# Patient Record
Sex: FEMALE | Race: WHITE | NOT HISPANIC OR LATINO | Employment: OTHER | URBAN - METROPOLITAN AREA
[De-identification: names, ages, dates, MRNs, and addresses within clinical notes are randomized per-mention and may not be internally consistent; named-entity substitution may affect disease eponyms.]

---

## 2020-09-20 ENCOUNTER — TRANSFERRED RECORDS (OUTPATIENT)
Dept: HEALTH INFORMATION MANAGEMENT | Facility: CLINIC | Age: 50
End: 2020-09-20

## 2020-10-02 ENCOUNTER — TRANSFERRED RECORDS (OUTPATIENT)
Dept: HEALTH INFORMATION MANAGEMENT | Facility: CLINIC | Age: 50
End: 2020-10-02

## 2020-10-28 ENCOUNTER — TRANSFERRED RECORDS (OUTPATIENT)
Dept: HEALTH INFORMATION MANAGEMENT | Facility: CLINIC | Age: 50
End: 2020-10-28

## 2020-10-31 ENCOUNTER — TRANSFERRED RECORDS (OUTPATIENT)
Dept: HEALTH INFORMATION MANAGEMENT | Facility: CLINIC | Age: 50
End: 2020-10-31

## 2020-11-14 ENCOUNTER — TRANSFERRED RECORDS (OUTPATIENT)
Dept: HEALTH INFORMATION MANAGEMENT | Facility: CLINIC | Age: 50
End: 2020-11-14

## 2020-11-15 ENCOUNTER — TRANSFERRED RECORDS (OUTPATIENT)
Dept: HEALTH INFORMATION MANAGEMENT | Facility: CLINIC | Age: 50
End: 2020-11-15

## 2020-11-30 ENCOUNTER — TRANSFERRED RECORDS (OUTPATIENT)
Dept: HEALTH INFORMATION MANAGEMENT | Facility: CLINIC | Age: 50
End: 2020-11-30

## 2020-11-30 ENCOUNTER — MEDICAL CORRESPONDENCE (OUTPATIENT)
Dept: HEALTH INFORMATION MANAGEMENT | Facility: CLINIC | Age: 50
End: 2020-11-30

## 2020-12-03 ENCOUNTER — REFERRAL (OUTPATIENT)
Dept: TRANSPLANT | Facility: CLINIC | Age: 50
End: 2020-12-03

## 2020-12-03 VITALS — WEIGHT: 180 LBS | BODY MASS INDEX: 28.25 KG/M2 | HEIGHT: 67 IN

## 2020-12-03 DIAGNOSIS — K70.30 ALCOHOLIC CIRRHOSIS (H): Primary | ICD-10-CM

## 2020-12-03 DIAGNOSIS — K70.31 ALCOHOLIC CIRRHOSIS OF LIVER WITH ASCITES (H): ICD-10-CM

## 2020-12-03 DIAGNOSIS — K76.6 PORTAL HYPERTENSION (H): ICD-10-CM

## 2020-12-03 PROBLEM — D61.818 PANCYTOPENIA (H): Status: ACTIVE | Noted: 2020-09-08

## 2020-12-03 PROBLEM — D75.89 MACROCYTOSIS WITHOUT ANEMIA: Status: ACTIVE | Noted: 2017-05-19

## 2020-12-03 PROBLEM — I82.629 ACUTE DEEP VEIN THROMBOSIS (DVT) OF UPPER EXTREMITY (H): Status: ACTIVE | Noted: 2020-09-08

## 2020-12-03 PROBLEM — J94.8 HYDROTHORAX: Status: ACTIVE | Noted: 2020-09-13

## 2020-12-03 PROBLEM — N18.9 CKD (CHRONIC KIDNEY DISEASE): Status: ACTIVE | Noted: 2020-11-14

## 2020-12-03 PROBLEM — K74.60 CIRRHOSIS OF LIVER WITH ASCITES (H): Status: ACTIVE | Noted: 2020-09-08

## 2020-12-03 PROBLEM — E87.6 HYPOKALEMIA: Status: ACTIVE | Noted: 2020-09-08

## 2020-12-03 PROBLEM — N18.9 ACUTE KIDNEY INJURY SUPERIMPOSED ON CHRONIC KIDNEY DISEASE (H): Status: ACTIVE | Noted: 2020-11-14

## 2020-12-03 PROBLEM — D53.9 MACROCYTIC ANEMIA: Status: ACTIVE | Noted: 2020-10-01

## 2020-12-03 PROBLEM — K76.82 ACUTE HEPATIC ENCEPHALOPATHY (H): Status: ACTIVE | Noted: 2020-09-10

## 2020-12-03 PROBLEM — D64.9 ACUTE ANEMIA: Status: ACTIVE | Noted: 2020-10-02

## 2020-12-03 PROBLEM — Z86.718 HISTORY OF DVT (DEEP VEIN THROMBOSIS): Status: ACTIVE | Noted: 2020-10-30

## 2020-12-03 PROBLEM — K31.89 PORTAL HYPERTENSIVE GASTROPATHY (H): Status: ACTIVE | Noted: 2020-10-02

## 2020-12-03 PROBLEM — R18.8 CIRRHOSIS OF LIVER WITH ASCITES (H): Status: ACTIVE | Noted: 2020-09-08

## 2020-12-03 PROBLEM — D62 ANEMIA ASSOCIATED WITH ACUTE BLOOD LOSS: Status: ACTIVE | Noted: 2020-10-30

## 2020-12-03 PROBLEM — I87.2 VENOUS INCOMPETENCE: Status: ACTIVE | Noted: 2017-02-15

## 2020-12-03 PROBLEM — K21.9 GASTROESOPHAGEAL REFLUX DISEASE WITHOUT ESOPHAGITIS: Status: ACTIVE | Noted: 2017-02-14

## 2020-12-03 PROBLEM — J96.01 ACUTE HYPOXEMIC RESPIRATORY FAILURE (H): Status: ACTIVE | Noted: 2020-09-09

## 2020-12-03 PROBLEM — K92.2 GI (GASTROINTESTINAL BLEED): Status: ACTIVE | Noted: 2020-10-30

## 2020-12-03 SDOH — HEALTH STABILITY: MENTAL HEALTH: HOW MANY STANDARD DRINKS CONTAINING ALCOHOL DO YOU HAVE ON A TYPICAL DAY?: NOT ASKED

## 2020-12-03 SDOH — HEALTH STABILITY: MENTAL HEALTH: HOW OFTEN DO YOU HAVE A DRINK CONTAINING ALCOHOL?: NOT ASKED

## 2020-12-03 SDOH — HEALTH STABILITY: MENTAL HEALTH: HOW OFTEN DO YOU HAVE 6 OR MORE DRINKS ON ONE OCCASION?: NOT ASKED

## 2020-12-03 ASSESSMENT — MIFFLIN-ST. JEOR: SCORE: 1469.1

## 2020-12-03 NOTE — LETTER
12/3/2020    Sarah Fisher  [address] [facility] [address]      Dear Sarah,    Thank you for your interest in the Transplant Center at [facility], [facility]. We look forward to being a part of your care team and assisting you through the transplant process.    As we discussed, your transplant coordinator is Kiki Sutherland (Liver).  You may call your coordinator at any time with questions or concerns call [phone].    Please complete the following.    1. Fill out and return the enclosed forms    Authorization for Electronic Communication    Authorization to Discuss Protected Health Information    Authorization for Release of Protected Health Information    Authorization for Care Everywhere Release of Information    2. Sign up for:    Genlot, access to your electronic medical record (see enclosed pamphlet)    [email or web address], a transplant education website    You can use these tools to learn more about your transplant, communicate with your care team, and track your medical details      Sincerely,  Solid Organ Transplant  [facility], [facility]    cc: Referring Physician and PCP

## 2020-12-03 NOTE — TELEPHONE ENCOUNTER
Patient was asked the following questions during liver intake call.     Referring Provider: Rehan Escobar NP  Referring Diagnosis: Alcoholic Cirrhosis of the Liver.   PCP: Dr. Raven Weaver     1)Do you know why you have liver disease: Yes             If Alcoholic Cirrhosis is present when was your last drink: 6/2020             Have you ever been through treatment for alcohol: No, but sees therapist at [facility] regularly.    2) Presence of Ascites: Yes Paracentesis: Yes  3) Presence of Hepatic Encephalopathy: No Medications: No  4) History of GI Bleeding: No  5) Oxygen Use: None  6) EGD: Yes Where: [facility] When: 2020  7) Colonoscopy: No, but scheduled for 12/4/2020 at [facility].   8) MELD Score: 23  9) Insurance information: [employer] [facility]       Policy lao: Self       Subscriber/policy/ID number: [id number]      Group Number: [id number]    Referral intake process completed.  Patient is aware that after financial approval is received, medical records will be requested.   Patient confirmed for a callback from transplant coordinator on 12/10/2020.  Tentative evaluation date TBD.    Confirmed coordinator will discuss evaluation process in more detail at the time of their call.   Patient is aware of the need to arrange age appropriate cancer screening, vaccinations, and dental care.  Reminded patient to complete questionnaire, complete medical records release, and review packet prior to evaluation visit .  Assessed patient for special needs (ie--wheelchair, assistance, guardian, and ):  None   Patient instructed to call [phone] with questions.     Patient gave verbal consent during intake call to obtain medical records and documents outside of [facility]:  Yes    CLAUDIO Koch, LPN   Solid Organ Transplant

## 2020-12-04 ENCOUNTER — TRANSFERRED RECORDS (OUTPATIENT)
Dept: MULTI SPECIALTY CLINIC | Facility: CLINIC | Age: 50
End: 2020-12-04
Payer: COMMERCIAL

## 2020-12-08 ENCOUNTER — TRANSFERRED RECORDS (OUTPATIENT)
Dept: HEALTH INFORMATION MANAGEMENT | Facility: CLINIC | Age: 50
End: 2020-12-08

## 2020-12-08 NOTE — TELEPHONE ENCOUNTER
Spoke to her mother who reports Sarah is inpatient at outside hospital. MELD  29: T bili 4.1, INR 2.9, Creat 1.35, Na 132. Ask that mother notify this RN when patient is discharged. Per electronic records it appears communication between care teams has occurred but reassured this RN will verify.      50 year old with decomponesated alcoholic cirrhosis diagnosed in 2020 when patient became jaundice. Mother reports May 2020, no treatment, quit on own.    MELD 23-27 in 20 with T bili highest in  of 17,  was 13 and now ranging 3.2-5.7 in past few months. INR 2.1.     Ascites - diuretic managed   Taps - abd and  thora this week  HE - no  GIB - yes  EGD -  Colonoscopy -      Social History  Lives with alone and son, since  with [employer]son  Workin/[employer]- [employer]  ADL's:independent     PMH:  DVT- arm     Surgical History: C section    Nicotine:    NA       Alcohol/non prescribed drugs:     Plan:  Full evaluation.

## 2020-12-11 NOTE — TELEPHONE ENCOUNTER
Spoke to Evelina, bedside RN in CCU. Patient started on Midodrine and Levo being weaned off. Off Bipap but labored breathing. Will ensure care teams have communicated as needed. Contact information provided. Inpatient hepatology service updated.

## 2020-12-14 ENCOUNTER — HOSPITAL ENCOUNTER (INPATIENT)
Facility: CLINIC | Age: 50
LOS: 5 days | Discharge: HOME | End: 2020-12-19
Attending: INTERNAL MEDICINE | Admitting: INTERNAL MEDICINE
Payer: COMMERCIAL

## 2020-12-14 DIAGNOSIS — K70.31 ALCOHOLIC CIRRHOSIS OF LIVER WITH ASCITES (H): ICD-10-CM

## 2020-12-14 DIAGNOSIS — K72.10 CHRONIC LIVER FAILURE WITHOUT HEPATIC COMA (H): ICD-10-CM

## 2020-12-14 DIAGNOSIS — I95.9 HYPOTENSION, UNSPECIFIED HYPOTENSION TYPE: Primary | ICD-10-CM

## 2020-12-14 PROBLEM — K72.90 LIVER FAILURE (H): Status: ACTIVE | Noted: 2020-12-14

## 2020-12-14 LAB
ALBUMIN SERPL-MCNC: 2.5 G/DL (ref 3.4–5)
ALP SERPL-CCNC: 118 U/L (ref 40–150)
ALT SERPL W P-5'-P-CCNC: 12 U/L (ref 0–50)
ANION GAP SERPL CALCULATED.3IONS-SCNC: 5 MMOL/L (ref 3–14)
AST SERPL W P-5'-P-CCNC: 33 U/L (ref 0–45)
BASOPHILS # BLD AUTO: 0.1 10E9/L (ref 0–0.2)
BASOPHILS NFR BLD AUTO: 0.5 %
BILIRUB SERPL-MCNC: 5.9 MG/DL (ref 0.2–1.3)
BUN SERPL-MCNC: 10 MG/DL (ref 7–30)
CALCIUM SERPL-MCNC: 8.5 MG/DL (ref 8.5–10.1)
CHLORIDE SERPL-SCNC: 106 MMOL/L (ref 94–109)
CO2 SERPL-SCNC: 30 MMOL/L (ref 20–32)
CREAT SERPL-MCNC: 1.3 MG/DL (ref 0.52–1.04)
DIFFERENTIAL METHOD BLD: ABNORMAL
EOSINOPHIL # BLD AUTO: 0.2 10E9/L (ref 0–0.7)
EOSINOPHIL NFR BLD AUTO: 2.2 %
ERYTHROCYTE [DISTWIDTH] IN BLOOD BY AUTOMATED COUNT: 19.7 % (ref 10–15)
GFR SERPL CREATININE-BSD FRML MDRD: 48 ML/MIN/{1.73_M2}
GLUCOSE SERPL-MCNC: 121 MG/DL (ref 70–99)
HCT VFR BLD AUTO: 31.5 % (ref 35–47)
HGB BLD-MCNC: 9.6 G/DL (ref 11.7–15.7)
IMM GRANULOCYTES # BLD: 0.1 10E9/L (ref 0–0.4)
IMM GRANULOCYTES NFR BLD: 0.8 %
INR PPP: 2.04 (ref 0.86–1.14)
LYMPHOCYTES # BLD AUTO: 1.8 10E9/L (ref 0.8–5.3)
LYMPHOCYTES NFR BLD AUTO: 16.1 %
MCH RBC QN AUTO: 31.6 PG (ref 26.5–33)
MCHC RBC AUTO-ENTMCNC: 30.5 G/DL (ref 31.5–36.5)
MCV RBC AUTO: 104 FL (ref 78–100)
MONOCYTES # BLD AUTO: 1.1 10E9/L (ref 0–1.3)
MONOCYTES NFR BLD AUTO: 9.5 %
NEUTROPHILS # BLD AUTO: 7.9 10E9/L (ref 1.6–8.3)
NEUTROPHILS NFR BLD AUTO: 70.9 %
NRBC # BLD AUTO: 0 10*3/UL
NRBC BLD AUTO-RTO: 0 /100
PLATELET # BLD AUTO: 102 10E9/L (ref 150–450)
POTASSIUM SERPL-SCNC: 4.2 MMOL/L (ref 3.4–5.3)
PROT SERPL-MCNC: 5.5 G/DL (ref 6.8–8.8)
RBC # BLD AUTO: 3.04 10E12/L (ref 3.8–5.2)
SODIUM SERPL-SCNC: 141 MMOL/L (ref 133–144)
WBC # BLD AUTO: 11.1 10E9/L (ref 4–11)

## 2020-12-14 PROCEDURE — 93005 ELECTROCARDIOGRAM TRACING: CPT

## 2020-12-14 PROCEDURE — 85610 PROTHROMBIN TIME: CPT | Performed by: STUDENT IN AN ORGANIZED HEALTH CARE EDUCATION/TRAINING PROGRAM

## 2020-12-14 PROCEDURE — 93010 ELECTROCARDIOGRAM REPORT: CPT | Performed by: INTERNAL MEDICINE

## 2020-12-14 PROCEDURE — 250N000013 HC RX MED GY IP 250 OP 250 PS 637: Performed by: STUDENT IN AN ORGANIZED HEALTH CARE EDUCATION/TRAINING PROGRAM

## 2020-12-14 PROCEDURE — 120N000003 HC R&B IMCU UMMC

## 2020-12-14 PROCEDURE — 36415 COLL VENOUS BLD VENIPUNCTURE: CPT | Performed by: STUDENT IN AN ORGANIZED HEALTH CARE EDUCATION/TRAINING PROGRAM

## 2020-12-14 PROCEDURE — 80053 COMPREHEN METABOLIC PANEL: CPT | Performed by: STUDENT IN AN ORGANIZED HEALTH CARE EDUCATION/TRAINING PROGRAM

## 2020-12-14 PROCEDURE — 85025 COMPLETE CBC W/AUTO DIFF WBC: CPT | Performed by: STUDENT IN AN ORGANIZED HEALTH CARE EDUCATION/TRAINING PROGRAM

## 2020-12-14 PROCEDURE — 99207 PR CDG-HISTORY COMP: MEETS EXP. PROB FOCUSED- DOWN CODED LACK OF PFSH: CPT | Performed by: INTERNAL MEDICINE

## 2020-12-14 PROCEDURE — 99221 1ST HOSP IP/OBS SF/LOW 40: CPT | Mod: AI | Performed by: INTERNAL MEDICINE

## 2020-12-14 RX ORDER — SPIRONOLACTONE 25 MG/1
50 TABLET ORAL DAILY
Status: DISCONTINUED | OUTPATIENT
Start: 2020-12-15 | End: 2020-12-18

## 2020-12-14 RX ORDER — MIDODRINE HYDROCHLORIDE 5 MG/1
5 TABLET ORAL
Status: DISCONTINUED | OUTPATIENT
Start: 2020-12-14 | End: 2020-12-19 | Stop reason: HOSPADM

## 2020-12-14 RX ORDER — LIDOCAINE 40 MG/G
CREAM TOPICAL
Status: DISCONTINUED | OUTPATIENT
Start: 2020-12-14 | End: 2020-12-19 | Stop reason: HOSPADM

## 2020-12-14 RX ORDER — LEVOTHYROXINE SODIUM 50 UG/1
50 TABLET ORAL
Status: DISCONTINUED | OUTPATIENT
Start: 2020-12-15 | End: 2020-12-19 | Stop reason: HOSPADM

## 2020-12-14 RX ORDER — PANTOPRAZOLE SODIUM 40 MG/1
40 TABLET, DELAYED RELEASE ORAL
Status: DISCONTINUED | OUTPATIENT
Start: 2020-12-15 | End: 2020-12-19 | Stop reason: HOSPADM

## 2020-12-14 RX ORDER — LACTULOSE 10 G/15ML
20 SOLUTION ORAL 4 TIMES DAILY
Status: DISCONTINUED | OUTPATIENT
Start: 2020-12-14 | End: 2020-12-19 | Stop reason: HOSPADM

## 2020-12-14 RX ORDER — ACETAMINOPHEN 500 MG
500 TABLET ORAL EVERY 6 HOURS PRN
Status: DISCONTINUED | OUTPATIENT
Start: 2020-12-14 | End: 2020-12-19 | Stop reason: HOSPADM

## 2020-12-14 RX ORDER — SUCRALFATE 1 G/1
1 TABLET ORAL
Status: DISCONTINUED | OUTPATIENT
Start: 2020-12-15 | End: 2020-12-19 | Stop reason: HOSPADM

## 2020-12-14 RX ORDER — FUROSEMIDE 20 MG
20 TABLET ORAL DAILY
Status: DISCONTINUED | OUTPATIENT
Start: 2020-12-15 | End: 2020-12-18

## 2020-12-14 RX ADMIN — MIDODRINE HYDROCHLORIDE 5 MG: 5 TABLET ORAL at 20:10

## 2020-12-14 RX ADMIN — RIFAXIMIN 550 MG: 550 TABLET ORAL at 20:13

## 2020-12-14 ASSESSMENT — ACTIVITIES OF DAILY LIVING (ADL): ADLS_ACUITY_SCORE: 17

## 2020-12-14 ASSESSMENT — MIFFLIN-ST. JEOR: SCORE: 1466.63

## 2020-12-14 NOTE — PROGRESS NOTES
[facility]  Internal Medicine Transfer Triage Note     Date of call: December 13, 2020  Time of call: 9:PM   Current Location: [facility]      Reason for Transfer: Hepatology consult patient  Diagnosis:  End-stage liver disease     Summary 50-year-old female medical history significant for end-stage liver disease with recurrent admission blood loss anemia, recurrent left-sided pleural effusion s/p thora , DVT was admitted to [facility] on dec 8 and was transferred to the ICU on December 9 following large-volume thoracentesis requiring pressors and BiPAP support.  Infection work-up was negative including SBP patient was initially accepted to [facility] ICU bed but she has been off BiPAP and pressor for the last  12 hours now.  Patient will be transferring to IMC bed to the [facility] for further hepatology and  transplant work-up.  Currently patient is in the [facility] with stable hemodynamics blood pressure is 104/72 now on midodrine,  satting well on room air not febrile not tachycardic.  During her hospitalization at [place] patient has received 2 units of packed RBC her current hemoglobin is 8.6 creatinine is 1.08.      Outside Records: available      Expected Time of Arrival for Transfer: 8-24 hours     Recommendations for Management and Stabilization: Not needed      Jairo Granados MD  Hospitalist  [facility]    Departments of Medicine   Pager: [phone]      Updated Provider handoff 12/14 at 1 pm   Patient reportedly continuing to improve, has been off pressors for >24 hours and stabilized on room air. Her BPs have ranged in the 110s, on midodrine. Her last Hgb is 8.9, trending up the past few days without signs of bleeding. Patient accepted for a med surg bed with telemetry, no longer needs IMC.    Kayla Sandhu MD   SOC triage

## 2020-12-14 NOTE — PROGRESS NOTES
Admission          12/14/2020  4:19 PM  -----------------------------------------------------------  Reason for admission: txp workup  Primary team notified of pt arrival. - not seen yet  Admitted from: [facility]  Via: stretcher  Accompanied by: N/A  Belongings: Placed in closet  Admission Profile: partially complete  Teaching: orientation to unit and call light- call light within reach, call don't fall, use of console, meal times, when to call for the RN, and enforced importance of safety   Access: PIV x1  Telemetry:Placed on pt  Ht./Wt.: needs completion  Code Status verified on armband: no - need provider to see pt and put in code status  2 RN Skin Assessment Completed By: Zoraida RN & Chichi RN  Med Rec completed: no- need to call niece in an hour  Bed surface reassessed with algorithm and charted: no  New bed surface ordered: no    Pt status:    Temp:  [98  F (36.7  C)] 98  F (36.7  C)  Pulse:  [91] 91  Resp:  [18] 18  BP: (123)/(73) 123/73  SpO2:  [96 %] 96 %

## 2020-12-15 ENCOUNTER — APPOINTMENT (OUTPATIENT)
Dept: OCCUPATIONAL THERAPY | Facility: CLINIC | Age: 50
End: 2020-12-15
Attending: INTERNAL MEDICINE
Payer: COMMERCIAL

## 2020-12-15 ENCOUNTER — APPOINTMENT (OUTPATIENT)
Dept: ULTRASOUND IMAGING | Facility: CLINIC | Age: 50
End: 2020-12-15
Attending: INTERNAL MEDICINE
Payer: COMMERCIAL

## 2020-12-15 ENCOUNTER — APPOINTMENT (OUTPATIENT)
Dept: GENERAL RADIOLOGY | Facility: CLINIC | Age: 50
End: 2020-12-15
Attending: INTERNAL MEDICINE
Payer: COMMERCIAL

## 2020-12-15 LAB
ALBUMIN SERPL-MCNC: 2.2 G/DL (ref 3.4–5)
ALP SERPL-CCNC: 110 U/L (ref 40–150)
ALT SERPL W P-5'-P-CCNC: 12 U/L (ref 0–50)
ANION GAP SERPL CALCULATED.3IONS-SCNC: 5 MMOL/L (ref 3–14)
AST SERPL W P-5'-P-CCNC: 19 U/L (ref 0–45)
BILIRUB SERPL-MCNC: 5.6 MG/DL (ref 0.2–1.3)
BUN SERPL-MCNC: 11 MG/DL (ref 7–30)
CALCIUM SERPL-MCNC: 8.2 MG/DL (ref 8.5–10.1)
CHLORIDE SERPL-SCNC: 107 MMOL/L (ref 94–109)
CO2 SERPL-SCNC: 28 MMOL/L (ref 20–32)
CREAT SERPL-MCNC: 1.17 MG/DL (ref 0.52–1.04)
ERYTHROCYTE [DISTWIDTH] IN BLOOD BY AUTOMATED COUNT: 19.2 % (ref 10–15)
GFR SERPL CREATININE-BSD FRML MDRD: 54 ML/MIN/{1.73_M2}
GLUCOSE SERPL-MCNC: 86 MG/DL (ref 70–99)
HCT VFR BLD AUTO: 28.9 % (ref 35–47)
HGB BLD-MCNC: 8.9 G/DL (ref 11.7–15.7)
INR PPP: 2.14 (ref 0.86–1.14)
INTERPRETATION ECG - MUSE: NORMAL
MCH RBC QN AUTO: 32 PG (ref 26.5–33)
MCHC RBC AUTO-ENTMCNC: 30.8 G/DL (ref 31.5–36.5)
MCV RBC AUTO: 104 FL (ref 78–100)
PLATELET # BLD AUTO: 83 10E9/L (ref 150–450)
POTASSIUM SERPL-SCNC: 3.8 MMOL/L (ref 3.4–5.3)
PROT SERPL-MCNC: 5.1 G/DL (ref 6.8–8.8)
RBC # BLD AUTO: 2.78 10E12/L (ref 3.8–5.2)
SODIUM SERPL-SCNC: 140 MMOL/L (ref 133–144)
WBC # BLD AUTO: 8.5 10E9/L (ref 4–11)

## 2020-12-15 PROCEDURE — 97165 OT EVAL LOW COMPLEX 30 MIN: CPT | Mod: GO

## 2020-12-15 PROCEDURE — 97530 THERAPEUTIC ACTIVITIES: CPT | Mod: GO

## 2020-12-15 PROCEDURE — 36415 COLL VENOUS BLD VENIPUNCTURE: CPT | Performed by: STUDENT IN AN ORGANIZED HEALTH CARE EDUCATION/TRAINING PROGRAM

## 2020-12-15 PROCEDURE — 71046 X-RAY EXAM CHEST 2 VIEWS: CPT | Mod: 26 | Performed by: RADIOLOGY

## 2020-12-15 PROCEDURE — 120N000002 HC R&B MED SURG/OB UMMC

## 2020-12-15 PROCEDURE — 250N000013 HC RX MED GY IP 250 OP 250 PS 637: Performed by: STUDENT IN AN ORGANIZED HEALTH CARE EDUCATION/TRAINING PROGRAM

## 2020-12-15 PROCEDURE — 99254 IP/OBS CNSLTJ NEW/EST MOD 60: CPT | Mod: GC | Performed by: STUDENT IN AN ORGANIZED HEALTH CARE EDUCATION/TRAINING PROGRAM

## 2020-12-15 PROCEDURE — 99233 SBSQ HOSP IP/OBS HIGH 50: CPT | Mod: GC | Performed by: STUDENT IN AN ORGANIZED HEALTH CARE EDUCATION/TRAINING PROGRAM

## 2020-12-15 PROCEDURE — 97535 SELF CARE MNGMENT TRAINING: CPT | Mod: GO

## 2020-12-15 PROCEDURE — 80053 COMPREHEN METABOLIC PANEL: CPT | Performed by: STUDENT IN AN ORGANIZED HEALTH CARE EDUCATION/TRAINING PROGRAM

## 2020-12-15 PROCEDURE — 999N000147 HC STATISTIC PT IP EVAL DEFER

## 2020-12-15 PROCEDURE — 71046 X-RAY EXAM CHEST 2 VIEWS: CPT

## 2020-12-15 PROCEDURE — 85610 PROTHROMBIN TIME: CPT | Performed by: STUDENT IN AN ORGANIZED HEALTH CARE EDUCATION/TRAINING PROGRAM

## 2020-12-15 PROCEDURE — 85027 COMPLETE CBC AUTOMATED: CPT | Performed by: STUDENT IN AN ORGANIZED HEALTH CARE EDUCATION/TRAINING PROGRAM

## 2020-12-15 PROCEDURE — 93975 VASCULAR STUDY: CPT | Mod: 26 | Performed by: RADIOLOGY

## 2020-12-15 PROCEDURE — 93975 VASCULAR STUDY: CPT

## 2020-12-15 RX ORDER — ZINC SULFATE 50(220)MG
220 CAPSULE ORAL DAILY
Status: DISCONTINUED | OUTPATIENT
Start: 2020-12-16 | End: 2020-12-19 | Stop reason: HOSPADM

## 2020-12-15 RX ORDER — MULTIPLE VITAMINS W/ MINERALS TAB 9MG-400MCG
1 TAB ORAL DAILY
Status: DISCONTINUED | OUTPATIENT
Start: 2020-12-16 | End: 2020-12-19 | Stop reason: HOSPADM

## 2020-12-15 RX ORDER — FOLIC ACID 1 MG/1
1 TABLET ORAL DAILY
Status: DISCONTINUED | OUTPATIENT
Start: 2020-12-16 | End: 2020-12-19 | Stop reason: HOSPADM

## 2020-12-15 RX ADMIN — LACTULOSE 20 G: 10 SOLUTION ORAL at 12:05

## 2020-12-15 RX ADMIN — SUCRALFATE 1 G: 1 TABLET ORAL at 17:10

## 2020-12-15 RX ADMIN — MIDODRINE HYDROCHLORIDE 5 MG: 5 TABLET ORAL at 12:05

## 2020-12-15 RX ADMIN — MIDODRINE HYDROCHLORIDE 5 MG: 5 TABLET ORAL at 17:09

## 2020-12-15 RX ADMIN — PANTOPRAZOLE SODIUM 40 MG: 40 TABLET, DELAYED RELEASE ORAL at 12:05

## 2020-12-15 RX ADMIN — LACTULOSE 20 G: 10 SOLUTION ORAL at 17:09

## 2020-12-15 RX ADMIN — FUROSEMIDE 20 MG: 20 TABLET ORAL at 12:05

## 2020-12-15 RX ADMIN — SUCRALFATE 1 G: 1 TABLET ORAL at 12:05

## 2020-12-15 RX ADMIN — RIFAXIMIN 550 MG: 550 TABLET ORAL at 12:05

## 2020-12-15 RX ADMIN — PANTOPRAZOLE SODIUM 40 MG: 40 TABLET, DELAYED RELEASE ORAL at 17:10

## 2020-12-15 RX ADMIN — RIFAXIMIN 550 MG: 550 TABLET ORAL at 22:34

## 2020-12-15 RX ADMIN — LEVOTHYROXINE SODIUM 50 MCG: 50 TABLET ORAL at 12:05

## 2020-12-15 ASSESSMENT — ACTIVITIES OF DAILY LIVING (ADL)
ADLS_ACUITY_SCORE: 19

## 2020-12-15 ASSESSMENT — MIFFLIN-ST. JEOR: SCORE: 1456.63

## 2020-12-15 NOTE — PROGRESS NOTES
Admitted/transferred from: Unit 6B at 0115.  2 RN full   skin assessment completed by Magy Mercedes, RN and Mignon Iniguez RN.  Skin assessment finding: issues found Noted purple bruising seen on pt's Right wrist, Left abd/flank are, Right upper bicep-pt stated PICC line d/c'd from OSH. 2x2 dressing and tranparent dressing intact over site.  Skin jaundiced, sclera jaundiced.  Interventions/actions: No intervention at this time. No open areas noted.     Will continue to monitor.

## 2020-12-15 NOTE — PLAN OF CARE
"NEURO: A&O x4  VITALS: Blood pressure 98/61, pulse 96, temperature 97.9  F (36.6  C), temperature source Oral, resp. rate 18, height 1.702 m (5' 7\"), weight 81.4 kg (179 lb 7.3 oz), SpO2 93 %.  PAIN: Denies  CARDIAC: SR  RESPIRATORY: RA  GI: Bowel sounds active, no BM, adequate intake, NPO at midnight   : Adequate urine output prior to daniels removal, DTV at 0230  LDA: PIV   IV: Saline locked  ACTIVITY: Up with Ax1-SBA with FWW  GOALS: Pee by DTV time    "

## 2020-12-15 NOTE — CONSULTS
[facility]    Hepatology Consult    Requesting provider: Dr Price, Internal Medicine    Consult requested for management of decompensated liver cirrhosis      HPI:  50 year old female with history of hypothyroidism, GERD, alcohol cirrhosis complicated by hepatic encephalopathy, GAVE recurrent hepatic hydrothorax admitted for further management of decompensated liver cirrhosis.    Patient was recently admitted to [facility] when she presented to their ED with weakness and confusion. Was noted to have tangential thoughts, shortness of breath, pleural effusion.  Underwent thoracentesis,  however she had hypotension and respiratory failure after the procedure requiring ICU transfer with BiPAP and required vasopressors.  After clinical improvement, she was transferred for transplant evaluation.    At the time of this interview, patient reports no ongoing symptoms.  She is breathing comfortably on room air at rest.  I did not have her ambulate.  She denies any chest pain currently.  Denies any nausea or vomiting.  Reports weight loss in the last few months, about 40 pounds.  Attributes it to not eating well and poor appetite.      Recent primary care visits admissions at outside hospital:  6/24/2020 -primary care visit.  Main complaint was hemorrhoidal bleeding.  Was noted to be jaundiced.  The concern for alcohol liver disease is raised at that time.  Encouraged to stop drinking.  Patient already stopped drinking at this point after noticing jaundice.  Ultrasound was 7/6 which showed cirrhosis, cavernous transformation of the portal veins.    9/8-9/21: Admitted with hypoxic respiratory failure in the setting of decompensated liver cirrhosis; found to have bilateral pleural effusions.  2 L fluid removed, after removal had hypoxemia and hypotension and required ICU admission.  Fluid cultures were positive for E. coli, treated with Zosyn and oral Augmentin post discharge.  Also had hepatic  encephalopathy     9/30- 10/6: Admitted with worsening respiratory symptoms.  Recurrent bilateral pleural effusions and hypoxemia.  Underwent thoracentesis.  Noted to be hypoxic with ambulation.  Diuretics were increased to 80/200 Lasix/spironolactone.  There was some discussion about Pleurx catheter or hospice, plan was not determined at that time.     10/30-11/1: Admitted with shortness of breath and hematochezia, hemoglobin was down to 4.5, transfused, EGD with PHG and GAVE.  Left-sided pleural effusion, status post thoracentesis.     11/14-11/18: p/w weakness, SOB, anemia. CXR with complete opacification of left hemithorax. S/p PRBC tx. Seems no thoracentesis was performed this admission. Her diuretics were discontinued on discharge due to OCRBIN. Anticoagulation was discontinued (on for DVT).       Cirrhosis History:  Etiology: Likely alcohol-related  Primary Hepatologist: Has not been evaluated in our clinic yet.  Has been followed by [facility] for multiple admissions  MELD-Na: 23  Portal hypertension: Present  HE: Has history of hepatic encephalopathy and was admitted to [place] multiple times with this as above  Ascites: Has history of ascites and hepatohydrothorax; requiring regular thoracentesis; repeated admissions as above for hypoxic respiratory failure secondary to large pleural effusions.  She also has history of SBP on peritoneal fluid from 9/9/2020.  Coagulopathy: INR is elevated at 2.1  Varices: No varices on EGD on December 4.  Portal hypertensive gastropathy.  GAVE treated with APC.  HCC Screening: Recent imaging without any masses  Any Thrombosis: Possible.  Last US: July 2020  Transplant Candidacy: Has not been evaluated yet      MELD-Na score: 23 at 12/15/2020  6:40 AM  MELD score: 23 at 12/15/2020  6:40 AM  Calculated from:  Serum Creatinine: 1.17 mg/dL at 12/15/2020  6:40 AM  Serum Sodium: 140 mmol/L (Rounded to 137 mmol/L) at 12/15/2020  6:40 AM  Total Bilirubin: 5.6 mg/dL at 12/15/2020   "6:40 AM  INR(ratio): 2.14 at 12/15/2020  6:40 AM  Age: 50 years 8 months      Medical hx Surgical hx   GERD  Hypothyroidism  Deep vein thrombosis, on anticoagulation with Eliquis  Decompensated liver cirrhosis       She has no prior history of abdominal surgery       Medications  Current Facility-Administered Medications   Medication Dose Route Frequency    furosemide  20 mg Oral Daily    lactulose  20 g Oral 4x Daily    levothyroxine  50 mcg Oral QAM AC    midodrine  5 mg Oral TID w/meals    pantoprazole  40 mg Oral BID AC    rifaximin  550 mg Oral BID    sodium chloride (PF)  3 mL Intracatheter Q8H    spironolactone  50 mg Oral Daily    sucralfate  1 g Oral BID AC           Allergies  Allergies   Allergen Reactions    Amoxicillin GI Disturbance, Diarrhea, Nausea and Nausea and Vomiting       Family hx Social hx   Reports that she has extensive family history of alcohol use disorder.  3 of her brothers where diagnosis of alcohol use disorders, one of the brothers  with complications related liver disease from alcohol.  Father had colon cancer at age of 62   inflammatory bowel disease, or other forms of liver disease.   She does not smoke, never smoker.      Used to drink alcohol quite heavily, reports that 3 months before , during Covid 19, drank more heavily.  Was drinking at least 4 drinks a day.  Has not drinking for prolonged period of time.  Quit in  \"because she was not feeling right\".  Primary care note from 2024 noted to have jaundice.    Patient had reported to PCP that she had started seeing yellowness in the eyes and jaundice, therefore quit drinking alcohol.  With weakness and weight loss.  Quit with help from his friends and family.  Did not undergo rehab/treatment.    She works as a  for a [employer] in [place].    She has an 11-year-old son.           Review of systems  A 10-point review of systems was negative.      Examination  BP 92/67 (BP Location: Right arm)   " "Pulse 86   Temp 97.6  F (36.4  C) (Oral)   Resp 18   Ht 1.702 m (5' 7\")   Wt 80.4 kg (177 lb 4 oz)   SpO2 94%   BMI 27.76 kg/m      Intake/Output Summary (Last 24 hours) at 12/15/2020 0807  Last data filed at 12/15/2020 0245  Gross per 24 hour   Intake 400 ml   Output 375 ml   Net 25 ml       Gen- well, NAD, A+Ox3, jaundiced.  Generalized muscle mass loss is noted.  Appears older than stated age.  Eye- EOMI, sclera icterus is present  ENT- MMM  Lym- no palpable LAD  CVS- RRR  RS-normal respiratory effort on room air.  Abd-*abdomen is soft, mildly distended.  Nontender.  Extr- no YOEL  Neuro- no asterixis  Skin- no rash  Psych- normal mood    Laboratory  Lab Results   Component Value Date     12/15/2020    POTASSIUM 3.8 12/15/2020    CHLORIDE 107 12/15/2020    CO2 28 12/15/2020    BUN 11 12/15/2020    CR 1.17 12/15/2020       Lab Results   Component Value Date    BILITOTAL 5.6 12/15/2020    ALT 12 12/15/2020    AST 19 12/15/2020    ALKPHOS 110 12/15/2020       Lab Results   Component Value Date    ALBUMIN 2.2 12/15/2020    PROTTOTAL 5.1 12/15/2020        Lab Results   Component Value Date    WBC 8.5 12/15/2020    HGB 8.9 12/15/2020     12/15/2020    PLT 83 12/15/2020       Lab Results   Component Value Date    INR 2.14 12/15/2020     MELD-Na score: 23 at 12/15/2020  6:40 AM  MELD score: 23 at 12/15/2020  6:40 AM  Calculated from:  Serum Creatinine: 1.17 mg/dL at 12/15/2020  6:40 AM  Serum Sodium: 140 mmol/L (Rounded to 137 mmol/L) at 12/15/2020  6:40 AM  Total Bilirubin: 5.6 mg/dL at 12/15/2020  6:40 AM  INR(ratio): 2.14 at 12/15/2020  6:40 AM  Age: 50 years 8 months      Iron studies iron studies from October 2020.  Ferritin of 645.  Iron binding capacity of 188.  42% iron saturation.  Iron of 79.    DAISY was negative.    Hepatitis C negative in September 2020    Hepatitis B: Core antibody negative, surface antigen negative, surface antibody positive quantitative 71.1    Immunoglobulins level, IgE " level was high, IgG was normal, IgA was high, IgM was normal.    Anti-smooth muscle antibody negative    Antimitochondrial antibody negative    EGD 12/4/2020  Impressions/Post-Op Diagnosis:       - Erythema in the esophagus.       - Portal hypertensive gastropathy. Treated with argon plasma coagulation        (APC).       - Congested duodenal mucosa.    Recommendation:       - Continue pantoprazole 40 mg daily and sucralfate 1 gram twice daily.       - Iron supplementation twice daily.       - Avoid NSAIDs.    Colonoscopy 12/4/2020    Impressions/Post-Op Diagnosis:       - Hemorrhoids found on perianal exam.       - The examined portion of the ileum was normal.       - Congested mucosa in the entire examined colon.       - One 4 mm polyp in the cecum, removed with a cold biopsy forceps.        Resected and retrieved.       - Non-bleeding internal hemorrhoids.        Radiology  Abdominal ultrasound 7/6/2020  IMPRESSION:  1.  Cirrhosis and hepatic steatosis without focal mass.  2.  Mild splenomegaly and mild ascites.  3.  Difficult visualization of the portal vein. Consider follow-up with CT or MRI to assess for possible cavernous transformation if indicated clinically        Assessment  50 year old female with history of hypothyroidism, GERD, alcohol cirrhosis complicated by hepatic encephalopathy, GAVE, ascites recurrent hepatic hydrothorax admitted for further management of decompensated liver cirrhosis.    Patient was diagnosed with cirrhosis in June of this year after she found herself being jaundiced after drinking heavily for the preceding 3 months, in addition to her prior long history of alcohol use.  Has quit since June.  Since her diagnosis of cirrhosis, she has had multiple hospitalization at [facility] for complications of decompensated liver cirrhosis including hepatic encephalopathy, GI bleed, recurrent hepatic hydrothorax and resultant respiratory failure.  She also has had complication post  procedurally after the thoracentesis however the exact mechanism of this is not known, consideration include flash pulmonary edema versus hemothorax, especially since most of her thoracentesis in the past have been grossly bloody. She also has history of E. coli that was noted in the pleural fluid which is very concerning.    She now presents to our hospital for further evaluation of her decompensated liver disease possibility of work-up for liver transplantation.  Based on her history, it is appropriate to start her transplantation given her decompensation in the past 6 months.  She has quit alcohol since early June.    Problem list  Acute decompensated liver cirrhosis  Hepatohydrothorax  Ascites, history of SBP  History of GI bleed secondary to GAVE  Hepatic encephalopathy  Macrocytic anemia  Thrombocytopenia  Acute kidney injury      Recommendations  - We will alert our  and pretransplant care coordinator from liver transplant team to start evaluation process  - work-up left pleural effusion further.  Please obtain diagnostic and therapeutic thoracentesis.  She has history of decompensation/hypoxia after paracentesis, so would be cautious, ok to remove < 500 cc.  She also has history of infection in the left pleural effusion so would be very interested in seeing what these cultures show.  If she does have evidence of infection, will need to involve thoracic surgery.  Would not recommend placing chest tube currently because this is a hepatohydrothorax.  - Would be cautious with increasing diuretic dose significantly.  Baseline creatinine of 0.5-0.6 back in September.  Currently at 1.17.  Has been as high as 2.36 in Nov.  Has had intermittent rise and fall, likely secondary to repeated thoracentesis and fluid shifts leading to prerenal injury, at risk for ATN with large volume thoracentesis  - Has had episodes of profound anemia, suspected to be GI bleed in the past.  No current evidence of ongoing  loss.  Could also be bleeding into the thoracic cavity, and is waiting for diagnostic/therapeutic thoracentesis.   - Okay to continue rifaximin and lactulose  -We will start SBP prophylaxis given her prior history.  Has not been on prophylaxis at home.  - Please obtain pharmacy consult for medication reconciliation  - Please obtain nutrition consult given her significant weight loss and sarcopenia  -Please obtain vitamin B12 and folate levels.  Start thiamine, folate and multivitamin supplements.  Start zinc supplement.    Patient was seen and discussed with Dr Austin MELENDEZ MD  Gastroenterology Fellow  Division of Gastroenterology, Hepatology and Nutrition  [facility]  Text page Pager [phone]

## 2020-12-15 NOTE — PROGRESS NOTES
Transfer to   Via:bed  Reason for transfer: Pt appropriate for 7C with improved condition  Belongings: Moved with pt  Chart: Brought with pt  Medications: Meds brought with pt.    Report given to Skyla RAMOS.    Rohith Lazcano RN

## 2020-12-15 NOTE — PROGRESS NOTES
[facility]     Progress Note - [place] 4 Service        Date of Admission:  12/14/2020    Assessment & Plan       Sarah Fisher is a 50 year old female with PMH GERD, subclinical hypothyroidism, EtOH cirrhosis complicated by HE, GAVE, recurrent hepatic hydrothorax transferred from OSH for liver transplant evaluation.     Decompensated Cirrhosis c/b HE, GAVE, hepatic hydrothorax  Admitted 12/14 for liver transplant workup.   Etiology: EtOH, sober since end of June 2020  MELD-Na: 23  Portal HTN: yes, no TIPS procedure (likely CI due to HE)  HE: yes, lactulose and rifaximin scheduled  Ascites: yes, last para 12/8. 0.7L removed. No SBP.   EV: last EGD 12/4 with NO varices. PHG tx with APC. No residual GAVE identified.   Coag/Thrombocytopenia: yes - INR 2.04, plts 102  HCC screening: none, last U/S 12/14  Transplant candidacy: reports last drink 188 days PTA (6.2 months)  - Hepatology consulted for transplant evaluation, appreciate recs   - Diuretics: spironolactone 50mg, lasix 20mg. Titrate as able.  - CXR 12/15 with large left pleural effusion, without mediastinal shift. Given response to most recent thora (hypotension requiring pressors), would hold off on repeat thoracentesis unless develops significant increasing oxygen requirements.   - Lactulose 20g TID, goal 3-4 BMs daily  - Continue PTA rifaximin  - Avoid hepatotoxic meds  - 2g Na diet  - Daily MELD labs    Deconditioning  Related to recent ICU admission.  - PT/OT consulted     Chronic Issues:  Hypothyroidism: Continue PTA levothyroxine  GERD: Continue PTA PPI  Hypotension: Continue PTA midodrine        Diet: Combination Diet 2 gm NA Diet    Fluids: Not indicated  Lines: PIV  DVT Prophylaxis: Pneumatic Compression Devices  Aguiar Catheter: not present  Code Status: Full Code           Disposition Plan   Expected discharge: 2 - 3 days, recommended to prior living arrangement once transplant evaluation  completed.  Entered: Gino Price MD 12/15/2020, 4:46 PM       The patient's care was discussed with the Attending Physician, Dr. Scott Woodward, Bedside Nurse, Patient and GI Consultant.    Gino Price MD  [address]   Please see sign in/sign out for up to date coverage information  ______________________________________________________________________    Interval History   No acute events overnight, patient seen and examined at bedside. In good spirits. Tolerating PO. Denies abdominal pain, nausea, vomiting. Had only one BM thus far today.      Data reviewed today: I reviewed all medications, new labs and imaging results over the last 24 hours.    CXR 12/15: Large left sided pleural effusion without mediastinal shift  Abdominal U/S with doppler 12/15:  1.  Cirrhotic morphology of the liver.  2.  Reversal of flow in the main portal vein, right portal vein, and  recanalization of the paraumbilical vein, compatible with portal  hypertension  3.  Decreased resistive index of the hepatic artery and increased peak  systolic velocity possibly secondary to stenosis or shunting.  4.  Partially visualized right pleural effusion and trace perihepatic  ascites.     Physical Exam   Vital Signs: Temp: 97.5  F (36.4  C) Temp src: Axillary BP: 98/59 Pulse: 71   Resp: 18 SpO2: 98 % O2 Device: None (Room air) Oxygen Delivery: 1 LPM  Weight: 177 lbs 4 oz  GEN: notable muscle wasting of bilateral UE, jaundiced  HEENT: EOMI, MMM, ++ scleral icterus  CV: RRR, Warm, well-perfused extremities, no JVD  RESP: absent breath sounds left lung, clear auscultation on right  GI: soft, non-distended, mild epigastric tenderness on palpation  MSK: +2 BL LE edema  Skin: Warm, dry. No rashes/lesions  Neuro: no focal deficits, no asterixis  Psych: Appropriate mood and affect.    Data   Recent Labs   Lab 12/15/20  0640 12/14/20 1959   WBC 8.5 11.1*   HGB 8.9* 9.6*   * 104*   PLT  83* 102*   INR 2.14* 2.04*    141   POTASSIUM 3.8 4.2   CHLORIDE 107 106   CO2 28 30   BUN 11 10   CR 1.17* 1.30*   ANIONGAP 5 5   EMMY 8.2* 8.5   GLC 86 121*   ALBUMIN 2.2* 2.5*   PROTTOTAL 5.1* 5.5*   BILITOTAL 5.6* 5.9*   ALKPHOS 110 118   ALT 12 12   AST 19 33

## 2020-12-15 NOTE — PROGRESS NOTES
12/15/20 0550   Quick Adds   Type of Visit Initial Occupational Therapy Evaluation   Living Environment   People in home parent(s);child(michelle), adult;other relative(s)   Current Living Arrangements house   Home Accessibility stairs to enter home;stairs within home   Number of Stairs, Main Entrance other (see comments)  (12)   Stair Railings, Main Entrance railings on both sides of stairs   Number of Stairs, Within Home, Primary other (see comments)  (Pt reports she does not utilize these stairs.)   Transportation Anticipated family or friend will provide   Living Environment Comments Pt lives in a basement apartment at her mom's house. Other people in home include her son and her niece. Pt utilizes a tub.shower with a shower chair, also has a toilet riser.   Self-Care   Usual Activity Tolerance moderate   Current Activity Tolerance fair   Regular Exercise No   Equipment Currently Used at Home walker, standard;shower chair;raised toilet seat   Activity/Exercise/Self-Care Comment Pt uses a FWW at baseline.   Disability/Function   Hearing Difficulty or Deaf no   Wear Glasses or Blind yes   Vision Management glasses   Concentrating, Remembering or Making Decisions Difficulty no   Difficulty Communicating no   Difficulty Eating/Swallowing no   Walking or Climbing Stairs Difficulty yes   Walking or Climbing Stairs ambulation difficulty, requires equipment   Mobility Management FWW   Dressing/Bathing Difficulty no   Toileting issues no   Doing Errands Independently Difficulty (such as shopping) yes   Fall history within last six months no   Change in Functional Status Since Onset of Current Illness/Injury yes   General Information   Onset of Illness/Injury or Date of Surgery 12/14/20   Referring Physician Gino Price MD   Patient/Family Therapy Goal Statement (OT) Pt wishes to return home.   Additional Occupational Profile Info/Pertinent History of Current Problem Sarah Fisher is a 50 year old female with PMH  GERD, subclinical hypothyroidism, EtOH cirrhosis complicated by HE, GAVE, recurrent hepatic hydrothorax transferred from OSH for liver transplant evaluation.    Existing Precautions/Restrictions no known precautions/restrictions   General Observations and Info Pt supine in bed upon arrival, agreeable to therapy.   Cognitive Status Examination   Orientation Status orientation to person, place and time   Sensory   Sensory Comments Pt reports no numbness or tingling in hands or feet.   Pain Assessment   Patient Currently in Pain No   Integumentary/Edema   Integumentary/Edema no deficits were identifed   Range of Motion Comprehensive   General Range of Motion bilateral upper extremity ROM WFL   Strength Comprehensive (MMT)   General Manual Muscle Testing (MMT) Assessment no strength deficits identified   Instrumental Activities of Daily Living (IADL)   IADL Comments Pt reports her mom does all the cooking and laundry. Pt's niece manages her medication and they have a cleaning lady. Pt does not drive and is not currently working.   Clinical Impression   Criteria for Skilled Therapeutic Interventions Met (OT) yes;meets criteria   OT Diagnosis Eval & treat   OT Problem List-Impairments impacting ADL activity tolerance impaired;mobility   Assessment of Occupational Performance 1-3 Performance Deficits   Identified Performance Deficits Transfers, stairs   Planned Therapy Interventions (OT) progressive activity/exercise;transfer training   Clinical Decision Making Complexity (OT) low complexity   Therapy Frequency (OT) 6x/week   Predicted Duration of Therapy 1 week   Anticipated Equipment Needs Upon Discharge (OT) other (see comments)  (Will assess after further therapy.)   Risks and Benefits of Treatment have been explained. Yes   Patient, Family & other staff in agreement with plan of care Yes   OT Discharge Planning    OT Discharge Recommendation (DC Rec) Home with assist   OT Rationale for DC Rec Pt is functioning near  baseline. Will have significant assist at home from mother, niece, and son.   OT Brief overview of current status  FWW + SBA for functional mobility & ADLs.   Total Evaluation Time (Minutes)   Total Evaluation Time (Minutes) 5

## 2020-12-15 NOTE — PLAN OF CARE
AOx4. Hypotensive; providers aware. Provider said it was okay to give Lasix but to hold off on the Spirolactone d/t soft BPs. OVSS on ra. Up with assist x1 using walker. Ambulated halls today. +2 to +3 edema in BLE. Pt tolerating diet. AUO. Jaundice skin and sclera. Bruising  throughout. Denies pain. PIV SL. Abd ultrasound this AM completed. Cont POC.

## 2020-12-15 NOTE — H&P
[facility]     History and Physical - [place] 4 Service        Date of Admission:  12/14/2020    Assessment & Plan   Sarah Fisher is a 50 year old female with PMH GERD, subclinical hypothyroidism, EtOH cirrhosis complicated by HE, GAVE, recurrent hepatic hydrothorax transferred from OSH for liver transplant evaluation.     Decompensated Cirrhosis c/b HE, GAVE, hepatic hydrothorax  Admitted 12/14 for liver transplant workup.   Etiology: EtOH, sober since end of June 2020  MELD-Na: 23  Portal HTN: yes, no TIPS procedure (likely CI due to HE)  HE: yes, lactulose and rifaximin scheduled  Ascites: yes, last para 12/8. 0.7L removed. No SBP.   EV: last EGD 12/4 with NO varices. PHG tx with APC. No residual GAVE identified.   Coag/Thrombocytopenia: yes - INR 2.04, plts 102  HCC screening: no history of, U/S abdomen ordered   Transplant candidacy: reports last drink 188 days PTA (6.2 months)  - Hepatology consulted for transplant evaluation, appreciate recs   - Will start with diuretic dosing: spironolactone 50mg, lasix 20mg. Titrate as able.  - Lactulose 20g TID, goal 3-4 BMs daily  - Continue PTA rifaximin  - U/S abdomen complete w/ doppler  - CXR in AM to assess status of recurrent effusion  - Avoid hepatotoxic meds  - 2g Na diet  - Daily MELD labs    Deconditioning  Related to recent ICU admission.  - PT/OT consulted    Chronic Issues:  Hypothyroidism: Continue PTA levothyroxine  GERD: Continue PTA PPI  Hypotension: Continue PTA midodrine       Diet: Combination Diet 2 gm NA Diet    Fluids: Not indicated  DVT Prophylaxis: Mechanical  Aguiar Catheter: in place, indication:    Code Status: Full Code           Disposition Plan   Expected discharge: 2 - 3 days, recommended to prior living arrangement once transplant evaluation complete.  Entered: Gino Price MD 12/14/2020, 8:35 PM       The patient's care was discussed with the Attending Physician, Dr. Granados, Bedside  Nurse and Patient.    Gino Price MD  [address]   Contact information available via [facility] Paging/Directory  Please see sign in/sign out for up to date coverage information  ______________________________________________________________________    Chief Complaint   Transplant evaluation    History is obtained from the patient    History of Present Illness   Sarah Fisher is a 50 year old female with PMH GERD, subclinical hypothyroidism, EtOH cirrhosis complicated by HE, GAVE, recurrent hepatic hydrothorax transferred from OSH for liver transplant evaluation.     Patient has history of recurrent hepatic hydrothorax in the past requiring thoracentesis. Presented to [facility] on 12/8 with symptoms of SOB, found to have pleural effusion. Thoracentesis and paracentesis performed, after which she subsequently developed hypotension requiring ICU admit and 2-3 pressors. She recovered, and decision was made to transfer her to [facility] for transplant workup. She reports her last drink was end of June/early July (approximately 188 days ago). She is on lactulose and rifaximin at home, states she stopped taking lactulose about a month ago due to having about 4 BMs daily without it. She has had multiple recent admissions in the [facility] system, summarized below. She denies any current abdominal pain, nausea, vomiting, melena, hematochezia, hematemesis, SOB, itching.     [facility] admissions:   9/8-9/21: admitted for decompensated alcoholic cirrhosis, encephalopathy and left infected pleural effusion (E.Coli) s/p 3 thoracenteses during that admission.    9/30- 10/6: presented after discharge with increasing SOB, recurrent pleural effusions. Underwent 2 thoracenteses that admit, discharged on home O2.    10/30-11/1: acute blood loss anemia from UGIB s/p PRBC tx. EGD with APC and had a thoracentesis for pleural effusion.     11/14-11/18: p/w weakness, SOB, anemia.  CXR with complete opacification of left hemithorax. S/p PRBC tx. Seems no thoracentesis was performed this admission. Her diuretics were discontinued on discharge due to CORBIN. Anticoagulation was discontinued (on for DVT).     Review of Systems    The 10 point Review of Systems is negative other than noted in the HPI or here.     Past Medical History    I have reviewed this patient's medical history and updated it with pertinent information if needed.   Past Medical History:   Diagnosis Date     History of blood transfusion      Thyroid disease       Past Surgical History   I have reviewed this patient's surgical history and updated it with pertinent information if needed.  Past Surgical History:   Procedure Laterality Date     SOFT TISSUE SURGERY          Social History   I have reviewed this patient's social history and updated it with pertinent information if needed. Sarah Fisher  reports that she has never smoked. She has never used smokeless tobacco. She reports previous alcohol use. She reports previous drug use.    Family History   Non contributory     Prior to Admission Medications   None     Allergies   Allergies   Allergen Reactions     Amoxicillin GI Disturbance, Diarrhea, Nausea and Nausea and Vomiting       Physical Exam   Vital Signs: Temp: 97.9  F (36.6  C) Temp src: Oral BP: 98/61 Pulse: 96   Resp: 18 SpO2: 93 % O2 Device: None (Room air)    Weight: 179 lbs 7.27 oz    GEN: Well nourished, well developed, appears stated age  HEENT: PERRL, no conjunctival injection, anicteric, Neck supple without meningismus, no anterior cervical or supraclavicular LAD  CV: RRR, Warm, well-perfused extremities, no JVD  RESP: CTAB, normal WOB  GI: soft, non-tender, non-distended, no masses or organomegaly  MSK: No gross deformities appreciated, no peripheral edema  Skin: Warm, dry. No rashes/lesions  Neuro: Alert, CNs II-XII grossly intact. Sensation and motor function of extremities grossly intact.  No clonus or  hypperreflexia  Psych: Appropriate mood and affect.    Data   Data reviewed today: I reviewed all medications, new labs and imaging results over the last 24 hours. I personally reviewed no images or EKG's today.    Recent Labs   Lab 12/14/20 1959   WBC 11.1*   HGB 9.6*   *   *   INR 2.04*      POTASSIUM 4.2   CHLORIDE 106   CO2 30   BUN 10   CR 1.30*   ANIONGAP 5   EMMY 8.5   *   ALBUMIN 2.5*   PROTTOTAL 5.5*   BILITOTAL 5.9*   ALKPHOS 118   ALT 12   AST 33

## 2020-12-15 NOTE — PLAN OF CARE
Pt arrived to  at 0115 this am transferred from . Pt A/O x4. O2 sats on 1L=94%. Pt denied any c/o's of pain. Abd dist, +bs, slight nausea noted but resided w/o intervention. BP hypotensive 92/61 HR 80's. MD notified. No axn taken unless symptomatic. Pt NPO since 0000 for upcoming abd US this am. Pt due to void at 0215. Pt up to BR SBA and walker. Denied any c/o's lightheadedness or dizziness. /64 HR 75 after back to bed. Pt able to void 125 ml parga uo. PIV saline locked. PCD's to lower extremities for edema 2-3+. Monitor resp status closely. Monitor I/O.

## 2020-12-15 NOTE — PLAN OF CARE
PT/7C: PT orders met. Per chart review and discussion with OT, pt is functioning near baseline with no mobility needs requiring skilled PT. PT to sign off.

## 2020-12-16 ENCOUNTER — APPOINTMENT (OUTPATIENT)
Dept: OCCUPATIONAL THERAPY | Facility: CLINIC | Age: 50
End: 2020-12-16
Attending: INTERNAL MEDICINE
Payer: COMMERCIAL

## 2020-12-16 LAB
ALBUMIN SERPL-MCNC: 2.3 G/DL (ref 3.4–5)
ALP SERPL-CCNC: 105 U/L (ref 40–150)
ALT SERPL W P-5'-P-CCNC: 11 U/L (ref 0–50)
ANION GAP SERPL CALCULATED.3IONS-SCNC: 6 MMOL/L (ref 3–14)
AST SERPL W P-5'-P-CCNC: 20 U/L (ref 0–45)
BILIRUB SERPL-MCNC: 6.1 MG/DL (ref 0.2–1.3)
BUN SERPL-MCNC: 12 MG/DL (ref 7–30)
CALCIUM SERPL-MCNC: 8.3 MG/DL (ref 8.5–10.1)
CHLORIDE SERPL-SCNC: 105 MMOL/L (ref 94–109)
CO2 SERPL-SCNC: 29 MMOL/L (ref 20–32)
CREAT SERPL-MCNC: 1.12 MG/DL (ref 0.52–1.04)
ERYTHROCYTE [DISTWIDTH] IN BLOOD BY AUTOMATED COUNT: 19.5 % (ref 10–15)
FIBRINOGEN PPP-MCNC: 143 MG/DL (ref 200–420)
FOLATE SERPL-MCNC: 1.9 NG/ML
GFR SERPL CREATININE-BSD FRML MDRD: 57 ML/MIN/{1.73_M2}
GLUCOSE SERPL-MCNC: 94 MG/DL (ref 70–99)
HCT VFR BLD AUTO: 28.4 % (ref 35–47)
HGB BLD-MCNC: 8.5 G/DL (ref 11.7–15.7)
INR PPP: 2.05 (ref 0.86–1.14)
LDH SERPL L TO P-CCNC: 371 U/L (ref 81–234)
MCH RBC QN AUTO: 31.4 PG (ref 26.5–33)
MCHC RBC AUTO-ENTMCNC: 29.9 G/DL (ref 31.5–36.5)
MCV RBC AUTO: 105 FL (ref 78–100)
PLATELET # BLD AUTO: 86 10E9/L (ref 150–450)
POTASSIUM SERPL-SCNC: 3.6 MMOL/L (ref 3.4–5.3)
PROT SERPL-MCNC: 5 G/DL (ref 6.8–8.8)
RBC # BLD AUTO: 2.71 10E12/L (ref 3.8–5.2)
SODIUM SERPL-SCNC: 140 MMOL/L (ref 133–144)
VIT B12 SERPL-MCNC: 768 PG/ML (ref 193–986)
WBC # BLD AUTO: 7.3 10E9/L (ref 4–11)

## 2020-12-16 PROCEDURE — 83615 LACTATE (LD) (LDH) ENZYME: CPT | Performed by: STUDENT IN AN ORGANIZED HEALTH CARE EDUCATION/TRAINING PROGRAM

## 2020-12-16 PROCEDURE — 250N000011 HC RX IP 250 OP 636: Performed by: STUDENT IN AN ORGANIZED HEALTH CARE EDUCATION/TRAINING PROGRAM

## 2020-12-16 PROCEDURE — 97535 SELF CARE MNGMENT TRAINING: CPT | Mod: GO

## 2020-12-16 PROCEDURE — 80053 COMPREHEN METABOLIC PANEL: CPT | Performed by: STUDENT IN AN ORGANIZED HEALTH CARE EDUCATION/TRAINING PROGRAM

## 2020-12-16 PROCEDURE — 82746 ASSAY OF FOLIC ACID SERUM: CPT | Performed by: STUDENT IN AN ORGANIZED HEALTH CARE EDUCATION/TRAINING PROGRAM

## 2020-12-16 PROCEDURE — 250N000013 HC RX MED GY IP 250 OP 250 PS 637: Performed by: STUDENT IN AN ORGANIZED HEALTH CARE EDUCATION/TRAINING PROGRAM

## 2020-12-16 PROCEDURE — 82607 VITAMIN B-12: CPT | Performed by: STUDENT IN AN ORGANIZED HEALTH CARE EDUCATION/TRAINING PROGRAM

## 2020-12-16 PROCEDURE — 99232 SBSQ HOSP IP/OBS MODERATE 35: CPT | Mod: GC | Performed by: STUDENT IN AN ORGANIZED HEALTH CARE EDUCATION/TRAINING PROGRAM

## 2020-12-16 PROCEDURE — 85027 COMPLETE CBC AUTOMATED: CPT | Performed by: STUDENT IN AN ORGANIZED HEALTH CARE EDUCATION/TRAINING PROGRAM

## 2020-12-16 PROCEDURE — 258N000003 HC RX IP 258 OP 636: Performed by: STUDENT IN AN ORGANIZED HEALTH CARE EDUCATION/TRAINING PROGRAM

## 2020-12-16 PROCEDURE — 85610 PROTHROMBIN TIME: CPT | Performed by: STUDENT IN AN ORGANIZED HEALTH CARE EDUCATION/TRAINING PROGRAM

## 2020-12-16 PROCEDURE — 99233 SBSQ HOSP IP/OBS HIGH 50: CPT | Mod: GC | Performed by: STUDENT IN AN ORGANIZED HEALTH CARE EDUCATION/TRAINING PROGRAM

## 2020-12-16 PROCEDURE — 120N000002 HC R&B MED SURG/OB UMMC

## 2020-12-16 PROCEDURE — 85384 FIBRINOGEN ACTIVITY: CPT | Performed by: STUDENT IN AN ORGANIZED HEALTH CARE EDUCATION/TRAINING PROGRAM

## 2020-12-16 PROCEDURE — 36415 COLL VENOUS BLD VENIPUNCTURE: CPT | Performed by: STUDENT IN AN ORGANIZED HEALTH CARE EDUCATION/TRAINING PROGRAM

## 2020-12-16 RX ORDER — SPIRONOLACTONE 100 MG/1
200 TABLET, FILM COATED ORAL DAILY
Status: ON HOLD | COMMUNITY
End: 2020-12-19

## 2020-12-16 RX ORDER — TRAZODONE HYDROCHLORIDE 50 MG/1
50 TABLET, FILM COATED ORAL AT BEDTIME
COMMUNITY
End: 2021-08-24

## 2020-12-16 RX ORDER — POTASSIUM CHLORIDE 750 MG/1
20 TABLET, EXTENDED RELEASE ORAL DAILY
COMMUNITY
End: 2021-08-24 | Stop reason: ALTCHOICE

## 2020-12-16 RX ORDER — FUROSEMIDE 80 MG
80 TABLET ORAL EVERY MORNING
Status: ON HOLD | COMMUNITY
End: 2020-12-19

## 2020-12-16 RX ORDER — SUCRALFATE 1 G/1
1 TABLET ORAL
COMMUNITY
End: 2021-04-05

## 2020-12-16 RX ORDER — CARVEDILOL 3.12 MG/1
3.12 TABLET ORAL 2 TIMES DAILY WITH MEALS
Status: ON HOLD | COMMUNITY
End: 2020-12-19

## 2020-12-16 RX ORDER — PANTOPRAZOLE SODIUM 40 MG/1
40 TABLET, DELAYED RELEASE ORAL
Status: ON HOLD | COMMUNITY
End: 2021-06-07

## 2020-12-16 RX ORDER — FERROUS SULFATE 325(65) MG
325 TABLET, DELAYED RELEASE (ENTERIC COATED) ORAL DAILY
COMMUNITY
Start: 2020-10-12 | End: 2021-05-25

## 2020-12-16 RX ORDER — CIPROFLOXACIN 500 MG/1
500 TABLET, FILM COATED ORAL EVERY 12 HOURS SCHEDULED
Status: CANCELLED | OUTPATIENT
Start: 2020-12-16

## 2020-12-16 RX ORDER — LEVOTHYROXINE SODIUM 50 UG/1
50 TABLET ORAL DAILY
COMMUNITY
Start: 2020-07-08 | End: 2021-12-03

## 2020-12-16 RX ADMIN — SUCRALFATE 1 G: 1 TABLET ORAL at 16:13

## 2020-12-16 RX ADMIN — FUROSEMIDE 20 MG: 20 TABLET ORAL at 08:46

## 2020-12-16 RX ADMIN — LACTULOSE 20 G: 10 SOLUTION ORAL at 12:52

## 2020-12-16 RX ADMIN — PHYTONADIONE 10 MG: 10 INJECTION, EMULSION INTRAMUSCULAR; INTRAVENOUS; SUBCUTANEOUS at 19:04

## 2020-12-16 RX ADMIN — ZINC SULFATE 220 MG (50 MG) CAPSULE 220 MG: CAPSULE at 08:46

## 2020-12-16 RX ADMIN — LACTULOSE 20 G: 10 SOLUTION ORAL at 08:45

## 2020-12-16 RX ADMIN — RIFAXIMIN 550 MG: 550 TABLET ORAL at 21:29

## 2020-12-16 RX ADMIN — RIFAXIMIN 550 MG: 550 TABLET ORAL at 08:46

## 2020-12-16 RX ADMIN — Medication 50 MG: at 08:46

## 2020-12-16 RX ADMIN — MULTIPLE VITAMINS W/ MINERALS TAB 1 TABLET: TAB at 08:46

## 2020-12-16 RX ADMIN — LEVOTHYROXINE SODIUM 50 MCG: 50 TABLET ORAL at 08:47

## 2020-12-16 RX ADMIN — SPIRONOLACTONE 50 MG: 25 TABLET ORAL at 08:46

## 2020-12-16 RX ADMIN — MIDODRINE HYDROCHLORIDE 5 MG: 5 TABLET ORAL at 17:38

## 2020-12-16 RX ADMIN — MIDODRINE HYDROCHLORIDE 5 MG: 5 TABLET ORAL at 08:46

## 2020-12-16 RX ADMIN — PANTOPRAZOLE SODIUM 40 MG: 40 TABLET, DELAYED RELEASE ORAL at 16:13

## 2020-12-16 RX ADMIN — MIDODRINE HYDROCHLORIDE 5 MG: 5 TABLET ORAL at 12:53

## 2020-12-16 RX ADMIN — LACTULOSE 20 G: 10 SOLUTION ORAL at 16:13

## 2020-12-16 RX ADMIN — SUCRALFATE 1 G: 1 TABLET ORAL at 08:45

## 2020-12-16 RX ADMIN — PANTOPRAZOLE SODIUM 40 MG: 40 TABLET, DELAYED RELEASE ORAL at 08:45

## 2020-12-16 RX ADMIN — FOLIC ACID 1 MG: 1 TABLET ORAL at 08:46

## 2020-12-16 ASSESSMENT — ACTIVITIES OF DAILY LIVING (ADL)
ADLS_ACUITY_SCORE: 19
ADLS_ACUITY_SCORE: 19
ADLS_ACUITY_SCORE: 21
ADLS_ACUITY_SCORE: 21
ADLS_ACUITY_SCORE: 19
ADLS_ACUITY_SCORE: 19

## 2020-12-16 NOTE — PROGRESS NOTES
"[facility]    Hepatology Follow-up    CC: Decompensated liver cirrhosis    Dx:   Acute decompensated liver cirrhosis  Hepatohydrothorax  Ascites, history of SBP  History of GI bleed secondary to GAVE  Hepatic encephalopathy  Macrocytic anemia  Thrombocytopenia  Acute kidney injury    24 hour events:   No acute events overnight    Subjective:  Patient denies fevers, sweats or chills.  Apprehensive about thoracentesis      Medications  Current Facility-Administered Medications   Medication Dose Route Frequency    folic acid  1 mg Oral Daily    furosemide  20 mg Oral Daily    lactulose  20 g Oral 4x Daily    levothyroxine  50 mcg Oral QAM AC    midodrine  5 mg Oral TID w/meals    multivitamin w/minerals  1 tablet Oral Daily    pantoprazole  40 mg Oral BID AC    phytonadione  10 mg Intravenous Once    rifaximin  550 mg Oral BID    sodium chloride (PF)  3 mL Intracatheter Q8H    spironolactone  50 mg Oral Daily    sucralfate  1 g Oral BID AC    vitamin B1  50 mg Oral Daily    zinc sulfate  220 mg Oral Daily       Review of systems  A 10-point review of systems was negative.    Examination  /73 (BP Location: Right arm)   Pulse 93   Temp 97.6  F (36.4  C) (Oral)   Resp 16   Ht 1.702 m (5' 7\")   Wt 80.4 kg (177 lb 4 oz)   SpO2 98%   BMI 27.76 kg/m      Intake/Output Summary (Last 24 hours) at 12/16/2020 1636  Last data filed at 12/16/2020 1200  Gross per 24 hour   Intake 660 ml   Output 575 ml   Net 85 ml       Gen- well, NAD, A+Ox3, mildly jaundiced  CVS- RRR  RS- CTA  Abd-soft abdomen  Extr-no lower extremity edema  Neuro-alert and oriented x3  Skin- no rash  Psych- normal mood  Lym- no LAD    Laboratory  Lab Results   Component Value Date     12/16/2020    POTASSIUM 3.6 12/16/2020    CHLORIDE 105 12/16/2020    CO2 29 12/16/2020    BUN 12 12/16/2020    CR 1.12 12/16/2020       Lab Results   Component Value Date    BILITOTAL 6.1 12/16/2020    ALT 11 12/16/2020    AST 20 " 12/16/2020    ALKPHOS 105 12/16/2020       Lab Results   Component Value Date    WBC 7.3 12/16/2020    HGB 8.5 12/16/2020     12/16/2020    PLT 86 12/16/2020       Lab Results   Component Value Date    INR 2.05 12/16/2020     MELD-Na score: 22 at 12/16/2020  7:15 AM  MELD score: 22 at 12/16/2020  7:15 AM  Calculated from:  Serum Creatinine: 1.12 mg/dL at 12/16/2020  7:15 AM  Serum Sodium: 140 mmol/L (Rounded to 137 mmol/L) at 12/16/2020  7:15 AM  Total Bilirubin: 6.1 mg/dL at 12/16/2020  7:15 AM  INR(ratio): 2.05 at 12/16/2020  7:15 AM  Age: 50 years 8 months      Radiology  Chest x-ray reviewed    Assessment  50 year old female with history of hypothyroidism, GERD, alcohol cirrhosis complicated by hepatic encephalopathy, GAVE, ascites recurrent hepatic hydrothorax admitted for further management of decompensated liver cirrhosis.     Acute events keeping her in the hospital right now it is her pulmonary findings on x-ray.  Will need to do a therapeutic and diagnostic thoracentesis.  She has history of infection in the fluid, as well as bloody effusions.      She now presents to our hospital for further evaluation of her decompensated liver disease possibility of work-up for liver transplantation.  Based on her history, it is appropriate to start her transplantation given her decompensation in the past 6 months.  She has quit alcohol since early June.    Problem list  Acute decompensated liver cirrhosis  Hepatohydrothorax  Ascites, history of SBP  History of GI bleed secondary to GAVE  Hepatic encephalopathy  Macrocytic anemia  Thrombocytopenia  Acute kidney injury    Recommendations  -Diagnostic and therapeutic thoracentesis.  Given her prior history of decompensation after thoracentesis, would be okay for the volume was minimal suggest 500 mL to 1 L.  -Nutrition consult, continue thiamine, folate, multivitamin, zinc.  -No significant history of hepatic encephalopathy, has been on rifaximin and lactulose.  I  suspect that this is mostly secondary to hospitalization and infections during these hospitalizations  -We will have to watch her renal function closely on diuretics, her baseline is 1.5-0.6.  Has been mostly around 1.1.  -Start SBP prophylaxis with ciprofloxacin.  Okay to start after thoracentesis is done  - currently not a TIPS candidate because of high bilirubin/MELD, HE does not appear to be prohibitive of this if needed in the future    Patient was seen and discussed with Dr Austin MELENDEZ MD  Gastroenterology Fellow  Division of Gastroenterology, Hepatology and Nutrition  [facility]  Text page Pager [phone]

## 2020-12-16 NOTE — PROGRESS NOTES
[facility]     Progress Note - [facility] 4 Service        Date of Admission:  12/14/2020    Assessment & Plan       Sarah Fisher is a 50 year old female with PMH GERD, subclinical hypothyroidism, EtOH cirrhosis complicated by HE, GAVE, recurrent hepatic hydrothorax transferred from OSH for liver transplant evaluation.     Changes Today:  - Nutrition consult  - Pharmacy consult for med rec  - CAPS consulted for para and thora  - Vitamin supplements added   - SBP ppx after thora/para     Decompensated Cirrhosis c/b HE, GAVE, hepatic hydrothorax  Admitted 12/14 for liver transplant workup. CXR on admit with large left pleural effusion, re accumulation of hepatic hydrothorax.  Etiology: EtOH, sober since end of June 2020  MELD-Na: 23  Portal HTN: yes, no TIPS procedure (likely CI due to HE)  HE: yes, lactulose and rifaximin scheduled  Ascites: yes, last para 12/8. 0.7L removed. H/O SBP in Sept 2020.  EV: last EGD 12/4 with NO varices. PHG tx with APC. No residual GAVE identified.   Coag/Thrombocytopenia: yes - INR 2.04, plts 102  HCC screening: none, last U/S 12/14  Transplant candidacy: reports last drink 188 days PTA (6.2 months)  - Hepatology consulted for transplant evaluation, appreciate recs   - Diuretics: spironolactone 50mg, lasix 20mg. Titrate as able.  - CXR 12/15 with large left pleural effusion, without mediastinal shift.  - Lactulose 20g TID, goal 3-4 BMs daily  - Continue PTA rifaximin  - Avoid hepatotoxic meds  - 2g Na diet  - Daily MELD labs  - Plan for para/thora to evaluate fluid   - FA, thiamine, MV supplements ordered   - Will initiate SBP ppx with cipro 500mg BID once thora/para completed     Deconditioning  Related to recent ICU admission.  - PT/OT consulted     Chronic Issues:  Hypothyroidism: Continue PTA levothyroxine  GERD: Continue PTA PPI  Hypotension: Continue PTA midodrine     Diet: Combination Diet 2 gm NA Diet    Fluids: Not indicated    Lines: PIV  DVT Prophylaxis: Pneumatic Compression Devices and Ambulate every shift  Aguiar Catheter: not present  Code Status: Full Code      Disposition Plan   Expected discharge: 2 - 3 days, recommended to prior living arrangement once transplant eval complete.  Entered: Gino Price MD 12/16/2020, 7:18 AM       The patient's care was discussed with the Attending Physician, Dr. Scott Woodward.    Gino Price MD  [address]   Please see sign in/sign out for up to date coverage information  ______________________________________________________________________    Interval History   No acute events overnight, seen and examined at bedside. Good appetite. Denies abdominal pain, nausea, vomiting, chest pain, SOB. Had 4 BMs yesterday. Discussed plan for hannah, she is apprehensive due to previous complications in the past.     Data reviewed today: No new imaging to review.    Physical Exam   Vital Signs: Temp: 97.5  F (36.4  C) Temp src: Oral BP: 113/67 Pulse: 97   Resp: 16 SpO2: 92 % O2 Device: Nasal cannula Oxygen Delivery: 1 LPM  Weight: 177 lbs 4 oz  GEN: notable muscle wasting of bilateral UE, jaundiced  HEENT: EOMI, MMM, ++ scleral icterus  CV: RRR, Warm, well-perfused extremities, no JVD  RESP: absent breath sounds left lung, clear auscultation on right  GI: soft, non-distended, mild epigastric tenderness on palpation  MSK: +2 BL LE edema  Skin: Warm, dry. No rashes/lesions  Neuro: no focal deficits, no asterixis  Psych: Appropriate mood and affect.    Data   Recent Labs   Lab 12/15/20  0640 12/14/20 1959   WBC 8.5 11.1*   HGB 8.9* 9.6*   * 104*   PLT 83* 102*   INR 2.14* 2.04*    141   POTASSIUM 3.8 4.2   CHLORIDE 107 106   CO2 28 30   BUN 11 10   CR 1.17* 1.30*   ANIONGAP 5 5   EMMY 8.2* 8.5   GLC 86 121*   ALBUMIN 2.2* 2.5*   PROTTOTAL 5.1* 5.5*   BILITOTAL 5.6* 5.9*   ALKPHOS 110 118   ALT 12 12   AST 19 33     Recent Results (from  the past 24 hour(s))   XR Chest 2 Views    Narrative    Exam: XR CHEST 2 VW, 12/15/2020 8:16 AM    Indication: Evaluate for left sided pleural effusion    Comparison: None    Findings:   Cardiac silhouette is obscured. Near complete opacification of the  left lung fields with subtle sparing of the left lung apex. No  significant mediastinal shift. There are some streaky right basilar  airspace opacities. No definite pneumothorax although slightly limited  by film penetration. Upper abdomen is unremarkable. No acute osseous  abnormalities are suspected.      Impression    Impression: Large left-sided pleural effusion. No significant  mediastinal shift.      Left pleural effusion discussed with Dr. Price by Dr. Godwin at 9:55  AM on 12/15/2020.    I have personally reviewed the examination and initial interpretation  and I agree with the findings.    CINTHIA YUEN MD   US Abdomen Limited w Abd/Pelvis Duplex Complete    Narrative    EXAMINATION: US ABDOMEN LIMITED WITH DOPPLER COMPLETE 12/15/2020 8:19  AM     COMPARISON: No direct comparisons available.    HISTORY: End-stage liver disease, evaluation for transplant workup    TECHNIQUE: The abdomen was scanned in standard fashion with  specialized ultrasound transducer(s) using both gray-scale, color  Doppler, and spectral flow techniques.    Findings:  Liver: The liver demonstrates diffuse coarsened echotexture with  increased echogenicity. Nodular contour to the liver margins. No  evidence of a focal hepatic mass.     Extrahepatic portal vein flow is retrograde at 34 cm/s.  Right portal vein flow is retrograde, measuring 36 cm/s.  Left portal vein flow is antegrade, measuring 45 cm/s.    Flow in the hepatic artery is towards the liver and:  315  cm/s peak systolic  0.47 resistive index.     Recanalized paraumbilical vein.    The splenic vein is patent and flow is towards the liver.  The left,  middle, and right hepatic veins are patent with flow towards the  IVC.  Flattening of waveforms secondary to underlying liver disease. The IVC  is patent with flow towards the heart.   The visualized aorta is not  dilated.    Gallbladder: Mildly thickened gallbladder wall 3.3 mm. There is no  pericholecystic fluid, positive sonographic Lema's sign or evidence  for cholelithiasis    Bile Ducts: No definite intrahepatic biliary dilatation. The common  bile duct is not visualized in this study.    Pancreas: Not visualized in this study.     Right kidney: Normal echotexture, without mass or hydronephrosis.  Measuring 11.7 cm.    Fluid: Partially visualized right pleural effusion, trace ascites  surrounding the liver.      Impression    Impression:   1.  Cirrhotic morphology of the liver.  2.  Reversal of flow in the main portal vein, right portal vein, and  recanalization of the paraumbilical vein, compatible with portal  hypertension  3.  Decreased resistive index of the hepatic artery and increased peak  systolic velocity possibly secondary to stenosis or shunting.  4.  Partially visualized right pleural effusion and trace perihepatic  ascites.      I have personally reviewed the examination and initial interpretation  and I agree with the findings.    ELIZA REYNA MD

## 2020-12-16 NOTE — PLAN OF CARE
A&Ox4.VSS on room air. Denies pain throughout shift. Up to the bathroom with SBA, and a walker. On 2g sodium diet, denies nausea. Jaundice skin and sclera. Skin with some bruising. PIV saline locked. Passing gas, BMx1 this shift. Voiding spontaneously. Continue with plan of care.

## 2020-12-16 NOTE — CONSULTS
"Psychosocial Assessment  Sarah DAMIAN [employer]icaid \"Maria Elena\" was evaluated during her inpatient stay as a part of her liver transplant evaluation.   Living Situation: Maria Elena owns a home in [place], [place]. She has resided there with her 11 year son for four years. Since her hospitalizations in Sept 2020, she has been living with her mother in [place], [place]. No concerns with her living situation at her home or mothers home.   Education/Employment:  Maria Elena completed her bachelors degree, and later continue on to complete her masters degree in non-profit management from [employer]. She was working for [employer] in their  department for about a year. 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   Financial /Income: Maria Elena is receiving income from a family business and reported that she is able to obtain her job back at [employer] when she is medically ready. She does not have any financial concerns.   Health Insurance: Maria Elena has BCBS of [place] and is unsure what her out of pocket max is. She does not have any concerns potentially meeting her out of pocket max.  This writer talked with Sarah about the financial risks of transplant, particularly about the high cost of transplant related medications and the importance of maintaining adequate health insurance coverage.  Family/Social Support: Clarissa identified her mother, niece and brother as her primary supports. Her mother Virginia lives in [place], [place] with her niece Radha who is currently in school to become a nurse. Her son, Flakito is being cared for by her mother at this time. She has a brother, Saravanan who lives in [facility] and identified him and his spouse, Frantz and supports. Her sister Raven lives in [place], [place]. She has a step-brother and step-sister, who she has minimal contact with. Her brother, Thomas passed away from alcohol use in 2016. Her " mother, brother and niece would all be able to provide support post transplant.    This writer stressed the importance of having a stable and involved support network before and after transplant.  Provided Sarah  with education about the relationship between a stable support system and better surgical and post-transplant outcomes compared to patients with a limited support system.    Functional Status: Prior to September, 2020, Maria Elena was independent with all ADL/IADLS. Since her hospitalization, she has required the assistance of a walker. No functional concerns related to transplant.   Chemical Dependency:  Maria Elena reports that her last date of consumption was June 20, 2020 on Father's day. She reported waking up the next morning and not feeling well and decided to abstain. Prior to June she reported drinking daily around five+ drinks per day. She indicated that the only time she remembers abstaining from alcohol was when she was pregnant with her son, Flakito who is now 11 years old. She noticed an increase to her drinking around March 2020. No history of tobacco use, miuse/over use of pain medication, or illicit substance use. No history of treatment or legal history related to substance use.   Mental Health: No history of mental health concerns. No history of hospitalizations or past/current suicidal thoughts.   Adjustment to Illness: Maria Elena is in favor of a transplant if indicated. This writer provided Sarah  with supportive counseling throughout this interview.  This writer also encouraged Sarah to attend the liver transplant support group for additional support and encouragement.   Impression/Recommendations:   Sarah verbalizes understanding the psychosocial risks of transplant and teaching provided during this evaluation.  Sarah has met the sobriety criteria recommended for listing, however this writer recommends Sarah complete a chemical dependency evaluation and any treatment recommendations  before being listed for transplant.  Pending completion of a chemical dependency evaluation and recommendations from the chemical dependency evaluation, she would be an acceptable transplant candidate from a psychosocial perspective. I have referred her to complete a CD assessment outpatient as she may be discharging from inpatient soon.   There are no contraindications to transplant from a psychosocial perspective.  Sarah has adequate finances and health insurance for transplant, and an intact support system. Sarah is an acceptable transplant candidate from a psychosocial perspective pending CD recommendations/engagement in treatment.  This writer will remain available to assist patient throughout the evaluation process and will follow patient through transplant if she is listed.  It was a pleasure to evaluate this patient for liver transplant.   Teaching completed during assessment:  1.     Housing and relocation needs post transplant.  2.     Caregiver needs post transplant.  3.     Financial issues related to transplant.  4.     Risks of alcohol use post transplant.  5.     Common psychosocial stressors pre/post transplant.        6.     Liver Transplant support group availability.        7.     Advanced Health Care Directive - I provided a health care directive at bedside              Psychosocial Risks of Transplant Reviewed:  1.     Increased stress related to your emotional, family, social, employment, or   financial situation.  2.     Affect on work and/or disability benefits.  3.     Affect on future health and life insurance.  4.     Transplant outcome expectations may not be met.  5.     Mental Health risks: anxiety, depression, PTSD, guilt, grief and chronic fatigue.   ENRIQUE Rodriguez, LICSW

## 2020-12-16 NOTE — PLAN OF CARE
AOx4. Up with SBA using walker; steady but needs help getting into bed at times. VSS on ra. +2 to +3 edema in BLE. Tolerating diet. +flatus, no BM on shift. Voiding spontaneously; pt says she misses the urine collection hat at times. Jaundice skin and sclarea. Denies pain. PIV SL. Plan is to have thoracentesis at bedside today with possible para; no time determined. Cont POC.

## 2020-12-16 NOTE — PROGRESS NOTES
CLINICAL NUTRITION SERVICES - ASSESSMENT NOTE     Nutrition Prescription    RECOMMENDATIONS FOR MDs/PROVIDERS TO ORDER:  None at this time    Malnutrition Status:    Non-severe malnutrition in the context of acute on chronic illness/disease    Recommendations already ordered by Registered Dietitian (RD):  1. Oral nutrition supplements PRN, trial gelatien plus    Future/Additional Recommendations:  1. Continue diet per MD  2. Consider liberalizing diet to 3g sodium if intake declines  3. Consider scheduling oral nutrition supplements once pt assesses preferences      REASON FOR ASSESSMENT  Sarah Fisher is a/an 50 year old female assessed by the dietitian for Admission Nutrition Risk Screen for positive for lost 34 lbs or more and Provider Order - EtOH cirrhosis with significant wt loss and sarcopenia     Reason for admission:  transferred from OSH for liver transplant evaluation (admitted to [facility] 12/8/20 due to weakness and confusion and transferred to ICU on 12/9/20 following large-volume thoracentesis requiring pressors and BiPAP, taken off BiPAP 12/12)    PMH:  GERD, DVT, subclinical hypothyroidism, EtOH cirrhosis complicated by HE, GAVE, recurrent hepatic hydrothorax     NUTRITION HISTORY  Previously unknown to this service     Sober since end of June 2020 per H&P    Per pt 12/16/20:  Pt reports eating was fine before coming to hospital. Pt reported eating TID at baseline. For breakfast, pt ate a smoothie, cereal, or fruit and yogurt. For lunch and dinner, pt typically eats salads, sometimes with meat. Pt reports drinking up to one Ensure daily. Pt has been following low sodium diet since September 2020. Pt reports ~45lb wt loss since September. Pt reports wt loss was unintentional and attributes it to poor appetite.    CURRENT NUTRITION ORDERS  Diet: Combination Diet 2 g Sodium since 12/14 (breifly NPO for procedure 12/15)  Intake/Tolerance: Ordering substantial meals once to twice daily per  "[employer] with 75% intake per food records.  Fair/good appetite noted by RN 12/15    Pt reports eating while in hospital has been difficult. Expressed confusion and difficulty with ordering while on low sodium diet. Pt ordering trays BID, but is not consistently able to finish entrees. Eating 50-75% of trays. Pt reports difficulty ordering breakfast due to morning appointments.    LABS  Labs reviewed  Sodium and potassium wnl 12/16  Creatinine 1.12 (H) 12/16, trending down  Folate 1.9 (L) 12/16  Glucose wnl 12/16    MEDICATIONS  Medications reviewed  Folic acid 1mg daily  Thiamine 50mg daily   Zinc 220mg daily  Multivitamin w/minerals (theravit-m)  Lasix  Lactulose  Aldactone    ANTHROPOMETRICS  Height: 170.2 cm (5' 7\")  Most Recent Weight: 80.4 kg (177 lb 4 oz)    IBW:  61 kg  BMI: Overweight BMI 25-29.9  Weight History: 10.8kg (12% body wt) wt loss in past 3 months, limitations in assessing wt data due to large fluctuations in wt likely due to fluid shifts    12/14/20: 81.4kg    Wt Readings from Last 10 Encounters:   12/15/20 80.4 kg (177 lb 4 oz)   12/03/20 81.6 kg (180 lb)     11/30/20: 85.3kg  11/14/20: 78.9kg  10/30/20: 80.7kg  10/7/20: 85.6kg  9/24/20: 91.2kg  9/8/20: 90.7kg    5/17/17: 104.3kg    Dosing Weight: 65.9 kg (adjusted wt based on lowest wt this admission of 80.4kg on 12/15)    ASSESSED NUTRITION NEEDS  Estimated Energy Needs: 7382-9125 kcals/day (30 - 35 kcals/kg)  Justification: Repletion and cirrhosis  Estimated Protein Needs: 79-99 grams protein/day (1.2 - 1.5 grams of pro/kg)  Justification: Repletion and cirrhosis   Estimated Fluid Needs: 3831-9875 mL/day (30 - 35 mL/kg)   Justification: Maintenance, or other per provider pending fluid status    PHYSICAL FINDINGS  See malnutrition section below.  Mild-moderate edema noted in chart  Diarrhea noted 12/15/20    MALNUTRITION  % Intake: < 75% for > 7 days (non-severe)  % Weight Loss: > 7.5% in 3 months (severe)  Subcutaneous Fat Loss: None " observed (visual assessment only)  Muscle Loss: None observed (visual assessment only)  Fluid Accumulation/Edema: Mild-Moderate  Malnutrition Diagnosis: Non-severe malnutrition in the context of acute on chronic illness/disease    NUTRITION DIAGNOSIS  Inadequate oral intake related to poor appeite as evidenced by meeting less than 75% of needs over past week and 12% wt loss in past 3 months    INTERVENTIONS  Implementation  Nutrition Education: Provided education on low sodium diet in the context of hospital meal service. Discussed how sodium was calculated, lower sodium alternatives of menu items, ability to order half portions, as well as strategies to improve intake such as scheduling meals ahead of time and keeping snack in room   Medical food supplement therapy: discussed types of supplements and gave supplement handout. Pt expressed concern over sodium so sodium content of various supplements was discussed. Sent trial gelatein. Supplements PRN for now so pt may assess preferences.     Goals  Patient to consume % of nutritionally adequate meal trays TID, or the equivalent with supplements/snacks.     Monitoring/Evaluation  Progress toward goals will be monitored and evaluated per protocol.    Kylie Shah  Dietetic Intern     I have read and agree with the above nutrition note, recs, and interventions.  I did not personally see or speak with this patient, chart review only.  Britni Omalley, RD, LD  7C RD pager: [phone]

## 2020-12-16 NOTE — PROGRESS NOTES
Shift: 3033-2821  VS: Temp: 97.5  F (36.4  C) Temp src: Axillary BP: 98/59 Pulse: 71   Resp: 18 SpO2: 98 % O2 Device: None (Room air) Oxygen Delivery: 1 LPM  Pain: Denies pain.   Neuro: A&Ox4, pleasant and cooperative with care.   Cardiac:   Soft BP's, on scheduled midodrine.   Respiratory: Lung sounds clear/diminished on RA.   GI/Diet/Appetite: 2gm Na diet with fair/good appetite. LBM 12/15, on lactulose.   :  voiding  LDA's: PIV to YA ROMERO.   Skin: Jaundice, otherwise intact.   Activity: Ax1 w/GB & Walker.  Tests/Procedures:   Pertinent Labs/Lab Collection:      Plan: Workup for liver transplant, continue w/POC.

## 2020-12-16 NOTE — PHARMACY-ADMISSION MEDICATION HISTORY
Admission medication history interview status for the 12/14/2020 admission is complete. See Epic admission navigator for allergy information, pharmacy, prior to admission medications and immunization status.     Medication history interview sources:  patient (patient team tried discussing with patient, she was unfamiliar at the itme), RX Fills (shows medication, strength, fill date, instructions)    Changes made to PTA medication list (reason)  Added: all  Deleted: n/a  Changed: n/a    Additional medication history information (including reliability of information, actions taken by pharmacist):    As noted in physician's note, some of these medications were changed or stopped recently at outside hospital and new medications have been added (i.e lactulose). I added medications to list based off how patient would have been taking them at home before recent hospitalization & transfer to [facility].       Prior to Admission medications    Medication Sig Last Dose Taking? Auth Provider   apixaban ANTICOAGULANT (ELIQUIS) 2.5 MG tablet Take 2.5 mg by mouth 2 times daily  Yes Unknown, Entered By History   carvedilol (COREG) 3.125 MG tablet Take 3.125 mg by mouth 2 times daily (with meals)  Yes Unknown, Entered By History   ferrous sulfate (FE TABS) 325 (65 Fe) MG EC tablet Take 325 mg by mouth daily  Yes Unknown, Entered By History   furosemide (LASIX) 80 MG tablet Take 80 mg by mouth every morning  Yes Unknown, Entered By History   levothyroxine (SYNTHROID/LEVOTHROID) 50 MCG tablet Take 50 mcg by mouth daily  Yes Unknown, Entered By History   pantoprazole (PROTONIX) 40 MG EC tablet Take 40 mg by mouth every morning (before breakfast)  Yes Unknown, Entered By History   potassium chloride ER (K-TAB/KLOR-CON) 10 MEQ CR tablet Take 10 mEq by mouth daily  Yes Unknown, Entered By History   rifaximin (XIFAXAN) 550 MG TABS tablet Take 550 mg by mouth 2 times daily  Yes Unknown, Entered By History   spironolactone (ALDACTONE) 100 MG  tablet Take 200 mg by mouth daily  Yes Unknown, Entered By History   sucralfate (CARAFATE) 1 GM tablet Take 1 g by mouth 2 times daily (before meals) (1 hours before meals)  Yes Unknown, Entered By History   traZODone (DESYREL) 50 MG tablet Take 50 mg by mouth At Bedtime  Yes Unknown, Entered By History         Medication history completed by: Lizzeth Leggett [facility]

## 2020-12-17 ENCOUNTER — APPOINTMENT (OUTPATIENT)
Dept: GENERAL RADIOLOGY | Facility: CLINIC | Age: 50
End: 2020-12-17
Attending: INTERNAL MEDICINE
Payer: COMMERCIAL

## 2020-12-17 LAB
ALBUMIN SERPL-MCNC: 2.2 G/DL (ref 3.4–5)
ALP SERPL-CCNC: 94 U/L (ref 40–150)
ALT SERPL W P-5'-P-CCNC: 12 U/L (ref 0–50)
AMYLASE FLD-CCNC: 14 U/L
ANION GAP SERPL CALCULATED.3IONS-SCNC: 5 MMOL/L (ref 3–14)
APPEARANCE FLD: NORMAL
AST SERPL W P-5'-P-CCNC: 25 U/L (ref 0–45)
BILIRUB SERPL-MCNC: 6.2 MG/DL (ref 0.2–1.3)
BUN SERPL-MCNC: 12 MG/DL (ref 7–30)
CALCIUM SERPL-MCNC: 8.5 MG/DL (ref 8.5–10.1)
CHLORIDE SERPL-SCNC: 106 MMOL/L (ref 94–109)
CO2 SERPL-SCNC: 28 MMOL/L (ref 20–32)
COLOR FLD: NORMAL
CREAT SERPL-MCNC: 1.15 MG/DL (ref 0.52–1.04)
ERYTHROCYTE [DISTWIDTH] IN BLOOD BY AUTOMATED COUNT: 19.3 % (ref 10–15)
GFR SERPL CREATININE-BSD FRML MDRD: 55 ML/MIN/{1.73_M2}
GLUCOSE FLD-MCNC: 109 MG/DL
GLUCOSE SERPL-MCNC: 88 MG/DL (ref 70–99)
GRAM STN SPEC: NORMAL
GRAM STN SPEC: NORMAL
HCT VFR BLD AUTO: 26.8 % (ref 35–47)
HGB BLD-MCNC: 8.2 G/DL (ref 11.7–15.7)
INR PPP: 1.92 (ref 0.86–1.14)
LDH FLD L TO P-CCNC: 81 U/L
LYMPHOCYTES NFR FLD MANUAL: 46 %
Lab: NORMAL
MCH RBC QN AUTO: 31.7 PG (ref 26.5–33)
MCHC RBC AUTO-ENTMCNC: 30.6 G/DL (ref 31.5–36.5)
MCV RBC AUTO: 104 FL (ref 78–100)
MONOS+MACROS NFR FLD MANUAL: 50 %
NEUTS BAND NFR FLD MANUAL: 4 %
PLATELET # BLD AUTO: 86 10E9/L (ref 150–450)
POTASSIUM SERPL-SCNC: 3.9 MMOL/L (ref 3.4–5.3)
PROT FLD-MCNC: 1.4 G/DL
PROT SERPL-MCNC: 5 G/DL (ref 6.8–8.8)
RBC # BLD AUTO: 2.59 10E12/L (ref 3.8–5.2)
SODIUM SERPL-SCNC: 140 MMOL/L (ref 133–144)
SPECIMEN SOURCE FLD: NORMAL
SPECIMEN SOURCE: NORMAL
WBC # BLD AUTO: 7.9 10E9/L (ref 4–11)
WBC # FLD AUTO: 129 /UL

## 2020-12-17 PROCEDURE — 87075 CULTR BACTERIA EXCEPT BLOOD: CPT | Performed by: STUDENT IN AN ORGANIZED HEALTH CARE EDUCATION/TRAINING PROGRAM

## 2020-12-17 PROCEDURE — 99233 SBSQ HOSP IP/OBS HIGH 50: CPT | Mod: GC | Performed by: STUDENT IN AN ORGANIZED HEALTH CARE EDUCATION/TRAINING PROGRAM

## 2020-12-17 PROCEDURE — 82945 GLUCOSE OTHER FLUID: CPT | Performed by: STUDENT IN AN ORGANIZED HEALTH CARE EDUCATION/TRAINING PROGRAM

## 2020-12-17 PROCEDURE — 88305 TISSUE EXAM BY PATHOLOGIST: CPT | Mod: TC | Performed by: STUDENT IN AN ORGANIZED HEALTH CARE EDUCATION/TRAINING PROGRAM

## 2020-12-17 PROCEDURE — 82150 ASSAY OF AMYLASE: CPT | Performed by: STUDENT IN AN ORGANIZED HEALTH CARE EDUCATION/TRAINING PROGRAM

## 2020-12-17 PROCEDURE — 88112 CYTOPATH CELL ENHANCE TECH: CPT | Mod: TC | Performed by: STUDENT IN AN ORGANIZED HEALTH CARE EDUCATION/TRAINING PROGRAM

## 2020-12-17 PROCEDURE — 85027 COMPLETE CBC AUTOMATED: CPT | Performed by: STUDENT IN AN ORGANIZED HEALTH CARE EDUCATION/TRAINING PROGRAM

## 2020-12-17 PROCEDURE — 0W9B3ZZ DRAINAGE OF LEFT PLEURAL CAVITY, PERCUTANEOUS APPROACH: ICD-10-PCS | Performed by: INTERNAL MEDICINE

## 2020-12-17 PROCEDURE — 88305 TISSUE EXAM BY PATHOLOGIST: CPT | Mod: 26 | Performed by: PATHOLOGY

## 2020-12-17 PROCEDURE — 250N000013 HC RX MED GY IP 250 OP 250 PS 637: Performed by: STUDENT IN AN ORGANIZED HEALTH CARE EDUCATION/TRAINING PROGRAM

## 2020-12-17 PROCEDURE — 71045 X-RAY EXAM CHEST 1 VIEW: CPT

## 2020-12-17 PROCEDURE — 999N001014 HC STATISTIC CYTO WRIGHT STAIN TC: Performed by: STUDENT IN AN ORGANIZED HEALTH CARE EDUCATION/TRAINING PROGRAM

## 2020-12-17 PROCEDURE — 120N000002 HC R&B MED SURG/OB UMMC

## 2020-12-17 PROCEDURE — 89051 BODY FLUID CELL COUNT: CPT | Performed by: STUDENT IN AN ORGANIZED HEALTH CARE EDUCATION/TRAINING PROGRAM

## 2020-12-17 PROCEDURE — 32555 ASPIRATE PLEURA W/ IMAGING: CPT | Performed by: INTERNAL MEDICINE

## 2020-12-17 PROCEDURE — 87077 CULTURE AEROBIC IDENTIFY: CPT | Performed by: STUDENT IN AN ORGANIZED HEALTH CARE EDUCATION/TRAINING PROGRAM

## 2020-12-17 PROCEDURE — 71045 X-RAY EXAM CHEST 1 VIEW: CPT | Mod: 26 | Performed by: RADIOLOGY

## 2020-12-17 PROCEDURE — 83615 LACTATE (LD) (LDH) ENZYME: CPT | Performed by: STUDENT IN AN ORGANIZED HEALTH CARE EDUCATION/TRAINING PROGRAM

## 2020-12-17 PROCEDURE — 36415 COLL VENOUS BLD VENIPUNCTURE: CPT | Performed by: STUDENT IN AN ORGANIZED HEALTH CARE EDUCATION/TRAINING PROGRAM

## 2020-12-17 PROCEDURE — 85610 PROTHROMBIN TIME: CPT | Performed by: STUDENT IN AN ORGANIZED HEALTH CARE EDUCATION/TRAINING PROGRAM

## 2020-12-17 PROCEDURE — 88112 CYTOPATH CELL ENHANCE TECH: CPT | Mod: 26 | Performed by: PATHOLOGY

## 2020-12-17 PROCEDURE — 84157 ASSAY OF PROTEIN OTHER: CPT | Performed by: STUDENT IN AN ORGANIZED HEALTH CARE EDUCATION/TRAINING PROGRAM

## 2020-12-17 PROCEDURE — 87070 CULTURE OTHR SPECIMN AEROBIC: CPT | Performed by: STUDENT IN AN ORGANIZED HEALTH CARE EDUCATION/TRAINING PROGRAM

## 2020-12-17 PROCEDURE — 80053 COMPREHEN METABOLIC PANEL: CPT | Performed by: STUDENT IN AN ORGANIZED HEALTH CARE EDUCATION/TRAINING PROGRAM

## 2020-12-17 PROCEDURE — 999N001018 HC STATISTIC H-CELL BLOCK W/STAIN: Performed by: STUDENT IN AN ORGANIZED HEALTH CARE EDUCATION/TRAINING PROGRAM

## 2020-12-17 PROCEDURE — 87205 SMEAR GRAM STAIN: CPT | Performed by: STUDENT IN AN ORGANIZED HEALTH CARE EDUCATION/TRAINING PROGRAM

## 2020-12-17 RX ORDER — CIPROFLOXACIN 500 MG/1
500 TABLET, FILM COATED ORAL EVERY 12 HOURS SCHEDULED
Status: DISCONTINUED | OUTPATIENT
Start: 2020-12-17 | End: 2020-12-17

## 2020-12-17 RX ORDER — CIPROFLOXACIN 500 MG/1
500 TABLET, FILM COATED ORAL
Status: DISCONTINUED | OUTPATIENT
Start: 2020-12-17 | End: 2020-12-19 | Stop reason: HOSPADM

## 2020-12-17 RX ADMIN — MIDODRINE HYDROCHLORIDE 5 MG: 5 TABLET ORAL at 12:29

## 2020-12-17 RX ADMIN — PANTOPRAZOLE SODIUM 40 MG: 40 TABLET, DELAYED RELEASE ORAL at 08:21

## 2020-12-17 RX ADMIN — MULTIPLE VITAMINS W/ MINERALS TAB 1 TABLET: TAB at 08:20

## 2020-12-17 RX ADMIN — RIFAXIMIN 550 MG: 550 TABLET ORAL at 20:44

## 2020-12-17 RX ADMIN — PANTOPRAZOLE SODIUM 40 MG: 40 TABLET, DELAYED RELEASE ORAL at 16:31

## 2020-12-17 RX ADMIN — CIPROFLOXACIN HYDROCHLORIDE 500 MG: 500 TABLET, FILM COATED ORAL at 16:31

## 2020-12-17 RX ADMIN — FUROSEMIDE 20 MG: 20 TABLET ORAL at 08:21

## 2020-12-17 RX ADMIN — LACTULOSE 20 G: 10 SOLUTION ORAL at 12:29

## 2020-12-17 RX ADMIN — ZINC SULFATE 220 MG (50 MG) CAPSULE 220 MG: CAPSULE at 08:19

## 2020-12-17 RX ADMIN — SUCRALFATE 1 G: 1 TABLET ORAL at 16:31

## 2020-12-17 RX ADMIN — RIFAXIMIN 550 MG: 550 TABLET ORAL at 08:20

## 2020-12-17 RX ADMIN — LEVOTHYROXINE SODIUM 50 MCG: 50 TABLET ORAL at 08:22

## 2020-12-17 RX ADMIN — MIDODRINE HYDROCHLORIDE 5 MG: 5 TABLET ORAL at 18:29

## 2020-12-17 RX ADMIN — Medication 50 MG: at 08:22

## 2020-12-17 RX ADMIN — MIDODRINE HYDROCHLORIDE 5 MG: 5 TABLET ORAL at 08:21

## 2020-12-17 RX ADMIN — SPIRONOLACTONE 50 MG: 25 TABLET ORAL at 08:20

## 2020-12-17 RX ADMIN — LACTULOSE 20 G: 10 SOLUTION ORAL at 08:22

## 2020-12-17 RX ADMIN — SUCRALFATE 1 G: 1 TABLET ORAL at 08:20

## 2020-12-17 RX ADMIN — FOLIC ACID 1 MG: 1 TABLET ORAL at 08:21

## 2020-12-17 ASSESSMENT — ACTIVITIES OF DAILY LIVING (ADL)
ADLS_ACUITY_SCORE: 21
ADLS_ACUITY_SCORE: 19
ADLS_ACUITY_SCORE: 21
ADLS_ACUITY_SCORE: 21

## 2020-12-17 NOTE — PROCEDURES
Procedure Note    Attending: Dr. Wills   Resident: none  Procedure: Left Thoracentesis  Indication: Pleural effusion   Risk Assessment: normal   Pre-procedure diagnosis: Pleural Effusion   Post-procedure diagnosis:  Pleural Effusion     The risks and benefits of the procedure were explained to Sarah Fisher who expressed understanding and opted to proceed.  Consent was obtained and placed in the chart and the patient was placed on pulse oximetry.  A time out was performed.    An ultrasound probe was used to identify an area of pleural fluid in the Left hemithorax.  This area was prepped and draped in the usual sterile fashion.  5 ml of 1% lidocaine was instilled and fluid located using ultrasound guidance.  A small incision was  made with an 11 blade.  The thoracentesis catheter and needle were inserted above the rib until fluid obtained then the needle removed.    Using a one-way valve, 500 ml of serosanguineous colored fluid was removed. A specimen was sent for analysis.    The catheter was removed with the patient exhaling and an occlusive dressing placed.       Ultrasound revealed normal lung sliding after the procedure and a chest radiograph is pending.    The tolerated the procedure well.  Please contact the Consult and Procedure Service if any concerns or complications arise.       Riaz Wills MD on 12/17/2020 at 3:45 PM

## 2020-12-17 NOTE — PLAN OF CARE
Patient cares from 15:00 to 19:30. Patient is alert, oriented x 4, denies pain, no nausea. Pt on scheduled Lactulose, had loose BM during the shift. Pt took a shower this evening. PIV in place. Paracentesis scheduled for tomorrow. Pt is voiding and ambulating without difficulty, independent with cares.

## 2020-12-17 NOTE — PROGRESS NOTES
[facility]     Progress Note - Curry 4 Service        Date of Admission:  12/14/2020    Assessment & Plan      Sarah Fisher is a 50 year old female with PMH GERD, subclinical hypothyroidism, EtOH cirrhosis complicated by HE, GAVE, recurrent hepatic hydrothorax transferred from OSH for liver transplant evaluation.     Changes Today:  - CAPS consulted for para and thora  - SBP ppx with Cipro to start after procedures  - No further transplant workup inpt     Decompensated Cirrhosis c/b HE, GAVE, hepatic hydrothorax  Admitted 12/14 for liver transplant workup. CXR on admit with large left pleural effusion, re accumulation of hepatic hydrothorax.  Etiology: EtOH, sober since end of June 2020  MELD-Na: 22  Portal HTN: yes, no TIPS procedure (likely CI due to HE)  HE: yes, lactulose and rifaximin scheduled  Ascites: yes, last para 12/8. 0.7L removed. H/O SBP in Sept 2020.  EV: last EGD 12/4 with NO varices. PHG tx with APC. No residual GAVE identified.   Coag/Thrombocytopenia: yes - INR 1.92, plts 86  HCC screening: none, last U/S 12/14  Transplant candidacy: reports last drink 188 days PTA (6.2 months)  - Hepatology consulted for transplant evaluation, appreciate recs    - Continue workup as outpt given MELD score of 22  - Diuretics: spironolactone 50mg, lasix 20mg. Titrate as able.  - CXR 12/15 with large left pleural effusion, without mediastinal shift.   - Plan for thora today - will remove only 500cc given previous hypotension  - Lactulose 20g TID, goal 3-4 BMs daily  - Continue PTA rifaximin  - Avoid hepatotoxic meds  - 2g Na diet  - Daily MELD labs  - FA, thiamine, MV supplements ordered   - Will initiate SBP ppx with cipro 500mg BID once thora/para completed     Deconditioning  Related to recent ICU admission.  - PT/OT consulted      Malnutrition:    - Level of malnutrition: Non-Severe   - Based on: weight loss, mild (or greater) subcutaneous fat loss, mild  (or greater) muscle loss       Chronic Issues:  Hypothyroidism: Continue PTA levothyroxine  GERD: Continue PTA PPI  Hypotension: Continue PTA midodrine     Diet: Combination Diet 2 gm NA Diet  Snacks/Supplements Adult: Other; Pt can order supplements PRN; Between Meals    Fluids: Not indicated   Lines: PIV  DVT Prophylaxis: Pneumatic Compression Devices and Ambulate every shift  Aguiar Catheter: not present  Code Status: Full Code      Disposition Plan   Expected discharge: 2 - 3 days, recommended to prior living arrangement once pleural fluid workup completed.     Entered: Gino Price MD 12/17/2020, 10:55 AM       The patient's care was discussed with the Attending Physician, Dr. Scott Woodward.    Gino Price MD  [address]   Please see sign in/sign out for up to date coverage information  ______________________________________________________________________    Interval History   No acute events overnight, seen and examined at bedside. Good appetite, feels well. Denies abdominal pain, nausea, vomiting, chest pain, SOB. No new complaints.     Data reviewed today: No new imaging to review.    Physical Exam   Vital Signs: Temp: 97.8  F (36.6  C) Temp src: Oral BP: 107/68 Pulse: 99   Resp: 16 SpO2: 90 % O2 Device: None (Room air)    Weight: 177 lbs 4 oz  GEN: notable muscle wasting of bilateral UE, jaundiced  HEENT: EOMI, MMM, ++ scleral icterus  CV: RRR, Warm, well-perfused extremities, no JVD  RESP: absent breath sounds left lung, clear auscultation on right  GI: soft, non-distended, mild epigastric tenderness on palpation  MSK: +2 BL LE edema  Skin: Warm, dry. No rashes/lesions  Neuro: no focal deficits, no asterixis  Psych: Appropriate mood and affect.    Data   Recent Labs   Lab 12/17/20  0709 12/16/20  0715 12/15/20  0640   WBC 7.9 7.3 8.5   HGB 8.2* 8.5* 8.9*   * 105* 104*   PLT 86* 86* 83*   INR 1.92* 2.05* 2.14*    140 140    POTASSIUM 3.9 3.6 3.8   CHLORIDE 106 105 107   CO2 28 29 28   BUN 12 12 11   CR 1.15* 1.12* 1.17*   ANIONGAP 5 6 5   EMMY 8.5 8.3* 8.2*   GLC 88 94 86   ALBUMIN 2.2* 2.3* 2.2*   PROTTOTAL 5.0* 5.0* 5.1*   BILITOTAL 6.2* 6.1* 5.6*   ALKPHOS 94 105 110   ALT 12 11 12   AST 25 20 19

## 2020-12-17 NOTE — PROGRESS NOTES
"SPIRITUAL HEALTH SERVICES  [facility] 7C  REFERRAL SOURCE: MARVIN     Introduced spiritual health services, assessment of SH needs.  Pt stated \"I'm fine,\" adding that she was interested in going to the meditation space. She expressed no SH needs and declined a  visit. Instructed pt how to request SH visit through her nurse if desired.     PLAN:  is available per pt/family/staff request.     Rev. Fadia Enrique MDiv, [facility]  Staff    Pager [phone]  * [facility] remains available 24/7 for emergent requests/referrals, either by having the switchboard page the on-call  or by entering an ASAP/STAT consult in Epic (this will also page the on-call ).*   "

## 2020-12-17 NOTE — PLAN OF CARE
Assumed care of patient from [id number]. A&Ox4.VSS on room air. Denies pain throughout shift. Up to the bathroom with SBA, and a walker. Passing gas, BM x1 this shift. Morning lactulose given. On 2g sodium diet, denies nausea. Jaundice skin and sclera. Skin with some bruising. PIV saline locked. Thoracentesis done at bedside, patient tolerated it well. Voiding spontaneously. Continue with plan of care.

## 2020-12-17 NOTE — PLAN OF CARE
A&Ox4.VSS on room air. Denies pain throughout shift. Up to the bathroom with SBA, and a walker. Refused evening lactulose, stating that she has been passing loose stools. On 2g sodium diet, denies nausea. Jaundice skin and sclera. Skin with some bruising. PIV saline locked. Passing gas, no BM overnight. Voiding spontaneously. Continue with plan of care.

## 2020-12-18 ENCOUNTER — APPOINTMENT (OUTPATIENT)
Dept: OCCUPATIONAL THERAPY | Facility: CLINIC | Age: 50
End: 2020-12-18
Attending: INTERNAL MEDICINE
Payer: COMMERCIAL

## 2020-12-18 LAB
ALBUMIN SERPL-MCNC: 2.2 G/DL (ref 3.4–5)
ALP SERPL-CCNC: 108 U/L (ref 40–150)
ALT SERPL W P-5'-P-CCNC: 14 U/L (ref 0–50)
ANION GAP SERPL CALCULATED.3IONS-SCNC: 4 MMOL/L (ref 3–14)
AST SERPL W P-5'-P-CCNC: 28 U/L (ref 0–45)
BILIRUB SERPL-MCNC: 4.9 MG/DL (ref 0.2–1.3)
BUN SERPL-MCNC: 13 MG/DL (ref 7–30)
CALCIUM SERPL-MCNC: 8.3 MG/DL (ref 8.5–10.1)
CHLORIDE SERPL-SCNC: 103 MMOL/L (ref 94–109)
CO2 SERPL-SCNC: 28 MMOL/L (ref 20–32)
COPATH REPORT: NORMAL
CREAT SERPL-MCNC: 1.12 MG/DL (ref 0.52–1.04)
ERYTHROCYTE [DISTWIDTH] IN BLOOD BY AUTOMATED COUNT: 19.1 % (ref 10–15)
GFR SERPL CREATININE-BSD FRML MDRD: 57 ML/MIN/{1.73_M2}
GLUCOSE SERPL-MCNC: 100 MG/DL (ref 70–99)
HCT VFR BLD AUTO: 27.4 % (ref 35–47)
HGB BLD-MCNC: 8.7 G/DL (ref 11.7–15.7)
INR PPP: 2.1 (ref 0.86–1.14)
MCH RBC QN AUTO: 32.3 PG (ref 26.5–33)
MCHC RBC AUTO-ENTMCNC: 31.8 G/DL (ref 31.5–36.5)
MCV RBC AUTO: 102 FL (ref 78–100)
PLATELET # BLD AUTO: 92 10E9/L (ref 150–450)
POTASSIUM SERPL-SCNC: 3.6 MMOL/L (ref 3.4–5.3)
PROT SERPL-MCNC: 5 G/DL (ref 6.8–8.8)
RBC # BLD AUTO: 2.69 10E12/L (ref 3.8–5.2)
SODIUM SERPL-SCNC: 136 MMOL/L (ref 133–144)
WBC # BLD AUTO: 6.8 10E9/L (ref 4–11)

## 2020-12-18 PROCEDURE — 120N000002 HC R&B MED SURG/OB UMMC

## 2020-12-18 PROCEDURE — 85027 COMPLETE CBC AUTOMATED: CPT | Performed by: STUDENT IN AN ORGANIZED HEALTH CARE EDUCATION/TRAINING PROGRAM

## 2020-12-18 PROCEDURE — 36415 COLL VENOUS BLD VENIPUNCTURE: CPT | Performed by: STUDENT IN AN ORGANIZED HEALTH CARE EDUCATION/TRAINING PROGRAM

## 2020-12-18 PROCEDURE — 80053 COMPREHEN METABOLIC PANEL: CPT | Performed by: STUDENT IN AN ORGANIZED HEALTH CARE EDUCATION/TRAINING PROGRAM

## 2020-12-18 PROCEDURE — 250N000013 HC RX MED GY IP 250 OP 250 PS 637: Performed by: STUDENT IN AN ORGANIZED HEALTH CARE EDUCATION/TRAINING PROGRAM

## 2020-12-18 PROCEDURE — 99233 SBSQ HOSP IP/OBS HIGH 50: CPT | Mod: GC | Performed by: INTERNAL MEDICINE

## 2020-12-18 PROCEDURE — 97530 THERAPEUTIC ACTIVITIES: CPT | Mod: GO

## 2020-12-18 PROCEDURE — 97535 SELF CARE MNGMENT TRAINING: CPT | Mod: GO

## 2020-12-18 PROCEDURE — 85610 PROTHROMBIN TIME: CPT | Performed by: STUDENT IN AN ORGANIZED HEALTH CARE EDUCATION/TRAINING PROGRAM

## 2020-12-18 PROCEDURE — 99233 SBSQ HOSP IP/OBS HIGH 50: CPT | Mod: GC | Performed by: STUDENT IN AN ORGANIZED HEALTH CARE EDUCATION/TRAINING PROGRAM

## 2020-12-18 RX ORDER — FUROSEMIDE 20 MG
40 TABLET ORAL DAILY
Status: DISCONTINUED | OUTPATIENT
Start: 2020-12-18 | End: 2020-12-19 | Stop reason: HOSPADM

## 2020-12-18 RX ORDER — SPIRONOLACTONE 100 MG/1
100 TABLET, FILM COATED ORAL DAILY
Status: DISCONTINUED | OUTPATIENT
Start: 2020-12-18 | End: 2020-12-19 | Stop reason: HOSPADM

## 2020-12-18 RX ORDER — SPIRONOLACTONE 100 MG/1
100 TABLET, FILM COATED ORAL DAILY
Status: DISCONTINUED | OUTPATIENT
Start: 2020-12-19 | End: 2020-12-18

## 2020-12-18 RX ADMIN — PANTOPRAZOLE SODIUM 40 MG: 40 TABLET, DELAYED RELEASE ORAL at 17:08

## 2020-12-18 RX ADMIN — ZINC SULFATE 220 MG (50 MG) CAPSULE 220 MG: CAPSULE at 09:21

## 2020-12-18 RX ADMIN — FOLIC ACID 1 MG: 1 TABLET ORAL at 09:12

## 2020-12-18 RX ADMIN — MIDODRINE HYDROCHLORIDE 5 MG: 5 TABLET ORAL at 17:08

## 2020-12-18 RX ADMIN — SUCRALFATE 1 G: 1 TABLET ORAL at 17:08

## 2020-12-18 RX ADMIN — LEVOTHYROXINE SODIUM 50 MCG: 50 TABLET ORAL at 09:11

## 2020-12-18 RX ADMIN — CIPROFLOXACIN HYDROCHLORIDE 500 MG: 500 TABLET, FILM COATED ORAL at 12:44

## 2020-12-18 RX ADMIN — MIDODRINE HYDROCHLORIDE 5 MG: 5 TABLET ORAL at 09:12

## 2020-12-18 RX ADMIN — FUROSEMIDE 40 MG: 20 TABLET ORAL at 09:11

## 2020-12-18 RX ADMIN — LACTULOSE 20 G: 10 SOLUTION ORAL at 09:11

## 2020-12-18 RX ADMIN — LACTULOSE 20 G: 10 SOLUTION ORAL at 12:44

## 2020-12-18 RX ADMIN — MULTIPLE VITAMINS W/ MINERALS TAB 1 TABLET: TAB at 09:12

## 2020-12-18 RX ADMIN — Medication 50 MG: at 09:12

## 2020-12-18 RX ADMIN — SUCRALFATE 1 G: 1 TABLET ORAL at 09:11

## 2020-12-18 RX ADMIN — RIFAXIMIN 550 MG: 550 TABLET ORAL at 09:12

## 2020-12-18 RX ADMIN — PANTOPRAZOLE SODIUM 40 MG: 40 TABLET, DELAYED RELEASE ORAL at 09:12

## 2020-12-18 RX ADMIN — RIFAXIMIN 550 MG: 550 TABLET ORAL at 20:34

## 2020-12-18 RX ADMIN — SPIRONOLACTONE 100 MG: 100 TABLET ORAL at 09:12

## 2020-12-18 RX ADMIN — MIDODRINE HYDROCHLORIDE 5 MG: 5 TABLET ORAL at 12:44

## 2020-12-18 ASSESSMENT — MIFFLIN-ST. JEOR: SCORE: 1471.83

## 2020-12-18 ASSESSMENT — ACTIVITIES OF DAILY LIVING (ADL)
ADLS_ACUITY_SCORE: 19

## 2020-12-18 NOTE — PLAN OF CARE
"BP 97/62 (BP Location: Right arm)   Pulse 96   Temp 98.2  F (36.8  C) (Oral)   Resp 18   Ht 1.702 m (5' 7\")   Wt 81.9 kg (180 lb 9.6 oz)   SpO2 92%   BMI 28.29 kg/m      [id number] AVSS on RA. A &O x4. Denies pain. Tolerating diet. Eating 75%. Left back Thoracentesis site bandage clean/dry/intact. Voiding adequate amounts. Had 1 BM. PIV SL. BLE edema 2+. Up to bathroom independently.   "

## 2020-12-18 NOTE — ADDENDUM NOTE
Addended by: KHALIDA WHEELER JR. on: 12/18/2020 09:56 AM     Modules accepted: Maria Victoria Ratliff

## 2020-12-18 NOTE — PLAN OF CARE
Assumed care form 1900-0730: VSS on RA. 2g sodium diet diet, tolerating well. Denies nausea and pain. SBA w/ walker. Thoracentesis site bandage clean/dry/intact. Little jaundice. Refused lactulose. Passing gas, no BM. Voiding spontaneously. PIV saline locked. Bilateral lower extremity edema, 2+. Pt resting between cares. Continue POC.

## 2020-12-18 NOTE — PLAN OF CARE
Occupational Therapy Discharge Summary    Reason for therapy discharge:    All goals and outcomes met, no further needs identified.    Progress towards therapy goal(s). See goals on Care Plan in [facility] electronic health record for goal details.  Goals met    Therapy recommendation(s):    No further therapy is recommended. Pt is currently functioning at baseline. Pt reports she will have assist at home as needed from her mother, niece, and son. Pt has no concerns for OT regarding her return home.

## 2020-12-18 NOTE — PLAN OF CARE
1100 to 1930:Pt/. is alert and oriented x 4 ,denies pain and nausea,has poor appetite ,pt reported 4 BM's ,refused last dose of lactulose.LS diminished especially on left side,BS+  abdomen distended,BLE +2 edema,skin is yellow and  bruised.pt had thoracocentesis today about 500 ml's of serosanguinous fluid was removed from left side .dressing is clean and dry.VSS except slightly tachy.Will continue to monitor.

## 2020-12-18 NOTE — PROGRESS NOTES
"[facility]    Hepatology Follow-up    CC: Decompensated liver cirrhosis    Dx:   Acute decompensated liver cirrhosis  Hepatohydrothorax  Ascites, history of SBP  History of GI bleed secondary to GAVE  Hepatic encephalopathy  Macrocytic anemia  Thrombocytopenia  Acute kidney injury    24 hour events:   Underwent thoracentesis    Subjective:  Patient feels well overall  Denies any shortness of breath or chest pain  No nausea or vomiting  Tolerating p.o. intake without any issues  Ambulating without any issues    Medications  Current Facility-Administered Medications   Medication Dose Route Frequency     ciprofloxacin  500 mg Oral Daily before lunch     folic acid  1 mg Oral Daily     furosemide  40 mg Oral Daily     lactulose  20 g Oral 4x Daily     levothyroxine  50 mcg Oral QAM AC     midodrine  5 mg Oral TID w/meals     multivitamin w/minerals  1 tablet Oral Daily     pantoprazole  40 mg Oral BID AC     rifaximin  550 mg Oral BID     sodium chloride (PF)  3 mL Intracatheter Q8H     spironolactone  100 mg Oral Daily     sucralfate  1 g Oral BID AC     vitamin B1  50 mg Oral Daily     zinc sulfate  220 mg Oral Daily       Review of systems  A 10-point review of systems was negative.    Examination  /67 (BP Location: Right arm)   Pulse 88   Temp 98.1  F (36.7  C) (Oral)   Resp 18   Ht 1.702 m (5' 7\")   Wt 81.9 kg (180 lb 9.6 oz)   SpO2 98%   BMI 28.29 kg/m      Intake/Output Summary (Last 24 hours) at 12/16/2020 1636  Last data filed at 12/16/2020 1200  Gross per 24 hour   Intake 660 ml   Output 575 ml   Net 85 ml       Gen- well, NAD, A+Ox3, mildly jaundiced  CVS- RRR  RS- CTA  Abd-soft abdomen  Extr-no lower extremity edema  Neuro-alert and oriented x3  Skin- no rash  Psych- normal mood  Lym- no LAD    Laboratory         Lab Results   Component Value Date     12/18/2020    Lab Results   Component Value Date    CHLORIDE 103 12/18/2020    Lab Results   Component Value " Date    BUN 13 12/18/2020      Lab Results   Component Value Date    POTASSIUM 3.6 12/18/2020    Lab Results   Component Value Date    CO2 28 12/18/2020    Lab Results   Component Value Date    CR 1.12 12/18/2020        Lab Results   Component Value Date    WBC 6.8 12/18/2020    HGB 8.7 (L) 12/18/2020    HCT 27.4 (L) 12/18/2020     (H) 12/18/2020    PLT 92 (L) 12/18/2020     Lab Results   Component Value Date    AST 28 12/18/2020    ALT 14 12/18/2020    ALKPHOS 108 12/18/2020    BILITOTAL 4.9 (H) 12/18/2020     Lab Results   Component Value Date    INR 2.10 (H) 12/18/2020         MELD-Na score: 23 at 12/18/2020  7:09 AM  MELD score: 22 at 12/18/2020  7:09 AM  Calculated from:  Serum Creatinine: 1.12 mg/dL at 12/18/2020  7:09 AM  Serum Sodium: 136 mmol/L at 12/18/2020  7:09 AM  Total Bilirubin: 4.9 mg/dL at 12/18/2020  7:09 AM  INR(ratio): 2.10 at 12/18/2020  7:09 AM  Age: 50 years 8 months      Radiology  Chest x-ray reviewed    Assessment  50 year old female with history of hypothyroidism, GERD, alcohol cirrhosis complicated by hepatic encephalopathy, GAVE, ascites recurrent hepatic hydrothorax admitted for further management of decompensated liver cirrhosis.     She she has had multiple hospitalizations at outside hospital in the last few months, and now presents to our hospital for further evaluation of her decompensated liver disease possibility of work-up for liver transplantation.  Based on her history, it is appropriate to start her transplantation given her decompensation in the past 6 months.  She has quit alcohol since early June.    While the key decompensation has been event hydrothorax on the left lung field.  Underwent thoracentesis 1 day ago, with small amount of fluid removed.  She does have history of decompensation after large-volume thoracentesis at outside hospital.  Unfortunately given her high bilirubin levels, she is not currently a candidate for TIPS.    Problem list  Acute  decompensated liver cirrhosis  Hepatohydrothorax  Ascites, history of SBP  History of GI bleed secondary to GAVE  Hepatic encephalopathy  Macrocytic anemia  Thrombocytopenia  Acute kidney injury    Recommendations  -Continue to slowly uptitrate her diuretics as tolerated.  Continue to monitor renal function with this. her baseline is 0.5-0.6.  Has been mostly around 1.1 in the last month or so.  -Nutrition consult, continue thiamine, folate, multivitamin, zinc.  -No significant history of hepatic encephalopathy, has been on rifaximin and lactulose.  I suspect that this is mostly secondary to hospitalization and infections during these hospitalizations   -Start SBP prophylaxis with ciprofloxacin.  This should be continued at discharge  - currently not a TIPS candidate because of high bilirubin/MELD, HE does not appear to be prohibitive of this if needed in the future    Follow up plan: Patient has been scheduled to see Dr Avila on Jan 4th at 10:30 AM. Labs prior to that.  She was also for transplant surgery the same day.     Patient was discussed with Dr Alex MELENDEZ MD  Gastroenterology Fellow  Division of Gastroenterology, Hepatology and Nutrition  [facility]  Text page Pager [phone]    The patient was seen and examined.  The above assessment and plan was developed jointly with the fellow.      Christos Johnson MD

## 2020-12-18 NOTE — PROGRESS NOTES
[facility]     Progress Note - Curry 4 Service        Date of Admission:  12/14/2020    Assessment & Plan      Sarah Fisher is a 50 year old female with PMH GERD, subclinical hypothyroidism, EtOH cirrhosis complicated by HE, GAVE, recurrent hepatic hydrothorax transferred from OSH for liver transplant evaluation.     Changes Today:  - No further transplant workup inpt  - Thora transudative, no evidence of infections  - Titrate diuretics up: 40mg Lasix, 100mg Spironolactone  - Strict I/Os, daily weights to assess diuretic titration     Decompensated Cirrhosis c/b HE, GAVE, hepatic hydrothorax  Admitted 12/14 for liver transplant workup. CXR on admit with large left pleural effusion, re accumulation of hepatic hydrothorax.  Etiology: EtOH, sober since end of June 2020  MELD-Na: 23  Portal HTN: yes, TIPS contraindicated due to HE  HE: yes, lactulose and rifaximin scheduled  Ascites: yes, last para 12/8. 0.7L removed. H/O SBP in Sept 2020.  EV: last EGD 12/4 with NO varices. PHG tx with APC. No residual GAVE identified.   Coag/Thrombocytopenia: yes - INR 1.92, plts 86  HCC screening: none, last U/S 12/14  Transplant candidacy: reports last drink 188 days PTA (6.2 months)  - Hepatology consulted for transplant evaluation, appreciate recs    - Continue workup as outpt given MELD score of 23  - Diuretics: increased to spironolactone 100mg, lasix 40mg. Titrate as able.  - CXR 12/15 with large left pleural effusion, without mediastinal shift.   - Thora 12/17- 500cc removed (judicious due to prior h/o hypotension)    - Transudative in nature, no evidence of infection  - Lactulose 20g TID, goal 3-4 BMs daily  - Continue PTA rifaximin  - Avoid hepatotoxic meds  - 2g Na diet  - Daily MELD labs  - FA, thiamine, MV supplements ordered   - Continue SBP ppx with cipro 500mg daily     Deconditioning  Related to recent ICU admission.  - PT/OT consulted      Malnutrition:    -  Level of malnutrition: Non-Severe   - Based on: weight loss, mild (or greater) subcutaneous fat loss, mild (or greater) muscle loss       Chronic Issues:  Hypothyroidism: Continue PTA levothyroxine  GERD: Continue PTA PPI  Hypotension: Continue PTA midodrine     Diet: Combination Diet 2 gm NA Diet  Snacks/Supplements Adult: Other; Pt can order supplements PRN; Between Meals    Fluids: Not indicated   Lines: PIV  DVT Prophylaxis: Pneumatic Compression Devices and Ambulate every shift  Aguiar Catheter: not present  Code Status: Full Code      Disposition Plan   Expected discharge: 1-2 days, recommended to prior living arrangement once diuretics at stabled dosing.    Entered: Gino Price MD 12/18/2020, 10:09 AM       The patient's care was discussed with the Attending Physician, Dr. Scott Woodward.    Gino Price MD  [address]   Please see sign in/sign out for up to date coverage information  ______________________________________________________________________    Interval History   No acute events overnight, seen and examined at bedside. S/P thoracentesis yesterday, 500cc removed without any side effects. Good appetite, feels well. Denies abdominal pain, nausea, vomiting, chest pain, SOB. No new complaints.     Data reviewed today: All imaging reviewed.     Physical Exam   Vital Signs: Temp: 98.2  F (36.8  C) Temp src: Oral BP: 97/62 Pulse: 96   Resp: 18 SpO2: 92 % O2 Device: None (Room air)    Weight: 177 lbs 4 oz  GEN: notable muscle wasting of bilateral UE, jaundiced  HEENT: EOMI, MMM, ++ scleral icterus  CV: RRR, Warm, well-perfused extremities, no JVD  RESP: absent breath sounds left lung, clear auscultation on right  GI: soft, non-distended, mild epigastric tenderness on palpation  MSK: +2 BL LE edema  Skin: Warm, dry. No rashes/lesions  Neuro: no focal deficits, no asterixis  Psych: Appropriate mood and affect.    Data   Recent Labs   Lab  12/18/20  0709 12/17/20  0709 12/16/20  0715   WBC 6.8 7.9 7.3   HGB 8.7* 8.2* 8.5*   * 104* 105*   PLT 92* 86* 86*   INR 2.10* 1.92* 2.05*    140 140   POTASSIUM 3.6 3.9 3.6   CHLORIDE 103 106 105   CO2 28 28 29   BUN 13 12 12   CR 1.12* 1.15* 1.12*   ANIONGAP 4 5 6   EMMY 8.3* 8.5 8.3*   * 88 94   ALBUMIN 2.2* 2.2* 2.3*   PROTTOTAL 5.0* 5.0* 5.0*   BILITOTAL 4.9* 6.2* 6.1*   ALKPHOS 108 94 105   ALT 14 12 11   AST 28 25 20

## 2020-12-19 VITALS
OXYGEN SATURATION: 92 % | SYSTOLIC BLOOD PRESSURE: 100 MMHG | HEIGHT: 67 IN | HEART RATE: 108 BPM | DIASTOLIC BLOOD PRESSURE: 53 MMHG | BODY MASS INDEX: 28.35 KG/M2 | RESPIRATION RATE: 16 BRPM | WEIGHT: 180.6 LBS | TEMPERATURE: 97.9 F

## 2020-12-19 LAB
ALBUMIN SERPL-MCNC: 2.4 G/DL (ref 3.4–5)
ALP SERPL-CCNC: 136 U/L (ref 40–150)
ALT SERPL W P-5'-P-CCNC: 15 U/L (ref 0–50)
ANION GAP SERPL CALCULATED.3IONS-SCNC: 6 MMOL/L (ref 3–14)
AST SERPL W P-5'-P-CCNC: 29 U/L (ref 0–45)
BILIRUB SERPL-MCNC: 5.1 MG/DL (ref 0.2–1.3)
BUN SERPL-MCNC: 11 MG/DL (ref 7–30)
CALCIUM SERPL-MCNC: 8.5 MG/DL (ref 8.5–10.1)
CHLORIDE SERPL-SCNC: 102 MMOL/L (ref 94–109)
CO2 SERPL-SCNC: 28 MMOL/L (ref 20–32)
CREAT SERPL-MCNC: 1.02 MG/DL (ref 0.52–1.04)
ERYTHROCYTE [DISTWIDTH] IN BLOOD BY AUTOMATED COUNT: 18.9 % (ref 10–15)
GFR SERPL CREATININE-BSD FRML MDRD: 64 ML/MIN/{1.73_M2}
GLUCOSE SERPL-MCNC: 94 MG/DL (ref 70–99)
HCT VFR BLD AUTO: 29.2 % (ref 35–47)
HGB BLD-MCNC: 9.1 G/DL (ref 11.7–15.7)
INR PPP: 1.79 (ref 0.86–1.14)
MCH RBC QN AUTO: 32.3 PG (ref 26.5–33)
MCHC RBC AUTO-ENTMCNC: 31.2 G/DL (ref 31.5–36.5)
MCV RBC AUTO: 104 FL (ref 78–100)
PLATELET # BLD AUTO: 115 10E9/L (ref 150–450)
POTASSIUM SERPL-SCNC: 3.4 MMOL/L (ref 3.4–5.3)
PROT SERPL-MCNC: 5.4 G/DL (ref 6.8–8.8)
RBC # BLD AUTO: 2.82 10E12/L (ref 3.8–5.2)
SODIUM SERPL-SCNC: 136 MMOL/L (ref 133–144)
WBC # BLD AUTO: 8.3 10E9/L (ref 4–11)

## 2020-12-19 PROCEDURE — 99239 HOSP IP/OBS DSCHRG MGMT >30: CPT | Mod: GC | Performed by: STUDENT IN AN ORGANIZED HEALTH CARE EDUCATION/TRAINING PROGRAM

## 2020-12-19 PROCEDURE — 250N000013 HC RX MED GY IP 250 OP 250 PS 637: Performed by: STUDENT IN AN ORGANIZED HEALTH CARE EDUCATION/TRAINING PROGRAM

## 2020-12-19 PROCEDURE — 85610 PROTHROMBIN TIME: CPT | Performed by: STUDENT IN AN ORGANIZED HEALTH CARE EDUCATION/TRAINING PROGRAM

## 2020-12-19 PROCEDURE — 80053 COMPREHEN METABOLIC PANEL: CPT | Performed by: STUDENT IN AN ORGANIZED HEALTH CARE EDUCATION/TRAINING PROGRAM

## 2020-12-19 PROCEDURE — 36415 COLL VENOUS BLD VENIPUNCTURE: CPT | Performed by: STUDENT IN AN ORGANIZED HEALTH CARE EDUCATION/TRAINING PROGRAM

## 2020-12-19 PROCEDURE — 85027 COMPLETE CBC AUTOMATED: CPT | Performed by: STUDENT IN AN ORGANIZED HEALTH CARE EDUCATION/TRAINING PROGRAM

## 2020-12-19 RX ORDER — SPIRONOLACTONE 100 MG/1
100 TABLET, FILM COATED ORAL DAILY
Qty: 30 TABLET | Refills: 0 | Status: SHIPPED | OUTPATIENT
Start: 2020-12-20 | End: 2021-01-11

## 2020-12-19 RX ORDER — FOLIC ACID 1 MG/1
1 TABLET ORAL DAILY
Qty: 30 TABLET | Refills: 0 | Status: SHIPPED | OUTPATIENT
Start: 2020-12-20 | End: 2021-08-04

## 2020-12-19 RX ORDER — CIPROFLOXACIN 500 MG/1
500 TABLET, FILM COATED ORAL
Qty: 30 TABLET | Refills: 0 | Status: SHIPPED | OUTPATIENT
Start: 2020-12-20 | End: 2021-01-11

## 2020-12-19 RX ORDER — POTASSIUM CHLORIDE 750 MG/1
40 TABLET, EXTENDED RELEASE ORAL ONCE
Status: COMPLETED | OUTPATIENT
Start: 2020-12-19 | End: 2020-12-19

## 2020-12-19 RX ORDER — MULTIPLE VITAMINS W/ MINERALS TAB 9MG-400MCG
1 TAB ORAL DAILY
Qty: 30 TABLET | Refills: 0 | Status: SHIPPED | OUTPATIENT
Start: 2020-12-20 | End: 2021-01-20

## 2020-12-19 RX ORDER — FUROSEMIDE 40 MG
40 TABLET ORAL DAILY
Qty: 30 TABLET | Refills: 0 | Status: SHIPPED | OUTPATIENT
Start: 2020-12-20 | End: 2021-01-11

## 2020-12-19 RX ORDER — MIDODRINE HYDROCHLORIDE 5 MG/1
5 TABLET ORAL
Qty: 30 TABLET | Refills: 0 | Status: SHIPPED | OUTPATIENT
Start: 2020-12-19 | End: 2021-01-20

## 2020-12-19 RX ORDER — ZINC SULFATE 50(220)MG
220 CAPSULE ORAL DAILY
Qty: 30 CAPSULE | Refills: 0 | Status: SHIPPED | OUTPATIENT
Start: 2020-12-20 | End: 2021-04-05

## 2020-12-19 RX ADMIN — LACTULOSE 20 G: 10 SOLUTION ORAL at 11:43

## 2020-12-19 RX ADMIN — FOLIC ACID 1 MG: 1 TABLET ORAL at 07:38

## 2020-12-19 RX ADMIN — POTASSIUM CHLORIDE 40 MEQ: 750 TABLET, EXTENDED RELEASE ORAL at 12:13

## 2020-12-19 RX ADMIN — CIPROFLOXACIN HYDROCHLORIDE 500 MG: 500 TABLET, FILM COATED ORAL at 11:43

## 2020-12-19 RX ADMIN — Medication 50 MG: at 07:38

## 2020-12-19 RX ADMIN — SPIRONOLACTONE 100 MG: 100 TABLET ORAL at 07:38

## 2020-12-19 RX ADMIN — LEVOTHYROXINE SODIUM 50 MCG: 50 TABLET ORAL at 07:38

## 2020-12-19 RX ADMIN — MIDODRINE HYDROCHLORIDE 5 MG: 5 TABLET ORAL at 11:43

## 2020-12-19 RX ADMIN — RIFAXIMIN 550 MG: 550 TABLET ORAL at 07:38

## 2020-12-19 RX ADMIN — PANTOPRAZOLE SODIUM 40 MG: 40 TABLET, DELAYED RELEASE ORAL at 07:38

## 2020-12-19 RX ADMIN — SUCRALFATE 1 G: 1 TABLET ORAL at 07:38

## 2020-12-19 RX ADMIN — ZINC SULFATE 220 MG (50 MG) CAPSULE 220 MG: CAPSULE at 07:38

## 2020-12-19 RX ADMIN — FUROSEMIDE 40 MG: 20 TABLET ORAL at 07:38

## 2020-12-19 RX ADMIN — MULTIPLE VITAMINS W/ MINERALS TAB 1 TABLET: TAB at 07:38

## 2020-12-19 RX ADMIN — MIDODRINE HYDROCHLORIDE 5 MG: 5 TABLET ORAL at 07:38

## 2020-12-19 RX ADMIN — LACTULOSE 20 G: 10 SOLUTION ORAL at 07:41

## 2020-12-19 ASSESSMENT — ACTIVITIES OF DAILY LIVING (ADL)
ADLS_ACUITY_SCORE: 17

## 2020-12-19 NOTE — DISCHARGE SUMMARY
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  The following   labs/tests are recommended: BMP in one wek.    Follow up with Dr. Avila and transplant liver clinic on January 4th for   hospital follow- up. No follow up labs or test are needed.    Appointments on [place] and/or [facility] (with [facility] or [facility]   provider or service). Call [phone] if you haven't heard regarding   these appointments within 7 days of discharge.           Unresulted Labs Ordered in the Past 30 Days of this Admission     Date and Time Order Name Status Description    12/16/2020 0958 fluid culture - Aerobic Bacterial Preliminary     12/16/2020 0958 Anaerobic bacterial culture Preliminary     12/16/2020 0958 Fluid Culture Aerobic Bacterial Preliminary       These results will be followed up by GI providers and PCP.    Discharge Disposition   Discharged to home  Condition at discharge: Stable    Hospital Course   Sarah Fisher is a 50 year old female with PMH GERD, subclinical hypothyroidism, EtOH cirrhosis complicated by HE, GAVE, recurrent hepatic hydrothorax transferred from OSH for liver transplant evaluation.     Decompensated Cirrhosis c/b HE, GAVE, hepatic hydrothorax  Admitted 12/14 for liver transplant workup. CXR on admit with large left pleural effusion, re accumulation of hepatic hydrothorax. 500cc removed on 12/17 - transudative in nature,  negative for infection.   - Etiology: EtOH, sober since end of June 2020  - MELD-Na: 20 on discharge  - Portal HTN: yes, TIPS contraindicated due to HE  - HE: yes, lactulose and rifaximin daily, goal 3-4 BMs daily  - Ascites: yes, last para 12/8. 0.7L removed. H/O SBP in Sept 2020. Started on Cipro 500mg daily (SBP ppx) during admit (not on previously). Ordered on discharge.  - EV: last EGD 12/4 with NO varices. PHG tx with APC. No residual GAVE identified.   - Coag/Thrombocytopenia: yes - INR 1.79, plts 115 on discharge  - HCC screening: last U/S 12/14 negative  - Transplant candidacy: reports last drink 188 days PTA (6.2 months)  - Hepatology consulted for transplant evaluation during admit, has follow up with Dr. Avila in hepatology as well as liver transplant clinic on January 4th.   - Diuretics: spironolactone 100mg, lasix 40mg. Cr stable on discharge.  - BMP one week after discharge to monitor lytes and creatinine   - FA, thiamine, MV supplements ordered on discharge    Folic acid deficiency: Folate low at 1.9. Supplement ordered on discharge.     Malnutrition: Level of malnutrition: Non-Severe based on: weight loss, mild (or greater) subcutaneous fat loss, mild (or greater) muscle loss    Consultations This Hospital Stay   GI HEPATOLOGY ADULT IP CONSULT  PHYSICAL THERAPY ADULT IP CONSULT  OCCUPATIONAL THERAPY ADULT IP CONSULT  GI HEPATOLOGY ADULT IP CONSULT  NUTRITION SERVICES ADULT IP CONSULT  MEDICATION HISTORY IP PHARMACY CONSULT  CARE MANAGEMENT / SOCIAL WORK IP CONSULT  INTERNAL MEDICINE PROCEDURE TEAM ADULT IP CONSULT [place] - THORACENTESIS  INTERNAL MEDICINE PROCEDURE TEAM ADULT IP CONSULT [place] - PARACENTESIS  INTERVENTIONAL RADIOLOGY ADULT/PEDS IP CONSULT    Code Status   Full Code       The patient was discussed with Dr. Scott Woodward.    Gino Price MD  [address] [address]  Phone:  [phone]  ______________________________________________________________________    Physical Exam   Vital Signs: Temp: 97.9  F (36.6  C) Temp src: Oral BP: 100/53 Pulse: 108   Resp: 16 SpO2: 92 % O2 Device: None (Room air)    Weight: 180 lbs 9.6 oz  GEN: notable muscle wasting of bilateral UE, jaundiced  HEENT: EOMI, MMM, ++ scleral icterus  CV: RRR, Warm, well-perfused extremities, no JVD  RESP: absent breath sounds left lung, clear auscultation on right  GI: soft, non-distended, mild epigastric tenderness on palpation  MSK: +2 BL LE edema  Skin: Warm, dry. No rashes/lesions  Neuro: no focal deficits, no asterixis  Psych: Appropriate mood and affect.      Primary Care Physician   Raven Weaver    Discharge Orders      Basic metabolic panel     Reason for your hospital stay    You were admitted for transplant evaluation. Part of this was completed as an inpatient, and will continue as an outpatient with the liver docs. You have appointments for liver clinic and transplant clinic on January 4th. We also did a thoracentesis with only 500mL out.     Activity    Your activity upon discharge: activity as tolerated     Discharge Instructions    You will discharge home on Lasix 40mg and Spironolactone 100mg. Please weight yourself daily. If you gain one pound or more each day for three days OR you gain 3 lbs in one week, you should take and extra Lasix pill (one in the morning, one in the afternoon) until your weight goes back to normal. If you develop symptoms of SOB concerning for fluid accumulation in the lung, you should also take an extra Lasix pill and call the liver clinic to let them know. You will have a BMP in one week and follow up with Dr. Avila and the transplant clinic on January 4th.     Adult [facility] Follow-up and recommended labs and tests    Follow up with primary care provider, Raven Weaver, within 7 days to evaluate medication change and for hospital follow- up.  The following labs/tests are  recommended: BMP in one wek.    Follow up with Dr. Avila and transplant liver clinic on January 4th for hospital follow- up. No follow up labs or test are needed.    Appointments on [place] and/or [facility] (with [facility] or [facility] provider or service). Call [phone] if you haven't heard regarding these appointments within 7 days of discharge.     Full Code     Diet    Follow this diet upon discharge: Orders Placed This Encounter      Snacks/Supplements Adult: Other; Pt can order supplements PRN; Between Meals      2 Gram Sodium Diet       Significant Results and Procedures   Most Recent 3 CBC's:  Recent Labs   Lab Test 12/19/20  0747 12/18/20  0709 12/17/20  0709   WBC 8.3 6.8 7.9   HGB 9.1* 8.7* 8.2*   * 102* 104*   * 92* 86*     Most Recent 3 BMP's:  Recent Labs   Lab Test 12/19/20  0747 12/18/20  0709 12/17/20  0709    136 140   POTASSIUM 3.4 3.6 3.9   CHLORIDE 102 103 106   CO2 28 28 28   BUN 11 13 12   CR 1.02 1.12* 1.15*   ANIONGAP 6 4 5   EMMY 8.5 8.3* 8.5   GLC 94 100* 88     Most Recent 2 LFT's:  Recent Labs   Lab Test 12/19/20  0747 12/18/20  0709   AST 29 28   ALT 15 14   ALKPHOS 136 108   BILITOTAL 5.1* 4.9*     Most Recent 3 INR's:  Recent Labs   Lab Test 12/19/20  0747 12/18/20  0709 12/17/20  0709   INR 1.79* 2.10* 1.92*   ,   Results for orders placed or performed during the hospital encounter of 12/14/20   XR Chest 2 Views    Narrative    Exam: XR CHEST 2 VW, 12/15/2020 8:16 AM    Indication: Evaluate for left sided pleural effusion    Comparison: None    Findings:   Cardiac silhouette is obscured. Near complete opacification of the  left lung fields with subtle sparing of the left lung apex. No  significant mediastinal shift. There are some streaky right basilar  airspace opacities. No definite pneumothorax although slightly limited  by film penetration. Upper abdomen is unremarkable. No acute osseous  abnormalities are suspected.      Impression    Impression: Large  left-sided pleural effusion. No significant  mediastinal shift.      Left pleural effusion discussed with Dr. Price by Dr. Godwin at 9:55  AM on 12/15/2020.    I have personally reviewed the examination and initial interpretation  and I agree with the findings.    CINTHIA YUEN MD   US Abdomen Limited w Abd/Pelvis Duplex Complete    Narrative    EXAMINATION: US ABDOMEN LIMITED WITH DOPPLER COMPLETE 12/15/2020 8:19  AM     COMPARISON: No direct comparisons available.    HISTORY: End-stage liver disease, evaluation for transplant workup    TECHNIQUE: The abdomen was scanned in standard fashion with  specialized ultrasound transducer(s) using both gray-scale, color  Doppler, and spectral flow techniques.    Findings:  Liver: The liver demonstrates diffuse coarsened echotexture with  increased echogenicity. Nodular contour to the liver margins. No  evidence of a focal hepatic mass.     Extrahepatic portal vein flow is retrograde at 34 cm/s.  Right portal vein flow is retrograde, measuring 36 cm/s.  Left portal vein flow is antegrade, measuring 45 cm/s.    Flow in the hepatic artery is towards the liver and:  315  cm/s peak systolic  0.47 resistive index.     Recanalized paraumbilical vein.    The splenic vein is patent and flow is towards the liver.  The left,  middle, and right hepatic veins are patent with flow towards the IVC.  Flattening of waveforms secondary to underlying liver disease. The IVC  is patent with flow towards the heart.   The visualized aorta is not  dilated.    Gallbladder: Mildly thickened gallbladder wall 3.3 mm. There is no  pericholecystic fluid, positive sonographic Lema's sign or evidence  for cholelithiasis    Bile Ducts: No definite intrahepatic biliary dilatation. The common  bile duct is not visualized in this study.    Pancreas: Not visualized in this study.     Right kidney: Normal echotexture, without mass or hydronephrosis.  Measuring 11.7 cm.    Fluid: Partially visualized right  pleural effusion, trace ascites  surrounding the liver.      Impression    Impression:   1.  Cirrhotic morphology of the liver.  2.  Reversal of flow in the main portal vein, right portal vein, and  recanalization of the paraumbilical vein, compatible with portal  hypertension  3.  Decreased resistive index of the hepatic artery and increased peak  systolic velocity possibly secondary to stenosis or shunting.  4.  Partially visualized right pleural effusion and trace perihepatic  ascites.      I have personally reviewed the examination and initial interpretation  and I agree with the findings.    ELIZA REYNA MD   XR Chest Port 1 View    Narrative    Exam: XR CHEST PORT 1 VW, 12/17/2020 1:24 PM    Indication: s/p paracentesis    Comparison: 12/15/2020    Findings:   No significant change in the complete whiteout of the left lung. Small  amount of air in the upper left chest likely aerated lung. Heart still  remains slightly shifted to the left indicating an element of  atelectasis. Right lung is clear.      Impression    Impression: Almost complete opacification of the left thorax. Likely  combination of effusion and atelectasis.    CHARITO MEJIA MD       Discharge Medications   Current Discharge Medication List      START taking these medications    Details   ciprofloxacin (CIPRO) 500 MG tablet Take 1 tablet (500 mg) by mouth daily (before lunch)  Qty: 30 tablet, Refills: 0    Associated Diagnoses: Alcoholic cirrhosis of liver with ascites (H)      folic acid (FOLVITE) 1 MG tablet Take 1 tablet (1 mg) by mouth daily  Qty: 30 tablet, Refills: 0    Associated Diagnoses: Chronic liver failure without hepatic coma (H)      midodrine (PROAMATINE) 5 MG tablet Take 1 tablet (5 mg) by mouth 3 times daily (with meals)  Qty: 30 tablet, Refills: 0    Associated Diagnoses: Hypotension, unspecified hypotension type      multivitamin w/minerals (THERA-VIT-M) tablet Take 1 tablet by mouth daily  Qty: 30 tablet, Refills: 0     Associated Diagnoses: Chronic liver failure without hepatic coma (H)      thiamine 50 MG TABS Take 1 tablet (50 mg) by mouth daily  Qty: 30 tablet, Refills: 0    Associated Diagnoses: Chronic liver failure without hepatic coma (H)      zinc sulfate (ZINCATE) 220 (50 Zn) MG capsule Take 1 capsule (220 mg) by mouth daily  Qty: 30 capsule, Refills: 0    Associated Diagnoses: Chronic liver failure without hepatic coma (H)         CONTINUE these medications which have CHANGED    Details   furosemide (LASIX) 40 MG tablet Take 1 tablet (40 mg) by mouth daily  Qty: 30 tablet, Refills: 0    Associated Diagnoses: Alcoholic cirrhosis of liver with ascites (H)      spironolactone (ALDACTONE) 100 MG tablet Take 1 tablet (100 mg) by mouth daily  Qty: 30 tablet, Refills: 0    Associated Diagnoses: Alcoholic cirrhosis of liver with ascites (H)         CONTINUE these medications which have NOT CHANGED    Details   ferrous sulfate (FE TABS) 325 (65 Fe) MG EC tablet Take 325 mg by mouth daily      levothyroxine (SYNTHROID/LEVOTHROID) 50 MCG tablet Take 50 mcg by mouth daily      pantoprazole (PROTONIX) 40 MG EC tablet Take 40 mg by mouth every morning (before breakfast)      potassium chloride ER (K-TAB/KLOR-CON) 10 MEQ CR tablet Take 10 mEq by mouth daily      rifaximin (XIFAXAN) 550 MG TABS tablet Take 550 mg by mouth 2 times daily      sucralfate (CARAFATE) 1 GM tablet Take 1 g by mouth 2 times daily (before meals) (1 hours before meals)      traZODone (DESYREL) 50 MG tablet Take 50 mg by mouth At Bedtime         STOP taking these medications       apixaban ANTICOAGULANT (ELIQUIS) 2.5 MG tablet Comments:   Reason for Stopping:         carvedilol (COREG) 3.125 MG tablet Comments:   Reason for Stopping:             Allergies   Allergies   Allergen Reactions     Amoxicillin GI Disturbance, Diarrhea, Nausea and Nausea and Vomiting

## 2020-12-19 NOTE — PLAN OF CARE
Alert and oriented. Calm and cooperative. VSS. Up independently in room with walker. Voiding spontaneously in adequate amounts. Having bowel movements on Lactulose. Denies pain. 2+ edema bilateral LE. Tolerating 2g Na restricted diet.   Discharged home. Verbalized understanding of all discharge instructions. New medications received from discharge pharmacy. All belongings sent.

## 2020-12-19 NOTE — PLAN OF CARE
VSS on RA. A&Ox4. Denies nausea, denies pain. Up ad srinivas in room. MD notified of pt's low urine output, no new orders, will continue to monitor. 4 loose bowel movements this shift, held scheduled lactulose. 2g Na diet, appetite good. Abdomen distended, skin jaundiced. Continue with plan of care.

## 2020-12-19 NOTE — PLAN OF CARE
Alert, oriented, up independently with walker.  Slept much of the night. Denies pain, had a slight feeling of nausea that resolved itself.  2g Na diet.  Voided 1x, adequate amount. No BMs overnight.  2+ edema bilat LEs, pt states swelling is getting better.  Hoping to discharge home soon.

## 2020-12-20 ENCOUNTER — PATIENT OUTREACH (OUTPATIENT)
Dept: CARE COORDINATION | Facility: CLINIC | Age: 50
End: 2020-12-20

## 2020-12-21 ENCOUNTER — TELEPHONE (OUTPATIENT)
Dept: TRANSPLANT | Facility: CLINIC | Age: 50
End: 2020-12-21

## 2020-12-21 NOTE — TELEPHONE ENCOUNTER
Transplant Social Work Services Phone Call    Data: I received a call from Maria Elena re: chemical health assessment, PCP and labs. I directed her to call her transplant coordinator regarding labs. I also provided her information to scheduled CD assessment and she reported that she will also reach out to schedule if she has not heard anything. In terms of PCP she would like to establish care with a PCP through [facility]. I indicated that it would be up to her who she feels comfortable establishing care. Maria Elena inquired about open enrollment for [facility]. I directed her to consult with the senior linkage line and provided brief information regarding low/high out of pocket max. No other questions.   Intervention: resources, chemical health   Assessment: Maria Elena is motivated and engaging with the transplant evaluation   Education provided by SW: Chemical health   Plan:Maria Elena voiced understanding that she is able to reach out with other questions. I will continue to assist with liver evaluation     ENRIQUE Rodriguez, [facility]  Liver Transplant   [facility]  Phone: [phone]  Pager: [phone]

## 2020-12-22 LAB
BACTERIA SPEC CULT: NO GROWTH
Lab: NORMAL
SPECIMEN SOURCE: NORMAL

## 2020-12-22 NOTE — PROGRESS NOTES
The patient was contacted by another LICSW for post-hospital follow up. Will close encounter at this time.    Jasmine Hobbs CMA, [facility]  Post Hospital Discharge Team

## 2020-12-24 LAB
BACTERIA SPEC CULT: ABNORMAL
BACTERIA SPEC CULT: NORMAL
Lab: NORMAL
SPECIMEN SOURCE: ABNORMAL
SPECIMEN SOURCE: NORMAL

## 2020-12-29 DIAGNOSIS — K72.10 CHRONIC LIVER FAILURE WITHOUT HEPATIC COMA (H): ICD-10-CM

## 2020-12-29 PROCEDURE — 80048 BASIC METABOLIC PNL TOTAL CA: CPT | Performed by: STUDENT IN AN ORGANIZED HEALTH CARE EDUCATION/TRAINING PROGRAM

## 2020-12-29 PROCEDURE — 36415 COLL VENOUS BLD VENIPUNCTURE: CPT | Performed by: STUDENT IN AN ORGANIZED HEALTH CARE EDUCATION/TRAINING PROGRAM

## 2020-12-30 LAB
ANION GAP SERPL CALCULATED.3IONS-SCNC: 9 MMOL/L (ref 3–14)
BUN SERPL-MCNC: 16 MG/DL (ref 7–30)
CALCIUM SERPL-MCNC: 8.5 MG/DL (ref 8.5–10.1)
CHLORIDE SERPL-SCNC: 104 MMOL/L (ref 94–109)
CO2 SERPL-SCNC: 25 MMOL/L (ref 20–32)
CREAT SERPL-MCNC: 1.38 MG/DL (ref 0.52–1.04)
GFR SERPL CREATININE-BSD FRML MDRD: 44 ML/MIN/{1.73_M2}
GLUCOSE SERPL-MCNC: 142 MG/DL (ref 70–99)
POTASSIUM SERPL-SCNC: 3.5 MMOL/L (ref 3.4–5.3)
SODIUM SERPL-SCNC: 138 MMOL/L (ref 133–144)

## 2020-12-31 ENCOUNTER — TELEPHONE (OUTPATIENT)
Dept: TRANSPLANT | Facility: CLINIC | Age: 50
End: 2020-12-31

## 2020-12-31 NOTE — TELEPHONE ENCOUNTER
Left message asking her to not take her water pills, Lasix/Furosemide and Aldactone/Spironolactone,  and repeat labs on Monday.  Also, sent my chart.

## 2021-01-04 ENCOUNTER — ALLIED HEALTH/NURSE VISIT (OUTPATIENT)
Dept: TRANSPLANT | Facility: CLINIC | Age: 51
End: 2021-01-04
Attending: STUDENT IN AN ORGANIZED HEALTH CARE EDUCATION/TRAINING PROGRAM
Payer: COMMERCIAL

## 2021-01-04 ENCOUNTER — OFFICE VISIT (OUTPATIENT)
Dept: GASTROENTEROLOGY | Facility: CLINIC | Age: 51
End: 2021-01-04
Attending: STUDENT IN AN ORGANIZED HEALTH CARE EDUCATION/TRAINING PROGRAM
Payer: COMMERCIAL

## 2021-01-04 ENCOUNTER — HOSPITAL ENCOUNTER (OUTPATIENT)
Dept: BEHAVIORAL HEALTH | Facility: CLINIC | Age: 51
Discharge: HOME | End: 2021-01-04
Attending: FAMILY MEDICINE | Admitting: FAMILY MEDICINE
Payer: COMMERCIAL

## 2021-01-04 ENCOUNTER — ANCILLARY PROCEDURE (OUTPATIENT)
Dept: GENERAL RADIOLOGY | Facility: CLINIC | Age: 51
End: 2021-01-04
Attending: STUDENT IN AN ORGANIZED HEALTH CARE EDUCATION/TRAINING PROGRAM
Payer: COMMERCIAL

## 2021-01-04 VITALS
WEIGHT: 187.83 LBS | OXYGEN SATURATION: 94 % | HEART RATE: 112 BPM | DIASTOLIC BLOOD PRESSURE: 82 MMHG | BODY MASS INDEX: 29.42 KG/M2 | SYSTOLIC BLOOD PRESSURE: 129 MMHG

## 2021-01-04 VITALS
BODY MASS INDEX: 29.43 KG/M2 | DIASTOLIC BLOOD PRESSURE: 82 MMHG | HEART RATE: 112 BPM | OXYGEN SATURATION: 94 % | SYSTOLIC BLOOD PRESSURE: 129 MMHG | WEIGHT: 187.9 LBS

## 2021-01-04 VITALS — HEIGHT: 67 IN | WEIGHT: 180 LBS | BODY MASS INDEX: 28.25 KG/M2

## 2021-01-04 DIAGNOSIS — K70.31 ALCOHOLIC CIRRHOSIS OF LIVER WITH ASCITES (H): ICD-10-CM

## 2021-01-04 DIAGNOSIS — K76.6 PORTAL HYPERTENSION (H): ICD-10-CM

## 2021-01-04 DIAGNOSIS — E03.9 HYPOTHYROIDISM, UNSPECIFIED TYPE: ICD-10-CM

## 2021-01-04 DIAGNOSIS — K70.30 ALCOHOLIC CIRRHOSIS (H): ICD-10-CM

## 2021-01-04 DIAGNOSIS — E55.9 VITAMIN D DEFICIENCY: ICD-10-CM

## 2021-01-04 DIAGNOSIS — K70.31 ALCOHOLIC CIRRHOSIS OF LIVER WITH ASCITES (H): Primary | ICD-10-CM

## 2021-01-04 DIAGNOSIS — R17 ELEVATED BILIRUBIN: ICD-10-CM

## 2021-01-04 DIAGNOSIS — D59.10 AUTOIMMUNE HEMOLYTIC ANEMIA (H): ICD-10-CM

## 2021-01-04 DIAGNOSIS — J90 PLEURAL EFFUSION: ICD-10-CM

## 2021-01-04 DIAGNOSIS — R74.02 ELEVATED LDH: ICD-10-CM

## 2021-01-04 LAB
ABO + RH BLD: NORMAL
ABO + RH BLD: NORMAL
ALBUMIN SERPL-MCNC: 2.7 G/DL (ref 3.4–5)
ALBUMIN UR-MCNC: NEGATIVE MG/DL
ALP SERPL-CCNC: 139 U/L (ref 40–150)
ALT SERPL W P-5'-P-CCNC: 20 U/L (ref 0–50)
ANION GAP SERPL CALCULATED.3IONS-SCNC: 7 MMOL/L (ref 3–14)
APPEARANCE UR: ABNORMAL
AST SERPL W P-5'-P-CCNC: 34 U/L (ref 0–45)
BACTERIA #/AREA URNS HPF: ABNORMAL /HPF
BILIRUB DIRECT SERPL-MCNC: 1.8 MG/DL (ref 0–0.2)
BILIRUB SERPL-MCNC: 5.2 MG/DL (ref 0.2–1.3)
BILIRUB UR QL STRIP: NEGATIVE
BLD GP AB SCN SERPL QL: NORMAL
BLOOD BANK CMNT PATIENT-IMP: NORMAL
BUN SERPL-MCNC: 13 MG/DL (ref 7–30)
CALCIUM SERPL-MCNC: 9.1 MG/DL (ref 8.5–10.1)
CHLORIDE SERPL-SCNC: 103 MMOL/L (ref 94–109)
CHOLEST SERPL-MCNC: 160 MG/DL
CMV IGG SERPL QL IA: >8 AI (ref 0–0.8)
CO2 SERPL-SCNC: 29 MMOL/L (ref 20–32)
COLOR UR AUTO: ABNORMAL
CREAT SERPL-MCNC: 1.07 MG/DL (ref 0.52–1.04)
DEPRECATED CALCIDIOL+CALCIFEROL SERPL-MC: 10 UG/L (ref 20–75)
EBV VCA IGG SER QL IA: 3 AI (ref 0–0.8)
ERYTHROCYTE [DISTWIDTH] IN BLOOD BY AUTOMATED COUNT: 16.7 % (ref 10–15)
GFR SERPL CREATININE-BSD FRML MDRD: 60 ML/MIN/{1.73_M2}
GLUCOSE SERPL-MCNC: 106 MG/DL (ref 70–99)
GLUCOSE UR STRIP-MCNC: NEGATIVE MG/DL
HCG SERPL QL: NEGATIVE
HCT VFR BLD AUTO: 30.4 % (ref 35–47)
HDLC SERPL-MCNC: 59 MG/DL
HGB BLD-MCNC: 9.6 G/DL (ref 11.7–15.7)
HGB UR QL STRIP: NEGATIVE
HIV 1+2 AB+HIV1 P24 AG SERPL QL IA: NONREACTIVE
HYALINE CASTS #/AREA URNS LPF: 8 /LPF (ref 0–2)
INR PPP: 1.69 (ref 0.86–1.14)
KETONES UR STRIP-MCNC: NEGATIVE MG/DL
LDH SERPL L TO P-CCNC: 609 U/L (ref 81–234)
LDLC SERPL CALC-MCNC: 86 MG/DL
LEUKOCYTE ESTERASE UR QL STRIP: NEGATIVE
MCH RBC QN AUTO: 33.3 PG (ref 26.5–33)
MCHC RBC AUTO-ENTMCNC: 31.6 G/DL (ref 31.5–36.5)
MCV RBC AUTO: 106 FL (ref 78–100)
MUCOUS THREADS #/AREA URNS LPF: PRESENT /LPF
NITRATE UR QL: NEGATIVE
NONHDLC SERPL-MCNC: 101 MG/DL
PH UR STRIP: 5 PH (ref 5–7)
PLATELET # BLD AUTO: 149 10E9/L (ref 150–450)
POTASSIUM SERPL-SCNC: 3.9 MMOL/L (ref 3.4–5.3)
PROT SERPL-MCNC: 6.4 G/DL (ref 6.8–8.8)
RBC # BLD AUTO: 2.88 10E12/L (ref 3.8–5.2)
RBC #/AREA URNS AUTO: 1 /HPF (ref 0–2)
SODIUM SERPL-SCNC: 138 MMOL/L (ref 133–144)
SOURCE: ABNORMAL
SP GR UR STRIP: 1.02 (ref 1–1.03)
SPECIMEN EXP DATE BLD: NORMAL
SQUAMOUS #/AREA URNS AUTO: 5 /HPF (ref 0–1)
T PALLIDUM AB SER QL: NONREACTIVE
TRIGL SERPL-MCNC: 75 MG/DL
UROBILINOGEN UR STRIP-MCNC: 2 MG/DL (ref 0–2)
WBC # BLD AUTO: 7.3 10E9/L (ref 4–11)
WBC #/AREA URNS AUTO: 7 /HPF (ref 0–5)

## 2021-01-04 PROCEDURE — 86481 TB AG RESPONSE T-CELL SUSP: CPT | Mod: 90 | Performed by: PATHOLOGY

## 2021-01-04 PROCEDURE — 86644 CMV ANTIBODY: CPT | Mod: 90 | Performed by: PATHOLOGY

## 2021-01-04 PROCEDURE — 86900 BLOOD TYPING SEROLOGIC ABO: CPT | Mod: 90 | Performed by: PATHOLOGY

## 2021-01-04 PROCEDURE — 80061 LIPID PANEL: CPT | Performed by: PATHOLOGY

## 2021-01-04 PROCEDURE — 84703 CHORIONIC GONADOTROPIN ASSAY: CPT | Performed by: PATHOLOGY

## 2021-01-04 PROCEDURE — 86780 TREPONEMA PALLIDUM: CPT | Mod: 90 | Performed by: PATHOLOGY

## 2021-01-04 PROCEDURE — 83010 ASSAY OF HAPTOGLOBIN QUANT: CPT | Performed by: STUDENT IN AN ORGANIZED HEALTH CARE EDUCATION/TRAINING PROGRAM

## 2021-01-04 PROCEDURE — 85045 AUTOMATED RETICULOCYTE COUNT: CPT | Performed by: PATHOLOGY

## 2021-01-04 PROCEDURE — 86665 EPSTEIN-BARR CAPSID VCA: CPT | Mod: 90 | Performed by: PATHOLOGY

## 2021-01-04 PROCEDURE — 82306 VITAMIN D 25 HYDROXY: CPT | Mod: 90 | Performed by: PATHOLOGY

## 2021-01-04 PROCEDURE — 86850 RBC ANTIBODY SCREEN: CPT | Mod: 90 | Performed by: PATHOLOGY

## 2021-01-04 PROCEDURE — 84443 ASSAY THYROID STIM HORMONE: CPT | Performed by: PATHOLOGY

## 2021-01-04 PROCEDURE — 36415 COLL VENOUS BLD VENIPUNCTURE: CPT | Performed by: PATHOLOGY

## 2021-01-04 PROCEDURE — H0001 ALCOHOL AND/OR DRUG ASSESS: HCPCS | Mod: 95

## 2021-01-04 PROCEDURE — 99203 OFFICE O/P NEW LOW 30 MIN: CPT | Performed by: TRANSPLANT SURGERY

## 2021-01-04 PROCEDURE — 80076 HEPATIC FUNCTION PANEL: CPT | Performed by: PATHOLOGY

## 2021-01-04 PROCEDURE — 85610 PROTHROMBIN TIME: CPT | Performed by: PATHOLOGY

## 2021-01-04 PROCEDURE — 99205 OFFICE O/P NEW HI 60 MIN: CPT | Performed by: STUDENT IN AN ORGANIZED HEALTH CARE EDUCATION/TRAINING PROGRAM

## 2021-01-04 PROCEDURE — 80321 ALCOHOLS BIOMARKERS 1OR 2: CPT | Mod: 90 | Performed by: PATHOLOGY

## 2021-01-04 PROCEDURE — 86901 BLOOD TYPING SEROLOGIC RH(D): CPT | Mod: 90 | Performed by: PATHOLOGY

## 2021-01-04 PROCEDURE — 99000 SPECIMEN HANDLING OFFICE-LAB: CPT | Performed by: PATHOLOGY

## 2021-01-04 PROCEDURE — 86886 COOMBS TEST INDIRECT TITER: CPT | Mod: 90 | Performed by: PATHOLOGY

## 2021-01-04 PROCEDURE — 85027 COMPLETE CBC AUTOMATED: CPT | Performed by: PATHOLOGY

## 2021-01-04 PROCEDURE — 83615 LACTATE (LD) (LDH) ENZYME: CPT | Performed by: PATHOLOGY

## 2021-01-04 PROCEDURE — 81001 URINALYSIS AUTO W/SCOPE: CPT | Performed by: PATHOLOGY

## 2021-01-04 PROCEDURE — 80048 BASIC METABOLIC PNL TOTAL CA: CPT | Performed by: PATHOLOGY

## 2021-01-04 PROCEDURE — 71046 X-RAY EXAM CHEST 2 VIEWS: CPT | Performed by: RADIOLOGY

## 2021-01-04 RX ORDER — CIPROFLOXACIN 250 MG/1
250 TABLET, FILM COATED ORAL 2 TIMES DAILY
Qty: 30 TABLET | Refills: 3 | Status: SHIPPED | OUTPATIENT
Start: 2021-01-04 | End: 2021-05-25

## 2021-01-04 RX ORDER — SPIRONOLACTONE 25 MG/1
25 TABLET ORAL DAILY
Qty: 30 TABLET | Refills: 3 | Status: SHIPPED | OUTPATIENT
Start: 2021-01-04 | End: 2021-02-05 | Stop reason: DRUGHIGH

## 2021-01-04 RX ORDER — FUROSEMIDE 20 MG
20 TABLET ORAL DAILY
Qty: 30 TABLET | Refills: 3 | Status: SHIPPED | OUTPATIENT
Start: 2021-01-04 | End: 2021-02-05 | Stop reason: DRUGHIGH

## 2021-01-04 ASSESSMENT — COLUMBIA-SUICIDE SEVERITY RATING SCALE - C-SSRS
TOTAL  NUMBER OF INTERRUPTED ATTEMPTS PAST 3 MONTHS: NO
TOTAL  NUMBER OF ABORTED OR SELF INTERRUPTED ATTEMPTS PAST 3 MONTHS: NO
2. HAVE YOU ACTUALLY HAD ANY THOUGHTS OF KILLING YOURSELF LIFETIME?: NO
ATTEMPT LIFETIME: NO
1. IN THE PAST MONTH, HAVE YOU WISHED YOU WERE DEAD OR WISHED YOU COULD GO TO SLEEP AND NOT WAKE UP?: NO
4. HAVE YOU HAD THESE THOUGHTS AND HAD SOME INTENTION OF ACTING ON THEM?: NO
5. HAVE YOU STARTED TO WORK OUT OR WORKED OUT THE DETAILS OF HOW TO KILL YOURSELF? DO YOU INTEND TO CARRY OUT THIS PLAN?: NO
4. HAVE YOU HAD THESE THOUGHTS AND HAD SOME INTENTION OF ACTING ON THEM?: NO
3. HAVE YOU BEEN THINKING ABOUT HOW YOU MIGHT KILL YOURSELF?: NO
5. HAVE YOU STARTED TO WORK OUT OR WORKED OUT THE DETAILS OF HOW TO KILL YOURSELF? DO YOU INTEND TO CARRY OUT THIS PLAN?: NO
6. HAVE YOU EVER DONE ANYTHING, STARTED TO DO ANYTHING, OR PREPARED TO DO ANYTHING TO END YOUR LIFE?: NO
ATTEMPT PAST THREE MONTHS: NO
1. IN THE PAST MONTH, HAVE YOU WISHED YOU WERE DEAD OR WISHED YOU COULD GO TO SLEEP AND NOT WAKE UP?: NO
TOTAL  NUMBER OF INTERRUPTED ATTEMPTS LIFETIME: NO
TOTAL  NUMBER OF ABORTED OR SELF INTERRUPTED ATTEMPTS PAST LIFETIME: NO
2. HAVE YOU ACTUALLY HAD ANY THOUGHTS OF KILLING YOURSELF?: NO
6. HAVE YOU EVER DONE ANYTHING, STARTED TO DO ANYTHING, OR PREPARED TO DO ANYTHING TO END YOUR LIFE?: NO

## 2021-01-04 ASSESSMENT — PAIN SCALES - GENERAL
PAINLEVEL: NO PAIN (0)
PAINLEVEL: NO PAIN (0)

## 2021-01-04 ASSESSMENT — ANXIETY QUESTIONNAIRES
7. FEELING AFRAID AS IF SOMETHING AWFUL MIGHT HAPPEN: NOT AT ALL
2. NOT BEING ABLE TO STOP OR CONTROL WORRYING: NOT AT ALL
GAD7 TOTAL SCORE: 0
IF YOU CHECKED OFF ANY PROBLEMS ON THIS QUESTIONNAIRE, HOW DIFFICULT HAVE THESE PROBLEMS MADE IT FOR YOU TO DO YOUR WORK, TAKE CARE OF THINGS AT HOME, OR GET ALONG WITH OTHER PEOPLE: NOT DIFFICULT AT ALL
6. BECOMING EASILY ANNOYED OR IRRITABLE: NOT AT ALL
1. FEELING NERVOUS, ANXIOUS, OR ON EDGE: NOT AT ALL
4. TROUBLE RELAXING: NOT AT ALL
3. WORRYING TOO MUCH ABOUT DIFFERENT THINGS: NOT AT ALL
5. BEING SO RESTLESS THAT IT IS HARD TO SIT STILL: NOT AT ALL

## 2021-01-04 ASSESSMENT — MIFFLIN-ST. JEOR: SCORE: 1469.1

## 2021-01-04 ASSESSMENT — PATIENT HEALTH QUESTIONNAIRE - PHQ9: SUM OF ALL RESPONSES TO PHQ QUESTIONS 1-9: 1

## 2021-01-04 NOTE — PROGRESS NOTES
"Phone Visit:  The patient has been notified of the following:     \"We have found that certain health care needs can be provided without the need for a face to face visit.  This service lets us provide the care you need with a phone conversation.      I will have full access to your [facility] medical record during this entire phone call.   I will be taking notes for your medical record.     Since this is like an office visit, we will bill your insurance company for this service.  If your insurance denies the charge we will appeal and/or write off the cost of the service.  The Governor's executive order may result in expanded health insurance coverage for this service, which would be paid by your insurance.      There are potential benefits and risks of telephone visits (e.g. limits to patient confidentiality) that differ from in-person visits.  Confidentiality still applies for telephone services, and nobody will record the visit.  It is important to be in a quiet, private space that is free of distractions (including cell phone or other devices) during the visit.??     If during the course of the call I believe a telephone visit is not appropriate, you will not be charged for \"this service\"    Consent has been obtained for this service by care team member: Yes    Phone visit start time:  3:00 pm  Phone visit end time:  4:13 pm    [facility]  Provider Name:  BIANCA Blanchard     Credentials:  SHILA/BIANCA     Telephone: [phone]      PATIENT'S NAME: Sarah Fisher  PREFERRED NAME: Maria Elena  PRONOUNS: she/her/hers    MRN: [id number]  : 1970   ACCT. NUMBER:  [id number]  DATE OF SERVICE: 2021  START TIME: 3:00 pm  END TIME: 4:13 pm  PREFERRED PHONE: [phone]  May we leave a program related message: Yes  ADDRESS:   [address] [place] [address]  SERVICE MODALITY:  Phone Visit New      Provider verified identity " through the following two step process.  Patient provided:  Patient  (1970) and Patient's last 4 digits of N ([id number])    UNIVERSAL ADULT SUBSTANCE USE DISORDER DIAGNOSTIC ASSESSMENT      Identifying Information:  The patient is a 50 year old, /White female of  decent.  The pronoun use throughout this assessment reflects the patient's chosen pronoun.  The patient was referred for an assessment by [facility] Liver Transplant Team.  The patient attended the session alone.     Chief Complaint:   The patient had an assessment via a remote telephone visit at [facility] with this counselor for evaluation of a possible substance use disorder.  The reason for the substance abuse assessment was because it was a part of the evaluation process with the [facility] Liver Transplant Team for a potential liver transplant for this patient.  The patient first had a concern about having substance abuse issues in 3/2020.  The patient has not attempted to resolve these concerns prior to 2020 when she had started to feel ill and had stopped her use of alcohol.  The patient did not become aware of her liver disease until 2020 when she was hospitalized for an extended period of time and found out she had end-stage liver disease.  The patient does not appear to be in severe withdrawal, an imminent safety risk to self or others, or requiring immediate medical attention and may proceed with the assessment interview.    Social/Family History:  The patient reported [place] in [place] areas.  The patient reported being raised by her mother and her step-father.  The patient denied experiencing or witnessing any verbal, physical or sexual abuse in the family home.  The patient reported discipline in her family home was in the form of being grounded, having privileges taken away or verbal reprimands.  The patient reported feeling supported by her mother  "and her step-father.  The patient reported a history of:   Family History   Problem Relation Age of Onset     Substance Abuse Brother      Substance Abuse Brother    The patient reported overall her childhood was a happy childhood.  The patient described current relationships with her family of origin as being good.      The patient describes her cultural background as being /White female of  decent.  Cultural influences and impact on patient's life structure, values, norms, and healthcare: None.  Contextual influences on patient's health include: Family Factors Alcohol was always around at family get together's and holidays, \"it was always there\".  The patient reported having a brother die secondary to his alcohol abuse in 2015.  The patient identified her preferred language to be English.  The patient reported she does not need the assistance of an  or other support involved in therapy.     The patient reported had no significant delays in developmental tasks.  The patient's highest education level was graduate school.  The patient identified the following learning problems: none reported.  The patient reports she is able to understand written materials.    The patient denied having any history of being .  The patient identified as being heterosexual and she reported being single and not in a romantic relationship at this time.  The patient reported having 1 son age 12 who lives with her and she has full custody of her son.     The patient reported living her niece, Radha, and her 12 year old son in a single family home.  The patient denied having any concerns regarding her immediate living environment and/or neighborhood and she plans to continue to live there.  The patient identified her mother, her brother and her niece as being her primary support network at this time.  The patient identified the quality of her relationships with her support network as good.  The patient " would like the following people involved in treatment services if recommended: None at this time.     The patient reported engaging in the following recreational/leisure activities: Traveling, going to her son's sporting events, cooking and spending time with her family and her friends.  The patient reported engaging in the following recreation/leisure activities while using alcohol or other non-prescribed mood altering chemicals: In the summertime, when attending her son's baseball she would often attend happy hours with the other parents and kids after the games.   The patient reported the following people are supportive of her recovery: her mother, her brother and her niece.    The patient reported she is currently working part-time for her [employer].  The patient reported she had been working for [employer], but she had been on a furlough from that job off and on since the COVID-19 pandemic started and she is currently on a MLOA from that job secondary to her current health issues.  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  The patient does not identify finances as a current stressor.      The patient denies substance related arrests or legal issues.  The patient denies being on probation / parole / under the jurisdiction of the court.    Patient's Strengths and Limitations:  The patient identified the following strengths or resources that will help her succeed in treatment: Alevism / Mandaen, commitment to health and well being, denise / spirituality, family support and strong social skills.  Things that may interfere with the patient's success in treatment include: none identified.     Personal and Family Medical History:  The patient did not report a family history of mental health concerns.      The patient reported the following previous mental health diagnoses: The patient denied having any  history of being diagnosed with a clinical mental health disorder.  The patient reported her primary mental health symptoms include: none at this time and these do not impact her ability to function.  The patient has not received mental health services in the past: The patient denied having any history of being prescribed psychotropic medications.  The patient denied having any history of working with a 1:1 mental health therapist.  Psychiatric Hospitalizations: None.  The patient denies a history of civil commitment.  Current mental health services/providers include:  The patient denied having any history of being prescribed psychotropic medications.  The patient denied having any history of working with a 1:1 mental health therapist.    GAIN-SS Tool:    When was the last time that you had significant problems...  a. with feeling very trapped, lonely, sad, blue, depressed or hopeless about the future? Never  b. with sleep trouble, such as bad dreams, sleeping restlessly, or falling asleep during the day? 2 - 12 months ago  c. with feeling very anxious, nervous, tense, scared, panicked or like something bad was going to happen?  Never  d. with becoming very distressed and upset when something reminded you of the past?  Never  e. with thinking about ending your life or committing suicide?  Never  When was the last time that you did the following things two or more times?  a. Lied or conned to get things you wanted or to avoid having to do something?   Never  b. Had a hard time paying attention at school, work or home? Never  c. Had a hard time listening to instructions at school, work or home?  Never  d. Were a bully or threatened other people?  Never  e. Started physical fights with other people?  Never    The patient has had a physical exam to rule out medical causes for current symptoms.  Date of last physical exam was within the past year. Client was encouraged to follow up with PCP if symptoms were to develop.   The patient has a [facility] Provider, who is named Raven Weaver.  The patient reported the following medical concerns:   Past Medical History:   Diagnosis Date     Ascites      History of blood transfusion      Liver cirrhosis (H)      Thyroid disease    The patient reported taking her medications as prescribed and following the recommendations of her healthcare providers.  Patient denies pregnancy.  There are not significant appetite / nutritional concerns / weight changes.  The patient does not report a history of head injury / trauma / cognitive impairment.      Patient reports current meds as:   Outpatient Medications Marked as Taking for the 1/4/21 encounter (Hospital Encounter) with [facility]o [facility]ing [facility]   Medication Sig     ciprofloxacin (CIPRO) 250 MG tablet Take 1 tablet (250 mg) by mouth 2 times daily     ciprofloxacin (CIPRO) 500 MG tablet Take 1 tablet (500 mg) by mouth daily (before lunch)     ferrous sulfate (FE TABS) 325 (65 Fe) MG EC tablet Take 325 mg by mouth daily     folic acid (FOLVITE) 1 MG tablet Take 1 tablet (1 mg) by mouth daily     furosemide (LASIX) 20 MG tablet Take 1 tablet (20 mg) by mouth daily     furosemide (LASIX) 40 MG tablet Take 1 tablet (40 mg) by mouth daily (Patient taking differently: Take 20 mg by mouth daily )     levothyroxine (SYNTHROID/LEVOTHROID) 50 MCG tablet Take 50 mcg by mouth daily     midodrine (PROAMATINE) 5 MG tablet Take 1 tablet (5 mg) by mouth 3 times daily (with meals)     multivitamin w/minerals (THERA-VIT-M) tablet Take 1 tablet by mouth daily     pantoprazole (PROTONIX) 40 MG EC tablet Take 40 mg by mouth every morning (before breakfast)     potassium chloride ER (K-TAB/KLOR-CON) 10 MEQ CR tablet Take 10 mEq by mouth daily     rifaximin (XIFAXAN) 550 MG TABS tablet Take 550 mg by mouth 2 times daily     spironolactone (ALDACTONE) 100 MG tablet Take 1 tablet (100 mg) by mouth daily (Patient taking differently: Take 50 mg by mouth daily )      spironolactone (ALDACTONE) 25 MG tablet Take 1 tablet (25 mg) by mouth daily     sucralfate (CARAFATE) 1 GM tablet Take 1 g by mouth 2 times daily (before meals) (1 hours before meals)     thiamine 50 MG TABS Take 1 tablet (50 mg) by mouth daily     traZODone (DESYREL) 50 MG tablet Take 50 mg by mouth At Bedtime     zinc sulfate (ZINCATE) 220 (50 Zn) MG capsule Take 1 capsule (220 mg) by mouth daily     Medication Adherence:  The patient reported taking prescribed medications as prescribed.    Patient Allergies:    Allergies   Allergen Reactions     Amoxicillin GI Disturbance, Diarrhea, Nausea and Nausea and Vomiting     Medical History:    Past Medical History:   Diagnosis Date     Ascites      History of blood transfusion      Liver cirrhosis (H)      Thyroid disease      Rating Scales:    PHQ9:    PHQ 1/4/2021   PHQ-9 Total Score 1   Q9: Thoughts of better off dead/self-harm past 2 weeks Not at all     Substance Use:  The patient reported the following biological family members or relatives with chemical health issues: Brother reportedly used alcohol .  The patient has not received substance use disorder and/or gambling treatment in the past.  The patient has not ever been to detox.  The patient is not currently receiving any chemical dependency treatment.              X = Primary Drug Used   Age of First Use Most Recent Pattern of Use and Duration   Need enough information to show pattern (both frequency and amounts) and to show tolerance for each chemical that has a diagnosis   Date of last use and time, if needed   Withdrawal Potential? Requiring special care Method of use  (oral, smoked, snort, IV, etc)     X Alcohol     10 For the past year prior 3/2020, she reported a pattern of drinking 4-5 glasses of wine or mixed drinks between 4-7 times per week.    The patient reported her heaviest use of alcohol had been from 3/2020 until 6/20/2020, when she reported a pattern of drinking 4-5 glasses of wine or mixed  drinks on an almost daily basis.    The patient reported having some heavier use of alcohol, when she reported drinking up to 7-8 glasses of wine or mixed drinks around 1 time per week on average over this past year.    The patient reported she had stopped her use of alcohol on 6/20/2020 secondary to feeling ill, but she was not aware of her liver disease until she was hospitalized for an extended periods of time in 9/2020.   6/20/2020    (2 mixed drinks) None Oral      Marijuana/  Hashish   18 The patient reported smoking THC on 2 occasions in her life during college.   20 None Smoke      Cocaine/Crack     No use          Meth/  Amphetamines   No use          Heroin     No use          Other Opiates/  Synthetics   No use          Inhalants     No use          Benzodiazepines     No use          Hallucinogens     No use          Barbiturates/  Sedatives/  Hypnotics No use          Over-the-Counter Drugs   No use          Other     No use          Nicotine     No use         CAGE- AID:  No flowsheet data found.    The patient reported the following problems as a result of their substance use: occupational / vocational problems and health issues, including having end-stage liver disease.  The patient is not concerned about substance use at this time due her last use of alcohol being in 6/20/2020.     The patient reports experiencing the following withdrawal symptoms within the past 12 months: shaky/jittery/tremors, headache and fatigue and the following within the past 30 days: none.  The patient reported urges to use Alcohol.  The patient reported she has used more Alcohol than intended and over a longer period of time than intended.  The patient reported she has not had unsuccessful attempts to cut down or control use of Alcohol.  The patient reported her longest period of abstinence had been since 6/20/2020 and she had not returned to drinking alcohol.  The patient reported she has needed to use more Alcohol to  achieve the same effect.  The patient does  report diminished effect with use of same amount of Alcohol.     The patient does  report a great deal of time is spent in activities necessary to obtain, use, or recover from Alcohol effects.  The patient does not report important social, occupational, or recreational activities are given up or reduced because of Alcohol use.  Alcohol use was not continued despite knowledge of having a persistent or recurrent physical or psychological problem that is likely to have been caused or exacerbated by use.  The patient reported the following problem behaviors while under the influence of substances: driving after drinking alcohol, but she had never had a DWI charge.  The patient reported her recovery goal is continue to abstain from alcohol for the rest of her life.     The patient does not have other addictive behaviors she is concerned about at this time.  The patient reported her substance use has not negatively impacted her ability to function in a school setting.  The patient reported her substance use has negatively impacted her ability to function in a work setting, mainly in regards to some decreased performance at work secondary to her alcohol abuse.  The patient's demographics and history impact her recovery in the following ways: The patient had a brother die due to his alcohol abuse in 2015.    Dimension Scale Ratings:    Dimension 1 -  Acute Intoxication/Withdrawal: 0 - No Problem    Dimension 2 - Biomedical: 3 - Severe Problem    Dimension 3 - Emotional/Behavioral/Cognitive Conditions: 1 - Minor Problem    Dimension 4 - Readiness to Change:  1 - Minor Problem    Dimension 5 - Relapse/Continued Use/ Continued Problem Potential: 2 - Moderate Problem    Dimension 6 - Recovery Environment:  2 - Moderate Problem    Significant Losses / Trauma / Abuse / Neglect Issues:   The patient did not serve in the .  There are indications or report of significant loss,  trauma, abuse or neglect issues related to:  The patient denied having any history of being verbally, emotionally, physically or sexually abused.  The patient reported having a history of trauma issues due to her step-father's death in 2014, her brother's death from alcoholism in 2015, a close friend/nect door neighbor's death from an accidental drug overdose in 6/2020 and due to her extended hospitalization form her end-stage liver disease diagnosis in 9/2020.  The patient denied having any history of suicide attempts and denied having any current suicide ideation.  The patient denied having any history of self-injurious behavior.     Concerns for possible neglect are not present.    Safety Assessment:   Current Safety Concerns:  [place] Suicide Severity Rating Scale (Lifetime/Recent)  [place] Suicide Severity Rating (Lifetime/Recent) 1/4/2021   1. Wish to be Dead (Lifetime) No   1. Wish to be Dead (Recent) No   2. Non-Specific Active Suicidal Thoughts (Lifetime) No   2. Non-Specific Active Suicidal Thoughts (Recent) No   3. Active Suicidal Ideation with any Methods (Not Plan) Without Intent to Act (Lifetime) No   3. Active Sucidal Ideation with any Methods (Not Plan) Without Intent to Act (Recent) No   4. Active Suicidal Ideation with Some Intent to Act, Without Specific Plan (Lifetime) No   4. Active Suicidal Ideation with Some Intent to Act, Without Specific Plan (Recent) No   5. Active Suicidal Ideation with Specific Plan and Intent (Lifetime) No   5. Active Suicidal Ideation with Specific Plan and Intent (Recent) No   Most Severe Ideation Rating (Lifetime) NA   Frequency (Lifetime) NA   Duration (Lifetime) NA   Controllability (Lifetime) NA   Protective Factors  (Lifetime) NA   Reasons for Ideation (Lifetime) NA   Most Severe Ideation Rating (Past Month) NA   Frequency (Past Month) NA   Duration (Past Month) NA   Controllability (Past Month) NA   Protective Factors (Past Month) NA   Reasons for Ideation  (Past Month) NA   Actual Attempt (Lifetime) No   Actual Attempt (Past 3 Months) No   Has subject engaged in non-suicidal self-injurious behavior? (Lifetime) No   Has subject engaged in non-suicidal self-injurious behavior? (Past 3 Months) No   Interrupted Attempts (Lifetime) No   Interrupted Attempts (Past 3 Months) No   Aborted or Self-Interrupted Attempt (Lifetime) No   Aborted or Self-Interrupted Attempt (Past 3 Months) No   Preparatory Acts or Behavior (Lifetime) No   Preparatory Acts or Behavior (Past 3 Months) No   Most Recent Attempt Actual Lethality Code NA   Most Lethal Attempt Actual Lethality Code NA   Initial/First Attempt Actual Lethality Code NA     Patient denies current homicidal ideation and behaviors.  Patient denies current self-injurious ideation and behaviors.    Patient reported unsafe motor vehicle operation associated with substance use.  Patient denies any high risk behaviors associated with mental health symptoms.  Patient reports the following current concerns for their personal safety: None.  Patient reports there are not firearms in the house.     History of Safety Concerns:  Patient denied a history of homicidal ideation.     Patient denied a history of personal safety concerns.    Patient denied a history of assaultive behaviors.    Patient denied a history of sexual assault behaviors.     Patient reported a history unsafe motor vehicle operation associated with substance use.  Patient denies any history of high risk behaviors associated with mental health symptoms.  Patient reports the following protective factors: spirituality, positive relationships positive family connections, dedication to family/friends, safe and stable environment, adherence with prescribed medication, living with other people, daily obligations, positive social skills, financial stability and pets    Risk Plan:  See Recommendations for Safety and Risk Management Plan     Review of Symptoms per patient  report:  Substance Use:  blackouts, passing out, vomiting, daily use, family relationship problems due to substance use and driving under the influence     Diagnostic Criteria:  OP BEH MARIANA CRITERIA:     1.)  Substance is often taken in larger amounts or over a longer period than was intended.  Met for:  Alcohol.  3.)  A great deal of time is spent in activities necessary to obtain the substance, use the substance, or recover from its effects.  Met for:  Alcohol.  4.)  Craving, or a strong desire or urge to use the substance.  Met for:  Alcohol.  5.)  Recurrent use of the substance resulting in a failure to fulfill major role obligations at work, school, or home.  Met for:  Alcohol.  8.)  Recurrent use of the substance in which it is physically hazardous.  Met for:  Alcohol.  10.)  Tolerance:  either a need for markedly increased amounts of the substance to achieve the desired effect or a markedly diminished effect with continued use of the dame amount of the substance.  Met for:  Alcohol.  11.)  Withdrawal:  either patient endorses characteristic withdrawal syndrome for the substance or the substance (or closely related substance) is taken to relieve or avoid withdrawal symptoms.  Met for:  Alcohol.    DSM-5 Diagnosis:  Alcohol Use Disorder Severe - 303.90 (F10.20)    Specify if: In early remission:  After full criteria for alcohol/drug use disorder were previously met, none of the criteria for alcohol/drug use disorder have been met for at least 3 months but for less than 12 months (with the exception that Criterion A4,  Craving or a strong desire or urge to use alcohol/drug  may be met).     In sustained remission:   After full criteria for alcohol use disorder were previously met, non of the criteria for alcohol/drug use disorder have been met at any time during a period of 12 months or longer (with the exception that Criterion A4,  Craving or strong desire or urge to use alcohol/drug  may be met).     Specify  if:   This additional specifier is used if the individual is in an environment where access to alcohol is restricted.    Mild: Presence of 2-3 symptoms  Moderate: Presence of 4-5 symptoms  Severe: Presence of 6 or more symptoms    Recommendations:     1. Plan for Safety and Risk Management:     Recommended that patient call 911 or go to the local ED should there be a change in any of these risk factors..            Report to child / adult protection services was NA.     2. MARIANA Referrals:      Recommendations:      1.)  It was recommended the patient continue to abstain from alcohol and from all other non-prescribed mood altering chemicals.   2.)  Follow all of the recommendations of her healthcare providers.  3.)  Follow all of the terms and conditions of working with the [facility] Liver Transplant Team.  4.)  Participate in random alcohol and drug testing to ensure compliance with abstinence from alcohol and from all other non-prescribed mood altering chemicals.  5.)  Enter the Mixed Primary Day Outpatient program at [facility] in [place], [place] for primary substance use disorder treatment.   6.)  Attend 12-step support group meetings, liver transplant support group meetings and/or other support group meetings on a weekly basis.    Patient reports they are willing to follow these recommendations.  Patient does not have a history of opiate use.    3. Records:   The patient's electronic medical records were reviewed at time of assessment.   Information in this assessment was obtained from the medical record and  provided by patient who is a good historian.        Patient will have open access to their mental health medical record.    Provider Name/ Credentials:  BIANCA Blanchard  January 4, 2021

## 2021-01-04 NOTE — LETTER
1/4/2021         RE: Sarah Fisher  [address]  [facility] [address]        Dear Colleague,    Thank you for referring your patient, Sarah Fisher, to the [facility] [place]. Please see a copy of my visit note below.    [facility]     Date of Visit: 1/4/2020    Reason for referral: LT evaluation    Subjective: Ms. 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 HE, hypothyroidism, who presents for LT evaluation.     Patient presented to PCP 6/2020 for hemorrhoidal bleeding, was noticed to be jaundiced. They recommended she stop drinking, which she had already done when she saw her eyes turning yellow. Labs significant for BR 17.2  ALT 37 Alk phos 231. Ultrasound was done 7/6 which showed cirrhosis, cavernous transformation of the portal veins.     Presented to the ED 9/8 with SOB. Found to have LUE swelling on exam, underwent US that showed left internal jugular acute DVT. Had a CT PE and AP as well that did not show a PE, but showed a very large left pleural effusion with associated complete left lung atelectasis. Also showed a large right pleural effusion. No adenopathy. Showed moderated volume ascites with mesenteric edema, likely portal colopathy. She was started on IV heparin for her L internal jugular DVT. This is the first DVT she has ever had. Later Imaging showed DVT involved left internal jugular, brachiocephalic and subclavian vein. She underwent a thoracentesis with 2 L removed on 9/8. This was blood tinged per report, but grossly bloody > 50,000 with 9,337 WBC 98% PMNS. Protein 1.5 LDH 92. She developed hypotension after this with worsening hypoxia thought to be related to reexpansion pulmonary edema. Culture from this eventually grow E. Coli and eggerthelle lenta. She was put on zosyn and eventually augmentin for this. She had a paracentesis 9/9 that  showed < 50,000 RBC, 5,203 WBC 70% PMNs, albumin < 0.7, TP 1.0. AC was stopped given concern for hemothorax, eventually lower dose apixaban started. She had a repeat left sided thoracentesis 9/12 with > 50,000 RBC 4,889 WBC 72% PMNs,  TP 3.0 negative culture, and then 9/17 with > 50,000 RBC, 1,1118 WBC 17% PMNs and a negative culture. She was confused this admission, started on meds for HE. Started on low dose diuretics.     Admitted again 9/30 with SOB. Had CT PE that did not show a PE, showed very large left and large right pleural effusion with associated new complete left lung atelectasis.  and haptoglobin 187. BR 10/5 5.3 with 2.6 direct. Underwent thoracentesis 10/5 that showed > 50,000 RBC with 385 WBC 8% PMNs. Had darker stools, underwent EGD showed PHG. AC restarted. Was discharged on home oxygen and with increased diuretics.     Admitted 10/30-1 for SOB, BRBPR, anemia. BR 3.2 with 1.8 direct. Hemoglobin was down to 4.5, transfused, EGD with PHG and GAVE - treated with APC. Had near complete opacification of left pleural space with effusion. Had CORBIN - creatinine around 2, downtrended to 1.5 with holding diuretics. DVU scan showed some improvement in the chronic DVT - plan was for 2 months AC. Ferritin 645.5.    Admission 11/4-18 for anemia - Hgb was 4.3, required several units of blood. Iron stores TSat 69%. CXR showed complete white out of left lung field. Decision was made to hold AC given her recurrent anemia. Repeat UE US showed chronic appearing non occlusive thrombus in the left internal jugular vein, innominate vein and subclavain vein. Also had CORBIN - creatinine was 2.36 initially, down to 1.27 on discharge. Diuretics were held. She was on home oxygen at that point. Started on a BB this admission. 11/15 BR 5.7 with 1.9 direct.     She had an outpatient EGD/Colonoscopy 12/4 that showed erythema in the esophagus, no varices. Severe PHG. No definite residual GAVE seen. Antrum PHG was  treated with APC. Diffuse congested mucosa without active bleeding found in duodenum. She had colonoscopy that showed hemorrhoids, congested colonic mucosa. 4 mm cecal polyp removed that showed normal colonic mucosa.     She was admitted to the hospital  with weakness and confusion. Patient felt she was confused after her procedure. Underwent a thoracentesis on  - 1.7 L of fluid was removed because she was having some SOB. Studies c/w < 50,000 RBC, 151 WBC 25% PMNs. She shortly developed worsening hypoxia, hypotension, requiring pressors and BiPAP. [place] to be 2/2 re-expansion pulmonary edema. Hgb went 8 -> 6, required 2 units blood. Oxygenation and pressor support improved with time. She was transferred here for further evaluation.     Upon transfer to here, she was doing well. Required little to no oxygen. She was mentating well. She underwent a 500 ml diagnostic thoracentesis on  that was described as pink/cloudy with 129 WBC. TP 1.4 (serum 5), LDH 81 (serum 371), glucose 109, amylase 14. Negative cytology. 1 of the two culture bottles grew skin di on day 5, other culture did not. She was discharged on cipro ppx for her history of SBP. Discharged on lasix 40/spironolactone 100.     Today, she reports she is feeling the best she has felt in a long time. She uses a walker when outside of the house. Diuretics were held last week for slight rise in creatinine. Abdomen not swollen, only having SOB when moving around. Denies GI bleeding. On leave from work. No confusion.     Co morbidities  - ASCUS + , ,  with negative HPV testing  - Last mammogram      ROS: 14 point ROS negative except for positives noted in HPI.    PMHx:  Past Medical History:   Diagnosis Date     History of blood transfusion      Thyroid disease    Alcohol related cirrhosis  Anemia  ASCUS      PSHx:  Past Surgical History:   Procedure Laterality Date     SOFT TISSUE SURGERY       Ovarian cyst    FamHx:  No  known history of liver disease    SocHx:  Social History     Socioeconomic History     Marital status: Single     Spouse name: Not on file     Number of children: Not on file     Years of education: Not on file     Highest education level: Not on file   Occupational History     Not on file   Social Needs     Financial resource strain: Not on file     Food insecurity     Worry: Not on file     Inability: Not on file     Transportation needs     Medical: Not on file     Non-medical: Not on file   Tobacco Use     Smoking status: Never Smoker     Smokeless tobacco: Never Used   Substance and Sexual Activity     Alcohol use: Not Currently     Comment: Quit on 6/20/2020     Drug use: Not Currently     Sexual activity: Not on file   Lifestyle     Physical activity     Days per week: Not on file     Minutes per session: Not on file     Stress: Not on file   Relationships     Social connections     Talks on phone: Not on file     Gets together: Not on file     Attends Voodoo service: Not on file     Active member of club or organization: Not on file     Attends meetings of clubs or organizations: Not on file     Relationship status: Not on file     Intimate partner violence     Fear of current or ex partner: Not on file     Emotionally abused: Not on file     Physically abused: Not on file     Forced sexual activity: Not on file   Other Topics Concern     Parent/sibling w/ CABG, MI or angioplasty before 65F 55M? Not Asked   Social History Narrative     Not on file   Just moved back home, living with son and niece who is going to school to be a RN    Medications:  Current Outpatient Medications   Medication     ciprofloxacin (CIPRO) 500 MG tablet     ferrous sulfate (FE TABS) 325 (65 Fe) MG EC tablet     folic acid (FOLVITE) 1 MG tablet     furosemide (LASIX) 40 MG tablet     levothyroxine (SYNTHROID/LEVOTHROID) 50 MCG tablet     midodrine (PROAMATINE) 5 MG tablet     multivitamin w/minerals (THERA-VIT-M) tablet      pantoprazole (PROTONIX) 40 MG EC tablet     potassium chloride ER (K-TAB/KLOR-CON) 10 MEQ CR tablet     rifaximin (XIFAXAN) 550 MG TABS tablet     spironolactone (ALDACTONE) 100 MG tablet     sucralfate (CARAFATE) 1 GM tablet     thiamine 50 MG TABS     traZODone (DESYREL) 50 MG tablet     zinc sulfate (ZINCATE) 220 (50 Zn) MG capsule     No current facility-administered medications for this visit.        Allergies:     Allergies   Allergen Reactions     Amoxicillin GI Disturbance, Diarrhea, Nausea and Nausea and Vomiting       Objective:  AF  /82 94% on RA BMI 28  Constitutional: Pleasant woman in NAD  Eyes: Icteric  Respiratory: Dullness to percussion group home up left lung field, breathing comfortably on RA  MSK: normal range of motion of visualized extremities  Skin: jaundiced  Neuro: AOOX3, no asterixis  Psychiatric: normal mood and orientation    Labs:  Last Comprehensive Metabolic Panel:  Sodium   Date Value Ref Range Status   12/29/2020 138 133 - 144 mmol/L Final     Potassium   Date Value Ref Range Status   12/29/2020 3.5 3.4 - 5.3 mmol/L Final     Chloride   Date Value Ref Range Status   12/29/2020 104 94 - 109 mmol/L Final     Carbon Dioxide   Date Value Ref Range Status   12/29/2020 25 20 - 32 mmol/L Final     Anion Gap   Date Value Ref Range Status   12/29/2020 9 3 - 14 mmol/L Final     Glucose   Date Value Ref Range Status   12/29/2020 142 (H) 70 - 99 mg/dL Final     Urea Nitrogen   Date Value Ref Range Status   12/29/2020 16 7 - 30 mg/dL Final     Creatinine   Date Value Ref Range Status   12/29/2020 1.38 (H) 0.52 - 1.04 mg/dL Final     GFR Estimate   Date Value Ref Range Status   12/29/2020 44 (L) >60 mL/min/[1.73_m2] Final     Comment:     Non  GFR Calc  Starting 12/18/2018, serum creatinine based estimated GFR (eGFR) will be   calculated using the Chronic Kidney Disease Epidemiology Collaboration   (CKD-EPI) equation.       Calcium   Date Value Ref Range Status    12/29/2020 8.5 8.5 - 10.1 mg/dL Final     Bilirubin Total   Date Value Ref Range Status   12/19/2020 5.1 (H) 0.2 - 1.3 mg/dL Final     Alkaline Phosphatase   Date Value Ref Range Status   12/19/2020 136 40 - 150 U/L Final     ALT   Date Value Ref Range Status   12/19/2020 15 0 - 50 U/L Final     AST   Date Value Ref Range Status   12/19/2020 29 0 - 45 U/L Final       Lab Results   Component Value Date    WBC 8.3 12/19/2020     Lab Results   Component Value Date    RBC 2.82 12/19/2020     Lab Results   Component Value Date    HGB 9.1 12/19/2020     Lab Results   Component Value Date    HCT 29.2 12/19/2020     Lab Results   Component Value Date     12/19/2020     Lab Results   Component Value Date    MCH 32.3 12/19/2020     Lab Results   Component Value Date    MCHC 31.2 12/19/2020     Lab Results   Component Value Date    RDW 18.9 12/19/2020     Lab Results   Component Value Date     12/19/2020       INR   Date Value Ref Range Status   12/19/2020 1.79 (H) 0.86 - 1.14 Final          MELD-Na score: 20 at 12/19/2020  7:47 AM  MELD score: 19 at 12/19/2020  7:47 AM  Calculated from:  Serum Creatinine: 1.02 mg/dL at 12/19/2020  7:47 AM  Serum Sodium: 136 mmol/L at 12/19/2020  7:47 AM  Total Bilirubin: 5.1 mg/dL at 12/19/2020  7:47 AM  INR(ratio): 1.79 at 12/19/2020  7:47 AM  Age: 50 years 8 months     6/2019  Hepatitis B Sag negative  Hepatitis C Ab negative  Hepatitis A IgG negative    10/2020  DAISY negative  ASMA negative  AFP 4.1    Imaging:    [facility] US Doppler 12/15/2020    Findings:  Liver: The liver demonstrates diffuse coarsened echotexture with  increased echogenicity. Nodular contour to the liver margins. No  evidence of a focal hepatic mass.      Extrahepatic portal vein flow is retrograde at 34 cm/s.  Right portal vein flow is retrograde, measuring 36 cm/s.  Left portal vein flow is antegrade, measuring 45 cm/s.     Flow in the hepatic artery is towards the liver and:  315  cm/s peak  systolic  0.47 resistive index.      Recanalized paraumbilical vein.     The splenic vein is patent and flow is towards the liver.  The left,  middle, and right hepatic veins are patent with flow towards the IVC.  Flattening of waveforms secondary to underlying liver disease. The IVC  is patent with flow towards the heart.   The visualized aorta is not  dilated.     Gallbladder: Mildly thickened gallbladder wall 3.3 mm. There is no  pericholecystic fluid, positive sonographic Lema's sign or evidence  for cholelithiasis     Bile Ducts: No definite intrahepatic biliary dilatation. The common  bile duct is not visualized in this study.     Pancreas: Not visualized in this study.      Right kidney: Normal echotexture, without mass or hydronephrosis.  Measuring 11.7 cm.     Fluid: Partially visualized right pleural effusion, trace ascites  surrounding the liver.                                                                      Impression:   1.  Cirrhotic morphology of the liver.  2.  Reversal of flow in the main portal vein, right portal vein, and  recanalization of the paraumbilical vein, compatible with portal  hypertension  3.  Decreased resistive index of the hepatic artery and increased peak  systolic velocity possibly secondary to stenosis or shunting.  4.  Partially visualized right pleural effusion and trace perihepatic  ascites.    Endoscopy:    12/4/2020 EGD    Findings:       Diffuse erythema was found in the entire esophagus.       The exam of the esophagus was otherwise normal. NO varices.       Severe portal hypertensive gastropathy was found in the entire examined        stomach. Most prominent within the gastric antrum. No definitive        residual GAVE identified. Coagulation for bleeding prevention using        argon plasma (1 liter/minute and 60 dawkins) of the gastric antrum was        successful.       Diffuse congested mucosa without active bleeding and with no stigmata of        bleeding was  found in the entire duodenum.    Impressions/Post-Op Diagnosis:       - Erythema in the esophagus.       - Portal hypertensive gastropathy. Treated with argon plasma coagulation        (APC).       - Congested duodenal mucosa.    Recommendation:       - Continue pantoprazole 40 mg daily and sucralfate 1 gram twice daily.       - Iron supplementation twice daily.       - Avoid NSAIDs.    12/4/2020 Colonoscopy    Findings:       Hemorrhoids were found on perianal exam.       The terminal ileum appeared normal.       An area of grossly congested mucosa was found in the entire colon.       A 4 mm polyp was found in the cecum. The polyp was sessile. The polyp        was removed with a cold biopsy forceps. Resection and retrieval were        complete.       Non-bleeding internal hemorrhoids were found during retroflexion.    Impressions/Post-Op Diagnosis:       - Hemorrhoids found on perianal exam.       - The examined portion of the ileum was normal.       - Congested mucosa in the entire examined colon.       - One 4 mm polyp in the cecum, removed with a cold biopsy forceps.        Resected and retrieved.       - Non-bleeding internal hemorrhoids.    A) COLON, CECUM, POLYPECTOMY:  1. Colonic mucosa with no diagnostic abnormalities; a lymphoid aggregate is present  2. Negative for serrated change, dysplasia, and malignancy    Assessment/Plan: Ms. Fisher is a 50 year old woman with a history of alcohol related cirrhosis c/b ascites, hepatic hydrothorax, unprovoked left internal jugular DVT, hypothyroidism, who presents for liver transplant evaluation.     She has been sober just over 6 months, still having issues with volume overload - mainly related to hepatic hydrothorax/SBE. She has had bilateral pleural effusions before, but the left one is larger and has had complete opacification on imaging. It is improved today on her CXR and she symptomatically feels better. This effusion has been intermittently bloody (when she  was on AC), and she has had two episodes of re expansion pulmonary edema after larger volume thoras. She has had infection in this pleural fluid thought to be related to SBE. She also has a history of left sided internal jugular/sublclavian DVT - potentially related to extrinsic compression from left sided effusion but would like this further evaluated. CT imaging has not showed evidence of lung malignancy. Cytology has been negative. BR still elevated in the 5s, although her indirect fraction is rising (was close to 50/50 in the past) and she appears to be hemolyzing based on rising LDH and elevated reticuloyte count. Was having issues with anemia/GIB 2/2 GAVE or PHG on AC, improved since stopping this.     Blood type B. MELD 20    - Proceed with testing for LT evaluation, she will need TTE with bubble (OSH TTE did not do this) as well as DBE. She is not currently a TIPS candidate given her elevated BR.   - Pulmonary referral to evaluate her left sided effusion - studies now consistent with hepatic hydrothorax, but given that it was previously bloody (although on AC) and she had associated left internal jugular DVT would like to rule out malignant effusion  - Hematology referral for left internal jugular DVT and new evidence of hemolysis. She is sober for ~ 6 months, not c/w zieves syndrome. Will also start folate  - Volume overload: Restart lower dose diuretics - lasix 20 mg daily and spironolactone 50 mg daily given recent elevated creatinine  - Continue cipro for SBP ppx  - HCC screening: AFP normal Fall 2020, no mass on US 12/2020  - Variceal screening: EGD 12/2020 without varices  - HE: can continue lactulose and rifaximin, unclear if she had HE or delirium related to underlying infection     RTC 3 months with monthly BMP, LFTs, CBC, INR    Fadia Avila MD MSc  Transplant Hepatology  [facility]    Approximately 45 minutes of non face-to-face time were spent in review of the patient's medical  record to date.  This included review of previous: clinic visits, hospital records, lab results, imaging studies, and procedural documentation.  The findings from this review are summarized in the above note.      Again, thank you for allowing me to participate in the care of your patient.        Sincerely,        Fadia Avila MD

## 2021-01-04 NOTE — PROGRESS NOTES
"I examined the patient along with Dr. Fadia Beltran, our hepatologist.  Please refer to Dr. Beltran's notes for full details.  Briefly: She is a 50-year-old lady, with decompensated Laënnec cirrhosis.  She has decompensated in the form of ascites.  She has also developed right-sided hydrothorax.  She has not had infections of the right-sided chest with E. coli.  No heart disease or lung disease.  She is ambulating well.    Vital signs:      BP: 129/82 Pulse: 112     SpO2: 94 %       Weight: 85.2 kg (187 lb 13.3 oz)  Estimated body mass index is 29.42 kg/m  as calculated from the following:    Height as of 12/14/20: 1.702 m (5' 7\").    Weight as of this encounter: 85.2 kg (187 lb 13.3 oz).    Examination of the abdomen: The chest angle is white.  There is large amount of ascites present.    I had a long discussion with the patient regarding liver transplantation which included but was not limited to  the following points:    1. Liver transplant selection committee process.  2. The federal rules for cadaveric waiting list, the size and blood type matching of the organ. The availability of living-related donor transplantation.  3. The types of donors: brain death donors, non-heart beating donors, partial liver grafts: splits and living donor grafts  4. Extended criteria  Donors (older age, steasosis) and the increased  risk of primary non-function using the extended criteria donors  5. The CDC high risk donors,  Risk of donor transmitted infections and donor transmitted malignancy  6. The liver transplant operation and the associated risks and technical complications which can include intraoperative death, post operative death,  Primary non-function, bleeding requiring re-operations, arterial and biliary complications, bowel perforations, and intra abdominal abscess. Some of these complicaitons may require a second operation.  7. The postoperative course, the ICU stay and risk of postoperative complications which can " include sepsis, MI, stroke, brain injury, pneumonia, pleural effusions, and renal dysfunction.  8. The current 1 year and 5 year graft and patient survivals.  9. The need for life long immunosuppressive therapy and the side effects of these medications, including the possibility of toxicity, opportunistic infections, risk of cancer including lymphoma, and the possibility of rejection even if the patient is taking the medication exactly as prescribed.  10. The need for compliance with medications and follow-up visits in the clinic and thereafter.  11. The patient and family understand these risks and wish to proceed to transplantation    Time spent direct face to face counsellin min

## 2021-01-04 NOTE — LETTER
"    1/4/2021         RE: Sarah Fisher  [address] [facility] [address]        Dear Colleague,    Thank you for referring your patient, Sarah Fisher, to the [facility]. Please see a copy of my visit note below.    I examined the patient along with Dr. Fadia Beltran, our hepatologist.  Please refer to Dr. Beltran's notes for full details.  Briefly: She is a 50-year-old lady, with decompensated Laënnec cirrhosis.  She has decompensated in the form of ascites.  She has also developed right-sided hydrothorax.  She has not had infections of the right-sided chest with E. coli.  No heart disease or lung disease.  She is ambulating well.    Vital signs:      BP: 129/82 Pulse: 112     SpO2: 94 %       Weight: 85.2 kg (187 lb 13.3 oz)  Estimated body mass index is 29.42 kg/m  as calculated from the following:    Height as of 12/14/20: 1.702 m (5' 7\").    Weight as of this encounter: 85.2 kg (187 lb 13.3 oz).    Examination of the abdomen: The chest angle is white.  There is large amount of ascites present.    I had a long discussion with the patient regarding liver transplantation which included but was not limited to  the following points:    1. Liver transplant selection committee process.  2. The federal rules for cadaveric waiting list, the size and blood type matching of the organ. The availability of living-related donor transplantation.  3. The types of donors: brain death donors, non-heart beating donors, partial liver grafts: splits and living donor grafts  4. Extended criteria  Donors (older age, steasosis) and the increased  risk of primary non-function using the extended criteria donors  5. The CDC high risk donors,  Risk of donor transmitted infections and donor transmitted malignancy  6. The liver transplant operation and the associated risks and technical complications which can include intraoperative death, post operative death,  Primary non-function, bleeding " requiring re-operations, arterial and biliary complications, bowel perforations, and intra abdominal abscess. Some of these complicaitons may require a second operation.  7. The postoperative course, the ICU stay and risk of postoperative complications which can include sepsis, MI, stroke, brain injury, pneumonia, pleural effusions, and renal dysfunction.  8. The current 1 year and 5 year graft and patient survivals.  9. The need for life long immunosuppressive therapy and the side effects of these medications, including the possibility of toxicity, opportunistic infections, risk of cancer including lymphoma, and the possibility of rejection even if the patient is taking the medication exactly as prescribed.  10. The need for compliance with medications and follow-up visits in the clinic and thereafter.  11. The patient and family understand these risks and wish to proceed to transplantation    Time spent direct face to face counsellin min        Again, thank you for allowing me to participate in the care of your patient.        Sincerely,        Christian Morrison MD

## 2021-01-04 NOTE — PROGRESS NOTES
"Psychosocial Assessment   Sarah Fisher \"Maria Elena\" was evaluated during her inpatient stay as a part of her liver transplant evaluation. I spoke with Maria Elena via phone to update assessment.   Living Situation: Maria Elena owns a home in [place], [place]. She has resided there with her 11 year son for four years. Since her hospitalizations in Sept 2020, she has been living with her mother in [place], [place]. No concerns with her living situation at her home or mothers home.     Since last Friday she moved back to her home address with her 11 year old son and her niece Radha. Radha is assisting with transportation and some of her medical needs.     Education/Employment:  Maria Elena completed her bachelors degree, and later continue on to complete her masters degree in non-profit management from [employer]. She was working for [employer] in their  department for about a year. 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   Financial /Income: Maria Elena is receiving income from a family business and reported that she is able to obtain her job back at [email or web address] when she is medically ready. She does not have any financial concerns.   Health Insurance: Maria Elena has [place]BS of [place] and is unsure what her out of pocket max is. She does not have any concerns potentially meeting her out of pocket max.  This writer talked with Sarah about the financial risks of transplant, particularly about the high cost of transplant related medications and the importance of maintaining adequate health insurance coverage.  Family/Social Support: Clarissa identified her mother, niece and brother as her primary supports. Her mother Virginia lives in [place], [place] with her niece Radha who is currently in school to become a nurse. Her son, Flakito is being cared for by her mother at this time. She has a brother, Saravanan who lives in [facility] and " identified him and his spouse, Frantz and supports. Her sister Raven lives in [place], [place]. She has a step-brother and step-sister, who she has minimal contact with. Her brother, Thomas passed away from alcohol use in 2016. Her mother, brother and niece would all be able to provide support post transplant.    This writer stressed the importance of having a stable and involved support network before and after transplant.  Provided Sarah  with education about the relationship between a stable support system and better surgical and post-transplant outcomes compared to patients with a limited support system.    Functional Status: Prior to September, 2020, Maria Elena was independent with all ADL/IADLS. Since her hospitalization, she has required the assistance of a walker. No functional concerns related to transplant.     Maria Elena reports that she feels better than she has in the last few months.   Chemical Dependency:  Maria Elena reports that her last date of consumption was June 20, 2020 on Father's day. She reported waking up the next morning and not feeling well and decided to abstain. Prior to June she reported drinking daily around five+ drinks per day. She indicated that the only time she remembers abstaining from alcohol was when she was pregnant with her son, Flakito who is now 11 years old. She noticed an increase to her drinking around March 2020. No history of tobacco use, miuse/over use of pain medication, or illicit substance use. No history of treatment or legal history related to substance use.   Mental Health: No history of mental health concerns. No history of hospitalizations or past/current suicidal thoughts.   Adjustment to Illness: Maria Elena is in favor of a transplant if indicated. This writer provided Sarah  with supportive counseling throughout this interview.  This writer also encouraged Sarah to attend the liver transplant support group for additional support and encouragement.    Impression/Recommendations:   Sarah verbalizes understanding the psychosocial risks of transplant and teaching provided during this evaluation.  Sarah has met the sobriety criteria recommended for listing, however this writer recommends Sarah complete a chemical dependency evaluation and any treatment recommendations before being listed for transplant.  Pending completion of a chemical dependency evaluation and recommendations from the chemical dependency evaluation, she would be an acceptable transplant candidate from a psychosocial perspective. I have referred her to complete a CD assessment outpatient as she may be discharging from inpatient soon. She has her assessment schedule for today 1/4/2020. I indicated that I will follow up with her re: any recommendations.     There are no contraindications to transplant from a psychosocial perspective.  Sarah has adequate finances and health insurance for transplant, and an intact support system. Sarah is an acceptable transplant candidate from a psychosocial perspective pending CD recommendations/engagement in treatment.  This writer will remain available to assist patient throughout the evaluation process and will follow patient through transplant if she is listed.  It was a pleasure to evaluate this patient for liver transplant.   Teaching completed during assessment:  1.     Housing and relocation needs post transplant.  2.     Caregiver needs post transplant.  3.     Financial issues related to transplant.  4.     Risks of alcohol use post transplant.  5.     Common psychosocial stressors pre/post transplant.        6.     Liver Transplant support group availability.        7.     Advanced Health Care Directive - I provided a health care directive at bedside during her hospitalization. Maria Elena has completed a copy of a health care directive with her  and she will send me a copy today.               Psychosocial Risks of Transplant  Reviewed:  1.     Increased stress related to your emotional, family, social, employment, or   financial situation.  2.     Affect on work and/or disability benefits.  3.     Affect on future health and life insurance.  4.     Transplant outcome expectations may not be met.  5.     Mental Health risks: anxiety, depression, PTSD, guilt, grief and chronic fatigue.     ENRIQUE Rodriguez, LICSW

## 2021-01-04 NOTE — PROGRESS NOTES
"The patient has been notified of the following:     \"We have found that certain health care needs can be provided without the need for a face to face visit.  This service lets us provide the care you need with a phone conversation.      I will have full access to your [facility] medical record during this entire phone call.   I will be taking notes for your medical record.     Since this is like an office visit, we will bill your insurance company for this service.  If your insurance denies the charge we will appeal and/or write off the cost of the service.  The Governor's executive order may result in expanded health insurance coverage for this service, which would be paid by your insurance.      There are potential benefits and risks of telephone visits (e.g. limits to patient confidentiality) that differ from in-person visits.?  Confidentiality still applies for telephone services, and nobody will record the visit.  It is important to be in a quiet, private space that is free of distractions (including cell phone or other devices) during the visit.??     If during the course of the call I believe a telephone visit is not appropriate, you will not be charged for \"this service\"    Consent has been obtained for this service by care team member: Yes    Phone visit start time:  3:00 pm  Phone visit end time:  4:13 pm  [facility]  [address] [place] [address]      ADULT CD ASSESSMENT ADDENDUM      Patient Name: Sarah Fisher  Cell Phone:   Home: [phone] (home)    Mobile:   Telephone Information:   Mobile [phone]       Email:  [email or web address]  Emergency Contact: Radha Fisher (niece) Tel: [phone]    The patient reported being:  being single, with no serious involvement    With which race do you identify? /White female of  decent    Initial Screening Questions     1. Are you currently having severe withdrawal symptoms that are putting " yourself or others in danger?  No    2. Are you currently having severe medical problems that require immediate attention?  No    3. Are you currently having severe emotional or behavioral problems that are putting yourself or others at risk of harm?  No    4. Do you currently participate in community denise activities, such as attending Restoration, temple, Sabianism or Evangelical services?  Yes, explain: On a regular basis prior to the COVID-19 pandemic.    5. How does your spirituality impact your recovery?  The patient reported her spirituality would have a very positive impact on her recovery.    6. Do you currently self-administer your medications?  Yes    Have you ever purged, binged or restricted yourself as a way to control your weight?   No     Are you on a special diet?   Yes, explain: Low sodium     Do you have any concerns regarding your nutritional status?   No     Have you had any appetite changes in the last 3 months?   No   Have you had weight loss or weight gain of more than 10 lbs in the last 3 months?   If patient gained or lost more than 10 lbs, then refer to program RN / attending Physician for assessment.   No   Was the patient informed of BMI?    Above,  General nutrition education   No   Have you engaged in any risk-taking behavior that would put you at risk for exposure to blood-borne or sexually transmitted diseases?   No   Do you have any dental problems?   No   Do you have a valid 's license?    Yes     Mental Health Status   Unable to assess the patient's mental health status on 1/4/2021 due to the substance abuse assessment being conducted over the phone or video secondary to the COVID-19 virus pandemic.       Criteria for Diagnosis: DSM-5 Criteria for Substance Use Disorders      Alcohol Use Disorder Severe - 303.90 (F10.20)    Level of Care   I.) Intoxication and Withdrawal: 0   II.) Biomedical:  3   III.) Emotional and Behavioral:  1   IV.) Readiness to Change:  1   V.) Relapse  Potential: 2   VI.) Recovery Environmental: 2     Initial Problem List     The patient has end-stage liver disease and she is working with the [facility] Liver Transplant Team regarding a possible liver transplant   The patient lacks a sober peer support network  The patient lacks awareness of the disease model of addiction    Patient/Client is willing to follow treatment recommendations.  Yes    Counselor: BIANCA Blanchard

## 2021-01-04 NOTE — NURSING NOTE
Chief Complaint   Patient presents with     Consult     Pre Liver     Blood pressure 129/82, pulse 112, weight 85.2 kg (187 lb 13.3 oz), SpO2 94 %, not currently breastfeeding.    Joaquina Villeda on 1/4/2021 at 10:40 AM

## 2021-01-05 ENCOUNTER — TELEPHONE (OUTPATIENT)
Dept: TRANSPLANT | Facility: CLINIC | Age: 51
End: 2021-01-05

## 2021-01-05 ENCOUNTER — COMMITTEE REVIEW (OUTPATIENT)
Dept: TRANSPLANT | Facility: CLINIC | Age: 51
End: 2021-01-05

## 2021-01-05 DIAGNOSIS — K70.31 ALCOHOLIC CIRRHOSIS OF LIVER WITH ASCITES (H): Primary | ICD-10-CM

## 2021-01-05 DIAGNOSIS — J90 PLEURAL EFFUSION: Primary | ICD-10-CM

## 2021-01-05 DIAGNOSIS — J90 PLEURAL EFFUSION: ICD-10-CM

## 2021-01-05 DIAGNOSIS — Z00.00 ROUTINE HEALTH MAINTENANCE: ICD-10-CM

## 2021-01-05 DIAGNOSIS — I82.409 DVT (DEEP VENOUS THROMBOSIS) (H): ICD-10-CM

## 2021-01-05 LAB
BLD GP AB SCN TITR SERPL: NORMAL {TITER}
GAMMA INTERFERON BACKGROUND BLD IA-ACNC: 0.07 IU/ML
HAPTOGLOB SERPL-MCNC: 57 MG/DL (ref 32–197)
M TB IFN-G CD4+ BCKGRND COR BLD-ACNC: 2.67 IU/ML
M TB TUBERC IFN-G BLD QL: NEGATIVE
MITOGEN IGNF BCKGRD COR BLD-ACNC: 0 IU/ML
MITOGEN IGNF BCKGRD COR BLD-ACNC: 0 IU/ML
PETH BLD-MCNC: NEGATIVE NG/ML
RETICS # AUTO: 140.8 10E9/L (ref 25–95)
RETICS/RBC NFR AUTO: 4.7 % (ref 0.5–2)
TSH SERPL DL<=0.005 MIU/L-ACNC: 9 MU/L (ref 0.4–4)

## 2021-01-05 RX ORDER — FOLIC ACID 1 MG/1
1 TABLET ORAL DAILY
Qty: 90 TABLET | Refills: 3 | Status: SHIPPED | OUTPATIENT
Start: 2021-01-05 | End: 2021-04-05

## 2021-01-05 RX ORDER — ERGOCALCIFEROL 1.25 MG/1
50000 CAPSULE, LIQUID FILLED ORAL WEEKLY
Qty: 12 CAPSULE | Refills: 0 | Status: SHIPPED | OUTPATIENT
Start: 2021-01-05 | End: 2021-04-24

## 2021-01-05 ASSESSMENT — ANXIETY QUESTIONNAIRES: GAD7 TOTAL SCORE: 0

## 2021-01-05 NOTE — TELEPHONE ENCOUNTER
Finish evaluation and orders placed  - pulmonary- bloody thoracentesis, lg effusion  - hematology-  lt acute internal jugular DVT  - chest CT  - stress  - follow up on pap/pelvic, will schedule at PCP, and mammogram (here)

## 2021-01-05 NOTE — PROGRESS NOTES
[facility]     Date of Visit: 1/4/2020    Reason for referral: LT evaluation    Subjective: Ms. 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 HE, hypothyroidism, who presents for LT evaluation.     Patient presented to PCP 6/2020 for hemorrhoidal bleeding, was noticed to be jaundiced. They recommended she stop drinking, which she had already done when she saw her eyes turning yellow. Labs significant for BR 17.2  ALT 37 Alk phos 231. Ultrasound was done 7/6 which showed cirrhosis, cavernous transformation of the portal veins.     Presented to the ED 9/8 with SOB. Found to have LUE swelling on exam, underwent US that showed left internal jugular acute DVT. Had a CT PE and AP as well that did not show a PE, but showed a very large left pleural effusion with associated complete left lung atelectasis. Also showed a large right pleural effusion. No adenopathy. Showed moderated volume ascites with mesenteric edema, likely portal colopathy. She was started on IV heparin for her L internal jugular DVT. This is the first DVT she has ever had. Later Imaging showed DVT involved left internal jugular, brachiocephalic and subclavian vein. She underwent a thoracentesis with 2 L removed on 9/8. This was blood tinged per report, but grossly bloody > 50,000 with 9,337 WBC 98% PMNS. Protein 1.5 LDH 92. She developed hypotension after this with worsening hypoxia thought to be related to reexpansion pulmonary edema. Culture from this eventually grow E. Coli and eggerthelle lenta. She was put on zosyn and eventually augmentin for this. She had a paracentesis 9/9 that showed < 50,000 RBC, 5,203 WBC 70% PMNs, albumin < 0.7, TP 1.0. AC was stopped given concern for hemothorax, eventually lower dose apixaban started. She had a repeat left sided thoracentesis 9/12 with > 50,000 RBC 4,889 WBC 72% PMNs,  TP 3.0 negative culture, and then  9/17 with > 50,000 RBC, 1,1118 WBC 17% PMNs and a negative culture. She was confused this admission, started on meds for HE. Started on low dose diuretics.     Admitted again 9/30 with SOB. Had CT PE that did not show a PE, showed very large left and large right pleural effusion with associated new complete left lung atelectasis.  and haptoglobin 187. BR 10/5 5.3 with 2.6 direct. Underwent thoracentesis 10/5 that showed > 50,000 RBC with 385 WBC 8% PMNs. Had darker stools, underwent EGD showed PHG. AC restarted. Was discharged on home oxygen and with increased diuretics.     Admitted 10/30-1 for SOB, BRBPR, anemia. BR 3.2 with 1.8 direct. Hemoglobin was down to 4.5, transfused, EGD with PHG and GAVE - treated with APC. Had near complete opacification of left pleural space with effusion. Had CORBIN - creatinine around 2, downtrended to 1.5 with holding diuretics. DVU scan showed some improvement in the chronic DVT - plan was for 2 months AC. Ferritin 645.5.    Admission 11/4-18 for anemia - Hgb was 4.3, required several units of blood. Iron stores TSat 69%. CXR showed complete white out of left lung field. Decision was made to hold AC given her recurrent anemia. Repeat UE US showed chronic appearing non occlusive thrombus in the left internal jugular vein, innominate vein and subclavain vein. Also had CORBIN - creatinine was 2.36 initially, down to 1.27 on discharge. Diuretics were held. She was on home oxygen at that point. Started on a BB this admission. 11/15 BR 5.7 with 1.9 direct.     She had an outpatient EGD/Colonoscopy 12/4 that showed erythema in the esophagus, no varices. Severe PHG. No definite residual GAVE seen. Antrum PHG was treated with APC. Diffuse congested mucosa without active bleeding found in duodenum. She had colonoscopy that showed hemorrhoids, congested colonic mucosa. 4 mm cecal polyp removed that showed normal colonic mucosa.     She was admitted to the hospital 12/8 with weakness and  confusion. Patient felt she was confused after her procedure. Underwent a thoracentesis on  - 1.7 L of fluid was removed because she was having some SOB. Studies c/w < 50,000 RBC, 151 WBC 25% PMNs. She shortly developed worsening hypoxia, hypotension, requiring pressors and BiPAP. [place] to be 2/2 re-expansion pulmonary edema. Hgb went 8 -> 6, required 2 units blood. Oxygenation and pressor support improved with time. She was transferred here for further evaluation.     Upon transfer to here, she was doing well. Required little to no oxygen. She was mentating well. She underwent a 500 ml diagnostic thoracentesis on  that was described as pink/cloudy with 129 WBC. TP 1.4 (serum 5), LDH 81 (serum 371), glucose 109, amylase 14. Negative cytology. 1 of the two culture bottles grew skin di on day 5, other culture did not. She was discharged on cipro ppx for her history of SBP. Discharged on lasix 40/spironolactone 100.     Today, she reports she is feeling the best she has felt in a long time. She uses a walker when outside of the house. Diuretics were held last week for slight rise in creatinine. Abdomen not swollen, only having SOB when moving around. Denies GI bleeding. On leave from work. No confusion.     Co morbidities  - ASCUS + , ,  with negative HPV testing  - Last mammogram      ROS: 14 point ROS negative except for positives noted in HPI.    PMHx:  Past Medical History:   Diagnosis Date     History of blood transfusion      Thyroid disease    Alcohol related cirrhosis  Anemia  ASCUS      PSHx:  Past Surgical History:   Procedure Laterality Date     SOFT TISSUE SURGERY       Ovarian cyst    FamHx:  No known history of liver disease    SocHx:  Social History     Socioeconomic History     Marital status: Single     Spouse name: Not on file     Number of children: Not on file     Years of education: Not on file     Highest education level: Not on file   Occupational History      Not on file   Social Needs     Financial resource strain: Not on file     Food insecurity     Worry: Not on file     Inability: Not on file     Transportation needs     Medical: Not on file     Non-medical: Not on file   Tobacco Use     Smoking status: Never Smoker     Smokeless tobacco: Never Used   Substance and Sexual Activity     Alcohol use: Not Currently     Comment: Quit on 6/20/2020     Drug use: Not Currently     Sexual activity: Not on file   Lifestyle     Physical activity     Days per week: Not on file     Minutes per session: Not on file     Stress: Not on file   Relationships     Social connections     Talks on phone: Not on file     Gets together: Not on file     Attends Spiritism service: Not on file     Active member of club or organization: Not on file     Attends meetings of clubs or organizations: Not on file     Relationship status: Not on file     Intimate partner violence     Fear of current or ex partner: Not on file     Emotionally abused: Not on file     Physically abused: Not on file     Forced sexual activity: Not on file   Other Topics Concern     Parent/sibling w/ CABG, MI or angioplasty before 65F 55M? Not Asked   Social History Narrative     Not on file   Just moved back home, living with son and niece who is going to school to be a RN    Medications:  Current Outpatient Medications   Medication     ciprofloxacin (CIPRO) 500 MG tablet     ferrous sulfate (FE TABS) 325 (65 Fe) MG EC tablet     folic acid (FOLVITE) 1 MG tablet     furosemide (LASIX) 40 MG tablet     levothyroxine (SYNTHROID/LEVOTHROID) 50 MCG tablet     midodrine (PROAMATINE) 5 MG tablet     multivitamin w/minerals (THERA-VIT-M) tablet     pantoprazole (PROTONIX) 40 MG EC tablet     potassium chloride ER (K-TAB/KLOR-CON) 10 MEQ CR tablet     rifaximin (XIFAXAN) 550 MG TABS tablet     spironolactone (ALDACTONE) 100 MG tablet     sucralfate (CARAFATE) 1 GM tablet     thiamine 50 MG TABS     traZODone (DESYREL) 50 MG  tablet     zinc sulfate (ZINCATE) 220 (50 Zn) MG capsule     No current facility-administered medications for this visit.        Allergies:     Allergies   Allergen Reactions     Amoxicillin GI Disturbance, Diarrhea, Nausea and Nausea and Vomiting       Objective:  AF  /82 94% on RA BMI 28  Constitutional: Pleasant woman in NAD  Eyes: Icteric  Respiratory: Dullness to percussion half-way up left lung field, breathing comfortably on RA  MSK: normal range of motion of visualized extremities  Skin: jaundiced  Neuro: AOOX3, no asterixis  Psychiatric: normal mood and orientation    Labs:  Last Comprehensive Metabolic Panel:  Sodium   Date Value Ref Range Status   12/29/2020 138 133 - 144 mmol/L Final     Potassium   Date Value Ref Range Status   12/29/2020 3.5 3.4 - 5.3 mmol/L Final     Chloride   Date Value Ref Range Status   12/29/2020 104 94 - 109 mmol/L Final     Carbon Dioxide   Date Value Ref Range Status   12/29/2020 25 20 - 32 mmol/L Final     Anion Gap   Date Value Ref Range Status   12/29/2020 9 3 - 14 mmol/L Final     Glucose   Date Value Ref Range Status   12/29/2020 142 (H) 70 - 99 mg/dL Final     Urea Nitrogen   Date Value Ref Range Status   12/29/2020 16 7 - 30 mg/dL Final     Creatinine   Date Value Ref Range Status   12/29/2020 1.38 (H) 0.52 - 1.04 mg/dL Final     GFR Estimate   Date Value Ref Range Status   12/29/2020 44 (L) >60 mL/min/[1.73_m2] Final     Comment:     Non  GFR Calc  Starting 12/18/2018, serum creatinine based estimated GFR (eGFR) will be   calculated using the Chronic Kidney Disease Epidemiology Collaboration   (CKD-EPI) equation.       Calcium   Date Value Ref Range Status   12/29/2020 8.5 8.5 - 10.1 mg/dL Final     Bilirubin Total   Date Value Ref Range Status   12/19/2020 5.1 (H) 0.2 - 1.3 mg/dL Final     Alkaline Phosphatase   Date Value Ref Range Status   12/19/2020 136 40 - 150 U/L Final     ALT   Date Value Ref Range Status   12/19/2020 15 0 - 50 U/L  Final     AST   Date Value Ref Range Status   12/19/2020 29 0 - 45 U/L Final       Lab Results   Component Value Date    WBC 8.3 12/19/2020     Lab Results   Component Value Date    RBC 2.82 12/19/2020     Lab Results   Component Value Date    HGB 9.1 12/19/2020     Lab Results   Component Value Date    HCT 29.2 12/19/2020     Lab Results   Component Value Date     12/19/2020     Lab Results   Component Value Date    MCH 32.3 12/19/2020     Lab Results   Component Value Date    MCHC 31.2 12/19/2020     Lab Results   Component Value Date    RDW 18.9 12/19/2020     Lab Results   Component Value Date     12/19/2020       INR   Date Value Ref Range Status   12/19/2020 1.79 (H) 0.86 - 1.14 Final          MELD-Na score: 20 at 12/19/2020  7:47 AM  MELD score: 19 at 12/19/2020  7:47 AM  Calculated from:  Serum Creatinine: 1.02 mg/dL at 12/19/2020  7:47 AM  Serum Sodium: 136 mmol/L at 12/19/2020  7:47 AM  Total Bilirubin: 5.1 mg/dL at 12/19/2020  7:47 AM  INR(ratio): 1.79 at 12/19/2020  7:47 AM  Age: 50 years 8 months     6/2019  Hepatitis B Sag negative  Hepatitis C Ab negative  Hepatitis A IgG negative    10/2020  DAISY negative  ASMA negative  AFP 4.1    Imaging:    [facility] US Doppler 12/15/2020    Findings:  Liver: The liver demonstrates diffuse coarsened echotexture with  increased echogenicity. Nodular contour to the liver margins. No  evidence of a focal hepatic mass.      Extrahepatic portal vein flow is retrograde at 34 cm/s.  Right portal vein flow is retrograde, measuring 36 cm/s.  Left portal vein flow is antegrade, measuring 45 cm/s.     Flow in the hepatic artery is towards the liver and:  315  cm/s peak systolic  0.47 resistive index.      Recanalized paraumbilical vein.     The splenic vein is patent and flow is towards the liver.  The left,  middle, and right hepatic veins are patent with flow towards the IVC.  Flattening of waveforms secondary to underlying liver disease. The IVC  is patent  with flow towards the heart.   The visualized aorta is not  dilated.     Gallbladder: Mildly thickened gallbladder wall 3.3 mm. There is no  pericholecystic fluid, positive sonographic Lema's sign or evidence  for cholelithiasis     Bile Ducts: No definite intrahepatic biliary dilatation. The common  bile duct is not visualized in this study.     Pancreas: Not visualized in this study.      Right kidney: Normal echotexture, without mass or hydronephrosis.  Measuring 11.7 cm.     Fluid: Partially visualized right pleural effusion, trace ascites  surrounding the liver.                                                                      Impression:   1.  Cirrhotic morphology of the liver.  2.  Reversal of flow in the main portal vein, right portal vein, and  recanalization of the paraumbilical vein, compatible with portal  hypertension  3.  Decreased resistive index of the hepatic artery and increased peak  systolic velocity possibly secondary to stenosis or shunting.  4.  Partially visualized right pleural effusion and trace perihepatic  ascites.    Endoscopy:    12/4/2020 EGD    Findings:       Diffuse erythema was found in the entire esophagus.       The exam of the esophagus was otherwise normal. NO varices.       Severe portal hypertensive gastropathy was found in the entire examined        stomach. Most prominent within the gastric antrum. No definitive        residual GAVE identified. Coagulation for bleeding prevention using        argon plasma (1 liter/minute and 60 dawkins) of the gastric antrum was        successful.       Diffuse congested mucosa without active bleeding and with no stigmata of        bleeding was found in the entire duodenum.    Impressions/Post-Op Diagnosis:       - Erythema in the esophagus.       - Portal hypertensive gastropathy. Treated with argon plasma coagulation        (APC).       - Congested duodenal mucosa.    Recommendation:       - Continue pantoprazole 40 mg daily and  sucralfate 1 gram twice daily.       - Iron supplementation twice daily.       - Avoid NSAIDs.    12/4/2020 Colonoscopy    Findings:       Hemorrhoids were found on perianal exam.       The terminal ileum appeared normal.       An area of grossly congested mucosa was found in the entire colon.       A 4 mm polyp was found in the cecum. The polyp was sessile. The polyp        was removed with a cold biopsy forceps. Resection and retrieval were        complete.       Non-bleeding internal hemorrhoids were found during retroflexion.    Impressions/Post-Op Diagnosis:       - Hemorrhoids found on perianal exam.       - The examined portion of the ileum was normal.       - Congested mucosa in the entire examined colon.       - One 4 mm polyp in the cecum, removed with a cold biopsy forceps.        Resected and retrieved.       - Non-bleeding internal hemorrhoids.    A) COLON, CECUM, POLYPECTOMY:  1. Colonic mucosa with no diagnostic abnormalities; a lymphoid aggregate is present  2. Negative for serrated change, dysplasia, and malignancy    Assessment/Plan: Ms. Fisher is a 50 year old woman with a history of alcohol related cirrhosis c/b ascites, hepatic hydrothorax, unprovoked left internal jugular DVT, hypothyroidism, who presents for liver transplant evaluation.     She has been sober just over 6 months, still having issues with volume overload - mainly related to hepatic hydrothorax/SBE. She has had bilateral pleural effusions before, but the left one is larger and has had complete opacification on imaging. It is improved today on her CXR and she symptomatically feels better. This effusion has been intermittently bloody (when she was on AC), and she has had two episodes of re expansion pulmonary edema after larger volume thoras. She has had infection in this pleural fluid thought to be related to SBE. She also has a history of left sided internal jugular/sublclavian DVT - potentially related to extrinsic compression  from left sided effusion but would like this further evaluated. CT imaging has not showed evidence of lung malignancy. Cytology has been negative. BR still elevated in the 5s, although her indirect fraction is rising (was close to 50/50 in the past) and she appears to be hemolyzing based on rising LDH and elevated reticuloyte count. Was having issues with anemia/GIB 2/2 GAVE or PHG on AC, improved since stopping this.     Blood type B. MELD 20    - Proceed with testing for LT evaluation, she will need TTE with bubble (OSH TTE did not do this) as well as DBE. She is not currently a TIPS candidate given her elevated BR.   - Pulmonary referral to evaluate her left sided effusion - studies now consistent with hepatic hydrothorax, but given that it was previously bloody (although on AC) and she had associated left internal jugular DVT would like to rule out malignant effusion  - Hematology referral for left internal jugular DVT and new evidence of hemolysis. She is sober for ~ 6 months, not c/w zieves syndrome. Will also start folate  - Volume overload: Restart lower dose diuretics - lasix 20 mg daily and spironolactone 50 mg daily given recent elevated creatinine  - Continue cipro for SBP ppx  - HCC screening: AFP normal Fall 2020, no mass on US 12/2020  - Variceal screening: EGD 12/2020 without varices  - HE: can continue lactulose and rifaximin, unclear if she had HE or delirium related to underlying infection     RTC 3 months with monthly BMP, LFTs, CBC, INR    Fadia Avila MD MSc  Transplant Hepatology  [facility]    Approximately 45 minutes of non face-to-face time were spent in review of the patient's medical record to date.  This included review of previous: clinic visits, hospital records, lab results, imaging studies, and procedural documentation.  The findings from this review are summarized in the above note.

## 2021-01-05 NOTE — COMMITTEE REVIEW
Abdominal Patient Discussion Note Transplant Coordinator: Kiki Sutherland  Transplant Surgeon:   Christian Morrison    Referring Physician: Rehan Escobar    Committee Review Members:  Hepatology Fadia Avila MD   Nutrition Humaira Jason, RD   Pharmacy Sanjiv Coleman, [facility], Feli Tabares, [facility]    - Clinical Sherice Arredondo, MSW, Evelina Rodriguez, [facility]   Transplant Chimaobi Rohith Rocha MD, Christos Johnson MD,  Oli Gurrola RN, Leigh Beltran MD, Cyndee Salazar, APRN CNP, Kiki Sutherland, RN, Darrin Patterson MD, Ronan Santos MD, Thomas M. Leventhal, MD, Riaz Seth MD, Jaylan Lyon MD       Additional Discussion Notes and Findings:   Finish evaluation including:  - pulmonary- bloody thoracentesis, lg effusion  - hematology-  lt acute internal jugular DVT  - chest CT

## 2021-01-05 NOTE — TELEPHONE ENCOUNTER
RECORDS RECEIVED FROM: internal    DATE RECEIVED: 1.11.21   NOTES STATUS DETAILS   OFFICE NOTE from referring provider internal  Fadia Avila,    OFFICE NOTE from other specialist Internal/CE 12.19.20 Bernardo   12.15.20 Zee     9.24.20 Raven Weaver   DISCHARGE SUMMARY from hospital     DISCHARGE REPORT from the ER ce [facility]- 12.8.20 Dana     MEDICATION LIST internal     IMAGING  (NEED IMAGES AND REPORTS)     CT SCAN ce/internal  [facility]- 9.30.20, .9.8.20,    CHEST XRAY (CXR) CE/internal  [facility]- 12.11.20, 12.10.20, 12.9.20, 12.8.20, 11.14.20,   11.1.20, 10.30.20, 10.5.20, 10.3.20, 10.1.20,   internal - 1.4.21, 12.17.20, 12.15.20    TESTS     PULMONARY FUNCTION TESTING (PFT) Scheduled  Scheduled 1.7.21       Action 1.5.21 sv    Action Taken Image request sent to [facility] for   ---CXR- 12.11.20, 12.10.20, 12.9.20, 12.8.20, 11.14.20,   11.1.20, 10.30.20, 10.5.20, 10.3.20, 10.1.20,   ---CT- 9.30.20, .9.8.20,     1.7.20--- received images in PACS---

## 2021-01-06 ENCOUNTER — TELEPHONE (OUTPATIENT)
Dept: TRANSPLANT | Facility: CLINIC | Age: 51
End: 2021-01-06

## 2021-01-06 NOTE — TELEPHONE ENCOUNTER
Transplant Social Work Services Phone Call    Data: I called Maria Elena to follow up on chemical dependency evaluation that she completed on 1/4/2020. Per LAD, pt would benefit from either 1:1 or IOP. Pt would like to discuss with me. I called her to discuss. She reported that she thought about it more and indicated that she thinks that the more intense program would be good for her at this time. She indicated that she can use it to relate to others and gain coping skills. I applauded her for her decision and agreed with her choice. I indicated that services can be tapered off in the future and is beneficial to have additional support. She agreed and denied any other concerns. I indicated that I will let Ed BIANCA WOLFE know about her decision. He will likely make a referral and then the specific program will contact her to set up an intake and start date to the program.   Intervention: Supportive listening   Assessment: Maria Elena is motivated to maintain abstinence and willing to participate in a higher level of care   Education provided by SW: IOP vs 1:1   Plan: I will inform Ed regarding pt's decision. She indicated that she send him a message, however has not heard back.     ENRIQUE Rodriguez, [facility]  Liver Transplant   [facility]  Phone: [phone]  Pager: [phone]

## 2021-01-06 NOTE — TELEPHONE ENCOUNTER
Spoke with the patient and confirmed Pulmonary Function Test, Chest CT, and Dobutamine Stress Echo appointments on 1/7/21. Pt scheduled for Hepatology appointment on 4/5/21.  Informed patient an itinerary can be accessed on [employer].

## 2021-01-07 ENCOUNTER — HOSPITAL ENCOUNTER (OUTPATIENT)
Dept: CARDIOLOGY | Facility: CLINIC | Age: 51
End: 2021-01-07
Attending: STUDENT IN AN ORGANIZED HEALTH CARE EDUCATION/TRAINING PROGRAM
Payer: COMMERCIAL

## 2021-01-07 ENCOUNTER — HOSPITAL ENCOUNTER (OUTPATIENT)
Dept: CT IMAGING | Facility: CLINIC | Age: 51
Discharge: HOME | End: 2021-01-07
Attending: STUDENT IN AN ORGANIZED HEALTH CARE EDUCATION/TRAINING PROGRAM | Admitting: STUDENT IN AN ORGANIZED HEALTH CARE EDUCATION/TRAINING PROGRAM
Payer: COMMERCIAL

## 2021-01-07 DIAGNOSIS — J90 PLEURAL EFFUSION: ICD-10-CM

## 2021-01-07 DIAGNOSIS — K70.31 ALCOHOLIC CIRRHOSIS OF LIVER WITH ASCITES (H): ICD-10-CM

## 2021-01-07 PROCEDURE — 999N000208 ECHO STRESS ECHOCARDIOGRAM

## 2021-01-07 PROCEDURE — 71250 CT THORAX DX C-: CPT

## 2021-01-07 PROCEDURE — 94729 DIFFUSING CAPACITY: CPT | Performed by: INTERNAL MEDICINE

## 2021-01-07 PROCEDURE — 93325 DOPPLER ECHO COLOR FLOW MAPG: CPT | Mod: 26 | Performed by: INTERNAL MEDICINE

## 2021-01-07 PROCEDURE — 94375 RESPIRATORY FLOW VOLUME LOOP: CPT | Performed by: INTERNAL MEDICINE

## 2021-01-07 PROCEDURE — 94726 PLETHYSMOGRAPHY LUNG VOLUMES: CPT | Performed by: INTERNAL MEDICINE

## 2021-01-07 PROCEDURE — 93321 DOPPLER ECHO F-UP/LMTD STD: CPT | Mod: 26 | Performed by: INTERNAL MEDICINE

## 2021-01-07 PROCEDURE — 71250 CT THORAX DX C-: CPT | Mod: 26 | Performed by: RADIOLOGY

## 2021-01-07 PROCEDURE — 250N000011 HC RX IP 250 OP 636: Performed by: INTERNAL MEDICINE

## 2021-01-07 PROCEDURE — 93018 CV STRESS TEST I&R ONLY: CPT | Performed by: INTERNAL MEDICINE

## 2021-01-07 PROCEDURE — 255N000002 HC RX 255 OP 636: Performed by: INTERNAL MEDICINE

## 2021-01-07 PROCEDURE — 93350 STRESS TTE ONLY: CPT | Mod: 26 | Performed by: INTERNAL MEDICINE

## 2021-01-07 PROCEDURE — 93016 CV STRESS TEST SUPVJ ONLY: CPT | Performed by: INTERNAL MEDICINE

## 2021-01-07 PROCEDURE — 250N000009 HC RX 250: Performed by: INTERNAL MEDICINE

## 2021-01-07 RX ORDER — DOBUTAMINE HYDROCHLORIDE 200 MG/100ML
10-50 INJECTION INTRAVENOUS CONTINUOUS
Status: ACTIVE | OUTPATIENT
Start: 2021-01-07 | End: 2021-01-07

## 2021-01-07 RX ORDER — METOPROLOL TARTRATE 1 MG/ML
1-20 INJECTION, SOLUTION INTRAVENOUS
Status: ACTIVE | OUTPATIENT
Start: 2021-01-07 | End: 2021-01-07

## 2021-01-07 RX ORDER — ATROPINE SULFATE 0.4 MG/ML
.2-2 AMPUL (ML) INJECTION
Status: DISCONTINUED | OUTPATIENT
Start: 2021-01-07 | End: 2021-01-07 | Stop reason: CLARIF

## 2021-01-07 RX ORDER — SODIUM CHLORIDE 9 MG/ML
INJECTION, SOLUTION INTRAVENOUS CONTINUOUS
Status: ACTIVE | OUTPATIENT
Start: 2021-01-07 | End: 2021-01-07

## 2021-01-07 RX ADMIN — HUMAN ALBUMIN MICROSPHERES AND PERFLUTREN 6 ML: 10; .22 INJECTION, SOLUTION INTRAVENOUS at 12:24

## 2021-01-07 RX ADMIN — METOPROLOL TARTRATE 2 MG: 5 INJECTION INTRAVENOUS at 12:21

## 2021-01-07 RX ADMIN — DOBUTAMINE HYDROCHLORIDE 10 MCG/KG/MIN: 200 INJECTION INTRAVENOUS at 12:14

## 2021-01-08 ENCOUNTER — TELEPHONE (OUTPATIENT)
Dept: TRANSPLANT | Facility: CLINIC | Age: 51
End: 2021-01-08

## 2021-01-08 LAB
DLCOCOR-%PRED-PRE: 45 %
DLCOCOR-PRE: 10.58 ML/MIN/MMHG
DLCOUNC-%PRED-PRE: 39 %
DLCOUNC-PRE: 9.1 ML/MIN/MMHG
DLCOUNC-PRED: 23.25 ML/MIN/MMHG
ERV-%PRED-PRE: 7 %
ERV-PRE: 0.07 L
ERV-PRED: 0.85 L
EXPTIME-PRE: 8.1 SEC
FEF2575-%PRED-PRE: 40 %
FEF2575-PRE: 1.17 L/SEC
FEF2575-PRED: 2.91 L/SEC
FEFMAX-%PRED-PRE: 39 %
FEFMAX-PRE: 2.82 L/SEC
FEFMAX-PRED: 7.21 L/SEC
FEV1-%PRED-PRE: 28 %
FEV1-PRE: 0.86 L
FEV1FEV6-PRE: 90 %
FEV1FEV6-PRED: 82 %
FEV1FVC-PRE: 90 %
FEV1FVC-PRED: 80 %
FEV1SVC-PRE: 85 %
FEV1SVC-PRED: 79 %
FIFMAX-PRE: 1.82 L/SEC
FRCPLETH-%PRED-PRE: 43 %
FRCPLETH-PRE: 1.24 L
FRCPLETH-PRED: 2.86 L
FVC-%PRED-PRE: 24 %
FVC-PRE: 0.95 L
FVC-PRED: 3.83 L
IC-%PRED-PRE: 31 %
IC-PRE: 0.94 L
IC-PRED: 3.01 L
RVPLETH-%PRED-PRE: 62 %
RVPLETH-PRE: 1.17 L
RVPLETH-PRED: 1.88 L
TLCPLETH-%PRED-PRE: 39 %
TLCPLETH-PRE: 2.18 L
TLCPLETH-PRED: 5.44 L
VA-%PRED-PRE: 34 %
VA-PRE: 1.9 L
VC-%PRED-PRE: 25 %
VC-PRE: 1 L
VC-PRED: 3.86 L

## 2021-01-08 NOTE — TELEPHONE ENCOUNTER
Spoke to Sarah and reports breathing is stable. Will review with Dr. Avila, standing order and also need for AMBER.

## 2021-01-11 ENCOUNTER — VIRTUAL VISIT (OUTPATIENT)
Dept: PULMONOLOGY | Facility: CLINIC | Age: 51
End: 2021-01-11
Attending: INTERNAL MEDICINE
Payer: COMMERCIAL

## 2021-01-11 ENCOUNTER — PRE VISIT (OUTPATIENT)
Dept: PULMONOLOGY | Facility: CLINIC | Age: 51
End: 2021-01-11

## 2021-01-11 DIAGNOSIS — Z11.59 ENCOUNTER FOR SCREENING FOR OTHER VIRAL DISEASES: ICD-10-CM

## 2021-01-11 DIAGNOSIS — J90 PLEURAL EFFUSION ON LEFT: Primary | ICD-10-CM

## 2021-01-11 PROCEDURE — 99205 OFFICE O/P NEW HI 60 MIN: CPT | Mod: 95 | Performed by: INTERNAL MEDICINE

## 2021-01-11 NOTE — PROGRESS NOTES
Sarah is a 50 year old who is being evaluated via a billable video visit.      How would you like to obtain your AVS? [id number]  If the video visit is dropped, the invitation should be resent by: Other e-mail: [email or web address]  Will anyone else be joining your video visit? No      Video Start Time: 7am    Video-Visit Details    Type of service:  Video Visit    Video End Time:7:45 AM    Originating Location (pt. Location): Home    Distant Location (provider location):  [facility]     Platform used for Video Visit: Wikia    [facility]  Pulmonary Medicine  Visit Clinic Note  January 11, 2021         ASSESSMENT & PLAN     Patient is a 50-year-old female with a past medical history of liver cirrhosis.  Who has been referred to pulmonary clinic for further management of recurrent hepatic hydrothorax.    #Bilateral pleural effusion more on the left than the right:  -Patient has had total 5 thoracentesis in last 2 months.  For  Month of December she was not in any diuresis.  Now that she has been restarted on diuresis we will follow her response to that.  -Patient does not have any history of severe encephalopathy but her bilirubin greater than 5 is a contraindication for her to get a TIPS.  Discussed this with patient's hepatologist.  -Patient's last thoracentesis was serosanguineous.  Malignant work-up has been negative.  Repeat CT shows recurrence of extensive pleural effusion more on the left than right.  Hepatic hydrothorax is a more likely to be on the right but they can also show up on the left as well.  -I will set her up for outpatient thoracentesis and resend the labs for malignancy work-up.  -I also discussed the case with interventional pulmonary if she continues to have recurrent pleural effusion then she might require Pleurx catheter.  Placing an indwelling pleural catheter is not ideal especially she has had one episode of spontaneous  bacterial effusion.  I will continue to monitor    #Left IJ clot  -As of now the current etiology might decompress a left-sided pleural effusion.  I have found only one case report  -The pleural effusion turned out to be malignant in that case report.  -I will follow up with the heme-onc recommendations.    RTC in 3 days for thoracentesis  And 2 weeks after that.     I explained the lab values, imagings and findings to the patient.  Patient expressed understanding I did not recognize any barriers to the understanding of the patient.    Almas Irby MD          Today's visit note:     Chief Complaint: Sarah Fisher is a 50 year old year old female who is being seen for Consult For (Alcoholic cirrhosis of liver with ascites, pleural effusion )      HPI:     Recurrent Hepatic hydrothorax:   -Recent thoracentesis in 12/18 : 500 ml of fluid was removed. Once before was in the beginning of December: She has received total of 5 thoracentesis since Septemebr including the small one on 12/20.   -Significant improvement in breathing is noted after removal of fluid.     -Her diuretic dose was stopped in Decemebr for worsening renal function. She has followed with hepatology in first week of January. Since than she is back on her diuretics.   - She has difficulty with leg swelling as well.   --She has been taking lactulose. She has been on and off it.     #Left internal jugular clot:   Probably diagnosed in October. But has followed with [place] [facility] and has been told to not take the blood thinner medications.     #Shortness of breath:   -Gets sob with climbing stairs. Gets sob after around couple of blocks.   -  Social History:  -Never smoked.   -Work as a .          Medications:     Current Outpatient Medications   Medication     ciprofloxacin (CIPRO) 250 MG tablet     ferrous sulfate (FE TABS) 325 (65 Fe) MG EC tablet     folic acid (FOLVITE) 1 MG tablet     folic acid (FOLVITE) 1 MG tablet     furosemide  (LASIX) 20 MG tablet     levothyroxine (SYNTHROID/LEVOTHROID) 50 MCG tablet     midodrine (PROAMATINE) 5 MG tablet     multivitamin w/minerals (THERA-VIT-M) tablet     pantoprazole (PROTONIX) 40 MG EC tablet     potassium chloride ER (K-TAB/KLOR-CON) 10 MEQ CR tablet     rifaximin (XIFAXAN) 550 MG TABS tablet     spironolactone (ALDACTONE) 25 MG tablet     sucralfate (CARAFATE) 1 GM tablet     thiamine 50 MG TABS     traZODone (DESYREL) 50 MG tablet     vitamin D2 (ERGOCALCIFEROL) 64250 units (1250 mcg) capsule     zinc sulfate (ZINCATE) 220 (50 Zn) MG capsule     No current facility-administered medications for this visit.             Review of Systems:       A complete 10 point review of systems was otherwise negative except as noted in the HPI.        PHYSICAL EXAM:  There were no vitals taken for this visit.            Data:   All laboratory and imaging data reviewed.      PFT:       PFT Interpretation:  I personally reviewed and interpreted the PFTs.  -Severe restrictive defects are noted in the PFTs.  CXR: I personally reviewed and interpreted the chest x-ray    Chest CT: I personally reviewed and interpreted the CT scan.  -Compressive left-sided pleural effusion is noted.  -    Recent Results (from the past 168 hour(s))   Treponema Abs w Reflex to RPR and Titer    Collection Time: 01/04/21 10:19 AM   Result Value Ref Range    Treponema Antibodies Nonreactive NR^Nonreactive   HIV Antigen Antibody Combo Pretransplant    Collection Time: 01/04/21 10:19 AM   Result Value Ref Range    HIV Antigen Antibody Combo Pretransplant Nonreactive NR^Nonreactive   EBV Capsid Antibody IgG    Collection Time: 01/04/21 10:19 AM   Result Value Ref Range    EBV Capsid Antibody IgG 3.0 (H) 0.0 - 0.8 AI   CMV Antibody IgG    Collection Time: 01/04/21 10:19 AM   Result Value Ref Range    CMV Antibody IgG >8.0 (H) 0.0 - 0.8 AI   CBC with platelets    Collection Time: 01/04/21 10:19 AM   Result Value Ref Range    WBC 7.3 4.0 - 11.0  10e9/L    RBC Count 2.88 (L) 3.8 - 5.2 10e12/L    Hemoglobin 9.6 (L) 11.7 - 15.7 g/dL    Hematocrit 30.4 (L) 35.0 - 47.0 %     (H) 78 - 100 fl    MCH 33.3 (H) 26.5 - 33.0 pg    MCHC 31.6 31.5 - 36.5 g/dL    RDW 16.7 (H) 10.0 - 15.0 %    Platelet Count 149 (L) 150 - 450 10e9/L   INR    Collection Time: 01/04/21 10:19 AM   Result Value Ref Range    INR 1.69 (H) 0.86 - 1.14   Phosphatidylethanol (PEth)    Collection Time: 01/04/21 10:19 AM   Result Value Ref Range    Phosphatidylethanol (PEth) Negative Negative ng/mL   Vitamin D Deficiency    Collection Time: 01/04/21 10:19 AM   Result Value Ref Range    Vitamin D Deficiency screening 10 (L) 20 - 75 ug/L   Quantiferon TB Gold Plus    Collection Time: 01/04/21 10:19 AM    Specimen: Blood   Result Value Ref Range    MTB Quantiferon Result Negative NEG^Negative    TB1 Ag minus Nil 0.00 IU/mL    TB2 Ag minus Nil 0.00 IU/mL    Mitogen minus Nil 2.67 IU/mL    NIL Result 0.07 IU/mL   HCG qualitative (female only)    Collection Time: 01/04/21 10:19 AM   Result Value Ref Range    HCG Qualitative Serum Negative NEG^Negative   Hepatic panel    Collection Time: 01/04/21 10:19 AM   Result Value Ref Range    Bilirubin Direct 1.8 (H) 0.0 - 0.2 mg/dL    Bilirubin Total 5.2 (H) 0.2 - 1.3 mg/dL    Albumin 2.7 (L) 3.4 - 5.0 g/dL    Protein Total 6.4 (L) 6.8 - 8.8 g/dL    Alkaline Phosphatase 139 40 - 150 U/L    ALT 20 0 - 50 U/L    AST 34 0 - 45 U/L   Basic metabolic panel    Collection Time: 01/04/21 10:19 AM   Result Value Ref Range    Sodium 138 133 - 144 mmol/L    Potassium 3.9 3.4 - 5.3 mmol/L    Chloride 103 94 - 109 mmol/L    Carbon Dioxide 29 20 - 32 mmol/L    Anion Gap 7 3 - 14 mmol/L    Glucose 106 (H) 70 - 99 mg/dL    Urea Nitrogen 13 7 - 30 mg/dL    Creatinine 1.07 (H) 0.52 - 1.04 mg/dL    GFR Estimate 60 (L) >60 mL/min/[1.73_m2]    GFR Estimate If Black 70 >60 mL/min/[1.73_m2]    Calcium 9.1 8.5 - 10.1 mg/dL   Lipid Profile    Collection Time: 01/04/21 10:19 AM    Result Value Ref Range    Cholesterol 160 <200 mg/dL    Triglycerides 75 <150 mg/dL    HDL Cholesterol 59 >49 mg/dL    LDL Cholesterol Calculated 86 <100 mg/dL    Non HDL Cholesterol 101 <130 mg/dL   Antibody titer red cell    Collection Time: 01/04/21 10:19 AM   Result Value Ref Range    Antibody Titer Anti A1: IgM 64, IgG 128    ABO/Rh type and screen    Collection Time: 01/04/21 10:19 AM   Result Value Ref Range    ABO B     RH(D) Pos     Antibody Screen Neg     Test Valid Only At          [facility]    Specimen Expires 01/07/2021    Lactate Dehydrogenase    Collection Time: 01/04/21 10:19 AM   Result Value Ref Range    Lactate Dehydrogenase 609 (H) 81 - 234 U/L   Reticulocyte count    Collection Time: 01/04/21 10:19 AM   Result Value Ref Range    % Retic 4.7 (H) 0.5 - 2.0 %    Absolute Retic 140.8 (H) 25 - 95 10e9/L   TSH    Collection Time: 01/04/21 10:19 AM   Result Value Ref Range    TSH 9.00 (H) 0.40 - 4.00 mU/L   UA reflex to Microscopic and Culture    Collection Time: 01/04/21 10:20 AM    Specimen: Midstream Urine   Result Value Ref Range    Color Urine Bibi     Appearance Urine Slightly Cloudy     Glucose Urine Negative NEG^Negative mg/dL    Bilirubin Urine Negative NEG^Negative    Ketones Urine Negative NEG^Negative mg/dL    Specific Gravity Urine 1.019 1.003 - 1.035    Blood Urine Negative NEG^Negative    pH Urine 5.0 5.0 - 7.0 pH    Protein Albumin Urine Negative NEG^Negative mg/dL    Urobilinogen mg/dL 2.0 0.0 - 2.0 mg/dL    Nitrite Urine Negative NEG^Negative    Leukocyte Esterase Urine Negative NEG^Negative    Source Midstream Urine     RBC Urine 1 0 - 2 /HPF    WBC Urine 7 (H) 0 - 5 /HPF    Bacteria Urine Few (A) NEG^Negative /HPF    Squamous Epithelial /HPF Urine 5 (H) 0 - 1 /HPF    Mucous Urine Present (A) NEG^Negative /LPF    Hyaline Casts 8 (H) 0 - 2 /LPF   Haptoglobin    Collection Time: 01/04/21 10:20 AM   Result Value Ref Range    Haptoglobin  57 32 - 197 mg/dL   Pulmonary Function Test    Collection Time: 01/07/21 10:31 AM   Result Value Ref Range    FVC-Pred 3.83 L    FVC-Pre 0.95 L    FVC-%Pred-Pre 24 %    FEV1-Pre 0.86 L    FEV1-%Pred-Pre 28 %    FEV1FVC-Pred 80 %    FEV1FVC-Pre 90 %    FEFMax-Pred 7.21 L/sec    FEFMax-Pre 2.82 L/sec    FEFMax-%Pred-Pre 39 %    FEF2575-Pred 2.91 L/sec    FEF2575-Pre 1.17 L/sec    SLW4307-%Pred-Pre 40 %    ExpTime-Pre 8.10 sec    FIFMax-Pre 1.82 L/sec    VC-Pred 3.86 L    VC-Pre 1.00 L    VC-%Pred-Pre 25 %    IC-Pred 3.01 L    IC-Pre 0.94 L    IC-%Pred-Pre 31 %    ERV-Pred 0.85 L    ERV-Pre 0.07 L    ERV-%Pred-Pre 7 %    FEV1FEV6-Pred 82 %    FEV1FEV6-Pre 90 %    FRCPleth-Pred 2.86 L    FRCPleth-Pre 1.24 L    FRCPleth-%Pred-Pre 43 %    RVPleth-Pred 1.88 L    RVPleth-Pre 1.17 L    RVPleth-%Pred-Pre 62 %    TLCPleth-Pred 5.44 L    TLCPleth-Pre 2.18 L    TLCPleth-%Pred-Pre 39 %    DLCOunc-Pred 23.25 ml/min/mmHg    DLCOunc-Pre 9.10 ml/min/mmHg    DLCOunc-%Pred-Pre 39 %    DLCOcor-Pre 10.58 ml/min/mmHg    DLCOcor-%Pred-Pre 45 %    VA-Pre 1.90 L    VA-%Pred-Pre 34 %    FEV1SVC-Pred 79 %    FEV1SVC-Pre 85 %

## 2021-01-11 NOTE — LETTER
1/11/2021         RE: Sarah Fisher  [address] [facility] [address]        Dear Colleague,    Thank you for referring your patient, Sarah Fisher, to the [facility] AND [facility]. Please see a copy of my visit note below.    Sarah is a 50 year old who is being evaluated via a billable video visit.      How would you like to obtain your AVS? Box Score GamesharGetup Cloud  If the video visit is dropped, the invitation should be resent by: Other e-mail: [email or web address]  Will anyone else be joining your video visit? No      Video Start Time: 7am    Video-Visit Details    Type of service:  Video Visit    Video End Time:7:45 AM    Originating Location (pt. Location): Home    Distant Location (provider location):  [facility] AND [facility]     Platform used for Video Visit: OpenSesame    [facility]  Pulmonary Medicine  Visit Clinic Note  January 11, 2021         ASSESSMENT & PLAN     Patient is a 50-year-old female with a past medical history of liver cirrhosis.  Who has been referred to pulmonary clinic for further management of recurrent hepatic hydrothorax.    #Bilateral pleural effusion more on the left than the right:  -Patient has had total 5 thoracentesis in last 2 months.  For  Month of December she was not in any diuresis.  Now that she has been restarted on diuresis we will follow her response to that.  -Patient does not have any history of severe encephalopathy but her bilirubin greater than 5 is a contraindication for her to get a TIPS.  Discussed this with patient's hepatologist.  -Patient's last thoracentesis was serosanguineous.  Malignant work-up has been negative.  Repeat CT shows recurrence of extensive pleural effusion more on the left than right.  Hepatic hydrothorax is a more likely to be on the right but they can also show up on the left as well.  -I will set her up for outpatient thoracentesis  and resend the labs for malignancy work-up.  -I also discussed the case with interventional pulmonary if she continues to have recurrent pleural effusion then she might require Pleurx catheter.  Placing an indwelling pleural catheter is not ideal especially she has had one episode of spontaneous bacterial effusion.  I will continue to monitor    #Left IJ clot  -As of now the current etiology might decompress a left-sided pleural effusion.  I have found only one case report  -The pleural effusion turned out to be malignant in that case report.  -I will follow up with the heme-onc recommendations.    RTC in 3 days for thoracentesis  And 2 weeks after that.     I explained the lab values, imagings and findings to the patient.  Patient expressed understanding I did not recognize any barriers to the understanding of the patient.    Almas Irby MD          Today's visit note:     Chief Complaint: Sarah Fisher is a 50 year old year old female who is being seen for Consult For (Alcoholic cirrhosis of liver with ascites, pleural effusion )      HPI:     Recurrent Hepatic hydrothorax:   -Recent thoracentesis in 12/18 : 500 ml of fluid was removed. Once before was in the beginning of December: She has received total of 5 thoracentesis since Septemebr including the small one on 12/20.   -Significant improvement in breathing is noted after removal of fluid.     -Her diuretic dose was stopped in Decemebr for worsening renal function. She has followed with hepatology in first week of January. Since than she is back on her diuretics.   - She has difficulty with leg swelling as well.   --She has been taking lactulose. She has been on and off it.     #Left internal jugular clot:   Probably diagnosed in October. But has followed with [place] [facility] and has been told to not take the blood thinner medications.     #Shortness of breath:   -Gets sob with climbing stairs. Gets sob after around couple of blocks.   -  Social  History:  -Never smoked.   -Work as a .          Medications:     Current Outpatient Medications   Medication     ciprofloxacin (CIPRO) 250 MG tablet     ferrous sulfate (FE TABS) 325 (65 Fe) MG EC tablet     folic acid (FOLVITE) 1 MG tablet     folic acid (FOLVITE) 1 MG tablet     furosemide (LASIX) 20 MG tablet     levothyroxine (SYNTHROID/LEVOTHROID) 50 MCG tablet     midodrine (PROAMATINE) 5 MG tablet     multivitamin w/minerals (THERA-VIT-M) tablet     pantoprazole (PROTONIX) 40 MG EC tablet     potassium chloride ER (K-TAB/KLOR-CON) 10 MEQ CR tablet     rifaximin (XIFAXAN) 550 MG TABS tablet     spironolactone (ALDACTONE) 25 MG tablet     sucralfate (CARAFATE) 1 GM tablet     thiamine 50 MG TABS     traZODone (DESYREL) 50 MG tablet     vitamin D2 (ERGOCALCIFEROL) 15581 units (1250 mcg) capsule     zinc sulfate (ZINCATE) 220 (50 Zn) MG capsule     No current facility-administered medications for this visit.             Review of Systems:       A complete 10 point review of systems was otherwise negative except as noted in the HPI.        PHYSICAL EXAM:  There were no vitals taken for this visit.            Data:   All laboratory and imaging data reviewed.      PFT:       PFT Interpretation:  I personally reviewed and interpreted the PFTs.  -Severe restrictive defects are noted in the PFTs.  CXR: I personally reviewed and interpreted the chest x-ray    Chest CT: I personally reviewed and interpreted the CT scan.  -Compressive left-sided pleural effusion is noted.  -    Recent Results (from the past 168 hour(s))   Treponema Abs w Reflex to RPR and Titer    Collection Time: 01/04/21 10:19 AM   Result Value Ref Range    Treponema Antibodies Nonreactive NR^Nonreactive   HIV Antigen Antibody Combo Pretransplant    Collection Time: 01/04/21 10:19 AM   Result Value Ref Range    HIV Antigen Antibody Combo Pretransplant Nonreactive NR^Nonreactive   EBV Capsid Antibody IgG    Collection Time: 01/04/21 10:19 AM    Result Value Ref Range    EBV Capsid Antibody IgG 3.0 (H) 0.0 - 0.8 AI   CMV Antibody IgG    Collection Time: 01/04/21 10:19 AM   Result Value Ref Range    CMV Antibody IgG >8.0 (H) 0.0 - 0.8 AI   CBC with platelets    Collection Time: 01/04/21 10:19 AM   Result Value Ref Range    WBC 7.3 4.0 - 11.0 10e9/L    RBC Count 2.88 (L) 3.8 - 5.2 10e12/L    Hemoglobin 9.6 (L) 11.7 - 15.7 g/dL    Hematocrit 30.4 (L) 35.0 - 47.0 %     (H) 78 - 100 fl    MCH 33.3 (H) 26.5 - 33.0 pg    MCHC 31.6 31.5 - 36.5 g/dL    RDW 16.7 (H) 10.0 - 15.0 %    Platelet Count 149 (L) 150 - 450 10e9/L   INR    Collection Time: 01/04/21 10:19 AM   Result Value Ref Range    INR 1.69 (H) 0.86 - 1.14   Phosphatidylethanol (PEth)    Collection Time: 01/04/21 10:19 AM   Result Value Ref Range    Phosphatidylethanol (PEth) Negative Negative ng/mL   Vitamin D Deficiency    Collection Time: 01/04/21 10:19 AM   Result Value Ref Range    Vitamin D Deficiency screening 10 (L) 20 - 75 ug/L   Quantiferon TB Gold Plus    Collection Time: 01/04/21 10:19 AM    Specimen: Blood   Result Value Ref Range    MTB Quantiferon Result Negative NEG^Negative    TB1 Ag minus Nil 0.00 IU/mL    TB2 Ag minus Nil 0.00 IU/mL    Mitogen minus Nil 2.67 IU/mL    NIL Result 0.07 IU/mL   HCG qualitative (female only)    Collection Time: 01/04/21 10:19 AM   Result Value Ref Range    HCG Qualitative Serum Negative NEG^Negative   Hepatic panel    Collection Time: 01/04/21 10:19 AM   Result Value Ref Range    Bilirubin Direct 1.8 (H) 0.0 - 0.2 mg/dL    Bilirubin Total 5.2 (H) 0.2 - 1.3 mg/dL    Albumin 2.7 (L) 3.4 - 5.0 g/dL    Protein Total 6.4 (L) 6.8 - 8.8 g/dL    Alkaline Phosphatase 139 40 - 150 U/L    ALT 20 0 - 50 U/L    AST 34 0 - 45 U/L   Basic metabolic panel    Collection Time: 01/04/21 10:19 AM   Result Value Ref Range    Sodium 138 133 - 144 mmol/L    Potassium 3.9 3.4 - 5.3 mmol/L    Chloride 103 94 - 109 mmol/L    Carbon Dioxide 29 20 - 32 mmol/L    Anion Gap 7 3 -  14 mmol/L    Glucose 106 (H) 70 - 99 mg/dL    Urea Nitrogen 13 7 - 30 mg/dL    Creatinine 1.07 (H) 0.52 - 1.04 mg/dL    GFR Estimate 60 (L) >60 mL/min/[1.73_m2]    GFR Estimate If Black 70 >60 mL/min/[1.73_m2]    Calcium 9.1 8.5 - 10.1 mg/dL   Lipid Profile    Collection Time: 01/04/21 10:19 AM   Result Value Ref Range    Cholesterol 160 <200 mg/dL    Triglycerides 75 <150 mg/dL    HDL Cholesterol 59 >49 mg/dL    LDL Cholesterol Calculated 86 <100 mg/dL    Non HDL Cholesterol 101 <130 mg/dL   Antibody titer red cell    Collection Time: 01/04/21 10:19 AM   Result Value Ref Range    Antibody Titer Anti A1: IgM 64, IgG 128    ABO/Rh type and screen    Collection Time: 01/04/21 10:19 AM   Result Value Ref Range    ABO B     RH(D) Pos     Antibody Screen Neg     Test Valid Only At          [facility]    Specimen Expires 01/07/2021    Lactate Dehydrogenase    Collection Time: 01/04/21 10:19 AM   Result Value Ref Range    Lactate Dehydrogenase 609 (H) 81 - 234 U/L   Reticulocyte count    Collection Time: 01/04/21 10:19 AM   Result Value Ref Range    % Retic 4.7 (H) 0.5 - 2.0 %    Absolute Retic 140.8 (H) 25 - 95 10e9/L   TSH    Collection Time: 01/04/21 10:19 AM   Result Value Ref Range    TSH 9.00 (H) 0.40 - 4.00 mU/L   UA reflex to Microscopic and Culture    Collection Time: 01/04/21 10:20 AM    Specimen: Midstream Urine   Result Value Ref Range    Color Urine Bibi     Appearance Urine Slightly Cloudy     Glucose Urine Negative NEG^Negative mg/dL    Bilirubin Urine Negative NEG^Negative    Ketones Urine Negative NEG^Negative mg/dL    Specific Gravity Urine 1.019 1.003 - 1.035    Blood Urine Negative NEG^Negative    pH Urine 5.0 5.0 - 7.0 pH    Protein Albumin Urine Negative NEG^Negative mg/dL    Urobilinogen mg/dL 2.0 0.0 - 2.0 mg/dL    Nitrite Urine Negative NEG^Negative    Leukocyte Esterase Urine Negative NEG^Negative    Source Midstream Urine     RBC Urine 1 0 - 2 /HPF     WBC Urine 7 (H) 0 - 5 /HPF    Bacteria Urine Few (A) NEG^Negative /HPF    Squamous Epithelial /HPF Urine 5 (H) 0 - 1 /HPF    Mucous Urine Present (A) NEG^Negative /LPF    Hyaline Casts 8 (H) 0 - 2 /LPF   Haptoglobin    Collection Time: 01/04/21 10:20 AM   Result Value Ref Range    Haptoglobin 57 32 - 197 mg/dL   Pulmonary Function Test    Collection Time: 01/07/21 10:31 AM   Result Value Ref Range    FVC-Pred 3.83 L    FVC-Pre 0.95 L    FVC-%Pred-Pre 24 %    FEV1-Pre 0.86 L    FEV1-%Pred-Pre 28 %    FEV1FVC-Pred 80 %    FEV1FVC-Pre 90 %    FEFMax-Pred 7.21 L/sec    FEFMax-Pre 2.82 L/sec    FEFMax-%Pred-Pre 39 %    FEF2575-Pred 2.91 L/sec    FEF2575-Pre 1.17 L/sec    [id number]-%Pred-Pre 40 %    ExpTime-Pre 8.10 sec    FIFMax-Pre 1.82 L/sec    VC-Pred 3.86 L    VC-Pre 1.00 L    VC-%Pred-Pre 25 %    IC-Pred 3.01 L    IC-Pre 0.94 L    IC-%Pred-Pre 31 %    ERV-Pred 0.85 L    ERV-Pre 0.07 L    ERV-%Pred-Pre 7 %    FEV1FEV6-Pred 82 %    FEV1FEV6-Pre 90 %    FRCPleth-Pred 2.86 L    FRCPleth-Pre 1.24 L    FRCPleth-%Pred-Pre 43 %    RVPleth-Pred 1.88 L    RVPleth-Pre 1.17 L    RVPleth-%Pred-Pre 62 %    TLCPleth-Pred 5.44 L    TLCPleth-Pre 2.18 L    TLCPleth-%Pred-Pre 39 %    DLCOunc-Pred 23.25 ml/min/mmHg    DLCOunc-Pre 9.10 ml/min/mmHg    DLCOunc-%Pred-Pre 39 %    DLCOcor-Pre 10.58 ml/min/mmHg    DLCOcor-%Pred-Pre 45 %    VA-Pre 1.90 L    VA-%Pred-Pre 34 %    FEV1SVC-Pred 79 %    FEV1SVC-Pre 85 %            Again, thank you for allowing me to participate in the care of your patient.        Sincerely,        Almas Irby MD

## 2021-01-12 DIAGNOSIS — Z11.59 ENCOUNTER FOR SCREENING FOR OTHER VIRAL DISEASES: ICD-10-CM

## 2021-01-12 PROCEDURE — U0005 INFEC AGEN DETEC AMPLI PROBE: HCPCS | Performed by: INTERNAL MEDICINE

## 2021-01-12 PROCEDURE — U0003 INFECTIOUS AGENT DETECTION BY NUCLEIC ACID (DNA OR RNA); SEVERE ACUTE RESPIRATORY SYNDROME CORONAVIRUS 2 (SARS-COV-2) (CORONAVIRUS DISEASE [COVID-19]), AMPLIFIED PROBE TECHNIQUE, MAKING USE OF HIGH THROUGHPUT TECHNOLOGIES AS DESCRIBED BY CMS-2020-01-R: HCPCS | Performed by: INTERNAL MEDICINE

## 2021-01-13 LAB
LABORATORY COMMENT REPORT: NORMAL
SARS-COV-2 RNA RESP QL NAA+PROBE: NEGATIVE
SARS-COV-2 RNA RESP QL NAA+PROBE: NORMAL
SPECIMEN SOURCE: NORMAL
SPECIMEN SOURCE: NORMAL

## 2021-01-14 ENCOUNTER — HOSPITAL ENCOUNTER (OUTPATIENT)
Facility: CLINIC | Age: 51
Discharge: HOME | End: 2021-01-14
Attending: INTERNAL MEDICINE | Admitting: INTERNAL MEDICINE
Payer: COMMERCIAL

## 2021-01-14 ENCOUNTER — APPOINTMENT (OUTPATIENT)
Dept: GENERAL RADIOLOGY | Facility: CLINIC | Age: 51
End: 2021-01-14
Attending: INTERNAL MEDICINE
Payer: COMMERCIAL

## 2021-01-14 VITALS
SYSTOLIC BLOOD PRESSURE: 107 MMHG | DIASTOLIC BLOOD PRESSURE: 81 MMHG | HEART RATE: 98 BPM | OXYGEN SATURATION: 96 % | RESPIRATION RATE: 34 BRPM

## 2021-01-14 PROBLEM — J90 PLEURAL EFFUSION ON LEFT: Status: ACTIVE | Noted: 2020-09-08

## 2021-01-14 LAB
AMYLASE FLD-CCNC: 14 U/L
APPEARANCE FLD: NORMAL
COLOR FLD: NORMAL
CREAT FLD-MCNC: 1.2 MG/DL
GLUCOSE FLD-MCNC: 117 MG/DL
GRAM STN SPEC: NORMAL
GRAM STN SPEC: NORMAL
LDH FLD L TO P-CCNC: 74 U/L
LDH SERPL L TO P-CCNC: 459 U/L (ref 81–234)
LYMPHOCYTES NFR FLD MANUAL: 6 %
NEUTS BAND NFR FLD MANUAL: 5 %
OTHER CELLS FLD MANUAL: 89 %
PROT FLD-MCNC: 1.3 G/DL
PROT SERPL-MCNC: 5.1 G/DL (ref 6.8–8.8)
SPECIMEN SOURCE FLD: NORMAL
SPECIMEN SOURCE: NORMAL
TRIGL FLD-MCNC: 53 MG/DL
WBC # FLD AUTO: 74 /UL

## 2021-01-14 PROCEDURE — 88184 FLOWCYTOMETRY/ TC 1 MARKER: CPT | Mod: TC | Performed by: INTERNAL MEDICINE

## 2021-01-14 PROCEDURE — 83615 LACTATE (LD) (LDH) ENZYME: CPT | Mod: 59 | Performed by: INTERNAL MEDICINE

## 2021-01-14 PROCEDURE — 84478 ASSAY OF TRIGLYCERIDES: CPT | Performed by: INTERNAL MEDICINE

## 2021-01-14 PROCEDURE — 87206 SMEAR FLUORESCENT/ACID STAI: CPT | Performed by: INTERNAL MEDICINE

## 2021-01-14 PROCEDURE — 87102 FUNGUS ISOLATION CULTURE: CPT | Performed by: INTERNAL MEDICINE

## 2021-01-14 PROCEDURE — 87116 MYCOBACTERIA CULTURE: CPT | Performed by: INTERNAL MEDICINE

## 2021-01-14 PROCEDURE — 89051 BODY FLUID CELL COUNT: CPT | Performed by: INTERNAL MEDICINE

## 2021-01-14 PROCEDURE — 71045 X-RAY EXAM CHEST 1 VIEW: CPT | Mod: 26 | Performed by: RADIOLOGY

## 2021-01-14 PROCEDURE — 88112 CYTOPATH CELL ENHANCE TECH: CPT | Mod: 26 | Performed by: PATHOLOGY

## 2021-01-14 PROCEDURE — 87205 SMEAR GRAM STAIN: CPT | Performed by: INTERNAL MEDICINE

## 2021-01-14 PROCEDURE — 999N000065 XR CHEST 1 VW

## 2021-01-14 PROCEDURE — 88305 TISSUE EXAM BY PATHOLOGIST: CPT | Mod: 26 | Performed by: PATHOLOGY

## 2021-01-14 PROCEDURE — 32555 ASPIRATE PLEURA W/ IMAGING: CPT | Performed by: INTERNAL MEDICINE

## 2021-01-14 PROCEDURE — 999N001137 HC STATISTIC FLOW >15 ABY TC 88189: Performed by: INTERNAL MEDICINE

## 2021-01-14 PROCEDURE — 87070 CULTURE OTHR SPECIMN AEROBIC: CPT | Performed by: INTERNAL MEDICINE

## 2021-01-14 PROCEDURE — 88112 CYTOPATH CELL ENHANCE TECH: CPT | Mod: TC | Performed by: INTERNAL MEDICINE

## 2021-01-14 PROCEDURE — 83615 LACTATE (LD) (LDH) ENZYME: CPT | Performed by: INTERNAL MEDICINE

## 2021-01-14 PROCEDURE — 82150 ASSAY OF AMYLASE: CPT | Performed by: INTERNAL MEDICINE

## 2021-01-14 PROCEDURE — 88305 TISSUE EXAM BY PATHOLOGIST: CPT | Mod: TC | Performed by: INTERNAL MEDICINE

## 2021-01-14 PROCEDURE — 84311 SPECTROPHOTOMETRY: CPT | Performed by: INTERNAL MEDICINE

## 2021-01-14 PROCEDURE — 32554 ASPIRATE PLEURA W/O IMAGING: CPT | Performed by: INTERNAL MEDICINE

## 2021-01-14 PROCEDURE — 88185 FLOWCYTOMETRY/TC ADD-ON: CPT | Mod: TC | Performed by: INTERNAL MEDICINE

## 2021-01-14 PROCEDURE — 84155 ASSAY OF PROTEIN SERUM: CPT | Performed by: INTERNAL MEDICINE

## 2021-01-14 PROCEDURE — 82945 GLUCOSE OTHER FLUID: CPT | Performed by: INTERNAL MEDICINE

## 2021-01-14 PROCEDURE — 999N001018 HC STATISTIC H-CELL BLOCK W/STAIN: Performed by: INTERNAL MEDICINE

## 2021-01-14 PROCEDURE — 84157 ASSAY OF PROTEIN OTHER: CPT | Performed by: INTERNAL MEDICINE

## 2021-01-14 PROCEDURE — 88189 FLOWCYTOMETRY/READ 16 & >: CPT | Performed by: PATHOLOGY

## 2021-01-14 PROCEDURE — 999N001014 HC STATISTIC CYTO WRIGHT STAIN TC: Performed by: INTERNAL MEDICINE

## 2021-01-14 NOTE — OR NURSING
Pt underwent L thoracentesis with local. Procedure well tolerated. XR ordered, labs ordered. Fluid specimens sent to lab with RT. Transferred to recovery in stable condition.     Tasneem Solorzano RN

## 2021-01-14 NOTE — PROCEDURES
[facility]     Thoracentesis at Bedside    Date/Time: 1/14/2021 12:09 PM  Performed by: Almas Irby MD  Authorized by: Almas Irby MD     UNIVERSAL PROTOCOL   Site Marked: Yes  Prior Images Obtained and Reviewed:  Yes  Required items: Required blood products, implants, devices and special equipment available    Patient identity confirmed:  Verbally with patient and arm band  Patient was reevaluated immediately before administering moderate or deep sedation or anesthesia  Confirmation Checklist:  Patient's identity using two indicators, relevant allergies and correct equipment/implants were available  Time out: Immediately prior to the procedure a time out was called    Universal Protocol: the Joint Commission Universal Protocol was followed    Preparation: Patient was prepped and draped in usual sterile fashion          Procedure purpose: therapeutic  Indications: pleural effusion       ANESTHESIA    Anesthesia: Local infiltration  Local Anesthetic:  Lidocaine 1% without epinephrine  Anesthetic Total (mL):  5      SEDATION    Patient Sedated: No      PROCEDURE DETAILS   Preparation: skin prepped with ChloraPrep  Patient position: sitting  Ultrasound guidance: yes  Location: left posterior  Intercostal space: 6th  Puncture method: over-the-needle catheter  Number of attempts: 1  Drainage characteristics: serous  Chest x-ray performed: yes  Chest x-ray interpreted by me.  Chest x-ray findings: pleural effusion    PROCEDURE Describe Procedure: 1500ml of pleural effusion was removed. She has continued to have significant effusion despite the thoracentesis.  Length of time physician/provider present for 1:1 monitoring during sedation: 0

## 2021-01-14 NOTE — DISCHARGE INSTRUCTIONS
Discharge Instructions after Thorocentesis          Follow-up  _x__ We took small tissue or fluid samples to study. We will call you with the results in about 10 business days.    Call right away if you have:    Unusual chest pain    Temperature above 100.6  F (37.5  C)    Coughing that does not stop.    If you have severe pain, bleeding, or shortness of breath, go to an emergency room.    If you have questions, call:  Monday to Friday, 7 a.m. to 4:30 p.m.  Endoscopy: [phone] (We may have to call you back)    After hours:  Hospital: [phone] (Ask for the pulmonary fellow on call)

## 2021-01-15 ENCOUNTER — HEALTH MAINTENANCE LETTER (OUTPATIENT)
Age: 51
End: 2021-01-15

## 2021-01-15 LAB
COPATH REPORT: NORMAL
COPATH REPORT: NORMAL

## 2021-01-17 LAB
ACID FAST STN SPEC QL: NORMAL
ACID FAST STN SPEC QL: NORMAL
SPECIMEN SOURCE: NORMAL

## 2021-01-18 LAB — LAB SCANNED RESULT: NORMAL

## 2021-01-19 ENCOUNTER — TELEPHONE (OUTPATIENT)
Dept: TRANSPLANT | Facility: CLINIC | Age: 51
End: 2021-01-19

## 2021-01-19 LAB
BACTERIA SPEC CULT: NO GROWTH
SPECIMEN SOURCE: NORMAL

## 2021-01-19 NOTE — TELEPHONE ENCOUNTER
[facility] Call Center    Phone Message    May a detailed message be left on voicemail: yes     Reason for Call: Medication Refill Request    Has the patient contacted the pharmacy for the refill? Yes   Name of medication being requested:multivitamin w/minerals (THERA-VIT-M) tablet, Midodrine 5mg, Thiamine 50mg   Provider who prescribed the medication: Dr. Avila  Pharmacy: [facility]  Date medication is needed: ASAP/ Today     Action Taken: Message routed to:  [facility]    Travel Screening: Not Applicable

## 2021-01-20 ENCOUNTER — TELEPHONE (OUTPATIENT)
Dept: TRANSPLANT | Facility: CLINIC | Age: 51
End: 2021-01-20

## 2021-01-20 DIAGNOSIS — K72.10 CHRONIC LIVER FAILURE WITHOUT HEPATIC COMA (H): ICD-10-CM

## 2021-01-20 DIAGNOSIS — I95.9 HYPOTENSION, UNSPECIFIED HYPOTENSION TYPE: ICD-10-CM

## 2021-01-20 RX ORDER — MULTIPLE VITAMINS W/ MINERALS TAB 9MG-400MCG
1 TAB ORAL DAILY
Qty: 30 TABLET | Refills: 3 | Status: ON HOLD | OUTPATIENT
Start: 2021-01-20 | End: 2021-10-29

## 2021-01-20 RX ORDER — MIDODRINE HYDROCHLORIDE 5 MG/1
5 TABLET ORAL 2 TIMES DAILY WITH MEALS
Qty: 30 TABLET | Refills: 3 | Status: SHIPPED | OUTPATIENT
Start: 2021-01-20 | End: 2021-03-31

## 2021-01-20 NOTE — TELEPHONE ENCOUNTER
Spoke with Maria Elena and will decrease her Midodrine to bid. Rx sent as well as refill for MVI. Will not refill thiamine at this time and can re discuss when seen at next appointments. Is having some insurance issues and advised to speak with financial . Will get routine health maintenance, as well as dexa, completed in next month per primary care.

## 2021-01-22 ENCOUNTER — TELEPHONE (OUTPATIENT)
Dept: TRANSPLANT | Facility: CLINIC | Age: 51
End: 2021-01-22

## 2021-01-22 DIAGNOSIS — K74.60 CIRRHOSIS OF LIVER (H): Primary | ICD-10-CM

## 2021-01-22 NOTE — TELEPHONE ENCOUNTER
Left message asking for her to re schedule her cancelled test (dexa, mammogram). Reviewed with Dr. Beltran and no need for cardiology consult.

## 2021-01-27 NOTE — TELEPHONE ENCOUNTER
Spoke to Maria Elena who increased her lasix to 40 mg every day on 1/14 per MD. Will recehck labs next week. Denies any cramping. Intermittent pedal edema, resolving with sleep/elevation. Will weigh self daily and stick with low salt diet.

## 2021-02-01 ENCOUNTER — VIRTUAL VISIT (OUTPATIENT)
Dept: PULMONOLOGY | Facility: CLINIC | Age: 51
End: 2021-02-01
Attending: INTERNAL MEDICINE
Payer: COMMERCIAL

## 2021-02-01 DIAGNOSIS — J90 PLEURAL EFFUSION: Primary | ICD-10-CM

## 2021-02-01 PROCEDURE — 99214 OFFICE O/P EST MOD 30 MIN: CPT | Mod: 95 | Performed by: INTERNAL MEDICINE

## 2021-02-01 NOTE — PROGRESS NOTES
Maria Elena is a 50 year old who is being evaluated via a billable video visit.      How would you like to obtain your AVS? MyChart  If the video visit is dropped, the invitation should be resent by: Text to cell phone: telephone visit   Will anyone else be joining your video visit? No      Video Start Time: 1000  Video-Visit Details    Type of service:  Video Visit    Video End Time: 1030    Originating Location (pt. Location): Home    Distant Location (provider location):  [facility]     Platform used for Video Visit: [facility]  Pulmonary Medicine  Visit Clinic Note           ASSESSMENT & PLAN     Patient is a 50-year-old female with a past medical history of liver cirrhosis.  Who has been referred to pulmonary clinic for further management of recurrent hepatic hydrothorax.    #Bilateral pleural effusion more on the left than the right:  -Patient has had total 5 thoracentesis in last 2 months.  For  Month of December she was not in any diuresis.   -Patient does not have any history of severe encephalopathy but her bilirubin greater than 5 is a contraindication for her to get a TIPS.  Discussed this with patient's hepatologist.    -I was able to do thoracentesis and remove 1.5 L the last time.  It appears that pleural effusion has recurred very quickly.  We will try to do another thoracentesis in the next 2 weeks I will assess her effusion with ultrasound and imaging.  With extensive effusion is noted to have I discussed the case with Dr. Beltran and Dr. Dancer and patient might require Pleurx catheter placement.    -Patient has been on good dose of diuretics for past few weeks if she continues to accumulate fluid despite of that then she probably needs a Pleurx catheter placement.    #Left IJ clot  -As of now the current etiology might decompress a left-sided pleural effusion.  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  -I will follow up with the heme-onc recommendations.    RTC in2 weeks for thoracentesis and follow up after that.     I explained the lab values, imagings and findings to the patient.  Patient expressed understanding I did not recognize any barriers to the understanding of the patient.    Almas Irby MD          Today's visit note:     Chief Complaint: Sarah Fisher is a 50 year old year old female who is being seen for RECHECK (3 week follow up )      HPI:     Recurrent Hepatic hydrothorax:   -Recent thoracentesis in 12/18 : 500 ml of fluid was removed. Once before was in the beginning of December: She has received total of 5 thoracentesis since Septemebr including the small one on 12/20.   -Significant improvement in breathing is noted after removal of fluid.     -Her diuretic dose was stopped in Decemebr for worsening renal function. She has followed with hepatology in first week of January. Since than she is back on her diuretics.   - She has difficulty with leg swelling as well.   --She has been taking lactulose. She has been on and off it.     #Left internal jugular clot:   Probably diagnosed in October. But has followed with [place] [facility] and has been told to not take the blood thinner medications.     #Shortness of breath:   -Gets sob with climbing stairs. Gets sob after around couple of blocks.   -  Social History:  -Never smoked.   -Work as a .     #Interval History:  2/1  -Patient has had improvement in his symptoms but seems to have recurrence of shortness of breath.  -She did not have severe respiratory distress after the recent thoracentesis.         Medications:     Current Outpatient Medications   Medication     ciprofloxacin (CIPRO) 250 MG tablet     ferrous sulfate (FE TABS) 325 (65 Fe) MG EC tablet     folic acid (FOLVITE) 1 MG tablet     folic acid (FOLVITE) 1 MG tablet     furosemide (LASIX) 20 MG tablet      levothyroxine (SYNTHROID/LEVOTHROID) 50 MCG tablet     midodrine (PROAMATINE) 5 MG tablet     multivitamin w/minerals (THERA-VIT-M) tablet     pantoprazole (PROTONIX) 40 MG EC tablet     potassium chloride ER (K-TAB/KLOR-CON) 10 MEQ CR tablet     rifaximin (XIFAXAN) 550 MG TABS tablet     spironolactone (ALDACTONE) 25 MG tablet     sucralfate (CARAFATE) 1 GM tablet     thiamine 50 MG TABS     traZODone (DESYREL) 50 MG tablet     vitamin D2 (ERGOCALCIFEROL) 33451 units (1250 mcg) capsule     zinc sulfate (ZINCATE) 220 (50 Zn) MG capsule     No current facility-administered medications for this visit.             Review of Systems:       A complete 10 point review of systems was otherwise negative except as noted in the HPI.        PHYSICAL EXAM:  There were no vitals taken for this visit.            Data:   All laboratory and imaging data reviewed.      PFT:       PFT Interpretation:  I personally reviewed and interpreted the PFTs.  -Severe restrictive defects are noted in the PFTs.  CXR: I personally reviewed and interpreted the chest x-ray    Chest CT: I personally reviewed and interpreted the CT scan.  -Compressive left-sided pleural effusion is noted.  -    No results found for this or any previous visit (from the past 168 hour(s)).

## 2021-02-01 NOTE — LETTER
2/1/2021         RE: Sarah Fisher  [address]  [facility] [address]        Dear Colleague,    Thank you for referring your patient, Sarah Fisher, to the [facility]. Please see a copy of my visit note below.    Maria Elena is a 50 year old who is being evaluated via a billable video visit.      How would you like to obtain your AVS? MyChart  If the video visit is dropped, the invitation should be resent by: Text to cell phone: telephone visit   Will anyone else be joining your video visit? No      Video Start Time: 1000  Video-Visit Details    Type of service:  Video Visit    Video End Time: 1030    Originating Location (pt. Location): Home    Distant Location (provider location):  [facility]     Platform used for Video Visit: [employer]     [facility]  Pulmonary Medicine  Visit Clinic Note           ASSESSMENT & PLAN     Patient is a 50-year-old female with a past medical history of liver cirrhosis.  Who has been referred to pulmonary clinic for further management of recurrent hepatic hydrothorax.    #Bilateral pleural effusion more on the left than the right:  -Patient has had total 5 thoracentesis in last 2 months.  For  Month of December she was not in any diuresis.   -Patient does not have any history of severe encephalopathy but her bilirubin greater than 5 is a contraindication for her to get a TIPS.  Discussed this with patient's hepatologist.    -I was able to do thoracentesis and remove 1.5 L the last time.  It appears that pleural effusion has recurred very quickly.  We will try to do another thoracentesis in the next 2 weeks I will assess her effusion with ultrasound and imaging.  With extensive effusion is noted to have I discussed the case with Dr. Beltran and Dr. Dancer and patient might require Pleurx catheter placement.    -Patient has been on  good dose of diuretics for past few weeks if she continues to accumulate fluid despite of that then she probably needs a Pleurx catheter placement.    #Left IJ clot  -As of now the current etiology might decompress a left-sided pleural effusion.  I have found only one case report  -The pleural effusion turned out to be malignant in that case report.  -I will follow up with the heme-onc recommendations.    RTC in2 weeks for thoracentesis and follow up after that.     I explained the lab values, imagings and findings to the patient.  Patient expressed understanding I did not recognize any barriers to the understanding of the patient.    Almas Irby MD          Today's visit note:     Chief Complaint: Sarah Fisher is a 50 year old year old female who is being seen for RECHECK (3 week follow up )      HPI:     Recurrent Hepatic hydrothorax:   -Recent thoracentesis in 12/18 : 500 ml of fluid was removed. Once before was in the beginning of December: She has received total of 5 thoracentesis since Septemebr including the small one on 12/20.   -Significant improvement in breathing is noted after removal of fluid.     -Her diuretic dose was stopped in Decemebr for worsening renal function. She has followed with hepatology in first week of January. Since than she is back on her diuretics.   - She has difficulty with leg swelling as well.   --She has been taking lactulose. She has been on and off it.     #Left internal jugular clot:   Probably diagnosed in October. But has followed with [place] [facility] and has been told to not take the blood thinner medications.     #Shortness of breath:   -Gets sob with climbing stairs. Gets sob after around couple of blocks.   -  Social History:  -Never smoked.   -Work as a .     #Interval History:  2/1  -Patient has had improvement in his symptoms but seems to have recurrence of shortness of breath.  -She did not have severe respiratory distress after the recent  thoracentesis.         Medications:     Current Outpatient Medications   Medication     ciprofloxacin (CIPRO) 250 MG tablet     ferrous sulfate (FE TABS) 325 (65 Fe) MG EC tablet     folic acid (FOLVITE) 1 MG tablet     folic acid (FOLVITE) 1 MG tablet     furosemide (LASIX) 20 MG tablet     levothyroxine (SYNTHROID/LEVOTHROID) 50 MCG tablet     midodrine (PROAMATINE) 5 MG tablet     multivitamin w/minerals (THERA-VIT-M) tablet     pantoprazole (PROTONIX) 40 MG EC tablet     potassium chloride ER (K-TAB/KLOR-CON) 10 MEQ CR tablet     rifaximin (XIFAXAN) 550 MG TABS tablet     spironolactone (ALDACTONE) 25 MG tablet     sucralfate (CARAFATE) 1 GM tablet     thiamine 50 MG TABS     traZODone (DESYREL) 50 MG tablet     vitamin D2 (ERGOCALCIFEROL) 47033 units (1250 mcg) capsule     zinc sulfate (ZINCATE) 220 (50 Zn) MG capsule     No current facility-administered medications for this visit.             Review of Systems:       A complete 10 point review of systems was otherwise negative except as noted in the HPI.        PHYSICAL EXAM:  There were no vitals taken for this visit.            Data:   All laboratory and imaging data reviewed.      PFT:       PFT Interpretation:  I personally reviewed and interpreted the PFTs.  -Severe restrictive defects are noted in the PFTs.  CXR: I personally reviewed and interpreted the chest x-ray    Chest CT: I personally reviewed and interpreted the CT scan.  -Compressive left-sided pleural effusion is noted.  -    No results found for this or any previous visit (from the past 168 hour(s)).            Again, thank you for allowing me to participate in the care of your patient.        Sincerely,        Almas Irby MD

## 2021-02-02 ENCOUNTER — TELEPHONE (OUTPATIENT)
Dept: TRANSPLANT | Facility: CLINIC | Age: 51
End: 2021-02-02

## 2021-02-02 ENCOUNTER — ANCILLARY PROCEDURE (OUTPATIENT)
Dept: BONE DENSITY | Facility: CLINIC | Age: 51
End: 2021-02-02
Attending: STUDENT IN AN ORGANIZED HEALTH CARE EDUCATION/TRAINING PROGRAM
Payer: COMMERCIAL

## 2021-02-02 ENCOUNTER — ANCILLARY PROCEDURE (OUTPATIENT)
Dept: MAMMOGRAPHY | Facility: CLINIC | Age: 51
End: 2021-02-02
Attending: STUDENT IN AN ORGANIZED HEALTH CARE EDUCATION/TRAINING PROGRAM
Payer: COMMERCIAL

## 2021-02-02 DIAGNOSIS — Z00.00 ROUTINE HEALTH MAINTENANCE: ICD-10-CM

## 2021-02-02 DIAGNOSIS — K70.31 ALCOHOLIC CIRRHOSIS OF LIVER WITH ASCITES (H): Primary | ICD-10-CM

## 2021-02-02 DIAGNOSIS — R74.02 ELEVATED LDH: ICD-10-CM

## 2021-02-02 DIAGNOSIS — K70.30 ALCOHOLIC CIRRHOSIS (H): ICD-10-CM

## 2021-02-02 DIAGNOSIS — K70.31 ALCOHOLIC CIRRHOSIS OF LIVER WITH ASCITES (H): ICD-10-CM

## 2021-02-02 LAB
ALBUMIN SERPL-MCNC: 2.6 G/DL (ref 3.4–5)
ALP SERPL-CCNC: 156 U/L (ref 40–150)
ALT SERPL W P-5'-P-CCNC: 18 U/L (ref 0–50)
ANION GAP SERPL CALCULATED.3IONS-SCNC: 6 MMOL/L (ref 3–14)
AST SERPL W P-5'-P-CCNC: 36 U/L (ref 0–45)
BILIRUB DIRECT SERPL-MCNC: 1.9 MG/DL (ref 0–0.2)
BILIRUB SERPL-MCNC: 6.9 MG/DL (ref 0.2–1.3)
BUN SERPL-MCNC: 8 MG/DL (ref 7–30)
CALCIUM SERPL-MCNC: 8.6 MG/DL (ref 8.5–10.1)
CHLORIDE SERPL-SCNC: 101 MMOL/L (ref 94–109)
CO2 SERPL-SCNC: 33 MMOL/L (ref 20–32)
CREAT SERPL-MCNC: 0.86 MG/DL (ref 0.52–1.04)
ERYTHROCYTE [DISTWIDTH] IN BLOOD BY AUTOMATED COUNT: 15.3 % (ref 10–15)
GFR SERPL CREATININE-BSD FRML MDRD: 78 ML/MIN/{1.73_M2}
GLUCOSE SERPL-MCNC: 124 MG/DL (ref 70–99)
HCT VFR BLD AUTO: 31.4 % (ref 35–47)
HGB BLD-MCNC: 9.9 G/DL (ref 11.7–15.7)
INR PPP: 1.88 (ref 0.86–1.14)
MCH RBC QN AUTO: 32.6 PG (ref 26.5–33)
MCHC RBC AUTO-ENTMCNC: 31.5 G/DL (ref 31.5–36.5)
MCV RBC AUTO: 103 FL (ref 78–100)
PLATELET # BLD AUTO: 141 10E9/L (ref 150–450)
POTASSIUM SERPL-SCNC: 2.8 MMOL/L (ref 3.4–5.3)
PROT SERPL-MCNC: 6.1 G/DL (ref 6.8–8.8)
RBC # BLD AUTO: 3.04 10E12/L (ref 3.8–5.2)
SODIUM SERPL-SCNC: 139 MMOL/L (ref 133–144)
WBC # BLD AUTO: 7.1 10E9/L (ref 4–11)

## 2021-02-02 PROCEDURE — 77067 SCR MAMMO BI INCL CAD: CPT | Performed by: RADIOLOGY

## 2021-02-02 PROCEDURE — 83010 ASSAY OF HAPTOGLOBIN QUANT: CPT | Mod: 90 | Performed by: PATHOLOGY

## 2021-02-02 PROCEDURE — 80076 HEPATIC FUNCTION PANEL: CPT | Performed by: PATHOLOGY

## 2021-02-02 PROCEDURE — 85610 PROTHROMBIN TIME: CPT | Performed by: PATHOLOGY

## 2021-02-02 PROCEDURE — 99000 SPECIMEN HANDLING OFFICE-LAB: CPT | Performed by: PATHOLOGY

## 2021-02-02 PROCEDURE — 36415 COLL VENOUS BLD VENIPUNCTURE: CPT | Performed by: PATHOLOGY

## 2021-02-02 PROCEDURE — 80048 BASIC METABOLIC PNL TOTAL CA: CPT | Performed by: PATHOLOGY

## 2021-02-02 PROCEDURE — 77080 DXA BONE DENSITY AXIAL: CPT | Performed by: INTERNAL MEDICINE

## 2021-02-02 PROCEDURE — 85027 COMPLETE CBC AUTOMATED: CPT | Performed by: PATHOLOGY

## 2021-02-02 NOTE — TELEPHONE ENCOUNTER
Reviewed with Maria Elena and is taking 40 mg lasix and 25 mg Aldactone. Increased Aldactone to 50 mg QD. Increase potasium to 20 meq once daily. Labs next week.       Sent RX and my chart message

## 2021-02-03 LAB — HAPTOGLOB SERPL-MCNC: 37 MG/DL (ref 32–197)

## 2021-02-05 ENCOUNTER — DOCUMENTATION ONLY (OUTPATIENT)
Dept: OTHER | Facility: CLINIC | Age: 51
End: 2021-02-05

## 2021-02-05 DIAGNOSIS — K74.60 CIRRHOSIS OF LIVER (H): Primary | ICD-10-CM

## 2021-02-05 RX ORDER — SPIRONOLACTONE 25 MG/1
50 TABLET ORAL DAILY
Qty: 60 TABLET | Refills: 3 | Status: SHIPPED | OUTPATIENT
Start: 2021-02-05 | End: 2021-03-03

## 2021-02-05 RX ORDER — FUROSEMIDE 20 MG
40 TABLET ORAL DAILY
Qty: 60 TABLET | Refills: 3 | Status: SHIPPED | OUTPATIENT
Start: 2021-02-05 | End: 2021-08-19

## 2021-02-08 ENCOUNTER — TELEPHONE (OUTPATIENT)
Dept: PULMONOLOGY | Facility: CLINIC | Age: 51
End: 2021-02-08

## 2021-02-08 DIAGNOSIS — J90 PLEURAL EFFUSION: Primary | ICD-10-CM

## 2021-02-08 PROCEDURE — 32555 ASPIRATE PLEURA W/ IMAGING: CPT | Performed by: INTERNAL MEDICINE

## 2021-02-08 NOTE — TELEPHONE ENCOUNTER
Left message with pt to call me back to set up Thoracentesis with Dr. Irby next week. Provided my number to call back.

## 2021-02-09 DIAGNOSIS — J90 PLEURAL EFFUSION: Primary | ICD-10-CM

## 2021-02-09 DIAGNOSIS — Z11.59 ENCOUNTER FOR SCREENING FOR OTHER VIRAL DISEASES: ICD-10-CM

## 2021-02-09 NOTE — TELEPHONE ENCOUNTER
Called pt again and scheduled pt for Thoracentesis on 2/17 at 10AM with Dr. Irby in the Endo lab. Pt confirmed appt. I also let pt know that she should be getting a call from the COVID team to schedule a COVID test prior to the procedure. Pt asked that I send a [employer] message with the information. Will send [employer] message.

## 2021-02-10 DIAGNOSIS — K70.30 ALCOHOLIC CIRRHOSIS (H): ICD-10-CM

## 2021-02-10 PROCEDURE — 80048 BASIC METABOLIC PNL TOTAL CA: CPT | Performed by: STUDENT IN AN ORGANIZED HEALTH CARE EDUCATION/TRAINING PROGRAM

## 2021-02-10 PROCEDURE — 36415 COLL VENOUS BLD VENIPUNCTURE: CPT | Performed by: STUDENT IN AN ORGANIZED HEALTH CARE EDUCATION/TRAINING PROGRAM

## 2021-02-11 ENCOUNTER — TELEPHONE (OUTPATIENT)
Dept: TRANSPLANT | Facility: CLINIC | Age: 51
End: 2021-02-11

## 2021-02-11 LAB
ANION GAP SERPL CALCULATED.3IONS-SCNC: 2 MMOL/L (ref 3–14)
BUN SERPL-MCNC: 10 MG/DL (ref 7–30)
CALCIUM SERPL-MCNC: 9 MG/DL (ref 8.5–10.1)
CHLORIDE SERPL-SCNC: 104 MMOL/L (ref 94–109)
CO2 SERPL-SCNC: 32 MMOL/L (ref 20–32)
CREAT SERPL-MCNC: 1.04 MG/DL (ref 0.52–1.04)
FUNGUS SPEC CULT: NORMAL
GFR SERPL CREATININE-BSD FRML MDRD: 62 ML/MIN/{1.73_M2}
GLUCOSE SERPL-MCNC: 119 MG/DL (ref 70–99)
POTASSIUM SERPL-SCNC: 3.5 MMOL/L (ref 3.4–5.3)
SODIUM SERPL-SCNC: 138 MMOL/L (ref 133–144)
SPECIMEN SOURCE: NORMAL

## 2021-02-11 NOTE — TELEPHONE ENCOUNTER
Liver Transplant Evaluation/Teaching    TEACHING TOPICS: Evaluation Process, Evaluation Items, Diagnostic Studies, Consultation, Chemical Dependency Policy, CD Eval, Donor Source, Liver Allocation, MELD System, UNOS, Waiting List, Follow up while on transplant list, Follow up after transplantation, Infection and Rejection, Immunosuppression , Medication Teaching, Lab Recording after transplant, Laboratory Frequency after transplant , Consent for evaluation and One year survival rates  INSTRUCTIONAL MATERIAL USED/GIVEN: Liver Transplant Handbook, MELD Booklet, Donor Booklet, Web Sites Options, Verbal instructions, Multiple Listing Brochure , Consent for evaluation signed, One year survival rates and SRTR (Scientific Registry) Data  Person(s) involved in teaching: Patient and nishana Garcíaanda  Asks Questions: YES  Eager to Learn: YES  Cooperative: YES  Receptive (willing/able to accept information): YES  Reason for the appointment, diagnosis and treatment plan:YES  Knowledge of proper use of medications and conditions for which they are ordered (with special attention to potential side effects or drug interactions): YES  Patient is aware of and agrees to required commitment to post-op care and long term follow-up: YES  Patient has name and phone number of transplant coordinator.   Have reviewed my transplant place. Will work on dousign and return.

## 2021-02-14 DIAGNOSIS — Z11.59 ENCOUNTER FOR SCREENING FOR OTHER VIRAL DISEASES: ICD-10-CM

## 2021-02-14 LAB
SARS-COV-2 RNA RESP QL NAA+PROBE: NORMAL
SPECIMEN SOURCE: NORMAL

## 2021-02-14 PROCEDURE — U0003 INFECTIOUS AGENT DETECTION BY NUCLEIC ACID (DNA OR RNA); SEVERE ACUTE RESPIRATORY SYNDROME CORONAVIRUS 2 (SARS-COV-2) (CORONAVIRUS DISEASE [COVID-19]), AMPLIFIED PROBE TECHNIQUE, MAKING USE OF HIGH THROUGHPUT TECHNOLOGIES AS DESCRIBED BY CMS-2020-01-R: HCPCS | Performed by: INTERNAL MEDICINE

## 2021-02-14 PROCEDURE — U0005 INFEC AGEN DETEC AMPLI PROBE: HCPCS | Performed by: INTERNAL MEDICINE

## 2021-02-15 LAB
LABORATORY COMMENT REPORT: NORMAL
SARS-COV-2 RNA RESP QL NAA+PROBE: NEGATIVE
SPECIMEN SOURCE: NORMAL

## 2021-02-17 ENCOUNTER — HOSPITAL ENCOUNTER (OUTPATIENT)
Facility: CLINIC | Age: 51
Discharge: HOME | End: 2021-02-17
Attending: INTERNAL MEDICINE | Admitting: INTERNAL MEDICINE
Payer: COMMERCIAL

## 2021-02-17 ENCOUNTER — APPOINTMENT (OUTPATIENT)
Dept: GENERAL RADIOLOGY | Facility: CLINIC | Age: 51
End: 2021-02-17
Attending: INTERNAL MEDICINE
Payer: COMMERCIAL

## 2021-02-17 VITALS
SYSTOLIC BLOOD PRESSURE: 107 MMHG | RESPIRATION RATE: 15 BRPM | DIASTOLIC BLOOD PRESSURE: 74 MMHG | HEART RATE: 82 BPM | OXYGEN SATURATION: 98 %

## 2021-02-17 LAB
APPEARANCE FLD: NORMAL
COLOR FLD: YELLOW
GLUCOSE FLD-MCNC: 108 MG/DL
GRAM STN SPEC: NORMAL
GRAM STN SPEC: NORMAL
LDH FLD L TO P-CCNC: 82 U/L
LYMPHOCYTES NFR FLD MANUAL: 20 %
NEUTS BAND NFR FLD MANUAL: 3 %
OTHER CELLS FLD MANUAL: 77 %
PROT FLD-MCNC: 1.5 G/DL
SPECIMEN SOURCE FLD: NORMAL
SPECIMEN SOURCE: NORMAL
WBC # FLD AUTO: 93 /UL

## 2021-02-17 PROCEDURE — 87205 SMEAR GRAM STAIN: CPT | Performed by: INTERNAL MEDICINE

## 2021-02-17 PROCEDURE — 250N000011 HC RX IP 250 OP 636: Performed by: INTERNAL MEDICINE

## 2021-02-17 PROCEDURE — 89051 BODY FLUID CELL COUNT: CPT | Performed by: INTERNAL MEDICINE

## 2021-02-17 PROCEDURE — 71045 X-RAY EXAM CHEST 1 VIEW: CPT | Mod: 26 | Performed by: RADIOLOGY

## 2021-02-17 PROCEDURE — 32554 ASPIRATE PLEURA W/O IMAGING: CPT | Performed by: INTERNAL MEDICINE

## 2021-02-17 PROCEDURE — 84157 ASSAY OF PROTEIN OTHER: CPT | Performed by: INTERNAL MEDICINE

## 2021-02-17 PROCEDURE — 82945 GLUCOSE OTHER FLUID: CPT | Performed by: INTERNAL MEDICINE

## 2021-02-17 PROCEDURE — 32555 ASPIRATE PLEURA W/ IMAGING: CPT | Performed by: INTERNAL MEDICINE

## 2021-02-17 PROCEDURE — 83615 LACTATE (LD) (LDH) ENZYME: CPT | Performed by: INTERNAL MEDICINE

## 2021-02-17 PROCEDURE — 87070 CULTURE OTHR SPECIMN AEROBIC: CPT | Performed by: INTERNAL MEDICINE

## 2021-02-17 PROCEDURE — 999N000065 XR CHEST PORT 1 VW

## 2021-02-17 RX ORDER — ONDANSETRON 2 MG/ML
INJECTION INTRAMUSCULAR; INTRAVENOUS PRN
Status: DISCONTINUED | OUTPATIENT
Start: 2021-02-17 | End: 2021-02-17 | Stop reason: HOSPADM

## 2021-02-17 NOTE — PROCEDURES
[facility]    Thoracentesis at Bedside    Date/Time: 2/17/2021 10:30 AM  Performed by: Almas Irby MD  Authorized by: Almas Irby MD     UNIVERSAL PROTOCOL   Site Marked: Yes  Prior Images Obtained and Reviewed:  Yes  Required items: Required blood products, implants, devices and special equipment available    Patient identity confirmed:  Verbally with patient, provided demographic data and arm band  Patient was reevaluated immediately before administering moderate or deep sedation or anesthesia  Confirmation Checklist:  Patient's identity using two indicators, relevant allergies, procedure was appropriate and matched the consent or emergent situation and correct equipment/implants were available  Time out: Immediately prior to the procedure a time out was called    Universal Protocol: the Joint Commission Universal Protocol was followed    Preparation: Patient was prepped and draped in usual sterile fashion    ESBL (mL):  5        Procedure purpose: therapeutic  Indications: pleural effusion       ANESTHESIA    Local Anesthetic: Lidocaine 1% without epinephrine      SEDATION    Patient Sedated: No      PROCEDURE DETAILS   Preparation: skin prepped with ChloraPrep  Patient position: sitting  Ultrasound guidance: yes  Location: left posterior  Intercostal space: 6th  Puncture method: over-the-needle catheter  Catheter size: 18 gauge  Number of attempts: 1  Drainage characteristics: serosanguinous    PROCEDURE   Patient Tolerance:  Patient tolerated the procedure well with no immediate complications  Describe Procedure: Total 1500 ml of dark serous fluid was removed. Patient had no respiratory distress. She started coughing which prompted to stop the thoracentesis.  Length of time physician/provider present for 1:1 monitoring during sedation: 0

## 2021-02-17 NOTE — DISCHARGE INSTRUCTIONS
Discharge Instructions for Thoracentesis  Thoracentesis is a procedure that removes extra fluid from the pleural space. The space is between the outside surface of the lungs (pleura) and the chest wall. The extra fluid is called pleural effusion. The procedure may be done to take a sample of the fluid. This is so it can be tested to help find the cause. Or it may be done to drain the extra fluid if you are having trouble breathing.   Home care    You may have some pain after the procedure. Your provider can prescribe or advise what pain medicine for you to take at home, if needed. Take these exactly as directed.    If you stopped taking other medicines before the procedure, ask your provider when you can start them again.    Take it easy for 48 hours after the procedure. Don't do anything active until your provider says it s OK.    Don't do strenuous activities, such as lifting, until your provider says it s OK.    You will have a small bandage over the puncture site. You may remove the bandage in 24 hours.    Check the puncture site for the signs of infection listed below.    Follow-up  Make a follow-up appointment with your provider as directed. During your follow-up visit, your provider will check your healing. Be sure to let your provider know how you are feeling.   When to call your healthcare provider  Call your provider right away if you have any of these: [phone] 7am-430pm, after hours call [phone] and ask for pulmonary fellow on-call.     Coughing up blood    Chest pain (if chest pain suddenly gets worse, call [phone])    Shortness of breath    Fever of 100.4 F (38 C) or higher, or as directed by your provider    Pain that doesn't get better after taking pain medicine    Signs of infection at the puncture site. These include increased pain, redness, warmth, swelling, or fluid leaking that is green or yellow or smells bad    Fluid draining from the puncture site    Bleeding from the puncture  site  Simon last reviewed this educational content on 3/1/2020    7489-6212 [employer], [employer]. [address] [place] [address], [place] [address]. All rights reserved. This information is not intended as a substitute for professional medical care. Always follow your healthcare professional's instructions.

## 2021-02-18 ENCOUNTER — VIRTUAL VISIT (OUTPATIENT)
Dept: HEMATOLOGY | Facility: CLINIC | Age: 51
End: 2021-02-18
Attending: STUDENT IN AN ORGANIZED HEALTH CARE EDUCATION/TRAINING PROGRAM
Payer: COMMERCIAL

## 2021-02-18 ENCOUNTER — TELEPHONE (OUTPATIENT)
Dept: TRANSPLANT | Facility: CLINIC | Age: 51
End: 2021-02-18

## 2021-02-18 VITALS — BODY MASS INDEX: 27.41 KG/M2 | WEIGHT: 175 LBS

## 2021-02-18 DIAGNOSIS — R74.02 ELEVATED LDH: ICD-10-CM

## 2021-02-18 DIAGNOSIS — I82.622 ACUTE DEEP VEIN THROMBOSIS (DVT) OF OTHER VEIN OF LEFT UPPER EXTREMITY (H): Primary | ICD-10-CM

## 2021-02-18 PROCEDURE — 99205 OFFICE O/P NEW HI 60 MIN: CPT | Mod: 95 | Performed by: INTERNAL MEDICINE

## 2021-02-18 NOTE — TELEPHONE ENCOUNTER
Patient Call: Voicemail  Date/Time: 021821 09:43 am  Reason for call: Jorgito, Nurse , [facility] calling about patient, he is on a team that follows patients that were recently hospitalized or had a health event.  He has been unsuccessful trying to reach patient.  He would like to communicate with care team to see if any additional support is needed.  He can be reached at [phone].

## 2021-02-18 NOTE — PROGRESS NOTES
Patient was contacted to complete the pre-visit call prior to their video visit with the provider.      Allergies and medications were reviewed.    I thanked them for their time to cover this information     Deon Núñez CMA    Due to the ongoing COVID-19 outbreak, this visit was conducted by video, with the patient's approval.        [facility]  [address] [place] [id number]  Phone: [phone], Fax: [phone]    Outpatient Visit Note:    Patient: Sarah Fisher  MRN: [id number]  : 1970  LEANA: 2021     Reason for Consultation:  Sarah Fisher is a referred by Dr Fadia Avila of Hepatolgy for evaluation and treatment of anemia, concern for hemolysis and internal jugular DVT.    Assessment: Sarah Fisher is a 50 year old woman with alcoholic cirrhosis, complicated by massive left hepatic hydrothorax and left internal jugular, innominate and subclavian vein thromboses, likely provoked by underlying coagulopathy from liver disease and possibly from compression related to her hydrothorax.     Internal jugular thrombosis:  Clearly, she has been demonstrated to be too high risk to anticoagulate for this thrombosis.  At this point she is left with chronic thrombus that does not need any acute management and clearly the risk of anticoagulation is too high.  However, it is reasonable to consider whether she needs aggressive anticoagulation around the time of liver transplant.  I think it would be prudent to exclude the presence of antiphospholipid antibody syndrome which would put her at sufficiently high risk for thrombosis complicating her liver transplant that aggressive anticoagulation would be necessary.  I placed orders for these test to be done in the near future.  If testing is negative, she should remain off anticoagulation until the time of her liver transplant surgery, and then receive standard of care prophylactic anticoagulation  postoperatively.    Anemia and hemolysis: It does appear she does have anemia with a high bilirubin and LDH, suggestive of ongoing hemolysis.  Her anemia however, has improved as her source for gastrointestinal bleeding has been treated and she is off anticoagulation.  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  She had a negative antibody screen which argues against warm autoimmune hemolytic anemia.  In addition her anemia has improved since the bleeding has stopped.  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  Given she is doing relatively well at this point I think the risks of empiric immunosuppression are high given her underlying liver disease and ongoing COVID-19 pandemic.  However, if she decompensates with worsening anemia elevated bilirubin and no evidence of blood loss then this could be evidence of worsening immune hemolysis and that the risks would be outweighed by the potential benefits with immunosuppression.    Recommendations:  1. I counseled the patient about my assessment of the cause and treatment of her internal jugular thrombosis and anemia.  2. No anticoagulation - risks outweigh benefits right now  3. Antiphospholipid antibody testing has been ordered  4. If this returns positive, then I will meet with her again to discuss management with liver transplant surgery  5. If this returns negative then no further action needs to be taken- she should receive standard of care postop prophylaxis.  6. 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  She may have some degree of Dony negative hemolytic anemia but we cannot prove this.  Rather at this point the risks seem to outweigh the benefits of empiric treatment for immune hemolytic anemia.  If she decompensates in terms of her anemia without a source of blood loss then we can consider treatment very  carefully.    60 minutes spent on the date of the encounter doing chart review, review of outside records, review of test results, interpretation of tests, patient visit and documentation     Leeroy Nava MD   of Medicine, Division of Hematology, Oncology and Transplantation  [facility]     Lab addendum 3/12/21  Results for BOB FISHER (MRN [id number]) as of 3/12/2021 15:05   Ref. Range 3/3/2021 13:00   Beta 2 Glycoprotein 1 Antibody IgG Latest Ref Range: <7 U/mL 1.4   Beta 2 Glycoprotein 1 Antibody IgM Latest Ref Range: <7 U/mL 3.7      Ref. Range 3/3/2021 13:00   Cardiolipin Antibody IgG Latest Ref Range: 0.0 - 19.9 GPL-U/mL 1.7   Cardiolipin Antibody IgM Latest Ref Range: 0.0 - 19.9 MPL-U/mL 0.2     Lupus inhibitor neg    APS testing is negative - ok to proceed with transplant and standard of care post op DVT prophylaxis.    Leeroy Nava MD   of Medicine, Division of Hematology, Oncology and Transplantation  [facility]   -------------------------------      History: Sarah Fisher is a 50 year old woman with history of cirrhosis, secondary to alcohol use disorder, complicated by hepatic hydrothorax requiring frequent thoracenteses for symptom relief, who was diagnosed with a left internal jugular deep vein thrombosis in September 2020.  Repeat ultrasound the following week demonstrated a thick clot that only involves the left lower IJ, but also innominate and subclavian veins.  She was initially started on anticoagulation with IV heparin and transition to low-dose apixaban, 2.5 mg twice a day.  She reports that she initially presented September 8, 2020 with complaints of left arm swelling.  She attributes this now primarily to her liver disease.  She does not feel that the swelling improved after starting anticoagulation, but rather after several months of being on diuretics.  She notes no chest pain or  hemoptysis.  She does have shortness of breath and cough related to her hydrothorax.    Unfortunately, she had bleeding complicate much of her course for anticoagulation for this clot.  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  Then again about 2 weeks later she presented with weakness and shortness of breath and her hemoglobin was worse yet again now down to 4.3.  She again required transfusion.  Thus, ultimately the decision was made to stop anticoagulation given her 2 severe episodes of GI bleeding.    She is then remained off anticoagulation and doing well.  She has had no more episodes of bleeding per her report.  She has had several hospital admissions for hepatic encephalopathy.  She saw Dr. Beltran in hepatology in  for further evaluation of liver transplant candidacy.  Of note, her indirect bilirubin was quite high at the time raising concern for ongoing hemolysis.    In reviewing her labs, her red blood cell antibody screen has been negative repeatedly, snd hemoglobin has increased since she has been off anticoagulation now without any other intervention besides treatment of the GI bleeding source.    Her medical history is notable for cirrhosis with hepatic hydrothorax being considered for liver transplant, internal jugular thrombosis as above,      Her surgical history is reviewed in the EMR and is notable for distant history of  without thrombotic complications    She is currently on no anticoagulants    She has no family history of venous thrombosis    Physical Exam:  Exam:  Body mass index is 27.41 kg/m .   Constitutional: Appears in no distress and in good spirits today  Eyes: no discharge, injection or icterus  Respiratory: no cough or labored breathing  Skin: no rashes or petechiae  Neurological: no deficits appreciated, speech is fluent  Psych: affect is normal    Labs:  Results for BOB CRONIN (MRN  [id number]) as of 2/18/2021 10:53   Ref. Range 2/2/2021 13:23   WBC Latest Ref Range: 4.0 - 11.0 10e9/L 7.1   Hemoglobin Latest Ref Range: 11.7 - 15.7 g/dL 9.9 (L)   Hematocrit Latest Ref Range: 35.0 - 47.0 % 31.4 (L)   Platelet Count Latest Ref Range: 150 - 450 10e9/L 141 (L)       Imaging:  Reviewed ultrasound reports that show improvement in internal jugular thrombosis during treatment on 10/31 and 11/15    Video-Visit Details:    Type of service:  Video Visit  Video Start Time: 1043 AM  Video End Time (time video stopped): 1102 AM    Originating Location (pt. Location): Home    Distant Location (provider location):  [facility]     Mode of Communication:  Video Conference via Zones

## 2021-02-19 NOTE — TELEPHONE ENCOUNTER
Left message with this RN contact number if needed. No support necessary at this time, evaluation ongoing.

## 2021-02-22 LAB
BACTERIA SPEC CULT: NO GROWTH
SPECIMEN SOURCE: NORMAL

## 2021-02-24 ENCOUNTER — TELEPHONE (OUTPATIENT)
Dept: TRANSPLANT | Facility: CLINIC | Age: 51
End: 2021-02-24

## 2021-02-24 NOTE — TELEPHONE ENCOUNTER
"Transplant Social Work Services Phone Call    Data: I received a call from Maria Elena to discuss updates regarding her insurance and treatment. I spoke with Maria Elena and her niece Radha via phone. She indicated her insurance company indicated that she was not in network with our health system. She reported that she has a hearing on Monday to re-enroll in a plan that would be in-network. Maria Elena indicated that she received a call from Raquel DEL [employer] Indicating that she has one opening in her group, but Maria Elena will need to begin after Raquel's vacation. Maria Elena is hopeful to enter [employer] group.   I also received the following e-mail from Maria Elena:   \"I've attached my appeal submission to [facility] and the notice I received in response regarding the hearing date and time. My appeal application has the details of what I am appealing. The information I need included in the letter is essentially that [facility] is the only health system in my geographic location that can provide the care I require, and as a result I am requesting a special re-enrollment period (preferably retroactively) in order to enroll in a plan that has [facility] in-network. I had to appeal for this special enrollment period because when I called [facility] during open enrollment in December 2020 I informed them that I needed [facility] specifically to be in-network with my plan for 2021 and the representative assured me that the plan I was currently enrolled in had [facility] in-network and I did not need to make any changes to my plan for 2021. I found out later this information was incorrect, [facility] is out of network with my plan and the out of network coverage will leave me with a significant financial burden. There is not really anything information-wise on the hearing notice, just information of who will be reviewing the case. If you are able to email the letter to me I can fax it to the appeals office with my other documentation, or you can fax it directly to them with my " "case number on the notice.   Thank you so much for your help, I really appreciate your assistance with this considering the short time frame! Let me know if there is any additional information you require. I added Kiki on this email as you mentioned you would be reaching out to her as well.\"    Intervention: Coordination   Assessment: Maria Elena continues to advocate for her medical needs and coverage   Education provided by SW: No new information at this time   Plan:Coordinate with transplant coordinator, Kiki RIDDLE And Lyndsey Swanson was also copied in e-mail.     ENRIQUE Rodriguez, [facility]  Liver Transplant   [facility]  Phone: [phone]  Pager: [phone]          "

## 2021-02-25 NOTE — NURSING NOTE
RN reached out to patient on 2.25.2021 to arrange for a lab visit for orders placed by Dr. Nava.  She will get these drawn at the [facility] on 3.3.2021.    Eduarda Solano RN - Nurse Clinician - [facility] - [phone]

## 2021-02-26 ENCOUNTER — DOCUMENTATION ONLY (OUTPATIENT)
Dept: TRANSPLANT | Facility: CLINIC | Age: 51
End: 2021-02-26

## 2021-02-26 NOTE — LETTER
RE: Maria Elena Fisher          1970    Case #:[id number]     To Whom It May Concern,       Maria Elena Fisher is under my care at [facility] for management of end stage liver disease. She is currently in the process of a liver transplant evaluation. The patient has a complex medical history including, but not limited to, reoccurring hepatic hydrothorax requiring thoracentesis and thromboses of the internal jugular and subclavian veins. She is followed closely by pulmonary and hematology for ongoing medical management of.     Once active on the waitlist it is essential for the patient to remain in optimal wellness, stay up to date with any changes of patients health status and staying current with all routine health screening. Due to the complexity of this patient's medical status it would be advantageous to have all her medical care at the institution she is receiving a transplant at. Collaboration of care and streamlining communication is essential for optimal care management.     We appreciate having the opportunity to participate in this patients care and are respectfully requesting consideration for of all her medical needs to be covered at one health care system.     Thank you for consideration in this matter,      Fadia Avila MD  Transplant Hepatology  [facility]

## 2021-02-26 NOTE — PROGRESS NOTES
After review with care team letter of support written for patient transferring her care here due to her complicated history and liver transplant need.

## 2021-03-03 ENCOUNTER — ANCILLARY PROCEDURE (OUTPATIENT)
Dept: MAMMOGRAPHY | Facility: CLINIC | Age: 51
End: 2021-03-03
Attending: STUDENT IN AN ORGANIZED HEALTH CARE EDUCATION/TRAINING PROGRAM
Payer: COMMERCIAL

## 2021-03-03 ENCOUNTER — TELEPHONE (OUTPATIENT)
Dept: TRANSPLANT | Facility: CLINIC | Age: 51
End: 2021-03-03

## 2021-03-03 DIAGNOSIS — R92.8 ABNORMAL MAMMOGRAM OF BOTH BREASTS: ICD-10-CM

## 2021-03-03 DIAGNOSIS — K70.31 ALCOHOLIC CIRRHOSIS OF LIVER WITH ASCITES (H): ICD-10-CM

## 2021-03-03 DIAGNOSIS — K74.60 CIRRHOSIS OF LIVER (H): Primary | ICD-10-CM

## 2021-03-03 DIAGNOSIS — I82.622 ACUTE DEEP VEIN THROMBOSIS (DVT) OF OTHER VEIN OF LEFT UPPER EXTREMITY (H): ICD-10-CM

## 2021-03-03 DIAGNOSIS — J90 PLEURAL EFFUSION ON LEFT: Primary | ICD-10-CM

## 2021-03-03 LAB
ALBUMIN SERPL-MCNC: 2.5 G/DL (ref 3.4–5)
ALP SERPL-CCNC: 142 U/L (ref 40–150)
ALT SERPL W P-5'-P-CCNC: 16 U/L (ref 0–50)
ANION GAP SERPL CALCULATED.3IONS-SCNC: 6 MMOL/L (ref 3–14)
AST SERPL W P-5'-P-CCNC: 31 U/L (ref 0–45)
BILIRUB DIRECT SERPL-MCNC: 2 MG/DL (ref 0–0.2)
BILIRUB SERPL-MCNC: 6.9 MG/DL (ref 0.2–1.3)
BUN SERPL-MCNC: 10 MG/DL (ref 7–30)
CALCIUM SERPL-MCNC: 8.7 MG/DL (ref 8.5–10.1)
CHLORIDE SERPL-SCNC: 102 MMOL/L (ref 94–109)
CO2 SERPL-SCNC: 30 MMOL/L (ref 20–32)
CREAT SERPL-MCNC: 0.84 MG/DL (ref 0.52–1.04)
ERYTHROCYTE [DISTWIDTH] IN BLOOD BY AUTOMATED COUNT: 16.5 % (ref 10–15)
GFR SERPL CREATININE-BSD FRML MDRD: 80 ML/MIN/{1.73_M2}
GLUCOSE SERPL-MCNC: 111 MG/DL (ref 70–99)
HCT VFR BLD AUTO: 28.9 % (ref 35–47)
HGB BLD-MCNC: 9.4 G/DL (ref 11.7–15.7)
INR PPP: 1.75 (ref 0.86–1.14)
MCH RBC QN AUTO: 33.2 PG (ref 26.5–33)
MCHC RBC AUTO-ENTMCNC: 32.5 G/DL (ref 31.5–36.5)
MCV RBC AUTO: 102 FL (ref 78–100)
PLATELET # BLD AUTO: 134 10E9/L (ref 150–450)
POTASSIUM SERPL-SCNC: 3 MMOL/L (ref 3.4–5.3)
PROT SERPL-MCNC: 6.1 G/DL (ref 6.8–8.8)
RBC # BLD AUTO: 2.83 10E12/L (ref 3.8–5.2)
SODIUM SERPL-SCNC: 138 MMOL/L (ref 133–144)
WBC # BLD AUTO: 6.2 10E9/L (ref 4–11)

## 2021-03-03 PROCEDURE — 80076 HEPATIC FUNCTION PANEL: CPT | Performed by: PATHOLOGY

## 2021-03-03 PROCEDURE — 85597 PHOSPHOLIPID PLTLT NEUTRALIZ: CPT | Mod: 90 | Performed by: PATHOLOGY

## 2021-03-03 PROCEDURE — 80048 BASIC METABOLIC PNL TOTAL CA: CPT | Performed by: PATHOLOGY

## 2021-03-03 PROCEDURE — 36415 COLL VENOUS BLD VENIPUNCTURE: CPT | Performed by: PATHOLOGY

## 2021-03-03 PROCEDURE — 77066 DX MAMMO INCL CAD BI: CPT | Performed by: STUDENT IN AN ORGANIZED HEALTH CARE EDUCATION/TRAINING PROGRAM

## 2021-03-03 PROCEDURE — 86147 CARDIOLIPIN ANTIBODY EA IG: CPT | Mod: 90 | Performed by: PATHOLOGY

## 2021-03-03 PROCEDURE — 85732 THROMBOPLASTIN TIME PARTIAL: CPT | Mod: 90 | Performed by: PATHOLOGY

## 2021-03-03 PROCEDURE — 76642 ULTRASOUND BREAST LIMITED: CPT | Mod: RT | Performed by: STUDENT IN AN ORGANIZED HEALTH CARE EDUCATION/TRAINING PROGRAM

## 2021-03-03 PROCEDURE — 86146 BETA-2 GLYCOPROTEIN ANTIBODY: CPT | Mod: 90 | Performed by: PATHOLOGY

## 2021-03-03 PROCEDURE — 99000 SPECIMEN HANDLING OFFICE-LAB: CPT | Performed by: PATHOLOGY

## 2021-03-03 PROCEDURE — 85610 PROTHROMBIN TIME: CPT | Performed by: PATHOLOGY

## 2021-03-03 PROCEDURE — 85613 RUSSELL VIPER VENOM DILUTED: CPT | Mod: 90 | Performed by: PATHOLOGY

## 2021-03-03 PROCEDURE — G0279 TOMOSYNTHESIS, MAMMO: HCPCS | Performed by: STUDENT IN AN ORGANIZED HEALTH CARE EDUCATION/TRAINING PROGRAM

## 2021-03-03 PROCEDURE — 85027 COMPLETE CBC AUTOMATED: CPT | Performed by: PATHOLOGY

## 2021-03-03 PROCEDURE — 85730 THROMBOPLASTIN TIME PARTIAL: CPT | Mod: 90 | Performed by: PATHOLOGY

## 2021-03-03 PROCEDURE — 85390 FIBRINOLYSINS SCREEN I&R: CPT | Mod: 90 | Performed by: PATHOLOGY

## 2021-03-03 RX ORDER — SPIRONOLACTONE 25 MG/1
75 TABLET ORAL DAILY
Qty: 60 TABLET | Refills: 3 | Status: SHIPPED | OUTPATIENT
Start: 2021-03-03 | End: 2021-04-28

## 2021-03-03 NOTE — TELEPHONE ENCOUNTER
Per Dr. Avila: Increase Spironolactone to 75 mg and repeat BMP next week. Spoke with Maria Elena and Radha. Will aslo send my chart for clear communication.

## 2021-03-04 DIAGNOSIS — Z11.59 ENCOUNTER FOR SCREENING FOR OTHER VIRAL DISEASES: ICD-10-CM

## 2021-03-04 LAB
B2 GLYCOPROT1 IGG SERPL IA-ACNC: 1.4 U/ML
B2 GLYCOPROT1 IGM SERPL IA-ACNC: 3.7 U/ML
CARDIOLIPIN ANTIBODY IGG: 1.7 GPL-U/ML (ref 0–19.9)
CARDIOLIPIN ANTIBODY IGM: 0.2 MPL-U/ML (ref 0–19.9)

## 2021-03-05 LAB — LA PPP-IMP: NEGATIVE

## 2021-03-06 DIAGNOSIS — Z11.59 ENCOUNTER FOR SCREENING FOR OTHER VIRAL DISEASES: ICD-10-CM

## 2021-03-06 LAB
SARS-COV-2 RNA RESP QL NAA+PROBE: NORMAL
SPECIMEN SOURCE: NORMAL

## 2021-03-06 PROCEDURE — U0003 INFECTIOUS AGENT DETECTION BY NUCLEIC ACID (DNA OR RNA); SEVERE ACUTE RESPIRATORY SYNDROME CORONAVIRUS 2 (SARS-COV-2) (CORONAVIRUS DISEASE [COVID-19]), AMPLIFIED PROBE TECHNIQUE, MAKING USE OF HIGH THROUGHPUT TECHNOLOGIES AS DESCRIBED BY CMS-2020-01-R: HCPCS | Performed by: INTERNAL MEDICINE

## 2021-03-06 PROCEDURE — U0005 INFEC AGEN DETEC AMPLI PROBE: HCPCS | Performed by: INTERNAL MEDICINE

## 2021-03-10 ENCOUNTER — HOSPITAL ENCOUNTER (OUTPATIENT)
Facility: CLINIC | Age: 51
Discharge: HOME | End: 2021-03-10
Attending: INTERNAL MEDICINE | Admitting: INTERNAL MEDICINE
Payer: COMMERCIAL

## 2021-03-10 ENCOUNTER — APPOINTMENT (OUTPATIENT)
Dept: GENERAL RADIOLOGY | Facility: CLINIC | Age: 51
End: 2021-03-10
Attending: INTERNAL MEDICINE
Payer: COMMERCIAL

## 2021-03-10 VITALS
OXYGEN SATURATION: 94 % | RESPIRATION RATE: 20 BRPM | DIASTOLIC BLOOD PRESSURE: 63 MMHG | HEART RATE: 85 BPM | SYSTOLIC BLOOD PRESSURE: 107 MMHG

## 2021-03-10 PROCEDURE — 999N000065 XR CHEST PORT 1 VW

## 2021-03-10 PROCEDURE — 32555 ASPIRATE PLEURA W/ IMAGING: CPT | Performed by: INTERNAL MEDICINE

## 2021-03-10 PROCEDURE — 32554 ASPIRATE PLEURA W/O IMAGING: CPT | Performed by: INTERNAL MEDICINE

## 2021-03-10 PROCEDURE — 71045 X-RAY EXAM CHEST 1 VIEW: CPT | Mod: 26 | Performed by: RADIOLOGY

## 2021-03-10 NOTE — DISCHARGE INSTRUCTIONS
Discharge Instructions for Thoracentesis  Thoracentesis is a procedure that removes extra fluid from the pleural space. The space is between the outside surface of the lungs (pleura) and the chest wall. The extra fluid is called pleural effusion. The procedure may be done to take a sample of the fluid. This is so it can be tested to help find the cause. Or it may be done to drain the extra fluid if you are having trouble breathing.     Home care    You may have some pain after the procedure. Your provider can prescribe or advise what pain medicine for you to take at home, if needed. Take these exactly as directed.    If you stopped taking other medicines before the procedure, ask your provider when you can start them again.    Take it easy for 48 hours after the procedure. Don't do anything active until your provider says it s OK.    Don't do strenuous activities, such as lifting, until your provider says it s OK.    You will have a small bandage over the puncture site. You may remove the bandage in 24 hours.    Check the puncture site for the signs of infection listed below.    Follow-up  Make a follow-up appointment with your provider as directed. During your follow-up visit, your provider will check your healing. Be sure to let your provider know how you are feeling.   When to call your healthcare provider  Call your provider right away if you have any of these:     Coughing up blood    Chest pain (if chest pain suddenly gets worse, call 911)    Shortness of breath    Fever of 100.4 F (38 C) or higher, or as directed by your provider    Pain that doesn't get better after taking pain medicine    Signs of infection at the puncture site. These include increased pain, redness, warmth, swelling, or fluid leaking that is green or yellow or smells bad    Fluid draining from the puncture site    Bleeding from the puncture site  [employer] last reviewed this educational content on 3/1/2020    [id number] [employer]. All rights reserved. This information is not intended as a substitute for professional medical care. Always follow your healthcare professional's instructions.

## 2021-03-10 NOTE — OR NURSING
Patient had therapeutic left thoracentesis with 1.6L off.  Patient tolerated procedure well.  Chest xray ordered post-procedure.

## 2021-03-11 ENCOUNTER — IMMUNIZATION (OUTPATIENT)
Dept: NURSING | Facility: CLINIC | Age: 51
End: 2021-03-11
Payer: COMMERCIAL

## 2021-03-11 PROCEDURE — 91300 PR COVID VAC PFIZER DIL RECON 30 MCG/0.3 ML IM: CPT

## 2021-03-11 PROCEDURE — 0001A PR COVID VAC PFIZER DIL RECON 30 MCG/0.3 ML IM: CPT

## 2021-03-12 DIAGNOSIS — R92.8 ABNORMAL FINDING ON BREAST IMAGING: Primary | ICD-10-CM

## 2021-03-13 LAB
MYCOBACTERIUM SPEC CULT: NORMAL
MYCOBACTERIUM SPEC CULT: NORMAL
SPECIMEN SOURCE: NORMAL

## 2021-03-17 ENCOUNTER — OFFICE VISIT (OUTPATIENT)
Dept: PEDIATRICS | Facility: CLINIC | Age: 51
End: 2021-03-17
Payer: COMMERCIAL

## 2021-03-17 VITALS
OXYGEN SATURATION: 95 % | DIASTOLIC BLOOD PRESSURE: 58 MMHG | WEIGHT: 168.2 LBS | TEMPERATURE: 97.6 F | BODY MASS INDEX: 26.34 KG/M2 | RESPIRATION RATE: 13 BRPM | HEART RATE: 104 BPM | SYSTOLIC BLOOD PRESSURE: 110 MMHG

## 2021-03-17 DIAGNOSIS — K72.10 CHRONIC LIVER FAILURE WITHOUT HEPATIC COMA (H): ICD-10-CM

## 2021-03-17 DIAGNOSIS — E03.8 SUBCLINICAL HYPOTHYROIDISM: ICD-10-CM

## 2021-03-17 DIAGNOSIS — Z00.00 ROUTINE GENERAL MEDICAL EXAMINATION AT A HEALTH CARE FACILITY: Primary | ICD-10-CM

## 2021-03-17 DIAGNOSIS — K70.31 ALCOHOLIC CIRRHOSIS OF LIVER WITH ASCITES (H): ICD-10-CM

## 2021-03-17 DIAGNOSIS — Z12.4 ENCOUNTER FOR SCREENING FOR CERVICAL CANCER: ICD-10-CM

## 2021-03-17 LAB — HBA1C MFR BLD: 4.2 % (ref 0–5.6)

## 2021-03-17 PROCEDURE — 80048 BASIC METABOLIC PNL TOTAL CA: CPT | Performed by: NURSE PRACTITIONER

## 2021-03-17 PROCEDURE — 36415 COLL VENOUS BLD VENIPUNCTURE: CPT | Performed by: NURSE PRACTITIONER

## 2021-03-17 PROCEDURE — 84439 ASSAY OF FREE THYROXINE: CPT | Performed by: NURSE PRACTITIONER

## 2021-03-17 PROCEDURE — 87624 HPV HI-RISK TYP POOLED RSLT: CPT | Performed by: NURSE PRACTITIONER

## 2021-03-17 PROCEDURE — G0145 SCR C/V CYTO,THINLAYER,RESCR: HCPCS | Performed by: NURSE PRACTITIONER

## 2021-03-17 PROCEDURE — 83036 HEMOGLOBIN GLYCOSYLATED A1C: CPT | Performed by: NURSE PRACTITIONER

## 2021-03-17 PROCEDURE — 84443 ASSAY THYROID STIM HORMONE: CPT | Performed by: NURSE PRACTITIONER

## 2021-03-17 PROCEDURE — 80061 LIPID PANEL: CPT | Performed by: NURSE PRACTITIONER

## 2021-03-17 PROCEDURE — 99386 PREV VISIT NEW AGE 40-64: CPT | Performed by: NURSE PRACTITIONER

## 2021-03-17 ASSESSMENT — ENCOUNTER SYMPTOMS
NERVOUS/ANXIOUS: 0
PALPITATIONS: 0
MYALGIAS: 0
COUGH: 0
CONSTIPATION: 0
CHILLS: 0
EYE PAIN: 0
DYSURIA: 0
HEMATOCHEZIA: 0
ARTHRALGIAS: 0
WEAKNESS: 0
FEVER: 0
BREAST MASS: 0
JOINT SWELLING: 0
DIZZINESS: 0
ABDOMINAL PAIN: 0
SHORTNESS OF BREATH: 0
HEMATURIA: 0
FREQUENCY: 0
SORE THROAT: 0
PARESTHESIAS: 0
HEARTBURN: 0
NAUSEA: 0
HEADACHES: 0
DIARRHEA: 0

## 2021-03-17 NOTE — PROGRESS NOTES
SUBJECTIVE:   CC: Sarah Fisher is an 50 year old woman who presents for preventive health visit.     Patient has been advised of split billing requirements and indicates understanding: Yes     Healthy Habits:     Getting at least 3 servings of Calcium per day:  Yes    Bi-annual eye exam:  Yes    Dental care twice a year:  NO    Sleep apnea or symptoms of sleep apnea:  None    Diet:  Low salt    Frequency of exercise:  2-3 days/week    Duration of exercise:  15-30 minutes    Taking medications regularly:  Yes    Medication side effects:  None    PHQ-2 Total Score: 0    Additional concerns today:  No    Exercising, not as much as she would like. After thoracentesis, energy levels go up and breathing is better. Working on endurance with walking and increasing movement in general.     Previously had care through [facility] and [facility]. Transferred to [facility] 12/14/2020 for her GI care, now establishing primary care as well.    End stage liver failure - working to get on the transplant list. Needs to have an annual physical as part of this process.     Colon cancer screen - utd  Mammogram - utd, upcoming biopsy for recent abnormal mammogram  Pap - due  Tetanus - utd  Shingrix - due  Hep B - due    Today's PHQ-2 Score:   PHQ-2 ( 1999 Pfizer) 3/17/2021   Q1: Little interest or pleasure in doing things 0   Q2: Feeling down, depressed or hopeless 0   PHQ-2 Score 0   Q1: Little interest or pleasure in doing things Not at all   Q2: Feeling down, depressed or hopeless Not at all   PHQ-2 Score 0     Abuse: Current or Past (Physical, Sexual or Emotional) - No  Do you feel safe in your environment? Yes    Social History     Tobacco Use     Smoking status: Never Smoker     Smokeless tobacco: Never Used   Substance Use Topics     Alcohol use: Not Currently     Comment: Quit on 6/20/2020     Alcohol Use 3/17/2021   Prescreen: >3 drinks/day or >7 drinks/week? Not Applicable     Any new diagnosis of family breast, ovarian, or  bowel cancer? No     Reviewed orders with patient.  Reviewed health maintenance and updated orders accordingly - Yes  BP Readings from Last 3 Encounters:   03/17/21 110/58   03/10/21 107/63   02/17/21 107/74    Wt Readings from Last 3 Encounters:   03/17/21 76.3 kg (168 lb 3.2 oz)   02/18/21 79.4 kg (175 lb)   01/04/21 85.2 kg (187 lb 13.3 oz)           Breast CA Risk Screening:  Breast CA Risk Assessment (FHS-7) 3/17/2021   Do you have a family history of breast, colon, or ovarian cancer? No / Unknown     Mammogram Screening: Recommended annual mammography  Pertinent mammograms are reviewed under the imaging tab.    History of abnormal Pap smear: YES - updated in Problem List and Health Maintenance accordingly     Reviewed and updated as needed this visit by clinical staff  Tobacco  Allergies    Med Hx  Surg Hx  Fam Hx  Soc Hx        Reviewed and updated as needed this visit by Provider                Patient Active Problem List   Diagnosis     Acute deep vein thrombosis (DVT) of upper extremity (H)     Acute hepatic encephalopathy     Acute hypoxemic respiratory failure (H)     Acute kidney injury superimposed on chronic kidney disease (H)     Carrier of group B Streptococcus     Cirrhosis of liver with ascites (H)     CKD (chronic kidney disease)     Elevated blood pressure     Family history of colon cancer     Gastroesophageal reflux disease without esophagitis     GI (gastrointestinal bleed)     Hyponatremia     Hypokalemia     History of DVT (deep vein thrombosis)     Anemia associated with acute blood loss     Acute anemia     Need for vaccination for viral hepatitis     Macrocytosis without anemia     Knee pain     Pancytopenia (H)     Macrocytic anemia     Blood loss anemia     Hydrothorax     Portal hypertensive gastropathy (H)     Pleural effusion     Screening for malignant neoplasm of cervix     Venous incompetence     Varicose veins of leg with pain     Supervision of high-risk pregnancy of  elderly primigravida     Subclinical hypothyroidism     Liver failure (H)     Hypotension, unspecified hypotension type     Past Medical History:   Diagnosis Date     Ascites      History of blood transfusion      Liver cirrhosis (H)      Thyroid disease      Past Surgical History:   Procedure Laterality Date      SECTION  2009     THORACENTESIS Left 2021    Procedure: THORACENTESIS;  Surgeon: Almas Irby MD;  Location:  GI     THORACENTESIS N/A 2021    Procedure: THORACENTESIS;  Surgeon: Almas Irby MD;  Location:  GI     THORACENTESIS N/A 03/10/2021    Procedure: THORACENTESIS;  Surgeon: Almas Irby MD;  [facility]     Family History   Problem Relation Age of Onset     No Known Problems Mother      Colon Cancer Father 58         age 63     Breast Cancer Maternal Grandmother         Diagnosed in her 60's     No Known Problems Maternal Grandfather      No Known Problems Paternal Grandmother      No Known Problems Paternal Grandfather      Substance Abuse Brother      No Known Problems Sister      No Known Problems Son      Substance Abuse Brother      Social History     Socioeconomic History     Marital status: Single     Spouse name: Not on file     Number of children: Not on file     Years of education: Not on file     Highest education level: Not on file   Occupational History     Not on file   Social Needs     Financial resource strain: Not on file     Food insecurity     Worry: Not on file     Inability: Not on file     Transportation needs     Medical: Not on file     Non-medical: Not on file   Tobacco Use     Smoking status: Never Smoker     Smokeless tobacco: Never Used   Substance and Sexual Activity     Alcohol use: Not Currently     Comment: Quit on 2020     Drug use: Not Currently     Sexual activity: Not Currently     Partners: Male   Lifestyle     Physical activity     Days per week: Not on file     Minutes per session: Not on file     Stress: Not on file    Relationships     Social connections     Talks on phone: Not on file     Gets together: Not on file     Attends [employer] service: Not on file     Active member of club or organization: Not on file     Attends meetings of clubs or organizations: Not on file     Relationship status: Not on file     Intimate partner violence     Fear of current or ex partner: Not on file     Emotionally abused: Not on file     Physically abused: Not on file     Forced sexual activity: Not on file   Other Topics Concern     Parent/sibling w/ CABG, MI or angioplasty before 65F 55M? Not Asked   Social History Narrative    Lives [place]. 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 12 year son, Flakito.         Christine Harris, DNP, APRN, CNP    3/17/2021     Current Outpatient Medications   Medication     ciprofloxacin (CIPRO) 250 MG tablet     ferrous sulfate (FE TABS) 325 (65 Fe) MG EC tablet     folic acid (FOLVITE) 1 MG tablet     folic acid (FOLVITE) 1 MG tablet     furosemide (LASIX) 20 MG tablet     levothyroxine (SYNTHROID/LEVOTHROID) 50 MCG tablet     midodrine (PROAMATINE) 5 MG tablet     multivitamin w/minerals (THERA-VIT-M) tablet     pantoprazole (PROTONIX) 40 MG EC tablet     potassium chloride ER (K-TAB/KLOR-CON) 10 MEQ CR tablet     rifaximin (XIFAXAN) 550 MG TABS tablet     spironolactone (ALDACTONE) 25 MG tablet     sucralfate (CARAFATE) 1 GM tablet     thiamine 50 MG TABS     traZODone (DESYREL) 50 MG tablet     vitamin D2 (ERGOCALCIFEROL) 64140 units (1250 mcg) capsule     zinc sulfate (ZINCATE) 220 (50 Zn) MG capsule     No current facility-administered medications for this visit.         Allergies   Allergen Reactions     Amoxicillin GI Disturbance, Diarrhea, Nausea and Nausea and Vomiting       Review of Systems   Constitutional: Negative for chills and fever.   HENT: Negative for congestion, ear pain, hearing loss and sore throat.    Eyes: Negative for pain and visual  disturbance.   Respiratory: Negative for cough and shortness of breath.    Cardiovascular: Negative for chest pain, palpitations and peripheral edema.   Gastrointestinal: Negative for abdominal pain, constipation, diarrhea, heartburn, hematochezia and nausea.   Breasts:  Negative for tenderness, breast mass and discharge.   Genitourinary: Negative for dysuria, frequency, genital sores, hematuria, pelvic pain, urgency and vaginal discharge.   Musculoskeletal: Negative for arthralgias, joint swelling and myalgias.   Skin: Negative for rash.   Neurological: Negative for dizziness, weakness, headaches and paresthesias.   Psychiatric/Behavioral: Negative for mood changes. The patient is not nervous/anxious.         OBJECTIVE:   /58 (BP Location: Right arm, Patient Position: Chair, Cuff Size: Adult Regular)   Pulse 104   Temp 97.6  F (36.4  C) (Tympanic)   Resp 13   Wt 76.3 kg (168 lb 3.2 oz)   SpO2 95%   BMI 26.34 kg/m    Physical Exam  Constitutional: appears to be in no acute distress, comfortable, pleasant.   Eyes: anicteric, conjunctiva clear without drainage or erythema. WILLIS.   Ears, Nose and Throat: tympanic membranes gray with LR,  nose without nasal discharge. OP: no erythema to posterior pharynx, negative post nasal drainage, tonsils +1 no erythema or exudate.  Neck: supple, thyroid palpable,not enlarged, no nodules   Breast: Exam deferred (deferred after discussion of exam options with patient, no symptoms or concerns).   Cardiovascular: regular rate and rhythm, normal S1 and S2, no murmurs, rubs or gallops, peripheral pulses full and symmetric; negative peripheral edema   Respiratory: Air entry throughout. Breathing pattern unlabored without the use of accessory muscles. Clear to auscultation A and P, no wheezes or crackles, normal breath sounds.    Gastrointestinal: rounded, soft. Positive bowel sounds x4, nontender, no masses.   Genitourinary: external genitalia is without lesions. Introitus is  normal, vaginal walls pink and moist without lesions or evidence of trauma. There is no abnormal discharge from the cervix. Cervix is pink without polyps or lesions.  Musculoskeletal: full range of motion, no edema.   Skin: pink, turgor smooth and elastic. Negative for lesions or dryness.  Neurological: normal gait, no tremor.   Psychological: appropriate mood and affect.   Lymphatic: no cervical, axillary, supraclavicular, or infraclavicular lymphadenopathy.    Diagnostic Test Results:  Labs reviewed in Epic    ASSESSMENT/PLAN:   1. Routine general medical examination at a health care facility  Age appropriate screening and preventative care have been addressed today. Vaccinations have been reviewed. Patient has been advised to undertake routine aerobic activity. Colonoscopy has been reviewed and is up to date at this time. Recommend annual vision exams as well as biannual dental exams. They will follow up for annual physical again in one year.   Plan:   - REVIEW OF HEALTH MAINTENANCE PROTOCOL ORDERS   - Lipid panel reflex to direct LDL Fasting   - Hemoglobin A1c   - Colonoscopy - utd   - Mammogram - utd, upcoming biopsy for recent abnormal mammogram   - Pap - due   - Tetanus - utd   - Shingrix, PPSV23, Hep B - due, unable to vaccinate today as she is currently completing covid-19 vaccine series    2. Encounter for screening for cervical cancer   2010 & 2011  ASCUS Negative HPV  2012 NILM  2017 ASCUS Negative HPV 46 y.o.  PLAN: cotest 2/2020  Plan:   - Pap imaged thin layer screen with HPV - recommended age 30 - 65 years (select HPV order below)   - HPV High Risk Types DNA Cervical    3. Chronic liver failure without hepatic coma (H)  4. Alcoholic cirrhosis of liver with ascites (H)  History of alcoholic cirrhosis. Presented to PCP 6/2020 for hemorrhoidal bleeding, was noticed to be jaundiced. They recommended she stop drinking, which she had already done when she saw her eyes turning yellow. Labs significant for  "BR 17.2  ALT 37 Alk phos 231. Ultrasound was done 7/6 which showed cirrhosis, cavernous transformation of the portal veins. Sober since 6/20/2020. Following with GI, Dr. Fadia Avila. Currently undergoing evaluation for liver transplant.   Plan:   - Basic metabolic panel (as ordered by Dr. Avila)    5. Subclinical hypothyroidism  Currently on levothyroxine 50 mcg daily.   Plan:   - TSH with free T4 reflex      Follow-up: Lab results pending, will follow-up as indicated after reviewing results.       COUNSELING:  Reviewed preventive health counseling, as reflected in patient instructions  Special attention given to:        Regular exercise       Healthy diet/nutrition       Vision screening       Immunizations       Alcohol Use       Osteoporosis prevention/bone health    Estimated body mass index is 26.34 kg/m  as calculated from the following:    Height as of 1/4/21: 1.702 m (5' 7\").    Weight as of this encounter: 76.3 kg (168 lb 3.2 oz).    Weight management plan: Discussed healthy diet and exercise guidelines    She reports that she has never smoked. She has never used smokeless tobacco.      Counseling Resources:  ATP IV Guidelines  Pooled Cohorts Equation Calculator  Breast Cancer Risk Calculator  BRCA-Related Cancer Risk Assessment: FHS-7 Tool  FRAX Risk Assessment  ICSI Preventive Guidelines  Dietary Guidelines for Americans, 2010  USDA's MyPlate  ASA Prophylaxis  Lung CA Screening    ROBYN Bear CNP  [facility]  "

## 2021-03-17 NOTE — PROCEDURES
[facility]    Thoracentesis at Bedside    Date/Time: 3/17/2021 4:51 AM  Performed by: Almas Irby MD  Authorized by: Almas Irby MD     UNIVERSAL PROTOCOL   Site Marked: Yes  Prior Images Obtained and Reviewed:  Yes  Required items: Required blood products, implants, devices and special equipment available    Patient identity confirmed:  Verbally with patient and arm band  Patient was reevaluated immediately before administering moderate or deep sedation or anesthesia  Confirmation Checklist:  Patient's identity using two indicators, relevant allergies, procedure was appropriate and matched the consent or emergent situation and correct equipment/implants were available  Time out: Immediately prior to the procedure a time out was called    Universal Protocol: the Joint Commission Universal Protocol was followed    Preparation: Patient was prepped and draped in usual sterile fashion          Procedure purpose: therapeutic  Indications: pleural effusion       ANESTHESIA    Anesthesia: Local infiltration  Local Anesthetic:  Lidocaine 1% without epinephrine  Anesthetic Total (mL):  10      SEDATION    Patient Sedated: No      PROCEDURE DETAILS   Preparation: skin prepped with ChloraPrep  Patient position: sitting  Ultrasound guidance: yes  Location: left posterior  Intercostal space: 5th  Puncture method: over-the-needle catheter  Needle size: 18  Catheter size: 18 gauge  Number of attempts: 1  Drainage characteristics: serosanguinous  Chest x-ray performed: yes  Chest x-ray interpreted by me.  Chest x-ray findings: pleural effusion    PROCEDURE   Patient Tolerance:  Patient tolerated the procedure well with no immediate complications  Describe Procedure: Total 1600ml fluid was tapped.  Length of time physician/provider present for 1:1 monitoring during sedation: 0

## 2021-03-18 LAB
ANION GAP SERPL CALCULATED.3IONS-SCNC: 8 MMOL/L (ref 3–14)
BUN SERPL-MCNC: 17 MG/DL (ref 7–30)
CALCIUM SERPL-MCNC: 9.1 MG/DL (ref 8.5–10.1)
CHLORIDE SERPL-SCNC: 100 MMOL/L (ref 94–109)
CHOLEST SERPL-MCNC: 206 MG/DL
CO2 SERPL-SCNC: 26 MMOL/L (ref 20–32)
CREAT SERPL-MCNC: 1.01 MG/DL (ref 0.52–1.04)
GFR SERPL CREATININE-BSD FRML MDRD: 64 ML/MIN/{1.73_M2}
GLUCOSE SERPL-MCNC: 86 MG/DL (ref 70–99)
HDLC SERPL-MCNC: 70 MG/DL
LDLC SERPL CALC-MCNC: 117 MG/DL
NONHDLC SERPL-MCNC: 136 MG/DL
POTASSIUM SERPL-SCNC: 3.4 MMOL/L (ref 3.4–5.3)
SODIUM SERPL-SCNC: 134 MMOL/L (ref 133–144)
T4 FREE SERPL-MCNC: 1.48 NG/DL (ref 0.76–1.46)
TRIGL SERPL-MCNC: 95 MG/DL
TSH SERPL DL<=0.005 MIU/L-ACNC: 5.38 MU/L (ref 0.4–4)

## 2021-03-22 ENCOUNTER — TELEPHONE (OUTPATIENT)
Dept: TRANSPLANT | Facility: CLINIC | Age: 51
End: 2021-03-22

## 2021-03-22 DIAGNOSIS — E03.8 SUBCLINICAL HYPOTHYROIDISM: Primary | ICD-10-CM

## 2021-03-22 LAB
COPATH REPORT: NORMAL
PAP: NORMAL

## 2021-03-23 LAB
FINAL DIAGNOSIS: NORMAL
HPV HR 12 DNA CVX QL NAA+PROBE: NEGATIVE
HPV16 DNA SPEC QL NAA+PROBE: NEGATIVE
HPV18 DNA SPEC QL NAA+PROBE: NEGATIVE
SPECIMEN DESCRIPTION: NORMAL
SPECIMEN SOURCE CVX/VAG CYTO: NORMAL

## 2021-03-23 NOTE — TELEPHONE ENCOUNTER
Called Loc and patient does need Xifaxan re ordered. VORB per Dr Avila for Rifaximin 550 mg tablet, I tablet by mouth twice daily, 11 refills.

## 2021-03-25 PROBLEM — Z12.4 SCREENING FOR MALIGNANT NEOPLASM OF CERVIX: Status: ACTIVE | Noted: 2017-02-23

## 2021-03-31 ENCOUNTER — TELEPHONE (OUTPATIENT)
Dept: TRANSPLANT | Facility: CLINIC | Age: 51
End: 2021-03-31

## 2021-03-31 DIAGNOSIS — I95.9 HYPOTENSION, UNSPECIFIED HYPOTENSION TYPE: ICD-10-CM

## 2021-03-31 RX ORDER — MIDODRINE HYDROCHLORIDE 5 MG/1
5 TABLET ORAL 2 TIMES DAILY WITH MEALS
Qty: 20 TABLET | Refills: 0 | Status: SHIPPED | OUTPATIENT
Start: 2021-03-31 | End: 2021-04-05

## 2021-03-31 NOTE — TELEPHONE ENCOUNTER
Left message informing Maria Elena will refill Midodrine for 1 week and then it should be re asseseds by Dr. Avila at next week appointment. Her documented BP are good and not sure if she still requires it. This RN noted that her breast biopsy was cancelled and advised to re schedule. Her Vitamin D Rx is done so she should take OTC daily Vitamin D, minimum  2000 international unit(s). Lastly, have still not received docusign, reminded to do so.

## 2021-04-01 ENCOUNTER — IMMUNIZATION (OUTPATIENT)
Dept: NURSING | Facility: CLINIC | Age: 51
End: 2021-04-01
Payer: COMMERCIAL

## 2021-04-01 PROCEDURE — 91300 PR COVID VAC PFIZER DIL RECON 30 MCG/0.3 ML IM: CPT

## 2021-04-01 PROCEDURE — 0002A PR COVID VAC PFIZER DIL RECON 30 MCG/0.3 ML IM: CPT

## 2021-04-02 ENCOUNTER — TELEPHONE (OUTPATIENT)
Dept: TRANSPLANT | Facility: CLINIC | Age: 51
End: 2021-04-02

## 2021-04-02 NOTE — TELEPHONE ENCOUNTER
Transplant Social Work Services Phone Call    Data: I called and left a voice message for Maria Elena requesting a call back to provide check in about insurance, CD treatment and address any concerns/questions.   Intervention: deferred  Assessment: deferred  Education provided by NIRANJAN: No new education at this time   Plan: Waiting for return call     ENRIQUE Rodriguez, [facility]  Liver Transplant   [facility]  Phone: [phone]  Pager: [phone]

## 2021-04-04 NOTE — PROGRESS NOTES
[facility]     Date of Visit: 4/5/2021    Reason for referral: LT evaluation follow up     Subjective: Ms. 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 HE, hypothyroidism, who presents for LT evaluation.     Patient presented to PCP 6/2020 for hemorrhoidal bleeding, was noticed to be jaundiced. They recommended she stop drinking, which she had already done when she saw her eyes turning yellow. Labs significant for BR 17.2  ALT 37 Alk phos 231. Ultrasound was done 7/6 which showed cirrhosis, cavernous transformation of the portal veins.     Presented to the ED 9/8 with SOB. Found to have LUE swelling on exam, underwent US that showed left internal jugular acute DVT. Had a CT PE and AP as well that did not show a PE, but showed a very large left pleural effusion with associated complete left lung atelectasis. Also showed a large right pleural effusion. No adenopathy. Showed moderated volume ascites with mesenteric edema, likely portal colopathy. She was started on IV heparin for her L internal jugular DVT. This is the first DVT she has ever had. Later Imaging showed DVT involved left internal jugular, brachiocephalic and subclavian vein. She underwent a thoracentesis with 2 L removed on 9/8. This was blood tinged per report, but grossly bloody > 50,000 with 9,337 WBC 98% PMNS. Protein 1.5 LDH 92. She developed hypotension after this with worsening hypoxia thought to be related to reexpansion pulmonary edema. Culture from this eventually grow E. Coli and eggerthelle lenta. She was put on zosyn and eventually augmentin for this. She had a paracentesis 9/9 that showed < 50,000 RBC, 5,203 WBC 70% PMNs, albumin < 0.7, TP 1.0. AC was stopped given concern for hemothorax, eventually lower dose apixaban started. She had a repeat left sided thoracentesis 9/12 with > 50,000 RBC 4,889 WBC 72% PMNs,  TP 3.0 negative  culture, and then 9/17 with > 50,000 RBC, 1,1118 WBC 17% PMNs and a negative culture. She was confused this admission, started on meds for HE. Started on low dose diuretics.     Admitted again 9/30 with SOB. Had CT PE that did not show a PE, showed very large left and large right pleural effusion with associated new complete left lung atelectasis.  and haptoglobin 187. BR 10/5 5.3 with 2.6 direct. Underwent thoracentesis 10/5 that showed > 50,000 RBC with 385 WBC 8% PMNs. Had darker stools, underwent EGD showed PHG. AC restarted. Was discharged on home oxygen and with increased diuretics.     Admitted 10/30-1 for SOB, BRBPR, anemia. BR 3.2 with 1.8 direct. Hemoglobin was down to 4.5, transfused, EGD with PHG and GAVE - treated with APC. Had near complete opacification of left pleural space with effusion. Had CORBIN - creatinine around 2, downtrended to 1.5 with holding diuretics. DVU scan showed some improvement in the chronic DVT - plan was for 2 months AC. Ferritin 645.5.    Admission 11/4-18 for anemia - Hgb was 4.3, required several units of blood. Iron stores TSat 69%. CXR showed complete white out of left lung field. Decision was made to hold AC given her recurrent anemia. Repeat UE US showed chronic appearing non occlusive thrombus in the left internal jugular vein, innominate vein and subclavain vein. Also had CORBIN - creatinine was 2.36 initially, down to 1.27 on discharge. Diuretics were held. She was on home oxygen at that point. Started on a BB this admission. 11/15 BR 5.7 with 1.9 direct.     She had an outpatient EGD/Colonoscopy 12/4 that showed erythema in the esophagus, no varices. Severe PHG. No definite residual GAVE seen. Antrum PHG was treated with APC. Diffuse congested mucosa without active bleeding found in duodenum. She had colonoscopy that showed hemorrhoids, congested colonic mucosa. 4 mm cecal polyp removed that showed normal colonic mucosa.     She was admitted to the hospital 12/8 with  "weakness and confusion. Patient felt she was confused after her procedure. Underwent a thoracentesis on 12/9 - 1.7 L of fluid was removed because she was having some SOB. Studies c/w < 50,000 RBC, 151 WBC 25% PMNs. She shortly developed worsening hypoxia, hypotension, requiring pressors and BiPAP. [place] to be 2/2 re-expansion pulmonary edema. Hgb went 8 -> 6, required 2 units blood. Oxygenation and pressor support improved with time. She was transferred here for further evaluation.     Upon transfer to here, she was doing well. Required little to no oxygen. She was mentating well. She underwent a 500 ml diagnostic thoracentesis on 12/17 that was described as pink/cloudy with 129 WBC. TP 1.4 (serum 5), LDH 81 (serum 371), glucose 109, amylase 14. Negative cytology. 1 of the two culture bottles grew skin di on day 5, other culture did not. She was discharged on cipro ppx for her history of SBP. Discharged on lasix 40/spironolactone 100.     Interval Events  - Upcoming breast/bx to follow up abnormal mammogram, was cancelled because of issues related to COVID testing, needs to reschedule  - Follow up pap normal  - Saw hematology, they felt her DVT was related to hypercoagulable state from cirrhosis and compression from hydrothorax. Felt she may \"have some degree of Dony negative hemolytic anemia but we cannot prove this.  Rather at this point the risks seem to outweigh the benefits of empiric treatment for immune hemolytic anemia.  If she decompensates in terms of her anemia without a source of blood loss then we can consider treatment very carefully\"  - Today feels great that she has had more energy now than in the past 3 months  - Required another thoracentesis 3/10. Reports B[facility]ANOS when she is due for her thora. Feels fine now. On spironolactone 75 mg daily and furosemide 40 mg daily. Minimal abdominal swelling    ROS: 14 point ROS negative except for positives noted in HPI.    PMHx:  Past Medical History: "   Diagnosis Date     Ascites      History of blood transfusion      Liver cirrhosis (H)      Thyroid disease    Alcohol related cirrhosis    PSHx:  Past Surgical History:   Procedure Laterality Date      SECTION  2009     THORACENTESIS Left 2021    Procedure: THORACENTESIS;  Surgeon: Almas Irby MD;  Location: [facility]     THORACENTESIS N/A 2021    Procedure: THORACENTESIS;  Surgeon: Almas Irby MD;  Location: [facility]     THORACENTESIS N/A 03/10/2021    Procedure: THORACENTESIS;  Surgeon: Almas Irby MD;  Location: [facility]     Ovarian cyst    FamHx:  No known history of liver disease    SocHx:  Social History     Socioeconomic History     Marital status: Single     Spouse name: Not on file     Number of children: Not on file     Years of education: Not on file     Highest education level: Not on file   Occupational History     Not on file   Social Needs     Financial resource strain: Not on file     Food insecurity     Worry: Not on file     Inability: Not on file     Transportation needs     Medical: Not on file     Non-medical: Not on file   Tobacco Use     Smoking status: Never Smoker     Smokeless tobacco: Never Used   Substance and Sexual Activity     Alcohol use: Not Currently     Comment: Quit on 2020     Drug use: Not Currently     Sexual activity: Not Currently     Partners: Male   Lifestyle     Physical activity     Days per week: Not on file     Minutes per session: Not on file     Stress: Not on file   Relationships     Social connections     Talks on phone: Not on file     Gets together: Not on file     Attends Jainism service: Not on file     Active member of club or organization: Not on file     Attends meetings of clubs or organizations: Not on file     Relationship status: Not on file     Intimate partner violence     Fear of current or ex partner: Not on file     Emotionally abused: Not on file     Physically abused: Not on file     Forced sexual activity: Not on file    Other Topics Concern     Parent/sibling w/ CABG, MI or angioplasty before 65F 55M? Not Asked   Social History Narrative    Lives [place]. 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 12 year son, Flakito.         Christine Harris, DNP, APRN, CNP    3/17/2021   Just moved back home, living with son and niece who is going to school to be a RN    Medications:  Current Outpatient Medications   Medication     ciprofloxacin (CIPRO) 250 MG tablet     ferrous sulfate (FE TABS) 325 (65 Fe) MG EC tablet     folic acid (FOLVITE) 1 MG tablet     folic acid (FOLVITE) 1 MG tablet     furosemide (LASIX) 20 MG tablet     levothyroxine (SYNTHROID/LEVOTHROID) 50 MCG tablet     midodrine (PROAMATINE) 5 MG tablet     multivitamin w/minerals (THERA-VIT-M) tablet     pantoprazole (PROTONIX) 40 MG EC tablet     potassium chloride ER (K-TAB/KLOR-CON) 10 MEQ CR tablet     rifaximin (XIFAXAN) 550 MG TABS tablet     spironolactone (ALDACTONE) 25 MG tablet     sucralfate (CARAFATE) 1 GM tablet     thiamine 50 MG TABS     traZODone (DESYREL) 50 MG tablet     vitamin D2 (ERGOCALCIFEROL) 75962 units (1250 mcg) capsule     zinc sulfate (ZINCATE) 220 (50 Zn) MG capsule     No current facility-administered medications for this visit.        Allergies:     Allergies   Allergen Reactions     Amoxicillin GI Disturbance, Diarrhea, Nausea and Nausea and Vomiting       Objective:  No vitals for this virtual visit  Constitutional: Pleasant woman in NAD  Eyes: Icteric  Respiratory: Breathing comfortably on RA  Skin: jaundiced  Neuro: AOOX3, no asterixis  Psychiatric: normal mood and orientation    Labs:  Last Comprehensive Metabolic Panel:  Sodium   Date Value Ref Range Status   03/17/2021 134 133 - 144 mmol/L Final     Potassium   Date Value Ref Range Status   03/17/2021 3.4 3.4 - 5.3 mmol/L Final     Chloride   Date Value Ref Range Status   03/17/2021 100 94 - 109 mmol/L Final     Carbon Dioxide   Date Value Ref  Range Status   03/17/2021 26 20 - 32 mmol/L Final     Anion Gap   Date Value Ref Range Status   03/17/2021 8 3 - 14 mmol/L Final     Glucose   Date Value Ref Range Status   03/17/2021 86 70 - 99 mg/dL Final     Comment:     Fasting specimen     Urea Nitrogen   Date Value Ref Range Status   03/17/2021 17 7 - 30 mg/dL Final     Creatinine   Date Value Ref Range Status   03/17/2021 1.01 0.52 - 1.04 mg/dL Final     GFR Estimate   Date Value Ref Range Status   03/17/2021 64 >60 mL/min/[1.73_m2] Final     Comment:     Non  GFR Calc  Starting 12/18/2018, serum creatinine based estimated GFR (eGFR) will be   calculated using the Chronic Kidney Disease Epidemiology Collaboration   (CKD-EPI) equation.       Calcium   Date Value Ref Range Status   03/17/2021 9.1 8.5 - 10.1 mg/dL Final     Bilirubin Total   Date Value Ref Range Status   03/03/2021 6.9 (H) 0.2 - 1.3 mg/dL Final     Alkaline Phosphatase   Date Value Ref Range Status   03/03/2021 142 40 - 150 U/L Final     ALT   Date Value Ref Range Status   03/03/2021 16 0 - 50 U/L Final     AST   Date Value Ref Range Status   03/03/2021 31 0 - 45 U/L Final       Lab Results   Component Value Date    WBC 8.3 12/19/2020     Lab Results   Component Value Date    RBC 2.82 12/19/2020     Lab Results   Component Value Date    HGB 9.1 12/19/2020     Lab Results   Component Value Date    HCT 29.2 12/19/2020     Lab Results   Component Value Date     12/19/2020     Lab Results   Component Value Date    MCH 32.3 12/19/2020     Lab Results   Component Value Date    MCHC 31.2 12/19/2020     Lab Results   Component Value Date    RDW 18.9 12/19/2020     Lab Results   Component Value Date     12/19/2020       INR   Date Value Ref Range Status   03/03/2021 1.75 (H) 0.86 - 1.14 Final          MELD-Na score: 20 at 3/3/2021  1:00 PM  MELD score: 20 at 3/3/2021  1:00 PM  Calculated from:  Serum Creatinine: 0.84 mg/dL (Rounded to 1 mg/dL) at 3/3/2021  1:00  PM  Serum Sodium: 138 mmol/L (Rounded to 137 mmol/L) at 3/3/2021  1:00 PM  Total Bilirubin: 6.9 mg/dL at 3/3/2021  1:00 PM  INR(ratio): 1.75 at 3/3/2021  1:00 PM  Age: 50 years 11 months     6/2019  Hepatitis B Sag negative  Hepatitis C Ab negative  Hepatitis A IgG negative    10/2020  DAISY negative  ASMA negative  AFP 4.1    Imaging:    RUQ US Doppler 12/15/2020    Findings:  Liver: The liver demonstrates diffuse coarsened echotexture with  increased echogenicity. Nodular contour to the liver margins. No  evidence of a focal hepatic mass.      Extrahepatic portal vein flow is retrograde at 34 cm/s.  Right portal vein flow is retrograde, measuring 36 cm/s.  Left portal vein flow is antegrade, measuring 45 cm/s.     Flow in the hepatic artery is towards the liver and:  315  cm/s peak systolic  0.47 resistive index.      Recanalized paraumbilical vein.     The splenic vein is patent and flow is towards the liver.  The left,  middle, and right hepatic veins are patent with flow towards the IVC.  Flattening of waveforms secondary to underlying liver disease. The IVC  is patent with flow towards the heart.   The visualized aorta is not  dilated.     Gallbladder: Mildly thickened gallbladder wall 3.3 mm. There is no  pericholecystic fluid, positive sonographic Lema's sign or evidence  for cholelithiasis     Bile Ducts: No definite intrahepatic biliary dilatation. The common  bile duct is not visualized in this study.     Pancreas: Not visualized in this study.      Right kidney: Normal echotexture, without mass or hydronephrosis.  Measuring 11.7 cm.     Fluid: Partially visualized right pleural effusion, trace ascites  surrounding the liver.                                                                Impression:   1.  Cirrhotic morphology of the liver.  2.  Reversal of flow in the main portal vein, right portal vein, and  recanalization of the paraumbilical vein, compatible with portal  hypertension  3.  Decreased  resistive index of the hepatic artery and increased peak  systolic velocity possibly secondary to stenosis or shunting.  4.  Partially visualized right pleural effusion and trace perihepatic  ascites.    Endoscopy:    12/4/2020 EGD    Findings:       Diffuse erythema was found in the entire esophagus.       The exam of the esophagus was otherwise normal. NO varices.       Severe portal hypertensive gastropathy was found in the entire examined        stomach. Most prominent within the gastric antrum. No definitive        residual GAVE identified. Coagulation for bleeding prevention using        argon plasma (1 liter/minute and 60 dawkins) of the gastric antrum was        successful.       Diffuse congested mucosa without active bleeding and with no stigmata of        bleeding was found in the entire duodenum.    Impressions/Post-Op Diagnosis:       - Erythema in the esophagus.       - Portal hypertensive gastropathy. Treated with argon plasma coagulation        (APC).       - Congested duodenal mucosa.    Recommendation:       - Continue pantoprazole 40 mg daily and sucralfate 1 gram twice daily.       - Iron supplementation twice daily.       - Avoid NSAIDs.    12/4/2020 Colonoscopy    Findings:       Hemorrhoids were found on perianal exam.       The terminal ileum appeared normal.       An area of grossly congested mucosa was found in the entire colon.       A 4 mm polyp was found in the cecum. The polyp was sessile. The polyp        was removed with a cold biopsy forceps. Resection and retrieval were        complete.       Non-bleeding internal hemorrhoids were found during retroflexion.    Impressions/Post-Op Diagnosis:       - Hemorrhoids found on perianal exam.       - The examined portion of the ileum was normal.       - Congested mucosa in the entire examined colon.       - One 4 mm polyp in the cecum, removed with a cold biopsy forceps.        Resected and retrieved.       - Non-bleeding internal  hemorrhoids.    A) COLON, CECUM, POLYPECTOMY:  1. Colonic mucosa with no diagnostic abnormalities; a lymphoid aggregate is present  2. Negative for serrated change, dysplasia, and malignancy    Assessment/Plan: Ms. Fisher is a 51 year old woman with a history of alcohol related cirrhosis c/b ascites, hepatic hydrothorax, unprovoked left internal jugular DVT, hypothyroidism, who presents for follow up of liver transplant evaluation.     She has been sober over 6 months. Still having recurrent right sided transudative pleural effusion thought to be related to hepatic hydrothorax. This effusion has been intermittently bloody (when she was on AC), and she has had two episodes of re expansion pulmonary edema after larger volume thoras. She has had infection in this pleural fluid thought to be related to SBE. More recently fluid has been transudative. She is currently asymptomatic, on moderate dose diuretics.     She also has a history of left sided internal jugular/sublclavian DVT - potentially related to extrinsic compression from left sided effusion, hematology felt this was related to hypercoagulable stable related to cirrhosis. Work up for other hypercoagulable conditions negative. CT imaging has not showed evidence of lung malignancy. Cytology has been negative. BR still elevated in the 5s, although her indirect fraction is rising (was close to 50/50 in the past) and she appears to be hemolyzing based on rising LDH and elevated reticuloyte count - hematology recommended monitoring. Was having issues with anemia/GIB 2/2 GAVE or PHG on AC, improved since stopping this.     Needs breast bx as part of transplant eval. SW following up with her later today as well.     Blood type B.    MELD-Na score: 20 at 3/3/2021  1:00 PM  MELD score: 20 at 3/3/2021  1:00 PM  Calculated from:  Serum Creatinine: 0.84 mg/dL (Rounded to 1 mg/dL) at 3/3/2021  1:00 PM  Serum Sodium: 138 mmol/L (Rounded to 137 mmol/L) at 3/3/2021  1:00  PM  Total Bilirubin: 6.9 mg/dL at 3/3/2021  1:00 PM  INR(ratio): 1.75 at 3/3/2021  1:00 PM  Age: 50 years 11 months    - Appreciate pulmonary recs, would not recommend pleurex catheter given risk of dehydration and infection with this  - Continue lasix 40/toi 75 with 10 meq KCl for ascites/HH   - Continue cipro for SBP ppx  - Continue midodrine 5 mg BID - concern that stopping this and dropping her blood pressure would make her ascites/HH worse, so will continue for now. Should check BP 1-2x/week  - HCC screening: AFP normal Fall 2020, no mass on US 12/2020 -> repeat AFP with next lab draw  - Appreciate hematology recs, continue folate for potential hemolysis  - Variceal screening: EGD 12/2020 without varices  - HE: can continue lactulose and rifaximin, unclear if she had HE or delirium related to underlying infection   - Follow up with SW later today  - She will reschedule her breast bx  - Discussed travel - she just received her 2nd COVID vaccine. Her mother recently broke her hip in [place] so wanted to visit. Discussed that patients not considered immune until roughly 2 weeks post 2nd dose, so would wait until then. She should still take precautions, and cannot completely eliminate risk of infection. She would he higher risk for decompensation related to her liver disease    RTC 2 months     Fadia Avila MD MSc  Transplant Hepatology  [facility]    Approximately 30 minutes was spent for the visit with 30 minutes of non face-to-face time were spent in review of the patient's medical record on the day of the visit. This included review of previous: clinic visits, hospital records, lab results, imaging studies, and procedural documentation.  The findings from this review are summarized in the above note.

## 2021-04-05 ENCOUNTER — VIRTUAL VISIT (OUTPATIENT)
Dept: GASTROENTEROLOGY | Facility: CLINIC | Age: 51
End: 2021-04-05
Attending: STUDENT IN AN ORGANIZED HEALTH CARE EDUCATION/TRAINING PROGRAM
Payer: COMMERCIAL

## 2021-04-05 DIAGNOSIS — I95.9 HYPOTENSION, UNSPECIFIED HYPOTENSION TYPE: ICD-10-CM

## 2021-04-05 DIAGNOSIS — K72.10 CHRONIC LIVER FAILURE WITHOUT HEPATIC COMA (H): ICD-10-CM

## 2021-04-05 DIAGNOSIS — K70.31 ALCOHOLIC CIRRHOSIS OF LIVER WITH ASCITES (H): ICD-10-CM

## 2021-04-05 DIAGNOSIS — E61.1 IRON DEFICIENCY: ICD-10-CM

## 2021-04-05 DIAGNOSIS — R74.02 ELEVATED LDH: ICD-10-CM

## 2021-04-05 DIAGNOSIS — K21.9 GASTROESOPHAGEAL REFLUX DISEASE WITHOUT ESOPHAGITIS: Primary | ICD-10-CM

## 2021-04-05 PROCEDURE — 99215 OFFICE O/P EST HI 40 MIN: CPT | Mod: 95 | Performed by: STUDENT IN AN ORGANIZED HEALTH CARE EDUCATION/TRAINING PROGRAM

## 2021-04-05 RX ORDER — PANTOPRAZOLE SODIUM 20 MG/1
20 TABLET, DELAYED RELEASE ORAL DAILY
Qty: 90 TABLET | Refills: 3 | Status: ON HOLD | OUTPATIENT
Start: 2021-04-05 | End: 2021-09-09

## 2021-04-05 RX ORDER — MIDODRINE HYDROCHLORIDE 5 MG/1
5 TABLET ORAL 2 TIMES DAILY WITH MEALS
Qty: 180 TABLET | Refills: 0 | Status: SHIPPED | OUTPATIENT
Start: 2021-04-05 | End: 2021-05-25

## 2021-04-05 RX ORDER — FOLIC ACID 1 MG/1
1 TABLET ORAL DAILY
Qty: 90 TABLET | Refills: 3 | Status: ON HOLD | OUTPATIENT
Start: 2021-04-05 | End: 2021-06-07

## 2021-04-05 RX ORDER — ZINC SULFATE 50(220)MG
220 CAPSULE ORAL DAILY
Qty: 90 CAPSULE | Refills: 0 | Status: SHIPPED | OUTPATIENT
Start: 2021-04-05 | End: 2021-09-17

## 2021-04-05 NOTE — PROGRESS NOTES
Maria Elena is a 51 year old who is being evaluated via a billable video visit.      How would you like to obtain your AVS? MyChart  If the video visit is dropped, the invitation should be resent by: Text to cell phone: [phone]  Will anyone else be joining your video visit? No      Video Start Time: 11:30  Video-Visit Details    Type of service:  Video Visit    Video End Time:11:55    Originating Location (pt. Location): Home    Distant Location (provider location):  [facility] [place]     Platform used for Video Visit: Val DELEON CMA

## 2021-04-05 NOTE — LETTER
4/5/2021         RE: Sarah Fisher  [address]  [facility] [address]        Dear Colleague,    Thank you for referring your patient, Sarah Fisher, to the [facility] HEPATOLOGY [facility]IC [place]. Please see a copy of my visit note below.    [facility]     Date of Visit: 4/5/2021    Reason for referral: LT evaluation follow up     Subjective: Ms. 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 HE, hypothyroidism, who presents for LT evaluation.     Patient presented to PCP 6/2020 for hemorrhoidal bleeding, was noticed to be jaundiced. They recommended she stop drinking, which she had already done when she saw her eyes turning yellow. Labs significant for BR 17.2  ALT 37 Alk phos 231. Ultrasound was done 7/6 which showed cirrhosis, cavernous transformation of the portal veins.     Presented to the ED 9/8 with SOB. Found to have LUE swelling on exam, underwent US that showed left internal jugular acute DVT. Had a CT PE and AP as well that did not show a PE, but showed a very large left pleural effusion with associated complete left lung atelectasis. Also showed a large right pleural effusion. No adenopathy. Showed moderated volume ascites with mesenteric edema, likely portal colopathy. She was started on IV heparin for her L internal jugular DVT. This is the first DVT she has ever had. Later Imaging showed DVT involved left internal jugular, brachiocephalic and subclavian vein. She underwent a thoracentesis with 2 L removed on 9/8. This was blood tinged per report, but grossly bloody > 50,000 with 9,337 WBC 98% PMNS. Protein 1.5 LDH 92. She developed hypotension after this with worsening hypoxia thought to be related to reexpansion pulmonary edema. Culture from this eventually grow E. Coli and eggerthelle lenta. She was put on zosyn and eventually augmentin for this. She had a paracentesis  9/9 that showed < 50,000 RBC, 5,203 WBC 70% PMNs, albumin < 0.7, TP 1.0. AC was stopped given concern for hemothorax, eventually lower dose apixaban started. She had a repeat left sided thoracentesis 9/12 with > 50,000 RBC 4,889 WBC 72% PMNs,  TP 3.0 negative culture, and then 9/17 with > 50,000 RBC, 1,1118 WBC 17% PMNs and a negative culture. She was confused this admission, started on meds for HE. Started on low dose diuretics.     Admitted again 9/30 with SOB. Had CT PE that did not show a PE, showed very large left and large right pleural effusion with associated new complete left lung atelectasis.  and haptoglobin 187. BR 10/5 5.3 with 2.6 direct. Underwent thoracentesis 10/5 that showed > 50,000 RBC with 385 WBC 8% PMNs. Had darker stools, underwent EGD showed PHG. AC restarted. Was discharged on home oxygen and with increased diuretics.     Admitted 10/30-1 for SOB, BRBPR, anemia. BR 3.2 with 1.8 direct. Hemoglobin was down to 4.5, transfused, EGD with PHG and GAVE - treated with APC. Had near complete opacification of left pleural space with effusion. Had CORBIN - creatinine around 2, downtrended to 1.5 with holding diuretics. DVU scan showed some improvement in the chronic DVT - plan was for 2 months AC. Ferritin 645.5.    Admission 11/4-18 for anemia - Hgb was 4.3, required several units of blood. Iron stores TSat 69%. CXR showed complete white out of left lung field. Decision was made to hold AC given her recurrent anemia. Repeat UE US showed chronic appearing non occlusive thrombus in the left internal jugular vein, innominate vein and subclavain vein. Also had CORBIN - creatinine was 2.36 initially, down to 1.27 on discharge. Diuretics were held. She was on home oxygen at that point. Started on a BB this admission. 11/15 BR 5.7 with 1.9 direct.     She had an outpatient EGD/Colonoscopy 12/4 that showed erythema in the esophagus, no varices. Severe PHG. No definite residual GAVE seen. Antrum PHG  "was treated with APC. Diffuse congested mucosa without active bleeding found in duodenum. She had colonoscopy that showed hemorrhoids, congested colonic mucosa. 4 mm cecal polyp removed that showed normal colonic mucosa.     She was admitted to the hospital 12/8 with weakness and confusion. Patient felt she was confused after her procedure. Underwent a thoracentesis on 12/9 - 1.7 L of fluid was removed because she was having some SOB. Studies c/w < 50,000 RBC, 151 WBC 25% PMNs. She shortly developed worsening hypoxia, hypotension, requiring pressors and BiPAP. [place] to be 2/2 re-expansion pulmonary edema. Hgb went 8 -> 6, required 2 units blood. Oxygenation and pressor support improved with time. She was transferred here for further evaluation.     Upon transfer to here, she was doing well. Required little to no oxygen. She was mentating well. She underwent a 500 ml diagnostic thoracentesis on 12/17 that was described as pink/cloudy with 129 WBC. TP 1.4 (serum 5), LDH 81 (serum 371), glucose 109, amylase 14. Negative cytology. 1 of the two culture bottles grew skin di on day 5, other culture did not. She was discharged on cipro ppx for her history of SBP. Discharged on lasix 40/spironolactone 100.     Interval Events  - Upcoming breast/bx to follow up abnormal mammogram, was cancelled because of issues related to COVID testing, needs to reschedule  - Follow up pap normal  - Saw hematology, they felt her DVT was related to hypercoagulable state from cirrhosis and compression from hydrothorax. Felt she may \"have some degree of Dony negative hemolytic anemia but we cannot prove this.  Rather at this point the risks seem to outweigh the benefits of empiric treatment for immune hemolytic anemia.  If she decompensates in terms of her anemia without a source of blood loss then we can consider treatment very carefully\"  - Today feels great that she has had more energy now than in the past 3 months  - Required another " thoracentesis 3/10. Reports BOLANOS when she is due for her thora. Feels fine now. On spironolactone 75 mg daily and furosemide 40 mg daily. Minimal abdominal swelling    ROS: 14 point ROS negative except for positives noted in HPI.    PMHx:  Past Medical History:   Diagnosis Date     Ascites      History of blood transfusion      Liver cirrhosis (H)      Thyroid disease    Alcohol related cirrhosis    PSHx:  Past Surgical History:   Procedure Laterality Date      SECTION  2009     THORACENTESIS Left 2021    Procedure: THORACENTESIS;  Surgeon: Almas Irby MD;  Location:  GI     THORACENTESIS N/A 2021    Procedure: THORACENTESIS;  Surgeon: Almas Irby MD;  Location:  GI     THORACENTESIS N/A 03/10/2021    Procedure: THORACENTESIS;  Surgeon: Almas Irby MD;  [facility]     Ovarian cyst    FamHx:  No known history of liver disease    SocHx:  Social History     Socioeconomic History     Marital status: Single     Spouse name: Not on file     Number of children: Not on file     Years of education: Not on file     Highest education level: Not on file   Occupational History     Not on file   Social Needs     Financial resource strain: Not on file     Food insecurity     Worry: Not on file     Inability: Not on file     Transportation needs     Medical: Not on file     Non-medical: Not on file   Tobacco Use     Smoking status: Never Smoker     Smokeless tobacco: Never Used   Substance and Sexual Activity     Alcohol use: Not Currently     Comment: Quit on 2020     Drug use: Not Currently     Sexual activity: Not Currently     Partners: Male   Lifestyle     Physical activity     Days per week: Not on file     Minutes per session: Not on file     Stress: Not on file   Relationships     Social connections     Talks on phone: Not on file     Gets together: Not on file     Attends Congregation service: Not on file     Active member of club or organization: Not on file     Attends meetings of  clubs or organizations: Not on file     Relationship status: Not on file     Intimate partner violence     Fear of current or ex partner: Not on file     Emotionally abused: Not on file     Physically abused: Not on file     Forced sexual activity: Not on file   Other Topics Concern     Parent/sibling w/ CABG, MI or angioplasty before 65F 55M? Not Asked   Social History Narrative    Lives [place]. 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 12 year son, Flakito.         Christine Harris, DNP, APRN, CNP    3/17/2021   Just moved back home, living with son and niece who is going to school to be a RN    Medications:  Current Outpatient Medications   Medication     ciprofloxacin (CIPRO) 250 MG tablet     ferrous sulfate (FE TABS) 325 (65 Fe) MG EC tablet     folic acid (FOLVITE) 1 MG tablet     folic acid (FOLVITE) 1 MG tablet     furosemide (LASIX) 20 MG tablet     levothyroxine (SYNTHROID/LEVOTHROID) 50 MCG tablet     midodrine (PROAMATINE) 5 MG tablet     multivitamin w/minerals (THERA-VIT-M) tablet     pantoprazole (PROTONIX) 40 MG EC tablet     potassium chloride ER (K-TAB/KLOR-CON) 10 MEQ CR tablet     rifaximin (XIFAXAN) 550 MG TABS tablet     spironolactone (ALDACTONE) 25 MG tablet     sucralfate (CARAFATE) 1 GM tablet     thiamine 50 MG TABS     traZODone (DESYREL) 50 MG tablet     vitamin D2 (ERGOCALCIFEROL) 06633 units (1250 mcg) capsule     zinc sulfate (ZINCATE) 220 (50 Zn) MG capsule     No current facility-administered medications for this visit.        Allergies:     Allergies   Allergen Reactions     Amoxicillin GI Disturbance, Diarrhea, Nausea and Nausea and Vomiting       Objective:  No vitals for this virtual visit  Constitutional: Pleasant woman in NAD  Eyes: Icteric  Respiratory: Breathing comfortably on RA  Skin: jaundiced  Neuro: AOOX3, no asterixis  Psychiatric: normal mood and orientation    Labs:  Last Comprehensive Metabolic Panel:  Sodium   Date Value  Ref Range Status   03/17/2021 134 133 - 144 mmol/L Final     Potassium   Date Value Ref Range Status   03/17/2021 3.4 3.4 - 5.3 mmol/L Final     Chloride   Date Value Ref Range Status   03/17/2021 100 94 - 109 mmol/L Final     Carbon Dioxide   Date Value Ref Range Status   03/17/2021 26 20 - 32 mmol/L Final     Anion Gap   Date Value Ref Range Status   03/17/2021 8 3 - 14 mmol/L Final     Glucose   Date Value Ref Range Status   03/17/2021 86 70 - 99 mg/dL Final     Comment:     Fasting specimen     Urea Nitrogen   Date Value Ref Range Status   03/17/2021 17 7 - 30 mg/dL Final     Creatinine   Date Value Ref Range Status   03/17/2021 1.01 0.52 - 1.04 mg/dL Final     GFR Estimate   Date Value Ref Range Status   03/17/2021 64 >60 mL/min/[1.73_m2] Final     Comment:     Non  GFR Calc  Starting 12/18/2018, serum creatinine based estimated GFR (eGFR) will be   calculated using the Chronic Kidney Disease Epidemiology Collaboration   (CKD-EPI) equation.       Calcium   Date Value Ref Range Status   03/17/2021 9.1 8.5 - 10.1 mg/dL Final     Bilirubin Total   Date Value Ref Range Status   03/03/2021 6.9 (H) 0.2 - 1.3 mg/dL Final     Alkaline Phosphatase   Date Value Ref Range Status   03/03/2021 142 40 - 150 U/L Final     ALT   Date Value Ref Range Status   03/03/2021 16 0 - 50 U/L Final     AST   Date Value Ref Range Status   03/03/2021 31 0 - 45 U/L Final       Lab Results   Component Value Date    WBC 8.3 12/19/2020     Lab Results   Component Value Date    RBC 2.82 12/19/2020     Lab Results   Component Value Date    HGB 9.1 12/19/2020     Lab Results   Component Value Date    HCT 29.2 12/19/2020     Lab Results   Component Value Date     12/19/2020     Lab Results   Component Value Date    MCH 32.3 12/19/2020     Lab Results   Component Value Date    MCHC 31.2 12/19/2020     Lab Results   Component Value Date    RDW 18.9 12/19/2020     Lab Results   Component Value Date     12/19/2020        INR   Date Value Ref Range Status   03/03/2021 1.75 (H) 0.86 - 1.14 Final          MELD-Na score: 20 at 3/3/2021  1:00 PM  MELD score: 20 at 3/3/2021  1:00 PM  Calculated from:  Serum Creatinine: 0.84 mg/dL (Rounded to 1 mg/dL) at 3/3/2021  1:00 PM  Serum Sodium: 138 mmol/L (Rounded to 137 mmol/L) at 3/3/2021  1:00 PM  Total Bilirubin: 6.9 mg/dL at 3/3/2021  1:00 PM  INR(ratio): 1.75 at 3/3/2021  1:00 PM  Age: 50 years 11 months     6/2019  Hepatitis B Sag negative  Hepatitis C Ab negative  Hepatitis A IgG negative    10/2020  DAISY negative  ASMA negative  AFP 4.1    Imaging:    [facility] US Doppler 12/15/2020    Findings:  Liver: The liver demonstrates diffuse coarsened echotexture with  increased echogenicity. Nodular contour to the liver margins. No  evidence of a focal hepatic mass.      Extrahepatic portal vein flow is retrograde at 34 cm/s.  Right portal vein flow is retrograde, measuring 36 cm/s.  Left portal vein flow is antegrade, measuring 45 cm/s.     Flow in the hepatic artery is towards the liver and:  315  cm/s peak systolic  0.47 resistive index.      Recanalized paraumbilical vein.     The splenic vein is patent and flow is towards the liver.  The left,  middle, and right hepatic veins are patent with flow towards the IVC.  Flattening of waveforms secondary to underlying liver disease. The IVC  is patent with flow towards the heart.   The visualized aorta is not  dilated.     Gallbladder: Mildly thickened gallbladder wall 3.3 mm. There is no  pericholecystic fluid, positive sonographic Lema's sign or evidence  for cholelithiasis     Bile Ducts: No definite intrahepatic biliary dilatation. The common  bile duct is not visualized in this study.     Pancreas: Not visualized in this study.      Right kidney: Normal echotexture, without mass or hydronephrosis.  Measuring 11.7 cm.     Fluid: Partially visualized right pleural effusion, trace ascites  surrounding the liver.                                                                 Impression:   1.  Cirrhotic morphology of the liver.  2.  Reversal of flow in the main portal vein, right portal vein, and  recanalization of the paraumbilical vein, compatible with portal  hypertension  3.  Decreased resistive index of the hepatic artery and increased peak  systolic velocity possibly secondary to stenosis or shunting.  4.  Partially visualized right pleural effusion and trace perihepatic  ascites.    Endoscopy:    12/4/2020 EGD    Findings:       Diffuse erythema was found in the entire esophagus.       The exam of the esophagus was otherwise normal. NO varices.       Severe portal hypertensive gastropathy was found in the entire examined        stomach. Most prominent within the gastric antrum. No definitive        residual GAVE identified. Coagulation for bleeding prevention using        argon plasma (1 liter/minute and 60 dawkins) of the gastric antrum was        successful.       Diffuse congested mucosa without active bleeding and with no stigmata of        bleeding was found in the entire duodenum.    Impressions/Post-Op Diagnosis:       - Erythema in the esophagus.       - Portal hypertensive gastropathy. Treated with argon plasma coagulation        (APC).       - Congested duodenal mucosa.    Recommendation:       - Continue pantoprazole 40 mg daily and sucralfate 1 gram twice daily.       - Iron supplementation twice daily.       - Avoid NSAIDs.    12/4/2020 Colonoscopy    Findings:       Hemorrhoids were found on perianal exam.       The terminal ileum appeared normal.       An area of grossly congested mucosa was found in the entire colon.       A 4 mm polyp was found in the cecum. The polyp was sessile. The polyp        was removed with a cold biopsy forceps. Resection and retrieval were        complete.       Non-bleeding internal hemorrhoids were found during retroflexion.    Impressions/Post-Op Diagnosis:       - Hemorrhoids found on perianal  exam.       - The examined portion of the ileum was normal.       - Congested mucosa in the entire examined colon.       - One 4 mm polyp in the cecum, removed with a cold biopsy forceps.        Resected and retrieved.       - Non-bleeding internal hemorrhoids.    A) COLON, CECUM, POLYPECTOMY:  1. Colonic mucosa with no diagnostic abnormalities; a lymphoid aggregate is present  2. Negative for serrated change, dysplasia, and malignancy    Assessment/Plan: Ms. Fisher is a 51 year old woman with a history of alcohol related cirrhosis c/b ascites, hepatic hydrothorax, unprovoked left internal jugular DVT, hypothyroidism, who presents for follow up of liver transplant evaluation.     She has been sober over 6 months. Still having recurrent right sided transudative pleural effusion thought to be related to hepatic hydrothorax. This effusion has been intermittently bloody (when she was on AC), and she has had two episodes of re expansion pulmonary edema after larger volume thoras. She has had infection in this pleural fluid thought to be related to SBE. More recently fluid has been transudative. She is currently asymptomatic, on moderate dose diuretics.     She also has a history of left sided internal jugular/sublclavian DVT - potentially related to extrinsic compression from left sided effusion, hematology felt this was related to hypercoagulable stable related to cirrhosis. Work up for other hypercoagulable conditions negative. CT imaging has not showed evidence of lung malignancy. Cytology has been negative. BR still elevated in the 5s, although her indirect fraction is rising (was close to 50/50 in the past) and she appears to be hemolyzing based on rising LDH and elevated reticuloyte count - hematology recommended monitoring. Was having issues with anemia/GIB 2/2 GAVE or PHG on AC, improved since stopping this.     Needs breast bx as part of transplant eval. SW following up with her later today as well.     Blood  type B.    MELD-Na score: 20 at 3/3/2021  1:00 PM  MELD score: 20 at 3/3/2021  1:00 PM  Calculated from:  Serum Creatinine: 0.84 mg/dL (Rounded to 1 mg/dL) at 3/3/2021  1:00 PM  Serum Sodium: 138 mmol/L (Rounded to 137 mmol/L) at 3/3/2021  1:00 PM  Total Bilirubin: 6.9 mg/dL at 3/3/2021  1:00 PM  INR(ratio): 1.75 at 3/3/2021  1:00 PM  Age: 50 years 11 months    - Appreciate pulmonary recs, would not recommend pleurex catheter given risk of dehydration and infection with this  - Continue lasix 40/toi 75 with 10 meq KCl for ascites/HH   - Continue cipro for SBP ppx  - Continue midodrine 5 mg BID - concern that stopping this and dropping her blood pressure would make her ascites/HH worse, so will continue for now. Should check BP 1-2x/week  - HCC screening: AFP normal Fall 2020, no mass on US 12/2020 -> repeat AFP with next lab draw  - Appreciate hematology recs, continue folate for potential hemolysis  - Variceal screening: EGD 12/2020 without varices  - HE: can continue lactulose and rifaximin, unclear if she had HE or delirium related to underlying infection   - Follow up with SW later today  - She will reschedule her breast bx  - Discussed travel - she just received her 2nd COVID vaccine. Her mother recently broke her hip in [place] so wanted to visit. Discussed that patients not considered immune until roughly 2 weeks post 2nd dose, so would wait until then. She should still take precautions, and cannot completely eliminate risk of infection. She would he higher risk for decompensation related to her liver disease    RTC 2 months     Fadia Avila MD MSc  Transplant Hepatology  [facility]    Approximately 30 minutes was spent for the visit with 30 minutes of non face-to-face time were spent in review of the patient's medical record on the day of the visit. This included review of previous: clinic visits, hospital records, lab results, imaging studies, and procedural documentation.  The findings from this  review are summarized in the above note.        Maria Elena is a 51 year old who is being evaluated via a billable video visit.      How would you like to obtain your AVS? MyChart  If the video visit is dropped, the invitation should be resent by: Text to cell phone: [phone]  Will anyone else be joining your video visit? No      Video Start Time: 11:30  Video-Visit Details    Type of service:  Video Visit    Video End Time:11:55    Originating Location (pt. Location): Home    Distant Location (provider location):  [facility] HEPATOLOGY [facility]     Platform used for Video Visit: Val DELEON CMA      Again, thank you for allowing me to participate in the care of your patient.        Sincerely,        Fadia Avila MD

## 2021-04-06 ENCOUNTER — TELEPHONE (OUTPATIENT)
Dept: TRANSPLANT | Facility: CLINIC | Age: 51
End: 2021-04-06

## 2021-04-06 DIAGNOSIS — K74.60 CIRRHOSIS OF LIVER (H): Primary | ICD-10-CM

## 2021-04-06 NOTE — TELEPHONE ENCOUNTER
Transplant Social Work Services Phone Call    Data: I spoke with Maria Elena via phone re: recommendation for CD treatment. At this time insurance continues to be a barrier to engagement in IOP. At the time of her original assessment, IOP vs. 1:1 was recommended. I spoke with Maria Elena regarding engagement in 1:1 as insurance continues to be a barrier for her to engage in a formal treatment program. She is in agreement with plan. Referral made to Dr. Kapil Gerber   Intervention: Continue liver evaluation   Assessment: Maria Elena presents as motivated towards recommendations  Education provided by SW: 1:1 counseling   Plan: Referral made to ENRIQUE Watts, [facility]  Liver Transplant   [facility]  Phone: [phone]  Pager: [phone]

## 2021-04-07 ENCOUNTER — TELEPHONE (OUTPATIENT)
Dept: PULMONOLOGY | Facility: CLINIC | Age: 51
End: 2021-04-07

## 2021-04-07 DIAGNOSIS — Z11.59 ENCOUNTER FOR SCREENING FOR OTHER VIRAL DISEASES: ICD-10-CM

## 2021-04-07 NOTE — TELEPHONE ENCOUNTER
Received communication from RICHARD Morales, about scheduling soonest appointment with Dr. Irby. Discussed soonest appt was in May. She is agreeable to this and appt was scheduled. Details confirmed with pt

## 2021-04-13 ENCOUNTER — COMMITTEE REVIEW (OUTPATIENT)
Dept: TRANSPLANT | Facility: CLINIC | Age: 51
End: 2021-04-13

## 2021-04-13 NOTE — COMMITTEE REVIEW
Abdominal Committee Review Note     Evaluation Date: 12/15/2020  Committee Review Date: 4/13/2021    Organ being evaluated for: Liver    Transplant Phase: Evaluation  Transplant Status: Active    Transplant Coordinator: Kiki Sutherland  Transplant Surgeon:   Christian Morrison    Referring Physician: Rehan Escobar    Primary Diagnosis:   Secondary Diagnosis:     Committee Review Members:  Hepatology Fadia Avila MD   Nutrition Humaira Jason,    Pharmacy Ricki Aguilera, [facility]    - Clinical Sherice Arredondo, [facility], Evelina Rodriguez, [facility]   Transplant Keith Dominguez MD, Payal Sam, RN, Sana Rocha MD, Lotus Hughes, RN, Britni Wilson, RICHARD, Christos Johnson MD, Jr Oli Gurrola, RICHARD, Leigh Beltran MD, Cyndee Salazar, APRN CNP, Kiki Sutherland, RN, Darrin Patterson MD, Ronan Santos MD, Christian Morrison MD, Thomas M. Leventhal, MD, Riaz Seth MD, Jaylan Lyon MD       Transplant Eligibility: ETOH, Cirrhosis with MELD    Committee Review Decision: Approved    Relative Contraindications: Other, await breast biopsy    Absolute Contraindications: None    Committee Chair Leventhal, Thomas Michael, MD verbally attested to the committee's decision.    Committee Discussion Details:  Approved to list pending negative biopsy

## 2021-04-15 ENCOUNTER — TELEPHONE (OUTPATIENT)
Dept: TRANSPLANT | Facility: CLINIC | Age: 51
End: 2021-04-15

## 2021-04-15 ENCOUNTER — ANCILLARY PROCEDURE (OUTPATIENT)
Dept: MAMMOGRAPHY | Facility: CLINIC | Age: 51
End: 2021-04-15
Attending: STUDENT IN AN ORGANIZED HEALTH CARE EDUCATION/TRAINING PROGRAM
Payer: COMMERCIAL

## 2021-04-15 DIAGNOSIS — K70.31 ALCOHOLIC CIRRHOSIS OF LIVER WITH ASCITES (H): ICD-10-CM

## 2021-04-15 DIAGNOSIS — R92.8 ABNORMAL FINDING ON BREAST IMAGING: Primary | ICD-10-CM

## 2021-04-15 DIAGNOSIS — R92.8 ABNORMAL MAMMOGRAM OF BOTH BREASTS: ICD-10-CM

## 2021-04-15 LAB
AFP SERPL-MCNC: 5.6 UG/L (ref 0–8)
ALBUMIN SERPL-MCNC: 2.7 G/DL (ref 3.4–5)
ALP SERPL-CCNC: 184 U/L (ref 40–150)
ALT SERPL W P-5'-P-CCNC: 21 U/L (ref 0–50)
ANION GAP SERPL CALCULATED.3IONS-SCNC: 9 MMOL/L (ref 3–14)
AST SERPL W P-5'-P-CCNC: 35 U/L (ref 0–45)
BILIRUB DIRECT SERPL-MCNC: 1.7 MG/DL (ref 0–0.2)
BILIRUB SERPL-MCNC: 4.6 MG/DL (ref 0.2–1.3)
BUN SERPL-MCNC: 16 MG/DL (ref 7–30)
CALCIUM SERPL-MCNC: 8.6 MG/DL (ref 8.5–10.1)
CHLORIDE SERPL-SCNC: 103 MMOL/L (ref 94–109)
CO2 SERPL-SCNC: 25 MMOL/L (ref 20–32)
CREAT SERPL-MCNC: 1.13 MG/DL (ref 0.52–1.04)
ERYTHROCYTE [DISTWIDTH] IN BLOOD BY AUTOMATED COUNT: 14.3 % (ref 10–15)
GFR SERPL CREATININE-BSD FRML MDRD: 56 ML/MIN/{1.73_M2}
GLUCOSE SERPL-MCNC: 160 MG/DL (ref 70–99)
HCT VFR BLD AUTO: 27.3 % (ref 35–47)
HGB BLD-MCNC: 8.7 G/DL (ref 11.7–15.7)
INR PPP: 1.58 (ref 0.86–1.14)
MCH RBC QN AUTO: 34.3 PG (ref 26.5–33)
MCHC RBC AUTO-ENTMCNC: 31.9 G/DL (ref 31.5–36.5)
MCV RBC AUTO: 108 FL (ref 78–100)
PLATELET # BLD AUTO: 128 10E9/L (ref 150–450)
POTASSIUM SERPL-SCNC: 3 MMOL/L (ref 3.4–5.3)
PROT SERPL-MCNC: 6.2 G/DL (ref 6.8–8.8)
RBC # BLD AUTO: 2.54 10E12/L (ref 3.8–5.2)
SODIUM SERPL-SCNC: 137 MMOL/L (ref 133–144)
WBC # BLD AUTO: 6.3 10E9/L (ref 4–11)

## 2021-04-15 PROCEDURE — 80053 COMPREHEN METABOLIC PANEL: CPT | Performed by: PATHOLOGY

## 2021-04-15 PROCEDURE — 82248 BILIRUBIN DIRECT: CPT | Performed by: PATHOLOGY

## 2021-04-15 PROCEDURE — 82105 ALPHA-FETOPROTEIN SERUM: CPT | Mod: 90 | Performed by: PATHOLOGY

## 2021-04-15 PROCEDURE — 99000 SPECIMEN HANDLING OFFICE-LAB: CPT | Performed by: PATHOLOGY

## 2021-04-15 PROCEDURE — 85027 COMPLETE CBC AUTOMATED: CPT | Performed by: PATHOLOGY

## 2021-04-15 PROCEDURE — 88305 TISSUE EXAM BY PATHOLOGIST: CPT | Mod: GC | Performed by: PATHOLOGY

## 2021-04-15 PROCEDURE — 85610 PROTHROMBIN TIME: CPT | Performed by: PATHOLOGY

## 2021-04-15 PROCEDURE — 19081 BX BREAST 1ST LESION STRTCTC: CPT | Mod: RT | Performed by: RADIOLOGY

## 2021-04-15 PROCEDURE — 36415 COLL VENOUS BLD VENIPUNCTURE: CPT | Performed by: PATHOLOGY

## 2021-04-15 RX ORDER — LIDOCAINE HYDROCHLORIDE AND EPINEPHRINE 10; 10 MG/ML; UG/ML
10 INJECTION, SOLUTION INFILTRATION; PERINEURAL ONCE
Status: COMPLETED | OUTPATIENT
Start: 2021-04-15 | End: 2021-04-15

## 2021-04-15 RX ORDER — LIDOCAINE HYDROCHLORIDE 10 MG/ML
10 INJECTION, SOLUTION EPIDURAL; INFILTRATION; INTRACAUDAL; PERINEURAL ONCE
Status: COMPLETED | OUTPATIENT
Start: 2021-04-15 | End: 2021-04-15

## 2021-04-15 RX ADMIN — LIDOCAINE HYDROCHLORIDE AND EPINEPHRINE 10 ML: 10; 10 INJECTION, SOLUTION INFILTRATION; PERINEURAL at 13:53

## 2021-04-15 RX ADMIN — LIDOCAINE HYDROCHLORIDE 10 ML: 10 INJECTION, SOLUTION EPIDURAL; INFILTRATION; INTRACAUDAL; PERINEURAL at 13:54

## 2021-04-15 NOTE — TELEPHONE ENCOUNTER
Transplant Social Work Services Phone Call    Data: I received a voice message from Maria Elena providing this writer with an update re: about progress. This writer advised patient to engage in treatment as recommended by prior CD assessment. At this point, patient will need to complete a CD assessment, which is recommended. Maria Elena indicated that she has an appointment with Dr. Kapil Gerber as well. I spoke with Maria Elena via phone re: updated CD assessment. She is willing to complete any recommendations made by transplant team. I connected with Ed K, who previously completed her CD assessment as well to obtain collateral re: new CD assessment for pt.   Intervention: continued liver eval   Assessment: Maria Elena is motivated and willing to follow medical directives.   Education provided by SW: CD assessment   Plan: Waiting for information regarding updated assessment vs. 1:1 counseling.     ENRIQUE Rodriguez, [facility]  Liver Transplant   [facility]  Phone: [phone]  Pager: [phone]

## 2021-04-19 ENCOUNTER — TELEPHONE (OUTPATIENT)
Dept: TRANSPLANT | Facility: CLINIC | Age: 51
End: 2021-04-19

## 2021-04-19 LAB — COPATH REPORT: NORMAL

## 2021-04-19 NOTE — TELEPHONE ENCOUNTER
Spoke with the patient and confirmed Chemical Dependency Evaluation Update appointments on 4/21/21.  Informed patient an itinerary can be accessed on Fingerprintt.

## 2021-04-20 ENCOUNTER — TELEPHONE (OUTPATIENT)
Dept: MAMMOGRAPHY | Facility: CLINIC | Age: 51
End: 2021-04-20

## 2021-04-20 NOTE — TELEPHONE ENCOUNTER
Spoke to Maria Elena about the benign finding of a fibroadenoma found during her breast biopsy done last week.  We discussed the Radiologist's recommendation of continuing on with her yearly screening mammograms.  Maria Elena verbalized understanding and all questions and concerns were answered at this time.

## 2021-04-21 ENCOUNTER — TELEPHONE (OUTPATIENT)
Dept: BEHAVIORAL HEALTH | Facility: CLINIC | Age: 51
End: 2021-04-21

## 2021-04-21 ENCOUNTER — HOSPITAL ENCOUNTER (OUTPATIENT)
Dept: BEHAVIORAL HEALTH | Facility: CLINIC | Age: 51
Discharge: HOME | End: 2021-04-21
Attending: FAMILY MEDICINE | Admitting: FAMILY MEDICINE
Payer: COMMERCIAL

## 2021-04-21 VITALS — HEIGHT: 66 IN | BODY MASS INDEX: 25.71 KG/M2 | WEIGHT: 160 LBS

## 2021-04-21 PROCEDURE — H0001 ALCOHOL AND/OR DRUG ASSESS: HCPCS | Mod: GT

## 2021-04-21 ASSESSMENT — COLUMBIA-SUICIDE SEVERITY RATING SCALE - C-SSRS
2. HAVE YOU ACTUALLY HAD ANY THOUGHTS OF KILLING YOURSELF LIFETIME?: NO
1. IN THE PAST MONTH, HAVE YOU WISHED YOU WERE DEAD OR WISHED YOU COULD GO TO SLEEP AND NOT WAKE UP?: NO
1. IN THE PAST MONTH, HAVE YOU WISHED YOU WERE DEAD OR WISHED YOU COULD GO TO SLEEP AND NOT WAKE UP?: NO
2. HAVE YOU ACTUALLY HAD ANY THOUGHTS OF KILLING YOURSELF?: NO

## 2021-04-21 ASSESSMENT — MIFFLIN-ST. JEOR: SCORE: 1357.51

## 2021-04-21 ASSESSMENT — PAIN SCALES - GENERAL: PAINLEVEL: NO PAIN (0)

## 2021-04-21 NOTE — TELEPHONE ENCOUNTER
This patient's MARIANA Assessment DAANES information was entered directly into the [place] [employer] DAANES website on 4/21/2021.  Assessment ID: [id number]

## 2021-04-21 NOTE — TELEPHONE ENCOUNTER
This patient's Release of Information forms have been e-mailed to the patient via [employer] on 4/21/2021.  This counselor will await the return of this patient's signed Release of Information forms.

## 2021-04-21 NOTE — PROGRESS NOTES
[facility]  Provider Name:  SHILA Blanchard/[facility]     Telephone: [phone]      PATIENT'S NAME: Sarah Fisher  PREFERRED NAME: Maria Elena  PRONOUNS: she/her/hers    MRN: [id number]  : 1970   ACCT. NUMBER:  [id number]  DATE OF SERVICE: 2021  START TIME: 10:32 am  END TIME: 10:52 am  E-MAIL: [email or web address]  PREFERRED PHONE: [phone]  May we leave a program related message: Yes  ADDRESS:   [address] [place], [place] [id number]  SERVICE MODALITY:  Video Visit:    Provider verified identity through the following two step process.  Patient provided:  Patient  (1970) and Patient's last 4 digits of N ([id number])    Telemedicine Visit: The patient's condition can be safely assessed and treated via synchronous audio and visual telemedicine encounter.      Reason for Telemedicine Visit: Services only offered telehealth    Originating Site (Patient Location): Patient's home    Distant Site (Provider Location): Provider Remote Setting    Consent:  The patient/guardian has verbally consented to: the potential risks and benefits of telemedicine (video visit) versus in person care; bill my insurance or make self-payment for services provided; and responsibility for payment of non-covered services.     Patient would like the video invitation sent by:  My Chart    Mode of Communication:  Video Conference via Tagito    [place] ADULT SUBSTANCE USE DISORDER DIAGNOSTIC ASSESSMENT    Identifying Information:  The patient is a 51 year old, /White female of  decent.  The pronoun use throughout this assessment reflects the patient's chosen pronoun.  The patient was referred for an assessment by [facility].  The patient attended the session alone.     Chief Complaint:   The patient had an assessment via a remote video visit at [facility] with this counselor for evaluation of a possible  substance use disorder.  The reason for the substance abuse assessment was because it was a part of the evaluation process with the [facility] Liver Transplant Team for a potential liver transplant for this patient.  The patient had a prior substance use disorder assessment with this counselor on 1/4/2021 and at that time the recommendation had been for her to the Mixed Primary Day Outpatient program at [facility] in [place], [place] for primary substance use disorder treatment.  The patient had issues with her out of network insurance provider which had refused to authorize the substance use disorder treatment program despite the fact the patient had never participated in a prior substance use disorder treatment and despite the fact she is in the process of potentially receiving a liver transplant.  The patient presented to the substance use disorder assessment today looking for recommendations in lieu of being able to enter a formal intensive outpatient substance use disorder treatment program.  The patient first had a concern about having substance abuse issues in 3/2020.  The patient has not attempted to resolve these concerns prior to 6/2020 when she had started to feel ill and had stopped her use of alcohol.  The patient did not become aware of her liver disease until 9/2020 when she was hospitalized for an extended period of time and found out she had end-stage liver disease.  The patient does not appear to be in severe withdrawal, an imminent safety risk to self or others, or requiring immediate medical attention and may proceed with the assessment interview.    Social/Family History:  The patient reported [place] areas.  The patient reported being raised by her mother and her step-father.  The patient denied experiencing or witnessing any verbal, physical or sexual abuse in the family home.  The patient reported discipline in  "her family home was in the form of being grounded, having privileges taken away or verbal reprimands.  The patient reported feeling supported by her mother and her step-father.  The patient reported a history of: The patient reported her full brother and her half-brother both have a history of substance abuse.  The patient reported overall her childhood was a happy childhood.  The patient described current relationships with her family of origin as being good.      The patient describes her cultural background as being /White female of  decent.  Cultural influences and impact on patient's life structure, values, norms, and healthcare: None.  Contextual influences on patient's health include: Family Factors Alcohol was always around at family get together's and holidays, \"it was always there\".  The patient reported having a brother die secondary to his alcohol abuse in 2015.  The patient identified her preferred language to be English.  The patient reported she does not need the assistance of an  or other support involved in therapy.     The patient reported had no significant delays in developmental tasks.  The patient's highest education level was graduate school.  The patient identified the following learning problems: none reported.  The patient reports she is able to understand written materials.    The patient denied having any history of being .  The patient identified as being heterosexual and she reported being single and not in a romantic relationship at this time.  The patient reported having 1 son age 12 who lives with her and she has full custody of her son.     The patient reported living her niece, Radha, and her 12 year old son in a single family home.  The patient denied having any concerns regarding her immediate living environment and/or neighborhood and she plans to continue to live there.  The patient identified her mother, her brother and her niece as being " her primary support network at this time.  The patient identified the quality of her relationships with her support network as good.  The patient would like the following people involved in treatment services if recommended: None at this time.     The patient reported engaging in the following recreational/leisure activities: Traveling, going to her son's sporting events, cooking and spending time with her family and her friends.  The patient reported engaging in the following recreation/leisure activities while using alcohol or other non-prescribed mood altering chemicals: In the summertime, when attending her son's baseball she would often attend happy hours with the other parents and kids after the games.   The patient reported the following people are supportive of her recovery: her mother, her brother and her niece.    The patient reported she is currently working part-time for her [employer]  The patient reported she had been working for [employer], but she had been on a furlough from that job off and on since the COVID-19 pandemic started and she is currently on a MLOA from that job secondary to her current health issues.  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  The patient does not identify finances as a current stressor.       The patient denies substance related arrests or legal issues.  The patient denies being on probation / parole / under the jurisdiction of the court.    Patient's Strengths and Limitations:  The patient identified the following strengths or resources that will help her succeed in treatment: Religious / Yazdanism, commitment to health and well being, denise / spirituality, family support and strong social skills.  Things that may interfere with the patient's success in treatment include: none identified.     Personal and Family Medical History:  The patient did not report a  family history of mental health concerns.       The patient reported the following previous mental health diagnoses: The patient denied having any history of being diagnosed with a clinical mental health disorder.  The patient reported her primary mental health symptoms include: none at this time and these do not impact her ability to function.  The patient has not received mental health services in the past: The patient denied having any history of being prescribed psychotropic medications.  The patient denied having any history of working with a 1:1 mental health therapist.  Psychiatric Hospitalizations: None.  The patient denies a history of civil commitment.  Current mental health services/providers include:  The patient denied having any history of being prescribed psychotropic medications.  The patient denied having any history of working with a 1:1 mental health therapist.    GAIN-SS Tool:    When was the last time that you had significant problems... 4/21/2021   with feeling very trapped, lonely, sad, blue, depressed or hopeless about the future? Never   with sleep trouble, such as bad dreams, sleeping restlessly, or falling asleep during the day? 1+ years ago   with feeling very anxious, nervous, tense, scared, panicked or like something bad was going to happen? Never   with becoming very distressed & upset when something reminded you of the past? Never   with thinking about ending your life or committing suicide? Never     When was the last time that you did the following things 2 or more times? 4/21/2021   Lied or conned to get things you wanted or to avoid having to do something? Never   Had a hard time paying attention at school, work or home? Never   Had a hard time listening to instructions at school, work or home? Never   Were a bully or threatened other people? Never   Started physical fights with other people? Never     The patient has had a physical exam to rule out medical causes for current  symptoms.  Date of last physical exam was within the past year. The patient was encouraged to follow up with PCP if symptoms were to develop.  The patient has a [facility], who is named Christine Harris.  The patient reported the following medical concerns:   Past Medical History:   Diagnosis Date     Ascites      History of blood transfusion      Liver cirrhosis (H)      Thyroid disease    The patient reported taking her medications as prescribed and following the recommendations of her healthcare providers.  The patient denied having any current issues with pain.  Patient denies pregnancy.  There are not significant appetite / nutritional concerns / weight changes.  The patient does not report having a history of an eating disorder.  The patient does not report a history of head injury / trauma / cognitive impairment.      The patient reported current medications as:   Outpatient Medications Marked as Taking for the 4/21/21 encounter (Hospital Encounter) with Kimo Downing LADC   Medication Sig     ciprofloxacin (CIPRO) 250 MG tablet Take 1 tablet (250 mg) by mouth 2 times daily     ferrous sulfate (FE TABS) 325 (65 Fe) MG EC tablet Take 325 mg by mouth daily     ferrous sulfate 300 (60 Fe) MG/5ML syrup Take 5 mLs (300 mg) by mouth daily     folic acid (FOLVITE) 1 MG tablet Take 1 tablet (1 mg) by mouth daily     folic acid (FOLVITE) 1 MG tablet Take 1 tablet (1 mg) by mouth daily     furosemide (LASIX) 20 MG tablet Take 2 tablets (40 mg) by mouth daily     levothyroxine (SYNTHROID/LEVOTHROID) 50 MCG tablet Take 50 mcg by mouth daily     midodrine (PROAMATINE) 5 MG tablet Take 1 tablet (5 mg) by mouth 2 times daily (with meals)     multivitamin w/minerals (THERA-VIT-M) tablet Take 1 tablet by mouth daily     pantoprazole (PROTONIX) 20 MG EC tablet Take 1 tablet (20 mg) by mouth daily     pantoprazole (PROTONIX) 40 MG EC tablet Take 40 mg by mouth every morning (before breakfast)      potassium chloride ER (K-TAB/KLOR-CON) 10 MEQ CR tablet Take 20 mEq by mouth daily     rifaximin (XIFAXAN) 550 MG TABS tablet Take 550 mg by mouth 2 times daily     spironolactone (ALDACTONE) 25 MG tablet Take 3 tablets (75 mg) by mouth daily     vitamin D2 (ERGOCALCIFEROL) 49142 units (1250 mcg) capsule Take 1 capsule (50,000 Units) by mouth once a week     zinc sulfate (ZINCATE) 220 (50 Zn) MG capsule Take 1 capsule (220 mg) by mouth daily     Medication Adherence:  The patient reported taking her prescribed medications as prescribed.    Patient Allergies:    Allergies   Allergen Reactions     Amoxicillin GI Disturbance, Diarrhea, Nausea and Nausea and Vomiting     Medical History:    Past Medical History:   Diagnosis Date     Ascites      History of blood transfusion      Liver cirrhosis (H)      Thyroid disease      Rating Scales:  PHQ9:    PHQ-9 SCORE 1/4/2021 4/21/2021   PHQ-9 Total Score MyChart - 0   PHQ-9 Total Score 1 0     ZHENG:  ZHENG-7 SCORE 1/4/2021 4/21/2021   Total Score - 0 (minimal anxiety)   Total Score 0 0     Substance Use:  The patient reported the following biological family members or relatives with chemical health issues: Her brother has a history of having substance abuse issues.  The patient denied having any inpatient detoxification admissions or inpatient hospitalizations for withdrawal symptoms.  The patient denied having any history of participating in a substance use disorder treatment program.  The patient denied having any history of attending 12-step or other support group meetings.  The patient denied having any history of participating in gambling treatment.  The patient is not currently receiving any chemical dependency treatment.              X = Primary Drug Used   Age of First Use Most Recent Pattern of Use and Duration   Need enough information to show pattern (both frequency and amounts) and to show tolerance for each chemical that has a diagnosis   Date of last use and time,  if needed   Withdrawal Potential? Requiring special care Method of use  (oral, smoked, snort, IV, etc)      Alcohol     10 From 3/2019 until 3/2020, she reported a pattern of drinking 4-5 glasses of wine or mixed drinks between 4-7 times per week.     The patient reported her heaviest use of alcohol had been from 3/2020 until 6/20/2020, when she reported a pattern of drinking 4-5 glasses of wine or mixed drinks on an almost daily basis.     The patient reported having some heavier use of alcohol, when she reported drinking up to 7-8 glasses of wine or mixed drinks around 1 time per week on average prior to 6/2020.     The patient reported she had stopped her use of alcohol on 6/20/2020 secondary to feeling ill, but she was not aware of her liver disease until she was hospitalized for an extended period of time in 9/2020.   6/20/2020 None Oral      Marijuana/  Hashish   18 The patient reported smoking THC on 2 occasions in her life during college.   20 None Smoke      Cocaine/Crack     No use          Meth/  Amphetamines   No use          Heroin     No use          Other Opiates/  Synthetics   No use          Inhalants     No use          Benzodiazepines     No use          Hallucinogens     No use          Barbiturates/  Sedatives/  Hypnotics No use          Over-the-Counter Drugs   No use          Other     No use          Nicotine     No use         The patient reported the following problems as a result of her substance use: occupational / vocational problems and health issues, including having end-stage liver disease.  The patient is not concerned about substance use at this time due her last use of alcohol being in 6/20/2020.     The patient reports experiencing the following withdrawal symptoms within the past 12 months: shaky/jittery/tremors, headache and fatigue and the following within the past 30 days: none.  The patient reported urges to use Alcohol.  The patient reported she has used more Alcohol than  intended and over a longer period of time than intended.  The patient reported she has not had unsuccessful attempts to cut down or control use of Alcohol.  The patient reported her longest period of abstinence had been since 6/20/2020 and she had not returned to drinking alcohol.  The patient reported she has needed to use more Alcohol to achieve the same effect.  The patient does  report diminished effect with use of same amount of Alcohol.      The patient does  report a great deal of time is spent in activities necessary to obtain, use, or recover from Alcohol effects.  The patient does not report important social, occupational, or recreational activities are given up or reduced because of Alcohol use.  Alcohol use was not continued despite knowledge of having a persistent or recurrent physical or psychological problem that is likely to have been caused or exacerbated by use.  The patient reported the following problem behaviors while under the influence of substances: driving after drinking alcohol, but she had never had a DWI charge.  The patient reported her recovery goal is continue to abstain from alcohol for the rest of her life.      The patient does not have other addictive behaviors she is concerned about at this time.  The patient reported her substance use has not negatively impacted her ability to function in a school setting.  The patient reported her substance use has negatively impacted her ability to function in a work setting, mainly in regards to some decreased performance at work secondary to her alcohol abuse.  The patient's demographics and history impact her recovery in the following ways: The patient had a brother die due to his alcohol abuse in 2015.    Dimension Scale Ratings:    Dimension 1 -  Acute Intoxication/Withdrawal: 0 - No Problem    Dimension 2 - Biomedical: 3 - Severe Problem    Dimension 3 - Emotional/Behavioral/Cognitive Conditions: 1 - Minor Problem    Dimension 4 -  Readiness to Change:  0 - No Problem    Dimension 5 - Relapse/Continued Use/ Continued Problem Potential: 1 - Minor Problem    Dimension 6 - Recovery Environment:  1 - Minor Problem    Significant Losses / Trauma / Abuse / Neglect Issues:   The patient did not serve in the .  There are indications or report of significant loss, trauma, abuse or neglect issues related to:  The patient denied having any history of being verbally, emotionally, physically or sexually abused.  The patient reported having a history of trauma issues due to her step-father's death in 2014, her brother's death from alcoholism in 2015, a close friend/next door neighbor's death from an accidental drug overdose in 6/2020 and due to her extended hospitalization due to her end-stage liver disease diagnosis in 9/2020.  The patient denied having any history of suicide attempts and denied having any current suicide ideation.  The patient denied having any history of self-injurious behavior.     Concerns for possible neglect are not present.    Safety Assessment:   Current Safety Concerns:  [place] Suicide Severity Rating Scale (Lifetime/Recent)  [place] Suicide Severity Rating (Lifetime/Recent) 1/4/2021 4/21/2021   1. Wish to be Dead (Lifetime) No No   1. Wish to be Dead (Recent) No No   2. Non-Specific Active Suicidal Thoughts (Lifetime) No No   2. Non-Specific Active Suicidal Thoughts (Recent) No No   3. Active Suicidal Ideation with any Methods (Not Plan) Without Intent to Act (Lifetime) No -   3. Active Suicidal Ideation with any Methods (Not Plan) Without Intent to Act (Recent) No -   4. Active Suicidal Ideation with Some Intent to Act, Without Specific Plan (Lifetime) No -   4. Active Suicidal Ideation with Some Intent to Act, Without Specific Plan (Recent) No -   5. Active Suicidal Ideation with Specific Plan and Intent (Lifetime) No -   5. Active Suicidal Ideation with Specific Plan and Intent (Recent) No -   Most Severe Ideation  Rating (Lifetime) NA -   Frequency (Lifetime) NA -   Duration (Lifetime) NA -   Controllability (Lifetime) NA -   Protective Factors  (Lifetime) NA -   Reasons for Ideation (Lifetime) NA -   Most Severe Ideation Rating (Past Month) NA -   Frequency (Past Month) NA -   Duration (Past Month) NA -   Controllability (Past Month) NA -   Protective Factors (Past Month) NA -   Reasons for Ideation (Past Month) NA -   Actual Attempt (Lifetime) No -   Actual Attempt (Past 3 Months) No -   Has subject engaged in non-suicidal self-injurious behavior? (Lifetime) No No   Has subject engaged in non-suicidal self-injurious behavior? (Past 3 Months) No No   Interrupted Attempts (Lifetime) No -   Interrupted Attempts (Past 3 Months) No -   Aborted or Self-Interrupted Attempt (Lifetime) No -   Aborted or Self-Interrupted Attempt (Past 3 Months) No -   Preparatory Acts or Behavior (Lifetime) No -   Preparatory Acts or Behavior (Past 3 Months) No -   Most Recent Attempt Actual Lethality Code NA -   Most Lethal Attempt Actual Lethality Code NA -   Initial/First Attempt Actual Lethality Code NA -     Patient denies current homicidal ideation and behaviors.  Patient denies current self-injurious ideation and behaviors.    Patient reported unsafe motor vehicle operation associated with substance use.  Patient denies any high risk behaviors associated with mental health symptoms.  Patient reports the following current concerns for their personal safety: None.  Patient reports there are not firearms in the house.     History of Safety Concerns:  Patient denied a history of homicidal ideation.     Patient denied a history of personal safety concerns.    Patient denied a history of assaultive behaviors.    Patient denied a history of sexual assault behaviors.     Patient reported a history unsafe motor vehicle operation associated with substance use.  Patient denies any history of high risk behaviors associated with mental health  symptoms.  Patient reports the following protective factors: spirituality, positive relationships positive family connections, dedication to family/friends, safe and stable environment, adherence with prescribed medication, living with other people, daily obligations, positive social skills, financial stability and pets    Risk Plan:  See Recommendations for Safety and Risk Management Plan    Review of Symptoms per patient report:  Substance Use:  blackouts, passing out, vomiting, daily use, family relationship problems due to substance use and driving under the influence.     Collateral Contact Summary:   Collateral contacts contributing to this assessment:  The patient's electronic medical records were reviewed at time of assessment.    No additional collateral data had been obtained at the time of this documentation.     If court related records were reviewed, summarize here: None    Information from collateral contacts supported/largely agreed with information from the client and associated risk ratings.    Information in this assessment was obtained from the medical record and provided by the patient who is a good historian.        The patient will have open access to her substance use disorder assessment medical record.    Diagnostic Criteria:   1.)  Substance is often taken in larger amounts or over a longer period than was intended.  Met for:  Alcohol.  3.)  A great deal of time is spent in activities necessary to obtain the substance, use the substance, or recover from its effects.  Met for:  Alcohol.  4.)  Craving, or a strong desire or urge to use the substance.  Met for:  Alcohol.  5.)  Recurrent use of the substance resulting in a failure to fulfill major role obligations at work, school, or home.  Met for:  Alcohol.  8.)  Recurrent use of the substance in which it is physically hazardous.  Met for:  Alcohol.  10.)  Tolerance:  either a need for markedly increased amounts of the substance to achieve the  desired effect or a markedly diminished effect with continued use of the dame amount of the substance.  Met for:  Alcohol.  11.)  Withdrawal:  either patient endorses characteristic withdrawal syndrome for the substance or the substance (or closely related substance) is taken to relieve or avoid withdrawal symptoms.  Met for:  Alcohol.    As evidenced by self report and criteria, the patient meets the following DSM-5 Diagnoses: (Sustained by DSM-5 Criteria Listed Above)      1.)  Alcohol Use Disorder Severe - 303.90 (F10.20), in early remission    Specify if: In early remission:  After full criteria for alcohol/drug use disorder were previously met, none of the criteria for alcohol/drug use disorder have been met for at least 3 months but for less than 12 months (with the exception that Criterion A4,  Craving or a strong desire or urge to use alcohol/drug  may be met).     In sustained remission:   After full criteria for alcohol use disorder were previously met, non of the criteria for alcohol/drug use disorder have been met at any time during a period of 12 months or longer (with the exception that Criterion A4,  Craving or strong desire or urge to use alcohol/drug  may be met).     Specify if:   This additional specifier is used if the individual is in an environment where access to alcohol is restricted.    Mild: Presence of 2-3 symptoms  Moderate: Presence of 4-5 symptoms  Severe: Presence of 6 or more symptoms    Recommendations:     1. Plan for Safety and Risk Management:     It was recommended the patient call 911 or go to the local Emergency Department should there be any significant change in the above risk factors.            Report to child / adult protection services was NA.    2. MARIANA Referrals:      Recommendations:      1.)  It was recommended the patient continue to abstain from alcohol and from all other non-prescribed mood altering chemicals.   2.)  Follow all of the recommendations of her  healthcare providers.  3.)  Follow all of the terms and conditions of working with the [facility] Liver Transplant Team.  4.)  Participate in random alcohol and drug testing to ensure compliance with abstinence from alcohol and from all other non-prescribed mood altering chemicals.  5.)  Participate in 1:1 counseling sessions with an in-network 1:1 mental health therapist to help her address her transition from drinking alcohol to sobriety, to address the stressors of having end-stage liver disease and to help her develop additional sober coping skills.  6.)  Attend liver transplant support group meetings and/or other support group meetings as she is physically able.    The patient reported she is willing to follow the above recommendations.     The patient would like the following family or other support people involved in their treatment:  None at this time.    The patient does not have a history of opiate use.    3.  Mental Health Referrals:     The patient did not appear to be in any need of a mental health referral at this time.    4. Cultural Concerns:    The patient did not identify having any cultural concerns regarding mental health, physical health, or substance use issues.     5. Recommendations for treatment focus:      Alcohol / Substance Use - See #2. MARIANA Referrals above for details on recommendations.     Provider Name/ Credentials:  BIANCA Blanchard  April 21, 2021

## 2021-04-21 NOTE — TELEPHONE ENCOUNTER
Tried reaching out to patient to check them in for their CD eval today, 4/21/2021 at 1030. Called, but no answer so a voice message was left for patient to return call to check in.

## 2021-04-22 ENCOUNTER — TELEPHONE (OUTPATIENT)
Dept: TRANSPLANT | Facility: CLINIC | Age: 51
End: 2021-04-22

## 2021-04-22 NOTE — TELEPHONE ENCOUNTER
Called patient  SHELLY  To help her with the docusign forms- will send out forms for pt to sign  In the mail

## 2021-04-26 NOTE — TELEPHONE ENCOUNTER
The patient's signed SACHIN's were e-mailed to [facility] on 4/26/2021 to be scanned into medical records.

## 2021-04-28 ENCOUNTER — TELEPHONE (OUTPATIENT)
Dept: TRANSPLANT | Facility: CLINIC | Age: 51
End: 2021-04-28

## 2021-04-28 DIAGNOSIS — K70.31 ALCOHOLIC CIRRHOSIS OF LIVER WITH ASCITES (H): ICD-10-CM

## 2021-04-28 RX ORDER — SPIRONOLACTONE 25 MG/1
100 TABLET ORAL DAILY
Qty: 60 TABLET | Refills: 3 | COMMUNITY
Start: 2021-04-28 | End: 2021-08-19

## 2021-04-28 NOTE — TELEPHONE ENCOUNTER
Spoke with Maria Elena and received docusign. She was taking 10 Meq KCL and now increased it to 20 mEq QD? Aldactone decreased to 50 mg daily with BMP next week. Will get MELD labs as well.

## 2021-04-29 ENCOUNTER — VIRTUAL VISIT (OUTPATIENT)
Dept: BEHAVIORAL HEALTH | Facility: CLINIC | Age: 51
End: 2021-04-29
Payer: COMMERCIAL

## 2021-04-29 DIAGNOSIS — F43.20 ADJUSTMENT DISORDER, UNSPECIFIED TYPE: Primary | ICD-10-CM

## 2021-04-29 PROCEDURE — 90834 PSYTX W PT 45 MINUTES: CPT | Mod: 95 | Performed by: PSYCHOLOGIST

## 2021-04-29 ASSESSMENT — PATIENT HEALTH QUESTIONNAIRE - PHQ9
SUM OF ALL RESPONSES TO PHQ QUESTIONS 1-9: 0
SUM OF ALL RESPONSES TO PHQ QUESTIONS 1-9: 0
10. IF YOU CHECKED OFF ANY PROBLEMS, HOW DIFFICULT HAVE THESE PROBLEMS MADE IT FOR YOU TO DO YOUR WORK, TAKE CARE OF THINGS AT HOME, OR GET ALONG WITH OTHER PEOPLE: NOT DIFFICULT AT ALL

## 2021-04-29 NOTE — PROGRESS NOTES
[facility]: Integrated Behavioral Health  April 29, 2021      Behavioral Health Clinician Progress Note    Patient Name: Sarah Fisher           Service Type: Individual      Service Location:  Video visit      Session Start Time: 11:02  Session End Time: 11:54      Session Length: 38 - 52      Attendees: Patient    Visit Activities (Refresh list every visit): [facility] and [facility] Only    Telemedicine Visit: The patient's condition can be safely assessed and treated via synchronous audio and visual telemedicine encounter.      Reason for Telemedicine Visit: Services only offered telehealth    Originating Site (Patient Location): Patient's home    Distant Site (Provider Location): Provider Remote Setting    Consent:  The patient/guardian has verbally consented to: the potential risks and benefits of telemedicine (video visit) versus in person care; bill my insurance or make self-payment for services provided; and responsibility for payment of non-covered services.     Mode of Communication:  Video Conference via [employer]    As the provider I attest to compliance with applicable laws and regulations related to telemedicine.      Diagnostic Assessment Date: IP  Treatment Plan Review Date: IP  See Flowsheets for today's PHQ-9 and ZHENG-7 results  Previous PHQ-9:   PHQ-9 SCORE 1/4/2021 4/21/2021 4/29/2021   PHQ-9 Total Score MyChart - 0 0   PHQ-9 Total Score 1 0 0     Previous ZHENG-7:   ZHENG-7 SCORE 1/4/2021 4/21/2021   Total Score - 0 (minimal anxiety)   Total Score 0 0       PRASHANT LEVEL:  PRASHANT Score (Last Two) 12/27/2020 3/17/2021   PRASHANT Raw Score 35 37   Activation Score 72.1 79.2   PRASHANT Level 3 4       DATA  Extended Session (60+ minutes): No  Interactive Complexity: No  Crisis: No    Treatment Objective(s) Addressed in This Session:  Target Behavior(s): alcohol use and disease management/lifestyle changes      Adjustment Difficulties: will develop coping/problem-solving skills to facilitate  more adaptive adjustment  Psychological distress related to Chronic Disease Management    Current Stressors / Issues:  [facility] met with Maria Elena today at the request of her SOT team at [facility]. Session consisted of reviewing her presenting concerns, establishing rapport/supportive psychotherapy, and establishing a preliminary treatment plan. Maria Elena shares she began with the transplant process in 2020. She was with [facility] prior to working with our organization, though once she was recommended transplant she started with [facility]. Maria Elena indicates she quit drinking in 2020 and is having good success with her recovery so far. She was recommended however, per her report, that additional support through 1:1 counseling could benefit her as she pursues transplant, which she reported is a stressful notion. Maria Elena did share that she was recommended IOP treatment, though her insurance denied her claim to do this. She fortunately however detailed her work with a sobriety group she belongs to that is peer lead and consists of mostly family and friends. Maria Elena reports feeling fortunate about her group as she can be open and honest with fellow members.     Regarding mental health, Maria Elena is reporting some stress pertaining to transplant and matters with COVID-19 have been difficult, like not being able to see as many family and friends due to quarantine. She denied a history of previous mental health concerns; she has no history of inpatient or outpatient treatment. We talked about her family history with alcohol use difficulties. Her brother apparently  from liver failure due to alcohol use in , which makes her a bit fearful herself about her own situation. She otherwise reports good relationships with family and friends. She feels as though her support network is good. She is currently living with her son and niece and says housing is stable; no financial concerns at this time she says.     We agreed  that periodic 1:1 counseling would be beneficial for her due to stress with the transplant process and COVID-19. We agreed to meet once every two to three weeks for now to help her feel supported and have additional objective guidance as she undergoes this process.     Answers for HPI/ROS submitted by the patient on 4/29/2021   If you checked off any problems, how difficult have these problems made it for you to do your work, take care of things at home, or get along with other people?: Not difficult at all  PHQ9 TOTAL SCORE: 0    Progress on Treatment Objective(s) / Homework:  New Objective established this session - PREPARATION (Decided to change - considering how); Intervened by negotiating a change plan and determining options / strategies for behavior change, identifying triggers, exploring social supports, and working towards setting a date to begin behavior change    Motivational Interviewing    MI Intervention: Expressed Empathy/Understanding, Supported Autonomy, Collaboration, Evocation, Permission to raise concern or advise, Open-ended questions, Reflections: simple and complex, Change talk (evoked) and Reframe     Change Talk Expressed by the Patient: Committment to change    Provider Response to Change Talk: E - Evoked more info from patient about behavior change, A - Affirmed patient's thoughts, decisions, or attempts at behavior change, R - Reflected patient's change talk and S - Summarized patient's change talk statements      Care Plan review completed: Yes    Medication Review:  No changes to current psychiatric medication(s)    Medication Compliance:  Yes    Changes in Health Issues:   Yes: Chronic disease management, Associated Psychological Distress    Chemical Use Review:   Substance Use: Chemical use reviewed, no active concerns identified      Tobacco Use: No current tobacco use.      Assessment: Current Emotional / Mental Status (status of significant symptoms):  Risk status (Self / Other  harm or suicidal ideation)  Patient denies a history of suicidal ideation, suicide attempts, self-injurious behavior, homicidal ideation, homicidal behavior and and other safety concerns  Patient denies current fears or concerns for personal safety.  Patient denies current or recent suicidal ideation or behaviors.  Patient denies current or recent homicidal ideation or behaviors.  Patient denies current or recent self injurious behavior or ideation.  Patient denies other safety concerns.  A safety and risk management plan has not been developed at this time, however patient was encouraged to call [place] [phone] should there be a change in any of these risk factors.     [place] Suicide Severity Rating Scale (Short Version)  [place] Suicide Severity Rating (Short Version) 12/14/2020   Over the past 2 weeks have you felt down, depressed, or hopeless? no   Over the past 2 weeks have you had thoughts of killing yourself? no   Have you ever attempted to kill yourself? no     [place] Suicide Severity Rating Scale (Lifetime/Recent)  [place] Suicide Severity Rating (Lifetime/Recent) 5/28/2021   1. Wish to be Dead (Lifetime) No   1. Wish to be Dead (Recent) No   2. Non-Specific Active Suicidal Thoughts (Lifetime) No   2. Non-Specific Active Suicidal Thoughts (Recent) No   3. Active Suicidal Ideation with any Methods (Not Plan) Without Intent to Act (Lifetime) No   3. Active Suicidal Ideation with any Methods (Not Plan) Without Intent to Act (Recent) No   4. Active Suicidal Ideation with Some Intent to Act, Without Specific Plan (Lifetime) No   4. Active Suicidal Ideation with Some Intent to Act, Without Specific Plan (Recent) No   5. Active Suicidal Ideation with Specific Plan and Intent (Lifetime) No   5. Active Suicidal Ideation with Specific Plan and Intent (Recent) No   Most Severe Ideation Rating (Lifetime) NA   Frequency (Lifetime) NA   Duration (Lifetime) NA   Controllability (Lifetime) NA   Protective Factors   (Lifetime) NA   Reasons for Ideation (Lifetime) NA   Most Severe Ideation Rating (Past Month) NA   Frequency (Past Month) NA   Duration (Past Month) NA   Controllability (Past Month) NA   Protective Factors (Past Month) NA   Reasons for Ideation (Past Month) NA   Actual Attempt (Lifetime) No   Actual Attempt (Past 3 Months) No   Has subject engaged in non-suicidal self-injurious behavior? (Lifetime) No   Has subject engaged in non-suicidal self-injurious behavior? (Past 3 Months) No   Interrupted Attempts (Lifetime) No   Interrupted Attempts (Past 3 Months) No   Aborted or Self-Interrupted Attempt (Lifetime) No   Aborted or Self-Interrupted Attempt (Past 3 Months) No   Preparatory Acts or Behavior (Lifetime) No   Preparatory Acts or Behavior (Past 3 Months) No   Most Recent Attempt Actual Lethality Code NA   Most Lethal Attempt Actual Lethality Code NA   Initial/First Attempt Actual Lethality Code NA   Some encounter information is confidential and restricted. Go to Review Flowsheets activity to see all data.           Appearance:   Appropriate   Eye Contact:   Good   Psychomotor Behavior: Normal   Attitude:   Cooperative  Friendly Pleasant  Orientation:   All  Speech   Rate / Production: Normal    Volume:  Normal   Mood:    Normal  Affect:    Appropriate   Thought Content:  Clear   Thought Form:  Coherent  Logical   Insight:    Good     Diagnoses:  1. Adjustment disorder, unspecified type        Collateral Reports Completed:  Not Applicable    Plan: (Homework, other):  Patient was given information about behavioral services and encouraged to schedule a follow up appointment with the clinic [facility] in 3 weeks.  She was also given information about mental health symptoms and treatment options .  CD Recommendations: Maintain Sobriety, 1:1 counseling; continue support group attendance.     Kapil Gerber Psy.D, LP   Behavioral Health Clinician   [facility]     4/29/2021

## 2021-04-30 ENCOUNTER — DOCUMENTATION ONLY (OUTPATIENT)
Dept: TRANSPLANT | Facility: CLINIC | Age: 51
End: 2021-04-30

## 2021-04-30 DIAGNOSIS — K74.60 CIRRHOSIS OF LIVER (H): Primary | ICD-10-CM

## 2021-04-30 DIAGNOSIS — R79.89 ELEVATED FERRITIN LEVEL: ICD-10-CM

## 2021-04-30 ASSESSMENT — PATIENT HEALTH QUESTIONNAIRE - PHQ9: SUM OF ALL RESPONSES TO PHQ QUESTIONS 1-9: 0

## 2021-04-30 NOTE — PROGRESS NOTES
Patient has been listed for Adult Liver Transplant. Transplant team, PCP and patient notified.  Disease:ETOH  MELD:18  ABO:B      TIBC/Ferrtin, hemmochormatosis re check to be done in 1 wk.

## 2021-04-30 NOTE — LETTER
April 30, 2021    Sarah DAMIAN Phillip  [address]  [place] [address]      Dear Ms. Fisher,    This letter is sent to confirm that you have completed your transplant work-up and you are a candidate in the liver transplant program at the [facility].  You were placed on the liver active waitlist.      When you are active on the waitlist and an organ becomes available, a coordinator will need to speak to you immediately.  You could be contacted at any time during the day or night as an organ could become available at any time.  Please make certain our office always has your current telephone numbers and address.      Items we will need from you:      We have received approval from your insurance company for the transplant procedure.  It is critical that you notify us if there is any change in your insurance.  It is also important that you familiarize yourself with the details of your specific insurance policy.  Our patient  is available to assist you if you should have any questions regarding your coverage.    You will need to have labs drawn frequently while you are listed. The frequency is based on your MELD score. Your score is 18, so please get every month. Below is a chart to help you understand how often to have labs drawn:                MELD greater than 25 - Weekly labs              MELD 18-24 - Monthly labs               Please call with any questions, or if you would like to have the lab location changed from a [place] lab.        Please inform us immediately if you go to an ER or are admitted to a hospital other than the [facility]      During this waiting period, we may request additional periodic laboratory tests with your primary physician.  It will be your responsibility to remind your physician to forward your results to the Transplant Office.      We need to be kept informed of any changes in your medical condition such  as:    o changes in your medications,   o significant changes in your health  o significant infections (such as pneumonia or abscesses)  o blood transfusions  o any condition which requires hospitalization  o any surgery      Remember to complete any routine cancer screening tests required before your transplant.  This includes colonoscopy; prostate screening for men, and mammogram and gynecologic testing for women, as well as dental work.  Your primary care clinic can assist you with arranging for these exams.  Remind your caregivers to forward copies of the records and final reports.    We want you to know that our program has physician and surgeon coverage 24 hours a day, 365 days a year. If this coverage changes or there are substantial program changes, you will be notified in writing by letter.     Attached is a letter from the [employer] for Organ Sharing (UNOS). It describes the services and information offered to patients by UNOS and the Organ Procurement and Transplantation Network.    We appreciate having had the opportunity to participate in your care.  If you have questions, please feel free to call the Transplant Office at [phone] or [phone].      Sincerely,    VIC Bolaños,RN  Adult Liver Coordinator  [phone]    Solid Organ Transplant  [facility]      Enclosures: UNOS Letter  cc: Care Team                          The Organ Procurement and Transplantation Network  Toll-free patient services line:     Your resource for organ transplant information    If you have a question regarding your own medical care, you always should call your transplant hospital first. However, for general organ transplant-related information, you can call the Organ Procurement and Transplantation Network (OPTN) toll-free patient services line at [phone]. Anyone, including potential transplant  candidates, candidates, recipients, family members, friends, living donors, and donor family members, can call this number to:          Talk about organ donation, living donation, the transplant process, the donation process, and transplant policies.    Get a free patient information kit with helpful booklets, waiting list and transplant information, and a list of all transplant hospitals.    Ask questions about the OPTN website (https://optn.transplant.hrsa.gov/), the [employer] for Organ Sharing s (UNOS) website (https://unos.org/), or the UNOS website for living donors and transplant recipients. (https://www.transplantliving.org/).    Learn how the OPTN can help you.    Talk about any concerns that you may have with a transplant hospital.    The [place] [facility] transplant system, the OPTN, is managed under federal contract by the [employer] Organ Sharing (UNOS), which is a non-profit charitable organization. The OPTN helps create and define organ sharing policies that make the best use of donated organs. This process continuously evaluating new advances and discoveries so policies can be adapted to best serve patients waiting for transplants. To do so, the OPTN works closely with transplant professionals, transplant patients, transplant candidates, donor families, living donors, and the public. All transplant programs and organ procurement organizations throughout the country are OPTN members and are obligated to follow the policies the OPTN creates for allocating organs.    The OPTN also is responsible for:      Providing educational material for patients, the public, and professionals.    Raising awareness of the need for donated organs and tissue.    Coordinating organ procurement, matching, and placement.    Collecting information about every organ transplant and donation that occurs in the [facility] in the [place].    Remember, you should contact your transplant hospital directly if you have questions or concerns  about your own medical care including medical records, work-up progress, and test results.    We are not your transplant hospital, and our staff will not be able to answer questions about your case, so please keep your transplant hospital s phone number handy.    However, while you research your transplant needs and learn as much as you can about transplantation and donation, we welcome your call to our toll-free patient services line at [phone].          Updated 4/1/2019

## 2021-05-01 ENCOUNTER — ORGAN (OUTPATIENT)
Dept: TRANSPLANT | Facility: CLINIC | Age: 51
End: 2021-05-01

## 2021-05-01 NOTE — TELEPHONE ENCOUNTER
There are three types of donors - living donors, brain dead donors, and DCD donors.  Living donation would be like if your family member donated an organ to you.  Brain death donors are  donors who have no brain stem reflexes whose bodies are being kept alive with life support.  And then there is your type of offer - DCD donation.  DCD stands for donation after cardiac death.  It is when the potential donor had a brain injury but did not progress to brain death, so the family is choosing to withdraw life support.    They will take away life support and wait for the heart to stop. This must happen within a certain timeframe to be suitable for organ donation.  There is a chance the donor may not pass away in time, which means you may not get the liver.  We have no way to predict how quickly the donor will pass.  Research has shown that DCD livers have a higher risk of biliary complications - strictures in particular.  About 20% of recipients who receive a DCD liver transplant develop biliary strictures, meaning the need for ERCP and stents, and worst case scenario if the strictures become so severe, retransplant may be indicated.  In our interest to give you the best organ possible, our surgeons may decline the liver intra-operatively if it looks like the risk of complications will be too significant.  (ERCP is similar to when you have an EGD.  They take a scope and look down inside of you & can place a stent in the bile ducts if needed.)  Reviewed with Maria Elena and her niece Radha. No questions at this time.  Rosario Jerry, RN, BA  On Call Organ Coordinator

## 2021-05-02 ENCOUNTER — ANESTHESIA (OUTPATIENT)
Dept: SURGERY | Facility: CLINIC | Age: 51
End: 2021-05-02
Payer: COMMERCIAL

## 2021-05-02 ENCOUNTER — ANESTHESIA EVENT (OUTPATIENT)
Dept: SURGERY | Facility: CLINIC | Age: 51
End: 2021-05-02
Payer: COMMERCIAL

## 2021-05-02 ENCOUNTER — APPOINTMENT (OUTPATIENT)
Dept: GENERAL RADIOLOGY | Facility: CLINIC | Age: 51
End: 2021-05-02
Attending: TRANSPLANT SURGERY
Payer: COMMERCIAL

## 2021-05-02 ENCOUNTER — HOSPITAL ENCOUNTER (OUTPATIENT)
Facility: CLINIC | Age: 51
Discharge: HOME | End: 2021-05-02
Attending: TRANSPLANT SURGERY | Admitting: TRANSPLANT SURGERY
Payer: COMMERCIAL

## 2021-05-02 VITALS
DIASTOLIC BLOOD PRESSURE: 61 MMHG | OXYGEN SATURATION: 98 % | SYSTOLIC BLOOD PRESSURE: 104 MMHG | WEIGHT: 170.4 LBS | TEMPERATURE: 98 F | BODY MASS INDEX: 27.38 KG/M2 | RESPIRATION RATE: 16 BRPM | HEIGHT: 66 IN | HEART RATE: 98 BPM

## 2021-05-02 PROBLEM — Z76.82 PRE-LIVER TRANSPLANT, LISTED: Status: ACTIVE | Noted: 2021-05-02

## 2021-05-02 LAB
ABO + RH BLD: NORMAL
ABO + RH BLD: NORMAL
ALBUMIN SERPL-MCNC: 3.1 G/DL (ref 3.4–5)
ALP SERPL-CCNC: 169 U/L (ref 40–150)
ALT SERPL W P-5'-P-CCNC: 16 U/L (ref 0–50)
AMYLASE SERPL-CCNC: 38 U/L (ref 30–110)
ANION GAP SERPL CALCULATED.3IONS-SCNC: 8 MMOL/L (ref 3–14)
APTT PPP: 39 SEC (ref 22–37)
AST SERPL W P-5'-P-CCNC: 37 U/L (ref 0–45)
B-HCG SERPL-ACNC: 8 IU/L (ref 0–5)
BASOPHILS # BLD AUTO: 0 10E9/L (ref 0–0.2)
BASOPHILS NFR BLD AUTO: 0.4 %
BILIRUB SERPL-MCNC: 5.4 MG/DL (ref 0.2–1.3)
BLD GP AB SCN SERPL QL: NORMAL
BLD PROD TYP BPU: NORMAL
BLD UNIT ID BPU: 0
BLOOD BANK CMNT PATIENT-IMP: NORMAL
BLOOD PRODUCT CODE: NORMAL
BPU ID: NORMAL
BUN SERPL-MCNC: 15 MG/DL (ref 7–30)
CALCIUM SERPL-MCNC: 8.6 MG/DL (ref 8.5–10.1)
CHLORIDE SERPL-SCNC: 99 MMOL/L (ref 94–109)
CO2 SERPL-SCNC: 26 MMOL/L (ref 20–32)
CREAT SERPL-MCNC: 1.15 MG/DL (ref 0.52–1.04)
DIFFERENTIAL METHOD BLD: ABNORMAL
EOSINOPHIL # BLD AUTO: 0.2 10E9/L (ref 0–0.7)
EOSINOPHIL NFR BLD AUTO: 2.2 %
ERYTHROCYTE [DISTWIDTH] IN BLOOD BY AUTOMATED COUNT: 14.3 % (ref 10–15)
FIBRINOGEN PPP-MCNC: 233 MG/DL (ref 200–420)
GFR SERPL CREATININE-BSD FRML MDRD: 55 ML/MIN/{1.73_M2}
GLUCOSE SERPL-MCNC: 119 MG/DL (ref 70–99)
HCT VFR BLD AUTO: 25.6 % (ref 35–47)
HGB BLD-MCNC: 8.3 G/DL (ref 11.7–15.7)
IMM GRANULOCYTES # BLD: 0 10E9/L (ref 0–0.4)
IMM GRANULOCYTES NFR BLD: 0.4 %
INR PPP: 1.69 (ref 0.86–1.14)
LABORATORY COMMENT REPORT: NORMAL
LYMPHOCYTES # BLD AUTO: 1 10E9/L (ref 0.8–5.3)
LYMPHOCYTES NFR BLD AUTO: 14.6 %
MAGNESIUM SERPL-MCNC: 1.8 MG/DL (ref 1.6–2.3)
MCH RBC QN AUTO: 35 PG (ref 26.5–33)
MCHC RBC AUTO-ENTMCNC: 32.4 G/DL (ref 31.5–36.5)
MCV RBC AUTO: 108 FL (ref 78–100)
MONOCYTES # BLD AUTO: 0.8 10E9/L (ref 0–1.3)
MONOCYTES NFR BLD AUTO: 11.2 %
NEUTROPHILS # BLD AUTO: 4.9 10E9/L (ref 1.6–8.3)
NEUTROPHILS NFR BLD AUTO: 71.2 %
NRBC # BLD AUTO: 0 10*3/UL
NRBC BLD AUTO-RTO: 0 /100
NUM BPU REQUESTED: 10
NUM BPU REQUESTED: 10
NUM BPU REQUESTED: 2
PHOSPHATE SERPL-MCNC: 3.3 MG/DL (ref 2.5–4.5)
PLATELET # BLD AUTO: 136 10E9/L (ref 150–450)
POTASSIUM SERPL-SCNC: 2.8 MMOL/L (ref 3.4–5.3)
POTASSIUM SERPL-SCNC: 3.9 MMOL/L (ref 3.4–5.3)
PROT SERPL-MCNC: 6.1 G/DL (ref 6.8–8.8)
RBC # BLD AUTO: 2.37 10E12/L (ref 3.8–5.2)
SARS-COV-2 RNA RESP QL NAA+PROBE: NEGATIVE
SODIUM SERPL-SCNC: 133 MMOL/L (ref 133–144)
SPECIMEN EXP DATE BLD: NORMAL
SPECIMEN SOURCE: NORMAL
TRANSFUSION STATUS PATIENT QL: NORMAL
WBC # BLD AUTO: 6.9 10E9/L (ref 4–11)

## 2021-05-02 PROCEDURE — U0005 INFEC AGEN DETEC AMPLI PROBE: HCPCS | Performed by: STUDENT IN AN ORGANIZED HEALTH CARE EDUCATION/TRAINING PROGRAM

## 2021-05-02 PROCEDURE — 86665 EPSTEIN-BARR CAPSID VCA: CPT | Performed by: STUDENT IN AN ORGANIZED HEALTH CARE EDUCATION/TRAINING PROGRAM

## 2021-05-02 PROCEDURE — 86706 HEP B SURFACE ANTIBODY: CPT | Performed by: STUDENT IN AN ORGANIZED HEALTH CARE EDUCATION/TRAINING PROGRAM

## 2021-05-02 PROCEDURE — 86923 COMPATIBILITY TEST ELECTRIC: CPT | Performed by: STUDENT IN AN ORGANIZED HEALTH CARE EDUCATION/TRAINING PROGRAM

## 2021-05-02 PROCEDURE — U0003 INFECTIOUS AGENT DETECTION BY NUCLEIC ACID (DNA OR RNA); SEVERE ACUTE RESPIRATORY SYNDROME CORONAVIRUS 2 (SARS-COV-2) (CORONAVIRUS DISEASE [COVID-19]), AMPLIFIED PROBE TECHNIQUE, MAKING USE OF HIGH THROUGHPUT TECHNOLOGIES AS DESCRIBED BY CMS-2020-01-R: HCPCS | Performed by: STUDENT IN AN ORGANIZED HEALTH CARE EDUCATION/TRAINING PROGRAM

## 2021-05-02 PROCEDURE — 80053 COMPREHEN METABOLIC PANEL: CPT | Performed by: STUDENT IN AN ORGANIZED HEALTH CARE EDUCATION/TRAINING PROGRAM

## 2021-05-02 PROCEDURE — 86704 HEP B CORE ANTIBODY TOTAL: CPT | Performed by: STUDENT IN AN ORGANIZED HEALTH CARE EDUCATION/TRAINING PROGRAM

## 2021-05-02 PROCEDURE — 82150 ASSAY OF AMYLASE: CPT | Performed by: STUDENT IN AN ORGANIZED HEALTH CARE EDUCATION/TRAINING PROGRAM

## 2021-05-02 PROCEDURE — 87521 HEPATITIS C PROBE&RVRS TRNSC: CPT | Performed by: STUDENT IN AN ORGANIZED HEALTH CARE EDUCATION/TRAINING PROGRAM

## 2021-05-02 PROCEDURE — 84132 ASSAY OF SERUM POTASSIUM: CPT | Performed by: STUDENT IN AN ORGANIZED HEALTH CARE EDUCATION/TRAINING PROGRAM

## 2021-05-02 PROCEDURE — 87516 HEPATITIS B DNA AMP PROBE: CPT | Performed by: STUDENT IN AN ORGANIZED HEALTH CARE EDUCATION/TRAINING PROGRAM

## 2021-05-02 PROCEDURE — 99207 PR NO BILLABLE SERVICE THIS VISIT: CPT | Mod: GC | Performed by: TRANSPLANT SURGERY

## 2021-05-02 PROCEDURE — 85384 FIBRINOGEN ACTIVITY: CPT | Performed by: STUDENT IN AN ORGANIZED HEALTH CARE EDUCATION/TRAINING PROGRAM

## 2021-05-02 PROCEDURE — P9059 PLASMA, FRZ BETWEEN 8-24HOUR: HCPCS | Performed by: TRANSPLANT SURGERY

## 2021-05-02 PROCEDURE — 85610 PROTHROMBIN TIME: CPT | Performed by: STUDENT IN AN ORGANIZED HEALTH CARE EDUCATION/TRAINING PROGRAM

## 2021-05-02 PROCEDURE — 86803 HEPATITIS C AB TEST: CPT | Performed by: STUDENT IN AN ORGANIZED HEALTH CARE EDUCATION/TRAINING PROGRAM

## 2021-05-02 PROCEDURE — 71046 X-RAY EXAM CHEST 2 VIEWS: CPT

## 2021-05-02 PROCEDURE — 86901 BLOOD TYPING SEROLOGIC RH(D): CPT | Performed by: STUDENT IN AN ORGANIZED HEALTH CARE EDUCATION/TRAINING PROGRAM

## 2021-05-02 PROCEDURE — P9016 RBC LEUKOCYTES REDUCED: HCPCS | Performed by: STUDENT IN AN ORGANIZED HEALTH CARE EDUCATION/TRAINING PROGRAM

## 2021-05-02 PROCEDURE — 83540 ASSAY OF IRON: CPT | Performed by: STUDENT IN AN ORGANIZED HEALTH CARE EDUCATION/TRAINING PROGRAM

## 2021-05-02 PROCEDURE — 84702 CHORIONIC GONADOTROPIN TEST: CPT | Performed by: STUDENT IN AN ORGANIZED HEALTH CARE EDUCATION/TRAINING PROGRAM

## 2021-05-02 PROCEDURE — 86644 CMV ANTIBODY: CPT | Performed by: STUDENT IN AN ORGANIZED HEALTH CARE EDUCATION/TRAINING PROGRAM

## 2021-05-02 PROCEDURE — 999N000141 HC STATISTIC PRE-PROCEDURE NURSING ASSESSMENT: Performed by: TRANSPLANT SURGERY

## 2021-05-02 PROCEDURE — 999N000054 HC STATISTIC EKG NON-CHARGEABLE

## 2021-05-02 PROCEDURE — 82728 ASSAY OF FERRITIN: CPT | Performed by: STUDENT IN AN ORGANIZED HEALTH CARE EDUCATION/TRAINING PROGRAM

## 2021-05-02 PROCEDURE — 999N000127 HC STATISTIC PERIPHERAL IV START W US GUIDANCE

## 2021-05-02 PROCEDURE — 87340 HEPATITIS B SURFACE AG IA: CPT | Performed by: STUDENT IN AN ORGANIZED HEALTH CARE EDUCATION/TRAINING PROGRAM

## 2021-05-02 PROCEDURE — 999N000004 HC CANCELLED SURGERY UP TO 46-60 MINS: Performed by: TRANSPLANT SURGERY

## 2021-05-02 PROCEDURE — 87535 HIV-1 PROBE&REVERSE TRNSCRPJ: CPT | Performed by: STUDENT IN AN ORGANIZED HEALTH CARE EDUCATION/TRAINING PROGRAM

## 2021-05-02 PROCEDURE — 250N000013 HC RX MED GY IP 250 OP 250 PS 637: Performed by: STUDENT IN AN ORGANIZED HEALTH CARE EDUCATION/TRAINING PROGRAM

## 2021-05-02 PROCEDURE — 999N001063 HC STATISTIC THAWING COMPONENT: Performed by: TRANSPLANT SURGERY

## 2021-05-02 PROCEDURE — 87389 HIV-1 AG W/HIV-1&-2 AB AG IA: CPT | Performed by: STUDENT IN AN ORGANIZED HEALTH CARE EDUCATION/TRAINING PROGRAM

## 2021-05-02 PROCEDURE — 83550 IRON BINDING TEST: CPT | Performed by: STUDENT IN AN ORGANIZED HEALTH CARE EDUCATION/TRAINING PROGRAM

## 2021-05-02 PROCEDURE — 83735 ASSAY OF MAGNESIUM: CPT | Performed by: STUDENT IN AN ORGANIZED HEALTH CARE EDUCATION/TRAINING PROGRAM

## 2021-05-02 PROCEDURE — 84100 ASSAY OF PHOSPHORUS: CPT | Performed by: STUDENT IN AN ORGANIZED HEALTH CARE EDUCATION/TRAINING PROGRAM

## 2021-05-02 PROCEDURE — 86850 RBC ANTIBODY SCREEN: CPT | Performed by: STUDENT IN AN ORGANIZED HEALTH CARE EDUCATION/TRAINING PROGRAM

## 2021-05-02 PROCEDURE — 36415 COLL VENOUS BLD VENIPUNCTURE: CPT | Performed by: STUDENT IN AN ORGANIZED HEALTH CARE EDUCATION/TRAINING PROGRAM

## 2021-05-02 PROCEDURE — 85025 COMPLETE CBC W/AUTO DIFF WBC: CPT | Performed by: STUDENT IN AN ORGANIZED HEALTH CARE EDUCATION/TRAINING PROGRAM

## 2021-05-02 PROCEDURE — 93010 ELECTROCARDIOGRAM REPORT: CPT | Mod: 59 | Performed by: INTERNAL MEDICINE

## 2021-05-02 PROCEDURE — 86900 BLOOD TYPING SEROLOGIC ABO: CPT | Performed by: STUDENT IN AN ORGANIZED HEALTH CARE EDUCATION/TRAINING PROGRAM

## 2021-05-02 PROCEDURE — 71046 X-RAY EXAM CHEST 2 VIEWS: CPT | Mod: 26 | Performed by: RADIOLOGY

## 2021-05-02 PROCEDURE — 86645 CMV ANTIBODY IGM: CPT | Performed by: STUDENT IN AN ORGANIZED HEALTH CARE EDUCATION/TRAINING PROGRAM

## 2021-05-02 PROCEDURE — 87081 CULTURE SCREEN ONLY: CPT | Performed by: STUDENT IN AN ORGANIZED HEALTH CARE EDUCATION/TRAINING PROGRAM

## 2021-05-02 PROCEDURE — 85730 THROMBOPLASTIN TIME PARTIAL: CPT | Performed by: STUDENT IN AN ORGANIZED HEALTH CARE EDUCATION/TRAINING PROGRAM

## 2021-05-02 RX ORDER — LIDOCAINE 40 MG/G
CREAM TOPICAL
Status: DISCONTINUED | OUTPATIENT
Start: 2021-05-02 | End: 2021-05-02 | Stop reason: HOSPADM

## 2021-05-02 RX ORDER — MEROPENEM 500 MG/1
500 INJECTION, POWDER, FOR SOLUTION INTRAVENOUS EVERY 6 HOURS PRN
Status: DISCONTINUED | OUTPATIENT
Start: 2021-05-02 | End: 2021-05-02 | Stop reason: HOSPADM

## 2021-05-02 RX ORDER — MEROPENEM 500 MG/1
500 INJECTION, POWDER, FOR SOLUTION INTRAVENOUS ONCE
Status: DISCONTINUED | OUTPATIENT
Start: 2021-05-02 | End: 2021-05-02 | Stop reason: HOSPADM

## 2021-05-02 RX ORDER — FLUCONAZOLE 2 MG/ML
400 INJECTION, SOLUTION INTRAVENOUS ONCE
Status: DISCONTINUED | OUTPATIENT
Start: 2021-05-02 | End: 2021-05-02 | Stop reason: HOSPADM

## 2021-05-02 RX ORDER — NOREPINEPHRINE BITARTRATE 0.06 MG/ML
0.03-0.4 INJECTION, SOLUTION INTRAVENOUS CONTINUOUS
Status: DISCONTINUED | OUTPATIENT
Start: 2021-05-02 | End: 2021-05-02 | Stop reason: HOSPADM

## 2021-05-02 RX ORDER — POTASSIUM CHLORIDE 20MEQ/15ML
60 LIQUID (ML) ORAL ONCE
Status: COMPLETED | OUTPATIENT
Start: 2021-05-02 | End: 2021-05-02

## 2021-05-02 RX ADMIN — POTASSIUM CHLORIDE 60 MEQ: 20 SOLUTION ORAL at 05:37

## 2021-05-02 ASSESSMENT — MIFFLIN-ST. JEOR: SCORE: 1404.68

## 2021-05-02 NOTE — PROGRESS NOTES
CLINICAL NUTRITION SERVICES - ASSESSMENT NOTE     Nutrition Prescription    RECOMMENDATIONS FOR MDs/PROVIDERS TO ORDER:  --Diet advancement vs nutrition support in 2-3 days.     Malnutrition Status:    Severe malnutrition in the context of chronic illness    Recommendations already ordered by Registered Dietitian (RD):  --Provided verbal education on post-transplant guidelines.     Future/Additional Recommendations:  --If EN becomes POC:  --GOAL: Osmolite 1.5 Blair @ goal of 50ml/hr (1200ml/day) +4 Prosource will provide: 1960 kcals (31 kcal/kg), 119 g PRO (1.9 g/kg), 914 ml free H20, 244 g CHO, and 0 g fiber daily.  --Start TF @ 10 ml/hr and advance by 10 ml q 8 hrs until goal rate.  --Do not start or advance TF rate unless K+ >3.0, Mg++ > 1.5,  and Phos > 1.9.  --Minimum 30 ml q 4 hrs water flushes for tube patency.  --If gastric enteral access: HOB > 30 degrees.  --MVI/minerals supplement if not already ordered    --Additional education as able/appropriate regarding post-transplant nutrition and food safety (please provide handouts/booklet).    --Please order Ensure Enlive Shakes (vanilla or chocolate) once able post-op.      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    Noted, plan is for liver transplant today.      Visited with pt this morning. Pt reports PO intake PTA was good, denies weight changes. Pt was very engaged in conversation inquiring about nutrition post-op. Discussed post-transplant nutrition therapy and food safety, also discussed with pt possible need for feeding tube post-op. Discussed with pt that RD will provide written  "handouts/booklets and additional education once post-op. Pt usually drinks Ensure mixed with [place] and very cold at home (doesn't like plain Ensure). Pt agreed to try vanilla or chocolate Ensure Enlive Shakes once able.     CURRENT NUTRITION ORDERS  Diet: NPO    LABS  K+ 3.9 (WNL <- 2.8)  Cr 1.15 (H)  Mg++ 1.8 (low normal)  Phos 3.3 (WNL)  Total bili 5.4 (H)    MEDICATIONS  Solumedrol  Epinephrine (not started, ordered for post-op)  Norepi (not started, ordered for post-op)  Vasopressin (not started, ordered for post-op)    ANTHROPOMETRICS  Height: 167.6 cm (5' 6\")  Most Recent Weight: 77.3 kg (170 lb 6.4 oz)    IBW: 59.1 kg (131% IBW)  BMI: Overweight BMI 25-29.9 (27.45 kg/m2, within range for liver transplant)  Weight History: difficult to assess 2/2 ascites and suspected fluid shifts  6% wt loss x 5 months (not clinically significant)  Wt Readings from Last 20 Encounters:   05/02/21 77.3 kg (170 lb 6.4 oz)   03/17/21 76.3 kg (168 lb 3.2 oz)   02/18/21 79.4 kg (175 lb)   01/04/21 85.2 kg (187 lb 13.3 oz)   01/04/21 85.2 kg (187 lb 14.4 oz)   12/18/20 81.9 kg (180 lb 9.6 oz)   12/03/20 81.6 kg (180 lb)   11/30/20: 85.3kg  11/14/20: 78.9kg  10/30/20: 80.7kg  10/7/20: 85.6kg  9/24/20: 91.2kg  9/8/20: 90.7kg --> 15% wt loss, significant though difficult to assess due to suspected fluid shifts  5/17/17: 104.3kg    Dosing Weight: 64 kg adjusted using IBW (59.1 kg) and 77 kg admission weight    ASSESSED NUTRITION NEEDS  Estimated Energy Needs: [id number] kcals/day (25 - 30 - 35 kcals/kg)  Justification: Increased needs and Post-SOT (lower end if intubated, higher end if extubated)  Estimated Protein Needs:  grams protein/day (1.5 - 2 grams of pro/kg)  Justification: Increased needs post-SOT  Estimated Fluid Needs: (1 mL/kcal)   Justification: Maintenance and Per provider pending fluid status    PHYSICAL FINDINGS  See malnutrition section below.     MALNUTRITION  % Intake: No decreased intake noted  % Weight " Loss: > 10% in 6 months (severe), may be confounded by fluid shifts  Subcutaneous Fat Loss: Facial region:  mild and Upper arm:  moderate  Muscle Loss: Temporal:  moderate, Thoracic region (clavicle, acromium bone, deltoid, trapezius, pectoral):  moderate, Upper arm (bicep, tricep):  moderate and Upper leg (quadricep, hamstring):  moderate  Fluid Accumulation/Edema: None noted  Malnutrition Diagnosis: Severe malnutrition in the context of chronic illness    NUTRITION DIAGNOSIS  Inadequate oral intake related to NPO status in preparation for surgery and suspected hypermetabolism 2/2 diagnosis as evidenced by current diet order and muscle/fat wasting noted on NFPE significant for severe malnutrition.     INTERVENTIONS  Implementation  Nutrition Education: RD role, verbal post-transplant and food safety education, possibility of feeding tube placement   Enteral Nutrition - recs  Medical food supplement therapy -recs    Goals  Diet adv v nutrition support within 2-3 days.     Monitoring/Evaluation  Progress toward goals will be monitored and evaluated per protocol.    Criss Prabhakar MS, RD, LD, CNSC  Weekend Pager: [phone]  7A RD Pager: [phone]  [facility] RD: [phone] *[phone]

## 2021-05-02 NOTE — DISCHARGE SUMMARY
DISCHARGE:  Patient with orders to discharge to home.     Education Provided:   LDAs PIV removed    Belongings returned from security: home medications returned.  Personal belongings accounted for.    Patient in stable condition.  Patient transferred out independently.

## 2021-05-02 NOTE — PLAN OF CARE
7A PT    PT: Orders received, chart reviewed. Pt has planned liver transplant today. Anticipate new orders post-op. Will reschedule.

## 2021-05-02 NOTE — PLAN OF CARE
OT 7A. Orders received and appreciated. Per chart review, Pt has planned liver transplant today. Anticipate new orders post-op. Will reschedule and initiate as appropriate. Thank you.

## 2021-05-02 NOTE — PROGRESS NOTES
Brief phone consult note    Sarah Fisher, 51 year old, is a postmenopausal women who is about to have liver transplant. Her serum quantitative hcg was 8 international unit(s)/L. Low levels of hCG are fairly commonly detected in postmenopausal women.     The hCG isolated from the pituitary contains approximately one-half the sialic acid content of pregnancy hCG, and some of the glycans are sulphated (hCG-S). hCG-S has 50 to 65 percent of the biologic activity of hCG in pregnancy urine. The plasma concentration of hCG-S increases during menopause and ranges, for the majority of patients, from 0.5 to 5 international units/L, although levels up to 10 to 15 international units/L are occasionally observed in hypogonadal states, particularly in menopause/perimenopause or following chemotherapy.     With patient being postmenopausal, this is very unlikely to be a pregnancy. Would recommend checking urine qualitative pregnancy test in the future.    Plan was discussed with Dr. Dior.    Tavia Squires MD ([email or web address])  [facility] PGY-3  05/02/2021

## 2021-05-02 NOTE — PLAN OF CARE
"/61 (BP Location: Left arm)   Pulse 102   Temp 98.2  F (36.8  C) (Oral)   Resp 16   Ht 1.676 m (5' 6\")   Wt 77.3 kg (170 lb 6.4 oz)   SpO2 96%   BMI 27.50 kg/m      Reason for Admission:  Pt. admitted from home for liver transplant. Pt. oriented to call light & unit routines. 2 RN skin check complete. Pt's home meds sent to security. Call light & pt's belongings within reach. Labs, EKG, CXR, 1 pre-op shower, med rec.,  admission profile & PCS complete.   Neuro:  Pt. alert & Ox4  Behavior: Pt. calm & cooperative with cares.   Activity: Pt. up ad srinivas.  Vital: Pt. tachy 99, AOVSS on RA.  LDAs: Right PIV saline locked.   Cardiac: Tachycardia  Respiratory: LS clear bilat.  GI/: Pt. voiding spontaneously, no stools this shift.   Skin: Intact. Protective Mepilex over sacrum. Pt. took 1 pre-op shower & needs 1 more.  Pain/Nausea: Pt. denies pain or nausea.  Diet: NPO  Labs/Imaging: Covid negative. K+ 2.8 & treated with KCL 60meq oral solution x1 per order from surgery cross-cover.  Recheck K+ at 8am per order.  Plan: OR time at 1300. Continue to follow POC & notify MD with change in status.      "

## 2021-05-02 NOTE — TELEPHONE ENCOUNTER
Organ Offer Encounter Information    Organ Offer Information  Organ offer date & time: 2021  4:00 PM  Coordinator/Fellow/Attending name: Rosario Jerry RN   Organ(s):  Organ UNOS ID Match Run ID Comment Organ Laterality   Liver [id number]       Recent infections?: No    New medications?: No Recent pregnancy?: No   Angicoagulation medications?: No Recent vaccinations?: Yes (Comment: 2nd COVID 2021)   Recent blood transfusions?: No Recent hospitalizations?: No   Has your insurance changed in the last 6-12 months?: Neg    Discussed organ offer with: Patient, Caregiver  Patient/Caregiver name: Maria Elena Fisher and Radha Mooneyid  Discussed risk category with Patient/Other: DCD  Understood donor criteria, verbalized understanding  Patient/Other asked to speak to a surgeon?: No  Discussed program-specific outcomes: Did not have questions regarding SRTR  Right to decline organ offer without penalty, Patient/Other: Aware of option to decline without penalty  Organ offer decision status Patient/Other: Accepted Offer  Organ disposition: Case Cancelled - Other (specify) (Comment: DONOR DIDN'T )  Additional Comments:     2021 4:23 PM  Liver: DCD  MD: Alberto/Breonna  OPO Contact: Fatoumata Gunderson [phone]  Donor/Recip HCV Status: Negative/Negative  (HCV+ Donors - Discuss HCV genotyping/quant testing with MD & send message to [facility] HCV POOL - Include Donor UNOS ID)  Donor Nutritional Status: None since 2021  Plan (NPO, Donor OR): Donor OR pending.  Maria Elena will wait for further instructions.  Expressed understanding of DCD risks. Explained that the oncoming coordinator, Neena, will call her between [phone]  - - -   COVID Screening  In the past month, have you had:  Any close contact with a suspect or laboratory-confirmed COVID-19 patient: No  Travel anywhere: No  Fever: No  Cough: No  Short of Breath: No  Loss of Taste/Smell: No  Rash: No  Rosario Jerry, RN, BA  On Call Organ  Coordinator    2021 9:05 PM:  Called patient. Answered many questions-patient only got activated yesterday. Plan to admit patient as soon as possible- only able to get in at 0030. Patient aware- provided admission instructions. Informed OR tomorrow at 1300 for recip. Donor OR at noon.  Radha Suarez  Transplant Coordinator    Admissions: Zev   Unit: 7A:  Munira  Update Provider Entering Orders (XM Plan & COVID Testing):  Paged [phone] DANY SMITH [ Msg Id [id number] ]  Immunology: Sylvia   Inpatient Lab (COVID Testing [phone], Option 2): Kaitlin   Book OR: Amanda --Booked for 1300  Vessel Storage Confirmation (PA/EULALIA/GAGANDEEP): Confirmed YES to bank- Amanda   Blood Bank: Done   Research: ON HOLD  TransNet/ABO Verification: Done - confirmed received with Amanda   Add Organ: Done     Donor OR Time: 1200  Procuring MD: Dr. Ravi, Dr Wheeler and Dr. Cornell  Contact in the OR: Eleanor  Organs Being Procured: KAMLA DONIS, SHANTELLE, heart and lungs for research enbloc  Flush Solution: sps  Biopsy: LI: on standby, KI: No per protocol  Pump: KI: Yes  Special Requests: No  MD for Visualization: Dr. Dominguez  Transportation Details: Pickup 1015 outside ED. Depart 1030. [place]uddle 1130. Tail # [id number].    Radha Suarez  Transplant Coordinator    2021 3:35 PM:  Donor did not . Cancelled OR, notified blood bank. Sangeeta at 7A notified. Removed organ from record.  Radha Suarez  Transplant Coordinator                    Attestation I have discussed all of the above with the Patient/Legal Guardian/Caregiver regarding this organ offer.: Yes  Coordinator/Fellow/Attending name: Rosario Jerry RN

## 2021-05-02 NOTE — PROGRESS NOTES
"/61   Pulse 98   Temp 98  F (36.7  C) (Oral)   Resp 16   Ht 1.676 m (5' 6\")   Wt 77.3 kg (170 lb 6.4 oz)   SpO2 98%   BMI 27.50 kg/m      Assumed cares [id number]; Pt went to OR @1420  Neuro: A/Ox4  Pain/Nausea: denies  Cardiac: tachycardic  Resp: lung sounds clear, equal bilaterally  GI/: last BM 5/1; voiding spontaneously  Diet/Appetite: NPO since midnight  Access: PIV- saline locked  Activity: up ad srinivas  Plan: OR  For DDLT today  Pre-op checklist has been completed, consents are signed  Will continue with plan of care and notify team of any changes.?    Lyndsey Mendez RN on 5/2/2021 at 2:24 PM    "

## 2021-05-02 NOTE — TELEPHONE ENCOUNTER
"TRANSPLANT OR REPORT    Organ: Liver  Laterality (if known): NA  Organ Location: Local    UNOS ID: [id number]  Donor OR Time: 1200  Expected/Actual Cross Clamp Time: 1220  Expected Organ Arrival Time: 1500    Surgeon: Alberto  Time in OR: 1300  Time in 3C (N/A for LI): NA    Recipient Details  Admission ETA: 0030  Unit: 7A  Isolation: No  Latex Allergy: No  : No  Diagnosis: Alcohol Cirrhosis    Liver Transplants  Bypass: No  Hemodialysis: No  ~ \"RENAL STAFF TEACHING SERVICE MEDICINE\" : NA  ~ CRRT Resource Nurse: LORRAINE  (Telephone Number for CRRT [phone])    Kidney/Panc Transplants  XM Status (Need to wait for XM?): No    Liver or KP/PA Recipients - Vessel Banking:  Donor has positive serologies for HIV/HCV/HBV: No  Donor has risk criteria for HIV/HCV/HBV: No      Transplant Coordinator Contact Info:   Neena       Vessel Bank Information  Transplant hospitals must not store a donor s extra vessels if the donor has tested positive for any of the following:   - HIV by antibody, antigen, or nucleic acid test (JODIE)   - Hepatitis B surface antigen (HBsAg)   - Hepatitis B (HBV) by JODIE   - Hepatitis C (HCV) by antibody or JODIE     Extra vessels from donors that do not test positive for HIV, HBV, or HCV as above may be stored      "

## 2021-05-02 NOTE — PROGRESS NOTES
Admitted/transferred from: Home  Time of arrival on unit 0036  2 RN full  skin assessment completed by REAL Mcbride & GIUSEPPE Sanchez  Skin assessment finding: spider veins on feet.   Interventions/actions: skin interventions protective Mepilex over sacrum     Will continue to monitor.

## 2021-05-02 NOTE — ANESTHESIA PREPROCEDURE EVALUATION
Anesthesia Pre-Procedure Evaluation    Patient: Sarah Fisher   MRN: [id number] : 1970        Preoperative Diagnosis: * No pre-op diagnosis entered *   Procedure : Procedure(s):  TRANSPLANT, LIVER, RECIPIENT,  DONOR     Past Medical History:   Diagnosis Date     Ascites      History of blood transfusion      Liver cirrhosis (H)      Thyroid disease       Past Surgical History:   Procedure Laterality Date      SECTION  2009     THORACENTESIS Left 2021    Procedure: THORACENTESIS;  Surgeon: Almas rIby MD;  Location: [facility]     THORACENTESIS N/A 2021    Procedure: THORACENTESIS;  Surgeon: Almas Irby MD;  Location: [facility]     THORACENTESIS N/A 03/10/2021    Procedure: THORACENTESIS;  Surgeon: Almas Irby MD;  Location: [facility]      Allergies   Allergen Reactions     Amoxicillin GI Disturbance, Diarrhea, Nausea and Nausea and Vomiting      Social History     Tobacco Use     Smoking status: Never Smoker     Smokeless tobacco: Never Used   Substance Use Topics     Alcohol use: Not Currently     Frequency: Never     Comment: Quit on 2020      Wt Readings from Last 1 Encounters:   21 77.3 kg (170 lb 6.4 oz)        Anesthesia Evaluation            ROS/MED HX  ENT/Pulmonary:       Neurologic: Comment: Hepatic encephalopathy      Cardiovascular:     (+) -----Previous cardiac testing   Echo: Date: Results:    Stress Test: Date: 2021 Results:  Interpretation Summary  Normal dobutamine stress echocardiogram without evidence of inducible  ischemia. Target heart rate was achieved. Heart rate and blood pressure  response to dobutamine were normal. Normal LV function and wall motion at  rest. With stress, the left ventricular ejection fraction increased from 55-  60% to greater than 65% and the left ventricular size decreased appropriately.  No regional wall motion abnormality with stress.  No subjective symptoms to suggest ischemia.  There was no ECG evidence of ischemia.  No  significant valve disease on screening doppler evaluation. The aortic root  and visualized ascending aorta are normal.  IVC diameter and respiratory changes fall into an intermediate range  suggesting an RA pressure of 8 mmHg.  Right ventricular systolic pressure could not be approximated due to  inadequate tricuspid regurgitation.  There is no pericardial effusion. Large pleural effusion.  ECG Reviewed: Date: 5/2/2021 Results:  SR with PAC's  Cath: Date: Results:      METS/Exercise Tolerance:     Hematologic: Comments: Hx of BUNNY quarles    (+) History of blood clots, history of blood transfusion,     Musculoskeletal:  - neg musculoskeletal ROS     GI/Hepatic: Comment: ESLD 2/2 alcoholic cirrhosis, c/b Esophageal and rectal varices    (+) liver disease,     Renal/Genitourinary:     (+) renal disease,     Endo:     (+) thyroid problem, hypothyroidism,     Psychiatric/Substance Use:     (+) alcohol abuse     Infectious Disease:       Malignancy:       Other:            Physical Exam    Airway        Mallampati: II   TM distance: > 3 FB   Neck ROM: full   Mouth opening: > 3 cm    Respiratory Devices and Support         Dental           Cardiovascular   cardiovascular exam normal       Rhythm and rate: regular and normal     Pulmonary   pulmonary exam normal        breath sounds clear to auscultation           OUTSIDE LABS:  CBC:   Lab Results   Component Value Date    WBC 6.9 05/02/2021    WBC 6.3 04/15/2021    HGB 8.3 (L) 05/02/2021    HGB 8.7 (L) 04/15/2021    HCT 25.6 (L) 05/02/2021    HCT 27.3 (L) 04/15/2021     (L) 05/02/2021     (L) 04/15/2021     BMP:   Lab Results   Component Value Date     05/02/2021     04/15/2021    POTASSIUM 2.8 (L) 05/02/2021    POTASSIUM 3.0 (L) 04/15/2021    CHLORIDE 99 05/02/2021    CHLORIDE 103 04/15/2021    CO2 26 05/02/2021    CO2 25 04/15/2021    BUN 15 05/02/2021    BUN 16 04/15/2021    CR 1.15 (H) 05/02/2021    CR 1.13 (H) 04/15/2021     (H)  05/02/2021     (H) 04/15/2021     COAGS:   Lab Results   Component Value Date    PTT 39 (H) 05/02/2021    INR 1.69 (H) 05/02/2021    FIBR 233 05/02/2021     POC:   Lab Results   Component Value Date    HCGS Negative 01/04/2021     HEPATIC:   Lab Results   Component Value Date    ALBUMIN 3.1 (L) 05/02/2021    PROTTOTAL 6.1 (L) 05/02/2021    ALT 16 05/02/2021    AST 37 05/02/2021    ALKPHOS 169 (H) 05/02/2021    BILITOTAL 5.4 (H) 05/02/2021     OTHER:   Lab Results   Component Value Date    A1C 4.2 03/17/2021    EMMY 8.6 05/02/2021    PHOS 3.3 05/02/2021    MAG 1.8 05/02/2021    AMYLASE 38 05/02/2021    TSH 5.38 (H) 03/17/2021    T4 1.48 (H) 03/17/2021       Anesthesia Plan    ASA Status:  4   NPO Status:  NPO Appropriate    Anesthesia Type: General.     - Airway: ETT   Induction: Intravenous.   Maintenance: Balanced.   Techniques and Equipment:     - Lines/Monitors: 2nd IV, Arterial Line, Central Line, PAC, AMBER            AMBER Absolute Contra-indication: NONE            AMBER Relative Contra-indication: Esophageal Varices     - Blood: Blood in Room, PRBC, FFP, Cell Saver, PLT, Cryo     - Drips/Meds: Norepi, Vasopressin, Epinephrine     Consents    Anesthesia Plan(s) and associated risks, benefits, and realistic alternatives discussed. Questions answered and patient/representative(s) expressed understanding.     - Discussed with:  Patient      - Extended Intubation/Ventilatory Support Discussed: Yes.      - Patient is DNR/DNI Status: No    Use of blood products discussed: Yes.     - Discussed with: Patient.     - Consented: consented to blood products            Reason for refusal: other.     Postoperative Care    Pain management: IV analgesics.        Comments:                Deon Charlton MD

## 2021-05-03 ENCOUNTER — TELEPHONE (OUTPATIENT)
Dept: TRANSPLANT | Facility: CLINIC | Age: 51
End: 2021-05-03

## 2021-05-03 DIAGNOSIS — R79.89 ELEVATED FERRITIN: Primary | ICD-10-CM

## 2021-05-03 LAB
CMV IGG SERPL QL IA: >8 AI (ref 0–0.8)
CMV IGM SERPL QL IA: 0.2 AI (ref 0–0.8)
EBV VCA IGG SER QL IA: 2.5 AI (ref 0–0.8)
EBV VCA IGM SER QL IA: <0.2 AI (ref 0–0.8)
FERRITIN SERPL-MCNC: 72 NG/ML (ref 8–252)
HBV CORE AB SERPL QL IA: NONREACTIVE
HBV SURFACE AB SERPL IA-ACNC: 5.24 M[IU]/ML
HBV SURFACE AG SERPL QL IA: NONREACTIVE
HCV AB SERPL QL IA: NONREACTIVE
HIV 1+2 AB+HIV1 P24 AG SERPL QL IA: NONREACTIVE
INTERPRETATION ECG - MUSE: NORMAL
IRON SATN MFR SERPL: 73 % (ref 15–46)
IRON SERPL-MCNC: 259 UG/DL (ref 35–180)
TIBC SERPL-MCNC: 355 UG/DL (ref 240–430)

## 2021-05-05 DIAGNOSIS — R79.0 ABNORMAL SERUM IRON LEVEL: Primary | ICD-10-CM

## 2021-05-05 DIAGNOSIS — R79.89 ELEVATED FERRITIN LEVEL: ICD-10-CM

## 2021-05-05 DIAGNOSIS — K70.30 ALCOHOLIC CIRRHOSIS (H): ICD-10-CM

## 2021-05-06 LAB
BACTERIA SPEC CULT: NORMAL
HBV DNA SERPL QL NAA+PROBE: NORMAL
HCV RNA SERPL QL NAA+PROBE: NORMAL
HIV1+2 RNA SERPL QL NAA+PROBE: NORMAL
Lab: NORMAL
SPECIMEN SOURCE: NORMAL

## 2021-05-07 NOTE — TELEPHONE ENCOUNTER
Action    Action Taken 5-7: Requested 4 most recent EKGs from [facility]  5-7: CT Chest PE 9-30-20 already in PACS  5-7: Requested echo 9-8-20    5-7: sent EKG to scanning, resolved US in PACS

## 2021-05-10 ENCOUNTER — PRE VISIT (OUTPATIENT)
Dept: CARDIOLOGY | Facility: CLINIC | Age: 51
End: 2021-05-10

## 2021-05-10 ENCOUNTER — VIRTUAL VISIT (OUTPATIENT)
Dept: CARDIOLOGY | Facility: CLINIC | Age: 51
End: 2021-05-10
Attending: INTERNAL MEDICINE
Payer: COMMERCIAL

## 2021-05-10 DIAGNOSIS — Z01.810 PRE-OPERATIVE CARDIOVASCULAR EXAMINATION: Primary | ICD-10-CM

## 2021-05-10 DIAGNOSIS — R79.0 ABNORMAL SERUM IRON LEVEL: ICD-10-CM

## 2021-05-10 DIAGNOSIS — K70.30 ALCOHOLIC CIRRHOSIS, UNSPECIFIED WHETHER ASCITES PRESENT (H): ICD-10-CM

## 2021-05-10 PROCEDURE — 99203 OFFICE O/P NEW LOW 30 MIN: CPT | Mod: 95 | Performed by: INTERNAL MEDICINE

## 2021-05-10 RX ORDER — LIDOCAINE 40 MG/G
CREAM TOPICAL
Status: CANCELLED | OUTPATIENT
Start: 2021-05-10

## 2021-05-10 NOTE — PROGRESS NOTES
[facility]  CARDIOVASCULAR MEDICINE VIDEO VISIT NOTE    Referring Provider: No ref. provider found   Primary Care Provider: Christine Harris     Patient Name: Sarah Fisher   MRN: [id number]     PERTINENT CLINICAL HISTORY:   Sarah Fisher is a 51 year old female w/ h/o EtOH cirrhosis and ESLD undergoing liver transplant evaluation.    She is asymptomatic and active.  She is walking 1.25 miles around her neighborhood daily without issue.  Denies any chest pain, palpitations, SOB, edema, orthopnea, PND, syncope or near syncope.    I have personally reviewed outside notes from the liver transplant team.     PAST MEDICAL HISTORY:     Past Medical History:   Diagnosis Date     Ascites      History of blood transfusion      Liver cirrhosis (H)      Thyroid disease         PAST SURGICAL HISTORY:     Past Surgical History:   Procedure Laterality Date      SECTION  2009     THORACENTESIS Left 2021    Procedure: THORACENTESIS;  Surgeon: Almas Irby MD;  Location: [facility]     THORACENTESIS N/A 2021    Procedure: THORACENTESIS;  Surgeon: Almas Irby MD;  [facility]     THORACENTESIS N/A 03/10/2021    Procedure: THORACENTESIS;  Surgeon: Almas Irby MD;  [facility]        CURRENT MEDICATIONS:     Current Outpatient Medications   Medication Sig Dispense Refill     ciprofloxacin (CIPRO) 250 MG tablet Take 1 tablet (250 mg) by mouth 2 times daily 30 tablet 3     ferrous sulfate (FE TABS) 325 (65 Fe) MG EC tablet Take 325 mg by mouth daily       folic acid (FOLVITE) 1 MG tablet Take 1 tablet (1 mg) by mouth daily 90 tablet 3     folic acid (FOLVITE) 1 MG tablet Take 1 tablet (1 mg) by mouth daily 30 tablet 0     furosemide (LASIX) 20 MG tablet Take 2 tablets (40 mg) by mouth daily 60 tablet 3     levothyroxine (SYNTHROID/LEVOTHROID) 50 MCG tablet Take 50 mcg by mouth daily       midodrine (PROAMATINE) 5 MG tablet Take 1 tablet (5 mg) by mouth 2 times daily (with meals) 180 tablet 0      multivitamin w/minerals (THERA-VIT-M) tablet Take 1 tablet by mouth daily 30 tablet 3     pantoprazole (PROTONIX) 20 MG EC tablet Take 1 tablet (20 mg) by mouth daily 90 tablet 3     pantoprazole (PROTONIX) 40 MG EC tablet Take 40 mg by mouth every morning (before breakfast)       potassium chloride ER (K-TAB/KLOR-CON) 10 MEQ CR tablet Take 20 mEq by mouth daily       rifaximin (XIFAXAN) 550 MG TABS tablet Take 550 mg by mouth 2 times daily       spironolactone (ALDACTONE) 25 MG tablet Take 2 tablets (50 mg) by mouth daily 60 tablet 3     thiamine 50 MG TABS Take 1 tablet (50 mg) by mouth daily 90 tablet 0     traZODone (DESYREL) 50 MG tablet Take 50 mg by mouth At Bedtime       vitamin D2 (ERGOCALCIFEROL) 77937 units (1250 mcg) capsule Take 1 capsule (50,000 Units) by mouth once a week 12 capsule 0     zinc sulfate (ZINCATE) 220 (50 Zn) MG capsule Take 1 capsule (220 mg) by mouth daily 90 capsule 0        ALLERGIES:     Allergies   Allergen Reactions     Amoxicillin GI Disturbance, Diarrhea, Nausea and Nausea and Vomiting         PHYSICAL EXAMINATION:   No vitals were collected as this was a video visit.  There is no height or weight on file to calculate BMI.  Wt Readings from Last 2 Encounters:   05/02/21 77.3 kg (170 lb 6.4 oz)   03/17/21 76.3 kg (168 lb 3.2 oz)     Constitutional: no acute distress, pleasant and cooperative, appears overall well.  Cardiovascular: extremities with no edema or cyanosis  Respiratory: no audible wheezes or coughing; no accessory muscle use  Gastrointestinal: non distended  Neurologic: AOx3     The rest of a comprehensive physical examination is deferred due to PHE (public health emergency) video visit restrictions     LABORATORY DATA:   I have reviewed the labs below.    LIPID RESULTS:  Recent Labs   Lab Test 03/17/21  1143 01/04/21  1019   CHOL 206* 160   HDL 70 59   * 86   TRIG 95 75        LIVER ENZYME RESULTS:  Recent Labs   Lab Test 05/02/21  0106 04/15/21  1337   AST 37  35   ALT 16 21       CBC RESULTS:  Recent Labs   Lab Test 05/02/21  0106 04/15/21  1337   WBC 6.9 6.3   HGB 8.3* 8.7*   HCT 25.6* 27.3*   * 128*       BMP RESULTS:  Recent Labs   Lab Test 05/02/21  0846 05/02/21  0106 04/15/21  1337   NA  --  133 137   POTASSIUM 3.9 2.8* 3.0*   CHLORIDE  --  99 103   CO2  --  26 25   ANIONGAP  --  8 9   GLC  --  119* 160*   BUN  --  15 16   CR  --  1.15* 1.13*   EMMY  --  8.6 8.6       A1C RESULTS:  Lab Results   Component Value Date    A1C 4.2 03/17/2021       INR RESULTS:  Recent Labs   Lab Test 05/02/21  0106 04/15/21  1337   INR 1.69* 1.58*          PROCEDURES & FURTHER ASSESSMENTS:   I have reviewed the test results below.    STRESS TEST:  Dobutamine echo 1/7/2021  Normal dobutamine stress echocardiogram without evidence of inducible  ischemia. Target heart rate was achieved. Heart rate and blood pressure  response to dobutamine were normal. Normal LV function and wall motion at  rest. With stress, the left ventricular ejection fraction increased from 55-  60% to greater than 65% and the left ventricular size decreased appropriately.  No regional wall motion abnormality with stress.  No subjective symptoms to suggest ischemia.  There was no ECG evidence of ischemia.  No significant valve disease on screening doppler evaluation. The aortic root  and visualized ascending aorta are normal.  IVC diameter and respiratory changes fall into an intermediate range  suggesting an RA pressure of 8 mmHg.  Right ventricular systolic pressure could not be approximated due to  inadequate tricuspid regurgitation.  There is no pericardial effusion. Large pleural effusion.     CLINICAL IMPRESSION:   Sarah Fisher is a 51 year old female w/ h/o EtOH cirrhosis and ESLD undergoing liver transplant evaluation.  She is asymptomatic and active.  She had a normal dobutamine echo with no signs of ischemia.  However, her pulmonary pressures could not be assessed.    PLAN:  --RHC    Follow-up:  GERARD    Thank you for allowing us to take part in the care of this very pleasant patient.  Please do not hesitate to call if any further questions or concerns arise.    I spent 20 min today reviewing the medical record, meeting with the patient, and completing this note.    Jose Jung MD, PhD  Interventional/Critical Care Cardiology  [phone]    May 10, 2021      CC  Patient Care Team:  Christine Harris APRN CNP as PCP - General (Family Medicine)  Kiki Sutherland RN as Registered Nurse (Transplant)  Rehan Escobar APRN CNP as Referring Physician (Gastroenterology)  Fadia Avila MD as Assigned Gastroenterology Provider  Christian Morrison MD as Assigned Surgical Provider  Almas Irby MD as Assigned Pulmonology Provider  Christine Harris APRN CNP as Assigned PCP  Kiana Krishnan as Personal Advocate & Liaison (PAL)        6}    Video-Visit Details    Video Start Time: 2:31 PM    Video End Time:2:43 PM    Originating Location (pt. Location): Home    Distant Location (provider location):  [facility]     Platform used for Video Visit: [employer]

## 2021-05-10 NOTE — PATIENT INSTRUCTIONS
You have been scheduled for a right heart on Monday June 7  Please arrive at the [facility] Waiting Room at 11:30 AM    [facility], [address] [address]    You will need a COVID test 3-4 days prior to the procedure. Our COVID team will call you with a time and day for the testing    Please do not eat or drink anything after 8 am. You should take all your morning medications as prescribed with sips of water.     Please bring an updated list of your current medications.    A physician will come and talk with you about the procedure and obtain consent.    You will have the procedure and then be discharged after being monitored post procedure

## 2021-05-10 NOTE — PROGRESS NOTES
Maria Elena is a 51 year old who is being evaluated via a billable video visit.      How would you like to obtain your AVS? [email or web address]  If the video visit is dropped, the invitation should be resent by: Other e-mail: [email or web address])  Will anyone else be joining your video visit? No        Jose Jung MD, PhD  Interventional/Critical Care Cardiology  [phone]    May 10, 2021

## 2021-05-10 NOTE — LETTER
5/10/2021      RE: Sarah Fisher  [address] [facility] [address]       Dear Colleague,    Thank you for the opportunity to participate in the care of your patient, Sarah Fisher, at the [facility]. Please see a copy of my visit note below.    Maria Elena is a 51 year old who is being evaluated via a billable video visit.      How would you like to obtain your AVS? [email or web address]  If the video visit is dropped, the invitation should be resent by: Other e-mail: [email or web address]  Will anyone else be joining your video visit? No        Jose Jung MD, PhD  Interventional/Critical Care Cardiology  [phone]    May 10, 2021      [facility]  CARDIOVASCULAR MEDICINE VIDEO VISIT NOTE    Referring Provider: No ref. provider found   Primary Care Provider: Christine Harris     Patient Name: Sarah Fisher   MRN: [id number]     PERTINENT CLINICAL HISTORY:   Sarah Fisher is a 51 year old female w/ h/o EtOH cirrhosis and ESLD undergoing liver transplant evaluation.    She is asymptomatic and active.  She is walking 1.25 miles around her neighborhood daily without issue.  Denies any chest pain, palpitations, SOB, edema, orthopnea, PND, syncope or near syncope.    I have personally reviewed outside notes from the liver transplant team.     PAST MEDICAL HISTORY:     Past Medical History:   Diagnosis Date     Ascites      History of blood transfusion      Liver cirrhosis (H)      Thyroid disease         PAST SURGICAL HISTORY:     Past Surgical History:   Procedure Laterality Date      SECTION  2009     THORACENTESIS Left 2021    Procedure: THORACENTESIS;  Surgeon: Almas Irby MD;  Location: [facility]     THORACENTESIS N/A 2021    Procedure: THORACENTESIS;  Surgeon: Almas Irby MD;  Location: [facility]     THORACENTESIS N/A 03/10/2021    Procedure: THORACENTESIS;  Surgeon: Almas Irby MD;  Location: [facility]         CURRENT MEDICATIONS:     Current Outpatient Medications   Medication Sig Dispense Refill     ciprofloxacin (CIPRO) 250 MG tablet Take 1 tablet (250 mg) by mouth 2 times daily 30 tablet 3     ferrous sulfate (FE TABS) 325 (65 Fe) MG EC tablet Take 325 mg by mouth daily       folic acid (FOLVITE) 1 MG tablet Take 1 tablet (1 mg) by mouth daily 90 tablet 3     folic acid (FOLVITE) 1 MG tablet Take 1 tablet (1 mg) by mouth daily 30 tablet 0     furosemide (LASIX) 20 MG tablet Take 2 tablets (40 mg) by mouth daily 60 tablet 3     levothyroxine (SYNTHROID/LEVOTHROID) 50 MCG tablet Take 50 mcg by mouth daily       midodrine (PROAMATINE) 5 MG tablet Take 1 tablet (5 mg) by mouth 2 times daily (with meals) 180 tablet 0     multivitamin w/minerals (THERA-VIT-M) tablet Take 1 tablet by mouth daily 30 tablet 3     pantoprazole (PROTONIX) 20 MG EC tablet Take 1 tablet (20 mg) by mouth daily 90 tablet 3     pantoprazole (PROTONIX) 40 MG EC tablet Take 40 mg by mouth every morning (before breakfast)       potassium chloride ER (K-TAB/KLOR-CON) 10 MEQ CR tablet Take 20 mEq by mouth daily       rifaximin (XIFAXAN) 550 MG TABS tablet Take 550 mg by mouth 2 times daily       spironolactone (ALDACTONE) 25 MG tablet Take 2 tablets (50 mg) by mouth daily 60 tablet 3     thiamine 50 MG TABS Take 1 tablet (50 mg) by mouth daily 90 tablet 0     traZODone (DESYREL) 50 MG tablet Take 50 mg by mouth At Bedtime       vitamin D2 (ERGOCALCIFEROL) 51231 units (1250 mcg) capsule Take 1 capsule (50,000 Units) by mouth once a week 12 capsule 0     zinc sulfate (ZINCATE) 220 (50 Zn) MG capsule Take 1 capsule (220 mg) by mouth daily 90 capsule 0        ALLERGIES:     Allergies   Allergen Reactions     Amoxicillin GI Disturbance, Diarrhea, Nausea and Nausea and Vomiting         PHYSICAL EXAMINATION:   No vitals were collected as this was a video visit.  There is no height or weight on file to calculate BMI.  Wt Readings from Last 2 Encounters:    05/02/21 77.3 kg (170 lb 6.4 oz)   03/17/21 76.3 kg (168 lb 3.2 oz)     Constitutional: no acute distress, pleasant and cooperative, appears overall well.  Cardiovascular: extremities with no edema or cyanosis  Respiratory: no audible wheezes or coughing; no accessory muscle use  Gastrointestinal: non distended  Neurologic: AOx3     The rest of a comprehensive physical examination is deferred due to PHE (public health emergency) video visit restrictions     LABORATORY DATA:   I have reviewed the labs below.    LIPID RESULTS:  Recent Labs   Lab Test 03/17/21  1143 01/04/21  1019   CHOL 206* 160   HDL 70 59   * 86   TRIG 95 75        LIVER ENZYME RESULTS:  Recent Labs   Lab Test 05/02/21  0106 04/15/21  1337   AST 37 35   ALT 16 21       CBC RESULTS:  Recent Labs   Lab Test 05/02/21  0106 04/15/21  1337   WBC 6.9 6.3   HGB 8.3* 8.7*   HCT 25.6* 27.3*   * 128*       BMP RESULTS:  Recent Labs   Lab Test 05/02/21  0846 05/02/21  0106 04/15/21  1337   NA  --  133 137   POTASSIUM 3.9 2.8* 3.0*   CHLORIDE  --  99 103   CO2  --  26 25   ANIONGAP  --  8 9   GLC  --  119* 160*   BUN  --  15 16   CR  --  1.15* 1.13*   EMMY  --  8.6 8.6       A1C RESULTS:  Lab Results   Component Value Date    A1C 4.2 03/17/2021       INR RESULTS:  Recent Labs   Lab Test 05/02/21  0106 04/15/21  1337   INR 1.69* 1.58*          PROCEDURES & FURTHER ASSESSMENTS:   I have reviewed the test results below.    STRESS TEST:  Dobutamine echo 1/7/2021  Normal dobutamine stress echocardiogram without evidence of inducible  ischemia. Target heart rate was achieved. Heart rate and blood pressure  response to dobutamine were normal. Normal LV function and wall motion at  rest. With stress, the left ventricular ejection fraction increased from 55-  60% to greater than 65% and the left ventricular size decreased appropriately.  No regional wall motion abnormality with stress.  No subjective symptoms to suggest ischemia.  There was no ECG  evidence of ischemia.  No significant valve disease on screening doppler evaluation. The aortic root  and visualized ascending aorta are normal.  IVC diameter and respiratory changes fall into an intermediate range  suggesting an RA pressure of 8 mmHg.  Right ventricular systolic pressure could not be approximated due to  inadequate tricuspid regurgitation.  There is no pericardial effusion. Large pleural effusion.     CLINICAL IMPRESSION:   Sarah iFsher is a 51 year old female w/ h/o EtOH cirrhosis and ESLD undergoing liver transplant evaluation.  She is asymptomatic and active.  She had a normal dobutamine echo with no signs of ischemia.  However, her pulmonary pressures could not be assessed.    PLAN:  --RHC    Follow-up: PRN    Thank you for allowing us to take part in the care of this very pleasant patient.  Please do not hesitate to call if any further questions or concerns arise.    I spent 20 min today reviewing the medical record, meeting with the patient, and completing this note.    Jose Jung MD, PhD  Interventional/Critical Care Cardiology  [phone]    May 10, 2021      CC  Patient Care Team:  Christine Harris APRN CNP as PCP - General (Family Medicine)  Kiki Sutherland RN as Registered Nurse (Transplant)  Rehan Escobar APRN CNP as Referring Physician (Gastroenterology)  Fadia Avila MD as Assigned Gastroenterology Provider  Christian Morrison MD as Assigned Surgical Provider  Almas Irby MD as Assigned Pulmonology Provider  Christine Harris APRN CNP as Assigned PCP  Kiana Krishnan as Personal Advocate & Liaison (PAL)        6}    Video-Visit Details    Video Start Time: 2:31 PM    Video End Time:2:43 PM    Originating Location (pt. Location): Home    Distant Location (provider location):  [facility]     Platform used for Video Visit: [employer]          Please do not hesitate to contact me if you have any questions/concerns.     Sincerely,     Jose HU  MD Fabiana

## 2021-05-17 DIAGNOSIS — Z11.59 ENCOUNTER FOR SCREENING FOR OTHER VIRAL DISEASES: ICD-10-CM

## 2021-05-17 DIAGNOSIS — K74.60 CIRRHOSIS OF LIVER (H): ICD-10-CM

## 2021-05-17 DIAGNOSIS — R79.89 ELEVATED FERRITIN: ICD-10-CM

## 2021-05-17 LAB
ALBUMIN SERPL-MCNC: 3 G/DL (ref 3.4–5)
ANION GAP SERPL CALCULATED.3IONS-SCNC: 8 MMOL/L (ref 3–14)
BILIRUB SERPL-MCNC: 5.7 MG/DL (ref 0.2–1.3)
BUN SERPL-MCNC: 16 MG/DL (ref 7–30)
CALCIUM SERPL-MCNC: 8.7 MG/DL (ref 8.5–10.1)
CHLORIDE SERPL-SCNC: 100 MMOL/L (ref 94–109)
CO2 SERPL-SCNC: 27 MMOL/L (ref 20–32)
CREAT SERPL-MCNC: 1.05 MG/DL (ref 0.52–1.04)
GFR SERPL CREATININE-BSD FRML MDRD: 61 ML/MIN/{1.73_M2}
GLUCOSE SERPL-MCNC: 127 MG/DL (ref 70–99)
INR PPP: 1.55 (ref 0.86–1.14)
POTASSIUM SERPL-SCNC: 3.4 MMOL/L (ref 3.4–5.3)
SODIUM SERPL-SCNC: 135 MMOL/L (ref 133–144)

## 2021-05-17 PROCEDURE — G0452 MOLECULAR PATHOLOGY INTERPR: HCPCS | Performed by: PATHOLOGY

## 2021-05-17 PROCEDURE — 82247 BILIRUBIN TOTAL: CPT | Performed by: STUDENT IN AN ORGANIZED HEALTH CARE EDUCATION/TRAINING PROGRAM

## 2021-05-17 PROCEDURE — 81256 HFE GENE: CPT | Performed by: STUDENT IN AN ORGANIZED HEALTH CARE EDUCATION/TRAINING PROGRAM

## 2021-05-17 PROCEDURE — 85610 PROTHROMBIN TIME: CPT | Performed by: STUDENT IN AN ORGANIZED HEALTH CARE EDUCATION/TRAINING PROGRAM

## 2021-05-17 PROCEDURE — 80048 BASIC METABOLIC PNL TOTAL CA: CPT | Performed by: STUDENT IN AN ORGANIZED HEALTH CARE EDUCATION/TRAINING PROGRAM

## 2021-05-17 PROCEDURE — 82040 ASSAY OF SERUM ALBUMIN: CPT | Performed by: STUDENT IN AN ORGANIZED HEALTH CARE EDUCATION/TRAINING PROGRAM

## 2021-05-17 PROCEDURE — 36415 COLL VENOUS BLD VENIPUNCTURE: CPT | Performed by: STUDENT IN AN ORGANIZED HEALTH CARE EDUCATION/TRAINING PROGRAM

## 2021-05-18 ENCOUNTER — COMMITTEE REVIEW (OUTPATIENT)
Dept: TRANSPLANT | Facility: CLINIC | Age: 51
End: 2021-05-18

## 2021-05-18 NOTE — COMMITTEE REVIEW
Abdominal Patient Discussion Note Transplant Coordinator: Kiki Sutherland  Transplant Surgeon:   Christian Morrison    Referring Physician: Rehan Escobar    Committee Review Members:  Anesthesiology Onel Charlton MD   Cardiology Kaela Caldera, APRN CNP   Hepatology Fadia Avila MD   Nurse Practitioner Raven Garcia, NP   Nutrition Humaira Jason,    Pharmacy Feli Tabares, [facility]    - Clinical Evelina Rodriguez, [facility]   Transplant Keith Dominguez MD, Sirena Dudley, RN, Payal Sam, RN, Sana Rocha MD, Lotus Hughes, RN, Britni Wilson, RICHARD, Christos Johnson MD, Jr Oli Gurrola, RICHARD, Leigh Beltran MD, Cyndee Salazar, APRN CNP, Kiki Sutherland, RN, Darrin Patterson MD, Ronan Santos MD, Thomas M. Leventhal, MD       Additional Discussion Notes and Findings:   No RHC needed  Remain listed

## 2021-05-20 ENCOUNTER — VIRTUAL VISIT (OUTPATIENT)
Dept: PULMONOLOGY | Facility: CLINIC | Age: 51
End: 2021-05-20
Attending: INTERNAL MEDICINE
Payer: COMMERCIAL

## 2021-05-20 DIAGNOSIS — Z11.59 ENCOUNTER FOR SCREENING FOR OTHER VIRAL DISEASES: ICD-10-CM

## 2021-05-20 DIAGNOSIS — J90 PLEURAL EFFUSION ON LEFT: Primary | ICD-10-CM

## 2021-05-20 LAB — COPATH REPORT: NORMAL

## 2021-05-20 PROCEDURE — 99214 OFFICE O/P EST MOD 30 MIN: CPT | Mod: 95 | Performed by: INTERNAL MEDICINE

## 2021-05-20 NOTE — LETTER
"5/20/2021      RE: Sarah Fisher  [address] [facility] [address]       Maria Elena is a 51 year old who is being evaluated via a billable video visit.      How would you like to obtain your AVS? [email or web address]  If the video visit is dropped, the invitation should be resent by: Other e-mail: [email or web address]  Will anyone else be joining your video visit? No      Video Start Time: 1:30  Video-Visit Details    Type of service:  Video Visit    Video End Time: 1:55    Originating Location (pt. Location): Home    Distant Location (provider location):  [facility]     Platform used for Video Visit: [facility]  Pulmonary Medicine  Visit Clinic Note           ASSESSMENT & PLAN     Patient is a 50-year-old female with a past medical history of liver cirrhosis.  Who has been referred to pulmonary clinic for further management of recurrent hepatic hydrothorax.    #Bilateral pleural effusion more on the left than the right:  -Patient has had total 5 thoracentesis in last 2 months.  For  Month of December she was not in any diuresis.   -Patient does not have any history of severe encephalopathy but her bilirubin greater than 5 is a contraindication for her to get a TIPS.  Discussed this with patient's hepatologist.  -Her thoracentesis frequency has decreased.  -She is on the transplant list.  -I will continue to schedule her thoracentesis.   -NO pleurx cath after discussion with hepatologist.  -Will set her up for another thoracentesis.      #Pre transplant work up\"   - As per ARISCAT score she is intermediate risk of post operative respiratory failure.  -As per ROSA score she is at 6.2% risk of post operative respiratory failure.   -No further work up from pulmonary is required prior to the liver transplant surgery.     RTC in 8 weeks after thoracentesis.     I explained the lab values, imagings and findings to the patient.  Patient expressed " understanding I did not recognize any barriers to the understanding of the patient.    Almas Irby MD          Today's visit note:     Chief Complaint: Sarah Fisher is a 50 year old year old female who is being seen for Follow Up (video visit)      HPI:     Recurrent Hepatic hydrothorax:   -Recent thoracentesis in 12/18 : 500 ml of fluid was removed. Once before was in the beginning of December: She has received total of 5 thoracentesis since Septemebr including the small one on 12/20.   -Significant improvement in breathing is noted after removal of fluid.     -Her diuretic dose was stopped in Decemebr for worsening renal function. She has followed with hepatology in first week of January. Since than she is back on her diuretics.   - She has difficulty with leg swelling as well.   --She has been taking lactulose. She has been on and off it.     #Left internal jugular clot:   Probably diagnosed in October. But has followed with [place] [facility] and has been told to not take the blood thinner medications.     #Shortness of breath:   -Gets sob with climbing stairs. Gets sob after around couple of blocks.   -  Social History:  -Never smoked.   -Work as a .     #Interval History:  2/1  -Patient has had improvement in his symptoms but seems to have recurrence of shortness of breath.  -She did not have severe respiratory distress after the recent thoracentesis.    #Interval History: 5/24  -Has been gaining weight. Has slowly increasing shortness of breath.  She is planning to go to [place] next week.  No recent fever or chills are noted.            Medications:     Current Outpatient Medications   Medication     ciprofloxacin (CIPRO) 250 MG tablet     ferrous sulfate (FE TABS) 325 (65 Fe) MG EC tablet     folic acid (FOLVITE) 1 MG tablet     folic acid (FOLVITE) 1 MG tablet     furosemide (LASIX) 20 MG tablet     levothyroxine (SYNTHROID/LEVOTHROID) 50 MCG tablet     midodrine (PROAMATINE) 5 MG tablet      multivitamin w/minerals (THERA-VIT-M) tablet     pantoprazole (PROTONIX) 20 MG EC tablet     pantoprazole (PROTONIX) 40 MG EC tablet     potassium chloride ER (K-TAB/KLOR-CON) 10 MEQ CR tablet     rifaximin (XIFAXAN) 550 MG TABS tablet     spironolactone (ALDACTONE) 25 MG tablet     thiamine 50 MG TABS     traZODone (DESYREL) 50 MG tablet     vitamin D2 (ERGOCALCIFEROL) 93462 units (1250 mcg) capsule     zinc sulfate (ZINCATE) 220 (50 Zn) MG capsule     No current facility-administered medications for this visit.             Review of Systems:       A complete 10 point review of systems was otherwise negative except as noted in the HPI.        PHYSICAL EXAM:  There were no vitals taken for this visit.            Data:   All laboratory and imaging data reviewed.      PFT:       PFT Interpretation:  I personally reviewed and interpreted the PFTs.  -Severe restrictive defects are noted in the PFTs.  CXR: I personally reviewed and interpreted the chest x-ray    Chest CT: I personally reviewed and interpreted the CT scan.  -Compressive left-sided pleural effusion is noted.  -    Recent Results (from the past 168 hour(s))   Basic metabolic panel    Collection Time: 05/17/21 12:29 PM   Result Value Ref Range    Sodium 135 133 - 144 mmol/L    Potassium 3.4 3.4 - 5.3 mmol/L    Chloride 100 94 - 109 mmol/L    Carbon Dioxide 27 20 - 32 mmol/L    Anion Gap 8 3 - 14 mmol/L    Glucose 127 (H) 70 - 99 mg/dL    Urea Nitrogen 16 7 - 30 mg/dL    Creatinine 1.05 (H) 0.52 - 1.04 mg/dL    GFR Estimate 61 >60 mL/min/[1.73_m2]    GFR Estimate If Black 71 >60 mL/min/[1.73_m2]    Calcium 8.7 8.5 - 10.1 mg/dL   INR    Collection Time: 05/17/21 12:29 PM   Result Value Ref Range    INR 1.55 (H) 0.86 - 1.14   Bilirubin  total    Collection Time: 05/17/21 12:29 PM   Result Value Ref Range    Bilirubin Total 5.7 (H) 0.2 - 1.3 mg/dL   Albumin level    Collection Time: 05/17/21 12:29 PM   Result Value Ref Range    Albumin 3.0 (L) 3.4 - 5.0 g/dL        Almas Irby MD

## 2021-05-20 NOTE — PROGRESS NOTES
"Maria Elena is a 51 year old who is being evaluated via a billable video visit.      How would you like to obtain your AVS? [employer]  If the video visit is dropped, the invitation should be resent by: Other e-mail: [email or web address]  Will anyone else be joining your video visit? No      Video Start Time: 1:30  Video-Visit Details    Type of service:  Video Visit    Video End Time: 1:55    Originating Location (pt. Location): Home    Distant Location (provider location):  [facility]     Platform used for Video Visit: Yodlee    [facility]  Pulmonary Medicine  Visit Clinic Note           ASSESSMENT & PLAN     Patient is a 50-year-old female with a past medical history of liver cirrhosis.  Who has been referred to pulmonary clinic for further management of recurrent hepatic hydrothorax.    #Bilateral pleural effusion more on the left than the right:  -Patient has had total 5 thoracentesis in last 2 months.  For  Month of December she was not in any diuresis.   -Patient does not have any history of severe encephalopathy but her bilirubin greater than 5 is a contraindication for her to get a TIPS.  Discussed this with patient's hepatologist.  -Her thoracentesis frequency has decreased.  -She is on the transplant list.  -I will continue to schedule her thoracentesis.   -NO pleurx cath after discussion with hepatologist.  -Will set her up for another thoracentesis.      #Pre transplant work up\"   - As per ARISCAT score she is intermediate risk of post operative respiratory failure.  -As per ROSA score she is at 6.2% risk of post operative respiratory failure.   -No further work up from pulmonary is required prior to the liver transplant surgery.     RTC in 8 weeks after thoracentesis.     I explained the lab values, imagings and findings to the patient.  Patient expressed understanding I did not recognize any barriers to the understanding of the patient.    Hem " MD Mahamed          Today's visit note:     Chief Complaint: Sarah Fisher is a 50 year old year old female who is being seen for Follow Up (video visit)      HPI:     Recurrent Hepatic hydrothorax:   -Recent thoracentesis in 12/18 : 500 ml of fluid was removed. Once before was in the beginning of December: She has received total of 5 thoracentesis since Septemebr including the small one on 12/20.   -Significant improvement in breathing is noted after removal of fluid.     -Her diuretic dose was stopped in Decemebr for worsening renal function. She has followed with hepatology in first week of January. Since than she is back on her diuretics.   - She has difficulty with leg swelling as well.   --She has been taking lactulose. She has been on and off it.     #Left internal jugular clot:   Probably diagnosed in October. But has followed with [place] [facility] and has been told to not take the blood thinner medications.     #Shortness of breath:   -Gets sob with climbing stairs. Gets sob after around couple of blocks.   -  Social History:  -Never smoked.   -Work as a .     #Interval History:  2/1  -Patient has had improvement in his symptoms but seems to have recurrence of shortness of breath.  -She did not have severe respiratory distress after the recent thoracentesis.    #Interval History: 5/24  -Has been gaining weight. Has slowly increasing shortness of breath.  She is planning to go to [place] next week.  No recent fever or chills are noted.            Medications:     Current Outpatient Medications   Medication     ciprofloxacin (CIPRO) 250 MG tablet     ferrous sulfate (FE TABS) 325 (65 Fe) MG EC tablet     folic acid (FOLVITE) 1 MG tablet     folic acid (FOLVITE) 1 MG tablet     furosemide (LASIX) 20 MG tablet     levothyroxine (SYNTHROID/LEVOTHROID) 50 MCG tablet     midodrine (PROAMATINE) 5 MG tablet     multivitamin w/minerals (THERA-VIT-M) tablet     pantoprazole (PROTONIX) 20 MG EC tablet      pantoprazole (PROTONIX) 40 MG EC tablet     potassium chloride ER (K-TAB/KLOR-CON) 10 MEQ CR tablet     rifaximin (XIFAXAN) 550 MG TABS tablet     spironolactone (ALDACTONE) 25 MG tablet     thiamine 50 MG TABS     traZODone (DESYREL) 50 MG tablet     vitamin D2 (ERGOCALCIFEROL) 11323 units (1250 mcg) capsule     zinc sulfate (ZINCATE) 220 (50 Zn) MG capsule     No current facility-administered medications for this visit.             Review of Systems:       A complete 10 point review of systems was otherwise negative except as noted in the HPI.        PHYSICAL EXAM:  There were no vitals taken for this visit.            Data:   All laboratory and imaging data reviewed.      PFT:       PFT Interpretation:  I personally reviewed and interpreted the PFTs.  -Severe restrictive defects are noted in the PFTs.  CXR: I personally reviewed and interpreted the chest x-ray    Chest CT: I personally reviewed and interpreted the CT scan.  -Compressive left-sided pleural effusion is noted.  -    Recent Results (from the past 168 hour(s))   Basic metabolic panel    Collection Time: 05/17/21 12:29 PM   Result Value Ref Range    Sodium 135 133 - 144 mmol/L    Potassium 3.4 3.4 - 5.3 mmol/L    Chloride 100 94 - 109 mmol/L    Carbon Dioxide 27 20 - 32 mmol/L    Anion Gap 8 3 - 14 mmol/L    Glucose 127 (H) 70 - 99 mg/dL    Urea Nitrogen 16 7 - 30 mg/dL    Creatinine 1.05 (H) 0.52 - 1.04 mg/dL    GFR Estimate 61 >60 mL/min/[1.73_m2]    GFR Estimate If Black 71 >60 mL/min/[1.73_m2]    Calcium 8.7 8.5 - 10.1 mg/dL   INR    Collection Time: 05/17/21 12:29 PM   Result Value Ref Range    INR 1.55 (H) 0.86 - 1.14   Bilirubin  total    Collection Time: 05/17/21 12:29 PM   Result Value Ref Range    Bilirubin Total 5.7 (H) 0.2 - 1.3 mg/dL   Albumin level    Collection Time: 05/17/21 12:29 PM   Result Value Ref Range    Albumin 3.0 (L) 3.4 - 5.0 g/dL

## 2021-05-20 NOTE — LETTER
"    5/20/2021         RE: Sarah Fisher  [address] [address]        Dear Colleague,    Thank you for referring your patient, Sarah Fisher, to the [facility]. Please see a copy of my visit note below.    Maria Elena is a 51 year old who is being evaluated via a billable video visit.      How would you like to obtain your AVS? [employer]  If the video visit is dropped, the invitation should be resent by: Other e-mail: [email or web address]  Will anyone else be joining your video visit? No      Video Start Time: 1:30  Video-Visit Details    Type of service:  Video Visit    Video End Time: 1:55    Originating Location (pt. Location): Home    Distant Location (provider location):  [facility]     Platform used for Video Visit: [place]    [facility]  Pulmonary Medicine  Visit Clinic Note           ASSESSMENT & PLAN     Patient is a 50-year-old female with a past medical history of liver cirrhosis.  Who has been referred to pulmonary clinic for further management of recurrent hepatic hydrothorax.    #Bilateral pleural effusion more on the left than the right:  -Patient has had total 5 thoracentesis in last 2 months.  For  Month of December she was not in any diuresis.   -Patient does not have any history of severe encephalopathy but her bilirubin greater than 5 is a contraindication for her to get a TIPS.  Discussed this with patient's hepatologist.  -Her thoracentesis frequency has decreased.  -She is on the transplant list.  -I will continue to schedule her thoracentesis.   -NO pleurx cath after discussion with hepatologist.  -Will set her up for another thoracentesis.      #Pre transplant work up\"   - As per ARISCAT score she is intermediate risk of post operative respiratory failure.  -As per ROSA score she is at 6.2% risk of post operative respiratory failure.   -No " further work up from pulmonary is required prior to the liver transplant surgery.     RTC in 8 weeks after thoracentesis.     I explained the lab values, imagings and findings to the patient.  Patient expressed understanding I did not recognize any barriers to the understanding of the patient.    Almas Irby MD          Today's visit note:     Chief Complaint: Sarah Fisher is a 50 year old year old female who is being seen for Follow Up (video visit)      HPI:     Recurrent Hepatic hydrothorax:   -Recent thoracentesis in 12/18 : 500 ml of fluid was removed. Once before was in the beginning of December: She has received total of 5 thoracentesis since Septemebr including the small one on 12/20.   -Significant improvement in breathing is noted after removal of fluid.     -Her diuretic dose was stopped in Decemebr for worsening renal function. She has followed with hepatology in first week of January. Since than she is back on her diuretics.   - She has difficulty with leg swelling as well.   --She has been taking lactulose. She has been on and off it.     #Left internal jugular clot:   Probably diagnosed in October. But has followed with [place] [facility] and has been told to not take the blood thinner medications.     #Shortness of breath:   -Gets sob with climbing stairs. Gets sob after around couple of blocks.   -  Social History:  -Never smoked.   -Work as a .     #Interval History:  2/1  -Patient has had improvement in his symptoms but seems to have recurrence of shortness of breath.  -She did not have severe respiratory distress after the recent thoracentesis.    #Interval History: 5/24  -Has been gaining weight. Has slowly increasing shortness of breath.  She is planning to go to [place] next week.  No recent fever or chills are noted.            Medications:     Current Outpatient Medications   Medication     ciprofloxacin (CIPRO) 250 MG tablet     ferrous sulfate (FE TABS) 325 (65 Fe) MG EC  tablet     folic acid (FOLVITE) 1 MG tablet     folic acid (FOLVITE) 1 MG tablet     furosemide (LASIX) 20 MG tablet     levothyroxine (SYNTHROID/LEVOTHROID) 50 MCG tablet     midodrine (PROAMATINE) 5 MG tablet     multivitamin w/minerals (THERA-VIT-M) tablet     pantoprazole (PROTONIX) 20 MG EC tablet     pantoprazole (PROTONIX) 40 MG EC tablet     potassium chloride ER (K-TAB/KLOR-CON) 10 MEQ CR tablet     rifaximin (XIFAXAN) 550 MG TABS tablet     spironolactone (ALDACTONE) 25 MG tablet     thiamine 50 MG TABS     traZODone (DESYREL) 50 MG tablet     vitamin D2 (ERGOCALCIFEROL) 94293 units (1250 mcg) capsule     zinc sulfate (ZINCATE) 220 (50 Zn) MG capsule     No current facility-administered medications for this visit.             Review of Systems:       A complete 10 point review of systems was otherwise negative except as noted in the HPI.        PHYSICAL EXAM:  There were no vitals taken for this visit.            Data:   All laboratory and imaging data reviewed.      PFT:       PFT Interpretation:  I personally reviewed and interpreted the PFTs.  -Severe restrictive defects are noted in the PFTs.  CXR: I personally reviewed and interpreted the chest x-ray    Chest CT: I personally reviewed and interpreted the CT scan.  -Compressive left-sided pleural effusion is noted.  -    Recent Results (from the past 168 hour(s))   Basic metabolic panel    Collection Time: 05/17/21 12:29 PM   Result Value Ref Range    Sodium 135 133 - 144 mmol/L    Potassium 3.4 3.4 - 5.3 mmol/L    Chloride 100 94 - 109 mmol/L    Carbon Dioxide 27 20 - 32 mmol/L    Anion Gap 8 3 - 14 mmol/L    Glucose 127 (H) 70 - 99 mg/dL    Urea Nitrogen 16 7 - 30 mg/dL    Creatinine 1.05 (H) 0.52 - 1.04 mg/dL    GFR Estimate 61 >60 mL/min/[1.73_m2]    GFR Estimate If Black 71 >60 mL/min/[1.73_m2]    Calcium 8.7 8.5 - 10.1 mg/dL   INR    Collection Time: 05/17/21 12:29 PM   Result Value Ref Range    INR 1.55 (H) 0.86 - 1.14   Bilirubin  total     Collection Time: 05/17/21 12:29 PM   Result Value Ref Range    Bilirubin Total 5.7 (H) 0.2 - 1.3 mg/dL   Albumin level    Collection Time: 05/17/21 12:29 PM   Result Value Ref Range    Albumin 3.0 (L) 3.4 - 5.0 g/dL                                             Again, thank you for allowing me to participate in the care of your patient.        Sincerely,        Almas Irby MD

## 2021-05-21 ENCOUNTER — TELEPHONE (OUTPATIENT)
Dept: PULMONOLOGY | Facility: CLINIC | Age: 51
End: 2021-05-21

## 2021-05-22 DIAGNOSIS — Z11.59 ENCOUNTER FOR SCREENING FOR OTHER VIRAL DISEASES: ICD-10-CM

## 2021-05-22 LAB
SARS-COV-2 RNA RESP QL NAA+PROBE: NORMAL
SPECIMEN SOURCE: NORMAL

## 2021-05-22 PROCEDURE — U0003 INFECTIOUS AGENT DETECTION BY NUCLEIC ACID (DNA OR RNA); SEVERE ACUTE RESPIRATORY SYNDROME CORONAVIRUS 2 (SARS-COV-2) (CORONAVIRUS DISEASE [COVID-19]), AMPLIFIED PROBE TECHNIQUE, MAKING USE OF HIGH THROUGHPUT TECHNOLOGIES AS DESCRIBED BY CMS-2020-01-R: HCPCS | Performed by: RADIOLOGY

## 2021-05-22 PROCEDURE — U0005 INFEC AGEN DETEC AMPLI PROBE: HCPCS | Performed by: RADIOLOGY

## 2021-05-25 ENCOUNTER — ANCILLARY PROCEDURE (OUTPATIENT)
Dept: RADIOLOGY | Facility: AMBULATORY SURGERY CENTER | Age: 51
End: 2021-05-25
Attending: INTERNAL MEDICINE
Payer: COMMERCIAL

## 2021-05-25 ENCOUNTER — HOSPITAL ENCOUNTER (OUTPATIENT)
Facility: AMBULATORY SURGERY CENTER | Age: 51
Discharge: HOME | End: 2021-05-25
Attending: RADIOLOGY | Admitting: RADIOLOGY
Payer: COMMERCIAL

## 2021-05-25 ENCOUNTER — TELEPHONE (OUTPATIENT)
Dept: GASTROENTEROLOGY | Facility: CLINIC | Age: 51
End: 2021-05-25

## 2021-05-25 VITALS
OXYGEN SATURATION: 100 % | RESPIRATION RATE: 18 BRPM | HEIGHT: 66 IN | WEIGHT: 170 LBS | TEMPERATURE: 97 F | BODY MASS INDEX: 27.32 KG/M2 | HEART RATE: 94 BPM | SYSTOLIC BLOOD PRESSURE: 112 MMHG | DIASTOLIC BLOOD PRESSURE: 66 MMHG

## 2021-05-25 DIAGNOSIS — J90 PLEURAL EFFUSION ON LEFT: ICD-10-CM

## 2021-05-25 DIAGNOSIS — I95.9 HYPOTENSION, UNSPECIFIED HYPOTENSION TYPE: ICD-10-CM

## 2021-05-25 DIAGNOSIS — K70.31 ALCOHOLIC CIRRHOSIS OF LIVER WITH ASCITES (H): ICD-10-CM

## 2021-05-25 LAB
ERYTHROCYTE [DISTWIDTH] IN BLOOD BY AUTOMATED COUNT: 13.8 % (ref 10–15)
HCT VFR BLD AUTO: 21 % (ref 35–47)
HGB BLD-MCNC: 6.5 G/DL (ref 11.7–15.7)
MCH RBC QN AUTO: 31.9 PG (ref 26.5–33)
MCHC RBC AUTO-ENTMCNC: 31 G/DL (ref 31.5–36.5)
MCV RBC AUTO: 103 FL (ref 78–100)
PLATELET # BLD AUTO: 140 10E9/L (ref 150–450)
RBC # BLD AUTO: 2.04 10E12/L (ref 3.8–5.2)
WBC # BLD AUTO: 5 10E9/L (ref 4–11)

## 2021-05-25 PROCEDURE — 32555 ASPIRATE PLEURA W/ IMAGING: CPT | Mod: LT | Performed by: RADIOLOGY

## 2021-05-25 RX ORDER — LIDOCAINE 40 MG/G
CREAM TOPICAL
Status: DISCONTINUED | OUTPATIENT
Start: 2021-05-25 | End: 2021-05-26 | Stop reason: HOSPADM

## 2021-05-25 RX ORDER — MIDODRINE HYDROCHLORIDE 5 MG/1
5 TABLET ORAL 2 TIMES DAILY WITH MEALS
Qty: 180 TABLET | Refills: 0 | Status: SHIPPED | OUTPATIENT
Start: 2021-05-25 | End: 2021-09-17

## 2021-05-25 RX ORDER — CIPROFLOXACIN 250 MG/1
500 TABLET, FILM COATED ORAL DAILY
Qty: 90 TABLET | Refills: 3 | Status: SHIPPED | OUTPATIENT
Start: 2021-05-25 | End: 2021-08-24

## 2021-05-25 RX ORDER — HEPARIN SODIUM (PORCINE) LOCK FLUSH IV SOLN 100 UNIT/ML 100 UNIT/ML
5 SOLUTION INTRAVENOUS
Status: CANCELLED | OUTPATIENT
Start: 2021-05-25

## 2021-05-25 RX ORDER — HEPARIN SODIUM,PORCINE 10 UNIT/ML
5 VIAL (ML) INTRAVENOUS
Status: CANCELLED | OUTPATIENT
Start: 2021-05-25

## 2021-05-25 ASSESSMENT — MIFFLIN-ST. JEOR: SCORE: 1402.86

## 2021-05-25 NOTE — OR NURSING
Spoke with Jess Ansari regarding pt needing transfusion. Pt's primary  is setting up her infusion for tomorrow morning- ok to discharge to home per . Pt denies lightheaded, vitals WNL.

## 2021-05-25 NOTE — DISCHARGE INSTRUCTIONS
Discharge Instructions for Paracentesis or Thoracentesis     Paracentesis is a procedure to remove extra fluid from your belly (abdomen). Thoracentesis is a procedure to remove extra fluid from your chest.  This fluid buildup is called ascites. The procedure may have been done to take a sample of the fluid. Or, it may have been done to drain the extra fluid from your abdomen or chest to help make you more comfortable.     Home care    If you have pain after the procedure, your healthcare provider can prescribe or recommend pain medicines. Take these exactly as directed. If you stopped taking other medicines before the procedure, ask your provider when you can start them again.    Avoid strenuous activity for 48 hours.    You will have a small bandage over the puncture site. Adhesive tapes, adhesive strips, or surgical glue may also be used to close the incision. They also help stop bleeding and promote healing. You may take the bandage off in 24-48 hours. Once there is a scab over the site, no bandages are required.    Check the puncture site for the signs of infection listed below.     Follow-up care  Make a follow-up appointment with your healthcare provider as directed. During your follow-up visit, your provider will check your healing. Let your provider know how you are feeling. You can also discuss the cause of your fluid, and if you need any further treatment.    When to seek medical advice:  Call your healthcare provider if you have any of the following after the procedure:    A fever of 100.4 F (38.0 C) or higher    Trouble breathing    Abdominal pain that is worse than expected    Belly Abdominal pain not caused by having the skin punctured that is worse than expected    Persistent bleeding from the puncture site    More than a small amount of fluid leaking from the puncture site    Swollen belly    Signs of infection at the puncture site. These include increased pain, redness, or swelling, warmth, or  bad-smelling drainage.    Feeling dizzy or lightheaded, or fainting     Call our Interventional Radiology (IR) service if:  If you start bleeding from the procedure site.  If you do start to bleed from the site, lie down and hold some pressure on the site.  Our radiology provider can help you decide if you need to return to the hospital.  If you have new or worsening pain related to the procedure.  If you have pronounced swelling at the procedure site.  If you develop persistent nausea or vomiting.  If you develop hives or a rash or any unexplained itching.  If you have a fever of greater than 100.4  F and chills in the first 5 days after procedure.  Any other concerns related to your procedure.      [facility]  Interventional Radiology (IR)  [address]  [facility] [place] [address]    Contact Number:  [phone]  (IR control desk)  Monday - Friday 8:00 am - 4:30 pm    After hours for urgent concerns:  [phone]  After 4:30 pm Monday - Friday, Weekends and Holidays.   Ask for Interventional Radiology on-call.  Someone is available 24 hours a day.  [facility] toll free number:  [phone]

## 2021-05-25 NOTE — PROCEDURES
Interventional Radiology Brief Post Procedure Note    Procedure: IR thoracentesis Left    Proceduralist: Dr. Kennedi Chavez and Gema Vicente PA-C    Time Out: Prior to the start of the procedure and with procedural staff participation, I verbally confirmed the patient s identity using two indicators, relevant allergies, that the procedure was appropriate and matched the consent or emergent situation, and that the correct equipment/implants were available. Immediately prior to starting the procedure I conducted the Time Out with the procedural staff and re-confirmed the patient s name, procedure, and site/side. (The Joint Commission universal protocol was followed.)  Yes    Sedation: None. Local Anesthestic used    Findings: Completed US guided LEFT thoracentesis with removal of 1500 mL of clear yellow fluid.      Estimated Blood Loss: Minimal    SPECIMENS: None    Complications: 1. None     Condition: Stable    Plan: Return to recovery area.    Comments: See dictated procedure note for full details.    Gema Vicente PA-C

## 2021-05-26 ENCOUNTER — TELEPHONE (OUTPATIENT)
Dept: TRANSPLANT | Facility: CLINIC | Age: 51
End: 2021-05-26

## 2021-05-26 ENCOUNTER — HOSPITAL ENCOUNTER (EMERGENCY)
Facility: CLINIC | Age: 51
Discharge: HOME | End: 2021-05-26
Attending: EMERGENCY MEDICINE | Admitting: EMERGENCY MEDICINE
Payer: COMMERCIAL

## 2021-05-26 VITALS
OXYGEN SATURATION: 100 % | HEART RATE: 93 BPM | SYSTOLIC BLOOD PRESSURE: 116 MMHG | RESPIRATION RATE: 17 BRPM | TEMPERATURE: 98.6 F | DIASTOLIC BLOOD PRESSURE: 63 MMHG | BODY MASS INDEX: 27.44 KG/M2 | HEIGHT: 66 IN

## 2021-05-26 DIAGNOSIS — Z51.89 ENCOUNTER FOR BLOOD TRANSFUSION: ICD-10-CM

## 2021-05-26 DIAGNOSIS — D64.9 ANEMIA, UNSPECIFIED TYPE: ICD-10-CM

## 2021-05-26 LAB
ABO + RH BLD: NORMAL
ABO + RH BLD: NORMAL
ANION GAP SERPL CALCULATED.3IONS-SCNC: 6 MMOL/L (ref 3–14)
BASOPHILS # BLD AUTO: 0 10E9/L (ref 0–0.2)
BASOPHILS NFR BLD AUTO: 0.6 %
BLD GP AB SCN SERPL QL: NORMAL
BLD PROD TYP BPU: NORMAL
BLOOD BANK CMNT PATIENT-IMP: NORMAL
BUN SERPL-MCNC: 13 MG/DL (ref 7–30)
CALCIUM SERPL-MCNC: 8.7 MG/DL (ref 8.5–10.1)
CHLORIDE SERPL-SCNC: 102 MMOL/L (ref 94–109)
CO2 SERPL-SCNC: 30 MMOL/L (ref 20–32)
CREAT SERPL-MCNC: 0.94 MG/DL (ref 0.52–1.04)
DAT POLY-SP REAG RBC QL: NORMAL
DIFFERENTIAL METHOD BLD: ABNORMAL
EOSINOPHIL # BLD AUTO: 0.1 10E9/L (ref 0–0.7)
EOSINOPHIL NFR BLD AUTO: 1.9 %
ERYTHROCYTE [DISTWIDTH] IN BLOOD BY AUTOMATED COUNT: 13.9 % (ref 10–15)
GFR SERPL CREATININE-BSD FRML MDRD: 70 ML/MIN/{1.73_M2}
GLUCOSE SERPL-MCNC: 124 MG/DL (ref 70–99)
HCT VFR BLD AUTO: 19.3 % (ref 35–47)
HGB BLD-MCNC: 6 G/DL (ref 11.7–15.7)
IMM GRANULOCYTES # BLD: 0 10E9/L (ref 0–0.4)
IMM GRANULOCYTES NFR BLD: 0.6 %
LDH SERPL L TO P-CCNC: 445 U/L (ref 81–234)
LYMPHOCYTES # BLD AUTO: 0.9 10E9/L (ref 0.8–5.3)
LYMPHOCYTES NFR BLD AUTO: 16.8 %
MCH RBC QN AUTO: 32.1 PG (ref 26.5–33)
MCHC RBC AUTO-ENTMCNC: 31.1 G/DL (ref 31.5–36.5)
MCV RBC AUTO: 103 FL (ref 78–100)
MONOCYTES # BLD AUTO: 0.6 10E9/L (ref 0–1.3)
MONOCYTES NFR BLD AUTO: 12.3 %
NEUTROPHILS # BLD AUTO: 3.5 10E9/L (ref 1.6–8.3)
NEUTROPHILS NFR BLD AUTO: 67.8 %
NRBC # BLD AUTO: 0 10*3/UL
NRBC BLD AUTO-RTO: 0 /100
NUM BPU REQUESTED: 2
PLATELET # BLD AUTO: 133 10E9/L (ref 150–450)
POTASSIUM SERPL-SCNC: 3.2 MMOL/L (ref 3.4–5.3)
RBC # BLD AUTO: 1.87 10E12/L (ref 3.8–5.2)
RETICS # AUTO: 80.4 10E9/L (ref 25–95)
RETICS/RBC NFR AUTO: 4.3 % (ref 0.5–2)
SODIUM SERPL-SCNC: 137 MMOL/L (ref 133–144)
SPECIMEN EXP DATE BLD: NORMAL
WBC # BLD AUTO: 5.2 10E9/L (ref 4–11)

## 2021-05-26 PROCEDURE — 86900 BLOOD TYPING SEROLOGIC ABO: CPT | Performed by: EMERGENCY MEDICINE

## 2021-05-26 PROCEDURE — 85045 AUTOMATED RETICULOCYTE COUNT: CPT | Performed by: EMERGENCY MEDICINE

## 2021-05-26 PROCEDURE — 99285 EMERGENCY DEPT VISIT HI MDM: CPT | Performed by: EMERGENCY MEDICINE

## 2021-05-26 PROCEDURE — 86850 RBC ANTIBODY SCREEN: CPT | Performed by: EMERGENCY MEDICINE

## 2021-05-26 PROCEDURE — P9016 RBC LEUKOCYTES REDUCED: HCPCS | Performed by: EMERGENCY MEDICINE

## 2021-05-26 PROCEDURE — 86880 COOMBS TEST DIRECT: CPT | Performed by: EMERGENCY MEDICINE

## 2021-05-26 PROCEDURE — 83615 LACTATE (LD) (LDH) ENZYME: CPT | Performed by: EMERGENCY MEDICINE

## 2021-05-26 PROCEDURE — 999N001086 HC STATISTIC MORPHOLOGY W/INTERP HEMEPATH TC 85060: Performed by: EMERGENCY MEDICINE

## 2021-05-26 PROCEDURE — 250N000013 HC RX MED GY IP 250 OP 250 PS 637: Performed by: EMERGENCY MEDICINE

## 2021-05-26 PROCEDURE — 85025 COMPLETE CBC W/AUTO DIFF WBC: CPT | Performed by: EMERGENCY MEDICINE

## 2021-05-26 PROCEDURE — 85060 BLOOD SMEAR INTERPRETATION: CPT | Performed by: PATHOLOGY

## 2021-05-26 PROCEDURE — 99285 EMERGENCY DEPT VISIT HI MDM: CPT | Mod: 25

## 2021-05-26 PROCEDURE — 80048 BASIC METABOLIC PNL TOTAL CA: CPT | Performed by: EMERGENCY MEDICINE

## 2021-05-26 PROCEDURE — 93005 ELECTROCARDIOGRAM TRACING: CPT

## 2021-05-26 PROCEDURE — 83010 ASSAY OF HAPTOGLOBIN QUANT: CPT | Performed by: EMERGENCY MEDICINE

## 2021-05-26 PROCEDURE — 36430 TRANSFUSION BLD/BLD COMPNT: CPT

## 2021-05-26 PROCEDURE — 86923 COMPATIBILITY TEST ELECTRIC: CPT | Performed by: EMERGENCY MEDICINE

## 2021-05-26 PROCEDURE — 86901 BLOOD TYPING SEROLOGIC RH(D): CPT | Performed by: EMERGENCY MEDICINE

## 2021-05-26 RX ORDER — POTASSIUM CHLORIDE 750 MG/1
40 TABLET, EXTENDED RELEASE ORAL ONCE
Status: COMPLETED | OUTPATIENT
Start: 2021-05-26 | End: 2021-05-26

## 2021-05-26 RX ADMIN — POTASSIUM CHLORIDE 40 MEQ: 750 TABLET, EXTENDED RELEASE ORAL at 18:23

## 2021-05-26 NOTE — TELEPHONE ENCOUNTER
Spoke to Maria Elena and is leaving tomorrow for a week. She understands she needs a transfusion. Will go to ED to get.

## 2021-05-26 NOTE — ED NOTES
Bed: ED28  Expected date:   Expected time:   Means of arrival:   Comments:  Maria Elena Fisher  MRN: [id number]  Transplant patient. Needs blood. Leaving town tomorrow.

## 2021-05-26 NOTE — ED PROVIDER NOTES
History     Chief Complaint   Patient presents with     Abnormal Labs     HPI  Sarah Fisher is a 51 year old female with a past medical history of alcoholic cirrhosis of the liver awaiting transplant, ascites, pleural effusion (requring multiple thoracentesis) hypothyroidism, anemia requiring blood transfusion, hemolysis, iron supplementation, and GERD, GI bleed, CKD, DVT who presents to the emergency department with a chief complaint of low hemoglobin.  The patient reports that she had outpatient labs yesterday (done prior to thoracentesis which did improve) which revealed hemoglobin 6.5.  The patient denies any symptoms associated with this, states she feels like her normal self.  The patient knows that she needs a blood transfusion.  She reports that she has plans to leave town tomorrow for a week and is eager to be discharged home so that she can go on her trip. She has researched nearby hospitals there in case she needs medical assistance while there.  The patient has a history of anemia requiring blood transfusion due to hemolysis.  She was on iron supplementation for this, but this was recently stopped due to diagnosis of hereditary hemochromatosis.  It was recommended to the patient that she be scheduled to receive 2 units of PRBCs today.  She was advised to come to the emergency department earlier if she develops symptoms.  The patient has tolerated transfusions well in the past.  Patient's gastroenterologist had plan to contact patient's hematologist per chart review. Hgb was 8.3-9.4 over the last few weeks. Last transfusion was several months ago. She denies any fatigue or other symptoms of anemia. Not currently anticoagulated. Has not noted any recent GI bleeding or other bleeding symptoms.    I have reviewed the Medications, Allergies, Past Medical and Surgical History, and Social History in the "[employer] system.    Past Medical History:   Diagnosis Date     Ascites      History of blood transfusion       Liver cirrhosis (H)      Thyroid disease      Past Surgical History:   Procedure Laterality Date      SECTION       IR THORACENTESIS  2021     THORACENTESIS Left 2021    Procedure: THORACENTESIS;  Surgeon: Almas Irby MD;  Location: [facility]     THORACENTESIS N/A 2021    Procedure: THORACENTESIS;  Surgeon: Almas Irby MD;  Location: [facility]     THORACENTESIS N/A 03/10/2021    Procedure: THORACENTESIS;  Surgeon: Almas Irby MD;  Location: [facility]     THORACENTESIS Left 2021    Procedure: THORACENTESIS;  Surgeon: Kennedi Chavez MD;  Location: [facility] OR     No current facility-administered medications for this encounter.      Current Outpatient Medications   Medication     ciprofloxacin (CIPRO) 250 MG tablet     folic acid (FOLVITE) 1 MG tablet     folic acid (FOLVITE) 1 MG tablet     furosemide (LASIX) 20 MG tablet     levothyroxine (SYNTHROID/LEVOTHROID) 50 MCG tablet     midodrine (PROAMATINE) 5 MG tablet     multivitamin w/minerals (THERA-VIT-M) tablet     pantoprazole (PROTONIX) 20 MG EC tablet     pantoprazole (PROTONIX) 40 MG EC tablet     potassium chloride ER (K-TAB/KLOR-CON) 10 MEQ CR tablet     rifaximin (XIFAXAN) 550 MG TABS tablet     spironolactone (ALDACTONE) 25 MG tablet     thiamine 50 MG TABS     traZODone (DESYREL) 50 MG tablet     vitamin D2 (ERGOCALCIFEROL) 52841 units (1250 mcg) capsule     zinc sulfate (ZINCATE) 220 (50 Zn) MG capsule     Allergies   Allergen Reactions     Amoxicillin GI Disturbance, Diarrhea, Nausea and Nausea and Vomiting     Past medical history, past surgical history, medications, and allergies were reviewed with the patient. Additional pertinent items: None    Social History     Socioeconomic History     Marital status: Single     Spouse name: Not on file     Number of children: Not on file     Years of education: Not on file     Highest education level: Master's degree (e.g., MA, MS, Roberto, MEd, MSW, SASHA)   Occupational History     Not  on file   Social Needs     Financial resource strain: Not hard at all     Food insecurity     Worry: Never true     Inability: Never true     Transportation needs     Medical: No     Non-medical: No   Tobacco Use     Smoking status: Never Smoker     Smokeless tobacco: Never Used   Substance and Sexual Activity     Alcohol use: Not Currently     Frequency: Never     Comment: Quit on 6/20/2020     Drug use: Not Currently     Sexual activity: Not Currently     Partners: Male   Lifestyle     Physical activity     Days per week: 5 days     Minutes per session: 30 min     Stress: Only a little   Relationships     Social connections     Talks on phone: More than three times a week     Gets together: More than three times a week     Attends Judaism service: More than 4 times per year     Active member of club or organization: Yes     Attends meetings of clubs or organizations: More than 4 times per year     Relationship status: Never      Intimate partner violence     Fear of current or ex partner: Not on file     Emotionally abused: Not on file     Physically abused: Not on file     Forced sexual activity: Not on file   Other Topics Concern     Parent/sibling w/ CABG, MI or angioplasty before 65F 55M? Not Asked   Social History Narrative    Lives [place]. 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 12 year sonFlakito.         Christine Harris, DNP, APRN, CNP    3/17/2021     Social history was reviewed with the patient. Additional pertinent items: None    Review of Systems  General: No fevers or chills  Skin: No rash or diaphoresis  Eyes: No eye redness or discharge  Ears/Nose/Throat: No rhinorrhea or nasal congestion  Respiratory: No cough or SOB  Cardiovascular: No chest pain or palpitations  Gastrointestinal: No nausea, vomiting, or diarrhea  Genitourinary: No urinary frequency, hematuria, or dysuria  Musculoskeletal: No arthralgias or myalgias  Neurologic: No numbness  "or weakness  Hematologic/Lymphatic/Immunologic: No leg swelling, no easy bruising/bleeding  Endocrine: No polyuria/polydypsia    A complete review of systems was performed with pertinent positives and negatives noted in the HPI, and all other systems negative.    Physical Exam   BP: 109/61  Pulse: 101  Temp: 98.2  F (36.8  C)  Resp: 18  Height: 167.6 cm (5' 6\")  SpO2: 97 %      General: Well nourished, well developed, NAD  HEENT: EOMI, anicteric. NCAT, MMM  Neck: no jugular venous distension, supple, nl ROM  Cardiac: Regular rate and rhythm. No murmurs, rubs, or gallops. Normal S1, S2.  Intact peripheral pulses  Pulm: CTAB, no stridor, wheezes, rales, rhonchi  Abd: Soft, nontender, nondistended.  No masses palpated.    Skin: Warm and dry to the touch.  No rash  Extremities: No LE edema, no cyanosis, w/w/p  Neuro: A&Ox3, no gross focal deficits    ED Course     ED Course as of May 26 2249   Wed May 26, 2021   1820 CBC with platelets differential     Procedures                           Labs Ordered and Resulted from Time of ED Arrival Up to the Time of Departure from the ED   BASIC METABOLIC PANEL - Abnormal; Notable for the following components:       Result Value    Potassium 3.2 (*)     Glucose 124 (*)     All other components within normal limits   LACTATE DEHYDROGENASE - Abnormal; Notable for the following components:    Lactate Dehydrogenase 445 (*)     All other components within normal limits   CBC WITH PLATELETS DIFFERENTIAL - Abnormal; Notable for the following components:    RBC Count 1.87 (*)     Hemoglobin 6.0 (*)     Hematocrit 19.3 (*)      (*)     MCHC 31.1 (*)     Platelet Count 133 (*)     All other components within normal limits   RETICULOCYTE COUNT - Abnormal; Notable for the following components:    % Retic 4.3 (*)     All other components within normal limits   BLOOD MORPHOLOGY PATHOLOGIST REVIEW   HAPTOGLOBIN   ABO/RH TYPE AND SCREEN   PREPARE RED BLOOD CELL ORDER UNIT   DIRECT " ANTIGLOBULIN TEST   BLOOD COMPONENT   BLOOD COMPONENT            Results for orders placed or performed during the hospital encounter of 05/26/21 (from the past 24 hour(s))   EKG 12-lead, tracing only   Result Value Ref Range    Interpretation ECG Click View Image link to view waveform and result    Basic metabolic panel   Result Value Ref Range    Sodium 137 133 - 144 mmol/L    Potassium 3.2 (L) 3.4 - 5.3 mmol/L    Chloride 102 94 - 109 mmol/L    Carbon Dioxide 30 20 - 32 mmol/L    Anion Gap 6 3 - 14 mmol/L    Glucose 124 (H) 70 - 99 mg/dL    Urea Nitrogen 13 7 - 30 mg/dL    Creatinine 0.94 0.52 - 1.04 mg/dL    GFR Estimate 70 >60 mL/min/[1.73_m2]    GFR Estimate If Black 81 >60 mL/min/[1.73_m2]    Calcium 8.7 8.5 - 10.1 mg/dL   ABO/Rh type and screen   Result Value Ref Range    Units Ordered 2     ABO B     RH(D) Pos     Antibody Screen Neg     Test Valid Only At          [facility]    Specimen Expires 05/29/2021     Crossmatch Red Blood Cells    Direct antiglobulin test   Result Value Ref Range    JAYLIN  Broad Spectrum Neg    Lactate Dehydrogenase   Result Value Ref Range    Lactate Dehydrogenase 445 (H) 81 - 234 U/L   Blood component   Result Value Ref Range    Unit Number [id number]     Blood Component Type Red Blood Cells LeukoReduced (Part 2)     Division Number 00     Status of Unit Released to care unit 05/26/2021 1821     Blood Product Code E2397E56     Unit Status ISS    Blood component   Result Value Ref Range    Unit Number [id number]     Blood Component Type Red Blood Cells Leukocyte Reduced     Division Number 00     Status of Unit Released to care unit 05/26/2021 2045     Blood Product Code [id number]     Unit Status ISS    CBC with platelets differential   Result Value Ref Range    WBC 5.2 4.0 - 11.0 10e9/L    RBC Count 1.87 (L) 3.8 - 5.2 10e12/L    Hemoglobin 6.0 (LL) 11.7 - 15.7 g/dL    Hematocrit 19.3 (L) 35.0 - 47.0 %     (H) 78 - 100 fl     MCH 32.1 26.5 - 33.0 pg    MCHC 31.1 (L) 31.5 - 36.5 g/dL    RDW 13.9 10.0 - 15.0 %    Platelet Count 133 (L) 150 - 450 10e9/L    Diff Method Automated Method     % Neutrophils 67.8 %    % Lymphocytes 16.8 %    % Monocytes 12.3 %    % Eosinophils 1.9 %    % Basophils 0.6 %    % Immature Granulocytes 0.6 %    Nucleated RBCs 0 0 /100    Absolute Neutrophil 3.5 1.6 - 8.3 10e9/L    Absolute Lymphocytes 0.9 0.8 - 5.3 10e9/L    Absolute Monocytes 0.6 0.0 - 1.3 10e9/L    Absolute Eosinophils 0.1 0.0 - 0.7 10e9/L    Absolute Basophils 0.0 0.0 - 0.2 10e9/L    Abs Immature Granulocytes 0.0 0 - 0.4 10e9/L    Absolute Nucleated RBC 0.0    Reticulocyte count   Result Value Ref Range    % Retic 4.3 (H) 0.5 - 2.0 %    Absolute Retic 80.4 25 - 95 10e9/L       Labs, vital signs, and imaging studies were reviewed by me.    Medications   potassium chloride ER (KLOR-CON M) CR tablet 40 mEq (40 mEq Oral Given 5/26/21 1823)       Assessments & Plan (with Medical Decision Making)   Sarah Fisher is a 51 year old female who presents with low  Hgb. 2 U PRBCs ordered per recommendations in GI note. Labs ordered to further evaluate the patient.     Labs remarkable for Hgb 6.0     Pt was d/w GI and hematology, agree with blood transfusion. Additional labs recommended for hematology workup were ordered. OK for discharge home, no recheck of hemoglobin necessary for next week per their recommendations. Pt to be advised to f/u with GI and hematology when she returns from [place].    I have reviewed the nursing notes.    I have reviewed the findings, diagnosis, plan and need for follow up with the patient.    Patient to be discharged home. Advised to follow up with PCP within 1 week. To return to nearest ER immediately with any new/worsening symptoms. Plan of care discussed with patient who expresses understanding and agrees with plan of care.      Discharge Medication List as of 5/26/2021 10:18 PM          Final diagnoses:   Anemia,  unspecified type   Encounter for blood transfusion     Nohelia Alonso MD  5/26/2021   [facility] EMERGENCY DEPARTMENT     Nohelia Alonso MD  05/26/21 [id number]

## 2021-05-26 NOTE — DISCHARGE INSTRUCTIONS
TODAY'S VISIT:  You were seen today for low hemoglobin   -   - If you had any labs or imaging/radiology tests performed today, you should also discuss these tests with your usual provider.     FOLLOW-UP:  Please make an appointment to follow up with:  - Your Primary Care Provider. If you do not have a PCP, please call the Primary Care Center (phone: [phone] for an appointment  -  your GI doctor and hematologist when you return from [place]    - Have your provider review the results from today's visit with you again to make sure no further follow-up or additional testing is needed based on those results.     RETURN TO THE EMERGENCY DEPARTMENT  Return to the nearest Emergency Department at any time for any new or worsening symptoms or any concerns.

## 2021-05-26 NOTE — CONFIDENTIAL NOTE
Hgb 6.5 today on labs prior to her thoracentesis. She denies overt s/s of GI blood loss, otherwise feels well. Has a history of hemolysis, iron supplementation recently stopped due to elevated Tsat and diagnosis of hereditary hemochromatosis (compound HZ).     Recommended 2 units PRBCs - to be scheduled 5/26. She should present to the ED earlier if developing symptoms of anemia or overt blood loss. Verbally consented her for blood transfusion over the phone (risks infection, volume overload, transfusion reaction), she is agreeable to transfusion. She has tolerated transfusions well in the past. Will also discuss her care with her hematologist.

## 2021-05-27 LAB
BLD PROD TYP BPU: NORMAL
BLD PROD TYP BPU: NORMAL
BLD UNIT ID BPU: 0
BLD UNIT ID BPU: 0
BLOOD PRODUCT CODE: NORMAL
BLOOD PRODUCT CODE: NORMAL
BPU ID: NORMAL
BPU ID: NORMAL
COPATH REPORT: NORMAL
HAPTOGLOB SERPL-MCNC: NORMAL MG/DL (ref 32–197)
INTERPRETATION ECG - MUSE: NORMAL
TRANSFUSION STATUS PATIENT QL: NORMAL

## 2021-05-27 NOTE — RESULT ENCOUNTER NOTE
FYI, resulted after Patient Discharged from ED  Result hemolyzed.  Routed to Patients gastroenterologist, Fadia Avila MD

## 2021-05-28 ASSESSMENT — COLUMBIA-SUICIDE SEVERITY RATING SCALE - C-SSRS
4. HAVE YOU HAD THESE THOUGHTS AND HAD SOME INTENTION OF ACTING ON THEM?: NO
2. HAVE YOU ACTUALLY HAD ANY THOUGHTS OF KILLING YOURSELF LIFETIME?: NO
5. HAVE YOU STARTED TO WORK OUT OR WORKED OUT THE DETAILS OF HOW TO KILL YOURSELF? DO YOU INTEND TO CARRY OUT THIS PLAN?: NO
4. HAVE YOU HAD THESE THOUGHTS AND HAD SOME INTENTION OF ACTING ON THEM?: NO
6. HAVE YOU EVER DONE ANYTHING, STARTED TO DO ANYTHING, OR PREPARED TO DO ANYTHING TO END YOUR LIFE?: NO
TOTAL  NUMBER OF ABORTED OR SELF INTERRUPTED ATTEMPTS PAST 3 MONTHS: NO
TOTAL  NUMBER OF ABORTED OR SELF INTERRUPTED ATTEMPTS PAST LIFETIME: NO
3. HAVE YOU BEEN THINKING ABOUT HOW YOU MIGHT KILL YOURSELF?: NO
1. IN THE PAST MONTH, HAVE YOU WISHED YOU WERE DEAD OR WISHED YOU COULD GO TO SLEEP AND NOT WAKE UP?: NO
2. HAVE YOU ACTUALLY HAD ANY THOUGHTS OF KILLING YOURSELF?: NO
1. IN THE PAST MONTH, HAVE YOU WISHED YOU WERE DEAD OR WISHED YOU COULD GO TO SLEEP AND NOT WAKE UP?: NO
TOTAL  NUMBER OF INTERRUPTED ATTEMPTS LIFETIME: NO
TOTAL  NUMBER OF INTERRUPTED ATTEMPTS PAST 3 MONTHS: NO
5. HAVE YOU STARTED TO WORK OUT OR WORKED OUT THE DETAILS OF HOW TO KILL YOURSELF? DO YOU INTEND TO CARRY OUT THIS PLAN?: NO
ATTEMPT PAST THREE MONTHS: NO
6. HAVE YOU EVER DONE ANYTHING, STARTED TO DO ANYTHING, OR PREPARED TO DO ANYTHING TO END YOUR LIFE?: NO
ATTEMPT LIFETIME: NO

## 2021-06-04 ENCOUNTER — TELEPHONE (OUTPATIENT)
Dept: CARDIOLOGY | Facility: CLINIC | Age: 51
End: 2021-06-04

## 2021-06-04 DIAGNOSIS — Z11.59 ENCOUNTER FOR SCREENING FOR OTHER VIRAL DISEASES: ICD-10-CM

## 2021-06-04 LAB
SARS-COV-2 RNA RESP QL NAA+PROBE: NORMAL
SPECIMEN SOURCE: NORMAL

## 2021-06-04 PROCEDURE — U0003 INFECTIOUS AGENT DETECTION BY NUCLEIC ACID (DNA OR RNA); SEVERE ACUTE RESPIRATORY SYNDROME CORONAVIRUS 2 (SARS-COV-2) (CORONAVIRUS DISEASE [COVID-19]), AMPLIFIED PROBE TECHNIQUE, MAKING USE OF HIGH THROUGHPUT TECHNOLOGIES AS DESCRIBED BY CMS-2020-01-R: HCPCS | Performed by: INTERNAL MEDICINE

## 2021-06-04 PROCEDURE — U0005 INFEC AGEN DETEC AMPLI PROBE: HCPCS | Performed by: INTERNAL MEDICINE

## 2021-06-04 NOTE — TELEPHONE ENCOUNTER
Left voicemail for patient to complete Travel Screen for Cardiac Cath Lab appointment on 6/7/2021 and inform patient of updated Visitor Policy.  Patient has COVID test scheduled for today, 6/4/2021.

## 2021-06-07 ENCOUNTER — APPOINTMENT (OUTPATIENT)
Dept: LAB | Facility: CLINIC | Age: 51
End: 2021-06-07
Attending: INTERNAL MEDICINE
Payer: COMMERCIAL

## 2021-06-07 ENCOUNTER — HOSPITAL ENCOUNTER (OUTPATIENT)
Facility: CLINIC | Age: 51
Discharge: HOME | End: 2021-06-07
Attending: INTERNAL MEDICINE | Admitting: INTERNAL MEDICINE
Payer: COMMERCIAL

## 2021-06-07 ENCOUNTER — APPOINTMENT (OUTPATIENT)
Dept: MEDSURG UNIT | Facility: CLINIC | Age: 51
End: 2021-06-07
Attending: INTERNAL MEDICINE
Payer: COMMERCIAL

## 2021-06-07 VITALS
HEART RATE: 90 BPM | WEIGHT: 169.97 LBS | RESPIRATION RATE: 20 BRPM | HEIGHT: 66 IN | DIASTOLIC BLOOD PRESSURE: 63 MMHG | BODY MASS INDEX: 27.32 KG/M2 | SYSTOLIC BLOOD PRESSURE: 118 MMHG | OXYGEN SATURATION: 95 %

## 2021-06-07 DIAGNOSIS — K70.30 ALCOHOLIC CIRRHOSIS, UNSPECIFIED WHETHER ASCITES PRESENT (H): ICD-10-CM

## 2021-06-07 DIAGNOSIS — K74.60 CIRRHOSIS OF LIVER (H): Primary | ICD-10-CM

## 2021-06-07 DIAGNOSIS — K70.30 ALCOHOLIC CIRRHOSIS (H): ICD-10-CM

## 2021-06-07 DIAGNOSIS — R79.0 ABNORMAL SERUM IRON LEVEL: ICD-10-CM

## 2021-06-07 LAB
ALBUMIN SERPL-MCNC: 2.7 G/DL (ref 3.4–5)
ALP SERPL-CCNC: 150 U/L (ref 40–150)
ALT SERPL W P-5'-P-CCNC: 18 U/L (ref 0–50)
ANION GAP SERPL CALCULATED.3IONS-SCNC: 6 MMOL/L (ref 3–14)
AST SERPL W P-5'-P-CCNC: 35 U/L (ref 0–45)
B-HCG SERPL-ACNC: 10 IU/L (ref 0–5)
BILIRUB DIRECT SERPL-MCNC: 1.9 MG/DL (ref 0–0.2)
BILIRUB SERPL-MCNC: 5.6 MG/DL (ref 0.2–1.3)
BUN SERPL-MCNC: 14 MG/DL (ref 7–30)
CALCIUM SERPL-MCNC: 8.7 MG/DL (ref 8.5–10.1)
CHLORIDE SERPL-SCNC: 106 MMOL/L (ref 94–109)
CO2 SERPL-SCNC: 28 MMOL/L (ref 20–32)
CREAT SERPL-MCNC: 0.96 MG/DL (ref 0.52–1.04)
ERYTHROCYTE [DISTWIDTH] IN BLOOD BY AUTOMATED COUNT: 15.8 % (ref 10–15)
GFR SERPL CREATININE-BSD FRML MDRD: 68 ML/MIN/{1.73_M2}
GLUCOSE SERPL-MCNC: 138 MG/DL (ref 70–99)
HCT VFR BLD AUTO: 27.8 % (ref 35–47)
HGB BLD-MCNC: 7.7 G/DL (ref 11.7–15.7)
HGB BLD-MCNC: 8.4 G/DL (ref 11.7–15.7)
INR PPP: 1.73 (ref 0.86–1.14)
MCH RBC QN AUTO: 31.1 PG (ref 26.5–33)
MCHC RBC AUTO-ENTMCNC: 30.2 G/DL (ref 31.5–36.5)
MCV RBC AUTO: 103 FL (ref 78–100)
OXYHGB MFR BLD: 72 % (ref 92–100)
PLATELET # BLD AUTO: 143 10E9/L (ref 150–450)
POTASSIUM SERPL-SCNC: 3.4 MMOL/L (ref 3.4–5.3)
PROT SERPL-MCNC: 5.8 G/DL (ref 6.8–8.8)
RBC # BLD AUTO: 2.7 10E12/L (ref 3.8–5.2)
SODIUM SERPL-SCNC: 140 MMOL/L (ref 133–144)
WBC # BLD AUTO: 5 10E9/L (ref 4–11)

## 2021-06-07 PROCEDURE — 80076 HEPATIC FUNCTION PANEL: CPT | Performed by: INTERNAL MEDICINE

## 2021-06-07 PROCEDURE — 250N000009 HC RX 250: Performed by: INTERNAL MEDICINE

## 2021-06-07 PROCEDURE — 36415 COLL VENOUS BLD VENIPUNCTURE: CPT | Performed by: INTERNAL MEDICINE

## 2021-06-07 PROCEDURE — 999N000132 HC STATISTIC PP CARE STAGE 1

## 2021-06-07 PROCEDURE — 80048 BASIC METABOLIC PNL TOTAL CA: CPT | Performed by: INTERNAL MEDICINE

## 2021-06-07 PROCEDURE — 84702 CHORIONIC GONADOTROPIN TEST: CPT | Performed by: INTERNAL MEDICINE

## 2021-06-07 PROCEDURE — 85027 COMPLETE CBC AUTOMATED: CPT | Performed by: INTERNAL MEDICINE

## 2021-06-07 PROCEDURE — 82810 BLOOD GASES O2 SAT ONLY: CPT

## 2021-06-07 PROCEDURE — 93451 RIGHT HEART CATH: CPT | Performed by: INTERNAL MEDICINE

## 2021-06-07 PROCEDURE — 272N000001 HC OR GENERAL SUPPLY STERILE: Performed by: INTERNAL MEDICINE

## 2021-06-07 PROCEDURE — 85018 HEMOGLOBIN: CPT

## 2021-06-07 PROCEDURE — 85610 PROTHROMBIN TIME: CPT | Performed by: INTERNAL MEDICINE

## 2021-06-07 RX ORDER — LIDOCAINE 40 MG/G
CREAM TOPICAL
Status: COMPLETED | OUTPATIENT
Start: 2021-06-07 | End: 2021-06-07

## 2021-06-07 RX ADMIN — LIDOCAINE: 40 CREAM TOPICAL at 13:08

## 2021-06-07 ASSESSMENT — MIFFLIN-ST. JEOR: SCORE: 1402.5

## 2021-06-07 NOTE — PRE-PROCEDURE
GENERAL PRE-PROCEDURE:   Procedure:  Right heart catheterization  Date/Time:  6/7/2021 1:37 PM    Written consent obtained?: Yes    Risks and benefits: Risks, benefits and alternatives were discussed    DC Plan: Appropriate discharge home plan in place for patients who are going home after procedure   Consent given by:  Patient  Patient states understanding of procedure being performed: Yes    Patient's understanding of procedure matches consent: Yes    Procedure consent matches procedure scheduled: Yes    Appropriately NPO:  Yes  Lungs:  Other (comment)  Lung exam comment:  Right lung is clear throughout. Left lung with severely diminished breath sounds extending 3/4 up lung field.   Heart:  Normal heart sounds and rate  I have reviewed the lab findings, diagnostic data and medications. Hcg was elevated at 10 but previously elevated at 8 on 5/2/21. Patient reports that last menstrual period was over 1 year ago. No chance that she is pregnant. INR elevated at 1.73 due to liver disease.

## 2021-06-07 NOTE — IP AVS SNAPSHOT
[facility] [address] [address]                                    After Visit Summary   6/7/2021    Sarah Fisher    MRN: [id number]           After Visit Summary Signature Page    I have received my discharge instructions, and my questions have been answered. I have discussed any challenges I see with this plan with the nurse or doctor.    ..........................................................................................................................................  Patient/Patient Representative Signature      ..........................................................................................................................................  Patient Representative Print Name and Relationship to Patient    ..................................................               ................................................  Date                                   Time    ..........................................................................................................................................  Reviewed by Signature/Title    ...................................................              ..............................................  Date                                               Time          22EPIC Rev 08/18

## 2021-06-07 NOTE — IP AVS SNAPSHOT
After Visit Summary Template Not Found    This Print Group is only intended to be used in the After Visit Summary and can only be used in a report that uses a released After Visit Summary Template.                       MRN:[id number]                      After Visit Summary   6/7/2021    Sarah Fisher    MRN: [id number]           Visit Information        Department      6/7/2021 11:53 AM [facility] [address] [place]          Review of your medicines      UNREVIEWED medicines. Ask your doctor about these medicines       Dose / Directions   Aldactone 25 MG tablet  Used for: Alcoholic cirrhosis of liver with ascites (H)  Generic drug: spironolactone      Dose: 50 mg  Take 2 tablets (50 mg) by mouth daily  Quantity: 60 tablet  Refills: 3     ciprofloxacin 250 MG tablet  Commonly known as: CIPRO  Used for: Alcoholic cirrhosis of liver with ascites (H)      Dose: 500 mg  Take 2 tablets (500 mg) by mouth daily  Quantity: 90 tablet  Refills: 3     folic acid 1 MG tablet  Commonly known as: FOLVITE  Used for: Chronic liver failure without hepatic coma (H)  Ask about: Which instructions should I use?      Dose: 1 mg  Take 1 tablet (1 mg) by mouth daily  Quantity: 30 tablet  Refills: 0     furosemide 20 MG tablet  Commonly known as: LASIX  Used for: Cirrhosis of liver (H)      Dose: 40 mg  Take 2 tablets (40 mg) by mouth daily  Quantity: 60 tablet  Refills: 3     levothyroxine 50 MCG tablet  Commonly known as: SYNTHROID/LEVOTHROID      Dose: 50 mcg  Take 50 mcg by mouth daily  Refills: 0     midodrine 5 MG tablet  Commonly known as: PROAMATINE  Used for: Hypotension, unspecified hypotension type      Dose: 5 mg  Take 1 tablet (5 mg) by mouth 2 times daily (with meals)  Quantity: 180 tablet  Refills: 0     multivitamin w/minerals tablet  Used for: Chronic liver failure without hepatic coma (H)      Dose: 1 tablet  Take 1 tablet by mouth daily  Quantity: 30 tablet  Refills: 3     pantoprazole 20 MG EC  tablet  Commonly known as: PROTONIX  Used for: Gastroesophageal reflux disease without esophagitis  Ask about: Which instructions should I use?      Dose: 20 mg  Take 1 tablet (20 mg) by mouth daily  Quantity: 90 tablet  Refills: 3     potassium chloride ER 10 MEQ CR tablet  Commonly known as: K-TAB/KLOR-CON      Dose: 20 mEq  Take 20 mEq by mouth daily  Refills: 0     rifaximin 550 MG Tabs tablet  Commonly known as: XIFAXAN      Dose: 550 mg  Take 550 mg by mouth 2 times daily  Refills: 0     thiamine 50 MG Tabs  Used for: Chronic liver failure without hepatic coma (H)      Dose: 50 mg  Take 1 tablet (50 mg) by mouth daily  Quantity: 90 tablet  Refills: 0     traZODone 50 MG tablet  Commonly known as: DESYREL      Dose: 50 mg  Take 50 mg by mouth At Bedtime  Refills: 0     vitamin D2 00984 units (1250 mcg) capsule  Commonly known as: ERGOCALCIFEROL  Used for: Vitamin D deficiency      Dose: 50,000 Units  Take 1 capsule (50,000 Units) by mouth once a week  Quantity: 12 capsule  Refills: 0     zinc sulfate 220 (50 Zn) MG capsule  Commonly known as: ZINCATE  Used for: Chronic liver failure without hepatic coma (H)      Dose: 220 mg  Take 1 capsule (220 mg) by mouth daily  Quantity: 90 capsule  Refills: 0              Protect others around you: Learn how to safely use, store and throw away your medicines at www.disposemymeds.org.       Follow-ups after your visit       Your next 10 appointments already scheduled    Jun 07, 2021  Procedure with Joseph Guevara MD  [facility] ([facility] [place] ) [address] [address]  [phone]   The [facility] is located on the corner of [facility] [address] [facility] [address] [facility] and [address] [address] [facility] on the [facility]. It is easily accessible from virtually any point in  the [facility] area, via [address] and [address].   Jun 09, 2021 12:30 PM  (Arrive by 12:15 PM)  Video Visit with Fadia Avila MD  [facility] [place] ([facility] [place] ) [address] [place] [address] [id number] [phone]   Please Note: This is a virtual visit; there is no need to come to the facility.       Jul 22, 2021 11:00 AM  (Arrive by 10:45 AM)  Video Visit with Almas Irby MD  [facility] [place] ([facility] ) [address] [place] [address] [id number] [phone]   [facility]  Note: this is not an onsite visit; there is no need to come to the facility.  Please have a list of all current medications available for appointment.     Under State and Federal Law; healthcare facilities must inform each patient of their Patient Rights.  This assures that the health care system is fair, and it works to meet patients' needs.  If you d like to read of copy of our Patient Bill of Rights, [email or web address]          Care Instructions       Further instructions from your care team       [facility]                        Interventional Cardiology  Discharge Instructions   Post Right Heart Cath      AFTER YOU GO HOME:    DO drink plenty of fluids    DO resume your regular diet and medications unless otherwise instructed by your Primary Physician    Do Not scrub the procedure site vigorously    No lotion or powder to the puncture site for 3 days    CALL YOUR PRIMARY PHYSICIAN IF: You may resume all normal activity.  Monitor neck site for bleeding, swelling, or voice changes. If you notice bleeding or swelling immediately apply pressure to the site and call number below to speak with Cardiology Fellow.  If you experience any changes in  "your breathing you should call your doctor immediately or come to the closest Emergency Department.  Do not drive yourself.    ADDITIONAL INSTRUCTIONS: Medications: You are to resume all home medications including anticoagulation therapy unless otherwise advised by your primary cardiologist or nurse coordinator.    Follow Up: Per your primary cardiology team    If you have any questions or concerns regarding your procedure site please call [phone] at anytime and ask for Cardiology Fellow on call.  They are available 24 hours a day.  You may also contact the Cardiology Clinic after hours number at [phone].                                                       Telephone Numbers [phone] Monday-Friday 8:00 am to 4:30 pm    [phone] After 4:30 pm Monday-Friday, Weekends & Holidays  Ask for Interventional Cardiologist on call. Someone is on call 24 hours/day   [facility] toll free number [phone] Monday-Friday 8:00 am to 4:30 pm   [facility] Emergency Dept [phone]                   Additional Information About Your Visit       Not iT Information    Not iT gives you secure access to your electronic health record. If you see a primary care provider, you can also send messages to your care team and make appointments. If you have questions, please call your primary care clinic.  If you do not have a primary care provider, please call [phone] and they will assist you.       Care EveryWhere ID    This is your Care EveryWhere ID. This could be used by other organizations to access your [place] medical records  [id number]       Your Vitals Were  Most recent update: 6/7/2021  1:01 PM    Blood Pressure   111/67          Pulse   81          Respirations   18          Height   1.676 m (5' 5.98\")          Weight   77.1 kg (169 lb 15.6 oz)             Pulse Oximetry   98%    BMI (Body Mass Index)   27.45 kg/m           Primary Care Provider Office Phone # Fax #    ROBYN Bear CNP " [phone]      Equal Access to Services    AMRIK LYNNE : Hadii reji ku jennifer Sokimaniali, waaxda luqadaha, qaybta kabernadetteda josephbladimir, prachi haleyin hayaajaswinder ruizstella woods ladickjaswinder parish. So [facility] [phone].    ATENCIÓN: Si habla español, tiene a saldivar disposición servicios gratuitos de asistencia lingüística. Llame al [phone].    We comply with applicable federal and state civil rights laws, including the [place] Human Rights Act. We do not discriminate on the basis of race, color, creed, Yazdanism, national origin, marital status, age, disability, sex, sexual orientation, or gender identity.    If you would like an itemization of your charges they will now be available in [facility] 30 days after discharge. To access the itemized statements in [facility] go to billing/billing summary. From there select view account. There will be multiple tabs showing an overview of your account, detail, payments, and communications. From the communications tab you can see your monthly statements, your itemized statements, and any billing letters generated for your account. If you do not have a [facility] account and need help getting access please contact [facility] support at [phone].  If you would prefer to have your itemized statements mailed please contact our automated itemized bill request line at [phone] option  2.       Thank you!    Thank you for choosing [facility] for your care. Our goal is always to provide you with excellent care. Hearing back from our patients is one way we can continue to improve our services. Please take a few minutes to complete the written survey that you may receive in the mail after you visit with us. Thank you!            Medication List      ASK your doctor about these medications          Morning Afternoon Evening Bedtime As Needed    Aldactone 25 MG tablet  INSTRUCTIONS: Take 2 tablets (50 mg) by mouth daily  Generic drug: spironolactone                     ciprofloxacin 250 MG  tablet  Also known as: CIPRO  INSTRUCTIONS: Take 2 tablets (500 mg) by mouth daily                     folic acid 1 MG tablet  Also known as: FOLVITE  INSTRUCTIONS: Take 1 tablet (1 mg) by mouth daily  Ask about: Which instructions should I use?                     furosemide 20 MG tablet  Also known as: LASIX  INSTRUCTIONS: Take 2 tablets (40 mg) by mouth daily                     levothyroxine 50 MCG tablet  Also known as: SYNTHROID/LEVOTHROID  INSTRUCTIONS: Take 50 mcg by mouth daily                     midodrine 5 MG tablet  Also known as: PROAMATINE  INSTRUCTIONS: Take 1 tablet (5 mg) by mouth 2 times daily (with meals)                     multivitamin w/minerals tablet  INSTRUCTIONS: Take 1 tablet by mouth daily                     pantoprazole 20 MG EC tablet  Also known as: PROTONIX  INSTRUCTIONS: Take 1 tablet (20 mg) by mouth daily  Ask about: Which instructions should I use?                     potassium chloride ER 10 MEQ CR tablet  Also known as: K-TAB/KLOR-CON  INSTRUCTIONS: Take 20 mEq by mouth daily                     rifaximin 550 MG Tabs tablet  Also known as: XIFAXAN  INSTRUCTIONS: Take 550 mg by mouth 2 times daily                     thiamine 50 MG Tabs  INSTRUCTIONS: Take 1 tablet (50 mg) by mouth daily                     traZODone 50 MG tablet  Also known as: DESYREL  INSTRUCTIONS: Take 50 mg by mouth At Bedtime                     vitamin D2 13762 units (1250 mcg) capsule  Also known as: ERGOCALCIFEROL  INSTRUCTIONS: Take 1 capsule (50,000 Units) by mouth once a week                     zinc sulfate 220 (50 Zn) MG capsule  Also known as: ZINCATE  INSTRUCTIONS: Take 1 capsule (220 mg) by mouth daily

## 2021-06-07 NOTE — PROGRESS NOTES
Patient returned to 2A post [facility]HC.  VSS.  Denies pain.  Right neck site C/D/I, no hematoma.  Teaching was done and discharge instructions were given. Declined PO food and fluids - will eat at home.  Patient appropriate for immediate discharge.   Patient will discharge from hospital to home once dressed.

## 2021-06-07 NOTE — DISCHARGE INSTRUCTIONS
[facility]                        Interventional Cardiology  Discharge Instructions   Post Right Heart Cath      AFTER YOU GO HOME:    DO drink plenty of fluids    DO resume your regular diet and medications unless otherwise instructed by your Primary Physician    Do Not scrub the procedure site vigorously    No lotion or powder to the puncture site for 3 days    CALL YOUR PRIMARY PHYSICIAN IF: You may resume all normal activity.  Monitor neck site for bleeding, swelling, or voice changes. If you notice bleeding or swelling immediately apply pressure to the site and call number below to speak with Cardiology Fellow.  If you experience any changes in your breathing you should call your doctor immediately or come to the closest Emergency Department.  Do not drive yourself.    ADDITIONAL INSTRUCTIONS: Medications: You are to resume all home medications including anticoagulation therapy unless otherwise advised by your primary cardiologist or nurse coordinator.    Follow Up: Per your primary cardiology team    If you have any questions or concerns regarding your procedure site please call [phone] at anytime and ask for Cardiology Fellow on call.  They are available 24 hours a day.  You may also contact the Cardiology Clinic after hours number at [phone].                                                       Telephone Numbers [phone] Monday-Friday 8:00 am to 4:30 pm    [phone] After 4:30 pm Monday-Friday, Weekends & Holidays  Ask for Interventional Cardiologist on call. Someone is on call 24 hours/day   [facility] toll free number [phone] Monday-Friday 8:00 am to 4:30 pm   [facility] Emergency Dept [phone]

## 2021-06-07 NOTE — PROGRESS NOTES
Pt is prepped for a right heart cath.LMX cream applied to neck.Consent signed and questions answered with niece at bedside Radha.Notified KAMLA Whiting of elevated pregnancy test and pt states that she hasn't a period in over a year and was OK to go to procedure.

## 2021-06-09 ENCOUNTER — VIRTUAL VISIT (OUTPATIENT)
Dept: GASTROENTEROLOGY | Facility: CLINIC | Age: 51
End: 2021-06-09
Attending: STUDENT IN AN ORGANIZED HEALTH CARE EDUCATION/TRAINING PROGRAM
Payer: COMMERCIAL

## 2021-06-09 VITALS — SYSTOLIC BLOOD PRESSURE: 111 MMHG | DIASTOLIC BLOOD PRESSURE: 67 MMHG

## 2021-06-09 DIAGNOSIS — R60.9 EDEMA, UNSPECIFIED TYPE: ICD-10-CM

## 2021-06-09 DIAGNOSIS — D64.9 ANEMIA, UNSPECIFIED TYPE: ICD-10-CM

## 2021-06-09 DIAGNOSIS — E83.110 HEREDITARY HEMOCHROMATOSIS (H): ICD-10-CM

## 2021-06-09 DIAGNOSIS — J90 PLEURAL EFFUSION: ICD-10-CM

## 2021-06-09 DIAGNOSIS — K70.31 ALCOHOLIC CIRRHOSIS OF LIVER WITH ASCITES (H): Primary | ICD-10-CM

## 2021-06-09 PROCEDURE — 99215 OFFICE O/P EST HI 40 MIN: CPT | Mod: 95 | Performed by: STUDENT IN AN ORGANIZED HEALTH CARE EDUCATION/TRAINING PROGRAM

## 2021-06-09 RX ORDER — POTASSIUM CHLORIDE 750 MG/1
20 TABLET, EXTENDED RELEASE ORAL 2 TIMES DAILY
Qty: 360 TABLET | Refills: 3 | Status: SHIPPED | OUTPATIENT
Start: 2021-06-09 | End: 2021-08-24

## 2021-06-09 ASSESSMENT — PAIN SCALES - GENERAL: PAINLEVEL: NO PAIN (0)

## 2021-06-09 NOTE — LETTER
6/9/2021         RE: Sarah Fisher  [address] [facility] [address]        Dear Colleague,    Thank you for referring your patient, Sarah Fisher, to the [facility] [place]. Please see a copy of my visit note below.    Maria Elena is a 51 year old who is being evaluated via a billable video visit.      How would you like to obtain your AVS? Mail a copy  If the video visit is dropped, the invitation should be resent by: Send to e-mail at: [email or web address]  Will anyone else be joining your video visit? No      Video Start Time: 12:34 PM  Video-Visit Details    Type of service:  Video Visit    Video End Time:1:04 PM    Originating Location (pt. Location): Home    Distant Location (provider location):  [facility] [place]     Platform used for Video Visit: BIOeCON     [facility]     Date of Visit: 6/9/2021    Reason for referral: LT evaluation follow up     Subjective: Ms. 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 HE, hypothyroidism, who presents for follow up. She is listed for liver transplant.     Initial History:    Patient presented to PCP 6/2020 for hemorrhoidal bleeding, was noticed to be jaundiced. They recommended she stop drinking, which she had already done when she saw her eyes turning yellow. Labs significant for BR 17.2  ALT 37 Alk phos 231. Ultrasound was done 7/6 which showed cirrhosis, cavernous transformation of the portal veins.     Presented to the ED 9/8 with SOB. Found to have LUE swelling on exam, underwent US that showed left internal jugular acute DVT. Had a CT PE and AP as well that did not show a PE, but showed a very large left pleural effusion with associated complete left lung atelectasis. Also showed a large right pleural effusion. No adenopathy. Showed moderated volume ascites with mesenteric edema, likely  portal colopathy. She was started on IV heparin for her L internal jugular DVT. This is the first DVT she has ever had. Later Imaging showed DVT involved left internal jugular, brachiocephalic and subclavian vein. She underwent a thoracentesis with 2 L removed on 9/8. This was blood tinged per report, but grossly bloody > 50,000 with 9,337 WBC 98% PMNS. Protein 1.5 LDH 92. She developed hypotension after this with worsening hypoxia thought to be related to reexpansion pulmonary edema. Culture from this eventually grow E. Coli and eggerthelle lenta. She was put on zosyn and eventually augmentin for this. She had a paracentesis 9/9 that showed < 50,000 RBC, 5,203 WBC 70% PMNs, albumin < 0.7, TP 1.0. AC was stopped given concern for hemothorax, eventually lower dose apixaban started. She had a repeat left sided thoracentesis 9/12 with > 50,000 RBC 4,889 WBC 72% PMNs,  TP 3.0 negative culture, and then 9/17 with > 50,000 RBC, 1,1118 WBC 17% PMNs and a negative culture. She was confused this admission, started on meds for HE. Started on low dose diuretics.     Admitted again 9/30 with SOB. Had CT PE that did not show a PE, showed very large left and large right pleural effusion with associated new complete left lung atelectasis.  and haptoglobin 187. BR 10/5 5.3 with 2.6 direct. Underwent thoracentesis 10/5 that showed > 50,000 RBC with 385 WBC 8% PMNs. Had darker stools, underwent EGD showed PHG. AC restarted. Was discharged on home oxygen and with increased diuretics.     Admitted 10/30-1 for SOB, BRBPR, anemia. BR 3.2 with 1.8 direct. Hemoglobin was down to 4.5, transfused, EGD with PHG and GAVE - treated with APC. Had near complete opacification of left pleural space with effusion. Had CORBIN - creatinine around 2, downtrended to 1.5 with holding diuretics. DVU scan showed some improvement in the chronic DVT - plan was for 2 months AC. Ferritin 645.5.    Admission 11/4-18 for anemia - Hgb was 4.3, required  several units of blood. Iron stores TSat 69%. CXR showed complete white out of left lung field. Decision was made to hold AC given her recurrent anemia. Repeat UE US showed chronic appearing non occlusive thrombus in the left internal jugular vein, innominate vein and subclavain vein. Also had CORBIN - creatinine was 2.36 initially, down to 1.27 on discharge. Diuretics were held. She was on home oxygen at that point. Started on a BB this admission. 11/15 BR 5.7 with 1.9 direct.     She had an outpatient EGD/Colonoscopy 12/4 that showed erythema in the esophagus, no varices. Severe PHG. No definite residual GAVE seen. Antrum PHG was treated with APC. Diffuse congested mucosa without active bleeding found in duodenum. She had colonoscopy that showed hemorrhoids, congested colonic mucosa. 4 mm cecal polyp removed that showed normal colonic mucosa.     She was admitted to the hospital 12/8 with weakness and confusion. Patient felt she was confused after her procedure. Underwent a thoracentesis on 12/9 - 1.7 L of fluid was removed because she was having some SOB. Studies c/w < 50,000 RBC, 151 WBC 25% PMNs. She shortly developed worsening hypoxia, hypotension, requiring pressors and BiPAP. [place] to be 2/2 re-expansion pulmonary edema. Hgb went 8 -> 6, required 2 units blood. Oxygenation and pressor support improved with time. She was transferred here for further evaluation.     Upon transfer to here, she was doing well. Required little to no oxygen. She was mentating well. She underwent a 500 ml diagnostic thoracentesis on 12/17 that was described as pink/cloudy with 129 WBC. TP 1.4 (serum 5), LDH 81 (serum 371), glucose 109, amylase 14. Negative cytology. 1 of the two culture bottles grew skin di on day 5, other culture did not. She was discharged on cipro ppx for her history of SBP. Discharged on lasix 40/spironolactone 100.     Saw hematology, they felt her DVT was related to hypercoagulable state from cirrhosis and  "compression from hydrothorax. Felt she may \"have some degree of Dony negative hemolytic anemia but we cannot prove this.  Rather at this point the risks seem to outweigh the benefits of empiric treatment for immune hemolytic anemia.  If she decompensates in terms of her anemia without a source of blood loss then we can consider treatment very carefully\"    Had a mammogram 2021 that showed skin thickening likely related to underlying cirrhosis, potential asymmetry in the upper posterior right breast seen on MLO view only, recommended further evaluation. Additional imaging showed grouped coarse calcifications. Underwent bx suspicious calcifications that were sampled, pathology resulted as hyalinized fibroadenoma with stromal calcifications, negative for atypic and malignancy. Recommend repeat mammogram annually.     Pap smear 3/2021 normal.     Interval Events  - Reports she is feeling well.   - Found to be compound HZ for hemochromatosis. Ferritin elevated in the past, now normal  - On spironolactone 75 mg daily and furosemide 40 mg daily. Minimal abdominal swelling. Having more swelling in her legs  - Last thoracentesis done prior to her trip - mainly did because she was leaving town. She thinks her breathing is easier a few days after her procedure. Found to have Hgb to the 6s prior to this lab, got a transfusion afterwards (2 units). Did not have overt blood loss witht his drop   - RHC done that showed RV 35/10 PA pressures 25/22 mean 22, PCW 15, PVR 1.1 NEWELL    ROS: 14 point ROS negative except for positives noted in HPI.    PMHx:  Past Medical History:   Diagnosis Date     Ascites      History of blood transfusion      Liver cirrhosis (H)      Thyroid disease    Alcohol related cirrhosis    PSHx:  Past Surgical History:   Procedure Laterality Date      SECTION       CV RIGHT HEART CATH MEASUREMENTS RECORDED N/A 2021    Procedure: CV RIGHT HEART CATH;  Surgeon: Joseph Guevara MD; "  Location:  HEART CARDIAC CATH LAB     IR THORACENTESIS  2021     THORACENTESIS Left 2021    Procedure: THORACENTESIS;  Surgeon: Almas Irby MD;  Location:  GI     THORACENTESIS N/A 2021    Procedure: THORACENTESIS;  Surgeon: Almas Irby MD;  Location:  GI     THORACENTESIS N/A 03/10/2021    Procedure: THORACENTESIS;  Surgeon: Almas Irby MD;  Location:  GI     THORACENTESIS Left 2021    Procedure: THORACENTESIS;  Surgeon: Kennedi Chavez MD;  Location: [facility] OR     Ovarian cyst    FamHx:  No known history of liver disease    SocHx:  Social History     Socioeconomic History     Marital status: Single     Spouse name: Not on file     Number of children: Not on file     Years of education: Not on file     Highest education level: Master's degree (e.g., MA, MS, Roberto, MEd, MSW, SASHA)   Occupational History     Not on file   Social Needs     Financial resource strain: Not hard at all     Food insecurity     Worry: Never true     Inability: Never true     Transportation needs     Medical: No     Non-medical: No   Tobacco Use     Smoking status: Never Smoker     Smokeless tobacco: Never Used   Substance and Sexual Activity     Alcohol use: Not Currently     Frequency: Never     Comment: Quit on 2020     Drug use: Not Currently     Sexual activity: Not Currently     Partners: Male   Lifestyle     Physical activity     Days per week: 5 days     Minutes per session: 30 min     Stress: Only a little   Relationships     Social connections     Talks on phone: More than three times a week     Gets together: More than three times a week     Attends Bahai service: More than 4 times per year     Active member of club or organization: Yes     Attends meetings of clubs or organizations: More than 4 times per year     Relationship status: Never      Intimate partner violence     Fear of current or ex partner: Not on file     Emotionally abused: Not on file     Physically  abused: Not on file     Forced sexual activity: Not on file   Other Topics Concern     Parent/sibling w/ CABG, MI or angioplasty before 65F 55M? Not Asked   Social History Narrative    Lives [place]. 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         Christine Harris, DNP, APRN, CNP    3/17/2021   Just moved back home, living with son and niece who is going to school to be a RN    Medications:  Current Outpatient Medications   Medication     ciprofloxacin (CIPRO) 250 MG tablet     folic acid (FOLVITE) 1 MG tablet     furosemide (LASIX) 20 MG tablet     levothyroxine (SYNTHROID/LEVOTHROID) 50 MCG tablet     midodrine (PROAMATINE) 5 MG tablet     multivitamin w/minerals (THERA-VIT-M) tablet     pantoprazole (PROTONIX) 20 MG EC tablet     potassium chloride ER (K-TAB/KLOR-CON) 10 MEQ CR tablet     rifaximin (XIFAXAN) 550 MG TABS tablet     spironolactone (ALDACTONE) 25 MG tablet     thiamine 50 MG TABS     traZODone (DESYREL) 50 MG tablet     vitamin D2 (ERGOCALCIFEROL) 89220 units (1250 mcg) capsule     zinc sulfate (ZINCATE) 220 (50 Zn) MG capsule     No current facility-administered medications for this visit.      Allergies:     Allergies   Allergen Reactions     Amoxicillin GI Disturbance, Diarrhea, Nausea and Nausea and Vomiting     Objective:  No vitals for this virtual visit  Constitutional: Pleasant woman in NAD  Eyes: Icteric  Respiratory: Breathing comfortably on RA  Skin: jaundiced  Neuro: AOOX3, no asterixis  Psychiatric: normal mood and orientation    Labs:  Last Comprehensive Metabolic Panel:  Sodium   Date Value Ref Range Status   06/07/2021 140 133 - 144 mmol/L Final     Potassium   Date Value Ref Range Status   06/07/2021 3.4 3.4 - 5.3 mmol/L Final     Chloride   Date Value Ref Range Status   06/07/2021 106 94 - 109 mmol/L Final     Carbon Dioxide   Date Value Ref Range Status   06/07/2021 28 20 - 32 mmol/L Final     Anion Gap   Date Value  Ref Range Status   06/07/2021 6 3 - 14 mmol/L Final     Glucose   Date Value Ref Range Status   06/07/2021 138 (H) 70 - 99 mg/dL Final     Urea Nitrogen   Date Value Ref Range Status   06/07/2021 14 7 - 30 mg/dL Final     Creatinine   Date Value Ref Range Status   06/07/2021 0.96 0.52 - 1.04 mg/dL Final     GFR Estimate   Date Value Ref Range Status   06/07/2021 68 >60 mL/min/[1.73_m2] Final     Comment:     Non  GFR Calc  Starting 12/18/2018, serum creatinine based estimated GFR (eGFR) will be   calculated using the Chronic Kidney Disease Epidemiology Collaboration   (CKD-EPI) equation.       Calcium   Date Value Ref Range Status   06/07/2021 8.7 8.5 - 10.1 mg/dL Final     Bilirubin Total   Date Value Ref Range Status   06/07/2021 5.6 (H) 0.2 - 1.3 mg/dL Final     Alkaline Phosphatase   Date Value Ref Range Status   06/07/2021 150 40 - 150 U/L Final     ALT   Date Value Ref Range Status   06/07/2021 18 0 - 50 U/L Final     AST   Date Value Ref Range Status   06/07/2021 35 0 - 45 U/L Final       Lab Results   Component Value Date    WBC 8.3 12/19/2020     Lab Results   Component Value Date    RBC 2.82 12/19/2020     Lab Results   Component Value Date    HGB 9.1 12/19/2020     Lab Results   Component Value Date    HCT 29.2 12/19/2020     Lab Results   Component Value Date     12/19/2020     Lab Results   Component Value Date    MCH 32.3 12/19/2020     Lab Results   Component Value Date    MCHC 31.2 12/19/2020     Lab Results   Component Value Date    RDW 18.9 12/19/2020     Lab Results   Component Value Date     12/19/2020       INR   Date Value Ref Range Status   06/07/2021 1.73 (H) 0.86 - 1.14 Final       MELD-Na score: 19 at 6/7/2021 12:14 PM  MELD score: 19 at 6/7/2021 12:14 PM  Calculated from:  Serum Creatinine: 0.96 mg/dL (Rounded to 1 mg/dL) at 6/7/2021 12:14 PM  Serum Sodium: 140 mmol/L (Rounded to 137 mmol/L) at 6/7/2021 12:14 PM  Total Bilirubin: 5.6 mg/dL at 6/7/2021 12:14  PM  INR(ratio): 1.73 at 6/7/2021 12:14 PM  Age: 51 years 2 months     6/2019  Hepatitis B Sag negative  Hepatitis C Ab negative  Hepatitis A IgG negative    10/2020  DAISY negative  ASMA negative  AFP 4.1    Imaging:    RUQ US Doppler 12/15/2020    Findings:  Liver: The liver demonstrates diffuse coarsened echotexture with  increased echogenicity. Nodular contour to the liver margins. No  evidence of a focal hepatic mass.      Extrahepatic portal vein flow is retrograde at 34 cm/s.  Right portal vein flow is retrograde, measuring 36 cm/s.  Left portal vein flow is antegrade, measuring 45 cm/s.     Flow in the hepatic artery is towards the liver and:  315  cm/s peak systolic  0.47 resistive index.      Recanalized paraumbilical vein.     The splenic vein is patent and flow is towards the liver.  The left,  middle, and right hepatic veins are patent with flow towards the IVC.  Flattening of waveforms secondary to underlying liver disease. The IVC  is patent with flow towards the heart.   The visualized aorta is not  dilated.     Gallbladder: Mildly thickened gallbladder wall 3.3 mm. There is no  pericholecystic fluid, positive sonographic Lema's sign or evidence  for cholelithiasis     Bile Ducts: No definite intrahepatic biliary dilatation. The common  bile duct is not visualized in this study.     Pancreas: Not visualized in this study.      Right kidney: Normal echotexture, without mass or hydronephrosis.  Measuring 11.7 cm.     Fluid: Partially visualized right pleural effusion, trace ascites  surrounding the liver.                                                                Impression:   1.  Cirrhotic morphology of the liver.  2.  Reversal of flow in the main portal vein, right portal vein, and  recanalization of the paraumbilical vein, compatible with portal  hypertension  3.  Decreased resistive index of the hepatic artery and increased peak  systolic velocity possibly secondary to stenosis or shunting.  4.   Partially visualized right pleural effusion and trace perihepatic  ascites.    Endoscopy:    12/4/2020 EGD    Findings:       Diffuse erythema was found in the entire esophagus.       The exam of the esophagus was otherwise normal. NO varices.       Severe portal hypertensive gastropathy was found in the entire examined        stomach. Most prominent within the gastric antrum. No definitive        residual GAVE identified. Coagulation for bleeding prevention using        argon plasma (1 liter/minute and 60 dawkins) of the gastric antrum was        successful.       Diffuse congested mucosa without active bleeding and with no stigmata of        bleeding was found in the entire duodenum.    Impressions/Post-Op Diagnosis:       - Erythema in the esophagus.       - Portal hypertensive gastropathy. Treated with argon plasma coagulation        (APC).       - Congested duodenal mucosa.    Recommendation:       - Continue pantoprazole 40 mg daily and sucralfate 1 gram twice daily.       - Iron supplementation twice daily.       - Avoid NSAIDs.    12/4/2020 Colonoscopy    Findings:       Hemorrhoids were found on perianal exam.       The terminal ileum appeared normal.       An area of grossly congested mucosa was found in the entire colon.       A 4 mm polyp was found in the cecum. The polyp was sessile. The polyp        was removed with a cold biopsy forceps. Resection and retrieval were        complete.       Non-bleeding internal hemorrhoids were found during retroflexion.    Impressions/Post-Op Diagnosis:       - Hemorrhoids found on perianal exam.       - The examined portion of the ileum was normal.       - Congested mucosa in the entire examined colon.       - One 4 mm polyp in the cecum, removed with a cold biopsy forceps.        Resected and retrieved.       - Non-bleeding internal hemorrhoids.    A) COLON, CECUM, POLYPECTOMY:  1. Colonic mucosa with no diagnostic abnormalities; a lymphoid aggregate is present  2.  Negative for serrated change, dysplasia, and malignancy    Assessment/Plan: Ms. Fisher is a 51 year old woman with a history of alcohol related cirrhosis c/b ascites, hemochromoatosis compound heterozygote, hepatic hydrothorax c/b SBE, unprovoked left internal jugular DVT, concern for hemolysis, hypothyroidism, who is currently listed for liver transplant. MELD is low, discussed living donor transplant, she will think more about this.    MELD-Na score: 19 at 6/7/2021 12:14 PM  MELD score: 19 at 6/7/2021 12:14 PM  Calculated from:  Serum Creatinine: 0.96 mg/dL (Rounded to 1 mg/dL) at 6/7/2021 12:14 PM  Serum Sodium: 140 mmol/L (Rounded to 137 mmol/L) at 6/7/2021 12:14 PM  Total Bilirubin: 5.6 mg/dL at 6/7/2021 12:14 PM  INR(ratio): 1.73 at 6/7/2021 12:14 PM  Age: 51 years 2 months    - Continue lasix 40, increased spironolactone to 100 mg daily for LE edema. Check labs next week. Will also place Lymphedema consult  - Continue cipro for SBP ppx  - Continue midodrine 5 mg BID - concern that stopping this and dropping her blood pressure would make her ascites/HH worse, so will continue for now. Should check BP 1-2x/week  - HCC screening: AFP normal 4/2021, due for RUQ US  - Appreciate hematology recs, continue folate for potential hemolysis  - Appreciate pulmonary recs, would not recommend pleurex catheter given risk of dehydration and infection with this  - Variceal screening: EGD 12/2020 without varices  - HE: can continue lactulose and rifaximin, unclear if she had HE or delirium related to underlying infection   - Recommend she complete hepatitis B vaccine series    RTC 3 months     Fadia Avila MD MSc  Transplant Hepatology  [facility]

## 2021-06-09 NOTE — PROGRESS NOTES
Maria Elena is a 51 year old who is being evaluated via a billable video visit.      How would you like to obtain your AVS? Mail a copy  If the video visit is dropped, the invitation should be resent by: Send to e-mail at: [email or web address]  Will anyone else be joining your video visit? No      Video Start Time: 12:34 PM  Video-Visit Details    Type of service:  Video Visit    Video End Time:1:04 PM    Originating Location (pt. Location): Home    Distant Location (provider location):  [facility] [place]     Platform used for Video Visit: [employer]     [facility]     Date of Visit: 6/9/2021    Reason for referral: LT evaluation follow up     Subjective: Ms. 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 HE, hypothyroidism, who presents for follow up. She is listed for liver transplant.     Initial History:    Patient presented to PCP 6/2020 for hemorrhoidal bleeding, was noticed to be jaundiced. They recommended she stop drinking, which she had already done when she saw her eyes turning yellow. Labs significant for BR 17.2  ALT 37 Alk phos 231. Ultrasound was done 7/6 which showed cirrhosis, cavernous transformation of the portal veins.     Presented to the ED 9/8 with SOB. Found to have LUE swelling on exam, underwent US that showed left internal jugular acute DVT. Had a CT PE and AP as well that did not show a PE, but showed a very large left pleural effusion with associated complete left lung atelectasis. Also showed a large right pleural effusion. No adenopathy. Showed moderated volume ascites with mesenteric edema, likely portal colopathy. She was started on IV heparin for her L internal jugular DVT. This is the first DVT she has ever had. Later Imaging showed DVT involved left internal jugular, brachiocephalic and subclavian vein. She underwent a thoracentesis with 2 L removed on 9/8. This  was blood tinged per report, but grossly bloody > 50,000 with 9,337 WBC 98% PMNS. Protein 1.5 LDH 92. She developed hypotension after this with worsening hypoxia thought to be related to reexpansion pulmonary edema. Culture from this eventually grow E. Coli and eggerthelle lenta. She was put on zosyn and eventually augmentin for this. She had a paracentesis 9/9 that showed < 50,000 RBC, 5,203 WBC 70% PMNs, albumin < 0.7, TP 1.0. AC was stopped given concern for hemothorax, eventually lower dose apixaban started. She had a repeat left sided thoracentesis 9/12 with > 50,000 RBC 4,889 WBC 72% PMNs,  TP 3.0 negative culture, and then 9/17 with > 50,000 RBC, 1,1118 WBC 17% PMNs and a negative culture. She was confused this admission, started on meds for HE. Started on low dose diuretics.     Admitted again 9/30 with SOB. Had CT PE that did not show a PE, showed very large left and large right pleural effusion with associated new complete left lung atelectasis.  and haptoglobin 187. BR 10/5 5.3 with 2.6 direct. Underwent thoracentesis 10/5 that showed > 50,000 RBC with 385 WBC 8% PMNs. Had darker stools, underwent EGD showed PHG. AC restarted. Was discharged on home oxygen and with increased diuretics.     Admitted 10/30-1 for SOB, BRBPR, anemia. BR 3.2 with 1.8 direct. Hemoglobin was down to 4.5, transfused, EGD with PHG and GAVE - treated with APC. Had near complete opacification of left pleural space with effusion. Had CORBIN - creatinine around 2, downtrended to 1.5 with holding diuretics. DVU scan showed some improvement in the chronic DVT - plan was for 2 months AC. Ferritin 645.5.    Admission 11/4-18 for anemia - Hgb was 4.3, required several units of blood. Iron stores TSat 69%. CXR showed complete white out of left lung field. Decision was made to hold AC given her recurrent anemia. Repeat UE US showed chronic appearing non occlusive thrombus in the left internal jugular vein, innominate vein and  "subclavain vein. Also had CORBIN - creatinine was 2.36 initially, down to 1.27 on discharge. Diuretics were held. She was on home oxygen at that point. Started on a BB this admission. 11/15 BR 5.7 with 1.9 direct.     She had an outpatient EGD/Colonoscopy 12/4 that showed erythema in the esophagus, no varices. Severe PHG. No definite residual GAVE seen. Antrum PHG was treated with APC. Diffuse congested mucosa without active bleeding found in duodenum. She had colonoscopy that showed hemorrhoids, congested colonic mucosa. 4 mm cecal polyp removed that showed normal colonic mucosa.     She was admitted to the hospital 12/8 with weakness and confusion. Patient felt she was confused after her procedure. Underwent a thoracentesis on 12/9 - 1.7 L of fluid was removed because she was having some SOB. Studies c/w < 50,000 RBC, 151 WBC 25% PMNs. She shortly developed worsening hypoxia, hypotension, requiring pressors and BiPAP. [place] to be 2/2 re-expansion pulmonary edema. Hgb went 8 -> 6, required 2 units blood. Oxygenation and pressor support improved with time. She was transferred here for further evaluation.     Upon transfer to here, she was doing well. Required little to no oxygen. She was mentating well. She underwent a 500 ml diagnostic thoracentesis on 12/17 that was described as pink/cloudy with 129 WBC. TP 1.4 (serum 5), LDH 81 (serum 371), glucose 109, amylase 14. Negative cytology. 1 of the two culture bottles grew skin di on day 5, other culture did not. She was discharged on cipro ppx for her history of SBP. Discharged on lasix 40/spironolactone 100.     Saw hematology, they felt her DVT was related to hypercoagulable state from cirrhosis and compression from hydrothorax. Felt she may \"have some degree of Dony negative hemolytic anemia but we cannot prove this.  Rather at this point the risks seem to outweigh the benefits of empiric treatment for immune hemolytic anemia.  If she decompensates in terms of her " "anemia without a source of blood loss then we can consider treatment very carefully\"    Had a mammogram 2021 that showed skin thickening likely related to underlying cirrhosis, potential asymmetry in the upper posterior right breast seen on MLO view only, recommended further evaluation. Additional imaging showed grouped coarse calcifications. Underwent bx suspicious calcifications that were sampled, pathology resulted as hyalinized fibroadenoma with stromal calcifications, negative for atypic and malignancy. Recommend repeat mammogram annually.     Pap smear 3/2021 normal.     Interval Events  - Reports she is feeling well.   - Found to be compound HZ for hemochromatosis. Ferritin elevated in the past, now normal  - On spironolactone 75 mg daily and furosemide 40 mg daily. Minimal abdominal swelling. Having more swelling in her legs  - Last thoracentesis done prior to her trip - mainly did because she was leaving town. She thinks her breathing is easier a few days after her procedure. Found to have Hgb to the 6s prior to this lab, got a transfusion afterwards (2 units). Did not have overt blood loss witht his drop   - RHC done that showed RV 35/10 PA pressures  mean 22, PCW 15, PVR 1.1 NEWELL    ROS: 14 point ROS negative except for positives noted in HPI.    PMHx:  Past Medical History:   Diagnosis Date     Ascites      History of blood transfusion      Liver cirrhosis (H)      Thyroid disease    Alcohol related cirrhosis    PSHx:  Past Surgical History:   Procedure Laterality Date      SECTION       CV RIGHT HEART CATH MEASUREMENTS RECORDED N/A 2021    Procedure: CV RIGHT HEART CATH;  Surgeon: Joseph Guevara MD;  Location:  HEART CARDIAC CATH LAB     IR THORACENTESIS  2021     THORACENTESIS Left 2021    Procedure: THORACENTESIS;  Surgeon: Almas Irby MD;  Location:  GI     THORACENTESIS N/A 2021    Procedure: THORACENTESIS;  Surgeon: Almas Irby MD;  " Location: [facility] GI     THORACENTESIS N/A 03/10/2021    Procedure: THORACENTESIS;  Surgeon: Almas Irby MD;  Location: [facility] GI     THORACENTESIS Left 2021    Procedure: THORACENTESIS;  Surgeon: Kennedi Chavez MD;  Location: [facility] OR     Ovarian cyst    FamHx:  No known history of liver disease    SocHx:  Social History     Socioeconomic History     Marital status: Single     Spouse name: Not on file     Number of children: Not on file     Years of education: Not on file     Highest education level: Master's degree (e.g., MA, MS, Roberto, MEd, MSW, SASHA)   Occupational History     Not on file   Social Needs     Financial resource strain: Not hard at all     Food insecurity     Worry: Never true     Inability: Never true     Transportation needs     Medical: No     Non-medical: No   Tobacco Use     Smoking status: Never Smoker     Smokeless tobacco: Never Used   Substance and Sexual Activity     Alcohol use: Not Currently     Frequency: Never     Comment: Quit on 2020     Drug use: Not Currently     Sexual activity: Not Currently     Partners: Male   Lifestyle     Physical activity     Days per week: 5 days     Minutes per session: 30 min     Stress: Only a little   Relationships     Social connections     Talks on phone: More than three times a week     Gets together: More than three times a week     Attends Protestant service: More than 4 times per year     Active member of club or organization: Yes     Attends meetings of clubs or organizations: More than 4 times per year     Relationship status: Never      Intimate partner violence     Fear of current or ex partner: Not on file     Emotionally abused: Not on file     Physically abused: Not on file     Forced sexual activity: Not on file   Other Topics Concern     Parent/sibling w/ CABG, MI or angioplasty before 65F 55M? Not Asked   Social History Narrative    Lives [place]. 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 12 year son, Flakito.         Christine Harris, DNP, APRN, CNP    3/17/2021   Just moved back home, living with son and niece who is going to school to be a RN    Medications:  Current Outpatient Medications   Medication     ciprofloxacin (CIPRO) 250 MG tablet     folic acid (FOLVITE) 1 MG tablet     furosemide (LASIX) 20 MG tablet     levothyroxine (SYNTHROID/LEVOTHROID) 50 MCG tablet     midodrine (PROAMATINE) 5 MG tablet     multivitamin w/minerals (THERA-VIT-M) tablet     pantoprazole (PROTONIX) 20 MG EC tablet     potassium chloride ER (K-TAB/KLOR-CON) 10 MEQ CR tablet     rifaximin (XIFAXAN) 550 MG TABS tablet     spironolactone (ALDACTONE) 25 MG tablet     thiamine 50 MG TABS     traZODone (DESYREL) 50 MG tablet     vitamin D2 (ERGOCALCIFEROL) 38322 units (1250 mcg) capsule     zinc sulfate (ZINCATE) 220 (50 Zn) MG capsule     No current facility-administered medications for this visit.      Allergies:     Allergies   Allergen Reactions     Amoxicillin GI Disturbance, Diarrhea, Nausea and Nausea and Vomiting     Objective:  No vitals for this virtual visit  Constitutional: Pleasant woman in NAD  Eyes: Icteric  Respiratory: Breathing comfortably on RA  Skin: jaundiced  Neuro: AOOX3, no asterixis  Psychiatric: normal mood and orientation    Labs:  Last Comprehensive Metabolic Panel:  Sodium   Date Value Ref Range Status   06/07/2021 140 133 - 144 mmol/L Final     Potassium   Date Value Ref Range Status   06/07/2021 3.4 3.4 - 5.3 mmol/L Final     Chloride   Date Value Ref Range Status   06/07/2021 106 94 - 109 mmol/L Final     Carbon Dioxide   Date Value Ref Range Status   06/07/2021 28 20 - 32 mmol/L Final     Anion Gap   Date Value Ref Range Status   06/07/2021 6 3 - 14 mmol/L Final     Glucose   Date Value Ref Range Status   06/07/2021 138 (H) 70 - 99 mg/dL Final     Urea Nitrogen   Date Value Ref Range Status   06/07/2021 14 7 - 30 mg/dL Final     Creatinine   Date Value Ref Range Status    06/07/2021 0.96 0.52 - 1.04 mg/dL Final     GFR Estimate   Date Value Ref Range Status   06/07/2021 68 >60 mL/min/[1.73_m2] Final     Comment:     Non  GFR Calc  Starting 12/18/2018, serum creatinine based estimated GFR (eGFR) will be   calculated using the Chronic Kidney Disease Epidemiology Collaboration   (CKD-EPI) equation.       Calcium   Date Value Ref Range Status   06/07/2021 8.7 8.5 - 10.1 mg/dL Final     Bilirubin Total   Date Value Ref Range Status   06/07/2021 5.6 (H) 0.2 - 1.3 mg/dL Final     Alkaline Phosphatase   Date Value Ref Range Status   06/07/2021 150 40 - 150 U/L Final     ALT   Date Value Ref Range Status   06/07/2021 18 0 - 50 U/L Final     AST   Date Value Ref Range Status   06/07/2021 35 0 - 45 U/L Final       Lab Results   Component Value Date    WBC 8.3 12/19/2020     Lab Results   Component Value Date    RBC 2.82 12/19/2020     Lab Results   Component Value Date    HGB 9.1 12/19/2020     Lab Results   Component Value Date    HCT 29.2 12/19/2020     Lab Results   Component Value Date     12/19/2020     Lab Results   Component Value Date    MCH 32.3 12/19/2020     Lab Results   Component Value Date    MCHC 31.2 12/19/2020     Lab Results   Component Value Date    RDW 18.9 12/19/2020     Lab Results   Component Value Date     12/19/2020       INR   Date Value Ref Range Status   06/07/2021 1.73 (H) 0.86 - 1.14 Final       MELD-Na score: 19 at 6/7/2021 12:14 PM  MELD score: 19 at 6/7/2021 12:14 PM  Calculated from:  Serum Creatinine: 0.96 mg/dL (Rounded to 1 mg/dL) at 6/7/2021 12:14 PM  Serum Sodium: 140 mmol/L (Rounded to 137 mmol/L) at 6/7/2021 12:14 PM  Total Bilirubin: 5.6 mg/dL at 6/7/2021 12:14 PM  INR(ratio): 1.73 at 6/7/2021 12:14 PM  Age: 51 years 2 months     6/2019  Hepatitis B Sag negative  Hepatitis C Ab negative  Hepatitis A IgG negative    10/2020  DAISY negative  ASMA negative  AFP 4.1    Imaging:    RUQ US Doppler 12/15/2020    Findings:  Liver:  The liver demonstrates diffuse coarsened echotexture with  increased echogenicity. Nodular contour to the liver margins. No  evidence of a focal hepatic mass.      Extrahepatic portal vein flow is retrograde at 34 cm/s.  Right portal vein flow is retrograde, measuring 36 cm/s.  Left portal vein flow is antegrade, measuring 45 cm/s.     Flow in the hepatic artery is towards the liver and:  315  cm/s peak systolic  0.47 resistive index.      Recanalized paraumbilical vein.     The splenic vein is patent and flow is towards the liver.  The left,  middle, and right hepatic veins are patent with flow towards the IVC.  Flattening of waveforms secondary to underlying liver disease. The IVC  is patent with flow towards the heart.   The visualized aorta is not  dilated.     Gallbladder: Mildly thickened gallbladder wall 3.3 mm. There is no  pericholecystic fluid, positive sonographic Lema's sign or evidence  for cholelithiasis     Bile Ducts: No definite intrahepatic biliary dilatation. The common  bile duct is not visualized in this study.     Pancreas: Not visualized in this study.      Right kidney: Normal echotexture, without mass or hydronephrosis.  Measuring 11.7 cm.     Fluid: Partially visualized right pleural effusion, trace ascites  surrounding the liver.                                                                Impression:   1.  Cirrhotic morphology of the liver.  2.  Reversal of flow in the main portal vein, right portal vein, and  recanalization of the paraumbilical vein, compatible with portal  hypertension  3.  Decreased resistive index of the hepatic artery and increased peak  systolic velocity possibly secondary to stenosis or shunting.  4.  Partially visualized right pleural effusion and trace perihepatic  ascites.    Endoscopy:    12/4/2020 EGD    Findings:       Diffuse erythema was found in the entire esophagus.       The exam of the esophagus was otherwise normal. NO varices.       Severe portal  hypertensive gastropathy was found in the entire examined        stomach. Most prominent within the gastric antrum. No definitive        residual GAVE identified. Coagulation for bleeding prevention using        argon plasma (1 liter/minute and 60 dawkins) of the gastric antrum was        successful.       Diffuse congested mucosa without active bleeding and with no stigmata of        bleeding was found in the entire duodenum.    Impressions/Post-Op Diagnosis:       - Erythema in the esophagus.       - Portal hypertensive gastropathy. Treated with argon plasma coagulation        (APC).       - Congested duodenal mucosa.    Recommendation:       - Continue pantoprazole 40 mg daily and sucralfate 1 gram twice daily.       - Iron supplementation twice daily.       - Avoid NSAIDs.    12/4/2020 Colonoscopy    Findings:       Hemorrhoids were found on perianal exam.       The terminal ileum appeared normal.       An area of grossly congested mucosa was found in the entire colon.       A 4 mm polyp was found in the cecum. The polyp was sessile. The polyp        was removed with a cold biopsy forceps. Resection and retrieval were        complete.       Non-bleeding internal hemorrhoids were found during retroflexion.    Impressions/Post-Op Diagnosis:       - Hemorrhoids found on perianal exam.       - The examined portion of the ileum was normal.       - Congested mucosa in the entire examined colon.       - One 4 mm polyp in the cecum, removed with a cold biopsy forceps.        Resected and retrieved.       - Non-bleeding internal hemorrhoids.    A) COLON, CECUM, POLYPECTOMY:  1. Colonic mucosa with no diagnostic abnormalities; a lymphoid aggregate is present  2. Negative for serrated change, dysplasia, and malignancy    Assessment/Plan: Ms. Fisher is a 51 year old woman with a history of alcohol related cirrhosis c/b ascites, hemochromoatosis compound heterozygote, hepatic hydrothorax c/b SBE, unprovoked left internal  jugular DVT, concern for hemolysis, hypothyroidism, who is currently listed for liver transplant. MELD is low, discussed living donor transplant, she will think more about this.    MELD-Na score: 19 at 6/7/2021 12:14 PM  MELD score: 19 at 6/7/2021 12:14 PM  Calculated from:  Serum Creatinine: 0.96 mg/dL (Rounded to 1 mg/dL) at 6/7/2021 12:14 PM  Serum Sodium: 140 mmol/L (Rounded to 137 mmol/L) at 6/7/2021 12:14 PM  Total Bilirubin: 5.6 mg/dL at 6/7/2021 12:14 PM  INR(ratio): 1.73 at 6/7/2021 12:14 PM  Age: 51 years 2 months    - Continue lasix 40, increased spironolactone to 100 mg daily for LE edema. Check labs next week. Will also place Lymphedema consult  - Continue cipro for SBP ppx  - Continue midodrine 5 mg BID - concern that stopping this and dropping her blood pressure would make her ascites/HH worse, so will continue for now. Should check BP 1-2x/week  - HCC screening: AFP normal 4/2021, due for RUQ US  - Appreciate hematology recs, continue folate for potential hemolysis  - Appreciate pulmonary recs, would not recommend pleurex catheter given risk of dehydration and infection with this  - Variceal screening: EGD 12/2020 without varices  - HE: can continue lactulose and rifaximin, unclear if she had HE or delirium related to underlying infection   - Recommend she complete hepatitis B vaccine series    RTC 3 months     Fadia Avila MD MSc  Transplant Hepatology  [facility]

## 2021-06-22 ENCOUNTER — TELEPHONE (OUTPATIENT)
Dept: GASTROENTEROLOGY | Facility: CLINIC | Age: 51
End: 2021-06-22

## 2021-07-18 ENCOUNTER — MYC MEDICAL ADVICE (OUTPATIENT)
Dept: PULMONOLOGY | Facility: CLINIC | Age: 51
End: 2021-07-18

## 2021-07-19 NOTE — TELEPHONE ENCOUNTER
Per provider, patient should have thoracentesis. RN passed patient information to clinic coordinators this morning to schedule. Patient notified.

## 2021-07-20 ENCOUNTER — LAB (OUTPATIENT)
Dept: LAB | Facility: CLINIC | Age: 51
End: 2021-07-20
Attending: RADIOLOGY
Payer: COMMERCIAL

## 2021-07-20 DIAGNOSIS — Z11.59 ENCOUNTER FOR SCREENING FOR OTHER VIRAL DISEASES: ICD-10-CM

## 2021-07-20 DIAGNOSIS — J90 PLEURAL EFFUSION: ICD-10-CM

## 2021-07-20 DIAGNOSIS — R06.00 DYSPNEA: Primary | ICD-10-CM

## 2021-07-20 PROCEDURE — U0005 INFEC AGEN DETEC AMPLI PROBE: HCPCS

## 2021-07-20 PROCEDURE — U0003 INFECTIOUS AGENT DETECTION BY NUCLEIC ACID (DNA OR RNA); SEVERE ACUTE RESPIRATORY SYNDROME CORONAVIRUS 2 (SARS-COV-2) (CORONAVIRUS DISEASE [COVID-19]), AMPLIFIED PROBE TECHNIQUE, MAKING USE OF HIGH THROUGHPUT TECHNOLOGIES AS DESCRIBED BY CMS-2020-01-R: HCPCS

## 2021-07-21 LAB — SARS-COV-2 RNA RESP QL NAA+PROBE: NEGATIVE

## 2021-07-22 ENCOUNTER — TELEPHONE (OUTPATIENT)
Dept: PULMONOLOGY | Facility: CLINIC | Age: 51
End: 2021-07-22

## 2021-07-22 ENCOUNTER — VIRTUAL VISIT (OUTPATIENT)
Dept: PULMONOLOGY | Facility: CLINIC | Age: 51
End: 2021-07-22
Attending: INTERNAL MEDICINE
Payer: COMMERCIAL

## 2021-07-22 DIAGNOSIS — J90 PLEURAL EFFUSION ON LEFT: Primary | ICD-10-CM

## 2021-07-22 DIAGNOSIS — J96.01 ACUTE HYPOXEMIC RESPIRATORY FAILURE (H): ICD-10-CM

## 2021-07-22 DIAGNOSIS — J90 PLEURAL EFFUSION: Primary | ICD-10-CM

## 2021-07-22 PROCEDURE — 99214 OFFICE O/P EST MOD 30 MIN: CPT | Mod: 95 | Performed by: INTERNAL MEDICINE

## 2021-07-22 NOTE — LETTER
"    7/22/2021         RE: Sarah Fisher  [address] [facility] [address]        Dear Colleague,    Thank you for referring your patient, Sarah Fisher, to the [facility]. Please see a copy of my visit note below.    Maria Elena is a 51 year old who is being evaluated via a billable video visit.      How would you like to obtain your AVS? Mir Vrachahart  If the video visit is dropped, the invitation should be resent by: Other e-mail: Say-Hey  Will anyone else be joining your video visit? No      Video Start Time: 11:10 AM  Video-Visit Details    Type of service:  Video Visit    Video End Time:11:41 AM    Originating Location (pt. Location): Home    Distant Location (provider location):  [facility]     Platform used for Video Visit: [place]    [facility]  Pulmonary Medicine  Visit Clinic Note           ASSESSMENT & PLAN     Patient is a 50-year-old female with a past medical history of liver cirrhosis.  Who has been referred to pulmonary clinic for further management of recurrent hepatic hydrothorax.    #Bilateral pleural effusion more on the left than the right:  -Patient continues to have recurrent pleural effusion.  -Will try to set up recurrent IR guided thoracentesis.   -Patient does not have any history of severe encephalopathy but her bilirubin greater than 5 is a contraindication for her to get a TIPS.  Discussed this with patient's hepatologist.  -She is on the transplant list.  -NO pleurx cath after discussion with hepatologist.    -She may benefit from increasing her lasix dose. Her renal function is stable.   -Recommended to use incentive spirometer.     #Left arm swelling:  -Probably due to decreased drainage due to extensive pleural effusion.  -She will call back in 2 weeks after thoracentesis if swelling has not improved.     #Pre transplant work up\"   - As " per ARISCAT score she is intermediate risk of post operative respiratory failure.  -As per ROSA score she is at 6.2% risk of post operative respiratory failure.   -No PH is noted on RHC. Elevated PA pressure is due to increased cardiac output. Normal PVR.     -No further work up from pulmonary is required prior to the liver transplant surgery.     RTC in  12 weeks    I explained the lab values, imagings and findings to the patient.  Patient expressed understanding I did not recognize any barriers to the understanding of the patient.    Almas Irby MD          Today's visit note:     Chief Complaint: Sarah Fisher is a 50 year old year old female who is being seen for General Visit (video visit)      HPI:     Recurrent Hepatic hydrothorax:   -Recent thoracentesis in 12/18 : 500 ml of fluid was removed. Once before was in the beginning of December: She has received total of 5 thoracentesis since Septemebr including the small one on 12/20.   -Significant improvement in breathing is noted after removal of fluid.     -Her diuretic dose was stopped in Decemebr for worsening renal function. She has followed with hepatology in first week of January. Since than she is back on her diuretics.   - She has difficulty with leg swelling as well.   --She has been taking lactulose. She has been on and off it.     #Left internal jugular clot:   Probably diagnosed in October. But has followed with [place] [facility] and has been told to not take the blood thinner medications.     #Shortness of breath:   -Gets sob with climbing stairs. Gets sob after around couple of blocks.   -  Social History:  -Never smoked.   -Work as a .     #Interval History:  2/1  -Patient has had improvement in his symptoms but seems to have recurrence of shortness of breath.  -She did not have severe respiratory distress after the recent thoracentesis.    #Interval History: 5/24  -Has been gaining weight. Has slowly increasing shortness of  breath.  She is planning to go to [place] next week.  No recent fever or chills are noted.     #Interval History: 7/22  - Increased swelling. Will be set up for repeat thoracentesis.            Medications:     Current Outpatient Medications   Medication     ciprofloxacin (CIPRO) 250 MG tablet     folic acid (FOLVITE) 1 MG tablet     furosemide (LASIX) 20 MG tablet     levothyroxine (SYNTHROID/LEVOTHROID) 50 MCG tablet     midodrine (PROAMATINE) 5 MG tablet     multivitamin w/minerals (THERA-VIT-M) tablet     pantoprazole (PROTONIX) 20 MG EC tablet     potassium chloride ER (K-TAB/KLOR-CON) 10 MEQ CR tablet     potassium chloride ER (KLOR-CON M) 10 MEQ CR tablet     rifaximin (XIFAXAN) 550 MG TABS tablet     spironolactone (ALDACTONE) 25 MG tablet     traZODone (DESYREL) 50 MG tablet     vitamin D2 (ERGOCALCIFEROL) 75998 units (1250 mcg) capsule     zinc sulfate (ZINCATE) 220 (50 Zn) MG capsule     No current facility-administered medications for this visit.            Review of Systems:       A complete 10 point review of systems was otherwise negative except as noted in the HPI.        PHYSICAL EXAM:  There were no vitals taken for this visit.            Data:   All laboratory and imaging data reviewed.      PFT:       PFT Interpretation:  I personally reviewed and interpreted the PFTs.  -Severe restrictive defects are noted in the PFTs.  CXR: I personally reviewed and interpreted the chest x-ray    Chest CT: I personally reviewed and interpreted the CT scan.  -Compressive left-sided pleural effusion is noted.  -    Recent Results (from the past 168 hour(s))   SARS-COV2 (COVID-19) Virus RT-PCR    Collection Time: 07/20/21  5:05 PM    Specimen: Nasopharyngeal; Swab   Result Value Ref Range    SARS CoV2 PCR Negative Negative                                             Again, thank you for allowing me to participate in the care of your patient.        Sincerely,        Almas Irby MD

## 2021-07-22 NOTE — TELEPHONE ENCOUNTER
Per provider request, standing order for IR guided Thoracentesis placed at 6 week interval. Patient notified.

## 2021-07-22 NOTE — PROGRESS NOTES
"Maria Elena is a 51 year old who is being evaluated via a billable video visit.      How would you like to obtain your AVS? Hernandezharjoaquin  If the video visit is dropped, the invitation should be resent by: Other e-mail: [email or web address]  Will anyone else be joining your video visit? No      Video Start Time: 11:10 AM  Video-Visit Details    Type of service:  Video Visit    Video End Time:11:41 AM    Originating Location (pt. Location): Home    Distant Location (provider location):  [facility]     Platform used for Video Visit: [employer]    [facility]  Pulmonary Medicine  Visit Clinic Note           ASSESSMENT & PLAN     Patient is a 50-year-old female with a past medical history of liver cirrhosis.  Who has been referred to pulmonary clinic for further management of recurrent hepatic hydrothorax.    #Bilateral pleural effusion more on the left than the right:  -Patient continues to have recurrent pleural effusion.  -Will try to set up recurrent IR guided thoracentesis.   -Patient does not have any history of severe encephalopathy but her bilirubin greater than 5 is a contraindication for her to get a TIPS.  Discussed this with patient's hepatologist.  -She is on the transplant list.  -NO pleurx cath after discussion with hepatologist.    -She may benefit from increasing her lasix dose. Her renal function is stable.   -Recommended to use incentive spirometer.     #Left arm swelling:  -Probably due to decreased drainage due to extensive pleural effusion.  -She will call back in 2 weeks after thoracentesis if swelling has not improved.     #Pre transplant work up\"   - As per ARISCAT score she is intermediate risk of post operative respiratory failure.  -As per ROSA score she is at 6.2% risk of post operative respiratory failure.   -No PH is noted on RHC. Elevated PA pressure is due to increased cardiac output. Normal PVR.     -No further work up from pulmonary is " required prior to the liver transplant surgery.     RTC in  12 weeks    I explained the lab values, imagings and findings to the patient.  Patient expressed understanding I did not recognize any barriers to the understanding of the patient.    Almas Irby MD          Today's visit note:     Chief Complaint: Sarah Fisher is a 50 year old year old female who is being seen for General Visit (video visit)      HPI:     Recurrent Hepatic hydrothorax:   -Recent thoracentesis in 12/18 : 500 ml of fluid was removed. Once before was in the beginning of December: She has received total of 5 thoracentesis since Septemebr including the small one on 12/20.   -Significant improvement in breathing is noted after removal of fluid.     -Her diuretic dose was stopped in Decemebr for worsening renal function. She has followed with hepatology in first week of January. Since than she is back on her diuretics.   - She has difficulty with leg swelling as well.   --She has been taking lactulose. She has been on and off it.     #Left internal jugular clot:   Probably diagnosed in October. But has followed with [place] [facility] and has been told to not take the blood thinner medications.     #Shortness of breath:   -Gets sob with climbing stairs. Gets sob after around couple of blocks.   -  Social History:  -Never smoked.   -Work as a .     #Interval History:  2/1  -Patient has had improvement in his symptoms but seems to have recurrence of shortness of breath.  -She did not have severe respiratory distress after the recent thoracentesis.    #Interval History: 5/24  -Has been gaining weight. Has slowly increasing shortness of breath.  She is planning to go to [place] next week.  No recent fever or chills are noted.     #Interval History: 7/22  - Increased swelling. Will be set up for repeat thoracentesis.            Medications:     Current Outpatient Medications   Medication     ciprofloxacin (CIPRO) 250 MG tablet      folic acid (FOLVITE) 1 MG tablet     furosemide (LASIX) 20 MG tablet     levothyroxine (SYNTHROID/LEVOTHROID) 50 MCG tablet     midodrine (PROAMATINE) 5 MG tablet     multivitamin w/minerals (THERA-VIT-M) tablet     pantoprazole (PROTONIX) 20 MG EC tablet     potassium chloride ER (K-TAB/KLOR-CON) 10 MEQ CR tablet     potassium chloride ER (KLOR-CON M) 10 MEQ CR tablet     rifaximin (XIFAXAN) 550 MG TABS tablet     spironolactone (ALDACTONE) 25 MG tablet     traZODone (DESYREL) 50 MG tablet     vitamin D2 (ERGOCALCIFEROL) 94936 units (1250 mcg) capsule     zinc sulfate (ZINCATE) 220 (50 Zn) MG capsule     No current facility-administered medications for this visit.            Review of Systems:       A complete 10 point review of systems was otherwise negative except as noted in the HPI.        PHYSICAL EXAM:  There were no vitals taken for this visit.            Data:   All laboratory and imaging data reviewed.      PFT:       PFT Interpretation:  I personally reviewed and interpreted the PFTs.  -Severe restrictive defects are noted in the PFTs.  CXR: I personally reviewed and interpreted the chest x-ray    Chest CT: I personally reviewed and interpreted the CT scan.  -Compressive left-sided pleural effusion is noted.  -    Recent Results (from the past 168 hour(s))   SARS-COV2 (COVID-19) Virus RT-PCR    Collection Time: 07/20/21  5:05 PM    Specimen: Nasopharyngeal; Swab   Result Value Ref Range    SARS CoV2 PCR Negative Negative

## 2021-07-23 ENCOUNTER — HOSPITAL ENCOUNTER (EMERGENCY)
Facility: CLINIC | Age: 51
Discharge: HOME | End: 2021-07-23
Attending: FAMILY MEDICINE | Admitting: FAMILY MEDICINE
Payer: COMMERCIAL

## 2021-07-23 ENCOUNTER — APPOINTMENT (OUTPATIENT)
Dept: GENERAL RADIOLOGY | Facility: CLINIC | Age: 51
End: 2021-07-23
Attending: FAMILY MEDICINE
Payer: COMMERCIAL

## 2021-07-23 ENCOUNTER — LAB (OUTPATIENT)
Dept: LAB | Facility: CLINIC | Age: 51
End: 2021-07-23
Payer: COMMERCIAL

## 2021-07-23 ENCOUNTER — TELEPHONE (OUTPATIENT)
Dept: TRANSPLANT | Facility: CLINIC | Age: 51
End: 2021-07-23

## 2021-07-23 ENCOUNTER — HOSPITAL ENCOUNTER (OUTPATIENT)
Facility: AMBULATORY SURGERY CENTER | Age: 51
End: 2021-07-23
Attending: RADIOLOGY
Payer: COMMERCIAL

## 2021-07-23 VITALS
OXYGEN SATURATION: 100 % | HEART RATE: 67 BPM | SYSTOLIC BLOOD PRESSURE: 109 MMHG | DIASTOLIC BLOOD PRESSURE: 66 MMHG | TEMPERATURE: 98.2 F | RESPIRATION RATE: 18 BRPM | WEIGHT: 155 LBS | BODY MASS INDEX: 24.91 KG/M2 | HEIGHT: 66 IN

## 2021-07-23 VITALS
BODY MASS INDEX: 24.91 KG/M2 | RESPIRATION RATE: 18 BRPM | DIASTOLIC BLOOD PRESSURE: 52 MMHG | SYSTOLIC BLOOD PRESSURE: 113 MMHG | OXYGEN SATURATION: 100 % | WEIGHT: 155 LBS | HEIGHT: 66 IN | TEMPERATURE: 98 F | HEART RATE: 114 BPM

## 2021-07-23 DIAGNOSIS — N17.9 AKI (ACUTE KIDNEY INJURY) (H): ICD-10-CM

## 2021-07-23 DIAGNOSIS — D64.9 ANEMIA: Primary | ICD-10-CM

## 2021-07-23 DIAGNOSIS — J90 RECURRENT LEFT PLEURAL EFFUSION: ICD-10-CM

## 2021-07-23 DIAGNOSIS — K70.31 ALCOHOLIC CIRRHOSIS OF LIVER WITH ASCITES (H): ICD-10-CM

## 2021-07-23 DIAGNOSIS — D64.9 ANEMIA, UNSPECIFIED TYPE: ICD-10-CM

## 2021-07-23 DIAGNOSIS — D64.9 ANEMIA: ICD-10-CM

## 2021-07-23 LAB
ABO/RH(D): NORMAL
ALBUMIN SERPL-MCNC: 2.9 G/DL (ref 3.4–5)
ALBUMIN SERPL-MCNC: 2.9 G/DL (ref 3.4–5)
ALP SERPL-CCNC: 144 U/L (ref 40–150)
ALP SERPL-CCNC: 150 U/L (ref 40–150)
ALT SERPL W P-5'-P-CCNC: 22 U/L (ref 0–50)
ALT SERPL W P-5'-P-CCNC: 29 U/L (ref 0–50)
ANION GAP SERPL CALCULATED.3IONS-SCNC: 7 MMOL/L (ref 3–14)
ANION GAP SERPL CALCULATED.3IONS-SCNC: 7 MMOL/L (ref 3–14)
ANION GAP SERPL CALCULATED.3IONS-SCNC: 8 MMOL/L (ref 3–14)
ANTIBODY SCREEN: NEGATIVE
APTT PPP: 36 SECONDS (ref 22–38)
AST SERPL W P-5'-P-CCNC: 26 U/L (ref 0–45)
AST SERPL W P-5'-P-CCNC: ABNORMAL U/L
BASOPHILS # BLD AUTO: 0 10E3/UL (ref 0–0.2)
BASOPHILS NFR BLD AUTO: 0 %
BILIRUB DIRECT SERPL-MCNC: 1.8 MG/DL (ref 0–0.2)
BILIRUB SERPL-MCNC: 4.2 MG/DL (ref 0.2–1.3)
BILIRUB SERPL-MCNC: 4.2 MG/DL (ref 0.2–1.3)
BLD PROD TYP BPU: NORMAL
BLOOD COMPONENT TYPE: NORMAL
BUN SERPL-MCNC: 25 MG/DL (ref 7–30)
BUN SERPL-MCNC: 26 MG/DL (ref 7–30)
BUN SERPL-MCNC: 26 MG/DL (ref 7–30)
CALCIUM SERPL-MCNC: 8.6 MG/DL (ref 8.5–10.1)
CHLORIDE BLD-SCNC: 100 MMOL/L (ref 94–109)
CHLORIDE BLD-SCNC: 101 MMOL/L (ref 94–109)
CHLORIDE BLD-SCNC: 102 MMOL/L (ref 94–109)
CO2 SERPL-SCNC: 24 MMOL/L (ref 20–32)
CO2 SERPL-SCNC: 25 MMOL/L (ref 20–32)
CO2 SERPL-SCNC: 25 MMOL/L (ref 20–32)
CODING SYSTEM: NORMAL
CREAT SERPL-MCNC: 1.28 MG/DL (ref 0.52–1.04)
CREAT SERPL-MCNC: 1.34 MG/DL (ref 0.52–1.04)
CREAT SERPL-MCNC: 1.41 MG/DL (ref 0.52–1.04)
CROSSMATCH: NORMAL
EOSINOPHIL # BLD AUTO: 0.2 10E3/UL (ref 0–0.7)
EOSINOPHIL NFR BLD AUTO: 3 %
ERYTHROCYTE [DISTWIDTH] IN BLOOD BY AUTOMATED COUNT: 15.5 % (ref 10–15)
ERYTHROCYTE [DISTWIDTH] IN BLOOD BY AUTOMATED COUNT: 16.7 % (ref 10–15)
ERYTHROCYTE [DISTWIDTH] IN BLOOD BY AUTOMATED COUNT: 16.9 % (ref 10–15)
GFR SERPL CREATININE-BSD FRML MDRD: 43 ML/MIN/1.73M2
GFR SERPL CREATININE-BSD FRML MDRD: 46 ML/MIN/1.73M2
GFR SERPL CREATININE-BSD FRML MDRD: 49 ML/MIN/1.73M2
GLUCOSE BLD-MCNC: 112 MG/DL (ref 70–99)
GLUCOSE BLD-MCNC: 139 MG/DL (ref 70–99)
GLUCOSE BLD-MCNC: 139 MG/DL (ref 70–99)
HCT VFR BLD AUTO: 14.5 % (ref 35–47)
HCT VFR BLD AUTO: 15.2 % (ref 35–47)
HCT VFR BLD AUTO: 24.2 % (ref 35–47)
HGB BLD-MCNC: 4.1 G/DL (ref 11.7–15.7)
HGB BLD-MCNC: 4.3 G/DL (ref 11.7–15.7)
HGB BLD-MCNC: 7.3 G/DL (ref 11.7–15.7)
IMM GRANULOCYTES # BLD: 0 10E3/UL
IMM GRANULOCYTES NFR BLD: 0 %
INR PPP: 1.77 (ref 0.85–1.15)
INR PPP: 1.8 (ref 0.85–1.15)
ISSUE DATE AND TIME: NORMAL
LIPASE SERPL-CCNC: 209 U/L (ref 73–393)
LYMPHOCYTES # BLD AUTO: 0.8 10E3/UL (ref 0.8–5.3)
LYMPHOCYTES NFR BLD AUTO: 12 %
MCH RBC QN AUTO: 25.3 PG (ref 26.5–33)
MCH RBC QN AUTO: 25.4 PG (ref 26.5–33)
MCH RBC QN AUTO: 26.5 PG (ref 26.5–33)
MCHC RBC AUTO-ENTMCNC: 28.3 G/DL (ref 31.5–36.5)
MCHC RBC AUTO-ENTMCNC: 28.3 G/DL (ref 31.5–36.5)
MCHC RBC AUTO-ENTMCNC: 30.2 G/DL (ref 31.5–36.5)
MCV RBC AUTO: 88 FL (ref 78–100)
MCV RBC AUTO: 90 FL (ref 78–100)
MCV RBC AUTO: 90 FL (ref 78–100)
MONOCYTES # BLD AUTO: 1.1 10E3/UL (ref 0–1.3)
MONOCYTES NFR BLD AUTO: 17 %
NEUTROPHILS # BLD AUTO: 4.2 10E3/UL (ref 1.6–8.3)
NEUTROPHILS NFR BLD AUTO: 68 %
NRBC # BLD AUTO: 0 10E3/UL
NRBC BLD AUTO-RTO: 0 /100
PLATELET # BLD AUTO: 135 10E3/UL (ref 150–450)
PLATELET # BLD AUTO: 161 10E3/UL (ref 150–450)
PLATELET # BLD AUTO: 167 10E3/UL (ref 150–450)
POTASSIUM BLD-SCNC: 3.4 MMOL/L (ref 3.4–5.3)
POTASSIUM BLD-SCNC: 3.5 MMOL/L (ref 3.4–5.3)
POTASSIUM BLD-SCNC: 3.8 MMOL/L (ref 3.4–5.3)
PROT SERPL-MCNC: 5.9 G/DL (ref 6.8–8.8)
PROT SERPL-MCNC: 6 G/DL (ref 6.8–8.8)
RBC # BLD AUTO: 1.62 10E6/UL (ref 3.8–5.2)
RBC # BLD AUTO: 1.69 10E6/UL (ref 3.8–5.2)
RBC # BLD AUTO: 2.75 10E6/UL (ref 3.8–5.2)
SODIUM SERPL-SCNC: 132 MMOL/L (ref 133–144)
SODIUM SERPL-SCNC: 133 MMOL/L (ref 133–144)
SODIUM SERPL-SCNC: 134 MMOL/L (ref 133–144)
SPECIMEN EXPIRATION DATE: NORMAL
UNIT ABO/RH: NORMAL
UNIT NUMBER: NORMAL
UNIT STATUS: NORMAL
UNIT TYPE ISBT: 7300
WBC # BLD AUTO: 6.1 10E3/UL (ref 4–11)
WBC # BLD AUTO: 6.2 10E3/UL (ref 4–11)
WBC # BLD AUTO: 6.7 10E3/UL (ref 4–11)

## 2021-07-23 PROCEDURE — 36430 TRANSFUSION BLD/BLD COMPNT: CPT | Performed by: FAMILY MEDICINE

## 2021-07-23 PROCEDURE — 99284 EMERGENCY DEPT VISIT MOD MDM: CPT | Performed by: FAMILY MEDICINE

## 2021-07-23 PROCEDURE — 36415 COLL VENOUS BLD VENIPUNCTURE: CPT | Performed by: FAMILY MEDICINE

## 2021-07-23 PROCEDURE — 86900 BLOOD TYPING SEROLOGIC ABO: CPT | Performed by: FAMILY MEDICINE

## 2021-07-23 PROCEDURE — 85610 PROTHROMBIN TIME: CPT | Performed by: FAMILY MEDICINE

## 2021-07-23 PROCEDURE — 85041 AUTOMATED RBC COUNT: CPT | Performed by: FAMILY MEDICINE

## 2021-07-23 PROCEDURE — 84155 ASSAY OF PROTEIN SERUM: CPT | Performed by: FAMILY MEDICINE

## 2021-07-23 PROCEDURE — 85025 COMPLETE CBC W/AUTO DIFF WBC: CPT | Performed by: PATHOLOGY

## 2021-07-23 PROCEDURE — 99283 EMERGENCY DEPT VISIT LOW MDM: CPT | Mod: 25 | Performed by: FAMILY MEDICINE

## 2021-07-23 PROCEDURE — 80048 BASIC METABOLIC PNL TOTAL CA: CPT | Performed by: EMERGENCY MEDICINE

## 2021-07-23 PROCEDURE — 36415 COLL VENOUS BLD VENIPUNCTURE: CPT | Performed by: EMERGENCY MEDICINE

## 2021-07-23 PROCEDURE — 85610 PROTHROMBIN TIME: CPT | Performed by: PATHOLOGY

## 2021-07-23 PROCEDURE — 85730 THROMBOPLASTIN TIME PARTIAL: CPT | Performed by: FAMILY MEDICINE

## 2021-07-23 PROCEDURE — 86923 COMPATIBILITY TEST ELECTRIC: CPT | Performed by: FAMILY MEDICINE

## 2021-07-23 PROCEDURE — 71045 X-RAY EXAM CHEST 1 VIEW: CPT

## 2021-07-23 PROCEDURE — 36415 COLL VENOUS BLD VENIPUNCTURE: CPT | Performed by: PATHOLOGY

## 2021-07-23 PROCEDURE — 71045 X-RAY EXAM CHEST 1 VIEW: CPT | Mod: 26 | Performed by: RADIOLOGY

## 2021-07-23 PROCEDURE — 82248 BILIRUBIN DIRECT: CPT | Performed by: PATHOLOGY

## 2021-07-23 PROCEDURE — 85027 COMPLETE CBC AUTOMATED: CPT | Mod: 91 | Performed by: EMERGENCY MEDICINE

## 2021-07-23 PROCEDURE — 83690 ASSAY OF LIPASE: CPT | Performed by: FAMILY MEDICINE

## 2021-07-23 PROCEDURE — 80053 COMPREHEN METABOLIC PANEL: CPT | Performed by: PATHOLOGY

## 2021-07-23 PROCEDURE — 99285 EMERGENCY DEPT VISIT HI MDM: CPT | Performed by: FAMILY MEDICINE

## 2021-07-23 PROCEDURE — P9016 RBC LEUKOCYTES REDUCED: HCPCS | Performed by: FAMILY MEDICINE

## 2021-07-23 RX ORDER — LIDOCAINE 40 MG/G
CREAM TOPICAL
Status: DISCONTINUED | OUTPATIENT
Start: 2021-07-23 | End: 2021-07-27 | Stop reason: HOSPADM

## 2021-07-23 RX ORDER — LIDOCAINE 40 MG/G
CREAM TOPICAL
Status: DISCONTINUED | OUTPATIENT
Start: 2021-07-23 | End: 2021-07-23 | Stop reason: HOSPADM

## 2021-07-23 ASSESSMENT — ENCOUNTER SYMPTOMS
NAUSEA: 0
NUMBNESS: 0
CONFUSION: 0
EYE REDNESS: 0
LIGHT-HEADEDNESS: 0
CHEST TIGHTNESS: 0
RECTAL PAIN: 0
CHOKING: 0
FATIGUE: 0
FEVER: 0
COLOR CHANGE: 0
DYSPHORIC MOOD: 0
HALLUCINATIONS: 0
DECREASED CONCENTRATION: 0
COUGH: 0
WEAKNESS: 0
WHEEZING: 0
ARTHRALGIAS: 0
TROUBLE SWALLOWING: 0
ACTIVITY CHANGE: 1
BACK PAIN: 0
WOUND: 0
ABDOMINAL PAIN: 0
HEADACHES: 0
DIFFICULTY URINATING: 0
VOMITING: 0
HEMATURIA: 0
APPETITE CHANGE: 0
BRUISES/BLEEDS EASILY: 1
CHILLS: 0
NECK STIFFNESS: 0
ABDOMINAL DISTENTION: 1
SHORTNESS OF BREATH: 1

## 2021-07-23 ASSESSMENT — MIFFLIN-ST. JEOR
SCORE: 1334.83
SCORE: 1334.83

## 2021-07-23 NOTE — PROGRESS NOTES
Anemia with 4.3 Hgb.    Procedure to be cancelled for today.    Patient to ED for further evaluation.

## 2021-07-23 NOTE — TELEPHONE ENCOUNTER
Called lab and add on for T&C. Unsuccessful attempt to reach Maria Elena and Radha by phone. Sent page to [facility] IR- advising enrie young and send to ED. Contacted ED.

## 2021-07-23 NOTE — OR NURSING
Patient was in prep when her hgb resulted at 4.3 (she had her labs drawn in the lab prior to arriving to the surgery center). Discussed with Dr Fadia Avila via phone. Dr Avila requested her thoracentesis be cancelled and patient should be evaluated in the ED ([place]). Patient has required transfusions in the past. Patient's VSS, denies any bloody stools, denies pain, no symptoms. Pt does report shortness of breath which was the reason for today's procedure. Patient's niece Radha was at the bedside and agrees to transport pt directly to the ED. 22g PIV left in place at discharge. Patient and Radha agree with the plan of care.  Alan Marie RN

## 2021-07-23 NOTE — ED PROVIDER NOTES
[place] EMERGENCY DEPARTMENT ([facility])  21    History     Chief Complaint   Patient presents with     Abnormal Labs     The history is provided by the patient and medical records.     Sarah Fisher is a 51 year old female with a history of alcoholic cirrhosis with ascites, pleural effusion requiring thoracentesis, CKD, and DVT who presents to the Emergency Department with low hemoglobin. Patient was supposed to have a thoracentesis this morning but was found to have hemoglobin at 4.3 so patient was advised to come to the ED. Patient reports she has had hemoglobin at this level before but was barely able to move at that time. She states her hemoglobin is normally at 7-8. She recently had hemoglobin at 6.5 in May and was given 2 units pRBCs in the ED. Patient reports abdominal distention, mild shortness of breath on exertion, and some discomfort due to the fluid build up. She denies lightheadedness or other pain.  Patient denies any gross melena possibly stools been slightly darker.  No abdominal pain only distention no fevers no hematemesis.  Patient has had endoscopy upper in the past without known varices.  No coughing hemoptysis bloody nose etc.  No syncope or presyncopal symptoms.    Past Medical History  Past Medical History:   Diagnosis Date     Ascites      History of blood transfusion      Liver cirrhosis (H)      Thyroid disease      Past Surgical History:   Procedure Laterality Date      SECTION       CV RIGHT HEART CATH MEASUREMENTS RECORDED N/A 2021    Procedure: CV RIGHT HEART CATH;  Surgeon: Joseph Guevara MD;  Location:  HEART CARDIAC CATH LAB     IR THORACENTESIS  2021     THORACENTESIS Left 2021    Procedure: THORACENTESIS;  Surgeon: Almas Irby MD;  Location:  GI     THORACENTESIS N/A 2021    Procedure: THORACENTESIS;  Surgeon: Almas Irby MD;  Location:  GI     THORACENTESIS N/A 03/10/2021    Procedure: THORACENTESIS;   Surgeon: Almas Irby MD;  Location:  GI     THORACENTESIS Left 2021    Procedure: THORACENTESIS;  Surgeon: Kennedi Chavez MD;  Location: [facility] OR     ciprofloxacin (CIPRO) 250 MG tablet  folic acid (FOLVITE) 1 MG tablet  furosemide (LASIX) 20 MG tablet  levothyroxine (SYNTHROID/LEVOTHROID) 50 MCG tablet  midodrine (PROAMATINE) 5 MG tablet  multivitamin w/minerals (THERA-VIT-M) tablet  pantoprazole (PROTONIX) 20 MG EC tablet  potassium chloride ER (K-TAB/KLOR-CON) 10 MEQ CR tablet  potassium chloride ER (KLOR-CON M) 10 MEQ CR tablet  rifaximin (XIFAXAN) 550 MG TABS tablet  spironolactone (ALDACTONE) 25 MG tablet  traZODone (DESYREL) 50 MG tablet  vitamin D2 (ERGOCALCIFEROL) 19930 units (1250 mcg) capsule  zinc sulfate (ZINCATE) 220 (50 Zn) MG capsule      Allergies   Allergen Reactions     Amoxicillin GI Disturbance, Diarrhea, Nausea and Nausea and Vomiting     Family History  Family History   Problem Relation Age of Onset     No Known Problems Mother      Colon Cancer Father 58         age 63     Breast Cancer Maternal Grandmother         Diagnosed in her 60's     No Known Problems Maternal Grandfather      No Known Problems Paternal Grandmother      No Known Problems Paternal Grandfather      Substance Abuse Brother      No Known Problems Sister      No Known Problems Son      Substance Abuse Brother      Social History   Social History     Tobacco Use     Smoking status: Never Smoker     Smokeless tobacco: Never Used   Substance Use Topics     Alcohol use: Not Currently     Comment: Quit on 2020     Drug use: Not Currently      Past medical history, past surgical history, medications, allergies, family history, and social history were reviewed with the patient. No additional pertinent items.       Review of Systems   Constitutional: Positive for activity change (sob with activity). Negative for appetite change, chills, fatigue and fever.   HENT: Negative for congestion, nosebleeds and  "trouble swallowing.    Eyes: Negative for redness.   Respiratory: Positive for shortness of breath (stable). Negative for cough, choking, chest tightness and wheezing.    Cardiovascular: Negative for chest pain and leg swelling.   Gastrointestinal: Positive for abdominal distention. Negative for abdominal pain, nausea, rectal pain and vomiting.        ? Stools slightly darker?   Genitourinary: Negative for difficulty urinating and hematuria.   Musculoskeletal: Negative for arthralgias, back pain, gait problem and neck stiffness.   Skin: Positive for pallor. Negative for color change, rash and wound.   Neurological: Negative for syncope, weakness, light-headedness, numbness and headaches.   Hematological: Bruises/bleeds easily.   Psychiatric/Behavioral: Negative for confusion, decreased concentration, dysphoric mood and hallucinations.   All other systems reviewed and are negative.    A complete review of systems was performed with pertinent positives and negatives noted in the HPI, and all other systems negative.    Physical Exam   BP: 118/55  Pulse: 109  Temp: 98.5  F (36.9  C)  Resp: 16  Height: 167.6 cm (5' 6\")  Weight: 70.3 kg (155 lb)  SpO2: 98 %  Physical Exam  Vitals and nursing note reviewed.   Constitutional:       General: She is in acute distress.      Appearance: She is not toxic-appearing or diaphoretic.   HENT:      Head: Atraumatic.      Nose: Nose normal. No rhinorrhea.      Mouth/Throat:      Mouth: Mucous membranes are moist.      Pharynx: No oropharyngeal exudate.   Eyes:      General: Scleral icterus present.      Pupils: Pupils are equal, round, and reactive to light.   Cardiovascular:      Rate and Rhythm: Normal rate.      Heart sounds: Normal heart sounds.   Pulmonary:      Effort: No respiratory distress.      Comments: Decrease bs left  Chest:      Chest wall: No tenderness.   Abdominal:      General: Bowel sounds are normal. There is distension.      Palpations: Abdomen is soft. There is " no mass.      Tenderness: There is no abdominal tenderness. There is no rebound.      Hernia: No hernia is present.   Genitourinary:     Comments: Patient normal stool minimally trace guaiac positive  Musculoskeletal:         General: No tenderness. Normal range of motion.      Cervical back: Normal range of motion and neck supple.      Right lower leg: Edema present.      Left lower leg: Edema present.   Skin:     General: Skin is warm.      Capillary Refill: Capillary refill takes less than 2 seconds.      Coloration: Skin is jaundiced and pale.      Findings: No rash.   Neurological:      General: No focal deficit present.      Mental Status: She is oriented to person, place, and time. Mental status is at baseline.   Psychiatric:      Comments: appropriate         ED Course   10:29 AM  The patient was seen and examined by Thomas Garrett MD in Room ED21.      Patient valuated here in the ER vitally stable otherwise.  IV was established prior.  Labs are drawn reviewed patient's hemoglobin is 4.1.  Other laboratory testing revealed white count 6.2 patient's platelets are 167.  Sodium 132 potassium 3.8.  Bicarb 25 creatinine 1.28 total bilirubin is 4.2.  INR noted to be 1.77.  Lipase 219.    Patient was consented in the ER talk to Dr. Beltran GI also at this point patient been stable we will not do the thoracentesis IR will call to reschedule.  Patient had a chest x-ray showing a left pleural effusion is noted from previous but oxygen saturation stable.  Patient will be transfused 3 units in the ER patient declines admission to the hospital which is been offered several times.  We will recheck patient's hemoglobin 1 hour after the last unit given along with this we will recheck a creatinine.  Dr. Beltran will further coordinate follow-up with hematology regarding her anemia.  Patient be reassessed signed out to evening physician will further evaluate patient for stability and disposition.  As noted patient is alert  and oriented not confused and is able to make decisions and does not want to be admitted to the hospital currently at this point.    Sign out to pm MD in the ED      Procedures              Results for orders placed or performed during the hospital encounter of 07/23/21   XR Chest Port 1 View     Status: None    Narrative    Portable chest    INDICATION: Shortness of breath with anemia and effusion    COMPARISON: 5/2/2021    Findings: Left pleural effusion with associated atelectasis.  Silhouetting of the lower left heart border. No evidence of  pneumothorax radiographically. Degenerative changes at the right  shoulder.      Impression    IMPRESSION: Left pleural effusion and associated atelectasis. No  evidence of pneumothorax.    SURESH SANDERS MD         SYSTEM ID:  [id number]   INR     Status: Abnormal   Result Value Ref Range    INR 1.77 (H) 0.85 - 1.15   Partial thromboplastin time     Status: Normal   Result Value Ref Range    aPTT 36 22 - 38 Seconds   Comprehensive metabolic panel     Status: Abnormal   Result Value Ref Range    Sodium 132 (L) 133 - 144 mmol/L    Potassium 3.8 3.4 - 5.3 mmol/L    Chloride 100 94 - 109 mmol/L    Carbon Dioxide (CO2) 25 20 - 32 mmol/L    Anion Gap 7 3 - 14 mmol/L    Urea Nitrogen 26 7 - 30 mg/dL    Creatinine 1.28 (H) 0.52 - 1.04 mg/dL    Calcium 8.6 8.5 - 10.1 mg/dL    Glucose 112 (H) 70 - 99 mg/dL    Alkaline Phosphatase 150 40 - 150 U/L    AST      ALT 29 0 - 50 U/L    Protein Total 6.0 (L) 6.8 - 8.8 g/dL    Albumin 2.9 (L) 3.4 - 5.0 g/dL    Bilirubin Total 4.2 (H) 0.2 - 1.3 mg/dL    GFR Estimate 49 (L) >60 mL/min/1.73m2    Narrative    Unsatisfactory specimen - hemolyzed   Lipase     Status: Normal   Result Value Ref Range    Lipase 209 73 - 393 U/L   CBC with platelets and differential     Status: Abnormal   Result Value Ref Range    WBC Count 6.2 4.0 - 11.0 10e3/uL    RBC Count 1.62 (L) 3.80 - 5.20 10e6/uL    Hemoglobin 4.1 (LL) 11.7 - 15.7 g/dL    Hematocrit 14.5 (L)  35.0 - 47.0 %    MCV 90 78 - 100 fL    MCH 25.3 (L) 26.5 - 33.0 pg    MCHC 28.3 (L) 31.5 - 36.5 g/dL    RDW 16.9 (H) 10.0 - 15.0 %    Platelet Count 167 150 - 450 10e3/uL    % Neutrophils 68 %    % Lymphocytes 12 %    % Monocytes 17 %    % Eosinophils 3 %    % Basophils 0 %    % Immature Granulocytes 0 %    NRBCs per 100 WBC 0 <1 /100    Absolute Neutrophils 4.2 1.6 - 8.3 10e3/uL    Absolute Lymphocytes 0.8 0.8 - 5.3 10e3/uL    Absolute Monocytes 1.1 0.0 - 1.3 10e3/uL    Absolute Eosinophils 0.2 0.0 - 0.7 10e3/uL    Absolute Basophils 0.0 0.0 - 0.2 10e3/uL    Absolute Immature Granulocytes 0.0 <=0.0 10e3/uL    Absolute NRBCs 0.0 10e3/uL   CBC with platelets     Status: Abnormal   Result Value Ref Range    WBC Count 6.7 4.0 - 11.0 10e3/uL    RBC Count 2.75 (L) 3.80 - 5.20 10e6/uL    Hemoglobin 7.3 (L) 11.7 - 15.7 g/dL    Hematocrit 24.2 (L) 35.0 - 47.0 %    MCV 88 78 - 100 fL    MCH 26.5 26.5 - 33.0 pg    MCHC 30.2 (L) 31.5 - 36.5 g/dL    RDW 15.5 (H) 10.0 - 15.0 %    Platelet Count 135 (L) 150 - 450 10e3/uL   Basic metabolic panel     Status: Abnormal   Result Value Ref Range    Sodium 133 133 - 144 mmol/L    Potassium 3.5 3.4 - 5.3 mmol/L    Chloride 101 94 - 109 mmol/L    Carbon Dioxide (CO2) 25 20 - 32 mmol/L    Anion Gap 7 3 - 14 mmol/L    Urea Nitrogen 25 7 - 30 mg/dL    Creatinine 1.34 (H) 0.52 - 1.04 mg/dL    Calcium 8.6 8.5 - 10.1 mg/dL    Glucose 139 (H) 70 - 99 mg/dL    GFR Estimate 46 (L) >60 mL/min/1.73m2   Adult Type and Screen     Status: None   Result Value Ref Range    ABO/RH(D) B POS     Antibody Screen Negative Negative    SPECIMEN EXPIRATION DATE [id number]    Prepare red blood cells (unit)     Status: None (Preliminary result)   Result Value Ref Range    CROSSMATCH Compatible     UNIT ABO/RH B Pos     Unit Number [id number]     UNIT STATUS Issued     Blood Component Type Red Blood Cells     Product Code L5640A81     CODING SYSTEM IPHS549     UNIT TYPE ISBT 7300     ISSUE DATE AND TIME  [id number]    Prepare red blood cells (unit)     Status: None (Preliminary result)   Result Value Ref Range    CROSSMATCH Compatible     UNIT ABO/RH B Pos     Unit Number [id number]     UNIT STATUS Issued     Blood Component Type Red Blood Cells     Product Code S6512U18     CODING SYSTEM [id number]     UNIT TYPE ISBT 7300     ISSUE DATE AND TIME [id number]    Prepare red blood cells (unit)     Status: None (Preliminary result)   Result Value Ref Range    CROSSMATCH Compatible     UNIT ABO/RH B Pos     Unit Number [id number]     UNIT STATUS Issued     Blood Component Type Red Blood Cells     Product Code V9808D11     CODING SYSTEM [id number]     UNIT TYPE ISBT 7300     ISSUE DATE AND TIME [id number]    CBC with platelets differential     Status: Abnormal    Narrative    The following orders were created for panel order CBC with platelets differential.  Procedure                               Abnormality         Status                     ---------                               -----------         ------                     CBC with platelets and d...[[id number]]  Abnormal            Final result                 Please view results for these tests on the individual orders.   ABO/Rh type and screen     Status: None    Narrative    The following orders were created for panel order ABO/Rh type and screen.  Procedure                               Abnormality         Status                     ---------                               -----------         ------                     Adult Type and Screen[[id number]]                            Final result                 Please view results for these tests on the individual orders.   Results for orders placed or performed in visit on 07/23/21   INR     Status: Abnormal   Result Value Ref Range    INR 1.80 (H) 0.85 - 1.15   CBC with platelets     Status: Abnormal   Result Value Ref Range    WBC Count 6.1 4.0 - 11.0 10e3/uL    RBC Count 1.69 (L) 3.80 - 5.20 10e6/uL     Hemoglobin 4.3 (LL) 11.7 - 15.7 g/dL    Hematocrit 15.2 (L) 35.0 - 47.0 %    MCV 90 78 - 100 fL    MCH 25.4 (L) 26.5 - 33.0 pg    MCHC 28.3 (L) 31.5 - 36.5 g/dL    RDW 16.7 (H) 10.0 - 15.0 %    Platelet Count 161 150 - 450 10e3/uL   Hepatic panel (Albumin, ALT, AST, Bili, Alk Phos, TP)     Status: Abnormal   Result Value Ref Range    Bilirubin Total 4.2 (H) 0.2 - 1.3 mg/dL    Bilirubin Direct 1.8 (H) 0.0 - 0.2 mg/dL    Protein Total 5.9 (L) 6.8 - 8.8 g/dL    Albumin 2.9 (L) 3.4 - 5.0 g/dL    Alkaline Phosphatase 144 40 - 150 U/L    AST 26 0 - 45 U/L    ALT 22 0 - 50 U/L   Basic metabolic panel     Status: Abnormal   Result Value Ref Range    Sodium 134 133 - 144 mmol/L    Potassium 3.4 3.4 - 5.3 mmol/L    Chloride 102 94 - 109 mmol/L    Carbon Dioxide (CO2) 24 20 - 32 mmol/L    Anion Gap 8 3 - 14 mmol/L    Urea Nitrogen 26 7 - 30 mg/dL    Creatinine 1.41 (H) 0.52 - 1.04 mg/dL    Calcium 8.6 8.5 - 10.1 mg/dL    Glucose 139 (H) 70 - 99 mg/dL    GFR Estimate 43 (L) >60 mL/min/1.73m2   ABO/Rh type and screen *Canceled*     Status: None ()    Narrative    The following orders were created for panel order ABO/Rh type and screen.  Procedure                               Abnormality         Status                     ---------                               -----------         ------                     Adult Type and Screen[[id number]]                                                         Please view results for these tests on the individual orders.     Medications   lidocaine 1 % 0.1-1 mL (has no administration in time range)   lidocaine (LMX4) cream (has no administration in time range)   sodium chloride (PF) 0.9% PF flush 3 mL (3 mLs Intracatheter Not Given 7/23/21 7422)   sodium chloride (PF) 0.9% PF flush 3 mL (has no administration in time range)        Assessments & Plan (with Medical Decision Making)  51-year-old female with liver disease but no varices with some history of anemia presented to the ER after IR noted  a hemoglobin of 4.3 approximately today.  Patient has typical plan thoracentesis to relieve some of her ascites.  He did not do the procedure vitally she stable her x-ray shows left pleural effusion.  Patient's hemoglobin noted be 4.1 in the ER after reverification.  Rectal exam revealed no gross blood trace guaiac positive but minimal abdomen is nontender.  Discussed with GI also.  Patient to be given 3 units of packed cells in the ER offered admission but patient declined several times does understand the risk etc.  Patient been stable though will receive 3 units of packed cells and then recheck hemoglobin and creatinine 1 hour after the last unit is given.  To follow-up with GI also will coordinate hematology follow-up also.  Signed out to the physician. GI recommended goal of hgb 7 or higher. Patient agrees.       I have reviewed the nursing notes. I have reviewed the findings, diagnosis, plan and need for follow up with the patient.    Discharge Medication List as of 7/23/2021  8:38 PM          Final diagnoses:   Anemia, unspecified type   Alcoholic cirrhosis of liver with ascites (H)   Recurrent left pleural effusion   CORBIN (acute kidney injury) (H)     I, Ayesha Partida, am serving as a trained medical scribe to document services personally performed by Thomas Garrett MD, based on the provider's statements to me.      I, Thomas Garrett MD, was physically present and have reviewed and verified the accuracy of this note documented by Ayesha Partida.     --  Thomas Garrett MD  [facility] EMERGENCY DEPARTMENT  7/23/2021    This note was created at least in part by the use of dragon voice dictation system. Inadvertent typographical errors may still exist.  Thomas Garrett MD.    Patient evaluated in the emergency department during the COVID-19 pandemic period. Careful attention to patients safety was addressed throughout the evaluation. Evaluation and treatment management was initiated with disposition  made efficiently and appropriate as possible to minimize any risk of potential exposure to patient during this evaluation.       Thomas Garrett MD  07/23/21 [id number]

## 2021-07-24 NOTE — ED NOTES
Emergency Department Patient Sign-out       Brief HPI:  This is a 51 year old female signed out to me by Dr. Garrett .  See initial ED Provider note for details of the presentation.            Significant Events prior to my assuming care: The patient is a 51-year-old female who presents to the emergency department with low hemoglobin.  Patient was offered, but declined admission.  Hemoglobin was 4, creatinine mildly elevated.  3 units of PRBCs have been ordered.  Hemoglobin and creatinine to be rechecked after blood transfusion and patient to be discharged home if hemoglobin has normalized.      Exam:   Patient Vitals for the past 24 hrs:   BP Temp Temp src Pulse Resp SpO2 Height Weight   07/23/21 1927 109/61 98.2  F (36.8  C) Oral 105 18 100 % -- --   07/23/21 1900 100/71 98.1  F (36.7  C) Oral 104 18 100 % -- --   07/23/21 1830 99/49 -- -- 100 -- -- -- --   07/23/21 1813 -- -- -- -- -- 100 % -- --   07/23/21 1800 101/61 -- -- 104 -- 100 % -- --   07/23/21 1746 102/63 98.6  F (37  C) Oral 104 18 -- -- --   07/23/21 1733 99/51 98.1  F (36.7  C) Oral 101 18 100 % -- --   07/23/21 1646 -- 98.6  F (37  C) Oral -- -- -- -- --   07/23/21 1600 103/50 -- -- 104 -- 100 % -- --   07/23/21 1547 98/44 98.5  F (36.9  C) Oral 107 18 -- -- --   07/23/21 1534 103/60 98.2  F (36.8  C) Oral 106 18 100 % -- --   07/23/21 1500 101/80 -- -- 109 -- -- -- --   07/23/21 1452 -- -- -- -- -- 100 % -- --   07/23/21 1430 91/51 -- -- 95 -- 100 % -- --   07/23/21 1423 -- -- -- -- -- 100 % -- --   07/23/21 1422 -- -- -- -- -- 100 % -- --   07/23/21 1400 109/75 -- -- 103 -- 100 % -- --   07/23/21 1354 -- -- -- -- -- 100 % -- --   07/23/21 1350 -- 98.1  F (36.7  C) Oral -- -- 100 % -- --   07/23/21 1319 -- -- -- -- -- 100 % -- --   07/23/21 1300 110/81 -- -- 100 -- 100 % -- --   07/23/21 1258 108/59 98.2  F (36.8  C) Oral 105 18 100 % -- --   07/23/21 1244 107/62 98.2  F (36.8  C) -- 107 18 97 % -- --   07/23/21 1130 113/62 -- -- 107 -- -- --  "--   07/23/21 1127 -- -- -- -- -- 97 % -- --   07/23/21 1126 -- -- -- -- -- 100 % -- --   07/23/21 1118 -- -- -- -- -- 97 % -- --   07/23/21 1117 -- -- -- -- -- 100 % -- --   07/23/21 1112 -- -- -- -- -- 94 % -- --   07/23/21 1110 -- -- -- -- -- 99 % -- --   07/23/21 1102 -- -- -- -- -- 100 % -- --   07/23/21 1101 109/58 -- -- 96 -- 100 % -- --   07/23/21 1100 109/58 -- -- 96 -- -- -- --   07/23/21 1044 105/60 -- -- 101 -- 100 % -- --   07/23/21 1021 118/55 98.5  F (36.9  C) Oral 109 16 98 % 1.676 m (5' 6\") 70.3 kg (155 lb)           ED RESULTS:   Results for orders placed or performed during the hospital encounter of 07/23/21 (from the past 24 hour(s))   CBC with platelets differential     Status: Abnormal    Collection Time: 07/23/21 10:42 AM    Narrative    The following orders were created for panel order CBC with platelets differential.  Procedure                               Abnormality         Status                     ---------                               -----------         ------                     CBC with platelets and d...[[id number]]  Abnormal            Final result                 Please view results for these tests on the individual orders.   INR     Status: Abnormal    Collection Time: 07/23/21 10:42 AM   Result Value Ref Range    INR 1.77 (H) 0.85 - 1.15   Partial thromboplastin time     Status: Normal    Collection Time: 07/23/21 10:42 AM   Result Value Ref Range    aPTT 36 22 - 38 Seconds   ABO/Rh type and screen     Status: None    Collection Time: 07/23/21 10:42 AM    Narrative    The following orders were created for panel order ABO/Rh type and screen.  Procedure                               Abnormality         Status                     ---------                               -----------         ------                     Adult Type and Screen[[id number]]                            Final result                 Please view results for these tests on the individual orders.   Comprehensive " metabolic panel     Status: Abnormal    Collection Time: 07/23/21 10:42 AM   Result Value Ref Range    Sodium 132 (L) 133 - 144 mmol/L    Potassium 3.8 3.4 - 5.3 mmol/L    Chloride 100 94 - 109 mmol/L    Carbon Dioxide (CO2) 25 20 - 32 mmol/L    Anion Gap 7 3 - 14 mmol/L    Urea Nitrogen 26 7 - 30 mg/dL    Creatinine 1.28 (H) 0.52 - 1.04 mg/dL    Calcium 8.6 8.5 - 10.1 mg/dL    Glucose 112 (H) 70 - 99 mg/dL    Alkaline Phosphatase 150 40 - 150 U/L    AST      ALT 29 0 - 50 U/L    Protein Total 6.0 (L) 6.8 - 8.8 g/dL    Albumin 2.9 (L) 3.4 - 5.0 g/dL    Bilirubin Total 4.2 (H) 0.2 - 1.3 mg/dL    GFR Estimate 49 (L) >60 mL/min/1.73m2    Narrative    Unsatisfactory specimen - hemolyzed   Lipase     Status: Normal    Collection Time: 07/23/21 10:42 AM   Result Value Ref Range    Lipase 209 73 - 393 U/L   CBC with platelets and differential     Status: Abnormal    Collection Time: 07/23/21 10:42 AM   Result Value Ref Range    WBC Count 6.2 4.0 - 11.0 10e3/uL    RBC Count 1.62 (L) 3.80 - 5.20 10e6/uL    Hemoglobin 4.1 (LL) 11.7 - 15.7 g/dL    Hematocrit 14.5 (L) 35.0 - 47.0 %    MCV 90 78 - 100 fL    MCH 25.3 (L) 26.5 - 33.0 pg    MCHC 28.3 (L) 31.5 - 36.5 g/dL    RDW 16.9 (H) 10.0 - 15.0 %    Platelet Count 167 150 - 450 10e3/uL    % Neutrophils 68 %    % Lymphocytes 12 %    % Monocytes 17 %    % Eosinophils 3 %    % Basophils 0 %    % Immature Granulocytes 0 %    NRBCs per 100 WBC 0 <1 /100    Absolute Neutrophils 4.2 1.6 - 8.3 10e3/uL    Absolute Lymphocytes 0.8 0.8 - 5.3 10e3/uL    Absolute Monocytes 1.1 0.0 - 1.3 10e3/uL    Absolute Eosinophils 0.2 0.0 - 0.7 10e3/uL    Absolute Basophils 0.0 0.0 - 0.2 10e3/uL    Absolute Immature Granulocytes 0.0 <=0.0 10e3/uL    Absolute NRBCs 0.0 10e3/uL   Adult Type and Screen     Status: None    Collection Time: 07/23/21 10:42 AM   Result Value Ref Range    ABO/RH(D) B POS     Antibody Screen Negative Negative    SPECIMEN EXPIRATION DATE [id number]    Prepare red blood cells  (unit)     Status: None (Preliminary result)    Collection Time: 07/23/21 11:05 AM   Result Value Ref Range    CROSSMATCH Compatible     UNIT ABO/RH B Pos     Unit Number [id number]     UNIT STATUS Issued     Blood Component Type Red Blood Cells     Product Code [id number]     CODING SYSTEM [id number]     UNIT TYPE ISBT 7300     ISSUE DATE AND TIME [id number]    Prepare red blood cells (unit)     Status: None (Preliminary result)    Collection Time: 07/23/21 11:05 AM   Result Value Ref Range    CROSSMATCH Compatible     UNIT ABO/RH B Pos     Unit Number [id number]     UNIT STATUS Issued     Blood Component Type Red Blood Cells     Product Code [id number]     CODING SYSTEM [id number]     UNIT TYPE ISBT 7300     ISSUE DATE AND TIME [id number]    XR Chest Port 1 View     Status: None    Collection Time: 07/23/21 12:25 PM    Narrative    Portable chest    INDICATION: Shortness of breath with anemia and effusion    COMPARISON: 5/2/2021    Findings: Left pleural effusion with associated atelectasis.  Silhouetting of the lower left heart border. No evidence of  pneumothorax radiographically. Degenerative changes at the right  shoulder.      Impression    IMPRESSION: Left pleural effusion and associated atelectasis. No  evidence of pneumothorax.    SURESH SANDERS MD         SYSTEM ID:  [id number]   Prepare red blood cells (unit)     Status: None (Preliminary result)    Collection Time: 07/23/21  1:53 PM   Result Value Ref Range    CROSSMATCH Compatible     UNIT ABO/RH B Pos     Unit Number [id number]     UNIT STATUS Issued     Blood Component Type Red Blood Cells     Product Code [id number]     CODING SYSTEM [id number]     UNIT TYPE ISBT 7300     ISSUE DATE AND TIME [id number]    Basic metabolic panel     Status: Abnormal    Collection Time: 07/23/21  7:34 PM   Result Value Ref Range    Sodium 133 133 - 144 mmol/L    Potassium 3.5 3.4 - 5.3 mmol/L    Chloride 101 94 - 109 mmol/L    Carbon Dioxide (CO2) 25 20 - 32  mmol/L    Anion Gap 7 3 - 14 mmol/L    Urea Nitrogen 25 7 - 30 mg/dL    Creatinine 1.34 (H) 0.52 - 1.04 mg/dL    Calcium 8.6 8.5 - 10.1 mg/dL    Glucose 139 (H) 70 - 99 mg/dL    GFR Estimate 46 (L) >60 mL/min/1.73m2   CBC with platelets     Status: Abnormal    Collection Time: 07/23/21  7:35 PM   Result Value Ref Range    WBC Count 6.7 4.0 - 11.0 10e3/uL    RBC Count 2.75 (L) 3.80 - 5.20 10e6/uL    Hemoglobin 7.3 (L) 11.7 - 15.7 g/dL    Hematocrit 24.2 (L) 35.0 - 47.0 %    MCV 88 78 - 100 fL    MCH 26.5 26.5 - 33.0 pg    MCHC 30.2 (L) 31.5 - 36.5 g/dL    RDW 15.5 (H) 10.0 - 15.0 %    Platelet Count 135 (L) 150 - 450 10e3/uL       ED MEDICATIONS:   Medications   lidocaine 1 % 0.1-1 mL (has no administration in time range)   lidocaine (LMX4) cream (has no administration in time range)   sodium chloride (PF) 0.9% PF flush 3 mL (3 mLs Intracatheter Not Given 7/23/21 1538)   sodium chloride (PF) 0.9% PF flush 3 mL (has no administration in time range)         Impression:    ICD-10-CM    1. Anemia, unspecified type  D64.9    2. Alcoholic cirrhosis of liver with ascites (H)  K70.31    3. Recurrent left pleural effusion  J90    4. CORBIN (acute kidney injury) (H)  N17.9        Plan:    Pending studies include repeat hemoglobin and creatinine after PRBCs given.    PRBCs given, pt remained stable.    Hgb 7.3. Creatinine still mildly elevated at 1.34.  Patient is advised to follow-up with her PCP regarding her lab abnormalities today.    Patient to be discharged home. Advised to follow up with PCP within 1 week. To return to ER immediately with any new/worsening symptoms.    MD Gavin López Michelle C, MD  07/24/21 0004

## 2021-07-24 NOTE — DISCHARGE INSTRUCTIONS
TODAY'S VISIT:  You were seen today for low hemoglobin   -   - If you had any labs or imaging/radiology tests performed today, you should also discuss these tests with your usual provider.     FOLLOW-UP:  Please make an appointment to follow up with:  - Your Primary Care Provider. If you do not have a PCP, please call the Primary Care Center (phone: [phone] for an appointment  - you should have your hemoglobin and creatinine rechecked by your PCP    - Have your provider review the results from today's visit with you again to make sure no further follow-up or additional testing is needed based on those results.     RETURN TO THE EMERGENCY DEPARTMENT  Return to the Emergency Department at any time for any new or worsening symptoms or any concerns.

## 2021-07-27 ENCOUNTER — LAB (OUTPATIENT)
Dept: LAB | Facility: CLINIC | Age: 51
End: 2021-07-27
Attending: SPECIALIST

## 2021-07-27 ENCOUNTER — OFFICE VISIT (OUTPATIENT)
Dept: URGENT CARE | Facility: URGENT CARE | Age: 51
End: 2021-07-27
Payer: COMMERCIAL

## 2021-07-27 VITALS
TEMPERATURE: 98.4 F | SYSTOLIC BLOOD PRESSURE: 120 MMHG | DIASTOLIC BLOOD PRESSURE: 68 MMHG | HEART RATE: 105 BPM | OXYGEN SATURATION: 98 %

## 2021-07-27 DIAGNOSIS — Z11.59 ENCOUNTER FOR SCREENING FOR OTHER VIRAL DISEASES: Primary | ICD-10-CM

## 2021-07-27 DIAGNOSIS — Z11.59 ENCOUNTER FOR SCREENING FOR OTHER VIRAL DISEASES: ICD-10-CM

## 2021-07-27 DIAGNOSIS — H10.32 ACUTE BACTERIAL CONJUNCTIVITIS OF LEFT EYE: Primary | ICD-10-CM

## 2021-07-27 PROCEDURE — 99213 OFFICE O/P EST LOW 20 MIN: CPT | Performed by: PHYSICIAN ASSISTANT

## 2021-07-27 PROCEDURE — U0005 INFEC AGEN DETEC AMPLI PROBE: HCPCS

## 2021-07-27 PROCEDURE — U0003 INFECTIOUS AGENT DETECTION BY NUCLEIC ACID (DNA OR RNA); SEVERE ACUTE RESPIRATORY SYNDROME CORONAVIRUS 2 (SARS-COV-2) (CORONAVIRUS DISEASE [COVID-19]), AMPLIFIED PROBE TECHNIQUE, MAKING USE OF HIGH THROUGHPUT TECHNOLOGIES AS DESCRIBED BY CMS-2020-01-R: HCPCS

## 2021-07-27 RX ORDER — POLYMYXIN B SULFATE AND TRIMETHOPRIM 1; 10000 MG/ML; [USP'U]/ML
1-2 SOLUTION OPHTHALMIC EVERY 4 HOURS
Qty: 5 ML | Refills: 0 | Status: SHIPPED | OUTPATIENT
Start: 2021-07-27 | End: 2021-08-03

## 2021-07-27 NOTE — PATIENT INSTRUCTIONS
Patient Education     Conjunctivitis, Nonspecific    The membrane that covers the white part of your eye (the conjunctiva) is inflamed. Inflammation happens when your body responds to an injury, allergic reaction, infection, or illness. Symptoms of inflammation in the eye may include redness, irritation, itching, swelling, or burning. These symptoms should go away within the next 24 hours. Conjunctivitis may be related to a particle that was in your eye. If so, it may wash out with your tears or irrigation treatment. Being exposed to liquid chemicals or fumes may also cause this reaction.    Home care    Put a cold pack on the eye for 20 minutes at a time. This will reduce pain. To make a cold pack, put ice cubes in a plastic bag that seals at the top. Wrap the bag in a clean, thin towel or cloth.    Artificial tears may be prescribed to reduce irritation or redness. These should be used 3 to 4 times a day.    You may use acetaminophen or ibuprofen to control pain, unless another medicine was prescribed. If you have chronic liver or kidney disease, talk with your healthcare provider before using these medicines. Also talk with your provider if you have ever had a stomach ulcer or gastrointestinal bleeding.    Follow-up care  Follow up with your healthcare provider, or as advised.  When to seek medical advice  Call your healthcare provider right away if any of these occur:    Eyelid swells more    Eye pain gets worse    Redness or drainage from the eye gets worse    Blurry vision gets worse or you have increased sensitivity to light    Normal vision does not return within 24 to 48 hours  Simon last reviewed this educational content on 2/1/2020 2000-2021 [employer]. All rights reserved. This information is not intended as a substitute for professional medical care. Always follow your healthcare professional's instructions.

## 2021-07-28 ENCOUNTER — PATIENT OUTREACH (OUTPATIENT)
Dept: GASTROENTEROLOGY | Facility: CLINIC | Age: 51
End: 2021-07-28

## 2021-07-28 DIAGNOSIS — K70.31 ALCOHOLIC CIRRHOSIS OF LIVER WITH ASCITES (H): Primary | ICD-10-CM

## 2021-07-28 DIAGNOSIS — D64.9 ANEMIA, UNSPECIFIED TYPE: ICD-10-CM

## 2021-07-28 LAB — SARS-COV-2 RNA RESP QL NAA+PROBE: NEGATIVE

## 2021-07-28 NOTE — PROGRESS NOTES
Spoke to patient and reviewed plan of care per Dr. Avila:   -CBC checks every 2 weeks, CMP with her next set of labs  -hemochromatosis, 1 unit PRBC for hemoglobin < 7  -follow up in hematology clinic   -monthly david Berg with radiology to determine the limit   Patient verbalized understanding and states she will also have monthly labs for transplant. She is having thoracentesis on 7/30 and hopes to add these labs at that time. Patient also has question if she should be on iron again. She wants to make sure the eye drops she is taking for pink eye is okay to take. Also she has not heard from imaging department to schedule her abdominal ultrasound.

## 2021-07-29 NOTE — PROGRESS NOTES
Clarified with patient that per Dr. Avila she should not take iron due to her diagnosis of hemochromatosis. Dr. Avila is going to reach out again to hematology and get something set up for patient in that clinic. Will plan to review lab work done tomorrow and reach out to patient again with any further instruction. Patient verbalized understanding of the plan.

## 2021-07-30 ENCOUNTER — ANCILLARY PROCEDURE (OUTPATIENT)
Dept: RADIOLOGY | Facility: AMBULATORY SURGERY CENTER | Age: 51
End: 2021-07-30
Attending: INTERNAL MEDICINE
Payer: COMMERCIAL

## 2021-07-30 ENCOUNTER — HOSPITAL ENCOUNTER (OUTPATIENT)
Facility: AMBULATORY SURGERY CENTER | Age: 51
Discharge: HOME | End: 2021-07-30
Attending: PHYSICIAN ASSISTANT | Admitting: PHYSICIAN ASSISTANT
Payer: COMMERCIAL

## 2021-07-30 ENCOUNTER — PATIENT OUTREACH (OUTPATIENT)
Dept: GASTROENTEROLOGY | Facility: CLINIC | Age: 51
End: 2021-07-30

## 2021-07-30 ENCOUNTER — ANESTHESIA (OUTPATIENT)
Dept: SURGERY | Facility: AMBULATORY SURGERY CENTER | Age: 51
End: 2021-07-30

## 2021-07-30 ENCOUNTER — TELEPHONE (OUTPATIENT)
Dept: TRANSPLANT | Facility: CLINIC | Age: 51
End: 2021-07-30

## 2021-07-30 ENCOUNTER — ANESTHESIA EVENT (OUTPATIENT)
Dept: SURGERY | Facility: AMBULATORY SURGERY CENTER | Age: 51
End: 2021-07-30

## 2021-07-30 ENCOUNTER — LAB (OUTPATIENT)
Dept: LAB | Facility: CLINIC | Age: 51
End: 2021-07-30
Payer: COMMERCIAL

## 2021-07-30 VITALS
TEMPERATURE: 98.1 F | OXYGEN SATURATION: 100 % | HEART RATE: 103 BPM | WEIGHT: 155 LBS | RESPIRATION RATE: 16 BRPM | SYSTOLIC BLOOD PRESSURE: 123 MMHG | HEIGHT: 66 IN | DIASTOLIC BLOOD PRESSURE: 67 MMHG | BODY MASS INDEX: 24.91 KG/M2

## 2021-07-30 DIAGNOSIS — K70.31 ALCOHOLIC CIRRHOSIS OF LIVER WITH ASCITES (H): ICD-10-CM

## 2021-07-30 DIAGNOSIS — E83.110 HEREDITARY HEMOCHROMATOSIS (H): ICD-10-CM

## 2021-07-30 DIAGNOSIS — D64.9 ANEMIA, UNSPECIFIED TYPE: ICD-10-CM

## 2021-07-30 DIAGNOSIS — D64.9 ANEMIA, UNSPECIFIED TYPE: Primary | ICD-10-CM

## 2021-07-30 LAB
ALBUMIN SERPL-MCNC: 2.8 G/DL (ref 3.4–5)
ALP SERPL-CCNC: 140 U/L (ref 40–150)
ALT SERPL W P-5'-P-CCNC: 20 U/L (ref 0–50)
ANION GAP SERPL CALCULATED.3IONS-SCNC: 7 MMOL/L (ref 3–14)
AST SERPL W P-5'-P-CCNC: 26 U/L (ref 0–45)
BILIRUB DIRECT SERPL-MCNC: 2 MG/DL (ref 0–0.2)
BILIRUB SERPL-MCNC: 5.8 MG/DL (ref 0.2–1.3)
BUN SERPL-MCNC: 20 MG/DL (ref 7–30)
CALCIUM SERPL-MCNC: 9 MG/DL (ref 8.5–10.1)
CHLORIDE BLD-SCNC: 102 MMOL/L (ref 94–109)
CO2 SERPL-SCNC: 26 MMOL/L (ref 20–32)
CREAT SERPL-MCNC: 1.07 MG/DL (ref 0.52–1.04)
ERYTHROCYTE [DISTWIDTH] IN BLOOD BY AUTOMATED COUNT: 17.9 % (ref 10–15)
FERRITIN SERPL-MCNC: 32 NG/ML (ref 8–252)
GFR SERPL CREATININE-BSD FRML MDRD: 60 ML/MIN/1.73M2
GLUCOSE BLD-MCNC: 124 MG/DL (ref 70–99)
HCT VFR BLD AUTO: 23.8 % (ref 35–47)
HGB BLD-MCNC: 6.9 G/DL (ref 11.7–15.7)
LDH SERPL L TO P-CCNC: 310 U/L (ref 81–234)
MCH RBC QN AUTO: 26.4 PG (ref 26.5–33)
MCHC RBC AUTO-ENTMCNC: 29 G/DL (ref 31.5–36.5)
MCV RBC AUTO: 91 FL (ref 78–100)
PLATELET # BLD AUTO: 111 10E3/UL (ref 150–450)
POTASSIUM BLD-SCNC: 3.8 MMOL/L (ref 3.4–5.3)
PROT SERPL-MCNC: 5.7 G/DL (ref 6.8–8.8)
RBC # BLD AUTO: 2.61 10E6/UL (ref 3.8–5.2)
SODIUM SERPL-SCNC: 135 MMOL/L (ref 133–144)
WBC # BLD AUTO: 6.1 10E3/UL (ref 4–11)

## 2021-07-30 PROCEDURE — 85027 COMPLETE CBC AUTOMATED: CPT | Performed by: PATHOLOGY

## 2021-07-30 PROCEDURE — 83615 LACTATE (LD) (LDH) ENZYME: CPT | Performed by: PATHOLOGY

## 2021-07-30 PROCEDURE — 82728 ASSAY OF FERRITIN: CPT | Performed by: PATHOLOGY

## 2021-07-30 PROCEDURE — 36415 COLL VENOUS BLD VENIPUNCTURE: CPT | Performed by: PATHOLOGY

## 2021-07-30 PROCEDURE — 32555 ASPIRATE PLEURA W/ IMAGING: CPT | Mod: LT

## 2021-07-30 PROCEDURE — 32555 ASPIRATE PLEURA W/ IMAGING: CPT | Mod: LT | Performed by: PHYSICIAN ASSISTANT

## 2021-07-30 PROCEDURE — 80053 COMPREHEN METABOLIC PANEL: CPT | Performed by: PATHOLOGY

## 2021-07-30 PROCEDURE — 82248 BILIRUBIN DIRECT: CPT | Performed by: PATHOLOGY

## 2021-07-30 RX ORDER — LIDOCAINE 40 MG/G
CREAM TOPICAL
Status: DISCONTINUED | OUTPATIENT
Start: 2021-07-30 | End: 2021-07-31 | Stop reason: HOSPADM

## 2021-07-30 ASSESSMENT — MIFFLIN-ST. JEOR: SCORE: 1334.83

## 2021-07-30 NOTE — TELEPHONE ENCOUNTER
DOTTY Avila, make inactive due to anemia.Plan to follow up with hematology.  Spoke to Maria Elena and knows she will not get an organ offer call while inactive.

## 2021-07-30 NOTE — DISCHARGE INSTRUCTIONS
Discharge Instructions for Paracentesis or Thoracentesis     Paracentesis is a procedure to remove extra fluid from your belly (abdomen). Thoracentesis is a procedure to remove extra fluid from your chest.  This fluid buildup is called ascites. The procedure may have been done to take a sample of the fluid. Or, it may have been done to drain the extra fluid from your abdomen or chest to help make you more comfortable.     Home care    If you have pain after the procedure, your healthcare provider can prescribe or recommend pain medicines. Take these exactly as directed. If you stopped taking other medicines before the procedure, ask your provider when you can start them again.    Avoid strenuous activity for 48 hours.    You will have a small bandage over the puncture site. Adhesive tapes, adhesive strips, or surgical glue may also be used to close the incision. They also help stop bleeding and promote healing. You may take the bandage off in 24-48 hours. Once there is a scab over the site, no bandages are required.    Check the puncture site for the signs of infection listed below.     Follow-up care  Make a follow-up appointment with your healthcare provider as directed. During your follow-up visit, your provider will check your healing. Let your provider know how you are feeling. You can also discuss the cause of your fluid, and if you need any further treatment.    When to seek medical advice:  Call your healthcare provider if you have any of the following after the procedure:    A fever of 100.4 F (38.0 C) or higher    Trouble breathing    Abdominal pain that is worse than expected    Belly Abdominal pain not caused by having the skin punctured that is worse than expected    Persistent bleeding from the puncture site    More than a small amount of fluid leaking from the puncture site    Swollen belly    Signs of infection at the puncture site. These include increased pain, redness, or swelling, warmth, or  bad-smelling drainage.    Feeling dizzy or lightheaded, or fainting     Call our Interventional Radiology (IR) service if:  If you start bleeding from the procedure site.  If you do start to bleed from the site, lie down and hold some pressure on the site.  Our radiology provider can help you decide if you need to return to the hospital.  If you have new or worsening pain related to the procedure.  If you have pronounced swelling at the procedure site.  If you develop persistent nausea or vomiting.  If you develop hives or a rash or any unexplained itching.  If you have a fever of greater than 100.4  F and chills in the first 5 days after procedure.  Any other concerns related to your procedure.      [facility]  Interventional Radiology (IR)  [address] [facility] [place] [address]    Contact Number:  [phone]  (IR control desk)  Monday - Friday 8:00 am - 4:30 pm    After hours for urgent concerns:  [phone]  After 4:30 pm Monday - Friday, Weekends and Holidays.   Ask for Interventional Radiology on-call.  Someone is available 24 hours a day.  [facility] toll free number:  [phone]

## 2021-07-30 NOTE — PROGRESS NOTES
Patient scheduled to get 1 unit of PRBC on 8/3, with T&C on 8/2.  Faxed consent form to [phone]. Working on securing appointment with Hematology, and provided patient scheduling number if she does not hear back from them soon. Patient denies signs or symptoms of bleeding, and will get to an ER if she develops any concerning symptoms.

## 2021-08-02 ENCOUNTER — LAB (OUTPATIENT)
Dept: LAB | Facility: CLINIC | Age: 51
End: 2021-08-02
Payer: COMMERCIAL

## 2021-08-02 DIAGNOSIS — D64.9 ANEMIA, UNSPECIFIED TYPE: ICD-10-CM

## 2021-08-02 DIAGNOSIS — K74.60 CIRRHOSIS OF LIVER (H): ICD-10-CM

## 2021-08-02 LAB
ABO/RH(D): NORMAL
ALBUMIN SERPL-MCNC: 2.8 G/DL (ref 3.4–5)
ANTIBODY SCREEN: NEGATIVE
BILIRUB SERPL-MCNC: 6.3 MG/DL (ref 0.2–1.3)
CREAT SERPL-MCNC: 1.03 MG/DL (ref 0.52–1.04)
GFR SERPL CREATININE-BSD FRML MDRD: 63 ML/MIN/1.73M2
INR PPP: 1.68 (ref 0.85–1.15)
SODIUM SERPL-SCNC: 137 MMOL/L (ref 133–144)
SPECIMEN EXPIRATION DATE: NORMAL

## 2021-08-02 PROCEDURE — 36415 COLL VENOUS BLD VENIPUNCTURE: CPT | Performed by: PATHOLOGY

## 2021-08-02 PROCEDURE — 82040 ASSAY OF SERUM ALBUMIN: CPT | Performed by: PATHOLOGY

## 2021-08-02 PROCEDURE — 82565 ASSAY OF CREATININE: CPT | Performed by: PATHOLOGY

## 2021-08-02 PROCEDURE — 84295 ASSAY OF SERUM SODIUM: CPT | Performed by: PATHOLOGY

## 2021-08-02 PROCEDURE — 85610 PROTHROMBIN TIME: CPT | Performed by: PATHOLOGY

## 2021-08-02 PROCEDURE — 86850 RBC ANTIBODY SCREEN: CPT | Mod: 90 | Performed by: PATHOLOGY

## 2021-08-02 PROCEDURE — 82247 BILIRUBIN TOTAL: CPT | Performed by: PATHOLOGY

## 2021-08-02 PROCEDURE — 86900 BLOOD TYPING SEROLOGIC ABO: CPT | Mod: 90 | Performed by: PATHOLOGY

## 2021-08-02 PROCEDURE — 86901 BLOOD TYPING SEROLOGIC RH(D): CPT | Mod: 90 | Performed by: PATHOLOGY

## 2021-08-03 ENCOUNTER — COMMITTEE REVIEW (OUTPATIENT)
Dept: TRANSPLANT | Facility: CLINIC | Age: 51
End: 2021-08-03

## 2021-08-03 ENCOUNTER — INFUSION THERAPY VISIT (OUTPATIENT)
Dept: INFUSION THERAPY | Facility: CLINIC | Age: 51
End: 2021-08-03
Attending: STUDENT IN AN ORGANIZED HEALTH CARE EDUCATION/TRAINING PROGRAM
Payer: COMMERCIAL

## 2021-08-03 VITALS
HEART RATE: 107 BPM | TEMPERATURE: 98.1 F | SYSTOLIC BLOOD PRESSURE: 122 MMHG | OXYGEN SATURATION: 100 % | DIASTOLIC BLOOD PRESSURE: 57 MMHG | RESPIRATION RATE: 18 BRPM

## 2021-08-03 DIAGNOSIS — K70.31 ALCOHOLIC CIRRHOSIS OF LIVER WITH ASCITES (H): ICD-10-CM

## 2021-08-03 DIAGNOSIS — D62 ANEMIA ASSOCIATED WITH ACUTE BLOOD LOSS: ICD-10-CM

## 2021-08-03 DIAGNOSIS — D64.9 ANEMIA, UNSPECIFIED TYPE: Primary | ICD-10-CM

## 2021-08-03 DIAGNOSIS — D50.0 BLOOD LOSS ANEMIA: Primary | ICD-10-CM

## 2021-08-03 LAB
BASOPHILS # BLD AUTO: 0 10E3/UL (ref 0–0.2)
BASOPHILS NFR BLD AUTO: 1 %
BLD PROD TYP BPU: NORMAL
BLOOD COMPONENT TYPE: NORMAL
CODING SYSTEM: NORMAL
CROSSMATCH: NORMAL
EOSINOPHIL # BLD AUTO: 0.1 10E3/UL (ref 0–0.7)
EOSINOPHIL NFR BLD AUTO: 2 %
ERYTHROCYTE [DISTWIDTH] IN BLOOD BY AUTOMATED COUNT: 19.1 % (ref 10–15)
HCT VFR BLD AUTO: 22.4 % (ref 35–47)
HGB BLD-MCNC: 6.7 G/DL (ref 11.7–15.7)
IMM GRANULOCYTES # BLD: 0 10E3/UL
IMM GRANULOCYTES NFR BLD: 0 %
ISSUE DATE AND TIME: NORMAL
LYMPHOCYTES # BLD AUTO: 0.7 10E3/UL (ref 0.8–5.3)
LYMPHOCYTES NFR BLD AUTO: 12 %
MCH RBC QN AUTO: 26.8 PG (ref 26.5–33)
MCHC RBC AUTO-ENTMCNC: 29.9 G/DL (ref 31.5–36.5)
MCV RBC AUTO: 90 FL (ref 78–100)
MONOCYTES # BLD AUTO: 0.9 10E3/UL (ref 0–1.3)
MONOCYTES NFR BLD AUTO: 14 %
NEUTROPHILS # BLD AUTO: 4.5 10E3/UL (ref 1.6–8.3)
NEUTROPHILS NFR BLD AUTO: 71 %
NRBC # BLD AUTO: 0 10E3/UL
NRBC BLD AUTO-RTO: 0 /100
PLATELET # BLD AUTO: 120 10E3/UL (ref 150–450)
RBC # BLD AUTO: 2.5 10E6/UL (ref 3.8–5.2)
UNIT ABO/RH: NORMAL
UNIT NUMBER: NORMAL
UNIT STATUS: NORMAL
UNIT TYPE ISBT: 7300
WBC # BLD AUTO: 6.2 10E3/UL (ref 4–11)

## 2021-08-03 PROCEDURE — 86923 COMPATIBILITY TEST ELECTRIC: CPT

## 2021-08-03 PROCEDURE — 85025 COMPLETE CBC W/AUTO DIFF WBC: CPT | Performed by: STUDENT IN AN ORGANIZED HEALTH CARE EDUCATION/TRAINING PROGRAM

## 2021-08-03 PROCEDURE — 36430 TRANSFUSION BLD/BLD COMPNT: CPT

## 2021-08-03 PROCEDURE — 36415 COLL VENOUS BLD VENIPUNCTURE: CPT | Performed by: STUDENT IN AN ORGANIZED HEALTH CARE EDUCATION/TRAINING PROGRAM

## 2021-08-03 PROCEDURE — P9016 RBC LEUKOCYTES REDUCED: HCPCS

## 2021-08-03 RX ORDER — HEPARIN SODIUM,PORCINE 10 UNIT/ML
5 VIAL (ML) INTRAVENOUS
Status: CANCELLED | OUTPATIENT
Start: 2021-08-03

## 2021-08-03 RX ORDER — HEPARIN SODIUM (PORCINE) LOCK FLUSH IV SOLN 100 UNIT/ML 100 UNIT/ML
5 SOLUTION INTRAVENOUS
Status: CANCELLED | OUTPATIENT
Start: 2021-08-03

## 2021-08-03 ASSESSMENT — PAIN SCALES - GENERAL: PAINLEVEL: NO PAIN (0)

## 2021-08-03 NOTE — PROGRESS NOTES
Blood Product Transfusion Nursing Note:    Sarah Fisher presents today to [facility] for a 1u RBC transfusion.  During today's [facility] appointment orders from Dr. Avila were completed.    Progress note:  ID verified by name and .  Assessment completed.  Vitals were stable throughout time in [facility].    verbal education given to patient/representative regarding transfusion and possible side effects.  Patient/representative verbalized understanding.     present during visit today: Not Applicable.    All pertinent labs reviewed prior to infusion: YES  Hgb 6.7    Date of consent or authorization: 8/3/21    Blood bag component and order verified using system downtime charting.    Patient tolerated the procedure well    Transfusion given over approximately  90 mins     Discharge Plan:    Discharge instructions were reviewed with patient Yes  Patient/representative verbalized understanding of discharge instructions and all questions answered Yes.        Zoraida Mathews RN    /69   Pulse 109   SpO2 100%

## 2021-08-03 NOTE — COMMITTEE REVIEW
Abdominal Committee Review Note     Evaluation Date: 12/15/2020  Committee Review Date: 8/3/2021    Organ being evaluated for: Liver    Transplant Phase: Waitlist  Transplant Status: Inactive    Transplant Coordinator: Kiki Sutherland  Transplant Surgeon:   Christian Morrison    Referring Physician: Rehan Escobar    Primary Diagnosis: Alcoholic Cirrhosis  Secondary Diagnosis:     Committee Review Members:  Nutrition Humaira Jason, HIEU    - Clinical Sherice Arredondo, ENRIQUE, Evelina Rodriguez, [facility]   Transplant Sirena Dudley, RN, Payal Sam, RN, Lotus Hughes, RN, Britni Wilson, RN, Jr Oli Gurrola, RN, Leigh Beltran MD, Cyndee Salazar APRN CNP   Transplant Hepatology  Christos Johnson MD, Fadia Avila MD, Darrin Patterson MD, Susana Christina MD, Thomas M. Leventhal, MD   Transplant Surgery Christian Morrison MD, Lea Ravi MD, Jaylan Lyon MD       Transplant Eligibility: Cirrhosis with MELD, ETOH    Committee Review Decision: Remain Inactive    Relative Contraindications: Other, pending anemia work-up / hematology consult    Absolute Contraindications: None    Committee Chair Leigh Beltran MD verbally attested to the committee's decision.    Committee Discussion Details:     Patient to stay in-active    Hematology consult needed

## 2021-08-04 ENCOUNTER — PATIENT OUTREACH (OUTPATIENT)
Dept: GASTROENTEROLOGY | Facility: CLINIC | Age: 51
End: 2021-08-04

## 2021-08-04 DIAGNOSIS — K72.10 CHRONIC LIVER FAILURE WITHOUT HEPATIC COMA (H): ICD-10-CM

## 2021-08-04 RX ORDER — FOLIC ACID 1 MG/1
1 TABLET ORAL DAILY
Qty: 90 TABLET | Refills: 3 | Status: ON HOLD | OUTPATIENT
Start: 2021-08-04 | End: 2021-10-29

## 2021-08-04 NOTE — PROGRESS NOTES
SUBJECTIVE:  Chief Complaint:   Chief Complaint   Patient presents with     Urgent Care     L eye watery and itchy since yesterday, sensitive to light,      History of Present Illness:  Sarah Fisher is a 51 year old female who presents complaining of mild left eye discharge, mattering, redness for 1 day(s).   Onset/timing: sudden.    Associated Signs and Symptoms: sensitive to lgiht  Treatment measures tried include: none      Past Medical History:   Diagnosis Date     Ascites      History of blood transfusion      Liver cirrhosis (H)      Thyroid disease      Current Outpatient Medications   Medication Sig Dispense Refill     ciprofloxacin (CIPRO) 250 MG tablet Take 2 tablets (500 mg) by mouth daily 90 tablet 3     folic acid (FOLVITE) 1 MG tablet Take 1 tablet (1 mg) by mouth daily 30 tablet 0     furosemide (LASIX) 20 MG tablet Take 2 tablets (40 mg) by mouth daily 60 tablet 3     levothyroxine (SYNTHROID/LEVOTHROID) 50 MCG tablet Take 50 mcg by mouth daily       midodrine (PROAMATINE) 5 MG tablet Take 1 tablet (5 mg) by mouth 2 times daily (with meals) 180 tablet 0     multivitamin w/minerals (THERA-VIT-M) tablet Take 1 tablet by mouth daily 30 tablet 3     pantoprazole (PROTONIX) 20 MG EC tablet Take 1 tablet (20 mg) by mouth daily 90 tablet 3     potassium chloride ER (K-TAB/KLOR-CON) 10 MEQ CR tablet Take 20 mEq by mouth daily       potassium chloride ER (KLOR-CON M) 10 MEQ CR tablet Take 2 tablets (20 mEq) by mouth 2 times daily 360 tablet 3     rifaximin (XIFAXAN) 550 MG TABS tablet Take 550 mg by mouth 2 times daily       spironolactone (ALDACTONE) 25 MG tablet Take 100 mg by mouth daily 60 tablet 3     traZODone (DESYREL) 50 MG tablet Take 50 mg by mouth At Bedtime       vitamin D2 (ERGOCALCIFEROL) 14888 units (1250 mcg) capsule Take 1 capsule (50,000 Units) by mouth once a week 12 capsule 0     zinc sulfate (ZINCATE) 220 (50 Zn) MG capsule Take 1 capsule (220 mg) by mouth daily 90 capsule 0         ROS:  10 point ROS negative except as listed above      OBJECTIVE:  /68   Pulse 105   Temp 98.4  F (36.9  C)   SpO2 98%   General: no acute distress  Eye exam: right eye normal lid, conjunctiva, cornea, pupil and fundus, left eye abnormal findings: conjunctivitis with erythema.  Heart: NORMAL - regular rate and rhythm without murmur.  Lungs: normal and clear to auscultation      Results for orders placed or performed in visit on 07/27/21   SARS-COV2 (COVID-19) Virus RT-PCR     Status: Normal    Specimen: Nasopharyngeal; Swab   Result Value Ref Range    SARS CoV2 PCR Negative Negative    Narrative    Testing was performed using the Aptima SARS-CoV-2 Assay on the  [employer] Instrument System. Additional information about this  Emergency Use Authorization (EUA) assay can be found via the Lab  Guide. This test should be ordered for the detection of SARS-CoV-2 in  individuals who meet SARS-CoV-2 clinical and/or epidemiological  criteria. Test performance is unknown in asymptomatic patients. This  test is for in vitro diagnostic use under the FDA EUA for  laboratories certified under CLIA to perform high complexity testing.  This test has not been FDA cleared or approved. A negative result  does not rule out the presence of PCR inhibitors in the specimen or  target RNA in concentration below the limit of detection for the  assay. The possibility of a false negative should be considered if  the patient's recent exposure or clinical presentation suggests  COVID-19. This test was validated by the [facility]. This laboratory is certified under  the Clinical Laboratory Improvement Amendments of 1988 (CLIA-88) as  qualified to perform high complexity laboratory testing.   Asymptomatic COVID-19 Virus (Coronavirus) by PCR Nasopharyngeal     Status: Normal    Specimen: Nasopharyngeal; Swab    Narrative    The following orders were created for panel order Asymptomatic  COVID-19 Virus (Coronavirus) by PCR Nasopharyngeal.  Procedure                               Abnormality         Status                     ---------                               -----------         ------                     SARS-COV2 (COVID-19) Vir...[[id number]]  Normal              Final result                 Please view results for these tests on the individual orders.       ASSESSMENT:  (H10.32) Acute bacterial conjunctivitis of left eye  (primary encounter diagnosis)  Plan: trimethoprim-polymyxin b (POLYTRIM) 29854-3.1         UNIT/ML-% ophthalmic solution      ,Red flags and emergent follow up discussed, and understood by patient  Follow up with PCP if symptoms worsen or fail to improve in 2 days

## 2021-08-04 NOTE — PROGRESS NOTES
Discussed plan of care per Dr. Avila as below. Patient verbalized understanding and is agreeable to the plan.   - Continue 1 mg of folate daily, new prescription sent.     - Call hematology for an appointment - [phone], Dr. Avila is going to reach out to that provider again.     - Ok to get hepatitis b vaccine, either at the [facility] or with your primary care clinic.     - Tuesday 8/10, lab appointment at 11 AM for CBC and Type and Crossmatch, at [address]     - Wednesday 8/11 at Noon for blood transfusion (if needed) at [place]    - Labs and transfusion appointments will be repeated weekly for now.             ASCITES: Yes and trace around liver  - History of SBP: No  - Meets criteria for SBP ppx: No  [History of SBP and/or ascites total protein < 1.5g/dL AND either SCr  >1.2 , Na <130 or TB>3]

## 2021-08-05 ENCOUNTER — VIRTUAL VISIT (OUTPATIENT)
Dept: HEMATOLOGY | Facility: CLINIC | Age: 51
End: 2021-08-05
Attending: INTERNAL MEDICINE
Payer: COMMERCIAL

## 2021-08-05 VITALS — BODY MASS INDEX: 25.02 KG/M2 | WEIGHT: 155 LBS

## 2021-08-05 DIAGNOSIS — D64.9 ANEMIA, UNSPECIFIED TYPE: Primary | ICD-10-CM

## 2021-08-05 DIAGNOSIS — D50.0 IRON DEFICIENCY ANEMIA DUE TO CHRONIC BLOOD LOSS: ICD-10-CM

## 2021-08-05 PROCEDURE — 99215 OFFICE O/P EST HI 40 MIN: CPT | Mod: 95 | Performed by: INTERNAL MEDICINE

## 2021-08-05 RX ORDER — DIPHENHYDRAMINE HYDROCHLORIDE 50 MG/ML
50 INJECTION INTRAMUSCULAR; INTRAVENOUS
Status: CANCELLED
Start: 2021-08-05

## 2021-08-05 RX ORDER — HEPARIN SODIUM (PORCINE) LOCK FLUSH IV SOLN 100 UNIT/ML 100 UNIT/ML
5 SOLUTION INTRAVENOUS
Status: CANCELLED | OUTPATIENT
Start: 2021-08-05

## 2021-08-05 RX ORDER — ALBUTEROL SULFATE 90 UG/1
1-2 AEROSOL, METERED RESPIRATORY (INHALATION)
Status: CANCELLED
Start: 2021-08-05

## 2021-08-05 RX ORDER — EPINEPHRINE 1 MG/ML
0.3 INJECTION, SOLUTION, CONCENTRATE INTRAVENOUS EVERY 5 MIN PRN
Status: CANCELLED | OUTPATIENT
Start: 2021-08-05

## 2021-08-05 RX ORDER — MEPERIDINE HYDROCHLORIDE 25 MG/ML
25 INJECTION INTRAMUSCULAR; INTRAVENOUS; SUBCUTANEOUS EVERY 30 MIN PRN
Status: CANCELLED | OUTPATIENT
Start: 2021-08-05

## 2021-08-05 RX ORDER — ALBUTEROL SULFATE 0.83 MG/ML
2.5 SOLUTION RESPIRATORY (INHALATION)
Status: CANCELLED | OUTPATIENT
Start: 2021-08-05

## 2021-08-05 RX ORDER — HEPARIN SODIUM,PORCINE 10 UNIT/ML
5 VIAL (ML) INTRAVENOUS
Status: CANCELLED | OUTPATIENT
Start: 2021-08-05

## 2021-08-05 RX ORDER — NALOXONE HYDROCHLORIDE 0.4 MG/ML
0.2 INJECTION, SOLUTION INTRAMUSCULAR; INTRAVENOUS; SUBCUTANEOUS
Status: CANCELLED | OUTPATIENT
Start: 2021-08-05

## 2021-08-05 NOTE — PROGRESS NOTES
Patient was contacted to complete the pre-visit call prior to their video visit with the provider.      Allergies and medications were reviewed.    I thanked them for their time to cover this information     Deon Núñez CMA    Due to the ongoing COVID-19 outbreak, this visit was conducted by video, with the patient's approval.        [facility]  [address] [place] [id number]  Phone: [phone], Fax: [phone]      Outpatient Visit Note:    Patient: Sarah Fisher  MRN: [id number]  : 1970  LEANA: 2021    Sarah Fisher is a 51 year old woman with a history of cirrhosis who returns for evaluation of worsening anemia.    Assessment:  In summary, Sarah Fisher is a 51 year old woman with cirrhosis and transfusion dependent iron deficiency anemia.  I recommended IV iron and checking EPO level to ensure she does not have concurrent anemia of CKD.    Recommendations:  1. I counseled the patient about my assessment of causes for her anemia including liver disease, iron deficiency, chronic disease and possibly low EPO  2. Injectafer 750 mg IV x 2 written.  Hopefully can use next week's infusion time to give first dose if we can get insurance approval  3. Check EPO and if inadequate response from IV iron, refer to Anemia clinic for EPO initiation  4. Transfuse as needed through GI clinic    40 minutes spent on the date of the encounter doing chart review, review of test results, interpretation of tests, patient visit, documentation and coordinating care       Leeroy Nava MD   of Medicine, Division of Hematology, Oncology and Transplantation  [employer]     --------------------------------------------------------  History: Sarah Fisher is a 51 year old woman with a history of cirrhosis who I saw in February of this year due to previous internal jugular vein thrombosis.  At that point we  decided to have her stop anticoagulation given the risk of recurrence was low and risk of bleeding with significant given her history of GAVE.  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    Presents today for further discussion about anemia management because she has had more recent severe anemia requiring transfusion.  She reports up until recently she has only needed intermittent transfusions primarily associated with gastrointestinal bleeding.  However, more recently she presented with a hemoglobin of 4.3 on July 23, 2021 requiring urgent transfusion.  Previously, her hemoglobin levels have been approximately 9 chronically.  She is currently set up for weekly CBC and possible transfusions.    Objective:  Vitals: B/P: Data Unavailable, T: Data Unavailable, P: Data Unavailable, R: Data Unavailable, Wt: 155 lbs 0 oz  Exam:   Gen: Appears well, no distress  HEENT: no scleral icterus or hemorrhage, no wet purpura, no lymphadenopathy  CV: regular, no murmurs  Pulm: clear  Abd: soft, nontender, no splenomegaly  Ext: no edema    Labs:  Results for BOB CRONIN (MRN [id number]) as of 8/5/2021 17:39   Ref. Range 7/23/2021 10:42 7/23/2021 19:35 7/30/2021 10:51 8/3/2021 10:04   Hemoglobin Latest Ref Range: 11.7 - 15.7 g/dL 4.1 (LL) 7.3 (L) 6.9 (LL) 6.7 (LL)     Results for BOB CRONIN (MRN [id number]) as of 8/5/2021 17:39   Ref. Range 5/2/2021 01:06 7/30/2021 10:52   Ferritin Latest Ref Range: 8 - 252 ng/mL 72 32     Imaging:  none      Video-Visit Details:    Type of service:  Video Visit  Video Start Time: 1212 PM  Video End Time (time video stopped): 1229 PM    Originating Location (pt. Location): Home    Distant Location (provider location):  [facility]     Mode of Communication:  Video Conference via Marco Vasco

## 2021-08-10 ENCOUNTER — PATIENT OUTREACH (OUTPATIENT)
Dept: GASTROENTEROLOGY | Facility: CLINIC | Age: 51
End: 2021-08-10

## 2021-08-10 ENCOUNTER — LAB (OUTPATIENT)
Dept: LAB | Facility: CLINIC | Age: 51
End: 2021-08-10
Payer: COMMERCIAL

## 2021-08-10 ENCOUNTER — COMMITTEE REVIEW (OUTPATIENT)
Dept: TRANSPLANT | Facility: CLINIC | Age: 51
End: 2021-08-10

## 2021-08-10 DIAGNOSIS — K70.31 ALCOHOLIC CIRRHOSIS OF LIVER WITH ASCITES (H): ICD-10-CM

## 2021-08-10 DIAGNOSIS — D64.9 ANEMIA, UNSPECIFIED TYPE: ICD-10-CM

## 2021-08-10 LAB
ABO/RH(D): NORMAL
ANTIBODY SCREEN: NEGATIVE
ERYTHROCYTE [DISTWIDTH] IN BLOOD BY AUTOMATED COUNT: 20.3 % (ref 10–15)
HCT VFR BLD AUTO: 25.1 % (ref 35–47)
HGB BLD-MCNC: 7.4 G/DL (ref 11.7–15.7)
MCH RBC QN AUTO: 26.7 PG (ref 26.5–33)
MCHC RBC AUTO-ENTMCNC: 29.5 G/DL (ref 31.5–36.5)
MCV RBC AUTO: 91 FL (ref 78–100)
PLATELET # BLD AUTO: 124 10E3/UL (ref 150–450)
RBC # BLD AUTO: 2.77 10E6/UL (ref 3.8–5.2)
SPECIMEN EXPIRATION DATE: NORMAL
WBC # BLD AUTO: 5.3 10E3/UL (ref 4–11)

## 2021-08-10 PROCEDURE — 82668 ASSAY OF ERYTHROPOIETIN: CPT | Mod: 90 | Performed by: PATHOLOGY

## 2021-08-10 PROCEDURE — 86850 RBC ANTIBODY SCREEN: CPT | Mod: 90 | Performed by: PATHOLOGY

## 2021-08-10 PROCEDURE — 36415 COLL VENOUS BLD VENIPUNCTURE: CPT | Performed by: PATHOLOGY

## 2021-08-10 PROCEDURE — 86900 BLOOD TYPING SEROLOGIC ABO: CPT | Mod: 90 | Performed by: PATHOLOGY

## 2021-08-10 PROCEDURE — 86901 BLOOD TYPING SEROLOGIC RH(D): CPT | Mod: 90 | Performed by: PATHOLOGY

## 2021-08-10 PROCEDURE — 85027 COMPLETE CBC AUTOMATED: CPT | Performed by: PATHOLOGY

## 2021-08-10 NOTE — COMMITTEE REVIEW
Abdominal Patient Discussion Note Transplant Coordinator: Kiki Sutherland  Transplant Surgeon:   Christian Morrison    Referring Physician: Rehan Escobar    Committee Review Members:  Nutrition Amanda Chávez, RD, LD   Pharmacist Ricki Aguilera, [facility]    - Clinical Sherice Arredondo, [facility]   Transplant Payal Sam RN, Leigh Beltran MD, Cyndee Salazar, APRN CNP, Kiki Sutherland RN, Susana Christina MD   Transplant Hepatology  Fadia Avila MD, Darrin Patterson MD   Transplant Surgery Keith Dominguez MD, Maureen Benavidez MD, Christian Morrison MD, Jaylan Lyon MD       Additional Discussion Notes and Findings:   Seen by heme  Make active

## 2021-08-10 NOTE — PROGRESS NOTES
Patient's hemoglobin today is 7.4, above threshold for transfusion. Patient states she is feeling fatigue, but denies shortness of breath or rapid heart rate. She is interested in receiving transfusion tomorrow, however Dr. Avila wants patient to follow the ordered parameters of 1 unit for <7. Patient is agreeable to this. Infusion center will try to move up the iron infusion, using tomorrow's appointment spot. She will plan for labs again in 1 week.

## 2021-08-11 LAB — EPO SERPL-ACNC: 143 MU/ML

## 2021-08-13 ENCOUNTER — PATIENT OUTREACH (OUTPATIENT)
Dept: GASTROENTEROLOGY | Facility: CLINIC | Age: 51
End: 2021-08-13

## 2021-08-13 ENCOUNTER — OFFICE VISIT (OUTPATIENT)
Dept: PEDIATRICS | Facility: CLINIC | Age: 51
End: 2021-08-13
Payer: COMMERCIAL

## 2021-08-13 VITALS
SYSTOLIC BLOOD PRESSURE: 120 MMHG | DIASTOLIC BLOOD PRESSURE: 58 MMHG | BODY MASS INDEX: 32.77 KG/M2 | OXYGEN SATURATION: 99 % | TEMPERATURE: 97.9 F | RESPIRATION RATE: 14 BRPM | WEIGHT: 203 LBS | HEART RATE: 118 BPM

## 2021-08-13 DIAGNOSIS — K70.31 ALCOHOLIC CIRRHOSIS OF LIVER WITH ASCITES (H): Primary | ICD-10-CM

## 2021-08-13 DIAGNOSIS — Z23 ENCOUNTER FOR ADMINISTRATION OF VACCINE: Primary | ICD-10-CM

## 2021-08-13 DIAGNOSIS — K64.4 EXTERNAL HEMORRHOIDS: ICD-10-CM

## 2021-08-13 DIAGNOSIS — K31.819 GAVE (GASTRIC ANTRAL VASCULAR ECTASIA): ICD-10-CM

## 2021-08-13 DIAGNOSIS — D50.0 IRON DEFICIENCY ANEMIA DUE TO CHRONIC BLOOD LOSS: Primary | ICD-10-CM

## 2021-08-13 DIAGNOSIS — D64.9 ANEMIA, UNSPECIFIED TYPE: Primary | ICD-10-CM

## 2021-08-13 DIAGNOSIS — K74.60 CIRRHOSIS OF LIVER (H): Primary | ICD-10-CM

## 2021-08-13 PROCEDURE — 90472 IMMUNIZATION ADMIN EACH ADD: CPT | Performed by: NURSE PRACTITIONER

## 2021-08-13 PROCEDURE — 90746 HEPB VACCINE 3 DOSE ADULT IM: CPT | Performed by: NURSE PRACTITIONER

## 2021-08-13 PROCEDURE — 99214 OFFICE O/P EST MOD 30 MIN: CPT | Mod: 25 | Performed by: NURSE PRACTITIONER

## 2021-08-13 PROCEDURE — 90732 PPSV23 VACC 2 YRS+ SUBQ/IM: CPT | Performed by: NURSE PRACTITIONER

## 2021-08-13 PROCEDURE — 90471 IMMUNIZATION ADMIN: CPT | Performed by: NURSE PRACTITIONER

## 2021-08-13 PROCEDURE — 90750 HZV VACC RECOMBINANT IM: CPT | Performed by: NURSE PRACTITIONER

## 2021-08-13 RX ORDER — ALBUTEROL SULFATE 90 UG/1
1-2 AEROSOL, METERED RESPIRATORY (INHALATION)
Status: CANCELLED
Start: 2021-08-16

## 2021-08-13 RX ORDER — HYDROCORTISONE ACETATE 25 MG/1
25 SUPPOSITORY RECTAL 2 TIMES DAILY
Qty: 24 SUPPOSITORY | Refills: 0 | Status: SHIPPED | OUTPATIENT
Start: 2021-08-13 | End: 2021-08-30

## 2021-08-13 RX ORDER — HEPARIN SODIUM (PORCINE) LOCK FLUSH IV SOLN 100 UNIT/ML 100 UNIT/ML
5 SOLUTION INTRAVENOUS
Status: CANCELLED | OUTPATIENT
Start: 2021-08-16

## 2021-08-13 RX ORDER — ALBUTEROL SULFATE 0.83 MG/ML
2.5 SOLUTION RESPIRATORY (INHALATION)
Status: CANCELLED | OUTPATIENT
Start: 2021-08-13

## 2021-08-13 RX ORDER — LIDOCAINE HYDROCHLORIDE 10 MG/ML
20 INJECTION, SOLUTION EPIDURAL; INFILTRATION; INTRACAUDAL; PERINEURAL ONCE
Status: CANCELLED | OUTPATIENT
Start: 2021-08-13 | End: 2021-08-13

## 2021-08-13 RX ORDER — ALBUMIN (HUMAN) 12.5 G/50ML
12.5 SOLUTION INTRAVENOUS
Status: CANCELLED | OUTPATIENT
Start: 2021-08-13

## 2021-08-13 RX ORDER — METHYLPREDNISOLONE SODIUM SUCCINATE 125 MG/2ML
125 INJECTION, POWDER, LYOPHILIZED, FOR SOLUTION INTRAMUSCULAR; INTRAVENOUS
Status: CANCELLED
Start: 2021-08-13

## 2021-08-13 RX ORDER — DIPHENHYDRAMINE HYDROCHLORIDE 50 MG/ML
50 INJECTION INTRAMUSCULAR; INTRAVENOUS
Status: CANCELLED
Start: 2021-08-13

## 2021-08-13 RX ORDER — EPINEPHRINE 1 MG/ML
0.3 INJECTION, SOLUTION, CONCENTRATE INTRAVENOUS EVERY 5 MIN PRN
Status: CANCELLED | OUTPATIENT
Start: 2021-08-13

## 2021-08-13 RX ORDER — MEPERIDINE HYDROCHLORIDE 25 MG/ML
25 INJECTION INTRAMUSCULAR; INTRAVENOUS; SUBCUTANEOUS EVERY 30 MIN PRN
Status: CANCELLED | OUTPATIENT
Start: 2021-08-16

## 2021-08-13 RX ORDER — ALBUTEROL SULFATE 90 UG/1
1-2 AEROSOL, METERED RESPIRATORY (INHALATION)
Status: CANCELLED
Start: 2021-08-13

## 2021-08-13 RX ORDER — NALOXONE HYDROCHLORIDE 0.4 MG/ML
0.2 INJECTION, SOLUTION INTRAMUSCULAR; INTRAVENOUS; SUBCUTANEOUS
Status: CANCELLED | OUTPATIENT
Start: 2021-08-16

## 2021-08-13 RX ORDER — NALOXONE HYDROCHLORIDE 0.4 MG/ML
0.2 INJECTION, SOLUTION INTRAMUSCULAR; INTRAVENOUS; SUBCUTANEOUS
Status: CANCELLED | OUTPATIENT
Start: 2021-08-13

## 2021-08-13 RX ORDER — ALBUTEROL SULFATE 0.83 MG/ML
2.5 SOLUTION RESPIRATORY (INHALATION)
Status: CANCELLED | OUTPATIENT
Start: 2021-08-16

## 2021-08-13 RX ORDER — HYDROCORTISONE 25 MG/G
CREAM TOPICAL 2 TIMES DAILY PRN
Qty: 30 G | Refills: 1 | Status: ON HOLD | OUTPATIENT
Start: 2021-08-13 | End: 2021-10-29

## 2021-08-13 RX ORDER — HEPARIN SODIUM (PORCINE) LOCK FLUSH IV SOLN 100 UNIT/ML 100 UNIT/ML
5 SOLUTION INTRAVENOUS
Status: CANCELLED | OUTPATIENT
Start: 2021-08-13

## 2021-08-13 RX ORDER — DIPHENHYDRAMINE HYDROCHLORIDE 50 MG/ML
50 INJECTION INTRAMUSCULAR; INTRAVENOUS
Status: CANCELLED
Start: 2021-08-16

## 2021-08-13 RX ORDER — HEPARIN SODIUM,PORCINE 10 UNIT/ML
5 VIAL (ML) INTRAVENOUS
Status: CANCELLED | OUTPATIENT
Start: 2021-08-13

## 2021-08-13 RX ORDER — EPINEPHRINE 1 MG/ML
0.3 INJECTION, SOLUTION, CONCENTRATE INTRAVENOUS EVERY 5 MIN PRN
Status: CANCELLED | OUTPATIENT
Start: 2021-08-16

## 2021-08-13 RX ORDER — MEPERIDINE HYDROCHLORIDE 25 MG/ML
25 INJECTION INTRAMUSCULAR; INTRAVENOUS; SUBCUTANEOUS EVERY 30 MIN PRN
Status: CANCELLED | OUTPATIENT
Start: 2021-08-13

## 2021-08-13 RX ORDER — HEPARIN SODIUM,PORCINE 10 UNIT/ML
5 VIAL (ML) INTRAVENOUS
Status: CANCELLED | OUTPATIENT
Start: 2021-08-16

## 2021-08-13 NOTE — PROGRESS NOTES
RN received a phone call from speciality infusion finance team regarding Maria Elena's injectafer appointment. Per their team, Maria Elena's insurance will not cover injectafer but will cover venofer. RN called and spoke with Dr. Nava and Hammad Vicente PA-C who okay'd the switch. New orders were placed. Rn then connected Maria Elena and the [facility] infusion center to get scheduling completed.     Aura Pool, MSN, RN, PHN - Nurse Clinician - [facility] - [phone]

## 2021-08-13 NOTE — PROGRESS NOTES
Assessment & Plan     Encounter for administration of vaccine  Immunizations updated. Due for Hep B #2 in 4 weeks and Shingrix #2 in 2-6 months.   - Pneumococcal vaccine 23 valent PPSV23  (Pneumovax) [64580]  - HEPATITIS B VACCINE, ADULT, IM  - ZOSTER VACCINE RECOMBINANT ADJUVANTED IM NJX    External hemorrhoids  History of hemorrhoids, worsening symptoms over the past 2 weeks. Denies constipation. Some blood with wiping and sometimes notices blood in the toilet bowl as well. Using OTC medications and sitz baths with some relief. Discussed lifestyle interventions as outlined in AVS. Colonoscopy completed 12/4/2020 due to hx LATOYA 2/2 chronic blood loss, polypectomy x 1, negative for malignancy. It should be noted family history is significant for colon CA in her father (age 58). Recommend continuing conservative measures for now, will add hydrocortisone suppositories. Counseled on reasons to return to clinic.    - hydrocortisone (ANUSOL-HC) 25 MG suppository; Place 1 suppository (25 mg) rectally 2 times daily  - hydrocortisone, Perianal, (HYDROCORTISONE) 2.5 % cream; Place rectally 2 times daily as needed for hemorrhoids      31 minutes spent on the date of the encounter doing chart review, review of test results, patient visit and documentation        FUTURE APPOINTMENTS:       - Make appointment with nurse in 4 weeks for vaccine administration.    Return in about 4 weeks (around 9/10/2021) for Nurse Visit.    ROBYN Baer CNP  [facility] ALPHONSE Arredondo is a 51 year old who presents for the following health issues:    HPI     Hemorrhoids  Onset/Duration: history of hemorrhoids, progressively worsening over the past 2 weeks   Description:   Massiel-anal lump: YES  Pain: YES, worse with stool.  Itching: YES  Accompanying Signs & Symptoms:  Blood in stool: YES pt states she talked to her hematologist and he wasn't concerned  Changes in stool pattern: no. Denies constipation.   History:    Any previous GI studies done:Colonoscopy up to date, and CT scan of the abdomen  Family History of colon cancer: history of colon cancer in father at age 58  Precipitating factors: None  Alleviating factors: None  Therapies tried and outcome: preparation H/anusol and soaking with epsom salt    Sometimes notes blood on the toilet paper, sometimes notes blood in the toilet bowl. Some degree of blood with every BM. Mentioned this to hematologist who she reports wasn't overly concerned.     Patient would also like to update immunizations.     Past Medical History:   Diagnosis Date     Ascites      History of blood transfusion      Liver cirrhosis (H)      Thyroid disease      Current Outpatient Medications   Medication     hydrocortisone (ANUSOL-HC) 25 MG suppository     hydrocortisone, Perianal, (HYDROCORTISONE) 2.5 % cream     ciprofloxacin (CIPRO) 250 MG tablet     folic acid (FOLVITE) 1 MG tablet     furosemide (LASIX) 20 MG tablet     levothyroxine (SYNTHROID/LEVOTHROID) 50 MCG tablet     midodrine (PROAMATINE) 5 MG tablet     multivitamin w/minerals (THERA-VIT-M) tablet     pantoprazole (PROTONIX) 20 MG EC tablet     potassium chloride ER (K-TAB/KLOR-CON) 10 MEQ CR tablet     potassium chloride ER (KLOR-CON M) 10 MEQ CR tablet     rifaximin (XIFAXAN) 550 MG TABS tablet     spironolactone (ALDACTONE) 25 MG tablet     traZODone (DESYREL) 50 MG tablet     vitamin D2 (ERGOCALCIFEROL) 45506 units (1250 mcg) capsule     zinc sulfate (ZINCATE) 220 (50 Zn) MG capsule     No current facility-administered medications for this visit.        Allergies   Allergen Reactions     Amoxicillin GI Disturbance, Diarrhea, Nausea and Nausea and Vomiting         Review of Systems    ROS: 10 point ROS neg other than the symptoms noted above in the HPI.        Objective    /58   Pulse 118   Temp 97.9  F (36.6  C) (Tympanic)   Resp 14   Wt 92.1 kg (203 lb)   SpO2 99%   BMI 32.77 kg/m    Body mass index is 32.77  kg/m .  Physical Exam  Constitutional:       General: She is not in acute distress.     Appearance: Normal appearance. She is not ill-appearing or toxic-appearing.   Cardiovascular:      Rate and Rhythm: Tachycardia present.   Pulmonary:      Effort: Pulmonary effort is normal. No respiratory distress.   Skin:     General: Skin is warm and dry.   Neurological:      General: No focal deficit present.      Mental Status: She is alert and oriented to person, place, and time.   Psychiatric:         Mood and Affect: Mood normal.         Behavior: Behavior normal.

## 2021-08-13 NOTE — PATIENT INSTRUCTIONS
Patient Education     Hemorrhoids    Hemorrhoids are swollen and inflamed veins inside the rectum and near the anus. The rectum is the last several inches of the colon. The anus is the passage between the rectum and the outside of the body.   Causes  The veins can become swollen due to increased pressure in them. This is most often caused by:     Chronic constipation or diarrhea    Straining when having a bowel movement    Sitting too long on the toilet    A low-fiber diet    Pregnancy  Symptoms    Bleeding from the rectum. You may notice this after bowel movements.    Lump near the anus    Itching around the anus    Pain around the anus    Mucus leaks from the anus  There are different types of hemorrhoids. Depending on the type you have and the severity, you may be able to treat yourself at home. In some cases, a procedure may be the best treatment option. Your healthcare provider can tell you more about this, if needed.   Home care  General care    To get relief from pain or itching, try:  ? Medicines. Your healthcare provider may recommend stool softeners, suppositories, or laxatives to help manage constipation. Use these exactly as directed.  ? Sitz baths. A sitz bath involves sitting in a few inches of warm bath water. Be careful not to make the water so hot that you burn yourself--test it before sitting in it. Soak for about 10 to 15 minutes a few times a day. This may help relieve pain.  ? Topical products. Your healthcare provider may prescribe or recommend creams, ointments, or pads that can be applied to the hemorrhoid. Use these exactly as directed.  Tips to help prevent hemorrhoids     Eat more fiber. Fiber adds bulk to stool and absorbs water as it moves through your colon. This makes stool softer and easier to pass.  ? Increase the fiber in your diet with more fiber-rich foods. These include fresh fruit, vegetables, and whole grains.  ? Take a fiber supplement or bulking agent, if advised by your  healthcare provider. These include products such as psyllium or methylcellulose.    Drink more water. Your healthcare provider may direct you to drink plenty of water. This can help keep stool soft.    Be more active. Frequent exercise aids digestion and helps prevent constipation. It may also help make bowel movements more regular.    Don t strain during bowel movements. This can make hemorrhoids more likely. Also, don t sit on the toilet for long periods of time.    Follow-up care  Follow up with your healthcare provider as advised. If a culture or imaging tests were done, someone will let you know the results when they are ready. This may take a few days or longer. If your healthcare provider recommends a procedure for your hemorrhoids, these options can be discussed. Options may include surgery and outpatient office treatments.   When to seek medical advice  Call your healthcare provider right away if any of these occur:     Increased bleeding from the rectum    Increased pain around the rectum or anus    Weakness or dizziness  Call 911  Call 911 if any of these occur:     Trouble breathing or swallowing    Fainting or loss of consciousness    Unusually fast heart rate    Vomiting blood    Large amounts of blood in stool or black, tarry stools  [employer] last reviewed this educational content on 8/1/2019 2000-2021 [employer]. All rights reserved. This information is not intended as a substitute for professional medical care. Always follow your healthcare professional's instructions.         3-dose series Engerix-B or Recombivax HB at 0, 1, 6 months [minimum intervals: dose 1 to dose 2: 4 weeks / dose 2 to dose 3: 8 weeks / dose 1 to dose 3: 16 weeks])

## 2021-08-17 ENCOUNTER — HOSPITAL ENCOUNTER (OUTPATIENT)
Facility: CLINIC | Age: 51
End: 2021-08-17
Payer: COMMERCIAL

## 2021-08-17 ENCOUNTER — TELEPHONE (OUTPATIENT)
Dept: GASTROENTEROLOGY | Facility: CLINIC | Age: 51
End: 2021-08-17

## 2021-08-17 ENCOUNTER — LAB (OUTPATIENT)
Dept: LAB | Facility: CLINIC | Age: 51
End: 2021-08-17
Payer: COMMERCIAL

## 2021-08-17 ENCOUNTER — INFUSION THERAPY VISIT (OUTPATIENT)
Dept: INFUSION THERAPY | Facility: CLINIC | Age: 51
End: 2021-08-17
Attending: INTERNAL MEDICINE
Payer: COMMERCIAL

## 2021-08-17 VITALS
SYSTOLIC BLOOD PRESSURE: 121 MMHG | OXYGEN SATURATION: 99 % | TEMPERATURE: 98 F | HEART RATE: 113 BPM | DIASTOLIC BLOOD PRESSURE: 68 MMHG

## 2021-08-17 DIAGNOSIS — D50.0 IRON DEFICIENCY ANEMIA DUE TO CHRONIC BLOOD LOSS: Primary | ICD-10-CM

## 2021-08-17 DIAGNOSIS — Z11.59 ENCOUNTER FOR SCREENING FOR OTHER VIRAL DISEASES: ICD-10-CM

## 2021-08-17 DIAGNOSIS — Z11.59 ENCOUNTER FOR SCREENING FOR OTHER VIRAL DISEASES: Primary | ICD-10-CM

## 2021-08-17 DIAGNOSIS — K74.60 CIRRHOSIS OF LIVER (H): ICD-10-CM

## 2021-08-17 DIAGNOSIS — K70.31 ALCOHOLIC CIRRHOSIS OF LIVER WITH ASCITES (H): ICD-10-CM

## 2021-08-17 DIAGNOSIS — D64.9 ANEMIA, UNSPECIFIED TYPE: ICD-10-CM

## 2021-08-17 LAB
ABO/RH(D): NORMAL
ALBUMIN SERPL-MCNC: 3 G/DL (ref 3.4–5)
ANTIBODY SCREEN: NEGATIVE
BILIRUB SERPL-MCNC: 4.5 MG/DL (ref 0.2–1.3)
BLD PROD TYP BPU: NORMAL
BLOOD COMPONENT TYPE: NORMAL
CODING SYSTEM: NORMAL
CREAT SERPL-MCNC: 1.04 MG/DL (ref 0.52–1.04)
CROSSMATCH: NORMAL
ERYTHROCYTE [DISTWIDTH] IN BLOOD BY AUTOMATED COUNT: 20.8 % (ref 10–15)
GFR SERPL CREATININE-BSD FRML MDRD: 62 ML/MIN/1.73M2
HCT VFR BLD AUTO: 23.2 % (ref 35–47)
HGB BLD-MCNC: 6.8 G/DL (ref 11.7–15.7)
INR PPP: 1.82 (ref 0.85–1.15)
ISSUE DATE AND TIME: NORMAL
MCH RBC QN AUTO: 26.6 PG (ref 26.5–33)
MCHC RBC AUTO-ENTMCNC: 29.3 G/DL (ref 31.5–36.5)
MCV RBC AUTO: 91 FL (ref 78–100)
PLATELET # BLD AUTO: 159 10E3/UL (ref 150–450)
RBC # BLD AUTO: 2.56 10E6/UL (ref 3.8–5.2)
SODIUM SERPL-SCNC: 140 MMOL/L (ref 133–144)
SPECIMEN EXPIRATION DATE: NORMAL
UNIT ABO/RH: NORMAL
UNIT NUMBER: NORMAL
UNIT STATUS: NORMAL
UNIT TYPE ISBT: 7300
WBC # BLD AUTO: 7.2 10E3/UL (ref 4–11)

## 2021-08-17 PROCEDURE — 86900 BLOOD TYPING SEROLOGIC ABO: CPT | Mod: 90 | Performed by: PATHOLOGY

## 2021-08-17 PROCEDURE — 96365 THER/PROPH/DIAG IV INF INIT: CPT

## 2021-08-17 PROCEDURE — 82040 ASSAY OF SERUM ALBUMIN: CPT | Performed by: PATHOLOGY

## 2021-08-17 PROCEDURE — 82247 BILIRUBIN TOTAL: CPT | Performed by: PATHOLOGY

## 2021-08-17 PROCEDURE — 85610 PROTHROMBIN TIME: CPT | Performed by: PATHOLOGY

## 2021-08-17 PROCEDURE — 82565 ASSAY OF CREATININE: CPT | Performed by: PATHOLOGY

## 2021-08-17 PROCEDURE — 86901 BLOOD TYPING SEROLOGIC RH(D): CPT | Mod: 90 | Performed by: PATHOLOGY

## 2021-08-17 PROCEDURE — 96366 THER/PROPH/DIAG IV INF ADDON: CPT

## 2021-08-17 PROCEDURE — 36415 COLL VENOUS BLD VENIPUNCTURE: CPT | Performed by: PATHOLOGY

## 2021-08-17 PROCEDURE — 84295 ASSAY OF SERUM SODIUM: CPT | Performed by: PATHOLOGY

## 2021-08-17 PROCEDURE — U0005 INFEC AGEN DETEC AMPLI PROBE: HCPCS | Mod: 90 | Performed by: PATHOLOGY

## 2021-08-17 PROCEDURE — 258N000003 HC RX IP 258 OP 636: Performed by: PHYSICIAN ASSISTANT

## 2021-08-17 PROCEDURE — U0003 INFECTIOUS AGENT DETECTION BY NUCLEIC ACID (DNA OR RNA); SEVERE ACUTE RESPIRATORY SYNDROME CORONAVIRUS 2 (SARS-COV-2) (CORONAVIRUS DISEASE [COVID-19]), AMPLIFIED PROBE TECHNIQUE, MAKING USE OF HIGH THROUGHPUT TECHNOLOGIES AS DESCRIBED BY CMS-2020-01-R: HCPCS | Mod: 90 | Performed by: PATHOLOGY

## 2021-08-17 PROCEDURE — 86850 RBC ANTIBODY SCREEN: CPT | Mod: 90 | Performed by: PATHOLOGY

## 2021-08-17 PROCEDURE — 85027 COMPLETE CBC AUTOMATED: CPT | Performed by: PATHOLOGY

## 2021-08-17 PROCEDURE — 250N000011 HC RX IP 250 OP 636: Performed by: PHYSICIAN ASSISTANT

## 2021-08-17 RX ORDER — NALOXONE HYDROCHLORIDE 0.4 MG/ML
0.2 INJECTION, SOLUTION INTRAMUSCULAR; INTRAVENOUS; SUBCUTANEOUS
Status: CANCELLED | OUTPATIENT
Start: 2021-08-19

## 2021-08-17 RX ORDER — HEPARIN SODIUM (PORCINE) LOCK FLUSH IV SOLN 100 UNIT/ML 100 UNIT/ML
5 SOLUTION INTRAVENOUS
Status: CANCELLED | OUTPATIENT
Start: 2021-08-19

## 2021-08-17 RX ORDER — ALBUTEROL SULFATE 90 UG/1
1-2 AEROSOL, METERED RESPIRATORY (INHALATION)
Status: CANCELLED
Start: 2021-08-19

## 2021-08-17 RX ORDER — METHYLPREDNISOLONE SODIUM SUCCINATE 125 MG/2ML
125 INJECTION, POWDER, LYOPHILIZED, FOR SOLUTION INTRAMUSCULAR; INTRAVENOUS
Status: CANCELLED
Start: 2021-08-19

## 2021-08-17 RX ORDER — ALBUTEROL SULFATE 0.83 MG/ML
2.5 SOLUTION RESPIRATORY (INHALATION)
Status: CANCELLED | OUTPATIENT
Start: 2021-08-19

## 2021-08-17 RX ORDER — EPINEPHRINE 1 MG/ML
0.3 INJECTION, SOLUTION, CONCENTRATE INTRAVENOUS EVERY 5 MIN PRN
Status: CANCELLED | OUTPATIENT
Start: 2021-08-19

## 2021-08-17 RX ORDER — HEPARIN SODIUM,PORCINE 10 UNIT/ML
5 VIAL (ML) INTRAVENOUS
Status: CANCELLED | OUTPATIENT
Start: 2021-08-19

## 2021-08-17 RX ORDER — MEPERIDINE HYDROCHLORIDE 25 MG/ML
25 INJECTION INTRAMUSCULAR; INTRAVENOUS; SUBCUTANEOUS EVERY 30 MIN PRN
Status: CANCELLED | OUTPATIENT
Start: 2021-08-19

## 2021-08-17 RX ORDER — DIPHENHYDRAMINE HYDROCHLORIDE 50 MG/ML
50 INJECTION INTRAMUSCULAR; INTRAVENOUS
Status: CANCELLED
Start: 2021-08-19

## 2021-08-17 RX ADMIN — IRON SUCROSE 300 MG: 20 INJECTION, SOLUTION INTRAVENOUS at 13:28

## 2021-08-17 ASSESSMENT — PAIN SCALES - GENERAL: PAINLEVEL: NO PAIN (0)

## 2021-08-17 NOTE — TELEPHONE ENCOUNTER
Screening Questions  1. Are you active on mychart? y    2. What insurance is in the chart? BCBS    2.  Ordering/Referring Provider: Fadia Avila MD    3. BMI 28.2    4. Are you on daily home oxygen? n    5. Do you have a history of difficult airway? n    6. Have you had a heart, lung, or liver transplant? n    7. Are you currently on dialysis? n    8. Have you had a stroke or Transient ischemic atttack (TIA) within 6 months? n    9. In the past 6 months, have you had any heart related issues including cardiomyopathy or heart attack?         If yes, did it require cardiac stenting or other implantable device?n    10. Do you have any implantable devices in your body (pacemaker, defib, LVAD)? n    11. Do you take nitroglycerin? If yes, how often? n    12. Are you currently taking any blood thinners?n    13. Are you a diabetic? n    14. (Females) Are you currently pregnant? n  If yes, how many weeks?    15. Have you had a procedure in the past that was difficult to tolerate with conscious sedation? Any allergies to Fentanyl or Versed n    16. Are you taking any scheduled prescription narcotics more than once daily? n    17. Do you have any chemical dependencies such as alcohol, street drugs, or methadone? n    18. Do you have any history of post-traumatic stress syndrome or mental health issues? n    19. Do you transfer independently? y    20.  Do you have any issues with constipation? n    21. Preferred Pharmacy for Pre Prescription     Scheduling Details    Which Colonoscopy Prep was Sent?:   Procedure Scheduled: EGD  Provider/Surgeon: MARCO ANTONIO  Date of Procedure: 8/23  Location: [facility]  Caller (Please ask for phone number if not scheduled by patient): BOB      Sedation Type: MAC  Conscious Sedation- Needs  for 6 hours after the procedure  MAC/General-Needs  for 24 hours after procedure    Pre-op Required at [facility] and OR for MAC sedation:   (if yes advise patient they will need a  pre-op prior to procedure)      Is patient on blood thinners? -n (If yes- inform patient to follow up with PCP or provider for follow up instructions)     Informed patient they will need an adult  y  Cannot take any type of public or medical transportation alone    Informed Patient of COVID Test Requirement y    Confirmed Nurse will call to complete assessment y    Additional comments:       ----- Message from Fadia Avila MD sent at 8/17/2021 12:19 PM CDT -----  Hey -     I ordered an EGD for this patient to treat potential GAVE, can we get it scheduled next week with any provider?    Thanks    Natalie TAYLOR

## 2021-08-17 NOTE — PROGRESS NOTES
Infusion Nursing Note:  Sarah L Scheid presents today for Venofer.    Patient seen by provider today: No   present during visit today: Not Applicable.    Note: First dose of Venofer infused over 90 mins with 30 min obs.    Intravenous Access:  Peripheral IV placed.    Treatment Conditions:  Not Applicable.      Post Infusion Assessment:  Patient tolerated infusion without incident.  Patient observed for 30 minutes post Venofer per protocol.  Site patent and intact, free from redness, edema or discomfort.  No evidence of extravasations.  Access discontinued per protocol.       Discharge Plan:   AVS to patient via MYCHART.  Patient will return tomorrow 8/18 for next appointment.   Patient discharged in stable condition accompanied by: self.  Departure Mode: Ambulatory.      Zoraida Mathews RN

## 2021-08-17 NOTE — PATIENT INSTRUCTIONS
Patient Education     Venofer Injection 20 mg/mL  Uses  For anemia.  Instructions  This is an IV medicine. It is given through a sterile tube directly into the vein by a healthcare provider.  This medicine is given gradually through the IV line.  Read and make sure you understand the instructions for measuring your dose and using the syringe before using this medicine.  Always inspect the medicine before using.  The liquid should be clear and dark brown.  Do not use the medicine if it contains any particles or if it has changed color.  Keep this medicine at room temperature.  Protect medicine from light.  Speak with your nurse or pharmacist about how long the medicine can be stored safely at room temperature or in the refrigerator before it needs to be discarded.  Never use any medicine that has .  Discard any remaining medicine after your dose is given.  If you miss a dose, contact your doctor for instructions.  Please tell your doctor and pharmacist about all the medicines you take. Include both prescription and over-the-counter medicines. Also tell them about any vitamins, herbal medicines, or anything else you take for your health.  It is very important that you follow your doctor's instructions for all blood tests.  Cautions  Tell your doctor and pharmacist if you ever had an allergic reaction to a medicine. Symptoms of an allergic reaction can include trouble breathing, skin rash, itching, swelling, or severe dizziness.  Do not use the medication any more than instructed.  This medicine may cause dizziness or fainting, especially after exercising or in hot weather. Be very careful when standing or sitting up quickly.  Tell the doctor or pharmacist if you are pregnant, planning to be pregnant, or breastfeeding.  Ask your pharmacist if this medicine can interact with any of your other medicines. Be sure to tell them about all the medicines you take.  Please tell all your doctors and dentists that you  are on this medicine before they provide care.  Do not start or stop any other medicines without first speaking to your doctor or pharmacist.  Used needles and syringes should be thrown away properly in a medical waste container. Ask your doctor or pharmacist if you need help.  Do not share this medicine with anyone who has not been prescribed this medicine.  Side Effects  The following is a list of some common side effects from this medicine. Please speak with your doctor about what you should do if you experience these or other side effects.    constipation    diarrhea    dizziness    swelling of the legs, feet, and hands    muscle cramps    nausea    changes in taste or unpleasant taste    vomiting  Call your doctor or get medical help right away if you notice any of these more serious side effects:    chest pain    fainting    severe or persistent headache    fast or irregular heart beats    severe stomach or bowel pain    blurring or changes of vision  A few people may have an allergic reactions to this medicine. Symptoms can include difficulty breathing, skin rash, itching, swelling, or severe dizziness. If you notice any of these symptoms, seek medical help quickly.  Extra  Please speak with your doctor, nurse, or pharmacist if you have any questions about this medicine.  [email or web address]  IMPORTANT NOTE: This document tells you briefly how to take your medicine, but it does not tell you all there is to know about it.Your doctor or pharmacist may give you other documents about your medicine. Please talk to them if you have any questions.Always follow their advice. There is a more complete description of this medicine available in English.Scan this code on your smartphone or tablet or use the web address below. You can also ask your pharmacist for a printout. If you have any questions, please ask your pharmacist.     2021 [employer].

## 2021-08-17 NOTE — PROGRESS NOTES
Patient unable to get in to paracentesis at [facility] so requesting orders placed that can be used at [facility]. Patient also reports persistent lower extremity edema. She states a small cut she got on her calf leaks clear fluid. She notes this swelling from her knees down to feet. She is taking furosemide 40 mg daily and spironolactone 100 mg daily.

## 2021-08-17 NOTE — TELEPHONE ENCOUNTER
Hey -    I sent you a note that her Hgb if 6.8 - looks like she is getting iron today and blood tomorrow?    Can we increase he diuretics to lasix 60/150 and then I can see her next Tuesday at 8:30? (any second 30 minute slot of a new patient visit would work)

## 2021-08-18 ENCOUNTER — TELEPHONE (OUTPATIENT)
Dept: GASTROENTEROLOGY | Facility: CLINIC | Age: 51
End: 2021-08-18

## 2021-08-18 ENCOUNTER — DOCUMENTATION ONLY (OUTPATIENT)
Dept: TRANSPLANT | Facility: CLINIC | Age: 51
End: 2021-08-18

## 2021-08-18 ENCOUNTER — INFUSION THERAPY VISIT (OUTPATIENT)
Dept: INFUSION THERAPY | Facility: CLINIC | Age: 51
End: 2021-08-18
Attending: STUDENT IN AN ORGANIZED HEALTH CARE EDUCATION/TRAINING PROGRAM
Payer: COMMERCIAL

## 2021-08-18 ENCOUNTER — TELEPHONE (OUTPATIENT)
Dept: TRANSPLANT | Facility: CLINIC | Age: 51
End: 2021-08-18

## 2021-08-18 VITALS
SYSTOLIC BLOOD PRESSURE: 138 MMHG | TEMPERATURE: 98.1 F | OXYGEN SATURATION: 97 % | DIASTOLIC BLOOD PRESSURE: 74 MMHG | HEART RATE: 114 BPM

## 2021-08-18 DIAGNOSIS — K70.31 ALCOHOLIC CIRRHOSIS OF LIVER WITH ASCITES (H): Primary | ICD-10-CM

## 2021-08-18 DIAGNOSIS — D50.0 BLOOD LOSS ANEMIA: ICD-10-CM

## 2021-08-18 DIAGNOSIS — D62 ANEMIA ASSOCIATED WITH ACUTE BLOOD LOSS: ICD-10-CM

## 2021-08-18 LAB — SARS-COV-2 RNA RESP QL NAA+PROBE: POSITIVE

## 2021-08-18 PROCEDURE — 86923 COMPATIBILITY TEST ELECTRIC: CPT | Performed by: STUDENT IN AN ORGANIZED HEALTH CARE EDUCATION/TRAINING PROGRAM

## 2021-08-18 PROCEDURE — 36430 TRANSFUSION BLD/BLD COMPNT: CPT

## 2021-08-18 PROCEDURE — P9016 RBC LEUKOCYTES REDUCED: HCPCS | Performed by: STUDENT IN AN ORGANIZED HEALTH CARE EDUCATION/TRAINING PROGRAM

## 2021-08-18 ASSESSMENT — PAIN SCALES - GENERAL: PAINLEVEL: NO PAIN (0)

## 2021-08-18 NOTE — TELEPHONE ENCOUNTER
"-Coronavirus (COVID-19) Notification    Caller Name (Patient, parent, daughter/son, grandparent, etc)  Patient    Reason for call  Notify of Positive Coronavirus (COVID-19) lab results, assess symptoms,  review  [facility] recommendations    Lab Result    Lab test:  2019-nCoV rRt-PCR or SARS-CoV-2 PCR    Oropharyngeal AND/OR nasopharyngeal swabs is POSITIVE for 2019-nCoV RNA/SARS-COV-2 PCR (COVID-19 virus)    RN Recommendations/Instructions per [facility] Coronavirus COVID-19 recommendations    Brief introduction script  Introduce self then review script:  \"I am calling on behalf of [employer].  We were notified that your Coronavirus test (COVID-19) for was POSITIVE for the virus.  I have some information to relay to you but first I wanted to mention that the [place] Dept of Health will be contacting you shortly [it's possible MD already called Patient] to talk to you more about how you are feeling and other people you have had contact with who might now also have the virus.  Also,  [employer] is Partnering with the [facility] for Covid-19 research, you may be contacted directly by research staff.\"    Assessment (Inquire about Patient's current symptoms)   Assessment   Current Symptoms at time of phone call: (if no symptoms, document No symptoms] None   Symptoms onset (if applicable) NA     If at time of call, Patients symptoms hare worsened, the Patient should contact 911 or have someone drive them to Emergency Dept promptly:      If Patient calling 911, inform 911 personal that you have tested positive for the Coronavirus (COVID-19).  Place mask on and await 911 to arrive.    If Emergency Dept, If possible, please have another adult drive you to the Emergency Dept but you need to wear mask when in contact with other people.      Monoclonal Antibody Administration    You may be eligible to receive a new treatment with a monoclonal antibody for preventing hospitalization in patients at " "high risk for complications from COVID-19.   This medication is still experimental and available on a limited basis; it is given through an IV and must be given at an infusion center. Please note that not all people who are eligible will receive the medication since it is in limited supply.     Are you interested in being considered for this medication?  No.   Does the patient fit the criteria: No    If patient qualifies based on above criteria:  \"You will be contacted if you are selected to receive this treatment in the next 1-2 business days.   This is time sensitive and if you are not selected in the next 1-2 business days, you will not receive the medication.  If you do not receive a call to schedule, you have not been selected.\"      Review information with Patient    Your result was positive. This means you have COVID-19 (coronavirus).  We have sent you a letter that reviews the information that I'll be reviewing with you now.    How can I protect others?    If you have symptoms: stay home and away from others (self-isolate) until:    You've had no fever--and no medicine that reduces fever--for 1 full day (24 hours). And       Your other symptoms have gotten better. For example, your cough or breathing has improved. And     At least 10 days have passed since your symptoms started. (If you've been told by a doctor that you have a weak immune system, wait 20 days.)     If you don't have symptoms: Stay home and away from others (self-isolate) until at least 10 days have passed since your first positive COVID-19 test. (Date test collected)    During this time:    Stay in your own room, including for meals. Use your own bathroom if you can.    Stay away from others in your home. No hugging, kissing or shaking hands. No visitors.     Don't go to work, school or anywhere else.     Clean  high touch  surfaces often (doorknobs, counters, handles, etc.). Use a household cleaning spray or wipes. You'll find a full list " on the EPA website at www.epa.gov/pesticide-registration/list-n-disinfectants-use-against-sars-cov-2.     Cover your mouth and nose with a mask, tissue or other face covering to avoid spreading germs.    Wash your hands and face often with soap and water.    Make a list of people you have been in close contact with recently, even if either of you wore a face covering.   ; Start your list from 2 days before you became ill or had a positive test.  ; Include anyone that was within 6 feet of you for a cumulative total of 15 minutes or more in 24 hours. (Example: if you sat next to Christos for 5 minutes in the morning and 10 minutes in the afternoon, then you were in close contact for 15 minutes total that day. Christos would be added to your list.)    A public health worker will call or text you. It is important that you answer. They will ask you questions about possible exposures to COVID-19, such as people you have been in direct contact with and places you have visited.    Tell the people on your list that you have COVID-19; they should stay away from others for 14 days starting from the last time they were in contact with you (unless you are told something different from a public health worker).     Caregivers in these groups are at risk for severe illness due to COVID-19:  o People 65 years and older  o People who live in a nursing home or long-term care facility  o People with chronic disease (lung, heart, cancer, diabetes, kidney, liver, immunologic)  o People who have a weakened immune system, including those who:  - Are in cancer treatment  - Take medicine that weakens the immune system, such as corticosteroids  - Had a bone marrow or organ transplant  - Have an immune deficiency  - Have poorly controlled HIV or AIDS  - Are obese (body mass index of 40 or higher)  - Smoke regularly    Caregivers should wear gloves while washing dishes, handling laundry and cleaning bedrooms and bathrooms.    Wash and dry laundry with  special caution. Don't shake dirty laundry, and use the warmest water setting you can.    If you have a weakened immune system, ask your doctor about other actions you should take.    For more tips, go to www.cdc.gov/coronavirus/2019-ncov/downloads/10Things.pdf.    You should not go back to work until you meet the guidelines above for ending your home isolation. You don't need to be retested for COVID-19 before going back to work--studies show that you won't spread the virus if it's been at least 10 days since your symptoms started (or 20 days, if you have a weak immune system).    Employers: This document serves as formal notice of your employee's medical guidelines for going back to work. They must meet the above guidelines before going back to work in person.    How can I take care of myself?    1. Get lots of rest. Drink extra fluids (unless a doctor has told you not to).    2. Take Tylenol (acetaminophen) for fever or pain. If you have liver or kidney problems, ask your family doctor if it's okay to take Tylenol.     Take either:     650 mg (two 325 mg pills) every 4 to 6 hours, or     1,000 mg (two 500 mg pills) every 8 hours as needed.     Note: Don't take more than 3,000 mg in one day. Acetaminophen is found in many medicines (both prescribed and over-the-counter medicines). Read all labels to be sure you don't take too much.    For children, check the Tylenol bottle for the right dose (based on their age or weight).    3. If you have other health problems (like cancer, heart failure, an organ transplant or severe kidney disease): Call your specialty clinic if you don't feel better in the next 2 days.    4. Know when to call 911: Emergency warning signs include:    Trouble breathing or shortness of breath    Pain or pressure in the chest that doesn't go away    Feeling confused like you haven't felt before, or not being able to wake up    Bluish-colored lips or face    5. Sign up for [employer]. We know  it's scary to hear that you have COVID-19. We want to track your symptoms to make sure you're okay over the next 2 weeks. Please look for an email from [employer]--this is a free, online program that we'll use to keep in touch. To sign up, follow the link in the email. Learn more at [email or web address].    Where can I get more information?    [facility]: [email or web address]    Coronavirus Basics: www.health.[facility].[place]./diseases/coronavirus/basics.html    What to Do If You're Sick: www.cdc.gov/coronavirus/2019-ncov/about/steps-when-sick.html    Ending Home Isolation: www.cdc.gov/coronavirus/2019-ncov/hcp/disposition-in-home-patients.html     Caring for Someone with COVID-19: www.cdc.gov/coronavirus/2019-ncov/if-you-are-sick/care-for-someone.html     [facility] clinical trials (COVID-19 research studies): clinicalaffairs.[facility]-clinical-trials     A Positive COVID-19 letter will be sent via [employer] or the mail. (Exception, no letters sent to Presurgerical/Preprocedure Patients)  Pre-procedure testing,  Patient states has already been in contact with the Dr victoria for further instruction.  Ingrid Mathews LPN

## 2021-08-18 NOTE — TELEPHONE ENCOUNTER
Writer reviewed pre-assessment questions with patient prior to upcoming EGD on 8.23.2021.      Covid test scheduled: 8.20.2021    Arrival time: 0830    Facility location: [facility]    Sedation type: MAC    Implantable devices? No    Blood thinners/Antiplatelet medication? No    Reviewed EGD prep instructions with patient.      Designated  policy reviewed with patient.     Patient verbalized understanding.  No further questions or concerns.    Leonie Lunsford RN

## 2021-08-18 NOTE — TELEPHONE ENCOUNTER
Coronavirus (COVID-19) Notification    Reason for call  Notify of POSITIVE  COVID-19 lab result, assess symptoms,  review [facility] recommendations    Lab Result   Lab test for 2019-nCoV rRt-PCR or SARS-COV-2 PCR  Oropharyngeal AND/OR nasopharyngeal swabs were POSITIVE for 2019-nCoV RNA [OR] SARS-COV-2 RNA (COVID-19) RNA     We have been unable to reach Patient by phone at this time to notify of their Positive COVID-19 result.  Left voicemail message requesting a call back to [phone] [facility] for results.        POSITIVE COVID-19 Letter sent.    Ingrid Mathews LPN

## 2021-08-18 NOTE — TELEPHONE ENCOUNTER
Spoke with Maria Elena and knows she inactive on txp list. Will self isolate for a total of 14 days and re check COVID then. Is asymptomatic. Message sent to clinic nurse, Natalie, to help cancel and re schedule upcoming appointments and COVID re check.    Both her son and niece have been vaccinated. Reviewed and given COVID sites for more information.

## 2021-08-18 NOTE — PROGRESS NOTES
Per protocol, made inactive (COVID precaution). Added to selection next week. Patient advised to isolate for 14 days. Repeat test after 14 days and if remaining asymptomatic with a second test at least 48 hrs if 1st negative.

## 2021-08-18 NOTE — PROGRESS NOTES
Blood Product Transfusion Nursing Note:    Sarah Fisher presents today to [facility] for a 1u RBC transfusion.  During today's [facility] appointment orders from Dr. Avila were completed.    Progress note:  ID verified by name and .  Assessment completed.  Vitals were stable throughout time in [facility].    verbal education given to patient/representative regarding transfusion and possible side effects.  Patient/representative verbalized understanding.     present during visit today: Not Applicable.    All pertinent labs reviewed prior to infusion: YES  Hgb 6.8     Date of consent or authorization: 8/3/2021    Patient tolerated the procedure well    Transfusion given over approximately: 1 hour     Discharge Plan:    Patients covid test resulted positive at end of todays visit. Patient notified and given information for isolating. Patient is in contact with primary care clinic for next steps for upcoming appointments.    Discharge instructions were reviewed with patient Yes  Patient/representative verbalized understanding of discharge instructions and all questions answered Yes.        Zoraida Mathews RN    There were no vitals taken for this visit.

## 2021-08-18 NOTE — TELEPHONE ENCOUNTER
"Per Dr. Boyd \"I am ok with doing this procedure at the [facility].\"         Previous Messages       ----- Message -----   From: Leonie Lunsford RN   Sent: 8/18/2021   8:40 AM CDT   To: Gopal Boyd MD, Sirena Perry RN, *   Subject: 8.23.2021 EGD                                     Dr. Boyd please review and advise:     Pt is [facility]     Please review pt's lab results from yesterday.  Per adult liver coordinator:   \"MELD is 19, so keep on with monthly labs since you are active on list. Also, looks like you are getting iron infusion today and blood tomorrow.\"     Is patient appropriate for [facility]?     Thank you,   Leonie Lunsford RN   Pre-Assessment Endoscopy     "

## 2021-08-18 NOTE — PATIENT INSTRUCTIONS
"  Patient Education   After Your COVID-19 (Coronavirus) Test  You have been tested for COVID-19 (coronavirus).   If you'll have surgery in the next few days, we'll let you know ahead of time if you have the virus. Please call your surgeon's office with any questions.  For all other patients: Results are usually available in [employer]ingo within 2 to 3 days.   If you do not have a Fingo account, you'll get a letter in the mail in about 7 to 10 days.   [employer] is often the fastest way to get test results. Please sign up if you do not already have a Fingo account. See the handout Getting COVID-19 Test Results in Fingo for help.  What if my test result is positive?  If your test is positive and you have not viewed your result in Fingo, you'll get a phone call with your result. (A positive test means that you have the virus.)     Follow the tips under \"How do I self-isolate?\" below for 10 days (20 days if you have a weak immune system).    You don't need to be retested for COVID-19 before going back to school or work. As long as you're fever-free and feeling better, you can go back to school, work and other activities after waiting the 10 or 20 days.  What if I have questions after I get my results?  If you have questions about your results, please visit our testing website at [email or web address].   After 7 to 10 days, if you have not gotten your results:     Call [phone] ([phone]) and ask to speak with our COVID-19 results team.    If you're being treated at an infusion center: Call your infusion center directly.  What are the symptoms of COVID-19?  Cough, fever and trouble breathing are the most common signs of COVID-19.  Other symptoms can include new headaches, new muscle or body aches, new and unexplained fatigue (feeling very tired), chills, sore throat, congestion (stuffy or runny nose), diarrhea (loose poop), loss of taste or smell, belly pain, and nausea or vomiting " "(feeling sick to your stomach or throwing up).  You may already have symptoms of COVID-19, or they may show up later.  What should I do if I have symptoms?  If you're having surgery: Call your surgeon's office.  For all other patients: Stay home and away from others (self-isolate) until ...    You've had no fever--and no medicine that reduces fever--for 1 full day (24 hours), AND    Other symptoms have gotten better. For example, your cough or breathing has improved, AND    At least 10 days have passed since your symptoms first started.  How do I self-isolate?    Stay in your own room, even for meals. Use your own bathroom if you can.    Stay away from others in your home. No hugging, kissing or shaking hands. No visitors.    Don't go to work, school or anywhere else.    Clean \"high touch\" surfaces often (doorknobs, counters, handles). Use household cleaning spray or wipes. You'll find a full list of  on the EPA website: www.epa.gov/pesticide-registration/list-n-disinfectants-use-against-sars-cov-2.    Cover your mouth and nose with a mask or other face covering to avoid spreading germs.    Wash your hands and face often. Use soap and water.    Caregivers in these groups are at risk for severe illness due to COVID-19:  ? People 65 years and older  ? People who live in a nursing home or long-term care facility  ? People with chronic disease (lung, heart, cancer, diabetes, kidney, liver, immunologic)  ? People who have a weakened immune system, including those who:    Are in cancer treatment    Take medicine that weakens the immune system, such as corticosteroids    Had a bone marrow or organ transplant    Have an immune deficiency    Have poorly controlled HIV or AIDS    Are obese (body mass index of 40 or higher)    Smoke regularly    Caregivers should wear gloves while washing dishes, handling laundry and cleaning bedrooms and bathrooms.    Use caution when washing and drying laundry: Don't shake dirty " laundry and use the warmest water setting that you can.    For more tips on managing your health at home, go to www.cdc.gov/coronavirus/2019-ncov/downloads/10Things.pdf.  How can I take care of myself at home?  1. Get lots of rest. Drink extra fluids (unless a doctor has told you not to).  2. Take Tylenol (acetaminophen) for fever or pain. If you have liver or kidney problems, ask your family doctor if it's OK to take Tylenol.   Adults can take either:  ? 650 mg (two 325 mg pills) every 4 to 6 hours, or   ? 1,000 mg (two 500 mg pills) every 8 hours as needed.  ? Note: Don't take more than 3,000 mg in one day. Acetaminophen is found in many medicines (both prescribed and over-the-counter medicines). Read all labels to be sure you don't take too much.   For children, check the Tylenol bottle for the right dose. The dose is based on the child's age or weight.  3. If you have other health problems (like cancer, heart failure, an organ transplant or severe kidney disease): Call your specialty clinic if you don't feel better in the next 2 days.  4. Know when to call 911. Emergency warning signs include:  ? Trouble breathing or shortness of breath  ? Chest pain or pressure that doesn't go away  ? Feeling confused like you haven't felt before, or not being able to wake up  ? Bluish-colored lips or face  5. If your doctor prescribed a blood thinner medicine: Follow their instructions.  Where can I get more information?    [facility] - About COVID-19:   [email or web address]    CDC - If You're Sick: cdc.gov/coronavirus/2019-ncov/about/steps-when-sick.html    CDC - Ending Home Isolation: www.cdc.gov/coronavirus/2019-ncov/hcp/disposition-in-home-patients.html    CDC - Caring for Someone: www.cdc.gov/coronavirus/2019-ncov/if-you-are-sick/care-for-someone.html    [facility] - Interim Guidance for Hospital Discharge to Home: www.health.[facility].[place]/diseases/coronavirus/hcp/hospdischarge.pdf    [facility]  clinical trials (COVID-19 research studies): clinicalaffairs.[facility]-clinical-trials    Below are the COVID-19 hotlines at the [place] Department of Health ([facility]). Interpreters are available.  ? For health questions: Call 345-068-8967 or 1-163.568.2731 (7 a.m. to 7 p.m.)  ? For questions about schools and childcare: Call 629-342-1757 or 1-176.222.9324 (7 a.m. to 7 p.m.)    For informational purposes only. Not to replace the advice of your health care provider. Clinically reviewed by Infection Prevention and the [facility] COVID-19 Clinical Team. Copyright   2020 [facility]. All rights reserved. [employer] 178023 - Rev 11/11/20.

## 2021-08-19 ENCOUNTER — TELEPHONE (OUTPATIENT)
Dept: GASTROENTEROLOGY | Facility: OUTPATIENT CENTER | Age: 51
End: 2021-08-19

## 2021-08-19 ENCOUNTER — MYC MEDICAL ADVICE (OUTPATIENT)
Dept: PEDIATRICS | Facility: CLINIC | Age: 51
End: 2021-08-19

## 2021-08-19 ENCOUNTER — TELEPHONE (OUTPATIENT)
Dept: MEDSURG UNIT | Facility: CLINIC | Age: 51
End: 2021-08-19

## 2021-08-19 DIAGNOSIS — Z11.59 ENCOUNTER FOR SCREENING FOR OTHER VIRAL DISEASES: ICD-10-CM

## 2021-08-19 DIAGNOSIS — K74.60 CIRRHOSIS OF LIVER (H): ICD-10-CM

## 2021-08-19 DIAGNOSIS — K70.31 ALCOHOLIC CIRRHOSIS OF LIVER WITH ASCITES (H): ICD-10-CM

## 2021-08-19 RX ORDER — FUROSEMIDE 20 MG
40 TABLET ORAL DAILY
Qty: 60 TABLET | Refills: 3 | Status: SHIPPED | OUTPATIENT
Start: 2021-08-19 | End: 2021-08-24

## 2021-08-19 RX ORDER — SPIRONOLACTONE 25 MG/1
100 TABLET ORAL DAILY
Qty: 60 TABLET | Refills: 3 | Status: SHIPPED | OUTPATIENT
Start: 2021-08-19 | End: 2021-08-24

## 2021-08-19 NOTE — TELEPHONE ENCOUNTER
Caller: US CALLING PT TO RESCHEDULE    Procedure: EGD-MAC    Date of Procedure Cancelled: 8/23    Ordering Provider:    Reason for cancel: Aston duarte pt needs to be at hospital    Rescheduled: y     If rescheduled:    Date: 9/9/2021   [facility]   Note any change or update to original order/sedation: MAC    PT IS PREOP AWARE    +covid 8/17- will not need to do a covid test!

## 2021-08-19 NOTE — TELEPHONE ENCOUNTER
"Sewill, Leonie, RN sent to P Endoscopy Scheduling Pool  Cc: P Endoscopy Nurse Pool  Endo Scheduling are you able to help facilitate this?     It looks like Dr. Boyd sent the message to Dr. Fadia Avila           Previous Messages       ----- Message -----   From: Gopal Boyd MD   Sent: 8/18/2021  12:45 PM CDT   To: Sirena Perry, RN, Lizzeth Guillen, RN, *   Subject: RE: 8.23.2021 EGD                                 Khanh Loaiza,     The staff here at [facility] is not comfortable doing the patient's procedure here and would prefer it be done at the [facility].     Any chance we can get her in over there?     Sorry for the inconvenience,   Gopal   ----- Message -----   From: Lizzeth Guillen, RICHARD   Sent: 8/18/2021  12:41 PM CDT   To: Gopal Boyd MD, Sirena Perry, RN, *   Subject: RE: 8.23.2021 EGD                                 I fell the safety of the patient is better served at [facility]   ----- Message -----   From: Leonie Lunsford RN   Sent: 8/18/2021   8:40 AM CDT   To: Gopal Boyd MD, Sirena Perry, RN, *   Subject: 8.23.2021 EGD                                     Dr. Boyd please review and advise:     Pt [facility]     Please review pt's lab results from yesterday.  Per adult liver coordinator:   \"MELD is 19, so keep on with monthly labs since you are active on list. Also, looks like you are getting iron infusion today and blood tomorrow.\"     [facility]?     Thank you,   Leonie Lunsford RN   Pre-Assessment Endoscopy      "

## 2021-08-19 NOTE — TELEPHONE ENCOUNTER
Looks like she was asymptomatic covid positive.     Can we get more info on what the preop would be for? For the upper endoscopy on 9/9? She has a preadmit for something tomorrow but I'm not sure what.     SABIHA Diego MD  Internal Medicine-Pediatrics

## 2021-08-19 NOTE — TELEPHONE ENCOUNTER
Pre-Procedure Positive COVID Test     Patient Notification  Patient notified of the positive COVID test result  Patient notified of the ordering provider recommendations to reschedule procedure  Patient notified to reschedule procedure in 14 days from positive test or per provider recommendations    Scheduling Phone Number  Central Scheduling (Radiology): [phone]      Patient Information  Patient instructed to call the ordering provider if they develop symptoms, condition worsens, or they have any concerns.  Patient instructed to continue to self-quarantine and follow CDC recommendations    Concerns of COVID  Patient informed to visit [facility] (virtual visit) website if they are concerned about COVID symptoms (fever, rash, cough, shortness of breath) or exposure      [facility] instructions:  1. Go to the website [email or web address]  2. Create an account (you will need your insurance information)  3. Start a new visit  4. Choose your diagnosis (e.g. COVID19)  5. Fill out the information about your symptoms  6. A provider will reach out to you by text, phone call or video visit based on your request    RN canceled patient procedure in Epic.

## 2021-08-19 NOTE — PROGRESS NOTES
Patient tested positive for Covid on 8/17 and remains asymptomatic. All upcoming appointments adjusted as required per policy and patient current health status. She will continue to receive lab work and transfusions when appropriate. She will restart oral iron twice per day until iron infusions can resume. She was placed on hold on transplant list, and she has questions regarding testing required to reactivate. She is inquiring about her status in receiving a 3rd booster vaccine, and if she should get any further covid tests done. She was told by Endoscopy department that per their policy she will not require further covid testing for 3 months from time of her positive test. Patient is comfortable with how her appointments are arranged at this time, and will remain in isolation otherwise.

## 2021-08-19 NOTE — TELEPHONE ENCOUNTER
Caller: me calling pt, Maria Elena Fisher    Procedure: EGD     Date of Procedure Cancelled: 08/23/21    Ordering Provider:judi beckham    Reason for cancel: pt positive for covid    Rescheduled: yes     If rescheduled:    Date: 09/08/21   [place] [facility] [place]   Note any change or update to original order/sedation: no changes, did not r/s covid test as within 90 days

## 2021-08-23 NOTE — TELEPHONE ENCOUNTER
As long as she remains asymptomatic, she can resume regular activities after a 10 day quarantine (8/28/2021). If she needs a pre-op for her EGD on 9/9, her appt on 9/2 can be switched to that.     If she develops symptoms during her 10 day quarantine, she must continue to isolate until the following are true:  10 days since symptoms first appeared and  24 hours with no fever without the use of fever-reducing medications and  Other symptoms of COVID-19 are improving    Thanks,   Christine Harris, DNP, APRN, CNP

## 2021-08-24 ENCOUNTER — VIRTUAL VISIT (OUTPATIENT)
Dept: GASTROENTEROLOGY | Facility: CLINIC | Age: 51
End: 2021-08-24
Attending: STUDENT IN AN ORGANIZED HEALTH CARE EDUCATION/TRAINING PROGRAM
Payer: COMMERCIAL

## 2021-08-24 ENCOUNTER — PATIENT OUTREACH (OUTPATIENT)
Dept: GASTROENTEROLOGY | Facility: CLINIC | Age: 51
End: 2021-08-24

## 2021-08-24 ENCOUNTER — TELEPHONE (OUTPATIENT)
Dept: MEDSURG UNIT | Facility: CLINIC | Age: 51
End: 2021-08-24

## 2021-08-24 ENCOUNTER — COMMITTEE REVIEW (OUTPATIENT)
Dept: TRANSPLANT | Facility: CLINIC | Age: 51
End: 2021-08-24

## 2021-08-24 ENCOUNTER — LAB (OUTPATIENT)
Dept: LAB | Facility: CLINIC | Age: 51
End: 2021-08-24
Payer: COMMERCIAL

## 2021-08-24 DIAGNOSIS — U07.1 2019 NOVEL CORONAVIRUS DISEASE (COVID-19): ICD-10-CM

## 2021-08-24 DIAGNOSIS — E83.110 HEREDITARY HEMOCHROMATOSIS (H): ICD-10-CM

## 2021-08-24 DIAGNOSIS — K70.31 ALCOHOLIC CIRRHOSIS OF LIVER WITH ASCITES (H): ICD-10-CM

## 2021-08-24 DIAGNOSIS — K70.31 ALCOHOLIC CIRRHOSIS OF LIVER WITH ASCITES (H): Primary | ICD-10-CM

## 2021-08-24 DIAGNOSIS — D64.9 ANEMIA, UNSPECIFIED TYPE: ICD-10-CM

## 2021-08-24 DIAGNOSIS — K31.819 GAVE (GASTRIC ANTRAL VASCULAR ECTASIA): ICD-10-CM

## 2021-08-24 DIAGNOSIS — K74.60 CIRRHOSIS OF LIVER (H): ICD-10-CM

## 2021-08-24 DIAGNOSIS — E61.1 IRON DEFICIENCY: ICD-10-CM

## 2021-08-24 DIAGNOSIS — Z11.59 ENCOUNTER FOR SCREENING FOR OTHER VIRAL DISEASES: ICD-10-CM

## 2021-08-24 LAB
ABO/RH(D): NORMAL
ALBUMIN SERPL-MCNC: 2.7 G/DL (ref 3.4–5)
ALP SERPL-CCNC: 136 U/L (ref 40–150)
ALT SERPL W P-5'-P-CCNC: 26 U/L (ref 0–50)
ANION GAP SERPL CALCULATED.3IONS-SCNC: 6 MMOL/L (ref 3–14)
ANTIBODY SCREEN: NEGATIVE
AST SERPL W P-5'-P-CCNC: 29 U/L (ref 0–45)
BILIRUB DIRECT SERPL-MCNC: 2.6 MG/DL (ref 0–0.2)
BILIRUB SERPL-MCNC: 8.4 MG/DL (ref 0.2–1.3)
BUN SERPL-MCNC: 12 MG/DL (ref 7–30)
CALCIUM SERPL-MCNC: 8.9 MG/DL (ref 8.5–10.1)
CHLORIDE BLD-SCNC: 102 MMOL/L (ref 94–109)
CO2 SERPL-SCNC: 30 MMOL/L (ref 20–32)
CREAT SERPL-MCNC: 1.01 MG/DL (ref 0.52–1.04)
ERYTHROCYTE [DISTWIDTH] IN BLOOD BY AUTOMATED COUNT: 22.7 % (ref 10–15)
GFR SERPL CREATININE-BSD FRML MDRD: 65 ML/MIN/1.73M2
GLUCOSE BLD-MCNC: 134 MG/DL (ref 70–99)
HCT VFR BLD AUTO: 29.2 % (ref 35–47)
HGB BLD-MCNC: 9 G/DL (ref 11.7–15.7)
INR PPP: 1.78 (ref 0.85–1.15)
MCH RBC QN AUTO: 28.4 PG (ref 26.5–33)
MCHC RBC AUTO-ENTMCNC: 30.8 G/DL (ref 31.5–36.5)
MCV RBC AUTO: 92 FL (ref 78–100)
PLATELET # BLD AUTO: 99 10E3/UL (ref 150–450)
POTASSIUM BLD-SCNC: 3.2 MMOL/L (ref 3.4–5.3)
PROT SERPL-MCNC: 5.9 G/DL (ref 6.8–8.8)
RBC # BLD AUTO: 3.17 10E6/UL (ref 3.8–5.2)
SODIUM SERPL-SCNC: 138 MMOL/L (ref 133–144)
SPECIMEN EXPIRATION DATE: NORMAL
WBC # BLD AUTO: 6.4 10E3/UL (ref 4–11)

## 2021-08-24 PROCEDURE — 86901 BLOOD TYPING SEROLOGIC RH(D): CPT | Mod: 90 | Performed by: PATHOLOGY

## 2021-08-24 PROCEDURE — 85610 PROTHROMBIN TIME: CPT | Performed by: PATHOLOGY

## 2021-08-24 PROCEDURE — 86900 BLOOD TYPING SEROLOGIC ABO: CPT | Mod: 90 | Performed by: PATHOLOGY

## 2021-08-24 PROCEDURE — 85027 COMPLETE CBC AUTOMATED: CPT | Performed by: PATHOLOGY

## 2021-08-24 PROCEDURE — 80053 COMPREHEN METABOLIC PANEL: CPT | Performed by: PATHOLOGY

## 2021-08-24 PROCEDURE — 99215 OFFICE O/P EST HI 40 MIN: CPT | Mod: 95 | Performed by: STUDENT IN AN ORGANIZED HEALTH CARE EDUCATION/TRAINING PROGRAM

## 2021-08-24 PROCEDURE — 36415 COLL VENOUS BLD VENIPUNCTURE: CPT | Performed by: PATHOLOGY

## 2021-08-24 PROCEDURE — 82248 BILIRUBIN DIRECT: CPT | Performed by: PATHOLOGY

## 2021-08-24 PROCEDURE — 86850 RBC ANTIBODY SCREEN: CPT | Mod: 90 | Performed by: PATHOLOGY

## 2021-08-24 RX ORDER — VITAMIN B COMPLEX
2 TABLET ORAL DAILY
Status: ON HOLD | COMMUNITY
Start: 2021-08-24 | End: 2021-10-15

## 2021-08-24 RX ORDER — FERROUS SULFATE 220 (44)/5
6.8 SOLUTION, ORAL ORAL 2 TIMES DAILY
COMMUNITY
Start: 2021-08-24 | End: 2021-08-25

## 2021-08-24 RX ORDER — FUROSEMIDE 20 MG
60 TABLET ORAL DAILY
Qty: 270 TABLET | Refills: 3 | Status: SHIPPED | OUTPATIENT
Start: 2021-08-24 | End: 2021-09-21

## 2021-08-24 RX ORDER — TRAZODONE HYDROCHLORIDE 50 MG/1
50 TABLET, FILM COATED ORAL
Status: ON HOLD | COMMUNITY
Start: 2021-08-24 | End: 2021-10-15

## 2021-08-24 RX ORDER — SPIRONOLACTONE 50 MG/1
150 TABLET, FILM COATED ORAL DAILY
Qty: 270 TABLET | Refills: 3 | Status: SHIPPED | OUTPATIENT
Start: 2021-08-24 | End: 2021-09-21

## 2021-08-24 RX ORDER — POTASSIUM CHLORIDE 1125 MG/1
30 TABLET, EXTENDED RELEASE ORAL DAILY
Qty: 180 TABLET | Refills: 3 | Status: ON HOLD | OUTPATIENT
Start: 2021-08-24 | End: 2021-10-29

## 2021-08-24 RX ORDER — CIPROFLOXACIN 500 MG/1
500 TABLET, FILM COATED ORAL DAILY
Qty: 90 TABLET | Refills: 3 | Status: ON HOLD | OUTPATIENT
Start: 2021-08-24 | End: 2021-10-29

## 2021-08-24 ASSESSMENT — PAIN SCALES - GENERAL: PAINLEVEL: NO PAIN (0)

## 2021-08-24 NOTE — TELEPHONE ENCOUNTER
Pre-Procedure Positive COVID Test     Patient Notification  Patient aware of the positive COVID test result  Patient was reschedule to this date for procedure  Patient notified to reschedule procedure should she develop symptoms    Scheduling Phone Numbers  IR Control Desk: [phone]   Central Scheduling (Radiology): [phone]  Cardiology [phone]    Patient Information  Patient instructed to call the ordering provider if they develop symptoms, condition worsens, or they have any concerns.  Patient instructed to continue to self-quarantine and follow CDC recommendations  Pt instructed to park in door 2 area and call Ultrasound. RN will come get her at her car when they are ready for her.    Chelsea Vasquez RN

## 2021-08-24 NOTE — COMMITTEE REVIEW
Abdominal Patient Discussion Note Transplant Coordinator: Kiki Sutherland  Transplant Surgeon:   Christian Morrison    Referring Physician: Rehan Escobar    Committee Review Members:  Nutrition Humaira Jason, HIEU   Pharmacist Feli Tabares, [facility]    - Clinical Sherice Arredondo, MSMANDY, Evelina Rodriguez, [facility]   Transplant Payal Sam, RICHARD, Lotus Hughes, RICHARD, Britni Wilson, RN, Jr Oli Gurrola RN, Leigh Beltran MD, Cyndee Salazar, APRN CNP, Kiki Sutherland, RICAHRD   Transplant Hepatology  Fadia Avila MD, Darrin Patterson MD, Susana Christina MD, Thomas M. Leventhal, MD   Transplant Surgery Maureen Benavidez MD, Christian Morrison MD, Lea Ravi MD, Riaz Seth MD       Additional Discussion Notes and Findings:   Inactive due to COVID + (asymptomatic)  Follow protocol

## 2021-08-24 NOTE — PROGRESS NOTES
Maria Elena is a 51 year old who is being evaluated via a billable video visit.      How would you like to obtain your AVS? MyChart  If the video visit is dropped, the invitation should be resent by: Text to cell phone: [phone]  Will anyone else be joining your video visit? No      Video Start Time: 9:40 AM  Video-Visit Details    Type of service:  Video Visit    Video End Time:10:05    Originating Location (pt. Location): Home    Distant Location (provider location):  [facility] [place]     Platform used for Video Visit: [facility]     Date of Visit: 8/4/2021    Subjective: Ms. 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 HE, hypothyroidism, who presents for follow up. She is listed for liver transplant - on hold given + COVID 8/17/2021    Initial History:    Patient presented to PCP 6/2020 for hemorrhoidal bleeding, was noticed to be jaundiced. They recommended she stop drinking, which she had already done when she saw her eyes turning yellow. Labs significant for BR 17.2  ALT 37 Alk phos 231. Ultrasound was done 7/6 which showed cirrhosis, cavernous transformation of the portal veins.     Presented to the ED 9/8 with SOB. Found to have LUE swelling on exam, underwent US that showed left internal jugular acute DVT. Had a CT PE and AP as well that did not show a PE, but showed a very large left pleural effusion with associated complete left lung atelectasis. Also showed a large right pleural effusion. No adenopathy. Showed moderated volume ascites with mesenteric edema, likely portal colopathy. She was started on IV heparin for her L internal jugular DVT. This is the first DVT she has ever had. Later Imaging showed DVT involved left internal jugular, brachiocephalic and subclavian vein. She underwent a thoracentesis with 2 L removed on 9/8. This was blood tinged per report, but  grossly bloody > 50,000 with 9,337 WBC 98% PMNS. Protein 1.5 LDH 92. She developed hypotension after this with worsening hypoxia thought to be related to reexpansion pulmonary edema. Culture from this eventually grow E. Coli and eggerthelle lenta. She was put on zosyn and eventually augmentin for this. She had a paracentesis 9/9 that showed < 50,000 RBC, 5,203 WBC 70% PMNs, albumin < 0.7, TP 1.0. AC was stopped given concern for hemothorax, eventually lower dose apixaban started. She had a repeat left sided thoracentesis 9/12 with > 50,000 RBC 4,889 WBC 72% PMNs,  TP 3.0 negative culture, and then 9/17 with > 50,000 RBC, 1,1118 WBC 17% PMNs and a negative culture. She was confused this admission, started on meds for HE. Started on low dose diuretics.     Admitted again 9/30 with SOB. Had CT PE that did not show a PE, showed very large left and large right pleural effusion with associated new complete left lung atelectasis.  and haptoglobin 187. BR 10/5 5.3 with 2.6 direct. Underwent thoracentesis 10/5 that showed > 50,000 RBC with 385 WBC 8% PMNs. Had darker stools, underwent EGD showed PHG. AC restarted. Was discharged on home oxygen and with increased diuretics.     Admitted 10/30-1 for SOB, BRBPR, anemia. BR 3.2 with 1.8 direct. Hemoglobin was down to 4.5, transfused, EGD with PHG and GAVE - treated with APC. Had near complete opacification of left pleural space with effusion. Had CORBIN - creatinine around 2, downtrended to 1.5 with holding diuretics. DVU scan showed some improvement in the chronic DVT - plan was for 2 months AC. Ferritin 645.5.    Admission 11/4-18 for anemia - Hgb was 4.3, required several units of blood. Iron stores TSat 69%. CXR showed complete white out of left lung field. Decision was made to hold AC given her recurrent anemia. Repeat UE US showed chronic appearing non occlusive thrombus in the left internal jugular vein, innominate vein and subclavain vein. Also had CORBIN -  "creatinine was 2.36 initially, down to 1.27 on discharge. Diuretics were held. She was on home oxygen at that point. Started on a BB this admission. 11/15 BR 5.7 with 1.9 direct.     She had an outpatient EGD/Colonoscopy 12/4 that showed erythema in the esophagus, no varices. Severe PHG. No definite residual GAVE seen. Antrum PHG was treated with APC. Diffuse congested mucosa without active bleeding found in duodenum. She had colonoscopy that showed hemorrhoids, congested colonic mucosa. 4 mm cecal polyp removed that showed normal colonic mucosa.     She was admitted to the hospital 12/8 with weakness and confusion. Patient felt she was confused after her procedure. Underwent a thoracentesis on 12/9 - 1.7 L of fluid was removed because she was having some SOB. Studies c/w < 50,000 RBC, 151 WBC 25% PMNs. She shortly developed worsening hypoxia, hypotension, requiring pressors and BiPAP. [place] to be 2/2 re-expansion pulmonary edema. Hgb went 8 -> 6, required 2 units blood. Oxygenation and pressor support improved with time. She was transferred here for further evaluation.     Upon transfer to here, she was doing well. Required little to no oxygen. She was mentating well. She underwent a 500 ml diagnostic thoracentesis on 12/17 that was described as pink/cloudy with 129 WBC. TP 1.4 (serum 5), LDH 81 (serum 371), glucose 109, amylase 14. Negative cytology. 1 of the two culture bottles grew skin di on day 5, other culture did not. She was discharged on cipro ppx for her history of SBP. Discharged on lasix 40/spironolactone 100.     Saw hematology, they felt her DVT was related to hypercoagulable state from cirrhosis and compression from hydrothorax. Felt she may \"have some degree of Dony negative hemolytic anemia but we cannot prove this.  Rather at this point the risks seem to outweigh the benefits of empiric treatment for immune hemolytic anemia.  If she decompensates in terms of her anemia without a source of " "blood loss then we can consider treatment very carefully\"    Had a mammogram 2021 that showed skin thickening likely related to underlying cirrhosis, potential asymmetry in the upper posterior right breast seen on MLO view only, recommended further evaluation. Additional imaging showed grouped coarse calcifications. Underwent bx suspicious calcifications that were sampled, pathology resulted as hyalinized fibroadenoma with stromal calcifications, negative for atypic and malignancy. Recommend repeat mammogram annually.     Pap smear 3/2021 normal.     Found to be compound HZ for hemochromatosis. Ferritin elevated in the past, now normal. Stop iron supplementation but then developed anemia likely from bleeding from GAVE.     Interval Events  - Reports doing well despite + COVID  - asymptomatic. Doing pulse ox at home - 95% on RA  - She is taking daily iron, given delays in her outpatient infusions with her + COVID status. EGD delayed to . Still denies any overt blood loss.   - Taking lasix 60 mg daily and spironolactone 150 mg daily. Overall swelling better, but thinks she still has some swelling in her abdomen, para set up this week. No other swelling. Denies feeling SOB    ROS: 14 point ROS negative except for positives noted in HPI.    PMHx:  Past Medical History:   Diagnosis Date     Ascites      History of blood transfusion      Liver cirrhosis (H)      Thyroid disease    Alcohol related cirrhosis    PSHx:  Past Surgical History:   Procedure Laterality Date      SECTION       CV RIGHT HEART CATH MEASUREMENTS RECORDED N/A 2021    Procedure: CV RIGHT HEART CATH;  Surgeon: Joseph Guevara MD;  Location:  HEART CARDIAC CATH LAB     IR THORACENTESIS  2021     IR THORACENTESIS  2021     THORACENTESIS Left 2021    Procedure: THORACENTESIS;  Surgeon: Almas Irby MD;  Location:  GI     THORACENTESIS N/A 2021    Procedure: THORACENTESIS;  Surgeon: Mahamed" MD Almas;  Location: [facility] GI     THORACENTESIS N/A 03/10/2021    Procedure: THORACENTESIS;  Surgeon: Almas Irby MD;  Location: [facility] GI     THORACENTESIS Left 2021    Procedure: THORACENTESIS;  Surgeon: Kennedi Chavez MD;  Location: [facility] OR     THORACENTESIS Left 2021    Procedure: THORACENTESIS;  Surgeon: Jess Ansari PA-C;  Location: [facility] OR     Ovarian cyst    FamHx:  No known history of liver disease    SocHx:  Social History     Socioeconomic History     Marital status: Single     Spouse name: Not on file     Number of children: Not on file     Years of education: Not on file     Highest education level: Master's degree (e.g., MA, MS, Roberto, MEd, MSW, SASHA)   Occupational History     Not on file   Tobacco Use     Smoking status: Never Smoker     Smokeless tobacco: Never Used   Substance and Sexual Activity     Alcohol use: Not Currently     Comment: Quit on 2020     Drug use: Not Currently     Sexual activity: Not Currently     Partners: Male   Other Topics Concern     Parent/sibling w/ CABG, MI or angioplasty before 65F 55M? Not Asked   Social History Narrative    Lives [place]. 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 12 year sonFlakito.         Christine Harris, DNP, APRN, CNP    3/17/2021     Social Determinants of Health     Financial Resource Strain: Low Risk      Difficulty of Paying Living Expenses: Not hard at all   Food Insecurity: No Food Insecurity     Worried About Running Out of Food in the Last Year: Never true     Ran Out of Food in the Last Year: Never true   Transportation Needs: No Transportation Needs     Lack of Transportation (Medical): No     Lack of Transportation (Non-Medical): No   Physical Activity: Sufficiently Active     Days of Exercise per Week: 5 days     Minutes of Exercise per Session: 30 min   Stress: No Stress Concern Present     Feeling of Stress : Only a little   Social Connections: Moderately  Integrated     Frequency of Communication with Friends and Family: More than three times a week     Frequency of Social Gatherings with Friends and Family: More than three times a week     Attends [employer] Services: More than 4 times per year     Active Member of Clubs or Organizations: Yes     Attends Club or Organization Meetings: More than 4 times per year     Marital Status: Never    Intimate Partner Violence:      Fear of Current or Ex-Partner:      Emotionally Abused:      Physically Abused:      Sexually Abused:    Just moved back home, living with son and niece who is going to school to be a RN    Medications:  Current Outpatient Medications   Medication     ciprofloxacin (CIPRO) 250 MG tablet     folic acid (FOLVITE) 1 MG tablet     furosemide (LASIX) 20 MG tablet     hydrocortisone (ANUSOL-HC) 25 MG suppository     hydrocortisone, Perianal, (HYDROCORTISONE) 2.5 % cream     levothyroxine (SYNTHROID/LEVOTHROID) 50 MCG tablet     midodrine (PROAMATINE) 5 MG tablet     multivitamin w/minerals (THERA-VIT-M) tablet     pantoprazole (PROTONIX) 20 MG EC tablet     potassium chloride ER (K-TAB/KLOR-CON) 10 MEQ CR tablet     potassium chloride ER (KLOR-CON M) 10 MEQ CR tablet     rifaximin (XIFAXAN) 550 MG TABS tablet     spironolactone (ALDACTONE) 25 MG tablet     traZODone (DESYREL) 50 MG tablet     zinc sulfate (ZINCATE) 220 (50 Zn) MG capsule     No current facility-administered medications for this visit.     Allergies:     Allergies   Allergen Reactions     Amoxicillin GI Disturbance, Diarrhea, Nausea and Nausea and Vomiting     Objective:  No vitals for this virtual visit  Constitutional: Pleasant woman in NAD  Eyes: Icteric  Respiratory: Breathing comfortably on RA  Skin: jaundiced  Neuro: AOOX3, no asterixis  Psychiatric: normal mood and orientation    Labs:  Last Comprehensive Metabolic Panel:  Sodium   Date Value Ref Range Status   08/24/2021 138 133 - 144 mmol/L Final   06/07/2021 140 133 - 144  mmol/L Final     Potassium   Date Value Ref Range Status   08/24/2021 3.2 (L) 3.4 - 5.3 mmol/L Final   06/07/2021 3.4 3.4 - 5.3 mmol/L Final     Chloride   Date Value Ref Range Status   08/24/2021 102 94 - 109 mmol/L Final   06/07/2021 106 94 - 109 mmol/L Final     Carbon Dioxide   Date Value Ref Range Status   06/07/2021 28 20 - 32 mmol/L Final     Carbon Dioxide (CO2)   Date Value Ref Range Status   08/24/2021 30 20 - 32 mmol/L Final     Anion Gap   Date Value Ref Range Status   08/24/2021 6 3 - 14 mmol/L Final   06/07/2021 6 3 - 14 mmol/L Final     Glucose   Date Value Ref Range Status   08/24/2021 134 (H) 70 - 99 mg/dL Final   06/07/2021 138 (H) 70 - 99 mg/dL Final     Urea Nitrogen   Date Value Ref Range Status   08/24/2021 12 7 - 30 mg/dL Final   06/07/2021 14 7 - 30 mg/dL Final     Creatinine   Date Value Ref Range Status   08/24/2021 1.01 0.52 - 1.04 mg/dL Final   06/07/2021 0.96 0.52 - 1.04 mg/dL Final     GFR Estimate   Date Value Ref Range Status   08/24/2021 65 >60 mL/min/1.73m2 Final     Comment:     As of July 11, 2021, eGFR is calculated by the CKD-EPI creatinine equation, without race adjustment. eGFR can be influenced by muscle mass, exercise, and diet. The reported eGFR is an estimation only and is only applicable if the renal function is stable.   06/07/2021 68 >60 mL/min/[1.73_m2] Final     Comment:     Non  GFR Calc  Starting 12/18/2018, serum creatinine based estimated GFR (eGFR) will be   calculated using the Chronic Kidney Disease Epidemiology Collaboration   (CKD-EPI) equation.       Calcium   Date Value Ref Range Status   08/24/2021 8.9 8.5 - 10.1 mg/dL Final   06/07/2021 8.7 8.5 - 10.1 mg/dL Final     Bilirubin Total   Date Value Ref Range Status   08/24/2021 8.4 (H) 0.2 - 1.3 mg/dL Final   06/07/2021 5.6 (H) 0.2 - 1.3 mg/dL Final     Alkaline Phosphatase   Date Value Ref Range Status   08/24/2021 136 40 - 150 U/L Final   06/07/2021 150 40 - 150 U/L Final     ALT   Date  Value Ref Range Status   08/24/2021 26 0 - 50 U/L Final   06/07/2021 18 0 - 50 U/L Final     AST   Date Value Ref Range Status   08/24/2021 29 0 - 45 U/L Final   06/07/2021 35 0 - 45 U/L Final       Lab Results   Component Value Date    WBC 8.3 12/19/2020     Lab Results   Component Value Date    RBC 2.82 12/19/2020     Lab Results   Component Value Date    HGB 9.1 12/19/2020     Lab Results   Component Value Date    HCT 29.2 12/19/2020     Lab Results   Component Value Date     12/19/2020     Lab Results   Component Value Date    MCH 32.3 12/19/2020     Lab Results   Component Value Date    MCHC 31.2 12/19/2020     Lab Results   Component Value Date    RDW 18.9 12/19/2020     Lab Results   Component Value Date     12/19/2020       INR   Date Value Ref Range Status   08/24/2021 1.78 (H) 0.85 - 1.15 Final     Comment:     Effective 7/11/2021, the reference range for this assay has changed.   06/07/2021 1.73 (H) 0.86 - 1.14 Final       MELD-Na score: 21 at 8/24/2021 11:57 AM  MELD score: 21 at 8/24/2021 11:57 AM  Calculated from:  Serum Creatinine: 1.01 mg/dL at 8/24/2021 11:57 AM  Serum Sodium: 138 mmol/L (Using max of 137 mmol/L) at 8/24/2021 11:57 AM  Total Bilirubin: 8.4 mg/dL at 8/24/2021 11:57 AM  INR(ratio): 1.78 at 8/24/2021 11:57 AM  Age: 51 years     6/2019  Hepatitis B Sag negative  Hepatitis C Ab negative  Hepatitis A IgG negative    10/2020  DAISY negative  ASMA negative  AFP 4.1    Imaging:    RU US Doppler 12/15/2020    Findings:  Liver: The liver demonstrates diffuse coarsened echotexture with  increased echogenicity. Nodular contour to the liver margins. No  evidence of a focal hepatic mass.      Extrahepatic portal vein flow is retrograde at 34 cm/s.  Right portal vein flow is retrograde, measuring 36 cm/s.  Left portal vein flow is antegrade, measuring 45 cm/s.     Flow in the hepatic artery is towards the liver and:  315  cm/s peak systolic  0.47 resistive index.      Recanalized  paraumbilical vein.     The splenic vein is patent and flow is towards the liver.  The left,  middle, and right hepatic veins are patent with flow towards the IVC.  Flattening of waveforms secondary to underlying liver disease. The IVC  is patent with flow towards the heart.   The visualized aorta is not  dilated.     Gallbladder: Mildly thickened gallbladder wall 3.3 mm. There is no  pericholecystic fluid, positive sonographic Lema's sign or evidence  for cholelithiasis     Bile Ducts: No definite intrahepatic biliary dilatation. The common  bile duct is not visualized in this study.     Pancreas: Not visualized in this study.      Right kidney: Normal echotexture, without mass or hydronephrosis.  Measuring 11.7 cm.     Fluid: Partially visualized right pleural effusion, trace ascites  surrounding the liver.                                                                Impression:   1.  Cirrhotic morphology of the liver.  2.  Reversal of flow in the main portal vein, right portal vein, and  recanalization of the paraumbilical vein, compatible with portal  hypertension  3.  Decreased resistive index of the hepatic artery and increased peak  systolic velocity possibly secondary to stenosis or shunting.  4.  Partially visualized right pleural effusion and trace perihepatic  ascites.    Endoscopy:    12/4/2020 EGD    Findings:       Diffuse erythema was found in the entire esophagus.       The exam of the esophagus was otherwise normal. NO varices.       Severe portal hypertensive gastropathy was found in the entire examined        stomach. Most prominent within the gastric antrum. No definitive        residual GAVE identified. Coagulation for bleeding prevention using        argon plasma (1 liter/minute and 60 dawkins) of the gastric antrum was        successful.       Diffuse congested mucosa without active bleeding and with no stigmata of        bleeding was found in the entire duodenum.    Impressions/Post-Op  Diagnosis:       - Erythema in the esophagus.       - Portal hypertensive gastropathy. Treated with argon plasma coagulation        (APC).       - Congested duodenal mucosa.    Recommendation:       - Continue pantoprazole 40 mg daily and sucralfate 1 gram twice daily.       - Iron supplementation twice daily.       - Avoid NSAIDs.    12/4/2020 Colonoscopy    Findings:       Hemorrhoids were found on perianal exam.       The terminal ileum appeared normal.       An area of grossly congested mucosa was found in the entire colon.       A 4 mm polyp was found in the cecum. The polyp was sessile. The polyp        was removed with a cold biopsy forceps. Resection and retrieval were        complete.       Non-bleeding internal hemorrhoids were found during retroflexion.    Impressions/Post-Op Diagnosis:       - Hemorrhoids found on perianal exam.       - The examined portion of the ileum was normal.       - Congested mucosa in the entire examined colon.       - One 4 mm polyp in the cecum, removed with a cold biopsy forceps.        Resected and retrieved.       - Non-bleeding internal hemorrhoids.    A) COLON, CECUM, POLYPECTOMY:  1. Colonic mucosa with no diagnostic abnormalities; a lymphoid aggregate is present  2. Negative for serrated change, dysplasia, and malignancy    Assessment/Plan: Ms. Fisher is a 51 year old woman with a history of alcohol related cirrhosis c/b ascites, hemochromoatosis compound heterozygote, hepatic hydrothorax c/b SBE, unprovoked left internal jugular DVT, concern for hemolysis, hypothyroidism. She was listed for transplant, put on hold 8/17 for + COVID. She is asymptomatic and doing well.     She was diagnosed with hemochromatosis, iron supplementation stopped and developed transfusion dependent LATOYA likely from GAVE. Initial concern for hemolysis, but labs not c/w this. She denies any overt blood loss.     MELD-Na score: 21 at 8/24/2021 11:57 AM  MELD score: 21 at 8/24/2021 11:57  AM  Calculated from:  Serum Creatinine: 1.01 mg/dL at 8/24/2021 11:57 AM  Serum Sodium: 138 mmol/L (Using max of 137 mmol/L) at 8/24/2021 11:57 AM  Total Bilirubin: 8.4 mg/dL at 8/24/2021 11:57 AM  INR(ratio): 1.78 at 8/24/2021 11:57 AM  Age: 51 years     - LATOYA: continue daily PO iron (given delays in getting IV iron with + COVID), IV Iron per hematology, transfuse for Hgb < 8. Continue folate  - Continue lasix 60, spironolactone to 150 mg daily. Increase KCl to 30 daily  - Continue cipro for SBP ppx  - Continue midodrine 5 mg BID - concern that stopping this and dropping her blood pressure would make her ascites/HH worse, so will continue for now. Should check BP 1-2x/week  - HCC screening: AFP normal 4/2021, due for RUQ US  - Variceal screening: EGD 12/2020 without varices  - HE: can continue lactulose and rifaximin, unclear if she had HE or delirium related to underlying infection     Plan to reactivate for + COVID if repeat test in 14 days negative, and followed by second negative test at least 48 hrs if 1st negative. She has been asymptomatic    RV 2 months    Fadia Avila MD MSc  Transplant Hepatology  [facility]

## 2021-08-24 NOTE — LETTER
8/24/2021         RE: Sarah Fisher  [address] [facility] [address]        Dear Colleague,    Thank you for referring your patient, Sarah Fisher, to the [facility] [place]. Please see a copy of my visit note below.    Maria Elena is a 51 year old who is being evaluated via a billable video visit.      How would you like to obtain your AVS? MyChart  If the video visit is dropped, the invitation should be resent by: Text to cell phone: [phone]  Will anyone else be joining your video visit? No      Video Start Time: 9:40 AM  Video-Visit Details    Type of service:  Video Visit    Video End Time:10:05    Originating Location (pt. Location): Home    Distant Location (provider location):  [facility]PAT[facility]     Platform used for Video Visit: [facility]     Date of Visit: 8/4/2021    Subjective: Ms. 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 HE, hypothyroidism, who presents for follow up. She is listed for liver transplant - on hold given + COVID 8/17/2021    Initial History:    Patient presented to PCP 6/2020 for hemorrhoidal bleeding, was noticed to be jaundiced. They recommended she stop drinking, which she had already done when she saw her eyes turning yellow. Labs significant for BR 17.2  ALT 37 Alk phos 231. Ultrasound was done 7/6 which showed cirrhosis, cavernous transformation of the portal veins.     Presented to the ED 9/8 with SOB. Found to have LUE swelling on exam, underwent US that showed left internal jugular acute DVT. Had a CT PE and AP as well that did not show a PE, but showed a very large left pleural effusion with associated complete left lung atelectasis. Also showed a large right pleural effusion. No adenopathy. Showed moderated volume ascites with mesenteric edema, likely portal colopathy. She was started  on IV heparin for her L internal jugular DVT. This is the first DVT she has ever had. Later Imaging showed DVT involved left internal jugular, brachiocephalic and subclavian vein. She underwent a thoracentesis with 2 L removed on 9/8. This was blood tinged per report, but grossly bloody > 50,000 with 9,337 WBC 98% PMNS. Protein 1.5 LDH 92. She developed hypotension after this with worsening hypoxia thought to be related to reexpansion pulmonary edema. Culture from this eventually grow E. Coli and eggerthelle lenta. She was put on zosyn and eventually augmentin for this. She had a paracentesis 9/9 that showed < 50,000 RBC, 5,203 WBC 70% PMNs, albumin < 0.7, TP 1.0. AC was stopped given concern for hemothorax, eventually lower dose apixaban started. She had a repeat left sided thoracentesis 9/12 with > 50,000 RBC 4,889 WBC 72% PMNs,  TP 3.0 negative culture, and then 9/17 with > 50,000 RBC, 1,1118 WBC 17% PMNs and a negative culture. She was confused this admission, started on meds for HE. Started on low dose diuretics.     Admitted again 9/30 with SOB. Had CT PE that did not show a PE, showed very large left and large right pleural effusion with associated new complete left lung atelectasis.  and haptoglobin 187. BR 10/5 5.3 with 2.6 direct. Underwent thoracentesis 10/5 that showed > 50,000 RBC with 385 WBC 8% PMNs. Had darker stools, underwent EGD showed PHG. AC restarted. Was discharged on home oxygen and with increased diuretics.     Admitted 10/30-1 for SOB, BRBPR, anemia. BR 3.2 with 1.8 direct. Hemoglobin was down to 4.5, transfused, EGD with PHG and GAVE - treated with APC. Had near complete opacification of left pleural space with effusion. Had CORBIN - creatinine around 2, downtrended to 1.5 with holding diuretics. DVU scan showed some improvement in the chronic DVT - plan was for 2 months AC. Ferritin 645.5.    Admission 11/4-18 for anemia - Hgb was 4.3, required several units of blood. Iron  stores TSat 69%. CXR showed complete white out of left lung field. Decision was made to hold AC given her recurrent anemia. Repeat UE US showed chronic appearing non occlusive thrombus in the left internal jugular vein, innominate vein and subclavain vein. Also had COBRIN - creatinine was 2.36 initially, down to 1.27 on discharge. Diuretics were held. She was on home oxygen at that point. Started on a BB this admission. 11/15 BR 5.7 with 1.9 direct.     She had an outpatient EGD/Colonoscopy 12/4 that showed erythema in the esophagus, no varices. Severe PHG. No definite residual GAVE seen. Antrum PHG was treated with APC. Diffuse congested mucosa without active bleeding found in duodenum. She had colonoscopy that showed hemorrhoids, congested colonic mucosa. 4 mm cecal polyp removed that showed normal colonic mucosa.     She was admitted to the hospital 12/8 with weakness and confusion. Patient felt she was confused after her procedure. Underwent a thoracentesis on 12/9 - 1.7 L of fluid was removed because she was having some SOB. Studies c/w < 50,000 RBC, 151 WBC 25% PMNs. She shortly developed worsening hypoxia, hypotension, requiring pressors and BiPAP. [place] to be 2/2 re-expansion pulmonary edema. Hgb went 8 -> 6, required 2 units blood. Oxygenation and pressor support improved with time. She was transferred here for further evaluation.     Upon transfer to here, she was doing well. Required little to no oxygen. She was mentating well. She underwent a 500 ml diagnostic thoracentesis on 12/17 that was described as pink/cloudy with 129 WBC. TP 1.4 (serum 5), LDH 81 (serum 371), glucose 109, amylase 14. Negative cytology. 1 of the two culture bottles grew skin di on day 5, other culture did not. She was discharged on cipro ppx for her history of SBP. Discharged on lasix 40/spironolactone 100.     Saw hematology, they felt her DVT was related to hypercoagulable state from cirrhosis and compression from hydrothorax.  "Felt she may \"have some degree of Dony negative hemolytic anemia but we cannot prove this.  Rather at this point the risks seem to outweigh the benefits of empiric treatment for immune hemolytic anemia.  If she decompensates in terms of her anemia without a source of blood loss then we can consider treatment very carefully\"    Had a mammogram 2021 that showed skin thickening likely related to underlying cirrhosis, potential asymmetry in the upper posterior right breast seen on MLO view only, recommended further evaluation. Additional imaging showed grouped coarse calcifications. Underwent bx suspicious calcifications that were sampled, pathology resulted as hyalinized fibroadenoma with stromal calcifications, negative for atypic and malignancy. Recommend repeat mammogram annually.     Pap smear 3/2021 normal.     Found to be compound HZ for hemochromatosis. Ferritin elevated in the past, now normal. Stop iron supplementation but then developed anemia likely from bleeding from GAVE.     Interval Events  - Reports doing well despite + COVID  - asymptomatic. Doing pulse ox at home - 95% on RA  - She is taking daily iron, given delays in her outpatient infusions with her + COVID status. EGD delayed to . Still denies any overt blood loss.   - Taking lasix 60 mg daily and spironolactone 150 mg daily. Overall swelling better, but thinks she still has some swelling in her abdomen, para set up this week. No other swelling. Denies feeling SOB    ROS: 14 point ROS negative except for positives noted in HPI.    PMHx:  Past Medical History:   Diagnosis Date     Ascites      History of blood transfusion      Liver cirrhosis (H)      Thyroid disease    Alcohol related cirrhosis    PSHx:  Past Surgical History:   Procedure Laterality Date      SECTION       CV RIGHT HEART CATH MEASUREMENTS RECORDED N/A 2021    Procedure: CV RIGHT HEART CATH;  Surgeon: Josehp Guevara MD;  [facility] CARD[facility] LAB     IR THORACENTESIS  2021     IR THORACENTESIS  2021     THORACENTESIS Left 2021    Procedure: THORACENTESIS;  Surgeon: Almas Irby MD;  Location: [facility] GI     THORACENTESIS N/A 2021    Procedure: THORACENTESIS;  Surgeon: Almas Irby MD;  Location: [facility]     THORACENTESIS N/A 03/10/2021    Procedure: THORACENTESIS;  Surgeon: Almas Irby MD;  Location: [facility]     THORACENTESIS Left 2021    Procedure: THORACENTESIS;  Surgeon: Kennedi Chavez MD;  Location: [facility] OR     THORACENTESIS Left 2021    Procedure: THORACENTESIS;  Surgeon: Jess Ansari PA-C;  Location: [facility] OR     Ovarian cyst    FamHx:  No known history of liver disease    SocHx:  Social History     Socioeconomic History     Marital status: Single     Spouse name: Not on file     Number of children: Not on file     Years of education: Not on file     Highest education level: Master's degree (e.g., MA, MS, Roberto, MEd, MSW, SASHA)   Occupational History     Not on file   Tobacco Use     Smoking status: Never Smoker     Smokeless tobacco: Never Used   Substance and Sexual Activity     Alcohol use: Not Currently     Comment: Quit on 2020     Drug use: Not Currently     Sexual activity: Not Currently     Partners: Male   Other Topics Concern     Parent/sibling w/ CABG, MI or angioplasty before 65F 55M? Not Asked   Social History Narrative    Lives [place]. 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 12 year sonFlakito.         Christine Harris, DNP, APRN, CNP    3/17/2021     Social Determinants of Health     Financial Resource Strain: Low Risk      Difficulty of Paying Living Expenses: Not hard at all   Food Insecurity: No Food Insecurity     Worried About Running Out of Food in the Last Year: Never true     Ran Out of Food in the Last Year: Never true   Transportation Needs: No Transportation Needs     Lack of Transportation (Medical): No     Lack of  Transportation (Non-Medical): No   Physical Activity: Sufficiently Active     Days of Exercise per Week: 5 days     Minutes of Exercise per Session: 30 min   Stress: No Stress Concern Present     Feeling of Stress : Only a little   Social Connections: Moderately Integrated     Frequency of Communication with Friends and Family: More than three times a week     Frequency of Social Gatherings with Friends and Family: More than three times a week     Attends [employer] Services: More than 4 times per year     Active Member of Clubs or Organizations: Yes     Attends Club or Organization Meetings: More than 4 times per year     Marital Status: Never    Intimate Partner Violence:      Fear of Current or Ex-Partner:      Emotionally Abused:      Physically Abused:      Sexually Abused:    Just moved back home, living with son and niece who is going to school to be a RN    Medications:  Current Outpatient Medications   Medication     ciprofloxacin (CIPRO) 250 MG tablet     folic acid (FOLVITE) 1 MG tablet     furosemide (LASIX) 20 MG tablet     hydrocortisone (ANUSOL-HC) 25 MG suppository     hydrocortisone, Perianal, (HYDROCORTISONE) 2.5 % cream     levothyroxine (SYNTHROID/LEVOTHROID) 50 MCG tablet     midodrine (PROAMATINE) 5 MG tablet     multivitamin w/minerals (THERA-VIT-M) tablet     pantoprazole (PROTONIX) 20 MG EC tablet     potassium chloride ER (K-TAB/KLOR-CON) 10 MEQ CR tablet     potassium chloride ER (KLOR-CON M) 10 MEQ CR tablet     rifaximin (XIFAXAN) 550 MG TABS tablet     spironolactone (ALDACTONE) 25 MG tablet     traZODone (DESYREL) 50 MG tablet     zinc sulfate (ZINCATE) 220 (50 Zn) MG capsule     No current facility-administered medications for this visit.     Allergies:     Allergies   Allergen Reactions     Amoxicillin GI Disturbance, Diarrhea, Nausea and Nausea and Vomiting     Objective:  No vitals for this virtual visit  Constitutional: Pleasant woman in NAD  Eyes: Icteric  Respiratory:  Breathing comfortably on RA  Skin: jaundiced  Neuro: AOOX3, no asterixis  Psychiatric: normal mood and orientation    Labs:  Last Comprehensive Metabolic Panel:  Sodium   Date Value Ref Range Status   08/24/2021 138 133 - 144 mmol/L Final   06/07/2021 140 133 - 144 mmol/L Final     Potassium   Date Value Ref Range Status   08/24/2021 3.2 (L) 3.4 - 5.3 mmol/L Final   06/07/2021 3.4 3.4 - 5.3 mmol/L Final     Chloride   Date Value Ref Range Status   08/24/2021 102 94 - 109 mmol/L Final   06/07/2021 106 94 - 109 mmol/L Final     Carbon Dioxide   Date Value Ref Range Status   06/07/2021 28 20 - 32 mmol/L Final     Carbon Dioxide (CO2)   Date Value Ref Range Status   08/24/2021 30 20 - 32 mmol/L Final     Anion Gap   Date Value Ref Range Status   08/24/2021 6 3 - 14 mmol/L Final   06/07/2021 6 3 - 14 mmol/L Final     Glucose   Date Value Ref Range Status   08/24/2021 134 (H) 70 - 99 mg/dL Final   06/07/2021 138 (H) 70 - 99 mg/dL Final     Urea Nitrogen   Date Value Ref Range Status   08/24/2021 12 7 - 30 mg/dL Final   06/07/2021 14 7 - 30 mg/dL Final     Creatinine   Date Value Ref Range Status   08/24/2021 1.01 0.52 - 1.04 mg/dL Final   06/07/2021 0.96 0.52 - 1.04 mg/dL Final     GFR Estimate   Date Value Ref Range Status   08/24/2021 65 >60 mL/min/1.73m2 Final     Comment:     As of July 11, 2021, eGFR is calculated by the CKD-EPI creatinine equation, without race adjustment. eGFR can be influenced by muscle mass, exercise, and diet. The reported eGFR is an estimation only and is only applicable if the renal function is stable.   06/07/2021 68 >60 mL/min/[1.73_m2] Final     Comment:     Non  GFR Calc  Starting 12/18/2018, serum creatinine based estimated GFR (eGFR) will be   calculated using the Chronic Kidney Disease Epidemiology Collaboration   (CKD-EPI) equation.       Calcium   Date Value Ref Range Status   08/24/2021 8.9 8.5 - 10.1 mg/dL Final   06/07/2021 8.7 8.5 - 10.1 mg/dL Final     Bilirubin  Total   Date Value Ref Range Status   08/24/2021 8.4 (H) 0.2 - 1.3 mg/dL Final   06/07/2021 5.6 (H) 0.2 - 1.3 mg/dL Final     Alkaline Phosphatase   Date Value Ref Range Status   08/24/2021 136 40 - 150 U/L Final   06/07/2021 150 40 - 150 U/L Final     ALT   Date Value Ref Range Status   08/24/2021 26 0 - 50 U/L Final   06/07/2021 18 0 - 50 U/L Final     AST   Date Value Ref Range Status   08/24/2021 29 0 - 45 U/L Final   06/07/2021 35 0 - 45 U/L Final       Lab Results   Component Value Date    WBC 8.3 12/19/2020     Lab Results   Component Value Date    RBC 2.82 12/19/2020     Lab Results   Component Value Date    HGB 9.1 12/19/2020     Lab Results   Component Value Date    HCT 29.2 12/19/2020     Lab Results   Component Value Date     12/19/2020     Lab Results   Component Value Date    MCH 32.3 12/19/2020     Lab Results   Component Value Date    MCHC 31.2 12/19/2020     Lab Results   Component Value Date    RDW 18.9 12/19/2020     Lab Results   Component Value Date     12/19/2020       INR   Date Value Ref Range Status   08/24/2021 1.78 (H) 0.85 - 1.15 Final     Comment:     Effective 7/11/2021, the reference range for this assay has changed.   06/07/2021 1.73 (H) 0.86 - 1.14 Final       MELD-Na score: 21 at 8/24/2021 11:57 AM  MELD score: 21 at 8/24/2021 11:57 AM  Calculated from:  Serum Creatinine: 1.01 mg/dL at 8/24/2021 11:57 AM  Serum Sodium: 138 mmol/L (Using max of 137 mmol/L) at 8/24/2021 11:57 AM  Total Bilirubin: 8.4 mg/dL at 8/24/2021 11:57 AM  INR(ratio): 1.78 at 8/24/2021 11:57 AM  Age: 51 years     6/2019  Hepatitis B Sag negative  Hepatitis C Ab negative  Hepatitis A IgG negative    10/2020  DAISY negative  ASMA negative  AFP 4.1    Imaging:    RUQ US Doppler 12/15/2020    Findings:  Liver: The liver demonstrates diffuse coarsened echotexture with  increased echogenicity. Nodular contour to the liver margins. No  evidence of a focal hepatic mass.      Extrahepatic portal vein flow is  retrograde at 34 cm/s.  Right portal vein flow is retrograde, measuring 36 cm/s.  Left portal vein flow is antegrade, measuring 45 cm/s.     Flow in the hepatic artery is towards the liver and:  315  cm/s peak systolic  0.47 resistive index.      Recanalized paraumbilical vein.     The splenic vein is patent and flow is towards the liver.  The left,  middle, and right hepatic veins are patent with flow towards the IVC.  Flattening of waveforms secondary to underlying liver disease. The IVC  is patent with flow towards the heart.   The visualized aorta is not  dilated.     Gallbladder: Mildly thickened gallbladder wall 3.3 mm. There is no  pericholecystic fluid, positive sonographic Lema's sign or evidence  for cholelithiasis     Bile Ducts: No definite intrahepatic biliary dilatation. The common  bile duct is not visualized in this study.     Pancreas: Not visualized in this study.      Right kidney: Normal echotexture, without mass or hydronephrosis.  Measuring 11.7 cm.     Fluid: Partially visualized right pleural effusion, trace ascites  surrounding the liver.                                                                Impression:   1.  Cirrhotic morphology of the liver.  2.  Reversal of flow in the main portal vein, right portal vein, and  recanalization of the paraumbilical vein, compatible with portal  hypertension  3.  Decreased resistive index of the hepatic artery and increased peak  systolic velocity possibly secondary to stenosis or shunting.  4.  Partially visualized right pleural effusion and trace perihepatic  ascites.    Endoscopy:    12/4/2020 EGD    Findings:       Diffuse erythema was found in the entire esophagus.       The exam of the esophagus was otherwise normal. NO varices.       Severe portal hypertensive gastropathy was found in the entire examined        stomach. Most prominent within the gastric antrum. No definitive        residual GAVE identified. Coagulation for bleeding  prevention using        argon plasma (1 liter/minute and 60 dawkins) of the gastric antrum was        successful.       Diffuse congested mucosa without active bleeding and with no stigmata of        bleeding was found in the entire duodenum.    Impressions/Post-Op Diagnosis:       - Erythema in the esophagus.       - Portal hypertensive gastropathy. Treated with argon plasma coagulation        (APC).       - Congested duodenal mucosa.    Recommendation:       - Continue pantoprazole 40 mg daily and sucralfate 1 gram twice daily.       - Iron supplementation twice daily.       - Avoid NSAIDs.    12/4/2020 Colonoscopy    Findings:       Hemorrhoids were found on perianal exam.       The terminal ileum appeared normal.       An area of grossly congested mucosa was found in the entire colon.       A 4 mm polyp was found in the cecum. The polyp was sessile. The polyp        was removed with a cold biopsy forceps. Resection and retrieval were        complete.       Non-bleeding internal hemorrhoids were found during retroflexion.    Impressions/Post-Op Diagnosis:       - Hemorrhoids found on perianal exam.       - The examined portion of the ileum was normal.       - Congested mucosa in the entire examined colon.       - One 4 mm polyp in the cecum, removed with a cold biopsy forceps.        Resected and retrieved.       - Non-bleeding internal hemorrhoids.    A) COLON, CECUM, POLYPECTOMY:  1. Colonic mucosa with no diagnostic abnormalities; a lymphoid aggregate is present  2. Negative for serrated change, dysplasia, and malignancy    Assessment/Plan: Ms. Fisher is a 51 year old woman with a history of alcohol related cirrhosis c/b ascites, hemochromoatosis compound heterozygote, hepatic hydrothorax c/b SBE, unprovoked left internal jugular DVT, concern for hemolysis, hypothyroidism. She was listed for transplant, put on hold 8/17 for + COVID. She is asymptomatic and doing well.     She was diagnosed with hemochromatosis,  iron supplementation stopped and developed transfusion dependent LATOYA likely from GAVE. Initial concern for hemolysis, but labs not c/w this. She denies any overt blood loss.     MELD-Na score: 21 at 8/24/2021 11:57 AM  MELD score: 21 at 8/24/2021 11:57 AM  Calculated from:  Serum Creatinine: 1.01 mg/dL at 8/24/2021 11:57 AM  Serum Sodium: 138 mmol/L (Using max of 137 mmol/L) at 8/24/2021 11:57 AM  Total Bilirubin: 8.4 mg/dL at 8/24/2021 11:57 AM  INR(ratio): 1.78 at 8/24/2021 11:57 AM  Age: 51 years     - LATOYA: continue daily PO iron (given delays in getting IV iron with + COVID), IV Iron per hematology, transfuse for Hgb < 8. Continue folate  - Continue lasix 60, spironolactone to 150 mg daily. Increase KCl to 30 daily  - Continue cipro for SBP ppx  - Continue midodrine 5 mg BID - concern that stopping this and dropping her blood pressure would make her ascites/HH worse, so will continue for now. Should check BP 1-2x/week  - HCC screening: AFP normal 4/2021, due for RUQ US  - Variceal screening: EGD 12/2020 without varices  - HE: can continue lactulose and rifaximin, unclear if she had HE or delirium related to underlying infection     Plan to reactivate for + COVID if repeat test in 14 days negative, and followed by second negative test at least 48 hrs if 1st negative. She has been asymptomatic    RV 2 months    Fadia Avila MD MSc  Transplant Hepatology  [facility]          Again, thank you for allowing me to participate in the care of your patient.        Sincerely,        Fadia Avila MD

## 2021-08-25 DIAGNOSIS — E61.1 IRON DEFICIENCY: Primary | ICD-10-CM

## 2021-08-25 RX ORDER — FERROUS SULFATE 220 (44)/5
6.8 SOLUTION, ORAL ORAL 2 TIMES DAILY
Qty: 450 ML | Refills: 1 | Status: ON HOLD | OUTPATIENT
Start: 2021-08-25 | End: 2021-10-29

## 2021-08-26 ENCOUNTER — HOSPITAL ENCOUNTER (OUTPATIENT)
Dept: ULTRASOUND IMAGING | Facility: CLINIC | Age: 51
End: 2021-08-26
Attending: STUDENT IN AN ORGANIZED HEALTH CARE EDUCATION/TRAINING PROGRAM
Payer: COMMERCIAL

## 2021-08-26 ENCOUNTER — HOSPITAL ENCOUNTER (OUTPATIENT)
Facility: CLINIC | Age: 51
Discharge: HOME | End: 2021-08-26
Attending: STUDENT IN AN ORGANIZED HEALTH CARE EDUCATION/TRAINING PROGRAM
Payer: COMMERCIAL

## 2021-08-26 VITALS
HEART RATE: 103 BPM | RESPIRATION RATE: 16 BRPM | SYSTOLIC BLOOD PRESSURE: 112 MMHG | TEMPERATURE: 98.3 F | DIASTOLIC BLOOD PRESSURE: 61 MMHG

## 2021-08-26 DIAGNOSIS — K70.31 ALCOHOLIC CIRRHOSIS OF LIVER WITH ASCITES (H): ICD-10-CM

## 2021-08-26 LAB
% LINING CELLS, BODY FLUID: 8 %
APPEARANCE FLD: ABNORMAL
COLOR FLD: YELLOW
GRAM STAIN RESULT: NORMAL
GRAM STAIN RESULT: NORMAL
LYMPHOCYTES NFR FLD MANUAL: 67 %
MONOS+MACROS NFR FLD MANUAL: 11 %
NEUTS BAND NFR FLD MANUAL: 14 %
PROT FLD-MCNC: 0.9 G/DL
WBC # FLD AUTO: 67 /UL

## 2021-08-26 PROCEDURE — 84157 ASSAY OF PROTEIN OTHER: CPT | Performed by: STUDENT IN AN ORGANIZED HEALTH CARE EDUCATION/TRAINING PROGRAM

## 2021-08-26 PROCEDURE — 87070 CULTURE OTHR SPECIMN AEROBIC: CPT | Performed by: STUDENT IN AN ORGANIZED HEALTH CARE EDUCATION/TRAINING PROGRAM

## 2021-08-26 PROCEDURE — 89051 BODY FLUID CELL COUNT: CPT | Performed by: STUDENT IN AN ORGANIZED HEALTH CARE EDUCATION/TRAINING PROGRAM

## 2021-08-26 PROCEDURE — 87205 SMEAR GRAM STAIN: CPT | Performed by: STUDENT IN AN ORGANIZED HEALTH CARE EDUCATION/TRAINING PROGRAM

## 2021-08-26 PROCEDURE — 49083 ABD PARACENTESIS W/IMAGING: CPT

## 2021-08-26 PROCEDURE — 89050 BODY FLUID CELL COUNT: CPT | Performed by: STUDENT IN AN ORGANIZED HEALTH CARE EDUCATION/TRAINING PROGRAM

## 2021-08-26 PROCEDURE — 999N000154 HC STATISTIC RADIOLOGY XRAY, US, CT, MAR, NM

## 2021-08-26 PROCEDURE — 272N000706 US PARACENTESIS

## 2021-08-26 RX ORDER — LIDOCAINE 40 MG/G
CREAM TOPICAL
Status: DISCONTINUED | OUTPATIENT
Start: 2021-08-26 | End: 2021-08-26 | Stop reason: HOSPADM

## 2021-08-26 RX ORDER — ALBUMIN (HUMAN) 12.5 G/50ML
12.5 SOLUTION INTRAVENOUS ONCE
Status: DISCONTINUED | OUTPATIENT
Start: 2021-08-26 | End: 2021-08-26 | Stop reason: HOSPADM

## 2021-08-26 RX ORDER — LIDOCAINE HYDROCHLORIDE 10 MG/ML
10 INJECTION, SOLUTION EPIDURAL; INFILTRATION; INTRACAUDAL; PERINEURAL ONCE
Status: COMPLETED | OUTPATIENT
Start: 2021-08-26 | End: 2021-08-26

## 2021-08-26 RX ADMIN — LIDOCAINE HYDROCHLORIDE 10 ML: 10 INJECTION, SOLUTION EPIDURAL; INFILTRATION; INTRACAUDAL; PERINEURAL at 09:27

## 2021-08-26 NOTE — DISCHARGE INSTRUCTIONS

## 2021-08-26 NOTE — PROGRESS NOTES
Plan per Dr. Avila: Plan to reactivate for + COVID if repeat test in 14 days negative, and followed by second negative test at least 48 hrs if 1st negative. She has been asymptomatic   These appointments are scheduled 8/31 and 9/2, and lab orders placed.

## 2021-08-26 NOTE — PROGRESS NOTES
Patient Wellness Screening  1. In the last month, have you been in contact with someone who was confirmed or suspected to have Coronavirus/COVID-19? Yes,positive covid test 9/17/2021    2. Do you have the following symptoms?  Fever/Chills? No   Cough? No   Shortness of breath? No   New loss of taste or smell? No  Sore throat? No  Muscle or body aches? No  Headaches? No  Fatigue? No  Vomiting or diarrhea? No    Admission Note:  Reason for admission:  Time of arrival:0845    Accompanied by: self  Name/phone of discontinue : driving self    Pre-procedure assessment complete: Yes  If abnormal assessment/labs, provider notified: N/A    Medications held per instructions/orders: N/A  Procedure explained. All questions & concerns addressed: Yes  Consent: obtained    Discharge instructions reviewed: Yes  Patient verbalizes understanding: Yes    Paracentesis:   Patient tolerated well. VSS. 3300 cc yellow fluid removed from abdomen w/o difficulty. Bandaid applied to site - CDI.   Albumin given per protocol / standing orders. No albumin given.    .        Discharge Note:  Discharge criteria met and vital signs stable: Yes, denies any c/o, ambulatory in room  0968 Patient discharged per ambulatory to private vehicle. All personal belongings taken with patient.

## 2021-08-26 NOTE — PROCEDURES
[facility]    Procedure: IR Procedure Note    Date/Time: 8/26/2021 10:00 AM  Performed by: Daniella Logan MD  Authorized by: Daniella Logan MD     UNIVERSAL PROTOCOL   Site Marked: NA  Prior Images Obtained and Reviewed:  Yes  Required items: Required blood products, implants, devices and special equipment available    Patient identity confirmed:  Verbally with patient, arm band, provided demographic data and hospital-assigned identification number  Patient was reevaluated immediately before administering moderate or deep sedation or anesthesia  Confirmation Checklist:  Patient's identity using two indicators, relevant allergies, procedure was appropriate and matched the consent or emergent situation and correct equipment/implants were available  Time out: Immediately prior to the procedure a time out was called    Universal Protocol: the Joint Commission Universal Protocol was followed    Preparation: Patient was prepped and draped in usual sterile fashion           ANESTHESIA    Anesthesia: Local infiltration  Local Anesthetic:  Lidocaine 1% without epinephrine      SEDATION    Patient Sedated: No    See dictated procedure note for full details.  Findings: Moderate ascites    Specimens: fluid and/or tissue for laboratory analysis and culture and fluid and/or tissue for gram stain and culture    Complications: None    Condition: Stable    PROCEDURE   Patient Tolerance:  Patient tolerated the procedure well with no immediate complications    Length of time physician/provider present for 1:1 monitoring during sedation: 0

## 2021-08-30 ENCOUNTER — OFFICE VISIT (OUTPATIENT)
Dept: PEDIATRICS | Facility: CLINIC | Age: 51
End: 2021-08-30
Payer: COMMERCIAL

## 2021-08-30 VITALS
TEMPERATURE: 98.4 F | SYSTOLIC BLOOD PRESSURE: 112 MMHG | DIASTOLIC BLOOD PRESSURE: 60 MMHG | HEART RATE: 111 BPM | RESPIRATION RATE: 20 BRPM | HEIGHT: 67 IN | WEIGHT: 179 LBS | BODY MASS INDEX: 28.09 KG/M2 | OXYGEN SATURATION: 100 %

## 2021-08-30 DIAGNOSIS — E87.6 HYPOKALEMIA: ICD-10-CM

## 2021-08-30 DIAGNOSIS — E03.8 SUBCLINICAL HYPOTHYROIDISM: ICD-10-CM

## 2021-08-30 DIAGNOSIS — K64.4 EXTERNAL HEMORRHOIDS: ICD-10-CM

## 2021-08-30 DIAGNOSIS — Z01.818 PREOP GENERAL PHYSICAL EXAM: Primary | ICD-10-CM

## 2021-08-30 DIAGNOSIS — D50.0 IRON DEFICIENCY ANEMIA DUE TO CHRONIC BLOOD LOSS: ICD-10-CM

## 2021-08-30 DIAGNOSIS — K21.9 GASTROESOPHAGEAL REFLUX DISEASE WITHOUT ESOPHAGITIS: ICD-10-CM

## 2021-08-30 DIAGNOSIS — K70.31 ALCOHOLIC CIRRHOSIS OF LIVER WITH ASCITES (H): ICD-10-CM

## 2021-08-30 PROCEDURE — 99214 OFFICE O/P EST MOD 30 MIN: CPT | Performed by: NURSE PRACTITIONER

## 2021-08-30 RX ORDER — HYDROCORTISONE ACETATE 25 MG/1
25 SUPPOSITORY RECTAL 2 TIMES DAILY
Qty: 24 SUPPOSITORY | Refills: 0 | Status: ON HOLD | OUTPATIENT
Start: 2021-08-30 | End: 2021-10-29

## 2021-08-30 ASSESSMENT — MIFFLIN-ST. JEOR: SCORE: 1459.57

## 2021-08-30 NOTE — PATIENT INSTRUCTIONS

## 2021-08-30 NOTE — PROGRESS NOTES
[facility]  [address]  [address]  [place] [address]  Phone: [phone]  Fax: [phone]  Primary Provider: Christine Nuñez  Pre-op Performing Provider: CHRISTINE NUÑEZ      PREOPERATIVE EVALUATION:  Today's date: 8/30/2021    Sarah Fisher is a 51 year old female who presents for a preoperative evaluation.    Surgical Information:  Surgery/Procedure: Endoscopy  Surgery Location: [facility]  Surgeon: Fox Jerry MD  Surgery Date: Sept 9 2021  Time of Surgery: 11:15 am  Where patient plans to recover: At home with family  Fax number for surgical facility: Note does not need to be faxed, will be available electronically in Epic.    Type of Anesthesia Anticipated: to be determined      Assessment & Plan     The proposed surgical procedure is considered LOW risk.    Preop general physical exam    Alcoholic cirrhosis of liver with ascites (H)    Iron deficiency anemia due to chronic blood loss  On oral iron supplementation with plans to start iron infusions next week. Last CBC on 8/24, hemoglobin 9.0.     Hypokalemia  BMP on 8/24 revealed potassium 3.2, oral potassium supplement increased at that time to 30 mEq daily. Taking as prescribed.    Gastroesophageal reflux disease without esophagitis  Stable on PPI.    Subclinical hypothyroidism  Taking levothyroxine 50 mcg daily. TSH/T4 last checked 3/17/2021, results interestingly revealed elevated TSH and T4, recommended recheck in 1-2 weeks to confirm, however patient never followed up as recommended. Recheck with future labs tomorrow. If TSH and T4 both remain elevated, recommend follow-up with endocrinology, e-consult would be appropriate.   - TSH; Future  - T4, free; Future    External hemorrhoids  Pt requesting refill of hydrocortisone suppository, which work really well for her.   - hydrocortisone (ANUSOL-HC) 25 MG suppository; Place 1 suppository (25 mg) rectally 2 times daily       Risks and  Recommendations:  The patient has the following additional risks and recommendations for perioperative complications:   - No identified additional risk factors other than previously addressed    Medication Instructions:  Patient is to HOLD all scheduled medications on the day of surgery EXCEPT for modifications listed below:    Plans to take furosemide, spironolactone, and BID medications including midodrine and rifaximin.   Holding all other daily medications until after procedure.     RECOMMENDATION:  APPROVAL GIVEN to proceed with proposed procedure, without further diagnostic evaluation.        33 minutes spent on the date of the encounter doing chart review, review of test results, patient visit and documentation         Subjective     HPI related to upcoming procedure: ESOPHAGOGASTRODUODENOSCOPY    Preop Questions 8/30/2021   1. Have you ever had a heart attack or stroke? No   2. Have you ever had surgery on your heart or blood vessels, such as a stent placement, a coronary artery bypass, or surgery on an artery in your head, neck, heart, or legs? No   3. Do you have chest pain with activity? No   4. Do you have a history of  heart failure? No   5. Do you currently have a cold, bronchitis or symptoms of other infection? No   6. Do you have a cough, shortness of breath, or wheezing? No   7. Do you or anyone in your family have previous history of blood clots? UNKNOWN family Hx.    8. Do you or does anyone in your family have a serious bleeding problem such as prolonged bleeding following surgeries or cuts? No   9. Have you ever had problems with anemia or been told to take iron pills? YES - Currently taking liquid iron, next week will start iron infusions   10. Have you had any abnormal blood loss such as black, tarry or bloody stools, or abnormal vaginal bleeding? No   11. Have you ever had a blood transfusion? YES    11a. Have you ever had a transfusion reaction? No   12. Are you willing to have a blood  transfusion if it is medically needed before, during, or after your surgery? Yes   13. Have you or any of your relatives ever had problems with anesthesia? No   14. Do you have sleep apnea, excessive snoring or daytime drowsiness? No   15. Do you have any artifical heart valves or other implanted medical devices like a pacemaker, defibrillator, or continuous glucose monitor? No   16. Do you have artificial joints? No   17. Are you allergic to latex? No   18. Is there any chance that you may be pregnant? No       Health Care Directive:  Patient has a Health Care Directive on file      Preoperative Review of :   reviewed - no record of controlled substances prescribed.      Patient Active Problem List   Diagnosis     Acute deep vein thrombosis (DVT) of upper extremity (H)     Acute hepatic encephalopathy     Acute hypoxemic respiratory failure (H)     Acute kidney injury superimposed on chronic kidney disease (H)     Carrier of group B Streptococcus     Cirrhosis of liver with ascites (H)     CKD (chronic kidney disease)     Elevated blood pressure     Family history of colon cancer     Gastroesophageal reflux disease without esophagitis     GI (gastrointestinal bleed)     Hyponatremia     Hypokalemia     History of DVT (deep vein thrombosis)     Anemia associated with acute blood loss     Acute anemia     Need for vaccination for viral hepatitis     Macrocytosis without anemia     Knee pain     Pancytopenia (H)     Macrocytic anemia     Blood loss anemia     Hydrothorax     Portal hypertensive gastropathy (H)     Pleural effusion     Screening for malignant neoplasm of cervix     Venous incompetence     Varicose veins of leg with pain     Supervision of high-risk pregnancy of elderly primigravida     Subclinical hypothyroidism     Liver failure (H)     Hypotension, unspecified hypotension type     Pre-liver transplant, listed     Alcoholic cirrhosis (H)     Abnormal serum iron level     Iron deficiency anemia  due to chronic blood loss     Past Medical History:   Diagnosis Date     Ascites      History of blood transfusion      Liver cirrhosis (H)      Thyroid disease      Past Surgical History:   Procedure Laterality Date      SECTION       CV RIGHT HEART CATH MEASUREMENTS RECORDED N/A 2021    Procedure: CV RIGHT HEART CATH;  Surgeon: Joseph Guevara MD;  Location:  HEART CARDIAC CATH LAB     IR THORACENTESIS  2021     IR THORACENTESIS  2021     THORACENTESIS Left 2021    Procedure: THORACENTESIS;  Surgeon: Almas Irby MD;  Location: [facility]     THORACENTESIS N/A 2021    Procedure: THORACENTESIS;  Surgeon: Almas Irby MD;  Location:  GI     THORACENTESIS N/A 03/10/2021    Procedure: THORACENTESIS;  Surgeon: Almas Irby MD;  Location:  GI     THORACENTESIS Left 2021    Procedure: THORACENTESIS;  Surgeon: Kennedi Chavez MD;  Location: [facility] OR     THORACENTESIS Left 2021    Procedure: THORACENTESIS;  Surgeon: Jess Ansari PA-C;  Location: [facility] OR     Family History   Problem Relation Age of Onset     No Known Problems Mother      Colon Cancer Father 58         age 63     Breast Cancer Maternal Grandmother         Diagnosed in her 60's     No Known Problems Maternal Grandfather      No Known Problems Paternal Grandmother      No Known Problems Paternal Grandfather      Substance Abuse Brother      No Known Problems Sister      No Known Problems Son      Substance Abuse Brother      Social History     Socioeconomic History     Marital status: Single     Spouse name: Not on file     Number of children: Not on file     Years of education: Not on file     Highest education level: Master's degree (e.g., MA, MS, Roberto, MEd, MSW, SASHA)   Occupational History     Not on file   Tobacco Use     Smoking status: Never Smoker     Smokeless tobacco: Never Used   Substance and Sexual Activity     Alcohol use: Not Currently     Comment: Quit on 2020      Drug use: Not Currently     Sexual activity: Not Currently     Partners: Male   Other Topics Concern     Parent/sibling w/ CABG, MI or angioplasty before 65F 55M? Not Asked   Social History Narrative    Lives [place]. 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 12 year sonFlakito.         Christine Harris, DNP, APRN, CNP    3/17/2021     Social Determinants of Health     Financial Resource Strain: Low Risk      Difficulty of Paying Living Expenses: Not hard at all   Food Insecurity: No Food Insecurity     Worried About Running Out of Food in the Last Year: Never true     Ran Out of Food in the Last Year: Never true   Transportation Needs: No Transportation Needs     Lack of Transportation (Medical): No     Lack of Transportation (Non-Medical): No   Physical Activity: Sufficiently Active     Days of Exercise per Week: 5 days     Minutes of Exercise per Session: 30 min   Stress: No Stress Concern Present     Feeling of Stress : Only a little   Social Connections: Moderately Integrated     Frequency of Communication with Friends and Family: More than three times a week     Frequency of Social Gatherings with Friends and Family: More than three times a week     Attends Hinduism Services: More than 4 times per year     Active Member of Clubs or Organizations: Yes     Attends Club or Organization Meetings: More than 4 times per year     Marital Status: Never    Intimate Partner Violence:      Fear of Current or Ex-Partner:      Emotionally Abused:      Physically Abused:      Sexually Abused:      Current Outpatient Medications   Medication     ciprofloxacin (CIPRO) 500 MG tablet     Ferrous Sulfate 300 MG/6.8ML SOLN     folic acid (FOLVITE) 1 MG tablet     furosemide (LASIX) 20 MG tablet     hydrocortisone (ANUSOL-HC) 25 MG suppository     hydrocortisone, Perianal, (HYDROCORTISONE) 2.5 % cream     levothyroxine (SYNTHROID/LEVOTHROID) 50 MCG tablet     midodrine  "(PROAMATINE) 5 MG tablet     multivitamin w/minerals (THERA-VIT-M) tablet     pantoprazole (PROTONIX) 20 MG EC tablet     potassium chloride ER (KLOR-CON M15) 15 MEQ CR tablet     rifaximin (XIFAXAN) 550 MG TABS tablet     spironolactone (ALDACTONE) 50 MG tablet     traZODone (DESYREL) 50 MG tablet     Vitamin D3 (CHOLECALCIFEROL) 25 mcg (1000 units) tablet     zinc sulfate (ZINCATE) 220 (50 Zn) MG capsule     No current facility-administered medications for this visit.        Allergies   Allergen Reactions     Amoxicillin GI Disturbance, Diarrhea, Nausea and Nausea and Vomiting         Review of Systems  CONSTITUTIONAL: NEGATIVE for fever, chills, change in weight  INTEGUMENTARY/SKIN: NEGATIVE for worrisome rashes, moles or lesions  EYES: NEGATIVE for vision changes or irritation  ENT/MOUTH: NEGATIVE for ear, mouth and throat problems  RESP: NEGATIVE for significant cough or SOB  CV: NEGATIVE for chest pain, palpitations or peripheral edema different from baseline.  GI: NEGATIVE for nausea, abdominal pain, heartburn, or change in bowel habits  : NEGATIVE for frequency, dysuria, or hematuria  MUSCULOSKELETAL: NEGATIVE for significant arthralgias or myalgia  NEURO: NEGATIVE for weakness, dizziness or paresthesias  ENDOCRINE: NEGATIVE for temperature intolerance, skin/hair changes  HEME: NEGATIVE for bleeding problems  PSYCHIATRIC: NEGATIVE for changes in mood or affect        Objective     /60 (BP Location: Right arm, Patient Position: Sitting, Cuff Size: Adult Small)   Pulse 111   Temp 98.4  F (36.9  C) (Tympanic)   Resp 20   Ht 1.702 m (5' 7\")   Wt 81.2 kg (179 lb)   SpO2 100%   BMI 28.04 kg/m      Physical Exam  Constitutional: appears to be in no acute distress, comfortable, pleasant.   Eyes: anicteric, conjunctiva clear without drainage or erythema. WILLIS. Scleral jaundice present.  Ears, Nose and Throat: tympanic membranes gray with LR,  nose without nasal discharge. OP: no erythema to posterior " pharynx, negative post nasal drainage, tonsils +1 no erythema or exudate.  Neck: supple, thyroid palpable,not enlarged, no nodules   Cardiovascular: regular rate and rhythm, normal S1 and S2, no murmurs, rubs or gallops, peripheral pulses full and symmetric; 2+ bilateral pitting lower extremity edema   Respiratory: Air entry throughout. Breathing pattern unlabored without the use of accessory muscles. Clear to auscultation A and P, no wheezes or crackles, normal breath sounds.    Gastrointestinal: rounded, firm and distended consistent with history of ascites. Positive bowel sounds x4, nontender, no masses.   Musculoskeletal: full range of motion, no edema.   Skin: pink, turgor smooth and elastic. Negative for lesions or dryness.  Neurological: normal gait, no tremor.   Psychological: appropriate mood and affect.   Lymphatic: no cervical, axillary, supraclavicular, or infraclavicular lymphadenopathy.      Recent Labs   Lab Test 08/24/21  1157 08/17/21  1122 08/02/21  1148 07/30/21  1052 04/15/21  1337 03/17/21  1143   HGB 9.0* 6.8*   < >  --    < >  --    PLT 99* 159   < >  --    < >  --    INR 1.78* 1.82*  --   --    < >  --     140  --  135   < > 134   POTASSIUM 3.2*  --   --  3.8   < > 3.4   CR 1.01 1.04  --  1.07*   < > 1.01   A1C  --   --   --   --   --  4.2    < > = values in this interval not displayed.        Diagnostics:  No labs were ordered during this visit.   No EKG required, no history of coronary heart disease, significant arrhythmia, peripheral arterial disease or other structural heart disease.    Revised Cardiac Risk Index (RCRI):  The patient has the following serious cardiovascular risks for perioperative complications:   - No serious cardiac risks = 0 points     RCRI Interpretation: 0 points: Class I (very low risk - 0.4% complication rate)      Signed Electronically by: ROBYN Bear CNP  Copy of this evaluation report is provided to requesting physician.

## 2021-08-30 NOTE — PROGRESS NOTES
Late note from phone call 8/27/21: Spoke to patient and discussed increase in potassium, lasix and spironolactone and plan of care regarding positive covid. Medication list reviewed and updated in Epic. Patient verbalized understanding of the plan.

## 2021-08-31 ENCOUNTER — PATIENT OUTREACH (OUTPATIENT)
Dept: GASTROENTEROLOGY | Facility: CLINIC | Age: 51
End: 2021-08-31

## 2021-08-31 ENCOUNTER — LAB (OUTPATIENT)
Dept: LAB | Facility: CLINIC | Age: 51
End: 2021-08-31
Payer: COMMERCIAL

## 2021-08-31 DIAGNOSIS — K70.31 ALCOHOLIC CIRRHOSIS OF LIVER WITH ASCITES (H): Primary | ICD-10-CM

## 2021-08-31 DIAGNOSIS — K70.31 ALCOHOLIC CIRRHOSIS OF LIVER WITH ASCITES (H): ICD-10-CM

## 2021-08-31 DIAGNOSIS — D64.9 ANEMIA, UNSPECIFIED TYPE: ICD-10-CM

## 2021-08-31 DIAGNOSIS — K70.30 ALCOHOLIC CIRRHOSIS (H): ICD-10-CM

## 2021-08-31 DIAGNOSIS — E03.8 SUBCLINICAL HYPOTHYROIDISM: ICD-10-CM

## 2021-08-31 LAB
ABO/RH(D): NORMAL
ANION GAP SERPL CALCULATED.3IONS-SCNC: 8 MMOL/L (ref 3–14)
ANTIBODY SCREEN: NEGATIVE
BACTERIA FLD CULT: NO GROWTH
BUN SERPL-MCNC: 19 MG/DL (ref 7–30)
CALCIUM SERPL-MCNC: 8.9 MG/DL (ref 8.5–10.1)
CHLORIDE BLD-SCNC: 97 MMOL/L (ref 94–109)
CO2 SERPL-SCNC: 26 MMOL/L (ref 20–32)
CREAT SERPL-MCNC: 1.06 MG/DL (ref 0.52–1.04)
ERYTHROCYTE [DISTWIDTH] IN BLOOD BY AUTOMATED COUNT: 23.2 % (ref 10–15)
GFR SERPL CREATININE-BSD FRML MDRD: 61 ML/MIN/1.73M2
GLUCOSE BLD-MCNC: 94 MG/DL (ref 70–99)
HCT VFR BLD AUTO: 29.1 % (ref 35–47)
HGB BLD-MCNC: 8.9 G/DL (ref 11.7–15.7)
MCH RBC QN AUTO: 29.4 PG (ref 26.5–33)
MCHC RBC AUTO-ENTMCNC: 30.6 G/DL (ref 31.5–36.5)
MCV RBC AUTO: 96 FL (ref 78–100)
PLATELET # BLD AUTO: 106 10E3/UL (ref 150–450)
POTASSIUM BLD-SCNC: 3.8 MMOL/L (ref 3.4–5.3)
RBC # BLD AUTO: 3.03 10E6/UL (ref 3.8–5.2)
SARS-COV-2 RNA RESP QL NAA+PROBE: NEGATIVE
SODIUM SERPL-SCNC: 131 MMOL/L (ref 133–144)
SPECIMEN EXPIRATION DATE: NORMAL
T4 FREE SERPL-MCNC: 1.45 NG/DL (ref 0.76–1.46)
TSH SERPL DL<=0.005 MIU/L-ACNC: 4.58 MU/L (ref 0.4–4)
WBC # BLD AUTO: 8.3 10E3/UL (ref 4–11)

## 2021-08-31 PROCEDURE — 86901 BLOOD TYPING SEROLOGIC RH(D): CPT | Mod: 90 | Performed by: PATHOLOGY

## 2021-08-31 PROCEDURE — 36415 COLL VENOUS BLD VENIPUNCTURE: CPT | Performed by: PATHOLOGY

## 2021-08-31 PROCEDURE — 86850 RBC ANTIBODY SCREEN: CPT | Mod: 90 | Performed by: PATHOLOGY

## 2021-08-31 PROCEDURE — U0005 INFEC AGEN DETEC AMPLI PROBE: HCPCS | Mod: 90 | Performed by: PATHOLOGY

## 2021-08-31 PROCEDURE — 84443 ASSAY THYROID STIM HORMONE: CPT | Performed by: PATHOLOGY

## 2021-08-31 PROCEDURE — 80048 BASIC METABOLIC PNL TOTAL CA: CPT | Performed by: PATHOLOGY

## 2021-08-31 PROCEDURE — 86900 BLOOD TYPING SEROLOGIC ABO: CPT | Mod: 90 | Performed by: PATHOLOGY

## 2021-08-31 PROCEDURE — 84439 ASSAY OF FREE THYROXINE: CPT | Performed by: PATHOLOGY

## 2021-08-31 PROCEDURE — U0003 INFECTIOUS AGENT DETECTION BY NUCLEIC ACID (DNA OR RNA); SEVERE ACUTE RESPIRATORY SYNDROME CORONAVIRUS 2 (SARS-COV-2) (CORONAVIRUS DISEASE [COVID-19]), AMPLIFIED PROBE TECHNIQUE, MAKING USE OF HIGH THROUGHPUT TECHNOLOGIES AS DESCRIBED BY CMS-2020-01-R: HCPCS | Mod: 90 | Performed by: PATHOLOGY

## 2021-08-31 PROCEDURE — 85027 COMPLETE CBC AUTOMATED: CPT | Performed by: PATHOLOGY

## 2021-08-31 PROCEDURE — 80321 ALCOHOLS BIOMARKERS 1OR 2: CPT | Mod: 90 | Performed by: PATHOLOGY

## 2021-08-31 RX ORDER — LIDOCAINE 40 MG/G
CREAM TOPICAL
Status: CANCELLED | OUTPATIENT
Start: 2021-08-31

## 2021-08-31 RX ORDER — ONDANSETRON 2 MG/ML
4 INJECTION INTRAMUSCULAR; INTRAVENOUS
Status: CANCELLED | OUTPATIENT
Start: 2021-08-31

## 2021-08-31 NOTE — LETTER
To whom this may concern,     I follow Ms. Sarah Douglas in clinic. I feel [facility] is too strenuous of an exercise regimen for her with her medical conditions, and would not recommend she complete any exercise at [employer].    Dr. Fadia Avila

## 2021-08-31 NOTE — PROGRESS NOTES
Patient called to discuss plan. Hemoglobin today 8.9 so transfusion not needed and cancelled for tomorrow. She got covid test today and again Thursday, today's results not in yet. Will check with endoscopy if covid test required prior to that now that recent positive covid. She reports that she has only occasional swelling in ankles at this time, and per PCP appointment yesterday her abdomen does not appear or feel to have ascites at this time. She had paracentesis on 8/26.    13-Dec-2018 07:59

## 2021-09-01 ENCOUNTER — MYC MEDICAL ADVICE (OUTPATIENT)
Dept: PEDIATRICS | Facility: CLINIC | Age: 51
End: 2021-09-01
Payer: COMMERCIAL

## 2021-09-01 DIAGNOSIS — E03.8 SUBCLINICAL HYPOTHYROIDISM: Primary | ICD-10-CM

## 2021-09-01 PROCEDURE — 99207 E-CONSULT TO ENDOCRINOLOGY (ADULT OUTPT PROVIDER TO SPECIALIST WRITTEN QUESTION & RESPONSE): CPT | Performed by: NURSE PRACTITIONER

## 2021-09-02 ENCOUNTER — LAB (OUTPATIENT)
Dept: LAB | Facility: CLINIC | Age: 51
End: 2021-09-02
Attending: INTERNAL MEDICINE
Payer: COMMERCIAL

## 2021-09-02 DIAGNOSIS — Z11.59 ENCOUNTER FOR SCREENING FOR OTHER VIRAL DISEASES: ICD-10-CM

## 2021-09-02 PROCEDURE — U0003 INFECTIOUS AGENT DETECTION BY NUCLEIC ACID (DNA OR RNA); SEVERE ACUTE RESPIRATORY SYNDROME CORONAVIRUS 2 (SARS-COV-2) (CORONAVIRUS DISEASE [COVID-19]), AMPLIFIED PROBE TECHNIQUE, MAKING USE OF HIGH THROUGHPUT TECHNOLOGIES AS DESCRIBED BY CMS-2020-01-R: HCPCS

## 2021-09-02 PROCEDURE — U0005 INFEC AGEN DETEC AMPLI PROBE: HCPCS

## 2021-09-03 DIAGNOSIS — K70.30 ALCOHOLIC CIRRHOSIS (H): Primary | ICD-10-CM

## 2021-09-03 LAB — SARS-COV-2 RNA RESP QL NAA+PROBE: NEGATIVE

## 2021-09-04 ENCOUNTER — E-CONSULT (OUTPATIENT)
Dept: ENDOCRINOLOGY | Facility: CLINIC | Age: 51
End: 2021-09-04
Payer: COMMERCIAL

## 2021-09-04 DIAGNOSIS — Z11.59 ENCOUNTER FOR SCREENING FOR OTHER VIRAL DISEASES: ICD-10-CM

## 2021-09-04 PROCEDURE — 99451 NTRPROF PH1/NTRNET/EHR 5/>: CPT

## 2021-09-04 NOTE — PROGRESS NOTES
ALL SMARTFIELDS MUST BE COMPLETED FOR PATIENT CARE AND BILLING    9/4/2021     E-Consult has been accepted.    Interprofessional consultation requested by:  Christine Harris APRN CNP      Clinical Question/Purpose: MY CLINICAL QUESTION IS: Patient with history of subclinical hypothyroidism, on levothyroxine 50 mcg daily. Labs on 3/7/2021 revealed TSH and T4 were both elevated. Recommended recheck in 1-2 weeks but patient failed to follow-up. TSH/T4 rechecked 8/31/21, TSH elevated with T4 normal but on high end of normal. Hesitant to increase levothyroxine for fear that T4 will bump into elevated range.     Patient assessment and information reviewed:   ESLD    12/19/12 TSH 6.16  5/17/17 TSH 4.29  6/25/202 TSH 14.02  9/9/20 TSH 1.48, free T4 0.92, T3 42  1/4/2021 TSH 9  3/17/2021 TSH 5.38, free T4 1.48  8/31/2021 TSH 4.58, free T4 1.45    Rx on med list  7/8/2020 LT4 50 mcg/day    I do not agree with the reference range as listed for free T4 and as supported by the example you are citing. Normal free T4 is actually up to around 1.8 in my experience.      Recommendations:   OK to treat to target TSH in the normal range.    OK to increase levothyroxine (LT4) .  I anticipate the dose increase she will need will be small, perhaps only 1/2 tablet per WEEK (ie 50 x 7.5/week, in divided dose;which average 54 mcg/day over the course of a week). Usually given  As  MON to SAT 1 tablet/day;  SUN 1.5 tablet  Repeat labs in 2 months after a dose increase.  If TSH remains higher than normal then increase to by small amount again, definitely no higher than 75 mcg/day.    I wouldn't worry about mismatch between TSH and free T4 unless you see high TSH and free T4 > 1.8.   She should separate LT4 from iron and calcium by 4 hours; she should separate it from food, including coffee by 1 hour;     You should have labs to follow up on the change in about 2 3 months.  I am leaving orders for follow up labs on the medical record.  Please  call [phone]  to schedule lab follow up testing as directed.  Alternatively, it can be drawn in any [facility] lab.      The recommendations  provided in this E-Consult are based on the clinical data available to me at this time, and are furnished without the benefit of a comprehensive in-person or virtual patient evaluation, Any new clinical issues or changes in patient status since the filing of this E-Consult will need to be taken into account when assessing these recommendations. Please contact me if you have further questions.    My total time spent reviewing clinical information and formulating assessment was 5 minutes.    Report sent automatically to requesting provider once signed.     Kiki Neal MD

## 2021-09-07 ENCOUNTER — COMMITTEE REVIEW (OUTPATIENT)
Dept: TRANSPLANT | Facility: CLINIC | Age: 51
End: 2021-09-07

## 2021-09-07 ENCOUNTER — LAB (OUTPATIENT)
Dept: LAB | Facility: CLINIC | Age: 51
End: 2021-09-07
Payer: COMMERCIAL

## 2021-09-07 ENCOUNTER — DOCUMENTATION ONLY (OUTPATIENT)
Dept: TRANSPLANT | Facility: CLINIC | Age: 51
End: 2021-09-07

## 2021-09-07 ENCOUNTER — PATIENT OUTREACH (OUTPATIENT)
Dept: GASTROENTEROLOGY | Facility: CLINIC | Age: 51
End: 2021-09-07

## 2021-09-07 DIAGNOSIS — D64.9 ANEMIA, UNSPECIFIED TYPE: ICD-10-CM

## 2021-09-07 DIAGNOSIS — K70.31 ALCOHOLIC CIRRHOSIS OF LIVER WITH ASCITES (H): ICD-10-CM

## 2021-09-07 DIAGNOSIS — Z11.59 ENCOUNTER FOR SCREENING FOR OTHER VIRAL DISEASES: ICD-10-CM

## 2021-09-07 DIAGNOSIS — E03.8 SUBCLINICAL HYPOTHYROIDISM: Primary | ICD-10-CM

## 2021-09-07 DIAGNOSIS — K70.30 ALCOHOLIC CIRRHOSIS (H): ICD-10-CM

## 2021-09-07 LAB
ABO/RH(D): NORMAL
ALBUMIN SERPL-MCNC: 2.6 G/DL (ref 3.4–5)
ANTIBODY SCREEN: NEGATIVE
BILIRUB SERPL-MCNC: 6.1 MG/DL (ref 0.2–1.3)
CREAT SERPL-MCNC: 1.2 MG/DL (ref 0.52–1.04)
ERYTHROCYTE [DISTWIDTH] IN BLOOD BY AUTOMATED COUNT: 21.8 % (ref 10–15)
GFR SERPL CREATININE-BSD FRML MDRD: 52 ML/MIN/1.73M2
HCT VFR BLD AUTO: 26.7 % (ref 35–47)
HGB BLD-MCNC: 8.8 G/DL (ref 11.7–15.7)
INR PPP: 1.68 (ref 0.85–1.15)
MCH RBC QN AUTO: 31.1 PG (ref 26.5–33)
MCHC RBC AUTO-ENTMCNC: 33 G/DL (ref 31.5–36.5)
MCV RBC AUTO: 94 FL (ref 78–100)
PLATELET # BLD AUTO: 112 10E3/UL (ref 150–450)
RBC # BLD AUTO: 2.83 10E6/UL (ref 3.8–5.2)
SODIUM SERPL-SCNC: 134 MMOL/L (ref 133–144)
SPECIMEN EXPIRATION DATE: NORMAL
WBC # BLD AUTO: 8.3 10E3/UL (ref 4–11)

## 2021-09-07 PROCEDURE — 36415 COLL VENOUS BLD VENIPUNCTURE: CPT | Performed by: PATHOLOGY

## 2021-09-07 PROCEDURE — 82040 ASSAY OF SERUM ALBUMIN: CPT | Performed by: PATHOLOGY

## 2021-09-07 PROCEDURE — 86850 RBC ANTIBODY SCREEN: CPT | Mod: 90 | Performed by: PATHOLOGY

## 2021-09-07 PROCEDURE — 84295 ASSAY OF SERUM SODIUM: CPT | Performed by: PATHOLOGY

## 2021-09-07 PROCEDURE — 85027 COMPLETE CBC AUTOMATED: CPT | Performed by: PATHOLOGY

## 2021-09-07 PROCEDURE — 86901 BLOOD TYPING SEROLOGIC RH(D): CPT | Mod: 90 | Performed by: PATHOLOGY

## 2021-09-07 PROCEDURE — 86900 BLOOD TYPING SEROLOGIC ABO: CPT | Mod: 90 | Performed by: PATHOLOGY

## 2021-09-07 PROCEDURE — 82565 ASSAY OF CREATININE: CPT | Performed by: PATHOLOGY

## 2021-09-07 PROCEDURE — 82247 BILIRUBIN TOTAL: CPT | Performed by: PATHOLOGY

## 2021-09-07 PROCEDURE — 85610 PROTHROMBIN TIME: CPT | Performed by: PATHOLOGY

## 2021-09-07 NOTE — PROGRESS NOTES
Reactivated on liver transplant list (see committee note)    MELD 23    Blood type B    Discussed with patient, followed up with MyChart message. Reinforced importance of answering phone with potential of organ offers.  Patient expressed that she will be out of town one weekend in Sept and October, will notify before it happens if not transplanted.   Patient has my contact information as taking over as her Transplant Coordinator.        Olivia Sam, RN, BSN  Pre-Liver Transplant Coordinator  Phone: [phone]

## 2021-09-07 NOTE — PROGRESS NOTES
Per endoscopy, patient does not need covid test for upcoming appointment nor any for the 90 days following her positive covid test on 8/17/21. This 90 day rule also applies to the infusion centers for blood, and for ultrasound paracentesis.   Hemoglobin this week 8.8, no transfusion needed. Okay per Dr. Avila to follow PCP recommendations for levothyroxine dosage change. Dr. Avila also wrote letter if patient needs for [employer] to cancel membership. Dr. Avila is okay with treadmill and light weights as long as patient can carry on a conversation during the exercise. Patient verbalized understanding of this conversation.

## 2021-09-07 NOTE — COMMITTEE REVIEW
Abdominal Committee Review Note     Evaluation Date: 12/15/2020  Committee Review Date: 9/7/2021    Organ being evaluated for: Liver    Transplant Phase: Waitlist  Transplant Status: Active    Transplant Coordinator: Kiki Sutherland  Transplant Surgeon:   Christian Morrison    Referring Physician: Rehan Escobar    Primary Diagnosis: Alcoholic Cirrhosis  Secondary Diagnosis:     Committee Review Members:  Nutrition Humaira Jason, RD   Pharmacist Ricki Aguilera, [facility]    - Clinical Sherice Arredondo, ENRIQUE, Evelina Rodriguez, [facility]   Transplant Sirena Dudley, RN, Payal Sam, RN, Lotus Hughes, RN, Britni Wilson, RN, Jr Oli Gurrola, RN, Leigh Beltran MD, Cyndee Salazar APRN CNP   Transplant Hepatology  Christos Johnson MD, Fadia Avila MD, Darrin Patterson MD, Thomas M. Leventhal, MD   Transplant Surgery Christian Morrison MD, Riaz Seth MD, Jaylan Lyon MD       Transplant Eligibility: Cirrhosis with MELD, ETOH    Committee Review Decision: Remain Active    Relative Contraindications: None    Absolute Contraindications: None    Committee Chair Leventhal, Thomas Michael, MD verbally attested to the committee's decision.    Committee Discussion Details:      Ok to reactivate - 2 negative COVID tests since prior + test, asympptomatic

## 2021-09-08 LAB — PETH BLD-MCNC: NEGATIVE NG/ML

## 2021-09-09 ENCOUNTER — ANESTHESIA (OUTPATIENT)
Dept: GASTROENTEROLOGY | Facility: CLINIC | Age: 51
End: 2021-09-09
Payer: COMMERCIAL

## 2021-09-09 ENCOUNTER — ANESTHESIA EVENT (OUTPATIENT)
Dept: GASTROENTEROLOGY | Facility: CLINIC | Age: 51
End: 2021-09-09
Payer: COMMERCIAL

## 2021-09-09 ENCOUNTER — HOSPITAL ENCOUNTER (OUTPATIENT)
Facility: CLINIC | Age: 51
Discharge: HOME | End: 2021-09-09
Attending: INTERNAL MEDICINE | Admitting: INTERNAL MEDICINE
Payer: COMMERCIAL

## 2021-09-09 ENCOUNTER — HOSPITAL ENCOUNTER (OUTPATIENT)
Facility: AMBULATORY SURGERY CENTER | Age: 51
Discharge: HOME | End: 2021-09-09
Payer: COMMERCIAL

## 2021-09-09 VITALS
SYSTOLIC BLOOD PRESSURE: 112 MMHG | RESPIRATION RATE: 17 BRPM | WEIGHT: 179 LBS | HEIGHT: 67 IN | BODY MASS INDEX: 28.09 KG/M2 | TEMPERATURE: 98.2 F | HEART RATE: 96 BPM | OXYGEN SATURATION: 95 % | DIASTOLIC BLOOD PRESSURE: 77 MMHG

## 2021-09-09 DIAGNOSIS — K21.9 GASTROESOPHAGEAL REFLUX DISEASE WITHOUT ESOPHAGITIS: ICD-10-CM

## 2021-09-09 LAB — UPPER GI ENDOSCOPY: NORMAL

## 2021-09-09 PROCEDURE — 250N000011 HC RX IP 250 OP 636: Performed by: ANESTHESIOLOGY

## 2021-09-09 PROCEDURE — 370N000017 HC ANESTHESIA TECHNICAL FEE, PER MIN: Performed by: INTERNAL MEDICINE

## 2021-09-09 PROCEDURE — 250N000011 HC RX IP 250 OP 636: Performed by: INTERNAL MEDICINE

## 2021-09-09 PROCEDURE — 43270 EGD LESION ABLATION: CPT | Performed by: INTERNAL MEDICINE

## 2021-09-09 PROCEDURE — 250N000009 HC RX 250: Performed by: ANESTHESIOLOGY

## 2021-09-09 PROCEDURE — 999N000010 HC STATISTIC ANES STAT CODE-CRNA PER MINUTE: Performed by: INTERNAL MEDICINE

## 2021-09-09 PROCEDURE — 258N000003 HC RX IP 258 OP 636: Performed by: ANESTHESIOLOGY

## 2021-09-09 RX ORDER — ONDANSETRON 4 MG/1
4 TABLET, ORALLY DISINTEGRATING ORAL EVERY 6 HOURS PRN
Status: DISCONTINUED | OUTPATIENT
Start: 2021-09-09 | End: 2021-09-09 | Stop reason: HOSPADM

## 2021-09-09 RX ORDER — NALOXONE HYDROCHLORIDE 0.4 MG/ML
0.4 INJECTION, SOLUTION INTRAMUSCULAR; INTRAVENOUS; SUBCUTANEOUS
Status: DISCONTINUED | OUTPATIENT
Start: 2021-09-09 | End: 2021-09-09 | Stop reason: HOSPADM

## 2021-09-09 RX ORDER — FLUMAZENIL 0.1 MG/ML
0.2 INJECTION, SOLUTION INTRAVENOUS
Status: DISCONTINUED | OUTPATIENT
Start: 2021-09-09 | End: 2021-09-09 | Stop reason: HOSPADM

## 2021-09-09 RX ORDER — PROCHLORPERAZINE MALEATE 10 MG
10 TABLET ORAL EVERY 6 HOURS PRN
Status: DISCONTINUED | OUTPATIENT
Start: 2021-09-09 | End: 2021-09-09 | Stop reason: HOSPADM

## 2021-09-09 RX ORDER — ONDANSETRON 2 MG/ML
INJECTION INTRAMUSCULAR; INTRAVENOUS PRN
Status: COMPLETED | OUTPATIENT
Start: 2021-09-09 | End: 2021-09-09

## 2021-09-09 RX ORDER — PROPOFOL 10 MG/ML
INJECTION, EMULSION INTRAVENOUS PRN
Status: DISCONTINUED | OUTPATIENT
Start: 2021-09-09 | End: 2021-09-09

## 2021-09-09 RX ORDER — PROPOFOL 10 MG/ML
INJECTION, EMULSION INTRAVENOUS CONTINUOUS PRN
Status: DISCONTINUED | OUTPATIENT
Start: 2021-09-09 | End: 2021-09-09

## 2021-09-09 RX ORDER — SODIUM CHLORIDE, SODIUM LACTATE, POTASSIUM CHLORIDE, CALCIUM CHLORIDE 600; 310; 30; 20 MG/100ML; MG/100ML; MG/100ML; MG/100ML
INJECTION, SOLUTION INTRAVENOUS CONTINUOUS PRN
Status: DISCONTINUED | OUTPATIENT
Start: 2021-09-09 | End: 2021-09-09

## 2021-09-09 RX ORDER — NALOXONE HYDROCHLORIDE 0.4 MG/ML
0.2 INJECTION, SOLUTION INTRAMUSCULAR; INTRAVENOUS; SUBCUTANEOUS
Status: DISCONTINUED | OUTPATIENT
Start: 2021-09-09 | End: 2021-09-09 | Stop reason: HOSPADM

## 2021-09-09 RX ORDER — ONDANSETRON 2 MG/ML
4 INJECTION INTRAMUSCULAR; INTRAVENOUS EVERY 6 HOURS PRN
Status: DISCONTINUED | OUTPATIENT
Start: 2021-09-09 | End: 2021-09-09 | Stop reason: HOSPADM

## 2021-09-09 RX ORDER — PANTOPRAZOLE SODIUM 40 MG/1
40 TABLET, DELAYED RELEASE ORAL DAILY
Qty: 60 TABLET | Refills: 4 | Status: ON HOLD | OUTPATIENT
Start: 2021-09-09 | End: 2021-10-29

## 2021-09-09 RX ORDER — ONDANSETRON 2 MG/ML
INJECTION INTRAMUSCULAR; INTRAVENOUS PRN
Status: DISCONTINUED | OUTPATIENT
Start: 2021-09-09 | End: 2021-09-09

## 2021-09-09 RX ADMIN — PROPOFOL 20 MG: 10 INJECTION, EMULSION INTRAVENOUS at 13:18

## 2021-09-09 RX ADMIN — PROPOFOL 20 MG: 10 INJECTION, EMULSION INTRAVENOUS at 13:26

## 2021-09-09 RX ADMIN — ONDANSETRON 4 MG: 2 INJECTION INTRAMUSCULAR; INTRAVENOUS at 13:08

## 2021-09-09 RX ADMIN — PROPOFOL 20 MG: 10 INJECTION, EMULSION INTRAVENOUS at 13:32

## 2021-09-09 RX ADMIN — PROPOFOL 20 MG: 10 INJECTION, EMULSION INTRAVENOUS at 13:25

## 2021-09-09 RX ADMIN — DEXMEDETOMIDINE 12 MCG: 100 INJECTION, SOLUTION, CONCENTRATE INTRAVENOUS at 13:08

## 2021-09-09 RX ADMIN — GLUCAGON HYDROCHLORIDE 0.4 MG: KIT at 13:29

## 2021-09-09 RX ADMIN — PROPOFOL 20 MG: 10 INJECTION, EMULSION INTRAVENOUS at 13:29

## 2021-09-09 RX ADMIN — SODIUM CHLORIDE, POTASSIUM CHLORIDE, SODIUM LACTATE AND CALCIUM CHLORIDE: 600; 310; 30; 20 INJECTION, SOLUTION INTRAVENOUS at 13:06

## 2021-09-09 RX ADMIN — PROPOFOL 20 MG: 10 INJECTION, EMULSION INTRAVENOUS at 13:16

## 2021-09-09 RX ADMIN — PROPOFOL 20 MG: 10 INJECTION, EMULSION INTRAVENOUS at 13:15

## 2021-09-09 RX ADMIN — PROPOFOL 20 MG: 10 INJECTION, EMULSION INTRAVENOUS at 13:09

## 2021-09-09 RX ADMIN — PROPOFOL 20 MG: 10 INJECTION, EMULSION INTRAVENOUS at 13:31

## 2021-09-09 RX ADMIN — PROPOFOL 20 MG: 10 INJECTION, EMULSION INTRAVENOUS at 13:12

## 2021-09-09 RX ADMIN — PROPOFOL 20 MG: 10 INJECTION, EMULSION INTRAVENOUS at 13:27

## 2021-09-09 RX ADMIN — DEXMEDETOMIDINE 8 MCG: 100 INJECTION, SOLUTION, CONCENTRATE INTRAVENOUS at 13:12

## 2021-09-09 RX ADMIN — PROPOFOL 125 MCG/KG/MIN: 10 INJECTION, EMULSION INTRAVENOUS at 13:08

## 2021-09-09 RX ADMIN — ONDANSETRON 4 MG: 2 INJECTION INTRAMUSCULAR; INTRAVENOUS at 14:07

## 2021-09-09 ASSESSMENT — LIFESTYLE VARIABLES: TOBACCO_USE: 0

## 2021-09-09 ASSESSMENT — ENCOUNTER SYMPTOMS
DYSRHYTHMIAS: 0
SEIZURES: 0

## 2021-09-09 ASSESSMENT — MIFFLIN-ST. JEOR: SCORE: 1459.57

## 2021-09-09 NOTE — ANESTHESIA CARE TRANSFER NOTE
Patient: Sarah Fisher    Procedure(s):  ESOPHAGOGASTRODUODENOSCOPY, WITH LESION ABLATION    Diagnosis: Anemia, unspecified type [D64.9]  Diagnosis Additional Information: No value filed.    Anesthesia Type:   MAC     Note:    Oropharynx: oropharynx clear of all foreign objects  Level of Consciousness: awake  Oxygen Supplementation: room air    Independent Airway: airway patency satisfactory and stable    Vital Signs Stable: post-procedure vital signs reviewed and stable  Report to RN Given: handoff report given  Patient transferred to: Phase II  Comments: After procedure in [facility] Procedure Closplint under monitored anesthesia care (MAC), patient exhibited spontaneous respirations, patient breathing room air with oxygen saturation maintained greater than 92%, patient brought to [facility] recovery bay for postprocedure recovery, SpO2, NiBP, and EKG monitors and alarms on and functioning, report on patient's clinical status given to recovery-trained endoscopy RN, RN questions answered.    Handoff Report: Identifed the Patient, Identified the Reponsible Provider, Reviewed the pertinent medical history, Discussed the surgical course, Reviewed Intra-OP anesthesia mangement and issues during anesthesia, Set expectations for post-procedure period and Allowed opportunity for questions and acknowledgement of understanding      Vitals:  Vitals Value Taken Time   /67    Temp     Pulse 96 09/09/21 1342   Resp 20 09/09/21 1342   SpO2 94 % 09/09/21 1342   Vitals shown include unvalidated device data.    Electronically Signed By: Christine Marie Volp Hodgkins, CRNA, APRN CRNA  September 9, 2021  1:43 PM

## 2021-09-09 NOTE — ANESTHESIA POSTPROCEDURE EVALUATION
Patient: Sarah Fisher    Procedure(s):  ESOPHAGOGASTRODUODENOSCOPY, WITH LESION ABLATION    Diagnosis:Anemia, unspecified type [D64.9]  Diagnosis Additional Information: No value filed.    Anesthesia Type:  MAC    Note:  Disposition: Outpatient   Postop Pain Control: Uneventful            Sign Out: Well controlled pain   PONV: Yes            Sign Out: PONV/POV resolved with treatment   Neuro/Psych: Uneventful            Sign Out: Acceptable/Baseline neuro status   Airway/Respiratory: Uneventful            Sign Out: Acceptable/Baseline resp. status   CV/Hemodynamics: Uneventful            Sign Out: Acceptable CV status   Other NRE: NONE   DID A NON-ROUTINE EVENT OCCUR? No    Event details/Postop Comments:  Pt doing well prior to discharge home.             Last vitals:  Vitals Value Taken Time   /77 09/09/21 1410   Temp     Pulse 94 09/09/21 1412   Resp 27 09/09/21 1412   SpO2 95 % 09/09/21 1412   Vitals shown include unvalidated device data.    Electronically Signed By: Jose Reyes MD  September 9, 2021  2:30 PM

## 2021-09-09 NOTE — ANESTHESIA PREPROCEDURE EVALUATION
Anesthesia Pre-Procedure Evaluation    Patient: Sarah Fisher   MRN: [id number] : 1970        Preoperative Diagnosis: Anemia, unspecified type [D64.9]   Procedure : Procedure(s):  ESOPHAGOGASTRODUODENOSCOPY (EGD)     Past Medical History:   Diagnosis Date     Ascites      History of blood transfusion      Liver cirrhosis (H)      Thyroid disease       Past Surgical History:   Procedure Laterality Date      SECTION       CV RIGHT HEART CATH MEASUREMENTS RECORDED N/A 2021    Procedure: CV RIGHT HEART CATH;  Surgeon: Joseph Guevara MD;  Location:  [facility]     IR THORACENTESIS  2021     IR THORACENTESIS  2021     THORACENTESIS Left 2021    Procedure: THORACENTESIS;  Surgeon: Almas Irby MD;  Location: [facility]     THORACENTESIS N/A 2021    Procedure: THORACENTESIS;  Surgeon: Almas Irby MD;  Location:  GI     THORACENTESIS N/A 03/10/2021    Procedure: THORACENTESIS;  Surgeon: Almas Irby MD;  Location: [facility]     THORACENTESIS Left 2021    Procedure: THORACENTESIS;  Surgeon: Kennedi Chavez MD;  Location: [facility]     THORACENTESIS Left 2021    Procedure: THORACENTESIS;  Surgeon: Jess Ansari PA-C;  Location: [facility] OR      Allergies   Allergen Reactions     Amoxicillin GI Disturbance, Diarrhea, Nausea and Nausea and Vomiting      Social History     Tobacco Use     Smoking status: Never Smoker     Smokeless tobacco: Never Used   Substance Use Topics     Alcohol use: Not Currently     Comment: Quit on 2020      Wt Readings from Last 1 Encounters:   21 81.2 kg (179 lb)        Prior to Admission medications    Medication Sig Start Date End Date Taking? Authorizing Provider   ciprofloxacin (CIPRO) 500 MG tablet Take 1 tablet (500 mg) by mouth daily 21   Fadia Avila MD   Ferrous Sulfate 300 MG/6.8ML SOLN Take 6.8 mLs by mouth 2 times daily 21   Fadia Avila MD   folic acid (FOLVITE) 1 MG tablet Take 1  tablet (1 mg) by mouth daily 8/4/21   Leigh Beltran MD   furosemide (LASIX) 20 MG tablet Take 3 tablets (60 mg) by mouth daily 8/24/21   Fadia Avila MD   hydrocortisone (ANUSOL-HC) 25 MG suppository Place 1 suppository (25 mg) rectally 2 times daily 8/30/21   Christine Harris APRN CNP   hydrocortisone, Perianal, (HYDROCORTISONE) 2.5 % cream Place rectally 2 times daily as needed for hemorrhoids 8/13/21   Christine Harris APRN CNP   levothyroxine (SYNTHROID/LEVOTHROID) 50 MCG tablet Take 50 mcg by mouth daily 7/8/20   Unknown, Entered By History   midodrine (PROAMATINE) 5 MG tablet Take 1 tablet (5 mg) by mouth 2 times daily (with meals) 5/25/21   Fadia Avila MD   multivitamin w/minerals (THERA-VIT-M) tablet Take 1 tablet by mouth daily 1/20/21   Fadia Avila MD   pantoprazole (PROTONIX) 20 MG EC tablet Take 1 tablet (20 mg) by mouth daily 4/5/21   Fadia Avila MD   potassium chloride ER (KLOR-CON M15) 15 MEQ CR tablet Take 2 tablets (30 mEq) by mouth daily 8/24/21   Fadia Avila MD   rifaximin (XIFAXAN) 550 MG TABS tablet Take 550 mg by mouth 2 times daily    Unknown, Entered By History   spironolactone (ALDACTONE) 50 MG tablet Take 3 tablets (150 mg) by mouth daily 8/24/21   Fadia Avila MD   traZODone (DESYREL) 50 MG tablet Take 1 tablet (50 mg) by mouth nightly as needed for sleep 8/24/21   Fadia Avila MD   Vitamin D3 (CHOLECALCIFEROL) 25 mcg (1000 units) tablet Take 2 tablets (50 mcg) by mouth daily 8/24/21   Fadia Avila MD   zinc sulfate (ZINCATE) 220 (50 Zn) MG capsule Take 1 capsule (220 mg) by mouth daily 4/5/21   Fadia Avila MD     Recent Labs   Lab Test 05/26/21  1659   ABO B   RH Pos     Recent Results (from the past 744 hour(s))   US Paracentesis    Narrative    US PARACENTESIS 8/26/2021 9:57 AM    CLINICAL HISTORY: Alcoholic cirrhosis of liver with ascites (H)    PROCEDURE: Informed consent obtained. Time out performed. The abdomen  was prepped and draped in a  sterile fashion. 10 mL of 1% lidocaine was  infused into local soft tissues. A 5 Sinhala catheter system was  introduced into the abdominal ascites under ultrasound guidance.    3.3 liters of clear fluid were removed and sent to lab if requested.    Patient tolerated procedure well.    Ultrasound imaging was obtained and placed in the patient's permanent  medical record.      Impression    IMPRESSION:  1.  Status post ultrasound-guided paracentesis.    EMPERATRIZ MUSTAFA MD         SYSTEM ID:  [id number]     Anesthesia Evaluation   Pt has had prior anesthetic. Type: General.    No history of anesthetic complications       ROS/MED HX  ENT/Pulmonary:    (-) tobacco use, asthma and sleep apnea   Neurologic: Comment: Hepatic encephalopathy   (-) no seizures and no CVA   Cardiovascular:     (+) -----Previous cardiac testing   Echo: Date: Results:    Stress Test: Date: 1/7/2021 Results:  Interpretation Summary  Normal dobutamine stress echocardiogram without evidence of inducible  ischemia. Target heart rate was achieved. Heart rate and blood pressure  response to dobutamine were normal. Normal LV function and wall motion at  rest. With stress, the left ventricular ejection fraction increased from 55-  60% to greater than 65% and the left ventricular size decreased appropriately.  No regional wall motion abnormality with stress.  No subjective symptoms to suggest ischemia.  There was no ECG evidence of ischemia.  No significant valve disease on screening doppler evaluation. The aortic root  and visualized ascending aorta are normal.  IVC diameter and respiratory changes fall into an intermediate range  suggesting an RA pressure of 8 mmHg.  Right ventricular systolic pressure could not be approximated due to  inadequate tricuspid regurgitation.  There is no pericardial effusion. Large pleural effusion.  ECG Reviewed: Date: 5/2/2021 Results:  SR with PAC's  Cath: Date: Results:   (-) angina, CAD, syncope, arrhythmias, angina,  murmur and wheezes   METS/Exercise Tolerance:     Hematologic: Comments: Hx of BNUNY quarles  TCP   Coagulopathy     (+) History of blood clots, anemia, history of blood transfusion, no previous transfusion reaction,     Musculoskeletal:  - neg musculoskeletal ROS     GI/Hepatic: Comment: Cirrhosis - on the Liver Transplant List    (+) GERD, Asymptomatic on medication, esophageal disease, liver disease,     Renal/Genitourinary:     (+) renal disease, type: CRI,     Endo:     (+) thyroid problem, hypothyroidism,  (-) Type II DM   Psychiatric/Substance Use:     (+) alcohol abuse     Infectious Disease:       Malignancy:       Other:            Physical Exam    Airway        Mallampati: II   TM distance: > 3 FB   Neck ROM: full   Mouth opening: > 3 cm    Respiratory Devices and Support         Dental  no notable dental history         Cardiovascular          Rhythm and rate: regular and normal (-) no murmur    Pulmonary           breath sounds clear to auscultation   (-) no rhonchi and no wheezes        OUTSIDE LABS:  CBC:   Lab Results   Component Value Date    WBC 8.3 09/07/2021    WBC 8.3 08/31/2021    HGB 8.8 (L) 09/07/2021    HGB 8.9 (L) 08/31/2021    HCT 26.7 (L) 09/07/2021    HCT 29.1 (L) 08/31/2021     (L) 09/07/2021     (L) 08/31/2021     BMP:   Lab Results   Component Value Date     09/07/2021     (L) 08/31/2021    POTASSIUM 3.8 08/31/2021    POTASSIUM 3.2 (L) 08/24/2021    CHLORIDE 97 08/31/2021    CHLORIDE 102 08/24/2021    CO2 26 08/31/2021    CO2 30 08/24/2021    BUN 19 08/31/2021    BUN 12 08/24/2021    CR 1.20 (H) 09/07/2021    CR 1.06 (H) 08/31/2021    GLC 94 08/31/2021     (H) 08/24/2021     COAGS:   Lab Results   Component Value Date    PTT 36 07/23/2021    INR 1.68 (H) 09/07/2021    FIBR 233 05/02/2021     POC:   Lab Results   Component Value Date    HCGS Negative 01/04/2021     HEPATIC:   Lab Results   Component Value Date    ALBUMIN 2.6 (L) 09/07/2021    PROTTOTAL  5.9 (L) 08/24/2021    ALT 26 08/24/2021    AST 29 08/24/2021    ALKPHOS 136 08/24/2021    BILITOTAL 6.1 (H) 09/07/2021     OTHER:   Lab Results   Component Value Date    A1C 4.2 03/17/2021    EMMY 8.9 08/31/2021    PHOS 3.3 05/02/2021    MAG 1.8 05/02/2021    LIPASE 209 07/23/2021    AMYLASE 38 05/02/2021    TSH 4.58 (H) 08/31/2021    T4 1.45 08/31/2021       Anesthesia Plan    ASA Status:  4   NPO Status:  NPO Appropriate    Anesthesia Type: MAC.     - Reason for MAC: straight local not clinically adequate              Consents    Anesthesia Plan(s) and associated risks, benefits, and realistic alternatives discussed. Questions answered and patient/representative(s) expressed understanding.     - Discussed with:  Patient         Postoperative Care    Pain management: Multi-modal analgesia.   PONV prophylaxis: Ondansetron (or other 5HT-3), Background Propofol Infusion     Comments:                Jose Reyes MD

## 2021-09-10 ENCOUNTER — PREP FOR PROCEDURE (OUTPATIENT)
Dept: GASTROENTEROLOGY | Facility: CLINIC | Age: 51
End: 2021-09-10

## 2021-09-10 DIAGNOSIS — K31.819 GASTRIC ANTRAL VASCULAR ECTASIA: Primary | ICD-10-CM

## 2021-09-13 ENCOUNTER — PATIENT OUTREACH (OUTPATIENT)
Dept: GASTROENTEROLOGY | Facility: CLINIC | Age: 51
End: 2021-09-13

## 2021-09-13 ENCOUNTER — TELEPHONE (OUTPATIENT)
Dept: GASTROENTEROLOGY | Facility: CLINIC | Age: 51
End: 2021-09-13

## 2021-09-13 DIAGNOSIS — K31.819 GASTRIC ANTRAL VASCULAR ECTASIA: Primary | ICD-10-CM

## 2021-09-13 NOTE — PROGRESS NOTES
Called to discuss with patient    Procedure/Imaging/Clinic: EGD with APC ablation   Physician: Rosalino   Timin month   Procedure length: 45 min   Anesthesia: MAC   Dx: gastric antral vascular ectasia   Tier: 2   Location: [facility]    Explained they can expect a call from  for date and time of procedure, will need a , someone to stay with them for 24 hours and should stay in town for 24 hours (within 45 min of Hospital) post procedure    Patient needs to get pre-op physical completed. If outside [facility] system will need physical faxed to number [phone]   If you do not get a preop physical, your procedure could be cancelled, patient voiced understanding*    Preop Plan: Pre op exam     Med Review    Blood thinner -  none  ASA - none  Diabetic - none    COVID test discussed: tested positive on 21, no COVID    Patient Education r/t procedure: mychart    A pre-op nurse will call 1-2 days prior to the procedure.    Verbalized understanding of all instructions. All questions answered.    Message routed to Or     Fatoumata Lai, RN, BSN,   [facility]  Care coordinator

## 2021-09-13 NOTE — TELEPHONE ENCOUNTER
Spoke to patient in regards to scheduled procedure. Informed patient she is scheduled with Dr. Jerry on 10/7/2021. Patient stated she just spoke to RNGERARD Arenas and she does not need pre-op or covid test since she has tested positive within the last 90 days and her pre-op will be updated by Dr. Jerry on the day of. Informed patient she will need a  and someone to monitor her for 24 hours after the procedure. Informed patient all scheduling details will be sent to the address listed on Epic. Address confirmed on this call.

## 2021-09-14 ENCOUNTER — INFUSION THERAPY VISIT (OUTPATIENT)
Dept: INFUSION THERAPY | Facility: CLINIC | Age: 51
End: 2021-09-14
Attending: STUDENT IN AN ORGANIZED HEALTH CARE EDUCATION/TRAINING PROGRAM
Payer: COMMERCIAL

## 2021-09-14 VITALS
SYSTOLIC BLOOD PRESSURE: 106 MMHG | OXYGEN SATURATION: 98 % | DIASTOLIC BLOOD PRESSURE: 62 MMHG | TEMPERATURE: 98.2 F | HEART RATE: 106 BPM

## 2021-09-14 DIAGNOSIS — D64.9 ANEMIA, UNSPECIFIED TYPE: ICD-10-CM

## 2021-09-14 DIAGNOSIS — K70.31 ALCOHOLIC CIRRHOSIS OF LIVER WITH ASCITES (H): ICD-10-CM

## 2021-09-14 DIAGNOSIS — D50.0 IRON DEFICIENCY ANEMIA DUE TO CHRONIC BLOOD LOSS: Primary | ICD-10-CM

## 2021-09-14 LAB
ABO/RH(D): NORMAL
ANTIBODY SCREEN: NEGATIVE
ERYTHROCYTE [DISTWIDTH] IN BLOOD BY AUTOMATED COUNT: 20.6 % (ref 10–15)
HCT VFR BLD AUTO: 30 % (ref 35–47)
HGB BLD-MCNC: 9.4 G/DL (ref 11.7–15.7)
MCH RBC QN AUTO: 30.8 PG (ref 26.5–33)
MCHC RBC AUTO-ENTMCNC: 31.3 G/DL (ref 31.5–36.5)
MCV RBC AUTO: 98 FL (ref 78–100)
PLATELET # BLD AUTO: 143 10E3/UL (ref 150–450)
RBC # BLD AUTO: 3.05 10E6/UL (ref 3.8–5.2)
SPECIMEN EXPIRATION DATE: NORMAL
WBC # BLD AUTO: 17.7 10E3/UL (ref 4–11)

## 2021-09-14 PROCEDURE — 36415 COLL VENOUS BLD VENIPUNCTURE: CPT

## 2021-09-14 PROCEDURE — 96366 THER/PROPH/DIAG IV INF ADDON: CPT

## 2021-09-14 PROCEDURE — 258N000003 HC RX IP 258 OP 636: Performed by: PHYSICIAN ASSISTANT

## 2021-09-14 PROCEDURE — 85027 COMPLETE CBC AUTOMATED: CPT

## 2021-09-14 PROCEDURE — 96365 THER/PROPH/DIAG IV INF INIT: CPT

## 2021-09-14 PROCEDURE — 86900 BLOOD TYPING SEROLOGIC ABO: CPT

## 2021-09-14 PROCEDURE — 250N000011 HC RX IP 250 OP 636: Performed by: PHYSICIAN ASSISTANT

## 2021-09-14 RX ORDER — ALBUTEROL SULFATE 0.83 MG/ML
2.5 SOLUTION RESPIRATORY (INHALATION)
Status: CANCELLED | OUTPATIENT
Start: 2021-09-16

## 2021-09-14 RX ORDER — HEPARIN SODIUM (PORCINE) LOCK FLUSH IV SOLN 100 UNIT/ML 100 UNIT/ML
5 SOLUTION INTRAVENOUS
Status: CANCELLED | OUTPATIENT
Start: 2021-09-16

## 2021-09-14 RX ORDER — ALBUTEROL SULFATE 90 UG/1
1-2 AEROSOL, METERED RESPIRATORY (INHALATION)
Status: CANCELLED
Start: 2021-09-16

## 2021-09-14 RX ORDER — MEPERIDINE HYDROCHLORIDE 25 MG/ML
25 INJECTION INTRAMUSCULAR; INTRAVENOUS; SUBCUTANEOUS EVERY 30 MIN PRN
Status: CANCELLED | OUTPATIENT
Start: 2021-09-16

## 2021-09-14 RX ORDER — DIPHENHYDRAMINE HYDROCHLORIDE 50 MG/ML
50 INJECTION INTRAMUSCULAR; INTRAVENOUS
Status: CANCELLED
Start: 2021-09-16

## 2021-09-14 RX ORDER — METHYLPREDNISOLONE SODIUM SUCCINATE 125 MG/2ML
125 INJECTION, POWDER, LYOPHILIZED, FOR SOLUTION INTRAMUSCULAR; INTRAVENOUS
Status: CANCELLED
Start: 2021-09-16

## 2021-09-14 RX ORDER — HEPARIN SODIUM,PORCINE 10 UNIT/ML
5 VIAL (ML) INTRAVENOUS
Status: CANCELLED | OUTPATIENT
Start: 2021-09-16

## 2021-09-14 RX ORDER — EPINEPHRINE 1 MG/ML
0.3 INJECTION, SOLUTION, CONCENTRATE INTRAVENOUS EVERY 5 MIN PRN
Status: CANCELLED | OUTPATIENT
Start: 2021-09-16

## 2021-09-14 RX ORDER — NALOXONE HYDROCHLORIDE 0.4 MG/ML
0.2 INJECTION, SOLUTION INTRAMUSCULAR; INTRAVENOUS; SUBCUTANEOUS
Status: CANCELLED | OUTPATIENT
Start: 2021-09-16

## 2021-09-14 RX ADMIN — IRON SUCROSE 300 MG: 20 INJECTION, SOLUTION INTRAVENOUS at 13:03

## 2021-09-14 NOTE — PROGRESS NOTES
Infusion Nursing Note:  Sarah Fisher presents today for Venofer.    Patient seen by provider today: No   present during visit today: Not Applicable.    Note: Med infused over 90 min.      Intravenous Access:  Peripheral IV placed.  Blood drawn prior to infusion.    Treatment Conditions:  Not Applicable.      Post Infusion Assessment:  Patient tolerated infusion without incident.  No evidence of extravasations.  Access discontinued per protocol.       Discharge Plan:   Patient and/or family verbalized understanding of discharge instructions and all questions answered.  Patient discharged in stable condition accompanied by: self.      JOSE ANTONIO CHEN RN

## 2021-09-15 ENCOUNTER — TELEPHONE (OUTPATIENT)
Dept: TRANSPLANT | Facility: CLINIC | Age: 51
End: 2021-09-15

## 2021-09-15 NOTE — TELEPHONE ENCOUNTER
Called Maria Elena to check in, just checking since WBC nearly doubled.      No current signs and sx of infection  Had recent EGD with a lot of pain immediately after, now much improved. These labs drawn shortly after EGD done.  Has labs next week and will continue to monitor

## 2021-09-16 ENCOUNTER — INFUSION THERAPY VISIT (OUTPATIENT)
Dept: INFUSION THERAPY | Facility: CLINIC | Age: 51
End: 2021-09-16
Attending: STUDENT IN AN ORGANIZED HEALTH CARE EDUCATION/TRAINING PROGRAM
Payer: COMMERCIAL

## 2021-09-16 ENCOUNTER — HOSPITAL ENCOUNTER (OUTPATIENT)
Dept: ULTRASOUND IMAGING | Facility: CLINIC | Age: 51
End: 2021-09-16
Attending: STUDENT IN AN ORGANIZED HEALTH CARE EDUCATION/TRAINING PROGRAM
Payer: COMMERCIAL

## 2021-09-16 VITALS
HEART RATE: 91 BPM | RESPIRATION RATE: 16 BRPM | SYSTOLIC BLOOD PRESSURE: 116 MMHG | TEMPERATURE: 98 F | DIASTOLIC BLOOD PRESSURE: 52 MMHG | OXYGEN SATURATION: 94 %

## 2021-09-16 DIAGNOSIS — Z11.59 ENCOUNTER FOR SCREENING FOR OTHER VIRAL DISEASES: ICD-10-CM

## 2021-09-16 DIAGNOSIS — D50.0 IRON DEFICIENCY ANEMIA DUE TO CHRONIC BLOOD LOSS: ICD-10-CM

## 2021-09-16 DIAGNOSIS — K70.31 ALCOHOLIC CIRRHOSIS OF LIVER WITH ASCITES (H): Primary | ICD-10-CM

## 2021-09-16 DIAGNOSIS — K70.31 ALCOHOLIC CIRRHOSIS OF LIVER WITH ASCITES (H): ICD-10-CM

## 2021-09-16 PROCEDURE — 250N000011 HC RX IP 250 OP 636: Performed by: PHYSICIAN ASSISTANT

## 2021-09-16 PROCEDURE — 258N000003 HC RX IP 258 OP 636: Performed by: PHYSICIAN ASSISTANT

## 2021-09-16 PROCEDURE — 76705 ECHO EXAM OF ABDOMEN: CPT | Mod: 26 | Performed by: RADIOLOGY

## 2021-09-16 PROCEDURE — 76705 ECHO EXAM OF ABDOMEN: CPT

## 2021-09-16 RX ORDER — DIPHENHYDRAMINE HYDROCHLORIDE 50 MG/ML
50 INJECTION INTRAMUSCULAR; INTRAVENOUS
Status: CANCELLED
Start: 2021-09-16

## 2021-09-16 RX ORDER — EPINEPHRINE 1 MG/ML
0.3 INJECTION, SOLUTION, CONCENTRATE INTRAVENOUS EVERY 5 MIN PRN
Status: CANCELLED | OUTPATIENT
Start: 2021-09-16

## 2021-09-16 RX ORDER — NALOXONE HYDROCHLORIDE 0.4 MG/ML
0.2 INJECTION, SOLUTION INTRAMUSCULAR; INTRAVENOUS; SUBCUTANEOUS
Status: CANCELLED | OUTPATIENT
Start: 2021-09-16

## 2021-09-16 RX ORDER — ALBUTEROL SULFATE 90 UG/1
1-2 AEROSOL, METERED RESPIRATORY (INHALATION)
Status: CANCELLED
Start: 2021-09-16

## 2021-09-16 RX ORDER — HEPARIN SODIUM (PORCINE) LOCK FLUSH IV SOLN 100 UNIT/ML 100 UNIT/ML
5 SOLUTION INTRAVENOUS
Status: CANCELLED | OUTPATIENT
Start: 2021-09-16

## 2021-09-16 RX ORDER — MEPERIDINE HYDROCHLORIDE 25 MG/ML
25 INJECTION INTRAMUSCULAR; INTRAVENOUS; SUBCUTANEOUS EVERY 30 MIN PRN
Status: CANCELLED | OUTPATIENT
Start: 2021-09-16

## 2021-09-16 RX ORDER — ALBUTEROL SULFATE 0.83 MG/ML
2.5 SOLUTION RESPIRATORY (INHALATION)
Status: CANCELLED | OUTPATIENT
Start: 2021-09-16

## 2021-09-16 RX ORDER — HEPARIN SODIUM,PORCINE 10 UNIT/ML
5 VIAL (ML) INTRAVENOUS
Status: CANCELLED | OUTPATIENT
Start: 2021-09-16

## 2021-09-16 RX ORDER — METHYLPREDNISOLONE SODIUM SUCCINATE 125 MG/2ML
125 INJECTION, POWDER, LYOPHILIZED, FOR SOLUTION INTRAMUSCULAR; INTRAVENOUS
Status: CANCELLED
Start: 2021-09-16

## 2021-09-16 RX ADMIN — IRON SUCROSE 300 MG: 20 INJECTION, SOLUTION INTRAVENOUS at 12:38

## 2021-09-16 ASSESSMENT — PAIN SCALES - GENERAL: PAINLEVEL: NO PAIN (0)

## 2021-09-16 NOTE — PROGRESS NOTES
Infusion Nursing Note:  Sarah Fisher presents today for final dose of venofer.    Patient seen by provider today: No   present during visit today: Not Applicable.    Note: Patient states that she is doing well.  She is feeling good!      Intravenous Access:  Peripheral IV placed.    Treatment Conditions:  Not Applicable.      Post Infusion Assessment:  Patient tolerated infusion without incident.  Blood return noted pre and post infusion.  Site patent and intact, free from redness, edema or discomfort.  No evidence of extravasations.  Access discontinued per protocol.       Discharge Plan:   Patient discharged in stable condition accompanied by: self.  Departure Mode: Ambulatory.  Therapy completed, no further appointments at this time.    Fadia Talley RN

## 2021-09-17 DIAGNOSIS — I95.9 HYPOTENSION, UNSPECIFIED HYPOTENSION TYPE: ICD-10-CM

## 2021-09-17 DIAGNOSIS — K72.10 CHRONIC LIVER FAILURE WITHOUT HEPATIC COMA (H): ICD-10-CM

## 2021-09-17 RX ORDER — MIDODRINE HYDROCHLORIDE 5 MG/1
5 TABLET ORAL 2 TIMES DAILY WITH MEALS
Qty: 180 TABLET | Refills: 3 | Status: ON HOLD | OUTPATIENT
Start: 2021-09-17 | End: 2021-10-29

## 2021-09-17 RX ORDER — ZINC SULFATE 50(220)MG
220 CAPSULE ORAL DAILY
Qty: 90 CAPSULE | Refills: 3 | Status: ON HOLD | OUTPATIENT
Start: 2021-09-17 | End: 2021-10-29

## 2021-09-19 ENCOUNTER — HEALTH MAINTENANCE LETTER (OUTPATIENT)
Age: 51
End: 2021-09-19

## 2021-09-20 ENCOUNTER — TELEPHONE (OUTPATIENT)
Dept: MEDSURG UNIT | Facility: CLINIC | Age: 51
End: 2021-09-20

## 2021-09-20 ENCOUNTER — PATIENT OUTREACH (OUTPATIENT)
Dept: GASTROENTEROLOGY | Facility: CLINIC | Age: 51
End: 2021-09-20

## 2021-09-20 DIAGNOSIS — K70.31 ALCOHOLIC CIRRHOSIS OF LIVER WITH ASCITES (H): ICD-10-CM

## 2021-09-20 DIAGNOSIS — K74.60 CIRRHOSIS OF LIVER (H): ICD-10-CM

## 2021-09-20 DIAGNOSIS — Z23 ENCOUNTER FOR IMMUNIZATION: ICD-10-CM

## 2021-09-20 NOTE — TELEPHONE ENCOUNTER
Pre-Procedure Unconfirmed COVID Test     Step 1 COVID Screening  The patient was screened for COVID symptoms due to the inability to confirm the patient's COVID status (positive or negative test) Results Reviewed  The patient has a negative COVID test result 9/2/21 but also was positive 8/17/21    No COVID test required for 90 days    Patient reports the following:  Fever/Chills? No   Cough? No   Shortness of breath? No   New loss of taste or smell? No  Sore throat? No  Muscle or body aches? No  Headaches? No  Fatigue? No  Vomiting or diarrhea? No    Patient informed to contact the ordering provider if any of the symptoms develop prior to the procedure    Chelsea Vasquez RN

## 2021-09-20 NOTE — PROGRESS NOTES
Patient is due for second hepatitis B vaccine and hoping to get at the [facility]. Order placed via therapy plan, and will await call back from patient for preferred time to schedule a nurse visit. Patient is scheduled at her PCP office, so will not need to come to [facility] for this.

## 2021-09-21 ENCOUNTER — MYC MEDICAL ADVICE (OUTPATIENT)
Dept: GASTROENTEROLOGY | Facility: CLINIC | Age: 51
End: 2021-09-21

## 2021-09-21 ENCOUNTER — LAB (OUTPATIENT)
Dept: LAB | Facility: CLINIC | Age: 51
End: 2021-09-21
Payer: COMMERCIAL

## 2021-09-21 DIAGNOSIS — D64.9 ANEMIA, UNSPECIFIED TYPE: ICD-10-CM

## 2021-09-21 DIAGNOSIS — K70.31 ALCOHOLIC CIRRHOSIS OF LIVER WITH ASCITES (H): ICD-10-CM

## 2021-09-21 DIAGNOSIS — K74.60 CIRRHOSIS OF LIVER (H): ICD-10-CM

## 2021-09-21 LAB
ABO/RH(D): NORMAL
ALBUMIN SERPL-MCNC: 2.5 G/DL (ref 3.4–5)
ANTIBODY SCREEN: NEGATIVE
BILIRUB SERPL-MCNC: 5.6 MG/DL (ref 0.2–1.3)
CREAT SERPL-MCNC: 1.23 MG/DL (ref 0.52–1.04)
ERYTHROCYTE [DISTWIDTH] IN BLOOD BY AUTOMATED COUNT: 20.3 % (ref 10–15)
GFR SERPL CREATININE-BSD FRML MDRD: 51 ML/MIN/1.73M2
HCT VFR BLD AUTO: 29 % (ref 35–47)
HGB BLD-MCNC: 9.3 G/DL (ref 11.7–15.7)
INR PPP: 1.85 (ref 0.85–1.15)
MCH RBC QN AUTO: 32 PG (ref 26.5–33)
MCHC RBC AUTO-ENTMCNC: 32.1 G/DL (ref 31.5–36.5)
MCV RBC AUTO: 100 FL (ref 78–100)
PLATELET # BLD AUTO: 107 10E3/UL (ref 150–450)
RBC # BLD AUTO: 2.91 10E6/UL (ref 3.8–5.2)
SODIUM SERPL-SCNC: 137 MMOL/L (ref 133–144)
SPECIMEN EXPIRATION DATE: NORMAL
WBC # BLD AUTO: 10.3 10E3/UL (ref 4–11)

## 2021-09-21 PROCEDURE — 86900 BLOOD TYPING SEROLOGIC ABO: CPT | Mod: 90 | Performed by: PATHOLOGY

## 2021-09-21 PROCEDURE — 36415 COLL VENOUS BLD VENIPUNCTURE: CPT | Performed by: PATHOLOGY

## 2021-09-21 PROCEDURE — 85610 PROTHROMBIN TIME: CPT | Performed by: PATHOLOGY

## 2021-09-21 PROCEDURE — 82565 ASSAY OF CREATININE: CPT | Performed by: PATHOLOGY

## 2021-09-21 PROCEDURE — 86850 RBC ANTIBODY SCREEN: CPT | Mod: 90 | Performed by: PATHOLOGY

## 2021-09-21 PROCEDURE — 85027 COMPLETE CBC AUTOMATED: CPT | Performed by: PATHOLOGY

## 2021-09-21 PROCEDURE — 86901 BLOOD TYPING SEROLOGIC RH(D): CPT | Mod: 90 | Performed by: PATHOLOGY

## 2021-09-21 PROCEDURE — 84295 ASSAY OF SERUM SODIUM: CPT | Performed by: PATHOLOGY

## 2021-09-21 PROCEDURE — 82247 BILIRUBIN TOTAL: CPT | Performed by: PATHOLOGY

## 2021-09-21 PROCEDURE — 82040 ASSAY OF SERUM ALBUMIN: CPT | Performed by: PATHOLOGY

## 2021-09-21 RX ORDER — FUROSEMIDE 20 MG
80 TABLET ORAL DAILY
Qty: 360 TABLET | Refills: 3 | Status: ON HOLD | OUTPATIENT
Start: 2021-09-21 | End: 2021-10-29

## 2021-09-21 RX ORDER — SPIRONOLACTONE 50 MG/1
100 TABLET, FILM COATED ORAL 2 TIMES DAILY
Qty: 360 TABLET | Refills: 3 | Status: ON HOLD | OUTPATIENT
Start: 2021-09-21 | End: 2021-10-29

## 2021-09-21 NOTE — PROGRESS NOTES
Per Dr. Avila, patient will increase lasix to 80 mg daily and spironolactone to 100 mg 2 times per day. Hemoglobin stable today, can change frequency to every 2-3 weeks. Because of diuretic change, she will check labs once more next week, then go to 2 weeks. She is also scheduled for para this Thursday.

## 2021-09-23 ENCOUNTER — HOSPITAL ENCOUNTER (OUTPATIENT)
Facility: CLINIC | Age: 51
Discharge: HOME | End: 2021-09-23
Payer: COMMERCIAL

## 2021-09-23 ENCOUNTER — HOSPITAL ENCOUNTER (OUTPATIENT)
Dept: ULTRASOUND IMAGING | Facility: CLINIC | Age: 51
End: 2021-09-23
Attending: STUDENT IN AN ORGANIZED HEALTH CARE EDUCATION/TRAINING PROGRAM
Payer: COMMERCIAL

## 2021-09-23 DIAGNOSIS — K70.31 ALCOHOLIC CIRRHOSIS OF LIVER WITH ASCITES (H): ICD-10-CM

## 2021-09-23 PROCEDURE — 999N000154 HC STATISTIC RADIOLOGY XRAY, US, CT, MAR, NM

## 2021-09-23 PROCEDURE — 76705 ECHO EXAM OF ABDOMEN: CPT

## 2021-09-23 NOTE — PROGRESS NOTES
Pt here for paracentesis, not enough fluid to have procedure done per Dr Logan, pt discharged to home.

## 2021-09-23 NOTE — DISCHARGE INSTRUCTIONS

## 2021-09-28 ENCOUNTER — LAB (OUTPATIENT)
Dept: LAB | Facility: CLINIC | Age: 51
End: 2021-09-28
Attending: INTERNAL MEDICINE
Payer: COMMERCIAL

## 2021-09-28 ENCOUNTER — DOCUMENTATION ONLY (OUTPATIENT)
Dept: TRANSPLANT | Facility: CLINIC | Age: 51
End: 2021-09-28

## 2021-09-28 DIAGNOSIS — K70.30 ALCOHOLIC CIRRHOSIS (H): Primary | ICD-10-CM

## 2021-09-28 DIAGNOSIS — K70.31 ALCOHOLIC CIRRHOSIS OF LIVER WITH ASCITES (H): ICD-10-CM

## 2021-09-28 DIAGNOSIS — E61.1 IRON DEFICIENCY: ICD-10-CM

## 2021-09-28 DIAGNOSIS — D64.9 ANEMIA, UNSPECIFIED TYPE: ICD-10-CM

## 2021-09-28 LAB
ABO/RH(D): NORMAL
ANION GAP SERPL CALCULATED.3IONS-SCNC: 9 MMOL/L (ref 3–14)
ANTIBODY SCREEN: NEGATIVE
BUN SERPL-MCNC: 25 MG/DL (ref 7–30)
CALCIUM SERPL-MCNC: 8.7 MG/DL (ref 8.5–10.1)
CHLORIDE BLD-SCNC: 98 MMOL/L (ref 94–109)
CO2 SERPL-SCNC: 26 MMOL/L (ref 20–32)
CREAT SERPL-MCNC: 1.33 MG/DL (ref 0.52–1.04)
ERYTHROCYTE [DISTWIDTH] IN BLOOD BY AUTOMATED COUNT: 18.8 % (ref 10–15)
GFR SERPL CREATININE-BSD FRML MDRD: 46 ML/MIN/1.73M2
GLUCOSE BLD-MCNC: 105 MG/DL (ref 70–99)
HCT VFR BLD AUTO: 28.7 % (ref 35–47)
HGB BLD-MCNC: 9.5 G/DL (ref 11.7–15.7)
MCH RBC QN AUTO: 32.5 PG (ref 26.5–33)
MCHC RBC AUTO-ENTMCNC: 33.1 G/DL (ref 31.5–36.5)
MCV RBC AUTO: 98 FL (ref 78–100)
PLATELET # BLD AUTO: 110 10E3/UL (ref 150–450)
POTASSIUM BLD-SCNC: 4 MMOL/L (ref 3.4–5.3)
RBC # BLD AUTO: 2.92 10E6/UL (ref 3.8–5.2)
SODIUM SERPL-SCNC: 133 MMOL/L (ref 133–144)
SPECIMEN EXPIRATION DATE: NORMAL
WBC # BLD AUTO: 8.5 10E3/UL (ref 4–11)

## 2021-09-28 PROCEDURE — 85027 COMPLETE CBC AUTOMATED: CPT | Performed by: PATHOLOGY

## 2021-09-28 PROCEDURE — 86900 BLOOD TYPING SEROLOGIC ABO: CPT | Mod: 90 | Performed by: PATHOLOGY

## 2021-09-28 PROCEDURE — 86850 RBC ANTIBODY SCREEN: CPT | Mod: 90 | Performed by: PATHOLOGY

## 2021-09-28 PROCEDURE — 82728 ASSAY OF FERRITIN: CPT | Performed by: PATHOLOGY

## 2021-09-28 PROCEDURE — 36415 COLL VENOUS BLD VENIPUNCTURE: CPT | Performed by: PATHOLOGY

## 2021-09-28 PROCEDURE — 86901 BLOOD TYPING SEROLOGIC RH(D): CPT | Mod: 90 | Performed by: PATHOLOGY

## 2021-09-28 PROCEDURE — 80048 BASIC METABOLIC PNL TOTAL CA: CPT | Performed by: PATHOLOGY

## 2021-09-30 ENCOUNTER — TELEPHONE (OUTPATIENT)
Dept: TRANSPLANT | Facility: CLINIC | Age: 51
End: 2021-09-30

## 2021-09-30 ENCOUNTER — PATIENT OUTREACH (OUTPATIENT)
Dept: GASTROENTEROLOGY | Facility: CLINIC | Age: 51
End: 2021-09-30

## 2021-09-30 NOTE — PROGRESS NOTES
Patient wondering if she should continue or stop her iron supplements now that she has completed the iron infusions. Message sent to Dr. Avila, also asking about Anemia referral. Patient had other questions regarding MELD labs, answered by transplant coordinator. Patient states she feels great, increased energy, no need for paracentesis, and with hemoglobin 9.5 has had no need for transfusion since 8/18/21.   Per Dr Avila: Her ferritin is 280, so she can actually stop (the iron), and we'll just have her restart if she drops in the future.  My Chart message left with this information.

## 2021-09-30 NOTE — TELEPHONE ENCOUNTER
Spoke with Maria Elena regarding the following-     - she ill get  labs in [place] 10/5 to get updated MELD as they did not draw total bili or INR for MELD    - Had also left a voice mail for Natalie Avrey- wanting to know if she still need oral iron supplements?  Completed iron infusions, her understanding was she thought she should stop taking them if Hgb stable per Dr. Beltran- will clarify    - Received a call that she needs covid test for procedure with Dr. Jerry.  She was last told she doesn't need covid test since she was positive (now negative) for 90 days post + test. - message sent to coordinator there    - lab appt at [place] needed from Oct 12th after 1:30 p.m.or anytime on 11th- request to scheduling

## 2021-10-01 DIAGNOSIS — E61.1 IRON DEFICIENCY: ICD-10-CM

## 2021-10-01 DIAGNOSIS — K70.31 ALCOHOLIC CIRRHOSIS OF LIVER WITH ASCITES (H): Primary | ICD-10-CM

## 2021-10-01 LAB — FERRITIN SERPL-MCNC: 280 NG/ML (ref 8–252)

## 2021-10-04 DIAGNOSIS — K31.819 GASTRIC ANTRAL VASCULAR ECTASIA: ICD-10-CM

## 2021-10-04 DIAGNOSIS — E83.110 HEREDITARY HEMOCHROMATOSIS (H): ICD-10-CM

## 2021-10-04 DIAGNOSIS — E61.1 IRON DEFICIENCY: Primary | ICD-10-CM

## 2021-10-05 ENCOUNTER — LAB (OUTPATIENT)
Dept: LAB | Facility: CLINIC | Age: 51
End: 2021-10-05
Payer: COMMERCIAL

## 2021-10-05 DIAGNOSIS — K70.31 ALCOHOLIC CIRRHOSIS OF LIVER WITH ASCITES (H): ICD-10-CM

## 2021-10-05 DIAGNOSIS — D64.9 ANEMIA, UNSPECIFIED TYPE: ICD-10-CM

## 2021-10-05 DIAGNOSIS — K70.30 ALCOHOLIC CIRRHOSIS (H): ICD-10-CM

## 2021-10-05 DIAGNOSIS — E61.1 IRON DEFICIENCY: ICD-10-CM

## 2021-10-05 DIAGNOSIS — K31.819 GASTRIC ANTRAL VASCULAR ECTASIA: ICD-10-CM

## 2021-10-05 DIAGNOSIS — E83.110 HEREDITARY HEMOCHROMATOSIS (H): ICD-10-CM

## 2021-10-05 DIAGNOSIS — Z11.59 ENCOUNTER FOR SCREENING FOR OTHER VIRAL DISEASES: ICD-10-CM

## 2021-10-05 LAB
ERYTHROCYTE [DISTWIDTH] IN BLOOD BY AUTOMATED COUNT: 17.3 % (ref 10–15)
HCT VFR BLD AUTO: 27.5 % (ref 35–47)
HGB BLD-MCNC: 8.8 G/DL (ref 11.7–15.7)
INR PPP: 1.58 (ref 0.85–1.15)
MCH RBC QN AUTO: 32 PG (ref 26.5–33)
MCHC RBC AUTO-ENTMCNC: 32 G/DL (ref 31.5–36.5)
MCV RBC AUTO: 100 FL (ref 78–100)
PLATELET # BLD AUTO: 92 10E3/UL (ref 150–450)
RBC # BLD AUTO: 2.75 10E6/UL (ref 3.8–5.2)
WBC # BLD AUTO: 8.9 10E3/UL (ref 4–11)

## 2021-10-05 PROCEDURE — 36415 COLL VENOUS BLD VENIPUNCTURE: CPT

## 2021-10-05 PROCEDURE — 84295 ASSAY OF SERUM SODIUM: CPT

## 2021-10-05 PROCEDURE — 82728 ASSAY OF FERRITIN: CPT

## 2021-10-05 PROCEDURE — 82247 BILIRUBIN TOTAL: CPT

## 2021-10-05 PROCEDURE — 85610 PROTHROMBIN TIME: CPT

## 2021-10-05 PROCEDURE — 82565 ASSAY OF CREATININE: CPT

## 2021-10-05 PROCEDURE — 82040 ASSAY OF SERUM ALBUMIN: CPT

## 2021-10-05 PROCEDURE — 85027 COMPLETE CBC AUTOMATED: CPT

## 2021-10-06 ENCOUNTER — ANESTHESIA EVENT (OUTPATIENT)
Dept: GASTROENTEROLOGY | Facility: CLINIC | Age: 51
End: 2021-10-06
Payer: COMMERCIAL

## 2021-10-06 LAB
ALBUMIN SERPL-MCNC: 2.4 G/DL (ref 3.4–5)
BILIRUB SERPL-MCNC: 4.2 MG/DL (ref 0.2–1.3)
CREAT SERPL-MCNC: 1.12 MG/DL (ref 0.52–1.04)
FERRITIN SERPL-MCNC: 186 NG/ML (ref 8–252)
GFR SERPL CREATININE-BSD FRML MDRD: 57 ML/MIN/1.73M2
SODIUM SERPL-SCNC: 133 MMOL/L (ref 133–144)

## 2021-10-07 ENCOUNTER — HOSPITAL ENCOUNTER (OUTPATIENT)
Facility: CLINIC | Age: 51
Discharge: HOME | End: 2021-10-07
Attending: INTERNAL MEDICINE | Admitting: INTERNAL MEDICINE
Payer: COMMERCIAL

## 2021-10-07 ENCOUNTER — PREP FOR PROCEDURE (OUTPATIENT)
Dept: GASTROENTEROLOGY | Facility: CLINIC | Age: 51
End: 2021-10-07

## 2021-10-07 ENCOUNTER — ANESTHESIA (OUTPATIENT)
Dept: GASTROENTEROLOGY | Facility: CLINIC | Age: 51
End: 2021-10-07
Payer: COMMERCIAL

## 2021-10-07 VITALS
OXYGEN SATURATION: 100 % | SYSTOLIC BLOOD PRESSURE: 107 MMHG | RESPIRATION RATE: 21 BRPM | HEART RATE: 96 BPM | DIASTOLIC BLOOD PRESSURE: 71 MMHG

## 2021-10-07 DIAGNOSIS — K31.819 GASTRIC ANTRAL VASCULAR ECTASIA: ICD-10-CM

## 2021-10-07 DIAGNOSIS — K31.819 GAVE (GASTRIC ANTRAL VASCULAR ECTASIA): Primary | ICD-10-CM

## 2021-10-07 LAB — UPPER GI ENDOSCOPY: NORMAL

## 2021-10-07 PROCEDURE — 250N000009 HC RX 250: Performed by: NURSE ANESTHETIST, CERTIFIED REGISTERED

## 2021-10-07 PROCEDURE — 258N000003 HC RX IP 258 OP 636: Performed by: NURSE ANESTHETIST, CERTIFIED REGISTERED

## 2021-10-07 PROCEDURE — 999N000010 HC STATISTIC ANES STAT CODE-CRNA PER MINUTE: Performed by: INTERNAL MEDICINE

## 2021-10-07 PROCEDURE — 43235 EGD DIAGNOSTIC BRUSH WASH: CPT | Performed by: INTERNAL MEDICINE

## 2021-10-07 PROCEDURE — 43255 EGD CONTROL BLEEDING ANY: CPT | Performed by: INTERNAL MEDICINE

## 2021-10-07 PROCEDURE — 370N000017 HC ANESTHESIA TECHNICAL FEE, PER MIN: Performed by: INTERNAL MEDICINE

## 2021-10-07 PROCEDURE — 250N000011 HC RX IP 250 OP 636: Performed by: NURSE ANESTHETIST, CERTIFIED REGISTERED

## 2021-10-07 RX ORDER — PROPOFOL 10 MG/ML
INJECTION, EMULSION INTRAVENOUS PRN
Status: DISCONTINUED | OUTPATIENT
Start: 2021-10-07 | End: 2021-10-07

## 2021-10-07 RX ORDER — LIDOCAINE HYDROCHLORIDE 20 MG/ML
INJECTION, SOLUTION INFILTRATION; PERINEURAL PRN
Status: DISCONTINUED | OUTPATIENT
Start: 2021-10-07 | End: 2021-10-07

## 2021-10-07 RX ORDER — ONDANSETRON 4 MG/1
4 TABLET, ORALLY DISINTEGRATING ORAL EVERY 6 HOURS PRN
Status: DISCONTINUED | OUTPATIENT
Start: 2021-10-07 | End: 2021-10-07 | Stop reason: HOSPADM

## 2021-10-07 RX ORDER — FLUMAZENIL 0.1 MG/ML
0.2 INJECTION, SOLUTION INTRAVENOUS
Status: DISCONTINUED | OUTPATIENT
Start: 2021-10-07 | End: 2021-10-07 | Stop reason: HOSPADM

## 2021-10-07 RX ORDER — ONDANSETRON 2 MG/ML
4 INJECTION INTRAMUSCULAR; INTRAVENOUS EVERY 30 MIN PRN
Status: DISCONTINUED | OUTPATIENT
Start: 2021-10-07 | End: 2021-10-07 | Stop reason: HOSPADM

## 2021-10-07 RX ORDER — NALOXONE HYDROCHLORIDE 0.4 MG/ML
0.2 INJECTION, SOLUTION INTRAMUSCULAR; INTRAVENOUS; SUBCUTANEOUS
Status: DISCONTINUED | OUTPATIENT
Start: 2021-10-07 | End: 2021-10-07 | Stop reason: HOSPADM

## 2021-10-07 RX ORDER — SODIUM CHLORIDE, SODIUM LACTATE, POTASSIUM CHLORIDE, CALCIUM CHLORIDE 600; 310; 30; 20 MG/100ML; MG/100ML; MG/100ML; MG/100ML
INJECTION, SOLUTION INTRAVENOUS CONTINUOUS
Status: DISCONTINUED | OUTPATIENT
Start: 2021-10-07 | End: 2021-10-07 | Stop reason: HOSPADM

## 2021-10-07 RX ORDER — PROCHLORPERAZINE MALEATE 10 MG
10 TABLET ORAL EVERY 6 HOURS PRN
Status: DISCONTINUED | OUTPATIENT
Start: 2021-10-07 | End: 2021-10-07 | Stop reason: HOSPADM

## 2021-10-07 RX ORDER — LIDOCAINE 40 MG/G
CREAM TOPICAL
Status: DISCONTINUED | OUTPATIENT
Start: 2021-10-07 | End: 2021-10-07 | Stop reason: HOSPADM

## 2021-10-07 RX ORDER — PROPOFOL 10 MG/ML
INJECTION, EMULSION INTRAVENOUS CONTINUOUS PRN
Status: DISCONTINUED | OUTPATIENT
Start: 2021-10-07 | End: 2021-10-07

## 2021-10-07 RX ORDER — NALOXONE HYDROCHLORIDE 0.4 MG/ML
0.4 INJECTION, SOLUTION INTRAMUSCULAR; INTRAVENOUS; SUBCUTANEOUS
Status: DISCONTINUED | OUTPATIENT
Start: 2021-10-07 | End: 2021-10-07 | Stop reason: HOSPADM

## 2021-10-07 RX ORDER — SODIUM CHLORIDE, SODIUM LACTATE, POTASSIUM CHLORIDE, CALCIUM CHLORIDE 600; 310; 30; 20 MG/100ML; MG/100ML; MG/100ML; MG/100ML
INJECTION, SOLUTION INTRAVENOUS CONTINUOUS PRN
Status: DISCONTINUED | OUTPATIENT
Start: 2021-10-07 | End: 2021-10-07

## 2021-10-07 RX ORDER — ONDANSETRON 4 MG/1
4 TABLET, ORALLY DISINTEGRATING ORAL EVERY 30 MIN PRN
Status: DISCONTINUED | OUTPATIENT
Start: 2021-10-07 | End: 2021-10-07 | Stop reason: HOSPADM

## 2021-10-07 RX ORDER — ONDANSETRON 2 MG/ML
4 INJECTION INTRAMUSCULAR; INTRAVENOUS EVERY 6 HOURS PRN
Status: DISCONTINUED | OUTPATIENT
Start: 2021-10-07 | End: 2021-10-07 | Stop reason: HOSPADM

## 2021-10-07 RX ORDER — ONDANSETRON 2 MG/ML
INJECTION INTRAMUSCULAR; INTRAVENOUS PRN
Status: DISCONTINUED | OUTPATIENT
Start: 2021-10-07 | End: 2021-10-07

## 2021-10-07 RX ORDER — ONDANSETRON 2 MG/ML
4 INJECTION INTRAMUSCULAR; INTRAVENOUS
Status: DISCONTINUED | OUTPATIENT
Start: 2021-10-07 | End: 2021-10-07 | Stop reason: HOSPADM

## 2021-10-07 RX ADMIN — LIDOCAINE HYDROCHLORIDE 80 MG: 20 INJECTION, SOLUTION INFILTRATION; PERINEURAL at 14:42

## 2021-10-07 RX ADMIN — SODIUM CHLORIDE, POTASSIUM CHLORIDE, SODIUM LACTATE AND CALCIUM CHLORIDE: 600; 310; 30; 20 INJECTION, SOLUTION INTRAVENOUS at 14:40

## 2021-10-07 RX ADMIN — PROPOFOL 200 MCG/KG/MIN: 10 INJECTION, EMULSION INTRAVENOUS at 14:42

## 2021-10-07 RX ADMIN — PROPOFOL 50 MG: 10 INJECTION, EMULSION INTRAVENOUS at 14:44

## 2021-10-07 RX ADMIN — ONDANSETRON 4 MG: 2 INJECTION INTRAMUSCULAR; INTRAVENOUS at 14:42

## 2021-10-07 RX ADMIN — PROPOFOL 50 MG: 10 INJECTION, EMULSION INTRAVENOUS at 14:55

## 2021-10-07 RX ADMIN — PROPOFOL 50 MG: 10 INJECTION, EMULSION INTRAVENOUS at 14:49

## 2021-10-07 ASSESSMENT — LIFESTYLE VARIABLES: TOBACCO_USE: 0

## 2021-10-07 ASSESSMENT — ENCOUNTER SYMPTOMS
DYSRHYTHMIAS: 0
SEIZURES: 0

## 2021-10-07 NOTE — ANESTHESIA CARE TRANSFER NOTE
Patient: Sarah Fisher    Procedure: Procedure(s):  ESOPHAGOGASTRODUODENOSCOPY (EGD) with hemostasis       Diagnosis: Gastric antral vascular ectasia [K31.819]  Diagnosis Additional Information: No value filed.    Anesthesia Type:   MAC     Note:    Oropharynx: oropharynx clear of all foreign objects  Level of Consciousness: awake  Oxygen Supplementation: nasal cannula  Level of Supplemental Oxygen (L/min / FiO2): 2  Independent Airway: airway patency satisfactory and stable  Dentition: dentition unchanged  Vital Signs Stable: post-procedure vital signs reviewed and stable  Report to RN Given: handoff report given  Patient transferred to: PACU    Handoff Report: Identifed the Patient, Identified the Reponsible Provider, Reviewed the pertinent medical history, Discussed the surgical course, Reviewed Intra-OP anesthesia mangement and issues during anesthesia, Set expectations for post-procedure period and Allowed opportunity for questions and acknowledgement of understanding      Vitals:  Vitals Value Taken Time   BP     Temp     Pulse     Resp     SpO2         Electronically Signed By: ROBYN He CRNA  October 7, 2021  3:07 PM

## 2021-10-07 NOTE — ANESTHESIA PREPROCEDURE EVALUATION
Anesthesia Pre-Procedure Evaluation    Patient: Sarah Fisher   MRN: [id number] : 1970        Preoperative Diagnosis: Gastric antral vascular ectasia [K31.819]    Procedure : Procedure(s):  ESOPHAGOGASTRODUODENOSCOPY (EGD)          Past Medical History:   Diagnosis Date     Ascites      History of blood transfusion      Liver cirrhosis (H)      Thyroid disease       Past Surgical History:   Procedure Laterality Date      SECTION       CV RIGHT HEART CATH MEASUREMENTS RECORDED N/A 2021    Procedure: CV RIGHT HEART CATH;  Surgeon: Joseph Guevara MD;  Location:  [facility]ATH [facility]     IR THORACENTESIS  2021     IR THORACENTESIS  2021     THORACENTESIS Left 2021    Procedure: THORACENTESIS;  Surgeon: Almas Irby MD;  Location: [facility]     THORACENTESIS N/A 2021    Procedure: THORACENTESIS;  Surgeon: Almas Irby MD;  Location: [facility]     THORACENTESIS N/A 03/10/2021    Procedure: THORACENTESIS;  Surgeon: Almas Irby MD;  Location: [facility]     THORACENTESIS Left 2021    Procedure: THORACENTESIS;  Surgeon: Kennedi Chavez MD;  Location: [facility]     THORACENTESIS Left 2021    Procedure: THORACENTESIS;  Surgeon: Jess Ansari PA-C;  Location: [facility] OR      Allergies   Allergen Reactions     Amoxicillin GI Disturbance, Diarrhea, Nausea and Nausea and Vomiting      Social History     Tobacco Use     Smoking status: Never Smoker     Smokeless tobacco: Never Used   Substance Use Topics     Alcohol use: Not Currently     Comment: Quit on 2020      Wt Readings from Last 1 Encounters:   21 81.2 kg (179 lb)        Anesthesia Evaluation   Pt has had prior anesthetic. Type: General.    No history of anesthetic complications       ROS/MED HX  ENT/Pulmonary:    (-) tobacco use, asthma and sleep apnea   Neurologic: Comment: Hx Hepatic encephalopathy   (-) no seizures and no CVA   Cardiovascular:     (+) -----Previous cardiac testing   Echo:  Date: Results:    Stress Test: Date: 1/7/2021 Results:  Interpretation Summary  Normal dobutamine stress echocardiogram without evidence of inducible  ischemia. Target heart rate was achieved. Heart rate and blood pressure  response to dobutamine were normal. Normal LV function and wall motion at  rest. With stress, the left ventricular ejection fraction increased from 55-  60% to greater than 65% and the left ventricular size decreased appropriately.  No regional wall motion abnormality with stress.  No subjective symptoms to suggest ischemia.  There was no ECG evidence of ischemia.  No significant valve disease on screening doppler evaluation. The aortic root  and visualized ascending aorta are normal.  IVC diameter and respiratory changes fall into an intermediate range  suggesting an RA pressure of 8 mmHg.  Right ventricular systolic pressure could not be approximated due to  inadequate tricuspid regurgitation.  There is no pericardial effusion. Large pleural effusion.  ECG Reviewed: Date: 5/2/2021 Results:  SR with PAC's  Cath: Date: Results:   (-) angina, CAD, syncope, arrhythmias and angina   METS/Exercise Tolerance:     Hematologic: Comments: Hx of LIJ thombus  TCP   Coagulopathy     (+) History of blood clots, anemia, history of blood transfusion, no previous transfusion reaction,     Musculoskeletal:  - neg musculoskeletal ROS     GI/Hepatic: Comment: Cirrhosis - on the Liver Transplant List    (+) GERD, Asymptomatic on medication, esophageal disease, liver disease,     Renal/Genitourinary:     (+) renal disease, type: CRI,     Endo:     (+) thyroid problem, hypothyroidism,  (-) Type II DM   Psychiatric/Substance Use:     (+) alcohol abuse     Infectious Disease:       Malignancy:       Other:            Physical Exam    Airway  airway exam normal      Mallampati: II   TM distance: > 3 FB   Neck ROM: full   Mouth opening: > 3 cm    Respiratory Devices and Support         Dental  no notable dental history          Cardiovascular   cardiovascular exam normal          Pulmonary   pulmonary exam normal                OUTSIDE LABS:  CBC:   Lab Results   Component Value Date    WBC 8.9 10/05/2021    WBC 8.5 09/28/2021    HGB 8.8 (L) 10/05/2021    HGB 9.5 (L) 09/28/2021    HCT 27.5 (L) 10/05/2021    HCT 28.7 (L) 09/28/2021    PLT 92 (L) 10/05/2021     (L) 09/28/2021     BMP:   Lab Results   Component Value Date     10/05/2021     09/28/2021    POTASSIUM 4.0 09/28/2021    POTASSIUM 3.8 08/31/2021    CHLORIDE 98 09/28/2021    CHLORIDE 97 08/31/2021    CO2 26 09/28/2021    CO2 26 08/31/2021    BUN 25 09/28/2021    BUN 19 08/31/2021    CR 1.12 (H) 10/05/2021    CR 1.33 (H) 09/28/2021     (H) 09/28/2021    GLC 94 08/31/2021     COAGS:   Lab Results   Component Value Date    PTT 36 07/23/2021    INR 1.58 (H) 10/05/2021    FIBR 233 05/02/2021     POC:   Lab Results   Component Value Date    HCGS Negative 01/04/2021     HEPATIC:   Lab Results   Component Value Date    ALBUMIN 2.4 (L) 10/05/2021    PROTTOTAL 5.9 (L) 08/24/2021    ALT 26 08/24/2021    AST 29 08/24/2021    ALKPHOS 136 08/24/2021    BILITOTAL 4.2 (H) 10/05/2021     OTHER:   Lab Results   Component Value Date    A1C 4.2 03/17/2021    EMMY 8.7 09/28/2021    PHOS 3.3 05/02/2021    MAG 1.8 05/02/2021    LIPASE 209 07/23/2021    AMYLASE 38 05/02/2021    TSH 4.58 (H) 08/31/2021    T4 1.45 08/31/2021       Anesthesia Plan    ASA Status:  4   NPO Status:  NPO Appropriate    Anesthesia Type: MAC.              Consents    Anesthesia Plan(s) and associated risks, benefits, and realistic alternatives discussed. Questions answered and patient/representative(s) expressed understanding.     - Discussed with:  Patient      - Specific Concerns: Specific risks discussed (but not limited to): Possibility of intraoperative awareness..        Postoperative Care    Pain management: IV analgesics, Oral pain medications.   PONV prophylaxis: Ondansetron (or other 5HT-3),  Dexamethasone or Solumedrol     Comments:                Indra Galloway MD

## 2021-10-07 NOTE — H&P
[facility]  GASTROENTEROLOGY H&P NOTE  Sarah Fisher [id number]   10/07/2021    HPI  See H&P from 21. No significant updates.  No new complaints today. No abdominal pain. No signs of GI bleeding. 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 Anemia has improved since last procedure without needing another transfusion.     Past Medical History:   Diagnosis Date     Ascites      History of blood transfusion      Liver cirrhosis (H)      Thyroid disease        Past Surgical History:   Procedure Laterality Date      SECTION       CV RIGHT HEART CATH MEASUREMENTS RECORDED N/A 2021    Procedure: CV RIGHT HEART CATH;  Surgeon: Joseph Guevara MD;  Location:  HEART CARDIAC CATH LAB     IR THORACENTESIS  2021     IR THORACENTESIS  2021     THORACENTESIS Left 2021    Procedure: THORACENTESIS;  Surgeon: Almas Irby MD;  Location: [facility]     THORACENTESIS N/A 2021    Procedure: THORACENTESIS;  Surgeon: Almas Irby MD;  Location: [facility]     THORACENTESIS N/A 03/10/2021    Procedure: THORACENTESIS;  Surgeon: Almas Irby MD;  Location: [facility]     THORACENTESIS Left 2021    Procedure: THORACENTESIS;  Surgeon: Kennedi Chavez MD;  Location: [facility] OR     THORACENTESIS Left 2021    Procedure: THORACENTESIS;  Surgeon: Jess Ansari PA-C;  Location: [facility] OR          Allergies   Allergen Reactions     Amoxicillin GI Disturbance, Diarrhea, Nausea and Nausea and Vomiting       No current facility-administered medications for this encounter.    Family History   Problem Relation Age of Onset     No Known Problems Mother      Colon Cancer Father 58         age 63     Breast Cancer Maternal Grandmother         Diagnosed in her 60's     No Known Problems Maternal Grandfather      No Known Problems Paternal Grandmother      No Known Problems Paternal Grandfather      Substance Abuse Brother      No Known Problems Sister      No Known Problems Son      Substance Abuse  Brother        Social History     Socioeconomic History     Marital status: Single     Spouse name: Not on file     Number of children: Not on file     Years of education: Not on file     Highest education level: Master's degree (e.g., MA, MS, Roberto, MEd, MSW, SASHA)   Occupational History     Not on file   Tobacco Use     Smoking status: Never Smoker     Smokeless tobacco: Never Used   Substance and Sexual Activity     Alcohol use: Not Currently     Comment: Quit on 6/20/2020     Drug use: Not Currently     Sexual activity: Not Currently     Partners: Male   Other Topics Concern     Parent/sibling w/ CABG, MI or angioplasty before 65F 55M? Not Asked   Social History Narrative    Lives [place]. 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 12 year son, Flakito.         Christine Harris, DNP, APRN, CNP    3/17/2021     Social Determinants of Health     Financial Resource Strain: Low Risk      Difficulty of Paying Living Expenses: Not hard at all   Food Insecurity: No Food Insecurity     Worried About Running Out of Food in the Last Year: Never true     Ran Out of Food in the Last Year: Never true   Transportation Needs: No Transportation Needs     Lack of Transportation (Medical): No     Lack of Transportation (Non-Medical): No   Physical Activity: Sufficiently Active     Days of Exercise per Week: 5 days     Minutes of Exercise per Session: 30 min   Stress: No Stress Concern Present     Feeling of Stress : Only a little   Social Connections: Moderately Integrated     Frequency of Communication with Friends and Family: More than three times a week     Frequency of Social Gatherings with Friends and Family: More than three times a week     Attends Rastafari Services: More than 4 times per year     Active Member of Clubs or Organizations: Yes     Attends Club or Organization Meetings: More than 4 times per year     Marital Status: Never    Intimate Partner Violence:      Fear of  Current or Ex-Partner:      Emotionally Abused:      Physically Abused:      Sexually Abused:        Review Of Systems  Skin: jaundice  Eyes: negative  Ears/Nose/Throat: negative  Respiratory: No shortness of breath, dyspnea on exertion, cough, or hemoptysis  Cardiovascular: negative  Gastrointestinal: cirrhosis, GAVE  Genitourinary: negative  Musculoskeletal: negative  Neurologic: negative  Psychiatric: negative  Hematologic/Lymphatic/Immunologic: negative  Endocrine: hypothyroidism      OBJECTIVE:  VS: There were no vitals taken for this visit.   GEN: A&Ox3, NAD, comfortable  CV:  RRR, no M/G/R  PULM:  CTA B/L  ABD: Normoactive bowel sounds, soft, mildly distended, NT  SKIN: jaundice  EXT: mild LE edema  NEURO: nonfocal    REVIEW OF LABORATORY, PATHOLOGY AND IMAGING RESULTS:  BMP  Recent Labs   Lab 10/05/21  1124      CR 1.12*       CBC  Recent Labs   Lab 10/05/21  1124   WBC 8.9   RBC 2.75*   HGB 8.8*   HCT 27.5*      MCH 32.0   MCHC 32.0   RDW 17.3*   PLT 92*       INR  Recent Labs   Lab 10/05/21  1124   INR 1.58*       LFTs  Recent Labs   Lab 10/05/21  1124   BILITOTAL 4.2*   ALBUMIN 2.4*        PANCNo lab results found in last 7 days.    IMPRESSION:  Sarah Fisher is a 51 year old female  Here for EGD for GAVE.      Fox Jerry MD  [facility]  Division of Gastroenterology and Hepatology  [facility] [address] [phone]

## 2021-10-10 NOTE — ANESTHESIA POSTPROCEDURE EVALUATION
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    Anesthesia Type:  MAC    Note:  Disposition: Outpatient   Postop Pain Control: Uneventful            Sign Out: Well controlled pain   PONV: No   Neuro/Psych: Uneventful            Sign Out: Acceptable/Baseline neuro status   Airway/Respiratory: Uneventful            Sign Out: Acceptable/Baseline resp. status   CV/Hemodynamics: Uneventful            Sign Out: Acceptable CV status   Other NRE: NONE   DID A NON-ROUTINE EVENT OCCUR? No           Last vitals:  Vitals Value Taken Time   /71 10/07/21 1530   Temp     Pulse 96 10/07/21 1520   Resp 21 10/07/21 1530   SpO2 100 % 10/07/21 1530       Electronically Signed By: Indra Galloway MD  4:05 PM

## 2021-10-12 ENCOUNTER — ALLIED HEALTH/NURSE VISIT (OUTPATIENT)
Dept: PEDIATRICS | Facility: CLINIC | Age: 51
End: 2021-10-12
Payer: COMMERCIAL

## 2021-10-12 DIAGNOSIS — Z23 NEED FOR VACCINATION: ICD-10-CM

## 2021-10-12 DIAGNOSIS — Z23 HIGH PRIORITY FOR 2019-NCOV VACCINE: Primary | ICD-10-CM

## 2021-10-12 PROCEDURE — 91300 COVID-19,PF,PFIZER (12+ YRS): CPT

## 2021-10-12 PROCEDURE — 90471 IMMUNIZATION ADMIN: CPT

## 2021-10-12 PROCEDURE — 0004A COVID-19,PF,PFIZER (12+ YRS): CPT

## 2021-10-12 PROCEDURE — 90746 HEPB VACCINE 3 DOSE ADULT IM: CPT

## 2021-10-15 ENCOUNTER — MYC REFILL (OUTPATIENT)
Dept: PEDIATRICS | Facility: CLINIC | Age: 51
End: 2021-10-15

## 2021-10-15 DIAGNOSIS — G47.00 INSOMNIA, UNSPECIFIED TYPE: Primary | ICD-10-CM

## 2021-10-15 DIAGNOSIS — E55.9 VITAMIN D DEFICIENCY: ICD-10-CM

## 2021-10-16 ENCOUNTER — ANESTHESIA EVENT (OUTPATIENT)
Dept: SURGERY | Facility: CLINIC | Age: 51
End: 2021-10-16
Payer: COMMERCIAL

## 2021-10-16 ENCOUNTER — APPOINTMENT (OUTPATIENT)
Dept: GENERAL RADIOLOGY | Facility: CLINIC | Age: 51
End: 2021-10-16
Attending: TRANSPLANT SURGERY
Payer: COMMERCIAL

## 2021-10-16 ENCOUNTER — ORGAN (OUTPATIENT)
Dept: TRANSPLANT | Facility: CLINIC | Age: 51
End: 2021-10-16

## 2021-10-16 ENCOUNTER — HOSPITAL ENCOUNTER (INPATIENT)
Facility: CLINIC | Age: 51
LOS: 12 days | Discharge: HOME | End: 2021-10-29
Attending: TRANSPLANT SURGERY | Admitting: TRANSPLANT SURGERY
Payer: COMMERCIAL

## 2021-10-16 DIAGNOSIS — E61.1 IRON DEFICIENCY: ICD-10-CM

## 2021-10-16 DIAGNOSIS — R73.9 STEROID-INDUCED HYPERGLYCEMIA: ICD-10-CM

## 2021-10-16 DIAGNOSIS — Z76.82 LIVER TRANSPLANT CANDIDATE: Primary | ICD-10-CM

## 2021-10-16 DIAGNOSIS — Z94.4 LIVER TRANSPLANT RECIPIENT (H): ICD-10-CM

## 2021-10-16 DIAGNOSIS — T38.0X5A STEROID-INDUCED HYPERGLYCEMIA: ICD-10-CM

## 2021-10-16 LAB
ABO/RH(D): NORMAL
ALBUMIN SERPL-MCNC: 2.3 G/DL (ref 3.4–5)
ALP SERPL-CCNC: 206 U/L (ref 40–150)
ALT SERPL W P-5'-P-CCNC: 26 U/L (ref 0–50)
AMYLASE SERPL-CCNC: 70 U/L (ref 30–110)
ANION GAP SERPL CALCULATED.3IONS-SCNC: 7 MMOL/L (ref 3–14)
ANTIBODY SCREEN: NEGATIVE
APTT PPP: 38 SECONDS (ref 22–38)
AST SERPL W P-5'-P-CCNC: 39 U/L (ref 0–45)
BASOPHILS # BLD AUTO: 0.1 10E3/UL (ref 0–0.2)
BASOPHILS NFR BLD AUTO: 1 %
BILIRUB SERPL-MCNC: 5 MG/DL (ref 0.2–1.3)
BUN SERPL-MCNC: 38 MG/DL (ref 7–30)
CALCIUM SERPL-MCNC: 8.5 MG/DL (ref 8.5–10.1)
CHLORIDE BLD-SCNC: 100 MMOL/L (ref 94–109)
CO2 SERPL-SCNC: 24 MMOL/L (ref 20–32)
CREAT SERPL-MCNC: 1.44 MG/DL (ref 0.52–1.04)
EOSINOPHIL # BLD AUTO: 0.2 10E3/UL (ref 0–0.7)
EOSINOPHIL NFR BLD AUTO: 2 %
ERYTHROCYTE [DISTWIDTH] IN BLOOD BY AUTOMATED COUNT: 16.5 % (ref 10–15)
FIBRINOGEN PPP-MCNC: 202 MG/DL (ref 170–490)
GFR SERPL CREATININE-BSD FRML MDRD: 42 ML/MIN/1.73M2
GLUCOSE BLD-MCNC: 125 MG/DL (ref 70–99)
HCT VFR BLD AUTO: 24.2 % (ref 35–47)
HGB BLD-MCNC: 7.9 G/DL (ref 11.7–15.7)
IMM GRANULOCYTES # BLD: 0.1 10E3/UL
IMM GRANULOCYTES NFR BLD: 1 %
INR PPP: 1.78 (ref 0.85–1.15)
LYMPHOCYTES # BLD AUTO: 0.8 10E3/UL (ref 0.8–5.3)
LYMPHOCYTES NFR BLD AUTO: 11 %
MAGNESIUM SERPL-MCNC: 2 MG/DL (ref 1.6–2.3)
MCH RBC QN AUTO: 33.3 PG (ref 26.5–33)
MCHC RBC AUTO-ENTMCNC: 32.6 G/DL (ref 31.5–36.5)
MCV RBC AUTO: 102 FL (ref 78–100)
MONOCYTES # BLD AUTO: 1 10E3/UL (ref 0–1.3)
MONOCYTES NFR BLD AUTO: 13 %
NEUTROPHILS # BLD AUTO: 5.2 10E3/UL (ref 1.6–8.3)
NEUTROPHILS NFR BLD AUTO: 72 %
NRBC # BLD AUTO: 0 10E3/UL
NRBC BLD AUTO-RTO: 0 /100
PHOSPHATE SERPL-MCNC: 3.5 MG/DL (ref 2.5–4.5)
PLATELET # BLD AUTO: 95 10E3/UL (ref 150–450)
POTASSIUM BLD-SCNC: 4.1 MMOL/L (ref 3.4–5.3)
PROT SERPL-MCNC: 5.7 G/DL (ref 6.8–8.8)
RBC # BLD AUTO: 2.37 10E6/UL (ref 3.8–5.2)
SARS-COV-2 RNA RESP QL NAA+PROBE: NEGATIVE
SODIUM SERPL-SCNC: 131 MMOL/L (ref 133–144)
SPECIMEN EXPIRATION DATE: NORMAL
WBC # BLD AUTO: 7.3 10E3/UL (ref 4–11)

## 2021-10-16 PROCEDURE — 85384 FIBRINOGEN ACTIVITY: CPT

## 2021-10-16 PROCEDURE — 87081 CULTURE SCREEN ONLY: CPT

## 2021-10-16 PROCEDURE — 82150 ASSAY OF AMYLASE: CPT

## 2021-10-16 PROCEDURE — 99024 POSTOP FOLLOW-UP VISIT: CPT | Performed by: TRANSPLANT SURGERY

## 2021-10-16 PROCEDURE — 86665 EPSTEIN-BARR CAPSID VCA: CPT

## 2021-10-16 PROCEDURE — 83735 ASSAY OF MAGNESIUM: CPT

## 2021-10-16 PROCEDURE — 86803 HEPATITIS C AB TEST: CPT

## 2021-10-16 PROCEDURE — 85610 PROTHROMBIN TIME: CPT

## 2021-10-16 PROCEDURE — 93005 ELECTROCARDIOGRAM TRACING: CPT

## 2021-10-16 PROCEDURE — 80053 COMPREHEN METABOLIC PANEL: CPT

## 2021-10-16 PROCEDURE — U0005 INFEC AGEN DETEC AMPLI PROBE: HCPCS

## 2021-10-16 PROCEDURE — 84100 ASSAY OF PHOSPHORUS: CPT

## 2021-10-16 PROCEDURE — 93010 ELECTROCARDIOGRAM REPORT: CPT | Mod: 59 | Performed by: INTERNAL MEDICINE

## 2021-10-16 PROCEDURE — 85025 COMPLETE CBC W/AUTO DIFF WBC: CPT

## 2021-10-16 PROCEDURE — 36415 COLL VENOUS BLD VENIPUNCTURE: CPT

## 2021-10-16 PROCEDURE — 86704 HEP B CORE ANTIBODY TOTAL: CPT

## 2021-10-16 PROCEDURE — 87516 HEPATITIS B DNA AMP PROBE: CPT

## 2021-10-16 PROCEDURE — 86644 CMV ANTIBODY: CPT

## 2021-10-16 PROCEDURE — 86706 HEP B SURFACE ANTIBODY: CPT

## 2021-10-16 PROCEDURE — 999N000128 HC STATISTIC PERIPHERAL IV START W/O US GUIDANCE

## 2021-10-16 PROCEDURE — 71046 X-RAY EXAM CHEST 2 VIEWS: CPT | Mod: 26 | Performed by: RADIOLOGY

## 2021-10-16 PROCEDURE — 87389 HIV-1 AG W/HIV-1&-2 AB AG IA: CPT

## 2021-10-16 PROCEDURE — 85730 THROMBOPLASTIN TIME PARTIAL: CPT

## 2021-10-16 PROCEDURE — 86900 BLOOD TYPING SEROLOGIC ABO: CPT

## 2021-10-16 PROCEDURE — 87340 HEPATITIS B SURFACE AG IA: CPT

## 2021-10-16 PROCEDURE — 71046 X-RAY EXAM CHEST 2 VIEWS: CPT

## 2021-10-16 PROCEDURE — 86645 CMV ANTIBODY IGM: CPT

## 2021-10-16 RX ORDER — PIPERACILLIN SODIUM, TAZOBACTAM SODIUM 3; .375 G/15ML; G/15ML
3.38 INJECTION, POWDER, LYOPHILIZED, FOR SOLUTION INTRAVENOUS ONCE
Status: COMPLETED | OUTPATIENT
Start: 2021-10-17 | End: 2021-10-17

## 2021-10-16 RX ORDER — LIDOCAINE 40 MG/G
CREAM TOPICAL
Status: CANCELLED | OUTPATIENT
Start: 2021-10-16

## 2021-10-16 RX ORDER — PIPERACILLIN SODIUM, TAZOBACTAM SODIUM 2; .25 G/10ML; G/10ML
2.25 INJECTION, POWDER, LYOPHILIZED, FOR SOLUTION INTRAVENOUS
Status: DISCONTINUED | OUTPATIENT
Start: 2021-10-17 | End: 2021-10-17 | Stop reason: HOSPADM

## 2021-10-16 RX ORDER — LIDOCAINE 40 MG/G
CREAM TOPICAL
Status: DISCONTINUED | OUTPATIENT
Start: 2021-10-16 | End: 2021-10-17 | Stop reason: HOSPADM

## 2021-10-16 RX ORDER — PIPERACILLIN SODIUM, TAZOBACTAM SODIUM 3; .375 G/15ML; G/15ML
3.38 INJECTION, POWDER, LYOPHILIZED, FOR SOLUTION INTRAVENOUS ONCE
Status: CANCELLED | OUTPATIENT
Start: 2021-10-16

## 2021-10-16 RX ORDER — FLUCONAZOLE 2 MG/ML
400 INJECTION, SOLUTION INTRAVENOUS ONCE
Status: CANCELLED | OUTPATIENT
Start: 2021-10-16 | End: 2021-10-16

## 2021-10-16 RX ORDER — PIPERACILLIN SODIUM, TAZOBACTAM SODIUM 2; .25 G/10ML; G/10ML
2.25 INJECTION, POWDER, LYOPHILIZED, FOR SOLUTION INTRAVENOUS
Status: CANCELLED | OUTPATIENT
Start: 2021-10-16

## 2021-10-16 RX ORDER — FLUCONAZOLE 2 MG/ML
400 INJECTION, SOLUTION INTRAVENOUS
Status: COMPLETED | OUTPATIENT
Start: 2021-10-16 | End: 2021-10-17

## 2021-10-16 ASSESSMENT — ENCOUNTER SYMPTOMS: DYSRHYTHMIAS: 0

## 2021-10-16 NOTE — LETTER
Transition Communication Hand-off for Care Transitions to Next Level of Care Provider    Name: Sarah Fisher  : 1970  MRN #: [id number]  Primary Care Provider: Christine Harris     Primary Clinic: [address] [place] [address] [address] [place] [address]     Reason for Hospitalization:  Liver transplant candidate [Z76.82]  Admit Date/Time: 10/16/2021  8:35 PM  Discharge Date: 10/29/2021  Payor Source: No coverage found.      Reason for Communication Hand-off Referral: Other Continuity of care    Discharge Plan:  Home with outpatient follow up       Concern for non-adherence with plan of care:  No  Discharge Needs Assessment:  Needs      Most Recent Value   Equipment Currently Used at Home  shower chair, walker, standard   # of Referrals Placed by [facility]  Homecare          Follow-up specialty is recommended: Yes    Follow-up plan:    Future Appointments   Date Time Provider Department Center   10/30/2021  3:00 PM CS LAB CSLABR CS   2021  8:15 AM  LAB [facility]   2021  9:00 AM EA LAB EALABR EA   2021 10:00 AM  LAB [facility]   2021 11:30 AM Riaz Seth MD [facility]   2021  1:00 PM Ming Archer, [facility]   2021  9:00 AM Christian Morrison MD [facility]   2021  1:30 PM Nory Jason, [facility]   2021  2:00 PM Sherice Arredondo, MSW [facility]   11/15/2021 10:50 AM Christian Morrison MD [facility]   2021 10:10 AM Christian Morrison MD [facility]   2021 11:30 AM Christian Morrison MD [facility]   2022 10:30 AM Fadia Avila MD [facility]       Any outstanding tests or procedures:            Supplies     Future Labs/Procedures    Walker Order     Process Instructions:    By signing this order, the Authorizing Provider is attesting that they have completed a face-to-face evaluation on the patient to determine their need for this equipment in the last 60 days. A new face-to-face evaluation is  required each time  A new prescription for one of the specified items is ordered.   If an additional provider completed the evaluation, please indicate their name below.     **As of 2018, an order requisition and face sheet will print for all DME orders. Please give printed order and face sheet to patient if not obtaining product from [facility] DME closet.     Comments:    DME Documentation:   Describe the reason for need to support medical necessity: gait instability.     I, the undersigned, certify that the above prescribed supplies are medically necessary for this patient and is both reasonable and necessary in reference to accepted standards of medical and necessary in reference to accepted standards of medical practice in the treatment of this patient's condition and is not prescribed as a convenience.          Key Recommendations:  Please see attached AVS.  Initial outpatient transplant labs scheduled for the first week post hospitalization.   Pt declined home care.      Amanda Cho RN    AVS/Discharge Summary is the source of truth; this is a helpful guide for improved communication of patient story

## 2021-10-16 NOTE — LETTER
[facility]  [facility] [phone]    Name: Sarah DAMIAN Jesicaid  : 1970      New liver txp, needs RN for  Monday and Thursday AM labs  D-C Monday  Let us know if you can take her, thanks!

## 2021-10-16 NOTE — TELEPHONE ENCOUNTER
"TRANSPLANT OR REPORT    Organ: Liver  Laterality (if known): NA  Organ Location: Local    UN ID: [id number]   Donor OR Time: 0200  Expected/Actual Cross Clamp Time: 0400  Expected Organ Arrival Time: 0500    Surgeon: Dr. Morrison  Time in OR: 0600  Time in 3C (N/A for LI): 0600    Recipient Details  Admission ETA: 1900  Unit: 7A  Isolation: no  Latex Allergy: no  : no  Diagnosis: ESLD    Liver Transplants  Bypass: Portal bypass and cellsaver  Hemodialysis: no  ~ \"RENAL STAFF TEACHING SERVICE MEDICINE\" : no  ~ CRRT Resource Nurse: no  (Telephone Number for CRRT [phone])    Kidney/Panc Transplants  XM Status (Need to wait for XM?): no    Liver or KP/PA Recipients - Vessel Banking:  Donor has positive serologies for HIV/HCV/HBV: no  Donor has risk criteria for HIV/HCV/HBV: no      Transplant Coordinator Contact Info: Meka [phone]      Vessel Bank Information  Transplant hospitals must not store a donor s extra vessels if the donor has tested positive for any of the following:   - HIV by antibody, antigen, or nucleic acid test (JODIE)   - Hepatitis B surface antigen (HBsAg)   - Hepatitis B (HBV) by JODIE   - Hepatitis C (HCV) by antibody or JODIE     Extra vessels from donors that do not test positive for HIV, HBV, or HCV as above may be stored    "

## 2021-10-16 NOTE — ANESTHESIA PREPROCEDURE EVALUATION
Anesthesia Pre-Procedure Evaluation  51yF. Hx EtOH cirrhosis presents for DBD OLT. Comorbid history of chronic hypotension, CKD, hepatic encephalopathy, anemia, and coagulopathy of liver disease. We will plan for GETA with rapid sequence induction. PIVx2 with NO fluid warmer (for dead space minimization with PLT and cryo). CVCx2, PAC, arterial line, AMBER (Adult 2D probe), resuscitation with PlasmaLyte via Robert with dual patient line, under+upper+lower body Miroslava Huggers, cerebral oximetry, hourly TEG and ABG to monitor the following goals and interventions: Insulin gtt (on a dextrose-containing carrier) for goal K < 3.5 at reperfusion, calcium gtt for goal iCa > 5.0 at reperfusion, Hyperventilation for goal PaCO2 25-30 until after reperfusion (then normal). Blood in room (5+5+2 at all times). Sufenta and ketamine gtt's for analgesia, hypnosis w/ SEVOFLURANE titrated to BIS (40-60), Rocuronium for NMB with likely Sugammadex reversal upon transfer to ICU sedated on propofol and likely remaining intubated.    MELD-Na score: 26 at 10/16/2021 10:13 PM  MELD score: 22 at 10/16/2021 10:13 PM  Calculated from:  Serum Creatinine: 1.44 mg/dL at 10/16/2021 10:13 PM  Serum Sodium: 131 mmol/L at 10/16/2021 10:13 PM  Total Bilirubin: 5.0 mg/dL at 10/16/2021 10:13 PM  INR(ratio): 1.78 at 10/16/2021 10:13 PM  Age: 51 years    Patient: Sarah Fisher   MRN: [id number] : 1970        Preoperative Diagnosis: End stage liver disease (H) [K72.10]    Procedure : Procedure(s):  TRANSPLANT, LIVER, RECIPIENT,  DONOR          Past Medical History:   Diagnosis Date     Ascites      History of blood transfusion      Liver cirrhosis (H)      Thyroid disease       Past Surgical History:   Procedure Laterality Date      SECTION       CV RIGHT HEART CATH MEASUREMENTS RECORDED N/A 2021    Procedure: CV RIGHT HEART CATH;  Surgeon: Joseph Guevara MD;  Location:  HEART CARDIAC CATH LAB      ESOPHAGOSCOPY, GASTROSCOPY, DUODENOSCOPY (EGD), COMBINED N/A 10/7/2021    Procedure: ESOPHAGOGASTRODUODENOSCOPY (EGD) with hemostasis;  Surgeon: Fox Jerry MD;  Location: [facility]     IR THORACENTESIS  5/25/2021     IR THORACENTESIS  7/30/2021     THORACENTESIS Left 01/14/2021    Procedure: THORACENTESIS;  Surgeon: Almas Irby MD;  Location: [facility]     THORACENTESIS N/A 02/17/2021    Procedure: THORACENTESIS;  Surgeon: Almas Irby MD;  Location: [facility]     THORACENTESIS N/A 03/10/2021    Procedure: THORACENTESIS;  Surgeon: Almas Irby MD;  Location: [facility]     THORACENTESIS Left 5/25/2021    Procedure: THORACENTESIS;  Surgeon: Kennedi Chavez MD;  Location: [facility] OR     THORACENTESIS Left 7/30/2021    Procedure: THORACENTESIS;  Surgeon: Jess Ansari PA-C;  Location: [facility] OR      Allergies   Allergen Reactions     Amoxicillin GI Disturbance, Diarrhea, Nausea and Nausea and Vomiting      Social History     Tobacco Use     Smoking status: Never Smoker     Smokeless tobacco: Never Used   Substance Use Topics     Alcohol use: Not Currently     Comment: Quit on 6/20/2020      Wt Readings from Last 1 Encounters:   09/09/21 81.2 kg (179 lb)        Anesthesia Evaluation   Pt has had prior anesthetic. Type: General.    No history of anesthetic complications       ROS/MED HX  ENT/Pulmonary:  - neg pulmonary ROS     Neurologic: Comment: Hx Hepatic encephalopathy      Cardiovascular:     (+) -----Previous cardiac testing   Echo: Date: Results:    Stress Test: Date: 1/7/2021 Results:  Interpretation Summary  Normal dobutamine stress echocardiogram without evidence of inducible  ischemia. Target heart rate was achieved. Heart rate and blood pressure  response to dobutamine were normal. Normal LV function and wall motion at  rest. With stress, the left ventricular ejection fraction increased from 55-  60% to greater than 65% and the left ventricular size decreased appropriately.  No regional wall motion abnormality with  stress.  No subjective symptoms to suggest ischemia.  There was no ECG evidence of ischemia.  No significant valve disease on screening doppler evaluation. The aortic root  and visualized ascending aorta are normal.  IVC diameter and respiratory changes fall into an intermediate range  suggesting an RA pressure of 8 mmHg.  Right ventricular systolic pressure could not be approximated due to  inadequate tricuspid regurgitation.  There is no pericardial effusion. Large pleural effusion.  ECG Reviewed: Date: 5/26/2021 Results:  SR with PAC's. Prolonged QT (QTc 485)  Cath:  Date: 6/7/21 Results:  Mean PA pressure 25  Mild elevated pulmonary hypertension.  Left sided filling pressures are mildly elevated.  Hyperdynamic cardiac output level.     (-) angina, CAD, syncope, arrhythmias and angina   METS/Exercise Tolerance:     Hematologic: Comments: Hx of [employer] willam  TCP   Coagulopathy       (+) History of blood clots, anemia, history of blood transfusion, no previous transfusion reaction,     Musculoskeletal:  - neg musculoskeletal ROS     GI/Hepatic: Comment: Cirrhosis - on the Liver Transplant List    (+) GERD, Asymptomatic on medication, esophageal disease, liver disease,     Renal/Genitourinary:     (+) renal disease, type: CRI,     Endo:     (+) thyroid problem, hypothyroidism,     Psychiatric/Substance Use:     (+) alcohol abuse     Infectious Disease:       Malignancy:       Other:            Physical Exam    Airway        Mallampati: II   TM distance: > 3 FB   Neck ROM: full   Mouth opening: > 3 cm    Respiratory Devices and Support         Dental           Cardiovascular          Rhythm and rate: regular and normal     Pulmonary   pulmonary exam normal        breath sounds clear to auscultation           OUTSIDE LABS:  CBC:   Lab Results   Component Value Date    WBC 8.9 10/05/2021    WBC 8.5 09/28/2021    HGB 8.8 (L) 10/05/2021    HGB 9.5 (L) 09/28/2021    HCT 27.5 (L) 10/05/2021    HCT 28.7 (L) 09/28/2021    PLT  92 (L) 10/05/2021     (L) 09/28/2021     BMP:   Lab Results   Component Value Date     10/05/2021     09/28/2021    POTASSIUM 4.0 09/28/2021    POTASSIUM 3.8 08/31/2021    CHLORIDE 98 09/28/2021    CHLORIDE 97 08/31/2021    CO2 26 09/28/2021    CO2 26 08/31/2021    BUN 25 09/28/2021    BUN 19 08/31/2021    CR 1.12 (H) 10/05/2021    CR 1.33 (H) 09/28/2021     (H) 09/28/2021    GLC 94 08/31/2021     COAGS:   Lab Results   Component Value Date    PTT 36 07/23/2021    INR 1.58 (H) 10/05/2021    FIBR 233 05/02/2021     POC:   Lab Results   Component Value Date    HCGS Negative 01/04/2021     HEPATIC:   Lab Results   Component Value Date    ALBUMIN 2.4 (L) 10/05/2021    PROTTOTAL 5.9 (L) 08/24/2021    ALT 26 08/24/2021    AST 29 08/24/2021    ALKPHOS 136 08/24/2021    BILITOTAL 4.2 (H) 10/05/2021     OTHER:   Lab Results   Component Value Date    A1C 4.2 03/17/2021    EMMY 8.7 09/28/2021    PHOS 3.3 05/02/2021    MAG 1.8 05/02/2021    LIPASE 209 07/23/2021    AMYLASE 38 05/02/2021    TSH 4.58 (H) 08/31/2021    T4 1.45 08/31/2021       Anesthesia Plan    ASA Status:  4   NPO Status:  NPO Appropriate    Anesthesia Type: General.     - Airway: ETT   Induction: Intravenous.   Maintenance: Balanced.   Techniques and Equipment:     - Lines/Monitors: 2nd IV, Arterial Line, Central Line, BIS, NIRS, PAC, CVP, AMBER     - Blood: Blood in Room, PLT, FFP, PRBC, Cryo, Cell Saver, T&C     - Drips/Meds: Vasopressin, Epinephrine, Norepi, Ketamine, Sufentanil     Consents    Anesthesia Plan(s) and associated risks, benefits, and realistic alternatives discussed. Questions answered and patient/representative(s) expressed understanding.     - Discussed with:  Patient      - Extended Intubation/Ventilatory Support Discussed: Yes.    Use of blood products discussed: Yes.     - Discussed with: Patient.     - Consented: consented to blood products            Reason for refusal: other.     Postoperative Care    Pain  management: IV analgesics, Multi-modal analgesia.   PONV prophylaxis: Ondansetron (or other 5HT-3), Background Propofol Infusion, Dexamethasone or Solumedrol     Comments:                Linda Diez MD

## 2021-10-17 ENCOUNTER — APPOINTMENT (OUTPATIENT)
Dept: GENERAL RADIOLOGY | Facility: CLINIC | Age: 51
End: 2021-10-17
Attending: TRANSPLANT SURGERY
Payer: COMMERCIAL

## 2021-10-17 ENCOUNTER — DOCUMENTATION ONLY (OUTPATIENT)
Dept: TRANSPLANT | Facility: CLINIC | Age: 51
End: 2021-10-17

## 2021-10-17 ENCOUNTER — ANESTHESIA (OUTPATIENT)
Dept: SURGERY | Facility: CLINIC | Age: 51
End: 2021-10-17
Payer: COMMERCIAL

## 2021-10-17 ENCOUNTER — APPOINTMENT (OUTPATIENT)
Dept: GENERAL RADIOLOGY | Facility: CLINIC | Age: 51
End: 2021-10-17
Attending: STUDENT IN AN ORGANIZED HEALTH CARE EDUCATION/TRAINING PROGRAM
Payer: COMMERCIAL

## 2021-10-17 PROBLEM — Z76.82 LIVER TRANSPLANT CANDIDATE: Status: ACTIVE | Noted: 2021-10-17

## 2021-10-17 LAB
ALBUMIN SERPL-MCNC: 1.4 G/DL (ref 3.4–5)
ALP SERPL-CCNC: 222 U/L (ref 40–150)
ALT SERPL W P-5'-P-CCNC: 761 U/L (ref 0–50)
ANION GAP SERPL CALCULATED.3IONS-SCNC: 9 MMOL/L (ref 3–14)
APTT PPP: 50 SECONDS (ref 22–38)
AST SERPL W P-5'-P-CCNC: 1485 U/L (ref 0–45)
BASE EXCESS BLDA CALC-SCNC: -1.4 MMOL/L (ref -9–1.8)
BASOPHILS # BLD AUTO: 0 10E3/UL (ref 0–0.2)
BASOPHILS NFR BLD AUTO: 0 %
BILIRUB DIRECT SERPL-MCNC: 6.2 MG/DL (ref 0–0.2)
BILIRUB SERPL-MCNC: 8.3 MG/DL (ref 0.2–1.3)
BLD PROD TYP BPU: NORMAL
BLOOD COMPONENT TYPE: NORMAL
BUN SERPL-MCNC: 25 MG/DL (ref 7–30)
CALCIUM SERPL-MCNC: 8.5 MG/DL (ref 8.5–10.1)
CF REDUC 30M P MA P HEP LENFR BLD TEG: 0 % (ref 0–8)
CF REDUC 30M P MA P HEP LENFR BLD TEG: 0 % (ref 0–8)
CF REDUC 60M P MA LENFR BLD TEG: 0.4 % (ref 0–15)
CF REDUC 60M P MA LENFR BLD TEG: 0.8 % (ref 0–15)
CF REDUC 60M P MA LENFR BLD TEG: 12.1 % (ref 0–15)
CF REDUC 60M P MA LENFR BLD TEG: 38.6 % (ref 0–15)
CF REDUC 60M P MA LENFR BLD TEG: 7.9 % (ref 0–15)
CF REDUC 60M P MA P HEPASE LENFR BLD TEG: 0 % (ref 0–15)
CF REDUC 60M P MA P HEPASE LENFR BLD TEG: 0.7 % (ref 0–15)
CFT BLD TEG: 1.8 MINUTE (ref 1–3)
CFT BLD TEG: 1.9 MINUTE (ref 1–3)
CFT BLD TEG: 2.5 MINUTE (ref 1–3)
CFT BLD TEG: 2.5 MINUTE (ref 1–3)
CFT BLD TEG: 4.9 MINUTE (ref 1–3)
CFT P HPASE BLD TEG: 0.9 MINUTE (ref 1–3)
CFT P HPASE BLD TEG: 2.9 MINUTE (ref 1–3)
CHLORIDE BLD-SCNC: 104 MMOL/L (ref 94–109)
CI (COAGULATION INDEX) NATIVE: -3.4 (ref -3–3)
CI (COAGULATION INDEX) NATIVE: 2.2 (ref -3–3)
CI (COAGULATION INDEX): -1.5 (ref -3–3)
CI (COAGULATION INDEX): -13.4 (ref -3–3)
CI (COAGULATION INDEX): -2.6 (ref -3–3)
CI (COAGULATION INDEX): -2.9 (ref -3–3)
CI (COAGULATION INDEX): 0.4 (ref -3–3)
CLOT ANGLE BLD TEG: 36.6 DEGREES (ref 59–74)
CLOT ANGLE BLD TEG: 57.3 DEGREES (ref 59–74)
CLOT ANGLE BLD TEG: 58.6 DEGREES (ref 59–74)
CLOT ANGLE BLD TEG: 66.7 DEGREES (ref 59–74)
CLOT ANGLE BLD TEG: 68.7 DEGREES (ref 59–74)
CLOT ANGLE P HPASE BLD TEG: 54.6 DEGREES (ref 59–74)
CLOT ANGLE P HPASE BLD TEG: 73.9 DEGREES (ref 59–74)
CLOT INIT BLD TEG: 16.9 MINUTE (ref 4–9)
CLOT INIT BLD TEG: 3.8 MINUTE (ref 4–9)
CLOT INIT BLD TEG: 6.2 MINUTE (ref 4–9)
CLOT INIT BLD TEG: 6.7 MINUTE (ref 4–9)
CLOT INIT BLD TEG: 7 MINUTE (ref 4–9)
CLOT INIT P HPASE BLD TEG: 4.7 MINUTE (ref 4–9)
CLOT INIT P HPASE BLD TEG: 6.1 MINUTE (ref 4–9)
CLOT LYSIS 30M P MA LENFR BLD TEG: 0 % (ref 0–8)
CLOT LYSIS 30M P MA LENFR BLD TEG: 0 % (ref 0–8)
CLOT LYSIS 30M P MA LENFR BLD TEG: 2.2 % (ref 0–8)
CLOT LYSIS 30M P MA LENFR BLD TEG: 2.9 % (ref 0–8)
CLOT LYSIS 30M P MA LENFR BLD TEG: 9.6 % (ref 0–8)
CLOT STRENGTH BLD TEG: 2.9 KD/SC (ref 5.3–13.2)
CLOT STRENGTH BLD TEG: 4.3 KD/SC (ref 5.3–13.2)
CLOT STRENGTH BLD TEG: 4.9 KD/SC (ref 5.3–13.2)
CLOT STRENGTH BLD TEG: 5.2 KD/SC (ref 5.3–13.2)
CLOT STRENGTH BLD TEG: 5.3 KD/SC (ref 5.3–13.2)
CLOT STRENGTH P HPASE BLD TEG: 3.9 KD/SC (ref 5.3–13.2)
CLOT STRENGTH P HPASE BLD TEG: 8.6 KD/SC (ref 5.3–13.2)
CO2 SERPL-SCNC: 23 MMOL/L (ref 20–32)
CODING SYSTEM: NORMAL
CREAT SERPL-MCNC: 0.88 MG/DL (ref 0.52–1.04)
CROSSMATCH: NORMAL
EOSINOPHIL # BLD AUTO: 0 10E3/UL (ref 0–0.7)
EOSINOPHIL NFR BLD AUTO: 0 %
ERYTHROCYTE [DISTWIDTH] IN BLOOD BY AUTOMATED COUNT: 17.4 % (ref 10–15)
FIBRINOGEN PPP-MCNC: 143 MG/DL (ref 170–490)
GFR SERPL CREATININE-BSD FRML MDRD: 76 ML/MIN/1.73M2
GLUCOSE BLD-MCNC: 101 MG/DL (ref 70–99)
GLUCOSE BLDC GLUCOMTR-MCNC: 135 MG/DL (ref 70–99)
GLUCOSE BLDC GLUCOMTR-MCNC: 97 MG/DL (ref 70–99)
GRAM STAIN RESULT: NORMAL
GRAM STAIN RESULT: NORMAL
HCO3 BLD-SCNC: 23 MMOL/L (ref 21–28)
HCT VFR BLD AUTO: 37.7 % (ref 35–47)
HGB BLD-MCNC: 13.2 G/DL (ref 11.7–15.7)
IMM GRANULOCYTES # BLD: 0.4 10E3/UL
IMM GRANULOCYTES NFR BLD: 2 %
INR PPP: 2.86 (ref 0.85–1.15)
ISSUE DATE AND TIME: NORMAL
LACTATE SERPL-SCNC: 1 MMOL/L (ref 0.7–2)
LYMPHOCYTES # BLD AUTO: 0.3 10E3/UL (ref 0.8–5.3)
LYMPHOCYTES NFR BLD AUTO: 2 %
MAGNESIUM SERPL-MCNC: 2.3 MG/DL (ref 1.6–2.3)
MCF BLD TEG: 37.1 MM (ref 55–74)
MCF BLD TEG: 46.3 MM (ref 55–74)
MCF BLD TEG: 49.4 MM (ref 55–74)
MCF BLD TEG: 51 MM (ref 55–74)
MCF BLD TEG: 51.5 MM (ref 55–74)
MCF P HPASE BLD TEG: 43.7 MM (ref 55–74)
MCF P HPASE BLD TEG: 63.3 MM (ref 55–74)
MCH RBC QN AUTO: 32.3 PG (ref 26.5–33)
MCHC RBC AUTO-ENTMCNC: 35 G/DL (ref 31.5–36.5)
MCV RBC AUTO: 92 FL (ref 78–100)
MONOCYTES # BLD AUTO: 1.2 10E3/UL (ref 0–1.3)
MONOCYTES NFR BLD AUTO: 7 %
MRSA DNA SPEC QL NAA+PROBE: NEGATIVE
NEUTROPHILS # BLD AUTO: 15.2 10E3/UL (ref 1.6–8.3)
NEUTROPHILS NFR BLD AUTO: 89 %
NRBC # BLD AUTO: 0 10E3/UL
NRBC BLD AUTO-RTO: 0 /100
O2/TOTAL GAS SETTING VFR VENT: 50 %
PCO2 BLD: 37 MM HG (ref 35–45)
PH BLD: 7.4 [PH] (ref 7.35–7.45)
PHOSPHATE SERPL-MCNC: 3.6 MG/DL (ref 2.5–4.5)
PLATELET # BLD AUTO: 81 10E3/UL (ref 150–450)
PO2 BLD: 119 MM HG (ref 80–105)
POTASSIUM BLD-SCNC: 3.7 MMOL/L (ref 3.4–5.3)
PROT SERPL-MCNC: 3.4 G/DL (ref 6.8–8.8)
RBC # BLD AUTO: 4.09 10E6/UL (ref 3.8–5.2)
SA TARGET DNA: NEGATIVE
SODIUM SERPL-SCNC: 136 MMOL/L (ref 133–144)
UNIT ABO/RH: NORMAL
UNIT NUMBER: NORMAL
UNIT STATUS: NORMAL
UNIT TYPE ISBT: 1700
UNIT TYPE ISBT: 5100
UNIT TYPE ISBT: 7300
WBC # BLD AUTO: 17.2 10E3/UL (ref 4–11)

## 2021-10-17 PROCEDURE — 250N000009 HC RX 250: Performed by: NURSE ANESTHETIST, CERTIFIED REGISTERED

## 2021-10-17 PROCEDURE — 93503 INSERT/PLACE HEART CATHETER: CPT

## 2021-10-17 PROCEDURE — 250N000025 HC SEVOFLURANE, PER MIN: Performed by: TRANSPLANT SURGERY

## 2021-10-17 PROCEDURE — 84155 ASSAY OF PROTEIN SERUM: CPT | Performed by: STUDENT IN AN ORGANIZED HEALTH CARE EDUCATION/TRAINING PROGRAM

## 2021-10-17 PROCEDURE — 250N000009 HC RX 250: Performed by: TRANSPLANT SURGERY

## 2021-10-17 PROCEDURE — 999N000015 HC STATISTIC ARTERIAL MONITORING DAILY

## 2021-10-17 PROCEDURE — 410N000003 HC PER-PERFUSION 1ST 30 MIN: Performed by: TRANSPLANT SURGERY

## 2021-10-17 PROCEDURE — 999N000063 XR ABDOMEN PORT 1 VIEWS

## 2021-10-17 PROCEDURE — 370N000017 HC ANESTHESIA TECHNICAL FEE, PER MIN: Performed by: TRANSPLANT SURGERY

## 2021-10-17 PROCEDURE — 88304 TISSUE EXAM BY PATHOLOGIST: CPT | Mod: 26 | Performed by: PATHOLOGY

## 2021-10-17 PROCEDURE — 250N000011 HC RX IP 250 OP 636: Performed by: TRANSPLANT SURGERY

## 2021-10-17 PROCEDURE — 250N000011 HC RX IP 250 OP 636: Performed by: NURSE ANESTHETIST, CERTIFIED REGISTERED

## 2021-10-17 PROCEDURE — 83735 ASSAY OF MAGNESIUM: CPT | Performed by: STUDENT IN AN ORGANIZED HEALTH CARE EDUCATION/TRAINING PROGRAM

## 2021-10-17 PROCEDURE — 250N000013 HC RX MED GY IP 250 OP 250 PS 637: Performed by: STUDENT IN AN ORGANIZED HEALTH CARE EDUCATION/TRAINING PROGRAM

## 2021-10-17 PROCEDURE — 87205 SMEAR GRAM STAIN: CPT | Performed by: TRANSPLANT SURGERY

## 2021-10-17 PROCEDURE — 36415 COLL VENOUS BLD VENIPUNCTURE: CPT | Performed by: TRANSPLANT SURGERY

## 2021-10-17 PROCEDURE — 250N000011 HC RX IP 250 OP 636: Performed by: ANESTHESIOLOGY

## 2021-10-17 PROCEDURE — 85396 CLOTTING ASSAY WHOLE BLOOD: CPT | Performed by: TRANSPLANT SURGERY

## 2021-10-17 PROCEDURE — P9041 ALBUMIN (HUMAN),5%, 50ML: HCPCS | Performed by: TRANSPLANT SURGERY

## 2021-10-17 PROCEDURE — 258N000003 HC RX IP 258 OP 636

## 2021-10-17 PROCEDURE — 88313 SPECIAL STAINS GROUP 2: CPT | Mod: 26 | Performed by: PATHOLOGY

## 2021-10-17 PROCEDURE — 272N000085 HC PACK CELL SAVER CSP: Performed by: TRANSPLANT SURGERY

## 2021-10-17 PROCEDURE — 3E043XZ INTRODUCTION OF VASOPRESSOR INTO CENTRAL VEIN, PERCUTANEOUS APPROACH: ICD-10-PCS | Performed by: TRANSPLANT SURGERY

## 2021-10-17 PROCEDURE — 258N000003 HC RX IP 258 OP 636: Performed by: ANESTHESIOLOGY

## 2021-10-17 PROCEDURE — 47135 TRANSPLANTATION OF LIVER: CPT | Mod: GC | Performed by: TRANSPLANT SURGERY

## 2021-10-17 PROCEDURE — 272N000001 HC OR GENERAL SUPPLY STERILE: Performed by: TRANSPLANT SURGERY

## 2021-10-17 PROCEDURE — 74018 RADEX ABDOMEN 1 VIEW: CPT | Mod: 26 | Performed by: RADIOLOGY

## 2021-10-17 PROCEDURE — 86706 HEP B SURFACE ANTIBODY: CPT | Performed by: STUDENT IN AN ORGANIZED HEALTH CARE EDUCATION/TRAINING PROGRAM

## 2021-10-17 PROCEDURE — 85384 FIBRINOGEN ACTIVITY: CPT | Performed by: STUDENT IN AN ORGANIZED HEALTH CARE EDUCATION/TRAINING PROGRAM

## 2021-10-17 PROCEDURE — 82810 BLOOD GASES O2 SAT ONLY: CPT

## 2021-10-17 PROCEDURE — 250N000009 HC RX 250: Performed by: ANESTHESIOLOGY

## 2021-10-17 PROCEDURE — 410N000004: Performed by: TRANSPLANT SURGERY

## 2021-10-17 PROCEDURE — 83690 ASSAY OF LIPASE: CPT | Performed by: STUDENT IN AN ORGANIZED HEALTH CARE EDUCATION/TRAINING PROGRAM

## 2021-10-17 PROCEDURE — 0FY00Z0 TRANSPLANTATION OF LIVER, ALLOGENEIC, OPEN APPROACH: ICD-10-PCS | Performed by: TRANSPLANT SURGERY

## 2021-10-17 PROCEDURE — 83605 ASSAY OF LACTIC ACID: CPT | Performed by: STUDENT IN AN ORGANIZED HEALTH CARE EDUCATION/TRAINING PROGRAM

## 2021-10-17 PROCEDURE — 250N000012 HC RX MED GY IP 250 OP 636 PS 637: Performed by: STUDENT IN AN ORGANIZED HEALTH CARE EDUCATION/TRAINING PROGRAM

## 2021-10-17 PROCEDURE — P9037 PLATE PHERES LEUKOREDU IRRAD: HCPCS | Performed by: TRANSPLANT SURGERY

## 2021-10-17 PROCEDURE — 258N000003 HC RX IP 258 OP 636: Performed by: NURSE ANESTHETIST, CERTIFIED REGISTERED

## 2021-10-17 PROCEDURE — P9016 RBC LEUKOCYTES REDUCED: HCPCS | Performed by: TRANSPLANT SURGERY

## 2021-10-17 PROCEDURE — 87102 FUNGUS ISOLATION CULTURE: CPT | Performed by: TRANSPLANT SURGERY

## 2021-10-17 PROCEDURE — 250N000012 HC RX MED GY IP 250 OP 636 PS 637: Performed by: TRANSPLANT SURGERY

## 2021-10-17 PROCEDURE — 88309 TISSUE EXAM BY PATHOLOGIST: CPT | Mod: TC | Performed by: TRANSPLANT SURGERY

## 2021-10-17 PROCEDURE — 88309 TISSUE EXAM BY PATHOLOGIST: CPT | Mod: 26 | Performed by: PATHOLOGY

## 2021-10-17 PROCEDURE — 99291 CRITICAL CARE FIRST HOUR: CPT | Mod: 24 | Performed by: ANESTHESIOLOGY

## 2021-10-17 PROCEDURE — 87075 CULTR BACTERIA EXCEPT BLOOD: CPT | Performed by: TRANSPLANT SURGERY

## 2021-10-17 PROCEDURE — 250N000011 HC RX IP 250 OP 636: Performed by: STUDENT IN AN ORGANIZED HEALTH CARE EDUCATION/TRAINING PROGRAM

## 2021-10-17 PROCEDURE — 999N000065 XR CHEST PORT 1 VIEW

## 2021-10-17 PROCEDURE — 812N000006 HC ACQUISITION LIVER CADAVER DONOR

## 2021-10-17 PROCEDURE — 360N000078 HC SURGERY LEVEL 5, PER MIN: Performed by: TRANSPLANT SURGERY

## 2021-10-17 PROCEDURE — P9059 PLASMA, FRZ BETWEEN 8-24HOUR: HCPCS | Performed by: STUDENT IN AN ORGANIZED HEALTH CARE EDUCATION/TRAINING PROGRAM

## 2021-10-17 PROCEDURE — 250N000013 HC RX MED GY IP 250 OP 250 PS 637: Performed by: TRANSPLANT SURGERY

## 2021-10-17 PROCEDURE — 85610 PROTHROMBIN TIME: CPT | Performed by: STUDENT IN AN ORGANIZED HEALTH CARE EDUCATION/TRAINING PROGRAM

## 2021-10-17 PROCEDURE — 87340 HEPATITIS B SURFACE AG IA: CPT | Performed by: STUDENT IN AN ORGANIZED HEALTH CARE EDUCATION/TRAINING PROGRAM

## 2021-10-17 PROCEDURE — 272N000088 HC PUMP APP ADULT PERFUSION: Performed by: TRANSPLANT SURGERY

## 2021-10-17 PROCEDURE — 200N000002 HC R&B ICU UMMC

## 2021-10-17 PROCEDURE — 80069 RENAL FUNCTION PANEL: CPT | Performed by: STUDENT IN AN ORGANIZED HEALTH CARE EDUCATION/TRAINING PROGRAM

## 2021-10-17 PROCEDURE — 999N000077 HC STATISTIC INSERT IABP

## 2021-10-17 PROCEDURE — 87070 CULTURE OTHR SPECIMN AEROBIC: CPT | Performed by: TRANSPLANT SURGERY

## 2021-10-17 PROCEDURE — 82330 ASSAY OF CALCIUM: CPT

## 2021-10-17 PROCEDURE — 94002 VENT MGMT INPAT INIT DAY: CPT

## 2021-10-17 PROCEDURE — 87641 MR-STAPH DNA AMP PROBE: CPT | Performed by: STUDENT IN AN ORGANIZED HEALTH CARE EDUCATION/TRAINING PROGRAM

## 2021-10-17 PROCEDURE — 250N000011 HC RX IP 250 OP 636

## 2021-10-17 PROCEDURE — 82803 BLOOD GASES ANY COMBINATION: CPT | Performed by: STUDENT IN AN ORGANIZED HEALTH CARE EDUCATION/TRAINING PROGRAM

## 2021-10-17 PROCEDURE — 85025 COMPLETE CBC W/AUTO DIFF WBC: CPT | Performed by: STUDENT IN AN ORGANIZED HEALTH CARE EDUCATION/TRAINING PROGRAM

## 2021-10-17 PROCEDURE — 84100 ASSAY OF PHOSPHORUS: CPT | Performed by: STUDENT IN AN ORGANIZED HEALTH CARE EDUCATION/TRAINING PROGRAM

## 2021-10-17 PROCEDURE — 999N000065 XR ABDOMEN PORT 1 VIEWS

## 2021-10-17 PROCEDURE — 999N000045 HC STATISTIC DAILY SWAN MONITORING

## 2021-10-17 PROCEDURE — 999N000157 HC STATISTIC RCP TIME EA 10 MIN

## 2021-10-17 PROCEDURE — 250N000009 HC RX 250

## 2021-10-17 PROCEDURE — 250N000009 HC RX 250: Performed by: STUDENT IN AN ORGANIZED HEALTH CARE EDUCATION/TRAINING PROGRAM

## 2021-10-17 PROCEDURE — 82150 ASSAY OF AMYLASE: CPT | Performed by: STUDENT IN AN ORGANIZED HEALTH CARE EDUCATION/TRAINING PROGRAM

## 2021-10-17 PROCEDURE — 82803 BLOOD GASES ANY COMBINATION: CPT

## 2021-10-17 PROCEDURE — 86923 COMPATIBILITY TEST ELECTRIC: CPT | Performed by: TRANSPLANT SURGERY

## 2021-10-17 PROCEDURE — 258N000001 HC RX 258: Performed by: STUDENT IN AN ORGANIZED HEALTH CARE EDUCATION/TRAINING PROGRAM

## 2021-10-17 PROCEDURE — 999N000155 HC STATISTIC RAPCV CVP MONITORING

## 2021-10-17 PROCEDURE — C1757 CATH, THROMBECTOMY/EMBOLECT: HCPCS | Performed by: TRANSPLANT SURGERY

## 2021-10-17 PROCEDURE — 71045 X-RAY EXAM CHEST 1 VIEW: CPT | Mod: 26 | Performed by: RADIOLOGY

## 2021-10-17 PROCEDURE — 85730 THROMBOPLASTIN TIME PARTIAL: CPT | Performed by: STUDENT IN AN ORGANIZED HEALTH CARE EDUCATION/TRAINING PROGRAM

## 2021-10-17 RX ORDER — SODIUM CHLORIDE, SODIUM GLUCONATE, SODIUM ACETATE, POTASSIUM CHLORIDE AND MAGNESIUM CHLORIDE 526; 502; 368; 37; 30 MG/100ML; MG/100ML; MG/100ML; MG/100ML; MG/100ML
INJECTION, SOLUTION INTRAVENOUS CONTINUOUS PRN
Status: DISCONTINUED | OUTPATIENT
Start: 2021-10-17 | End: 2021-10-17

## 2021-10-17 RX ORDER — POTASSIUM CHLORIDE 1.5 G/1.58G
20 POWDER, FOR SOLUTION ORAL ONCE
Status: COMPLETED | OUTPATIENT
Start: 2021-10-17 | End: 2021-10-17

## 2021-10-17 RX ORDER — PREDNISONE 10 MG/1
10 TABLET ORAL ONCE
Status: COMPLETED | OUTPATIENT
Start: 2021-10-22 | End: 2021-10-22

## 2021-10-17 RX ORDER — DIPHENHYDRAMINE HYDROCHLORIDE 50 MG/ML
25 INJECTION INTRAMUSCULAR; INTRAVENOUS EVERY 24 HOURS
Status: DISCONTINUED | OUTPATIENT
Start: 2021-10-17 | End: 2021-10-17 | Stop reason: CLARIF

## 2021-10-17 RX ORDER — VALGANCICLOVIR 450 MG/1
450 TABLET, FILM COATED ORAL DAILY
Status: DISCONTINUED | OUTPATIENT
Start: 2021-10-18 | End: 2021-10-17

## 2021-10-17 RX ORDER — VALGANCICLOVIR HYDROCHLORIDE 50 MG/ML
450 POWDER, FOR SOLUTION ORAL DAILY
Status: DISCONTINUED | OUTPATIENT
Start: 2021-10-18 | End: 2021-10-19

## 2021-10-17 RX ORDER — DIPHENHYDRAMINE HCL 25 MG
25 CAPSULE ORAL EVERY 24 HOURS
Status: DISCONTINUED | OUTPATIENT
Start: 2021-10-17 | End: 2021-10-17 | Stop reason: CLARIF

## 2021-10-17 RX ORDER — ACETAMINOPHEN 325 MG/1
650 TABLET ORAL EVERY 24 HOURS
Status: DISCONTINUED | OUTPATIENT
Start: 2021-10-17 | End: 2021-10-17 | Stop reason: CLARIF

## 2021-10-17 RX ORDER — LIDOCAINE HYDROCHLORIDE 20 MG/ML
INJECTION, SOLUTION INFILTRATION; PERINEURAL PRN
Status: DISCONTINUED | OUTPATIENT
Start: 2021-10-17 | End: 2021-10-17

## 2021-10-17 RX ORDER — NOREPINEPHRINE BITARTRATE 0.06 MG/ML
.01-.6 INJECTION, SOLUTION INTRAVENOUS CONTINUOUS
Status: DISCONTINUED | OUTPATIENT
Start: 2021-10-17 | End: 2021-10-19

## 2021-10-17 RX ORDER — ALBUMIN, HUMAN INJ 5% 5 %
SOLUTION INTRAVENOUS PRN
Status: DISCONTINUED | OUTPATIENT
Start: 2021-10-17 | End: 2021-10-17 | Stop reason: HOSPADM

## 2021-10-17 RX ORDER — VALGANCICLOVIR 450 MG/1
450 TABLET, FILM COATED ORAL DAILY
Status: DISCONTINUED | OUTPATIENT
Start: 2021-10-18 | End: 2021-10-19

## 2021-10-17 RX ORDER — METHYLPREDNISOLONE SODIUM SUCCINATE 125 MG/2ML
50 INJECTION, POWDER, LYOPHILIZED, FOR SOLUTION INTRAMUSCULAR; INTRAVENOUS ONCE
Status: COMPLETED | OUTPATIENT
Start: 2021-10-20 | End: 2021-10-20

## 2021-10-17 RX ORDER — VERAPAMIL HYDROCHLORIDE 2.5 MG/ML
INJECTION, SOLUTION INTRAVENOUS PRN
Status: DISCONTINUED | OUTPATIENT
Start: 2021-10-17 | End: 2021-10-17 | Stop reason: HOSPADM

## 2021-10-17 RX ORDER — CALCIUM CHLORIDE 100 MG/ML
INJECTION INTRAVENOUS; INTRAVENTRICULAR PRN
Status: DISCONTINUED | OUTPATIENT
Start: 2021-10-17 | End: 2021-10-17

## 2021-10-17 RX ORDER — URSODIOL 300 MG/1
300 CAPSULE ORAL 2 TIMES DAILY
Status: DISCONTINUED | OUTPATIENT
Start: 2021-10-18 | End: 2021-10-29 | Stop reason: HOSPADM

## 2021-10-17 RX ORDER — PIPERACILLIN SODIUM, TAZOBACTAM SODIUM 3; .375 G/15ML; G/15ML
3.38 INJECTION, POWDER, LYOPHILIZED, FOR SOLUTION INTRAVENOUS EVERY 6 HOURS
Status: DISCONTINUED | OUTPATIENT
Start: 2021-10-18 | End: 2021-10-19

## 2021-10-17 RX ORDER — DIPHENHYDRAMINE HCL 25 MG
25 CAPSULE ORAL EVERY 6 HOURS PRN
Status: DISCONTINUED | OUTPATIENT
Start: 2021-10-17 | End: 2021-10-17 | Stop reason: CLARIF

## 2021-10-17 RX ORDER — MYCOPHENOLATE MOFETIL 250 MG/1
750 CAPSULE ORAL
Status: DISCONTINUED | OUTPATIENT
Start: 2021-10-17 | End: 2021-10-29 | Stop reason: HOSPADM

## 2021-10-17 RX ORDER — FIBRINOGEN (HUMAN) 700-1300MG
1050 KIT INTRAVENOUS ONCE
Status: COMPLETED | OUTPATIENT
Start: 2021-10-17 | End: 2021-10-17

## 2021-10-17 RX ORDER — MYCOPHENOLATE MOFETIL 200 MG/ML
750 POWDER, FOR SUSPENSION ORAL
Status: DISCONTINUED | OUTPATIENT
Start: 2021-10-17 | End: 2021-10-19

## 2021-10-17 RX ORDER — ASPIRIN 325 MG
325 TABLET ORAL DAILY
Status: DISCONTINUED | OUTPATIENT
Start: 2021-10-17 | End: 2021-10-19

## 2021-10-17 RX ORDER — MANNITOL 20 G/100ML
INJECTION, SOLUTION INTRAVENOUS PRN
Status: DISCONTINUED | OUTPATIENT
Start: 2021-10-17 | End: 2021-10-17

## 2021-10-17 RX ORDER — NOREPINEPHRINE BITARTRATE 0.06 MG/ML
.01-.6 INJECTION, SOLUTION INTRAVENOUS CONTINUOUS
Status: DISCONTINUED | OUTPATIENT
Start: 2021-10-17 | End: 2021-10-17

## 2021-10-17 RX ORDER — METHYLPREDNISOLONE SODIUM SUCCINATE 125 MG/2ML
100 INJECTION, POWDER, LYOPHILIZED, FOR SOLUTION INTRAMUSCULAR; INTRAVENOUS ONCE
Status: COMPLETED | OUTPATIENT
Start: 2021-10-19 | End: 2021-10-19

## 2021-10-17 RX ORDER — VASOPRESSIN 20 U/ML
INJECTION PARENTERAL PRN
Status: DISCONTINUED | OUTPATIENT
Start: 2021-10-17 | End: 2021-10-17

## 2021-10-17 RX ORDER — DIPHENHYDRAMINE HYDROCHLORIDE 50 MG/ML
25 INJECTION INTRAMUSCULAR; INTRAVENOUS EVERY 6 HOURS PRN
Status: DISCONTINUED | OUTPATIENT
Start: 2021-10-17 | End: 2021-10-17 | Stop reason: CLARIF

## 2021-10-17 RX ORDER — TACROLIMUS 1 MG/1
2 CAPSULE ORAL
Status: DISCONTINUED | OUTPATIENT
Start: 2021-10-18 | End: 2021-10-20

## 2021-10-17 RX ORDER — PROPOFOL 10 MG/ML
INJECTION, EMULSION INTRAVENOUS PRN
Status: DISCONTINUED | OUTPATIENT
Start: 2021-10-17 | End: 2021-10-17

## 2021-10-17 RX ORDER — SULFAMETHOXAZOLE AND TRIMETHOPRIM 400; 80 MG/1; MG/1
1 TABLET ORAL DAILY
Status: DISCONTINUED | OUTPATIENT
Start: 2021-10-18 | End: 2021-10-20

## 2021-10-17 RX ORDER — DEXTROSE MONOHYDRATE 25 G/50ML
25-50 INJECTION, SOLUTION INTRAVENOUS
Status: DISCONTINUED | OUTPATIENT
Start: 2021-10-17 | End: 2021-10-19

## 2021-10-17 RX ORDER — MAGNESIUM SULFATE 1 G/100ML
INJECTION INTRAVENOUS PRN
Status: DISCONTINUED | OUTPATIENT
Start: 2021-10-17 | End: 2021-10-17

## 2021-10-17 RX ORDER — HEPARIN SODIUM (PORCINE) LOCK FLUSH IV SOLN 100 UNIT/ML 100 UNIT/ML
SOLUTION INTRAVENOUS PRN
Status: DISCONTINUED | OUTPATIENT
Start: 2021-10-17 | End: 2021-10-17

## 2021-10-17 RX ORDER — NICOTINE POLACRILEX 4 MG
15-30 LOZENGE BUCCAL
Status: DISCONTINUED | OUTPATIENT
Start: 2021-10-17 | End: 2021-10-19

## 2021-10-17 RX ORDER — FENTANYL CITRATE 50 UG/ML
INJECTION, SOLUTION INTRAMUSCULAR; INTRAVENOUS PRN
Status: DISCONTINUED | OUTPATIENT
Start: 2021-10-17 | End: 2021-10-17

## 2021-10-17 RX ORDER — PAPAVERINE HYDROCHLORIDE 30 MG/ML
INJECTION INTRAMUSCULAR; INTRAVENOUS PRN
Status: DISCONTINUED | OUTPATIENT
Start: 2021-10-17 | End: 2021-10-17 | Stop reason: HOSPADM

## 2021-10-17 RX ORDER — MAGNESIUM HYDROXIDE 1200 MG/15ML
LIQUID ORAL PRN
Status: DISCONTINUED | OUTPATIENT
Start: 2021-10-17 | End: 2021-10-17 | Stop reason: HOSPADM

## 2021-10-17 RX ORDER — PROPOFOL 10 MG/ML
5-75 INJECTION, EMULSION INTRAVENOUS CONTINUOUS
Status: DISCONTINUED | OUTPATIENT
Start: 2021-10-17 | End: 2021-10-18

## 2021-10-17 RX ORDER — DEXTROSE MONOHYDRATE 100 MG/ML
INJECTION, SOLUTION INTRAVENOUS CONTINUOUS PRN
Status: DISCONTINUED | OUTPATIENT
Start: 2021-10-17 | End: 2021-10-29 | Stop reason: HOSPADM

## 2021-10-17 RX ORDER — URSODIOL 300 MG/1
300 CAPSULE ORAL 2 TIMES DAILY
Status: DISCONTINUED | OUTPATIENT
Start: 2021-10-18 | End: 2021-10-17

## 2021-10-17 RX ORDER — FLUCONAZOLE 2 MG/ML
400 INJECTION, SOLUTION INTRAVENOUS ONCE
Status: COMPLETED | OUTPATIENT
Start: 2021-10-18 | End: 2021-10-18

## 2021-10-17 RX ADMIN — POTASSIUM CHLORIDE 20 MEQ: 1.5 POWDER, FOR SOLUTION ORAL at 23:00

## 2021-10-17 RX ADMIN — NOREPINEPHRINE BITARTRATE 12.8 MCG: 1 INJECTION, SOLUTION, CONCENTRATE INTRAVENOUS at 18:07

## 2021-10-17 RX ADMIN — LIDOCAINE HYDROCHLORIDE 100 MG: 20 INJECTION, SOLUTION INFILTRATION; PERINEURAL at 14:54

## 2021-10-17 RX ADMIN — Medication 50 MCG/HR: at 23:03

## 2021-10-17 RX ADMIN — ROCURONIUM BROMIDE 100 MG: 50 INJECTION, SOLUTION INTRAVENOUS at 14:54

## 2021-10-17 RX ADMIN — Medication 0.03 MCG/KG/MIN: at 16:19

## 2021-10-17 RX ADMIN — FLUCONAZOLE 400 MG: 2 INJECTION INTRAVENOUS at 15:14

## 2021-10-17 RX ADMIN — PROPOFOL 30 MCG/KG/MIN: 10 INJECTION, EMULSION INTRAVENOUS at 23:02

## 2021-10-17 RX ADMIN — NOREPINEPHRINE BITARTRATE 6.4 MCG: 1 INJECTION, SOLUTION, CONCENTRATE INTRAVENOUS at 16:59

## 2021-10-17 RX ADMIN — SODIUM CHLORIDE, PRESERVATIVE FREE 400 UNITS: 5 INJECTION INTRAVENOUS at 17:35

## 2021-10-17 RX ADMIN — ROCURONIUM BROMIDE 20 MG: 50 INJECTION, SOLUTION INTRAVENOUS at 17:03

## 2021-10-17 RX ADMIN — NOREPINEPHRINE BITARTRATE 6.4 MCG: 1 INJECTION, SOLUTION, CONCENTRATE INTRAVENOUS at 16:50

## 2021-10-17 RX ADMIN — HUMAN INSULIN 0.5 UNITS/HR: 100 INJECTION, SOLUTION SUBCUTANEOUS at 23:21

## 2021-10-17 RX ADMIN — KETAMINE HYDROCHLORIDE 0.2 MG/KG/HR: 10 INJECTION, SOLUTION INTRAMUSCULAR; INTRAVENOUS at 16:12

## 2021-10-17 RX ADMIN — SODIUM CHLORIDE, PRESERVATIVE FREE 300 UNITS: 5 INJECTION INTRAVENOUS at 17:34

## 2021-10-17 RX ADMIN — SUFENTANIL CITRATE 0.2 MCG/KG/HR: 50 INJECTION EPIDURAL; INTRAVENOUS at 16:12

## 2021-10-17 RX ADMIN — EPINEPHRINE 20 MCG: 1 INJECTION PARENTERAL at 18:07

## 2021-10-17 RX ADMIN — PIPERACILLIN AND TAZOBACTAM 3.38 G: 3; .375 INJECTION, POWDER, FOR SOLUTION INTRAVENOUS at 19:02

## 2021-10-17 RX ADMIN — SODIUM CHLORIDE 1000 MG: 9 INJECTION, SOLUTION INTRAVENOUS at 18:16

## 2021-10-17 RX ADMIN — VASOPRESSIN 2 UNITS: 20 INJECTION INTRAVENOUS at 18:14

## 2021-10-17 RX ADMIN — SODIUM CHLORIDE, PRESERVATIVE FREE 300 UNITS: 5 INJECTION INTRAVENOUS at 17:24

## 2021-10-17 RX ADMIN — ASPIRIN 325 MG ORAL TABLET 325 MG: 325 PILL ORAL at 22:22

## 2021-10-17 RX ADMIN — EPINEPHRINE 10 MCG: 1 INJECTION PARENTERAL at 18:03

## 2021-10-17 RX ADMIN — MAGNESIUM SULFATE HEPTAHYDRATE 1 MG: 1 INJECTION, SOLUTION INTRAVENOUS at 15:23

## 2021-10-17 RX ADMIN — HUMAN INSULIN 5 UNITS/HR: 100 INJECTION, SOLUTION SUBCUTANEOUS at 17:15

## 2021-10-17 RX ADMIN — SODIUM CHLORIDE, SODIUM GLUCONATE, SODIUM ACETATE, POTASSIUM CHLORIDE AND MAGNESIUM CHLORIDE: 526; 502; 368; 37; 30 INJECTION, SOLUTION INTRAVENOUS at 14:50

## 2021-10-17 RX ADMIN — PIPERACILLIN AND TAZOBACTAM 3.38 G: 3; .375 INJECTION, POWDER, FOR SOLUTION INTRAVENOUS at 15:14

## 2021-10-17 RX ADMIN — SUGAMMADEX 200 MG: 100 INJECTION, SOLUTION INTRAVENOUS at 21:18

## 2021-10-17 RX ADMIN — EPINEPHRINE 0.03 MCG/KG/MIN: 1 INJECTION PARENTERAL at 17:59

## 2021-10-17 RX ADMIN — METHYLENE BLUE 100 MG: 5 INJECTION INTRAVENOUS at 18:15

## 2021-10-17 RX ADMIN — FENTANYL CITRATE 250 MCG: 50 INJECTION, SOLUTION INTRAMUSCULAR; INTRAVENOUS at 14:54

## 2021-10-17 RX ADMIN — Medication 0.1 MCG/KG/MIN: at 22:30

## 2021-10-17 RX ADMIN — DEXTROSE AND SODIUM CHLORIDE: 5; 450 INJECTION, SOLUTION INTRAVENOUS at 22:25

## 2021-10-17 RX ADMIN — PROPOFOL 80 MG: 10 INJECTION, EMULSION INTRAVENOUS at 14:54

## 2021-10-17 RX ADMIN — CALCIUM CHLORIDE 500 MG: 100 INJECTION INTRAVENOUS; INTRAVENTRICULAR at 17:05

## 2021-10-17 RX ADMIN — MIDAZOLAM 2 MG: 1 INJECTION INTRAMUSCULAR; INTRAVENOUS at 14:49

## 2021-10-17 RX ADMIN — FIBRINOGEN (HUMAN) 1050 MG: KIT INTRAVENOUS at 18:46

## 2021-10-17 RX ADMIN — VASOPRESSIN 2 UNITS/HR: 20 INJECTION INTRAVENOUS at 16:17

## 2021-10-17 RX ADMIN — NOREPINEPHRINE BITARTRATE 6.4 MCG: 1 INJECTION, SOLUTION, CONCENTRATE INTRAVENOUS at 16:17

## 2021-10-17 RX ADMIN — PROPOFOL 30 MCG/KG/MIN: 10 INJECTION, EMULSION INTRAVENOUS at 20:42

## 2021-10-17 RX ADMIN — MANNITOL 50 G: 20 INJECTION, SOLUTION INTRAVENOUS at 17:46

## 2021-10-17 RX ADMIN — MYCOPHENOLATE MOFETIL 750 MG: 200 POWDER, FOR SUSPENSION ORAL at 23:16

## 2021-10-17 RX ADMIN — PHENYLEPHRINE HYDROCHLORIDE 200 MCG: 10 INJECTION INTRAVENOUS at 16:26

## 2021-10-17 RX ADMIN — ROCURONIUM BROMIDE 30 MG: 50 INJECTION, SOLUTION INTRAVENOUS at 16:17

## 2021-10-17 RX ADMIN — NOREPINEPHRINE BITARTRATE 6.4 MCG: 1 INJECTION, SOLUTION, CONCENTRATE INTRAVENOUS at 17:07

## 2021-10-17 NOTE — PROGRESS NOTES
Admitted/transferred from: home  Time of arrival on unit 2030  2 RN full  skin assessment completed by Jada RAMOS, Kathy RAMOS  Skin assessment finding: bruising on BUE, L upper thigh, 2+ edema BLE.  Interventions/actions: no interventions necessary at this time.  Will continue to monitor.

## 2021-10-17 NOTE — PLAN OF CARE
/67 (BP Location: Left arm)   Pulse 112   Temp 98.2  F (36.8  C) (Oral)   Resp 16   Wt 81.2 kg (179 lb)   SpO2 99%   BMI 28.04 kg/m    0949-4800  Pt taken down to pre-op/OR for liver transplant surgery

## 2021-10-17 NOTE — PROGRESS NOTES
CLINICAL NUTRITION SERVICES - ASSESSMENT NOTE     Nutrition Prescription    RECOMMENDATIONS FOR MDs/PROVIDERS TO ORDER:  Diet advancement vs nutrition support as medically able     Malnutrition Status:    Moderate malnutrition in the context of chronic illness    Recommendations already ordered by Registered Dietitian (RD):  Ordered vitamin D     Future/Additional Recommendations:  1. Diet advancement as medically able   2. If enteral nutrition support is needed post surgery, to meet 100% of estimated needs recommend:   Osmolite 1.5 Blair @ goal of  60ml/hr  (1440ml/day)  will provide: 2160 kcals (3 kcal/kg), 90 g PRO (1.3 g/kg), 1097 ml free H20, 293 g CHO, and 0 g fiber daily.  -30-60 mL water flush q 4 hours for tube patency   - Start at 15 mL/hr and advance by 15 mL q 6-8 hours to goal if K>/=3, Mg >/=1.5 and Po4 >/= 1.9. If lytes not within these parameters, hold at current rate and replace   -15 mL certavite daily to meet micronutrient needs   -Head of bed 30 degrees with gastric feeds  3. Recommend resuming vitamin D supplementation post transplant pending recheck   4. Ability to provide post transplant education      REASON FOR ASSESSMENT  Sarah Fisher is a/an 51 year old female assessed by the dietitian for Provider Order - Liver Transplant Pre-op    CLINICAL HISTORY   PMH of etoh related alcohol cirrhosis c/b ascites, GAVE, hematochromatosis compound heterozygote, and hypothyroidism who presents for possible liver transplant.     NUTRITION HISTORY  Information obtained from patient. Reported appetite has been good, usually consumes 3 meals per day:   Breakfast: yogurt + fruit or oatmeal  Lunch: salad or sandwich  Dinner of chicken and fresh vegetable  She has been following low sodium diet. Drinks carbonated water throughout the day as main beverage.  Supplements: Thera-Vit-M and Vitamin D 3 (50 mcg daily).   No food allergies. No difficulties with chewing/swallowing. No GI concerns.     She reported  "average weight is around 175 lbs and confirmed height. She did not remember receiving any liver pre-op education. Per review of notes, came in for possible transplant in May of 2021.     CURRENT NUTRITION ORDERS  Diet: NPO  Intake/Tolerance: N/A     LABS  Na 131 (L)  Creatinine 1.44 (H)  Glucose 125 (H)  Vitamin D 10 (LL, 1/4/21)     MEDICATIONS  Methylprednisolone    ANTHROPOMETRICS  Height: 170 cm (5' 7\")   Most Recent Weight: 81.2 kg (179 lb)    IBW: 61.4 kg (132%)   BMI: Overweight BMI 25-29.9  Weight History:   Wt Readings from Last 15 Encounters:   10/16/21 81.2 kg (179 lb)   09/09/21 81.2 kg (179 lb)   08/30/21 81.2 kg (179 lb)   08/13/21 92.1 kg (203 lb)   08/05/21 70.3 kg (155 lb)   07/30/21 70.3 kg (155 lb)   07/23/21 70.3 kg (155 lb)   07/23/21 70.3 kg (155 lb)   06/07/21 77.1 kg (169 lb 15.6 oz)   05/25/21 77.1 kg (170 lb)   05/02/21 77.3 kg (170 lb 6.4 oz)   03/17/21 76.3 kg (168 lb 3.2 oz)   02/18/21 79.4 kg (175 lb)   01/04/21 85.2 kg (187 lb 13.3 oz)   01/04/21 85.2 kg (187 lb 14.4 oz)       Dosing Weight: 66.3  Kg (adjusted based on weight of 81.2 kg and IBW)    ASSESSED NUTRITION NEEDS PRE TRANSPLANT   Estimated Energy Needs: 6379-5957 kcals/day (25 - 30 kcals/kg)  Justification: Maintenance  Estimated Protein Needs: 80-99 grams protein/day (1.2 - 1.5 grams of pro/kg)  Justification: Increased needs  Estimated Fluid Needs: 6389-0363  mL/day (25 - 30 mL/kg)   Justification: Maintenance    ASSESSED NUTRITION NEEDS POST TRANSPLANT   Estimated Energy Needs: 4691-0830 kcals/day (30 - 35 kcals/kg )  Justification: Post-op  Estimated Protein Needs:  grams protein/day (1.3 - 2 grams of pro/kg)  Justification: Post-op  Estimated Fluid Needs: 7306-0471 mL/day (25 - 30 mL/kg)   Justification: Maintenance    PHYSICAL FINDINGS  See malnutrition section below.    MALNUTRITION  % Intake: No decreased intake noted  % Weight Loss: None noted  Subcutaneous Fat Loss: Facial region:  mild and Upper arm:  " mild  Muscle Loss: Temporal:  mild, Thoracic region (clavicle, acromium bone, deltoid, trapezius, pectoral):  mild, Upper arm (bicep, tricep):  moderate, Lower arm  (forearm):  moderate and Dorsal hand:  Moderate  Fluid Accumulation/Edema: None noted  Malnutrition Diagnosis: Moderate malnutrition in the context of chronic illness    NUTRITION DIAGNOSIS  Inadequate oral intake related to inability to consume adequate nutition as evidenced by NPO status for possible transplant      INTERVENTIONS  Implementation  Nutrition Education:   RD role in care   Increased energy and protein needs x6 weeks post surgery  Possibility of feeding tube post surgery due to barriers to consuming PO   Briefly discussed post transplant guidelines- educated patient on energy/protein needs, option for supplements and foods she cannot have post transplant (grapefruit, pomegranate, raw/undercooked meats)     Goals  Diet adv v nutrition support within 2-3 days.     Monitoring/Evaluation  Progress toward goals will be monitored and evaluated per protocol.    Yue Red RD, LD  Weekend pager: [phone]

## 2021-10-17 NOTE — ANESTHESIA PROCEDURE NOTES
Arterial Line Procedure Note    Pre-Procedure   Staff -        Anesthesiologist:  Kel Morse MD       Resident/Fellow: Tien Garcia MD       Performed By: resident       Location: OR       Pre-Anesthestic Checklist: patient identified, IV checked, risks and benefits discussed, informed consent, monitors and equipment checked, pre-op evaluation and at physician/surgeon's request  Timeout:       Correct Patient: Yes        Correct Procedure: Yes        Correct Site: Yes        Correct Position: Yes   Procedure   Procedure: emergent and arterial line       Laterality: right       Insertion Site: radial.  Sterile Prep        Standard elements of sterile barrier followed       Skin prep: Chloraprep  Insertion/Injection        Technique: ultrasound guided and Seldinger Technique        1. Ultrasound was used to evaluate the access site.       2. Artery evaluated via ultrasound for patency/adequacy.       3. Using real-time ultrasound the needle/catheter was observed entering the artery/vein.       Catheter Type/Size: 20 G, 1.75 in/4.5 cm quick cath (integral wire)  Narrative        Tegaderm dressing used.       Complications: None apparent,        Arterial waveform: Yes        IBP within 10% of NIBP: Yes

## 2021-10-17 NOTE — ANESTHESIA PROCEDURE NOTES
Central Line/PA Catheter Placement    Pre-Procedure   Staff -        Anesthesiologist:  Kel Morse MD       Resident/Fellow: Tien Garcia MD       Performed By: resident       Location: OR       Pre-Anesthestic Checklist: patient identified, IV checked, site marked, risks and benefits discussed, informed consent, monitors and equipment checked, pre-op evaluation and at physician/surgeon's request  Timeout:       Correct Patient: Yes        Correct Procedure: Yes        Correct Site: Yes        Correct Position: Yes        Correct Laterality: N/A     Procedure   Procedure: central line       Laterality: right       Insertion Site: internal jugular.       Patient Position: Trendelenburg  Sterile Prep        All elements of maximal sterile barrier technique followed       Patient Prep/Sterile Barriers: draped, hand hygiene, gloves , hat , mask , draped, gown, sterile gel and probe cover       Skin prep: Chloraprep  Insertion/Injection        Technique: ultrasound guided and Seldinger Technique        1. Ultrasound was used to evaluate the access site.       2. Vein evaluated via ultrasound for patency/adequacy.       3. Using real-time ultrasound the needle/catheter was observed entering the artery/vein.       Introducer Type: 9 Fr, 2-lumen MAC   Narrative         Secured by: suture and anchor securement device       Tegaderm and Biopatch dressing used.       Complications: None apparent,        blood aspirated from all lumens,        All lumens flushed: Yes       Verification method: X-ray, Ultrasound and Placement to be verified post-op   Comments:  [facility] CVC #1 of 2

## 2021-10-17 NOTE — H&P
Physician Attestation   I, Christian Morrison MD, saw this patient with the resident and agree with the resident/fellow's findings and plan of care as documented in the note.      I personally reviewed vital signs, medications, and labs.    Key findings: plan: liver transplant    Christian Morrison MD  Date of Service (when I saw the patient): 10/16/2021  [facility]    History and Physical  Transplant Surgery     Date of Admission:  10/16/2021    Assessment & Plan   Sarah Fisher is a 51 year old female with PMH of etoh related alcohol cirrhosis c/b ascites, GAVE, hematochromatosis compound heterozygote, and hypothyroidism who presents for possible liver transplant.  Patient was notified as an acceptable  donor organ became available and presented for further pre-operative work-up.  Patient was informed of the risks and benefits regarding  donor organ transplantation, and has elected to proceed with possible transplant.      MELD-Na score: 21 at 10/5/2021 11:24 AM  MELD score: 18 at 10/5/2021 11:24 AM  Calculated from:  Serum Creatinine: 1.12 mg/dL at 10/5/2021 11:24 AM  Serum Sodium: 133 mmol/L at 10/5/2021 11:24 AM  Total Bilirubin: 4.2 mg/dL at 10/5/2021 11:24 AM  INR(ratio): 1.58 at 10/5/2021 11:24 AM  Age: 51 years     Plan:   -Pre-op labs  -CXR  -EKG  -NPO status  -STAT COVID-19 PCR  -Surgeon to obtain formal consent    Moody Alba MD PGY1    Code Status   Full Code    Primary Care Physician   Christine Harris    Chief Complaint   Pre-op liver transplant    History is obtained from the patient    History of Present Illness   Sarah Fisher is a 51 year old female with PMH of etoh related alcohol cirrhosis c/b ascites, GAVE, hematochromatosis compound heterozygote, and hypothyroidism who presents for possible liver transplant.      Patient inially presented to PCP on 2020 for hemorrhoidal bleeding but was noted to be jaundice on exam. Labs  were significant for transaminitis and patient was recommended to stop drinking (which he already had done).   2020 U/S showed cirrhosis and cavernous transformation of the portal veins.     admission, was found to have a L internal jugular DVT which was treated with anticoagulation, she was also started on medication for hepatic encephalopathy during that admission.    admission for a right pleural effusion which was treated with thoracentesis and diuretics. She has been admitted multiple times for recurrent anemia requiring transfusions.   10/30 she was admitted due to anemia and found to have portal hypertensive gastropathy and GAVE which was treated with APC.   admission for anemia requiring transfusion   EGD showing severe PHG treated with APC   admission for weakness and confusion, thoracentesis performed and transferred to [facility]  2021 - mammogram showed skin thickening related to underlying cirrhosis    In the interm she has been found to be compound heterozygous for hemochromatosis. She will intermittently undergo paracentesis for her ascites.     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     Today she reports she is feeling well and has no complaints. She denies CP, fevers, chills, SOB. She states her functional status is >4 METS. She denies any sick contacts or recent illness.     Past Medical History    I have reviewed this patient's medical history and updated it with pertinent information if needed.   Past Medical History:   Diagnosis Date    Ascites     History of blood transfusion     Liver cirrhosis (H)     Thyroid disease        Past Surgical History   I have reviewed this patient's surgical history and updated it with pertinent information if needed.  Past Surgical History:   Procedure Laterality Date     SECTION      CV  RIGHT HEART CATH MEASUREMENTS RECORDED N/A 6/7/2021    Procedure: CV RIGHT HEART CATH;  Surgeon: Joseph Guevara MD;  Location:  HEART CARDIAC CATH LAB    ESOPHAGOSCOPY, GASTROSCOPY, DUODENOSCOPY (EGD), COMBINED N/A 10/7/2021    Procedure: ESOPHAGOGASTRODUODENOSCOPY (EGD) with hemostasis;  Surgeon: Fox Jerry MD;  Location:  GI    IR THORACENTESIS  5/25/2021    IR THORACENTESIS  7/30/2021    THORACENTESIS Left 01/14/2021    Procedure: THORACENTESIS;  Surgeon: Almas Irby MD;  Location:  GI    THORACENTESIS N/A 02/17/2021    Procedure: THORACENTESIS;  Surgeon: Almas Irby MD;  Location:  GI    THORACENTESIS N/A 03/10/2021    Procedure: THORACENTESIS;  Surgeon: Almas Irby MD;  Location:  GI    THORACENTESIS Left 5/25/2021    Procedure: THORACENTESIS;  Surgeon: Kennedi Chavez MD;  Location: [facility] OR    THORACENTESIS Left 7/30/2021    Procedure: THORACENTESIS;  Surgeon: Jess Ansari PA-C;  Location: [facility] [place]       Prior to Admission Medications   Prior to Admission Medications   Prescriptions Last Dose Informant Patient Reported? Taking?   Ferrous Sulfate 300 MG/6.8ML SOLN   No No   Sig: Take 6.8 mLs by mouth 2 times daily   Vitamin D3 (CHOLECALCIFEROL) 25 mcg (1000 units) tablet   Yes No   Sig: Take 2 tablets (50 mcg) by mouth daily   ciprofloxacin (CIPRO) 500 MG tablet   No No   Sig: Take 1 tablet (500 mg) by mouth daily   folic acid (FOLVITE) 1 MG tablet   No No   Sig: Take 1 tablet (1 mg) by mouth daily   furosemide (LASIX) 20 MG tablet   No No   Sig: Take 4 tablets (80 mg) by mouth daily   hydrocortisone (ANUSOL-HC) 25 MG suppository   No No   Sig: Place 1 suppository (25 mg) rectally 2 times daily   hydrocortisone, Perianal, (HYDROCORTISONE) 2.5 % cream   No No   Sig: Place rectally 2 times daily as needed for hemorrhoids   levothyroxine (SYNTHROID/LEVOTHROID) 50 MCG tablet   Yes No   Sig: Take 50 mcg by mouth daily   midodrine (PROAMATINE) 5 MG tablet   No No   Sig: Take 1  tablet (5 mg) by mouth 2 times daily (with meals)   multivitamin w/minerals (THERA-VIT-M) tablet   No No   Sig: Take 1 tablet by mouth daily   pantoprazole (PROTONIX) 40 MG EC tablet   No No   Sig: Take 1 tablet (40 mg) by mouth daily   potassium chloride ER (KLOR-CON M15) 15 MEQ CR tablet   No No   Sig: Take 2 tablets (30 mEq) by mouth daily   rifaximin (XIFAXAN) 550 MG TABS tablet   Yes No   Sig: Take 550 mg by mouth 2 times daily   spironolactone (ALDACTONE) 50 MG tablet   No No   Sig: Take 2 tablets (100 mg) by mouth 2 times daily   traZODone (DESYREL) 50 MG tablet   Yes No   Sig: Take 1 tablet (50 mg) by mouth nightly as needed for sleep   zinc sulfate (ZINCATE) 220 (50 Zn) MG capsule   No No   Sig: Take 1 capsule (220 mg) by mouth daily      Facility-Administered Medications: None     Allergies   Allergies   Allergen Reactions    Amoxicillin GI Disturbance, Diarrhea, Nausea and Nausea and Vomiting       Social History   I have reviewed this patient's social history and updated it with pertinent information if needed. Sarah Fisher  reports that she has never smoked. She has never used smokeless tobacco. She reports previous alcohol use. She reports previous drug use.     Family History   I have reviewed this patient's family history and updated it with pertinent information if needed.   Family History   Problem Relation Age of Onset    No Known Problems Mother     Colon Cancer Father 58         age 63    Breast Cancer Maternal Grandmother         Diagnosed in her 60's    No Known Problems Maternal Grandfather     No Known Problems Paternal Grandmother     No Known Problems Paternal Grandfather     Substance Abuse Brother     No Known Problems Sister     No Known Problems Son     Substance Abuse Brother        Review of Systems   The 10 point Review of Systems is negative other than noted in the HPI or here.  Of note she denies fevers, chills, cp, sob, recent sick contacts.     Physical Exam   Temp:  97.9  F (36.6  C) Temp src: Oral BP: 118/65 Pulse: 108   Resp: 16 SpO2: 100 % O2 Device: None (Room air)    Vital Signs with Ranges  Temp:  [97.9  F (36.6  C)] 97.9  F (36.6  C)  Pulse:  [108] 108  Resp:  [16] 16  BP: (118)/(65) 118/65  SpO2:  [100 %] 100 %  179 lbs 0 oz    Constitutional: awake, alert, sitting up in bed, NAD  HEENT: EOMI, MMM, no cervical lymphadenopathy  Respiratory: nonlabored breathing on room air, CTABL  Cardiovascular: RRR, no murmur  GI: abdomen soft, mildly distended, nontender  Skin: No rash  Musculoskeletal: moving all extremities, bilateral lower extremity edema   Neurologic: AOx3, no focal deficits  Neuropsychiatric: appropriate mood and affect

## 2021-10-17 NOTE — ANESTHESIA PROCEDURE NOTES
Airway       Patient location during procedure: OR       Procedure Start/Stop Times: 10/17/2021 2:56 PM  Staff -        CRNA: Giovanna Martinez APRN CRNA       Performed By: CRNA  Consent for Airway        Urgency: elective  Indications and Patient Condition       Indications for airway management: michael-procedural       Induction type:intravenous       Mask difficulty assessment: 1 - vent by mask    Final Airway Details       Final airway type: endotracheal airway       Successful airway: ETT - single  Endotracheal Airway Details        ETT size (mm): 7.5       Cuffed: yes       Successful intubation technique: direct laryngoscopy       DL Blade Type: Mendez 2       Grade View of Cords: 1       Adjucts: stylet       Position: Right       Measured from: lips       Secured at (cm): 22       Bite block used: None    Post intubation assessment        Placement verified by: capnometry, equal breath sounds and chest rise        Number of attempts at approach: 1       Secured with: pink tape       Ease of procedure: easy       Dentition: Intact

## 2021-10-17 NOTE — ANESTHESIA PROCEDURE NOTES
Central Line/PA Catheter Placement    Pre-Procedure   Staff -        Anesthesiologist:  Kel Morse MD       Resident/Fellow: Tien Garcia MD       Performed By: resident       Location: OR       Pre-Anesthestic Checklist: patient identified, IV checked, site marked, risks and benefits discussed, informed consent, monitors and equipment checked, pre-op evaluation and at physician/surgeon's request  Timeout:       Correct Patient: Yes        Correct Procedure: Yes        Correct Site: Yes        Correct Position: Yes        Correct Laterality: N/A     Procedure   Procedure: central line       Laterality: right       Insertion Site: internal jugular.       Patient Position: Trendelenburg  Sterile Prep        All elements of maximal sterile barrier technique followed       Patient Prep/Sterile Barriers: draped, hand hygiene, gloves , hat , mask , draped, gown, sterile gel and probe cover       Skin prep: Chloraprep  Insertion/Injection        Technique: ultrasound guided and Seldinger Technique        1. Ultrasound was used to evaluate the access site.       2. Vein evaluated via ultrasound for patency/adequacy.       3. Using real-time ultrasound the needle/catheter was observed entering the artery/vein.       Introducer Type: 9 Fr, 2-lumen MAC   Narrative         Secured by: suture and anchor securement device       Tegaderm and Biopatch dressing used.       Complications: None apparent,        All lumens flushed: Yes       Verification method: X-ray, Ultrasound and Placement to be verified post-op   Comments:  [facility] CVC #2 of 2

## 2021-10-17 NOTE — H&P
SURGICAL ICU ADMISSION NOTE  10/18/2021      PRIMARY TEAM: Transplant  PRIMARY PHYSICIAN: Dr. Roberts  REASON FOR CRITICAL CARE ADMISSION: Hemodynamic monitoring and ventilatory support   ADMITTING PHYSICIAN: Dr. De León  Date of Service (when I saw the patient): 10/18/2021    ASSESSMENT:    Sarah Fisher is a 51 year old female with PMH of ESLD MELD 18 due to alcoholic cirrhosis c/b ascites, GAVE, hematochromatosis, chronic hypotension, CKD, anemia, coagulopathy of liver disease, and hypothyroidism who presents to SICU after DDLT 10/17/21 for hemodynamic and ventilatory support. Intraoperatively the patient had turbid ascites sent for culture. He was also hypercoaguable during the case requiring 5000 heparin IV. He received 5 U pRBCs for blood product.        PLAN:    Neurological:  #Sedation for ventilation  # Acute pain   # Insomnia  - Monitor neurological status. Delirium preventions and precautions.   - Pain: Fentanyl gtt    - Sedation plan: Propofol gtt  - RASS goal 0 to -1.  - PTA trazodone 50 mg PRN    Pulmonary:   # Post operative ventilatory support  # Acute hypoxic respiratory support   # Large left pleural effusion  - VENT: VC/AC , RR 16, PEEP 5, FiO2 40%. Continue full vent support. PST when meets criteria.  Ventilatory bundle. HOB elevation   - CXR with large left pleural effusion. Per transplant consider L pigtail chest tube placement    Cardiovascular:    # Chronic hypotension  # Hypovolemic shock  - Pressors: Levo @ 0.05. Wean as able  - EKG: Sinus tachycardia  QTc:460  - Monitor hemodynamic status.    - Pull [place] Roman after liver ultrasound complete  - Lactate WNL  - Concern for reduced hepatic flow on  mg daily.  - Ursodiol 300 mg BID per transplant surgery    Gastroenterology/Nutrition:  # ESLD MELD 22 due to Alcoholic cirrhosis c/b ascites s/p DDLT  # GAVE  # GERD  - NPO , OGT to LIS  - BERKLEY x 1 with serosanguinous output. Monitor for fresh sanguinous output  -Nutrition  consult in morning with NJT placement after discussing with transplant surgery  -PTA PPI for GI ppx  - Trend LFTs q4h  - Liver ultrasound prelim read with elevated RI extrahepatic hepatic artery and right hepatic artery may be related to edema and small volume of fluid along left hepatic lobe      Fluids/Electrolytes:   # Hypovolemia  # Hyponatremia pre-op  - D5 1/2 NS @ 100 ml/hr for IV fluid hydration  - ICU replacement protocol  - BMP, Mg2+, Phos q4h, iCa2+ daily    Renal:  # CKD with pre-op Cr.1.44, GFR 42  - Aguiar in place for Strict I/O  - Renal function improving Cr 0.88 GFR 76  - Continue to trend BMP q4h  - Urine output is 1660 ml in 7 hours . Will continue to monitor intake and output.    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    Musculoskeletal:  # Weakness and deconditioning of critical illness   - Physical and occupational therapy consult       General Cares/Prophylaxis:    - DVT Prophylaxis: Pneumatic Compression Devices  - GI Prophylaxis: PPI  Restraints: Restraints for medical healing needed: Not Applicable    Lines/ tubes/ drains:  - R internal jugular MAC x 2, [place] Roman, PIV x 2m OGT, ETT, Aguiar, BERKLEY x 1    Disposition:  -  Surgical ICU    Patient seen, findings and plan discussed with surgical ICU staff, Dr. De León  .  Tasneem Romero  General Surgery Resident, PGY-2    - - - - - - - - - - - - - - - - - - - - - - - - - - - - - - - - - - - - - - - - - - - - - -   HISTORY PRESENTING ILLNESS:     Sarah Fisher is a 51 year old female with PMH of etoh , alcohol cirrhosis c/b ascites, GAVE, hematochromatosis chronic hypotension, CKD, anemia, coagulopathy of liver disease, and hypothyroidismwho presents to SICU after liver transplant.       Patient inially presented to PCP on 6/2020 for hemorrhoidal bleeding but was noted to be jaundice on exam. Labs were significant for transaminitis and patient was recommended to stop drinking (which he already had done).   7/6/2020 U/S showed cirrhosis and cavernous transformation of the portal veins.    9/8 admission, was found to have a L internal jugular DVT which was treated with anticoagulation, she was also started on medication for hepatic encephalopathy during that admission.   9/30 admission for a right pleural effusion which was treated with thoracentesis and diuretics. She has been admitted multiple times for recurrent anemia requiring transfusions.   10/30 she was admitted due to anemia and found to have portal hypertensive gastropathy and GAVE which was treated with APC.  11/4 admission for anemia requiring transfusion  12/4 EGD showing severe PHG treated with APC  12/8 admission for weakness and confusion, thoracentesis performed and transferred to [facility]  2/2021 - mammogram showed skin thickening related to underlying cirrhosis     In the interm she has been found to be compound heterozygous for hemochromatosis. She will intermittently undergo paracentesis for her ascites.      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     REVIEW OF SYSTEMS: 10 point  ROS neg other than the symptoms noted above in the HPI.    PAST MEDICAL HISTORY:   Past Medical History:   Diagnosis Date     Ascites      History of blood transfusion      Liver cirrhosis (H)      Thyroid disease        SURGICAL HISTORY:   Past Surgical History:   Procedure Laterality Date      SECTION       CV RIGHT HEART CATH MEASUREMENTS RECORDED N/A 2021    Procedure: CV RIGHT HEART CATH;  Surgeon: Joseph Guevara MD;  Location:  HEART CARDIAC CATH LAB     ESOPHAGOSCOPY, GASTROSCOPY, DUODENOSCOPY (EGD), COMBINED N/A 10/7/2021    Procedure: ESOPHAGOGASTRODUODENOSCOPY (EGD) with hemostasis;  Surgeon: Fox Jerry MD;  Location:  GI     IR THORACENTESIS  2021     IR THORACENTESIS  2021     THORACENTESIS Left 2021    Procedure: THORACENTESIS;  Surgeon: Almas Irby MD;  Location:  GI     THORACENTESIS N/A 2021    Procedure: THORACENTESIS;  Surgeon: Almas Irby MD;  Location:  GI     THORACENTESIS N/A 03/10/2021    Procedure: THORACENTESIS;  Surgeon: Almas Irby MD;  Location:  GI     THORACENTESIS Left 2021    Procedure: THORACENTESIS;  Surgeon: Kennedi Chavez MD;  Location: [facility] OR     THORACENTESIS Left 2021    Procedure: THORACENTESIS;  Surgeon: Jess Ansari PA-C;  Location: [facility] OR       SOCIAL HISTORY:   Social History     Tobacco Use     Smoking status: Never Smoker     Smokeless tobacco: Never Used   Substance Use Topics     Alcohol use: Not Currently     Comment: Quit on 2020     Drug use: Not Currently       FAMILY HISTORY:   Family History   Problem Relation Age of Onset     No Known Problems Mother      Colon Cancer Father 58         age 63     Breast Cancer Maternal Grandmother         Diagnosed in her 60's     No Known Problems Maternal Grandfather      No Known Problems Paternal Grandmother      No Known Problems Paternal Grandfather      Substance Abuse Brother      No Known Problems Sister      No Known  Problems Son      Substance Abuse Brother          ALLERGIES:   Allergies   Allergen Reactions     Amoxicillin GI Disturbance, Diarrhea, Nausea and Nausea and Vomiting       MEDICATIONS:  No current facility-administered medications on file prior to encounter.  ciprofloxacin (CIPRO) 500 MG tablet, Take 1 tablet (500 mg) by mouth daily  Ferrous Sulfate 300 MG/6.8ML SOLN, Take 6.8 mLs by mouth 2 times daily  folic acid (FOLVITE) 1 MG tablet, Take 1 tablet (1 mg) by mouth daily  furosemide (LASIX) 20 MG tablet, Take 4 tablets (80 mg) by mouth daily  hydrocortisone (ANUSOL-HC) 25 MG suppository, Place 1 suppository (25 mg) rectally 2 times daily  hydrocortisone, Perianal, (HYDROCORTISONE) 2.5 % cream, Place rectally 2 times daily as needed for hemorrhoids  levothyroxine (SYNTHROID/LEVOTHROID) 50 MCG tablet, Take 50 mcg by mouth daily  midodrine (PROAMATINE) 5 MG tablet, Take 1 tablet (5 mg) by mouth 2 times daily (with meals)  multivitamin w/minerals (THERA-VIT-M) tablet, Take 1 tablet by mouth daily  pantoprazole (PROTONIX) 40 MG EC tablet, Take 1 tablet (40 mg) by mouth daily  potassium chloride ER (KLOR-CON M15) 15 MEQ CR tablet, Take 2 tablets (30 mEq) by mouth daily  rifaximin (XIFAXAN) 550 MG TABS tablet, Take 550 mg by mouth 2 times daily  spironolactone (ALDACTONE) 50 MG tablet, Take 2 tablets (100 mg) by mouth 2 times daily  traZODone (DESYREL) 50 MG tablet, Take 1 tablet (50 mg) by mouth nightly as needed for sleep  Vitamin D3 (CHOLECALCIFEROL) 25 mcg (1000 units) tablet, Take 2 tablets (50 mcg) by mouth daily  zinc sulfate (ZINCATE) 220 (50 Zn) MG capsule, Take 1 capsule (220 mg) by mouth daily        PHYSICAL EXAMINATION:  Temp:  [98  F (36.7  C)-98.3  F (36.8  C)] 98.2  F (36.8  C)  Pulse:  [] 114  Resp:  [16] 16  BP: (110-113)/(58-67) 113/67  MAP:  [63 mmHg-96 mmHg] 78 mmHg  Arterial Line BP: ()/(49-73) 102/59  FiO2 (%):  [40 %-50 %] 40 %  SpO2:  [97 %-100 %] 99 %  General: calm, sedated    Neuro: CN 2-12 grossly intact, follows commands and awakens to voice  Resp: mechanically ventilated  Cardio: RRR  Abdomen: soft, mildly distended, appropriate tenderness to palpation but no peritoneal signs, incision dressings intact, R BERKLEY drain with serosanguinous output  Extremities: warm well perfused    LABS: Reviewed.   Arterial Blood Gases   Recent Labs   Lab 10/17/21  2132   PH 7.40   PCO2 37   PO2 119*   HCO3 23     Complete Blood Count   Recent Labs   Lab 10/17/21  2352 10/17/21  2128 10/16/21  2213   WBC 21.8* 17.2* 7.3   HGB 14.3 13.2 7.9*   PLT 89* 81* 95*     Basic Metabolic Panel  Recent Labs   Lab 10/18/21  0108 10/18/21  0003 10/17/21  2352 10/17/21  2320 10/17/21  2128 10/17/21  2126 10/16/21  2213   NA  --   --  134  --  136  --  131*   POTASSIUM  --   --  4.0  --  3.7  --  4.1   CHLORIDE  --   --  103  --  104  --  100   CO2  --   --  20  --  23  --  24   BUN  --   --  28  --  25  --  38*   CR  --   --  0.96  --  0.88  --  1.44*   * 144* 162* 135* 101*   < > 125*    < > = values in this interval not displayed.     Liver Function Tests  Recent Labs   Lab 10/17/21  2352 10/17/21  2128 10/16/21  2213   AST 1,435* 1,485* 39   * 761* 26   ALKPHOS 244* 222* 206*   BILITOTAL 8.9* 8.3* 5.0*   ALBUMIN 1.6* 1.4* 2.3*   INR 1.97* 2.86* 1.78*     Pancreatic Enzymes  Recent Labs   Lab 10/17/21  2352 10/16/21  2213   LIPASE 191  --    AMYLASE 52 70     Coagulation Profile  Recent Labs   Lab 10/17/21  2352 10/17/21  2128 10/16/21  2213   INR 1.97* 2.86* 1.78*   PTT 41* 50* 38       IMAGING:  Recent Results (from the past 24 hour(s))   XR Abdomen Port 1 View    Narrative    Exam: XR ABDOMEN PORT 1 VIEWS, 10/17/2021 8:43 PM    Indication: intra-operative check for 2 missing needles during Liver  Transplant; please call [phone] with results    Comparison: None    Findings:   2 radiograph of the abdomen and single radiograph of a surgical  needle. Open abdomen with presence of right abdominal drain.  Multiple  surgical clips project over the right upper abdomen. Partial  visualization of [place]-Roman catheter with tip projecting over the  proximal right main pulmonary artery. Enteric tube side-port in tip  projects over the stomach. Nonobstructive bowel gas pattern. No acute  osseous abnormality. There is no visualization of surgical needle  within the radiographs provided.      Impression    Impression: No unexpected retained surgical objects.    I have personally reviewed the examination and initial interpretation  and I agree with the findings.    BRENDAN WARD MD         SYSTEM ID:  [id number]   XR Chest Port 1 View    Narrative    EXAMINATION: XR CHEST PORT 1 VIEW, 10/17/2021 9:46 PM    INDICATION: post liver transplant    COMPARISON: Chest x-ray 10/16/2021    FINDINGS: Single portable AP radiograph of chest. ET tube is over the  mid thoracic trachea. Right IJ [place]-Roman catheter with tip projecting  over the proximal right main pulmonary artery. Enteric tube courses  below left hemidiaphragm, side-port projects over the region of the  stomach. Multiple surgical clips overlie the mid abdomen. Partial  visualization of a right abdominal drain.    Trachea is midline. The cardiomediastinal silhouette is within normal  limits. Large left pleural effusion, increased compared to prior. The  left lung is relatively opacified, this is favored to be related to  layering of pleural effusion. No right pleural effusion. No  pneumothorax. No consolidative opacities within the right lung.      Impression    IMPRESSION:  1. Increased large left pleural effusion. (Note prior chest x-ray was  upright and this one is supine hence more layering of the pleural  effusion).  2. Support devices are in appropriate positioning as described above  within the body of the report.    I have personally reviewed the examination and initial interpretation  and I agree with the findings.    BRENDAN WARD MD         SYSTEM ID:   [id number]   XR Abdomen Port 1 View    Narrative    Exam: XR ABDOMEN PORT 1 VIEWS, 10/17/2021 9:46 PM    Indication: ogt placement post-op    Comparison: 10/17/2021 at 8:27 PM    Findings:   Single portable AP radiograph of the abdomen. Enteric tube side-port  and tip projects over the region of the stomach. [place]-Roman catheter  tip projects over the proximal right main pulmonary artery.  Postsurgical changes of liver transplant. Right lower abdominal drain.    Nonobstructive bowel gas pattern. Partial visualization of large left  pleural effusion. No acute osseous abnormality. Soft tissue is within  normal limits.      Impression    Impression:   1. Postsurgical changes of liver transplant.  2. Enteric tube side-port and tip projects over the stomach.  3. Partial visualization of large left pleural effusion.    I have personally reviewed the examination and initial interpretation  and I agree with the findings.    BRENDAN WARD MD         SYSTEM ID:  [id number]

## 2021-10-17 NOTE — PROGRESS NOTES
/65 (BP Location: Left arm)   Pulse 108   Temp 97.9  F (36.6  C) (Oral)   Resp 16   Wt 81.2 kg (179 lb)   SpO2 100%   BMI 28.04 kg/m      Shift: 3770-8248  VS: Tachycardic (108). OVSS on RA.  Neuro: AOx4. 2+ BLE edema.  BG: none  Labs: Cr 1.44. VRE, COVID swabs sent, results pending.  Respiratory: WDL  Pain/Nausea/PRN: Denies pain and nausea  Diet: NPO  LDA: L PIV SL  GI/: BM this AM, voids per pt.  Skin: Bruising on BUE, L thigh  Mobility: UAL  Plan: Potential OR time 0700. Continue pre-op checklist.    Handoff given to following RN.

## 2021-10-17 NOTE — PLAN OF CARE
/71 (BP Location: Left arm)   Pulse 102   Temp 98  F (36.7  C) (Oral)   Resp 16   Wt 81.2 kg (179 lb)   SpO2 96%   BMI 28.04 kg/m     Shift: 8552-5977  VS: VSS on RA, afebrile.   Neuro: AOx4  Cardiopulmonary: WDL  Pain/Nausea/PRN: Denies  Diet: NPO  LDA: L PIV  GI/: Voiding w/o difficulty, LBM 10/16.  Skin: LE edema  Mobility: UAL  Plan/Education: Pre-op liver transplant prep complete, OR time TBD.     Will continue with POC, will notify MD with changes or concerns.

## 2021-10-18 ENCOUNTER — DOCUMENTATION ONLY (OUTPATIENT)
Dept: TRANSPLANT | Facility: CLINIC | Age: 51
End: 2021-10-18

## 2021-10-18 ENCOUNTER — APPOINTMENT (OUTPATIENT)
Dept: ULTRASOUND IMAGING | Facility: CLINIC | Age: 51
End: 2021-10-18
Attending: STUDENT IN AN ORGANIZED HEALTH CARE EDUCATION/TRAINING PROGRAM
Payer: COMMERCIAL

## 2021-10-18 ENCOUNTER — APPOINTMENT (OUTPATIENT)
Dept: OCCUPATIONAL THERAPY | Facility: CLINIC | Age: 51
End: 2021-10-18
Attending: STUDENT IN AN ORGANIZED HEALTH CARE EDUCATION/TRAINING PROGRAM
Payer: COMMERCIAL

## 2021-10-18 LAB
ALBUMIN SERPL-MCNC: 1.5 G/DL (ref 3.4–5)
ALBUMIN SERPL-MCNC: 1.6 G/DL (ref 3.4–5)
ALBUMIN SERPL-MCNC: 1.7 G/DL (ref 3.4–5)
ALP SERPL-CCNC: 174 U/L (ref 40–150)
ALP SERPL-CCNC: 178 U/L (ref 40–150)
ALP SERPL-CCNC: 244 U/L (ref 40–150)
ALT SERPL W P-5'-P-CCNC: 609 U/L (ref 0–50)
ALT SERPL W P-5'-P-CCNC: 634 U/L (ref 0–50)
ALT SERPL W P-5'-P-CCNC: 850 U/L (ref 0–50)
AMYLASE SERPL-CCNC: 52 U/L (ref 30–110)
ANION GAP SERPL CALCULATED.3IONS-SCNC: 11 MMOL/L (ref 3–14)
ANION GAP SERPL CALCULATED.3IONS-SCNC: 8 MMOL/L (ref 3–14)
APTT PPP: 37 SECONDS (ref 22–38)
APTT PPP: 41 SECONDS (ref 22–38)
APTT PPP: 41 SECONDS (ref 22–38)
AST SERPL W P-5'-P-CCNC: 1435 U/L (ref 0–45)
AST SERPL W P-5'-P-CCNC: 736 U/L (ref 0–45)
AST SERPL W P-5'-P-CCNC: 958 U/L (ref 0–45)
ATRIAL RATE - MUSE: 113 BPM
BASE EXCESS BLDA CALC-SCNC: -3.1 MMOL/L (ref -9–1.8)
BASE EXCESS BLDA CALC-SCNC: -4.1 MMOL/L (ref -9–1.8)
BASE EXCESS BLDA CALC-SCNC: -5.3 MMOL/L (ref -9–1.8)
BASOPHILS # BLD AUTO: 0 10E3/UL (ref 0–0.2)
BASOPHILS NFR BLD AUTO: 0 %
BILIRUB DIRECT SERPL-MCNC: 2.8 MG/DL (ref 0–0.2)
BILIRUB DIRECT SERPL-MCNC: 4.2 MG/DL (ref 0–0.2)
BILIRUB DIRECT SERPL-MCNC: 6.9 MG/DL (ref 0–0.2)
BILIRUB SERPL-MCNC: 3.5 MG/DL (ref 0.2–1.3)
BILIRUB SERPL-MCNC: 5.2 MG/DL (ref 0.2–1.3)
BILIRUB SERPL-MCNC: 8.9 MG/DL (ref 0.2–1.3)
BUN SERPL-MCNC: 28 MG/DL (ref 7–30)
BUN SERPL-MCNC: 29 MG/DL (ref 7–30)
BUN SERPL-MCNC: 34 MG/DL (ref 7–30)
BUN SERPL-MCNC: 38 MG/DL (ref 7–30)
CA-I BLD-MCNC: 5.1 MG/DL (ref 4.4–5.2)
CALCIUM SERPL-MCNC: 8.2 MG/DL (ref 8.5–10.1)
CALCIUM SERPL-MCNC: 8.4 MG/DL (ref 8.5–10.1)
CALCIUM SERPL-MCNC: 8.7 MG/DL (ref 8.5–10.1)
CALCIUM SERPL-MCNC: 8.9 MG/DL (ref 8.5–10.1)
CHLORIDE BLD-SCNC: 102 MMOL/L (ref 94–109)
CHLORIDE BLD-SCNC: 103 MMOL/L (ref 94–109)
CHLORIDE BLD-SCNC: 105 MMOL/L (ref 94–109)
CHLORIDE BLD-SCNC: 105 MMOL/L (ref 94–109)
CMV IGG SERPL IA-ACNC: >10 U/ML
CMV IGG SERPL IA-ACNC: ABNORMAL
CMV IGM SERPL IA-ACNC: <8 AU/ML
CMV IGM SERPL IA-ACNC: NEGATIVE
CO2 SERPL-SCNC: 20 MMOL/L (ref 20–32)
CO2 SERPL-SCNC: 20 MMOL/L (ref 20–32)
CO2 SERPL-SCNC: 21 MMOL/L (ref 20–32)
CO2 SERPL-SCNC: 21 MMOL/L (ref 20–32)
CREAT SERPL-MCNC: 0.96 MG/DL (ref 0.52–1.04)
CREAT SERPL-MCNC: 1.15 MG/DL (ref 0.52–1.04)
CREAT SERPL-MCNC: 1.34 MG/DL (ref 0.52–1.04)
CREAT SERPL-MCNC: 1.56 MG/DL (ref 0.52–1.04)
DEPRECATED CALCIDIOL+CALCIFEROL SERPL-MC: 18 UG/L (ref 20–75)
DIASTOLIC BLOOD PRESSURE - MUSE: NORMAL MMHG
EBV VCA IGG SER IA-ACNC: 66.1 U/ML
EBV VCA IGG SER IA-ACNC: POSITIVE
EBV VCA IGM SER IA-ACNC: <10 U/ML
EBV VCA IGM SER IA-ACNC: NORMAL
EOSINOPHIL # BLD AUTO: 0 10E3/UL (ref 0–0.7)
EOSINOPHIL NFR BLD AUTO: 0 %
ERYTHROCYTE [DISTWIDTH] IN BLOOD BY AUTOMATED COUNT: 17.7 % (ref 10–15)
ERYTHROCYTE [DISTWIDTH] IN BLOOD BY AUTOMATED COUNT: 17.9 % (ref 10–15)
ERYTHROCYTE [DISTWIDTH] IN BLOOD BY AUTOMATED COUNT: 18.1 % (ref 10–15)
FIBRINOGEN PPP-MCNC: 164 MG/DL (ref 170–490)
FIBRINOGEN PPP-MCNC: 177 MG/DL (ref 170–490)
FIBRINOGEN PPP-MCNC: 192 MG/DL (ref 170–490)
GFR SERPL CREATININE-BSD FRML MDRD: 38 ML/MIN/1.73M2
GFR SERPL CREATININE-BSD FRML MDRD: 46 ML/MIN/1.73M2
GFR SERPL CREATININE-BSD FRML MDRD: 55 ML/MIN/1.73M2
GFR SERPL CREATININE-BSD FRML MDRD: 69 ML/MIN/1.73M2
GLUCOSE BLD-MCNC: 156 MG/DL (ref 70–99)
GLUCOSE BLD-MCNC: 162 MG/DL (ref 70–99)
GLUCOSE BLD-MCNC: 174 MG/DL (ref 70–99)
GLUCOSE BLD-MCNC: 187 MG/DL (ref 70–99)
GLUCOSE BLDC GLUCOMTR-MCNC: 112 MG/DL (ref 70–99)
GLUCOSE BLDC GLUCOMTR-MCNC: 115 MG/DL (ref 70–99)
GLUCOSE BLDC GLUCOMTR-MCNC: 127 MG/DL (ref 70–99)
GLUCOSE BLDC GLUCOMTR-MCNC: 129 MG/DL (ref 70–99)
GLUCOSE BLDC GLUCOMTR-MCNC: 131 MG/DL (ref 70–99)
GLUCOSE BLDC GLUCOMTR-MCNC: 133 MG/DL (ref 70–99)
GLUCOSE BLDC GLUCOMTR-MCNC: 136 MG/DL (ref 70–99)
GLUCOSE BLDC GLUCOMTR-MCNC: 144 MG/DL (ref 70–99)
GLUCOSE BLDC GLUCOMTR-MCNC: 144 MG/DL (ref 70–99)
GLUCOSE BLDC GLUCOMTR-MCNC: 145 MG/DL (ref 70–99)
GLUCOSE BLDC GLUCOMTR-MCNC: 148 MG/DL (ref 70–99)
GLUCOSE BLDC GLUCOMTR-MCNC: 156 MG/DL (ref 70–99)
GLUCOSE BLDC GLUCOMTR-MCNC: 162 MG/DL (ref 70–99)
GLUCOSE BLDC GLUCOMTR-MCNC: 165 MG/DL (ref 70–99)
GLUCOSE BLDC GLUCOMTR-MCNC: 167 MG/DL (ref 70–99)
GLUCOSE BLDC GLUCOMTR-MCNC: 167 MG/DL (ref 70–99)
GLUCOSE BLDC GLUCOMTR-MCNC: 169 MG/DL (ref 70–99)
GLUCOSE BLDC GLUCOMTR-MCNC: 169 MG/DL (ref 70–99)
GLUCOSE BLDC GLUCOMTR-MCNC: 171 MG/DL (ref 70–99)
GLUCOSE BLDC GLUCOMTR-MCNC: 175 MG/DL (ref 70–99)
GLUCOSE BLDC GLUCOMTR-MCNC: 98 MG/DL (ref 70–99)
HBV CORE AB SERPL QL IA: NONREACTIVE
HBV SURFACE AB SERPL IA-ACNC: 42.92 M[IU]/ML
HBV SURFACE AB SERPL IA-ACNC: 46.92 M[IU]/ML
HBV SURFACE AG SERPL QL IA: NONREACTIVE
HBV SURFACE AG SERPL QL IA: NONREACTIVE
HCO3 BLD-SCNC: 19 MMOL/L (ref 21–28)
HCO3 BLD-SCNC: 21 MMOL/L (ref 21–28)
HCO3 BLD-SCNC: 22 MMOL/L (ref 21–28)
HCT VFR BLD AUTO: 35.8 % (ref 35–47)
HCT VFR BLD AUTO: 36.1 % (ref 35–47)
HCT VFR BLD AUTO: 42.1 % (ref 35–47)
HGB BLD-MCNC: 12.2 G/DL (ref 11.7–15.7)
HGB BLD-MCNC: 12.4 G/DL (ref 11.7–15.7)
HGB BLD-MCNC: 14.3 G/DL (ref 11.7–15.7)
HIV 1+2 AB+HIV1 P24 AG SERPL QL IA: NONREACTIVE
IMM GRANULOCYTES # BLD: 0.2 10E3/UL
IMM GRANULOCYTES # BLD: 0.2 10E3/UL
IMM GRANULOCYTES # BLD: 0.5 10E3/UL
IMM GRANULOCYTES NFR BLD: 1 %
IMM GRANULOCYTES NFR BLD: 1 %
IMM GRANULOCYTES NFR BLD: 2 %
INR PPP: 1.51 (ref 0.85–1.15)
INR PPP: 1.54 (ref 0.85–1.15)
INR PPP: 1.97 (ref 0.85–1.15)
INTERPRETATION ECG - MUSE: NORMAL
LACTATE SERPL-SCNC: 1.5 MMOL/L (ref 0.7–2)
LACTATE SERPL-SCNC: 1.6 MMOL/L (ref 0.7–2)
LACTATE SERPL-SCNC: 1.8 MMOL/L (ref 0.7–2)
LACTATE SERPL-SCNC: 1.8 MMOL/L (ref 0.7–2)
LIPASE SERPL-CCNC: 191 U/L (ref 73–393)
LYMPHOCYTES # BLD AUTO: 0.5 10E3/UL (ref 0.8–5.3)
LYMPHOCYTES NFR BLD AUTO: 2 %
LYMPHOCYTES NFR BLD AUTO: 3 %
LYMPHOCYTES NFR BLD AUTO: 4 %
MAGNESIUM SERPL-MCNC: 2.3 MG/DL (ref 1.6–2.3)
MCH RBC QN AUTO: 31.6 PG (ref 26.5–33)
MCH RBC QN AUTO: 31.8 PG (ref 26.5–33)
MCH RBC QN AUTO: 32 PG (ref 26.5–33)
MCHC RBC AUTO-ENTMCNC: 34 G/DL (ref 31.5–36.5)
MCHC RBC AUTO-ENTMCNC: 34.1 G/DL (ref 31.5–36.5)
MCHC RBC AUTO-ENTMCNC: 34.3 G/DL (ref 31.5–36.5)
MCV RBC AUTO: 93 FL (ref 78–100)
MONOCYTES # BLD AUTO: 0.4 10E3/UL (ref 0–1.3)
MONOCYTES # BLD AUTO: 0.5 10E3/UL (ref 0–1.3)
MONOCYTES # BLD AUTO: 0.8 10E3/UL (ref 0–1.3)
MONOCYTES NFR BLD AUTO: 3 %
MONOCYTES NFR BLD AUTO: 3 %
MONOCYTES NFR BLD AUTO: 4 %
NEUTROPHILS # BLD AUTO: 13.5 10E3/UL (ref 1.6–8.3)
NEUTROPHILS # BLD AUTO: 14.3 10E3/UL (ref 1.6–8.3)
NEUTROPHILS # BLD AUTO: 20 10E3/UL (ref 1.6–8.3)
NEUTROPHILS NFR BLD AUTO: 92 %
NEUTROPHILS NFR BLD AUTO: 92 %
NEUTROPHILS NFR BLD AUTO: 93 %
NRBC # BLD AUTO: 0 10E3/UL
NRBC BLD AUTO-RTO: 0 /100
O2/TOTAL GAS SETTING VFR VENT: 35 %
O2/TOTAL GAS SETTING VFR VENT: 40 %
O2/TOTAL GAS SETTING VFR VENT: 40 %
P AXIS - MUSE: 40 DEGREES
PCO2 BLD: 34 MM HG (ref 35–45)
PCO2 BLD: 36 MM HG (ref 35–45)
PCO2 BLD: 36 MM HG (ref 35–45)
PH BLD: 7.36 [PH] (ref 7.35–7.45)
PH BLD: 7.37 [PH] (ref 7.35–7.45)
PH BLD: 7.38 [PH] (ref 7.35–7.45)
PHOSPHATE SERPL-MCNC: 4.3 MG/DL (ref 2.5–4.5)
PHOSPHATE SERPL-MCNC: 5 MG/DL (ref 2.5–4.5)
PHOSPHATE SERPL-MCNC: 5.9 MG/DL (ref 2.5–4.5)
PLATELET # BLD AUTO: 51 10E3/UL (ref 150–450)
PLATELET # BLD AUTO: 53 10E3/UL (ref 150–450)
PLATELET # BLD AUTO: 89 10E3/UL (ref 150–450)
PO2 BLD: 133 MM HG (ref 80–105)
PO2 BLD: 148 MM HG (ref 80–105)
PO2 BLD: 159 MM HG (ref 80–105)
POTASSIUM BLD-SCNC: 3.9 MMOL/L (ref 3.4–5.3)
POTASSIUM BLD-SCNC: 4 MMOL/L (ref 3.4–5.3)
PR INTERVAL - MUSE: 170 MS
PROT SERPL-MCNC: 4.1 G/DL (ref 6.8–8.8)
PROT SERPL-MCNC: 4.1 G/DL (ref 6.8–8.8)
PROT SERPL-MCNC: 4.2 G/DL (ref 6.8–8.8)
QRS DURATION - MUSE: 90 MS
QT - MUSE: 336 MS
QTC - MUSE: 460 MS
R AXIS - MUSE: -12 DEGREES
RBC # BLD AUTO: 3.84 10E6/UL (ref 3.8–5.2)
RBC # BLD AUTO: 3.87 10E6/UL (ref 3.8–5.2)
RBC # BLD AUTO: 4.53 10E6/UL (ref 3.8–5.2)
SODIUM SERPL-SCNC: 133 MMOL/L (ref 133–144)
SODIUM SERPL-SCNC: 134 MMOL/L (ref 133–144)
SODIUM SERPL-SCNC: 134 MMOL/L (ref 133–144)
SODIUM SERPL-SCNC: 137 MMOL/L (ref 133–144)
SYSTOLIC BLOOD PRESSURE - MUSE: NORMAL MMHG
T AXIS - MUSE: 50 DEGREES
VENTRICULAR RATE- MUSE: 113 BPM
WBC # BLD AUTO: 14.7 10E3/UL (ref 4–11)
WBC # BLD AUTO: 15.4 10E3/UL (ref 4–11)
WBC # BLD AUTO: 21.8 10E3/UL (ref 4–11)

## 2021-10-18 PROCEDURE — 83735 ASSAY OF MAGNESIUM: CPT | Performed by: STUDENT IN AN ORGANIZED HEALTH CARE EDUCATION/TRAINING PROGRAM

## 2021-10-18 PROCEDURE — 258N000003 HC RX IP 258 OP 636: Performed by: STUDENT IN AN ORGANIZED HEALTH CARE EDUCATION/TRAINING PROGRAM

## 2021-10-18 PROCEDURE — 85384 FIBRINOGEN ACTIVITY: CPT | Performed by: STUDENT IN AN ORGANIZED HEALTH CARE EDUCATION/TRAINING PROGRAM

## 2021-10-18 PROCEDURE — 250N000009 HC RX 250: Performed by: STUDENT IN AN ORGANIZED HEALTH CARE EDUCATION/TRAINING PROGRAM

## 2021-10-18 PROCEDURE — 250N000012 HC RX MED GY IP 250 OP 636 PS 637: Performed by: STUDENT IN AN ORGANIZED HEALTH CARE EDUCATION/TRAINING PROGRAM

## 2021-10-18 PROCEDURE — 82803 BLOOD GASES ANY COMBINATION: CPT | Performed by: STUDENT IN AN ORGANIZED HEALTH CARE EDUCATION/TRAINING PROGRAM

## 2021-10-18 PROCEDURE — 85004 AUTOMATED DIFF WBC COUNT: CPT | Performed by: STUDENT IN AN ORGANIZED HEALTH CARE EDUCATION/TRAINING PROGRAM

## 2021-10-18 PROCEDURE — 82248 BILIRUBIN DIRECT: CPT | Performed by: STUDENT IN AN ORGANIZED HEALTH CARE EDUCATION/TRAINING PROGRAM

## 2021-10-18 PROCEDURE — P9047 ALBUMIN (HUMAN), 25%, 50ML: HCPCS | Performed by: STUDENT IN AN ORGANIZED HEALTH CARE EDUCATION/TRAINING PROGRAM

## 2021-10-18 PROCEDURE — C9113 INJ PANTOPRAZOLE SODIUM, VIA: HCPCS | Performed by: STUDENT IN AN ORGANIZED HEALTH CARE EDUCATION/TRAINING PROGRAM

## 2021-10-18 PROCEDURE — 93975 VASCULAR STUDY: CPT | Mod: 26 | Performed by: RADIOLOGY

## 2021-10-18 PROCEDURE — 94003 VENT MGMT INPAT SUBQ DAY: CPT

## 2021-10-18 PROCEDURE — 250N000011 HC RX IP 250 OP 636: Performed by: NURSE PRACTITIONER

## 2021-10-18 PROCEDURE — 250N000011 HC RX IP 250 OP 636: Performed by: STUDENT IN AN ORGANIZED HEALTH CARE EDUCATION/TRAINING PROGRAM

## 2021-10-18 PROCEDURE — 250N000013 HC RX MED GY IP 250 OP 250 PS 637: Performed by: TRANSPLANT SURGERY

## 2021-10-18 PROCEDURE — P9012 CRYOPRECIPITATE EACH UNIT: HCPCS | Performed by: STUDENT IN AN ORGANIZED HEALTH CARE EDUCATION/TRAINING PROGRAM

## 2021-10-18 PROCEDURE — 258N000001 HC RX 258: Performed by: STUDENT IN AN ORGANIZED HEALTH CARE EDUCATION/TRAINING PROGRAM

## 2021-10-18 PROCEDURE — 250N000013 HC RX MED GY IP 250 OP 250 PS 637: Performed by: STUDENT IN AN ORGANIZED HEALTH CARE EDUCATION/TRAINING PROGRAM

## 2021-10-18 PROCEDURE — 80053 COMPREHEN METABOLIC PANEL: CPT | Performed by: STUDENT IN AN ORGANIZED HEALTH CARE EDUCATION/TRAINING PROGRAM

## 2021-10-18 PROCEDURE — 999N000157 HC STATISTIC RCP TIME EA 10 MIN

## 2021-10-18 PROCEDURE — 250N000011 HC RX IP 250 OP 636

## 2021-10-18 PROCEDURE — 85610 PROTHROMBIN TIME: CPT | Performed by: STUDENT IN AN ORGANIZED HEALTH CARE EDUCATION/TRAINING PROGRAM

## 2021-10-18 PROCEDURE — 84100 ASSAY OF PHOSPHORUS: CPT | Performed by: STUDENT IN AN ORGANIZED HEALTH CARE EDUCATION/TRAINING PROGRAM

## 2021-10-18 PROCEDURE — 99232 SBSQ HOSP IP/OBS MODERATE 35: CPT | Mod: 25 | Performed by: SURGERY

## 2021-10-18 PROCEDURE — 83605 ASSAY OF LACTIC ACID: CPT | Performed by: STUDENT IN AN ORGANIZED HEALTH CARE EDUCATION/TRAINING PROGRAM

## 2021-10-18 PROCEDURE — P9041 ALBUMIN (HUMAN),5%, 50ML: HCPCS

## 2021-10-18 PROCEDURE — 85730 THROMBOPLASTIN TIME PARTIAL: CPT | Performed by: STUDENT IN AN ORGANIZED HEALTH CARE EDUCATION/TRAINING PROGRAM

## 2021-10-18 PROCEDURE — 82306 VITAMIN D 25 HYDROXY: CPT

## 2021-10-18 PROCEDURE — 82330 ASSAY OF CALCIUM: CPT | Performed by: STUDENT IN AN ORGANIZED HEALTH CARE EDUCATION/TRAINING PROGRAM

## 2021-10-18 PROCEDURE — 97165 OT EVAL LOW COMPLEX 30 MIN: CPT | Mod: GO

## 2021-10-18 PROCEDURE — 250N000013 HC RX MED GY IP 250 OP 250 PS 637

## 2021-10-18 PROCEDURE — 99207 PR SATISFY VISIT NUMBER: CPT | Performed by: TRANSPLANT SURGERY

## 2021-10-18 PROCEDURE — 76700 US EXAM ABDOM COMPLETE: CPT

## 2021-10-18 PROCEDURE — 93975 VASCULAR STUDY: CPT | Mod: 77

## 2021-10-18 PROCEDURE — 97535 SELF CARE MNGMENT TRAINING: CPT | Mod: GO

## 2021-10-18 PROCEDURE — 83605 ASSAY OF LACTIC ACID: CPT

## 2021-10-18 PROCEDURE — P9041 ALBUMIN (HUMAN),5%, 50ML: HCPCS | Performed by: STUDENT IN AN ORGANIZED HEALTH CARE EDUCATION/TRAINING PROGRAM

## 2021-10-18 PROCEDURE — 250N000009 HC RX 250: Performed by: TRANSPLANT SURGERY

## 2021-10-18 PROCEDURE — 200N000002 HC R&B ICU UMMC

## 2021-10-18 PROCEDURE — 97530 THERAPEUTIC ACTIVITIES: CPT | Mod: GO

## 2021-10-18 RX ORDER — POLYETHYLENE GLYCOL 3350 17 G/17G
17 POWDER, FOR SOLUTION ORAL DAILY PRN
Status: DISCONTINUED | OUTPATIENT
Start: 2021-10-18 | End: 2021-10-29 | Stop reason: HOSPADM

## 2021-10-18 RX ORDER — FUROSEMIDE 10 MG/ML
40 INJECTION INTRAMUSCULAR; INTRAVENOUS ONCE
Status: COMPLETED | OUTPATIENT
Start: 2021-10-18 | End: 2021-10-18

## 2021-10-18 RX ORDER — OXYCODONE HYDROCHLORIDE 5 MG/1
5 TABLET ORAL EVERY 4 HOURS PRN
Status: DISCONTINUED | OUTPATIENT
Start: 2021-10-18 | End: 2021-10-19

## 2021-10-18 RX ORDER — VITAMIN B COMPLEX
2 TABLET ORAL DAILY
Qty: 180 TABLET | Refills: 3 | Status: SHIPPED | OUTPATIENT
Start: 2021-10-18 | End: 2021-10-29

## 2021-10-18 RX ORDER — HEPARIN SODIUM 10000 [USP'U]/100ML
400 INJECTION, SOLUTION INTRAVENOUS CONTINUOUS
Status: DISCONTINUED | OUTPATIENT
Start: 2021-10-18 | End: 2021-10-26

## 2021-10-18 RX ORDER — NALOXONE HYDROCHLORIDE 0.4 MG/ML
0.2 INJECTION, SOLUTION INTRAMUSCULAR; INTRAVENOUS; SUBCUTANEOUS
Status: DISCONTINUED | OUTPATIENT
Start: 2021-10-18 | End: 2021-10-29 | Stop reason: HOSPADM

## 2021-10-18 RX ORDER — TRAZODONE HYDROCHLORIDE 50 MG/1
50 TABLET, FILM COATED ORAL
Status: DISCONTINUED | OUTPATIENT
Start: 2021-10-19 | End: 2021-10-29 | Stop reason: HOSPADM

## 2021-10-18 RX ORDER — NALOXONE HYDROCHLORIDE 0.4 MG/ML
0.4 INJECTION, SOLUTION INTRAMUSCULAR; INTRAVENOUS; SUBCUTANEOUS
Status: DISCONTINUED | OUTPATIENT
Start: 2021-10-18 | End: 2021-10-29 | Stop reason: HOSPADM

## 2021-10-18 RX ORDER — ONDANSETRON 2 MG/ML
4 INJECTION INTRAMUSCULAR; INTRAVENOUS EVERY 6 HOURS PRN
Status: DISCONTINUED | OUTPATIENT
Start: 2021-10-18 | End: 2021-10-26 | Stop reason: DRUGHIGH

## 2021-10-18 RX ORDER — BISACODYL 10 MG
10 SUPPOSITORY, RECTAL RECTAL DAILY PRN
Status: DISCONTINUED | OUTPATIENT
Start: 2021-10-18 | End: 2021-10-29 | Stop reason: HOSPADM

## 2021-10-18 RX ORDER — ALBUMIN (HUMAN) 12.5 G/50ML
12.5 SOLUTION INTRAVENOUS EVERY 8 HOURS
Status: DISCONTINUED | OUTPATIENT
Start: 2021-10-18 | End: 2021-10-20

## 2021-10-18 RX ORDER — TRAZODONE HYDROCHLORIDE 50 MG/1
50 TABLET, FILM COATED ORAL
Qty: 30 TABLET | Refills: 0 | Status: SHIPPED | OUTPATIENT
Start: 2021-10-18 | End: 2021-11-04

## 2021-10-18 RX ORDER — ALBUMIN, HUMAN INJ 5% 5 %
25 SOLUTION INTRAVENOUS ONCE
Status: COMPLETED | OUTPATIENT
Start: 2021-10-18 | End: 2021-10-18

## 2021-10-18 RX ORDER — LEVOTHYROXINE SODIUM 50 UG/1
50 TABLET ORAL
Status: DISCONTINUED | OUTPATIENT
Start: 2021-10-18 | End: 2021-10-29 | Stop reason: HOSPADM

## 2021-10-18 RX ORDER — HYDROMORPHONE HCL IN WATER/PF 6 MG/30 ML
.2-.4 PATIENT CONTROLLED ANALGESIA SYRINGE INTRAVENOUS
Status: DISCONTINUED | OUTPATIENT
Start: 2021-10-18 | End: 2021-10-20

## 2021-10-18 RX ORDER — ALBUMIN, HUMAN INJ 5% 5 %
25 SOLUTION INTRAVENOUS ONCE
Status: DISCONTINUED | OUTPATIENT
Start: 2021-10-18 | End: 2021-10-18

## 2021-10-18 RX ADMIN — LEVOTHYROXINE SODIUM 50 MCG: 0.05 TABLET ORAL at 07:45

## 2021-10-18 RX ADMIN — HEPARIN SODIUM 400 UNITS/HR: 10000 INJECTION, SOLUTION INTRAVENOUS at 12:07

## 2021-10-18 RX ADMIN — ALBUMIN HUMAN 25 G: 0.05 INJECTION, SOLUTION INTRAVENOUS at 19:38

## 2021-10-18 RX ADMIN — FLUCONAZOLE 400 MG: 400 INJECTION, SOLUTION INTRAVENOUS at 16:09

## 2021-10-18 RX ADMIN — URSODIOL 300 MG: 300 CAPSULE ORAL at 07:43

## 2021-10-18 RX ADMIN — PIPERACILLIN AND TAZOBACTAM 3.38 G: 3; .375 INJECTION, POWDER, LYOPHILIZED, FOR SOLUTION INTRAVENOUS at 12:46

## 2021-10-18 RX ADMIN — PIPERACILLIN AND TAZOBACTAM 3.38 G: 3; .375 INJECTION, POWDER, LYOPHILIZED, FOR SOLUTION INTRAVENOUS at 01:12

## 2021-10-18 RX ADMIN — TACROLIMUS 2 MG: 1 CAPSULE ORAL at 17:32

## 2021-10-18 RX ADMIN — PANTOPRAZOLE SODIUM 40 MG: 40 INJECTION, POWDER, FOR SOLUTION INTRAVENOUS at 07:46

## 2021-10-18 RX ADMIN — OXYCODONE HYDROCHLORIDE 5 MG: 5 TABLET ORAL at 16:06

## 2021-10-18 RX ADMIN — FUROSEMIDE 40 MG: 10 INJECTION, SOLUTION INTRAVENOUS at 20:50

## 2021-10-18 RX ADMIN — POLYETHYLENE GLYCOL 3350 17 G: 17 POWDER, FOR SOLUTION ORAL at 20:45

## 2021-10-18 RX ADMIN — HUMAN INSULIN 3 UNITS/HR: 100 INJECTION, SOLUTION SUBCUTANEOUS at 13:44

## 2021-10-18 RX ADMIN — BISACODYL 10 MG: 10 SUPPOSITORY RECTAL at 22:41

## 2021-10-18 RX ADMIN — VALGANCICLOVIR HYDROCHLORIDE 450 MG: 50 POWDER, FOR SOLUTION ORAL at 07:44

## 2021-10-18 RX ADMIN — ALBUMIN HUMAN 25 G: 0.05 INJECTION, SOLUTION INTRAVENOUS at 10:26

## 2021-10-18 RX ADMIN — DEXTROSE AND SODIUM CHLORIDE: 5; 450 INJECTION, SOLUTION INTRAVENOUS at 08:21

## 2021-10-18 RX ADMIN — HUMAN INSULIN 6 UNITS/HR: 100 INJECTION, SOLUTION SUBCUTANEOUS at 05:12

## 2021-10-18 RX ADMIN — ONDANSETRON 4 MG: 2 INJECTION INTRAMUSCULAR; INTRAVENOUS at 17:19

## 2021-10-18 RX ADMIN — PIPERACILLIN AND TAZOBACTAM 3.38 G: 3; .375 INJECTION, POWDER, LYOPHILIZED, FOR SOLUTION INTRAVENOUS at 07:47

## 2021-10-18 RX ADMIN — ASPIRIN 325 MG ORAL TABLET 325 MG: 325 PILL ORAL at 07:45

## 2021-10-18 RX ADMIN — TACROLIMUS 2 MG: 5 CAPSULE ORAL at 07:44

## 2021-10-18 RX ADMIN — URSODIOL 300 MG: 300 CAPSULE ORAL at 19:42

## 2021-10-18 RX ADMIN — MYCOPHENOLATE MOFETIL 750 MG: 250 CAPSULE ORAL at 17:32

## 2021-10-18 RX ADMIN — PROPOFOL 30 MCG/KG/MIN: 10 INJECTION, EMULSION INTRAVENOUS at 05:12

## 2021-10-18 RX ADMIN — ONDANSETRON 4 MG: 2 INJECTION INTRAMUSCULAR; INTRAVENOUS at 23:34

## 2021-10-18 RX ADMIN — ALBUMIN HUMAN 12.5 G: 0.25 SOLUTION INTRAVENOUS at 20:47

## 2021-10-18 RX ADMIN — SULFAMETHOXAZOLE AND TRIMETHOPRIM 1 TABLET: 400; 80 TABLET ORAL at 07:47

## 2021-10-18 RX ADMIN — SODIUM CHLORIDE 200 MG: 9 INJECTION, SOLUTION INTRAVENOUS at 08:22

## 2021-10-18 RX ADMIN — ALBUMIN HUMAN 12.5 G: 0.25 SOLUTION INTRAVENOUS at 12:13

## 2021-10-18 RX ADMIN — DEXTROSE AND SODIUM CHLORIDE: 5; 450 INJECTION, SOLUTION INTRAVENOUS at 18:25

## 2021-10-18 RX ADMIN — HYDROMORPHONE HYDROCHLORIDE 0.2 MG: 0.2 INJECTION, SOLUTION INTRAMUSCULAR; INTRAVENOUS; SUBCUTANEOUS at 23:34

## 2021-10-18 RX ADMIN — MYCOPHENOLATE MOFETIL 750 MG: 200 POWDER, FOR SUSPENSION ORAL at 07:45

## 2021-10-18 RX ADMIN — PIPERACILLIN AND TAZOBACTAM 3.38 G: 3; .375 INJECTION, POWDER, LYOPHILIZED, FOR SOLUTION INTRAVENOUS at 18:25

## 2021-10-18 ASSESSMENT — ACTIVITIES OF DAILY LIVING (ADL)
ADLS_ACUITY_SCORE: 11
ADLS_ACUITY_SCORE: 12
ADLS_ACUITY_SCORE: 12
ADLS_ACUITY_SCORE: 11
ADLS_ACUITY_SCORE: 10
PREVIOUS_RESPONSIBILITIES: MEAL PREP;HOUSEKEEPING;LAUNDRY;SHOPPING;MEDICATION MANAGEMENT;FINANCES;DRIVING;WORK
ADLS_ACUITY_SCORE: 10
ADLS_ACUITY_SCORE: 11
ADLS_ACUITY_SCORE: 10
ADLS_ACUITY_SCORE: 11
ADLS_ACUITY_SCORE: 11
ADLS_ACUITY_SCORE: 10
ADLS_ACUITY_SCORE: 11
ADLS_ACUITY_SCORE: 12

## 2021-10-18 NOTE — PROGRESS NOTES
Preliminary positive donor sputum and final urine culture results have been uploaded to [facility].  Donor ID [id number].  Dr. Lyon notified of results.

## 2021-10-18 NOTE — PLAN OF CARE
D: Afebrile. Sinus tachycardia. Normotensive. A&Ox4. On RA.     I/A: Extubated around 1100 today after successful PST for 3.5 hours. No complications during extubation. Continues to use IS and is on RA. Up to chair x2 today and working with PT. Currently on heparin gtt at straight rate of 600 units/hr. On insulin gtt between 6-9 units/hr. Maintenance D5 1/2 NS running at 100cc/hr. Right internal jugular MAC in place, removed superior MAC earlier. A line taken out. Clear liquid diet, tolerating well after passing bedside swallow screen. Aguiar in- continues to have low UOP, MDs aware and updated throughout the day. Albumin given this morning. BERKLEY drain having 100-150cc output per hour. No BM. PRN oxy given x1 for abdominal cramping pain- MD aware and performed US at bedside. Ambulation with assist of 1.     P: Continue to monitor pt. Consult SICU or transplant teams with any new changes or concerns. Continue to monitor urine output. Rewire MAC prior to transfer to floor.

## 2021-10-18 NOTE — CONSULTS
Transplant Admission Psychosocial Assessment    Patient Name: Sarah Fisher  : 1970  Age: 51 year old  MRN: [id number]  Date of Initial Social Work Evaluation: 20 by MAUREEN Rodriguez    Patient underwent a  donor liver transplant on 10/17/21.  I met with Maria Elena an her niece Radha to update psychosocial assessment and provide education about SW role while inpatient, and to begin discussion of expectations/requirements, caregiver needs and follow up needs post-transplant.     Presenting Information   Living Situation: Maria Elena owns a home in [place], [place]. She resides there with her 12 year son Flakito.  Her niece Radha has also been living with her and providing some care giving.  If not local, plans for short term stay:  n/a  Previous Functional Status: Vern Garcia sets up Maria Elena's medications, but otherwise Maria Elena was independent with her ADLs and IADLs.  Cultural/Language/Spiritual Considerations: Druze, English    Support System  Primary Support Person: vern Garcia (in school for nursing)  Other support:  Mother Virginia, brother Saravanan  Plan for support in immediate post-transplant period: Vern Garcia will provide the majority of post transplant care giving, and she will reach out to other family members to assist when she cannot.    Health Care Directive  Decision Maker: patient  Alternate Decision Maker: jessicagloria Coe ([facility] is named as the primary health care agent, and vern Garcia is named as the alternate agent  Health Care Directive: Copy in Chart    Mental Health/Coping:   History of Mental Health: No history of mental health concerns. No history of hospitalizations or past/current suicidal thoughts.   History of Chemical Health: Maria Elena reports that her last date of consumption was 2020 on Father's day. She completed a substance use assessment at [facility] as part of her liver transplant evaluation on 2020. Maria Elena's health insurance was a barrier  to her engagement in IOP. At the time of her original assessment, IOP vs. 1:1 was recommended. A referral made to Dr. Kapil Gerber and Maria Elena saw this provider once on 4/29/21.  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  Impact of transplant on income: reduced income  Insurance and medication coverage: [employer] of [place]   Financial concerns: none voiced today  Resources needed: ongoing assessment    Education provided by SW: Social Work role inpatient setting, availability of virtual transplant support groups    Assessment and recommendations and plan:  Maria Elena is doing well post liver transplant.  She asked appropriate questions about post transplant recovery.  Her niece Radha is very involved and supportive.  Maria Elena asked about having skilled home care at discharge.     Maria Elena is interested in having her Will notarized.  I provided her niece Radha with a link to mobile notaries.  I also consulted with Kylie Pastrana, 4A nurse manager.  A virtual mobile notary is preferred but an exception can me made to have an in-person mobile notary.  Patient/niece are aware the cost of this service is patient's responsibility.      ENRIQUE Echevarria, [facility]  Liver Transplant   Phone [phone]  Pager [phone]

## 2021-10-18 NOTE — TELEPHONE ENCOUNTER
Routing refill request to provider for review/approval because:  Medication is reported/historical  Positive pregnancy test in the past 12 months.     Airam Cerda RN on 10/18/2021 at 10:59 AM

## 2021-10-18 NOTE — ANESTHESIA PROCEDURE NOTES
Central Line/PA Catheter Placement    Pre-Procedure   Staff -        Anesthesiologist:  Kel Morse MD       Resident/Fellow: Tien Garcia MD       Performed By: resident and anesthesiologist       Location: OR       Pre-Anesthestic Checklist: patient identified, IV checked, site marked, risks and benefits discussed, informed consent, monitors and equipment checked, pre-op evaluation and at physician/surgeon's request  Timeout:       Correct Patient: Yes        Correct Procedure: Yes        Correct Site: Yes        Correct Position: Yes        Correct Laterality: N/A     Procedure   Procedure: emergent       Laterality: right       Insertion Site: internal jugular.       Patient Position: supine  Sterile Prep        All elements of maximal sterile barrier technique followed       Patient Prep/Sterile Barriers: draped, hand hygiene, gloves , hat , mask , draped, gown, sterile gel and probe cover       Skin prep: Chloraprep  Insertion/Injection        Technique: ultrasound guided and Seldinger Technique        1. Ultrasound was used to evaluate the access site.       2. Vein evaluated via ultrasound for patency/adequacy.       3. Using real-time ultrasound the needle/catheter was observed entering the artery/vein.       PA Catheter Type: CCO         Appropriate RV, RA and PA waveforms noted:  Yes            Withdrawn and Locked at cm: 48  Narrative         Secured by: suture and anchor securement device       Tegaderm and Biopatch dressing used.       Complications: None apparent,        All lumens flushed: Yes       Verification method: X-ray, Ultrasound, Placement to be verified post-op and AMBER

## 2021-10-18 NOTE — PROGRESS NOTES
Extubated patient at 1100 this morning to 4L NC. Patient had a good cough, cuff leak and spO2 in the mid 90's. Encouraged deep breathing and coughing.     Will continue to monitor    Thuan Head, RT

## 2021-10-18 NOTE — PROGRESS NOTES
Patient removed from OS waitlist after  donor liver transplant. OS ID [id number].    Donor Has Risk Criteria for Transmission of HIV/HCV/HBV: No  Recipient Notified of Risk Criteria: N/A

## 2021-10-18 NOTE — PROGRESS NOTES
Transplant Surgery  Inpatient Daily Progress Note  10/18/2021    Assessment & Plan: Sarah Fisher is a 51 year old female with PMH significant for ESLD MELD 18 due to alcoholic cirrhosis c/b ascites, GAVE, hematochromatosis, chronic hypotension, CKD, anemia, coagulopathy of liver disease, and hypothyroidism. She is now s/p DDLTx 10/17/21 with Dr. Morrison.    Graft function:DBD Liver Tx 10/17/21: POD 1  Elevated LFTs elevated, but improving. AST//609.  TB improving 3.5 (5.2). LA improving 1.6 (1.8).  -HA ppx: hypercoagulable intraop. Postop US with elevated RI involving the extrahepatic hepatic artery and the right hepatic artery. Repeat US today.  -Start Heparin 400units/hr +continue ASA 325mg daily. Monitor Coags BID  BERKLEY drain x1 with increasing output. Replace with 25% 25gm Albumin TID  Immunosuppression management:    Solumedrol taper per protocol induction  MMF 750mg BID  Tacrolimus 2mg BID, Titrate to 10-12   Complexity of management:Medium. Contributing factors: thrombocytopenia CORBIN  Hematology: Anemia of chronic disease/ABL:Received 5 L of PRBC intraop. HGb stable 12.  -HA ppx as above with Heparin 400units/hr  Cardiorespiratory: Circulatory failure requiring pressor support: weaned off.  Mechanical ventilation perioperative: Plan to extubate today  Pleural effusion, Large:  CDB/IS when extubated  GI/Nutrition: NPO, bedside swallow, advance to CLD as karen  Continue bowel regimen  Endocrine:Steroid induced hyperglycemia: -180s.  Continue insulin gtt currently 4-6units/hr.   Fluid/Electrolytes: MIVF:D5 1/2NS @ 100-continue with mild CORBIN +albumin for large volume Drain output  : Aguiar to remain due to critical illness, close monitoring of output  CORBIN: SCr 0.9 b/l, elevated to 1.3 today. Decreasing UOP to 30cc/hr. Continue to monitor in setting of large vol Drain ouput.  Infectious disease:Afebrile, WBC 15 (22) most likely steroid induced. Trend daily. Received zosyn x48hrs Periop and  diflucan x1 preop   Prophylaxis: DVT, fall, GI, fungal  Disposition: SICU    Medical Decision Making: High   Subsequent visit [id number] (high level decision making)    BREONNA/Fellow/Resident Provider: Danii Hickman NP     Faculty: Jaylan Lyon M.D.    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    10/17/2021 (Liver), Postoperative day: 1     Interval History: History limited due to intubation.   obtained from the electronic health record and nurse.    Overnight events: remains intubated. Tolerating PST. Awake. Alert. reporting pain controlled.      ROS:   A 10-point review of systems was negative except as noted above.    Curent Meds:    albumin human  12.5 g Intravenous Q8H     aspirin  325 mg Oral or Feeding Tube Daily     fluconazole  400 mg Intravenous Once     levothyroxine  50 mcg Oral QAM AC     [START ON 10/19/2021] methylPREDNISolone  100 mg Intravenous Once    Followed by     [START ON 10/20/2021] methylPREDNISolone  50 mg Intravenous Once    Followed by     [START ON 10/21/2021] predniSONE  25 mg Oral Once    Followed by     [START ON 10/22/2021] predniSONE  10 mg Oral Once     mycophenolate  750 mg Oral BID IS    Or     mycophenolate  750 mg Oral or NG Tube BID IS     pantoprazole (PROTONIX) IV  40 mg Intravenous Daily with breakfast     piperacillin-tazobactam  3.375 g Intravenous Q6H     sodium chloride (PF)  3 mL Intravenous Q8H     sulfamethoxazole-trimethoprim  1 tablet Oral or Feeding Tube Daily     tacrolimus  2 mg Oral BID IS    Or     tacrolimus  2 mg Oral or NG Tube BID IS     ursodiol  300 mg Oral BID    Or     ursodiol  300 mg Oral or Feeding Tube BID     valGANciclovir  450 mg Oral Daily    Or     valGANciclovir  450 mg Oral Daily       Physical Exam:     Admit Weight: 81.2 kg (179 lb)    Current Vitals:   /65   Pulse 101   Temp 98.3   F (36.8  C) (Axillary)   Resp 16   Wt 78.1 kg (172 lb 2.9 oz)   SpO2 98%   BMI 26.97 kg/m      CVP (mmHg): 9 mmHg    Vital sign ranges:    Temp:  [98.1  F (36.7  C)-98.3  F (36.8  C)] 98.3  F (36.8  C)  Pulse:  [] 101  Resp:  [10-16] 16  BP: (113-117)/(65-67) 117/65  MAP:  [63 mmHg-113 mmHg] 107 mmHg  Arterial Line BP: ()/(49-82) 147/82  FiO2 (%):  [35 %-50 %] 35 %  SpO2:  [97 %-100 %] 98 %  Patient Vitals for the past 24 hrs:   BP Temp Temp src Pulse Resp SpO2 Weight   10/18/21 1100 -- -- -- 101 -- 98 % --   10/18/21 1000 -- -- -- 102 16 97 % --   10/18/21 0900 -- -- -- 106 -- 98 % --   10/18/21 0800 -- 98.3  F (36.8  C) Axillary 109 10 99 % --   10/18/21 0734 117/65 -- -- -- -- -- --   10/18/21 0700 -- -- -- 95 16 99 % --   10/18/21 0645 -- -- -- 97 -- 99 % --   10/18/21 0630 -- -- -- 96 -- 99 % --   10/18/21 0615 -- -- -- 96 -- 99 % --   10/18/21 0600 -- -- -- 96 16 99 % --   10/18/21 0545 -- -- -- 97 -- 99 % --   10/18/21 0530 -- -- -- 96 -- 99 % --   10/18/21 0515 -- -- -- 96 -- 99 % --   10/18/21 0500 -- -- -- 98 16 99 % --   10/18/21 0445 -- -- -- 99 -- 99 % --   10/18/21 0430 -- -- -- 98 -- 99 % --   10/18/21 0415 -- -- -- 101 -- 99 % --   10/18/21 0400 -- 98.2  F (36.8  C) Axillary 103 16 99 % --   10/18/21 0345 -- -- -- 103 -- 99 % --   10/18/21 0330 -- -- -- 105 -- 99 % --   10/18/21 0315 -- -- -- 106 -- 99 % --   10/18/21 0300 -- -- -- 106 16 99 % --   10/18/21 0245 -- -- -- 112 -- 99 % --   10/18/21 0230 -- -- -- 112 -- 99 % --   10/18/21 0215 -- -- -- 112 -- 99 % --   10/18/21 0200 -- -- -- 113 -- 99 % --   10/18/21 0145 -- -- -- 115 -- 98 % --   10/18/21 0130 -- 98.3  F (36.8  C) -- 114 -- 99 % --   10/18/21 0115 -- -- -- 115 -- 99 % --   10/18/21 0100 -- -- -- 113 16 99 % --   10/18/21 0045 -- -- -- 115 -- 99 % --   10/18/21 0030 -- -- -- 111 -- 99 % --   10/18/21 0015 -- 98.2  F (36.8  C) -- 111 -- 98 % --   10/18/21 0000 -- 98.1  F (36.7  C) Axillary 111 16 99 % 78.1 kg (172 lb 2.9  oz)   10/17/21 2346 -- 98.2  F (36.8  C) -- 108 -- 99 % --   10/17/21 2345 -- -- -- 112 -- 100 % --   10/17/21 2336 -- 98.1  F (36.7  C) -- 102 -- 99 % --   10/17/21 2330 -- -- -- 102 -- 99 % --   10/17/21 2315 -- -- -- 93 -- 99 % --   10/17/21 2300 -- -- -- 89 16 100 % --   10/17/21 2245 -- -- -- 93 -- 100 % --   10/17/21 2230 -- -- -- 91 -- 99 % --   10/17/21 2215 -- -- -- 90 -- 99 % --   10/17/21 2200 -- -- -- 89 16 99 % --   10/17/21 2145 -- -- -- 83 -- 98 % --   10/17/21 2130 -- 98.3  F (36.8  C) Axillary 86 16 98 % --   10/17/21 2119 -- -- -- -- -- 99 % --   10/17/21 1129 113/67 98.2  F (36.8  C) Oral 112 16 99 % --     General Appearance: in no apparent distress.   Skin: normal, warm, dry, No rashes, induration   Heart: regular rate and rhythm  Lungs: ventilator breaths, clear anteriorly  Abdomen: obese, soft, NTTP, surgical dressing in place CDI.   :   Aguiar present with green output from Methylene blue  Extremities: edema: present bilaterally. 1+. There is no skin breakdown.  Neurologic: awake and alert.  Tremor absent.. Asterixis: absent.    Frailty Scores     Frailty Scores 12/27/2020 12/27/2020    Final Score Frail Frail    Final Score Number 3 3          Data:   CMP  Recent Labs   Lab 10/18/21  0947 10/18/21  0827 10/18/21  0808 10/18/21  0657 10/18/21  0415 10/18/21  0406 10/18/21  0003 10/17/21  2352 10/17/21  2126 10/16/21  2213   NA  --   --  134  --   --  137   < > 134   < > 131*   POTASSIUM  --   --  4.0  --   --  4.0   < > 4.0   < > 4.1   CHLORIDE  --   --  105  --   --  105   < > 103   < > 100   CO2  --   --  21  --   --  21   < > 20   < > 24   * 167* 174*   < >   < > 187*   < > 162*   < > 125*   BUN  --   --  34*  --   --  29   < > 28   < > 38*   CR  --   --  1.34*  --   --  1.15*   < > 0.96   < > 1.44*   GFRESTIMATED  --   --  46*  --   --  55*   < > 69   < > 42*   EMMY  --   --  8.4*  --   --  8.7   < > 8.9   < > 8.5   ICAW  --   --   --   --   --  5.1  --   --   --   --    MAG  --    --  2.3  --   --  2.3   < > 2.3   < > 2.0   PHOS  --   --  5.9*  --   --  5.0*   < > 4.3   < > 3.5   AMYLASE  --   --   --   --   --   --   --  52  --  70   LIPASE  --   --   --   --   --   --   --  191  --   --    ALBUMIN  --   --  1.5*  --   --  1.7*   < > 1.6*   < > 2.3*   BILITOTAL  --   --  3.5*  --   --  5.2*   < > 8.9*   < > 5.0*   ALKPHOS  --   --  174*  --   --  178*   < > 244*   < > 206*   AST  --   --  736*  --   --  958*   < > 1,435*   < > 39   ALT  --   --  609*  --   --  634*   < > 850*   < > 26    < > = values in this interval not displayed.     CBC  Recent Labs   Lab 10/18/21  0809 10/18/21  0406   HGB 12.2 12.4   WBC 14.7* 15.4*   PLT 51* 53*     COAGS  Recent Labs   Lab 10/18/21  0808 10/18/21  0406   INR 1.54* 1.51*   PTT 41* 37      Urinalysis  Recent Labs   Lab Test 01/04/21  1020   COLOR Bibi   APPEARANCE Slightly Cloudy   URINEGLC Negative   URINEBILI Negative   URINEKETONE Negative   SG 1.019   UBLD Negative   URINEPH 5.0   PROTEIN Negative   NITRITE Negative   LEUKEST Negative   RBCU 1   WBCU 7*     Virology:  Hepatitis C Antibody   Date Value Ref Range Status   05/02/2021 Nonreactive NR^Nonreactive Final     Comment:     Assay performance characteristics have not been established for newborns,   infants, and children

## 2021-10-18 NOTE — OP NOTE
Transplant Center   Operative Note     Procedure date:  10/17/21    Preoperative diagnosis:  End Stage Liver Disease due to Laënnec cirrhosis    Postoperative diagnosis:  Same, severe portal hypertension with significant amount of ascites.  The patient was hypercoagulable during the case, we had to give 5000 units of heparin IV.    Procedure:  liver transplant      Surgeon:  Surgeon(s) and Role:     * Christian Morrison MD - Primary    Fellow/assistant:  Dr. Mina assisted the entire procedure.  There was no qualified resident to assist with this procedure    Anesthesia:  General    Specimen:  Explant liver    Drains:  1 BERKLEY drain    Urine output:  Please see anesthesia chart mls    Estimated blood loss:  Patient received 5 units of PRBC intraoperatively    Fluids administered:       Intraoperative Events:   Patient developed hypercoagulability during the case.  The flow in the hepatic artery was diminished.  We Briana ballooned the hepatic artery and after which the flow greatly improved to about 250 mL/min.  Although we did not see any clot, I suspect it could have been a clot that was dislodged by the Briana.      Complications: None    Findings:     #1.  Micronodular cirrhosis of the liver.  #2.  Close to 6 L of ascites which was turbid.  The ascites was sent for culture sensitivity.  #3.  Very severe portal hypertension.  #4.  After we completed the hepatic artery anastomosis the flows in the hepatic artery were weak.  We did a Briana balloon of the hepatic artery after which the flows greatly improved.  Although I did not see a clot in the hepatic artery, one could not be excluded.  #5.  The patient was hypercoagulable during the case and we had to give 5000 units of heparin intravenously    Brief procedure:    Orthotopic liver transplant, caval replacement, portal vein end-to-end anastomosis, hepatic artery donor celiac axis anastomosed to the recipient hepatic artery at bifurcation, bile duct  end-to-end anastomosis no stent was placed.    Indication: Sarah Fisher with a history of End Stage Liver Disease due to Laennec's who presents for Donation after Brain Death Whole Liver transplant. A suitable donor offer has become available. After discussing the risks and benefits of proceeding, the patient agreed to proceed with surgery and provided informed consent.    Final ABO/Crossmatch verification: After the donor organ arrived to the operating room and prior to anastomosis, I participated in the transplant pre-verification upon organ receipt timeout by visually verifying the donor ID, organ and laterality, donor blood type, recipient unique identifier, recipient blood type, and that the donor and recipient are blood type compatible.     Donor UNOS ID:  [id number]    Donor ABO:  B    Donor arterial clamp on:  10/17/2021 12:43 PM    Preservation fluid:  UW     Vessels with organ:  Yes    Donor organ arrival to recipient room:  10/17/2021  2:56 PM    Total ischemic time:  319    Cold ischemic time:  275    Warm ischemic time:  44                 Back Table Preparation:   Procedure:  Bench preparation of the liver allograft for transplantation    Preoperative diagnosis:  End Stage Liver Disease due to Laennec's    Postoperative diagnosis:  Same,     Surgeon:  Surgeon(s) and Role:     * Christian Morrison MD - Primary    Co-Surgeon:  Christian Morrison M.D.    Fellow/Assistant:  Dr. Mikhail Alba there was no qualified resident to assist with this procedure    Anesthesia:  None    Graft biopsy:  Yes-the biopsy was reviewed at our hospital and showed less than 5% macrosteatosis.  There was no necrosis or fibrosis.    Macroscopic steatosis:  None    Back table reconstruction:  No Reconstruction    Intimal flap repair:  None    # of hepatic arteries:  The donor had a left hepatic artery arising from the left gastric artery    # of portal veins:  1    Accessory arteries:  Yes    # of hepatic veins:  3    # of  bile ducts:  1    Graft weight:  Medium size liver     Findings:   Liver Laceration: Yes -  There was hematoma on the superior aspect of the left lobe of the liver which was about 10 x 5 cm.  There was also a laceration on the inferior surface of the left lobe of the liver.  Overall quality of liver: Good    Back Table Procedure: The liver allograft was received and inspected and the aforementioned findings were noted. It had been previously flushed with UW. The donor liver was placed in fresh ice-cold preservation solution. We identified the inferior vena cava. Two stay sutures were placed on the supra-hepatic portion of the cava. Two stay sutures were placed on the infra-hepatic portion of the cava. The fibro-fatty tissue and adrenal gland was cleared of inferior vena cava. The phrenic vein was ligated. The adrenal vein was ligated. The IVC was tested for leaks by using a bulb syringe. We suture ligated all identified leaks. The portal vein was identified. All the fibro-fatty area or tissue around the portal vein was removed and the portal vein was dissected up to its bifurcation. An 8-Haitian cannula was placed in the portal vein and fixed with a stitch. The portal vein was tested for leaks. We suture ligated all identified leaks. The cannula was left in place to be used for flushing the liver at the time of implantation. The hepatic artery anatomy was identified. The celiac axis  was traced all the way from the aortic patch to the level of the gastro-duodenal artery. Dissection was stopped at the level of the gastro-duodenal artery. All the leaks in the hepatic artery tributaries were suture ligated. The bile duct was inspected and flushed. No reconstruction was required. The liver was placed back in ice-cold preservation solution until ready for transplantation. Faculty was present for the critical portions of the procedure.    Findings:      Operative Procedure:   Arterial anastomosis start:  10/17/2021  5:18  PM    Recipient arterial unclamp:  10/17/2021  6:33 PM    Extra vessels used:  No    Extra vessels banked:  Yes    Previous upper abd surgery:  No    Previous cholecystectomy?  No    Portal vein:  Thrombus: No       On portal bypass:  Yes-      Arterial flow:  Sufficient: Yes-the donor celiac axis was anastomosed to the recipient hepatic artery at the bifurcation.  After we did the anastomosis about 30 minutes later, the hepatic artery appeared soft.  We opened the splenic stump and passed a 2 Estonian Briana and ballooned it twice.  After this the flow greatly improved and the flows measured by mediastinum were about 250 mL/min.    Bile duct anastomosis:    To duct: Yes-the donor bile duct was about 8 mm the recipient bile duct was about 10 mm.  We performed an end-to-end anastomosis with interrupted sutures no stent was placed.        Sarah Fisher was brought to the operating room, placed in a supine position, and a time out was performed. Sequential compression devices were placed on both lower extremities and general endotracheal anesthesia was induced. The patient was given IV antibiotics, and Solumedrol. A Aguiar catheter was placed. A central line was placed by Anesthesia service. The abdomen was then shaved, prepped, and draped in the usual sterile fashion.  The backtable preparation occurred prior to implantation.    We entered the abdominal cavity using Vertical Extension (Lavern) incision. The abdomen was examined. Lysis of adhesions took  an additional 30  minutes of operating time. We proceeded to mobilization of the liver first by dividing falciform ligament, next dividing the triangular ligaments and mobilizing the left lobe with further evaluation of the right lobe of the liver. Next the right lobe of the liver was mobilized. We elected to proceed with a hilar dissection. The right and left hepatic arteries were identified, ligated and divided, followed by ligation and division of the common  bile duct. The portal vein was cleared from tissue and small branches were tied off and divided. The left and right portal veins were separately ligated and the portal vein was divided. At this point we went on the bypass with bypass flow of 1 liter per minute. Next, we continued mobilizing the right lobe. The adhesions between the right lobe and the right diaphragm were divided and the lobe was mobilized up to the vena cava. The hepatic veins were identified. Next, we dissected out the caudate lobe and infrahepatic IVC.     We applied Infrahepatic and suprahepatic clamps to the IVC and the liver was excised with curved Garcia scissors. The recipient's liver was passed off from the operating table. Next, complete hemostasis was obtained. The donor liver was brought into the field. The suprahepatic IVC anastomosis was constructed with 3-0 Prolene followed by the construction of infrahepatic anastomosis with 4-0 Prolene. Next, we came off the bypass and end-to-end portal anastomosis was constructed between the donor and recipient portal veins using 6-0 Prolene. During the construction of the infrahepatic IVC anastomosis, the liver was flushed with 5% albumin. Once the portal anastomosis was constructed, the liver was reperfused. Complete hemostasis was obtained and arterial anastomosis was performed between the donor celiac trunk patch using Vasquez technique and the bifurcation of the right and left recipient hepatic arteries. After clamps were released, there was a good flow within the donor artery. Again, all the arterial branches that were bleeding were ligated and complete hemostasis was obtained. After the anastomosis was performed, good flow was re-established, hemostasis was obtained. Next, the biliary anastomosis was performed using a 6-0 running PDS suture over the stent.     Next again the abdomen was irrigated and hemostasis was obtained. We closed the abdomen with #1 PDS.. All needle, sponge and instrument  counts were correct x 2. Faculty was present for key portions of the procedure. The patient was  transferred to the ICU for post-op monitoring.

## 2021-10-18 NOTE — PROGRESS NOTES
Admitted/transferred from: OR  Reason for admission/transfer: post op monitoring following a liver transplant  2 RN skin assessment: completed by Christine GALVAN and Mikhail SARABIA  Result of skin assessment and interventions/actions: Clamshell incision to abdomen, BERKLEY x1, scattered bruising and excoriation around perinium.  Height, weight, drug calc weight: Done  Patient belongings (see Flowsheet)  MDRO education added to care plan Yes  ?

## 2021-10-18 NOTE — ANESTHESIA POSTPROCEDURE EVALUATION
Patient: Sarah Fisher    Procedure: Procedure(s):  TRANSPLANT, LIVER, RECIPIENT,  DONOR       Diagnosis:End stage liver disease (H) [K72.10]  Diagnosis Additional Information: No value filed.    Anesthesia Type:  General    Note:  Disposition: ICU            ICU Sign Out: Anesthesiologist/ICU physician sign out WAS performed   Postop Pain Control: Uneventful            Sign Out: Well controlled pain   PONV: No   Neuro/Psych: Uneventful            Sign Out: PLANNED postop sedation   Airway/Respiratory: Uneventful            Sign Out: AIRWAY IN SITU/Resp. Support               Airway in situ/Resp. Support: ETT; PPV                 Reason: Planned Pre-op   CV/Hemodynamics: Uneventful            Sign Out: Detailed CV status               Blood Pressure: Pressors               Rate/Rhythm: SR               Perfusion:  Adequate perfusion indices   Other NRE: NONE   DID A NON-ROUTINE EVENT OCCUR? No    Event details/Postop Comments:  Coagulopathy intentionally uncorrected due to thrombophyllic state with clot in portal vein and around PA catheter. Minimal drain output and stable hemoglobin.            Last vitals:  Vitals Value Taken Time   /77 10/17/21 2120   Temp     Pulse 88 10/17/21 2142   Resp     SpO2 98 % 10/17/21 2142   Vitals shown include unvalidated device data.    Electronically Signed By: Kel Tobar MD  2021  9:43 PM

## 2021-10-18 NOTE — PROGRESS NOTES
10/18/21 1500   Quick Adds   Type of Visit Initial Occupational Therapy Evaluation   Living Environment   People in home child(michelle), adult;child(michelle), dependent   Current Living Arrangements house   Home Accessibility stairs to enter home   Number of Stairs, Main Entrance 3   Stair Railings, Main Entrance railings on both sides of stairs   Transportation Anticipated family or friend will provide   Living Environment Comments Pt lives in a single story home w/ her adult daughter and 13yo son. Laundry is on the main level and pt has both a tub and walk-in shower.    Self-Care   Usual Activity Tolerance good   Current Activity Tolerance moderate   Regular Exercise No   Equipment Currently Used at Home none   Activity/Exercise/Self-Care Comment Pt reports IND with I/ADLs and mobility. Reports using FWW previously d/t balance deficits but pt states she has not used a walker since January 2021 and declines having any falls.   Instrumental Activities of Daily Living (IADL)   Previous Responsibilities meal prep;housekeeping;laundry;shopping;medication management;finances;driving;work   IADL Comments Reports IND w/ daughter able to help take care of dog.    Disability/Function   Hearing Difficulty or Deaf no   Wear Glasses or Blind yes   Concentrating, Remembering or Making Decisions Difficulty no   Difficulty Communicating no   Difficulty Eating/Swallowing no   Walking or Climbing Stairs Difficulty yes   Mobility Management Previously using FWW after rec from OP PT in 2020. Pt has stopped using walker as of Jan 2021 d/t stating her balance has improved (did not follow up w/ PT after stopping use).   Dressing/Bathing Difficulty no   Toileting issues no   Doing Errands Independently Difficulty (such as shopping) no   Fall history within last six months no   Change in Functional Status Since Onset of Current Illness/Injury yes   General Information   Onset of Illness/Injury or Date of Surgery 10/17/21   Referring Physician  Christian Morrison MD   Additional Occupational Profile Info/Pertinent History of Current Problem 51 year old female with PMH of ESLD MELD 18 due to alcoholic cirrhosis c/b ascites, GAVE, hematochromatosis, chronic hypotension, CKD, anemia, coagulopathy of liver disease, and hypothyroidism who presents to SICU after DDLT 10/17/21 for hemodynamic and ventilatory support.   Existing Precautions/Restrictions abdominal;fall   Left Upper Extremity (Weight-bearing Status) partial weight-bearing (PWB)  (10# limit)   Right Upper Extremity (Weight-bearing Status) partial weight-bearing (PWB)  (10# limit)   Cognitive Status Examination   Orientation Status orientation to person, place and time   Affect/Mental Status (Cognitive) WNL   Cognitive Status Comments Cognition appears intact   Visual Perception   Visual Impairment/Limitations WFL;corrective lenses full-time   Sensory   Sensory Quick Adds No deficits were identified   Pain Assessment   Patient Currently in Pain No   Integumentary/Edema   Integumentary/Edema no deficits were identifed   Posture   Posture not impaired   Range of Motion Comprehensive   General Range of Motion bilateral upper extremity ROM WFL   Strength Comprehensive (MMT)   Comment, General Manual Muscle Testing (MMT) Assessment Not formally tested d/t precautions   Bed Mobility   Bed Mobility supine-sit   Supine-Sit Callahan (Bed Mobility) contact guard;verbal cues   Transfers   Transfers bed-chair transfer   Transfer Skill: Bed to Chair/Chair to Bed   Bed-Chair [place]ahan (Transfers) supervision;verbal cues   Assistive Device (Bed-Chair Transfers) standard walker   Activities of Daily Living   BADL Assessment lower body dressing   Lower Body Dressing Assessment   Callahan Level (Lower Body Dressing) moderate assist (50% patient effort)   Position (Lower Body Dressing) edge of bed sitting   Clinical Impression   Criteria for Skilled Therapeutic Interventions Met (OT) yes;meets  criteria;skilled treatment is necessary   OT Problem List-Impairments impacting ADL problems related to;activity tolerance impaired;mobility;strength;post-surgical precautions   Assessment of Occupational Performance 5 or more Performance Deficits   Identified Performance Deficits dressing, bathing, toileting, amb, transfers, home mgmt   Planned Therapy Interventions (OT) ADL retraining;IADL retraining;bed mobility training;strengthening;transfer training;home program guidelines;progressive activity/exercise;risk factor education   Clinical Decision Making Complexity (OT) low complexity   Therapy Frequency (OT) 6x/week   Predicted Duration of Therapy 1 week   Anticipated Equipment Needs Upon Discharge (OT)   (tbd)   Risk & Benefits of therapy have been explained evaluation/treatment results reviewed;care plan/treatment goals reviewed;risks/benefits reviewed;current/potential barriers reviewed;participants voiced agreement with care plan;participants included;patient;daughter   Comment-Clinical Impression Pt would benefit from continued skilled OT to progress I/ADL IND and func mobility   OT Discharge Planning    OT Discharge Recommendation (DC Rec) Home with assist   OT Rationale for DC Rec pt mobilizing well and anticipate will be safe to d/c home with A for heavy IADLs   OT Brief overview of current status  A x 1 w/ FWW   Total Evaluation Time (Minutes)   Total Evaluation Time (Minutes) 5

## 2021-10-18 NOTE — PROGRESS NOTES
Patient admitted from the OR after having a DDLT.     Neuro- PERRL. Opens eyes spontaneously. Nods yes/no and follows commands. Sedated on Propofol and Fentanyl.   CV- Sinus rhythm-Sinus tach with occasional PVCs. BP stable with MAPs now >65 with no pressors. Pulses palpable. Generalized edema. 1 units of FFP and 1 pack of cryo given. CVP 4-8.  Pulm- Minimal vent settings. Lungs clear/dim. Suctioned scant secretions.   GI- Abdomen is distended but soft. Hypoactive bowel sounds. OG to LCWS. On insulin gtt.  - Aguiar patent. Output trending down this morning. SICU notified. Urine remains green tinged from OR.    Will continue to monitor and assess for changes.

## 2021-10-18 NOTE — PROGRESS NOTES
STAFF NOTE:  51F hx etoh cirrhosis c/b ascites, GAVE, hematochromatosis compound heterozygote, and hypothyroidism  To OR for OLT.  Easy intubation  Had some clotting around PA catheter and in field, so got 1000U of heparin intra-operatively, which increased R time by 15min.  Last TEG showed no residual heparin effect.  recevied methylene blue intra-op; Decreasing pressor requirements post-op    Exam typical for post-liver transplant: 2 introducers + [place], A line, some peripherals  Drains serosanguinous, not horribly bloody  Incision fine  Aguiar somewhat concentrated    Labs notable for mild hypokalemic  Creatinine 1.44  Hgb 7.9 at the end of the case  Platelet count ok    Imaging pending.  Nothing interesting pre-op  Post-transplant doppler still pending    This is a 51F hx etoh cirrhosis c/b ascites, GAVE, hematochromatosis compound heterozygote, and hypothyroidism who presents s/p OLT.  She will be on aspirin, but otherwise should follow our typical protocol post-op.    ENCEPHALOPATHY / DELIRIUM:  -due to hepatic encephalopathy; monitoring with clinical exam  -lactulose can stop post-op  -nothing for sleep; propofol + fentnayl gtt for sedation   -using non-pharmacologic methods as much as possilble    ACUTE ON CHRONIC LIVER FAILURE S/P TRANSPLANT:  -EtOH cirrhosis as underlying cause of liver failure  -induction immunosuppression with methylprednisolone/prednisone taper through POD #5.  Will eventually continue prednisone at 5 mg PO daily until tacrolimus level is therapeutic  -maintenance immunosuppression to include mycophenolate and tacrolimus with anticipated goal trough levels of 8-12 (to be determined by surgical service) mcg/L for 0-3 months post-transplant.    -Surgical prophylaxis includes: piperacillin-tazobactam IV for 48 hours and micafungin IV for 14 days. Needs micafungin given renal failure pre-op and desire to avoid tacro/diflucan interactions.  -Opportunistic pathogen prophylaxis includes:  trimethoprim/sulfamethoxazole, valganciclovir, and topical antifungal coverage to begin once systemic antifungal therapy is complete.  -oral and IV narcotics available  -ASA for pathologic clotting per surgical service    ACUTE HYPOXIC RESPIRATORY FAILURE  -with PaO2 < 120 on >40% FiO2, P/F ratio is <300.  Monitoring with continuous pulse oximetry and intermittent ABGs.  -secondary to fluid overload  -lung protective ventilator settings with Vt <8ml/kg IBW (in this case, at least <500ml)  -anticipate extugbation in the morning    PANCYTOPENIA / ACUTE BLOOD LOSS ANEMIA ON ANEMIA OF CHRONIC DISEASE:  -monitoring with q4-6h Hgb in the immediate post-op period.    -Will use a transfusion trigger of Hgb <8, INR >2, Platelet count <20, Fibrinogen <200 in the acute post-op period    ACUTE KIDNEY INJURY:  -hepatorenal; I anticipate good chance of recovery after transplant  -monitoring with strict I/Os and serial Cr checks    MODERATE MALNUTRITION IN CONTEXT OF CRITICAL ILLNESS:  -will need post-pyloric Dobhoff as per our typical protocol, then Osmolite 1.5 [place] @ goal of  60ml/hr     MISC:  -full code  -family updated by primary team  -SQH not necessary immediately post-op (getting ASA as above); H2 blocker for steroids  -lines: [place] can come out tomorrow  -daniels to remain while with CORBIN  -anticipate discharge to acute rehab in >2 weeks    Billing statement: 37min of critical care time; spent in an initial review of imaging, labs, physical exam, and discussion of the patient with my own team and the extended care team including the primary service.   Based on this patient's presentation / recent intervention and my bedside assessment, I felt there was or is a reasonably high probability of imminent or life-threatening deterioration today or tonight for graft-related reasons.   My overall critical care time, as described in detail above, includes such things as coordination of care, arrhythmia and hemodynamics management with  infusions of medicines, respiratory management, fluid therapy including fluid boluses, and pain and sedation therapy. This time excludes time I spent personally performing or supervising procedures for this patient.    KWABENA De León MD  Clinical   Anesthesia / Critical Care  *[phone]

## 2021-10-18 NOTE — ANESTHESIA PROCEDURE NOTES
Perioperative AMBER Procedure Note    Staff -        Anesthesiologist:  Kel Morse MD       Performed By: anesthesiologist  Preanesthesia Checklist:  Patient identified, IV assessed, risks and benefits discussed, monitors and equipment assessed, procedure being performed at surgeon's request and anesthesia consent obtained.    AMBER Probe Insertion    Probe Status PRE Insertion: NO obvious damage  Probe type:  Adult 3D  Bite block used:   Soft  Insertion Technique: Laryngoscopy  Insertion complications: None obvious  Billing Report:A AMBER report is NOT being generated.  Probe Status POST Removal: NO obvious damage    AMBER Report  General Procedure Information  Images for this study have NOT been archived.  (This is not a billable AMBER report.)    Post Intervention Findings  Procedure(s) performed:  Other (see comments). Regional wall motion:. Surgeon(s) notified of all postintervention findings: Yes.       Echocardiogram Comments

## 2021-10-18 NOTE — PROGRESS NOTES
SURGICAL ICU PROGRESS NOTE  10/18/2021    ASSESSMENT: 51 year old female with PMH of ESLD MELD 18 due to alcoholic cirrhosis c/b ascites, GAVE, hematochromatosis, chronic hypotension, CKD, anemia, coagulopathy of liver disease, and hypothyroidism who presents to SICU after DDLT 10/17/21 for hemodynamic and ventilatory support. Intraoperatively the patient had turbid ascites sent for culture. She was also hypercoaguable during the case requiring 5000 Heparin IV, received 5 U pRBCs for blood product.        PLAN:  - Scheduled TID Albumin bolus for lower UOP today  - Extubation this AM after completed PST with SBT / SAT  - Chest tube L as needed for respiratory status post-extubation  - Confirm with Transplant Surgery - OK for evaluation at bedside for possible swallow study  - Per Transplant Surgery, start on Heparin GTT straight rate 400    Neurological:  #Sedation for ventilation  # Acute pain   # Insomnia  - Monitor neurological status. Delirium preventions and precautions. Wean as tolerated.  - Pain: Fentanyl GTT. Wean as tolerated  IV dilaudid and Oxycodone PRN today.                 - Sedation plan: Propofol GTT. Wean as tolerated.  - RASS goal 0 to -1.  - PTA trazodone 50 mg PRN     Pulmonary:   # Post operative ventilatory support  # Acute hypoxic respiratory support   # Large left pleural effusion  - VENT: VC/AC , RR 16, PEEP 5, FiO2 40%. Wean FiO2 / PEEP. Continue full vent support. PST this AM, wean to extubate. Ventilatory bundle. HOB elevation.    - CXR with large left pleural effusion. Per Transplant, will consider CT pigtail as needed     Cardiovascular:    # Chronic hypotension  # Hypovolemic shock  - Pressors: Now weaned  - EKG: Sinus tachycardia  QTc:460, resolving yesterday.  - Monitor hemodynamic status.    - Liver Ultrasound complete 10/18 - with elevated resistive indices involving the extrahepatic hepatic artery and the right hepatic artery, this may be related to edema. Pull [place]  Roman catheter today  - Lactate WNL  - Concern for reduced hepatic flow on  mg daily.  - Ursodiol 300 mg BID per transplant surgery     Gastroenterology/Nutrition:  # ESLD MELD 22 due to Alcoholic cirrhosis c/b ascites s/p DDLT  # GAVE  # GERD  - NPO , OGT to LIS  - B[place]LEY x 1 with serosanguinous output. Monitor for fresh sanguinous output  -Nutrition consult in morning with NJT placement after discussing with Transplant Surgery  -PTA PPI for GI ppx  - Trend LFTs q4h  - Liver ultrasound 10/18 read with elevated RI extrahepatic hepatic artery and right hepatic artery may be related to edema and small volume of fluid along left hepatic lobe      Fluids/Electrolytes:   # Hypovolemia  # Hyponatremia pre-op  - D5 1/2 NS @ 100 ml/hr for IV fluid hydration  - will continue today  - ICU replacement protocol  - BMP, Mg2+, Phos q4h, iCa2+ daily     Renal:  # CKD with pre-op Cr.1.44, GFR 42  - Aguiar in place for Strict I/O   - Renal function improving, Cr 1.34 from 1.15, GFR 76  - Continue to trend BMP q4h  - Urine output is 600cc post-operatively (about 50cc/hr). Will continue to monitor intake and output.     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 Fibrinogen 192.  - Start on straight rate Hep  GTT per Transplant  - Goals INR <2, Hgb >8, Fibrinogen >200, Platelets >50  - Received 5 U pRBCs intraop, received 5 U cryo and 2 FFP on admit to SICU  - Trend coags and CBC q4h.     Musculoskeletal:  # Weakness and deconditioning of critical illness   - Physical and occupational therapy consult       General Cares/Prophylaxis:    - DVT Prophylaxis: Pneumatic Compression Devices  - GI Prophylaxis: PPI  Restraints: Restraints for medical healing needed: Not Applicable     Lines/ tubes/ drains:  - R internal jugular MAC x 2, [place] Roman, PIV x 2m OGT, ETT, Aguiar, BERKLEY x 1  - [place] Roman removal today     Disposition:  - Surgical ICU today, possible transfer tomorrow      Patient seen and discussed with staff surgeon Dr. Coleman and discussed with Transplant Surgery team    ====================================    TODAY'S SUBJECTIVE/INTERVAL HISTORY:   1x albumin bolus this AM for decreasing UOP     OBJECTIVE:     Temp:  [98.1  F (36.7  C)-98.3  F (36.8  C)] 98.2  F (36.8  C)  Pulse:  [] 97  Resp:  [16] 16  BP: (110-113)/(60-67) 113/67  MAP:  [63 mmHg-113 mmHg] 72 mmHg  Arterial Line BP: ()/(49-73) 100/58  FiO2 (%):  [40 %-50 %] 40 %  SpO2:  [97 %-100 %] 99 %  Ventilation Mode: CMV/AC  (Continuous Mandatory Ventilation/ Assist Control)  FiO2 (%): 40 %  Rate Set (breaths/minute): 16 breaths/min  Tidal Volume Set (mL): 450 mL  PEEP (cm H2O): 5 cmH2O  Oxygen Concentration (%): 40 %  Resp: 16      I/O last 3 completed shifts:  In: 24990 [I.V.:8009; Other:537]  Out: 5695 [Urine:1550; Drains:145; Blood:4000]    General/Neuro: Intubated and sedated responsive   Pulm: Mechanically ventilated  Abd: soft; NT, ND  [place] incision healing  Extremities: no edema  Skin: warm and well-perfused.     LABS:   Arterial Blood Gases   Recent Labs   Lab 10/18/21  0406 10/18/21  0112 10/17/21  2132   PH 7.38 7.36 7.40   PCO2 36 34* 37   PO2 159* 148* 119*   HCO3 22 19* 23     Complete Blood Count   Recent Labs   Lab 10/18/21  0406  10/17/21  2352 10/17/21  2128 10/16/21  2213   WBC 15.4* 21.8* 17.2* 7.3   HGB 12.4 14.3 13.2 7.9*   PLT 53* 89* 81* 95*     Basic Metabolic Panel  Recent Labs   Lab 10/18/21  0553 10/18/21  0514 10/18/21  0415 10/18/21  0406 10/18/21  0003 10/17/21  2352 10/17/21  2320 10/17/21  2128 10/17/21  2126 10/16/21  2213   NA  --   --   --  137  --  134  --  136  --  131*   POTASSIUM  --   --   --  4.0  --  4.0  --  3.7  --  4.1   CHLORIDE  --   --   --  105  --  103  --  104  --  100   CO2  --   --   --  21  --  20  --  23  --  24   BUN  --   --   --  29  --  28  --  25  --  38*   CR  --   --   --  1.15*  --  0.96  --  0.88  --  1.44*   * 144* 175* 187*   < > 162*   < > 101*   < > 125*    < > = values in this interval not displayed.     Liver Function Tests  Recent Labs   Lab 10/18/21  0406 10/17/21  2352 10/17/21  2128 10/16/21  2213   * 1,435* 1,485* 39   * 850* 761* 26   ALKPHOS 178* 244* 222* 206*   BILITOTAL 5.2* 8.9* 8.3* 5.0*   ALBUMIN 1.7* 1.6* 1.4* 2.3*   INR 1.51* 1.97* 2.86* 1.78*     Pancreatic Enzymes  Recent Labs   Lab 10/17/21  2352 10/16/21  2213   LIPASE 191  --    AMYLASE 52 70     Coagulation Profile  Recent Labs   Lab 10/18/21  0406 10/17/21  2352 10/17/21  2128 10/16/21  2213   INR 1.51* 1.97* 2.86* 1.78*   PTT 37 41* 50* 38         IMAGING:   Recent Results (from the past 24 hour(s))   XR Abdomen Port 1 View    Narrative    Exam: XR ABDOMEN PORT 1 VIEWS, 10/17/2021 8:43 PM    Indication: intra-operative check for 2 missing needles during Liver  Transplant; please call *[phone] with results    Comparison: None    Findings:   2 radiograph of the abdomen and single radiograph of a surgical  needle. Open abdomen with presence of right abdominal drain. Multiple  surgical clips project over the right upper abdomen. Partial  visualization of [place]-Roman catheter with tip projecting over the  proximal right main pulmonary artery. Enteric tube side-port in tip  projects over the stomach.  Nonobstructive bowel gas pattern. No acute  osseous abnormality. There is no visualization of surgical needle  within the radiographs provided.      Impression    Impression: No unexpected retained surgical objects.    I have personally reviewed the examination and initial interpretation  and I agree with the findings.    BRENDAN WARD MD         SYSTEM ID:  [id number]   XR Chest Port 1 View    Narrative    EXAMINATION: XR CHEST PORT 1 VIEW, 10/17/2021 9:46 PM    INDICATION: post liver transplant    COMPARISON: Chest x-ray 10/16/2021    FINDINGS: Single portable AP radiograph of chest. ET tube is over the  mid thoracic trachea. Right IJ [place]-Roman catheter with tip projecting  over the proximal right main pulmonary artery. Enteric tube courses  below left hemidiaphragm, side-port projects over the region of the  stomach. Multiple surgical clips overlie the mid abdomen. Partial  visualization of a right abdominal drain.    Trachea is midline. The cardiomediastinal silhouette is within normal  limits. Large left pleural effusion, increased compared to prior. The  left lung is relatively opacified, this is favored to be related to  layering of pleural effusion. No right pleural effusion. No  pneumothorax. No consolidative opacities within the right lung.      Impression    IMPRESSION:  1. Increased large left pleural effusion. (Note prior chest x-ray was  upright and this one is supine hence more layering of the pleural  effusion).  2. Support devices are in appropriate positioning as described above  within the body of the report.    I have personally reviewed the examination and initial interpretation  and I agree with the findings.    BRENDAN WARD MD         SYSTEM ID:  [id number]   XR Abdomen Port 1 View    Narrative    Exam: XR ABDOMEN PORT 1 VIEWS, 10/17/2021 9:46 PM    Indication: ogt placement post-op    Comparison: 10/17/2021 at 8:27 PM    Findings:   Single portable AP radiograph of the abdomen.  Enteric tube side-port  and tip projects over the region of the stomach. [place]-Roman catheter  tip projects over the proximal right main pulmonary artery.  Postsurgical changes of liver transplant. Right lower abdominal drain.    Nonobstructive bowel gas pattern. Partial visualization of large left  pleural effusion. No acute osseous abnormality. Soft tissue is within  normal limits.      Impression    Impression:   1. Postsurgical changes of liver transplant.  2. Enteric tube side-port and tip projects over the stomach.  3. Partial visualization of large left pleural effusion.    I have personally reviewed the examination and initial interpretation  and I agree with the findings.    BRENDAN WARD MD         SYSTEM ID:  [id number]    Liver Transplant Portable    Impression    RESIDENT PRELIMINARY INTERPRETATION  Impression:   1.  Elevated resistive indices involving the extrahepatic hepatic  artery and the right hepatic artery, this may be related to edema.  2.  Small volume fluid along the left hepatic lobe and positioned  between the right kidney and the liver.  3.  Bilateral pleural effusions, left greater than right.

## 2021-10-18 NOTE — ANESTHESIA CARE TRANSFER NOTE
Patient: Sarah Fisher    Procedure: Procedure(s):  TRANSPLANT, LIVER, RECIPIENT,  DONOR       Diagnosis: End stage liver disease (H) [K72.10]  Diagnosis Additional Information: No value filed.    Anesthesia Type:   General     Note:    Oropharynx: endotracheal tube in place and ventilatory support  Level of Consciousness: iatrogenic sedation    Level of Supplemental Oxygen (L/min / FiO2): 10  Independent Airway: airway patency not satisfactory and stable  Dentition: dentition unchanged  Vital Signs Stable: post-procedure vital signs reviewed and stable  Report to RN Given: handoff report given  Patient transferred to: ICU  Comments: Report to RN   gtts lines intact   ICU Handoff: Call for PAUSE to initiate/utilize ICU HANDOFF, Identified Patient, Identified Responsible Provider, Reviewed the Pertinent Medical History, Discussed Surgical Course, Reviewed Intra-OP Anesthesia Management and Issues during Anesthesia, Set Expectations for Post Procedure Period and Allowed Opportunity for Questions and Acknowledgement of Understanding      Vitals:  Vitals Value Taken Time   BP     Temp     Pulse     Resp     SpO2         Electronically Signed By: ROBYN Lew CRNA  2021  9:15 PM

## 2021-10-18 NOTE — PROGRESS NOTES
CLINICAL NUTRITION SERVICES - BRIEF NOTE    Consult: Dietitian to Assess and Order TF per Medical Nutrition Therapy.     Per rounds, no plan for ppFT placement today. Plan to extubate and advance diet as tolerated/appropriate.     INTERVENTIONS  Recommendations / Nutrition Prescription  Monitor PO intakes post extubation and ability to order calorie counts and supplements as appropriate.     Implementation  RD to acknowledge consult       Rachell Pavon RD, MS, LD  SICU: [id number]

## 2021-10-18 NOTE — TELEPHONE ENCOUNTER
Organ Offer Encounter Information    Organ Offer Information  Organ offer date & time: 10/16/2021  3:52 PM  Coordinator/Fellow/Attending name: Meka Cardoza, RN   Organ(s):  Organ UNOS ID Match Run ID Comment Organ Laterality   Liver [id number]  [id number] MNOP       Recent infections?: No    New medications?: No Recent pregnancy?: No   Angicoagulation medications?: No Recent vaccinations?: Yes (Comment: Hep B 2nd dose, covid booster 10/12)   Recent blood transfusions?: Yes Recent hospitalizations?: No   Has your insurance changed in the last 6-12 months?: Neg    Discussed organ offer with: Patient  Patient/Caregiver name: Maria Elena  Discussed risk category with Patient/Other: N/A  Patient/Other asked to speak to a surgeon?: No  Discussed program-specific outcomes: Did not have questions regarding SRTR  Right to decline organ offer without penalty, Patient/Other: Aware of option to decline without penalty  Organ offer decision status Patient/Other: Accepted Offer  Organ disposition: Transplanted  Additional Comments: 10/16/2021 4:01 PM  Liver: Local  MD: Dr. Morrison  OPO Contact: Bibi [phone]  Donor/Recip HCV Status: Neg/Neg  (HCV+ Donors - Discuss HCV genotyping/quant testing with MD & send message to [facility] HCV POOL - Include Donor UNOS ID)  Donor Nutritional Status: MIV 35cc/hour with dextrose   Plan (NPO, Donor OR): Donor OR 0200 10/17/2021. NPO on admission. Will admit when a bed is available   - - -   COVID Screening  In the past month, have you had:  Any close contact with a suspect or laboratory-confirmed COVID-19 patient: no  Travel anywhere: no  COVID Symptoms (Fever, Cough, Short of Breath, Loss of Taste/Smell, Rash): no    Admissions: [place] [facility]  Unit: 7A  Update Provider Entering Orders (XM Plan & COVID Testing): DEDRA STEPHEN [ Msg Id [id number] ] paged @ 8683  Immunology: Radha @ 8260  Inpatient Lab (COVID Testing [phone], Option 2): Elyssa @ 4922  Book OR: Tristan @ 2401  Vessel Storage  Confirmation (PA/EULALIA/GAGANDEEP): yes  Blood Bank: [facility] @ 1932  Research: ON HOLD  TransNet/ABO Verification: Printed @ 1440  Add Organ: Added at 1700    Donor OR Time: 0200 10/17  Procuring MD: Dr. Rory Corley   Contact in the OR: Luke [phone]  Organs Being Procured: Liver/Kidneys?  Flush Solution: Taveras IGL [place]  Biopsy: Have pathology on stand by in case biopsy is needed on visual; Bibi aware and will put path on standby  Pump: na  Special Requests (Special blood tubes, nodes, waivers): anastacio THURSTON for Visualization: Dr. Morrison  Transportation Details: Pickup Rory Corley and Karina Steinerrone at ER entrance at 0130; Wait and return    10/16/2021 6:01 PM:  Called Maria Elena to let her know that 7A would have a bed for her at 1900. Her ETA is between 3400-3818; Rosa on 7A aware.   Meka Cardoza  Transplant Coordinator    10/16/2021 8:40 PM:  Recip OR changed to 0300 with  for surgeons at 0230. Recip OR moved to 0700  Meka Cardoza  Transplant Coordinator    10/17/2021 3:27 AM:  Donor OR moved to 0900 will re-book recipient OR.  Meka Cardoza  Transplant Coordinator    10/17/2021 7:28 AM:  Talked to Tristan in the OR @ 0730 to change OR to 1200.   Meka Cardoza  Transplant Coordinator    Attestation I have discussed all of the above with the Patient/Legal Guardian/Caregiver regarding this organ offer.: Yes  Coordinator/Fellow/Attending name: Meka Cardoza RN

## 2021-10-19 ENCOUNTER — APPOINTMENT (OUTPATIENT)
Dept: OCCUPATIONAL THERAPY | Facility: CLINIC | Age: 51
End: 2021-10-19
Attending: TRANSPLANT SURGERY
Payer: COMMERCIAL

## 2021-10-19 ENCOUNTER — APPOINTMENT (OUTPATIENT)
Dept: ULTRASOUND IMAGING | Facility: CLINIC | Age: 51
End: 2021-10-19
Attending: NURSE PRACTITIONER
Payer: COMMERCIAL

## 2021-10-19 ENCOUNTER — DOCUMENTATION ONLY (OUTPATIENT)
Dept: TRANSPLANT | Facility: CLINIC | Age: 51
End: 2021-10-19

## 2021-10-19 ENCOUNTER — APPOINTMENT (OUTPATIENT)
Dept: GENERAL RADIOLOGY | Facility: CLINIC | Age: 51
End: 2021-10-19
Attending: TRANSPLANT SURGERY
Payer: COMMERCIAL

## 2021-10-19 LAB
ABO/RH(D): NORMAL
ALBUMIN SERPL-MCNC: 2.6 G/DL (ref 3.4–5)
ALBUMIN UR-MCNC: 20 MG/DL
ALP SERPL-CCNC: 120 U/L (ref 40–150)
ALT SERPL W P-5'-P-CCNC: 344 U/L (ref 0–50)
AMORPH CRY #/AREA URNS HPF: ABNORMAL /HPF
ANION GAP SERPL CALCULATED.3IONS-SCNC: 13 MMOL/L (ref 3–14)
ANTIBODY SCREEN: NEGATIVE
APPEARANCE UR: ABNORMAL
AST SERPL W P-5'-P-CCNC: 260 U/L (ref 0–45)
BACTERIA SPEC CULT: NORMAL
BASE EXCESS BLDA CALC-SCNC: -0.1 MMOL/L (ref -9.6–2)
BASE EXCESS BLDA CALC-SCNC: -1.9 MMOL/L (ref -9.6–2)
BASE EXCESS BLDA CALC-SCNC: -2.8 MMOL/L (ref -9.6–2)
BASE EXCESS BLDA CALC-SCNC: -3.1 MMOL/L (ref -9.6–2)
BASE EXCESS BLDA CALC-SCNC: 0.3 MMOL/L (ref -9.6–2)
BILIRUB DIRECT SERPL-MCNC: 2.8 MG/DL (ref 0–0.2)
BILIRUB SERPL-MCNC: 3.5 MG/DL (ref 0.2–1.3)
BILIRUB UR QL STRIP: NEGATIVE
BUN SERPL-MCNC: 41 MG/DL (ref 7–30)
CA-I BLD-MCNC: 4.6 MG/DL (ref 4.4–5.2)
CA-I BLD-MCNC: 4.9 MG/DL (ref 4.4–5.2)
CA-I BLD-MCNC: 4.9 MG/DL (ref 4.4–5.2)
CA-I BLD-MCNC: 5.2 MG/DL (ref 4.4–5.2)
CA-I BLD-MCNC: 5.4 MG/DL (ref 4.4–5.2)
CA-I BLD-MCNC: 5.7 MG/DL (ref 4.4–5.2)
CALCIUM SERPL-MCNC: 8.2 MG/DL (ref 8.5–10.1)
CALCIUM UR-MCNC: 6.3 MG/DL
CALCIUM/CREAT UR: 0.18 G/G CR
CHLORIDE BLD-SCNC: 98 MMOL/L (ref 94–109)
CHLORIDE UR-SCNC: 27 MMOL/L
CO2 SERPL-SCNC: 19 MMOL/L (ref 20–32)
COLOR UR AUTO: YELLOW
CREAT SERPL-MCNC: 1.84 MG/DL (ref 0.52–1.04)
CREAT UR-MCNC: 36 MG/DL
ERYTHROCYTE [DISTWIDTH] IN BLOOD BY AUTOMATED COUNT: 17.5 % (ref 10–15)
GFR SERPL CREATININE-BSD FRML MDRD: 31 ML/MIN/1.73M2
GLUCOSE BLD-MCNC: 116 MG/DL (ref 70–99)
GLUCOSE BLD-MCNC: 117 MG/DL (ref 70–99)
GLUCOSE BLD-MCNC: 126 MG/DL (ref 70–99)
GLUCOSE BLD-MCNC: 170 MG/DL (ref 70–99)
GLUCOSE BLD-MCNC: 84 MG/DL (ref 70–99)
GLUCOSE BLD-MCNC: 97 MG/DL (ref 70–99)
GLUCOSE BLDC GLUCOMTR-MCNC: 100 MG/DL (ref 70–99)
GLUCOSE BLDC GLUCOMTR-MCNC: 106 MG/DL (ref 70–99)
GLUCOSE BLDC GLUCOMTR-MCNC: 118 MG/DL (ref 70–99)
GLUCOSE BLDC GLUCOMTR-MCNC: 119 MG/DL (ref 70–99)
GLUCOSE BLDC GLUCOMTR-MCNC: 127 MG/DL (ref 70–99)
GLUCOSE BLDC GLUCOMTR-MCNC: 143 MG/DL (ref 70–99)
GLUCOSE BLDC GLUCOMTR-MCNC: 145 MG/DL (ref 70–99)
GLUCOSE BLDC GLUCOMTR-MCNC: 146 MG/DL (ref 70–99)
GLUCOSE BLDC GLUCOMTR-MCNC: 151 MG/DL (ref 70–99)
GLUCOSE BLDC GLUCOMTR-MCNC: 161 MG/DL (ref 70–99)
GLUCOSE BLDC GLUCOMTR-MCNC: 89 MG/DL (ref 70–99)
GLUCOSE BLDC GLUCOMTR-MCNC: 95 MG/DL (ref 70–99)
GLUCOSE BLDC GLUCOMTR-MCNC: 97 MG/DL (ref 70–99)
GLUCOSE UR STRIP-MCNC: NEGATIVE MG/DL
HBA1C MFR BLD: 5.1 % (ref 0–5.6)
HCO3 BLDA-SCNC: 20 MMOL/L (ref 21–28)
HCO3 BLDA-SCNC: 22 MMOL/L (ref 21–28)
HCO3 BLDA-SCNC: 22 MMOL/L (ref 21–28)
HCO3 BLDA-SCNC: 23 MMOL/L (ref 21–28)
HCO3 BLDA-SCNC: 25 MMOL/L (ref 21–28)
HCT VFR BLD AUTO: 30.1 % (ref 35–47)
HCV AB SERPL QL IA: NONREACTIVE
HGB BLD-MCNC: 10.5 G/DL (ref 11.7–15.7)
HGB BLD-MCNC: 11.3 G/DL (ref 11.7–15.7)
HGB BLD-MCNC: 12.8 G/DL (ref 11.7–15.7)
HGB BLD-MCNC: 7.8 G/DL (ref 11.7–15.7)
HGB BLD-MCNC: 9.1 G/DL (ref 11.7–15.7)
HGB BLD-MCNC: 9.3 G/DL (ref 11.7–15.7)
HGB UR QL STRIP: ABNORMAL
HYALINE CASTS: 6 /LPF
KETONES UR STRIP-MCNC: NEGATIVE MG/DL
LACTATE BLD-SCNC: 0.7 MMOL/L
LACTATE BLD-SCNC: 0.9 MMOL/L
LACTATE BLD-SCNC: 1.5 MMOL/L
LACTATE BLD-SCNC: 1.7 MMOL/L
LACTATE BLD-SCNC: 2.6 MMOL/L
LACTATE SERPL-SCNC: 0.8 MMOL/L (ref 0.7–2)
LACTATE SERPL-SCNC: 1.2 MMOL/L (ref 0.7–2)
LEUKOCYTE ESTERASE UR QL STRIP: NEGATIVE
MAGNESIUM SERPL-MCNC: 2.3 MG/DL (ref 1.6–2.3)
MCH RBC QN AUTO: 32 PG (ref 26.5–33)
MCHC RBC AUTO-ENTMCNC: 34.9 G/DL (ref 31.5–36.5)
MCV RBC AUTO: 92 FL (ref 78–100)
MUCOUS THREADS #/AREA URNS LPF: PRESENT /LPF
NITRATE UR QL: NEGATIVE
OXYHGB MFR BLDA: 97 % (ref 92–100)
OXYHGB MFR BLDA: 98 % (ref 92–100)
OXYHGB MFR BLDA: 98 % (ref 92–100)
OXYHGB MFR BLDA: 99 % (ref 92–100)
OXYHGB MFR BLDA: 99 % (ref 92–100)
PCO2 BLDA: 27 MM HG (ref 35–45)
PCO2 BLDA: 28 MM HG (ref 35–45)
PCO2 BLDA: 37 MM HG (ref 35–45)
PCO2 BLDA: 37 MM HG (ref 35–45)
PCO2 BLDA: 39 MM HG (ref 35–45)
PH BLDA: 7.37 [PH] (ref 7.35–7.45)
PH BLDA: 7.4 [PH] (ref 7.35–7.45)
PH BLDA: 7.43 [PH] (ref 7.35–7.45)
PH BLDA: 7.46 [PH] (ref 7.35–7.45)
PH BLDA: 7.52 [PH] (ref 7.35–7.45)
PH UR STRIP: 5 [PH] (ref 5–7)
PHOSPHATE SERPL-MCNC: 6.7 MG/DL (ref 2.5–4.5)
PLATELET # BLD AUTO: 56 10E3/UL (ref 150–450)
PO2 BLDA: 160 MM HG (ref 80–105)
PO2 BLDA: 180 MM HG (ref 80–105)
PO2 BLDA: 180 MM HG (ref 80–105)
PO2 BLDA: 430 MM HG (ref 80–105)
PO2 BLDA: 503 MM HG (ref 80–105)
POTASSIUM BLD-SCNC: 3.4 MMOL/L (ref 3.5–5)
POTASSIUM BLD-SCNC: 3.5 MMOL/L (ref 3.5–5)
POTASSIUM BLD-SCNC: 3.5 MMOL/L (ref 3.5–5)
POTASSIUM BLD-SCNC: 3.6 MMOL/L (ref 3.5–5)
POTASSIUM BLD-SCNC: 3.8 MMOL/L (ref 3.4–5.3)
POTASSIUM BLD-SCNC: 3.8 MMOL/L (ref 3.5–5)
POTASSIUM UR-SCNC: 55 MMOL/L
PROT SERPL-MCNC: 4.9 G/DL (ref 6.8–8.8)
RBC # BLD AUTO: 3.28 10E6/UL (ref 3.8–5.2)
RBC URINE: 120 /HPF
SODIUM BLD-SCNC: 130 MMOL/L (ref 133–144)
SODIUM BLD-SCNC: 131 MMOL/L (ref 133–144)
SODIUM BLD-SCNC: 132 MMOL/L (ref 133–144)
SODIUM BLD-SCNC: 132 MMOL/L (ref 133–144)
SODIUM BLD-SCNC: 134 MMOL/L (ref 133–144)
SODIUM SERPL-SCNC: 130 MMOL/L (ref 133–144)
SODIUM UR-SCNC: 17 MMOL/L
SP GR UR STRIP: 1.02 (ref 1–1.03)
SPECIMEN EXPIRATION DATE: NORMAL
SQUAMOUS EPITHELIAL: 10 /HPF
TACROLIMUS BLD-MCNC: 14.1 UG/L (ref 5–15)
TME LAST DOSE: NORMAL H
TME LAST DOSE: NORMAL H
TRANSITIONAL EPI: <1 /HPF
UFH PPP CHRO-ACNC: <0.1 IU/ML
UROBILINOGEN UR STRIP-MCNC: NORMAL MG/DL
WBC # BLD AUTO: 23.4 10E3/UL (ref 4–11)
WBC URINE: 4 /HPF

## 2021-10-19 PROCEDURE — 99254 IP/OBS CNSLTJ NEW/EST MOD 60: CPT | Performed by: INTERNAL MEDICINE

## 2021-10-19 PROCEDURE — 83735 ASSAY OF MAGNESIUM: CPT | Performed by: TRANSPLANT SURGERY

## 2021-10-19 PROCEDURE — 02H633Z INSERTION OF INFUSION DEVICE INTO RIGHT ATRIUM, PERCUTANEOUS APPROACH: ICD-10-PCS | Performed by: SURGERY

## 2021-10-19 PROCEDURE — 250N000011 HC RX IP 250 OP 636: Performed by: STUDENT IN AN ORGANIZED HEALTH CARE EDUCATION/TRAINING PROGRAM

## 2021-10-19 PROCEDURE — 999N000065 XR CHEST PORT 1 VIEW

## 2021-10-19 PROCEDURE — 99207 PR SATISFY VISIT NUMBER: CPT | Performed by: TRANSPLANT SURGERY

## 2021-10-19 PROCEDURE — 82436 ASSAY OF URINE CHLORIDE: CPT | Performed by: NURSE PRACTITIONER

## 2021-10-19 PROCEDURE — 80053 COMPREHEN METABOLIC PANEL: CPT | Performed by: NURSE PRACTITIONER

## 2021-10-19 PROCEDURE — 99233 SBSQ HOSP IP/OBS HIGH 50: CPT | Mod: 24 | Performed by: PHYSICIAN ASSISTANT

## 2021-10-19 PROCEDURE — 80197 ASSAY OF TACROLIMUS: CPT | Performed by: TRANSPLANT SURGERY

## 2021-10-19 PROCEDURE — 74018 RADEX ABDOMEN 1 VIEW: CPT | Mod: 26 | Performed by: RADIOLOGY

## 2021-10-19 PROCEDURE — 84300 ASSAY OF URINE SODIUM: CPT | Performed by: NURSE PRACTITIONER

## 2021-10-19 PROCEDURE — 82330 ASSAY OF CALCIUM: CPT | Performed by: STUDENT IN AN ORGANIZED HEALTH CARE EDUCATION/TRAINING PROGRAM

## 2021-10-19 PROCEDURE — 250N000011 HC RX IP 250 OP 636: Performed by: TRANSPLANT SURGERY

## 2021-10-19 PROCEDURE — 81001 URINALYSIS AUTO W/SCOPE: CPT | Performed by: PHYSICIAN ASSISTANT

## 2021-10-19 PROCEDURE — 250N000013 HC RX MED GY IP 250 OP 250 PS 637: Performed by: TRANSPLANT SURGERY

## 2021-10-19 PROCEDURE — 250N000013 HC RX MED GY IP 250 OP 250 PS 637: Performed by: STUDENT IN AN ORGANIZED HEALTH CARE EDUCATION/TRAINING PROGRAM

## 2021-10-19 PROCEDURE — 83036 HEMOGLOBIN GLYCOSYLATED A1C: CPT | Performed by: STUDENT IN AN ORGANIZED HEALTH CARE EDUCATION/TRAINING PROGRAM

## 2021-10-19 PROCEDURE — 250N000009 HC RX 250: Performed by: STUDENT IN AN ORGANIZED HEALTH CARE EDUCATION/TRAINING PROGRAM

## 2021-10-19 PROCEDURE — 250N000013 HC RX MED GY IP 250 OP 250 PS 637: Performed by: PHYSICIAN ASSISTANT

## 2021-10-19 PROCEDURE — 86900 BLOOD TYPING SEROLOGIC ABO: CPT | Performed by: TRANSPLANT SURGERY

## 2021-10-19 PROCEDURE — 85520 HEPARIN ASSAY: CPT | Performed by: NURSE PRACTITIONER

## 2021-10-19 PROCEDURE — C9113 INJ PANTOPRAZOLE SODIUM, VIA: HCPCS | Performed by: STUDENT IN AN ORGANIZED HEALTH CARE EDUCATION/TRAINING PROGRAM

## 2021-10-19 PROCEDURE — 84100 ASSAY OF PHOSPHORUS: CPT | Performed by: TRANSPLANT SURGERY

## 2021-10-19 PROCEDURE — 258N000001 HC RX 258: Performed by: STUDENT IN AN ORGANIZED HEALTH CARE EDUCATION/TRAINING PROGRAM

## 2021-10-19 PROCEDURE — 02PY33Z REMOVAL OF INFUSION DEVICE FROM GREAT VESSEL, PERCUTANEOUS APPROACH: ICD-10-PCS | Performed by: SURGERY

## 2021-10-19 PROCEDURE — 250N000012 HC RX MED GY IP 250 OP 636 PS 637: Performed by: STUDENT IN AN ORGANIZED HEALTH CARE EDUCATION/TRAINING PROGRAM

## 2021-10-19 PROCEDURE — 93975 VASCULAR STUDY: CPT | Mod: 26 | Performed by: RADIOLOGY

## 2021-10-19 PROCEDURE — 97535 SELF CARE MNGMENT TRAINING: CPT | Mod: GO | Performed by: OCCUPATIONAL THERAPIST

## 2021-10-19 PROCEDURE — 83605 ASSAY OF LACTIC ACID: CPT

## 2021-10-19 PROCEDURE — 250N000013 HC RX MED GY IP 250 OP 250 PS 637: Performed by: NURSE PRACTITIONER

## 2021-10-19 PROCEDURE — 36592 COLLECT BLOOD FROM PICC: CPT | Performed by: NURSE PRACTITIONER

## 2021-10-19 PROCEDURE — P9041 ALBUMIN (HUMAN),5%, 50ML: HCPCS | Performed by: STUDENT IN AN ORGANIZED HEALTH CARE EDUCATION/TRAINING PROGRAM

## 2021-10-19 PROCEDURE — 84133 ASSAY OF URINE POTASSIUM: CPT | Performed by: NURSE PRACTITIONER

## 2021-10-19 PROCEDURE — 82248 BILIRUBIN DIRECT: CPT | Performed by: NURSE PRACTITIONER

## 2021-10-19 PROCEDURE — 85027 COMPLETE CBC AUTOMATED: CPT | Performed by: STUDENT IN AN ORGANIZED HEALTH CARE EDUCATION/TRAINING PROGRAM

## 2021-10-19 PROCEDURE — 120N000011 HC R&B TRANSPLANT UMMC

## 2021-10-19 PROCEDURE — 71045 X-RAY EXAM CHEST 1 VIEW: CPT | Mod: 26 | Performed by: RADIOLOGY

## 2021-10-19 PROCEDURE — P9047 ALBUMIN (HUMAN), 25%, 50ML: HCPCS | Performed by: STUDENT IN AN ORGANIZED HEALTH CARE EDUCATION/TRAINING PROGRAM

## 2021-10-19 PROCEDURE — 74018 RADEX ABDOMEN 1 VIEW: CPT

## 2021-10-19 PROCEDURE — 82340 ASSAY OF CALCIUM IN URINE: CPT | Performed by: NURSE PRACTITIONER

## 2021-10-19 PROCEDURE — 93975 VASCULAR STUDY: CPT

## 2021-10-19 RX ORDER — POTASSIUM CHLORIDE 750 MG/1
10 TABLET, EXTENDED RELEASE ORAL ONCE
Status: DISCONTINUED | OUTPATIENT
Start: 2021-10-19 | End: 2021-10-20

## 2021-10-19 RX ORDER — VALGANCICLOVIR HYDROCHLORIDE 50 MG/ML
450 POWDER, FOR SOLUTION ORAL EVERY OTHER DAY
Status: DISCONTINUED | OUTPATIENT
Start: 2021-10-20 | End: 2021-10-19 | Stop reason: ALTCHOICE

## 2021-10-19 RX ORDER — LIDOCAINE HYDROCHLORIDE 10 MG/ML
1 INJECTION, SOLUTION EPIDURAL; INFILTRATION; INTRACAUDAL; PERINEURAL ONCE
Status: COMPLETED | OUTPATIENT
Start: 2021-10-19 | End: 2021-10-19

## 2021-10-19 RX ORDER — OXYCODONE HYDROCHLORIDE 5 MG/1
5-10 TABLET ORAL EVERY 4 HOURS PRN
Status: DISCONTINUED | OUTPATIENT
Start: 2021-10-19 | End: 2021-10-19

## 2021-10-19 RX ORDER — AMOXICILLIN 250 MG
1 CAPSULE ORAL 2 TIMES DAILY
Status: DISCONTINUED | OUTPATIENT
Start: 2021-10-19 | End: 2021-10-29 | Stop reason: HOSPADM

## 2021-10-19 RX ORDER — ALBUMIN, HUMAN INJ 5% 5 %
12.5 SOLUTION INTRAVENOUS ONCE
Status: COMPLETED | OUTPATIENT
Start: 2021-10-19 | End: 2021-10-19

## 2021-10-19 RX ORDER — TRAMADOL HYDROCHLORIDE 50 MG/1
50-100 TABLET ORAL EVERY 6 HOURS PRN
Status: DISCONTINUED | OUTPATIENT
Start: 2021-10-19 | End: 2021-10-20

## 2021-10-19 RX ORDER — VALGANCICLOVIR 450 MG/1
450 TABLET, FILM COATED ORAL EVERY OTHER DAY
Status: DISCONTINUED | OUTPATIENT
Start: 2021-10-20 | End: 2021-10-21

## 2021-10-19 RX ORDER — ASPIRIN 325 MG
325 TABLET ORAL DAILY
Status: DISCONTINUED | OUTPATIENT
Start: 2021-10-20 | End: 2021-10-25

## 2021-10-19 RX ORDER — ALBUMIN, HUMAN INJ 5% 5 %
12.5-25 SOLUTION INTRAVENOUS EVERY 8 HOURS PRN
Status: DISCONTINUED | OUTPATIENT
Start: 2021-10-19 | End: 2021-10-21

## 2021-10-19 RX ORDER — METHOCARBAMOL 750 MG/1
750 TABLET, FILM COATED ORAL EVERY 6 HOURS PRN
Status: DISCONTINUED | OUTPATIENT
Start: 2021-10-19 | End: 2021-10-20

## 2021-10-19 RX ORDER — NYSTATIN 100000/ML
500000 SUSPENSION, ORAL (FINAL DOSE FORM) ORAL 4 TIMES DAILY
Status: DISCONTINUED | OUTPATIENT
Start: 2021-10-19 | End: 2021-10-29 | Stop reason: HOSPADM

## 2021-10-19 RX ORDER — POTASSIUM CHLORIDE 750 MG/1
10 TABLET, EXTENDED RELEASE ORAL ONCE
Status: COMPLETED | OUTPATIENT
Start: 2021-10-19 | End: 2021-10-19

## 2021-10-19 RX ORDER — DEXTROSE MONOHYDRATE 25 G/50ML
25-50 INJECTION, SOLUTION INTRAVENOUS
Status: DISCONTINUED | OUTPATIENT
Start: 2021-10-19 | End: 2021-10-29 | Stop reason: HOSPADM

## 2021-10-19 RX ORDER — POLYETHYLENE GLYCOL 3350 17 G/17G
17 POWDER, FOR SOLUTION ORAL DAILY
Status: DISCONTINUED | OUTPATIENT
Start: 2021-10-19 | End: 2021-10-29 | Stop reason: HOSPADM

## 2021-10-19 RX ORDER — AMOXICILLIN 250 MG
1 CAPSULE ORAL AT BEDTIME
Status: DISCONTINUED | OUTPATIENT
Start: 2021-10-19 | End: 2021-10-19

## 2021-10-19 RX ORDER — PIPERACILLIN SODIUM, TAZOBACTAM SODIUM 2; .25 G/10ML; G/10ML
2.25 INJECTION, POWDER, LYOPHILIZED, FOR SOLUTION INTRAVENOUS EVERY 6 HOURS
Status: COMPLETED | OUTPATIENT
Start: 2021-10-19 | End: 2021-10-20

## 2021-10-19 RX ORDER — NICOTINE POLACRILEX 4 MG
15-30 LOZENGE BUCCAL
Status: DISCONTINUED | OUTPATIENT
Start: 2021-10-19 | End: 2021-10-29 | Stop reason: HOSPADM

## 2021-10-19 RX ORDER — PANTOPRAZOLE SODIUM 40 MG/1
40 TABLET, DELAYED RELEASE ORAL
Status: DISCONTINUED | OUTPATIENT
Start: 2021-10-20 | End: 2021-10-22

## 2021-10-19 RX ADMIN — PIPERACILLIN SODIUM AND TAZOBACTAM SODIUM 2.25 G: 2; .25 INJECTION, POWDER, LYOPHILIZED, FOR SOLUTION INTRAVENOUS at 16:45

## 2021-10-19 RX ADMIN — MYCOPHENOLATE MOFETIL 750 MG: 250 CAPSULE ORAL at 17:51

## 2021-10-19 RX ADMIN — TRAMADOL HYDROCHLORIDE 100 MG: 50 TABLET ORAL at 11:20

## 2021-10-19 RX ADMIN — DEXTROSE AND SODIUM CHLORIDE: 5; 450 INJECTION, SOLUTION INTRAVENOUS at 03:30

## 2021-10-19 RX ADMIN — HUMAN INSULIN 3 UNITS/HR: 100 INJECTION, SOLUTION SUBCUTANEOUS at 03:31

## 2021-10-19 RX ADMIN — LIDOCAINE HYDROCHLORIDE 1 ML: 10 INJECTION, SOLUTION EPIDURAL; INFILTRATION; INTRACAUDAL; PERINEURAL at 12:37

## 2021-10-19 RX ADMIN — PIPERACILLIN SODIUM AND TAZOBACTAM SODIUM 2.25 G: 2; .25 INJECTION, POWDER, LYOPHILIZED, FOR SOLUTION INTRAVENOUS at 23:47

## 2021-10-19 RX ADMIN — URSODIOL 300 MG: 300 CAPSULE ORAL at 19:40

## 2021-10-19 RX ADMIN — ALBUMIN HUMAN 12.5 G: 0.05 INJECTION, SOLUTION INTRAVENOUS at 06:24

## 2021-10-19 RX ADMIN — ALBUMIN HUMAN 12.5 G: 0.25 SOLUTION INTRAVENOUS at 19:40

## 2021-10-19 RX ADMIN — PIPERACILLIN AND TAZOBACTAM 3.38 G: 3; .375 INJECTION, POWDER, LYOPHILIZED, FOR SOLUTION INTRAVENOUS at 00:46

## 2021-10-19 RX ADMIN — MYCOPHENOLATE MOFETIL 750 MG: 250 CAPSULE ORAL at 08:05

## 2021-10-19 RX ADMIN — BISACODYL 10 MG: 10 SUPPOSITORY RECTAL at 11:19

## 2021-10-19 RX ADMIN — METHYLPREDNISOLONE SODIUM SUCCINATE 100 MG: 125 INJECTION, POWDER, FOR SOLUTION INTRAMUSCULAR; INTRAVENOUS at 08:06

## 2021-10-19 RX ADMIN — ASPIRIN 325 MG ORAL TABLET 325 MG: 325 PILL ORAL at 08:06

## 2021-10-19 RX ADMIN — MAGNESIUM HYDROXIDE 30 ML: 400 SUSPENSION ORAL at 12:25

## 2021-10-19 RX ADMIN — PANTOPRAZOLE SODIUM 40 MG: 40 INJECTION, POWDER, FOR SOLUTION INTRAVENOUS at 08:06

## 2021-10-19 RX ADMIN — POTASSIUM CHLORIDE 10 MEQ: 750 TABLET, EXTENDED RELEASE ORAL at 06:34

## 2021-10-19 RX ADMIN — ALBUMIN HUMAN 12.5 G: 0.25 SOLUTION INTRAVENOUS at 12:25

## 2021-10-19 RX ADMIN — ONDANSETRON 4 MG: 2 INJECTION INTRAMUSCULAR; INTRAVENOUS at 17:51

## 2021-10-19 RX ADMIN — PROCHLORPERAZINE EDISYLATE 5 MG: 5 INJECTION INTRAMUSCULAR; INTRAVENOUS at 19:40

## 2021-10-19 RX ADMIN — POLYETHYLENE GLYCOL 3350 17 G: 17 POWDER, FOR SOLUTION ORAL at 11:49

## 2021-10-19 RX ADMIN — SULFAMETHOXAZOLE AND TRIMETHOPRIM 1 TABLET: 400; 80 TABLET ORAL at 08:05

## 2021-10-19 RX ADMIN — NYSTATIN 500000 UNITS: 500000 SUSPENSION ORAL at 19:40

## 2021-10-19 RX ADMIN — ALBUMIN HUMAN 12.5 G: 0.25 SOLUTION INTRAVENOUS at 03:30

## 2021-10-19 RX ADMIN — URSODIOL 300 MG: 300 CAPSULE ORAL at 08:06

## 2021-10-19 RX ADMIN — METHOCARBAMOL 750 MG: 750 TABLET ORAL at 00:46

## 2021-10-19 RX ADMIN — DOCUSATE SODIUM 50 MG AND SENNOSIDES 8.6 MG 1 TABLET: 8.6; 5 TABLET, FILM COATED ORAL at 19:40

## 2021-10-19 RX ADMIN — LEVOTHYROXINE SODIUM 50 MCG: 0.05 TABLET ORAL at 08:05

## 2021-10-19 RX ADMIN — TACROLIMUS 2 MG: 1 CAPSULE ORAL at 17:51

## 2021-10-19 RX ADMIN — NYSTATIN 500000 UNITS: 500000 SUSPENSION ORAL at 12:25

## 2021-10-19 RX ADMIN — NYSTATIN 500000 UNITS: 500000 SUSPENSION ORAL at 16:57

## 2021-10-19 RX ADMIN — TACROLIMUS 2 MG: 1 CAPSULE ORAL at 08:05

## 2021-10-19 RX ADMIN — DOCUSATE SODIUM 50 MG AND SENNOSIDES 8.6 MG 1 TABLET: 8.6; 5 TABLET, FILM COATED ORAL at 12:25

## 2021-10-19 RX ADMIN — PIPERACILLIN AND TAZOBACTAM 3.38 G: 3; .375 INJECTION, POWDER, LYOPHILIZED, FOR SOLUTION INTRAVENOUS at 06:24

## 2021-10-19 ASSESSMENT — ACTIVITIES OF DAILY LIVING (ADL)
DEPENDENT_IADLS:: CLEANING;COOKING;LAUNDRY;SHOPPING
ADLS_ACUITY_SCORE: 9
ADLS_ACUITY_SCORE: 11
ADLS_ACUITY_SCORE: 9

## 2021-10-19 NOTE — PLAN OF CARE
Major Shift Events: Pt still reporting significant abdominal pain, no relief from dilaudid or robaxin. Abdominal xray completed. Refused oxycodone. Attempted BM via bedside commode x2. Took first walk in hallway, stand by assist with walker. BERKLEY output pink tinged, serous in color, large output. Urine green d/t receiving methylene blue in OR, averaging about 10-15ml/hr, SICU aware.     Plan: Rewire MAC and possibly transfer up to the floor. Monitor and control pain as able. Nephrology consult placed.     For vital signs and complete assessments, please see documentation flowsheets.

## 2021-10-19 NOTE — PHARMACY-TRANSPLANT NOTE
Adult Liver Transplant Post Operative Note    51 year old female s/p  donor liver transplant on 10/17/21 for Alcoholic liver disease.      Planned immunosuppression regimen to include:   INDUCTION with: methylprednisolone/prednisone taper through POD #5.  MAINTENANCE to include mycophenolate and tacrolimus with initial goal trough levels of 8-12 (closer to 10) mcg/L for 0-3 months post-transplant.  Patient may continue prednisone at 5 mg PO daily until tacrolimus level is therapeutic.     Surgical prophylaxis includes: piperacillin-tazobactam IV for 48 hours and fluconazole IV for 24 hours.     Opportunistic pathogen prophylaxis includes: trimethoprim/sulfamethoxazole, valganciclovir, and nystatin (topical antifungal coverage to begin once systemic antifungal therapy is complete).    Patient is not enrolled in medication study.    Pharmacy will monitor for medication interactions and immunosuppression levels in conjunction with the team. Medication therapy needs for discharge planning will continue to be addressed throughout the current admission via multidisciplinary rounds and order review.  Pharmacy will make recommendations as appropriate.

## 2021-10-19 NOTE — PROGRESS NOTES
Transplant Surgery  Inpatient Daily Progress Note  10/19/2021    Assessment & Plan: Sarah Fisher is a 51 year old female with PMH significant for ESLD MELD 18 due to alcoholic cirrhosis c/b ascites, GAVE, hematochromatosis, chronic hypotension, CKD, anemia, coagulopathy of liver disease, and hypothyroidism. She is now s/p DDLTx 10/17/21 with Dr. Morrison.    Graft function:DBD Liver Tx 10/17/21: POD 2  Elevated LFTs, improving. AST//344.  TB 3.5. LA improving 1.2 (1.6). Continue Steve BID.   -HA ppx: hypercoagulable intraop. Postop US with elevated RI involving the extrahepatic hepatic artery and the right hepatic artery. Repeat US POD 1-Patent hepatic vasculature. Continued elevated RI in the hepatic arteries. Repeat US today.  - Heparin 400units/hr +continue ASA 325mg daily.    BERKLEY drain x1 with large volume. Replace with 25% 25gm Albumin TID  Immunosuppression management:    Solumedrol taper per protocol induction  MMF 750mg BID  Tacrolimus 2mg BID, Titrate to 10-12 , Tac level 14.1 today, may be related to fluconazole (complete), repeat in am.  Complexity of management:Medium. Contributing factors: thrombocytopenia CORBIN  Hematology: Anemia of chronic disease/ABL:Received 5 L of PRBC intraop. HGb  10.5 (12).  -HA ppx as above with Heparin 400units/hr  Cardiorespiratory: Hypotension: Stable 110-130  Pleural effusion, Large:  Extubated POD 1 to RA, CDB/IS   GI/Nutrition: CLD ADAT regular  Continue bowel regimen: give MOM, Miralax, Senna BID, Dulcolax suppos PRN  Endocrine:Steroid induced hyperglycemia: -170s.  Continue insulin gtt, wean off as able with steroid taper.   Fluid/Electrolytes: MIVF:D51/2NS @ 100-civ  : Aguiar due to critical illness, remove today   CORBIN: SCr 1-1.2 b/l, elevated to 1.8 today. Decreasing UOP to 10cc/hr. Continue to monitor in setting of large vol Drain ouput. Received albumin challenge POD #1.  Will consult Neph. Send UA/urine lytes  Infectious disease:Afebrile,    Leukocytosis: WBC 23 (15)  Trend daily. Received zosyn x48hrs Periop and diflucan x2 1 dose pre and 1 dose postop   Prophylaxis: DVT, fall, GI   Disposition: SICU, transfer to     Medical Decision Making: High   Subsequent visit 68055 (high level decision making)    BREONNA/Fellow/Resident Provider: Danii Hickman NP     Faculty: Jaylan Lyon M.D.    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    10/17/2021 (Liver), Postoperative day: 2     Interval History: obtained from the patient.    Overnight events: Continued large volume output. UOP downtrending. Reporting not sleep well/ frequent interuptions. Pain controlled. Tolerating CLD. No nausea/vomiting/flatus/ BM. Up with therapy.     ROS:   A 10-point review of systems was negative except as noted above.    Curent Meds:    albumin human  12.5 g Intravenous Q8H     aspirin  325 mg Oral or Feeding Tube Daily     levothyroxine  50 mcg Oral QAM AC     [START ON 10/20/2021] methylPREDNISolone  50 mg Intravenous Once    Followed by     [START ON 10/21/2021] predniSONE  25 mg Oral Once    Followed by     [START ON 10/22/2021] predniSONE  10 mg Oral Once     mycophenolate  750 mg Oral BID IS    Or     mycophenolate  750 mg Oral or NG Tube BID IS     pantoprazole  40 mg Oral QAM AC     piperacillin-tazobactam  2.25 g Intravenous Q6H     potassium chloride  10 mEq Oral Once     sodium chloride (PF)  3 mL Intravenous Q8H     sulfamethoxazole-trimethoprim  1 tablet Oral or Feeding Tube Daily     tacrolimus  2 mg Oral BID IS    Or     tacrolimus  2 mg Oral or NG Tube BID IS     ursodiol  300 mg Oral BID    Or     ursodiol  300 mg Oral or Feeding Tube BID     [START ON 10/20/2021] valGANciclovir  450 mg Oral Every Other Day       Physical Exam:     Admit Weight: 81.2 kg (179 lb)    Current Vitals:   /78   Pulse 115    Temp 97.9  F (36.6  C) (Axillary)   Resp 19   Wt 80.4 kg (177 lb 4 oz)   SpO2 96%   BMI 27.76 kg/m      CVP (mmHg): 9 mmHg    Vital sign ranges:    Temp:  [97.4  F (36.3  C)-98.9  F (37.2  C)] 97.9  F (36.6  C)  Pulse:  [] 115  Resp:  [13-20] 19  BP: (102-139)/(68-91) 112/78  MAP:  [80 mmHg-119 mmHg] 104 mmHg  Arterial Line BP: (109-158)/(44-87) 129/44  SpO2:  [94 %-98 %] 96 %  Patient Vitals for the past 24 hrs:   BP Temp Temp src Pulse Resp SpO2 Weight   10/19/21 0700 112/78 -- -- 115 19 96 % --   10/19/21 0600 102/78 -- -- 112 13 96 % --   10/19/21 0500 116/75 -- -- 112 -- 95 % --   10/19/21 0400 111/68 97.9  F (36.6  C) Axillary 113 -- 94 % 80.4 kg (177 lb 4 oz)   10/19/21 0300 119/79 -- -- 112 -- 95 % --   10/19/21 0200 117/83 -- -- 112 15 96 % --   10/19/21 0100 118/81 -- -- 108 15 97 % --   10/19/21 0000 139/89 98.9  F (37.2  C) Oral 106 15 95 % --   10/18/21 2300 (!) 136/91 -- -- 112 18 97 % --   10/18/21 2200 132/82 -- -- 109 16 95 % --   10/18/21 2100 134/88 -- -- 106 19 94 % --   10/18/21 2000 125/83 97.4  F (36.3  C) Oral 102 -- 97 % --   10/18/21 1800 -- -- -- 106 -- 96 % --   10/18/21 1700 -- -- -- 105 -- 97 % --   10/18/21 1600 -- 97.4  F (36.3  C) Oral 106 18 95 % --   10/18/21 1500 -- -- -- 101 -- 97 % --   10/18/21 1400 -- -- -- 105 20 95 % --   10/18/21 1300 -- -- -- 105 -- 95 % --   10/18/21 1200 -- 98.2  F (36.8  C) Oral 99 18 97 % --   10/18/21 1100 -- -- -- 101 -- 98 % --   10/18/21 1000 -- -- -- 102 16 97 % --   10/18/21 0900 -- -- -- 106 -- 98 % --     General Appearance: in no apparent distress. Fatigued appearing.  Skin: normal, warm, dry, No rashes, induration   Heart: regular rate and rhythm  Lungs: clear anteriorly, NLB on RA  Abdomen: obese, soft, NTTP, surgical dressing in place, replaced since OR with mild serous strikethrough.   :   Aguiar present with small amount of clear yellow urine.   Extremities: edema: present bilaterally. 1+. There is no skin  breakdown.  Neurologic: awake and alert.  Tremor absent.. Asterixis: absent.    Frailty Scores     Frailty Scores 12/27/2020 12/27/2020    Final Score Frail Frail    Final Score Number 3 3          Data:   CMP  Recent Labs   Lab 10/19/21  0817 10/19/21  0639 10/19/21  0540 10/19/21  0338 10/18/21  1739 10/18/21  1734 10/18/21  0827 10/18/21  0808 10/18/21  0415 10/18/21  0406 10/18/21  0003 10/17/21  2352 10/17/21  2126 10/16/21  2213   NA  --   --   --  130*  --  133   < > 134   < > 137   < > 134   < > 131*   POTASSIUM  --   --   --  3.8  --  3.9   < > 4.0   < > 4.0   < > 4.0   < > 4.1   CHLORIDE  --   --   --  98  --  102   < > 105   < > 105   < > 103   < > 100   CO2  --   --   --  19*  --  20   < > 21   < > 21   < > 20   < > 24   * 127*   < > 170*   < > 156*   < > 174*   < > 187*   < > 162*   < > 125*   BUN  --   --   --  41*  --  38*   < > 34*   < > 29   < > 28   < > 38*   CR  --   --   --  1.84*  --  1.56*   < > 1.34*   < > 1.15*   < > 0.96   < > 1.44*   GFRESTIMATED  --   --   --  31*  --  38*   < > 46*   < > 55*   < > 69   < > 42*   EMMY  --   --   --  8.2*  --  8.2*   < > 8.4*   < > 8.7   < > 8.9   < > 8.5   ICAW  --   --   --  4.6  --   --   --   --   --  5.1  --   --   --   --    MAG  --   --   --  2.3  --   --   --  2.3   < > 2.3   < > 2.3   < > 2.0   PHOS  --   --   --  6.7*  --   --   --  5.9*   < > 5.0*   < > 4.3   < > 3.5   AMYLASE  --   --   --   --   --   --   --   --   --   --   --  52  --  70   LIPASE  --   --   --   --   --   --   --   --   --   --   --  191  --   --    ALBUMIN  --   --   --  2.6*  --   --   --  1.5*   < > 1.7*   < > 1.6*   < > 2.3*   BILITOTAL  --   --   --  3.5*  --   --   --  3.5*   < > 5.2*   < > 8.9*   < > 5.0*   ALKPHOS  --   --   --  120  --   --   --  174*   < > 178*   < > 244*   < > 206*   AST  --   --   --  260*  --   --   --  736*   < > 958*   < > 1,435*   < > 39   ALT  --   --   --  344*  --   --   --  609*   < > 634*   < > 850*   < > 26    < > = values in this  interval not displayed.     CBC  Recent Labs   Lab 10/19/21  0339 10/18/21  0809   HGB 10.5* 12.2   WBC 23.4* 14.7*   PLT 56* 51*     COAGS  Recent Labs   Lab 10/18/21  0808 10/18/21  0406   INR 1.54* 1.51*   PTT 41* 37      Urinalysis  Recent Labs   Lab Test 01/04/21  1020   COLOR Bibi   APPEARANCE Slightly Cloudy   URINEGLC Negative   URINEBILI Negative   URINEKETONE Negative   SG 1.019   UBLD Negative   URINEPH 5.0   PROTEIN Negative   NITRITE Negative   LEUKEST Negative   RBCU 1   WBCU 7*     Virology:  Hepatitis C Antibody   Date Value Ref Range Status   05/02/2021 Nonreactive NR^Nonreactive Final     Comment:     Assay performance characteristics have not been established for newborns,   infants, and children

## 2021-10-19 NOTE — PHARMACY-ADMISSION MEDICATION HISTORY
Admission Medication History Completed by Pharmacy    See [facility] Admission Navigator for allergy information, preferred outpatient pharmacy, prior to admission medications and immunization status.     Medication History Sources:   Patient, patient's niece Radha, dispenssamina data     Changes made to PTA medication list (reason):  No changes made at this time.     Additional Information:    Patient reports that she is now taking the iron oral solution once daily (instead of twice daily).    She was using hydrocortisone cream + suppositories as needed for hemorrhoids and has not needed either since August.     She does not take trazodone often as it does not help her sleep     Prior to Admission medications    Medication Sig Last Dose Taking? Auth Provider   ciprofloxacin (CIPRO) 500 MG tablet Take 1 tablet (500 mg) by mouth daily Past Week at Unknown time Yes Fadia Avila MD   Ferrous Sulfate 300 MG/6.8ML SOLN Take 6.8 mLs by mouth 2 times daily Past Week at Unknown time Yes Fadia Avila MD   folic acid (FOLVITE) 1 MG tablet Take 1 tablet (1 mg) by mouth daily Past Week at Unknown time Yes Leigh Beltran MD   furosemide (LASIX) 20 MG tablet Take 4 tablets (80 mg) by mouth daily Past Week at Unknown time Yes Fadia Avila MD   hydrocortisone (ANUSOL-HC) 25 MG suppository Place 1 suppository (25 mg) rectally 2 times daily More than a month at Unknown time Yes Christine Harris APRN CNP   hydrocortisone, Perianal, (HYDROCORTISONE) 2.5 % cream Place rectally 2 times daily as needed for hemorrhoids More than a month at Unknown time Yes Christine Harris APRN CNP   levothyroxine (SYNTHROID/LEVOTHROID) 50 MCG tablet Take 50 mcg by mouth daily Past Week at Unknown time Yes Unknown, Entered By History   midodrine (PROAMATINE) 5 MG tablet Take 1 tablet (5 mg) by mouth 2 times daily (with meals) Past Week at Unknown time Yes Fadia Aivla MD   multivitamin w/minerals (THERA-VIT-M) tablet Take 1 tablet by mouth  daily Past Week at Unknown time Yes Fadia Avila MD   pantoprazole (PROTONIX) 40 MG EC tablet Take 1 tablet (40 mg) by mouth daily Past Week at Unknown time Yes Fox Jerry MD   potassium chloride ER (KLOR-CON M15) 15 MEQ CR tablet Take 2 tablets (30 mEq) by mouth daily Past Week at Unknown time Yes Fadia Avila MD   rifaximin (XIFAXAN) 550 MG TABS tablet Take 550 mg by mouth 2 times daily Past Week at Unknown time Yes Unknown, Entered By History   spironolactone (ALDACTONE) 50 MG tablet Take 2 tablets (100 mg) by mouth 2 times daily Past Week at Unknown time Yes Fadia Avila MD   traZODone (DESYREL) 50 MG tablet Take 1 tablet (50 mg) by mouth nightly as needed for sleep Past Week at Unknown time Yes Christine Harris APRN CNP   Vitamin D3 (CHOLECALCIFEROL) 25 mcg (1000 units) tablet Take 2 tablets (50 mcg) by mouth daily Past Week at Unknown time Yes Christine Harris APRN CNP   zinc sulfate (ZINCATE) 220 (50 Zn) MG capsule Take 1 capsule (220 mg) by mouth daily Past Week at Unknown time Yes Fadia Avila MD       Date completed: 10/19/21    Medication history completed by: ANNETTE WEINBERG [facility]

## 2021-10-19 NOTE — PROCEDURES
Procedure:  Removal and Insertion of Central Venous Catheter, Right Internal Jugular Venous Catheter  Indications:  DRUG ADMINISTRATION     Estimated Blood Loss:  MINIMAL ml  Complications: NONE      Findings:  Successfully exchanged. CXR confirmed to be in the right place.      Procedure Details:   Procedure was done as an emergency : No  The risks, benefits, complications, treatment options, and expected outcomes were discussed with PATIENT.  The risks and potential complications of their problem and purposed procedure include but are not limited to infection, bleeding, pain,  lung puncture, the need for additional procedures, creating a complication requiring transfusion or operation, and nerve and vessel injury.  The patient/alternate (see above) concurred with the proposed plan, giving informed consent.  The site of the procedure was properly noted/marked. The patient was identified as Maria Elena Fisher with YOB: 1970 and the procedure verified as Insertion of Central Venous Catheter.  A Time Out was held and the above information confirmed.     Under sterile conditions the skin over the Right Internal Jugular was prepped with Chlorhexidine and covered with a sterile drape.  Strict sterile conditions were maintained,  Cap, mask, and sterile gloves were worn by all participants.  2 ml of Lidocaine 1% local anesthetic was infiltrated into the skin and subcutaneous tissues.  The catheter was steriley prepped A guide wire was then passed easily through the central portion of the catheter.  There were no arrythmias. The catheter was then withdrawn.  A new 7.0 Indonesian tripple lumen was then inserted into the vessel over the guide wire.  All ports were flushed with sterile solution and had good non-pulsatile blood return.  The catheter was sutured into place and an occlusive sterile dressing applied.  The patient tolerated the procedure well with no change in vital signs.      Number of attempts: 0      A chest  x-ray was ordered. Positioning was in the appropriate place.     SICU Fellow Dr. Nolan monitored and assisted with the procedure.      Condition: stable

## 2021-10-19 NOTE — PROGRESS NOTES
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 Intraoperatively the patient had turbid ascites sent for culture. She was also hypercoaguable during the case requiring 5000 Heparin IV, received 5 U pRBCs for blood product.        CHANGES and MAJOR THINGS TODAY:   Change oxycodone to tramadol as pt does not care for tramadol   Remove daniels per pt request due to bladder spasms and discomfort   Oral nystatin for candidasis prevention   Re-Wire MAC line today   Renal-transplant consult for CORBIN   Stop MIVF     PLAN:    Neurological:  # Acute post operative pain   # Insomnia  - Monitor neurological status. Delirium preventions and precautions.   - Pain: PRN tramadol and IVP dilaudid, stop oxycodone                - PTA trazodone 50 mg PRN     Pulmonary:   # postoperative vent support   # Large left pleural effusion  - Extubated 10/18/21    - IS/OOB as tolerated  - CXR with large left pleural effusion. Per Transplant, will consider CT pigtail as needed (previously tapped)     Cardiovascular:    # Chronic hypotension  # Hypovolemic shock  - EKG: Sinus tachycardia  QTc:460, resolving yesterday.  - Monitor hemodynamic status.    - Liver Ultrasound complete 10/18 - with elevated resistive indices involving the extrahepatic hepatic artery and the right hepatic artery, this may be related to edema, Concern for reduced hepatic flow on  mg daily, started on Hep gtt 400 units/hr   - Lactic acid normal   - Ursodiol 300 mg BID per transplant surgery     Gastroenterology/Nutrition:  # ESLD MELD 22 due to Alcoholic cirrhosis c/b ascites s/p DDLT  # GAVE  # GERD  - Clears, advance as  tolerated  - BEKRLEY x 1 with serosanguinous output. Monitor for fresh sanguinous output  - PTA PPI for GI ppx  - Trend LFTs Q4H  - Liver ultrasound 10/18 read with elevated RI extrahepatic hepatic artery and right hepatic artery may be related to edema and small volume of fluid along left hepatic lobe      Fluids/Electrolytes:   # Hyponatremia pre-op  - ICU replacement protocol  - BMP, Mg2+, Phos q4h, iCa2+ daily     Renal:  # Acute on chronic kidney disease with pre-op Cr.1.44, GFR 42  - Renal function improving, Cr 1.84 from 1.56 from 1.34 from 1.15, GFR 31  - Continue to trend BMP q4h  - Decreasing hourly UOP (10/hr), continue to monitor intake and output.  - Consult Transplant Nephrology, spoke with team this am.   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 Fibrinogen 192.  - Started on straight rate Hep GTT per Transplant  - Goals INR <2, Hgb >8, Fibrinogen >200, Platelets >50  - Received 5 U pRBCs intraop, received 5 U cryo and 2 FFP on admit to SICU  - Trend coags and CBC q4h.     Musculoskeletal:  # Weakness and deconditioning of critical illness   - Physical and occupational therapy consult       General Cares/Prophylaxis:    - DVT Prophylaxis: Pneumatic  Compression Devices  - GI Prophylaxis: PPI  Restraints: Restraints for medical healing needed: Not Applicable     Lines/ tubes/ drains:  - R internal jugular MAC x 2, PIV x 2,  Daniels, BERKLEY x 1  - Rewire MAC     Disposition:  - Transfer to floor, after rewiring MAC    Time spent on this Encounter   Billing:  I spent 40 minutes bedside and on the inpatient unit today managing the care of Sarah Fisher in relation to the issues listed in this note.      Harlan Trinh  ====================================  INTERVAL HISTORY: Course reviewed. No acute events overnight. Pt reports she was unable to sleep last night, reports restless night due to bladder spasms and discomfort from this. Wants daniels out.  Denies abdominal pain from incision or drain site. Denies chest pain or shortness of breath. Refusing oxycodone and  dilaudid due to side effects, pt willing to try tramadol. No flatus yet.     OBJECTIVE:   1. VITAL SIGNS:   Temp:  [97.4  F (36.3  C)-98.9  F (37.2  C)] 97.9  F (36.6  C)  Pulse:  [] 115  Resp:  [13-20] 19  BP: (102-139)/(68-91) 112/78  MAP:  [89 mmHg-119 mmHg] 104 mmHg  Arterial Line BP: (124-158)/(44-87) 129/44  SpO2:  [94 %-98 %] 96 %      2. INTAKE/ OUTPUT:   I/O last 3 completed shifts:  In: 6360.09 [P.O.:840; I.V.:4310.09; NG/GT:110]  Out: 2902 [Urine:374; Emesis/NG output:150; Drains:2378]    3. PHYSICAL EXAMINATION:  General: laying in bed, awake, NAD.  HEENT: PERRLA.   Neuro: A&Ox3, NAD. Follows simple commands   Pulm/Resp: Clear breath sounds bilaterally without rhonchi, crackles or wheeze, breathing non-labored.   CV: RRR, S1/S2   Abdomen: incision dressed, some shadowing. BERKLEY drain with serosangious drainage, TTP.   : (+) daniels catheter in place, urine yellow and clear   MSK/Extremities: moving all extremities, peripheral pulses intact, calves soft and compressible, extremities well perfused, 2+     4. INVESTIGATIONS:   Arterial Blood Gases   Recent Labs   Lab 10/18/21  0809 10/18/21  0408  10/18/21  0112 10/17/21  2132   PH 7.37 7.38 7.36 7.40   PCO2 36 36 34* 37   PO2 133* 159* 148* 119*   HCO3 21 22 19* 23     Complete Blood Count   Recent Labs   Lab 10/19/21  0339 10/18/21  0809 10/18/21  0406 10/17/21  2352   WBC 23.4* 14.7* 15.4* 21.8*   HGB 10.5* 12.2 12.4 14.3   PLT 56* 51* 53* 89*     Basic Metabolic Panel  Recent Labs   Lab 10/19/21  0817 10/19/21  0639 10/19/21  0540 10/19/21  0338 10/18/21  1739 10/18/21  1734 10/18/21  0827 10/18/21  0808 10/18/21  0415 10/18/21  0406   NA  --   --   --  130*  --  133  --  134  --  137   POTASSIUM  --   --   --  3.8  --  3.9  --  4.0  --  4.0   CHLORIDE  --   --   --  98  --  102  --  105  --  105   CO2  --   --   --  19*  --  20  --  21  --  21   BUN  --   --   --  41*  --  38*  --  34*  --  29   CR  --   --   --  1.84*  --  1.56*  --  1.34*  --  1.15*   * 127* 145* 170*   < > 156*   < > 174*   < > 187*    < > = values in this interval not displayed.     Liver Function Tests  Recent Labs   Lab 10/19/21  0338 10/18/21  0808 10/18/21  0406 10/17/21  2352 10/17/21  2128 10/17/21  2128   * 736* 958* 1,435*   < > 1,485*   * 609* 634* 850*   < > 761*   ALKPHOS 120 174* 178* 244*   < > 222*   BILITOTAL 3.5* 3.5* 5.2* 8.9*   < > 8.3*   ALBUMIN 2.6* 1.5* 1.7* 1.6*   < > 1.4*   INR  --  1.54* 1.51* 1.97*  --  2.86*    < > = values in this interval not displayed.     Pancreatic Enzymes  Recent Labs   Lab 10/17/21  2352 10/16/21  2213   LIPASE 191  --    AMYLASE 52 70     Coagulation Profile  Recent Labs   Lab 10/18/21  0808 10/18/21  0406 10/17/21  2352 10/17/21  2128   INR 1.54* 1.51* 1.97* 2.86*   PTT 41* 37 41* 50*       5. RADIOLOGY:   Recent Results (from the past 24 hour(s))   US Liver Transplant Follow Up Portable    Narrative    EXAMINATION: US LIVER TRANSPLANT FOLLOW UP PORTABLE, 10/18/2021 11:29  AM     COMPARISON: 10/18/2021    HISTORY: post liver transplant POD#1    TECHNIQUE:  Gray-scale, color Doppler and spectral flow  analysis.    FINDINGS:   Trace perihepatic ascites. Right pleural effusion.    Liver:   Diffusely increased echogenicity in a periportal distribution  similar to prior exam. No focal hepatic mass.    Bile Ducts: Both the intra- and extrahepatic biliary system are of  normal caliber.  The common bile duct measures 3.4 mm in diameter.    Gallbladder: The gallbladder is surgically absent.    Kidneys:   Right kidney:  The right kidney demonstrates normal echotexture with  no evidence of a shadowing stone, focal mass or hydronephrosis.   10.8  cm in long axis dimension.    LIVER DOPPLER:  Splenic vein:  Patent continuous normal antegrade direction flow  towards the liver, 68 cm/sec.  Extrahepatic portal vein:  Patent continuous antegrade flow, 177  cm/sec.  Portal vein at anastomosis: Patent continuous antegrade flow, 136  cm/sec.  Intrahepatic portal vein:  Patent continuous antegrade flow, 149  cm/sec.  Right portal vein flow is antegrade, measuring 101 cm/sec.  Left portal vein flow is antegrade, measuring 43 cm/sec.    Inferior vena cava: patent with flow toward the heart throughout..  IVC above anastomosis: Not visualized  IVC at anastomosis:  178 cm/sec.  Intrahepatic IVC:  155 cm/sec.  Extrahepatic IVC:  98 cm/sec.    Right, mid, left hepatic veins: Patent with flow towards the inferior  vena cava.    Extrahepatic hepatic artery: High resistance with flow towards the  liver. 21/0 cm/sec with resistive index 1.0.  Right hepatic artery: 30/0 cm/sec with resistive index 1.0.  Left hepatic artery: 31/0 cm/sec with resistive index 1.0.      Impression    Impression:   1.  Patent hepatic vasculature. High resistance waveforms with  elevated resistive indices in the hepatic arteries, which could be  related to postoperative edema.  2.  Periportal edema, trace perihepatic ascites, and right pleural  effusion.    I have personally reviewed the examination and initial interpretation  and I agree with the findings.    VANE  HERBERT DURANT, DO    XR Abdomen Port 1 View    Narrative    Exam: XR ABDOMEN PORT 1 VIEWS, 10/19/2021 12:40 AM    Indication: worsening abdominal pain    Comparison: 10/17/2021    Findings:   2 radiographs of the abdomen. Postsurgical changes liver transplant.  Surgical clips and staples project over the abdomen. Right upper  quadrant abdominal drain. Multiple air distended loops of bowel. No  pneumatosis no portal venous gas. No acute osseous abnormality. Mild  anasarca.    Bibasilar and left retrocardiac opacities. Suspected bilateral small  pleural effusions.      Impression    Impression:   1. Nonobstructive bowel gas pattern.  2. Post surgical changes of liver transplant.   3. Bibasilar and left retrocardiac opacities. Suspected small  bilateral pleural effusions.    I have personally reviewed the examination and initial interpretation  and I agree with the findings.    FOX BONILLA MD        =========================================

## 2021-10-19 NOTE — CONSULTS
[facility]  Transplant Nephrology Consult  Date of Admission:  10/16/2021  Today's Date: 10/19/2021  Requesting physician: Christian Morrison MD    Recommendations:  - Consider reducing CNI dose or CNI sparing agents. Defer to primary.   - Continue albumin fluid challenge   - Please order Urine analysis and Urine electrolytes   - bladder scan since daniels removed/st cath    Assessment & Plan    Maria Elena Fisher is a 51 year old women with hx of ESLD (MELD 18) 2/2 alcoholic cirrhosis now s.p DDLD on 10/17/2021 with uncomplicated procedure and post op course notable for brief pressor requirement (10/17-10/18), extubated 10/18 AM. Patient is on Solumedrol taper for induction, Tacrolimus 2mg BID and MMF 750mg BID.  Tx nephrology consulted for rising cr of 1.84 (Admission 1.44) in the setting of reduced urine output.     # CORBIN   Patient's cr slowly trending up since 10/17 to 1.84 (eGFR 31) today. Also notably patient had CORBIN prior to tx with cr of 1.44 thought to be 2/2 hepatorenal syndrome. Ddx for CORBIN multifactorial including pre op - hepatorenal syndrome, michael op - blood loss (pRBC transfusion req), hypotension with brief pressor req, post op - CNI tox.  - Consider reducing CNI dose or CNI sparing regimen if possible Defer to tx surgery team  - Continue albumin fluid challenge    # Immune suppression   -  Patient is on Solumedrol taper for induction, Tacrolimus 2mg BID ang MMF 750mg BID.  - Consider dose reduction of tacrolimus or CNI sparing agent. Will defer to liver transplant team.     # Transplant hx   Etiology - Alcoholic liver cirrhosis  DDLT - 10/17/2021   AST//344 (Downtrending)     Danyell Scherer  Medical student     REASON FOR CONSULT   CORBIN    History of Present Illness   Sarah Fisher is a 51 year old female s/p DDLT. Patient reports uneventful operative and post op course. Reports never having renal issues or an OP nephrologist. Reports no BM  post op. Reports minimal urine output and had daniels removed earlier this AM. Denies dysuric symptoms or flank pain. Reports abdominal soreness.     Review of Systems    The 10 point Review of Systems is negative other than noted in the HPI or here.     Past Medical History    I have reviewed this patient's medical history and updated it with pertinent information if needed.   Past Medical History:   Diagnosis Date     Ascites      History of blood transfusion      Liver cirrhosis (H)      Thyroid disease        Past Surgical History   I have reviewed this patient's surgical history and updated it with pertinent information if needed.  Past Surgical History:   Procedure Laterality Date      SECTION       CV RIGHT HEART CATH MEASUREMENTS RECORDED N/A 2021    Procedure: CV RIGHT HEART CATH;  Surgeon: Joseph Guevara MD;  Location:  HEART CARDIAC CATH LAB     ESOPHAGOSCOPY, GASTROSCOPY, DUODENOSCOPY (EGD), COMBINED N/A 10/7/2021    Procedure: ESOPHAGOGASTRODUODENOSCOPY (EGD) with hemostasis;  Surgeon: Fox Jerry MD;  Location:  GI     IR THORACENTESIS  2021     IR THORACENTESIS  2021     THORACENTESIS Left 2021    Procedure: THORACENTESIS;  Surgeon: Almas Irby MD;  Location:  GI     THORACENTESIS N/A 2021    Procedure: THORACENTESIS;  Surgeon: Almas Irby MD;  Location:  GI     THORACENTESIS N/A 03/10/2021    Procedure: THORACENTESIS;  Surgeon: Almas Irby MD;  Location:  GI     THORACENTESIS Left 2021    Procedure: THORACENTESIS;  Surgeon: Kennedi Chavez MD;  [place] [facility] [place]ORACENTESIS Left 2021    Procedure: THORACENTESIS;  Surgeon: Jess Ansari PA-C;  [place] [facility] [place]     TRANSPLANT LIVER RECIPIENT  DONOR N/A 10/17/2021    Procedure: TRANSPLANT, LIVER, RECIPIENT,  DONOR;  Surgeon: Crhistian Morrison MD;  Location:  OR       Family History   I have reviewed this patient's family history and updated  it with pertinent information if needed.   Family History   Problem Relation Age of Onset     No Known Problems Mother      Colon Cancer Father 58         age 63     Breast Cancer Maternal Grandmother         Diagnosed in her 60's     No Known Problems Maternal Grandfather      No Known Problems Paternal Grandmother      No Known Problems Paternal Grandfather      Substance Abuse Brother      No Known Problems Sister      No Known Problems Son      Substance Abuse Brother        Social History   I have reviewed this patient's social history and updated it with pertinent information if needed. Sarah Fisher  reports that she has never smoked. She has never used smokeless tobacco. She reports previous alcohol use. She reports previous drug use.    Allergies   Allergies   Allergen Reactions     Amoxicillin GI Disturbance, Diarrhea, Nausea and Nausea and Vomiting     Prior to Admission Medications     albumin human  12.5 g Intravenous Q8H     aspirin  325 mg Oral or Feeding Tube Daily     levothyroxine  50 mcg Oral QAM AC     magnesium hydroxide  30 mL Oral or Feeding Tube Daily     [START ON 10/20/2021] methylPREDNISolone  50 mg Intravenous Once    Followed by     [START ON 10/21/2021] predniSONE  25 mg Oral Once    Followed by     [START ON 10/22/2021] predniSONE  10 mg Oral Once     mycophenolate  750 mg Oral BID IS    Or     mycophenolate  750 mg Oral or NG Tube BID IS     nystatin  500,000 Units Swish & Swallow 4x Daily     [START ON 10/20/2021] pantoprazole  40 mg Oral QAM AC     piperacillin-tazobactam  2.25 g Intravenous Q6H     polyethylene glycol  17 g Oral or Feeding Tube Daily     potassium chloride  10 mEq Oral Once     senna-docusate  1 tablet Oral or Feeding Tube BID     sodium chloride (PF)  3 mL Intravenous Q8H     sulfamethoxazole-trimethoprim  1 tablet Oral or Feeding Tube Daily     tacrolimus  2 mg Oral BID IS    Or     tacrolimus  2 mg Oral or NG Tube BID IS     ursodiol  300 mg Oral BID     Or     ursodiol  300 mg Oral or Feeding Tube BID     [START ON 10/20/2021] valGANciclovir  450 mg Oral Every Other Day       dextrose       heparin 400 Units/hr (10/19/21 1200)     insulin regular Stopped (10/19/21 1010)     BETA BLOCKER NOT PRESCRIBED         Physical Exam   Temp  Av.1  F (36.7  C)  Min: 97.4  F (36.3  C)  Max: 98.9  F (37.2  C)  Arterial Line BP  Min: 85/49  Max: 158/87  Arterial Line MAP (mmHg)  Av mmHg  Min: 63 mmHg  Max: 119 mmHg      Pulse  Av.4  Min: 83  Max: 115 Resp  Av  Min: 10  Max: 20  FiO2 (%)  Av.5 %  Min: 35 %  Max: 50 %  SpO2  Av.8 %  Min: 93 %  Max: 100 %    CVP (mmHg): 9 mmHgBP 122/88   Pulse 110   Temp 97.5  F (36.4  C) (Oral)   Resp 14   Wt 80.4 kg (177 lb 4 oz)   SpO2 93%   BMI 27.76 kg/m     Date 10/19/21 0700 - 10/20/21 0659   Shift 5680-0889 9801-9934 2497-2874 24 Hour Total   INTAKE   I.V. 637.5   637.5   Shift Total(mL/kg) 637.5(7.93)   637.5(7.93)   OUTPUT   Urine 113   113   Drains 610   610   Shift Total(mL/kg) 723(8.99)   723(8.99)   Weight (kg) 80.4 80.4 80.4 80.4      Admit Weight: 81.2 kg (179 lb)     GENERAL APPEARANCE: alert and no distress  HENT: mouth without ulcers or lesions  RESP: lungs clear to auscultation - no rales, rhonchi or wheezes  CV: Tachycardic, normal rate, no rub, no murmur  EDEMA: trace LE edema bilaterally  ABDOMEN: Surgical scars present. Dressing in place.   MS: extremities normal - no gross deformities noted, no evidence of inflammation in joints, no muscle tenderness  SKIN: no rash    Data   CMP  Recent Labs   Lab 10/19/21  1309 10/19/21  1203 10/19/21  1053 10/19/21  1007 10/19/21  0540 10/19/21  0338 10/18/21  1739 10/18/21  1734 10/18/21  0827 10/18/21  0808 10/18/21  0415 10/18/21  0406 10/18/21  0003 10/17/21  4052   NA  --   --   --   --   --  130*  --  133  --  134  --  137   < > 134   POTASSIUM  --   --   --   --   --  3.8  --  3.9  --  4.0  --  4.0   < > 4.0   CHLORIDE  --   --   --   --   --  98   --  102  --  105  --  105   < > 103   CO2  --   --   --   --   --  19*  --  20  --  21  --  21   < > 20   ANIONGAP  --   --   --   --   --  13  --  11  --  8  --  11   < > 11   * 100* 89 95   < > 170*   < > 156*   < > 174*   < > 187*   < > 162*   BUN  --   --   --   --   --  41*  --  38*  --  34*  --  29   < > 28   CR  --   --   --   --   --  1.84*  --  1.56*  --  1.34*  --  1.15*   < > 0.96   GFRESTIMATED  --   --   --   --   --  31*  --  38*  --  46*  --  55*   < > 69   EMMY  --   --   --   --   --  8.2*  --  8.2*  --  8.4*  --  8.7   < > 8.9   MAG  --   --   --   --   --  2.3  --   --   --  2.3  --  2.3  --  2.3   PHOS  --   --   --   --   --  6.7*  --   --   --  5.9*  --  5.0*  --  4.3   PROTTOTAL  --   --   --   --   --  4.9*  --   --   --  4.2*  --  4.1*  --  4.1*   ALBUMIN  --   --   --   --   --  2.6*  --   --   --  1.5*  --  1.7*  --  1.6*   BILITOTAL  --   --   --   --   --  3.5*  --   --   --  3.5*  --  5.2*  --  8.9*   ALKPHOS  --   --   --   --   --  120  --   --   --  174*  --  178*  --  244*   AST  --   --   --   --   --  260*  --   --   --  736*  --  958*  --  1,435*   ALT  --   --   --   --   --  344*  --   --   --  609*  --  634*  --  850*    < > = values in this interval not displayed.     CBC  Recent Labs   Lab 10/19/21  0339 10/18/21  0809 10/18/21  0406 10/17/21  2352   HGB 10.5* 12.2 12.4 14.3   WBC 23.4* 14.7* 15.4* 21.8*   RBC 3.28* 3.84 3.87 4.53   HCT 30.1* 35.8 36.1 42.1   MCV 92 93 93 93   MCH 32.0 31.8 32.0 31.6   MCHC 34.9 34.1 34.3 34.0   RDW 17.5* 17.9* 18.1* 17.7*   PLT 56* 51* 53* 89*     INR  Recent Labs   Lab 10/18/21  0808 10/18/21  0406 10/17/21  2352 10/17/21  2128   INR 1.54* 1.51* 1.97* 2.86*   PTT 41* 37 41* 50*     ABG  Recent Labs   Lab 10/18/21  0809 10/18/21  0406 10/18/21  0112 10/17/21  2132   PH 7.37 7.38 7.36 7.40   PCO2 36 36 34* 37   PO2 133* 159* 148* 119*   HCO3 21 22 19* 23   O2PER 35 40 40 50      Urine Studies  Recent Labs   Lab Test 01/04/21  1020  "  COLOR Bibi   APPEARANCE Slightly Cloudy   URINEGLC Negative   URINEBILI Negative   URINEKETONE Negative   SG 1.019   UBLD Negative   URINEPH 5.0   PROTEIN Negative   NITRITE Negative   LEUKEST Negative   RBCU 1   WBCU 7*     No lab results found.  PTH  No lab results found.  Iron Studies  Recent Labs   Lab Test 10/05/21  1124 09/28/21  1204 07/30/21  1052 05/02/21  0106   IRON  --   --   --  259*   FEB  --   --   --  355   IRONSAT  --   --   --  73*   DARRICK 186 280* 32 72       IMAGING:  Recent imaging from this admission reviewed.     \"I was present with the student who participated in the service and in the documentation of the note. I   have verified the history and personally performed the physical exam and medical decision-making. I   agree with the assessment and plan of care as documented in the note.\"   This patient has been seen and evaluated by me, Janay Blevins MD.  I have reviewed the note and agree with plan of care as documented by the medical student. Below are my additional findings, recs    . 52 yo female with alcoholic cirrhosis c/b ascites, GAVE, hemochromatosis, MELD-18 s/p DDLTx 10/17 course c/b hypercoagulability intraop and postop CORBIN    On exam tachycardic, stable hemodynamics, no rub, clear lungs, drain with serosanguinous drainage, tr le edema, alert    Labs with CORBIN BUN/Cr to 41/1.8 today with hyponatremia to 120, down-trending LFTs    AP  . CORBIN: oliguric CORBIN post liver Tx preceded by brief hypotensive episodes requiring pressors, blood loss, CORBIN preop, CNI use, ongoing fluid losses; DDx includes prerenal azotemia, ischemic ATN, less likely obstructive though daniels was removed today. Send UA, urine lytes. Recommend fluid challenge with iv albumin. Renal sparing regimen if possible with lower CNI goal, bladder scan to ensure no retention post daniels removal.     . Immunosuppression\" on tacrolimus, MMF, pred taper; consider renal sparing regimen with lower CNI goals if possible    . " Hyponatremia: likely impaired free water excretion in the setting of oliguric CORBIN, hypotonic fluids    . S/p DDLTx 10/17 Alcoholic cirrhosis: down-trending LFTs    Janay Blevins MD

## 2021-10-19 NOTE — PLAN OF CARE
Significant events: Urinary frequency present w/ minimal output; strait cath x 1 w/ 10 ml output; patient oliguric/anuric see I&Os md aware nephrology following. Poor appetite. Small BM. CVC re-wired to triple lumen.     D/I/A: AO x 4 and following commands. LS clear. BS hypoactive and flatus present. U Ambulated in room w/ walker tolerated well. Sinus tachycardia 100-110s. TMAX 97.5 F    P: Negative DVPRS. SBA w/ walker and gait belt. Fall risk precautions. Encourage PO intake. Continue bowel regimen and suppository. Encourage pulmonary toilet and IS use. Transfer to  when be available

## 2021-10-19 NOTE — CONSULTS
Care Management Initial Consult    General Information  Assessment completed with: Patient,    Type of CM/SW Visit: Initial Assessment    Primary Care Provider verified and updated as needed: Yes   Readmission within the last 30 days: no previous admission in last 30 days         Advance Care Planning: Advance Care Planning Reviewed: verified with patient          Communication Assessment  Patient's communication style: spoken language (English or Bilingual)    Hearing Difficulty or Deaf: no   Wear Glasses or Blind: yes    Cognitive  Cognitive/Neuro/Behavioral: WDL  Level of Consciousness: alert  Arousal Level: opens eyes spontaneously  Orientation: oriented x 4  Mood/Behavior: calm, cooperative  Best Language: 0 - No aphasia  Speech: clear, spontaneous, logical    Living Environment:   People in home: child(michelle), adult (and niece)  Radha   Current living Arrangements: house      Able to return to prior arrangements:         Family/Social Support:  Care provided by: other (see comments) (niece and son)  Provides care for: no one  Marital Status: Single  Children          Description of Support System: Supportive, Involved    Support Assessment: Adequate family and caregiver support, Adequate social supports    Current Resources:   Patient receiving home care services: No     Community Resources: None  Equipment currently used at home: shower chair, walker, standard  Supplies currently used at home: None    Employment/Financial:  Employment Status: disabled        Financial Concerns: No concerns identified   Referral to Financial Counselor: No       Lifestyle & Psychosocial Needs:  Social Determinants of Health     Tobacco Use: Low Risk      Smoking Tobacco Use: Never Smoker     Smokeless Tobacco Use: Never Used   Alcohol Use: Not At Risk     Frequency of Alcohol Consumption: Never     Average Number of Drinks: Not asked     Frequency of Binge Drinking: Not asked   Financial Resource Strain: Low Risk      Difficulty  of Paying Living Expenses: Not hard at all   Food Insecurity: No Food Insecurity     Worried About Running Out of Food in the Last Year: Never true     Ran Out of Food in the Last Year: Never true   Transportation Needs: No Transportation Needs     Lack of Transportation (Medical): No     Lack of Transportation (Non-Medical): No   Physical Activity: Sufficiently Active     Days of Exercise per Week: 5 days     Minutes of Exercise per Session: 30 min   Stress: No Stress Concern Present     Feeling of Stress : Only a little   Social Connections: Moderately Integrated     Frequency of Communication with Friends and Family: More than three times a week     Frequency of Social Gatherings with Friends and Family: More than three times a week     Attends Anglican Services: More than 4 times per year     Active Member of Clubs or Organizations: Yes     Attends Club or Organization Meetings: More than 4 times per year     Marital Status: Never    Intimate Partner Violence:      Fear of Current or Ex-Partner:      Emotionally Abused:      Physically Abused:      Sexually Abused:    Depression: Not at risk     PHQ-2 Score: 0   Housing Stability: Unknown     Unable to Pay for Housing in the Last Year: Not on file     Number of Places Lived in the Last Year: 1     Unstable Housing in the Last Year: No       Functional Status:  Prior to admission patient needed assistance:   Dependent ADLs:: Independent  Dependent IADLs:: Cleaning, Cooking, Laundry, Shopping  Assesssment of Functional Status: Not at baseline with mobility    Mental Health Status:  Mental Health Status: No Current Concerns       Chemical Dependency Status:  Chemical Dependency Status: No Current Concerns             Values/Beliefs:  Spiritual, Cultural Beliefs, Anglican Practices, Values that affect care: yes               Additional Information:  Had discussion with patient and patient's niece about discharge plans. Patient lives in a house with her  vern Garcia and patients son. They have been helping with care before admission. Son and niece plan on being patients care givers at discharge. Writer explained services provided for discharge, and patient was agreeable to home care RN services, she agreed to referrals for [facility] for RN needs. No therapy needs at this time. Patient stated that she has a walker and shower chair that she uses at home. Referrals made for home care, awaiting for acceptance at this time.    Elyssa Romano  RN Care Coordinator   MICU/SICU  [phone]       [address] [address]  Phone [phone]   Fax [phone]      Elyssa Romano RN

## 2021-10-20 ENCOUNTER — APPOINTMENT (OUTPATIENT)
Dept: ULTRASOUND IMAGING | Facility: CLINIC | Age: 51
End: 2021-10-20
Attending: TRANSPLANT SURGERY
Payer: COMMERCIAL

## 2021-10-20 ENCOUNTER — APPOINTMENT (OUTPATIENT)
Dept: OCCUPATIONAL THERAPY | Facility: CLINIC | Age: 51
End: 2021-10-20
Attending: TRANSPLANT SURGERY
Payer: COMMERCIAL

## 2021-10-20 ENCOUNTER — APPOINTMENT (OUTPATIENT)
Dept: GENERAL RADIOLOGY | Facility: CLINIC | Age: 51
End: 2021-10-20
Attending: NURSE PRACTITIONER
Payer: COMMERCIAL

## 2021-10-20 LAB
ALBUMIN SERPL-MCNC: 2.8 G/DL (ref 3.4–5)
ALP SERPL-CCNC: 128 U/L (ref 40–150)
ALT SERPL W P-5'-P-CCNC: 299 U/L (ref 0–50)
ANION GAP SERPL CALCULATED.3IONS-SCNC: 14 MMOL/L (ref 3–14)
ANION GAP SERPL CALCULATED.3IONS-SCNC: 16 MMOL/L (ref 3–14)
AST SERPL W P-5'-P-CCNC: 162 U/L (ref 0–45)
BASOPHILS # BLD AUTO: 0 10E3/UL (ref 0–0.2)
BASOPHILS NFR BLD AUTO: 0 %
BILIRUB DIRECT SERPL-MCNC: 2.7 MG/DL (ref 0–0.2)
BILIRUB SERPL-MCNC: 3.3 MG/DL (ref 0.2–1.3)
BUN SERPL-MCNC: 62 MG/DL (ref 7–30)
BUN SERPL-MCNC: 71 MG/DL (ref 7–30)
CA-I BLD-MCNC: 4.4 MG/DL (ref 4.4–5.2)
CALCIUM SERPL-MCNC: 8.6 MG/DL (ref 8.5–10.1)
CALCIUM SERPL-MCNC: 8.7 MG/DL (ref 8.5–10.1)
CHLORIDE BLD-SCNC: 93 MMOL/L (ref 94–109)
CHLORIDE BLD-SCNC: 94 MMOL/L (ref 94–109)
CO2 SERPL-SCNC: 18 MMOL/L (ref 20–32)
CO2 SERPL-SCNC: 19 MMOL/L (ref 20–32)
CREAT SERPL-MCNC: 2.85 MG/DL (ref 0.52–1.04)
CREAT SERPL-MCNC: 3.19 MG/DL (ref 0.52–1.04)
EOSINOPHIL # BLD AUTO: 0 10E3/UL (ref 0–0.7)
EOSINOPHIL NFR BLD AUTO: 0 %
ERYTHROCYTE [DISTWIDTH] IN BLOOD BY AUTOMATED COUNT: 17.3 % (ref 10–15)
GFR SERPL CREATININE-BSD FRML MDRD: 16 ML/MIN/1.73M2
GFR SERPL CREATININE-BSD FRML MDRD: 18 ML/MIN/1.73M2
GLUCOSE BLD-MCNC: 108 MG/DL (ref 70–99)
GLUCOSE BLD-MCNC: 130 MG/DL (ref 70–99)
GLUCOSE BLDC GLUCOMTR-MCNC: 116 MG/DL (ref 70–99)
GLUCOSE BLDC GLUCOMTR-MCNC: 92 MG/DL (ref 70–99)
GLUCOSE BLDC GLUCOMTR-MCNC: 94 MG/DL (ref 70–99)
HCT VFR BLD AUTO: 27.3 % (ref 35–47)
HGB BLD-MCNC: 7.2 G/DL (ref 11.7–15.7)
HGB BLD-MCNC: 9.3 G/DL (ref 11.7–15.7)
IMM GRANULOCYTES # BLD: 0.2 10E3/UL
IMM GRANULOCYTES NFR BLD: 1 %
LACTATE SERPL-SCNC: 0.7 MMOL/L (ref 0.7–2)
LACTATE SERPL-SCNC: 0.8 MMOL/L (ref 0.7–2)
LYMPHOCYTES # BLD AUTO: 0.4 10E3/UL (ref 0.8–5.3)
LYMPHOCYTES NFR BLD AUTO: 2 %
MAGNESIUM SERPL-MCNC: 3.1 MG/DL (ref 1.6–2.3)
MCH RBC QN AUTO: 32 PG (ref 26.5–33)
MCHC RBC AUTO-ENTMCNC: 34.1 G/DL (ref 31.5–36.5)
MCV RBC AUTO: 94 FL (ref 78–100)
MONOCYTES # BLD AUTO: 0.9 10E3/UL (ref 0–1.3)
MONOCYTES NFR BLD AUTO: 6 %
NEUTROPHILS # BLD AUTO: 13.7 10E3/UL (ref 1.6–8.3)
NEUTROPHILS NFR BLD AUTO: 91 %
NRBC # BLD AUTO: 0 10E3/UL
NRBC BLD AUTO-RTO: 0 /100
PATH REPORT.ADDENDUM SPEC: NORMAL
PATH REPORT.COMMENTS IMP SPEC: NORMAL
PATH REPORT.COMMENTS IMP SPEC: NORMAL
PATH REPORT.FINAL DX SPEC: NORMAL
PATH REPORT.GROSS SPEC: NORMAL
PATH REPORT.MICROSCOPIC SPEC OTHER STN: NORMAL
PATH REPORT.MICROSCOPIC SPEC OTHER STN: NORMAL
PATH REPORT.RELEVANT HX SPEC: NORMAL
PHOSPHATE SERPL-MCNC: 8 MG/DL (ref 2.5–4.5)
PHOTO IMAGE: NORMAL
PLATELET # BLD AUTO: 68 10E3/UL (ref 150–450)
POTASSIUM BLD-SCNC: 4.7 MMOL/L (ref 3.4–5.3)
POTASSIUM BLD-SCNC: 5.2 MMOL/L (ref 3.4–5.3)
PROT SERPL-MCNC: 5.8 G/DL (ref 6.8–8.8)
RBC # BLD AUTO: 2.91 10E6/UL (ref 3.8–5.2)
SODIUM SERPL-SCNC: 126 MMOL/L (ref 133–144)
SODIUM SERPL-SCNC: 128 MMOL/L (ref 133–144)
TACROLIMUS BLD-MCNC: 16.3 UG/L (ref 5–15)
TME LAST DOSE: ABNORMAL H
TME LAST DOSE: ABNORMAL H
UFH PPP CHRO-ACNC: <0.1 IU/ML
WBC # BLD AUTO: 15.1 10E3/UL (ref 4–11)

## 2021-10-20 PROCEDURE — 80197 ASSAY OF TACROLIMUS: CPT | Performed by: NURSE PRACTITIONER

## 2021-10-20 PROCEDURE — 250N000013 HC RX MED GY IP 250 OP 250 PS 637: Performed by: NURSE PRACTITIONER

## 2021-10-20 PROCEDURE — 83735 ASSAY OF MAGNESIUM: CPT | Performed by: NURSE PRACTITIONER

## 2021-10-20 PROCEDURE — 82330 ASSAY OF CALCIUM: CPT | Performed by: NURSE PRACTITIONER

## 2021-10-20 PROCEDURE — P9041 ALBUMIN (HUMAN),5%, 50ML: HCPCS | Performed by: PHYSICIAN ASSISTANT

## 2021-10-20 PROCEDURE — 36592 COLLECT BLOOD FROM PICC: CPT | Performed by: NURSE PRACTITIONER

## 2021-10-20 PROCEDURE — 99233 SBSQ HOSP IP/OBS HIGH 50: CPT | Performed by: INTERNAL MEDICINE

## 2021-10-20 PROCEDURE — 84100 ASSAY OF PHOSPHORUS: CPT | Performed by: NURSE PRACTITIONER

## 2021-10-20 PROCEDURE — 36592 COLLECT BLOOD FROM PICC: CPT

## 2021-10-20 PROCEDURE — 80053 COMPREHEN METABOLIC PANEL: CPT | Performed by: NURSE PRACTITIONER

## 2021-10-20 PROCEDURE — 71045 X-RAY EXAM CHEST 1 VIEW: CPT

## 2021-10-20 PROCEDURE — 250N000011 HC RX IP 250 OP 636: Performed by: NURSE PRACTITIONER

## 2021-10-20 PROCEDURE — 83605 ASSAY OF LACTIC ACID: CPT

## 2021-10-20 PROCEDURE — 97535 SELF CARE MNGMENT TRAINING: CPT | Mod: GO | Performed by: OCCUPATIONAL THERAPIST

## 2021-10-20 PROCEDURE — P9047 ALBUMIN (HUMAN), 25%, 50ML: HCPCS | Performed by: NURSE PRACTITIONER

## 2021-10-20 PROCEDURE — 93971 EXTREMITY STUDY: CPT | Mod: LT

## 2021-10-20 PROCEDURE — 99207 PR SATISFY VISIT NUMBER: CPT | Performed by: TRANSPLANT SURGERY

## 2021-10-20 PROCEDURE — 250N000012 HC RX MED GY IP 250 OP 636 PS 637: Performed by: NURSE PRACTITIONER

## 2021-10-20 PROCEDURE — 85025 COMPLETE CBC W/AUTO DIFF WBC: CPT | Performed by: NURSE PRACTITIONER

## 2021-10-20 PROCEDURE — 250N000013 HC RX MED GY IP 250 OP 250 PS 637: Performed by: PHYSICIAN ASSISTANT

## 2021-10-20 PROCEDURE — 85018 HEMOGLOBIN: CPT

## 2021-10-20 PROCEDURE — 82248 BILIRUBIN DIRECT: CPT | Performed by: NURSE PRACTITIONER

## 2021-10-20 PROCEDURE — 85520 HEPARIN ASSAY: CPT | Performed by: NURSE PRACTITIONER

## 2021-10-20 PROCEDURE — 71045 X-RAY EXAM CHEST 1 VIEW: CPT | Mod: 26 | Performed by: RADIOLOGY

## 2021-10-20 PROCEDURE — 250N000011 HC RX IP 250 OP 636: Performed by: PHYSICIAN ASSISTANT

## 2021-10-20 PROCEDURE — 120N000011 HC R&B TRANSPLANT UMMC

## 2021-10-20 PROCEDURE — 250N000013 HC RX MED GY IP 250 OP 250 PS 637: Performed by: TRANSPLANT SURGERY

## 2021-10-20 PROCEDURE — 93971 EXTREMITY STUDY: CPT | Mod: 26 | Performed by: RADIOLOGY

## 2021-10-20 PROCEDURE — 250N000013 HC RX MED GY IP 250 OP 250 PS 637: Performed by: STUDENT IN AN ORGANIZED HEALTH CARE EDUCATION/TRAINING PROGRAM

## 2021-10-20 PROCEDURE — 97530 THERAPEUTIC ACTIVITIES: CPT | Mod: GO | Performed by: OCCUPATIONAL THERAPIST

## 2021-10-20 PROCEDURE — 250N000009 HC RX 250

## 2021-10-20 RX ORDER — OXYMETAZOLINE HYDROCHLORIDE 0.05 G/100ML
2 SPRAY NASAL 2 TIMES DAILY PRN
Status: DISCONTINUED | OUTPATIENT
Start: 2021-10-20 | End: 2021-10-29 | Stop reason: HOSPADM

## 2021-10-20 RX ORDER — SULFAMETHOXAZOLE AND TRIMETHOPRIM 400; 80 MG/1; MG/1
1 TABLET ORAL
Status: DISCONTINUED | OUTPATIENT
Start: 2021-10-22 | End: 2021-10-26

## 2021-10-20 RX ORDER — BUMETANIDE 0.25 MG/ML
2 INJECTION INTRAMUSCULAR; INTRAVENOUS ONCE
Status: COMPLETED | OUTPATIENT
Start: 2021-10-20 | End: 2021-10-20

## 2021-10-20 RX ORDER — METHOCARBAMOL 500 MG/1
500 TABLET, FILM COATED ORAL EVERY 8 HOURS PRN
Status: DISCONTINUED | OUTPATIENT
Start: 2021-10-20 | End: 2021-10-29 | Stop reason: HOSPADM

## 2021-10-20 RX ORDER — TRAMADOL HYDROCHLORIDE 50 MG/1
50 TABLET ORAL EVERY 8 HOURS PRN
Status: DISCONTINUED | OUTPATIENT
Start: 2021-10-20 | End: 2021-10-29 | Stop reason: HOSPADM

## 2021-10-20 RX ORDER — TACROLIMUS 1 MG/1
1 CAPSULE ORAL
Status: DISCONTINUED | OUTPATIENT
Start: 2021-10-20 | End: 2021-10-22

## 2021-10-20 RX ADMIN — POLYETHYLENE GLYCOL 3350 17 G: 17 POWDER, FOR SOLUTION ORAL at 07:55

## 2021-10-20 RX ADMIN — METHOCARBAMOL 750 MG: 750 TABLET ORAL at 04:23

## 2021-10-20 RX ADMIN — NYSTATIN 500000 UNITS: 500000 SUSPENSION ORAL at 13:27

## 2021-10-20 RX ADMIN — LEVOTHYROXINE SODIUM 50 MCG: 0.05 TABLET ORAL at 07:58

## 2021-10-20 RX ADMIN — OXYMETAZOLINE HYDROCHLORIDE 2 SPRAY: 0.05 SPRAY NASAL at 21:28

## 2021-10-20 RX ADMIN — TACROLIMUS 2 MG: 1 CAPSULE ORAL at 07:56

## 2021-10-20 RX ADMIN — SULFAMETHOXAZOLE AND TRIMETHOPRIM 1 TABLET: 400; 80 TABLET ORAL at 07:57

## 2021-10-20 RX ADMIN — MYCOPHENOLATE MOFETIL 750 MG: 250 CAPSULE ORAL at 17:44

## 2021-10-20 RX ADMIN — MYCOPHENOLATE MOFETIL 750 MG: 250 CAPSULE ORAL at 07:56

## 2021-10-20 RX ADMIN — METHYLPREDNISOLONE SODIUM SUCCINATE 50 MG: 125 INJECTION, POWDER, FOR SOLUTION INTRAMUSCULAR; INTRAVENOUS at 07:55

## 2021-10-20 RX ADMIN — ALBUMIN HUMAN 25 G: 0.05 INJECTION, SOLUTION INTRAVENOUS at 13:28

## 2021-10-20 RX ADMIN — METHOCARBAMOL 750 MG: 750 TABLET ORAL at 13:28

## 2021-10-20 RX ADMIN — VALGANCICLOVIR 450 MG: 450 TABLET, FILM COATED ORAL at 07:57

## 2021-10-20 RX ADMIN — NYSTATIN 500000 UNITS: 500000 SUSPENSION ORAL at 07:58

## 2021-10-20 RX ADMIN — BUMETANIDE 2 MG: 0.25 INJECTION, SOLUTION INTRAMUSCULAR; INTRAVENOUS at 16:02

## 2021-10-20 RX ADMIN — ASPIRIN 325 MG ORAL TABLET 325 MG: 325 PILL ORAL at 07:58

## 2021-10-20 RX ADMIN — HEPARIN SODIUM 400 UNITS/HR: 10000 INJECTION, SOLUTION INTRAVENOUS at 22:08

## 2021-10-20 RX ADMIN — ALBUMIN HUMAN 12.5 G: 0.25 SOLUTION INTRAVENOUS at 04:23

## 2021-10-20 RX ADMIN — URSODIOL 300 MG: 300 CAPSULE ORAL at 23:32

## 2021-10-20 RX ADMIN — PANTOPRAZOLE SODIUM 40 MG: 40 TABLET, DELAYED RELEASE ORAL at 07:57

## 2021-10-20 RX ADMIN — DOCUSATE SODIUM 50 MG AND SENNOSIDES 8.6 MG 1 TABLET: 8.6; 5 TABLET, FILM COATED ORAL at 07:58

## 2021-10-20 RX ADMIN — NYSTATIN 500000 UNITS: 500000 SUSPENSION ORAL at 16:03

## 2021-10-20 RX ADMIN — URSODIOL 300 MG: 300 CAPSULE ORAL at 07:57

## 2021-10-20 RX ADMIN — ALBUMIN HUMAN 25 G: 0.05 INJECTION, SOLUTION INTRAVENOUS at 22:08

## 2021-10-20 ASSESSMENT — ACTIVITIES OF DAILY LIVING (ADL)
ADLS_ACUITY_SCORE: 9

## 2021-10-20 NOTE — PLAN OF CARE
"    /67   Pulse 114   Temp 97.6  F (36.4  C) (Oral)   Resp 18   Wt 80.4 kg (177 lb 4 oz)   SpO2 97%   BMI 27.76 kg/m      [id number]  Sarah \"Maria Elena\" Phillip transferred to 7A from 4A s/p DDLTx on 10/17. A/Ox4, calm and cooperative with cares. Pt endorsing fatigue and \"I haven't slept in three days.\" Pt just requesting to sleep. Denied pain. Denied n/v--on regular diet with fair appetite; c/o some bloating. AC/HS BGs, @HS. RIJ; patent with SR heparin gtt @400units/hr. 2 PIVs SL. Clamshell incision covered with primapore dressing, CDI. RJP patent with serosang output. Up x1 assist with walker/GB; steady gait. No BMs this shift (last one on day shift after suppository); voiding small amounts--team is aware. Pt oriented to unit and call light system; resting quietly this evening. Call light and belongings within reach. Will continue to monitor and continue POC/notify team as needed.   "

## 2021-10-20 NOTE — PROGRESS NOTES
Final positive donor sputum culture results have been uploaded to [employer].  Donor ID [id number].  Dr. Alba notified of results.

## 2021-10-20 NOTE — PROGRESS NOTES
Care Management Follow Up    Length of Stay (days): 3  Expected Discharge Date: 10/25/2021  Concerns to be Addressed: discharge planning     Patient plan of care discussed at interdisciplinary rounds: Yes    Anticipated Discharge Disposition: Home   Anticipated Discharge Services:  Home Care RN - need to secure agency   Anticipated Discharge DME: Walker      Additional Information:  Per team may be ready around Monday. Staying with niece, need to check address.     Referral made to [facility] for home care RN. Per liaison,  per insurance benefit check, it seems that we are out of network with this pt s specific [employer] [place] plan. Unfortunately, we will be unable to accept this referral.    Home Care Agencies  [facility] - do not take insurance  [facility] do not take insurance  [facility]- sent referral.       Sharlene Vela, RN, [place]  Float Care Coordinator  Covering 7A RNCC   Pager: [phone]

## 2021-10-20 NOTE — PROGRESS NOTES
[facility]   Transplant Nephrology Progress Note  Date of Admission:  10/16/2021  Today's Date: 10/20/2021    Recommendations:  - lack of response  to volume challenge makes pre-renal azotemia less likely, persistent oliguria concerning for ATN  Add Bumex 2mg iv x1 to iv albumin, ok to re-dose if needed to optimize volume status   - please order PM Na recheck   - Avoid hypotonic fluids       Assessment & Plan    Maria Elena Fisher is a 51 year old women with hx of ESLD (MELD 18) 2/2 alcoholic cirrhosis now s.p DDLD on 10/17/2021 with uncomplicated procedure and post op course notable for brief pressor requirement (10/17-10/18), extubated 10/18 AM. Patient is on Solumedrol taper for induction, Tacrolimus 2mg BID and MMF 750mg BID.  Tx nephrology consulted for rising cr of 1.84 (Admission 1.44) in the setting of reduced urine output.     # CORBIN   # ATN  Patient's cr slowly trending up since 10/17 to 2.85 from 1.84 (eGFR 18) today. Also notably patient had CORBIN prior to tx with cr of 1.44 thought to be 2/2 hepatorenal syndrome. Ddx for CORBIN multifactorial including pre op - hepatorenal syndrome, michael op - blood loss (pRBC transfusion req), hypotension with brief pressor req, post op - CNI tox.  UA - + Large blood, negative LE/Nitrate, Protein 20 mg/dl, mucus and hyaline casts   U Lytes na- 17, K - 55 Cl - 27    - Tacrolimus dose reduced 10/20 by 50%  - Continue albumin fluid challenge    # Immune suppression   -  Patient is on Solumedrol taper for induction, Tacrolimus 2mg BID ang MMF 750mg BID.  - Tacrolimus dose reduced 10/20 by 50%    # Transplant hx   Etiology - Alcoholic liver cirrhosis  DDLT - 10/17/2021   AST//344 (Downtrending)162/299 today    Recommendations were communicated to the primary team verbally.    Seen and discussed with Dr. Davy Scherer   Medical student     Interval History   Patient reports not sleeping well overnight. Reports no  pain or discomfort. Reports being able to walk to restroom, makes urine.     Review of Systems   4 point ROS was obtained and negative except as noted in the Interval History.    MEDICATIONS:    aspirin  325 mg Oral Daily     bumetanide  2 mg Intravenous Once     insulin aspart  1-10 Units Subcutaneous TID AC     insulin aspart  1-7 Units Subcutaneous At Bedtime     levothyroxine  50 mcg Oral QAM AC     mycophenolate  750 mg Oral BID IS     nystatin  500,000 Units Swish & Swallow 4x Daily     pantoprazole  40 mg Oral QAM AC     polyethylene glycol  17 g Oral or Feeding Tube Daily     [START ON 10/21/2021] predniSONE  25 mg Oral Once    Followed by     [START ON 10/22/2021] predniSONE  10 mg Oral Once     senna-docusate  1 tablet Oral or Feeding Tube BID     sodium chloride (PF)  3 mL Intravenous Q8H     [START ON 10/22/2021] sulfamethoxazole-trimethoprim  1 tablet Oral or Feeding Tube Once per day on      [Held by provider] tacrolimus  1 mg Oral BID IS     ursodiol  300 mg Oral BID     valGANciclovir  450 mg Oral Every Other Day       dextrose       heparin 400 Units/hr (10/19/21 2000)     BETA BLOCKER NOT PRESCRIBED         Physical Exam   Temp  Av  F (36.7  C)  Min: 97.4  F (36.3  C)  Max: 98.9  F (37.2  C)  Arterial Line BP  Min: 85/49  Max: 158/87  Arterial Line MAP (mmHg)  Av mmHg  Min: 63 mmHg  Max: 119 mmHg      Pulse  Av.2  Min: 83  Max: 115 Resp  Av.2  Min: 9  Max: 24  FiO2 (%)  Av.5 %  Min: 35 %  Max: 50 %  SpO2  Av.4 %  Min: 91 %  Max: 100 %    CVP (mmHg): 9 mmHgBP 122/77 (BP Location: Right arm)   Pulse 88   Temp 98.1  F (36.7  C) (Oral)   Resp 16   Wt 84.7 kg (186 lb 12.8 oz)   SpO2 95%   BMI 29.26 kg/m     Date 10/20/21 0700 - 10/21/21 0659   Shift 9772-9259 2590-4354 7742-5181 24 Hour Total   INTAKE   P.O. 150   150   I.V. 10   10   Shift Total(mL/kg) 160(1.89)   160(1.89)   OUTPUT   Drains 450 200  650   Shift Total(mL/kg) 450(5.31) 200(2.36)  650(7.67)    Weight (kg) 84.73 84.73 84.73 84.73      Admit Weight: 81.2 kg (179 lb)     GENERAL APPEARANCE: alert and no distress  HENT: mouth without ulcers or lesions  LYMPHATICS: no cervical or supraclavicular nodes  RESP: lungs clear to auscultation - no rales, rhonchi or wheezes  CV: regular rhythm, normal rate, no rub, no murmur  EDEMA: no LE edema bilaterally  ABDOMEN: soft, nondistended, nontender, bowel sounds normal  MS: extremities normal - no gross deformities noted, no evidence of inflammation in joints, no muscle tenderness  SKIN: no rash    Data   All labs reviewed by me.  CMP  Recent Labs   Lab 10/20/21  0815 10/20/21  0700 10/19/21  2149 10/19/21  1747 10/19/21  0540 10/19/21  0338 10/18/21  1739 10/18/21  1734 10/18/21  0827 10/18/21  0808 10/18/21  0415 10/18/21  0406   NA  --  128*  --   --   --  130*  --  133  --  134   < > 137   POTASSIUM  --  4.7  --   --   --  3.8  --  3.9  --  4.0   < > 4.0   CHLORIDE  --  94  --   --   --  98  --  102  --  105   < > 105   CO2  --  18*  --   --   --  19*  --  20  --  21   < > 21   ANIONGAP  --  16*  --   --   --  13  --  11  --  8   < > 11   * 130* 146* 118*   < > 170*   < > 156*   < > 174*   < > 187*   BUN  --  62*  --   --   --  41*  --  38*  --  34*   < > 29   CR  --  2.85*  --   --   --  1.84*  --  1.56*  --  1.34*   < > 1.15*   GFRESTIMATED  --  18*  --   --   --  31*  --  38*  --  46*   < > 55*   EMMY  --  8.6  --   --   --  8.2*  --  8.2*  --  8.4*   < > 8.7   MAG  --  3.1*  --   --   --  2.3  --   --   --  2.3  --  2.3   PHOS  --  8.0*  --   --   --  6.7*  --   --   --  5.9*  --  5.0*   PROTTOTAL  --  5.8*  --   --   --  4.9*  --   --   --  4.2*  --  4.1*   ALBUMIN  --  2.8*  --   --   --  2.6*  --   --   --  1.5*  --  1.7*   BILITOTAL  --  3.3*  --   --   --  3.5*  --   --   --  3.5*  --  5.2*   ALKPHOS  --  128  --   --   --  120  --   --   --  174*  --  178*   AST  --  162*  --   --   --  260*  --   --   --  736*  --  958*   ALT  --  299*  --   --    "--  344*  --   --   --  609*  --  634*    < > = values in this interval not displayed.     CBC  Recent Labs   Lab 10/20/21  1037 10/19/21  0339 10/18/21  0809 10/18/21  0406   HGB 9.3* 10.5* 12.2 12.4   WBC 15.1* 23.4* 14.7* 15.4*   RBC 2.91* 3.28* 3.84 3.87   HCT 27.3* 30.1* 35.8 36.1   MCV 94 92 93 93   MCH 32.0 32.0 31.8 32.0   MCHC 34.1 34.9 34.1 34.3   RDW 17.3* 17.5* 17.9* 18.1*   PLT 68* 56* 51* 53*     INR  Recent Labs   Lab 10/18/21  0808 10/18/21  0406 10/17/21  2352 10/17/21  2128   INR 1.54* 1.51* 1.97* 2.86*   PTT 41* 37 41* 50*     ABG  Recent Labs   Lab 10/18/21  0809 10/18/21  0406 10/18/21  0112 10/17/21  2132   PH 7.37 7.38 7.36 7.40   PCO2 36 36 34* 37   PO2 133* 159* 148* 119*   HCO3 21 22 19* 23   O2PER 35 40 40 50      Urine Studies  Recent Labs   Lab Test 10/19/21  1438 01/04/21  1020   COLOR Yellow Bibi   APPEARANCE Slightly Cloudy* Slightly Cloudy   URINEGLC Negative Negative   URINEBILI Negative Negative   URINEKETONE Negative Negative   SG 1.018 1.019   UBLD Large* Negative   URINEPH 5.0 5.0   PROTEIN 20 * Negative   NITRITE Negative Negative   LEUKEST Negative Negative   RBCU 120* 1   WBCU 4 7*     No lab results found.  PTH  No lab results found.  Iron Studies  Recent Labs   Lab Test 10/05/21  1124 09/28/21  1204 07/30/21  1052 05/02/21  0106   IRON  --   --   --  259*   FEB  --   --   --  355   IRONSAT  --   --   --  73*   DARRICK 186 280* 32 72       IMAGING:  10/20/2021 CXR  - Noticed L sided pleural effusion unchanged/slightly reduced compared to 10/19.     \"I was present with the student who participated in the service and in the documentation of the note. I   have verified the history and personally performed the physical exam and medical decision-making. I   agree with the assessment and plan of care as documented in the note.\"   This patient has been seen and evaluated by me, Janay Blevins MD.  I have reviewed the note and agree with plan of care as documented by the medical " student. Additional findings, recs    Some respiratory distress today, no desats, CXR w/ moderate L pleural effusion, remains oligo-anuric & bladder scan no retention, labs with worsening renal function. Suspect CORBIN likely 2/2 ATN & hypervolemic hyponatremia likely 2/2 impaired free water excretion in the setting of CORBIN. Recommend diuresis bumex iv 2 mg iv , could redose if needed along with albumin. Consider thoracentesis - if further respiratory distress & poor response to diuretics.    Janay Blevins MD

## 2021-10-20 NOTE — PROGRESS NOTES
"  Admitted/transferred from: 4A    Time of arrival on unit:     2 RN full  skin assessment completed by Munira WOLFE and Alanis HEBERT    Skin assessment findin) Jaundiced throughout with scattered petechiae  2) Bruising on left inner thigh (pt states she sustained at home from \"hitting furniture\") and scattered bruising on BUE (forearms)/BLE (ankles)  3) Clamshell incision, covered with dressing, CDI  4) RJP--patent with serosang output  5) David LOW 2      Interventions/actions:   --Pressure injury prevention education reinforced; protective mepilex removed per pt report  --Assess LDAs/skin/wounds qshift/PRN     Will continue to monitor.    "

## 2021-10-20 NOTE — PROGRESS NOTES
Provider, Rob Valentine, notified about L arm significantly swollen compared to R arm. L PIV removed, arm elevated. Provider ordered US.

## 2021-10-20 NOTE — PROGRESS NOTES
/79 (BP Location: Right arm)   Pulse 112   Temp 97.7  F (36.5  C) (Oral)   Resp 18   Wt 85 kg (187 lb 4.8 oz)   SpO2 96%   BMI 29.34 kg/m      Shift: 8228-8297  VS: tachycardic, otherwise VSS on RA, afebrile  Neuro: AOx4  BG: ACHS  Respiratory: WDL  Pain/Nausea/PRN: denies nausea, no pain meds given. Robaxin given @ 0400 for muscle aches  Diet: regular, CC  LDA: BERKLEY, R internal jugular with heparin @ 400 units/hr; R PIV SL, L PIV removed overnight due to swelling in extremity  GI/: oliguric, no BM overnight  Skin: generalized bruising, clamshell incision covered with primapore, CDI  Mobility: asx1 with walker  Plan: plan for US this am, monitor BM. Needs med card and lab book.    Handoff given to following RN.

## 2021-10-20 NOTE — PROGRESS NOTES
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 She is now s/p DDLTx 10/17/21 with Dr. Morrison.    Graft function:DBD Liver Tx 10/17/21: POD 3  Elevated LFTs, improving. AST//299.  TB 3.3 (3.5). LA improving 0.7 (0.8). Continue Steve BID.   -HA ppx: hypercoagulable intraop. Postop US with elevated RI involving the extrahepatic hepatic artery and the right hepatic artery. Repeat US POD 1&2 -Patent hepatic vasculature. Continued elevated RI in the hepatic arteries.   - Heparin 400units/hr +continue ASA 325mg daily.    BERKLEY drain x1 with continued large volume=1.7L /24 hrs. Replace PRN with 25% 12.5gm Albumin TID  Immunosuppression management:    Solumedrol taper per protocol induction  MMF 750mg BID  Tacrolimus 2mg BID, Tac level 16.3 today (13hr), in setting of CORBIN, hold dose today. Reduce goal to 6-8. Repeat in am.  Complexity of management:Medium. Contributing factors: thrombocytopenia CORBIN  Hematology: Anemia of chronic disease/ABL:Received 5 L of PRBC intraop. HGb 9.3 (10.5).Trend daily.  -HA ppx as above with Heparin 400units/hr  Cardiorespiratory: Hypotension: Stable 110-120  Tachycardia: -120,ST  Pleural effusion, Large:  Extubated POD 1 to RA, CDB/IS   GI/Nutrition: Regular  Continue bowel regimen: give MOM, Miralax, Senna BID, Dulcolax suppos PRN  Endocrine:Steroid induced hyperglycemia: -140s. Improving. Weaned off insulin gtt, Continue sliding scale insulin as needed.   Fluid/Electrolytes: CIV  Hyponatremia: Na 128   Hyperphosphatemia:8  : Aguiar due to critical illness, remove today   CORBIN: SCr 1-1.2 b/l, elevated to 2.85 (1.8) today. Decreased UOP, anuric.  Continue to monitor in setting of large vol Drain ouput. Received  "albumin challenge POD #1.  Neph following.   Infectious disease:Afebrile,   Leukocytosis: WBC 15 (23)  Trend daily. Received zosyn x48hrs Periop and diflucan x2 1 dose pre and 1 dose postop  Valcyte (CMV ppx) renal dosed, Bactrim (PCP ppx)  Prophylaxis: DVT, fall, GI   Disposition:   7A    Medical Decision Making: High   Subsequent visit 13529 (high level decision making)    BREONNA/Fellow/Resident Provider: Danii Hickman NP     Faculty: Jaylan Lyon M.D.    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    10/17/2021 (Liver), Postoperative day: 3     Interval History: obtained from the patient.    Overnight events: Continued large volume output. Reports voiding\"very little\" Up within room with walker. Pain controlled. Tolerating regular diet, small BM x1. Denies nausea/vomiting.     ROS:   A 10-point review of systems was negative except as noted above.    Curent Meds:    albumin human  12.5 g Intravenous Q8H     aspirin  325 mg Oral Daily     insulin aspart  1-10 Units Subcutaneous TID AC     insulin aspart  1-7 Units Subcutaneous At Bedtime     levothyroxine  50 mcg Oral QAM AC     methylPREDNISolone  50 mg Intravenous Once    Followed by     [START ON 10/21/2021] predniSONE  25 mg Oral Once    Followed by     [START ON 10/22/2021] predniSONE  10 mg Oral Once     mycophenolate  750 mg Oral BID IS     nystatin  500,000 Units Swish & Swallow 4x Daily     pantoprazole  40 mg Oral QAM AC     polyethylene glycol  17 g Oral or Feeding Tube Daily     senna-docusate  1 tablet Oral or Feeding Tube BID     sodium chloride (PF)  3 mL Intravenous Q8H     sulfamethoxazole-trimethoprim  1 tablet Oral or Feeding Tube Daily     tacrolimus  2 mg Oral BID IS     ursodiol  300 mg Oral BID     valGANciclovir  450 mg Oral Every Other Day       Physical Exam:     Admit Weight: 81.2 kg " (179 lb)    Current Vitals:   BP (!) 131/92 (BP Location: Right arm)   Pulse 90   Temp 97.8  F (36.6  C) (Oral)   Resp 18   Wt 85 kg (187 lb 4.8 oz)   SpO2 91%   BMI 29.34 kg/m      CVP (mmHg): 9 mmHg    Vital sign ranges:    Temp:  [97.5  F (36.4  C)-98.7  F (37.1  C)] 97.8  F (36.6  C)  Pulse:  [] 90  Resp:  [9-24] 18  BP: (107-131)/(67-92) 131/92  SpO2:  [91 %-100 %] 91 %  Patient Vitals for the past 24 hrs:   BP Temp Temp src Pulse Resp SpO2 Weight   10/20/21 0709 (!) 131/92 97.8  F (36.6  C) Oral 90 18 91 % --   10/20/21 0323 121/79 97.7  F (36.5  C) Oral 112 18 96 % 85 kg (187 lb 4.8 oz)   10/19/21 2345 117/77 -- -- 107 18 94 % --   10/19/21 2136 121/67 97.6  F (36.4  C) Oral 114 18 97 % --   10/19/21 2100 (!) 131/92 -- -- 115 19 97 % --   10/19/21 2015 126/81 -- -- 114 15 96 % --   10/19/21 2000 122/84 98.7  F (37.1  C) Oral 112 14 96 % --   10/19/21 1945 107/75 -- -- 113 15 -- --   10/19/21 1930 (!) 128/92 -- -- 112 19 100 % --   10/19/21 1915 119/86 -- -- 112 18 99 % --   10/19/21 1900 115/73 -- -- 114 17 96 % --   10/19/21 1800 (!) 110/91 97.5  F (36.4  C) Axillary 109 24 98 % --   10/19/21 1700 119/84 -- -- 110 17 95 % --   10/19/21 1600 111/82 -- -- 110 13 97 % --   10/19/21 1500 118/81 -- -- 111 -- 95 % --   10/19/21 1430 (!) 127/90 -- -- 111 13 95 % --   10/19/21 1400 -- -- -- 112 9 93 % --   10/19/21 1300 122/88 97.5  F (36.4  C) Oral 110 14 93 % --   10/19/21 1200 130/86 -- -- 110 18 96 % --   10/19/21 1000 126/89 -- -- 113 -- 97 % --   10/19/21 0900 124/87 -- -- -- -- -- --   10/19/21 0800 121/81 -- -- 113 15 94 % --     General Appearance: in no apparent distress. Fatigued appearing.  Skin: normal, warm, dry, No rashes, induration   Heart: regular rate and rhythm  Lungs: clear anteriorly, NLB on RA  Abdomen: obese, soft, NTTP, incision with staples CDI, mild serosanguinous output to right lateral incision.   :   anuric  Extremities: edema: present bilaterally. 1+pitting. There is no  skin breakdown.  Neurologic: awake and alert.  Tremor absent.. Asterixis: absent.    Frailty Scores     Frailty Scores 12/27/2020 12/27/2020    Final Score Frail Frail    Final Score Number 3 3          Data:   CMP  Recent Labs   Lab 10/20/21  0442 10/19/21  2149 10/19/21  1747 10/19/21  0540 10/19/21  0338 10/18/21  1739 10/18/21  1734 10/18/21  0827 10/18/21  0808 10/18/21  0003 10/17/21  2352 10/17/21  1509 10/16/21  2213   NA  --   --   --   --  130*  --  133   < > 134   < > 134   < > 131*   POTASSIUM  --   --   --   --  3.8  --  3.9   < > 4.0   < > 4.0   < > 4.1   CHLORIDE  --   --   --   --  98  --  102   < > 105   < > 103   < > 100   CO2  --   --   --   --  19*  --  20   < > 21   < > 20   < > 24   GLC  --  146* 118*   < > 170*   < > 156*   < > 174*   < > 162*   < > 125*   BUN  --   --   --   --  41*  --  38*   < > 34*   < > 28   < > 38*   CR  --   --   --   --  1.84*  --  1.56*   < > 1.34*   < > 0.96   < > 1.44*   GFRESTIMATED  --   --   --   --  31*  --  38*   < > 46*   < > 69   < > 42*   EMMY  --   --   --   --  8.2*  --  8.2*   < > 8.4*   < > 8.9   < > 8.5   ICAW 4.4  --   --   --  4.6  --   --    < >  --    < >  --    < >  --    MAG  --   --   --   --  2.3  --   --   --  2.3   < > 2.3   < > 2.0   PHOS  --   --   --   --  6.7*  --   --   --  5.9*   < > 4.3   < > 3.5   AMYLASE  --   --   --   --   --   --   --   --   --   --  52  --  70   LIPASE  --   --   --   --   --   --   --   --   --   --  191  --   --    ALBUMIN  --   --   --   --  2.6*  --   --   --  1.5*   < > 1.6*   < > 2.3*   BILITOTAL  --   --   --   --  3.5*  --   --   --  3.5*   < > 8.9*   < > 5.0*   ALKPHOS  --   --   --   --  120  --   --   --  174*   < > 244*   < > 206*   AST  --   --   --   --  260*  --   --   --  736*   < > 1,435*   < > 39   ALT  --   --   --   --  344*  --   --   --  609*   < > 850*   < > 26    < > = values in this interval not displayed.     CBC  Recent Labs   Lab 10/19/21  1731 10/19/21  0339 10/18/21  0809   HGB  --   10.5* 12.2   WBC  --  23.4* 14.7*   PLT  --  56* 51*   A1C 5.1  --   --      COAGS  Recent Labs   Lab 10/18/21  0808 10/18/21  0406   INR 1.54* 1.51*   PTT 41* 37      Urinalysis  Recent Labs   Lab Test 10/19/21  1438 01/04/21  1020   COLOR Yellow Bibi   APPEARANCE Slightly Cloudy* Slightly Cloudy   URINEGLC Negative Negative   URINEBILI Negative Negative   URINEKETONE Negative Negative   SG 1.018 1.019   UBLD Large* Negative   URINEPH 5.0 5.0   PROTEIN 20 * Negative   NITRITE Negative Negative   LEUKEST Negative Negative   RBCU 120* 1   WBCU 4 7*     Virology:  Hepatitis C Antibody   Date Value Ref Range Status   10/16/2021 Nonreactive Nonreactive Final   05/02/2021 Nonreactive NR^Nonreactive Final     Comment:     Assay performance characteristics have not been established for newborns,   infants, and children

## 2021-10-20 NOTE — PLAN OF CARE
/83 (BP Location: Right arm)   Pulse 97   Temp 98  F (36.7  C) (Oral)   Resp 16   Wt 84.7 kg (186 lb 12.8 oz)   SpO2 98%   BMI 29.26 kg/m       [id number]  AVSS on RA. Up with assist of one; gait belt and walker. Calls appropriately. Regular diet- calorie counts starting tomorrow. Very poor appetite and PO intake. Did very well with activity throughout day. Patient SOB when laying down in bed; O2 remained > 92% on RA. Chest x-ray completed. Bumex given this afternoon. Low urine output; team is aware. BM this evening (per patient report she missed most of hat when she had BM to measure urine). Oliguric throughout day; never saved. Triple lumen CVC saline locked. Heparin straight rate @ 400 units/hr into right PIV. PRN robaxin given for incisional discomfort. Incision stapled; small drainage; serosanguinous. Open to air; except for two little spots- clean gauze covering areas to help catch any drainage. Denies nausea. Continue plan of care, please notify MD with any changes.

## 2021-10-20 NOTE — PROGRESS NOTES
Transferred to: Unit 7a at 2115  Belongings: Suit case with clothes, cell phone, phone , glasses sent with patient  Daniels removed? No: no daniels in place  Central line removed? No, continued access  Chart and medications sent with patient Yes  Family notified: No: patient declined offer to call family.    Raven Arango RN

## 2021-10-20 NOTE — PROGRESS NOTES
CLINICAL NUTRITION SERVICES - REASSESSMENT NOTE     Nutrition Prescription    RECOMMENDATIONS FOR MDs/PROVIDERS TO ORDER:  Recommend Vitamin D3 5000 units daily for low vitamin D level   If phos continues to be elevated would consider Phos binder with meals     Malnutrition Status:    Moderate malnutrition in the context of chronic illness    Recommendations already ordered by Registered Dietitian (RD):  Calorie counts   Ensure clear at 10 am and special K bar at 2 pm     Future/Additional Recommendations:  -- monitor calorie counts   -- monitor weight trends   -- if TF warranted this admission recommend:   - Novasource Renal @ 45 ml/hr (1080 ml) provides 2160 kcal (33), 98 g pro (1.5), 198 g CHO, 774 ml free water, and 0 g fiber daily.   - 30 ml H2O flushes q 4 hrs for tube patency  - 15 ml Certavite/daily  - start TF at 15 ml/hr for 8 hrs and increase 10 ml q 8 hrs until goal rate   - Start at 15 mL/hr and advance by 15 mL q 6-8 hours to goal if K>/=3, Mg >/=1.5 and Po4 >/= 1.9. If lytes not within these parameters, hold at current rate and replace     EVALUATION OF THE PROGRESS TOWARD GOALS   Diet: Regular  Intake: PO intake per nursing < 50% of meals     Maria Elena reports she is eating 2 meals daily. This AM she had yogurt and pineapple. She is still working on the cereal, banana and milk. She has yet to order lunch.   Maria Elena is willing to try some nutritional supplements to increase her calorie/protein intake       NEW FINDINGS   Weight: elevated since admission. Continue current dosing weight     Labs   (10/20): Na 128 mmol/L (L), BUN 62 mg/dL (H), Cr 2.85 mg/dL (H), Glucose 130 mg/dL (H), phos 8 mg/dL (H), Mg++ 3.1 mg/dL (H)   (10/18): Vitamin D deficiency 18 micrograms/L (L)    Meds: Miralax    GI: LBM (10/19) x 2     MALNUTRITION  % Intake: Decreased intake does not meet criteria  % Weight Loss: None noted  Subcutaneous Fat Loss: Facial region:  mild and Upper arm:  mild  Muscle Loss: Temporal:  mild, Thoracic  region (clavicle, acromium bone, deltoid, trapezius, pectoral):  mild, Upper arm (bicep, tricep):  moderate, Lower arm  (forearm):  moderate and Dorsal hand:  Moderate  Fluid Accumulation/Edema: Does not meet criteria  Malnutrition Diagnosis: Moderate malnutrition in the context of chronic illness    Previous Goals   Diet adv v nutrition support within 2-3 days.  Evaluation: Met    Previous Nutrition Diagnosis  Inadequate oral intake  Evaluation: Improving    CURRENT NUTRITION DIAGNOSIS  Inadequate oral intake related to decreased appetite, s/p surgery as evidenced by PO intake ~ 50% of 2 meals daily       INTERVENTIONS  Implementation  1. Collaboration with other providers - discussed with team. Team will order Vitamin D for supplementation  2. Medical food supplement therapy - as above     Goals  Patient to consume % of nutritionally adequate meal trays TID, or the equivalent with supplements/snacks.    Monitoring/Evaluation  Progress toward goals will be monitored and evaluated per protocol.    Cristy Ramírez, MS/RD/RANDI/CNS  7A RD Pager: [phone]

## 2021-10-21 ENCOUNTER — APPOINTMENT (OUTPATIENT)
Dept: ULTRASOUND IMAGING | Facility: CLINIC | Age: 51
End: 2021-10-21
Attending: TRANSPLANT SURGERY
Payer: COMMERCIAL

## 2021-10-21 ENCOUNTER — DOCUMENTATION ONLY (OUTPATIENT)
Dept: TRANSPLANT | Facility: CLINIC | Age: 51
End: 2021-10-21

## 2021-10-21 ENCOUNTER — APPOINTMENT (OUTPATIENT)
Dept: GENERAL RADIOLOGY | Facility: CLINIC | Age: 51
End: 2021-10-21
Attending: STUDENT IN AN ORGANIZED HEALTH CARE EDUCATION/TRAINING PROGRAM
Payer: COMMERCIAL

## 2021-10-21 ENCOUNTER — APPOINTMENT (OUTPATIENT)
Dept: OCCUPATIONAL THERAPY | Facility: CLINIC | Age: 51
End: 2021-10-21
Attending: TRANSPLANT SURGERY
Payer: COMMERCIAL

## 2021-10-21 ENCOUNTER — APPOINTMENT (OUTPATIENT)
Dept: PHYSICAL THERAPY | Facility: CLINIC | Age: 51
End: 2021-10-21
Attending: NURSE PRACTITIONER
Payer: COMMERCIAL

## 2021-10-21 DIAGNOSIS — Z94.4 LIVER REPLACED BY TRANSPLANT (H): Primary | ICD-10-CM

## 2021-10-21 DIAGNOSIS — K70.31 ALCOHOLIC CIRRHOSIS OF LIVER WITH ASCITES (H): ICD-10-CM

## 2021-10-21 LAB
ALBUMIN SERPL-MCNC: 2.8 G/DL (ref 3.4–5)
ALP SERPL-CCNC: 96 U/L (ref 40–150)
ALT SERPL W P-5'-P-CCNC: 186 U/L (ref 0–50)
ANION GAP SERPL CALCULATED.3IONS-SCNC: 11 MMOL/L (ref 3–14)
ANION GAP SERPL CALCULATED.3IONS-SCNC: 13 MMOL/L (ref 3–14)
APTT PPP: 35 SECONDS (ref 22–38)
AST SERPL W P-5'-P-CCNC: 82 U/L (ref 0–45)
BACTERIA PRT CULT: NO GROWTH
BASOPHILS # BLD AUTO: 0 10E3/UL (ref 0–0.2)
BASOPHILS # BLD AUTO: 0 10E3/UL (ref 0–0.2)
BASOPHILS NFR BLD AUTO: 0 %
BASOPHILS NFR BLD AUTO: 0 %
BILIRUB DIRECT SERPL-MCNC: 1.8 MG/DL (ref 0–0.2)
BILIRUB SERPL-MCNC: 2.3 MG/DL (ref 0.2–1.3)
BLD PROD TYP BPU: NORMAL
BLOOD COMPONENT TYPE: NORMAL
BUN SERPL-MCNC: 77 MG/DL (ref 7–30)
BUN SERPL-MCNC: 79 MG/DL (ref 7–30)
CA-I BLD-MCNC: 4.4 MG/DL (ref 4.4–5.2)
CALCIUM SERPL-MCNC: 8.1 MG/DL (ref 8.5–10.1)
CALCIUM SERPL-MCNC: 8.3 MG/DL (ref 8.5–10.1)
CHLORIDE BLD-SCNC: 94 MMOL/L (ref 94–109)
CHLORIDE BLD-SCNC: 95 MMOL/L (ref 94–109)
CO2 SERPL-SCNC: 19 MMOL/L (ref 20–32)
CO2 SERPL-SCNC: 21 MMOL/L (ref 20–32)
CODING SYSTEM: NORMAL
CREAT SERPL-MCNC: 3.51 MG/DL (ref 0.52–1.04)
CREAT SERPL-MCNC: 3.55 MG/DL (ref 0.52–1.04)
CROSSMATCH: NORMAL
EOSINOPHIL # BLD AUTO: 0 10E3/UL (ref 0–0.7)
EOSINOPHIL # BLD AUTO: 0 10E3/UL (ref 0–0.7)
EOSINOPHIL NFR BLD AUTO: 0 %
EOSINOPHIL NFR BLD AUTO: 0 %
ERYTHROCYTE [DISTWIDTH] IN BLOOD BY AUTOMATED COUNT: 16.4 % (ref 10–15)
ERYTHROCYTE [DISTWIDTH] IN BLOOD BY AUTOMATED COUNT: 17.3 % (ref 10–15)
GFR SERPL CREATININE-BSD FRML MDRD: 14 ML/MIN/1.73M2
GFR SERPL CREATININE-BSD FRML MDRD: 14 ML/MIN/1.73M2
GLUCOSE BLD-MCNC: 118 MG/DL (ref 70–99)
GLUCOSE BLD-MCNC: 94 MG/DL (ref 70–99)
GLUCOSE BLDC GLUCOMTR-MCNC: 120 MG/DL (ref 70–99)
GLUCOSE BLDC GLUCOMTR-MCNC: 72 MG/DL (ref 70–99)
GLUCOSE BLDC GLUCOMTR-MCNC: 91 MG/DL (ref 70–99)
HBV DNA SERPL QL NAA+PROBE: NORMAL
HCT VFR BLD AUTO: 17.8 % (ref 35–47)
HCT VFR BLD AUTO: 24.4 % (ref 35–47)
HCV RNA SERPL QL NAA+PROBE: NORMAL
HGB BLD-MCNC: 6 G/DL (ref 11.7–15.7)
HGB BLD-MCNC: 8.3 G/DL (ref 11.7–15.7)
HIV1+2 RNA SERPL QL NAA+PROBE: NORMAL
IMM GRANULOCYTES # BLD: 0.1 10E3/UL
IMM GRANULOCYTES # BLD: 0.1 10E3/UL
IMM GRANULOCYTES NFR BLD: 1 %
IMM GRANULOCYTES NFR BLD: 1 %
INR PPP: 1.2 (ref 0.85–1.15)
ISSUE DATE AND TIME: NORMAL
ISSUE DATE AND TIME: NORMAL
LACTATE SERPL-SCNC: 0.5 MMOL/L (ref 0.7–2)
LACTATE SERPL-SCNC: 0.7 MMOL/L (ref 0.7–2)
LDH SERPL L TO P-CCNC: 276 U/L (ref 81–234)
LYMPHOCYTES # BLD AUTO: 0.5 10E3/UL (ref 0.8–5.3)
LYMPHOCYTES # BLD AUTO: 0.5 10E3/UL (ref 0.8–5.3)
LYMPHOCYTES NFR BLD AUTO: 5 %
LYMPHOCYTES NFR BLD AUTO: 5 %
MAGNESIUM SERPL-MCNC: 3 MG/DL (ref 1.6–2.3)
MCH RBC QN AUTO: 31.3 PG (ref 26.5–33)
MCH RBC QN AUTO: 31.7 PG (ref 26.5–33)
MCHC RBC AUTO-ENTMCNC: 33.7 G/DL (ref 31.5–36.5)
MCHC RBC AUTO-ENTMCNC: 34 G/DL (ref 31.5–36.5)
MCV RBC AUTO: 92 FL (ref 78–100)
MCV RBC AUTO: 94 FL (ref 78–100)
MONOCYTES # BLD AUTO: 0.7 10E3/UL (ref 0–1.3)
MONOCYTES # BLD AUTO: 0.8 10E3/UL (ref 0–1.3)
MONOCYTES NFR BLD AUTO: 7 %
MONOCYTES NFR BLD AUTO: 8 %
NEUTROPHILS # BLD AUTO: 8.3 10E3/UL (ref 1.6–8.3)
NEUTROPHILS # BLD AUTO: 9 10E3/UL (ref 1.6–8.3)
NEUTROPHILS NFR BLD AUTO: 86 %
NEUTROPHILS NFR BLD AUTO: 87 %
NRBC # BLD AUTO: 0 10E3/UL
NRBC # BLD AUTO: 0 10E3/UL
NRBC BLD AUTO-RTO: 0 /100
NRBC BLD AUTO-RTO: 0 /100
PF4 HEPARIN CMPLX AB SER QL: NEGATIVE
PHOSPHATE SERPL-MCNC: 8.1 MG/DL (ref 2.5–4.5)
PLATELET # BLD AUTO: 43 10E3/UL (ref 150–450)
PLATELET # BLD AUTO: 49 10E3/UL (ref 150–450)
POTASSIUM BLD-SCNC: 5.2 MMOL/L (ref 3.4–5.3)
POTASSIUM BLD-SCNC: 5.4 MMOL/L (ref 3.4–5.3)
PROT SERPL-MCNC: 5.1 G/DL (ref 6.8–8.8)
RBC # BLD AUTO: 1.89 10E6/UL (ref 3.8–5.2)
RBC # BLD AUTO: 2.65 10E6/UL (ref 3.8–5.2)
RETICS # AUTO: 0.12 10E6/UL (ref 0.03–0.1)
RETICS/RBC NFR AUTO: 4.5 % (ref 0.5–2)
SODIUM SERPL-SCNC: 126 MMOL/L (ref 133–144)
SODIUM SERPL-SCNC: 127 MMOL/L (ref 133–144)
TACROLIMUS BLD-MCNC: 7.3 UG/L (ref 5–15)
TME LAST DOSE: NORMAL H
TME LAST DOSE: NORMAL H
UFH PPP CHRO-ACNC: <0.1 IU/ML
UNIT ABO/RH: NORMAL
UNIT NUMBER: NORMAL
UNIT STATUS: NORMAL
UNIT TYPE ISBT: 7300
WBC # BLD AUTO: 10.4 10E3/UL (ref 4–11)
WBC # BLD AUTO: 9.6 10E3/UL (ref 4–11)

## 2021-10-21 PROCEDURE — P9016 RBC LEUKOCYTES REDUCED: HCPCS

## 2021-10-21 PROCEDURE — 250N000013 HC RX MED GY IP 250 OP 250 PS 637: Performed by: TRANSPLANT SURGERY

## 2021-10-21 PROCEDURE — 83735 ASSAY OF MAGNESIUM: CPT | Performed by: NURSE PRACTITIONER

## 2021-10-21 PROCEDURE — 83605 ASSAY OF LACTIC ACID: CPT

## 2021-10-21 PROCEDURE — 71045 X-RAY EXAM CHEST 1 VIEW: CPT

## 2021-10-21 PROCEDURE — 93975 VASCULAR STUDY: CPT | Mod: 26 | Performed by: RADIOLOGY

## 2021-10-21 PROCEDURE — 250N000013 HC RX MED GY IP 250 OP 250 PS 637: Performed by: PHYSICIAN ASSISTANT

## 2021-10-21 PROCEDURE — 97116 GAIT TRAINING THERAPY: CPT | Mod: GP

## 2021-10-21 PROCEDURE — 97162 PT EVAL MOD COMPLEX 30 MIN: CPT | Mod: GP

## 2021-10-21 PROCEDURE — 36592 COLLECT BLOOD FROM PICC: CPT | Performed by: TRANSPLANT SURGERY

## 2021-10-21 PROCEDURE — 85610 PROTHROMBIN TIME: CPT

## 2021-10-21 PROCEDURE — 82330 ASSAY OF CALCIUM: CPT | Performed by: NURSE PRACTITIONER

## 2021-10-21 PROCEDURE — 85045 AUTOMATED RETICULOCYTE COUNT: CPT | Performed by: NURSE PRACTITIONER

## 2021-10-21 PROCEDURE — 86022 PLATELET ANTIBODIES: CPT | Performed by: NURSE PRACTITIONER

## 2021-10-21 PROCEDURE — 85520 HEPARIN ASSAY: CPT

## 2021-10-21 PROCEDURE — 76700 US EXAM ABDOM COMPLETE: CPT

## 2021-10-21 PROCEDURE — 99207 PR SATISFY VISIT NUMBER: CPT | Performed by: TRANSPLANT SURGERY

## 2021-10-21 PROCEDURE — 80053 COMPREHEN METABOLIC PANEL: CPT | Performed by: NURSE PRACTITIONER

## 2021-10-21 PROCEDURE — 82248 BILIRUBIN DIRECT: CPT | Performed by: NURSE PRACTITIONER

## 2021-10-21 PROCEDURE — 83615 LACTATE (LD) (LDH) ENZYME: CPT | Performed by: NURSE PRACTITIONER

## 2021-10-21 PROCEDURE — 250N000013 HC RX MED GY IP 250 OP 250 PS 637: Performed by: NURSE PRACTITIONER

## 2021-10-21 PROCEDURE — 85025 COMPLETE CBC W/AUTO DIFF WBC: CPT | Performed by: NURSE PRACTITIONER

## 2021-10-21 PROCEDURE — 36592 COLLECT BLOOD FROM PICC: CPT | Performed by: NURSE PRACTITIONER

## 2021-10-21 PROCEDURE — 250N000011 HC RX IP 250 OP 636: Performed by: NURSE PRACTITIONER

## 2021-10-21 PROCEDURE — P9041 ALBUMIN (HUMAN),5%, 50ML: HCPCS | Performed by: PHYSICIAN ASSISTANT

## 2021-10-21 PROCEDURE — 85060 BLOOD SMEAR INTERPRETATION: CPT | Performed by: STUDENT IN AN ORGANIZED HEALTH CARE EDUCATION/TRAINING PROGRAM

## 2021-10-21 PROCEDURE — 99233 SBSQ HOSP IP/OBS HIGH 50: CPT | Performed by: INTERNAL MEDICINE

## 2021-10-21 PROCEDURE — 86923 COMPATIBILITY TEST ELECTRIC: CPT

## 2021-10-21 PROCEDURE — 85730 THROMBOPLASTIN TIME PARTIAL: CPT

## 2021-10-21 PROCEDURE — 250N000011 HC RX IP 250 OP 636: Performed by: PHYSICIAN ASSISTANT

## 2021-10-21 PROCEDURE — 83010 ASSAY OF HAPTOGLOBIN QUANT: CPT | Performed by: NURSE PRACTITIONER

## 2021-10-21 PROCEDURE — 84100 ASSAY OF PHOSPHORUS: CPT | Performed by: NURSE PRACTITIONER

## 2021-10-21 PROCEDURE — 83605 ASSAY OF LACTIC ACID: CPT | Performed by: TRANSPLANT SURGERY

## 2021-10-21 PROCEDURE — 97535 SELF CARE MNGMENT TRAINING: CPT | Mod: GO | Performed by: OCCUPATIONAL THERAPIST

## 2021-10-21 PROCEDURE — 250N000012 HC RX MED GY IP 250 OP 636 PS 637: Performed by: NURSE PRACTITIONER

## 2021-10-21 PROCEDURE — 250N000011 HC RX IP 250 OP 636

## 2021-10-21 PROCEDURE — 120N000011 HC R&B TRANSPLANT UMMC

## 2021-10-21 PROCEDURE — 80197 ASSAY OF TACROLIMUS: CPT | Performed by: NURSE PRACTITIONER

## 2021-10-21 PROCEDURE — 71045 X-RAY EXAM CHEST 1 VIEW: CPT | Mod: 26 | Performed by: RADIOLOGY

## 2021-10-21 RX ORDER — BUMETANIDE 0.25 MG/ML
1 INJECTION INTRAMUSCULAR; INTRAVENOUS ONCE
Status: COMPLETED | OUTPATIENT
Start: 2021-10-21 | End: 2021-10-21

## 2021-10-21 RX ORDER — BUMETANIDE 0.25 MG/ML
3 INJECTION INTRAMUSCULAR; INTRAVENOUS ONCE
Status: COMPLETED | OUTPATIENT
Start: 2021-10-21 | End: 2021-10-21

## 2021-10-21 RX ORDER — PREDNISONE 5 MG/1
5 TABLET ORAL DAILY
Status: DISCONTINUED | OUTPATIENT
Start: 2021-10-23 | End: 2021-10-27

## 2021-10-21 RX ORDER — VALGANCICLOVIR 450 MG/1
450 TABLET, FILM COATED ORAL
Status: DISCONTINUED | OUTPATIENT
Start: 2021-10-21 | End: 2021-10-26

## 2021-10-21 RX ADMIN — TACROLIMUS 1 MG: 1 CAPSULE ORAL at 18:07

## 2021-10-21 RX ADMIN — MYCOPHENOLATE MOFETIL 750 MG: 250 CAPSULE ORAL at 07:42

## 2021-10-21 RX ADMIN — NYSTATIN 500000 UNITS: 500000 SUSPENSION ORAL at 13:17

## 2021-10-21 RX ADMIN — DOCUSATE SODIUM 50 MG AND SENNOSIDES 8.6 MG 1 TABLET: 8.6; 5 TABLET, FILM COATED ORAL at 07:42

## 2021-10-21 RX ADMIN — LEVOTHYROXINE SODIUM 50 MCG: 0.05 TABLET ORAL at 07:42

## 2021-10-21 RX ADMIN — NYSTATIN 500000 UNITS: 500000 SUSPENSION ORAL at 07:41

## 2021-10-21 RX ADMIN — POLYETHYLENE GLYCOL 3350 17 G: 17 POWDER, FOR SOLUTION ORAL at 07:41

## 2021-10-21 RX ADMIN — NYSTATIN 500000 UNITS: 500000 SUSPENSION ORAL at 20:19

## 2021-10-21 RX ADMIN — MYCOPHENOLATE MOFETIL 750 MG: 250 CAPSULE ORAL at 18:07

## 2021-10-21 RX ADMIN — URSODIOL 300 MG: 300 CAPSULE ORAL at 07:44

## 2021-10-21 RX ADMIN — ASPIRIN 325 MG ORAL TABLET 325 MG: 325 PILL ORAL at 07:41

## 2021-10-21 RX ADMIN — VALGANCICLOVIR 450 MG: 450 TABLET, FILM COATED ORAL at 10:09

## 2021-10-21 RX ADMIN — PREDNISONE 25 MG: 20 TABLET ORAL at 07:43

## 2021-10-21 RX ADMIN — ALBUMIN HUMAN 25 G: 0.05 INJECTION, SOLUTION INTRAVENOUS at 13:15

## 2021-10-21 RX ADMIN — BUMETANIDE 3 MG: 0.25 INJECTION, SOLUTION INTRAMUSCULAR; INTRAVENOUS at 13:16

## 2021-10-21 RX ADMIN — URSODIOL 300 MG: 300 CAPSULE ORAL at 20:19

## 2021-10-21 RX ADMIN — BUMETANIDE 1 MG: 0.25 INJECTION, SOLUTION INTRAMUSCULAR; INTRAVENOUS at 07:37

## 2021-10-21 RX ADMIN — PANTOPRAZOLE SODIUM 40 MG: 40 TABLET, DELAYED RELEASE ORAL at 07:42

## 2021-10-21 ASSESSMENT — ACTIVITIES OF DAILY LIVING (ADL)
ADLS_ACUITY_SCORE: 11
ADLS_ACUITY_SCORE: 7
ADLS_ACUITY_SCORE: 7
ADLS_ACUITY_SCORE: 11
ADLS_ACUITY_SCORE: 7
ADLS_ACUITY_SCORE: 7
ADLS_ACUITY_SCORE: 11
ADLS_ACUITY_SCORE: 11
ADLS_ACUITY_SCORE: 7
ADLS_ACUITY_SCORE: 11
ADLS_ACUITY_SCORE: 7
ADLS_ACUITY_SCORE: 11
ADLS_ACUITY_SCORE: 7
ADLS_ACUITY_SCORE: 11
ADLS_ACUITY_SCORE: 9
ADLS_ACUITY_SCORE: 11
ADLS_ACUITY_SCORE: 7

## 2021-10-21 NOTE — PROGRESS NOTES
Brief progress note:     Notified of critical labs 6.0 and platelets of 43.    S: patient sleeping in room, awakened easily. Denies any complaints. Nose bleed from earlier this shift has stopped. Denies dizziness, weakness, lightheadedness, chest pain, sob, increased abdominal pain, or n/v.     O:  /62 (BP Location: Right arm)   Pulse 117   Temp 98.1  F (36.7  C) (Oral)   Resp 18   Wt 84.6 kg (186 lb 8 oz)   SpO2 93%   BMI 29.21 kg/m     HR fluctuating to 120s while in room  Gen: asleep but arousable, no acute distress  Cv: tachycardic  Resp: normal work of breathing, on nc  Abd: soft, mild incisional tenderness, non-distended, dressings in place c/d/i   Drain: serosanginous output  Extremities: WWP    Hgb: 6.0, plt 43    A/P:  51 F s/p DDLT on 10/17/21. Had an acute episode of epistaxis overnight which culminated in ~150 recorded blood loss and large amount of unrecorded blood loss - contributing to her acute anemia. Lower suspicion for an abdominal bleed given a benign abdominal exam and non-increased + serosanguinous drain output.     - coags (ptt/inr)  - anti Xa levels  - 2 unit(s) pRBC this AM  - 1 mg IV bumex between pRBC (to avoid volume overload)  - AM liver US  - will continue to monitor    Moody Alba  General Surgery PGY1   Night float

## 2021-10-21 NOTE — PROGRESS NOTES
[facility]   Transplant Nephrology Progress Note  Date of Admission:  10/16/2021  Today's Date: 10/21/2021    Recommendations:  - diuretic challenge bumex 4 mg iv x 1  - no absolute indications for RRT but will follow closely      Assessment & Plan    Maria Elena Fisher is a 51 year old women with hx of ESLD (MELD 18) 2/2 alcoholic cirrhosis now s.p DDLD on 10/17/2021 with uncomplicated procedure and post op course notable for brief pressor requirement (10/17-10/18), extubated 10/18 AM. Patient is on Solumedrol taper for induction, Tacrolimus 2mg BID and MMF 750mg BID.  Tx nephrology consulted for rising cr of 1.84 (Admission 1.44) in the setting of reduced urine output.     # CORBIN   # ATN  Oligoanuric, no response to fluid challenge and now with concerns for fluid overload  Agree with diuretic challenge bumex 4 mg x1    # Immune suppression   -  Patient is on Solumedrol taper for induction, Tacrolimus 2mg BID ang MMF 750mg BID.  - renal sparing regimen with lower CNI goals as per tx surgery    # Transplant hx   Etiology - Alcoholic liver cirrhosis  DDLT - 10/17/2021   AST//344 (Downtrending)162/299 today    Recommendations were communicated to the primary team verbally.    Janay Blevins MD       Interval History   Patient had prolonged epistaxis lasting for several hours yesterday, required prbc as drop in H/h noted, sob improved. Received bumex 1mg iv post prbc. Requiring O2 today    Review of Systems   4 point ROS was obtained and negative except as noted in the Interval History.    MEDICATIONS:    aspirin  325 mg Oral Daily     levothyroxine  50 mcg Oral QAM AC     mycophenolate  750 mg Oral BID IS     nystatin  500,000 Units Swish & Swallow 4x Daily     pantoprazole  40 mg Oral QAM AC     polyethylene glycol  17 g Oral or Feeding Tube Daily     [START ON 10/22/2021] predniSONE  10 mg Oral Once     senna-docusate  1 tablet Oral or Feeding Tube BID     sodium  chloride (PF)  3 mL Intravenous Q8H     [START ON 10/22/2021] sulfamethoxazole-trimethoprim  1 tablet Oral or Feeding Tube Once per day on      tacrolimus  1 mg Oral BID IS     ursodiol  300 mg Oral BID     valGANciclovir  450 mg Oral Once per day on        dextrose       [Held by provider] heparin 400 Units/hr (10/20/21 2208)     BETA BLOCKER NOT PRESCRIBED         Physical Exam   Temp  Av  F (36.7  C)  Min: 97.4  F (36.3  C)  Max: 98.9  F (37.2  C)  Arterial Line BP  Min: 85/49  Max: 158/87  Arterial Line MAP (mmHg)  Av mmHg  Min: 63 mmHg  Max: 119 mmHg      Pulse  Av.2  Min: 83  Max: 115 Resp  Av.2  Min: 9  Max: 24  FiO2 (%)  Av.5 %  Min: 35 %  Max: 50 %  SpO2  Av.4 %  Min: 91 %  Max: 100 %    CVP (mmHg): 9 mmHgBP 127/81 (BP Location: Right arm)   Pulse 115   Temp 98  F (36.7  C) (Oral)   Resp 18   Wt 84.6 kg (186 lb 8 oz)   SpO2 93%   BMI 29.21 kg/m     Date 10/20/21 0700 - 10/21/21 0659   Shift 6347-6706 7060-8142 2870-7955 24 Hour Total   INTAKE   P.O. 150   150   I.V. 10   10   Shift Total(mL/kg) 160(1.89)   160(1.89)   OUTPUT   Drains 450 200  650   Shift Total(mL/kg) 450(5.31) 200(2.36)  650(7.67)   Weight (kg) 84.73 84.73 84.73 84.73      Admit Weight: 81.2 kg (179 lb)     GENERAL APPEARANCE: alert and no distress  HENT: mouth without ulcers or lesions  LYMPHATICS: no cervical or supraclavicular nodes  RESP: lungs clear to auscultation - no rales, rhonchi or wheezes  CV: regular rhythm, normal rate, no rub, no murmur  EDEMA: no LE edema bilaterally  ABDOMEN: soft, nondistended, nontender, bowel sounds normal  MS: extremities normal - no gross deformities noted, no evidence of inflammation in joints, no muscle tenderness  SKIN: no rash    Data   All labs reviewed by me.  CMP  Recent Labs   Lab 10/21/21  1224 10/21/21  0749 10/21/21  0330 10/21/21  0157 10/20/21  2132 10/20/21  1718 10/20/21  0815 10/20/21  0700 10/19/21  0540 10/19/21  0338  10/18/21  0827 10/18/21  0808   NA  --   --  127*  --   --  126*  --  128*  --  130*   < > 134   POTASSIUM  --   --  5.2  --   --  5.2  --  4.7  --  3.8   < > 4.0   CHLORIDE  --   --  95  --   --  93*  --  94  --  98   < > 105   CO2  --   --  19*  --   --  19*  --  18*  --  19*   < > 21   ANIONGAP  --   --  13  --   --  14  --  16*  --  13   < > 8   * 91 94 72   < > 108*   < > 130*   < > 170*   < > 174*   BUN  --   --  77*  --   --  71*  --  62*  --  41*   < > 34*   CR  --   --  3.51*  --   --  3.19*  --  2.85*  --  1.84*   < > 1.34*   GFRESTIMATED  --   --  14*  --   --  16*  --  18*  --  31*   < > 46*   EMMY  --   --  8.3*  --   --  8.7  --  8.6  --  8.2*   < > 8.4*   MAG  --   --  3.0*  --   --   --   --  3.1*  --  2.3  --  2.3   PHOS  --   --  8.1*  --   --   --   --  8.0*  --  6.7*  --  5.9*   PROTTOTAL  --   --  5.1*  --   --   --   --  5.8*  --  4.9*  --  4.2*   ALBUMIN  --   --  2.8*  --   --   --   --  2.8*  --  2.6*  --  1.5*   BILITOTAL  --   --  2.3*  --   --   --   --  3.3*  --  3.5*  --  3.5*   ALKPHOS  --   --  96  --   --   --   --  128  --  120  --  174*   AST  --   --  82*  --   --   --   --  162*  --  260*  --  736*   ALT  --   --  186*  --   --   --   --  299*  --  344*  --  609*    < > = values in this interval not displayed.     CBC  Recent Labs   Lab 10/21/21  1154 10/21/21  0330 10/20/21  2213 10/20/21  1037 10/19/21  0339 10/19/21  0339   HGB 8.3* 6.0* 7.2* 9.3*   < > 10.5*   WBC 10.4 9.6  --  15.1*  --  23.4*   RBC 2.65* 1.89*  --  2.91*  --  3.28*   HCT 24.4* 17.8*  --  27.3*  --  30.1*   MCV 92 94  --  94  --  92   MCH 31.3 31.7  --  32.0  --  32.0   MCHC 34.0 33.7  --  34.1  --  34.9   RDW 16.4* 17.3*  --  17.3*  --  17.5*   PLT 49* 43*  --  68*  --  56*    < > = values in this interval not displayed.     INR  Recent Labs   Lab 10/21/21  0719 10/18/21  0808 10/18/21  0406 10/17/21  2352   INR 1.20* 1.54* 1.51* 1.97*   PTT 35 41* 37 41*     ABG  Recent Labs   Lab 10/18/21  0809  10/18/21  0406 10/18/21  0112 10/17/21  2132   PH 7.37 7.38 7.36 7.40   PCO2 36 36 34* 37   PO2 133* 159* 148* 119*   HCO3 21 22 19* 23   O2PER 35 40 40 50      Urine Studies  Recent Labs   Lab Test 10/19/21  1438 01/04/21  1020   COLOR Yellow Bibi   APPEARANCE Slightly Cloudy* Slightly Cloudy   URINEGLC Negative Negative   URINEBILI Negative Negative   URINEKETONE Negative Negative   SG 1.018 1.019   UBLD Large* Negative   URINEPH 5.0 5.0   PROTEIN 20 * Negative   NITRITE Negative Negative   LEUKEST Negative Negative   RBCU 120* 1   WBCU 4 7*     No lab results found.  PTH  No lab results found.  Iron Studies  Recent Labs   Lab Test 10/05/21  1124 09/28/21  1204 07/30/21  1052 05/02/21  0106   IRON  --   --   --  259*   FEB  --   --   --  355   IRONSAT  --   --   --  73*   DARRICK 186 280* 32 72       IMAGING:  10/20/2021 CXR

## 2021-10-21 NOTE — PLAN OF CARE
/76 (BP Location: Right arm)   Pulse 112   Temp 97.8  F (36.6  C) (Oral)   Resp 16   Wt 84.6 kg (186 lb 8 oz)   SpO2 91%   BMI 29.21 kg/m       4377-4633  BP remain softer; all other VSS on 2L of 02. Continuous 02 monitor on. Up with SB; calls appropriately. Worked well with therapy today- tolerated activity much better this afternoon compared to this morning. Patient received 2 units of blood; 1 unit in early AM and 1 unit with morning medicaitons due to critical hemoglobin 6.0 (after nose bleed that occurred overnight). Right BERKLEY to bulb suction; output continues to decrease; serosanguinous. Clamshell incision stapled; small serosanguinous output from incision. Denies pain and nausea. Triple lumen CVC saline locked. PIV saline locked. Heparin drip stopped at 1100 per provider order and remains off into evening. Regular diet with calorie count. Patient did much better with PO intake today. BS's discontinued. Med card and lab book updated. Patient spoke with speciality pharmacy today and set up time tomorrow morning for teaching when patient's niece will be by bedside. Niece lives with patient and very involved in care plan. Mother by bedside today; Maria Elena appears to be surrounded by a very good support system. Continues to be on diuretic; has saturated many briefs today due to urgency and has missed hat placed in bathroom other times. Breathing continues to get better as well. BM today. Alert and oriented x4. Continue plan of care, please notify MD with any changes.

## 2021-10-21 NOTE — PROGRESS NOTES
Care Management Follow Up    Length of Stay (days): 4  Expected Discharge Date: 10/25/2021  Concerns to be Addressed: discharge planning     Patient plan of care discussed at interdisciplinary rounds: Yes     Anticipated Discharge Disposition: Home   Anticipated Discharge Services:  no home care agency available.   Anticipated Discharge DME: Sergio        Additional Information:  Patient has an unusual insurance: [employer]UE [employer]OSS [employer] TR[employer]ANT that home care agencies are not taking. Called [employer] and given list. The only  Agencies that are in network are [facility] ( they do not have staffing) and [facility] (they do not have staffing)        Home Care Agencies  [facility] - do not take insurance  [facility] do not take insurance  [facility]- do not take insurance  [facility]- no staffing  [facility]-no staffing.     Updated Danii, BREONNA that home care is unavailable and pt will need to go to clinic for labs. Also sent OP txp CC a message.    RNCC spoke with patient by phone and confirmed she will be discharging to the address on face sheet. Her niece lives with her, will be her primary care giver and  providing rides. Updated patient that home care is unavailable and she will need to go to clinic for labs. [facility] near her. Also discussed appointments that will occur at [facility]. She is in agreement.         Sharlene Vela, RN, [place]  Float Care Coordinator  Covering 7A RNCC   Pager: [phone]

## 2021-10-21 NOTE — PROGRESS NOTES
Brief progress note:     Called to assess patient early this evening for nose bleed. On assessment patient was bleeding a significant amount from her left nares. I was unable to visualize the direct point of bleeding, grossly her anterior nares didn't have obvious point of bleeding.     15 minutes of pressure was applied at which time patient continued to have epistaxis. Another 20-30 minutes of pressure was applied without hemostasis. An additional 20-30 minutes of pressure was applied with QuickClot without effect. Afirin was then ordered and applied to new quick clot and left nares was packed and pressure applied for 1 hour. After an hour it seemed her bleeding stopped (23:15)    During this time patient had brief episodes of desaturations but was told to lean forward to prevent aspiration of blood. Suction device was given and patient has lost ~150cc blood this far (23:15). She has intermittent tachycardia as well (110s-120s) though is normotensive. Hgb 7.2 from 9.3.    Plan:  - monitor for now  - if continued bleed -> transfusion and ENT consult    Moody Alba  General Surgery PGY1  Night Float

## 2021-10-21 NOTE — PROGRESS NOTES
Final positive donor blood culture results have been uploaded to [employer].  Donor ID [id number].  Dr. Alba/Sonali notified of results.

## 2021-10-21 NOTE — PROGRESS NOTES
Transplant Surgery  Inpatient Daily Progress Note  10/21/2021    Assessment & Plan: Sarah Fisher is a 51 year old female with PMH significant for ESLD MELD 18 due to alcoholic cirrhosis c/b ascites, GAVE, hematochromatosis, chronic hypotension, CKD, anemia, coagulopathy of liver disease, and hypothyroidism. She is now s/p DBD DDLTx 10/17/21 with Dr. Morrison.    Graft function:DBD Liver Tx 10/17/21: POD 4  Elevated LFTs, downtrending. AST/ALT 82/186.  TB 2.3 (3.3).   Continue Steve BID.   -HA ppx: hypercoagulable intraop. Postop US with elevated RI involving the extrahepatic hepatic artery and the right hepatic artery. Repeat US POD 1&2 -Patent hepatic vasculature. Continued elevated RI in the hepatic arteries. Repeat US today to evaluate for fluid collections/bleeding   - Heparin 400units/hr (hold 10/21 in setting of bleeding). Continue ASA 325mg daily.    BERKLEY drain x1 with continued large volume=925mL /24 hrs. Stop albumin replacements  Immunosuppression management:    Solumedrol taper per protocol induction  MMF 750mg BID  Tacrolimus -supratherapeutic, dose on hold Tac level 7.3 today (24hr), in setting of CORBIN, Resume at 1mg BID with goal to 6-8. Repeat daily levels  Complexity of management:Medium. Contributing factors: thrombocytopenia CORBIN  Hematology: Anemia of chronic disease/ABL:Received 5 L of PRBC intraop. HGb 6, transfuse 2 PRBC, repeat hgb tonight. Hold heparin.    -HA ppx as above with Heparin 400units/hr-hold today with concern for bleeding. Send hemolysis labs and HIT panel.  Epistaxis: Persistent Nose bleed x4 hrs 10/20, stopped with quick clot. Humidified O2  Neurology: Acute postoperative pain: Controlled, Continue Tramadol 50mg TID PRN and Robaxin 500mg TID PRN  Cardiorespiratory: Hypotension: Stable 110-120  Tachycardia: -120,ST  Pleural effusion, Large:  Extubated POD 1 to RA, CDB/IS   GI/Nutrition: Regular  Continue bowel regimen: give MOM, Miralax, Senna BID, Dulcolax suppos  PRN  Endocrine:Steroid induced hyperglycemia: initially required an insulin gtt, FBS 90-120s. Weaned off insulin  Fluid/Electrolytes: Hypervolemia, +3Kg Trial diuresis today. Received 1mg bumex between PRBC, give additional 3mg.   Hyponatremia: Na 127   Hyperphosphatemia:8.1  :Anuric, no daniels  CORBIN: SCr 1-1.2 b/l, elevated to 3.5 today. Anuric. Lack of response to volume challenge, Neph following. Most likely ATN, bumex 4mg total today.  Infectious disease:Afebrile,   Leukocytosis: WBC 10  Trend daily. Received zosyn x48hrs Periop and diflucan x2 1 dose pre and 1 dose postop  Valcyte (CMV ppx) renal dosed, Bactrim (PCP ppx)  Prophylaxis: DVT, fall, GI   Disposition:   7A    Medical Decision Making: Medium  Subsequent visit 84102 (moderate level decision making)      BREONNA/Fellow/Resident Provider: Danii Hickman NP     Faculty: Jaylan Lyon M.D.    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    10/17/2021 (Liver), Postoperative day: 4     Interval History: obtained from the patient.    Overnight events: Epistaxis x4 hrs overnight. Stopped with quick clot. Anuric. Having BM's non bloody.  Pain controlled. Tolerating regular diet. Dyspnea improved. Ambulating with walker.     ROS:   A 10-point review of systems was negative except as noted above.    Curent Meds:    aspirin  325 mg Oral Daily     bumetanide  3 mg Intravenous Once     insulin aspart  1-10 Units Subcutaneous TID AC     insulin aspart  1-7 Units Subcutaneous At Bedtime     levothyroxine  50 mcg Oral QAM AC     mycophenolate  750 mg Oral BID IS     nystatin  500,000 Units Swish & Swallow 4x Daily     pantoprazole  40 mg Oral QAM AC     polyethylene glycol  17 g Oral or Feeding Tube Daily     [START ON 10/22/2021] predniSONE  10 mg Oral Once     senna-docusate  1 tablet Oral or Feeding Tube BID      sodium chloride (PF)  3 mL Intravenous Q8H     [START ON 10/22/2021] sulfamethoxazole-trimethoprim  1 tablet Oral or Feeding Tube Once per day on Mon Wed Fri     [Held by provider] tacrolimus  1 mg Oral BID IS     ursodiol  300 mg Oral BID     valGANciclovir  450 mg Oral Once per day on Mon Thu       Physical Exam:     Admit Weight: 81.2 kg (179 lb)    Current Vitals:   /81 (BP Location: Right arm)   Pulse 115   Temp 98  F (36.7  C) (Oral)   Resp 18   Wt 84.6 kg (186 lb 8 oz)   SpO2 93%   BMI 29.21 kg/m      CVP (mmHg): 9 mmHg    Vital sign ranges:    Temp:  [97.6  F (36.4  C)-98.6  F (37  C)] 98  F (36.7  C)  Pulse:  [] 115  Resp:  [16-18] 18  BP: (114-131)/(62-89) 127/81  SpO2:  [89 %-98 %] 93 %  Patient Vitals for the past 24 hrs:   BP Temp Temp src Pulse Resp SpO2 Weight   10/21/21 1055 127/81 98  F (36.7  C) Oral 115 18 93 % --   10/21/21 0914 125/85 98  F (36.7  C) Oral 111 18 95 % --   10/21/21 0903 124/87 97.8  F (36.6  C) Oral 112 18 96 % --   10/21/21 0709 125/79 98.6  F (37  C) Oral 113 18 -- --   10/21/21 0608 122/80 98.1  F (36.7  C) Oral 112 18 96 % --   10/21/21 0505 118/79 98.2  F (36.8  C) Oral 116 16 93 % --   10/21/21 0449 114/80 98.3  F (36.8  C) -- 116 16 -- --   10/21/21 0307 115/62 98.1  F (36.7  C) Oral 117 18 93 % 84.6 kg (186 lb 8 oz)   10/20/21 2334 117/73 98.2  F (36.8  C) Oral 115 18 92 % --   10/20/21 2205 -- -- -- (!) 125 -- (!) 89 % --   10/20/21 1920 131/89 97.6  F (36.4  C) Oral 119 16 94 % --   10/20/21 1605 121/83 98  F (36.7  C) Oral 97 16 98 % --     General Appearance: in no apparent distress. Fatigued appearing.  Skin: normal, warm, dry, No rashes, induration   Heart: regular rate and rhythm  Lungs: clear anteriorly, diminished to left base, no crackles or wheezing, 2L NC  Abdomen: obese, soft, NTTP, incision with staples CDI, mild serosanguinous output to right lateral incision.   :  anuric  Extremities: edema: present bilaterally. 1+pitting. There is no  skin breakdown.  Neurologic: awake and alert.  Tremor absent.. Asterixis: absent.    Frailty Scores     Frailty Scores 12/27/2020 12/27/2020    Final Score Frail Frail    Final Score Number 3 3          Data:   CMP  Recent Labs   Lab 10/21/21  1224 10/21/21  0749 10/21/21  0330 10/21/21  0324 10/21/21  0157 10/20/21  2132 10/20/21  1718 10/20/21  1718 10/20/21  0815 10/20/21  0700 10/20/21  0442 10/19/21  0338 10/18/21  0003 10/17/21  2352 10/17/21  1509 10/16/21  2213   NA  --   --  127*  --   --   --   --  126*   < > 128*  --    < >   < > 134   < > 131*   POTASSIUM  --   --  5.2  --   --   --   --  5.2   < > 4.7  --    < >   < > 4.0   < > 4.1   CHLORIDE  --   --  95  --   --   --   --  93*   < > 94  --    < >   < > 103   < > 100   CO2  --   --  19*  --   --   --   --  19*   < > 18*  --    < >   < > 20   < > 24   * 91 94  --    < >   < >   < > 108*   < > 130*  --    < >   < > 162*   < > 125*   BUN  --   --  77*  --   --   --   --  71*   < > 62*  --    < >   < > 28   < > 38*   CR  --   --  3.51*  --   --   --   --  3.19*   < > 2.85*  --    < >   < > 0.96   < > 1.44*   GFRESTIMATED  --   --  14*  --   --   --   --  16*   < > 18*  --    < >   < > 69   < > 42*   EMMY  --   --  8.3*  --   --   --   --  8.7   < > 8.6  --    < >   < > 8.9   < > 8.5   ICAW  --   --   --  4.4  --   --   --   --   --   --  4.4  --    < >  --    < >  --    MAG  --   --  3.0*  --   --   --   --   --   --  3.1*  --    < >   < > 2.3   < > 2.0   PHOS  --   --  8.1*  --   --   --   --   --   --  8.0*  --    < >   < > 4.3   < > 3.5   AMYLASE  --   --   --   --   --   --   --   --   --   --   --   --   --  52  --  70   LIPASE  --   --   --   --   --   --   --   --   --   --   --   --   --  191  --   --    ALBUMIN  --   --  2.8*  --   --   --   --   --   --  2.8*  --    < >   < > 1.6*   < > 2.3*   BILITOTAL  --   --  2.3*  --   --   --   --   --   --  3.3*  --    < >   < > 8.9*   < > 5.0*   ALKPHOS  --   --  96  --   --   --   --   --   --  128   --    < >   < > 244*   < > 206*   AST  --   --  82*  --   --   --   --   --   --  162*  --    < >   < > 1,435*   < > 39   ALT  --   --  186*  --   --   --   --   --   --  299*  --    < >   < > 850*   < > 26    < > = values in this interval not displayed.     CBC  Recent Labs   Lab 10/21/21  1154 10/21/21  0330 10/20/21  1037 10/19/21  1731   HGB 8.3* 6.0*   < >  --    WBC 10.4 9.6   < >  --    PLT 49* 43*   < >  --    A1C  --   --   --  5.1    < > = values in this interval not displayed.     COAGS  Recent Labs   Lab 10/21/21  0719 10/18/21  0808   INR 1.20* 1.54*   PTT 35 41*      Urinalysis  Recent Labs   Lab Test 10/19/21  1438 01/04/21  1020   COLOR Yellow Bibi   APPEARANCE Slightly Cloudy* Slightly Cloudy   URINEGLC Negative Negative   URINEBILI Negative Negative   URINEKETONE Negative Negative   SG 1.018 1.019   UBLD Large* Negative   URINEPH 5.0 5.0   PROTEIN 20 * Negative   NITRITE Negative Negative   LEUKEST Negative Negative   RBCU 120* 1   WBCU 4 7*     Virology:  Hepatitis C Antibody   Date Value Ref Range Status   10/16/2021 Nonreactive Nonreactive Final   05/02/2021 Nonreactive NR^Nonreactive Final     Comment:     Assay performance characteristics have not been established for newborns,   infants, and children

## 2021-10-22 ENCOUNTER — APPOINTMENT (OUTPATIENT)
Dept: PHYSICAL THERAPY | Facility: CLINIC | Age: 51
End: 2021-10-22
Attending: TRANSPLANT SURGERY
Payer: COMMERCIAL

## 2021-10-22 LAB
ALBUMIN SERPL-MCNC: 2.8 G/DL (ref 3.4–5)
ALP SERPL-CCNC: 111 U/L (ref 40–150)
ALT SERPL W P-5'-P-CCNC: 135 U/L (ref 0–50)
ANION GAP SERPL CALCULATED.3IONS-SCNC: 12 MMOL/L (ref 3–14)
AST SERPL W P-5'-P-CCNC: 35 U/L (ref 0–45)
BASOPHILS # BLD AUTO: 0 10E3/UL (ref 0–0.2)
BASOPHILS NFR BLD AUTO: 0 %
BILIRUB DIRECT SERPL-MCNC: 1.9 MG/DL (ref 0–0.2)
BILIRUB SERPL-MCNC: 2.3 MG/DL (ref 0.2–1.3)
BUN SERPL-MCNC: 84 MG/DL (ref 7–30)
CA-I BLD-MCNC: 4.5 MG/DL (ref 4.4–5.2)
CALCIUM SERPL-MCNC: 8.3 MG/DL (ref 8.5–10.1)
CHLORIDE BLD-SCNC: 95 MMOL/L (ref 94–109)
CO2 SERPL-SCNC: 20 MMOL/L (ref 20–32)
CREAT SERPL-MCNC: 3.89 MG/DL (ref 0.52–1.04)
EOSINOPHIL # BLD AUTO: 0 10E3/UL (ref 0–0.7)
EOSINOPHIL NFR BLD AUTO: 0 %
ERYTHROCYTE [DISTWIDTH] IN BLOOD BY AUTOMATED COUNT: 16.8 % (ref 10–15)
GFR SERPL CREATININE-BSD FRML MDRD: 13 ML/MIN/1.73M2
GLUCOSE BLD-MCNC: 86 MG/DL (ref 70–99)
HAPTOGLOB SERPL-MCNC: 119 MG/DL (ref 32–197)
HAPTOGLOB SERPL-MCNC: 145 MG/DL (ref 32–197)
HCT VFR BLD AUTO: 21.5 % (ref 35–47)
HGB BLD-MCNC: 7.2 G/DL (ref 11.7–15.7)
HGB BLD-MCNC: 7.2 G/DL (ref 11.7–15.7)
IMM GRANULOCYTES # BLD: 0.1 10E3/UL
IMM GRANULOCYTES NFR BLD: 1 %
LACTATE SERPL-SCNC: 0.4 MMOL/L (ref 0.7–2)
LACTATE SERPL-SCNC: 0.5 MMOL/L (ref 0.7–2)
LYMPHOCYTES # BLD AUTO: 0.6 10E3/UL (ref 0.8–5.3)
LYMPHOCYTES NFR BLD AUTO: 7 %
MAGNESIUM SERPL-MCNC: 2.9 MG/DL (ref 1.6–2.3)
MCH RBC QN AUTO: 30.8 PG (ref 26.5–33)
MCHC RBC AUTO-ENTMCNC: 33.5 G/DL (ref 31.5–36.5)
MCV RBC AUTO: 92 FL (ref 78–100)
MONOCYTES # BLD AUTO: 0.6 10E3/UL (ref 0–1.3)
MONOCYTES NFR BLD AUTO: 7 %
NEUTROPHILS # BLD AUTO: 7.4 10E3/UL (ref 1.6–8.3)
NEUTROPHILS NFR BLD AUTO: 85 %
NRBC # BLD AUTO: 0 10E3/UL
NRBC BLD AUTO-RTO: 0 /100
PATH REPORT.COMMENTS IMP SPEC: NORMAL
PATH REPORT.COMMENTS IMP SPEC: NORMAL
PATH REPORT.FINAL DX SPEC: NORMAL
PATH REPORT.MICROSCOPIC SPEC OTHER STN: NORMAL
PATH REPORT.MICROSCOPIC SPEC OTHER STN: NORMAL
PATH REPORT.RELEVANT HX SPEC: NORMAL
PHOSPHATE SERPL-MCNC: 7.1 MG/DL (ref 2.5–4.5)
PLATELET # BLD AUTO: 46 10E3/UL (ref 150–450)
POTASSIUM BLD-SCNC: 5.1 MMOL/L (ref 3.4–5.3)
PROT SERPL-MCNC: 5.2 G/DL (ref 6.8–8.8)
RBC # BLD AUTO: 2.34 10E6/UL (ref 3.8–5.2)
SODIUM SERPL-SCNC: 127 MMOL/L (ref 133–144)
TACROLIMUS BLD-MCNC: 5.4 UG/L (ref 5–15)
TME LAST DOSE: NORMAL H
TME LAST DOSE: NORMAL H
WBC # BLD AUTO: 8.6 10E3/UL (ref 4–11)

## 2021-10-22 PROCEDURE — G0500 MOD SEDAT ENDO SERVICE >5YRS: HCPCS | Performed by: STUDENT IN AN ORGANIZED HEALTH CARE EDUCATION/TRAINING PROGRAM

## 2021-10-22 PROCEDURE — 83605 ASSAY OF LACTIC ACID: CPT

## 2021-10-22 PROCEDURE — 250N000013 HC RX MED GY IP 250 OP 250 PS 637: Performed by: NURSE PRACTITIONER

## 2021-10-22 PROCEDURE — 250N000013 HC RX MED GY IP 250 OP 250 PS 637: Performed by: INTERNAL MEDICINE

## 2021-10-22 PROCEDURE — 97116 GAIT TRAINING THERAPY: CPT | Mod: GP

## 2021-10-22 PROCEDURE — 88342 IMHCHEM/IMCYTCHM 1ST ANTB: CPT | Mod: TC | Performed by: STUDENT IN AN ORGANIZED HEALTH CARE EDUCATION/TRAINING PROGRAM

## 2021-10-22 PROCEDURE — 80197 ASSAY OF TACROLIMUS: CPT | Performed by: NURSE PRACTITIONER

## 2021-10-22 PROCEDURE — 36592 COLLECT BLOOD FROM PICC: CPT | Performed by: PHYSICIAN ASSISTANT

## 2021-10-22 PROCEDURE — 250N000011 HC RX IP 250 OP 636: Performed by: STUDENT IN AN ORGANIZED HEALTH CARE EDUCATION/TRAINING PROGRAM

## 2021-10-22 PROCEDURE — 120N000011 HC R&B TRANSPLANT UMMC

## 2021-10-22 PROCEDURE — 82330 ASSAY OF CALCIUM: CPT | Performed by: NURSE PRACTITIONER

## 2021-10-22 PROCEDURE — 250N000012 HC RX MED GY IP 250 OP 636 PS 637: Performed by: NURSE PRACTITIONER

## 2021-10-22 PROCEDURE — 250N000013 HC RX MED GY IP 250 OP 250 PS 637: Performed by: TRANSPLANT SURGERY

## 2021-10-22 PROCEDURE — 99254 IP/OBS CNSLTJ NEW/EST MOD 60: CPT | Mod: 24 | Performed by: STUDENT IN AN ORGANIZED HEALTH CARE EDUCATION/TRAINING PROGRAM

## 2021-10-22 PROCEDURE — 80053 COMPREHEN METABOLIC PANEL: CPT | Performed by: NURSE PRACTITIONER

## 2021-10-22 PROCEDURE — 83735 ASSAY OF MAGNESIUM: CPT | Performed by: NURSE PRACTITIONER

## 2021-10-22 PROCEDURE — 99232 SBSQ HOSP IP/OBS MODERATE 35: CPT | Mod: 24 | Performed by: TRANSPLANT SURGERY

## 2021-10-22 PROCEDURE — 88305 TISSUE EXAM BY PATHOLOGIST: CPT | Mod: 26 | Performed by: PATHOLOGY

## 2021-10-22 PROCEDURE — 85018 HEMOGLOBIN: CPT | Performed by: PHYSICIAN ASSISTANT

## 2021-10-22 PROCEDURE — 82248 BILIRUBIN DIRECT: CPT | Performed by: NURSE PRACTITIONER

## 2021-10-22 PROCEDURE — 43239 EGD BIOPSY SINGLE/MULTIPLE: CPT | Performed by: STUDENT IN AN ORGANIZED HEALTH CARE EDUCATION/TRAINING PROGRAM

## 2021-10-22 PROCEDURE — 250N000013 HC RX MED GY IP 250 OP 250 PS 637: Performed by: PHYSICIAN ASSISTANT

## 2021-10-22 PROCEDURE — 250N000011 HC RX IP 250 OP 636: Performed by: PHYSICIAN ASSISTANT

## 2021-10-22 PROCEDURE — 250N000013 HC RX MED GY IP 250 OP 250 PS 637: Performed by: STUDENT IN AN ORGANIZED HEALTH CARE EDUCATION/TRAINING PROGRAM

## 2021-10-22 PROCEDURE — 83010 ASSAY OF HAPTOGLOBIN QUANT: CPT | Performed by: TRANSPLANT SURGERY

## 2021-10-22 PROCEDURE — 99233 SBSQ HOSP IP/OBS HIGH 50: CPT | Performed by: INTERNAL MEDICINE

## 2021-10-22 PROCEDURE — 85025 COMPLETE CBC W/AUTO DIFF WBC: CPT | Performed by: NURSE PRACTITIONER

## 2021-10-22 PROCEDURE — 250N000012 HC RX MED GY IP 250 OP 636 PS 637: Performed by: STUDENT IN AN ORGANIZED HEALTH CARE EDUCATION/TRAINING PROGRAM

## 2021-10-22 PROCEDURE — 84100 ASSAY OF PHOSPHORUS: CPT | Performed by: NURSE PRACTITIONER

## 2021-10-22 PROCEDURE — 0DB98ZX EXCISION OF DUODENUM, VIA NATURAL OR ARTIFICIAL OPENING ENDOSCOPIC, DIAGNOSTIC: ICD-10-PCS | Performed by: STUDENT IN AN ORGANIZED HEALTH CARE EDUCATION/TRAINING PROGRAM

## 2021-10-22 PROCEDURE — 97530 THERAPEUTIC ACTIVITIES: CPT | Mod: GP

## 2021-10-22 PROCEDURE — 36592 COLLECT BLOOD FROM PICC: CPT | Performed by: NURSE PRACTITIONER

## 2021-10-22 RX ORDER — PANTOPRAZOLE SODIUM 40 MG/1
40 TABLET, DELAYED RELEASE ORAL 2 TIMES DAILY
Status: DISCONTINUED | OUTPATIENT
Start: 2021-10-22 | End: 2021-10-29 | Stop reason: HOSPADM

## 2021-10-22 RX ORDER — FENTANYL CITRATE 50 UG/ML
INJECTION, SOLUTION INTRAMUSCULAR; INTRAVENOUS PRN
Status: COMPLETED | OUTPATIENT
Start: 2021-10-22 | End: 2021-10-22

## 2021-10-22 RX ORDER — SIMETHICONE 40MG/0.6ML
SUSPENSION, DROPS(FINAL DOSAGE FORM)(ML) ORAL PRN
Status: COMPLETED | OUTPATIENT
Start: 2021-10-22 | End: 2021-10-22

## 2021-10-22 RX ORDER — BUMETANIDE 0.25 MG/ML
3 INJECTION INTRAMUSCULAR; INTRAVENOUS ONCE
Status: COMPLETED | OUTPATIENT
Start: 2021-10-22 | End: 2021-10-22

## 2021-10-22 RX ADMIN — URSODIOL 300 MG: 300 CAPSULE ORAL at 21:24

## 2021-10-22 RX ADMIN — OXYMETAZOLINE HYDROCHLORIDE 2 SPRAY: 0.05 SPRAY NASAL at 00:00

## 2021-10-22 RX ADMIN — SULFAMETHOXAZOLE AND TRIMETHOPRIM 1 TABLET: 400; 80 TABLET ORAL at 08:32

## 2021-10-22 RX ADMIN — MIDAZOLAM 1 MG: 1 INJECTION INTRAMUSCULAR; INTRAVENOUS at 13:44

## 2021-10-22 RX ADMIN — NYSTATIN 500000 UNITS: 500000 SUSPENSION ORAL at 11:46

## 2021-10-22 RX ADMIN — PANTOPRAZOLE SODIUM 40 MG: 40 TABLET, DELAYED RELEASE ORAL at 07:33

## 2021-10-22 RX ADMIN — FENTANYL CITRATE 25 MCG: 50 INJECTION, SOLUTION INTRAMUSCULAR; INTRAVENOUS at 13:42

## 2021-10-22 RX ADMIN — NYSTATIN 500000 UNITS: 500000 SUSPENSION ORAL at 07:32

## 2021-10-22 RX ADMIN — MIDAZOLAM 1 MG: 1 INJECTION INTRAMUSCULAR; INTRAVENOUS at 13:40

## 2021-10-22 RX ADMIN — TACROLIMUS 1 MG: 1 CAPSULE ORAL at 07:33

## 2021-10-22 RX ADMIN — TACROLIMUS 1.5 MG: 1 CAPSULE ORAL at 17:41

## 2021-10-22 RX ADMIN — MIDAZOLAM 1 MG: 1 INJECTION INTRAMUSCULAR; INTRAVENOUS at 13:52

## 2021-10-22 RX ADMIN — SIMETHICONE 133 MG: 20 SUSPENSION/ DROPS ORAL at 12:58

## 2021-10-22 RX ADMIN — ASPIRIN 325 MG ORAL TABLET 325 MG: 325 PILL ORAL at 07:33

## 2021-10-22 RX ADMIN — BUMETANIDE 3 MG: 0.25 INJECTION, SOLUTION INTRAMUSCULAR; INTRAVENOUS at 16:20

## 2021-10-22 RX ADMIN — PANTOPRAZOLE SODIUM 40 MG: 40 TABLET, DELAYED RELEASE ORAL at 21:24

## 2021-10-22 RX ADMIN — NYSTATIN 500000 UNITS: 500000 SUSPENSION ORAL at 21:24

## 2021-10-22 RX ADMIN — MYCOPHENOLATE MOFETIL 750 MG: 250 CAPSULE ORAL at 07:34

## 2021-10-22 RX ADMIN — LEVOTHYROXINE SODIUM 50 MCG: 0.05 TABLET ORAL at 07:33

## 2021-10-22 RX ADMIN — DOCUSATE SODIUM 50 MG AND SENNOSIDES 8.6 MG 1 TABLET: 8.6; 5 TABLET, FILM COATED ORAL at 21:24

## 2021-10-22 RX ADMIN — FENTANYL CITRATE 50 MCG: 50 INJECTION, SOLUTION INTRAMUSCULAR; INTRAVENOUS at 13:38

## 2021-10-22 RX ADMIN — PREDNISONE 10 MG: 10 TABLET ORAL at 08:30

## 2021-10-22 RX ADMIN — FENTANYL CITRATE 25 MCG: 50 INJECTION, SOLUTION INTRAMUSCULAR; INTRAVENOUS at 13:47

## 2021-10-22 RX ADMIN — NYSTATIN 500000 UNITS: 500000 SUSPENSION ORAL at 15:02

## 2021-10-22 RX ADMIN — MIDAZOLAM 2 MG: 1 INJECTION INTRAMUSCULAR; INTRAVENOUS at 13:38

## 2021-10-22 RX ADMIN — MYCOPHENOLATE MOFETIL 750 MG: 250 CAPSULE ORAL at 17:41

## 2021-10-22 RX ADMIN — URSODIOL 300 MG: 300 CAPSULE ORAL at 07:32

## 2021-10-22 ASSESSMENT — ACTIVITIES OF DAILY LIVING (ADL)
ADLS_ACUITY_SCORE: 6
ADLS_ACUITY_SCORE: 6
ADLS_ACUITY_SCORE: 8
ADLS_ACUITY_SCORE: 11
ADLS_ACUITY_SCORE: 8
ADLS_ACUITY_SCORE: 6
ADLS_ACUITY_SCORE: 6
ADLS_ACUITY_SCORE: 8
ADLS_ACUITY_SCORE: 6
ADLS_ACUITY_SCORE: 8
ADLS_ACUITY_SCORE: 6
ADLS_ACUITY_SCORE: 8
ADLS_ACUITY_SCORE: 8
ADLS_ACUITY_SCORE: 6
ADLS_ACUITY_SCORE: 6
ADLS_ACUITY_SCORE: 8
ADLS_ACUITY_SCORE: 8
ADLS_ACUITY_SCORE: 6

## 2021-10-22 NOTE — OR NURSING
EGD with biopsies completed and pt tolerated well with conscious sedation and on 2-4L nc oxygen.  Report called to 7A RN and transport here to take pt back to dept.

## 2021-10-22 NOTE — CONSULTS
Gastroenterology Consultation  Sarah Fisher [id number] 51 year old 1970  10/22/2021        Date of Admission: 10/16/2021  Requesting physician: Christian Ramirez MD       Reason for Consultation:   Recurrent anemia, having melena per patient, evaluate for GI bleed (pt still NPO this AM)         Assessment and Plan:   Sarah Fisher is a 51 year old female with PMHx of alcohol related cirrhosis c/b ascites, hepatic hydrothorax and GAVE and history of hemochromatosis compound heterozygote who underwent DBD DDLT 10/17/21.     #. Alcohol related cirrhosis   #. Hemochromatosis compound heterozygote  #. History of GAVE  #. DDLT (DBD) on 10/17/21  #. Epistaxis  #. Acute on Chronic Normocytic Anemia    Patient with history of GAVE s/p APC 9/2021 and APC 10/2021 who underwent liver transplant + was placed on heparin gtt for hepatic artery thrombosis ppx, ASA and prednisone post transplant, which places her at risk for re-bleeding from her known GAVE. Patient also with ongoing epistaxis with an episode overnight lasting > 1 hour. Uremia in the setting of renal injury is likely not helping with hemostasis.     Retic index < 2, suggests hypoproliferation. Hgb dropped from 12 post DDLT to 6.0 on 10/21/21. Received 2u pRBC on 10/21/21 for a Hgb of 6.0 with improvement to 8.3, but then Hgb of 7.2         Recommendations:   -- Plan for EGD today   * NPO except meds + ice chips  -- Given ongoing blood losses, consider iron replacement  -- Increase pantoprazole to 40mg BID; ordered for you  -- Appreciate ENT consultation for ongoing epistaxis    It has been a pleasure to participate in the care of this patient.  Patient discussed with GI staff, Dr. Avila.  Please do not hesitate to contact the GI service with any questions or concerns.     Feli Marie (Lizzie)  Gastroenterology Fellow  Pager [phone]         HPI:   Sarah Fisher is a 51 year old female with PMHx of alcohol related cirrhosis c/b ascites,  hepatic hydrothorax and GAVE and history of hemochromatosis compound heterozygote who underwent DBD DDLT 10/17/21.     - PreLTx had issues with GAVE requiring transfusions. Underwent EGD w/APC on 21. Repeat EGD on 10/7/21 with GAVE without bleeding noted in the stomach and small intestine; treated with APC.   - Post LTx on 10/17 - had several days without bowel movements, but every since she started to have bowel movements on 10/18 they have been dark in color and now black, soft in nature. She had an episode of epistaxis two on 10/20 overnight where it lasted 6 hours per her report and then she had another episode overnight on 10/21 - this episode lasted 1.25 hours per her report  - Experiences some shortness of breath with exertion, but denies SOB at rest.   - Denies nausea or emesis. Denies abdominal pain. Good appetite  - Denies fevers or chills         Past Medical History:   Reviewed and edited as appropriate  Past Medical History:   Diagnosis Date     Ascites      History of blood transfusion      Liver cirrhosis (H)      Thyroid disease           Past Surgical History:   Reviewed and edited as appropriate   Past Surgical History:   Procedure Laterality Date      SECTION       CV RIGHT HEART CATH MEASUREMENTS RECORDED N/A 2021    Procedure: CV RIGHT HEART CATH;  Surgeon: Joseph Guevara MD;  Location: [facility] CARDIAC CATH LAB     ESOPHAGOSCOPY, GASTROSCOPY, DUODENOSCOPY (EGD), COMBINED N/A 10/7/2021    Procedure: ESOPHAGOGASTRODUODENOSCOPY (EGD) with hemostasis;  Surgeon: Fox Jerry MD;  Location:  GI     IR THORACENTESIS  2021     IR THORACENTESIS  2021     THORACENTESIS Left 2021    Procedure: THORACENTESIS;  Surgeon: Almas Irby MD;  Location:  GI     THORACENTESIS N/A 2021    Procedure: THORACENTESIS;  Surgeon: Almas Irby MD;  Location:  GI     THORACENTESIS N/A 03/10/2021    Procedure: THORACENTESIS;  Surgeon: Almas Irby MD;  Location:  [facility] GI     THORACENTESIS Left 2021    Procedure: THORACENTESIS;  Surgeon: Kennedi Chavez MD;  Location: [facility] OR     THORACENTESIS Left 2021    Procedure: THORACENTESIS;  Surgeon: Jess Ansari PA-C;  Location: [facility] OR     TRANSPLANT LIVER RECIPIENT  DONOR N/A 10/17/2021    Procedure: TRANSPLANT, LIVER, RECIPIENT,  DONOR;  Surgeon: Christian Morrison MD;  Location: [facility] OR            Medications:     Current Facility-Administered Medications   Medication     aspirin (ASA) tablet 325 mg     bisacodyl (DULCOLAX) Suppository 10 mg     dextrose 10% infusion     glucose gel 15-30 g    Or     dextrose 50 % injection 25-50 mL    Or     glucagon injection 1 mg     [Held by provider] heparin infusion 25,000 units in 0.45% NaCl 250 mL ANTICOAGULANT     levothyroxine (SYNTHROID/LEVOTHROID) tablet 50 mcg     methocarbamol (ROBAXIN) tablet 500 mg     mycophenolate (GENERIC EQUIVALENT) capsule 750 mg     naloxone (NARCAN) injection 0.2 mg    Or     naloxone (NARCAN) injection 0.4 mg    Or     naloxone (NARCAN) injection 0.2 mg    Or     naloxone (NARCAN) injection 0.4 mg     nystatin (MYCOSTATIN) suspension 500,000 Units     ondansetron (ZOFRAN) injection 4 mg     oxymetazoline (AFRIN) 0.05 % spray 2 spray     pantoprazole (PROTONIX) EC tablet 40 mg     polyethylene glycol (MIRALAX) Packet 17 g     polyethylene glycol (MIRALAX) Packet 17 g     [START ON 10/23/2021] predniSONE (DELTASONE) tablet 5 mg     prochlorperazine (COMPAZINE) injection 5 mg     Reason beta blocker order not selected     senna-docusate (SENOKOT-S/PERICOLACE) 8.6-50 MG per tablet 1 tablet     sodium chloride (PF) 0.9% PF flush 3 mL     sodium chloride (PF) 0.9% PF flush 3 mL     sodium chloride (PF) 0.9% PF flush 3 mL     sulfamethoxazole-trimethoprim (BACTRIM) 400-80 MG per tablet 1 tablet     tacrolimus (GENERIC EQUIVALENT) capsule 1 mg     traMADol (ULTRAM) tablet 50 mg     traZODone (DESYREL) tablet 50 mg     ursodiol  (ACTIGALL) capsule 300 mg     valGANciclovir (VALCYTE) tablet 450 mg            Allergies      Allergies   Allergen Reactions     Amoxicillin GI Disturbance, Diarrhea, Nausea and Nausea and Vomiting          Social History:   Reviewed and edited as appropriate  Social History     Socioeconomic History     Marital status: Single     Spouse name: Not on file     Number of children: Not on file     Years of education: Not on file     Highest education level: Master's degree (e.g., MA, MS, Roberto, MEd, MSW, SASHA)   Occupational History     Not on file   Tobacco Use     Smoking status: Never Smoker     Smokeless tobacco: Never Used   Substance and Sexual Activity     Alcohol use: Not Currently     Comment: Quit on 6/20/2020     Drug use: Not Currently     Sexual activity: Not Currently     Partners: Male   Other Topics Concern     Parent/sibling w/ CABG, MI or angioplasty before 65F 55M? Not Asked   Social History Narrative    Lives [place]. 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 12 year sonFlakito.         Christine Harris, DNP, APRN, CNP    3/17/2021     Social Determinants of Health     Financial Resource Strain: Low Risk      Difficulty of Paying Living Expenses: Not hard at all   Food Insecurity: No Food Insecurity     Worried About Running Out of Food in the Last Year: Never true     Ran Out of Food in the Last Year: Never true   Transportation Needs: No Transportation Needs     Lack of Transportation (Medical): No     Lack of Transportation (Non-Medical): No   Physical Activity: Sufficiently Active     Days of Exercise per Week: 5 days     Minutes of Exercise per Session: 30 min   Stress: No Stress Concern Present     Feeling of Stress : Only a little   Social Connections: Moderately Integrated     Frequency of Communication with Friends and Family: More than three times a week     Frequency of Social Gatherings with Friends and Family: More than three times a week      Attends Buddhism Services: More than 4 times per year     Active Member of Clubs or Organizations: Yes     Attends Club or Organization Meetings: More than 4 times per year     Marital Status: Never    Intimate Partner Violence:      Fear of Current or Ex-Partner:      Emotionally Abused:      Physically Abused:      Sexually Abused:          Family History:   Reviewed and edited as appropriate  Family History   Problem Relation Age of Onset     No Known Problems Mother      Colon Cancer Father 58         age 63     Breast Cancer Maternal Grandmother         Diagnosed in her 60's     No Known Problems Maternal Grandfather      No Known Problems Paternal Grandmother      No Known Problems Paternal Grandfather      Substance Abuse Brother      No Known Problems Sister      No Known Problems Son      Substance Abuse Brother           Review of Systems:   A complete 10 point review of systems was obtained.  Please see the HPI for pertinent positives and negatives.  All other systems were reviewed and were found to be negative.          Physical Exam:   VS:  /80 (BP Location: Right arm)   Pulse 102   Temp 97.6  F (36.4  C) (Oral)   Resp 22   Wt 83.1 kg (183 lb 1.6 oz)   SpO2 94%   BMI 28.68 kg/m      Wt:   Wt Readings from Last 2 Encounters:   10/22/21 83.1 kg (183 lb 1.6 oz)   21 81.2 kg (179 lb)      Constitutional: cooperative, pleasant, no acute distress  Eyes: Conjunctiva anicteric  HEENT: mucus membranes moist  CV: Tachycardic  Ext: 1+ David edema to mid shins bilaterally  Respiratory: Breathing comfortably on 2L  Abd: Nondistended, surgical scar healing well, appropriate tenderness around site  Skin: warm, perfused, no jaundice  Neuro: A&O x 3         Laboratory:   BMP  Recent Labs   Lab 10/22/21  0538 10/21/21  1611 10/21/21  1224 10/21/21  0749 10/21/21  0330 10/21/21  0157 10/20/21  2132 10/20/21  1718   * 126*  --   --  127*  --   --  126*   POTASSIUM 5.1 5.4*  --   --  5.2   --   --  5.2   CHLORIDE 95 94  --   --  95  --   --  93*   EMMY 8.3* 8.1*  --   --  8.3*  --   --  8.7   CO2 20 21  --   --  19*  --   --  19*   BUN 84* 79*  --   --  77*  --   --  71*   CR 3.89* 3.55*  --   --  3.51*  --   --  3.19*   GLC 86 118* 120* 91 94   < >   < > 108*    < > = values in this interval not displayed.     CBC  Recent Labs   Lab 10/22/21  0538 10/21/21  1154 10/21/21  0330 10/20/21  2213 10/20/21  1037 10/20/21  1037   WBC 8.6 10.4 9.6  --   --  15.1*   RBC 2.34* 2.65* 1.89*  --   --  2.91*   HGB 7.2* 8.3* 6.0* 7.2*   < > 9.3*   HCT 21.5* 24.4* 17.8*  --   --  27.3*   MCV 92 92 94  --   --  94   MCH 30.8 31.3 31.7  --   --  32.0   MCHC 33.5 34.0 33.7  --   --  34.1   RDW 16.8* 16.4* 17.3*  --   --  17.3*   PLT 46* 49* 43*  --   --  68*    < > = values in this interval not displayed.     INR  Recent Labs   Lab 10/21/21  0719 10/18/21  0808 10/18/21  0406 10/17/21  2352   INR 1.20* 1.54* 1.51* 1.97*     Liver Enzymes  Recent Labs   Lab 10/22/21  0538 10/21/21  0330 10/20/21  0700 10/19/21  0338   ALKPHOS 111 96 128 120   AST 35 82* 162* 260*   * 186* 299* 344*   BILITOTAL 2.3* 2.3* 3.3* 3.5*   PROTTOTAL 5.2* 5.1* 5.8* 4.9*   ALBUMIN 2.8* 2.8* 2.8* 2.6*      Imaging/Procedures/Studies:   CXR 10/21/21  Improving small left pleural effusion with overlying compressive  atelectasis.    EGD 10/7/21  Impression:               - Normal esophagus.                             - Portal hypertensive gastropathy.                             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                             Evidence of prior treatment. Small area of vascular                             ectasias noted in the duodenal bulb. Treated with                             argon plasma coagulation (APC).                             - No specimens collected.   Recommendation:           - Discharge patient to home (ambulatory).                             - Full liquid diet today and tomorrow, then advance                              to a regular diet as tolerated                             - Continue pantoprazole to 40 mg twice daily to                             treat iatrogenic gastric ulcerations following                             procedure today.                             - Repeat upper endoscopy in 3 month for retreatment                             with Dr. Jerry.

## 2021-10-22 NOTE — PLAN OF CARE
/79 (BP Location: Right arm)   Pulse 107   Temp 98.5  F (36.9  C) (Oral)   Resp 24   Wt 84.6 kg (186 lb 8 oz)   SpO2 96%   BMI 29.21 kg/m     Shift: 3887-9364  VS: VSS on 1L O2 via oxymask.  Neuro: AOx4  Cardiopulmonary: BOLANOS  Pain/Nausea/PRN: Denies pain/nausea. Nosebleed x1, held pressure for 1 hour after MD packed with quik clot. Afrin spray x1.   Diet: Reg w/ calorie counts  LDA: R I, L PIV, R BERKLEY.  GI/: Intermittent urinary incontinent d/t urgency; LBM 10/21.  Skin: Clamshell incision leaking at one end, covered with ABD.   Mobility: A1 w/ walker & GB    Will continue with POC, will notify MD with changes or concerns.

## 2021-10-22 NOTE — PROGRESS NOTES
Calorie Count  Intake recorded for: 10/21  Total Kcals: 969 Total Protein: 63g  Kcals from Hospital Food: 969   Protein: 63g  Kcals from Outside Food (average):0 Protein: 0g  # Meals Ordered from Kitchen: 3 meals  # Meals Recorded: 2 meals (First - 100% 8oz apple juice, 75% chicken salad sandwich)      (Second - 100% 8oz apple juice, chicken w/ swiss cheese, 40% tomato soup, 15% burger bun)  # Supplements Recorded: 100% 1 special K bar, 50% 1 special K bar

## 2021-10-22 NOTE — TELEPHONE ENCOUNTER
"A pharmacist spent 40 minutes on the phone providing medication teaching with Sarah Fisher and Radha (niece) for discharge with a focus on new medications/dose changes.  The discharge medication list was reviewed with the patient/family and the following points were discussed, as applicable: Name, description, purpose, dose/strength, duration of medications, common side effects, food/medications to avoid, action to be taken if dose is missed, when to call MD and how to obtain refills.  Maria Elena stated that she was \"better today.\"  The family member (Radha, vern) will be responsible for managing medications. Additionally, the following transplant related education was covered: Purpose of medication card, Medication videos, Timing of medications and day of lab draw considerations , Emesis or missed dose, Prescription Insurance  and Discharge process for receiving meds   Patient will  transplant supplies including BP monitor, at discharge pharmacy along with medications. (She already has medication boxes.) Patient will use local pharmacy or other mail order for outpatient medications. I gave Radha  [facility] phone number, in case they decide to use them in the future.  Clinical Pharmacy Consult:                                                      Transplant Specific:   Date of Transplant: 10/17/2021  Type of Transplant: liver  First Transplant: yes  History of rejection: no    Immunosuppression Regimen   TAC 1mg qAM & 1mg qPM, Prednisone 10mg qAM x 1 dose 10/22, and MMF 750mg qAM & 750mg qPM  Patient specific goal: 6-8  Most recent level: 7.3, date 10/21/21  Immunosuppressant Levels:  Therapeutic  Pt adherent to lab draws: yes  Scr:   Lab Results   Component Value Date    CR 3.89 10/22/2021    CR 0.96 06/07/2021     Side effects: no side effects    Prophylactic Medications  Antibacterial:  Bactrim 400-80mg Mon, Wed, Fri  Scheduled Discontinue Date: 6 months    Antifungal: Nystatin  Scheduled " Discontinue Date: once discharged from hospital    Antiviral: CrCl 10 to 24 mL/minute: Valcyte 450 mg twice weekly (Mon, Thurs)  Scheduled Discontinue Date: 3 months    Acid Reducer: Protonix (pantoprazole)  Scheduled Reviewed Date: Lifelong possible, since was on prior to admission    Thrombosis Prevention: Aspirin 325 mg PO daily  Scheduled Discontinue Date:     Blood Pressure Management  Frequency of home Blood Pressure checks: twice daily  Most recent home BP: 122/80  Patient Blood pressure goal:   Patient blood pressure at goal:  yes  Hospitalizations/ER visits since last assessment: 0      Med rec/DUR performed: yes  Med Rec Discrepancies: no    Reminders:    1. Bring to first clinic appt: med box, med card, bp monitor, all medications being taken, and lab book.  2.   MTM pharmacist visit on first clinic appt and if ok, again in 3 to 4 months during follow up appt.  3.   Avoid Grapefruit and Grapefruit juice.   4.   Avoid herbal supplements. If wish to take other medications or supplements, call your coordinator.   5.   Keep lab appts.   6.   Can use apps on phone like NimbusBase to help manage medication lists and reminders.   7.   Make sure you are protecting your skin by wearing long sleeves and applying sunscreen to exposed skin, for any significant time in the sun.     Transplant Coordinator is Lotus Hughes.      Feli Tabares [facility]

## 2021-10-22 NOTE — PROGRESS NOTES
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 She is now s/p DBD DDLTx 10/17/21 with Dr. Morrison.    Intervention: SW met with patient and her niece Radha at bedside to complete a supportive check in. SW provided role/contact information. SW also discussed discharge plans (TCU vs Home)    Assessment: Patient reports doing well post-transplant and is encouraged by her progress. Patient wants to avoid a TCU stay and go home with help from her niece Radha. Patient knows it has been difficult finding home care in her area and is okay with going for labs at her local clinic, as well as, follow up at the [facility]. Radha will be able to drive her to all appointments.     Education provided by SW: 7A Role/Contact, Discharge Plans    Plan:    Discharge Plans in Progress: Home with assist and home care    Barriers to d/c plan: Medical Stability    Follow up Plan: SW to continue following for support and resource needs    ENRIQUE Baker, OSMINSW  7A   Ph: [phone]  Pager: [phone]

## 2021-10-22 NOTE — PLAN OF CARE
/88   Pulse 111   Temp 97.3  F (36.3  C) (Oral)   Resp 13   Wt 83.1 kg (183 lb 1.6 oz)   SpO2 96%   BMI 28.68 kg/m        2290-5878  Tachy, OVSS on 2L facemask d/t NC irritation. Denies pain. Full liquid diet with alejo counts. R TL internal jugular SL, caps changed and blood return noted in all lumens. R BERKLEY with serosang OP. Leaky at site, dressing changed x3 this shift. LBM 10/22. denies nausea. Incontinent at times d/t urgency, bumex given. Clamshell incision stapled and OSIEL, wound cleaned. Up with 1 using walker, refused GB. Med card and lab book up to date at bedside. Spoke with specialty pharm today with niece present. EGD today to rule out CMV, biopsies pending. Lymph consult placed d/t BLE. Pt worked with PT today and plans to work with OT tomorrow.  Will continue to monitor and notify team with changes.

## 2021-10-22 NOTE — PROGRESS NOTES
[facility]   Transplant Nephrology Progress Note  Date of Admission:  10/16/2021  Today's Date: 10/22/2021    Recommendations:  - continue diuresis bumex 3 mg iv x1. redose if needed  - no RRT indications    Assessment & Plan    Maria Elena Fisher is a 51 year old women with hx of ESLD (MELD 18) 2/2 alcoholic cirrhosis now s.p DDLD on 10/17/2021 with uncomplicated procedure and post op course notable for brief pressor requirement (10/17-10/18), extubated 10/18 AM. Postop course c/b oliguric CORBIN    # CORBIN   # ATN  Some improvement in UOP to 300 ml yesterday  Continue  To monitor conservatively, continue diuresis bumex 3 mg iv x1, redose if needed goal net 0.5 L/24h    # Immune suppression   -  Pulse steroids for induction, Tacrolimus (goal trough per tx surgery 6-8) and MMF 750mg BID.  - renal sparing regimen with lower CNI goals as per tx surgery    # Transplant hx   Etiology - Alcoholic liver cirrhosis  DDLT - 10/17/2021   AST//344 (Downtrending)162/299 today    Recommendations were communicated to the primary team verbally.    Janay Blevins MD       Interval History   +melena & drop in H/H; EGD shows esophagitis & duodenal ulcerations, path pending:  Improved UOP to 300 ml/24h, remains on O2 2LPM    Review of Systems   4 point ROS was obtained and negative except as noted in the Interval History.    MEDICATIONS:    aspirin  325 mg Oral Daily     levothyroxine  50 mcg Oral QAM AC     mycophenolate  750 mg Oral BID IS     nystatin  500,000 Units Swish & Swallow 4x Daily     pantoprazole  40 mg Oral QAM AC     polyethylene glycol  17 g Oral or Feeding Tube Daily     [START ON 10/23/2021] predniSONE  5 mg Oral or Feeding Tube Daily     senna-docusate  1 tablet Oral or Feeding Tube BID     sodium chloride (PF)  3 mL Intravenous Q8H     sulfamethoxazole-trimethoprim  1 tablet Oral or Feeding Tube Once per day on Mon Wed Fri     tacrolimus  1 mg Oral BID IS     ursodiol  300  mg Oral BID     valGANciclovir  450 mg Oral Once per day on Mon Thu       dextrose       [Held by provider] heparin 400 Units/hr (10/20/21 2208)     BETA BLOCKER NOT PRESCRIBED         Physical Exam   Temp  Av  F (36.7  C)  Min: 97.4  F (36.3  C)  Max: 98.9  F (37.2  C)  Arterial Line BP  Min: 85/49  Max: 158/87  Arterial Line MAP (mmHg)  Av mmHg  Min: 63 mmHg  Max: 119 mmHg      Pulse  Av.2  Min: 83  Max: 115 Resp  Av.2  Min: 9  Max: 24  FiO2 (%)  Av.5 %  Min: 35 %  Max: 50 %  SpO2  Av.4 %  Min: 91 %  Max: 100 %    CVP (mmHg): 9 mmHgBP 122/80 (BP Location: Right arm)   Pulse 102   Temp 97.6  F (36.4  C) (Oral)   Resp 22   Wt 84.2 kg (185 lb 9.6 oz)   SpO2 94%   BMI 29.07 kg/m     Date 10/20/21 0700 - 10/21/21 0659   Shift 4127-5349 6093-2147 9632-3606 24 Hour Total   INTAKE   P.O. 150   150   I.V. 10   10   Shift Total(mL/kg) 160(1.89)   160(1.89)   OUTPUT   Drains 450 200  650   Shift Total(mL/kg) 450(5.31) 200(2.36)  650(7.67)   Weight (kg) 84.73 84.73 84.73 84.73      Admit Weight: 81.2 kg (179 lb)     GENERAL APPEARANCE: alert and no distress  HENT: mouth without ulcers or lesions  LYMPHATICS: no cervical or supraclavicular nodes  RESP: lungs clear to auscultation - no rales, rhonchi or wheezes  CV: regular rhythm, normal rate, no rub, no murmur  EDEMA: no LE edema bilaterally  ABDOMEN: soft, nondistended, nontender, bowel sounds normal  MS: extremities normal - no gross deformities noted, no evidence of inflammation in joints, no muscle tenderness  SKIN: no rash    Data   All labs reviewed by me.  CMP  Recent Labs   Lab 10/22/21  0538 10/21/21  1611 10/21/21  1224 10/21/21  0749 10/21/21  0330 10/21/21  0157 10/20/21  2132 10/20/21  1718 10/20/21  0815 10/20/21  0700 10/19/21  0540 10/19/21  0338   * 126*  --   --  127*  --   --  126*   < > 128*   < > 130*   POTASSIUM 5.1 5.4*  --   --  5.2  --   --  5.2   < > 4.7   < > 3.8   CHLORIDE 95 94  --   --  95  --   --  93*    < > 94   < > 98   CO2 20 21  --   --  19*  --   --  19*   < > 18*   < > 19*   ANIONGAP 12 11  --   --  13  --   --  14   < > 16*   < > 13   GLC 86 118* 120* 91 94   < >   < > 108*   < > 130*   < > 170*   BUN 84* 79*  --   --  77*  --   --  71*   < > 62*   < > 41*   CR 3.89* 3.55*  --   --  3.51*  --   --  3.19*   < > 2.85*   < > 1.84*   GFRESTIMATED 13* 14*  --   --  14*  --   --  16*   < > 18*   < > 31*   EMMY 8.3* 8.1*  --   --  8.3*  --   --  8.7   < > 8.6   < > 8.2*   MAG 2.9*  --   --   --  3.0*  --   --   --   --  3.1*  --  2.3   PHOS 7.1*  --   --   --  8.1*  --   --   --   --  8.0*  --  6.7*   PROTTOTAL 5.2*  --   --   --  5.1*  --   --   --   --  5.8*  --  4.9*   ALBUMIN 2.8*  --   --   --  2.8*  --   --   --   --  2.8*  --  2.6*   BILITOTAL 2.3*  --   --   --  2.3*  --   --   --   --  3.3*  --  3.5*   ALKPHOS 111  --   --   --  96  --   --   --   --  128  --  120   AST 35  --   --   --  82*  --   --   --   --  162*  --  260*   *  --   --   --  186*  --   --   --   --  299*  --  344*    < > = values in this interval not displayed.     CBC  Recent Labs   Lab 10/22/21  0538 10/21/21  1154 10/21/21  0330 10/20/21  2213 10/20/21  1037 10/20/21  1037   HGB 7.2* 8.3* 6.0* 7.2*   < > 9.3*   WBC 8.6 10.4 9.6  --   --  15.1*   RBC 2.34* 2.65* 1.89*  --   --  2.91*   HCT 21.5* 24.4* 17.8*  --   --  27.3*   MCV 92 92 94  --   --  94   MCH 30.8 31.3 31.7  --   --  32.0   MCHC 33.5 34.0 33.7  --   --  34.1   RDW 16.8* 16.4* 17.3*  --   --  17.3*   PLT 46* 49* 43*  --   --  68*    < > = values in this interval not displayed.     INR  Recent Labs   Lab 10/21/21  0719 10/18/21  0808 10/18/21  0406 10/17/21  2352   INR 1.20* 1.54* 1.51* 1.97*   PTT 35 41* 37 41*     ABG  Recent Labs   Lab 10/18/21  0809 10/18/21  0406 10/18/21  0112 10/17/21  2132   PH 7.37 7.38 7.36 7.40   PCO2 36 36 34* 37   PO2 133* 159* 148* 119*   HCO3 21 22 19* 23   O2PER 35 40 40 50      Urine Studies  Recent Labs   Lab Test 10/19/21  1438  01/04/21  1020   COLOR Yellow Bibi   APPEARANCE Slightly Cloudy* Slightly Cloudy   URINEGLC Negative Negative   URINEBILI Negative Negative   URINEKETONE Negative Negative   SG 1.018 1.019   UBLD Large* Negative   URINEPH 5.0 5.0   PROTEIN 20 * Negative   NITRITE Negative Negative   LEUKEST Negative Negative   RBCU 120* 1   WBCU 4 7*     No lab results found.  PTH  No lab results found.  Iron Studies  Recent Labs   Lab Test 10/05/21  1124 09/28/21  1204 07/30/21  1052 05/02/21  0106   IRON  --   --   --  259*   FEB  --   --   --  355   IRONSAT  --   --   --  73*   DARRICK 186 280* 32 72       IMAGING:  10/20/2021 CXR

## 2021-10-22 NOTE — PROGRESS NOTES
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 She is now s/p DBD DDLTx 10/17/21 with Dr. Morrison.    Graft function:DBD Liver Tx 10/17/21: POD 5  Elevated LFTs, downtrending. AST/ALT 35/135.  TB 2.3.   Continue Steve BID.   -HA ppx: hypercoagulable intraop. Postop US with elevated RI involving the extrahepatic hepatic artery and the right hepatic artery. Repeat US POD 1&2 -Patent hepatic vasculature. Continued elevated RI in the hepatic arteries. Repeat US 10/21 to evaluate for fluid collections/bleeding- shows small perihepatic fluid collections, unchanged from prior.  - Heparin 400units/hr (held since 10/21 in setting of bleeding). Continue ASA 325mg daily.    BERKLEY drain x1 with continued large volume=875mL /24 hrs. Stop albumin replacements  Immunosuppression management:    Solumedrol taper per protocol induction  MMF 750mg BID  Tacrolimus -supratherapeutic, HELD dose in setting of CORBIN. Resumed at 1mg BID on 10/21 PM with goal to 6-8. Repeat daily levels  Complexity of management:Medium. Contributing factors: thrombocytopenia CORBIN  Hematology: Anemia of chronic disease/ABL:Received 5 L of PRBC intraop. 10/21 HGb 6, transfused 2 PRBC. Holding heparin.    -HA ppx as above with Heparin 400units/hr-hold since 10/20 PM with concern for bleeding. Send hemolysis labs and HIT panel.  Epistaxis: Persistent Nose bleed x4 hrs 10/20, stopped with quick clot. Humidified O2. Repeat epistaxis 10/21 PM x 1.25 hrs.  Neurology: Acute postoperative pain: Controlled, Continue Tramadol 50mg TID PRN and Robaxin 500mg TID PRN  Cardiorespiratory: Hypotension: Stable 110-120  Tachycardia: -110,ST  Pleural effusion, Large:  Extubated POD 1 to RA, CDB/IS   GI/Nutrition:  Regular- asked patient to hold off on ea  Continue bowel regimen: give MOM, Miralax, Senna BID, Dulcolax suppos PRN  Endocrine:Steroid induced hyperglycemia: initially required an insulin gtt, FBS 90-120s. Weaned off insulin  Fluid/Electrolytes: Hypervolemia, +3Kg Trial diuresis today. Received 1mg bumex between PRBC, give additional 3mg. Had 310 mL urine output + 7 unrecorded voids.  Hyponatremia: Na 127   Hyperphosphatemia: 7.1  : Anuric, no daniels  CORBIN: SCr 1-1.2 b/l, elevated to 3.9 today. Making more urine in response to bumex, not all quantified overnight. Lack of response to volume challenge, Neph following. Most likely ATN, bumex 4mg total 10/21.  Infectious disease: Afebrile,   Leukocytosis: WBC improving, 8.6 (10.4). Trend daily. Received zosyn x48hrs Periop and diflucan x2 1 dose pre and 1 dose postop  Valcyte (CMV ppx) renal dosed, Bactrim (PCP ppx)  Prophylaxis: DVT, fall, GI   Disposition: 7A    Medical Decision Making: Medium  Subsequent visit 55231 (moderate level decision making)    BREONNA/Fellow/Resident Provider: Deirdre Lemus PA-C    Faculty: Jaylan Lyon M.D.    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    10/17/2021 (Liver), Postoperative day: 5     Interval History: obtained from the patient.    Overnight events: Epistaxis x1 hr overnight. Making urine. Having dark BMs. Pain controlled. Tolerating regular diet. Dyspnea improved. Ambulating with walker.     ROS:   A 10-point review of systems was negative except as noted above.    Curent Meds:    aspirin  325 mg Oral Daily     levothyroxine  50 mcg Oral QAM AC     mycophenolate  750 mg Oral BID IS     nystatin  500,000 Units Swish & Swallow 4x Daily     pantoprazole  40 mg Oral QAM AC     polyethylene glycol  17 g Oral or Feeding Tube Daily     [START ON 10/23/2021] predniSONE  5 mg Oral  or Feeding Tube Daily     senna-docusate  1 tablet Oral or Feeding Tube BID     sodium chloride (PF)  3 mL Intravenous Q8H     sulfamethoxazole-trimethoprim  1 tablet Oral or Feeding Tube Once per day on Mon Wed Fri     tacrolimus  1 mg Oral BID IS     ursodiol  300 mg Oral BID     valGANciclovir  450 mg Oral Once per day on Mon Thu       Physical Exam:     Admit Weight: 81.2 kg (179 lb)    Current Vitals:   /80 (BP Location: Right arm)   Pulse 102   Temp 97.6  F (36.4  C) (Oral)   Resp 22   Wt 83.1 kg (183 lb 1.6 oz)   SpO2 94%   BMI 28.68 kg/m      Vital sign ranges:    Temp:  [97.5  F (36.4  C)-98.5  F (36.9  C)] 97.6  F (36.4  C)  Pulse:  [102-115] 102  Resp:  [16-24] 22  BP: (107-127)/(74-87) 122/80  SpO2:  [91 %-96 %] 94 %  Patient Vitals for the past 24 hrs:   BP Temp Temp src Pulse Resp SpO2 Weight   10/22/21 0947 -- -- -- -- -- -- 83.1 kg (183 lb 1.6 oz)   10/22/21 0819 122/80 97.6  F (36.4  C) Oral 102 22 94 % --   10/22/21 0300 121/75 97.5  F (36.4  C) Oral 105 22 91 % 84.2 kg (185 lb 9.6 oz)   10/21/21 2327 114/79 98.5  F (36.9  C) Oral 107 24 96 % --   10/21/21 1909 127/87 97.8  F (36.6  C) Oral 114 20 91 % --   10/21/21 1601 107/76 97.8  F (36.6  C) Oral 112 16 91 % --   10/21/21 1524 113/74 98.2  F (36.8  C) Oral 105 16 92 % --   10/21/21 1055 127/81 98  F (36.7  C) Oral 115 18 93 % --     General Appearance: in no apparent distress. Fatigued appearing.  Skin: normal, warm, dry, No rashes, induration   Heart: regular rate and rhythm  Lungs: 2L NC  Abdomen: obese, soft, NTTP, incision with staples CDI, mild serosanguinous output to right lateral incision. Drain serosang  :  UOP increasing  Extremities: edema: present bilaterally There is no skin breakdown.  Neurologic: awake and alert.  Tremor absent.. Asterixis: absent.    Frailty Scores     Frailty Scores 12/27/2020 12/27/2020    Final Score Frail Frail    Final Score Number 3 3          Data:   CMP  Recent Labs   Lab 10/22/21  0557  10/21/21  1611 10/21/21  0749 10/21/21  0330 10/21/21  0324 10/20/21  1718 10/18/21  0003 10/17/21  2352 10/17/21  1509 10/16/21  2213   * 126*   < > 127*  --    < >   < > 134   < > 131*   POTASSIUM 5.1 5.4*   < > 5.2  --    < >   < > 4.0   < > 4.1   CHLORIDE 95 94   < > 95  --    < >   < > 103   < > 100   CO2 20 21   < > 19*  --    < >   < > 20   < > 24   GLC 86 118*   < > 94  --    < >   < > 162*   < > 125*   BUN 84* 79*   < > 77*  --    < >   < > 28   < > 38*   CR 3.89* 3.55*   < > 3.51*  --    < >   < > 0.96   < > 1.44*   GFRESTIMATED 13* 14*   < > 14*  --    < >   < > 69   < > 42*   EMMY 8.3* 8.1*   < > 8.3*  --    < >   < > 8.9   < > 8.5   ICAW 4.5  --   --   --  4.4  --    < >  --    < >  --    MAG 2.9*  --   --  3.0*  --   --    < > 2.3   < > 2.0   PHOS 7.1*  --   --  8.1*  --   --    < > 4.3   < > 3.5   AMYLASE  --   --   --   --   --   --   --  52  --  70   LIPASE  --   --   --   --   --   --   --  191  --   --    ALBUMIN 2.8*  --   --  2.8*  --   --    < > 1.6*   < > 2.3*   BILITOTAL 2.3*  --   --  2.3*  --   --    < > 8.9*   < > 5.0*   ALKPHOS 111  --   --  96  --   --    < > 244*   < > 206*   AST 35  --   --  82*  --   --    < > 1,435*   < > 39   *  --   --  186*  --   --    < > 850*   < > 26    < > = values in this interval not displayed.     CBC  Recent Labs   Lab 10/22/21  0538 10/21/21  1154 10/20/21  1037 10/19/21  1731   HGB 7.2* 8.3*   < >  --    WBC 8.6 10.4   < >  --    PLT 46* 49*   < >  --    A1C  --   --   --  5.1    < > = values in this interval not displayed.     COAGS  Recent Labs   Lab 10/21/21  0719 10/18/21  0808   INR 1.20* 1.54*   PTT 35 41*      Urinalysis  Recent Labs   Lab Test 10/19/21  1438 01/04/21  1020   COLOR Yellow Bibi   APPEARANCE Slightly Cloudy* Slightly Cloudy   URINEGLC Negative Negative   URINEBILI Negative Negative   URINEKETONE Negative Negative   SG 1.018 1.019   UBLD Large* Negative   URINEPH 5.0 5.0   PROTEIN 20 * Negative   NITRITE Negative  Negative   LEUKEST Negative Negative   RBCU 120* 1   WBCU 4 7*     Virology:  Hepatitis C Antibody   Date Value Ref Range Status   10/16/2021 Nonreactive Nonreactive Final   05/02/2021 Nonreactive NR^Nonreactive Final     Comment:     Assay performance characteristics have not been established for newborns,   infants, and children

## 2021-10-23 ENCOUNTER — APPOINTMENT (OUTPATIENT)
Dept: OCCUPATIONAL THERAPY | Facility: CLINIC | Age: 51
End: 2021-10-23
Attending: PHYSICIAN ASSISTANT
Payer: COMMERCIAL

## 2021-10-23 ENCOUNTER — APPOINTMENT (OUTPATIENT)
Dept: OCCUPATIONAL THERAPY | Facility: CLINIC | Age: 51
End: 2021-10-23
Attending: TRANSPLANT SURGERY
Payer: COMMERCIAL

## 2021-10-23 LAB
ALBUMIN SERPL-MCNC: 2.4 G/DL (ref 3.4–5)
ALP SERPL-CCNC: 148 U/L (ref 40–150)
ALT SERPL W P-5'-P-CCNC: 108 U/L (ref 0–50)
ANION GAP SERPL CALCULATED.3IONS-SCNC: 12 MMOL/L (ref 3–14)
AST SERPL W P-5'-P-CCNC: 28 U/L (ref 0–45)
BASOPHILS # BLD AUTO: 0 10E3/UL (ref 0–0.2)
BASOPHILS NFR BLD AUTO: 0 %
BILIRUB DIRECT SERPL-MCNC: 1.6 MG/DL (ref 0–0.2)
BILIRUB SERPL-MCNC: 1.9 MG/DL (ref 0.2–1.3)
BUN SERPL-MCNC: 94 MG/DL (ref 7–30)
CALCIUM SERPL-MCNC: 8.4 MG/DL (ref 8.5–10.1)
CHLORIDE BLD-SCNC: 98 MMOL/L (ref 94–109)
CO2 SERPL-SCNC: 21 MMOL/L (ref 20–32)
CREAT SERPL-MCNC: 3.66 MG/DL (ref 0.52–1.04)
EOSINOPHIL # BLD AUTO: 0.2 10E3/UL (ref 0–0.7)
EOSINOPHIL NFR BLD AUTO: 2 %
ERYTHROCYTE [DISTWIDTH] IN BLOOD BY AUTOMATED COUNT: 16.6 % (ref 10–15)
GFR SERPL CREATININE-BSD FRML MDRD: 14 ML/MIN/1.73M2
GLUCOSE BLD-MCNC: 112 MG/DL (ref 70–99)
HCT VFR BLD AUTO: 22.9 % (ref 35–47)
HGB BLD-MCNC: 7.5 G/DL (ref 11.7–15.7)
IMM GRANULOCYTES # BLD: 0.1 10E3/UL
IMM GRANULOCYTES NFR BLD: 1 %
LACTATE SERPL-SCNC: 0.5 MMOL/L (ref 0.7–2)
LACTATE SERPL-SCNC: 1.2 MMOL/L (ref 0.7–2)
LYMPHOCYTES # BLD AUTO: 0.5 10E3/UL (ref 0.8–5.3)
LYMPHOCYTES NFR BLD AUTO: 6 %
MAGNESIUM SERPL-MCNC: 2.8 MG/DL (ref 1.6–2.3)
MCH RBC QN AUTO: 30.7 PG (ref 26.5–33)
MCHC RBC AUTO-ENTMCNC: 32.8 G/DL (ref 31.5–36.5)
MCV RBC AUTO: 94 FL (ref 78–100)
MONOCYTES # BLD AUTO: 0.6 10E3/UL (ref 0–1.3)
MONOCYTES NFR BLD AUTO: 6 %
NEUTROPHILS # BLD AUTO: 7.7 10E3/UL (ref 1.6–8.3)
NEUTROPHILS NFR BLD AUTO: 85 %
NRBC # BLD AUTO: 0 10E3/UL
NRBC BLD AUTO-RTO: 0 /100
PHOSPHATE SERPL-MCNC: 6.3 MG/DL (ref 2.5–4.5)
PLATELET # BLD AUTO: 79 10E3/UL (ref 150–450)
POTASSIUM BLD-SCNC: 4.4 MMOL/L (ref 3.4–5.3)
PROT SERPL-MCNC: 5.5 G/DL (ref 6.8–8.8)
RBC # BLD AUTO: 2.44 10E6/UL (ref 3.8–5.2)
SODIUM SERPL-SCNC: 131 MMOL/L (ref 133–144)
TACROLIMUS BLD-MCNC: 4.2 UG/L (ref 5–15)
TME LAST DOSE: ABNORMAL H
TME LAST DOSE: ABNORMAL H
WBC # BLD AUTO: 8.9 10E3/UL (ref 4–11)

## 2021-10-23 PROCEDURE — 250N000013 HC RX MED GY IP 250 OP 250 PS 637: Performed by: NURSE PRACTITIONER

## 2021-10-23 PROCEDURE — 97168 OT RE-EVAL EST PLAN CARE: CPT | Mod: GO | Performed by: OCCUPATIONAL THERAPIST

## 2021-10-23 PROCEDURE — 36592 COLLECT BLOOD FROM PICC: CPT | Performed by: NURSE PRACTITIONER

## 2021-10-23 PROCEDURE — 250N000013 HC RX MED GY IP 250 OP 250 PS 637: Performed by: PHYSICIAN ASSISTANT

## 2021-10-23 PROCEDURE — 80197 ASSAY OF TACROLIMUS: CPT | Performed by: NURSE PRACTITIONER

## 2021-10-23 PROCEDURE — 83605 ASSAY OF LACTIC ACID: CPT

## 2021-10-23 PROCEDURE — 84100 ASSAY OF PHOSPHORUS: CPT | Performed by: NURSE PRACTITIONER

## 2021-10-23 PROCEDURE — 99233 SBSQ HOSP IP/OBS HIGH 50: CPT | Mod: GC | Performed by: INTERNAL MEDICINE

## 2021-10-23 PROCEDURE — 97535 SELF CARE MNGMENT TRAINING: CPT | Mod: GO | Performed by: OCCUPATIONAL THERAPIST

## 2021-10-23 PROCEDURE — 97140 MANUAL THERAPY 1/> REGIONS: CPT | Mod: GO | Performed by: OCCUPATIONAL THERAPIST

## 2021-10-23 PROCEDURE — 250N000012 HC RX MED GY IP 250 OP 636 PS 637: Performed by: STUDENT IN AN ORGANIZED HEALTH CARE EDUCATION/TRAINING PROGRAM

## 2021-10-23 PROCEDURE — 82248 BILIRUBIN DIRECT: CPT | Performed by: NURSE PRACTITIONER

## 2021-10-23 PROCEDURE — 250N000012 HC RX MED GY IP 250 OP 636 PS 637: Performed by: PHYSICIAN ASSISTANT

## 2021-10-23 PROCEDURE — 250N000013 HC RX MED GY IP 250 OP 250 PS 637: Performed by: INTERNAL MEDICINE

## 2021-10-23 PROCEDURE — 250N000012 HC RX MED GY IP 250 OP 636 PS 637: Performed by: NURSE PRACTITIONER

## 2021-10-23 PROCEDURE — 99232 SBSQ HOSP IP/OBS MODERATE 35: CPT | Mod: 24 | Performed by: TRANSPLANT SURGERY

## 2021-10-23 PROCEDURE — 80053 COMPREHEN METABOLIC PANEL: CPT | Performed by: NURSE PRACTITIONER

## 2021-10-23 PROCEDURE — 120N000011 HC R&B TRANSPLANT UMMC

## 2021-10-23 PROCEDURE — 36592 COLLECT BLOOD FROM PICC: CPT

## 2021-10-23 PROCEDURE — 85025 COMPLETE CBC W/AUTO DIFF WBC: CPT | Performed by: NURSE PRACTITIONER

## 2021-10-23 PROCEDURE — 83735 ASSAY OF MAGNESIUM: CPT | Performed by: NURSE PRACTITIONER

## 2021-10-23 RX ORDER — BUMETANIDE 1 MG/1
2 TABLET ORAL DAILY
Status: DISCONTINUED | OUTPATIENT
Start: 2021-10-24 | End: 2021-10-23

## 2021-10-23 RX ORDER — BUMETANIDE 1 MG/1
2 TABLET ORAL ONCE
Status: COMPLETED | OUTPATIENT
Start: 2021-10-23 | End: 2021-10-23

## 2021-10-23 RX ADMIN — MYCOPHENOLATE MOFETIL 750 MG: 250 CAPSULE ORAL at 18:00

## 2021-10-23 RX ADMIN — LEVOTHYROXINE SODIUM 50 MCG: 0.05 TABLET ORAL at 08:25

## 2021-10-23 RX ADMIN — NYSTATIN 500000 UNITS: 500000 SUSPENSION ORAL at 20:53

## 2021-10-23 RX ADMIN — NYSTATIN 500000 UNITS: 500000 SUSPENSION ORAL at 15:35

## 2021-10-23 RX ADMIN — NYSTATIN 500000 UNITS: 500000 SUSPENSION ORAL at 08:25

## 2021-10-23 RX ADMIN — TACROLIMUS 1.5 MG: 1 CAPSULE ORAL at 08:26

## 2021-10-23 RX ADMIN — ASPIRIN 325 MG ORAL TABLET 325 MG: 325 PILL ORAL at 08:24

## 2021-10-23 RX ADMIN — PREDNISONE 5 MG: 5 TABLET ORAL at 08:25

## 2021-10-23 RX ADMIN — PANTOPRAZOLE SODIUM 40 MG: 40 TABLET, DELAYED RELEASE ORAL at 08:25

## 2021-10-23 RX ADMIN — URSODIOL 300 MG: 300 CAPSULE ORAL at 08:25

## 2021-10-23 RX ADMIN — BUMETANIDE 2 MG: 1 TABLET ORAL at 12:44

## 2021-10-23 RX ADMIN — NYSTATIN 500000 UNITS: 500000 SUSPENSION ORAL at 12:00

## 2021-10-23 RX ADMIN — MYCOPHENOLATE MOFETIL 750 MG: 250 CAPSULE ORAL at 08:26

## 2021-10-23 RX ADMIN — URSODIOL 300 MG: 300 CAPSULE ORAL at 20:38

## 2021-10-23 RX ADMIN — METHOCARBAMOL 500 MG: 500 TABLET ORAL at 20:38

## 2021-10-23 RX ADMIN — DOCUSATE SODIUM 50 MG AND SENNOSIDES 8.6 MG 1 TABLET: 8.6; 5 TABLET, FILM COATED ORAL at 20:38

## 2021-10-23 RX ADMIN — PANTOPRAZOLE SODIUM 40 MG: 40 TABLET, DELAYED RELEASE ORAL at 20:38

## 2021-10-23 RX ADMIN — TACROLIMUS 1.5 MG: 1 CAPSULE ORAL at 18:00

## 2021-10-23 ASSESSMENT — ACTIVITIES OF DAILY LIVING (ADL)
ADLS_ACUITY_SCORE: 8

## 2021-10-23 NOTE — PROGRESS NOTES
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 She is now s/p DBD DDLTx 10/17/21 with Dr. Morrison.    Graft function:DBD Liver Tx 10/17/21: POD 6  Elevated LFTs, downtrending. AST/ALT 35/135. TB 1.9. Continue Steve BID.   -HA ppx: hypercoagulable intraop. Postop US with elevated RI involving the extrahepatic hepatic artery and the right hepatic artery. Repeat US POD 1&2 -Patent hepatic vasculature. Continued elevated RI in the hepatic arteries. Repeat US 10/21 to evaluate for fluid collections/bleeding- shows small perihepatic fluid collections, unchanged from prior.  - Heparin 400units/hr now held since 10/20 PM in setting of bleeding. Continue ASA 325mg daily.    BERKLEY drain x1, output slowly decreasing. Stopped albumin replacements  Immunosuppression management:    Solumedrol taper per protocol induction  MMF 750mg BID  Tacrolimus -supratherapeutic, HELD dose in setting of CORBIN. Resumed 1mg BID on 10/21 PM with goal to 6-8. Level today 4.2 but 13 hr trough, recently increased to 1.5 mg BID, will continue current dosing. Repeat daily levels  Complexity of management:Medium. Contributing factors: thrombocytopenia CORBIN  Hematology: Anemia of chronic disease/ABL: Received 5 L of PRBC intraop. 10/21 HGb 6, transfused 2 PRBC. Holding heparin.    -HA ppx as above with Heparin 400units/hr-hold since 10/20 PM with concern for bleeding. Send hemolysis labs and HIT panel.  Epistaxis: Persistent Nose bleed x4 hrs 10/20, stopped with quick clot. Humidified O2. Repeat epistaxis 10/21 PM x 1.25 hrs. Now improved, will consult ENT if recurrent nosebleed.  Neurology: Acute postoperative pain: Controlled, Continue Tramadol 50mg TID PRN and Robaxin 500mg TID  PRN  Cardiorespiratory: Hypotension: Stable 110-120  Tachycardia: HR 90s-110,ST  Pleural effusion, Large:  Extubated POD 1 to RA, CDB/IS   GI/Nutrition: Regular diet  Continue bowel regimen: give MOM, Miralax, Senna BID, Dulcolax suppos PRN  Duodenal   Endocrine:Steroid induced hyperglycemia: initially required an insulin gtt, FBS 90-120s. Weaned off insulin  Fluid/Electrolytes: Hypervolemia, Weight now to baseline. Responding to bumex, not all voids quantified. Will give 2 mg oral bumex today.  Hyponatremia: Improving, Na 131 (127)   Hyperphosphatemia: 6.3 (7.1)  : Anuric, no daniels  CORBIN: SCr 1-1.2 b/l, peaked at 3.9. Now trending downward. Making more urine in response to bumex, not all quantified overnight. Neph following. Bumex 2 mg oral today.  Infectious disease: Afebrile,   Leukocytosis: WBC improving, 8.9 (8.6). Trend daily. Received zosyn x48hrs Periop and diflucan x2 1 dose pre and 1 dose postop  Valcyte (CMV ppx) renal dosed, Bactrim (PCP ppx)  Prophylaxis: DVT, fall, GI   Disposition: 7A    Medical Decision Making: Medium  Subsequent visit 63331 (moderate level decision making)    BREONNA/Fellow/Resident Provider: Deirdre Lemus PA-C    Faculty: Jaylan Lyon M.D.    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    10/17/2021 (Liver), Postoperative day: 6     Interval History: obtained from the patient.    Overnight events: Making urine. Having dark BMs. Pain controlled. Tolerating regular diet. Dyspnea improved. Ambulating with walker.     ROS:   A 10-point review of systems was negative except as noted above.    Curent Meds:    aspirin  325 mg Oral Daily     bumetanide  2 mg Oral Once     [START ON 10/24/2021] bumetanide  2 mg Oral Daily     levothyroxine  50 mcg Oral QAM AC     mycophenolate  750 mg Oral BID IS     nystatin  500,000 Units Swish &  Swallow 4x Daily     pantoprazole  40 mg Oral BID     polyethylene glycol  17 g Oral or Feeding Tube Daily     predniSONE  5 mg Oral or Feeding Tube Daily     senna-docusate  1 tablet Oral or Feeding Tube BID     sodium chloride (PF)  3 mL Intravenous Q8H     sulfamethoxazole-trimethoprim  1 tablet Oral or Feeding Tube Once per day on Mon Wed Fri     tacrolimus  1.5 mg Oral BID IS     ursodiol  300 mg Oral BID     valGANciclovir  450 mg Oral Once per day on Mon Thu       Physical Exam:     Admit Weight: 81.2 kg (179 lb)    Current Vitals:   /77 (BP Location: Right arm)   Pulse 80   Temp 98.2  F (36.8  C) (Oral)   Resp 16   Wt 78.8 kg (173 lb 11.2 oz)   SpO2 98%   BMI 27.21 kg/m      Vital sign ranges:    Temp:  [97.6  F (36.4  C)-98.6  F (37  C)] 98.2  F (36.8  C)  Pulse:  [] 80  Resp:  [13-67] 16  BP: (113-134)/(68-95) 123/77  SpO2:  [92 %-99 %] 98 %  Patient Vitals for the past 24 hrs:   BP Temp Temp src Pulse Resp SpO2 Weight   10/23/21 1103 123/77 98.2  F (36.8  C) Oral 80 16 98 % --   10/23/21 1010 -- -- -- -- -- -- 78.8 kg (173 lb 11.2 oz)   10/23/21 0710 113/68 98.5  F (36.9  C) Oral 99 18 99 % --   10/23/21 0334 119/72 98.6  F (37  C) Oral 101 18 99 % --   10/22/21 2329 120/73 98.5  F (36.9  C) Oral 103 16 92 % --   10/22/21 1822 124/74 97.6  F (36.4  C) Oral 95 16 96 % --   10/22/21 1355 132/88 -- -- 111 13 96 % --   10/22/21 1350 (!) 127/92 -- -- 112 19 96 % --   10/22/21 1345 131/89 -- -- 112 16 94 % --   10/22/21 1340 127/86 -- -- 97 18 94 % --   10/22/21 1335 130/83 -- -- 94 21 94 % --   10/22/21 1330 134/88 -- -- 102 27 94 % --   10/22/21 1325 (!) 121/95 -- -- 111 (!) 67 92 % --   10/22/21 1320 124/83 -- -- 101 24 97 % --   10/22/21 1315 122/83 -- -- 105 24 95 % --     General Appearance: in no apparent distress. Fatigued appearing.  Skin: normal, warm, dry, No rashes, induration   Heart: regular rate and rhythm  Lungs: Nonlabored resps on RA  Abdomen: obese, soft, NTTP, incision with  staples CDI, mild serosanguinous output to right lateral incision. Drain serosang  :  UOP increasing  Extremities: edema: present bilaterally- legs wrapped. There is no skin breakdown.  Neurologic: awake and alert.  Tremor absent.. Asterixis: absent.    Frailty Scores     Frailty Scores 12/27/2020 12/27/2020    Final Score Frail Frail    Final Score Number 3 3          Data:   CMP  Recent Labs   Lab 10/23/21  0651 10/22/21  0538 10/21/21  0330 10/21/21  0324 10/18/21  0003 10/17/21  2352 10/17/21  1509 10/16/21  2213   * 127*   < >  --    < > 134   < > 131*   POTASSIUM 4.4 5.1   < >  --    < > 4.0   < > 4.1   CHLORIDE 98 95   < >  --    < > 103   < > 100   CO2 21 20   < >  --    < > 20   < > 24   * 86   < >  --    < > 162*   < > 125*   BUN 94* 84*   < >  --    < > 28   < > 38*   CR 3.66* 3.89*   < >  --    < > 0.96   < > 1.44*   GFRESTIMATED 14* 13*   < >  --    < > 69   < > 42*   EMMY 8.4* 8.3*   < >  --    < > 8.9   < > 8.5   ICAW  --  4.5  --  4.4   < >  --    < >  --    MAG 2.8* 2.9*   < >  --    < > 2.3   < > 2.0   PHOS 6.3* 7.1*   < >  --    < > 4.3   < > 3.5   AMYLASE  --   --   --   --   --  52  --  70   LIPASE  --   --   --   --   --  191  --   --    ALBUMIN 2.4* 2.8*   < >  --    < > 1.6*   < > 2.3*   BILITOTAL 1.9* 2.3*   < >  --    < > 8.9*   < > 5.0*   ALKPHOS 148 111   < >  --    < > 244*   < > 206*   AST 28 35   < >  --    < > 1,435*   < > 39   * 135*   < >  --    < > 850*   < > 26    < > = values in this interval not displayed.     CBC  Recent Labs   Lab 10/23/21  0651 10/22/21  1600 10/22/21  0538 10/22/21  0538 10/20/21  1037 10/19/21  1731   HGB 7.5* 7.2*   < > 7.2*   < >  --    WBC 8.9  --   --  8.6   < >  --    PLT 79*  --   --  46*   < >  --    A1C  --   --   --   --   --  5.1    < > = values in this interval not displayed.     COAGS  Recent Labs   Lab 10/21/21  0719 10/18/21  0808   INR 1.20* 1.54*   PTT 35 41*      Urinalysis  Recent Labs   Lab Test 10/19/21  1063  01/04/21  1020   COLOR Yellow Bibi   APPEARANCE Slightly Cloudy* Slightly Cloudy   URINEGLC Negative Negative   URINEBILI Negative Negative   URINEKETONE Negative Negative   SG 1.018 1.019   UBLD Large* Negative   URINEPH 5.0 5.0   PROTEIN 20 * Negative   NITRITE Negative Negative   LEUKEST Negative Negative   RBCU 120* 1   WBCU 4 7*     Virology:  Hepatitis C Antibody   Date Value Ref Range Status   10/16/2021 Nonreactive Nonreactive Final   05/02/2021 Nonreactive NR^Nonreactive Final     Comment:     Assay performance characteristics have not been established for newborns,   infants, and children

## 2021-10-23 NOTE — PLAN OF CARE
[id number]  VSS on 2L facemask. Denies pain. Regular diet with alejo counts, alejo counts recorded. R TL internal jugular SL. R BERKLEY with serosang OP. Leaky at site. LBM 10/22, denies nausea. Incontinent at times d/t urgency, bumex given. Clamshell incision stapled and OSIEL, wound cleaned. Up with 1 using walker, ambulated to bathroom and in halls. Activity encouraged. Up in chair for part of shift. Med card and lab book up to date at bedside. Spoke with specialty pharm on 10/22. EGD yesterday to rule out CMV, biopsies pending. Lymph wraps on for BLE edema. Voiding adequately, saving in hat in toilet. Green UOP, team aware. Pt worked with OT today.Will continue to monitor and notify team with changes.

## 2021-10-23 NOTE — PLAN OF CARE
/72 (BP Location: Right arm)   Pulse 101   Temp 98.6  F (37  C) (Oral)   Resp 18   Wt 83.1 kg (183 lb 1.6 oz)   SpO2 99%   BMI 28.68 kg/m      Shift: 0631-5985  Isolation Status: NA  VS: AVSS on RA with intermittent facemask at 2 LPM  Neuro: A&O x4.  Behaviors: Calm, cooperative, and pleasant.   BG: NA  Respiratory: WDL ex diminished lower lobe sounds.  Cardiac: Tachycardic at baseline.  Pain/Nausea/PRN: No reported pain or nausea.  Diet: Full liquid diet tolerated well.  LDA: Right BERKLYE with minimal serosanguinous drainage, SL L PIV and triple lumen R IJ  Infusion(s): NA  GI/: One small BM with 200 mLs of green urine output and some unmeasured incontinence.  Skin: Abdominal incision with bruising around.   Mobility: Up with Ao1 and a walker. Steady. Calls appropriately.  Plan: Plan to discharge home with niece.

## 2021-10-23 NOTE — PROGRESS NOTES
10/23/21 0800   Quick Adds   Type of Visit   (Lymphedema evalution)   Sensory   Sensory Comments   (light touch intact. )   Pain Assessment   Patient Currently in Pain No   Edema General Information   Onset of Edema   (acute on chronic )   Affected Body Part(s) Right LE;Left LE   Etiology Comments liver disease, decreased mobility, transplant.   General Comments/Previous Edema Treatment/Edema Equipment patient reports no previous use of LE compression.    Edema Examination/Assessment   Skin Condition Pitting;Dryness;Intact   Skin Condition Comments Skin intact with soft 2+ pitting edema in distal legs. Mild skin dryness at feet.    Skin Integrity intact   Pitting Assessment 2+ pitting edema distally   Edema Assessment Comments GCB applied to B LEs from MTPs to knee creases.    Clinical Impression   Criteria for Skilled Therapeutic Interventions Met (OT) yes   Edema: Patient Presentation Edema   Influenced by the following impairments decreased functional mobility   Assessment of Occupational Performance 1-3 Performance Deficits   Edema: Planned Interventions Gradient compression bandaging;Fit for compression garment;Edema exercises;Precautions to prevent infection/exacerbation;Education   Intervention Comments GCB to B LEs.   Clinical Decision Making Complexity (OT) low complexity   Therapy Frequency (OT) 5x/week   Predicted Duration of Therapy 1 week   Risk & Benefits of therapy have been explained evaluation/treatment results reviewed;care plan/treatment goals reviewed

## 2021-10-23 NOTE — PROGRESS NOTES
Calorie Count  Intake recorded for: 10/22  Total Kcals: 1695 Total Protein: 40g  Kcals from Hospital Food: 1695   Protein: 40g  Kcals from Outside Food (average):0 Protein: 0g  # Meals Ordered from Kitchen: 2 meals  # Meals Recorded: 2 meals (first - 100% 2 cheerios, banana, 2 4oz cranberry juice, 50% bagel w/ 2 peanut butters & 2 jellies)      (second - 100% vegetable broth, ice cream, orange sherbert, 4oz cranberry juice, 8oz skim milk, 2 hot cocoa)  # Supplements Recorded: 100% 1 Ensure Clear

## 2021-10-24 ENCOUNTER — APPOINTMENT (OUTPATIENT)
Dept: OCCUPATIONAL THERAPY | Facility: CLINIC | Age: 51
End: 2021-10-24
Attending: TRANSPLANT SURGERY
Payer: COMMERCIAL

## 2021-10-24 ENCOUNTER — APPOINTMENT (OUTPATIENT)
Dept: ULTRASOUND IMAGING | Facility: CLINIC | Age: 51
End: 2021-10-24
Attending: STUDENT IN AN ORGANIZED HEALTH CARE EDUCATION/TRAINING PROGRAM
Payer: COMMERCIAL

## 2021-10-24 LAB
ALBUMIN SERPL-MCNC: 2.2 G/DL (ref 3.4–5)
ALP SERPL-CCNC: 207 U/L (ref 40–150)
ALT SERPL W P-5'-P-CCNC: 85 U/L (ref 0–50)
ANION GAP SERPL CALCULATED.3IONS-SCNC: 10 MMOL/L (ref 3–14)
AST SERPL W P-5'-P-CCNC: 27 U/L (ref 0–45)
BASOPHILS # BLD MANUAL: 0 10E3/UL (ref 0–0.2)
BASOPHILS NFR BLD MANUAL: 0 %
BILIRUB DIRECT SERPL-MCNC: 2.1 MG/DL (ref 0–0.2)
BILIRUB SERPL-MCNC: 2.6 MG/DL (ref 0.2–1.3)
BUN SERPL-MCNC: 74 MG/DL (ref 7–30)
CALCIUM SERPL-MCNC: 8.2 MG/DL (ref 8.5–10.1)
CHLORIDE BLD-SCNC: 102 MMOL/L (ref 94–109)
CO2 SERPL-SCNC: 24 MMOL/L (ref 20–32)
CREAT SERPL-MCNC: 2.35 MG/DL (ref 0.52–1.04)
EOSINOPHIL # BLD MANUAL: 0.3 10E3/UL (ref 0–0.7)
EOSINOPHIL NFR BLD MANUAL: 4 %
ERYTHROCYTE [DISTWIDTH] IN BLOOD BY AUTOMATED COUNT: 17 % (ref 10–15)
GFR SERPL CREATININE-BSD FRML MDRD: 23 ML/MIN/1.73M2
GLUCOSE BLD-MCNC: 133 MG/DL (ref 70–99)
HCT VFR BLD AUTO: 24.5 % (ref 35–47)
HGB BLD-MCNC: 8.1 G/DL (ref 11.7–15.7)
LACTATE SERPL-SCNC: 1 MMOL/L (ref 0.7–2)
LACTATE SERPL-SCNC: 1.5 MMOL/L (ref 0.7–2)
LYMPHOCYTES # BLD MANUAL: 0.3 10E3/UL (ref 0.8–5.3)
LYMPHOCYTES NFR BLD MANUAL: 4 %
MAGNESIUM SERPL-MCNC: 2.2 MG/DL (ref 1.6–2.3)
MCH RBC QN AUTO: 31.4 PG (ref 26.5–33)
MCHC RBC AUTO-ENTMCNC: 33.1 G/DL (ref 31.5–36.5)
MCV RBC AUTO: 95 FL (ref 78–100)
MONOCYTES # BLD MANUAL: 0.5 10E3/UL (ref 0–1.3)
MONOCYTES NFR BLD MANUAL: 6 %
NEUTROPHILS # BLD MANUAL: 7.5 10E3/UL (ref 1.6–8.3)
NEUTROPHILS NFR BLD MANUAL: 86 %
PHOSPHATE SERPL-MCNC: 4.5 MG/DL (ref 2.5–4.5)
PLAT MORPH BLD: ABNORMAL
PLATELET # BLD AUTO: 113 10E3/UL (ref 150–450)
POLYCHROMASIA BLD QL SMEAR: SLIGHT
POTASSIUM BLD-SCNC: 3.9 MMOL/L (ref 3.4–5.3)
PROT SERPL-MCNC: 5.3 G/DL (ref 6.8–8.8)
RBC # BLD AUTO: 2.58 10E6/UL (ref 3.8–5.2)
RBC MORPH BLD: ABNORMAL
SARS-COV-2 RNA RESP QL NAA+PROBE: NEGATIVE
SODIUM SERPL-SCNC: 136 MMOL/L (ref 133–144)
TACROLIMUS BLD-MCNC: 4.5 UG/L (ref 5–15)
TME LAST DOSE: ABNORMAL H
TME LAST DOSE: ABNORMAL H
WBC # BLD AUTO: 8.7 10E3/UL (ref 4–11)

## 2021-10-24 PROCEDURE — 250N000013 HC RX MED GY IP 250 OP 250 PS 637: Performed by: NURSE PRACTITIONER

## 2021-10-24 PROCEDURE — 250N000012 HC RX MED GY IP 250 OP 636 PS 637: Performed by: PHYSICIAN ASSISTANT

## 2021-10-24 PROCEDURE — 83735 ASSAY OF MAGNESIUM: CPT | Performed by: NURSE PRACTITIONER

## 2021-10-24 PROCEDURE — 36592 COLLECT BLOOD FROM PICC: CPT

## 2021-10-24 PROCEDURE — 83605 ASSAY OF LACTIC ACID: CPT

## 2021-10-24 PROCEDURE — 99232 SBSQ HOSP IP/OBS MODERATE 35: CPT | Mod: 24 | Performed by: TRANSPLANT SURGERY

## 2021-10-24 PROCEDURE — 84100 ASSAY OF PHOSPHORUS: CPT | Performed by: NURSE PRACTITIONER

## 2021-10-24 PROCEDURE — 76700 US EXAM ABDOM COMPLETE: CPT

## 2021-10-24 PROCEDURE — U0005 INFEC AGEN DETEC AMPLI PROBE: HCPCS | Performed by: PHYSICIAN ASSISTANT

## 2021-10-24 PROCEDURE — 250N000013 HC RX MED GY IP 250 OP 250 PS 637: Performed by: PHYSICIAN ASSISTANT

## 2021-10-24 PROCEDURE — 85027 COMPLETE CBC AUTOMATED: CPT | Performed by: NURSE PRACTITIONER

## 2021-10-24 PROCEDURE — 250N000012 HC RX MED GY IP 250 OP 636 PS 637: Performed by: NURSE PRACTITIONER

## 2021-10-24 PROCEDURE — 99232 SBSQ HOSP IP/OBS MODERATE 35: CPT | Mod: GC | Performed by: INTERNAL MEDICINE

## 2021-10-24 PROCEDURE — 97140 MANUAL THERAPY 1/> REGIONS: CPT | Mod: GO | Performed by: OCCUPATIONAL THERAPIST

## 2021-10-24 PROCEDURE — 120N000011 HC R&B TRANSPLANT UMMC

## 2021-10-24 PROCEDURE — 36592 COLLECT BLOOD FROM PICC: CPT | Performed by: NURSE PRACTITIONER

## 2021-10-24 PROCEDURE — 250N000012 HC RX MED GY IP 250 OP 636 PS 637: Performed by: STUDENT IN AN ORGANIZED HEALTH CARE EDUCATION/TRAINING PROGRAM

## 2021-10-24 PROCEDURE — 99232 SBSQ HOSP IP/OBS MODERATE 35: CPT | Mod: 24 | Performed by: STUDENT IN AN ORGANIZED HEALTH CARE EDUCATION/TRAINING PROGRAM

## 2021-10-24 PROCEDURE — 93975 VASCULAR STUDY: CPT | Mod: 26 | Performed by: STUDENT IN AN ORGANIZED HEALTH CARE EDUCATION/TRAINING PROGRAM

## 2021-10-24 PROCEDURE — 250N000013 HC RX MED GY IP 250 OP 250 PS 637: Performed by: INTERNAL MEDICINE

## 2021-10-24 PROCEDURE — 80197 ASSAY OF TACROLIMUS: CPT | Performed by: NURSE PRACTITIONER

## 2021-10-24 PROCEDURE — 82248 BILIRUBIN DIRECT: CPT | Performed by: NURSE PRACTITIONER

## 2021-10-24 PROCEDURE — 80053 COMPREHEN METABOLIC PANEL: CPT | Performed by: NURSE PRACTITIONER

## 2021-10-24 RX ORDER — BUMETANIDE 1 MG/1
3 TABLET ORAL ONCE
Status: DISCONTINUED | OUTPATIENT
Start: 2021-10-24 | End: 2021-10-24

## 2021-10-24 RX ORDER — BUMETANIDE 1 MG/1
2 TABLET ORAL DAILY
Status: DISCONTINUED | OUTPATIENT
Start: 2021-10-25 | End: 2021-10-26

## 2021-10-24 RX ADMIN — NYSTATIN 500000 UNITS: 500000 SUSPENSION ORAL at 19:33

## 2021-10-24 RX ADMIN — PANTOPRAZOLE SODIUM 40 MG: 40 TABLET, DELAYED RELEASE ORAL at 19:33

## 2021-10-24 RX ADMIN — MYCOPHENOLATE MOFETIL 750 MG: 250 CAPSULE ORAL at 07:58

## 2021-10-24 RX ADMIN — TACROLIMUS 1.5 MG: 1 CAPSULE ORAL at 07:58

## 2021-10-24 RX ADMIN — ASPIRIN 325 MG ORAL TABLET 325 MG: 325 PILL ORAL at 07:56

## 2021-10-24 RX ADMIN — PREDNISONE 5 MG: 5 TABLET ORAL at 07:56

## 2021-10-24 RX ADMIN — MYCOPHENOLATE MOFETIL 750 MG: 250 CAPSULE ORAL at 18:20

## 2021-10-24 RX ADMIN — URSODIOL 300 MG: 300 CAPSULE ORAL at 19:33

## 2021-10-24 RX ADMIN — TACROLIMUS 1.5 MG: 1 CAPSULE ORAL at 18:20

## 2021-10-24 RX ADMIN — URSODIOL 300 MG: 300 CAPSULE ORAL at 07:57

## 2021-10-24 RX ADMIN — NYSTATIN 500000 UNITS: 500000 SUSPENSION ORAL at 11:44

## 2021-10-24 RX ADMIN — NYSTATIN 500000 UNITS: 500000 SUSPENSION ORAL at 07:55

## 2021-10-24 RX ADMIN — NYSTATIN 500000 UNITS: 500000 SUSPENSION ORAL at 15:35

## 2021-10-24 RX ADMIN — LEVOTHYROXINE SODIUM 50 MCG: 0.05 TABLET ORAL at 07:57

## 2021-10-24 RX ADMIN — PANTOPRAZOLE SODIUM 40 MG: 40 TABLET, DELAYED RELEASE ORAL at 07:57

## 2021-10-24 ASSESSMENT — ACTIVITIES OF DAILY LIVING (ADL)
ADLS_ACUITY_SCORE: 8

## 2021-10-24 NOTE — PROGRESS NOTES
Calorie Count  Intake recorded for: 10/23  Total Kcals: 692 Total Protein: 25g  Kcals from Hospital Food: 692   Protein: 25g  Kcals from Outside Food (average):0 Protein: 0g  # Meals Ordered from Kitchen: 2 meals  # Meals Recorded: 2 meals (first - 100% omelet w/ ham, cheese & vegetables, grapes, salsa, cheerios, 8oz skim milk, 2 4oz cranberry juices)      (second - 75% Citizen of [place] food not indicated what food was consumed from outside the hospital, not included in calorie count)  # Supplements Recorded: 0

## 2021-10-24 NOTE — PLAN OF CARE
[id number]  VSS on RA. Denies pain or nausea. Regular diet w/alejo counts, good appetite. Plan to be NPO @0000. R TL internal jugular SL, dsg changed this shift. R BERKLEY, leaky at site. Incision stapled and OSIEL, bruised. 2 BMs today. Voiding WOD, saving in hat in toilet. Up SBA. Ambulated to bathroom and in halls. Med card and lab book up to date. Lymph wraps on. Pt showered this shift. Liver US done at bedside today. Possible liver biopsy if t bili and alk phos continue to increase. Will continue to monitor and notify team with changes.

## 2021-10-24 NOTE — PROGRESS NOTES
[facility]   Transplant Nephrology Progress Note  Date of Admission:  10/16/2021  Today's Date: 10/23/2021    Recommendations:  - continue diuresis bumex 3 mg PO daily  - no RRT indications at this time    Assessment & Plan    Maria Elena Fisher is a 51 year old women with hx of ESLD (MELD 18) 2/2 alcoholic cirrhosis now s.p DDLD on 10/17/2021 with uncomplicated procedure and post op course notable for brief pressor requirement (10/17-10/18), extubated 10/18 AM. Postop course c/b oliguric CORBIN    # CORBIN   # ATN  # Hyypervolemic  UOP improving   Would advise Bumex 3 mg PO daily     # Immune suppression   -  Pulse steroids for induction, Tacrolimus (goal trough per tx surgery 6-8) and MMF 750mg BID.  - renal sparing regimen with lower CNI goals as per tx surgery    # Transplant hx   Etiology - Alcoholic liver cirrhosis  DDLT - 10/17/2021   AST//344 (Downtrending)    # Anemia   # Melena   EGD shows esophagitis & duodenal ulcerations, path pending:  Transfuse Hb< 7      Recommendations were communicated to the primary team verbally.  Patient seen and staffed with Dr Scott Dumont MD       Interval History   Feeling better   No SOB/CP  Hb stable      Review of Systems   4 point ROS was obtained and negative except as noted in the Interval History.    MEDICATIONS:   aspirin  325 mg Oral Daily    levothyroxine  50 mcg Oral QAM AC    mycophenolate  750 mg Oral BID IS    nystatin  500,000 Units Swish & Swallow 4x Daily    pantoprazole  40 mg Oral BID    polyethylene glycol  17 g Oral or Feeding Tube Daily    predniSONE  5 mg Oral or Feeding Tube Daily    senna-docusate  1 tablet Oral or Feeding Tube BID    sodium chloride (PF)  3 mL Intravenous Q8H    sulfamethoxazole-trimethoprim  1 tablet Oral or Feeding Tube Once per day on Mon Wed Fri    tacrolimus  1.5 mg Oral BID IS    ursodiol  300 mg Oral BID    valGANciclovir  450 mg Oral Once per day on Mon Thu      dextrose       [Held by provider] heparin 400 Units/hr (10/20/21 2208)    BETA BLOCKER NOT PRESCRIBED         Physical Exam   Temp  Av  F (36.7  C)  Min: 97.4  F (36.3  C)  Max: 98.9  F (37.2  C)  Arterial Line BP  Min: 85/49  Max: 158/87  Arterial Line MAP (mmHg)  Av mmHg  Min: 63 mmHg  Max: 119 mmHg      Pulse  Av.2  Min: 83  Max: 115 Resp  Av.2  Min: 9  Max: 24  FiO2 (%)  Av.5 %  Min: 35 %  Max: 50 %  SpO2  Av.4 %  Min: 91 %  Max: 100 %    CVP (mmHg): 9 mmHgBP 121/72 (BP Location: Right arm)   Pulse 94   Temp 98.4  F (36.9  C) (Oral)   Resp 18   Wt 78.8 kg (173 lb 11.2 oz)   SpO2 98%   BMI 27.21 kg/m     Date 10/20/21 0700 - 10/21/21 0659   Shift 9886-7895 1579-6329 1818-2365 24 Hour Total   INTAKE   P.O. 150   150   I.V. 10   10   Shift Total(mL/kg) 160(1.89)   160(1.89)   OUTPUT   Drains 450 200  650   Shift Total(mL/kg) 450(5.31) 200(2.36)  650(7.67)   Weight (kg) 84.73 84.73 84.73 84.73      Admit Weight: 81.2 kg (179 lb)     GENERAL APPEARANCE: alert and no distress  HENT: mouth without ulcers or lesions  LYMPHATICS: no cervical or supraclavicular nodes  RESP: lungs clear to auscultation - no rales, rhonchi or wheezes  CV: regular rhythm, normal rate, no rub, no murmur  EDEMA: no LE edema bilaterally  ABDOMEN: soft, nondistended, nontender, bowel sounds normal  MS: extremities normal - no gross deformities noted, no evidence of inflammation in joints, no muscle tenderness  SKIN: no rash    Data   All labs reviewed by me.  CMP  Recent Labs   Lab 10/23/21  0651 10/22/21  0538 10/21/21  1611 10/21/21  1224 10/21/21  0749 10/21/21  0330 10/20/21  0815 10/20/21  0700   * 127* 126*  --   --  127*   < > 128*   POTASSIUM 4.4 5.1 5.4*  --   --  5.2   < > 4.7   CHLORIDE 98 95 94  --   --  95   < > 94   CO2   --   --  19*   < > 18*   ANIONGAP 12 12 11  --   --  13   < > 16*   * 86 118* 120*   < > 94   < > 130*   BUN 94* 84* 79*  --   --  77*   < > 62*   CR 3.66* 3.89*  3.55*  --   --  3.51*   < > 2.85*   GFRESTIMATED 14* 13* 14*  --   --  14*   < > 18*   EMMY 8.4* 8.3* 8.1*  --   --  8.3*   < > 8.6   MAG 2.8* 2.9*  --   --   --  3.0*  --  3.1*   PHOS 6.3* 7.1*  --   --   --  8.1*  --  8.0*   PROTTOTAL 5.5* 5.2*  --   --   --  5.1*  --  5.8*   ALBUMIN 2.4* 2.8*  --   --   --  2.8*  --  2.8*   BILITOTAL 1.9* 2.3*  --   --   --  2.3*  --  3.3*   ALKPHOS 148 111  --   --   --  96  --  128   AST 28 35  --   --   --  82*  --  162*   * 135*  --   --   --  186*  --  299*    < > = values in this interval not displayed.     CBC  Recent Labs   Lab 10/23/21  0651 10/22/21  1600 10/22/21  0538 10/21/21  1154 10/21/21  0330 10/21/21  0330   HGB 7.5* 7.2* 7.2* 8.3*   < > 6.0*   WBC 8.9  --  8.6 10.4  --  9.6   RBC 2.44*  --  2.34* 2.65*  --  1.89*   HCT 22.9*  --  21.5* 24.4*  --  17.8*   MCV 94  --  92 92  --  94   MCH 30.7  --  30.8 31.3  --  31.7   MCHC 32.8  --  33.5 34.0  --  33.7   RDW 16.6*  --  16.8* 16.4*  --  17.3*   PLT 79*  --  46* 49*  --  43*    < > = values in this interval not displayed.     INR  Recent Labs   Lab 10/21/21  0719 10/18/21  0808 10/18/21  0406 10/17/21  2352   INR 1.20* 1.54* 1.51* 1.97*   PTT 35 41* 37 41*     ABG  Recent Labs   Lab 10/18/21  0809 10/18/21  0406 10/18/21  0112 10/17/21  2132   PH 7.37 7.38 7.36 7.40   PCO2 36 36 34* 37   PO2 133* 159* 148* 119*   HCO3 21 22 19* 23   O2PER 35 40 40 50      Urine Studies  Recent Labs   Lab Test 10/19/21  1438 01/04/21  1020   COLOR Yellow Bibi   APPEARANCE Slightly Cloudy* Slightly Cloudy   URINEGLC Negative Negative   URINEBILI Negative Negative   URINEKETONE Negative Negative   SG 1.018 1.019   UBLD Large* Negative   URINEPH 5.0 5.0   PROTEIN 20 * Negative   NITRITE Negative Negative   LEUKEST Negative Negative   RBCU 120* 1   WBCU 4 7*     No lab results found.  PTH  No lab results found.  Iron Studies  Recent Labs   Lab Test 10/05/21  1124 09/28/21  1204 07/30/21  1052 05/02/21  0106   IRON  --   --   --   259*   FEB  --   --   --  355   IRONSAT  --   --   --  73*   DARRICK 186 280* 32 72       IMAGING:  10/20/2021 CXR  This patient has been seen and evaluated by me, Janay Blevins MD.  I have reviewed the note and agree with plan of care as documented by the fellow.  Janay Blevins MD

## 2021-10-24 NOTE — PROGRESS NOTES
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 She is now s/p DBD DDLTx 10/17/21 with Dr. Morrison.    Graft function:DBD Liver Tx 10/17/21: POD 7  Liver enzymes downtrending. T bili and alk phos increased today, will monitor again tomorrow, consider liver biopsy if still trending up. Continue Steve BID.   -HA ppx: hypercoagulable intraop. Postop US with elevated RI involving the extrahepatic hepatic artery and the right hepatic artery. Repeat US POD 1&2 -Patent hepatic vasculature. Continued elevated RI in the hepatic arteries. Repeat US 10/21 to evaluate for fluid collections/bleeding- shows small perihepatic fluid collections, unchanged from prior.  - Heparin 400units/hr held since 10/20 PM in setting of bleeding, no plans to resume at this time. Continue ASA 325mg daily.    BERKLEY drain x1, output slowly decreasing. Stopped albumin replacements  Immunosuppression management:    Solumedrol taper per protocol induction  MMF 750mg BID  Tacrolimus -supratherapeutic, HELD dose in setting of CORBIN. Resumed 1mg BID on 10/21 PM with goal to 6-8. Level 10/23 4.2 but 13 hr trough, recently increased to 1.5 mg BID, will continue current dosing. 10/24 level pending.  Complexity of management:Medium. Contributing factors: thrombocytopenia CORBIN  Hematology: Anemia of chronic disease/ABL: Received 5 L of PRBC intraop. 10/21 HGb 6, transfused 2 PRBC. Holding heparin.    -HA ppx as above with Heparin 400units/hr-hold since 10/20 PM with concern for bleeding. Found to have oozing duodenal ulcers.  Epistaxis: Persistent Nose bleed x4 hrs 10/20, stopped with quick clot. Humidified O2. Repeat epistaxis 10/21 PM x 1.25 hrs. Now improved, will consult ENT if recurrent  nosebleed.  Neurology: Acute postoperative pain: Controlled, Continue Tramadol 50mg TID PRN and Robaxin 500mg TID PRN  Cardiorespiratory: Hypotension: Stable 110-120  Tachycardia: HR 80s-120,ST  Pleural effusion, Large:  Extubated POD 1 to RA, CDB/IS   GI/Nutrition: Regular diet  Continue bowel regimen: give MOM, Miralax, Senna BID, Dulcolax suppos PRN  Duodenal   Endocrine:Steroid induced hyperglycemia: initially required an insulin gtt, FBS 90-120s. Weaned off insulin  Fluid/Electrolytes: Hypervolemia, Weight now to baseline. Responding to bumex, not all voids quantified. Will give 3 mg oral bumex today.  Hyponatremia: Resolved, Na 136  Hyperphosphatemia: 4.5 (6.3)  : Voiding  CORBIN: SCr 1-1.2 b/l, peaked at 3.9. Now trending downward. Making more urine in response to bumex. Neph following. Bumex 3 mg oral today.  Infectious disease: Afebrile,   Leukocytosis: WBC improving, 8.7 (8.9). Trend daily. Received zosyn x48hrs Periop and diflucan x2 1 dose pre and 1 dose postop  Valcyte (CMV ppx) renal dosed, Bactrim (PCP ppx)  Prophylaxis: DVT, fall, GI   Disposition: 7A, may be able to discharge in 1-2 days depending on trend of LFTs    Medical Decision Making: Medium  Subsequent visit 85972 (moderate level decision making)    BREONNA/Fellow/Resident Provider: Deirdre Lemus PA-C    Faculty: Jaylan Lyon M.D.    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    10/17/2021 (Liver), Postoperative day: 7     Interval History: obtained from the patient.    Overnight events: Making urine. Having dark BMs. Pain controlled. Tolerating regular diet. Dyspnea improved. Ambulating with walker. States she has good support at home and is hoping to discharge as soon as medically ready.    ROS:   A 10-point review of systems was negative except as noted above.    Curent Meds:    aspirin   325 mg Oral Daily     bumetanide  3 mg Oral Once     levothyroxine  50 mcg Oral QAM AC     mycophenolate  750 mg Oral BID IS     nystatin  500,000 Units Swish & Swallow 4x Daily     pantoprazole  40 mg Oral BID     polyethylene glycol  17 g Oral or Feeding Tube Daily     predniSONE  5 mg Oral or Feeding Tube Daily     senna-docusate  1 tablet Oral or Feeding Tube BID     sodium chloride (PF)  3 mL Intravenous Q8H     sulfamethoxazole-trimethoprim  1 tablet Oral or Feeding Tube Once per day on Mon Wed Fri     tacrolimus  1.5 mg Oral BID IS     ursodiol  300 mg Oral BID     valGANciclovir  450 mg Oral Once per day on Mon Thu       Physical Exam:     Admit Weight: 81.2 kg (179 lb)    Current Vitals:   /66 (BP Location: Right arm)   Pulse 80   Temp 98.1  F (36.7  C) (Oral)   Resp 16   Wt 75.9 kg (167 lb 6.4 oz)   SpO2 96%   BMI 26.22 kg/m      Vital sign ranges:    Temp:  [98.1  F (36.7  C)-99.1  F (37.3  C)] 98.1  F (36.7  C)  Pulse:  [] 80  Resp:  [16-18] 16  BP: (112-135)/(65-77) 116/66  SpO2:  [96 %-98 %] 96 %  Patient Vitals for the past 24 hrs:   BP Temp Temp src Pulse Resp SpO2 Weight   10/24/21 1151 116/66 98.1  F (36.7  C) Oral 80 16 96 % --   10/24/21 0714 112/65 99.1  F (37.3  C) Oral -- 18 96 % --   10/24/21 0350 130/66 98.9  F (37.2  C) Oral (!) 122 18 96 % --   10/24/21 0342 -- -- -- -- -- -- 75.9 kg (167 lb 6.4 oz)   10/24/21 0015 113/76 99  F (37.2  C) Oral 107 18 98 % --   10/23/21 1931 121/72 98.4  F (36.9  C) Oral 94 18 98 % --   10/23/21 1531 135/77 98.8  F (37.1  C) Oral 100 16 96 % --     General Appearance: in no apparent distress. Fatigued appearing.  Skin: normal, warm, dry, No rashes, induration   Heart: regular rate and rhythm  Lungs: Nonlabored resps on RA  Abdomen: obese, soft, NTTP, incision with staples CDI, mild serosanguinous output to right lateral incision. Drain serosang  :  UOP increasing  Extremities: edema: trace bilaterally- greatly improved. There is no skin  breakdown.  Neurologic: awake and alert.  Tremor absent.. Asterixis: absent.    Frailty Scores     Frailty Scores 12/27/2020 12/27/2020    Final Score Frail Frail    Final Score Number 3 3          Data:   CMP  Recent Labs   Lab 10/24/21  0824 10/23/21  0651 10/22/21  0538 10/22/21  0538 10/21/21  0330 10/21/21  0324 10/18/21  0003 10/17/21  2352    131*   < > 127*   < >  --    < > 134   POTASSIUM 3.9 4.4   < > 5.1   < >  --    < > 4.0   CHLORIDE 102 98   < > 95   < >  --    < > 103   CO2 24 21   < > 20   < >  --    < > 20   * 112*   < > 86   < >  --    < > 162*   BUN 74* 94*   < > 84*   < >  --    < > 28   CR 2.35* 3.66*   < > 3.89*   < >  --    < > 0.96   GFRESTIMATED 23* 14*   < > 13*   < >  --    < > 69   EMMY 8.2* 8.4*   < > 8.3*   < >  --    < > 8.9   ICAW  --   --   --  4.5  --  4.4   < >  --    MAG 2.2 2.8*   < > 2.9*   < >  --    < > 2.3   PHOS 4.5 6.3*   < > 7.1*   < >  --    < > 4.3   AMYLASE  --   --   --   --   --   --   --  52   LIPASE  --   --   --   --   --   --   --  191   ALBUMIN 2.2* 2.4*   < > 2.8*   < >  --    < > 1.6*   BILITOTAL 2.6* 1.9*   < > 2.3*   < >  --    < > 8.9*   ALKPHOS 207* 148   < > 111   < >  --    < > 244*   AST 27 28   < > 35   < >  --    < > 1,435*   ALT 85* 108*   < > 135*   < >  --    < > 850*    < > = values in this interval not displayed.     CBC  Recent Labs   Lab 10/24/21  0824 10/23/21  0651 10/20/21  1037 10/19/21  1731   HGB 8.1* 7.5*   < >  --    WBC 8.7 8.9   < >  --    * 79*   < >  --    A1C  --   --   --  5.1    < > = values in this interval not displayed.     COAMACKENZIE  Recent Labs   Lab 10/21/21  0719 10/18/21  0808   INR 1.20* 1.54*   PTT 35 41*      Urinalysis  Recent Labs   Lab Test 10/19/21  1438 01/04/21  1020   COLOR Yellow Bibi   APPEARANCE Slightly Cloudy* Slightly Cloudy   URINEGLC Negative Negative   URINEBILI Negative Negative   URINEKETONE Negative Negative   SG 1.018 1.019   UBLD Large* Negative   URINEPH 5.0 5.0   PROTEIN 20 *  Negative   NITRITE Negative Negative   LEUKEST Negative Negative   RBCU 120* 1   WBCU 4 7*     Virology:  Hepatitis C Antibody   Date Value Ref Range Status   10/16/2021 Nonreactive Nonreactive Final   05/02/2021 Nonreactive NR^Nonreactive Final     Comment:     Assay performance characteristics have not been established for newborns,   infants, and children

## 2021-10-24 NOTE — PLAN OF CARE
/66 (BP Location: Right arm)   Pulse (!) 122   Temp 98.9  F (37.2  C) (Oral)   Resp 18   Wt 75.9 kg (167 lb 6.4 oz)   SpO2 96%   BMI 26.22 kg/m      Shift: [id number]  Isolation Status: NA  VS: Tachycardic at baseline on 2 LPM  Neuro: A&O x4  Behaviors: Calm, cooperative, pleasant. Uses call light appropriately.  BG: NA  Respiratory: Diminished sounds in the lower lobes.   Cardiac: Tachycardic (100s-120s)  Pain/Nausea/PRN: No reported pain or nausea.  Diet: Regular Diet  LDA: Right Triple lumen IJ bandage reinforced.  Infusion(s): NA  GI/: No episodes of incontinence tonight.   Skin: WDL ex abdominal incision and R BERKLEY with copious leaking.  Mobility: Up with SBA and walker.  Plan: Pt to maintain appetite and continue walking.

## 2021-10-24 NOTE — PROGRESS NOTES
[facility]   Transplant Nephrology Progress Note  Date of Admission:  10/16/2021  Today's Date: 10/24/2021    Recommendations:  - continue diuresis with  bumex 3 mg PO daily  - no RRT indications at this time    Assessment & Plan    Maria Elena Fisher is a 51 year old women with hx of ESLD (MELD 18) 2/2 alcoholic cirrhosis now s.p DDLD on 10/17/2021 with uncomplicated procedure and post op course notable for brief pressor requirement (10/17-10/18), extubated 10/18 AM. Postop course c/b oliguric CORBIN    # CORBIN   # ATN  # Hyypervolemic  UOP improving   Continue  Bumex 3 mg PO daily     # Immune suppression   -  Pulse steroids for induction, Tacrolimus (goal trough per tx surgery 6-8) and MMF 750mg BID.  - renal sparing regimen with lower CNI goals as per tx surgery    # Transplant hx   Etiology - Alcoholic liver cirrhosis  DDLT - 10/17/2021   AST//344 (Downtrending)    # Anemia   # Melena   EGD shows esophagitis & duodenal ulcerations, path pending:  Transfuse Hb< 7      Recommendations were communicated to the primary team verbally.  Patient seen and staffed with Dr Scott Dumont MD       Interval History   Doing well  No CP/SOB      Review of Systems   4 point ROS was obtained and negative except as noted in the Interval History.    MEDICATIONS:   aspirin  325 mg Oral Daily    [START ON 10/25/2021] bumetanide  2 mg Oral Daily    levothyroxine  50 mcg Oral QAM AC    mycophenolate  750 mg Oral BID IS    nystatin  500,000 Units Swish & Swallow 4x Daily    pantoprazole  40 mg Oral BID    polyethylene glycol  17 g Oral or Feeding Tube Daily    predniSONE  5 mg Oral or Feeding Tube Daily    senna-docusate  1 tablet Oral or Feeding Tube BID    sodium chloride (PF)  3 mL Intravenous Q8H    sulfamethoxazole-trimethoprim  1 tablet Oral or Feeding Tube Once per day on Mon Wed Fri    tacrolimus  1.5 mg Oral BID IS    ursodiol  300 mg Oral BID    valGANciclovir  450 mg  Oral Once per day on Mon Thu      dextrose      [Held by provider] heparin 400 Units/hr (10/20/21 2208)    BETA BLOCKER NOT PRESCRIBED         Physical Exam   Temp  Av  F (36.7  C)  Min: 97.4  F (36.3  C)  Max: 98.9  F (37.2  C)  Arterial Line BP  Min: 85/49  Max: 158/87  Arterial Line MAP (mmHg)  Av mmHg  Min: 63 mmHg  Max: 119 mmHg      Pulse  Av.2  Min: 83  Max: 115 Resp  Av.2  Min: 9  Max: 24  FiO2 (%)  Av.5 %  Min: 35 %  Max: 50 %  SpO2  Av.4 %  Min: 91 %  Max: 100 %    CVP (mmHg): 9 mmHgBP 116/66 (BP Location: Right arm)   Pulse 80   Temp 98.1  F (36.7  C) (Oral)   Resp 16   Wt 75.9 kg (167 lb 6.4 oz)   SpO2 96%   BMI 26.22 kg/m     Date 10/20/21 0700 - 10/21/21 0659   Shift 8672-6276 5791-2969 5926-2016 24 Hour Total   INTAKE   P.O. 150   150   I.V. 10   10   Shift Total(mL/kg) 160(1.89)   160(1.89)   OUTPUT   Drains 450 200  650   Shift Total(mL/kg) 450(5.31) 200(2.36)  650(7.67)   Weight (kg) 84.73 84.73 84.73 84.73      Admit Weight: 81.2 kg (179 lb)     GENERAL APPEARANCE: alert and no distress  HENT: mouth without ulcers or lesions  LYMPHATICS: no cervical or supraclavicular nodes  RESP: lungs clear to auscultation - no rales, rhonchi or wheezes  CV: regular rhythm, normal rate, no rub, no murmur  EDEMA: no LE edema bilaterally  ABDOMEN: soft, nondistended, nontender, bowel sounds normal  MS: extremities normal - no gross deformities noted, no evidence of inflammation in joints, no muscle tenderness  SKIN: no rash    Data   All labs reviewed by me.  CMP  Recent Labs   Lab 10/24/21  0824 10/23/21  0651 10/22/21  0538 10/21/21  1611 10/21/21  0749 10/21/21  0330    131* 127* 126*   < > 127*   POTASSIUM 3.9 4.4 5.1 5.4*   < > 5.2   CHLORIDE 102 98 95 94   < > 95   CO2 24 21 20 21   < > 19*   ANIONGAP 10 12 12 11   < > 13   * 112* 86 118*   < > 94   BUN 74* 94* 84* 79*   < > 77*   CR 2.35* 3.66* 3.89* 3.55*   < > 3.51*   GFRESTIMATED 23* 14* 13* 14*   < > 14*    EMMY 8.2* 8.4* 8.3* 8.1*   < > 8.3*   MAG 2.2 2.8* 2.9*  --   --  3.0*   PHOS 4.5 6.3* 7.1*  --   --  8.1*   PROTTOTAL 5.3* 5.5* 5.2*  --   --  5.1*   ALBUMIN 2.2* 2.4* 2.8*  --   --  2.8*   BILITOTAL 2.6* 1.9* 2.3*  --   --  2.3*   ALKPHOS 207* 148 111  --   --  96   AST 27 28 35  --   --  82*   ALT 85* 108* 135*  --   --  186*    < > = values in this interval not displayed.     CBC  Recent Labs   Lab 10/24/21  0824 10/23/21  0651 10/22/21  1600 10/22/21  0538 10/21/21  1154 10/21/21  1154   HGB 8.1* 7.5* 7.2* 7.2*   < > 8.3*   WBC 8.7 8.9  --  8.6  --  10.4   RBC 2.58* 2.44*  --  2.34*  --  2.65*   HCT 24.5* 22.9*  --  21.5*  --  24.4*   MCV 95 94  --  92  --  92   MCH 31.4 30.7  --  30.8  --  31.3   MCHC 33.1 32.8  --  33.5  --  34.0   RDW 17.0* 16.6*  --  16.8*  --  16.4*   * 79*  --  46*  --  49*    < > = values in this interval not displayed.     INR  Recent Labs   Lab 10/21/21  0719 10/18/21  0808 10/18/21  0406 10/17/21  2352   INR 1.20* 1.54* 1.51* 1.97*   PTT 35 41* 37 41*     ABG  Recent Labs   Lab 10/18/21  0809 10/18/21  0406 10/18/21  0112 10/17/21  2132   PH 7.37 7.38 7.36 7.40   PCO2 36 36 34* 37   PO2 133* 159* 148* 119*   HCO3 21 22 19* 23   O2PER 35 40 40 50      Urine Studies  Recent Labs   Lab Test 10/19/21  1438 01/04/21  1020   COLOR Yellow Bibi   APPEARANCE Slightly Cloudy* Slightly Cloudy   URINEGLC Negative Negative   URINEBILI Negative Negative   URINEKETONE Negative Negative   SG 1.018 1.019   UBLD Large* Negative   URINEPH 5.0 5.0   PROTEIN 20 * Negative   NITRITE Negative Negative   LEUKEST Negative Negative   RBCU 120* 1   WBCU 4 7*     No lab results found.  PTH  No lab results found.  Iron Studies  Recent Labs   Lab Test 10/05/21  1124 09/28/21  1204 07/30/21  1052 05/02/21  0106   IRON  --   --   --  259*   FEB  --   --   --  355   IRONSAT  --   --   --  73*   DARRICK 186 280* 32 72       IMAGING:  10/20/2021 CXR    This patient has been seen and evaluated by Janay gallego  MD Gen.  I have reviewed the note and agree with plan of care as documented by the fellow.  Janay Blevins MD

## 2021-10-24 NOTE — PROGRESS NOTES
Gastroenterology Progress Note  Sarah Fisher [id number] 51 year old 1970  10/22/2021        Date of Admission: 10/16/2021  Requesting physician: Christian Ramirez MD       Reason for Consultation:   Recurrent anemia, having melena per patient, evaluate for GI bleed (pt still NPO this AM)         Assessment and Plan:   Sarah Fisher is a 51 year old female with PMHx of alcohol related cirrhosis c/b ascites, hepatic hydrothorax and GAVE and history of hemochromatosis compound heterozygote who underwent DBD DDLT 10/17/21.     #. Alcohol related cirrhosis   #. Hemochromatosis compound heterozygote  #. History of GAVE  #. DDLT (DBD) on 10/17/21  #. Epistaxis  #. Acute on Chronic Normocytic Anemia    Patient with history of GAVE s/p APC 9/2021 and APC 10/2021 who underwent liver transplant + was placed on heparin gtt for hepatic artery thrombosis ppx, ASA and prednisone post transplant, which places her at risk for re-bleeding from her known GAVE. Patient also with ongoing epistaxis with an episode overnight lasting > 1 hour. Uremia in the setting of renal injury is likely not helping with hemostasis.     EGD 10/22 showing very minimal GAVE, not the source of bleeding. Showed multiple superficial duodenal ulcers - prelim bx normal but awaiting further stains. Hgb stable but has not been restarted on AC.     BR slightly up today. CORBIN resolving         Recommendations:   -- Follow up duodenal bx  -- Continue BID PPI  -- IS per transplant surgery    Fadia Avila MD  Transplant Hepatology         Medications:     Current Facility-Administered Medications   Medication     aspirin (ASA) tablet 325 mg     bisacodyl (DULCOLAX) Suppository 10 mg     [START ON 10/25/2021] bumetanide (BUMEX) tablet 2 mg     dextrose 10% infusion     glucose gel 15-30 g    Or     dextrose 50 % injection 25-50 mL    Or     glucagon injection 1 mg     [Held by provider] heparin infusion 25,000 units in 0.45% NaCl 250 mL  ANTICOAGULANT     levothyroxine (SYNTHROID/LEVOTHROID) tablet 50 mcg     methocarbamol (ROBAXIN) tablet 500 mg     mycophenolate (GENERIC EQUIVALENT) capsule 750 mg     naloxone (NARCAN) injection 0.2 mg    Or     naloxone (NARCAN) injection 0.4 mg    Or     naloxone (NARCAN) injection 0.2 mg    Or     naloxone (NARCAN) injection 0.4 mg     nystatin (MYCOSTATIN) suspension 500,000 Units     ondansetron (ZOFRAN) injection 4 mg     oxymetazoline (AFRIN) 0.05 % spray 2 spray     pantoprazole (PROTONIX) EC tablet 40 mg     polyethylene glycol (MIRALAX) Packet 17 g     polyethylene glycol (MIRALAX) Packet 17 g     predniSONE (DELTASONE) tablet 5 mg     prochlorperazine (COMPAZINE) injection 5 mg     Reason beta blocker order not selected     senna-docusate (SENOKOT-S/PERICOLACE) 8.6-50 MG per tablet 1 tablet     sodium chloride (PF) 0.9% PF flush 3 mL     sodium chloride (PF) 0.9% PF flush 3 mL     sodium chloride (PF) 0.9% PF flush 3 mL     sulfamethoxazole-trimethoprim (BACTRIM) 400-80 MG per tablet 1 tablet     tacrolimus (GENERIC EQUIVALENT) capsule 1.5 mg     traMADol (ULTRAM) tablet 50 mg     traZODone (DESYREL) tablet 50 mg     ursodiol (ACTIGALL) capsule 300 mg     valGANciclovir (VALCYTE) tablet 450 mg            Review of Systems:   10 point ROS otherwise negative         Physical Exam:   VS:  /66 (BP Location: Right arm)   Pulse 80   Temp 98.1  F (36.7  C) (Oral)   Resp 16   Wt 75.9 kg (167 lb 6.4 oz)   SpO2 96%   BMI 26.22 kg/m      Wt:   Wt Readings from Last 2 Encounters:   10/24/21 75.9 kg (167 lb 6.4 oz)   09/09/21 81.2 kg (179 lb)      Constitutional: cooperative, pleasant, no acute distress  Eyes: Conjunctiva anicteric  HEENT: mucus membranes moist  Ext: 1+ David edema to mid shins bilaterally  Respiratory: Breathing comfortably on RA  Abd: Nondistended, surgical scar healing well, appropriate tenderness around site  Skin: warm, perfused, no jaundice  Neuro: A&O x 3         Laboratory:    BMP  Recent Labs   Lab 10/24/21  0824 10/23/21  0651 10/22/21  0538 10/21/21  1611    131* 127* 126*   POTASSIUM 3.9 4.4 5.1 5.4*   CHLORIDE 102 98 95 94   EMMY 8.2* 8.4* 8.3* 8.1*   CO2 24 21 20 21   BUN 74* 94* 84* 79*   CR 2.35* 3.66* 3.89* 3.55*   * 112* 86 118*     CBC  Recent Labs   Lab 10/24/21  0824 10/23/21  0651 10/22/21  1600 10/22/21  0538 10/21/21  1154 10/21/21  1154   WBC 8.7 8.9  --  8.6  --  10.4   RBC 2.58* 2.44*  --  2.34*  --  2.65*   HGB 8.1* 7.5* 7.2* 7.2*   < > 8.3*   HCT 24.5* 22.9*  --  21.5*  --  24.4*   MCV 95 94  --  92  --  92   MCH 31.4 30.7  --  30.8  --  31.3   MCHC 33.1 32.8  --  33.5  --  34.0   RDW 17.0* 16.6*  --  16.8*  --  16.4*   * 79*  --  46*  --  49*    < > = values in this interval not displayed.     INR  Recent Labs   Lab 10/21/21  0719 10/18/21  0808 10/18/21  0406 10/17/21  2352   INR 1.20* 1.54* 1.51* 1.97*     Liver Enzymes  Recent Labs   Lab 10/24/21  0824 10/23/21  0651 10/22/21  0538 10/21/21  0330   ALKPHOS 207* 148 111 96   AST 27 28 35 82*   ALT 85* 108* 135* 186*   BILITOTAL 2.6* 1.9* 2.3* 2.3*   PROTTOTAL 5.3* 5.5* 5.2* 5.1*   ALBUMIN 2.2* 2.4* 2.8* 2.8*      Imaging/Procedures/Studies:   CXR 10/21/21  Improving small left pleural effusion with overlying compressive  atelectasis.    EGD 10/7/21  Impression:               - Normal esophagus.                             - Portal hypertensive gastropathy.                             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                             Evidence of prior treatment. Small area of vascular                             ectasias noted in the duodenal bulb. Treated with                             argon plasma coagulation (APC).                             - No specimens collected.   Recommendation:           - Discharge patient to home (ambulatory).                             - Full liquid diet today and tomorrow, then advance                             to a regular diet  as tolerated                             - Continue pantoprazole to 40 mg twice daily to                             treat iatrogenic gastric ulcerations following                             procedure today.                             - Repeat upper endoscopy in 3 month for retreatment                             with Dr. Jerry.

## 2021-10-25 ENCOUNTER — ANESTHESIA EVENT (OUTPATIENT)
Dept: SURGERY | Facility: CLINIC | Age: 51
End: 2021-10-25

## 2021-10-25 ENCOUNTER — APPOINTMENT (OUTPATIENT)
Dept: OCCUPATIONAL THERAPY | Facility: CLINIC | Age: 51
End: 2021-10-25
Attending: TRANSPLANT SURGERY
Payer: COMMERCIAL

## 2021-10-25 ENCOUNTER — APPOINTMENT (OUTPATIENT)
Dept: GENERAL RADIOLOGY | Facility: CLINIC | Age: 51
End: 2021-10-25
Attending: TRANSPLANT SURGERY
Payer: COMMERCIAL

## 2021-10-25 ENCOUNTER — ANESTHESIA (OUTPATIENT)
Dept: SURGERY | Facility: CLINIC | Age: 51
End: 2021-10-25

## 2021-10-25 PROBLEM — R04.0 EPISTAXIS: Status: ACTIVE | Noted: 2021-10-25

## 2021-10-25 PROBLEM — D84.9 IMMUNOSUPPRESSED STATUS (H): Status: ACTIVE | Noted: 2021-10-25

## 2021-10-25 PROBLEM — R73.9 STEROID-INDUCED HYPERGLYCEMIA: Status: ACTIVE | Noted: 2021-10-25

## 2021-10-25 PROBLEM — Z94.4 LIVER TRANSPLANT RECIPIENT (H): Status: ACTIVE | Noted: 2021-10-25

## 2021-10-25 PROBLEM — E46 MALNUTRITION (H): Status: ACTIVE | Noted: 2021-10-25

## 2021-10-25 PROBLEM — E87.1 HYPONATREMIA: Status: ACTIVE | Noted: 2020-11-14

## 2021-10-25 PROBLEM — D50.0 BLOOD LOSS ANEMIA: Status: ACTIVE | Noted: 2020-11-14

## 2021-10-25 PROBLEM — T38.0X5A STEROID-INDUCED HYPERGLYCEMIA: Status: ACTIVE | Noted: 2021-10-25

## 2021-10-25 PROBLEM — K20.90 ESOPHAGITIS: Status: ACTIVE | Noted: 2021-10-25

## 2021-10-25 PROBLEM — I95.9 HYPOTENSION: Status: ACTIVE | Noted: 2020-12-19

## 2021-10-25 PROBLEM — E83.39 HYPERPHOSPHATEMIA: Status: ACTIVE | Noted: 2021-10-25

## 2021-10-25 PROBLEM — K31.819 GASTRIC ANTRAL VASCULAR ECTASIA: Status: ACTIVE | Noted: 2021-10-25

## 2021-10-25 PROBLEM — D72.829 LEUKOCYTOSIS: Status: ACTIVE | Noted: 2021-10-25

## 2021-10-25 PROBLEM — N17.9 AKI (ACUTE KIDNEY INJURY) (H): Status: ACTIVE | Noted: 2020-11-14

## 2021-10-25 PROBLEM — K26.9 DUODENAL EROSION: Status: ACTIVE | Noted: 2021-10-25

## 2021-10-25 LAB
ABO/RH(D): NORMAL
ALBUMIN SERPL-MCNC: 1.9 G/DL (ref 3.4–5)
ALBUMIN UR-MCNC: 10 MG/DL
ALP SERPL-CCNC: 216 U/L (ref 40–150)
ALT SERPL W P-5'-P-CCNC: 66 U/L (ref 0–50)
AMYLASE SERPL-CCNC: 37 U/L (ref 30–110)
ANION GAP SERPL CALCULATED.3IONS-SCNC: 8 MMOL/L (ref 3–14)
ANTIBODY SCREEN: NEGATIVE
APPEARANCE UR: CLEAR
AST SERPL W P-5'-P-CCNC: 26 U/L (ref 0–45)
BACTERIA PRT CULT: ABNORMAL
BACTERIA PRT CULT: ABNORMAL
BASOPHILS # BLD AUTO: 0 10E3/UL (ref 0–0.2)
BASOPHILS NFR BLD AUTO: 0 %
BILIRUB DIRECT SERPL-MCNC: 2.8 MG/DL (ref 0–0.2)
BILIRUB SERPL-MCNC: 3.2 MG/DL (ref 0.2–1.3)
BILIRUB UR QL STRIP: NEGATIVE
BLD PROD TYP BPU: NORMAL
BLOOD COMPONENT TYPE: NORMAL
BUN SERPL-MCNC: 64 MG/DL (ref 7–30)
CALCIUM SERPL-MCNC: 8.2 MG/DL (ref 8.5–10.1)
CHLORIDE BLD-SCNC: 103 MMOL/L (ref 94–109)
CO2 SERPL-SCNC: 25 MMOL/L (ref 20–32)
CODING SYSTEM: NORMAL
COLOR UR AUTO: YELLOW
CREAT SERPL-MCNC: 1.66 MG/DL (ref 0.52–1.04)
CROSSMATCH: NORMAL
EOSINOPHIL # BLD AUTO: 0.3 10E3/UL (ref 0–0.7)
EOSINOPHIL NFR BLD AUTO: 5 %
ERCP: NORMAL
ERYTHROCYTE [DISTWIDTH] IN BLOOD BY AUTOMATED COUNT: 17.6 % (ref 10–15)
GFR SERPL CREATININE-BSD FRML MDRD: 35 ML/MIN/1.73M2
GLUCOSE BLD-MCNC: 118 MG/DL (ref 70–99)
GLUCOSE BLDC GLUCOMTR-MCNC: 117 MG/DL (ref 70–99)
GLUCOSE BLDC GLUCOMTR-MCNC: 119 MG/DL (ref 70–99)
GLUCOSE BLDC GLUCOMTR-MCNC: 88 MG/DL (ref 70–99)
GLUCOSE UR STRIP-MCNC: NEGATIVE MG/DL
HCT VFR BLD AUTO: 21.6 % (ref 35–47)
HGB BLD-MCNC: 7 G/DL (ref 11.7–15.7)
HGB BLD-MCNC: 7.6 G/DL (ref 11.7–15.7)
HGB UR QL STRIP: NEGATIVE
IMM GRANULOCYTES # BLD: 0.1 10E3/UL
IMM GRANULOCYTES NFR BLD: 2 %
ISSUE DATE AND TIME: NORMAL
KETONES UR STRIP-MCNC: NEGATIVE MG/DL
LACTATE SERPL-SCNC: 0.4 MMOL/L (ref 0.7–2)
LACTATE SERPL-SCNC: 0.6 MMOL/L (ref 0.7–2)
LEUKOCYTE ESTERASE UR QL STRIP: NEGATIVE
LIPASE SERPL-CCNC: 249 U/L (ref 73–393)
LYMPHOCYTES # BLD AUTO: 0.5 10E3/UL (ref 0.8–5.3)
LYMPHOCYTES NFR BLD AUTO: 7 %
MAGNESIUM SERPL-MCNC: 2.1 MG/DL (ref 1.6–2.3)
MCH RBC QN AUTO: 31.1 PG (ref 26.5–33)
MCHC RBC AUTO-ENTMCNC: 32.4 G/DL (ref 31.5–36.5)
MCV RBC AUTO: 96 FL (ref 78–100)
MONOCYTES # BLD AUTO: 0.4 10E3/UL (ref 0–1.3)
MONOCYTES NFR BLD AUTO: 7 %
MUCOUS THREADS #/AREA URNS LPF: PRESENT /LPF
NEUTROPHILS # BLD AUTO: 5.2 10E3/UL (ref 1.6–8.3)
NEUTROPHILS NFR BLD AUTO: 79 %
NITRATE UR QL: NEGATIVE
NRBC # BLD AUTO: 0 10E3/UL
NRBC BLD AUTO-RTO: 0 /100
PATH REPORT.COMMENTS IMP SPEC: NORMAL
PATH REPORT.FINAL DX SPEC: NORMAL
PATH REPORT.GROSS SPEC: NORMAL
PATH REPORT.MICROSCOPIC SPEC OTHER STN: NORMAL
PATH REPORT.RELEVANT HX SPEC: NORMAL
PH UR STRIP: 5.5 [PH] (ref 5–7)
PHOSPHATE SERPL-MCNC: 2.9 MG/DL (ref 2.5–4.5)
PHOTO IMAGE: NORMAL
PLATELET # BLD AUTO: 115 10E3/UL (ref 150–450)
POTASSIUM BLD-SCNC: 3.7 MMOL/L (ref 3.4–5.3)
PROT SERPL-MCNC: 4.8 G/DL (ref 6.8–8.8)
RBC # BLD AUTO: 2.25 10E6/UL (ref 3.8–5.2)
RBC URINE: <1 /HPF
SODIUM SERPL-SCNC: 136 MMOL/L (ref 133–144)
SP GR UR STRIP: 1.01 (ref 1–1.03)
SPECIMEN EXPIRATION DATE: NORMAL
TACROLIMUS BLD-MCNC: 4 UG/L (ref 5–15)
TME LAST DOSE: ABNORMAL H
TME LAST DOSE: ABNORMAL H
UNIT ABO/RH: NORMAL
UNIT NUMBER: NORMAL
UNIT STATUS: NORMAL
UNIT TYPE ISBT: 7300
UPPER GI ENDOSCOPY: NORMAL
UROBILINOGEN UR STRIP-MCNC: NORMAL MG/DL
WBC # BLD AUTO: 6.6 10E3/UL (ref 4–11)
WBC URINE: <1 /HPF

## 2021-10-25 PROCEDURE — 250N000012 HC RX MED GY IP 250 OP 636 PS 637: Performed by: NURSE PRACTITIONER

## 2021-10-25 PROCEDURE — 86923 COMPATIBILITY TEST ELECTRIC: CPT | Performed by: TRANSPLANT SURGERY

## 2021-10-25 PROCEDURE — 97535 SELF CARE MNGMENT TRAINING: CPT | Mod: GO | Performed by: OCCUPATIONAL THERAPIST

## 2021-10-25 PROCEDURE — C1726 CATH, BAL DIL, NON-VASCULAR: HCPCS | Performed by: INTERNAL MEDICINE

## 2021-10-25 PROCEDURE — 250N000011 HC RX IP 250 OP 636: Performed by: STUDENT IN AN ORGANIZED HEALTH CARE EDUCATION/TRAINING PROGRAM

## 2021-10-25 PROCEDURE — 250N000013 HC RX MED GY IP 250 OP 250 PS 637: Performed by: NURSE PRACTITIONER

## 2021-10-25 PROCEDURE — 82248 BILIRUBIN DIRECT: CPT | Performed by: NURSE PRACTITIONER

## 2021-10-25 PROCEDURE — 86900 BLOOD TYPING SEROLOGIC ABO: CPT | Performed by: STUDENT IN AN ORGANIZED HEALTH CARE EDUCATION/TRAINING PROGRAM

## 2021-10-25 PROCEDURE — 120N000011 HC R&B TRANSPLANT UMMC

## 2021-10-25 PROCEDURE — 99232 SBSQ HOSP IP/OBS MODERATE 35: CPT | Mod: GC | Performed by: INTERNAL MEDICINE

## 2021-10-25 PROCEDURE — 250N000012 HC RX MED GY IP 250 OP 636 PS 637: Performed by: PHYSICIAN ASSISTANT

## 2021-10-25 PROCEDURE — 85018 HEMOGLOBIN: CPT | Performed by: NURSE PRACTITIONER

## 2021-10-25 PROCEDURE — 250N000013 HC RX MED GY IP 250 OP 250 PS 637: Performed by: PHYSICIAN ASSISTANT

## 2021-10-25 PROCEDURE — 85025 COMPLETE CBC W/AUTO DIFF WBC: CPT | Performed by: NURSE PRACTITIONER

## 2021-10-25 PROCEDURE — 999N000141 HC STATISTIC PRE-PROCEDURE NURSING ASSESSMENT: Performed by: INTERNAL MEDICINE

## 2021-10-25 PROCEDURE — 250N000012 HC RX MED GY IP 250 OP 636 PS 637: Performed by: STUDENT IN AN ORGANIZED HEALTH CARE EDUCATION/TRAINING PROGRAM

## 2021-10-25 PROCEDURE — P9016 RBC LEUKOCYTES REDUCED: HCPCS | Performed by: TRANSPLANT SURGERY

## 2021-10-25 PROCEDURE — 80197 ASSAY OF TACROLIMUS: CPT | Performed by: NURSE PRACTITIONER

## 2021-10-25 PROCEDURE — 258N000003 HC RX IP 258 OP 636

## 2021-10-25 PROCEDURE — 250N000013 HC RX MED GY IP 250 OP 250 PS 637: Performed by: INTERNAL MEDICINE

## 2021-10-25 PROCEDURE — 80053 COMPREHEN METABOLIC PANEL: CPT | Performed by: NURSE PRACTITIONER

## 2021-10-25 PROCEDURE — 999N000181 XR SURGERY CARM FLUORO GREATER THAN 5 MIN W STILLS: Mod: TC

## 2021-10-25 PROCEDURE — 250N000025 HC SEVOFLURANE, PER MIN: Performed by: INTERNAL MEDICINE

## 2021-10-25 PROCEDURE — 258N000003 HC RX IP 258 OP 636: Performed by: INTERNAL MEDICINE

## 2021-10-25 PROCEDURE — 36592 COLLECT BLOOD FROM PICC: CPT

## 2021-10-25 PROCEDURE — 99254 IP/OBS CNSLTJ NEW/EST MOD 60: CPT | Mod: 24 | Performed by: INTERNAL MEDICINE

## 2021-10-25 PROCEDURE — 360N000082 HC SURGERY LEVEL 2 W/ FLUORO, PER MIN: Performed by: INTERNAL MEDICINE

## 2021-10-25 PROCEDURE — 83690 ASSAY OF LIPASE: CPT | Performed by: INTERNAL MEDICINE

## 2021-10-25 PROCEDURE — 83605 ASSAY OF LACTIC ACID: CPT

## 2021-10-25 PROCEDURE — 272N000001 HC OR GENERAL SUPPLY STERILE: Performed by: INTERNAL MEDICINE

## 2021-10-25 PROCEDURE — 82150 ASSAY OF AMYLASE: CPT | Performed by: INTERNAL MEDICINE

## 2021-10-25 PROCEDURE — 250N000013 HC RX MED GY IP 250 OP 250 PS 637: Performed by: TRANSPLANT SURGERY

## 2021-10-25 PROCEDURE — 250N000011 HC RX IP 250 OP 636

## 2021-10-25 PROCEDURE — 250N000009 HC RX 250

## 2021-10-25 PROCEDURE — 250N000009 HC RX 250: Performed by: INTERNAL MEDICINE

## 2021-10-25 PROCEDURE — 710N000010 HC RECOVERY PHASE 1, LEVEL 2, PER MIN: Performed by: INTERNAL MEDICINE

## 2021-10-25 PROCEDURE — 0F798DZ DILATION OF COMMON BILE DUCT WITH INTRALUMINAL DEVICE, VIA NATURAL OR ARTIFICIAL OPENING ENDOSCOPIC: ICD-10-PCS | Performed by: INTERNAL MEDICINE

## 2021-10-25 PROCEDURE — 370N000017 HC ANESTHESIA TECHNICAL FEE, PER MIN: Performed by: INTERNAL MEDICINE

## 2021-10-25 PROCEDURE — 84100 ASSAY OF PHOSPHORUS: CPT | Performed by: NURSE PRACTITIONER

## 2021-10-25 PROCEDURE — 81001 URINALYSIS AUTO W/SCOPE: CPT | Performed by: NURSE PRACTITIONER

## 2021-10-25 PROCEDURE — 83735 ASSAY OF MAGNESIUM: CPT | Performed by: NURSE PRACTITIONER

## 2021-10-25 PROCEDURE — C1877 STENT, NON-COAT/COV W/O DEL: HCPCS | Performed by: INTERNAL MEDICINE

## 2021-10-25 PROCEDURE — 255N000002 HC RX 255 OP 636: Performed by: INTERNAL MEDICINE

## 2021-10-25 DEVICE — IMPLANTABLE DEVICE
Type: IMPLANTABLE DEVICE | Site: MOUTH | Status: NON-FUNCTIONAL
Removed: 2021-11-29

## 2021-10-25 RX ORDER — FENTANYL CITRATE 50 UG/ML
INJECTION, SOLUTION INTRAMUSCULAR; INTRAVENOUS PRN
Status: DISCONTINUED | OUTPATIENT
Start: 2021-10-25 | End: 2021-10-25

## 2021-10-25 RX ORDER — ONDANSETRON 2 MG/ML
4 INJECTION INTRAMUSCULAR; INTRAVENOUS EVERY 6 HOURS PRN
Status: DISCONTINUED | OUTPATIENT
Start: 2021-10-25 | End: 2021-10-29 | Stop reason: HOSPADM

## 2021-10-25 RX ORDER — LIDOCAINE HYDROCHLORIDE 20 MG/ML
INJECTION, SOLUTION INFILTRATION; PERINEURAL PRN
Status: DISCONTINUED | OUTPATIENT
Start: 2021-10-25 | End: 2021-10-25

## 2021-10-25 RX ORDER — ONDANSETRON 4 MG/1
4 TABLET, ORALLY DISINTEGRATING ORAL EVERY 30 MIN PRN
Status: DISCONTINUED | OUTPATIENT
Start: 2021-10-25 | End: 2021-10-25 | Stop reason: HOSPADM

## 2021-10-25 RX ORDER — FLUMAZENIL 0.1 MG/ML
0.2 INJECTION, SOLUTION INTRAVENOUS
Status: ACTIVE | OUTPATIENT
Start: 2021-10-25 | End: 2021-10-26

## 2021-10-25 RX ORDER — ONDANSETRON 4 MG/1
4 TABLET, ORALLY DISINTEGRATING ORAL EVERY 6 HOURS PRN
Status: DISCONTINUED | OUTPATIENT
Start: 2021-10-25 | End: 2021-10-29 | Stop reason: HOSPADM

## 2021-10-25 RX ORDER — ASPIRIN 325 MG
325 TABLET ORAL DAILY
Status: DISCONTINUED | OUTPATIENT
Start: 2021-10-28 | End: 2021-10-29 | Stop reason: HOSPADM

## 2021-10-25 RX ORDER — ONDANSETRON 2 MG/ML
4 INJECTION INTRAMUSCULAR; INTRAVENOUS EVERY 30 MIN PRN
Status: DISCONTINUED | OUTPATIENT
Start: 2021-10-25 | End: 2021-10-25 | Stop reason: HOSPADM

## 2021-10-25 RX ORDER — SODIUM CHLORIDE, SODIUM LACTATE, POTASSIUM CHLORIDE, CALCIUM CHLORIDE 600; 310; 30; 20 MG/100ML; MG/100ML; MG/100ML; MG/100ML
INJECTION, SOLUTION INTRAVENOUS CONTINUOUS PRN
Status: DISCONTINUED | OUTPATIENT
Start: 2021-10-25 | End: 2021-10-25

## 2021-10-25 RX ORDER — LEVOFLOXACIN 5 MG/ML
INJECTION, SOLUTION INTRAVENOUS PRN
Status: DISCONTINUED | OUTPATIENT
Start: 2021-10-25 | End: 2021-10-25

## 2021-10-25 RX ORDER — TACROLIMUS 1 MG/1
2 CAPSULE ORAL
Status: DISCONTINUED | OUTPATIENT
Start: 2021-10-25 | End: 2021-10-25

## 2021-10-25 RX ORDER — IOPAMIDOL 510 MG/ML
INJECTION, SOLUTION INTRAVASCULAR PRN
Status: DISCONTINUED | OUTPATIENT
Start: 2021-10-25 | End: 2021-10-25 | Stop reason: HOSPADM

## 2021-10-25 RX ORDER — FENTANYL CITRATE 50 UG/ML
25 INJECTION, SOLUTION INTRAMUSCULAR; INTRAVENOUS EVERY 5 MIN PRN
Status: DISCONTINUED | OUTPATIENT
Start: 2021-10-25 | End: 2021-10-25 | Stop reason: HOSPADM

## 2021-10-25 RX ORDER — LIDOCAINE 40 MG/G
CREAM TOPICAL
Status: DISCONTINUED | OUTPATIENT
Start: 2021-10-25 | End: 2021-10-25 | Stop reason: HOSPADM

## 2021-10-25 RX ORDER — PROPOFOL 10 MG/ML
INJECTION, EMULSION INTRAVENOUS PRN
Status: DISCONTINUED | OUTPATIENT
Start: 2021-10-25 | End: 2021-10-25

## 2021-10-25 RX ADMIN — SODIUM CHLORIDE, POTASSIUM CHLORIDE, SODIUM LACTATE AND CALCIUM CHLORIDE: 600; 310; 30; 20 INJECTION, SOLUTION INTRAVENOUS at 13:35

## 2021-10-25 RX ADMIN — VALGANCICLOVIR 450 MG: 450 TABLET, FILM COATED ORAL at 08:59

## 2021-10-25 RX ADMIN — TACROLIMUS 2.5 MG: 1 CAPSULE ORAL at 18:18

## 2021-10-25 RX ADMIN — SULFAMETHOXAZOLE AND TRIMETHOPRIM 1 TABLET: 400; 80 TABLET ORAL at 08:59

## 2021-10-25 RX ADMIN — PANTOPRAZOLE SODIUM 40 MG: 40 TABLET, DELAYED RELEASE ORAL at 07:42

## 2021-10-25 RX ADMIN — PANTOPRAZOLE SODIUM 40 MG: 40 TABLET, DELAYED RELEASE ORAL at 19:31

## 2021-10-25 RX ADMIN — SUGAMMADEX 200 MG: 100 INJECTION, SOLUTION INTRAVENOUS at 14:26

## 2021-10-25 RX ADMIN — PHENYLEPHRINE HYDROCHLORIDE 100 MCG: 10 INJECTION INTRAVENOUS at 13:45

## 2021-10-25 RX ADMIN — ONDANSETRON 4 MG: 2 INJECTION INTRAMUSCULAR; INTRAVENOUS at 14:23

## 2021-10-25 RX ADMIN — MYCOPHENOLATE MOFETIL 750 MG: 250 CAPSULE ORAL at 18:18

## 2021-10-25 RX ADMIN — TACROLIMUS 1.5 MG: 1 CAPSULE ORAL at 07:41

## 2021-10-25 RX ADMIN — MYCOPHENOLATE MOFETIL 750 MG: 250 CAPSULE ORAL at 07:41

## 2021-10-25 RX ADMIN — LEVOFLOXACIN 500 MG: 5 INJECTION, SOLUTION INTRAVENOUS at 13:51

## 2021-10-25 RX ADMIN — SUGAMMADEX 200 MG: 100 INJECTION, SOLUTION INTRAVENOUS at 14:25

## 2021-10-25 RX ADMIN — LEVOTHYROXINE SODIUM 50 MCG: 0.05 TABLET ORAL at 07:42

## 2021-10-25 RX ADMIN — NYSTATIN 500000 UNITS: 500000 SUSPENSION ORAL at 19:31

## 2021-10-25 RX ADMIN — LIDOCAINE HYDROCHLORIDE 80 MG: 20 INJECTION, SOLUTION INFILTRATION; PERINEURAL at 13:40

## 2021-10-25 RX ADMIN — NYSTATIN 500000 UNITS: 500000 SUSPENSION ORAL at 07:42

## 2021-10-25 RX ADMIN — SODIUM CHLORIDE, POTASSIUM CHLORIDE, SODIUM LACTATE AND CALCIUM CHLORIDE 1000 ML: 600; 310; 30; 20 INJECTION, SOLUTION INTRAVENOUS at 15:00

## 2021-10-25 RX ADMIN — PROPOFOL 200 MG: 10 INJECTION, EMULSION INTRAVENOUS at 13:42

## 2021-10-25 RX ADMIN — FENTANYL CITRATE 100 MCG: 50 INJECTION, SOLUTION INTRAMUSCULAR; INTRAVENOUS at 13:38

## 2021-10-25 RX ADMIN — ASPIRIN 325 MG ORAL TABLET 325 MG: 325 PILL ORAL at 07:42

## 2021-10-25 RX ADMIN — NYSTATIN 500000 UNITS: 500000 SUSPENSION ORAL at 16:27

## 2021-10-25 RX ADMIN — BUMETANIDE 2 MG: 1 TABLET ORAL at 07:41

## 2021-10-25 RX ADMIN — PHENYLEPHRINE HYDROCHLORIDE 100 MCG: 10 INJECTION INTRAVENOUS at 14:23

## 2021-10-25 RX ADMIN — ROCURONIUM BROMIDE 100 MG: 50 INJECTION, SOLUTION INTRAVENOUS at 13:42

## 2021-10-25 RX ADMIN — URSODIOL 300 MG: 300 CAPSULE ORAL at 19:31

## 2021-10-25 RX ADMIN — PREDNISONE 5 MG: 5 TABLET ORAL at 07:42

## 2021-10-25 RX ADMIN — URSODIOL 300 MG: 300 CAPSULE ORAL at 07:42

## 2021-10-25 RX ADMIN — PHENYLEPHRINE HYDROCHLORIDE 100 MCG: 10 INJECTION INTRAVENOUS at 14:17

## 2021-10-25 ASSESSMENT — ACTIVITIES OF DAILY LIVING (ADL)
ADLS_ACUITY_SCORE: 8
ADLS_ACUITY_SCORE: 8
ADLS_ACUITY_SCORE: 10
ADLS_ACUITY_SCORE: 8
ADLS_ACUITY_SCORE: 8
ADLS_ACUITY_SCORE: 10
ADLS_ACUITY_SCORE: 10
ADLS_ACUITY_SCORE: 8
ADLS_ACUITY_SCORE: 8
ADLS_ACUITY_SCORE: 10
ADLS_ACUITY_SCORE: 8
ADLS_ACUITY_SCORE: 10
ADLS_ACUITY_SCORE: 8
ADLS_ACUITY_SCORE: 8
ADLS_ACUITY_SCORE: 10
ADLS_ACUITY_SCORE: 8

## 2021-10-25 ASSESSMENT — ENCOUNTER SYMPTOMS: DYSRHYTHMIAS: 0

## 2021-10-25 NOTE — PROGRESS NOTES
HEPATOLOGY FOLLOW UP         Assessment and Plan:   Sarah Fisher is a 51 year old female with PMHx of alcohol related cirrhosis c/b ascites, hepatic hydrothorax and GAVE and history of hemochromatosis compound heterozygote who underwent DBD DDLT 10/17/21. Consulted for anemia and melena.    #. Alcohol related cirrhosis   #. Hemochromatosis compound heterozygote  #. History of GAVE  #. DDLT (DBD) on 10/17/21  #. Epistaxis  #. Acute on Chronic Normocytic Anemia    Patient with history of GAVE s/p APC 9/2021 and APC 10/2021 who underwent liver transplant + was placed on heparin gtt for hepatic artery thrombosis ppx, ASA and prednisone post transplant + developed epistaxis. EGD on 10/22 with multiple duodenal erosions with oozing in the setting of recent heparin use + uremia. GAVE present, but mild and without active bleeding.    No further bleeding - melena or hematochezia since EGD. Hgb stable in 7-8s. Etiology of ulcers unclear - potentially ischemia vs med induced vs infections. Likely oozing in the setting of heparin.          Recommendations:   -- Follow up duodenal biopsies  -- Continue pantoprazole 40mg BID    Patient discussed with Hepatology staff, Dr. Avila.    Feli Marie (Lizzie)  Gastroenterology Fellow  Pager [phone]         Subjective/Objective   - No acute events overnight  - NPO after midnight for ERCP  - Denies nausea or emesis, mild abdominal pain near right surgical site  - Denies fevers or chills         Medications:     Current Facility-Administered Medications   Medication     [Auto Hold] aspirin (ASA) tablet 325 mg     [Auto Hold] bisacodyl (DULCOLAX) Suppository 10 mg     [Auto Hold] bumetanide (BUMEX) tablet 2 mg     dextrose 10% infusion     [Auto Hold] glucose gel 15-30 g    Or     [Auto Hold] dextrose 50 % injection 25-50 mL    Or     [Auto Hold] glucagon injection 1 mg     [Held by provider] heparin infusion 25,000 units in 0.45% NaCl 250 mL ANTICOAGULANT     [Auto Hold]  levothyroxine (SYNTHROID/LEVOTHROID) tablet 50 mcg     lidocaine (LMX4) cream     lidocaine 1 % 0.1-1 mL     [Auto Hold] methocarbamol (ROBAXIN) tablet 500 mg     [Auto Hold] mycophenolate (GENERIC EQUIVALENT) capsule 750 mg     [Auto Hold] naloxone (NARCAN) injection 0.2 mg    Or     [Auto Hold] naloxone (NARCAN) injection 0.4 mg    Or     [Auto Hold] naloxone (NARCAN) injection 0.2 mg    Or     [Auto Hold] naloxone (NARCAN) injection 0.4 mg     [Auto Hold] nystatin (MYCOSTATIN) suspension 500,000 Units     [Auto Hold] ondansetron (ZOFRAN) injection 4 mg     [Auto Hold] oxymetazoline (AFRIN) 0.05 % spray 2 spray     [Auto Hold] pantoprazole (PROTONIX) EC tablet 40 mg     [Auto Hold] polyethylene glycol (MIRALAX) Packet 17 g     [Auto Hold] polyethylene glycol (MIRALAX) Packet 17 g     [Auto Hold] predniSONE (DELTASONE) tablet 5 mg     [Auto Hold] prochlorperazine (COMPAZINE) injection 5 mg     [Auto Hold] Reason beta blocker order not selected     [Auto Hold] senna-docusate (SENOKOT-S/PERICOLACE) 8.6-50 MG per tablet 1 tablet     [Auto Hold] sodium chloride (OCEAN) 0.65 % nasal spray 1 spray     sodium chloride (PF) 0.9% PF flush 3 mL     sodium chloride (PF) 0.9% PF flush 3 mL     [Auto Hold] sodium chloride (PF) 0.9% PF flush 3 mL     [Auto Hold] sodium chloride (PF) 0.9% PF flush 3 mL     [Auto Hold] sodium chloride (PF) 0.9% PF flush 3 mL     [Auto Hold] sulfamethoxazole-trimethoprim (BACTRIM) 400-80 MG per tablet 1 tablet     [Auto Hold] tacrolimus (GENERIC EQUIVALENT) capsule 2.5 mg     [Auto Hold] traMADol (ULTRAM) tablet 50 mg     [Auto Hold] traZODone (DESYREL) tablet 50 mg     [Auto Hold] ursodiol (ACTIGALL) capsule 300 mg     [Auto Hold] valGANciclovir (VALCYTE) tablet 450 mg          Physical Exam:   VS:  /72 (BP Location: Right arm)   Pulse 91   Temp 98.5  F (36.9  C) (Oral)   Resp 16   Wt 75.9 kg (167 lb 6.4 oz)   SpO2 96%   BMI 26.22 kg/m      Wt:   Wt Readings from Last 2 Encounters:    10/24/21 75.9 kg (167 lb 6.4 oz)   09/09/21 81.2 kg (179 lb)      Constitutional: cooperative, pleasant, no acute distress  Eyes: Conjunctiva anicteric  HEENT: mucus membranes moist  CV: Regular rate  Ext: 1+ David edema to mid shins bilaterally  Respiratory: Breathing comfortably on 2L  Abd: Nondistended, surgical scar healing well, appropriate tenderness around site. Drain with serosang drainage  Skin: warm, perfused, no jaundice  Neuro: A&O x 3         Laboratory:   BMP  Recent Labs   Lab 10/25/21  1248 10/25/21  0544 10/24/21  0824 10/23/21  0651 10/22/21  0538 10/22/21  0538   NA  --  136 136 131*  --  127*   POTASSIUM  --  3.7 3.9 4.4  --  5.1   CHLORIDE  --  103 102 98  --  95   EMMY  --  8.2* 8.2* 8.4*  --  8.3*   CO2  --  25 24 21  --  20   BUN  --  64* 74* 94*  --  84*   CR  --  1.66* 2.35* 3.66*  --  3.89*   * 118* 133* 112*   < > 86    < > = values in this interval not displayed.     CBC  Recent Labs   Lab 10/25/21  0544 10/24/21  0824 10/23/21  0651 10/22/21  1600 10/22/21  0538 10/22/21  0538   WBC 6.6 8.7 8.9  --   --  8.6   RBC 2.25* 2.58* 2.44*  --   --  2.34*   HGB 7.0* 8.1* 7.5* 7.2*   < > 7.2*   HCT 21.6* 24.5* 22.9*  --   --  21.5*   MCV 96 95 94  --   --  92   MCH 31.1 31.4 30.7  --   --  30.8   MCHC 32.4 33.1 32.8  --   --  33.5   RDW 17.6* 17.0* 16.6*  --   --  16.8*   * 113* 79*  --   --  46*    < > = values in this interval not displayed.     Liver Enzymes  Recent Labs   Lab 10/25/21  0544 10/24/21  0824 10/23/21  0651 10/22/21  0538   ALKPHOS 216* 207* 148 111   AST 26 27 28 35   ALT 66* 85* 108* 135*   BILITOTAL 3.2* 2.6* 1.9* 2.3*   PROTTOTAL 4.8* 5.3* 5.5* 5.2*   ALBUMIN 1.9* 2.2* 2.4* 2.8*      Imaging/Procedures/Studies:   Abdominal US w/doppler 10/24/21  1.  Improved Doppler assessment of the hepatic vasculature, with  normalization of the resistive indices of the hepatic artery and  branches, and resolved diastolic reversal in the hepatic arterial  branches.  2.   Redistributed but likely unchanged volume of the small perihepatic  collections.

## 2021-10-25 NOTE — ANESTHESIA POSTPROCEDURE EVALUATION
Patient: Sarah Fisher    Procedure: Procedure(s):  ENDOSCOPIC RETROGRADE CHOLANGIOPANCREATOGRAPHY, WITH biliary sphincterotomy, biliary dilation, and biliary stent placement       Diagnosis:Status post liver transplant (H) [Z94.4]  Diagnosis Additional Information: No value filed.    Anesthesia Type:  General    Note:  Disposition: Inpatient   Postop Pain Control: Uneventful            Sign Out: Well controlled pain   PONV: No   Neuro/Psych: Uneventful            Sign Out: Acceptable/Baseline neuro status   Airway/Respiratory: Uneventful            Sign Out: Acceptable/Baseline resp. status   CV/Hemodynamics: Uneventful            Sign Out: Acceptable CV status; No obvious hypovolemia; No obvious fluid overload   Other NRE: NONE   DID A NON-ROUTINE EVENT OCCUR? No           Last vitals:  Vitals Value Taken Time   /86 10/25/21 1530   Temp 36.6  C (97.9  F) 10/25/21 1500   Pulse 107 10/25/21 1530   Resp 16 10/25/21 1515   SpO2 95 % 10/25/21 1531   Vitals shown include unvalidated device data.    Electronically Signed By: Anjel Jackson DO  October 25, 2021  3:32 PM

## 2021-10-25 NOTE — OR NURSING
OPAL Joyce aware patient has not had metoprolol within last 24 hours. No intervention. [facility] would like type and screen add on and blood consent.

## 2021-10-25 NOTE — PROGRESS NOTES
[facility]   Transplant Nephrology Progress Note  Date of Admission:  10/16/2021  Today's Date: 10/25/2021    Recommendations:  - Agree with decreasing bumex to 2 mg PO daily for now. Continue to taper if Cr continues to improve  - We will sign off, please page with questions    Assessment & Plan   # CORBIN   # ATN  # Hypervolemic  UOP and Cr improving, agree with tapering diuretic     # Immune suppression   -  Pulse steroids for induction, Tacrolimus (goal trough per tx surgery 6-8) and MMF 750mg BID.  - renal sparing regimen with lower CNI goals as per tx surgery     # Transplant hx   Etiology - Alcoholic liver cirrhosis  DDLT - 10/17/2021   AST//344 (Downtrending)     # Anemia   # Melena   EGD shows esophagitis & duodenal ulcerations, path pending:  Transfuse Hb< 7     Recommendations were communicated to the primary team verbally.  Patient seen and staffed with Dr Scott Rios MD   Pager: [phone]    Interval History   Had another episode of epistaxis this morning. No SOB but continues to have some leg swelling    Review of Systems   4 point ROS was obtained and negative except as noted in the Interval History.    MEDICATIONS:    [START ON 10/28/2021] aspirin  325 mg Oral Daily     [Auto Hold] bumetanide  2 mg Oral Daily     lactated ringers  1,000 mL Intravenous Once     [Auto Hold] levothyroxine  50 mcg Oral QAM AC     [Auto Hold] mycophenolate  750 mg Oral BID IS     [Auto Hold] nystatin  500,000 Units Swish & Swallow 4x Daily     [Auto Hold] pantoprazole  40 mg Oral BID     [Auto Hold] polyethylene glycol  17 g Oral or Feeding Tube Daily     [Auto Hold] predniSONE  5 mg Oral or Feeding Tube Daily     [Auto Hold] senna-docusate  1 tablet Oral or Feeding Tube BID     [Auto Hold] sodium chloride (PF)  3 mL Intravenous Q8H     [Auto Hold] sulfamethoxazole-trimethoprim  1 tablet Oral or Feeding Tube Once per day on Mon Wed Fri     [Auto Hold]  tacrolimus  2.5 mg Oral BID IS     [Auto Hold] ursodiol  300 mg Oral BID     [Auto Hold] valGANciclovir  450 mg Oral Once per day on        dextrose       [Held by provider] heparin 400 Units/hr (10/20/21 2208)     [Auto Hold] BETA BLOCKER NOT PRESCRIBED         Physical Exam   Temp  Av.2  F (36.8  C)  Min: 97.3  F (36.3  C)  Max: 99.6  F (37.6  C)  Arterial Line BP  Min: 85/49  Max: 158/87  Arterial Line MAP (mmHg)  Av mmHg  Min: 63 mmHg  Max: 119 mmHg      Pulse  Av  Min: 71  Max: 125 Resp  Av.5  Min: 9  Max: 67  FiO2 (%)  Av.5 %  Min: 35 %  Max: 50 %  SpO2  Av.5 %  Min: 80 %  Max: 100 %    CVP (mmHg): 9 mmHgBP 133/76   Pulse 101   Temp 97.9  F (36.6  C) (Core)   Resp 16   Wt 75.9 kg (167 lb 6.4 oz)   SpO2 97%   BMI 26.22 kg/m     Date 10/25/21 0700 - 10/26/21 0659   Shift 9239-3648 8573-5830 8655-2138 24 Hour Total   INTAKE   I.V. 600   600   IV Piggyback 100   100   Shift Total(mL/kg) 700(9.22)   700(9.22)   OUTPUT   Urine 600   600   Drains 200   200   Shift Total(mL/kg) 800(10.54)   800(10.54)   Weight (kg) 75.93 75.93 75.93 75.93      Admit Weight: 81.2 kg (179 lb)     GENERAL APPEARANCE: alert and no distress  HENT: epistaxis  RESP: breathing non-labored  CV: regular rhythm, normal rate  EDEMA: trace-1+ LE edema bilaterally  ABDOMEN: soft, nondistended  MS: extremities normal - no gross deformities noted  SKIN: no rash on exposed surfaces    Data   All labs reviewed by me.  CMP  Recent Labs   Lab 10/25/21  1248 10/25/21  0544 10/24/21  0824 10/23/21  0651 10/22/21  0538 10/22/21  0538   NA  --  136 136 131*  --  127*   POTASSIUM  --  3.7 3.9 4.4  --  5.1   CHLORIDE  --  103 102 98  --  95   CO2  --  25 24 21  --  20   ANIONGAP  --  8 10 12  --  12   * 118* 133* 112*   < > 86   BUN  --  64* 74* 94*  --  84*   CR  --  1.66* 2.35* 3.66*  --  3.89*   GFRESTIMATED  --  35* 23* 14*  --  13*   EMMY  --  8.2* 8.2* 8.4*  --  8.3*   MAG  --  2.1 2.2 2.8*  --  2.9*    PHOS  --  2.9 4.5 6.3*  --  7.1*   PROTTOTAL  --  4.8* 5.3* 5.5*  --  5.2*   ALBUMIN  --  1.9* 2.2* 2.4*  --  2.8*   BILITOTAL  --  3.2* 2.6* 1.9*  --  2.3*   ALKPHOS  --  216* 207* 148  --  111   AST  --  26 27 28  --  35   ALT  --  66* 85* 108*  --  135*    < > = values in this interval not displayed.     CBC  Recent Labs   Lab 10/25/21  0544 10/24/21  0824 10/23/21  0651 10/22/21  1600 10/22/21  0538 10/22/21  0538   HGB 7.0* 8.1* 7.5* 7.2*   < > 7.2*   WBC 6.6 8.7 8.9  --   --  8.6   RBC 2.25* 2.58* 2.44*  --   --  2.34*   HCT 21.6* 24.5* 22.9*  --   --  21.5*   MCV 96 95 94  --   --  92   MCH 31.1 31.4 30.7  --   --  30.8   MCHC 32.4 33.1 32.8  --   --  33.5   RDW 17.6* 17.0* 16.6*  --   --  16.8*   * 113* 79*  --   --  46*    < > = values in this interval not displayed.     INR  Recent Labs   Lab 10/21/21  0719   INR 1.20*   PTT 35     ABGNo lab results found in last 7 days.   Urine Studies  Recent Labs   Lab Test 10/19/21  1438 01/04/21  1020   COLOR Yellow Bibi   APPEARANCE Slightly Cloudy* Slightly Cloudy   URINEGLC Negative Negative   URINEBILI Negative Negative   URINEKETONE Negative Negative   SG 1.018 1.019   UBLD Large* Negative   URINEPH 5.0 5.0   PROTEIN 20 * Negative   NITRITE Negative Negative   LEUKEST Negative Negative   RBCU 120* 1   WBCU 4 7*     No lab results found.  PTH  No lab results found.  Iron Studies  Recent Labs   Lab Test 10/05/21  1124 09/28/21  1204 07/30/21  1052 05/02/21  0106   IRON  --   --   --  259*   FEB  --   --   --  355   IRONSAT  --   --   --  73*   DARRICK 186 280* 32 72       IMAGING:  All imaging studies reviewed by me.    This patient has been seen and evaluated by me, Janay Blevins MD.  I have reviewed the note and agree with plan of care as documented by the fellow.  Janay Blevins MD

## 2021-10-25 NOTE — PROGRESS NOTES
Transplant Surgery  Inpatient Daily Progress Note  10/25/2021    Assessment & Plan: Sarah Fisher is a 51 year old female with PMH significant for ESLD MELD 18 due to alcoholic cirrhosis c/b ascites, GAVE, hematochromatosis, chronic hypotension, CKD, anemia, coagulopathy of liver disease, and hypothyroidism. She is now s/p DBD DDLTx 10/17/21 with Dr. Morrison.    Graft function:DBD Liver Tx 10/17/21: POD 8  Elevated LFTs, downtrending. AST/ALT 26/66.   TB with mild elevation, 3.2. Continue Steve BID. Plan for ERCP today.  -HA ppx: hypercoagulable intraop. Postop US with elevated RI involving the extrahepatic hepatic artery and the right hepatic artery. Repeat US POD 1&2 -Patent hepatic vasculature. Continued elevated RI in the hepatic arteries. Repeat US 10/21 to evaluate for fluid collections/bleeding- shows small perihepatic fluid collections, unchanged from prior.  - Heparin 400units/hr on HOLD since 10/20 PM in setting of bleeding. Continue ASA 325mg daily.    BERKLEY drain x1, output~ 625cc/24hrs.   Immunosuppression management:    Solumedrol taper per protocol induction  MMF 750mg BID  Tacrolimus -supratherapeutic, HELD dose in setting of CORBIN. Resumed 1mg BID on 10/21 PM . Level today 4, increase to 2.5mg with a new goal of 8-10 with improving renal function. Repeat daily levels  Complexity of management:Medium. Contributing factors: thrombocytopenia CORBIN  Hematology: Anemia of chronic disease/ABL: Received 5 L of PRBC intraop. 10/21 HGb 6, transfused 2 PRBC. Holding heparin.    Hgb 7 this am, repeat tonight.   -HA ppx (as above) with Heparin 400units/hr- on hold since 10/20 PM with concern for bleeding. HIT negative.  Epistaxis: Persistent Nose bleed x4 hrs 10/20, stopped with quick clot. Humidified O2. Repeat epistaxis 10/21 PM x 1.25 hrs. Epistaxis 10/25, stopped after 20min. D/w ENT, ocean spray Nasal +vaseline, use afrin if recurrent nosebleed, call ENT.  Neurology: Acute postoperative pain: Controlled,  Continue Tramadol 50mg TID PRN and Robaxin 500mg TID PRN  Cardiorespiratory: Hypotension: Stable 110-130  Tachycardia: HR up to 110s,ST  Pleural effusion, Large:  Extubated POD 1 to RA, CDB/IS   GI/Nutrition: Regular diet  Postop ileus:Resolved,bowel regimen PRN  Endocrine:Steroid induced hyperglycemia: initially required an insulin gtt, FBS 90-120s. Weaned off insulin  Fluid/Electrolytes: Hypervolemia, improved with diuresis, Weight now -5kg from admit, continue Bumex 2 mg oral daily.  Hyponatremia: resolved, Na 136  Hyperphosphatemia: Resolved, 2.9  :   CORBIN: SCr 1-1.2 b/l, peaked at 3.9. Now trending down, creat 1.66 . Send UA with reflex.  Infectious disease: Afebrile,   Leukocytosis: Resolved, 6.6.   Received zosyn x48hrs Periop and diflucan x2 1 dose pre and 1 dose postop  Valcyte (CMV ppx) renal dosed, Bactrim (PCP ppx)  Prophylaxis: DVT, fall, GI   Disposition: 7A    Medical Decision Making: Medium  Subsequent visit 75906 (moderate level decision making)    BREONNA/Fellow/Resident Provider: Danii Hickman NP    Faculty: Riaz Seth M.D.    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    10/17/2021 (Liver), Postoperative day: 8     Interval History: obtained from the patient.    Overnight events: Increasing UOP. Reporting LLQ discomfort. Denies urinary complaints. Having BM's. Denies hematochezia/melena. Pain controlled. Tolerating regular diet. Denies SOB, CP. C/o itching to bilateral UEs    ROS:   A 10-point review of systems was negative except as noted above.    Curent Meds:   aspirin  325 mg Oral Daily    bumetanide  2 mg Oral Daily    levothyroxine  50 mcg Oral QAM AC    mycophenolate  750 mg Oral BID IS    nystatin  500,000 Units Swish & Swallow 4x Daily    pantoprazole  40 mg Oral BID    polyethylene glycol  17 g Oral or Feeding Tube Daily     predniSONE  5 mg Oral or Feeding Tube Daily    senna-docusate  1 tablet Oral or Feeding Tube BID    sodium chloride (PF)  3 mL Intravenous Q8H    sulfamethoxazole-trimethoprim  1 tablet Oral or Feeding Tube Once per day on Mon Wed Fri    tacrolimus  1.5 mg Oral BID IS    ursodiol  300 mg Oral BID    valGANciclovir  450 mg Oral Once per day on Mon Thu       Physical Exam:     Admit Weight: 81.2 kg (179 lb)    Current Vitals:   /78 (BP Location: Right arm)   Pulse 101   Temp 98.7  F (37.1  C) (Oral)   Resp 16   Wt 75.9 kg (167 lb 6.4 oz)   SpO2 94%   BMI 26.22 kg/m      Vital sign ranges:    Temp:  [98.1  F (36.7  C)-99.6  F (37.6  C)] 98.7  F (37.1  C)  Pulse:  [] 101  Resp:  [16] 16  BP: (116-133)/(66-84) 128/78  SpO2:  [91 %-98 %] 94 %  Patient Vitals for the past 24 hrs:   BP Temp Temp src Pulse Resp SpO2   10/25/21 0305 128/78 98.7  F (37.1  C) Oral 101 16 94 %   10/24/21 2333 133/84 99.2  F (37.3  C) Oral 71 16 91 %   10/24/21 1922 131/77 99.6  F (37.6  C) Oral 107 16 98 %   10/24/21 1151 116/66 98.1  F (36.7  C) Oral 80 16 96 %     General Appearance: in no apparent distress.    Skin: normal, warm, dry, No rashes, induration, excoriation to bilateral Forearms   Heart: regular rate and rhythm  Lungs: Nonlabored resps on RA  Abdomen: soft, NTTP, incision with staples CDI, mild serosanguinous output to right lateral drain   : Voiding, remains dark per pt.  Extremities: edema: trace -+1 bilateral LEs present bilaterally. There is no skin breakdown.  Neurologic: awake and alert.  Tremor absent.. Asterixis: absent.    Frailty Scores       Frailty Scores 12/27/2020 12/27/2020    Final Score Frail Frail    Final Score Number 3 3            Data:   CMP  Recent Labs   Lab 10/25/21  0544 10/24/21  0824 10/23/21  0651 10/22/21  0538 10/21/21  0330 10/21/21  0324    136   < > 127*   < >  --    POTASSIUM 3.7 3.9   < > 5.1   < >  --    CHLORIDE 103 102   < > 95   < >  --    CO2 25 24   < > 20   < >   --    * 133*   < > 86   < >  --    BUN 64* 74*   < > 84*   < >  --    CR 1.66* 2.35*   < > 3.89*   < >  --    GFRESTIMATED 35* 23*   < > 13*   < >  --    EMMY 8.2* 8.2*   < > 8.3*   < >  --    ICAW  --   --   --  4.5  --  4.4   MAG 2.1 2.2   < > 2.9*   < >  --    PHOS 2.9 4.5   < > 7.1*   < >  --    ALBUMIN 1.9* 2.2*   < > 2.8*   < >  --    BILITOTAL 3.2* 2.6*   < > 2.3*   < >  --    ALKPHOS 216* 207*   < > 111   < >  --    AST 26 27   < > 35   < >  --    ALT 66* 85*   < > 135*   < >  --     < > = values in this interval not displayed.     CBC  Recent Labs   Lab 10/25/21  0544 10/24/21  0824 10/20/21  1037 10/19/21  1731   HGB 7.0* 8.1*   < >  --    WBC 6.6 8.7   < >  --    * 113*   < >  --    A1C  --   --   --  5.1    < > = values in this interval not displayed.     COAGS  Recent Labs   Lab 10/21/21  0719 10/18/21  0808   INR 1.20* 1.54*   PTT 35 41*      Urinalysis  Recent Labs   Lab Test 10/19/21  1438 01/04/21  1020   COLOR Yellow Bibi   APPEARANCE Slightly Cloudy* Slightly Cloudy   URINEGLC Negative Negative   URINEBILI Negative Negative   URINEKETONE Negative Negative   SG 1.018 1.019   UBLD Large* Negative   URINEPH 5.0 5.0   PROTEIN 20 * Negative   NITRITE Negative Negative   LEUKEST Negative Negative   RBCU 120* 1   WBCU 4 7*     Virology:  Hepatitis C Antibody   Date Value Ref Range Status   10/16/2021 Nonreactive Nonreactive Final   05/02/2021 Nonreactive NR^Nonreactive Final     Comment:     Assay performance characteristics have not been established for newborns,   infants, and children         Attestation:    The patient has been seen with transplant team and evaluated by me.   Vital signs, labs, medications and orders were reviewed.   When obtained, diagnostic images were reviewed by me and interpreted as above.    The care plan was discussed with the multidisciplinary team and I agree with the findings and plan in this note, with any differences recorded in blue.    Immunosuppressive  medication management was reviewed and adjusted as reflected in the note and orders.     .

## 2021-10-25 NOTE — BRIEF OP NOTE
[facility] Brief Operative Note    Pre-operative diagnosis: Status post liver transplant (H) [Z94.4]   Post-operative diagnosis As prior    Procedure: Procedure(s):  ENDOSCOPIC RETROGRADE CHOLANGIOPANCREATOGRAPHY, WITH biliary sphincterotomy, biliary dilation, and biliary stent placement   Surgeon(s): Surgeon(s) and Role:     * Guru Dominguez Fenton MD - Primary     * Adina Milner MD - Fellow - Assisting   Estimated blood loss: * No values recorded between 10/25/2021  2:00 PM and 10/25/2021  2:35 PM *    Specimens: * No specimens in log *   Findings:        - Selective biliary cannulation followed by uncomplicated biliary sphincterotomy.   - Cholangiogram showed a single localized moderate-grade biliary stricture was found in the post-transplant anastomosis. Successfully dilated to 3 mm.   - One 10 Fr x 9 cm Sofflex plastic biliary stent was placed into the common bile duct.       - Return patient to hospital [facility] for ongoing care.   - Avoid aspirin and nonsteroidal anti-inflammatory medicines for 3 days.   - Observe patient's clinical course. Expect gradual improvement in LFTs with established biliary drainage.   - Repeat ERCP in 4 weeks to further interrrogate the biliary system and exchange stent.   - Inpatient pancreas-biliary consult service will closely follow patient and aid in further management decisions.  - If patient develops fever, chills, jaundice or passes dark urine or has worsening of LFTs before scheduled ERCP, will recommend patient to call us immediately for consideration of an earlier urgent ERCP  - The findings and recommendations were discussed with the patient and their family.

## 2021-10-25 NOTE — ANESTHESIA PROCEDURE NOTES
Airway         Procedure Start/Stop Times: 10/25/2021 1:43 PM  Staff -        Anesthesiologist:  Lore Jeff MD       Resident/Fellow: Quang Espinoza MD       Performed By: residentIndications and Patient Condition       Indications for airway management: michael-procedural         Mask difficulty assessment: 0 - not attempted    Final Airway Details       Final airway type: endotracheal airway       Successful airway: ETT - single  Endotracheal Airway Details        ETT size (mm): 7.5       Cuffed: yes       Successful intubation technique: direct laryngoscopy       DL Blade Type: MAC 3       Grade View of Cords: 2       Adjucts: stylet       Position: Right       Measured from: lips       Secured at (cm): 21       Bite Block used: endo OR bite block, padded with gauze due to noted dental enamel erosion.     Post intubation assessment        Placement verified by: capnometry, equal breath sounds and chest rise        Number of attempts at approach: 1       Number of other approaches attempted: 0       Secured with: pink tape       Ease of procedure: easy       Dentition: Intact and Unchanged

## 2021-10-25 NOTE — DISCHARGE SUMMARY
[facility]    Discharge Summary  Transplant Surgery    Date of Admission:  10/16/2021  Date of Discharge:  10/29/2021  Discharging Provider: Elana Ha PA-C/Riaz Seth MD    Discharge Diagnoses   Active Problems:    CORBIN (acute kidney injury) (H)    Hyponatremia    Blood loss anemia    Pleural effusion    Hypotension    Alcoholic cirrhosis (H)    Liver transplant candidate    Liver transplant recipient (H)    Immunosuppressed status (H)    Malnutrition (H)    Steroid-induced hyperglycemia    Epistaxis    Esophagitis    Duodenal erosion    Gastric antral vascular ectasia    Hyperphosphatemia    Leukocytosis      History of Present Illness   Sarah Fisher is an 51 year old female with PMH significant for ESLD MELD 18 due to alcoholic cirrhosis c/b ascites, GAVE, hematochromatosis, chronic hypotension, CKD, anemia, coagulopathy of liver disease, and hypothyroidism. She is now s/p DBD DDLTx 10/17/21 with biliary stent by Dr. Morrison.    Hospital Course   Graft function:   DBD Liver Tx 10/17/21:  Liver enzymes down trending initially until POD 6. On 10/24 ALK PHOS and Tbili increased, AST and ALT continued to decline. Consulted GI and underwent ERCP on 10/25 showing anastamotic stricture, stent placed. Continue Steve BID. US 10/21 to evaluate for fluid collections/bleeding- shows small perihepatic fluid collections, unchanged from prior. BERKLEY x1 with sero sang output, in place on discharge.   HA ppx: hypercoagulable intraop. Postop US with elevated RI involving the extrahepatic hepatic artery and the right hepatic artery. Repeat US POD 1&2 -Patent hepatic vasculature. Continued elevated RI in the hepatic arteries.  - Heparin 400units/hr held since 10/20 PM in setting of bleeding, no plans to resume at this time. Continue ASA 325mg daily.      Immunosuppression management:    Solu-medrol taper per protocol induction, remain on Prednisone 5 mg daily until Tacrolimus  goal is increased.   MMF 750mg BID  Tacrolimus 4 mg BID. Goal 6-8 due to renal function. Last level 10/29 6.7 (13 hours trough), not increased at that time as dose was recently increased.     Hematology:   Anemia of chronic disease/ABL: Received 5 L of PRBC intraop. 10/21 HGb 6, transfused 2 PRBC.   -HA ppx as above with Heparin 400units/hr-hold since 10/20 PM with concern for bleeding. Found to have oozing duodenal ulcers.  Epistaxis: Persistent Nose bleed x4 hrs 10/20, stopped with quick clot. Humidified O2. Repeat epistaxis 10/21 PM x 1.25 hrs. Now improved, will consult ENT if recurrent nosebleed.    Cardiorespiratory:   Hypotension: now resolved  Tachycardia: HR 80s-120, ST  Pleural effusion, Large:  Extubated POD 1 to RA, CDB/IS     GI/Nutrition:  Moderate malnutrition in the context of chronic illness: No feeding tube placed. Dietician consulted and calorie counts obtained. Adequate caloric intake prior to discharge.   Continue bowel regimen: give MOM, Miralax, Senna BID, Dulcolax suppository PRN  Grade A esophagitis/Gastric antral vascular ectasia without bleeding/Oozing duodenal erosions: Upper endoscopy completed by GI on 10/22 due to melena and anemia. Started on Protonix and stopped heparin gtt.   Pathology:  Duodenal mucosa with focal superficial loss of epithelial lining and occasional granulation tissue; otherwise villi are preserved and no other significant histologic abnormality; features celiac sprue or peptic duodenitis are not identified. Immunostain for CMV, with appropriate controls, is negative for cytomegalovirus     Endocrine:  Steroid induced hyperglycemia: initially required an insulin gtt, weaned off insulin prior to discharge.     Fluid/Electrolytes:   Hypervolemia: Weight now to baseline. Diuretics discontinued.   Hyponatremia: Resolved  Hyperphosphatemia: Improving  CORBIN: SCr 1-1.2 b/l, peaked at 3.9. Nephrology consulted. Cr trending downward, 2.6 on discharge. Attributed to ATN due to  hypovolemia, tacrolimus. Nephrology to follow up Cr on 10/30 and then will see in acute kidney clinic on Monday at noon.     Infectious disease: Afebrile, WBC WNL.   Leukocytosis: Leukocytosis POD 1-3, trended to normal on discharge. Received zosyn x48hrs Periop and diflucan x2 1 dose pre and 1 dose post-op and was then transitioned to topical anti-fungal.   Valcyte (CMV ppx) renal dosed x 3 months, Bactrim (PCP ppx). CMV D+/R+, EBV D+/R+.       Significant Results and Procedures   Procedure date:  10/17/21    Preoperative diagnosis:  End Stage Liver Disease due to Laënnec cirrhosis    Postoperative diagnosis:  Same, severe portal hypertension with significant amount of ascites.  The patient was hypercoagulable during the case, we had to give 5000 units of heparin IV.    Procedure:  liver transplant       Surgeon:  Surgeon(s) and Role:     * Christian Morrison MD - Primary    Fellow/assistant:  Dr. Mina assisted the entire procedure.  There was no qualified resident to assist with this procedure    Anesthesia:  General    Specimen:  Explant liver    Drains:  1 BERKLEY drain    Urine output:  Please see anesthesia chart mls    Estimated blood loss:  Patient received 5 units of PRBC intraoperatively    Fluids administered:        10/22/21: Upper endoscopy  Impression:          Etiology of anemia is likely multifactorial including                        epistaxis as well as many superficial duodenal erosions                        in the setting of heparin gtt as well as aspirin.                        Concern for ischemia, medication induced (sucha s MMF)                        vs infectious (such as CMV).                        - LA Grade A esophagitis; non-bleeding.                        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                        - Oozing duodenal erosions throughout the visualized                        duodenum. Biopsied.   Recommendation:      - Await pathology results.                         - Okay for clear liquid diet and then advance diet as                        tolerate                        - Given ongoing oozing, consider holding heparin gtt                        - Continue pantoprazole 40mg PO BID     10/25/21: ERCP  with anastamotic stricture, stent placed.    Pending Results   These results will be followed up by transplant coordinator   Unresulted Labs Ordered in the Past 30 Days of this Admission       Date and Time Order Name Status Description    10/28/2021 11:50 AM Zinc In process     10/21/2021  4:00 AM Prepare red blood cells (unit) Preliminary     10/17/2021 11:45 PM Prepare cryoprecipitate (unit) Preliminary     10/17/2021  4:30 PM Fungal or Yeast Culture Routine Preliminary     10/17/2021  1:21 PM Prepare red blood cells (unit) Preliminary     10/17/2021  1:21 PM Prepare red blood cells (unit) Preliminary     10/17/2021  1:21 PM Prepare red blood cells (unit) Preliminary     10/17/2021  1:21 PM Prepare red blood cells (unit) Preliminary     10/17/2021  1:21 PM Prepare red blood cells (unit) Preliminary             Code Status   Full    Primary Care Physician   Christine Harris    Physical Exam   Temp: 97.5  F (36.4  C) Temp src: Oral BP: (!) 145/81 Pulse: 79   Resp: 18 SpO2: 95 % O2 Device: None (Room air)    Vitals:    10/26/21 0033 10/27/21 1234 10/28/21 0300   Weight: 73.6 kg (162 lb 4.8 oz) 72.6 kg (160 lb) 74 kg (163 lb 2.3 oz)     Vital Signs with Ranges  Temp:  [97.5  F (36.4  C)-98.4  F (36.9  C)] 97.5  F (36.4  C)  Pulse:  [68-79] 79  Resp:  [16-18] 18  BP: (142-161)/(81-86) 145/81  SpO2:  [93 %-97 %] 95 %  I/O last 3 completed shifts:  In: -   Out: 650 [Drains:650]    General Appearance: NAD  Skin: normal, warm, dry, No rashes, induration, excoriation to bilateral Forearms   Heart: regular rate and rhythm  Lungs: Nonlabored resps on RA  Abdomen: soft, NTTP, incision with staples CDI, mild serous output to right lateral drain   : Voiding   Extremities:  edema: +1 bilateral LEs pitting present bilaterally. R>L  There is no skin breakdown.  Neurologic: awake and alert.  Tremor absent. Asterixis: absent.  Psych: no distress    Time Spent on this Encounter   Elana AMANDA PA-C, personally saw the patient today and spent greater than 30 minutes discharging this patient.    Discharge Disposition   Discharged to home  Condition at discharge: Stable    Consultations This Hospital Stay   SOCIAL WORK IP CONSULT  NUTRITION SERVICES ADULT IP CONSULT  PHYSICAL THERAPY ADULT IP CONSULT  OCCUPATIONAL THERAPY ADULT IP CONSULT  VASCULAR ACCESS CARE ADULT IP CONSULT  NUTRITION SERVICES ADULT IP CONSULT  OCCUPATIONAL THERAPY ADULT IP CONSULT  PHYSICAL THERAPY ADULT IP CONSULT  PHARMACY IP CONSULT  SOT MEDICATION HISTORY IP PHARMACY CONSULT  PHARMACY IP CONSULT  CARE MANAGEMENT / SOCIAL WORK IP CONSULT  NEPHROLOGY KIDNEY/PANCREAS TRANSPLANT ADULT IP CONSULT  CARE MANAGEMENT / SOCIAL WORK IP CONSULT  PHARMACY IP CONSULT  GI HEPATOLOGY ADULT IP CONSULT  LYMPHEDEMA THERAPY IP CONSULT  GI PANCREATICOBILIARY ADULT IP CONSULT  ENT IP CONSULT  SOCIAL WORK IP CONSULT  NUTRITION SERVICES ADULT IP CONSULT  PHYSICAL THERAPY ADULT IP CONSULT  OCCUPATIONAL THERAPY ADULT IP CONSULT    Discharge Orders       Reason for your hospital stay    Liver transplant.     Tubes and drains    You are going home with the following tubes or drains: BERKLEY.  Tube cares per hospital or home care instructions     Follow Up and recommended labs and tests    You will be seen in the clinic for follow up per protocol. DO NOT take tacrolimus (Prograf) until after your labs are drawn. Remember to bring all of your medications with you to this appointment.      LABS:  Saturday 10/30 labs: CBC and BMP  Transplant labs (CBC, BMP, hepatic panel, mag, phos, and tacrolimus levels (12 hours after administration)) to be drawn every Monday and Thursday for 4 weeks.    FOLLOW UP APPOINTMENTS:  Remember to always bring an  updated medication list to all appointments.     Follow up with your transplant surgeon, Dr. Morrison, in 2 weeks.  Follow up with transplant hepatology at 1 month.  Follow up with nephrology in the acute clinic on Monday November 1st and then as recommended at that time.   Follow up with primary care provider in 2-4 weeks for insulin management. (Pt to schedule)  Your staples will be removed 3 weeks after your operation.  You have a biliary stent in place.  Your coordinator will follow up in 6-8 weeks.  Call scheduling at [phone] if you have not heard about your appointments within 48 hours after discharge.  WHEN TO CONTACT YOUR  COORDINATOR:  Transplant Coordinator [phone]  Notify your coordinator if you have abdominal pain, increased redness or drainage from your incision, or fever greater than 100.5F.  Notify your coordinator immediately if you are ever unable to take your immunosuppressive medications for any reason.  If it is outside of office hours, please call the hospital switchboard at [phone] and ask to have the liver transplant surgery fellow paged for urgent medical questions, or present to the emergency department.    OTHER DISCHARGE INSTRUCTIONS:  BERKLEY plan: Please monitor color and amount of output.   Monitor blood glucose levels 4 times a day  Walk at least four times a day, lift no greater than 10 pounds for 6-8 weeks from the time of surgery.  No driving while taking narcotics or 3 weeks after surgery.    Diet recommendations post-transplant: Heart healthy dietary habits long term (low saturated/trans fat, low sodium). High protein diet x 8 weeks. Practice food safety precautions.     Adult [facility] Follow-up and recommended labs and tests    Follow up in transplant surgery clinic in 1-2 weeks with Dr. Morrison.   Follow up in nephrology acute clinic on Monday November 1st.     Appointments on [facility] and/or [facility] (with [facility] or [facility] provider or service). Call  [phone] if you haven't heard regarding these appointments within 7 days of discharge.     Walker Order    DME Documentation:   Describe the reason for need to support medical necessity: gait instability.     I, the undersigned, certify that the above prescribed supplies are medically necessary for this patient and is both reasonable and necessary in reference to accepted standards of medical and necessary in reference to accepted standards of medical practice in the treatment of this patient's condition and is not prescribed as a convenience.        Discharge Medications    Current Discharge Medication List        START taking these medications    Details   alcohol swab prep pads Use to swab area of injection/fina as directed.  Qty: 100 each, Refills: 3    Associated Diagnoses: Steroid-induced hyperglycemia      aspirin (ASA) 325 MG tablet Take 1 tablet (325 mg) by mouth daily  Qty: 60 tablet, Refills: 5    Associated Diagnoses: Liver transplant recipient (H)      blood glucose (CONTOUR NEXT TEST) test strip Use to test blood sugar 3 times daily.  Qty: 100 strip, Refills: 6    Associated Diagnoses: Steroid-induced hyperglycemia      blood glucose (NO BRAND SPECIFIED) lancets standard To use to test glucose level in the blood Use to test blood sugar 3 times daily as directed. To accompany glucose monitor brands per insurance coverage.  Qty: 100 each, Refills: 1    Comments: Cecy microlet lancets  Associated Diagnoses: Steroid-induced hyperglycemia      insulin aspart (NOVOLOG PEN) 100 UNIT/ML pen Inject 1-10 Units Subcutaneous 3 times daily (before meals) See AVS (too long to print in script). Dose to decrease with decreasing steroid dose  Qty: 9 mL, Refills: 1    Associated Diagnoses: Steroid-induced hyperglycemia      insulin pen needle (32G X 4 MM) 32G X 4 MM miscellaneous Use as directed by provider Per insurance coverage  Qty: 100 each, Refills: 0    Associated Diagnoses: Steroid-induced hyperglycemia       methocarbamol (ROBAXIN) 500 MG tablet Take 1 tablet (500 mg) by mouth every 8 hours as needed for muscle spasms  Qty: 30 tablet, Refills: 1    Associated Diagnoses: Liver transplant recipient (H)      mycophenolate (GENERIC EQUIVALENT) 250 MG capsule Take 3 capsules (750 mg) by mouth 2 times daily  Qty: 180 capsule, Refills: 11    Associated Diagnoses: Liver transplant recipient (H)      ondansetron (ZOFRAN-ODT) 4 MG ODT tab Take 1 tablet (4 mg) by mouth every 6 hours as needed for nausea or vomiting  Qty: 20 tablet, Refills: 1    Associated Diagnoses: Liver transplant recipient (H)      polyethylene glycol (MIRALAX) 17 GM/Dose powder Take 17 g by mouth daily  Qty: 510 g, Refills: 1    Associated Diagnoses: Liver transplant recipient (H)      !! predniSONE (DELTASONE) 10 MG tablet Take 5 tablets (50 mg) by mouth 4 times daily for 1 day, THEN 2 tablets (20 mg) 4 times daily for 1 day, THEN 2 tablets (20 mg) 2 times daily for 1 day, THEN 2 tablets (20 mg) daily for 1 day.  Qty: 34 tablet, Refills: 0    Associated Diagnoses: Liver transplant recipient (H)      !! predniSONE (DELTASONE) 20 MG tablet Take 2 tablets (40 mg) by mouth 4 times daily for 4 doses  Qty: 8 tablet, Refills: 0    Associated Diagnoses: Liver transplant recipient (H)      !! predniSONE (DELTASONE) 20 MG tablet Take 2 tablets (40 mg) by mouth 2 times daily for 2 doses  Qty: 4 tablet, Refills: 0    Associated Diagnoses: Liver transplant recipient (H)      !! predniSONE (DELTASONE) 20 MG tablet Take 1 tablet (20 mg) by mouth 2 times daily for 2 doses  Qty: 2 tablet, Refills: 0    Associated Diagnoses: Liver transplant recipient (H)      !! predniSONE (DELTASONE) 5 MG tablet Take 1 tablet (5 mg) by mouth daily  Qty: 30 tablet, Refills: 11    Associated Diagnoses: Liver transplant recipient (H)      senna-docusate (SENOKOT-S/PERICOLACE) 8.6-50 MG tablet 1 tablet by Oral or Feeding Tube route 2 times daily  Qty: 50 tablet, Refills: 1    Associated  Diagnoses: Liver transplant recipient (H)      Sharps Container MISC Use as directed to dispose of needles, lancets and other sharps Per Insurance coverage  Qty: 1 each, Refills: 0    Associated Diagnoses: Steroid-induced hyperglycemia      sulfamethoxazole-trimethoprim (BACTRIM) 400-80 MG tablet 1 tablet by Oral or Feeding Tube route daily  Qty: 30 tablet, Refills: 11    Associated Diagnoses: Liver transplant recipient (H)      tacrolimus (GENERIC EQUIVALENT) 1 MG capsule Take 4 capsules (4 mg) by mouth 2 times daily  Qty: 240 capsule, Refills: 11    Associated Diagnoses: Liver transplant recipient (H)      traMADol (ULTRAM) 50 MG tablet Take 1 tablet (50 mg) by mouth every 8 hours as needed for moderate pain  Qty: 20 tablet, Refills: 0    Associated Diagnoses: Liver transplant recipient (H)      ursodiol (ACTIGALL) 300 MG capsule Take 1 capsule (300 mg) by mouth 2 times daily  Qty: 60 capsule, Refills: 3    Associated Diagnoses: Liver transplant recipient (H)      valGANciclovir (VALCYTE) 450 MG tablet Take 1 tablet (450 mg) by mouth daily  Qty: 30 tablet, Refills: 2    Associated Diagnoses: Liver transplant recipient (H)       !! - Potential duplicate medications found. Please discuss with provider.        CONTINUE these medications which have CHANGED    Details   pantoprazole (PROTONIX) 40 MG EC tablet Take 1 tablet (40 mg) by mouth 2 times daily  Qty: 60 tablet, Refills: 2    Associated Diagnoses: Liver transplant recipient (H)           CONTINUE these medications which have NOT CHANGED    Details   levothyroxine (SYNTHROID/LEVOTHROID) 50 MCG tablet Take 50 mcg by mouth daily      traZODone (DESYREL) 50 MG tablet Take 1 tablet (50 mg) by mouth nightly as needed for sleep  Qty: 30 tablet, Refills: 0    Associated Diagnoses: Insomnia, unspecified type           STOP taking these medications       ciprofloxacin (CIPRO) 500 MG tablet Comments:   Reason for Stopping:         Ferrous Sulfate 300 MG/6.8ML SOLN  Comments:   Reason for Stopping:         folic acid (FOLVITE) 1 MG tablet Comments:   Reason for Stopping:         furosemide (LASIX) 20 MG tablet Comments:   Reason for Stopping:         hydrocortisone (ANUSOL-HC) 25 MG suppository Comments:   Reason for Stopping:         hydrocortisone, Perianal, (HYDROCORTISONE) 2.5 % cream Comments:   Reason for Stopping:         midodrine (PROAMATINE) 5 MG tablet Comments:   Reason for Stopping:         multivitamin w/minerals (THERA-VIT-M) tablet Comments:   Reason for Stopping:         potassium chloride ER (KLOR-CON M15) 15 MEQ CR tablet Comments:   Reason for Stopping:         rifaximin (XIFAXAN) 550 MG TABS tablet Comments:   Reason for Stopping:         spironolactone (ALDACTONE) 50 MG tablet Comments:   Reason for Stopping:         zinc sulfate (ZINCATE) 220 (50 Zn) MG capsule Comments:   Reason for Stopping:             Allergies   Allergies   Allergen Reactions    Amoxicillin GI Disturbance, Diarrhea, Nausea and Nausea and Vomiting     Data   Most Recent 3 CBC's:Recent Labs   Lab Test 10/29/21  0637 10/28/21  1753 10/28/21  0549   WBC 9.9 11.0 11.7*   HGB 7.9* 7.7* 6.9*   MCV 96 94 96    129* 149*      Most Recent 3 BMP's:  Recent Labs   Lab Test 10/29/21  1246 10/29/21  0735 10/29/21  0637 10/28/21  2158 10/28/21  1753 10/28/21  0737 10/28/21  0549   NA  --   --  133  --  133  --  135   POTASSIUM  --   --  3.9  --  4.0  --  3.6   CHLORIDE  --   --  102  --  101  --  103   CO2  --   --  22  --  21  --  24   BUN  --   --  70*  --  70*  --  61*   CR  --   --  2.66*  --  2.68*  --  2.39*   ANIONGAP  --   --  9  --  11  --  8   EMMY  --   --  8.7  --  8.1*  --  8.3*   * 134* 123*   < > 254*   < > 206*    < > = values in this interval not displayed.     Most Recent 2 LFT's:  Recent Labs   Lab Test 10/29/21  0637 10/28/21  0549   AST 13 5   ALT 28 27   ALKPHOS 208* 252*   BILITOTAL 1.6* 1.6*     Most Recent INR's and Anticoagulation Dosing  History:  Anticoagulation Dose History       Recent Dosing and Labs Latest Ref Rng & Units 10/5/2021 10/16/2021 10/17/2021 10/17/2021 10/18/2021 10/18/2021 10/21/2021    INR 0.85 - 1.15 1.58(H) 1.78(H) 2.86(H) 1.97(H) 1.51(H) 1.54(H) 1.20(H)          Most Recent 3 Troponin's:No lab results found.  Most Recent Cholesterol Panel:  Recent Labs   Lab Test 03/17/21  1143   CHOL 206*   *   HDL 70   TRIG 95     Most Recent 6 Bacteria Isolates From Any Culture (See EPIC Reports for Culture Details):  Recent Labs   Lab Test 05/02/21  0036 02/17/21  1003 01/14/21  1000 12/17/20  1103 12/17/20  1000   CULT No VRE isolated No growth Culture negative for acid fast bacilli  Assayed at [facility] [address] [place] [address] [phone]  Culture negative after 4 weeks  No growth No anaerobes isolated  No growth On day 5, isolated in broth only:  Cutibacterium (Propionibacterium) acnes  *  Critical Value/Significant Value, preliminary result only, called to and read back by  DR JANNY MD 12.22.20 AT 1416, JL    Susceptibility testing of Cutibacterium is not done from this source. Our antibiogram   indicates that Cutibacterium species are susceptible to penicillin and cefotaxime, and   most are susceptible to clindamycin.  Vicky Parrish M.D., Medical Director       Most Recent TSH, T4 and A1c Labs:  Recent Labs   Lab Test 10/19/21  1731 08/31/21  1116   TSH  --  4.58*   T4  --  1.45   A1C 5.1  --      Attestation:    The patient has been seen with the team and evaluated by me.  Less than 30 minutes planning discharge.  Vital signs, labs, medications and orders were reviewed.   When obtained, diagnostic images were reviewed by me and interpreted as above.    The care plan was discussed with the multidisciplinary team and I agree with the findings and plan in this note, with any differences recorded in blue.    Immunosuppressive medication management was reviewed and adjusted as reflected in the  note and orders.     .

## 2021-10-25 NOTE — DISCHARGE INSTRUCTIONS
Your Post Liver  Transplant Coordinator is Lotus Hughes , RN #[phone]      Diet recommendations post-transplant: High calorie (at least 1800) and protein diet (100-130 grams/day) x 6-8 weeks.  Recommend 3-4 small, high protein meals + 2 protein shakes/bars daily. Heart healthy dietary habits long term (low saturated/trans fat, low sodium). Practice food safety precautions. See nutrition handout and food safety booklet for more information.     ________________________________________    Diabetes Management Team Discharge Instructions    Blood Glucose Checks: three times daily before meals    You are experiencing high blood sugars related to the steroids you are receiving (Prednisone). This medication will taper down quickly over the next week.     The insulin plan is below: you will only be taking one type of insulin: Novolog rapid acting insulin before meals.     10/29: Prednisone 50 mg four times daily  -Novolog with meals: 1 unit per 15 grams of carbohydrate  -Novolog sliding scale with meals:   BG less than 140: no sliding scale addition needed  -190: 1 units  -240: 2 units  BG over 240: 3 units    Example: you will be eating 34 grams of carb with lunch and your pre lunch sugar is 220. Take 2 units of carb coverage insulin (34/15= 2.2 and round down) plus 2 units for the sliding scale = 4 units of Novolog at the start of the meal.       10/30: Prednisone 40 mg four times daily  Novolog with meals: 1 unit per 20 grams of carbohydrate  Novolog sliding scale with meals:   BG less than 140: no sliding scale addition needed  -190: 1 units  -240: 2 units  BG over 240: 3 units    Example: you will be eating 20 grams of carb with lunch and your pre lunch sugar is 170. Take 1 unit of carb coverage insulin (20/20= 1 unit) plus 1 unit for the sliding scale = 2 units of Novolog at the start of the meal.     10/31: Prednisone 40 mg two times daily  Novolog with meals: 1 unit per 30 grams  of carbohydrate  Novolog sliding scale with meals:   BG less than 140: no sliding scale addition needed  -190: 1 units  -240: 2 units  BG over 240: 3 units    Example: you will be eating 25 grams of carb with lunch and your pre lunch sugar is 100. You do not need carb coverage insulin as you are eating less than 30 grams of carb today, and you do not need sliding scale insulin as your blood sugar is less than 140, so you would not need any insulin before lunch!    11/1: Prednisone 20 mg two times daily  Can STOP the Novolog with meals  Continue the Novolog sliding scale with meals, only if blood glucose is elevated   Novolog sliding scale with meals:   BG less than 140: no sliding scale addition needed  -190: 1 units  -240: 2 units  BG over 240: 3 units    11/2: Prednisone 20 mg daily  Continue the Novoog sliding scale with meals, only if blood glucose is elevated:  BG less than 140: no sliding scale addition needed  -190: 1 units  -240: 2 units  BG over 240: 3 units    If you have urgent questions or concerns regarding your blood sugars or insulin, you may contact [phone] (the main hospital ). Ask to speak with the endocrinologist on call. Call this number if you have any blood sugars over 250 at home, or any blood sugars less than 75, so we can help adjust the insulin dosing.       Thank you for letting the Diabetes Management Team be involved in your care!

## 2021-10-25 NOTE — CONSULTS
GASTROENTEROLOGY CONSULTATION    Date of Admission:  10/16/2021          ASSESSMENT AND RECOMMENDATIONS:   51 year old female with PMHx of alcohol related cirrhosis c/b ascites, hepatic hydrothorax and GAVE and history of hemochromatosis compound heterozygote who underwent DBD DDLT 10/17/21. Had worsening direct hyperbilirubinemia for 2 days, which GI panc/bili is consulted for ERCP.     # Direct hyperbilirubinemia  # Alcohol related cirrhosis s/p DDLT (DBD) on 10/17/21  # Hemochromatosis compound heterozygote  # Small GAVE, EGD 10/22/21  # Acute on Chronic Normocytic Anemia  Prior to transplant Tbili 5-6, immediate after transplant tbili was 9 then gradually downtrended to 1.9 (10/23), then increased to 2.6 (10/24) and 3.2 (10/25). Also with increasing ALP. AST/ALT improving. US with patent liver vasculature. She had  donor, which increased risk of biliary obstruction related to decreased donor tissue. Possible bile duct obstruction/ bile anastomosis stricture. Will obtain ERCP today. No heparin infusion or anticoagulation since 10/20/21.    RECOMMENDATIONS  -- NPO, hold anticoagulation  -- ERCP today with Dr. Fenton; plan/risk and benefit discussed with patient    Gastroenterology follow up recommendations: TBD    Thank you for involving us in this patient's care. Please do not hesitate to contact the GI service with any questions or concerns.     Patient care plan discussed with Dr. Brown , GI staff physician.    Wild Isbell MD  GI fellow  Page [phone]          Chief Complaint:   We were asked by Danii Hickman NP of liver transplant surgery to evaluate this patient with elevated TB and AP in a s/p liver transplant, requesting ERCP  History is obtained from the patient and the medical record.          History of Present Illness:   51 year old female with PMHx of alcohol related cirrhosis c/b ascites, hepatic hydrothorax and GAVE and history of hemochromatosis compound  heterozygote who underwent DBD DDLT 10/17/21. Had worsening direct hyperbilirubinemia for 2 days, which GI panc/bili is consulted for ERCP.    Underwent  donor liver transplant 10/17/21. Prior to transplant Tbili 5-6, immediate after transplant tbili was 9 then gradually downtrended to 1.9 (10/23), then increased to 2.6 (10/24) and 3.2 (10/25). Also with increasing ALP. AST/ALT improving. US with patent liver vasculature.     No abdominal pain, no nausea vomiting, no fever. Has prior EGD, but no previous ERCP.             Past Medical History:   Reviewed and edited as appropriate  Past Medical History:   Diagnosis Date     Ascites      History of blood transfusion      Liver cirrhosis (H)      Thyroid disease           Past Surgical History:   Reviewed and edited as appropriate   Past Surgical History:   Procedure Laterality Date      SECTION       CV RIGHT HEART CATH MEASUREMENTS RECORDED N/A 2021    Procedure: CV RIGHT HEART CATH;  Surgeon: Joseph Guevara MD;  Location:  [facility]ATH [facility]     ESOPHAGOSCOPY, GASTROSCOPY, DUODENOSCOPY (EGD), COMBINED N/A 10/7/2021    Procedure: ESOPHAGOGASTRODUODENOSCOPY (EGD) with hemostasis;  Surgeon: Fox Jerry MD;  Location:  GI     ESOPHAGOSCOPY, GASTROSCOPY, DUODENOSCOPY (EGD), COMBINED N/A 10/22/2021    Procedure: ESOPHAGOGASTRODUODENOSCOPY, WITH BIOPSY;  Surgeon: Fadia Avila MD;  Location: [facility] GI     IR THORACENTESIS  2021     IR THORACENTESIS  2021     THORACENTESIS Left 2021    Procedure: THORACENTESIS;  Surgeon: Almas Irby MD;  Location: [facility]     THORACENTESIS N/A 2021    Procedure: THORACENTESIS;  Surgeon: Almas Irby MD;  Location: [facility]     THORACENTESIS N/A 03/10/2021    Procedure: THORACENTESIS;  Surgeon: Almas Irby MD;  Location: [facility]     THORACENTESIS Left 2021    Procedure: THORACENTESIS;  Surgeon: Kennedi Chavez MD;  Location: [facility] OR     THORACENTESIS Left 2021     Procedure: THORACENTESIS;  Surgeon: Jess Ansari PA-C;  Location: [facility] OR     TRANSPLANT LIVER RECIPIENT  DONOR N/A 10/17/2021    Procedure: TRANSPLANT, LIVER, RECIPIENT,  DONOR;  Surgeon: Christian Morrison MD;  Location: [facility] OR          Previous Endoscopy:   Prior EGD 10/22/21 reviewed.         Social History:   Reviewed and edited as appropriate  Social History     Socioeconomic History     Marital status: Single     Spouse name: Not on file     Number of children: Not on file     Years of education: Not on file     Highest education level: Master's degree (e.g., MA, MS, Roberto, MEd, MSW, SASHA)   Occupational History     Not on file   Tobacco Use     Smoking status: Never Smoker     Smokeless tobacco: Never Used   Substance and Sexual Activity     Alcohol use: Not Currently     Comment: Quit on 2020     Drug use: Not Currently     Sexual activity: Not Currently     Partners: Male   Other Topics Concern     Parent/sibling w/ CABG, MI or angioplasty before 65F 55M? Not Asked   Social History Narrative    Lives [place]. 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 12 year sonFlakito.         Christine Harris, DNP, APRN, CNP    3/17/2021     Social Determinants of Health     Financial Resource Strain: Low Risk      Difficulty of Paying Living Expenses: Not hard at all   Food Insecurity: No Food Insecurity     Worried About Running Out of Food in the Last Year: Never true     Ran Out of Food in the Last Year: Never true   Transportation Needs: No Transportation Needs     Lack of Transportation (Medical): No     Lack of Transportation (Non-Medical): No   Physical Activity: Sufficiently Active     Days of Exercise per Week: 5 days     Minutes of Exercise per Session: 30 min   Stress: No Stress Concern Present     Feeling of Stress : Only a little   Social Connections: Moderately Integrated     Frequency of Communication with Friends and Family: More than  three times a week     Frequency of Social Gatherings with Friends and Family: More than three times a week     Attends Denominational Services: More than 4 times per year     Active Member of Clubs or Organizations: Yes     Attends Club or Organization Meetings: More than 4 times per year     Marital Status: Never    Intimate Partner Violence:      Fear of Current or Ex-Partner:      Emotionally Abused:      Physically Abused:      Sexually Abused:           Family History:   Reviewed and edited as appropriate  No known history of gastrointestinal/liver disease or  gastrointestinal malignancies.       Allergies:   Reviewed and edited as appropriate     Allergies   Allergen Reactions     Amoxicillin GI Disturbance, Diarrhea, Nausea and Nausea and Vomiting          Medications:     Medications Prior to Admission   Medication Sig Dispense Refill Last Dose     ciprofloxacin (CIPRO) 500 MG tablet Take 1 tablet (500 mg) by mouth daily 90 tablet 3 Past Week at Unknown time     Ferrous Sulfate 300 MG/6.8ML SOLN Take 6.8 mLs by mouth 2 times daily 450 mL 1 Past Week at Unknown time     folic acid (FOLVITE) 1 MG tablet Take 1 tablet (1 mg) by mouth daily 90 tablet 3 Past Week at Unknown time     furosemide (LASIX) 20 MG tablet Take 4 tablets (80 mg) by mouth daily 360 tablet 3 Past Week at Unknown time     hydrocortisone (ANUSOL-HC) 25 MG suppository Place 1 suppository (25 mg) rectally 2 times daily 24 suppository 0 More than a month at Unknown time     hydrocortisone, Perianal, (HYDROCORTISONE) 2.5 % cream Place rectally 2 times daily as needed for hemorrhoids 30 g 1 More than a month at Unknown time     levothyroxine (SYNTHROID/LEVOTHROID) 50 MCG tablet Take 50 mcg by mouth daily   Past Week at Unknown time     midodrine (PROAMATINE) 5 MG tablet Take 1 tablet (5 mg) by mouth 2 times daily (with meals) 180 tablet 3 Past Week at Unknown time     multivitamin w/minerals (THERA-VIT-M) tablet Take 1 tablet by mouth daily 30  tablet 3 Past Week at Unknown time     pantoprazole (PROTONIX) 40 MG EC tablet Take 1 tablet (40 mg) by mouth daily 60 tablet 4 Past Week at Unknown time     potassium chloride ER (KLOR-CON M15) 15 MEQ CR tablet Take 2 tablets (30 mEq) by mouth daily 180 tablet 3 Past Week at Unknown time     rifaximin (XIFAXAN) 550 MG TABS tablet Take 550 mg by mouth 2 times daily   Past Week at Unknown time     spironolactone (ALDACTONE) 50 MG tablet Take 2 tablets (100 mg) by mouth 2 times daily 360 tablet 3 Past Week at Unknown time     zinc sulfate (ZINCATE) 220 (50 Zn) MG capsule Take 1 capsule (220 mg) by mouth daily 90 capsule 3 Past Week at Unknown time             Review of Systems:     A complete review of systems was performed and is negative except as noted in the HPI           Physical Exam:   /58 (BP Location: Right arm)   Pulse 105   Temp 98.6  F (37  C) (Oral)   Resp 16   Wt 75.9 kg (167 lb 6.4 oz)   SpO2 96%   BMI 26.22 kg/m    Wt:   Wt Readings from Last 2 Encounters:   10/24/21 75.9 kg (167 lb 6.4 oz)   09/09/21 81.2 kg (179 lb)      Constitutional: cooperative, pleasant, not dyspneic/diaphoretic, no acute distress  Eyes: Sclera anicteric  Ears/nose/mouth/throat: mucus membranes moist, hearing intact  Neck: supple, thyroid normal size  CV: No edema  Respiratory: Unlabored breathing  Abdomen: Non-distended, +bs, no hepatosplenomegaly, nontender, no peritoneal signs, liver transplant surgical scar well healed.  Skin: warm, perfused, no jaundice  Neuro: AAO x 3, No asterixis  Psych: Normal affect         Data:   Labs and imaging below were independently reviewed and interpreted    BMP  Recent Labs   Lab 10/25/21  0544 10/24/21  0824 10/23/21  0651 10/22/21  0538    136 131* 127*   POTASSIUM 3.7 3.9 4.4 5.1   CHLORIDE 103 102 98 95   EMMY 8.2* 8.2* 8.4* 8.3*   CO2 25 24 21 20   BUN 64* 74* 94* 84*   CR 1.66* 2.35* 3.66* 3.89*   * 133* 112* 86     CBC  Recent Labs   Lab 10/25/21  0508  10/24/21  0824 10/24/21  0824 10/23/21  0651 10/23/21  0651 10/22/21  1600 10/22/21  0538   WBC 6.6  --  8.7  --  8.9  --  8.6   RBC 2.25*  --  2.58*  --  2.44*  --  2.34*   HGB 7.0*   < > 8.1*   < > 7.5*   < > 7.2*   HCT 21.6*  --  24.5*  --  22.9*  --  21.5*   MCV 96  --  95  --  94  --  92   MCH 31.1  --  31.4  --  30.7  --  30.8   MCHC 32.4  --  33.1  --  32.8  --  33.5   RDW 17.6*  --  17.0*  --  16.6*  --  16.8*   *  --  113*  --  79*  --  46*    < > = values in this interval not displayed.     INR  Recent Labs   Lab 10/21/21  0719   INR 1.20*     LFTs  Recent Labs   Lab 10/25/21  0544 10/24/21  0824 10/23/21  0651 10/22/21  0538   ALKPHOS 216* 207* 148 111   AST 26 27 28 35   ALT 66* 85* 108* 135*   BILITOTAL 3.2* 2.6* 1.9* 2.3*   PROTTOTAL 4.8* 5.3* 5.5* 5.2*   ALBUMIN 1.9* 2.2* 2.4* 2.8*      PANCNo lab results found in last 7 days.    Imaging: US liver transplant 10/24/21  1.  Improved Doppler assessment of the hepatic vasculature, with  normalization of the resistive indices of the hepatic artery and  branches, and resolved diastolic reversal in the hepatic arterial  branches.  2.  Redistributed but likely unchanged volume of the small perihepatic  collections.

## 2021-10-25 NOTE — PROGRESS NOTES
CLINICAL NUTRITION SERVICES - BRIEF NOTE     Nutrition Prescription    Recommendations already ordered by Registered Dietitian (RD):  Ensure Clear apple once daily  Ensure Enlive Shake once daily  Calorie counts 10/25-10/27    Future/Additional Recommendations:  Minimize diet restrictions as able d/t high calorie/protein needs post-transplant.  Recommend 2 oral supplements/protein shakes/protein bars daily in addition to 3-4 small meals/day.      High protein food choices with meals to help meet high needs post-transplant over the next 6-8 weeks.     Heart-healthy diet (low saturated fat, low sodium, high fiber) and food safety precautions long term due to immunosuppression regimen post-transplant         EVALUATION OF THE PROGRESS TOWARD GOALS   Diet: NPO (for procedure)  Intake: Patient reports dysgeusia and reduced appetite, but feels eating is going fairly well.        NEW FINDINGS   Calorie counts: 3 day average = 1119 kcal (59% kcal needs) and 43 grams protein (43% pro needs).     INTERVENTIONS   Provided instruction on post-transplant diet with discussion regarding protein sources and high protein needs in acute post-tx phase. Reviewed recommendations to follow low fat/low sodium diet long term and discussed heart healthy diet tips.  Reviewed need for food safety precautions to prevent food borne illness.  Provided handouts: Post Transplant Diet Guidelines and USDA food safety booklet  Reviewed calorie count results with patient along with recommended protein/calorie intake in the acute phase post-tx.  Encouraged increased intake of high protein food sources. Discussed recommendations for home (protein smoothies/bars) and encouraged these 2x/day in between small meals 3-4x/day.  Reviewed oral supplement orders and modified these to patient's preferences.  Monitoring/Evaluation  Progress toward goals will be monitored and evaluated per protocol.     Elyssa Verdin MS, RD, LD  Pager [phone]     Complex Repair And Split-Thickness Skin Graft Text: The defect edges were debeveled with a #15 scalpel blade.  The primary defect was closed partially with a complex linear closure.  Given the location of the defect, shape of the defect and the proximity to free margins a split thickness skin graft was deemed most appropriate to repair the remaining defect.  The graft was trimmed to fit the size of the remaining defect.  The graft was then placed in the primary defect, oriented appropriately, and sutured into place.

## 2021-10-25 NOTE — OR NURSING
Dr Milner was bedside in pacu and reviewed procedure w patient, paged to remind to call Radha narayanan) for postop update. Timed amylase/lipase labs ordered for 1645. 1L LR bolus infusing

## 2021-10-25 NOTE — INTERVAL H&P NOTE
I have reviewed the surgical (or preoperative) H&P that is linked to this encounter, and examined the patient. Noted changes include:    Patient now s/p DDLT on 10/17/2021, c/w likely bleeding with down trending hgb. She has history of gastric antral vascular ectasy, now s/p argon plasma coagulation x2.   Hemodynamically stable with no pressors, maintaining good sats on room air, with UO improving, and diuresing (still 4+ L up since this admission).   Detailed anesthesia considerations as stated on anesthesia preoperative evaluation.     Lore Joyce MD

## 2021-10-25 NOTE — ANESTHESIA CARE TRANSFER NOTE
Patient: Sarah Fisher    Procedure: Procedure(s):  ENDOSCOPIC RETROGRADE CHOLANGIOPANCREATOGRAPHY, WITH biliary sphincterotomy, biliary dilation, and biliary stent placement       Diagnosis: Status post liver transplant (H) [Z94.4]  Diagnosis Additional Information: No value filed.    Anesthesia Type:   General     Note:    Oropharynx: oropharynx clear of all foreign objects and spontaneously breathing  Level of Consciousness: drowsy  Oxygen Supplementation: face mask  Level of Supplemental Oxygen (L/min / FiO2): 6  Independent Airway: airway patency satisfactory and stable  Dentition: dentition unchanged  Vital Signs Stable: post-procedure vital signs reviewed and stable  Report to RN Given: handoff report given  Patient transferred to: PACU    Handoff Report: Identifed the Patient, Identified the Reponsible Provider, Reviewed the pertinent medical history, Discussed the surgical course, Reviewed Intra-OP anesthesia mangement and issues during anesthesia, Set expectations for post-procedure period and Allowed opportunity for questions and acknowledgement of understanding      Vitals:  Vitals Value Taken Time   /73 10/25/21 1441   Temp     Pulse 103 10/25/21 1444   Resp     SpO2 99 % 10/25/21 1444   Vitals shown include unvalidated device data.    Electronically Signed By: Quang Espinoza MD  October 25, 2021  2:46 PM

## 2021-10-25 NOTE — PLAN OF CARE
/78 (BP Location: Right arm)   Pulse 101   Temp 98.7  F (37.1  C) (Oral)   Resp 16   Wt 75.9 kg (167 lb 6.4 oz)   SpO2 94%   BMI 26.22 kg/m        [id number]  Neuro: Pt. alert & Ox4  Behavior:  Pt. calm & cooperative with cares.   Activity: Pt. up with SBA.  Vital: T-99.6 oral, HR: 70's-107, OVSS on RA.  LDAs: Right internal jugular saline locked. Right BERKLEY with 275cc serosang. drainage. BERKLEY site leaking & dressing changed x2.  Cardiac: Tachycardia.  Respiratory: LS diminished in bases.  GI/: Pt. Voiding adequate amounts with green urine, 1 stools reported this shift.   Skin: Incision stapled & OSIEL. Itching skin relieved with ice packs.  Pain/Nausea:  Pt. had no c/o's pain or nausea.   Diet: NPO at midnight.  Labs/Imaging: Morning labs pending.   Plan:  Possible liver biopsy today or ERCP depending on labs. Continue to follow POC & notify MD with change in status.

## 2021-10-25 NOTE — ANESTHESIA PREPROCEDURE EVALUATION
Anesthesia Pre-Procedure Evaluation    Patient: Sarah Fisher   MRN: [id number] : 1970        Preoperative Diagnosis: Status post liver transplant (H) [Z94.4]    Procedure : Procedure(s):  ENDOSCOPIC RETROGRADE CHOLANGIOPANCREATOGRAPHY          Past Medical History:   Diagnosis Date     Ascites      History of blood transfusion      Liver cirrhosis (H)      Thyroid disease       Past Surgical History:   Procedure Laterality Date      SECTION       CV RIGHT HEART CATH MEASUREMENTS RECORDED N/A 2021    Procedure: CV RIGHT HEART CATH;  Surgeon: Joseph Guevara MD;  Location:  HEART CARDI[facility] CATH LAB     ESOPHAGOSCOPY, GASTROSCOPY, DUODENOSCOPY (EGD), COMBINED N/A 10/7/2021    Procedure: ESOPHAGOGASTRODUODENOSCOPY (EGD) with hemostasis;  Surgeon: Fox Jerry MD;  Location:  GI     ESOPHAGOSCOPY, GASTROSCOPY, DUODENOSCOPY (EGD), COMBINED N/A 10/22/2021    Procedure: ESOPHAGOGASTRODUODENOSCOPY, WITH BIOPSY;  Surgeon: Fadia Avila MD;  Location: [facility]     IR THORACENTESIS  2021     IR THORACENTESIS  2021     THORACENTESIS Left 2021    Procedure: THORACENTESIS;  Surgeon: Almas Irby MD;  Location: [facility]     THORACENTESIS N/A 2021    Procedure: THORACENTESIS;  Surgeon: Almas Irby MD;  Location: [facility]     THORACENTESIS N/A 03/10/2021    Procedure: THORACENTESIS;  Surgeon: Almas Irby MD;  Location: [facility]     THORACENTESIS Left 2021    Procedure: THORACENTESIS;  Surgeon: Kennedi Chavez MD;  Location: [facility] OR     THORACENTESIS Left 2021    Procedure: THORACENTESIS;  Surgeon: Jess Ansari PA-C;  Location: [facility] [place]     TRANSPLANT LIVER RECIPIENT  DONOR N/A 10/17/2021    Procedure: TRANSPLANT, LIVER, RECIPIENT,  DONOR;  Surgeon: Christian Morrison MD;  Location: [facility] OR      Allergies   Allergen Reactions     Amoxicillin GI Disturbance, Diarrhea, Nausea and Nausea and Vomiting      Social History     Tobacco Use      Smoking status: Never Smoker     Smokeless tobacco: Never Used   Substance Use Topics     Alcohol use: Not Currently     Comment: Quit on 6/20/2020      Wt Readings from Last 1 Encounters:   10/24/21 75.9 kg (167 lb 6.4 oz)        Anesthesia Evaluation   Pt has had prior anesthetic. Type: General.    No history of anesthetic complications       ROS/MED HX  ENT/Pulmonary:  - neg pulmonary ROS     Neurologic: Comment: Hx Hepatic encephalopathy, now s/p DDLT on 10/17/2021      Cardiovascular:     (+) -----Previous cardiac testing   Echo: Date: Results:    Stress Test: Date: 1/7/2021 Results:    Interpretation Summary  Normal dobutamine stress echocardiogram without evidence of inducible ischemia. Target heart rate was achieved. Heart rate and blood pressure response to dobutamine were normal. Normal LV function and wall motion at rest. With stress, the left ventricular ejection fraction increased from 55- 60% to greater than 65% and the left ventricular size decreased appropriately. No regional wall motion abnormality with stress. No subjective symptoms to suggest ischemia. There was no ECG evidence of ischemia. No significant valve disease on screening doppler evaluation. The aortic root and visualized ascending aorta are normal. IVC diameter and respiratory changes fall into an intermediate range suggesting an RA pressure of 8 mmHg. Right ventricular systolic pressure could not be approximated due to inadequate tricuspid regurgitation. There is no pericardial effusion. Large pleural effusion.  ECG Reviewed: Date: 5/26/2021 Results:  SR with PAC's. Prolonged QT (QTc 485)  Cath:  Date: 6/7/21 Results:  Mean PA pressure 25.   Mild elevated pulmonary hypertension.   Left sided filling pressures are mildly elevated. Hyperdynamic cardiac output level.     (-) angina, CAD, syncope, arrhythmias and angina   METS/Exercise Tolerance:     Hematologic: Comments: Hx of Meilimei  TCP   Coagulopathy       (+) History of blood  clots, anemia, history of blood transfusion, no previous transfusion reaction,     Musculoskeletal:  - neg musculoskeletal ROS     GI/Hepatic: Comment: - Cirrhosis, now s/p DDLT 10/17/2021  - Gastric Antral Vascular Ectasy, s/p Argon Plasma Coagulations x2.   - Duodenal ulcers    (+) GERD, Asymptomatic on medication, esophageal disease, liver disease,     Renal/Genitourinary:     (+) renal disease, type: CRI,     Endo:     (+) thyroid problem, hypothyroidism,     Psychiatric/Substance Use: Comment:   EtOH abuse in remission.     (+) alcohol abuse     Infectious Disease:  - neg infectious disease ROS     Malignancy:  - neg malignancy ROS     Other:  - neg other ROS          Physical Exam    Airway  airway exam normal      Mallampati: II   TM distance: > 3 FB   Neck ROM: full   Mouth opening: > 3 cm    Respiratory Devices and Support         Dental  no notable dental history         Cardiovascular   cardiovascular exam normal          Pulmonary   pulmonary exam normal                OUTSIDE LABS:  CBC:   Lab Results   Component Value Date    WBC 6.6 10/25/2021    WBC 8.7 10/24/2021    HGB 7.0 (L) 10/25/2021    HGB 8.1 (L) 10/24/2021    HCT 21.6 (L) 10/25/2021    HCT 24.5 (L) 10/24/2021     (L) 10/25/2021     (L) 10/24/2021     BMP:   Lab Results   Component Value Date     10/25/2021     10/24/2021    POTASSIUM 3.7 10/25/2021    POTASSIUM 3.9 10/24/2021    CHLORIDE 103 10/25/2021    CHLORIDE 102 10/24/2021    CO2 25 10/25/2021    CO2 24 10/24/2021    BUN 64 (H) 10/25/2021    BUN 74 (H) 10/24/2021    CR 1.66 (H) 10/25/2021    CR 2.35 (H) 10/24/2021     (H) 10/25/2021     (H) 10/24/2021     COAGS:   Lab Results   Component Value Date    PTT 35 10/21/2021    INR 1.20 (H) 10/21/2021    FIBR 177 10/18/2021     POC:   Lab Results   Component Value Date    HCGS Negative 01/04/2021     HEPATIC:   Lab Results   Component Value Date    ALBUMIN 1.9 (L) 10/25/2021    PROTTOTAL 4.8 (L)  10/25/2021    ALT 66 (H) 10/25/2021    AST 26 10/25/2021    ALKPHOS 216 (H) 10/25/2021    BILITOTAL 3.2 (H) 10/25/2021     OTHER:   Lab Results   Component Value Date    PH 7.37 10/18/2021    LACT 0.6 (L) 10/25/2021    A1C 5.1 10/19/2021    EMMY 8.2 (L) 10/25/2021    PHOS 2.9 10/25/2021    MAG 2.1 10/25/2021    LIPASE 191 10/17/2021    AMYLASE 52 10/17/2021    TSH 4.58 (H) 08/31/2021    T4 1.45 08/31/2021       Anesthesia Plan    ASA Status:  3   NPO Status:  NPO Appropriate    Anesthesia Type: General.     - Airway: ETT   Induction: RSI.   Maintenance: Balanced.   Techniques and Equipment:     - Lines/Monitors: 2nd IV (Patient already has DL RIJ)     - Blood: Blood in Room     Consents    Anesthesia Plan(s) and associated risks, benefits, and realistic alternatives discussed. Questions answered and patient/representative(s) expressed understanding.     - Discussed with:  Patient      - Specific Concerns: apiration risk. .     - Extended Intubation/Ventilatory Support Discussed: No.      - Patient is DNR/DNI Status: No    Use of blood products discussed: Yes.     - Discussed with: Patient.     Postoperative Care    Pain management: Oral pain medications, IV analgesics.   PONV prophylaxis: Ondansetron (or other 5HT-3), Dexamethasone or Solumedrol     Comments:                Lore Joyce MD

## 2021-10-26 ENCOUNTER — APPOINTMENT (OUTPATIENT)
Dept: OCCUPATIONAL THERAPY | Facility: CLINIC | Age: 51
End: 2021-10-26
Attending: TRANSPLANT SURGERY
Payer: COMMERCIAL

## 2021-10-26 ENCOUNTER — APPOINTMENT (OUTPATIENT)
Dept: INTERVENTIONAL RADIOLOGY/VASCULAR | Facility: CLINIC | Age: 51
End: 2021-10-26
Attending: PHYSICIAN ASSISTANT
Payer: COMMERCIAL

## 2021-10-26 LAB
ALBUMIN SERPL-MCNC: 1.8 G/DL (ref 3.4–5)
ALP SERPL-CCNC: 259 U/L (ref 40–150)
ALT SERPL W P-5'-P-CCNC: 46 U/L (ref 0–50)
ANION GAP SERPL CALCULATED.3IONS-SCNC: 7 MMOL/L (ref 3–14)
AST SERPL W P-5'-P-CCNC: 14 U/L (ref 0–45)
BASOPHILS # BLD AUTO: 0 10E3/UL (ref 0–0.2)
BASOPHILS NFR BLD AUTO: 0 %
BILIRUB DIRECT SERPL-MCNC: 3.7 MG/DL (ref 0–0.2)
BILIRUB SERPL-MCNC: 4.2 MG/DL (ref 0.2–1.3)
BUN SERPL-MCNC: 48 MG/DL (ref 7–30)
CALCIUM SERPL-MCNC: 8.2 MG/DL (ref 8.5–10.1)
CHLORIDE BLD-SCNC: 104 MMOL/L (ref 94–109)
CO2 SERPL-SCNC: 26 MMOL/L (ref 20–32)
CREAT SERPL-MCNC: 1.42 MG/DL (ref 0.52–1.04)
EOSINOPHIL # BLD AUTO: 0.2 10E3/UL (ref 0–0.7)
EOSINOPHIL NFR BLD AUTO: 4 %
ERYTHROCYTE [DISTWIDTH] IN BLOOD BY AUTOMATED COUNT: 17.8 % (ref 10–15)
GFR SERPL CREATININE-BSD FRML MDRD: 43 ML/MIN/1.73M2
GLUCOSE BLD-MCNC: 88 MG/DL (ref 70–99)
GLUCOSE BLDC GLUCOMTR-MCNC: 138 MG/DL (ref 70–99)
GLUCOSE BLDC GLUCOMTR-MCNC: 89 MG/DL (ref 70–99)
HCT VFR BLD AUTO: 21 % (ref 35–47)
HGB BLD-MCNC: 6.8 G/DL (ref 11.7–15.7)
HGB BLD-MCNC: 7 G/DL (ref 11.7–15.7)
HOLD SPECIMEN: NORMAL
IMM GRANULOCYTES # BLD: 0.1 10E3/UL
IMM GRANULOCYTES NFR BLD: 2 %
LACTATE SERPL-SCNC: 0.5 MMOL/L (ref 0.7–2)
LIPASE SERPL-CCNC: 81 U/L (ref 73–393)
LYMPHOCYTES # BLD AUTO: 0.5 10E3/UL (ref 0.8–5.3)
LYMPHOCYTES NFR BLD AUTO: 8 %
MAGNESIUM SERPL-MCNC: 1.7 MG/DL (ref 1.6–2.3)
MCH RBC QN AUTO: 31.9 PG (ref 26.5–33)
MCHC RBC AUTO-ENTMCNC: 32.4 G/DL (ref 31.5–36.5)
MCV RBC AUTO: 99 FL (ref 78–100)
MONOCYTES # BLD AUTO: 0.3 10E3/UL (ref 0–1.3)
MONOCYTES NFR BLD AUTO: 5 %
NEUTROPHILS # BLD AUTO: 4.3 10E3/UL (ref 1.6–8.3)
NEUTROPHILS NFR BLD AUTO: 81 %
NRBC # BLD AUTO: 0 10E3/UL
NRBC BLD AUTO-RTO: 0 /100
PHOSPHATE SERPL-MCNC: 3.1 MG/DL (ref 2.5–4.5)
PLATELET # BLD AUTO: 132 10E3/UL (ref 150–450)
POTASSIUM BLD-SCNC: 3.6 MMOL/L (ref 3.4–5.3)
PROT SERPL-MCNC: 4.8 G/DL (ref 6.8–8.8)
RBC # BLD AUTO: 2.13 10E6/UL (ref 3.8–5.2)
SODIUM SERPL-SCNC: 137 MMOL/L (ref 133–144)
TACROLIMUS BLD-MCNC: 6 UG/L (ref 5–15)
TME LAST DOSE: NORMAL H
TME LAST DOSE: NORMAL H
WBC # BLD AUTO: 5.4 10E3/UL (ref 4–11)

## 2021-10-26 PROCEDURE — 250N000009 HC RX 250: Performed by: PHYSICIAN ASSISTANT

## 2021-10-26 PROCEDURE — 82248 BILIRUBIN DIRECT: CPT | Performed by: NURSE PRACTITIONER

## 2021-10-26 PROCEDURE — 250N000012 HC RX MED GY IP 250 OP 636 PS 637: Performed by: NURSE PRACTITIONER

## 2021-10-26 PROCEDURE — 47000 NEEDLE BIOPSY OF LIVER PERQ: CPT | Performed by: PHYSICIAN ASSISTANT

## 2021-10-26 PROCEDURE — 36592 COLLECT BLOOD FROM PICC: CPT

## 2021-10-26 PROCEDURE — 88342 IMHCHEM/IMCYTCHM 1ST ANTB: CPT

## 2021-10-26 PROCEDURE — 272N000505 HC NEEDLE CR5

## 2021-10-26 PROCEDURE — 99152 MOD SED SAME PHYS/QHP 5/>YRS: CPT

## 2021-10-26 PROCEDURE — 99232 SBSQ HOSP IP/OBS MODERATE 35: CPT | Mod: 24 | Performed by: INTERNAL MEDICINE

## 2021-10-26 PROCEDURE — 83690 ASSAY OF LIPASE: CPT | Performed by: NURSE PRACTITIONER

## 2021-10-26 PROCEDURE — 83605 ASSAY OF LACTIC ACID: CPT

## 2021-10-26 PROCEDURE — 250N000013 HC RX MED GY IP 250 OP 250 PS 637: Performed by: NURSE PRACTITIONER

## 2021-10-26 PROCEDURE — 250N000011 HC RX IP 250 OP 636: Performed by: NURSE PRACTITIONER

## 2021-10-26 PROCEDURE — 250N000011 HC RX IP 250 OP 636: Performed by: PHYSICIAN ASSISTANT

## 2021-10-26 PROCEDURE — 85018 HEMOGLOBIN: CPT

## 2021-10-26 PROCEDURE — 83735 ASSAY OF MAGNESIUM: CPT | Performed by: NURSE PRACTITIONER

## 2021-10-26 PROCEDURE — 80048 BASIC METABOLIC PNL TOTAL CA: CPT | Performed by: NURSE PRACTITIONER

## 2021-10-26 PROCEDURE — 258N000003 HC RX IP 258 OP 636: Performed by: NURSE PRACTITIONER

## 2021-10-26 PROCEDURE — 97140 MANUAL THERAPY 1/> REGIONS: CPT | Mod: GO | Performed by: OCCUPATIONAL THERAPIST

## 2021-10-26 PROCEDURE — 120N000011 HC R&B TRANSPLANT UMMC

## 2021-10-26 PROCEDURE — 88342 IMHCHEM/IMCYTCHM 1ST ANTB: CPT | Mod: 26 | Performed by: PATHOLOGY

## 2021-10-26 PROCEDURE — 250N000013 HC RX MED GY IP 250 OP 250 PS 637: Performed by: INTERNAL MEDICINE

## 2021-10-26 PROCEDURE — 84999 UNLISTED CHEMISTRY PROCEDURE: CPT

## 2021-10-26 PROCEDURE — 85025 COMPLETE CBC W/AUTO DIFF WBC: CPT | Performed by: NURSE PRACTITIONER

## 2021-10-26 PROCEDURE — 88342 IMHCHEM/IMCYTCHM 1ST ANTB: CPT | Mod: TC | Performed by: NURSE PRACTITIONER

## 2021-10-26 PROCEDURE — 80197 ASSAY OF TACROLIMUS: CPT | Performed by: NURSE PRACTITIONER

## 2021-10-26 PROCEDURE — 88307 TISSUE EXAM BY PATHOLOGIST: CPT | Mod: 26 | Performed by: PATHOLOGY

## 2021-10-26 PROCEDURE — 272N000653 HC COIL/EMBOLIC DEVICE CR8

## 2021-10-26 PROCEDURE — P9016 RBC LEUKOCYTES REDUCED: HCPCS | Performed by: TRANSPLANT SURGERY

## 2021-10-26 PROCEDURE — 36592 COLLECT BLOOD FROM PICC: CPT | Performed by: NURSE PRACTITIONER

## 2021-10-26 PROCEDURE — 84100 ASSAY OF PHOSPHORUS: CPT | Performed by: NURSE PRACTITIONER

## 2021-10-26 PROCEDURE — 99152 MOD SED SAME PHYS/QHP 5/>YRS: CPT | Performed by: PHYSICIAN ASSISTANT

## 2021-10-26 PROCEDURE — 250N000012 HC RX MED GY IP 250 OP 636 PS 637: Performed by: PHYSICIAN ASSISTANT

## 2021-10-26 PROCEDURE — 250N000013 HC RX MED GY IP 250 OP 250 PS 637: Performed by: PHYSICIAN ASSISTANT

## 2021-10-26 PROCEDURE — 88341 IMHCHEM/IMCYTCHM EA ADD ANTB: CPT | Mod: 26 | Performed by: PATHOLOGY

## 2021-10-26 PROCEDURE — 0FD03ZX EXTRACTION OF LIVER, PERCUTANEOUS APPROACH, DIAGNOSTIC: ICD-10-PCS | Performed by: PHYSICIAN ASSISTANT

## 2021-10-26 PROCEDURE — 76942 ECHO GUIDE FOR BIOPSY: CPT | Mod: 26 | Performed by: PHYSICIAN ASSISTANT

## 2021-10-26 RX ORDER — FLUMAZENIL 0.1 MG/ML
0.2 INJECTION, SOLUTION INTRAVENOUS
Status: DISCONTINUED | OUTPATIENT
Start: 2021-10-26 | End: 2021-10-26 | Stop reason: HOSPADM

## 2021-10-26 RX ORDER — NALOXONE HYDROCHLORIDE 0.4 MG/ML
0.2 INJECTION, SOLUTION INTRAMUSCULAR; INTRAVENOUS; SUBCUTANEOUS
Status: DISCONTINUED | OUTPATIENT
Start: 2021-10-26 | End: 2021-10-26 | Stop reason: HOSPADM

## 2021-10-26 RX ORDER — FENTANYL CITRATE 50 UG/ML
25-50 INJECTION, SOLUTION INTRAMUSCULAR; INTRAVENOUS EVERY 5 MIN PRN
Status: DISCONTINUED | OUTPATIENT
Start: 2021-10-26 | End: 2021-10-26 | Stop reason: HOSPADM

## 2021-10-26 RX ORDER — SULFAMETHOXAZOLE AND TRIMETHOPRIM 400; 80 MG/1; MG/1
1 TABLET ORAL DAILY
Status: DISCONTINUED | OUTPATIENT
Start: 2021-10-27 | End: 2021-10-29 | Stop reason: HOSPADM

## 2021-10-26 RX ORDER — NALOXONE HYDROCHLORIDE 0.4 MG/ML
0.4 INJECTION, SOLUTION INTRAMUSCULAR; INTRAVENOUS; SUBCUTANEOUS
Status: DISCONTINUED | OUTPATIENT
Start: 2021-10-26 | End: 2021-10-26 | Stop reason: HOSPADM

## 2021-10-26 RX ORDER — VALGANCICLOVIR 450 MG/1
450 TABLET, FILM COATED ORAL DAILY
Status: DISCONTINUED | OUTPATIENT
Start: 2021-10-27 | End: 2021-10-29 | Stop reason: HOSPADM

## 2021-10-26 RX ORDER — BUMETANIDE 1 MG/1
2 TABLET ORAL DAILY
Status: DISCONTINUED | OUTPATIENT
Start: 2021-10-26 | End: 2021-10-27

## 2021-10-26 RX ADMIN — DOCUSATE SODIUM 50 MG AND SENNOSIDES 8.6 MG 1 TABLET: 8.6; 5 TABLET, FILM COATED ORAL at 19:19

## 2021-10-26 RX ADMIN — BUMETANIDE 2 MG: 1 TABLET ORAL at 11:07

## 2021-10-26 RX ADMIN — LEVOTHYROXINE SODIUM 50 MCG: 0.05 TABLET ORAL at 07:32

## 2021-10-26 RX ADMIN — TACROLIMUS 2.5 MG: 1 CAPSULE ORAL at 07:32

## 2021-10-26 RX ADMIN — URSODIOL 300 MG: 300 CAPSULE ORAL at 07:32

## 2021-10-26 RX ADMIN — URSODIOL 300 MG: 300 CAPSULE ORAL at 19:19

## 2021-10-26 RX ADMIN — FENTANYL CITRATE 25 MCG: 50 INJECTION INTRAMUSCULAR; INTRAVENOUS at 14:19

## 2021-10-26 RX ADMIN — NYSTATIN 500000 UNITS: 500000 SUSPENSION ORAL at 12:18

## 2021-10-26 RX ADMIN — MYCOPHENOLATE MOFETIL 750 MG: 250 CAPSULE ORAL at 17:59

## 2021-10-26 RX ADMIN — MYCOPHENOLATE MOFETIL 750 MG: 250 CAPSULE ORAL at 07:32

## 2021-10-26 RX ADMIN — DOCUSATE SODIUM 50 MG AND SENNOSIDES 8.6 MG 1 TABLET: 8.6; 5 TABLET, FILM COATED ORAL at 07:31

## 2021-10-26 RX ADMIN — NYSTATIN 500000 UNITS: 500000 SUSPENSION ORAL at 19:19

## 2021-10-26 RX ADMIN — LIDOCAINE HYDROCHLORIDE 8 ML: 10 INJECTION, SOLUTION EPIDURAL; INFILTRATION; INTRACAUDAL; PERINEURAL at 14:25

## 2021-10-26 RX ADMIN — MIDAZOLAM 1 MG: 1 INJECTION INTRAMUSCULAR; INTRAVENOUS at 14:19

## 2021-10-26 RX ADMIN — FENTANYL CITRATE 50 MCG: 50 INJECTION INTRAMUSCULAR; INTRAVENOUS at 14:25

## 2021-10-26 RX ADMIN — PREDNISONE 5 MG: 5 TABLET ORAL at 07:32

## 2021-10-26 RX ADMIN — PANTOPRAZOLE SODIUM 40 MG: 40 TABLET, DELAYED RELEASE ORAL at 07:32

## 2021-10-26 RX ADMIN — NYSTATIN 500000 UNITS: 500000 SUSPENSION ORAL at 16:47

## 2021-10-26 RX ADMIN — PANTOPRAZOLE SODIUM 40 MG: 40 TABLET, DELAYED RELEASE ORAL at 19:19

## 2021-10-26 RX ADMIN — SODIUM CHLORIDE 500 MG: 9 INJECTION, SOLUTION INTRAVENOUS at 19:19

## 2021-10-26 RX ADMIN — TACROLIMUS 2.5 MG: 1 CAPSULE ORAL at 17:59

## 2021-10-26 RX ADMIN — NYSTATIN 500000 UNITS: 500000 SUSPENSION ORAL at 07:32

## 2021-10-26 ASSESSMENT — ACTIVITIES OF DAILY LIVING (ADL)
ADLS_ACUITY_SCORE: 9
ADLS_ACUITY_SCORE: 10
ADLS_ACUITY_SCORE: 9
ADLS_ACUITY_SCORE: 10
ADLS_ACUITY_SCORE: 9

## 2021-10-26 NOTE — CONSULTS
Addendum: Bilateral pleural effusions noted on CXR. Team reports that patient is not dyspneic with activity and is maintaining good O2 saturation levels on room air. Team declines IR thoracentesis.    Patient is a 51 year old female with liver transplant performed 10/17/21, now with abnormal LFTs. Patient's team requesting random liver biopsy. Patient will be added to IR schedule on 10/26/21 for transplant liver biopsy.     Labs WNL for procedure.    Preprocedural orders have been entered.   Consent will be done prior to procedure.     Please contact the IR control at [phone] for estimated time of procedure.     Case discussed with primary team.    Joao Stock PA-C  Interventional Radiology  [phone] [facility].

## 2021-10-26 NOTE — PLAN OF CARE
VS: /66 (BP Location: Right arm)   Pulse 106   Temp 98.5  F (36.9  C) (Oral)   Resp 18   Wt 73.6 kg (162 lb 4.8 oz)   SpO2 95%   BMI 25.42 kg/m      Cares: 1930-0730     Current condition: Tachycardic OVSS on RA.   Neuro: A/Ox4   Cardio: WDL   Respiratory: Diminished at bases   GI/: Oliguric, UA collected. LBM 10/25  Skin: Lymph wraps, incision CDI.   Diet: Clear liquid, tolerating well   Labs: Critical lab value Hgb 6.8 (recheck ordered), recheck Hgb 7 passed along to day team  BG: ACHS; WNL  LDA: internal jugular SL, BERKLEY serosanguinous output   Mobility: SBA with walker    Pain: Denied pain or nausea   Continue POC

## 2021-10-26 NOTE — PROGRESS NOTES
GASTROENTEROLOGY PROGRESS NOTE    Date: 10/26/2021     ASSESSMENT:  51 year old female with PMHx of alcohol related cirrhosis c/b ascites, hepatic hydrothorax and GAVE and history of hemochromatosis compound heterozygote who underwent DBD DDLT 10/17/21. Had worsening direct hyperbilirubinemia for 2 days, which GI panc/bili consulted for ERCP.     # Direct hyperbilirubinemia  # Alcohol related cirrhosis s/p DDLT (DBD) on 10/17/21  # Hemochromatosis compound heterozygote  # Small GAVE, duodenal ulcers from EGD 10/22/21  # Acute on Chronic Normocytic Anemia  Prior to transplant Tbili 5-6, immediate after transplant tbili was 9 then gradually downtrended to 1.9 (10/23), then increased to 2.6 (10/24) and 3.2 (10/25). Also with increasing ALP. AST/ALT improving. US with patent liver vasculature so less likely ischemic injury. ERCP 10/25 with biliary sphincterotomy showing moderate-grade biliary stricture was found in the post-transplant anastomosis. Successfully dilate and 10 Fr x 9 cm Sofflex plastic biliary stent was placed.     Post ERCP, doing well without pain, n/v. tbili increased from 3.2 to 4.2. Lipase negative. This is unclear if the bilirubin was delayed decrease or labs yesterday was drawn prior to the peak bilirubin. Will observe for tomorrow and determine if she needs further ERCP with stent revision or not. Per transplant team, obtaining liver biopsy today.     RECOMMENDATIONS:  - Advance diet as tolerates   - Avoid aspirin, nonsteroidal anti-inflammatory, and anticoagulation for 3 days (until 10/28/21).   - Observe patient's clinical course. Expect gradual improvement in LFTs with established biliary drainage.   - Repeat ERCP in 4 weeks to further interrrogate the biliary system and exchange stent (we will schedule).     Gastroenterology follow up recommendations: Pending clinical course.      Thank you for involving us in this patient's care. Please do not hesitate to contact the GI service with any  questions or concerns.      Pt care plan discussed with Dr. Brown, GI staff physician.    Wild Isbell MD  Gastroenterology Fellow  Page [phone]  _______________________________________________________________    Subjective: Tolerates clear liquid diet. No worsening abdominal pain post ERCP. No nausea/vomiting. No fever.     Objective:  Blood pressure 118/64, pulse 89, temperature 99  F (37.2  C), temperature source Oral, resp. rate 18, weight 73.6 kg (162 lb 4.8 oz), SpO2 98 %, not currently breastfeeding.    Gen: A&Ox3, NAD  HEENT: sclera anicteric  CV: RRR  Lungs: breathing comfortably on room air  Abd: Non-distended, +bs, no hepatosplenomegaly, nontender, no peritoneal signs, liver transplant surgical scar well healed.  Skin: Jaundice, no stigmata of chronic liver disease  MS: appropriate muscle mass for age  Neuro: non focal     LABS:  BMP  Recent Labs   Lab 10/26/21  0417 10/25/21  2128 10/25/21  1857 10/25/21  1248 10/25/21  0544 10/24/21  0824 10/24/21  0824 10/23/21  0651 10/23/21  0651 10/21/21  1224     --   --   --  136  --  136  --  131*  --    POTASSIUM 3.6  --   --   --  3.7  --  3.9  --  4.4  --    CHLORIDE 104  --   --   --  103  --  102  --  98  --    EMMY 8.2*  --   --   --  8.2*  --  8.2*  --  8.4*  --    CO2 26  --   --   --  25  --  24  --  21  --    BUN 48*  --   --   --  64*  --  74*  --  94*  --    CR 1.42*  --   --   --  1.66*  --  2.35*  --  3.66*  --    GLC 88 119* 88 117* 118*   < > 133*   < > 112*   < >    < > = values in this interval not displayed.     CBC  Recent Labs   Lab 10/26/21  0549 10/26/21  0418 10/26/21  0418 10/25/21  1741 10/25/21  0544 10/24/21  0824 10/24/21  0824 10/23/21  0651 10/23/21  0651   WBC  --   --  5.4  --  6.6  --  8.7  --  8.9   RBC  --   --  2.13*  --  2.25*  --  2.58*  --  2.44*   HGB 7.0*   < > 6.8*   < > 7.0*   < > 8.1*   < > 7.5*   HCT  --   --  21.0*  --  21.6*  --  24.5*  --  22.9*   MCV  --   --  99  --  96  --  95  --  94   MCH  --   --   31.9  --  31.1  --  31.4  --  30.7   MCHC  --   --  32.4  --  32.4  --  33.1  --  32.8   RDW  --   --  17.8*  --  17.6*  --  17.0*  --  16.6*   PLT  --   --  132*  --  115*  --  113*  --  79*    < > = values in this interval not displayed.     INR  Recent Labs   Lab 10/21/21  0719   INR 1.20*     LFTs  Recent Labs   Lab 10/26/21  0417 10/25/21  0544 10/24/21  0824 10/23/21  0651   ALKPHOS 259* 216* 207* 148   AST 14 26 27 28   ALT 46 66* 85* 108*   BILITOTAL 4.2* 3.2* 2.6* 1.9*   PROTTOTAL 4.8* 4.8* 5.3* 5.5*   ALBUMIN 1.8* 1.9* 2.2* 2.4*      PANC  Recent Labs   Lab 10/26/21  0547 10/25/21  1741   LIPASE 81 249   AMYLASE  --  37       IMAGING: No new

## 2021-10-26 NOTE — PLAN OF CARE
/75 (BP Location: Right arm)   Pulse 105   Temp 97.9  F (36.6  C) (Oral)   Resp 16   Wt 75.9 kg (167 lb 6.4 oz)   SpO2 95%   BMI 26.22 kg/m    [id number]  Pt had ERCP this shift.  Patient vitally stable on RA, afebrile. BG ACHS. Denied any pain or nausea. Tolerating clear liquid diet with good appetite. Internal jugular SL. R BERKLEY with 400 out this shift. UA needed. BM overnight. UAL. ERCP done.   Will continue with POC and notify MD with changes or concerns.

## 2021-10-26 NOTE — PROCEDURES
[facility]    Procedure: IR Procedure Note    Date/Time: 10/26/2021 2:49 PM  Performed by: Gema Vicente PA-C  Authorized by: Gema Vicente PA-C     UNIVERSAL PROTOCOL   Site Marked: NA  Prior Images Obtained and Reviewed:  Yes  Required items: Required blood products, implants, devices and special equipment available    Patient identity confirmed:  Verbally with patient, arm band, provided demographic data and hospital-assigned identification number  Patient was reevaluated immediately before administering moderate or deep sedation or anesthesia  Confirmation Checklist:  Patient's identity using two indicators, relevant allergies, procedure was appropriate and matched the consent or emergent situation and correct equipment/implants were available  Time out: Immediately prior to the procedure a time out was called    Universal Protocol: the Joint Commission Universal Protocol was followed    Preparation: Patient was prepped and draped in usual sterile fashion           ANESTHESIA    Anesthesia: Local infiltration  Local Anesthetic:  Lidocaine 1% without epinephrine      SEDATION    Patient Sedated: Yes    Sedation:  Fentanyl and midazolam  Vital signs: Vital signs monitored during sedation    See dictated procedure note for full details.  Findings: Sedation start time: 1419       Sedation end time: 1435  Sedation medications ministered: 1 mg versed, 75 mcg fentanyl  Total sedation time: 11 min    Specimens: core needle biopsy specimens sent for pathological analysis    Complications: None    Condition: Stable    Plan: Transport back to inpatient bed for recovery     PROCEDURE   Patient Tolerance:  Patient tolerated the procedure well with no immediate complications  Describe Procedure: Completed ultrasound-guided transplant liver biopsy. two passes yielded two cores sent to pathology in preservative. Gelfoam in tract.    Length of time physician/provider present for 1:1  monitoring during sedation: 10

## 2021-10-26 NOTE — PRE-PROCEDURE
GENERAL PRE-PROCEDURE:   Procedure:  Transplant liver biopsy   Date/Time:  10/26/2021 2:09 PM    Written consent obtained?: Yes    Risks and benefits: Risks, benefits and alternatives were discussed    Consent given by:  Patient  Patient states understanding of procedure being performed: Yes    Patient's understanding of procedure matches consent: Yes    Procedure consent matches procedure scheduled: Yes    Expected level of sedation:  Moderate  Appropriately NPO:  Yes  ASA Class:  3  Mallampati  :  Grade 3- soft palate visible, posterior pharyngeal wall not visible  Lungs:  Lungs clear with good breath sounds bilaterally  Heart:  Normal heart sounds with tachycardia  History & Physical reviewed:  History and physical reviewed and no updates needed  Statement of review:  I have reviewed the lab findings, diagnostic data, medications, and the plan for sedation

## 2021-10-26 NOTE — PROGRESS NOTES
Calorie Count  Intake recorded for: 10/25  Total Kcals: 180 Total Protein: 0g  Kcals from Hospital Food: 180  Protein: 0g  Kcals from Outside Food (average):0 Protein: 0g  # Meals Ordered from Kitchen: food from unit  # Meals Recorded: 100% 3 popsicles from nursing unit  # Supplements Recorded: 0

## 2021-10-26 NOTE — PLAN OF CARE
/83 (BP Location: Right arm)   Pulse 82   Temp 98.8  F (37.1  C) (Oral)   Resp 16   Wt 73.6 kg (162 lb 4.8 oz)   SpO2 97%   BMI 25.42 kg/m    2003-4800  Pt had liver biopsy this shift  Patient vitally stable on RA, afebrile. Received 1 unit of blood this shift for hgb of 7.0.  BG ACHS. Denied any pain or nausea. Tolerating regular diet with good appetite. Internal jugular SL, caps and dressing changed. R BERKLEY with sanguinous output. Voiding and stooling.  Will continue with POC and notify MD with changes or concerns.

## 2021-10-26 NOTE — IR NOTE
Patient Name: Sarah Fisher  Medical Record Number: [id number]  Today's Date: 10/26/2021    Procedure: Transplanted Liver Biopsy   Proceduralist: PHILLY Vicente    Sedation start time: 1419   Sedation end time: 1435  Sedation medications ministered: 1 mg versed, 75 mcg fentanyl  Total sedation time: 11 min    Procedure start time: 1419  Procedure end time: 1435    Report given to: Frantz RAMOS 7A  : n/a    Other Notes: Pt arrived to IR room 2 from 7A. Consent reviewed, pt confirmed. Pt denies any questions or concerns regarding procedure. Pt positioned supine and monitored per protocol. Site cleansed and dressed per protocol. Pt tolerated procedure without any noted complications. Pt transferred back to 2A.

## 2021-10-26 NOTE — PROGRESS NOTES
Transplant Surgery  Inpatient Daily Progress Note  10/26/2021    Assessment & Plan: Sarah Fisher is a 51 year old female with PMH significant for ESLD MELD 18 due to alcoholic cirrhosis c/b ascites, GAVE, hematochromatosis, chronic hypotension, CKD, anemia, coagulopathy of liver disease, and hypothyroidism. She is now s/p DBD DDLTx 10/17/21 with Dr. Morrison.    Graft function:DBD Liver Tx 10/17/21: POD 9  Elevated LFTs, downtrending. AST/ALT normalized.   (216) and TB with continued mild elevation,4.2 (3.2).  - S/p ERCP 10/25 with anastamotic stricture, stent placed. Continue Steve BID. Plan for bx today with continued elevation.   -HA ppx: hypercoagulable intraop. Postop US with elevated RI involving the extrahepatic hepatic artery and the right hepatic artery. Repeat US POD 1&2 -Patent hepatic vasculature. Continued elevated RI in the hepatic arteries. Repeat US 10/21 to evaluate for fluid collections/bleeding- shows small perihepatic fluid collections, unchanged from prior.  - Initially on Heparin 400units/hr -[id number] in setting of bleeding. Continue ASA 325mg daily (hold x72hrs post ERCP).    BERKLEY drain x1, output~ 775cc/24hrs.   Immunosuppression management:    Solumedrol taper per protocol induction  Possible rejection, give 500mg IV solumedrol today.  Maintenance:  MMF 750mg BID  Tacrolimus -supratherapeutic, HELD dose in setting of CORBIN, resumed 10/21 PM . Level today pending, continue 2.5mg with a new goal of 8-10 with improving renal function. Repeat daily levels  Prednisone 5mg daily with Tac below goal  Complexity of management:Medium. Contributing factors: thrombocytopenia CORBIN  Hematology: Anemia of chronic disease/ABL: Received 5 L of PRBC intraop. Last received 2 units on 10/21     Hgb 7 this am, Transfuse 1 PRBC.  -HA ppx (as above) Heparin gtt stopped with concern for bleeding. HIT negative.  Epistaxis: Persistent Nose bleed x4 hrs 10/20, stopped with quick clot. Humidified O2.  Repeat epistaxis 10/21 PM x 1.25 hrs. Epistaxis 10/25, stopped after 20min. D/w ENT, ocean spray Nasal +vaseline, use afrin if recurrent nosebleed, call ENT.  Neurology: Acute postoperative pain: Controlled, Continue Tramadol 50mg TID PRN and Robaxin 500mg TID PRN  Cardiorespiratory: Hypotension: Stable 110-120  Tachycardia: ST, Resolved, HR 90s  Pleural effusion, Large:  Extubated POD 1 to RA, CDB/IS   GI/Nutrition: Regular diet  Postop ileus:Resolved,bowel regimen PRN  Endocrine:Steroid induced hyperglycemia: initially required an insulin gtt, FBS 80-110s. Weaned off insulin  Fluid/Electrolytes: Hypervolemia with YOEL, improved with diuresis, Weight now -8kg from admit, continue Bumex 2 mg oral daily.  Hyponatremia: resolved, Na 137  Hyperphosphatemia: Resolved, 3.1  :   CORBIN: SCr 1-1.2 b/l, peaked at 3.9. Now trending down, creat 1.42 .  UA 10/25 negative.   Infectious disease: Afebrile,   Leukocytosis: Resolved, 5.4  Received zosyn x48hrs Periop and diflucan x2 1 dose pre and 1 dose postop  Valcyte (CMV ppx) renal dosed, Bactrim (PCP ppx)  Prophylaxis: DVT, fall, GI   Disposition: 7A    Medical Decision Making: Medium  Subsequent visit 64582 (moderate level decision making)    BREONNA/Fellow/Resident Provider: Danii Hickman NP    Faculty: Riaz Seth M.D.    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    10/17/2021 (Liver), Postoperative day: 9     Interval History: obtained from the patient.    Overnight events: Increasing UOP. Having BM's. Denies hematochezia/melena. Pain controlled. Tolerating CLD. Reporting hunger. Denies SOB, CP. C/o itching to bilateral UEs    ROS:   A 10-point review of systems was negative except as noted above.    Curent Meds:   [START ON 10/28/2021] aspirin  325 mg Oral Daily    bumetanide  2 mg Oral Daily    levothyroxine  50 mcg  Oral QAM AC    mycophenolate  750 mg Oral BID IS    nystatin  500,000 Units Swish & Swallow 4x Daily    pantoprazole  40 mg Oral BID    polyethylene glycol  17 g Oral or Feeding Tube Daily    predniSONE  5 mg Oral or Feeding Tube Daily    senna-docusate  1 tablet Oral or Feeding Tube BID    sodium chloride (PF)  3 mL Intravenous Q8H    [START ON 10/27/2021] sulfamethoxazole-trimethoprim  1 tablet Oral or Feeding Tube Daily    tacrolimus  2.5 mg Oral BID IS    ursodiol  300 mg Oral BID    [START ON 10/27/2021] valGANciclovir  450 mg Oral Daily       Physical Exam:     Admit Weight: 81.2 kg (179 lb)    Current Vitals:   BP (!) 140/76 (BP Location: Right arm)   Pulse 102   Temp 99  F (37.2  C) (Oral)   Resp 16   Wt 73.6 kg (162 lb 4.8 oz)   SpO2 98%   BMI 25.42 kg/m      Vital sign ranges:    Temp:  [97.9  F (36.6  C)-99.6  F (37.6  C)] 99  F (37.2  C)  Pulse:  [] 102  Resp:  [15-18] 16  BP: (118-140)/(64-86) 140/76  SpO2:  [95 %-100 %] 98 %  Patient Vitals for the past 24 hrs:   BP Temp Temp src Pulse Resp SpO2 Weight   10/26/21 1056 (!) 140/76 99  F (37.2  C) Oral 102 16 98 % --   10/26/21 0945 133/78 99.6  F (37.6  C) Oral 102 16 98 % --   10/26/21 0929 125/72 99  F (37.2  C) -- 97 16 -- --   10/26/21 0736 118/64 99  F (37.2  C) Oral 89 18 98 % --   10/26/21 0343 126/66 98.5  F (36.9  C) Oral 106 18 95 % --   10/26/21 0033 122/70 98.7  F (37.1  C) Oral 109 18 97 % 73.6 kg (162 lb 4.8 oz)   10/25/21 1939 121/74 99.4  F (37.4  C) Oral 104 18 96 % --   10/25/21 1553 131/75 97.9  F (36.6  C) Oral 105 16 95 % --   10/25/21 1530 134/86 -- -- 98 16 96 % --   10/25/21 1515 133/76 97.9  F (36.6  C) Core 101 16 97 % --   10/25/21 1500 133/79 97.9  F (36.6  C) Core 102 15 100 % --   10/25/21 1445 136/78 -- -- 103 15 99 % --   10/25/21 1441 125/73 97.9  F (36.6  C) Core 103 16 99 % --     General Appearance: in no apparent distress.    Skin: normal, warm, dry, No rashes, induration, excoriation to bilateral Forearms    Heart: regular rate and rhythm  Lungs: Nonlabored resps on RA  Abdomen: soft, NTTP, incision with staples CDI, mild serous output to right lateral drain   : Voiding   Extremities: edema: +1 bilateral LEs pitting present bilaterally. There is no skin breakdown.  Neurologic: awake and alert.  Tremor absent.. Asterixis: absent.    Frailty Scores       Frailty Scores 12/27/2020 12/27/2020    Final Score Frail Frail    Final Score Number 3 3            Data:   CMP  Recent Labs   Lab 10/26/21  0547 10/26/21  0417 10/25/21  2128 10/25/21  1857 10/25/21  1741 10/25/21  1248 10/25/21  0544 10/23/21  0651 10/22/21  0538 10/21/21  0330 10/21/21  0324   NA  --  137  --   --   --   --  136   < > 127*   < >  --    POTASSIUM  --  3.6  --   --   --   --  3.7   < > 5.1   < >  --    CHLORIDE  --  104  --   --   --   --  103   < > 95   < >  --    CO2  --  26  --   --   --   --  25   < > 20   < >  --    GLC  --  88 119*   < >  --    < > 118*   < > 86   < >  --    BUN  --  48*  --   --   --   --  64*   < > 84*   < >  --    CR  --  1.42*  --   --   --   --  1.66*   < > 3.89*   < >  --    GFRESTIMATED  --  43*  --   --   --   --  35*   < > 13*   < >  --    EMMY  --  8.2*  --   --   --   --  8.2*   < > 8.3*   < >  --    ICAW  --   --   --   --   --   --   --   --  4.5  --  4.4   MAG  --  1.7  --   --   --   --  2.1   < > 2.9*   < >  --    PHOS  --  3.1  --   --   --   --  2.9   < > 7.1*   < >  --    AMYLASE  --   --   --   --  37  --   --   --   --   --   --    LIPASE 81  --   --   --  249  --   --   --   --   --   --    ALBUMIN  --  1.8*  --   --   --   --  1.9*   < > 2.8*   < >  --    BILITOTAL  --  4.2*  --   --   --   --  3.2*   < > 2.3*   < >  --    ALKPHOS  --  259*  --   --   --   --  216*   < > 111   < >  --    AST  --  14  --   --   --   --  26   < > 35   < >  --    ALT  --  46  --   --   --   --  66*   < > 135*   < >  --     < > = values in this interval not displayed.     CBC  Recent Labs   Lab 10/26/21  0549 10/26/21  0418  10/25/21  1741 10/25/21  0544 10/20/21  1037 10/19/21  1731   HGB 7.0* 6.8*   < > 7.0*   < >  --    WBC  --  5.4  --  6.6   < >  --    PLT  --  132*  --  115*   < >  --    A1C  --   --   --   --   --  5.1    < > = values in this interval not displayed.     COAGS  Recent Labs   Lab 10/21/21  0719   INR 1.20*   PTT 35      Urinalysis  Recent Labs   Lab Test 10/25/21  2123 10/19/21  1438   COLOR Yellow Yellow   APPEARANCE Clear Slightly Cloudy*   URINEGLC Negative Negative   URINEBILI Negative Negative   URINEKETONE Negative Negative   SG 1.013 1.018   UBLD Negative Large*   URINEPH 5.5 5.0   PROTEIN 10 * 20 *   NITRITE Negative Negative   LEUKEST Negative Negative   RBCU <1 120*   WBCU <1 4     Virology:  Hepatitis C Antibody   Date Value Ref Range Status   10/16/2021 Nonreactive Nonreactive Final   05/02/2021 Nonreactive NR^Nonreactive Final     Comment:     Assay performance characteristics have not been established for newborns,   infants, and children         Attestation:    The patient has been seen with team and evaluated by me.   Vital signs, labs, medications and orders were reviewed.   When obtained, diagnostic images were reviewed by me and interpreted as above.    The care plan was discussed with the multidisciplinary team and I agree with the findings and plan in this note, with any differences recorded in blue.    Immunosuppressive medication management was reviewed and adjusted as reflected in the note and orders.     .

## 2021-10-27 ENCOUNTER — APPOINTMENT (OUTPATIENT)
Dept: OCCUPATIONAL THERAPY | Facility: CLINIC | Age: 51
End: 2021-10-27
Attending: TRANSPLANT SURGERY
Payer: COMMERCIAL

## 2021-10-27 ENCOUNTER — APPOINTMENT (OUTPATIENT)
Dept: PHYSICAL THERAPY | Facility: CLINIC | Age: 51
End: 2021-10-27
Attending: TRANSPLANT SURGERY
Payer: COMMERCIAL

## 2021-10-27 LAB
ALBUMIN SERPL-MCNC: 1.8 G/DL (ref 3.4–5)
ALP SERPL-CCNC: 327 U/L (ref 40–150)
ALT SERPL W P-5'-P-CCNC: 35 U/L (ref 0–50)
ANION GAP SERPL CALCULATED.3IONS-SCNC: 9 MMOL/L (ref 3–14)
AST SERPL W P-5'-P-CCNC: 20 U/L (ref 0–45)
BASOPHILS # BLD AUTO: 0 10E3/UL (ref 0–0.2)
BASOPHILS NFR BLD AUTO: 0 %
BILIRUB DIRECT SERPL-MCNC: 2.5 MG/DL (ref 0–0.2)
BILIRUB SERPL-MCNC: 2.8 MG/DL (ref 0.2–1.3)
BUN SERPL-MCNC: 49 MG/DL (ref 7–30)
CALCIUM SERPL-MCNC: 8.2 MG/DL (ref 8.5–10.1)
CHLORIDE BLD-SCNC: 101 MMOL/L (ref 94–109)
CO2 SERPL-SCNC: 23 MMOL/L (ref 20–32)
CREAT SERPL-MCNC: 1.86 MG/DL (ref 0.52–1.04)
EOSINOPHIL # BLD AUTO: 0 10E3/UL (ref 0–0.7)
EOSINOPHIL NFR BLD AUTO: 0 %
ERYTHROCYTE [DISTWIDTH] IN BLOOD BY AUTOMATED COUNT: 17.7 % (ref 10–15)
GFR SERPL CREATININE-BSD FRML MDRD: 31 ML/MIN/1.73M2
GLUCOSE BLD-MCNC: 355 MG/DL (ref 70–99)
GLUCOSE BLDC GLUCOMTR-MCNC: 239 MG/DL (ref 70–99)
GLUCOSE BLDC GLUCOMTR-MCNC: 322 MG/DL (ref 70–99)
GLUCOSE BLDC GLUCOMTR-MCNC: 324 MG/DL (ref 70–99)
GLUCOSE BLDC GLUCOMTR-MCNC: 347 MG/DL (ref 70–99)
HCT VFR BLD AUTO: 23.9 % (ref 35–47)
HGB BLD-MCNC: 7.8 G/DL (ref 11.7–15.7)
IMM GRANULOCYTES # BLD: 0.1 10E3/UL
IMM GRANULOCYTES NFR BLD: 2 %
LYMPHOCYTES # BLD AUTO: 0.3 10E3/UL (ref 0.8–5.3)
LYMPHOCYTES NFR BLD AUTO: 6 %
MAGNESIUM SERPL-MCNC: 1.8 MG/DL (ref 1.6–2.3)
MCH RBC QN AUTO: 30.7 PG (ref 26.5–33)
MCHC RBC AUTO-ENTMCNC: 32.6 G/DL (ref 31.5–36.5)
MCV RBC AUTO: 94 FL (ref 78–100)
MONOCYTES # BLD AUTO: 0.1 10E3/UL (ref 0–1.3)
MONOCYTES NFR BLD AUTO: 2 %
NEUTROPHILS # BLD AUTO: 4.3 10E3/UL (ref 1.6–8.3)
NEUTROPHILS NFR BLD AUTO: 90 %
NRBC # BLD AUTO: 0 10E3/UL
NRBC BLD AUTO-RTO: 0 /100
PHOSPHATE SERPL-MCNC: 3.4 MG/DL (ref 2.5–4.5)
PLATELET # BLD AUTO: 129 10E3/UL (ref 150–450)
POTASSIUM BLD-SCNC: 4 MMOL/L (ref 3.4–5.3)
PROT SERPL-MCNC: 5.1 G/DL (ref 6.8–8.8)
RBC # BLD AUTO: 2.54 10E6/UL (ref 3.8–5.2)
SODIUM SERPL-SCNC: 133 MMOL/L (ref 133–144)
TACROLIMUS BLD-MCNC: 7 UG/L (ref 5–15)
TME LAST DOSE: NORMAL H
TME LAST DOSE: NORMAL H
WBC # BLD AUTO: 4.8 10E3/UL (ref 4–11)

## 2021-10-27 PROCEDURE — 97535 SELF CARE MNGMENT TRAINING: CPT | Mod: GO

## 2021-10-27 PROCEDURE — 80197 ASSAY OF TACROLIMUS: CPT | Performed by: NURSE PRACTITIONER

## 2021-10-27 PROCEDURE — 97535 SELF CARE MNGMENT TRAINING: CPT | Mod: GO | Performed by: OCCUPATIONAL THERAPIST

## 2021-10-27 PROCEDURE — 36592 COLLECT BLOOD FROM PICC: CPT | Performed by: NURSE PRACTITIONER

## 2021-10-27 PROCEDURE — 250N000013 HC RX MED GY IP 250 OP 250 PS 637: Performed by: PHYSICIAN ASSISTANT

## 2021-10-27 PROCEDURE — 250N000013 HC RX MED GY IP 250 OP 250 PS 637: Performed by: NURSE PRACTITIONER

## 2021-10-27 PROCEDURE — 83735 ASSAY OF MAGNESIUM: CPT | Performed by: NURSE PRACTITIONER

## 2021-10-27 PROCEDURE — 85025 COMPLETE CBC W/AUTO DIFF WBC: CPT | Performed by: NURSE PRACTITIONER

## 2021-10-27 PROCEDURE — 250N000013 HC RX MED GY IP 250 OP 250 PS 637: Performed by: INTERNAL MEDICINE

## 2021-10-27 PROCEDURE — 258N000003 HC RX IP 258 OP 636: Performed by: NURSE PRACTITIONER

## 2021-10-27 PROCEDURE — 250N000013 HC RX MED GY IP 250 OP 250 PS 637: Performed by: TRANSPLANT SURGERY

## 2021-10-27 PROCEDURE — 84100 ASSAY OF PHOSPHORUS: CPT | Performed by: NURSE PRACTITIONER

## 2021-10-27 PROCEDURE — 82248 BILIRUBIN DIRECT: CPT | Performed by: NURSE PRACTITIONER

## 2021-10-27 PROCEDURE — 97116 GAIT TRAINING THERAPY: CPT | Mod: GP

## 2021-10-27 PROCEDURE — P9047 ALBUMIN (HUMAN), 25%, 50ML: HCPCS | Performed by: NURSE PRACTITIONER

## 2021-10-27 PROCEDURE — 250N000012 HC RX MED GY IP 250 OP 636 PS 637: Performed by: NURSE PRACTITIONER

## 2021-10-27 PROCEDURE — 80048 BASIC METABOLIC PNL TOTAL CA: CPT | Performed by: NURSE PRACTITIONER

## 2021-10-27 PROCEDURE — 120N000011 HC R&B TRANSPLANT UMMC

## 2021-10-27 PROCEDURE — 250N000012 HC RX MED GY IP 250 OP 636 PS 637: Performed by: PHYSICIAN ASSISTANT

## 2021-10-27 PROCEDURE — 250N000011 HC RX IP 250 OP 636: Performed by: NURSE PRACTITIONER

## 2021-10-27 RX ORDER — PREDNISONE 20 MG/1
40 TABLET ORAL 4 TIMES DAILY
Status: DISCONTINUED | OUTPATIENT
Start: 2021-10-30 | End: 2021-10-29 | Stop reason: HOSPADM

## 2021-10-27 RX ORDER — DEXTROSE MONOHYDRATE 25 G/50ML
25-50 INJECTION, SOLUTION INTRAVENOUS
Status: DISCONTINUED | OUTPATIENT
Start: 2021-10-27 | End: 2021-10-27

## 2021-10-27 RX ORDER — HEPARIN SODIUM 5000 [USP'U]/.5ML
5000 INJECTION, SOLUTION INTRAVENOUS; SUBCUTANEOUS EVERY 12 HOURS
Status: DISCONTINUED | OUTPATIENT
Start: 2021-10-27 | End: 2021-10-29 | Stop reason: HOSPADM

## 2021-10-27 RX ORDER — PREDNISONE 20 MG/1
20 TABLET ORAL 2 TIMES DAILY
Status: DISCONTINUED | OUTPATIENT
Start: 2021-11-01 | End: 2021-10-29 | Stop reason: HOSPADM

## 2021-10-27 RX ORDER — ALBUMIN (HUMAN) 12.5 G/50ML
25 SOLUTION INTRAVENOUS ONCE
Status: COMPLETED | OUTPATIENT
Start: 2021-10-27 | End: 2021-10-27

## 2021-10-27 RX ORDER — TACROLIMUS 1 MG/1
1 CAPSULE ORAL ONCE
Status: COMPLETED | OUTPATIENT
Start: 2021-10-27 | End: 2021-10-27

## 2021-10-27 RX ORDER — PREDNISONE 20 MG/1
40 TABLET ORAL 2 TIMES DAILY
Status: DISCONTINUED | OUTPATIENT
Start: 2021-10-31 | End: 2021-10-29 | Stop reason: HOSPADM

## 2021-10-27 RX ORDER — NICOTINE POLACRILEX 4 MG
15-30 LOZENGE BUCCAL
Status: DISCONTINUED | OUTPATIENT
Start: 2021-10-27 | End: 2021-10-27

## 2021-10-27 RX ORDER — LANOLIN ALCOHOL/MO/W.PET/CERES
3 CREAM (GRAM) TOPICAL
Status: DISCONTINUED | OUTPATIENT
Start: 2021-10-27 | End: 2021-10-29 | Stop reason: HOSPADM

## 2021-10-27 RX ORDER — PREDNISONE 20 MG/1
20 TABLET ORAL DAILY
Status: DISCONTINUED | OUTPATIENT
Start: 2021-11-02 | End: 2021-10-29 | Stop reason: HOSPADM

## 2021-10-27 RX ADMIN — OXYMETAZOLINE HYDROCHLORIDE 2 SPRAY: 0.05 SPRAY NASAL at 04:59

## 2021-10-27 RX ADMIN — SODIUM CHLORIDE 500 MG: 9 INJECTION, SOLUTION INTRAVENOUS at 11:24

## 2021-10-27 RX ADMIN — NYSTATIN 500000 UNITS: 500000 SUSPENSION ORAL at 16:21

## 2021-10-27 RX ADMIN — HEPARIN SODIUM 5000 UNITS: 10000 INJECTION, SOLUTION INTRAVENOUS; SUBCUTANEOUS at 18:06

## 2021-10-27 RX ADMIN — TACROLIMUS 2.5 MG: 1 CAPSULE ORAL at 07:57

## 2021-10-27 RX ADMIN — MYCOPHENOLATE MOFETIL 750 MG: 250 CAPSULE ORAL at 18:05

## 2021-10-27 RX ADMIN — INSULIN ASPART 4 UNITS: 100 INJECTION, SOLUTION INTRAVENOUS; SUBCUTANEOUS at 16:33

## 2021-10-27 RX ADMIN — URSODIOL 300 MG: 300 CAPSULE ORAL at 20:36

## 2021-10-27 RX ADMIN — METHOCARBAMOL 500 MG: 500 TABLET ORAL at 23:33

## 2021-10-27 RX ADMIN — URSODIOL 300 MG: 300 CAPSULE ORAL at 07:59

## 2021-10-27 RX ADMIN — PREDNISONE 5 MG: 5 TABLET ORAL at 07:59

## 2021-10-27 RX ADMIN — TACROLIMUS 3.5 MG: 1 CAPSULE ORAL at 18:05

## 2021-10-27 RX ADMIN — MYCOPHENOLATE MOFETIL 750 MG: 250 CAPSULE ORAL at 07:57

## 2021-10-27 RX ADMIN — ALBUMIN HUMAN 25 G: 0.25 SOLUTION INTRAVENOUS at 13:04

## 2021-10-27 RX ADMIN — PANTOPRAZOLE SODIUM 40 MG: 40 TABLET, DELAYED RELEASE ORAL at 20:36

## 2021-10-27 RX ADMIN — BUMETANIDE 2 MG: 1 TABLET ORAL at 08:00

## 2021-10-27 RX ADMIN — NYSTATIN 500000 UNITS: 500000 SUSPENSION ORAL at 20:36

## 2021-10-27 RX ADMIN — INSULIN ASPART 5 UNITS: 100 INJECTION, SOLUTION INTRAVENOUS; SUBCUTANEOUS at 13:04

## 2021-10-27 RX ADMIN — VALGANCICLOVIR 450 MG: 450 TABLET, FILM COATED ORAL at 07:58

## 2021-10-27 RX ADMIN — SULFAMETHOXAZOLE AND TRIMETHOPRIM 1 TABLET: 400; 80 TABLET ORAL at 07:57

## 2021-10-27 RX ADMIN — TACROLIMUS 1 MG: 1 CAPSULE ORAL at 11:24

## 2021-10-27 RX ADMIN — PANTOPRAZOLE SODIUM 40 MG: 40 TABLET, DELAYED RELEASE ORAL at 07:58

## 2021-10-27 RX ADMIN — LEVOTHYROXINE SODIUM 50 MCG: 0.05 TABLET ORAL at 07:58

## 2021-10-27 RX ADMIN — NYSTATIN 500000 UNITS: 500000 SUSPENSION ORAL at 07:58

## 2021-10-27 ASSESSMENT — ACTIVITIES OF DAILY LIVING (ADL)
ADLS_ACUITY_SCORE: 7
ADLS_ACUITY_SCORE: 9
ADLS_ACUITY_SCORE: 9
ADLS_ACUITY_SCORE: 7

## 2021-10-27 NOTE — PROGRESS NOTES
CLINICAL NUTRITION SERVICES - REASSESSMENT NOTE     Nutrition Prescription    RECOMMENDATIONS FOR MDs/PROVIDERS TO ORDER:  Recommend Vitamin D3 2000 units daily for low vitamin D level (10/18/21)    Consider checking zinc level d/t dysgeusia.     Malnutrition Status:    Severe malnutrition in the context of acute on chronic illness    Recommendations already ordered by Registered Dietitian (RD):  Change supplement to Ensure Enlive Shake vanilla once daily at AM snack time    Continue calorie counts (10/28-10/30)    Future/Additional Recommendations:  Encourage small/frequent meals + oral supplements between meals.      EVALUATION OF THE PROGRESS TOWARD GOALS   Diet: Regular + Calorie Counts (10/25-10/27)  Supplements: Ensure Clear at 2 PM and Ensure Enlive Shake at HS     Intake:  Generally eating 50-75% of meals when allowed to eat.  Has had a limited diet recently d/t procedures on 10/25 and 10/26.  She reports disliking Ensure Clear now and has yet to receive Ensure Enlive shake.  She notes that things are not really tasting well to her. Thinks, however, that she is eating pretty well when able to eat.  Has some complaints about the .  She is focusing on smaller meals to improve tolerance.     NEW FINDINGS   Weight: Weight loss of 7.6 kg (9%) over the last 10 days of admission. Getting diuresis.     Labs: BUN 49/Cre 1.86, BG >300's    Meds: Tacrolimus, Med sliding scale insulin, Bumex daily, Protonix BID, Prednisone 5 mg, Senokot BID, Miralax, Cellcept BID     MALNUTRITION  % Intake:   % Weight Loss: Does not meet criteria  Subcutaneous Fat Loss: Facial region:  mild and Upper arm:  mild  Muscle Loss: Temporal:  mild, Thoracic region (clavicle, acromium bone, deltoid, trapezius, pectoral):  mild, Upper arm (bicep, tricep):  moderate, Lower arm  (forearm):  moderate and Dorsal hand:  Moderate  Fluid Accumulation/Edema: Mild  Malnutrition Diagnosis: Moderate malnutrition in the context of chronic  illness    MALNUTRITION  % Intake: < 75% for > 7 days (moderate)  % Weight Loss: Weight loss does not meet criteria (likely fluid related losses are a large part of weight loss)  Subcutaneous Fat Loss: Facial region:  moderate, Upper arm: moderate, Lower arm: moderate and Thoracic/intercostal: moderate  Muscle Loss: Temporal:  moderate, Facial & jaw region: moderate, Thoracic region (clavicle, acromium bone, deltoid, trapezius, pectoral): moderate and Upper arm (bicep, tricep): moderate  Fluid Accumulation/Edema: Mild  Malnutrition Diagnosis: Severe malnutrition in the context of acute on chronic illness    Previous Goals   Patient to consume % of nutritionally adequate meal trays TID, or the equivalent with supplements/snacks.  Evaluation: Not Met    Previous Nutrition Diagnosis  Inadequate oral intake  Evaluation: Ongoing     CURRENT NUTRITION DIAGNOSIS  Inadequate oral intake related to decreased appetite, dysgeusia, multiple procedures as evidenced by patient consuming 50-75% of small meals but not consistently allowed to eat over the last 1.5 weeks.      INTERVENTIONS  Implementation  Reviewed high protein needs and discussed appropriate snacks and supplements/smoothies at home.  Discussed oral supplements and made changes per patient's preference.  Reviewed patient's  concerns.  Provided coupons for Ensure for home use.     Goals  Patient to consume % of nutritionally adequate meal trays TID, or the equivalent with supplements/snacks.    Monitoring/Evaluation  Progress toward goals will be monitored and evaluated per protocol.    Elyssa Verdin MS, RD, LD, CCTD  7A/Obs units, pager [phone]

## 2021-10-27 NOTE — PROGRESS NOTES
GASTROENTEROLOGY PROGRESS NOTE    Date: 10/27/2021     ASSESSMENT:  51 year old female with PMHx of alcohol related cirrhosis c/b ascites, hepatic hydrothorax and GAVE and history of hemochromatosis compound heterozygote who underwent DBD DDLT 10/17/21. Had worsening direct hyperbilirubinemia for 2 days, which GI panc/bili consulted for ERCP.     # Bile anastomosis stricture s/p ERCP with biliary stent 10/25  # Acute cellular rejection of liver transplant  # Alcohol related cirrhosis s/p DDLT (DBD) on 10/17/21  # Small GAVE, duodenal ulcers from EGD 10/22/21  # Acute on Chronic Normocytic Anemia  Prior to transplant Tbili 5-6, immediate after transplant tbili was 9 then gradually downtrended to 1.9 (10/23), then increased to 2.6 (10/24) and 3.2 (10/25). Also with increasing ALP. AST/ALT improving. US with patent liver vasculature so less likely ischemic injury. ERCP 10/25 with biliary sphincterotomy showing moderate-grade biliary stricture was found in the post-transplant anastomosis. Successfully dilate and 10 Fr x 9 cm Sofflex plastic biliary stent was placed.    Post ERCP, doing well without pain, n/v. tbili now improved to 2.8 from 3.2 prior to ERCP. Lipase negative. Liver biopsy showed rejection.    RECOMMENDATIONS:  - Avoid aspirin, nonsteroidal anti-inflammatory, and anticoagulation for 3 days (until 10/28/21).   - Rejection treatment as per primary team.    Gastroenterology follow up recommendations:  - Repeat ERCP in 4 weeks with Dr. Fenton to further interrrogate the biliary system and exchange stent (we will schedule).     Thank you for involving us in this patient's care. Please do not hesitate to contact the GI service with any questions or concerns.     Wild Isbell MD  Gastroenterology Fellow  Page [phone]  _______________________________________________________________    Subjective: Tolerates diet. No worsening abdominal pain post ERCP. No nausea/vomiting. No fever.      Objective:  Blood pressure 139/82, pulse 84, temperature 97.6  F (36.4  C), temperature source Oral, resp. rate 18, weight 73.6 kg (162 lb 4.8 oz), SpO2 95 %, not currently breastfeeding.    Gen: A&Ox3, NAD  HEENT: sclera anicteric  CV: RRR  Lungs: breathing comfortably on room air  Abd: Non-distended, +bs, no hepatosplenomegaly, nontender, no peritoneal signs, liver transplant surgical scar well healed.  Skin: Jaundice, no stigmata of chronic liver disease  MS: appropriate muscle mass for age  Neuro: non focal     LABS:  BMP  Recent Labs   Lab 10/27/21  0721 10/26/21  2134 10/26/21  1814 10/26/21  0417 10/25/21  1248 10/25/21  0544 10/24/21  0824 10/24/21  0824 10/23/21  0651 10/23/21  0651   NA  --   --   --  137  --  136  --  136  --  131*   POTASSIUM  --   --   --  3.6  --  3.7  --  3.9  --  4.4   CHLORIDE  --   --   --  104  --  103  --  102  --  98   EMMY  --   --   --  8.2*  --  8.2*  --  8.2*  --  8.4*   CO2  --   --   --  26  --  25  --  24  --  21   BUN  --   --   --  48*  --  64*  --  74*  --  94*   CR  --   --   --  1.42*  --  1.66*  --  2.35*  --  3.66*   * 138* 89 88   < > 118*   < > 133*   < > 112*    < > = values in this interval not displayed.     CBC  Recent Labs   Lab 10/26/21  0549 10/26/21  0418 10/26/21  0418 10/25/21  1741 10/25/21  0544 10/24/21  0824 10/24/21  0824 10/23/21  0651 10/23/21  0651   WBC  --   --  5.4  --  6.6  --  8.7  --  8.9   RBC  --   --  2.13*  --  2.25*  --  2.58*  --  2.44*   HGB 7.0*   < > 6.8*   < > 7.0*   < > 8.1*   < > 7.5*   HCT  --   --  21.0*  --  21.6*  --  24.5*  --  22.9*   MCV  --   --  99  --  96  --  95  --  94   MCH  --   --  31.9  --  31.1  --  31.4  --  30.7   MCHC  --   --  32.4  --  32.4  --  33.1  --  32.8   RDW  --   --  17.8*  --  17.6*  --  17.0*  --  16.6*   PLT  --   --  132*  --  115*  --  113*  --  79*    < > = values in this interval not displayed.     INR  Recent Labs   Lab 10/21/21  0719   INR 1.20*     LFTs  Recent Labs   Lab  10/26/21  0417 10/25/21  0544 10/24/21  0824 10/23/21  0651   ALKPHOS 259* 216* 207* 148   AST 14 26 27 28   ALT 46 66* 85* 108*   BILITOTAL 4.2* 3.2* 2.6* 1.9*   PROTTOTAL 4.8* 4.8* 5.3* 5.5*   ALBUMIN 1.8* 1.9* 2.2* 2.4*      PANC  Recent Labs   Lab 10/26/21  0547 10/25/21  1741   LIPASE 81 249   AMYLASE  --  37     IMAGING: No new

## 2021-10-27 NOTE — PLAN OF CARE
Vitals: /81 (BP Location: Left arm)   Pulse 92   Temp 98.4  F (36.9  C) (Oral)   Resp 16   Wt 72.6 kg (160 lb)   SpO2 97%   BMI 25.06 kg/m    Endocrine: ACHS BG checks. 322, 347. Sliding scale insulin added.   Labs: T bili 2.8, Alk phos 327. Liver Bx from yesterday shows mild rejection. IV solumedrol given per orders.   Pain: Denies  PRN's: None given on this shift  Diet: Regular with alejo counts. Good appetite.   LDA: internal jugular saline locked. Dressing C,D,I. BERKLEY drain with moderate serous drainage.   GI: No BM on this shift. Declined bowel meds.   : Voiding. Not saving.   Skin: Clamshell incision stapled, OSIEL. No drainage noted.   Neuro: Intact  Mobility: UAL in room  Education: MC/LB up to date. Meds reviewed with patient.   Plan: IV solumedrol tomorrow, then start prednisone taper. Will continue to monitor and update provider with any changes in condition.

## 2021-10-27 NOTE — PLAN OF CARE
VS: /82 (BP Location: Left arm)   Pulse 84   Temp 97.6  F (36.4  C) (Oral)   Resp 18   Wt 73.6 kg (162 lb 4.8 oz)   SpO2 95%   BMI 25.42 kg/m      Cares: 1930-0730      Current condition: VSS on RA, tachycardic at times.   Neuro: A/Ox4   Cardio: WDL   Respiratory: Diminished at bases   GI/: Oliguric, saving urine LBM 10/26  Skin: Lymph wraps, incision CDI, BERKLEY dressing CDI.   Diet: Regular diet, poor to fair appetite  BG: ACHS; WNL  LDA: internal jugular SL, BERKLEY serosanguinous output   Mobility: SBA with walker    Pain: Denied pain or nausea   Changes: Pt had a nose bleed that lasted approximately an hour, utilized quick clot and afrin spray due to pressure not relieving bleed  Continue POC

## 2021-10-27 NOTE — PROGRESS NOTES
Calorie Count  Intake recorded for: 10/26  Total Kcals: 133 Total Protein: 2g  Kcals from Hospital Food: 133  Protein: 2g  Kcals from Outside Food (average):0 Protein: 0g  # Meals Ordered from Kitchen: 2 meals   # Meals Recorded: 1 meal - 100% ice cream   # Supplements Recorded: 0    Note: pt also had a slice of pizza from [employer] but not enough information was given to calculate calories/protein.

## 2021-10-28 LAB
ABO/RH(D): NORMAL
ALBUMIN SERPL-MCNC: 2.3 G/DL (ref 3.4–5)
ALP SERPL-CCNC: 252 U/L (ref 40–150)
ALT SERPL W P-5'-P-CCNC: 27 U/L (ref 0–50)
ANION GAP SERPL CALCULATED.3IONS-SCNC: 11 MMOL/L (ref 3–14)
ANION GAP SERPL CALCULATED.3IONS-SCNC: 8 MMOL/L (ref 3–14)
ANTIBODY SCREEN: NEGATIVE
AST SERPL W P-5'-P-CCNC: 5 U/L (ref 0–45)
BASOPHILS # BLD AUTO: 0 10E3/UL (ref 0–0.2)
BASOPHILS NFR BLD AUTO: 0 %
BILIRUB DIRECT SERPL-MCNC: 1.3 MG/DL (ref 0–0.2)
BILIRUB SERPL-MCNC: 1.6 MG/DL (ref 0.2–1.3)
BLD PROD TYP BPU: NORMAL
BLOOD COMPONENT TYPE: NORMAL
BUN SERPL-MCNC: 61 MG/DL (ref 7–30)
BUN SERPL-MCNC: 70 MG/DL (ref 7–30)
CALCIUM SERPL-MCNC: 8.1 MG/DL (ref 8.5–10.1)
CALCIUM SERPL-MCNC: 8.3 MG/DL (ref 8.5–10.1)
CHLORIDE BLD-SCNC: 101 MMOL/L (ref 94–109)
CHLORIDE BLD-SCNC: 103 MMOL/L (ref 94–109)
CO2 SERPL-SCNC: 21 MMOL/L (ref 20–32)
CO2 SERPL-SCNC: 24 MMOL/L (ref 20–32)
CODING SYSTEM: NORMAL
CREAT SERPL-MCNC: 2.39 MG/DL (ref 0.52–1.04)
CREAT SERPL-MCNC: 2.68 MG/DL (ref 0.52–1.04)
CROSSMATCH: NORMAL
EOSINOPHIL # BLD AUTO: 0 10E3/UL (ref 0–0.7)
EOSINOPHIL NFR BLD AUTO: 0 %
ERYTHROCYTE [DISTWIDTH] IN BLOOD BY AUTOMATED COUNT: 18.5 % (ref 10–15)
ERYTHROCYTE [DISTWIDTH] IN BLOOD BY AUTOMATED COUNT: 19 % (ref 10–15)
GFR SERPL CREATININE-BSD FRML MDRD: 20 ML/MIN/1.73M2
GFR SERPL CREATININE-BSD FRML MDRD: 23 ML/MIN/1.73M2
GLUCOSE BLD-MCNC: 206 MG/DL (ref 70–99)
GLUCOSE BLD-MCNC: 254 MG/DL (ref 70–99)
GLUCOSE BLDC GLUCOMTR-MCNC: 165 MG/DL (ref 70–99)
GLUCOSE BLDC GLUCOMTR-MCNC: 187 MG/DL (ref 70–99)
GLUCOSE BLDC GLUCOMTR-MCNC: 213 MG/DL (ref 70–99)
GLUCOSE BLDC GLUCOMTR-MCNC: 239 MG/DL (ref 70–99)
GLUCOSE BLDC GLUCOMTR-MCNC: 254 MG/DL (ref 70–99)
HCT VFR BLD AUTO: 20.9 % (ref 35–47)
HCT VFR BLD AUTO: 23.8 % (ref 35–47)
HGB BLD-MCNC: 6.9 G/DL (ref 11.7–15.7)
HGB BLD-MCNC: 7.7 G/DL (ref 11.7–15.7)
IMM GRANULOCYTES # BLD: 0.2 10E3/UL
IMM GRANULOCYTES NFR BLD: 2 %
ISSUE DATE AND TIME: NORMAL
LYMPHOCYTES # BLD AUTO: 0.5 10E3/UL (ref 0.8–5.3)
LYMPHOCYTES NFR BLD AUTO: 4 %
MAGNESIUM SERPL-MCNC: 1.8 MG/DL (ref 1.6–2.3)
MCH RBC QN AUTO: 30.3 PG (ref 26.5–33)
MCH RBC QN AUTO: 31.8 PG (ref 26.5–33)
MCHC RBC AUTO-ENTMCNC: 32.4 G/DL (ref 31.5–36.5)
MCHC RBC AUTO-ENTMCNC: 33 G/DL (ref 31.5–36.5)
MCV RBC AUTO: 94 FL (ref 78–100)
MCV RBC AUTO: 96 FL (ref 78–100)
MONOCYTES # BLD AUTO: 0.2 10E3/UL (ref 0–1.3)
MONOCYTES NFR BLD AUTO: 2 %
NEUTROPHILS # BLD AUTO: 10.8 10E3/UL (ref 1.6–8.3)
NEUTROPHILS NFR BLD AUTO: 92 %
NRBC # BLD AUTO: 0 10E3/UL
NRBC BLD AUTO-RTO: 0 /100
PHOSPHATE SERPL-MCNC: 4.6 MG/DL (ref 2.5–4.5)
PLATELET # BLD AUTO: 129 10E3/UL (ref 150–450)
PLATELET # BLD AUTO: 149 10E3/UL (ref 150–450)
POTASSIUM BLD-SCNC: 3.6 MMOL/L (ref 3.4–5.3)
POTASSIUM BLD-SCNC: 4 MMOL/L (ref 3.4–5.3)
PROT SERPL-MCNC: 5.2 G/DL (ref 6.8–8.8)
RBC # BLD AUTO: 2.17 10E6/UL (ref 3.8–5.2)
RBC # BLD AUTO: 2.54 10E6/UL (ref 3.8–5.2)
SODIUM SERPL-SCNC: 133 MMOL/L (ref 133–144)
SODIUM SERPL-SCNC: 135 MMOL/L (ref 133–144)
SPECIMEN EXPIRATION DATE: NORMAL
TACROLIMUS BLD-MCNC: 6.7 UG/L (ref 5–15)
TME LAST DOSE: NORMAL H
TME LAST DOSE: NORMAL H
UNIT ABO/RH: NORMAL
UNIT NUMBER: NORMAL
UNIT STATUS: NORMAL
UNIT TYPE ISBT: 7300
WBC # BLD AUTO: 11 10E3/UL (ref 4–11)
WBC # BLD AUTO: 11.7 10E3/UL (ref 4–11)

## 2021-10-28 PROCEDURE — 80069 RENAL FUNCTION PANEL: CPT | Performed by: NURSE PRACTITIONER

## 2021-10-28 PROCEDURE — P9016 RBC LEUKOCYTES REDUCED: HCPCS | Performed by: PHYSICIAN ASSISTANT

## 2021-10-28 PROCEDURE — 250N000011 HC RX IP 250 OP 636: Performed by: PHYSICIAN ASSISTANT

## 2021-10-28 PROCEDURE — 250N000012 HC RX MED GY IP 250 OP 636 PS 637: Performed by: NURSE PRACTITIONER

## 2021-10-28 PROCEDURE — 250N000013 HC RX MED GY IP 250 OP 250 PS 637: Performed by: PHYSICIAN ASSISTANT

## 2021-10-28 PROCEDURE — 250N000013 HC RX MED GY IP 250 OP 250 PS 637: Performed by: NURSE PRACTITIONER

## 2021-10-28 PROCEDURE — 82040 ASSAY OF SERUM ALBUMIN: CPT | Performed by: NURSE PRACTITIONER

## 2021-10-28 PROCEDURE — 250N000012 HC RX MED GY IP 250 OP 636 PS 637: Performed by: PHYSICIAN ASSISTANT

## 2021-10-28 PROCEDURE — 82310 ASSAY OF CALCIUM: CPT | Performed by: PHYSICIAN ASSISTANT

## 2021-10-28 PROCEDURE — 258N000003 HC RX IP 258 OP 636

## 2021-10-28 PROCEDURE — 36592 COLLECT BLOOD FROM PICC: CPT | Performed by: NURSE PRACTITIONER

## 2021-10-28 PROCEDURE — 258N000003 HC RX IP 258 OP 636: Performed by: NURSE PRACTITIONER

## 2021-10-28 PROCEDURE — 80197 ASSAY OF TACROLIMUS: CPT | Performed by: NURSE PRACTITIONER

## 2021-10-28 PROCEDURE — 250N000011 HC RX IP 250 OP 636: Performed by: NURSE PRACTITIONER

## 2021-10-28 PROCEDURE — 84630 ASSAY OF ZINC: CPT | Performed by: PHYSICIAN ASSISTANT

## 2021-10-28 PROCEDURE — 85027 COMPLETE CBC AUTOMATED: CPT | Performed by: PHYSICIAN ASSISTANT

## 2021-10-28 PROCEDURE — 86923 COMPATIBILITY TEST ELECTRIC: CPT | Performed by: PHYSICIAN ASSISTANT

## 2021-10-28 PROCEDURE — 120N000011 HC R&B TRANSPLANT UMMC

## 2021-10-28 PROCEDURE — P9041 ALBUMIN (HUMAN),5%, 50ML: HCPCS | Performed by: PHYSICIAN ASSISTANT

## 2021-10-28 PROCEDURE — 250N000013 HC RX MED GY IP 250 OP 250 PS 637: Performed by: TRANSPLANT SURGERY

## 2021-10-28 PROCEDURE — 83735 ASSAY OF MAGNESIUM: CPT | Performed by: NURSE PRACTITIONER

## 2021-10-28 PROCEDURE — 250N000013 HC RX MED GY IP 250 OP 250 PS 637: Performed by: INTERNAL MEDICINE

## 2021-10-28 PROCEDURE — 250N000012 HC RX MED GY IP 250 OP 636 PS 637: Performed by: CLINICAL NURSE SPECIALIST

## 2021-10-28 PROCEDURE — 36415 COLL VENOUS BLD VENIPUNCTURE: CPT | Performed by: PHYSICIAN ASSISTANT

## 2021-10-28 PROCEDURE — 86901 BLOOD TYPING SEROLOGIC RH(D): CPT | Performed by: PHYSICIAN ASSISTANT

## 2021-10-28 PROCEDURE — 84100 ASSAY OF PHOSPHORUS: CPT | Performed by: NURSE PRACTITIONER

## 2021-10-28 PROCEDURE — 250N000011 HC RX IP 250 OP 636: Performed by: INTERNAL MEDICINE

## 2021-10-28 PROCEDURE — 99254 IP/OBS CNSLTJ NEW/EST MOD 60: CPT | Mod: 24 | Performed by: CLINICAL NURSE SPECIALIST

## 2021-10-28 PROCEDURE — 999N000128 HC STATISTIC PERIPHERAL IV START W/O US GUIDANCE

## 2021-10-28 PROCEDURE — 85004 AUTOMATED DIFF WBC COUNT: CPT | Performed by: NURSE PRACTITIONER

## 2021-10-28 RX ORDER — SODIUM CHLORIDE, SODIUM LACTATE, POTASSIUM CHLORIDE, CALCIUM CHLORIDE 600; 310; 30; 20 MG/100ML; MG/100ML; MG/100ML; MG/100ML
INJECTION, SOLUTION INTRAVENOUS CONTINUOUS
Status: DISCONTINUED | OUTPATIENT
Start: 2021-10-28 | End: 2021-10-29 | Stop reason: HOSPADM

## 2021-10-28 RX ORDER — ALBUMIN, HUMAN INJ 5% 5 %
50 SOLUTION INTRAVENOUS ONCE
Status: COMPLETED | OUTPATIENT
Start: 2021-10-28 | End: 2021-10-28

## 2021-10-28 RX ORDER — TACROLIMUS 1 MG/1
4 CAPSULE ORAL
Status: DISCONTINUED | OUTPATIENT
Start: 2021-10-28 | End: 2021-10-29 | Stop reason: HOSPADM

## 2021-10-28 RX ADMIN — LEVOTHYROXINE SODIUM 50 MCG: 0.05 TABLET ORAL at 07:41

## 2021-10-28 RX ADMIN — NYSTATIN 500000 UNITS: 500000 SUSPENSION ORAL at 12:17

## 2021-10-28 RX ADMIN — SODIUM CHLORIDE, POTASSIUM CHLORIDE, SODIUM LACTATE AND CALCIUM CHLORIDE: 600; 310; 30; 20 INJECTION, SOLUTION INTRAVENOUS at 19:09

## 2021-10-28 RX ADMIN — ALBUMIN HUMAN 50 G: 0.05 INJECTION, SOLUTION INTRAVENOUS at 10:00

## 2021-10-28 RX ADMIN — SODIUM CHLORIDE 500 MG: 9 INJECTION, SOLUTION INTRAVENOUS at 06:04

## 2021-10-28 RX ADMIN — PANTOPRAZOLE SODIUM 40 MG: 40 TABLET, DELAYED RELEASE ORAL at 07:41

## 2021-10-28 RX ADMIN — PANTOPRAZOLE SODIUM 40 MG: 40 TABLET, DELAYED RELEASE ORAL at 19:48

## 2021-10-28 RX ADMIN — INSULIN ASPART 3 UNITS: 100 INJECTION, SOLUTION INTRAVENOUS; SUBCUTANEOUS at 17:11

## 2021-10-28 RX ADMIN — ONDANSETRON 4 MG: 2 INJECTION INTRAMUSCULAR; INTRAVENOUS at 00:25

## 2021-10-28 RX ADMIN — NYSTATIN 500000 UNITS: 500000 SUSPENSION ORAL at 07:41

## 2021-10-28 RX ADMIN — URSODIOL 300 MG: 300 CAPSULE ORAL at 07:42

## 2021-10-28 RX ADMIN — ONDANSETRON 4 MG: 4 TABLET, ORALLY DISINTEGRATING ORAL at 19:07

## 2021-10-28 RX ADMIN — SULFAMETHOXAZOLE AND TRIMETHOPRIM 1 TABLET: 400; 80 TABLET ORAL at 07:40

## 2021-10-28 RX ADMIN — ASPIRIN 325 MG ORAL TABLET 325 MG: 325 PILL ORAL at 07:42

## 2021-10-28 RX ADMIN — INSULIN ASPART 1 UNITS: 100 INJECTION, SOLUTION INTRAVENOUS; SUBCUTANEOUS at 07:43

## 2021-10-28 RX ADMIN — INSULIN ASPART: 100 INJECTION, SOLUTION INTRAVENOUS; SUBCUTANEOUS at 19:50

## 2021-10-28 RX ADMIN — INSULIN ASPART 2 UNITS: 100 INJECTION, SOLUTION INTRAVENOUS; SUBCUTANEOUS at 12:17

## 2021-10-28 RX ADMIN — HEPARIN SODIUM 5000 UNITS: 10000 INJECTION, SOLUTION INTRAVENOUS; SUBCUTANEOUS at 06:04

## 2021-10-28 RX ADMIN — VALGANCICLOVIR 450 MG: 450 TABLET, FILM COATED ORAL at 07:41

## 2021-10-28 RX ADMIN — NYSTATIN 500000 UNITS: 500000 SUSPENSION ORAL at 17:04

## 2021-10-28 RX ADMIN — MYCOPHENOLATE MOFETIL 750 MG: 250 CAPSULE ORAL at 07:40

## 2021-10-28 RX ADMIN — URSODIOL 300 MG: 300 CAPSULE ORAL at 19:47

## 2021-10-28 RX ADMIN — MYCOPHENOLATE MOFETIL 750 MG: 250 CAPSULE ORAL at 17:34

## 2021-10-28 RX ADMIN — NYSTATIN 500000 UNITS: 500000 SUSPENSION ORAL at 19:47

## 2021-10-28 RX ADMIN — HEPARIN SODIUM 5000 UNITS: 10000 INJECTION, SOLUTION INTRAVENOUS; SUBCUTANEOUS at 17:31

## 2021-10-28 RX ADMIN — TACROLIMUS 4 MG: 1 CAPSULE ORAL at 17:34

## 2021-10-28 RX ADMIN — TACROLIMUS 3.5 MG: 1 CAPSULE ORAL at 07:38

## 2021-10-28 ASSESSMENT — ACTIVITIES OF DAILY LIVING (ADL)
ADLS_ACUITY_SCORE: 7

## 2021-10-28 NOTE — CONSULTS
Diabetes/Hyperglycemia Management Consult    Chief Complaint steroid induced hyperglycemia  Consult requested by: Elana Ha PA-C  History of Present Illness Sarah Fisher is a 51 year old female with PMH significant for 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 Morrison, now on second round high-dose steroids, for ACR, and experiencing hyperglycemia to 300s.  First round high dose (induction-- methylprednisolone 1gm, 200 mg, 100 mg, 50  Mg, then pred) steroid hyperglycemia managed with IV insulin (as high as 8 units/h, then IV insulin need resolved by time taper to 100 mg).  Methylpred 500 mg started PM 10/26--> BG to 300s AM 10/27 and persisted there until late afternoon.  Today, BG improved to around 200.  Last dose  mg today, then prednisone taper starts tomorrow-- note QID dosing of prednisone in the first two steps.        Creatinine up to 2.39 today (meeting CORBIN criteria-- previously peaked at 3.9)-- to get fluids and re-check BMP this afternoon.  Eating okay, but taste sensation is altered.  This morning consumed  approximately 82 grams carbohydrate.  Today, eating in more of a grazing pattern than is typical, due to interruptions.    She is walking frequently by her estimation, sometimes to other units.    Timeline to discharge: as soon as tomorrow.    Glucose prior to methylpred:         Recent Labs   Lab 10/28/21  0737 10/28/21  0549 10/28/21  0224 10/27/21  2157 10/27/21  1623 10/27/21  1215   * 206* 213* 239* 324* 347*         Diabetes Type: steriod- induced  Glucose Control:   A1C not necessarily reliable given ESRD but pt reports no previous hx diabetes  Lab Results   Component Value Date    A1C 5.1 10/19/2021    A1C 4.2 03/17/2021         Review of Systems  10 point ROS completed with pertinent positives and negatives  noted in the HPI    Past medical, family and social histories are reviewed and updated.    Past Medical History  Past Medical History:   Diagnosis Date     Ascites      History of blood transfusion      Liver cirrhosis (H)      Thyroid disease        Family History  Family History   Problem Relation Age of Onset     No Known Problems Mother      Colon Cancer Father 58         age 63     Breast Cancer Maternal Grandmother         Diagnosed in her 60's     No Known Problems Maternal Grandfather      No Known Problems Paternal Grandmother      No Known Problems Paternal Grandfather      Substance Abuse Brother      No Known Problems Sister      No Known Problems Son      Substance Abuse Brother    No diabetes    Social History  Social History     Socioeconomic History     Marital status: Single     Spouse name: None     Number of children: None     Years of education: None     Highest education level: Master's degree (e.g., MA, MS, Roberto, MEd, MSW, SASHA)   Occupational History     None   Tobacco Use     Smoking status: Never Smoker     Smokeless tobacco: Never Used   Substance and Sexual Activity     Alcohol use: Not Currently     Comment: Quit on 2020     Drug use: Not Currently     Sexual activity: Not Currently     Partners: Male   Other Topics Concern     Parent/sibling w/ CABG, MI or angioplasty before 65F 55M? Not Asked   Social History Narrative    Lives [place]. 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 12 year sonFlakito.         Christine Harris, DNP, APRN, CNP    3/17/2021     Social Determinants of Health     Financial Resource Strain: Low Risk      Difficulty of Paying Living Expenses: Not hard at all   Food Insecurity: No Food Insecurity     Worried About Running Out of Food in the Last Year: Never true     Ran Out of Food in the Last Year: Never true   Transportation Needs: No Transportation Needs     Lack of Transportation (Medical): No     Lack of  Transportation (Non-Medical): No   Physical Activity: Sufficiently Active     Days of Exercise per Week: 5 days     Minutes of Exercise per Session: 30 min   Stress: No Stress Concern Present     Feeling of Stress : Only a little   Social Connections: Moderately Integrated     Frequency of Communication with Friends and Family: More than three times a week     Frequency of Social Gatherings with Friends and Family: More than three times a week     Attends [employer] Services: More than 4 times per year     Active Member of Clubs or Organizations: Yes     Attends Club or Organization Meetings: More than 4 times per year     Marital Status: Never    Intimate Partner Violence:      Fear of Current or Ex-Partner:      Emotionally Abused:      Physically Abused:      Sexually Abused:          Physical Exam  Temp: 98.3  F (36.8  C) Temp src: Oral BP: (!) 141/81 Pulse: 78   Resp: 16 SpO2: 99 % O2 Device: None (Room air)    Wt Readings from Last 4 Encounters:   10/28/21 74 kg (163 lb 2.3 oz)   09/09/21 81.2 kg (179 lb)   08/30/21 81.2 kg (179 lb)   08/13/21 92.1 kg (203 lb)     Body mass index is 25.55 kg/m .    General:  pleasant woman, resting in bed, in no distress.   HEENT: NC/AT, PER and anicteric, non-injected, oral mucous membranes moist.   Lungs: unlabored respiration, no cough  ABD: rounded, BERKLEY RLQ  Skin: dry  MSK:  fluid movement of all extremities  Lymp: + LE edema   Mental status:  alert, oriented x3, communicating clearly  Psych:  calm, even mood    Laboratory  Recent Labs   Lab Test 10/28/21  0737 10/28/21  0549 10/27/21  0721 10/27/21  0714   NA  --  135  --  133   POTASSIUM  --  3.6  --  4.0   CHLORIDE  --  103  --  101   CO2  --  24  --  23   ANIONGAP  --  8  --  9   * 206*   < > 355*   BUN  --  61*  --  49*   CR  --  2.39*  --  1.86*   EMMY  --  8.3*  --  8.2*    < > = values in this interval not displayed.     CBC RESULTS:   Recent Labs   Lab Test 10/28/21  0549   WBC 11.7*   RBC 2.17*   HGB  6.9*   HCT 20.9*   MCV 96   MCH 31.8   MCHC 33.0   RDW 18.5*   *       Liver Function Studies -   Recent Labs   Lab Test 10/28/21  0549   PROTTOTAL 5.2*   ALBUMIN 2.3*   BILITOTAL 1.6*   ALKPHOS 252*   AST 5   ALT 27       Active Medications  Current Facility-Administered Medications   Medication     aspirin (ASA) tablet 325 mg     bisacodyl (DULCOLAX) Suppository 10 mg     dextrose 10% infusion     glucose gel 15-30 g    Or     dextrose 50 % injection 25-50 mL    Or     glucagon injection 1 mg     heparin ANTICOAGULANT injection 5,000 Units     insulin aspart (NovoLOG) injection (RAPID ACTING)     insulin aspart (NovoLOG) injection (RAPID ACTING)     levothyroxine (SYNTHROID/LEVOTHROID) tablet 50 mcg     melatonin tablet 3 mg     methocarbamol (ROBAXIN) tablet 500 mg     mycophenolate (GENERIC EQUIVALENT) capsule 750 mg     naloxone (NARCAN) injection 0.2 mg    Or     naloxone (NARCAN) injection 0.4 mg    Or     naloxone (NARCAN) injection 0.2 mg    Or     naloxone (NARCAN) injection 0.4 mg     nystatin (MYCOSTATIN) suspension 500,000 Units     ondansetron (ZOFRAN-ODT) ODT tab 4 mg    Or     ondansetron (ZOFRAN) injection 4 mg     oxymetazoline (AFRIN) 0.05 % spray 2 spray     pantoprazole (PROTONIX) EC tablet 40 mg     polyethylene glycol (MIRALAX) Packet 17 g     polyethylene glycol (MIRALAX) Packet 17 g     [START ON 10/29/2021] predniSONE (DELTASONE) tablet 50 mg    Followed by     [START ON 10/30/2021] predniSONE (DELTASONE) tablet 40 mg    Followed by     [START ON 10/31/2021] predniSONE (DELTASONE) tablet 40 mg    Followed by     [START ON 11/1/2021] predniSONE (DELTASONE) tablet 20 mg    Followed by     [START ON 11/2/2021] predniSONE (DELTASONE) tablet 20 mg     prochlorperazine (COMPAZINE) injection 5 mg     Reason beta blocker order not selected     senna-docusate (SENOKOT-S/PERICOLACE) 8.6-50 MG per tablet 1 tablet     sodium chloride (OCEAN) 0.65 % nasal spray 1 spray     sodium chloride (PF)  0.9% PF flush 3 mL     sodium chloride (PF) 0.9% PF flush 3 mL     sodium chloride (PF) 0.9% PF flush 3 mL     sulfamethoxazole-trimethoprim (BACTRIM) 400-80 MG per tablet 1 tablet     tacrolimus (GENERIC EQUIVALENT) capsule 3.5 mg     traMADol (ULTRAM) tablet 50 mg     traZODone (DESYREL) tablet 50 mg     ursodiol (ACTIGALL) capsule 300 mg     valGANciclovir (VALCYTE) tablet 450 mg     Current Outpatient Medications   Medication Sig Dispense Refill     traZODone (DESYREL) 50 MG tablet Take 1 tablet (50 mg) by mouth nightly as needed for sleep 30 tablet 0     Vitamin D3 (CHOLECALCIFEROL) 25 mcg (1000 units) tablet Take 2 tablets (50 mcg) by mouth daily 180 tablet 3       Current Diet  Orders Placed This Encounter      Regular Diet Adult        Assessment  Sarah Fisher is a 51 year old female with PMH significant for 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 Morrison, now on second round high-dose steroids, for ACR, and experiencing hyperglycemia to 300s.    While steroid hyperglycemia initially quite significant, in the setting of elevated creatinine, hyperglycemia is already improving    Plan    - ADD aspart 1 unit per 15 grams carbohydrate for meals and snacks   - continue aspart medium resistance correction AC, HS  - no basal insulin added at this time, but favor NPH if needed due to prednisone taper  - education needs identified: will need teaching on monitoring and possibly injections-- educ consult already ordered  - prescriptions needed on discharge: ROSSY  - outpatient follow up: [facility]     Diabetes Management Team job code: [phone]    I spent a total of 85 minutes bedside and on the inpatient unit managing the glycemic care of Sarah Fisher. Over 50% of my time on the unit was spent counseling the patient  and/or  coordinating care regarding acute hyperglycemia management.  See note for details.    To contact Endocrine Diabetes service:   From 8AM-4PM: page inpatient diabetes provider that is following the patient that day (see filed or incomplete progress notes/consult notes).  If uncertain of provider assignment: page job code [phone] or review in [facility].  For questions or updates from 4PM-8AM: page the diabetes job code for on call fellow: [phone]    Please notify inpatient diabetes service if changes are planned to steroids, enteral feeding, parenteral feeding, or if procedures are planned requiring prolonged NPO status.

## 2021-10-28 NOTE — PROGRESS NOTES
Calorie Count  Intake recorded for: 10/27  Total Kcals: 837 Total Protein: 33g  Kcals from Hospital Food: 837  Protein: 33g  Kcals from Outside Food (average):0 Protein: 0g  # Meals Ordered from Kitchen: 2 meals   # Meals Recorded: 1 meal (First - 100% quarles strip, skim milk, cranberry juice, cheerios, banana, less than 25% oatmeal w/ brown sugar & butter)  # Supplements Recorded: 100% 1 Ensure Enlive

## 2021-10-28 NOTE — PROGRESS NOTES
Care Management Follow Up    Length of Stay (days): 11    Expected Discharge Date: 10/29/2021     Concerns to be Addressed: discharge planning     Patient plan of care discussed at interdisciplinary rounds: Yes    Anticipated Discharge Disposition: Home Care     Anticipated Discharge Services:    Anticipated Discharge DME: [facility]    Patient/family educated on Medicare website which has current facility and service quality ratings:    Education Provided on the Discharge Plan:    Patient/Family in Agreement with the Plan: yes    Referrals Placed by CM/SW: Homecare    Additional Information:  Pt status reviewed with [facility].  Team anticipates possible discharge tomorrow pending endocrine recommendations and diabetic education.  Pt will need post transplant hospital f/u appointment request sent.  Reviewed that d/t pt insurance we were unable to arrange home care.  Pt will need outpatient transplant labs arranged on Monday/Thursday's.  Pt has requested appointments be arranged at  [facility].       Post Transplant Liver Discharge Appointment request sent via in basket::    Within 1 week of discharge, due week of  11/1/2021:   LAB APPT  SURGEON   (coordinator)   PHARMACIST  Transplant Nephrology follow up needed?: No  WEEK 2 -               SURGEON with Dr. Morrison              DIETICIAN               SOCIAL WORK  Weekly surgeon visits x 3 with Dr. Morrison  3 month Hepatology visit with Dr. Avila    Spoke with scheduling rep with [facility] who was able to confirm outpatient transplant lab [place] [facility] [place] as [facility] has no availability on Monday 11/1.  Transplant lab appointment confirmed for Thursday 11/4 at the [facility] at 9 a.m. (confirmed with [facility] that this appt time would be ok).       Met with pt.  Reviewed/discussed anticipated plan for discharge.  Reviewed initial outpatient lab appointments.  Per pt her niece will  be with her 24/7.  Patient noted no concerns or questions.        Eleonora Cho, RN BSN, PHN, ACM-RN  7A RN Care Coordinator  Phone: [phone]  Pager [phone]  To contact the [facility] RNCC, Page: [phone]    10/28/2021 12:46 PM

## 2021-10-28 NOTE — PLAN OF CARE
Vitals: /85 (BP Location: Right leg)   Pulse 86   Temp 97.6  F (36.4  C) (Oral)   Resp 16   Wt 74 kg (163 lb 2.3 oz)   SpO2 95%   BMI 25.55 kg/m    Endocrine: ACHS BG checks. 187, 239. Carb coverage added in addition to sliding scale.   Labs: Hgb 6.9. 1U PRBC ordered and administered.   Pain: Denies  PRN's: None given on this shift  Diet: Regular with alejo counts. Good appetite.   LDA: PIV placed. Order to remove internal jugular. Will be removed after blood administration per pt preference. BERKLEY at R side. Leaky. Dressing changed.   GI: No BM reported on this shift.   : Voiding. Not saving.   Skin: Clamshell incision. Stapled. OSIEL.   Neuro: Intact  Mobility: UAL  Education: Medications reviewed with pt at med pass. Med card updated. Pt to update lab book using MyChart per pt.   Plan: 50g albumin administered on this shift. Continue with current POC. Will update provider with any changes in condition.

## 2021-10-28 NOTE — PLAN OF CARE
/78 (BP Location: Right arm)   Pulse 80   Temp 98.9  F (37.2  C) (Oral)   Resp 16   Wt 72.6 kg (160 lb)   SpO2 96%   BMI 25.06 kg/m       Patient alert and oriented. VS stable. Patient on room air. Patient denies pain. Patient denies nausea. BG - ACHS: 324. Urine Output - voiding adequately. Bowel Function - BM during shift. Nutrition - Regular diet with calorie counts. CVC - internal jugular: saline locked. Clamshell incision - approximated, stapled, open to air. BERKLEY drain - serous output. Activity - UAL in room. SBA in hallway. Med card and lab book up to date. Plan of Care - Will continue to monitor and notify care team of any changes.

## 2021-10-28 NOTE — PLAN OF CARE
[id number]  Activity: UAL  Neuros: Intact. A&O x4.   Cardiac: WDL.   Respiratory: WDL on RA. Denies SOB. LS CTA.  GI/: Voiding spontaneously. +BS, passing flatus. LBM 10/27  Diet: Regular with calorie counts  Skin: CDI  Lines: CVC SL  Incisions/Drains: Clamshell incision OSIEL with staples  Labs: BG at 0200 213. Hgb (10/27) 7.8; (10/28) 6.9. PA notified of Hgb.  Pain/nausea: Reported nausea at approximately 0030 - zofran administered and nausea relieved.  New changes this shift: Melatonin order put in for sleep. IV solumedrol administered this AM. Robaxin administered once overnight.  Plan: Prednisone to be tapered.

## 2021-10-28 NOTE — PROGRESS NOTES
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 She is now s/p DBD DDLTx 10/17/21 with Dr. Morrison.    Graft function: DBD Liver Tx 10/17/21: POD 11  -Elevated LFTs: AST/ALT normalized.   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 Continue Steve BID.   -HA ppx: hypercoagulable intraop. Postop US with elevated RI involving the extrahepatic hepatic artery and the right hepatic artery. Repeat US POD 1&2 -Patent hepatic vasculature. Continued elevated RI in the hepatic arteries. Repeat US 10/21 to evaluate for fluid collections/bleeding- shows small perihepatic fluid collections, unchanged from prior.  - Initially on Heparin 400units/hr -[id number]/20 in setting of bleeding. Continue ASA 325mg daily  -BERKLEY drain x1, output~ 635 cc/24hrs.   Immunosuppression management:    Solumedrol taper per protocol induction  Solumedrol 500 mg x3 10/26-28, followed by taper.  Maintenance:  MMF 750mg BID  Tacrolimus 4 mg BID. 10/28 6.7 (12 hour trough).   Prednisone 5mg daily with Tac below goal  Complexity of management: Medium. Contributing factors: thrombocytopenia CORBIN  Hematology: Anemia of chronic disease/ABL: Received 5 L of PRBC intraop   2 units on 10/21, 1 U 10/26 and 1 U on 10/28  Hgb 6.9 from 7.8.   -HA ppx (as above) Heparin gtt stopped with concern for bleeding. HIT negative.  Epistaxis: Persistent Nose bleed x4 hrs 10/20, stopped with quick clot. Humidified O2. Repeat epistaxis 10/21 PM x 1.25 hrs. Epistaxis 10/25, stopped  after 20min. D/w ENT, ocean spray Nasal +vaseline, use afrin if recurrent nosebleed, call ENT.  Neurology: Acute postoperative pain: Controlled, Continue Tramadol 50mg TID PRN and Robaxin 500mg TID PRN  Cardiorespiratory: Hypotension: Stable 120-130  Tachycardia: ST, Resolved, HR 80s  Pleural effusion, Large:  Extubated POD 1 to RA, CDB/IS   GI/Nutrition: Regular diet  Postop ileus: Resolved, bowel regimen PRN  Endocrine:   Steroid induced hyperglycemia: initially required an insulin gtt, transitioned to sliding scale insulin prn. Due to steroid taper and continued hyperglycemia consulted endocrinology, added carb coverage today. Will plan for diabetic education.   Fluid/Electrolytes:   Hypervolemia with YOEL, improving with diuresis, Weight now -9kg from admit, stopped Bumex .  Hyponatremia: resolved, Na 135  Hyperphosphatemia: Resolved, 3.6  :   CORBIN: SCr 1-1.2 b/l, peaked at 3.9. had been trending down, but increased today at 2.4 from 1.9. Bumex held on 10/27. Encourage po fluids and give albumin 5% 1 L bolus. Monitor with increasing tac dosing. UA 10/25 negative.   Infectious disease: Afebrile, Tmax 99.2. WBC 11.7 from 4.8. Possibly secondary to steroids, monitor.   Leukocytosis: Resolved, now increased again  Received Zosyn x48hrs Periop and diflucan x2 1 dose pre and 1 dose postop  Valcyte (CMV ppx) renal dosed, Bactrim (PCP ppx)  Prophylaxis: DVT-SQH BID, fall, GI   Disposition: 7A    Medical Decision Making: Medium  Subsequent visit 67653 (moderate level decision making)    BREONNA/Fellow/Resident Provider: Elana Ha PA-C [id number]     Faculty: Riaz Seth M.D.  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    10/17/2021 (Liver), Postoperative day: 11     Interval History: obtained from the patient.    Overnight events: No complaints.     ROS:   A 10-point  review of systems was negative except as noted above.    Curent Meds:   aspirin  325 mg Oral Daily    heparin ANTICOAGULANT  5,000 Units Subcutaneous Q12H    insulin aspart   Subcutaneous TID w/meals    insulin aspart  1-7 Units Subcutaneous TID AC    insulin aspart  1-5 Units Subcutaneous At Bedtime    levothyroxine  50 mcg Oral QAM AC    mycophenolate  750 mg Oral BID IS    nystatin  500,000 Units Swish & Swallow 4x Daily    pantoprazole  40 mg Oral BID    polyethylene glycol  17 g Oral or Feeding Tube Daily    [START ON 10/29/2021] predniSONE  50 mg Oral 4x Daily    Followed by    [START ON 10/30/2021] predniSONE  40 mg Oral 4x Daily    Followed by    [START ON 10/31/2021] predniSONE  40 mg Oral BID    Followed by    [START ON 11/1/2021] predniSONE  20 mg Oral BID    Followed by    [START ON 11/2/2021] predniSONE  20 mg Oral Daily    senna-docusate  1 tablet Oral or Feeding Tube BID    sodium chloride (PF)  3 mL Intravenous Q8H    sulfamethoxazole-trimethoprim  1 tablet Oral or Feeding Tube Daily    tacrolimus  4 mg Oral BID IS    ursodiol  300 mg Oral BID    valGANciclovir  450 mg Oral Daily       Physical Exam:     Admit Weight: 81.2 kg (179 lb)    Current Vitals:   BP (!) 153/83 (BP Location: Right arm)   Pulse 76   Temp 98.1  F (36.7  C) (Oral)   Resp 18   Wt 74 kg (163 lb 2.3 oz)   SpO2 97%   BMI 25.55 kg/m      Vital sign ranges:    Temp:  [97.6  F (36.4  C)-99.2  F (37.3  C)] 98.1  F (36.7  C)  Pulse:  [] 76  Resp:  [16-18] 18  BP: (119-153)/(64-85) 153/83  SpO2:  [95 %-100 %] 97 %  Patient Vitals for the past 24 hrs:   BP Temp Temp src Pulse Resp SpO2 Weight   10/28/21 1542 (!) 153/83 98.1  F (36.7  C) Oral 76 18 97 % --   10/28/21 1433 (!) 146/83 98.3  F (36.8  C) Oral 92 18 100 % --   10/28/21 1302 130/85 97.6  F (36.4  C) Oral 86 16 95 % --   10/28/21 1110 (!) 141/81 98.3  F (36.8  C) Oral 78 16 99 % --   10/28/21 0827 134/73 98.3  F (36.8  C) Oral 100 16 95 % --   10/28/21 0300 124/72 98.5   F (36.9  C) Oral 95 16 99 % 74 kg (163 lb 2.3 oz)   10/27/21 2302 130/72 99.2  F (37.3  C) Oral 98 16 98 % --   10/27/21 2036 119/64 98.5  F (36.9  C) Oral 91 16 97 % --     General Appearance: in mild distress.    Skin: normal, warm, dry, No rashes, induration, excoriation to bilateral Forearms   Heart: regular rate and rhythm  Lungs: Nonlabored resps on RA  Abdomen: soft, NTTP, incision with staples CDI, mild serous output to right lateral drain   : Voiding   Extremities: edema: +1 bilateral LEs pitting present bilaterally. R>L  There is no skin breakdown.  Neurologic: awake and alert.  Tremor absent. Asterixis: absent.  Psych: no distress    Frailty Scores       Frailty Scores 12/27/2020 12/27/2020    Final Score Frail Frail    Final Score Number 3 3            Data:   CMP  Recent Labs   Lab 10/28/21  1154 10/28/21  0737 10/28/21  0549 10/28/21  0224 10/27/21  0721 10/27/21  0714 10/26/21  1814 10/26/21  0547 10/25/21  1857 10/25/21  1741 10/23/21  0651 10/22/21  0538   NA  --   --  135  --   --  133  --   --    < >  --    < > 127*   POTASSIUM  --   --  3.6  --   --  4.0  --   --    < >  --    < > 5.1   CHLORIDE  --   --  103  --   --  101  --   --    < >  --    < > 95   CO2  --   --  24  --   --  23  --   --    < >  --    < > 20   * 187* 206*   < >   < > 355*   < >  --    < >  --    < > 86   BUN  --   --  61*  --   --  49*  --   --    < >  --    < > 84*   CR  --   --  2.39*  --   --  1.86*  --   --    < >  --    < > 3.89*   GFRESTIMATED  --   --  23*  --   --  31*  --   --    < >  --    < > 13*   EMMY  --   --  8.3*  --   --  8.2*  --   --    < >  --    < > 8.3*   ICAW  --   --   --   --   --   --   --   --   --   --   --  4.5   MAG  --   --  1.8  --   --  1.8  --   --    < >  --    < > 2.9*   PHOS  --   --  4.6*  --   --  3.4  --   --    < >  --    < > 7.1*   AMYLASE  --   --   --   --   --   --   --   --   --  37  --   --    LIPASE  --   --   --   --   --   --   --  81  --  249  --   --    ALBUMIN  --    --  2.3*  --   --  1.8*  --   --    < >  --    < > 2.8*   BILITOTAL  --   --  1.6*  --   --  2.8*  --   --    < >  --    < > 2.3*   ALKPHOS  --   --  252*  --   --  327*  --   --    < >  --    < > 111   AST  --   --  5  --   --  20  --   --    < >  --    < > 35   ALT  --   --  27  --   --  35  --   --    < >  --    < > 135*    < > = values in this interval not displayed.     CBC  Recent Labs   Lab 10/28/21  0549 10/27/21  0714   HGB 6.9* 7.8*   WBC 11.7* 4.8   * 129*     COAGS  No lab results found in last 7 days.    Invalid input(s): XA   Urinalysis  Recent Labs   Lab Test 10/25/21  2123 10/19/21  1438   COLOR Yellow Yellow   APPEARANCE Clear Slightly Cloudy*   URINEGLC Negative Negative   URINEBILI Negative Negative   URINEKETONE Negative Negative   SG 1.013 1.018   UBLD Negative Large*   URINEPH 5.5 5.0   PROTEIN 10 * 20 *   NITRITE Negative Negative   LEUKEST Negative Negative   RBCU <1 120*   WBCU <1 4     Virology:  Hepatitis C Antibody   Date Value Ref Range Status   10/16/2021 Nonreactive Nonreactive Final   05/02/2021 Nonreactive NR^Nonreactive Final     Comment:     Assay performance characteristics have not been established for newborns,   infants, and children     Attestation:    The patient has been seen with team and evaluated by me.   Vital signs, labs, medications and orders were reviewed.   When obtained, diagnostic images were reviewed by me and interpreted as above.    The care plan was discussed with the multidisciplinary team and I agree with the findings and plan in this note, with any differences recorded in blue.    Immunosuppressive medication management was reviewed and adjusted as reflected in the note and orders.     .

## 2021-10-29 ENCOUNTER — APPOINTMENT (OUTPATIENT)
Dept: PHYSICAL THERAPY | Facility: CLINIC | Age: 51
End: 2021-10-29
Attending: TRANSPLANT SURGERY
Payer: COMMERCIAL

## 2021-10-29 ENCOUNTER — TELEPHONE (OUTPATIENT)
Dept: TRANSPLANT | Facility: CLINIC | Age: 51
End: 2021-10-29
Payer: COMMERCIAL

## 2021-10-29 VITALS
WEIGHT: 163.14 LBS | BODY MASS INDEX: 25.55 KG/M2 | RESPIRATION RATE: 18 BRPM | SYSTOLIC BLOOD PRESSURE: 145 MMHG | HEART RATE: 79 BPM | DIASTOLIC BLOOD PRESSURE: 81 MMHG | OXYGEN SATURATION: 95 % | TEMPERATURE: 97.5 F

## 2021-10-29 DIAGNOSIS — Z94.4 LIVER TRANSPLANTED (H): Primary | ICD-10-CM

## 2021-10-29 LAB
ALBUMIN SERPL-MCNC: 2.7 G/DL (ref 3.4–5)
ALP SERPL-CCNC: 208 U/L (ref 40–150)
ALT SERPL W P-5'-P-CCNC: 28 U/L (ref 0–50)
ANION GAP SERPL CALCULATED.3IONS-SCNC: 9 MMOL/L (ref 3–14)
AST SERPL W P-5'-P-CCNC: 13 U/L (ref 0–45)
BASOPHILS # BLD AUTO: 0 10E3/UL (ref 0–0.2)
BASOPHILS NFR BLD AUTO: 0 %
BILIRUB DIRECT SERPL-MCNC: 1.2 MG/DL (ref 0–0.2)
BILIRUB SERPL-MCNC: 1.6 MG/DL (ref 0.2–1.3)
BUN SERPL-MCNC: 70 MG/DL (ref 7–30)
CALCIUM SERPL-MCNC: 8.7 MG/DL (ref 8.5–10.1)
CHLORIDE BLD-SCNC: 102 MMOL/L (ref 94–109)
CO2 SERPL-SCNC: 22 MMOL/L (ref 20–32)
CREAT SERPL-MCNC: 2.66 MG/DL (ref 0.52–1.04)
EOSINOPHIL # BLD AUTO: 0 10E3/UL (ref 0–0.7)
EOSINOPHIL NFR BLD AUTO: 0 %
ERYTHROCYTE [DISTWIDTH] IN BLOOD BY AUTOMATED COUNT: 19.5 % (ref 10–15)
GFR SERPL CREATININE-BSD FRML MDRD: 20 ML/MIN/1.73M2
GLUCOSE BLD-MCNC: 123 MG/DL (ref 70–99)
GLUCOSE BLDC GLUCOMTR-MCNC: 134 MG/DL (ref 70–99)
GLUCOSE BLDC GLUCOMTR-MCNC: 140 MG/DL (ref 70–99)
GLUCOSE BLDC GLUCOMTR-MCNC: 156 MG/DL (ref 70–99)
HCT VFR BLD AUTO: 24.7 % (ref 35–47)
HGB BLD-MCNC: 7.9 G/DL (ref 11.7–15.7)
IMM GRANULOCYTES # BLD: 0.1 10E3/UL
IMM GRANULOCYTES NFR BLD: 1 %
LYMPHOCYTES # BLD AUTO: 0.5 10E3/UL (ref 0.8–5.3)
LYMPHOCYTES NFR BLD AUTO: 5 %
MAGNESIUM SERPL-MCNC: 2 MG/DL (ref 1.6–2.3)
MCH RBC QN AUTO: 30.7 PG (ref 26.5–33)
MCHC RBC AUTO-ENTMCNC: 32 G/DL (ref 31.5–36.5)
MCV RBC AUTO: 96 FL (ref 78–100)
MONOCYTES # BLD AUTO: 0.3 10E3/UL (ref 0–1.3)
MONOCYTES NFR BLD AUTO: 3 %
NEUTROPHILS # BLD AUTO: 9 10E3/UL (ref 1.6–8.3)
NEUTROPHILS NFR BLD AUTO: 91 %
NRBC # BLD AUTO: 0 10E3/UL
NRBC BLD AUTO-RTO: 0 /100
PHOSPHATE SERPL-MCNC: 4.6 MG/DL (ref 2.5–4.5)
PLATELET # BLD AUTO: 153 10E3/UL (ref 150–450)
POTASSIUM BLD-SCNC: 3.9 MMOL/L (ref 3.4–5.3)
PROT SERPL-MCNC: 5.6 G/DL (ref 6.8–8.8)
RBC # BLD AUTO: 2.57 10E6/UL (ref 3.8–5.2)
SODIUM SERPL-SCNC: 133 MMOL/L (ref 133–144)
TACROLIMUS BLD-MCNC: 6.7 UG/L (ref 5–15)
TME LAST DOSE: NORMAL H
TME LAST DOSE: NORMAL H
WBC # BLD AUTO: 9.9 10E3/UL (ref 4–11)

## 2021-10-29 PROCEDURE — 258N000003 HC RX IP 258 OP 636

## 2021-10-29 PROCEDURE — 99233 SBSQ HOSP IP/OBS HIGH 50: CPT | Mod: 24 | Performed by: PHYSICIAN ASSISTANT

## 2021-10-29 PROCEDURE — 250N000012 HC RX MED GY IP 250 OP 636 PS 637: Performed by: NURSE PRACTITIONER

## 2021-10-29 PROCEDURE — 97116 GAIT TRAINING THERAPY: CPT | Mod: GP

## 2021-10-29 PROCEDURE — 36415 COLL VENOUS BLD VENIPUNCTURE: CPT | Performed by: NURSE PRACTITIONER

## 2021-10-29 PROCEDURE — 80197 ASSAY OF TACROLIMUS: CPT | Performed by: NURSE PRACTITIONER

## 2021-10-29 PROCEDURE — 250N000012 HC RX MED GY IP 250 OP 636 PS 637: Performed by: PHYSICIAN ASSISTANT

## 2021-10-29 PROCEDURE — 82248 BILIRUBIN DIRECT: CPT | Performed by: NURSE PRACTITIONER

## 2021-10-29 PROCEDURE — 84100 ASSAY OF PHOSPHORUS: CPT | Performed by: NURSE PRACTITIONER

## 2021-10-29 PROCEDURE — 250N000011 HC RX IP 250 OP 636: Performed by: STUDENT IN AN ORGANIZED HEALTH CARE EDUCATION/TRAINING PROGRAM

## 2021-10-29 PROCEDURE — 250N000013 HC RX MED GY IP 250 OP 250 PS 637: Performed by: NURSE PRACTITIONER

## 2021-10-29 PROCEDURE — 80048 BASIC METABOLIC PNL TOTAL CA: CPT | Performed by: NURSE PRACTITIONER

## 2021-10-29 PROCEDURE — 97530 THERAPEUTIC ACTIVITIES: CPT | Mod: GP

## 2021-10-29 PROCEDURE — 250N000011 HC RX IP 250 OP 636: Performed by: NURSE PRACTITIONER

## 2021-10-29 PROCEDURE — 250N000013 HC RX MED GY IP 250 OP 250 PS 637: Performed by: TRANSPLANT SURGERY

## 2021-10-29 PROCEDURE — 250N000013 HC RX MED GY IP 250 OP 250 PS 637: Performed by: INTERNAL MEDICINE

## 2021-10-29 PROCEDURE — 85025 COMPLETE CBC W/AUTO DIFF WBC: CPT | Performed by: NURSE PRACTITIONER

## 2021-10-29 PROCEDURE — 250N000013 HC RX MED GY IP 250 OP 250 PS 637: Performed by: PHYSICIAN ASSISTANT

## 2021-10-29 PROCEDURE — 83735 ASSAY OF MAGNESIUM: CPT | Performed by: NURSE PRACTITIONER

## 2021-10-29 PROCEDURE — 97110 THERAPEUTIC EXERCISES: CPT | Mod: GP

## 2021-10-29 PROCEDURE — 250N000011 HC RX IP 250 OP 636: Performed by: INTERNAL MEDICINE

## 2021-10-29 RX ORDER — METHOCARBAMOL 500 MG/1
500 TABLET, FILM COATED ORAL EVERY 8 HOURS PRN
Qty: 30 TABLET | Refills: 1 | Status: SHIPPED | OUTPATIENT
Start: 2021-10-29 | End: 2021-11-04

## 2021-10-29 RX ORDER — PREDNISONE 10 MG/1
TABLET ORAL
Qty: 50 TABLET | Refills: 0 | Status: SHIPPED | OUTPATIENT
Start: 2021-10-29 | End: 2021-11-04

## 2021-10-29 RX ORDER — PREDNISONE 5 MG/1
5 TABLET ORAL DAILY
Qty: 30 TABLET | Refills: 11 | Status: SHIPPED | OUTPATIENT
Start: 2021-11-03 | End: 2021-11-04

## 2021-10-29 RX ORDER — ASPIRIN 325 MG
325 TABLET ORAL DAILY
Qty: 60 TABLET | Refills: 5 | Status: SHIPPED | OUTPATIENT
Start: 2021-10-30 | End: 2021-11-02

## 2021-10-29 RX ORDER — PREDNISONE 20 MG/1
40 TABLET ORAL 4 TIMES DAILY
Qty: 8 TABLET | Refills: 0 | Status: SHIPPED | OUTPATIENT
Start: 2021-10-30 | End: 2021-11-04

## 2021-10-29 RX ORDER — AMOXICILLIN 250 MG
1 CAPSULE ORAL 2 TIMES DAILY
Qty: 50 TABLET | Refills: 1 | Status: SHIPPED | OUTPATIENT
Start: 2021-10-29 | End: 2021-11-04

## 2021-10-29 RX ORDER — CONTAINER,EMPTY
EACH MISCELLANEOUS
Qty: 1 EACH | Refills: 0 | Status: SHIPPED | OUTPATIENT
Start: 2021-10-29 | End: 2022-02-02

## 2021-10-29 RX ORDER — PREDNISONE 20 MG/1
40 TABLET ORAL 2 TIMES DAILY
Qty: 4 TABLET | Refills: 0 | Status: SHIPPED | OUTPATIENT
Start: 2021-10-31 | End: 2021-11-04

## 2021-10-29 RX ORDER — URSODIOL 300 MG/1
300 CAPSULE ORAL 2 TIMES DAILY
Qty: 60 CAPSULE | Refills: 3 | Status: SHIPPED | OUTPATIENT
Start: 2021-10-29 | End: 2021-11-02

## 2021-10-29 RX ORDER — TRAMADOL HYDROCHLORIDE 50 MG/1
50 TABLET ORAL EVERY 8 HOURS PRN
Qty: 20 TABLET | Refills: 0 | Status: SHIPPED | OUTPATIENT
Start: 2021-10-29 | End: 2021-11-04

## 2021-10-29 RX ORDER — ONDANSETRON 4 MG/1
4 TABLET, ORALLY DISINTEGRATING ORAL EVERY 6 HOURS PRN
Qty: 20 TABLET | Refills: 1 | Status: SHIPPED | OUTPATIENT
Start: 2021-10-29 | End: 2021-12-03

## 2021-10-29 RX ORDER — SULFAMETHOXAZOLE AND TRIMETHOPRIM 400; 80 MG/1; MG/1
1 TABLET ORAL DAILY
Qty: 30 TABLET | Refills: 11 | Status: SHIPPED | OUTPATIENT
Start: 2021-10-30 | End: 2021-11-02

## 2021-10-29 RX ORDER — POLYETHYLENE GLYCOL 3350 17 G/17G
17 POWDER, FOR SOLUTION ORAL DAILY
Qty: 510 G | Refills: 1 | Status: SHIPPED | OUTPATIENT
Start: 2021-10-29 | End: 2021-11-04

## 2021-10-29 RX ORDER — MYCOPHENOLATE MOFETIL 250 MG/1
750 CAPSULE ORAL 2 TIMES DAILY
Qty: 180 CAPSULE | Refills: 11 | Status: SHIPPED | OUTPATIENT
Start: 2021-10-29 | End: 2021-11-02

## 2021-10-29 RX ORDER — PANTOPRAZOLE SODIUM 40 MG/1
40 TABLET, DELAYED RELEASE ORAL 2 TIMES DAILY
Qty: 60 TABLET | Refills: 2 | Status: SHIPPED | OUTPATIENT
Start: 2021-10-29 | End: 2021-11-02

## 2021-10-29 RX ORDER — PREDNISONE 10 MG/1
TABLET ORAL
Qty: 34 TABLET | Refills: 0 | Status: SHIPPED | OUTPATIENT
Start: 2021-10-29 | End: 2021-10-29

## 2021-10-29 RX ORDER — PREDNISONE 20 MG/1
20 TABLET ORAL 2 TIMES DAILY
Qty: 2 TABLET | Refills: 0 | Status: SHIPPED | OUTPATIENT
Start: 2021-11-01 | End: 2021-11-04

## 2021-10-29 RX ORDER — GLUCOSAMINE HCL/CHONDROITIN SU 500-400 MG
CAPSULE ORAL
Qty: 100 EACH | Refills: 3 | Status: SHIPPED | OUTPATIENT
Start: 2021-10-29 | End: 2022-02-02

## 2021-10-29 RX ORDER — TACROLIMUS 1 MG/1
4 CAPSULE ORAL 2 TIMES DAILY
Qty: 240 CAPSULE | Refills: 11 | Status: SHIPPED | OUTPATIENT
Start: 2021-10-29 | End: 2021-11-02

## 2021-10-29 RX ORDER — VALGANCICLOVIR 450 MG/1
450 TABLET, FILM COATED ORAL DAILY
Qty: 30 TABLET | Refills: 2 | Status: SHIPPED | OUTPATIENT
Start: 2021-10-30 | End: 2021-11-02

## 2021-10-29 RX ADMIN — LEVOTHYROXINE SODIUM 50 MCG: 0.05 TABLET ORAL at 08:19

## 2021-10-29 RX ADMIN — INSULIN ASPART 1 UNITS: 100 INJECTION, SOLUTION INTRAVENOUS; SUBCUTANEOUS at 15:44

## 2021-10-29 RX ADMIN — PREDNISONE 50 MG: 20 TABLET ORAL at 05:03

## 2021-10-29 RX ADMIN — TACROLIMUS 4 MG: 1 CAPSULE ORAL at 08:18

## 2021-10-29 RX ADMIN — VALGANCICLOVIR 450 MG: 450 TABLET, FILM COATED ORAL at 08:18

## 2021-10-29 RX ADMIN — PREDNISONE 50 MG: 20 TABLET ORAL at 15:37

## 2021-10-29 RX ADMIN — ASPIRIN 325 MG ORAL TABLET 325 MG: 325 PILL ORAL at 08:18

## 2021-10-29 RX ADMIN — HEPARIN SODIUM 5000 UNITS: 10000 INJECTION, SOLUTION INTRAVENOUS; SUBCUTANEOUS at 05:02

## 2021-10-29 RX ADMIN — NYSTATIN 500000 UNITS: 500000 SUSPENSION ORAL at 12:47

## 2021-10-29 RX ADMIN — SULFAMETHOXAZOLE AND TRIMETHOPRIM 1 TABLET: 400; 80 TABLET ORAL at 08:18

## 2021-10-29 RX ADMIN — ONDANSETRON 4 MG: 4 TABLET, ORALLY DISINTEGRATING ORAL at 00:22

## 2021-10-29 RX ADMIN — NYSTATIN 500000 UNITS: 500000 SUSPENSION ORAL at 08:19

## 2021-10-29 RX ADMIN — PANTOPRAZOLE SODIUM 40 MG: 40 TABLET, DELAYED RELEASE ORAL at 08:18

## 2021-10-29 RX ADMIN — SODIUM CHLORIDE, POTASSIUM CHLORIDE, SODIUM LACTATE AND CALCIUM CHLORIDE: 600; 310; 30; 20 INJECTION, SOLUTION INTRAVENOUS at 05:02

## 2021-10-29 RX ADMIN — MYCOPHENOLATE MOFETIL 750 MG: 250 CAPSULE ORAL at 08:24

## 2021-10-29 RX ADMIN — NYSTATIN 500000 UNITS: 500000 SUSPENSION ORAL at 15:37

## 2021-10-29 RX ADMIN — PREDNISONE 50 MG: 20 TABLET ORAL at 12:47

## 2021-10-29 RX ADMIN — URSODIOL 300 MG: 300 CAPSULE ORAL at 08:18

## 2021-10-29 RX ADMIN — INSULIN ASPART 3 UNITS: 100 INJECTION, SOLUTION INTRAVENOUS; SUBCUTANEOUS at 08:26

## 2021-10-29 RX ADMIN — ONDANSETRON 4 MG: 4 TABLET, ORALLY DISINTEGRATING ORAL at 15:37

## 2021-10-29 ASSESSMENT — ACTIVITIES OF DAILY LIVING (ADL)
ADLS_ACUITY_SCORE: 7

## 2021-10-29 NOTE — PLAN OF CARE
[phone] Nursing shift Report:  Maria Elena appeared to sleep on/off throughout the shift. VSS, medicated x1 with SL Zofran for c/o nausea with relief. Up to the bathroom x2 voiding without difficulty. Staple to incision CDI. Large BERKLEY to suction draining moderate amount of serosanguinous. Will continue to monitor and report any significant changes.

## 2021-10-29 NOTE — PHARMACY-TRANSPLANT NOTE
Solid Organ Transplant Recipient Prior to Discharge Note    51 year old female s/p liver transplant on 10/17/2021. She had acute cellular rejection of liver 10/26/2021 treated with 3 days of methylprednisolone 500mg IV and then steroid taper.    Tacrolimus level today was 6.7 mcg/L (13hr trough); dose increased 10/28/2021 to 4mg po BID.  Tacrolimus goal trough is 10-12 mcg/L    Pharmacy has monitored for medication interactions and immunosuppression levels in conjunction with the multidisciplinary team. In anticipation for discharge, medication therapy needs have been addressed daily throughout the current admission via multidisciplinary rounds and/or discussions, order verification, daily clinical pharmacy review, and communication with prescribers.  Monica Castañeda, Pharm.D., [facility]  Pager [phone]

## 2021-10-29 NOTE — PROGRESS NOTES
"IP Diabetes Management  Daily Note           Assessment and Plan:   HPI: Sarah Fisher is a 51 year old female with PMH significant for 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 Morrison, now on second round high-dose steroids, for ACR, and experiencing hyperglycemia to 300s.    First round high dose (induction-- methylprednisolone 1gm, 200 mg, 100 mg, 50  Mg, then pred) steroid hyperglycemia managed with IV insulin (as high as 8 units/h, then IV insulin need resolved by time taper to 100 mg).  Methylpred 500 mg started PM 10/26--> BG to 300s AM 10/27 and persisted there until late afternoon.  Today, BG improved to around 200.    Assessment:   1) Steroid induced hyperglycemia     Methylpred course completed yesterday, and beginning Prednisone taper- note QID dosing of prednisone in the first two steps.    Meds and Supplies that need to be ordered for discharge  Medications and supplies are to be ordered by primary service on discharge.   *please use the DIAB non-branded discharge supply order set ([id number])*     -patient will need the following supplies: Novolog Flexpens, \"BD\" (32G x 4mm) insulin pen needles, Cecy Contour Next glucometer, Cecy microlet lancets, and test strips, sharps container, alcohol swabs.       Plan for discharge: copied and pasted to discharge AVS    10/29: Prednisone 50 mg four times daily  -Novolog with meals: 1 unit per 15 grams of carbohydrate  -Novolog sliding scale with meals:   BG less than 140: no sliding scale addition needed  -190: 1 units  -240: 2 units  BG over 240: 3 units    10/30: Prednisone 40 mg four times daily  Novolog with meals: 1 unit per 20 grams of carbohydrate  Novolog sliding scale with meals:   BG less than 140: no sliding scale addition needed  -190: 1 units  -240: 2 " units  BG over 240: 3 units      10/31: Prednisone 40 mg two times daily  Novolog with meals: 1 unit per 30 grams of carbohydrate  Novolog sliding scale with meals:   BG less than 140: no sliding scale addition needed  -190: 1 units  -240: 2 units  BG over 240: 3 units      11/1: Prednisone 20 mg two times daily  Can STOP the Novolog with meals  Continue the Novolog sliding scale with meals, only if blood glucose is elevated   Novolog sliding scale with meals:   BG less than 140: no sliding scale addition needed  -190: 1 units  -240: 2 units  BG over 240: 3 units    11/2: Prednisone 20 mg daily  Continue the Novoog sliding scale with meals, only if blood glucose is elevated:  BG less than 140: no sliding scale addition needed  -190: 1 units  -240: 2 units  BG over 240: 3 units      Outpatient follow up: with primary provider for review of BG following steroid taper.  Plan discussed with patient, bedside RN, and primary team.        Interval History and Assessment: interval glucose trend reviewed:    AM BG within target, no basal insulin on board. Yesterday, mid afternoon, missed coverage for an orange juice.     Received PO Pred early this AM. 3 units of carb coverage with breakfast and pre lunch .     No follow up BMP done last evening. This AM, Cr 2.66 and GFR 20.         CDE to see today for glucometer and insulin teaching.   Maria Elena felt comfortable with carb counting after providing some examples. Reviewed that insulin need will likely only be for a couple days post discharge.     Current nutritional intake and type: Orders Placed This Encounter      Regular Diet Adult    Diabetes Type: steriod- induced  Glucose Control:   A1C not necessarily reliable given ESRD but pt reports no previous hx diabetes        Lab Results   Component Value Date     A1C 5.1 10/19/2021     A1C 4.2 03/17/2021         Discharge Planning: today or tomorrow.            Diabetes History:   Type of  Diabetes: steroid induced hyperglycemia   Lab Results   Component Value Date    A1C 5.1 10/19/2021    A1C 4.2 03/17/2021              Review of Systems:     The Review of Systems is negative other than noted in the Interval History.           Medications:     Current Facility-Administered Medications   Medication     aspirin (ASA) tablet 325 mg     bisacodyl (DULCOLAX) Suppository 10 mg     dextrose 10% infusion     glucose gel 15-30 g    Or     dextrose 50 % injection 25-50 mL    Or     glucagon injection 1 mg     heparin ANTICOAGULANT injection 5,000 Units     insulin aspart (NovoLOG) injection (RAPID ACTING)     insulin aspart (NovoLOG) injection (RAPID ACTING)     insulin aspart (NovoLOG) injection (RAPID ACTING)     insulin aspart (NovoLOG) injection (RAPID ACTING)     lactated ringers infusion     levothyroxine (SYNTHROID/LEVOTHROID) tablet 50 mcg     melatonin tablet 3 mg     methocarbamol (ROBAXIN) tablet 500 mg     mycophenolate (GENERIC EQUIVALENT) capsule 750 mg     naloxone (NARCAN) injection 0.2 mg    Or     naloxone (NARCAN) injection 0.4 mg    Or     naloxone (NARCAN) injection 0.2 mg    Or     naloxone (NARCAN) injection 0.4 mg     nystatin (MYCOSTATIN) suspension 500,000 Units     ondansetron (ZOFRAN-ODT) ODT tab 4 mg    Or     ondansetron (ZOFRAN) injection 4 mg     oxymetazoline (AFRIN) 0.05 % spray 2 spray     pantoprazole (PROTONIX) EC tablet 40 mg     polyethylene glycol (MIRALAX) Packet 17 g     polyethylene glycol (MIRALAX) Packet 17 g     predniSONE (DELTASONE) tablet 50 mg    Followed by     [START ON 10/30/2021] predniSONE (DELTASONE) tablet 40 mg    Followed by     [START ON 10/31/2021] predniSONE (DELTASONE) tablet 40 mg    Followed by     [START ON 11/1/2021] predniSONE (DELTASONE) tablet 20 mg    Followed by     [START ON 11/2/2021] predniSONE (DELTASONE) tablet 20 mg     prochlorperazine (COMPAZINE) injection 5 mg     Reason beta blocker order not selected     senna-docusate  (SENOKOT-S/PERICOLACE) 8.6-50 MG per tablet 1 tablet     sodium chloride (OCEAN) 0.65 % nasal spray 1 spray     sodium chloride (PF) 0.9% PF flush 3 mL     sodium chloride (PF) 0.9% PF flush 3 mL     sodium chloride (PF) 0.9% PF flush 3 mL     sulfamethoxazole-trimethoprim (BACTRIM) 400-80 MG per tablet 1 tablet     tacrolimus (GENERIC EQUIVALENT) capsule 4 mg     traMADol (ULTRAM) tablet 50 mg     traZODone (DESYREL) tablet 50 mg     ursodiol (ACTIGALL) capsule 300 mg     valGANciclovir (VALCYTE) tablet 450 mg            Physical Exam:    BP (!) 161/86 (BP Location: Left arm)   Pulse 72   Temp 97.8  F (36.6  C) (Oral)   Resp 18   Wt 74 kg (163 lb 2.3 oz)   SpO2 97%   BMI 25.55 kg/m    General: pleasant, in no distress, sitting up in bed. Appears thin and fraile, with mild jaundice   HEENT: normocephalic, atraumatic. Oral mucous membranes moist.   Lungs: unlabored respiration, no cough  ABD: rounded, nondistended  Skin: warm and dry, no obvious lesions  MSK:  moves all extremities  Lymp:  no LE edema   Mental status:  alert, oriented to self, place, time  Psych:  bright affect, calm and appropriate interaction             Data:     Recent Labs   Lab 10/29/21  1246 10/29/21  0735 10/29/21  0637 10/28/21  2158 10/28/21  1753 10/28/21  1705   * 134* 123* 165* 254* 254*     Lab Results   Component Value Date    WBC 9.9 10/29/2021    WBC 11.0 10/28/2021    WBC 11.7 (H) 10/28/2021    HGB 7.9 (L) 10/29/2021    HGB 7.7 (L) 10/28/2021    HGB 6.9 (LL) 10/28/2021    HCT 24.7 (L) 10/29/2021    HCT 23.8 (L) 10/28/2021    HCT 20.9 (L) 10/28/2021    MCV 96 10/29/2021    MCV 94 10/28/2021    MCV 96 10/28/2021     10/29/2021     (L) 10/28/2021     (L) 10/28/2021     Lab Results   Component Value Date     10/29/2021     10/28/2021     10/28/2021    POTASSIUM 3.9 10/29/2021    POTASSIUM 4.0 10/28/2021    POTASSIUM 3.6 10/28/2021    CHLORIDE 102 10/29/2021    CHLORIDE 101 10/28/2021     CHLORIDE 103 10/28/2021    CO2 22 10/29/2021    CO2 21 10/28/2021    CO2 24 10/28/2021     (H) 10/29/2021     (H) 10/29/2021     (H) 10/29/2021     Lab Results   Component Value Date    BUN 70 (H) 10/28/2021    BUN 61 (H) 10/28/2021    BUN 49 (H) 10/27/2021     Lab Results   Component Value Date    TSH 4.58 (H) 08/31/2021    TSH 5.38 (H) 03/17/2021    TSH 9.00 (H) 01/04/2021     Lab Results   Component Value Date    AST 5 10/28/2021    AST 20 10/27/2021    AST 14 10/26/2021    ALT 27 10/28/2021    ALT 35 10/27/2021    ALT 46 10/26/2021    ALKPHOS 252 (H) 10/28/2021    ALKPHOS 327 (H) 10/27/2021    ALKPHOS 259 (H) 10/26/2021       35 minutes spent on the date of the encounter doing chart review, history and exam, documentation and further activities per the note      Over 50% of my time on the unit was spent counseling the patient and/or coordinating care regarding acute hyperglycemia management.  See note for details.    To contact Endocrine Diabetes service:   From 8AM-4PM: page inpatient diabetes provider that is following the patient  For questions or updates from 4PM-8AM: page the diabetes job code for on call fellow: [phone]    Soledad Carrizales PA-C  Inpatient Diabetes Management Service  Pager [phone]

## 2021-10-29 NOTE — PROGRESS NOTES
Posted to discharge AVS    10/29: Prednisone 50 mg four times daily  Novolog with meals: 1 unit per 15 grams of carbohydrate  Novolog sliding scale with meals:   BG less than 140: no sliding scale addition needed  -190: 1 units  -240: 2 units  BG over 240: 3 units      10/30: Prednisone 40 mg four times daily  Novolog with meals: 1 unit per 20 grams of carbohydrate  Novolog sliding scale with meals:   BG less than 140: no sliding scale addition needed  -190: 1 units  -240: 2 units  BG over 240: 3 units      10/31: Prednisone 40 mg two times daily  Novolog with meals: 1 unit per 30 grams of carbohydrate  Novolog sliding scale with meals:   BG less than 140: no sliding scale addition needed  -190: 1 units  -240: 2 units  BG over 240: 3 units      11/1: Prednisone 20 mg two times daily  Can STOP the Novolog with meals  Continue the Novolog sliding scale with meals, only if blood glucose is elevated   Novolog sliding scale with meals:   BG less than 140: no sliding scale addition needed  -190: 1 units  -240: 2 units  BG over 240: 3 units      11/2: Prednisone 20 mg daily  Continue the Novoog sliding scale with meals, only if blood glucose is elevated:  BG less than 140: no sliding scale addition needed  -190: 1 units  -240: 2 units  BG over 240: 3 units      Soledad Carrizales PA-C  Diabetes Management Service  Pager [phone]

## 2021-10-29 NOTE — PLAN OF CARE
BP (!) 153/83 (BP Location: Right arm)   Pulse 76   Temp 98.1  F (36.7  C) (Oral)   Resp 18   Wt 74 kg (163 lb 2.3 oz)   SpO2 97%   BMI 25.55 kg/m      Shift: 0094-8021  Isolation Status: NA  VS: hypertensive, but otherwise WDL on RA, afebrile  Neuro: Aox4  Behaviors: cooperative with cares, able to make her needs known  BG: AC/HS and carb coverage.  254, 165,  treated with sliding scale insulin as ordered.  Labs: Cr 2.68, hgb 7.7 following transfusion  Respiratory: Clear lung sounds, no shortness of breath  Cardiac: regular rhythm/rate  Pain/Nausea/PRN: Denied pain.  Nauseous.  PRN Zofran x1  Diet: Regular diet, fair appetite  LDA: internal jugular removed, LUE PIV, RLQ BERKLEY to bulb suction with 225 mL serous output.  Infusion(s): LR at 100 mL/hour,   GI/: soft Bms, voiding without difficulty  Skin: clamshell incision closed and approximated with staples, no drainage.  Scattered bruising  Mobility: Independent    Med card up to date.  Lab book nearly up to date.    Plan: Possible discontinue 10/29

## 2021-10-29 NOTE — PROGRESS NOTES
Transplant Social Work Service Discharge Note      Patient Name:  Sarah Fisher     Anticipated Discharge Date:  10/29/21    Discharge Disposition:   Other:  Home to [facility]    Plan for 24 hour care for immediate post transplant period: She lives with her Niece and 11 y/o son. Niece will provide 24-hour support to patient when she discharges today    If not local, plans for short term stay:  She lives locally.    Additional Services/Equipment Arranged:  None     Persons notified of above discharge plan:  Patient who has notified her family    Patient / Family response to discharge plan:  Reports she is excited to return to her home environment and see her son.    Education and resources provided by  at discharge: role of transplant  in out patient setting and provided contact info for , availability of support groups, and how to write a letter to the donor family    Discussed anticipated pharmacy out of pocket costs: YES    Provided [employer] Donor Letter Writing packet : YES    Plan: Maria Elena plans on discharging home today with her Niece and 11 y/o son. Coping well post-transplant. 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

## 2021-10-29 NOTE — PROGRESS NOTES
Care Management Discharge Note    Discharge Date: 10/29/2021       Discharge Disposition: Home Care    Discharge Services:      Discharge DME: Walker    Discharge Transportation: family or friend will provide    Education Provided on the Discharge Plan:  Yes  Persons Notified of Discharge Plans: Patient  Patient/Family in Agreement with the Plan: yes    Handoff Referral Completed: Yes    Additional Information:  Patient cleared to discharge home today.  Provider would like labs drawn on Saturday - CBC and BMP.  [facility] appointment confirmed for 10/30 at 3 p.m..  Per discussion with [facility], [facility] all have walk in lab availability 7 days a week.    Reviewed above with [facility].  Met with pt and updated on lab request/appointment.  Pt notes no concerns or questions.    Eleonora Cho RN BSN, PHN, ACM-RN  7A RN Care Coordinator  Phone: [phone]  Pager [phone]  To contact the weekend RNCC, Page: [phone]    10/29/2021 12:37 PM

## 2021-10-29 NOTE — PROGRESS NOTES
CLINICAL NUTRITION SERVICES - DISCHARGE NOTE    Patient s discharge needs assessed and discharge planning has been conducted with the multidisciplinary transplant care team including physicians, pharmacy, social work and transplant coordinator.    Follow up/Monitoring:  Once discharged, place outpatient nutrition consult via the transplant team if nutrition concerns arise.    Elyssa Verdin MS, RD, LD, CCTD  7A/Obs units, pager [phone]

## 2021-10-29 NOTE — PROGRESS NOTES
Calorie Count  Intake recorded for: 10/28  Total Kcals: 1232 Total Protein: 60g  Kcals from Hospital Food: 1232  Protein: 60g  Kcals from Outside Food (average):0 Protein: 0g  # Meals Ordered from Kitchen: 3 meals   # Meals Recorded: 3 meals (First - 100% 2 quarles strips, 2 slices of wheat toast w/ peanut butter, 2 orange juices, 50% Greek yogurt, peaches)      (Second - 75% baked chicken and mashed potatoes w/ gravy      (Third - 100% chicken noodle soup w/ saltine crackers, 50% deli ham & cheese sandwich)  # Supplements Recorded: 0

## 2021-10-29 NOTE — CONSULTS
Diabetes Educator consult received for Sarah Fisher, age 51, PMH significant for 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 Morrison, now on second round high-dose steroids, for ACR, and experiencing hyperglycemia to 300s.  To discharge today with steroid induced hyperglycemia and need for monitoring of glucose, insulin administration, and steroid taper.    Maria Elena was sitting up on side of bed.  Eager to participate in education.   Education provided included:  1.  What steroid induced hyperglycemia is, insulin resistance associated with steroids.  2.  Target glucose ranges; what euglycemia is.  3.  Self-blood glucose monitoring with Cecy Contour Next One glucose meter, Cecy Contour Next test strips and Microlet lancets. Meter in starter kit provided to patient. Discussed frequency of testing 3 times daily before meals and at bedtime.  Demonstrated use of lancing device, fingerstick, testing, set-up and use of memory, safe sharps disposal.  Patient was able to set up meter independently, perform fingerstick without difficult which was 154 mg/dL at about 11:20 am.  She does have tremors in her hands which she states started with the steroids.  Reviewed with her a couple of ways to place meter so that she can easily apply blood to tip of test strip.  4.  Action, peak, dosage and duration of Novolog insulin.  Concept of physiologic insulin, basal/bolus, and that she will only be taking bolus or rapid acting insulin.  Insulin plan reviewed provided by [facility], Inpatient Diabetes Service:    10/29: Prednisone 50 mg four times daily  -Novolog with meals: 1 unit per 15 grams of carbohydrate  -Novolog sliding scale with meals:   BG less than 140: no sliding scale addition needed  -190: 1 units  -240: 2 units  BG over 240: 3  units     10/30: Prednisone 40 mg four times daily  Novolog with meals: 1 unit per 20 grams of carbohydrate  Novolog sliding scale with meals:   BG less than 140: no sliding scale addition needed  -190: 1 units  -240: 2 units  BG over 240: 3 units        10/31: Prednisone 40 mg two times daily  Novolog with meals: 1 unit per 30 grams of carbohydrate  Novolog sliding scale with meals:   BG less than 140: no sliding scale addition needed  -190: 1 units  -240: 2 units  BG over 240: 3 units        11/1: Prednisone 20 mg two times daily  Can STOP the Novolog with meals  Continue the Novolog sliding scale with meals, only if blood glucose is elevated   Novolog sliding scale with meals:   BG less than 140: no sliding scale addition needed  -190: 1 units  -240: 2 units  BG over 240: 3 units     11/2: Prednisone 20 mg daily  Continue the Novoog sliding scale with meals, only if blood glucose is elevated:  BG less than 140: no sliding scale addition needed  -190: 1 units  -240: 2 units  BG over 240: 3 units    Practiced several scenarios calculating amounts of insulin to be taken.  She understands the instructions with the steroid taper and is clear on it.    5.  Insulin Injection technique with Novolog training pen, injection pad, and B-d 4 mm byron pen needles.  Demonstrated full use of pen including priming of pen needle, safe removal of pen needle.  With tremors, she still was able to do this successfully.  Reviewed site selection and rotation, storage of insulin, and actual injection technique.  6.  Signs/symptoms/causes/treatment of hypoglycemia.  Rule of 15, items that contain 15 grams.  Low < 70 mg/dL.  Notify MD if this is occurring .  7.  Resources:  Understanding Diabetes Basics, Site Selection and Rotation.  8.  When to call MD.    Informed IDS, RN, CC, and transplant team safe to discharge with this information.    ROBYN Ledesma  Diabetes Clinical Nurse  Specialist/CDE  [phone]    Addendum:  Please order for discharge:  Cecy Contour Next test strips   Cecy Microlet lancets   B-D 4 mm byron pen needles   Novolog flexpens   Alcohol wipes   Sharps container

## 2021-10-30 ENCOUNTER — APPOINTMENT (OUTPATIENT)
Dept: LAB | Facility: CLINIC | Age: 51
End: 2021-10-30
Payer: COMMERCIAL

## 2021-10-30 PROCEDURE — 80197 ASSAY OF TACROLIMUS: CPT | Performed by: TRANSPLANT SURGERY

## 2021-10-31 LAB — ZINC SERPL-MCNC: 48.4 UG/DL

## 2021-11-01 ENCOUNTER — TELEPHONE (OUTPATIENT)
Dept: TRANSPLANT | Facility: CLINIC | Age: 51
End: 2021-11-01

## 2021-11-01 ENCOUNTER — OFFICE VISIT (OUTPATIENT)
Dept: NEPHROLOGY | Facility: CLINIC | Age: 51
End: 2021-11-01
Attending: INTERNAL MEDICINE
Payer: COMMERCIAL

## 2021-11-01 ENCOUNTER — LAB (OUTPATIENT)
Dept: LAB | Facility: CLINIC | Age: 51
End: 2021-11-01
Payer: COMMERCIAL

## 2021-11-01 ENCOUNTER — PATIENT OUTREACH (OUTPATIENT)
Dept: GASTROENTEROLOGY | Facility: CLINIC | Age: 51
End: 2021-11-01

## 2021-11-01 ENCOUNTER — PREP FOR PROCEDURE (OUTPATIENT)
Dept: GASTROENTEROLOGY | Facility: CLINIC | Age: 51
End: 2021-11-01

## 2021-11-01 VITALS
SYSTOLIC BLOOD PRESSURE: 167 MMHG | HEART RATE: 84 BPM | DIASTOLIC BLOOD PRESSURE: 94 MMHG | OXYGEN SATURATION: 98 % | BODY MASS INDEX: 25.53 KG/M2 | WEIGHT: 163 LBS

## 2021-11-01 DIAGNOSIS — M79.89 LEG SWELLING: Primary | ICD-10-CM

## 2021-11-01 DIAGNOSIS — E03.8 SUBCLINICAL HYPOTHYROIDISM: ICD-10-CM

## 2021-11-01 DIAGNOSIS — Z94.4 LIVER TRANSPLANTED (H): ICD-10-CM

## 2021-11-01 DIAGNOSIS — K83.1 BILIARY STRICTURE (H): Primary | ICD-10-CM

## 2021-11-01 DIAGNOSIS — Z94.4 LIVER REPLACED BY TRANSPLANT (H): ICD-10-CM

## 2021-11-01 LAB
ALBUMIN SERPL-MCNC: 3 G/DL (ref 3.4–5)
ALBUMIN UR-MCNC: 30 MG/DL
ALP SERPL-CCNC: 188 U/L (ref 40–150)
ALT SERPL W P-5'-P-CCNC: 64 U/L (ref 0–50)
ANION GAP SERPL CALCULATED.3IONS-SCNC: 8 MMOL/L (ref 3–14)
APPEARANCE UR: CLEAR
AST SERPL W P-5'-P-CCNC: 27 U/L (ref 0–45)
BILIRUB DIRECT SERPL-MCNC: 1 MG/DL (ref 0–0.2)
BILIRUB SERPL-MCNC: 1.3 MG/DL (ref 0.2–1.3)
BILIRUB UR QL STRIP: NEGATIVE
BUN SERPL-MCNC: 78 MG/DL (ref 7–30)
CALCIUM SERPL-MCNC: 8.8 MG/DL (ref 8.5–10.1)
CHLORIDE BLD-SCNC: 106 MMOL/L (ref 94–109)
CO2 SERPL-SCNC: 24 MMOL/L (ref 20–32)
COLOR UR AUTO: YELLOW
CREAT SERPL-MCNC: 2.1 MG/DL (ref 0.52–1.04)
ERYTHROCYTE [DISTWIDTH] IN BLOOD BY AUTOMATED COUNT: 18.4 % (ref 10–15)
GFR SERPL CREATININE-BSD FRML MDRD: 27 ML/MIN/1.73M2
GLUCOSE BLD-MCNC: 183 MG/DL (ref 70–99)
GLUCOSE UR STRIP-MCNC: NEGATIVE MG/DL
HCT VFR BLD AUTO: 29.5 % (ref 35–47)
HGB BLD-MCNC: 9.5 G/DL (ref 11.7–15.7)
HGB UR QL STRIP: NEGATIVE
HYALINE CASTS: 3 /LPF
KETONES UR STRIP-MCNC: NEGATIVE MG/DL
LEUKOCYTE ESTERASE UR QL STRIP: NEGATIVE
MAGNESIUM SERPL-MCNC: 2 MG/DL (ref 1.6–2.3)
MCH RBC QN AUTO: 31.3 PG (ref 26.5–33)
MCHC RBC AUTO-ENTMCNC: 32.2 G/DL (ref 31.5–36.5)
MCV RBC AUTO: 97 FL (ref 78–100)
MUCOUS THREADS #/AREA URNS LPF: PRESENT /LPF
NITRATE UR QL: NEGATIVE
PH UR STRIP: 5 [PH] (ref 5–7)
PHOSPHATE SERPL-MCNC: 4.6 MG/DL (ref 2.5–4.5)
PLATELET # BLD AUTO: 227 10E3/UL (ref 150–450)
POTASSIUM BLD-SCNC: 4.4 MMOL/L (ref 3.4–5.3)
PROT SERPL-MCNC: 5.9 G/DL (ref 6.8–8.8)
RBC # BLD AUTO: 3.04 10E6/UL (ref 3.8–5.2)
RBC URINE: <1 /HPF
SODIUM SERPL-SCNC: 138 MMOL/L (ref 133–144)
SP GR UR STRIP: 1.01 (ref 1–1.03)
SQUAMOUS EPITHELIAL: <1 /HPF
TACROLIMUS BLD-MCNC: 5.9 UG/L (ref 5–15)
TME LAST DOSE: NORMAL H
TME LAST DOSE: NORMAL H
UROBILINOGEN UR STRIP-MCNC: NORMAL MG/DL
WBC # BLD AUTO: 11 10E3/UL (ref 4–11)
WBC URINE: 1 /HPF

## 2021-11-01 PROCEDURE — 36415 COLL VENOUS BLD VENIPUNCTURE: CPT | Performed by: PATHOLOGY

## 2021-11-01 PROCEDURE — 85027 COMPLETE CBC AUTOMATED: CPT | Performed by: PATHOLOGY

## 2021-11-01 PROCEDURE — 80053 COMPREHEN METABOLIC PANEL: CPT | Performed by: PATHOLOGY

## 2021-11-01 PROCEDURE — G0463 HOSPITAL OUTPT CLINIC VISIT: HCPCS

## 2021-11-01 PROCEDURE — 84100 ASSAY OF PHOSPHORUS: CPT | Performed by: PATHOLOGY

## 2021-11-01 PROCEDURE — 83735 ASSAY OF MAGNESIUM: CPT | Performed by: PATHOLOGY

## 2021-11-01 PROCEDURE — 82248 BILIRUBIN DIRECT: CPT | Performed by: PATHOLOGY

## 2021-11-01 PROCEDURE — 99204 OFFICE O/P NEW MOD 45 MIN: CPT

## 2021-11-01 PROCEDURE — 80197 ASSAY OF TACROLIMUS: CPT | Mod: 90 | Performed by: PATHOLOGY

## 2021-11-01 PROCEDURE — 99000 SPECIMEN HANDLING OFFICE-LAB: CPT | Performed by: PATHOLOGY

## 2021-11-01 PROCEDURE — 81001 URINALYSIS AUTO W/SCOPE: CPT | Performed by: PATHOLOGY

## 2021-11-01 RX ORDER — FUROSEMIDE 20 MG
20 TABLET ORAL DAILY
Qty: 60 TABLET | Refills: 0 | Status: SHIPPED | OUTPATIENT
Start: 2021-11-01 | End: 2021-12-03

## 2021-11-01 ASSESSMENT — PAIN SCALES - GENERAL: PAINLEVEL: NO PAIN (0)

## 2021-11-01 NOTE — TELEPHONE ENCOUNTER
Maria Elena is a post-liver transplant patient who discharged 10/29/21.    Radha, her niece, will be in charge of the medications, and provide the majority of care. Radha lives with Maria Elena.    Patient will use local pharmacy or other mail order for outpatient medications- by choice (not insurance restriction). I gave her [facility] in case she changes her mind.    Feli Tabares [facility]  [phone]

## 2021-11-01 NOTE — PLAN OF CARE
Physical Therapy Discharge Summary    Reason for therapy discharge:    Discharged to home.  All goals and outcomes met, no further needs identified.    Progress towards therapy goal(s). [facility] for goal details.  Goals met    Therapy recommendation(s):    No further therapy is recommended.

## 2021-11-01 NOTE — NURSING NOTE
Chief Complaint   Patient presents with     RECHECK     kidney tx follow up        BP (!) 167/94   Pulse 84   Wt 73.9 kg (163 lb)   SpO2 98%   BMI 25.53 kg/m        Shreya DELEON CMA

## 2021-11-01 NOTE — TELEPHONE ENCOUNTER
Post ERCP (10-25-21) with Dr. Fenton: Follow-up    Post procedure recommendations:   Repeat ERCP in 4 weeks to further re-evaluate the                        biliary system and exchange stent.     Orders placed:   Please assist in scheduling:     Procedure/Imaging/Clinic: ERCP  Physician: Dr. Fenton  Timin/29  Procedure length:90 min  Anesthesia:gen  Dx: biliary stricture  Tier:2  Location: [facility]       Patient states: feels ok, is following closely with transplant    Clinic contact and scheduling numbers verified for future questions/concerns.    Areli Pringle, RN Care Coordinator

## 2021-11-01 NOTE — PROGRESS NOTES
TRANSPLANT NEPHROLOGY POST LIVER TRANSPLANT VISIT    Assessment & Plan   CORBIN after OLT  Immunosuppression   HTN   Volume overload   Differential swelling     Discussion:  At this point Maria Elena appears to be recovering her acute kidney injury.  She is hydrating well but she is dealing with peripheral edema.  Her blood pressure is supportive of diuretic use.  Our plan today is to start small dose of Lasix 20 mg daily with the goal to lose 1 to 2 pounds per day.  Once the swelling has resolved will discontinue Lasix.  Owing to the differential swelling we discussed the the need to obtain lower extremity ultrasound to rule out DVT.  In regards to her blood pressure control, we will assess periodically after diuresis.  Her repeat blood pressure in the clinic was 140/90.  We will repeat her UA today and will continue to monitor and follow along with you.  I invited Maria Elena to the clinic in 2 to 3 days for follow-up.  At this point if she continues to show stability and improvement in her renal functions will discharge her from the acute kidney clinic.    Return visit: Return in about 3 days (around 11/4/2021) for in person.    Chava Jordan MD    Chief Complaint   Ms. Fisher is a 51 year old here for routine follow up and acute kidney injury following liver transplantation.     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  She underwent orthotopic liver transplantation about 2 weeks ago.  She had some kidney injury that appears to be improving.  She returns to the clinic today accompanied by her daughter.  She is taking her medications regularly.  Her appetite is improving.  She specifically denied fevers chills nausea vomiting or diarrhea.  She is off her stool softeners.  She is hydrating with over 2 L/day.  She has significant lower extremity edema right greater than left all the way to the thigh.  She specifically denied calf pain however with the recent  surgery and the hospitalization we discussed the need to rule out deep venous thrombosis.  Maria Elena specifically denied any history of longstanding diabetes prior to her transplantation.  Had no issues with high blood pressure.  Currently she is using sliding scale insulin due to elevated sugar in the settings of steroid use.  She is able to void without any trouble.  She specifically denied over-the-counter medication use including NSAIDs.    Home BP: Controlled 130-140/80-90 mmHg.    Problem List   Patient Active Problem List   Diagnosis     Acute deep vein thrombosis (DVT) of upper extremity (H)     Acute hepatic encephalopathy     Acute hypoxemic respiratory failure (H)     Acute kidney failure, unspecified (H)     Carrier of group B Streptococcus     Cirrhosis of liver with ascites (H)     CKD (chronic kidney disease)     Elevated blood pressure     Family history of colon cancer     Gastroesophageal reflux disease without esophagitis     GI (gastrointestinal bleed)     Hyponatremia     Hypokalemia     History of DVT (deep vein thrombosis)     Anemia associated with acute blood loss     Acute anemia     Need for vaccination for viral hepatitis     Macrocytosis without anemia     Knee pain     Pancytopenia (H)     Macrocytic anemia     Blood loss anemia     Hydrothorax     Portal hypertensive gastropathy (H)     Pleural effusion     Screening for malignant neoplasm of cervix     Venous incompetence     Varicose veins of leg with pain     Supervision of high-risk pregnancy of elderly primigravida     Subclinical hypothyroidism     Liver failure (H)     Hypotension     Pre-liver transplant, listed     Alcoholic cirrhosis (H)     Abnormal serum iron level     Iron deficiency anemia due to chronic blood loss     Encounter for immunization     Liver transplant candidate     Liver transplant recipient (H)     Immunosuppressed status (H)     Malnutrition (H)     Steroid-induced hyperglycemia     Epistaxis     Esophagitis      Duodenal erosion     Gastric antral vascular ectasia     Hyperphosphatemia     Leukocytosis       Allergies   Allergies   Allergen Reactions     Amoxicillin GI Disturbance, Diarrhea, Nausea and Nausea and Vomiting       Medications   Current Outpatient Medications   Medication Sig     alcohol swab prep pads Use to swab area of injection/fina as directed.     aspirin (ASA) 325 MG tablet Take 1 tablet (325 mg) by mouth daily     blood glucose (CONTOUR NEXT TEST) test strip Use to test blood sugar 3 times daily.     blood glucose (NO BRAND SPECIFIED) lancets standard To use to test glucose level in the blood Use to test blood sugar 3 times daily as directed. To accompany glucose monitor brands per insurance coverage.     insulin aspart (NOVOLOG PEN) 100 UNIT/ML pen Inject 1-10 Units Subcutaneous 3 times daily (before meals) See AVS (too long to print in script). Dose to decrease with decreasing steroid dose     insulin pen needle (32G X 4 MM) 32G X 4 MM miscellaneous Use as directed by provider Per insurance coverage     levothyroxine (SYNTHROID/LEVOTHROID) 50 MCG tablet Take 50 mcg by mouth daily     methocarbamol (ROBAXIN) 500 MG tablet Take 1 tablet (500 mg) by mouth every 8 hours as needed for muscle spasms     mycophenolate (GENERIC EQUIVALENT) 250 MG capsule Take 3 capsules (750 mg) by mouth 2 times daily     ondansetron (ZOFRAN-ODT) 4 MG ODT tab Take 1 tablet (4 mg) by mouth every 6 hours as needed for nausea or vomiting     pantoprazole (PROTONIX) 40 MG EC tablet Take 1 tablet (40 mg) by mouth 2 times daily     polyethylene glycol (MIRALAX) 17 GM/Dose powder Take 17 g by mouth daily     predniSONE (DELTASONE) 10 MG tablet Take 5 tablets (50 mg) by mouth 4 times daily for 1 day, THEN 4 tablets (40 mg) 4 times daily for 1 day, THEN 4 tablets (40 mg) 2 times daily for 1 day, THEN 2 tablets (20 mg) 2 times daily for 1 day, THEN 2 tablets (20 mg) daily for 1 day.     predniSONE (DELTASONE) 20 MG tablet Take 2  tablets (40 mg) by mouth 2 times daily for 2 doses     predniSONE (DELTASONE) 20 MG tablet Take 1 tablet (20 mg) by mouth 2 times daily for 2 doses     [START ON 11/3/2021] predniSONE (DELTASONE) 5 MG tablet Take 1 tablet (5 mg) by mouth daily     senna-docusate (SENOKOT-S/PERICOLACE) 8.6-50 MG tablet 1 tablet by Oral or Feeding Tube route 2 times daily     Sharps Container MISC Use as directed to dispose of needles, lancets and other sharps Per Insurance coverage     sulfamethoxazole-trimethoprim (BACTRIM) 400-80 MG tablet 1 tablet by Oral or Feeding Tube route daily     tacrolimus (GENERIC EQUIVALENT) 1 MG capsule Take 4 capsules (4 mg) by mouth 2 times daily     traMADol (ULTRAM) 50 MG tablet Take 1 tablet (50 mg) by mouth every 8 hours as needed for moderate pain     traZODone (DESYREL) 50 MG tablet Take 1 tablet (50 mg) by mouth nightly as needed for sleep     ursodiol (ACTIGALL) 300 MG capsule Take 1 capsule (300 mg) by mouth 2 times daily     valGANciclovir (VALCYTE) 450 MG tablet Take 1 tablet (450 mg) by mouth daily     No current facility-administered medications for this visit.     There are no discontinued medications.    Physical Exam   Vital Signs: There were no vitals taken for this visit.    GENERAL APPEARANCE: alert and no distress  HENT: mouth without ulcers or lesions  LYMPHATICS: no cervical or supraclavicular nodes  RESP: lungs clear to auscultation - no rales, rhonchi or wheezes  CV: regular rhythm, normal rate, no rub, no murmur  EDEMA: no LE edema bilaterally  ABDOMEN: soft, nondistended, nontender, bowel sounds normal  MS: extremities normal - no gross deformities noted, no evidence of inflammation in joints, no muscle tenderness  SKIN: no rash    Data     Renal Latest Ref Rng & Units 11/1/2021 10/30/2021 10/29/2021   Na 133 - 144 mmol/L 138 135 -   K 3.4 - 5.3 mmol/L 4.4 4.2 -   Cl 94 - 109 mmol/L 106 103 -   CO2 20 - 32 mmol/L 24 23 -   BUN 7 - 30 mg/dL 78(H) 79(H) -   Cr 0.52 - 1.04 mg/dL  2.10(H) 2.75(H) -   Glucose 70 - 99 mg/dL 183(H) 195(H) 140(H)   Ca  8.5 - 10.1 mg/dL 8.8 8.6 -   Mg 1.6 - 2.3 mg/dL 2.0 2.0 -     Bone Health Latest Ref Rng & Units 11/1/2021 10/30/2021 10/29/2021   Phos 2.5 - 4.5 mg/dL 4.6(H) 5.1(H) 4.6(H)   Vit D Def 20 - 75 ug/L - - -     Heme Latest Ref Rng & Units 11/1/2021 10/30/2021 10/29/2021   WBC 4.0 - 11.0 10e3/uL 11.0 10.9 9.9   Hgb 11.7 - 15.7 g/dL 9.5(L) 8.6(L) 7.9(L)   Plt 150 - 450 10e3/uL 227 206 153   ABSOLUTE NEUTROPHIL 1.6 - 8.3 10e3/uL - - -   ABSOLUTE LYMPHOCYTES 0.8 - 5.3 10e3/uL - - -   ABSOLUTE MONOCYTES 0.0 - 1.3 10e3/uL - - -   ABSOLUTE EOSINOPHILS 0.0 - 0.7 10e3/uL - - -   ABSOLUTE BASOPHILS 0.0 - 0.2 10e9/L - - -   ABS IMMATURE GRANULOCYTES 0 - 0.4 10e9/L - - -   ABSOLUTE NUCLEATED RBC - - - -     Liver Latest Ref Rng & Units 11/1/2021 10/30/2021 10/29/2021   AP 40 - 150 U/L 188(H) 196(H) 208(H)   AP (external) 50 - 136 IU/L - - -   TBili 0.2 - 1.3 mg/dL 1.3 1.2 1.6(H)   TBili (external) 0.2 - 1.2 mg/dL - - -   DBili 0.0 - 0.2 mg/dL 1.0(H) 1.1(H) 1.2(H)   DBili (external) 0.1 - 0.5 mg/dL - - -   ALT 0 - 50 U/L 64(H) 46 28   ALT (external) 8 - 45 IU/L - - -   AST 0 - 45 U/L 27 24 13   AST (external) 2 - 40 IU/L - - -   Tot Protein 6.8 - 8.8 g/dL 5.9(L) 5.9(L) 5.6(L)   Tot Protein (external) 6.0 - 8.0 g/dL - - -   Albumin 3.4 - 5.0 g/dL 3.0(L) 2.9(L) 2.7(L)   Albumin (external) 3.5 - 5.2 g/dL - - -     Pancreas Latest Ref Rng & Units 10/26/2021 10/25/2021 10/19/2021   A1C 0.0 - 5.6 % - - 5.1   Amylase 30 - 110 U/L - 37 -   Lipase 73 - 393 U/L 81 249 -     Iron studies Latest Ref Rng & Units 10/5/2021 9/28/2021 7/30/2021   Iron 35 - 180 ug/dL - - -   Iron sat 15 - 46 % - - -   Ferritin 8 - 252 ng/mL 186 280(H) 32     [facility] Txp Virology Latest Ref Rng & Units 10/16/2021 5/2/2021 1/4/2021   EBV CAPSID ANTIBODY IGG No detectable antibody. Positive(A) 2.5(H) 3.0(H)   Hep B Core NR:Nonreactive - Nonreactive -        Recent Labs   Lab Test 10/19/21  0542  10/20/21  0700 10/28/21  0622 10/29/21  0637 10/30/21  1033   DOSTAC 10/18/2021  --   --   --  10/29/2021   TACROL 14.1   < > 6.7 6.7 5.1    < > = values in this interval not displayed.

## 2021-11-01 NOTE — PLAN OF CARE
Occupational Therapy Discharge Summary    Reason for therapy discharge:    Discharged to home.    Progress towards therapy goal(s). See goals on Care Plan in [facility] electronic health record for goal details.  Goals partially met.  Barriers to achieving goals:   discharge from facility.    Therapy recommendation(s):    Continued therapy is recommended.  Rationale/Recommendations:  Recommend outpatient lymphedema therapy for long term management. Recommend assist from family for heavier IADLs to adhere to post-surgical abdominal precautions.

## 2021-11-01 NOTE — LETTER
11/1/2021     RE: Sarah Fisher  [address]  [facility] [address]     Dear Colleague,    Thank you for referring your patient, Sarah Fisher, to the [facility] [place] [facility]. Please see a copy of my visit note below.    TRANSPLANT NEPHROLOGY POST LIVER TRANSPLANT VISIT    Assessment & Plan   CORBIN after OLT  Immunosuppression   HTN   Volume overload   Differential swelling     Discussion:  At this point Maria Elena appears to be recovering her acute kidney injury.  She is hydrating well but she is dealing with peripheral edema.  Her blood pressure is supportive of diuretic use.  Our plan today is to start small dose of Lasix 20 mg daily with the goal to lose 1 to 2 pounds per day.  Once the swelling has resolved will discontinue Lasix.  Owing to the differential swelling we discussed the the need to obtain lower extremity ultrasound to rule out DVT.  In regards to her blood pressure control, we will assess periodically after diuresis.  Her repeat blood pressure in the clinic was 140/90.  We will repeat her UA today and will continue to monitor and follow along with you.  I invited Maria Elena to the clinic in 2 to 3 days for follow-up.  At this point if she continues to show stability and improvement in her renal functions will discharge her from the acute kidney clinic.    Return visit: Return in about 3 days (around 11/4/2021) for in person.    Chava Jordan MD    Chief Complaint   Ms. Fisher is a 51 year old here for routine follow up and acute kidney injury following liver transplantation.     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  She underwent orthotopic liver transplantation about 2 weeks ago.  She had some kidney injury that appears to be improving.  She returns to the clinic today accompanied by her daughter.  She is taking her medications  regularly.  Her appetite is improving.  She specifically denied fevers chills nausea vomiting or diarrhea.  She is off her stool softeners.  She is hydrating with over 2 L/day.  She has significant lower extremity edema right greater than left all the way to the thigh.  She specifically denied calf pain however with the recent surgery and the hospitalization we discussed the need to rule out deep venous thrombosis.  Maria Elena specifically denied any history of longstanding diabetes prior to her transplantation.  Had no issues with high blood pressure.  Currently she is using sliding scale insulin due to elevated sugar in the settings of steroid use.  She is able to void without any trouble.  She specifically denied over-the-counter medication use including NSAIDs.    Home BP: Controlled 130-140/80-90 mmHg.    Problem List   Patient Active Problem List   Diagnosis     Acute deep vein thrombosis (DVT) of upper extremity (H)     Acute hepatic encephalopathy     Acute hypoxemic respiratory failure (H)     Acute kidney failure, unspecified (H)     Carrier of group B Streptococcus     Cirrhosis of liver with ascites (H)     CKD (chronic kidney disease)     Elevated blood pressure     Family history of colon cancer     Gastroesophageal reflux disease without esophagitis     GI (gastrointestinal bleed)     Hyponatremia     Hypokalemia     History of DVT (deep vein thrombosis)     Anemia associated with acute blood loss     Acute anemia     Need for vaccination for viral hepatitis     Macrocytosis without anemia     Knee pain     Pancytopenia (H)     Macrocytic anemia     Blood loss anemia     Hydrothorax     Portal hypertensive gastropathy (H)     Pleural effusion     Screening for malignant neoplasm of cervix     Venous incompetence     Varicose veins of leg with pain     Supervision of high-risk pregnancy of elderly primigravida     Subclinical hypothyroidism     Liver failure (H)     Hypotension     Pre-liver transplant,  listed     Alcoholic cirrhosis (H)     Abnormal serum iron level     Iron deficiency anemia due to chronic blood loss     Encounter for immunization     Liver transplant candidate     Liver transplant recipient (H)     Immunosuppressed status (H)     Malnutrition (H)     Steroid-induced hyperglycemia     Epistaxis     Esophagitis     Duodenal erosion     Gastric antral vascular ectasia     Hyperphosphatemia     Leukocytosis       Allergies   Allergies   Allergen Reactions     Amoxicillin GI Disturbance, Diarrhea, Nausea and Nausea and Vomiting       Medications   Current Outpatient Medications   Medication Sig     alcohol swab prep pads Use to swab area of injection/fina as directed.     aspirin (ASA) 325 MG tablet Take 1 tablet (325 mg) by mouth daily     blood glucose (CONTOUR NEXT TEST) test strip Use to test blood sugar 3 times daily.     blood glucose (NO BRAND SPECIFIED) lancets standard To use to test glucose level in the blood Use to test blood sugar 3 times daily as directed. To accompany glucose monitor brands per insurance coverage.     insulin aspart (NOVOLOG PEN) 100 UNIT/ML pen Inject 1-10 Units Subcutaneous 3 times daily (before meals) See AVS (too long to print in script). Dose to decrease with decreasing steroid dose     insulin pen needle (32G X 4 MM) 32G X 4 MM miscellaneous Use as directed by provider Per insurance coverage     levothyroxine (SYNTHROID/LEVOTHROID) 50 MCG tablet Take 50 mcg by mouth daily     methocarbamol (ROBAXIN) 500 MG tablet Take 1 tablet (500 mg) by mouth every 8 hours as needed for muscle spasms     mycophenolate (GENERIC EQUIVALENT) 250 MG capsule Take 3 capsules (750 mg) by mouth 2 times daily     ondansetron (ZOFRAN-ODT) 4 MG ODT tab Take 1 tablet (4 mg) by mouth every 6 hours as needed for nausea or vomiting     pantoprazole (PROTONIX) 40 MG EC tablet Take 1 tablet (40 mg) by mouth 2 times daily     polyethylene glycol (MIRALAX) 17 GM/Dose powder Take 17 g by mouth  daily     predniSONE (DELTASONE) 10 MG tablet Take 5 tablets (50 mg) by mouth 4 times daily for 1 day, THEN 4 tablets (40 mg) 4 times daily for 1 day, THEN 4 tablets (40 mg) 2 times daily for 1 day, THEN 2 tablets (20 mg) 2 times daily for 1 day, THEN 2 tablets (20 mg) daily for 1 day.     predniSONE (DELTASONE) 20 MG tablet Take 2 tablets (40 mg) by mouth 2 times daily for 2 doses     predniSONE (DELTASONE) 20 MG tablet Take 1 tablet (20 mg) by mouth 2 times daily for 2 doses     [START ON 11/3/2021] predniSONE (DELTASONE) 5 MG tablet Take 1 tablet (5 mg) by mouth daily     senna-docusate (SENOKOT-S/PERICOLACE) 8.6-50 MG tablet 1 tablet by Oral or Feeding Tube route 2 times daily     Sharps Container MISC Use as directed to dispose of needles, lancets and other sharps Per Insurance coverage     sulfamethoxazole-trimethoprim (BACTRIM) 400-80 MG tablet 1 tablet by Oral or Feeding Tube route daily     tacrolimus (GENERIC EQUIVALENT) 1 MG capsule Take 4 capsules (4 mg) by mouth 2 times daily     traMADol (ULTRAM) 50 MG tablet Take 1 tablet (50 mg) by mouth every 8 hours as needed for moderate pain     traZODone (DESYREL) 50 MG tablet Take 1 tablet (50 mg) by mouth nightly as needed for sleep     ursodiol (ACTIGALL) 300 MG capsule Take 1 capsule (300 mg) by mouth 2 times daily     valGANciclovir (VALCYTE) 450 MG tablet Take 1 tablet (450 mg) by mouth daily     No current facility-administered medications for this visit.     There are no discontinued medications.    Physical Exam   Vital Signs: There were no vitals taken for this visit.    GENERAL APPEARANCE: alert and no distress  HENT: mouth without ulcers or lesions  LYMPHATICS: no cervical or supraclavicular nodes  RESP: lungs clear to auscultation - no rales, rhonchi or wheezes  CV: regular rhythm, normal rate, no rub, no murmur  EDEMA: no LE edema bilaterally  ABDOMEN: soft, nondistended, nontender, bowel sounds normal  MS: extremities normal - no gross deformities  noted, no evidence of inflammation in joints, no muscle tenderness  SKIN: no rash    Data     Renal Latest Ref Rng & Units 11/1/2021 10/30/2021 10/29/2021   Na 133 - 144 mmol/L 138 135 -   K 3.4 - 5.3 mmol/L 4.4 4.2 -   Cl 94 - 109 mmol/L 106 103 -   CO2 20 - 32 mmol/L 24 23 -   BUN 7 - 30 mg/dL 78(H) 79(H) -   Cr 0.52 - 1.04 mg/dL 2.10(H) 2.75(H) -   Glucose 70 - 99 mg/dL 183(H) 195(H) 140(H)   Ca  8.5 - 10.1 mg/dL 8.8 8.6 -   Mg 1.6 - 2.3 mg/dL 2.0 2.0 -     Bone Health Latest Ref Rng & Units 11/1/2021 10/30/2021 10/29/2021   Phos 2.5 - 4.5 mg/dL 4.6(H) 5.1(H) 4.6(H)   Vit D Def 20 - 75 ug/L - - -     Heme Latest Ref Rng & Units 11/1/2021 10/30/2021 10/29/2021   WBC 4.0 - 11.0 10e3/uL 11.0 10.9 9.9   Hgb 11.7 - 15.7 g/dL 9.5(L) 8.6(L) 7.9(L)   Plt 150 - 450 10e3/uL 227 206 153   ABSOLUTE NEUTROPHIL 1.6 - 8.3 10e3/uL - - -   ABSOLUTE LYMPHOCYTES 0.8 - 5.3 10e3/uL - - -   ABSOLUTE MONOCYTES 0.0 - 1.3 10e3/uL - - -   ABSOLUTE EOSINOPHILS 0.0 - 0.7 10e3/uL - - -   ABSOLUTE BASOPHILS 0.0 - 0.2 10e9/L - - -   ABS IMMATURE GRANULOCYTES 0 - 0.4 10e9/L - - -   ABSOLUTE NUCLEATED RBC - - - -     Liver Latest Ref Rng & Units 11/1/2021 10/30/2021 10/29/2021   AP 40 - 150 U/L 188(H) 196(H) 208(H)   AP (external) 50 - 136 IU/L - - -   TBili 0.2 - 1.3 mg/dL 1.3 1.2 1.6(H)   TBili (external) 0.2 - 1.2 mg/dL - - -   DBili 0.0 - 0.2 mg/dL 1.0(H) 1.1(H) 1.2(H)   DBili (external) 0.1 - 0.5 mg/dL - - -   ALT 0 - 50 U/L 64(H) 46 28   ALT (external) 8 - 45 IU/L - - -   AST 0 - 45 U/L 27 24 13   AST (external) 2 - 40 IU/L - - -   Tot Protein 6.8 - 8.8 g/dL 5.9(L) 5.9(L) 5.6(L)   Tot Protein (external) 6.0 - 8.0 g/dL - - -   Albumin 3.4 - 5.0 g/dL 3.0(L) 2.9(L) 2.7(L)   Albumin (external) 3.5 - 5.2 g/dL - - -     Pancreas Latest Ref Rng & Units 10/26/2021 10/25/2021 10/19/2021   A1C 0.0 - 5.6 % - - 5.1   Amylase 30 - 110 U/L - 37 -   Lipase 73 - 393 U/L 81 249 -     Iron studies Latest Ref Rng & Units 10/5/2021 9/28/2021 7/30/2021   Iron  35 - 180 ug/dL - - -   Iron sat 15 - 46 % - - -   Ferritin 8 - 252 ng/mL 186 280(H) 32     [facility] Txp Virology Latest Ref Rng & Units 10/16/2021 5/2/2021 1/4/2021   EBV CAPSID ANTIBODY IGG No detectable antibody. Positive(A) 2.5(H) 3.0(H)   Hep B Core NR:Nonreactive - Nonreactive -        Recent Labs   Lab Test 10/19/21  0542 10/20/21  0700 10/28/21  0622 10/29/21  0637 10/30/21  1033   DOSTAC 10/18/2021  --   --   --  10/29/2021   TACROL 14.1   < > 6.7 6.7 5.1    < > = values in this interval not displayed.        Again, thank you for allowing me to participate in the care of your patient.      Sincerely,    Early Post Transplant

## 2021-11-02 ENCOUNTER — TELEPHONE (OUTPATIENT)
Dept: TRANSPLANT | Facility: CLINIC | Age: 51
End: 2021-11-02

## 2021-11-02 DIAGNOSIS — Z94.4 LIVER TRANSPLANT RECIPIENT (H): ICD-10-CM

## 2021-11-02 DIAGNOSIS — Z11.59 ENCOUNTER FOR SCREENING FOR OTHER VIRAL DISEASES: ICD-10-CM

## 2021-11-02 LAB
PATH REPORT.ADDENDUM SPEC: NORMAL
PATH REPORT.ADDENDUM SPEC: NORMAL
PATH REPORT.COMMENTS IMP SPEC: NORMAL
PATH REPORT.FINAL DX SPEC: NORMAL
PATH REPORT.GROSS SPEC: NORMAL
PATH REPORT.MICROSCOPIC SPEC OTHER STN: NORMAL
PATH REPORT.MICROSCOPIC SPEC OTHER STN: NORMAL
PATH REPORT.RELEVANT HX SPEC: NORMAL
PHOTO IMAGE: NORMAL

## 2021-11-02 RX ORDER — VALGANCICLOVIR 450 MG/1
450 TABLET, FILM COATED ORAL DAILY
Qty: 90 TABLET | Refills: 3 | Status: SHIPPED | OUTPATIENT
Start: 2021-11-02 | End: 2022-01-03

## 2021-11-02 RX ORDER — TACROLIMUS 1 MG/1
4 CAPSULE ORAL EVERY MORNING
Qty: 360 CAPSULE | Refills: 3 | Status: SHIPPED | OUTPATIENT
Start: 2021-11-02 | End: 2021-11-04

## 2021-11-02 RX ORDER — ASPIRIN 325 MG
325 TABLET ORAL DAILY
Qty: 90 TABLET | Refills: 3 | Status: SHIPPED | OUTPATIENT
Start: 2021-11-02 | End: 2022-01-14 | Stop reason: DRUGHIGH

## 2021-11-02 RX ORDER — MYCOPHENOLATE MOFETIL 250 MG/1
750 CAPSULE ORAL 2 TIMES DAILY
Qty: 540 CAPSULE | Refills: 3 | Status: SHIPPED | OUTPATIENT
Start: 2021-11-02 | End: 2022-01-13

## 2021-11-02 RX ORDER — TACROLIMUS 5 MG/1
5 CAPSULE ORAL EVERY EVENING
Qty: 90 CAPSULE | Refills: 3 | Status: SHIPPED | OUTPATIENT
Start: 2021-11-02 | End: 2021-11-04

## 2021-11-02 RX ORDER — SULFAMETHOXAZOLE AND TRIMETHOPRIM 400; 80 MG/1; MG/1
1 TABLET ORAL DAILY
Qty: 90 TABLET | Refills: 3 | Status: SHIPPED | OUTPATIENT
Start: 2021-11-02 | End: 2022-01-14

## 2021-11-02 RX ORDER — URSODIOL 300 MG/1
300 CAPSULE ORAL 2 TIMES DAILY
Qty: 180 CAPSULE | Refills: 3 | Status: SHIPPED | OUTPATIENT
Start: 2021-11-02 | End: 2022-04-15

## 2021-11-02 RX ORDER — PANTOPRAZOLE SODIUM 40 MG/1
40 TABLET, DELAYED RELEASE ORAL 2 TIMES DAILY
Qty: 180 TABLET | Refills: 3 | Status: SHIPPED | OUTPATIENT
Start: 2021-11-02 | End: 2022-11-16

## 2021-11-02 NOTE — TELEPHONE ENCOUNTER
Post Discharge Liver Transplant Phone Call (within 1-3 business days post discharge)      Reviewed with the patient the Transplant Center contact information:  Best phone contact is [phone]-Friday from 8am-5pm.   *You may have to leave a message and get a call back.    Good alternative communication method is via [employer] message.    Coordinators are available 8am-5pm M-F, otherwise there will be an on-call RN available 24/7  *If calling after hours, please call [phone] and ask to speak with the on-call Transplant Coordinator    When to contact the Transplant Center:  - Fever > 100.5F  - Dizziness, lightheadedness  - Severe pain  - If you are unable to take your immunosuppression medications.  - Unable to obtain refill on immunosuppressive medications    Medications:  Reviewed medications with patient.   - confirmed pt has supply of meds  - MAR is up to date   - Verify preferred pharmacy in ClaimReturn  - Reminded patient to always contact the pharmacy first for refills    Confirmed the patient has an up to date medication card at home and is knowledgeable in updating their med card (or has someone to assist in this).   - Reinforced patient always bring med card to appointment      Labs:  Labs are expected to be drawn on Monday and Thursday until you are told differently by your transplant team.   - confirmed patient's preferred lab and updated EPIC   - Take a copy of your lab letter with to each lab draw   - Lab order sent directly to patient via via680 or mail.    - Confirmed patient has a copy of lab letter  - Review how to review lab results on [employer] or obtaining and recording lab values in handbook    Vitals:  Encouraged the patient to record the following:  - BP - daily unless light headed  - Temperature - daily  - Weight - daily    Follow Up:    Role of the Transplant Coordinator vs LPN was reviewed. Contact information provided for both.     Reviewed current scheduled follow up appointments with  surgeon.      Reminded the patient to follow up with PCP within 1 -2 weeks for general follow-up and management of diabetes, pain, and blood pressure    STENT REMOVAL - reminded patient that if stent was placed at time of transplant (this is indicated on the check list) this will need to come out 2-3 mos post transplant.  Asked patient to notify transplant coordinator if sees stent in stool.    Has the patient been contacted with initial visit from HOME CARE? no  o If not, Transplant Coordinator to be notified to follow up with Home Care  o Pt plans to have labs drawn at  Jaswant Peter LPN

## 2021-11-02 NOTE — TELEPHONE ENCOUNTER
ISSUE:   Tacrolimus IR level 5.9 on 11/1, goal 6-8, dose4 mg BID. Pt's LFTs slight bump. Dr NAGY reviewed and will slightly increase tacro.    PLAN:   Please call patient and confirm this was an accurate 12-hour trough. Verify Tacrolimus IR dose 4 mg BID. Confirm no new medications or illness. Confirm no missed doses. If accurate trough and accurate dose, increase Tacrolimus IR dose to 4 mg AM and 5 mg PM and repeat labs on THursday.  OUTCOME:   Spoke with patient, they confirm accurate trough level and current dose 4 mg BID. Patient confirmed dose change to 4 mg am, 5 mg pm and to repeat labs in 2 days. Orders sent to preferred pharmacy for dose change and lab for repeat labs. Patient voiced understanding of plan.     Alexandrea Peter LPN

## 2021-11-04 ENCOUNTER — OFFICE VISIT (OUTPATIENT)
Dept: PHARMACY | Facility: CLINIC | Age: 51
End: 2021-11-04
Payer: COMMERCIAL

## 2021-11-04 ENCOUNTER — ANCILLARY PROCEDURE (OUTPATIENT)
Dept: ULTRASOUND IMAGING | Facility: CLINIC | Age: 51
End: 2021-11-04
Attending: INTERNAL MEDICINE
Payer: COMMERCIAL

## 2021-11-04 ENCOUNTER — OFFICE VISIT (OUTPATIENT)
Dept: NEPHROLOGY | Facility: CLINIC | Age: 51
End: 2021-11-04
Attending: INTERNAL MEDICINE
Payer: COMMERCIAL

## 2021-11-04 ENCOUNTER — TELEPHONE (OUTPATIENT)
Dept: TRANSPLANT | Facility: CLINIC | Age: 51
End: 2021-11-04

## 2021-11-04 ENCOUNTER — OFFICE VISIT (OUTPATIENT)
Dept: TRANSPLANT | Facility: CLINIC | Age: 51
End: 2021-11-04
Attending: TRANSPLANT SURGERY
Payer: COMMERCIAL

## 2021-11-04 ENCOUNTER — LAB (OUTPATIENT)
Dept: LAB | Facility: CLINIC | Age: 51
End: 2021-11-04
Payer: COMMERCIAL

## 2021-11-04 VITALS
WEIGHT: 152.3 LBS | DIASTOLIC BLOOD PRESSURE: 85 MMHG | HEART RATE: 108 BPM | BODY MASS INDEX: 23.9 KG/M2 | HEIGHT: 67 IN | TEMPERATURE: 97.6 F | SYSTOLIC BLOOD PRESSURE: 150 MMHG

## 2021-11-04 VITALS
HEART RATE: 108 BPM | WEIGHT: 152 LBS | BODY MASS INDEX: 23.86 KG/M2 | SYSTOLIC BLOOD PRESSURE: 150 MMHG | HEIGHT: 67 IN | TEMPERATURE: 97.6 F | DIASTOLIC BLOOD PRESSURE: 85 MMHG

## 2021-11-04 DIAGNOSIS — R19.7 DIARRHEA, UNSPECIFIED TYPE: ICD-10-CM

## 2021-11-04 DIAGNOSIS — Z94.4 LIVER REPLACED BY TRANSPLANT (H): ICD-10-CM

## 2021-11-04 DIAGNOSIS — Z48.298 AFTERCARE FOLLOWING ORGAN TRANSPLANT: Primary | ICD-10-CM

## 2021-11-04 DIAGNOSIS — N17.9 AKI (ACUTE KIDNEY INJURY) (H): ICD-10-CM

## 2021-11-04 DIAGNOSIS — M79.89 LEG SWELLING: ICD-10-CM

## 2021-11-04 DIAGNOSIS — D84.821 IMMUNOSUPPRESSION DUE TO DRUG THERAPY (H): ICD-10-CM

## 2021-11-04 DIAGNOSIS — N17.9 ACUTE KIDNEY INJURY (H): ICD-10-CM

## 2021-11-04 DIAGNOSIS — Z94.4 LIVER TRANSPLANT RECIPIENT (H): Primary | ICD-10-CM

## 2021-11-04 DIAGNOSIS — T38.0X5A STEROID-INDUCED HYPERGLYCEMIA: ICD-10-CM

## 2021-11-04 DIAGNOSIS — Z79.899 IMMUNOSUPPRESSION DUE TO DRUG THERAPY (H): ICD-10-CM

## 2021-11-04 DIAGNOSIS — Z94.4 LIVER TRANSPLANT RECIPIENT (H): ICD-10-CM

## 2021-11-04 DIAGNOSIS — R73.9 STEROID-INDUCED HYPERGLYCEMIA: ICD-10-CM

## 2021-11-04 LAB
ALBUMIN SERPL-MCNC: 2.3 G/DL (ref 3.4–5)
ALP SERPL-CCNC: 185 U/L (ref 40–150)
ALT SERPL W P-5'-P-CCNC: 42 U/L (ref 0–50)
ANION GAP SERPL CALCULATED.3IONS-SCNC: 6 MMOL/L (ref 3–14)
AST SERPL W P-5'-P-CCNC: 16 U/L (ref 0–45)
BILIRUB DIRECT SERPL-MCNC: 1 MG/DL (ref 0–0.2)
BILIRUB SERPL-MCNC: 1.4 MG/DL (ref 0.2–1.3)
BUN SERPL-MCNC: 44 MG/DL (ref 7–30)
CALCIUM SERPL-MCNC: 8.2 MG/DL (ref 8.5–10.1)
CHLORIDE BLD-SCNC: 111 MMOL/L (ref 94–109)
CO2 SERPL-SCNC: 23 MMOL/L (ref 20–32)
CREAT SERPL-MCNC: 1.29 MG/DL (ref 0.52–1.04)
ERYTHROCYTE [DISTWIDTH] IN BLOOD BY AUTOMATED COUNT: 18.6 % (ref 10–15)
GFR SERPL CREATININE-BSD FRML MDRD: 48 ML/MIN/1.73M2
GLUCOSE BLD-MCNC: 109 MG/DL (ref 70–99)
HCT VFR BLD AUTO: 31.1 % (ref 35–47)
HGB BLD-MCNC: 9.6 G/DL (ref 11.7–15.7)
MAGNESIUM SERPL-MCNC: 1.6 MG/DL (ref 1.6–2.3)
MCH RBC QN AUTO: 30.9 PG (ref 26.5–33)
MCHC RBC AUTO-ENTMCNC: 30.9 G/DL (ref 31.5–36.5)
MCV RBC AUTO: 100 FL (ref 78–100)
PHOSPHATE SERPL-MCNC: 2.5 MG/DL (ref 2.5–4.5)
PLATELET # BLD AUTO: 151 10E3/UL (ref 150–450)
POTASSIUM BLD-SCNC: 3.9 MMOL/L (ref 3.4–5.3)
PROT SERPL-MCNC: 5.3 G/DL (ref 6.8–8.8)
RBC # BLD AUTO: 3.11 10E6/UL (ref 3.8–5.2)
SODIUM SERPL-SCNC: 140 MMOL/L (ref 133–144)
TACROLIMUS BLD-MCNC: 6.6 UG/L (ref 5–15)
TME LAST DOSE: NORMAL H
TME LAST DOSE: NORMAL H
WBC # BLD AUTO: 7.7 10E3/UL (ref 4–11)

## 2021-11-04 PROCEDURE — 99214 OFFICE O/P EST MOD 30 MIN: CPT | Performed by: TRANSPLANT SURGERY

## 2021-11-04 PROCEDURE — 99605 MTMS BY PHARM NP 15 MIN: CPT | Performed by: PHARMACIST

## 2021-11-04 PROCEDURE — 99000 SPECIMEN HANDLING OFFICE-LAB: CPT | Performed by: PATHOLOGY

## 2021-11-04 PROCEDURE — 36415 COLL VENOUS BLD VENIPUNCTURE: CPT | Performed by: PATHOLOGY

## 2021-11-04 PROCEDURE — 84100 ASSAY OF PHOSPHORUS: CPT | Performed by: PATHOLOGY

## 2021-11-04 PROCEDURE — 80197 ASSAY OF TACROLIMUS: CPT | Mod: 90 | Performed by: PATHOLOGY

## 2021-11-04 PROCEDURE — 93970 EXTREMITY STUDY: CPT | Mod: GC | Performed by: RADIOLOGY

## 2021-11-04 PROCEDURE — 83735 ASSAY OF MAGNESIUM: CPT | Performed by: PATHOLOGY

## 2021-11-04 PROCEDURE — 80053 COMPREHEN METABOLIC PANEL: CPT | Performed by: PATHOLOGY

## 2021-11-04 PROCEDURE — 99213 OFFICE O/P EST LOW 20 MIN: CPT | Mod: 24

## 2021-11-04 PROCEDURE — 82248 BILIRUBIN DIRECT: CPT | Performed by: PATHOLOGY

## 2021-11-04 PROCEDURE — 99607 MTMS BY PHARM ADDL 15 MIN: CPT | Performed by: PHARMACIST

## 2021-11-04 PROCEDURE — G0463 HOSPITAL OUTPT CLINIC VISIT: HCPCS

## 2021-11-04 PROCEDURE — 85027 COMPLETE CBC AUTOMATED: CPT | Performed by: PATHOLOGY

## 2021-11-04 RX ORDER — TACROLIMUS 5 MG/1
CAPSULE ORAL
Qty: 90 CAPSULE | Refills: 3 | Status: SHIPPED | OUTPATIENT
Start: 2021-11-04 | End: 2021-11-04

## 2021-11-04 RX ORDER — TACROLIMUS 5 MG/1
CAPSULE ORAL
Qty: 180 CAPSULE | Refills: 3 | Status: SHIPPED | OUTPATIENT
Start: 2021-11-04 | End: 2021-11-10

## 2021-11-04 RX ORDER — TACROLIMUS 1 MG/1
1 CAPSULE ORAL EVERY MORNING
Qty: 30 CAPSULE | Refills: 3 | Status: SHIPPED | OUTPATIENT
Start: 2021-11-04 | End: 2021-11-10

## 2021-11-04 ASSESSMENT — MIFFLIN-ST. JEOR
SCORE: 1337.1
SCORE: 1338.46

## 2021-11-04 ASSESSMENT — PAIN SCALES - GENERAL: PAINLEVEL: MILD PAIN (2)

## 2021-11-04 NOTE — PATIENT INSTRUCTIONS
Recommendations from today's MTM visit:                                                       1. Start metamucil once daily for loose stools.     Follow-up: 3 months    It was great to speak with you today.  I value your experience and would be very thankful for your time with providing feedback on our clinic survey. You may receive a survey via email or text message in the next few days.     To schedule another MTM appointment, please call the clinic directly or you may call the MTM scheduling line at [phone] or toll-free at [phone].     My Clinical Pharmacist's contact information:                                                      Please feel free to contact me with any questions or concerns you have.      Ming Archer, PharmD  MTM Pharmacist    Phone: [phone]

## 2021-11-04 NOTE — NURSING NOTE
"Chief Complaint   Patient presents with     RECHECK     follow up with kidney transplant     BP (!) 150/85   Pulse 108   Temp 97.6  F (36.4  C) (Oral)   Ht 1.702 m (5' 7\")   Wt 69.1 kg (152 lb 4.8 oz)   BMI 23.85 kg/m    Kamla Mark CMA on 11/4/2021 at 9:12 AM    "

## 2021-11-04 NOTE — TELEPHONE ENCOUNTER
Per dr Seth, Pt's tacro goal 8-10 as long as kidney function tolerates.    Pt's tacro low. Pt taking 4 mg AM and 5 mg PM. Pt to increase to 5 mg BID and recheck on Monday.     Pt repeated plan.

## 2021-11-04 NOTE — LETTER
11/4/2021      RE: Sarah Fisher  [address]  [facility] [address]       TRANSPLANT NEPHROLOGY POST LIVER TRANSPLANT VISIT    Assessment & Plan   CORBIN after OLT  Immunosuppression   HTN   Volume overload   Differential swelling (ultrasound was reviewed and negative for DVT)      Discussion:  Overall making brisk recovery. No evidence of DVT.   We discussed lasix as needed. Continue with appropriate hydration. RTC only as needed.     Return visit: GERARD Jordan MD    Chief Complaint   Ms. Fisher is a 51 year old here for routine follow up and acute kidney injury following liver transplantation.     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  She underwent orthotopic liver transplantation about 2 weeks ago.  She had some kidney injury that appears to be improving.  She returns to the clinic today accompanied by her daughter.  She is taking her medications regularly.  Her appetite is improving.  She specifically denied fevers chills nausea vomiting or diarrhea.  She is off her stool softeners.  She is hydrating with over 2 L/day.  She has significant lower extremity edema right greater than left all the way to the thigh, US was obtained and was negative for DVT.  Maria Elena specifically denied any history of longstanding diabetes prior to her transplantation.  Had no issues with high blood pressure.  Currently she is using sliding scale insulin due to elevated sugar in the settings of steroid use.  She is able to void without any trouble.  She specifically denied over-the-counter medication use including NSAIDs.    Home BP: Controlled 130-140/80-90 mmHg.    Problem List   Patient Active Problem List   Diagnosis     Acute deep vein thrombosis (DVT) of upper extremity (H)     Acute hepatic encephalopathy     Acute hypoxemic respiratory failure (H)     Acute kidney failure, unspecified (H)     Carrier of group B Streptococcus      Cirrhosis of liver with ascites (H)     CKD (chronic kidney disease)     Elevated blood pressure     Family history of colon cancer     Gastroesophageal reflux disease without esophagitis     GI (gastrointestinal bleed)     Hyponatremia     Hypokalemia     History of DVT (deep vein thrombosis)     Anemia associated with acute blood loss     Acute anemia     Need for vaccination for viral hepatitis     Macrocytosis without anemia     Knee pain     Pancytopenia (H)     Macrocytic anemia     Blood loss anemia     Hydrothorax     Portal hypertensive gastropathy (H)     Pleural effusion     Screening for malignant neoplasm of cervix     Venous incompetence     Varicose veins of leg with pain     Supervision of high-risk pregnancy of elderly primigravida     Subclinical hypothyroidism     Liver failure (H)     Hypotension     Pre-liver transplant, listed     Alcoholic cirrhosis (H)     Abnormal serum iron level     Iron deficiency anemia due to chronic blood loss     Encounter for immunization     Liver transplant candidate     Liver transplant recipient (H)     Immunosuppressed status (H)     Malnutrition (H)     Steroid-induced hyperglycemia     Epistaxis     Esophagitis     Duodenal erosion     Gastric antral vascular ectasia     Hyperphosphatemia     Leukocytosis       Allergies   Allergies   Allergen Reactions     Amoxicillin GI Disturbance, Diarrhea, Nausea and Nausea and Vomiting       Medications   Current Outpatient Medications   Medication Sig     alcohol swab prep pads Use to swab area of injection/fina as directed.     aspirin (ASA) 325 MG tablet Take 1 tablet (325 mg) by mouth daily     blood glucose (CONTOUR NEXT TEST) test strip Use to test blood sugar 3 times daily.     blood glucose (NO BRAND SPECIFIED) lancets standard To use to test glucose level in the blood Use to test blood sugar 3 times daily as directed. To accompany glucose monitor brands per insurance coverage.     furosemide (LASIX) 20 MG  "tablet Take 1 tablet (20 mg) by mouth daily     insulin aspart (NOVOLOG PEN) 100 UNIT/ML pen Inject 1-10 Units Subcutaneous 3 times daily (before meals) See AVS (too long to print in script). Dose to decrease with decreasing steroid dose     insulin pen needle (32G X 4 MM) 32G X 4 MM miscellaneous Use as directed by provider Per insurance coverage     levothyroxine (SYNTHROID/LEVOTHROID) 50 MCG tablet Take 50 mcg by mouth daily     mycophenolate (GENERIC EQUIVALENT) 250 MG capsule Take 3 capsules (750 mg) by mouth 2 times daily     ondansetron (ZOFRAN-ODT) 4 MG ODT tab Take 1 tablet (4 mg) by mouth every 6 hours as needed for nausea or vomiting     pantoprazole (PROTONIX) 40 MG EC tablet Take 1 tablet (40 mg) by mouth 2 times daily     Sharps Container MISC Use as directed to dispose of needles, lancets and other sharps Per Insurance coverage     sulfamethoxazole-trimethoprim (BACTRIM) 400-80 MG tablet 1 tablet by Oral or Feeding Tube route daily     ursodiol (ACTIGALL) 300 MG capsule Take 1 capsule (300 mg) by mouth 2 times daily     valGANciclovir (VALCYTE) 450 MG tablet Take 1 tablet (450 mg) by mouth daily     tacrolimus (GENERIC EQUIVALENT) 1 MG capsule Take 1 capsule (1 mg) by mouth every morning Dose change     tacrolimus (GENERIC EQUIVALENT) 5 MG capsule Take 1 capsule (5 mg) by mouth every morning AND 1 capsule (5 mg) every evening.     No current facility-administered medications for this visit.     Medications Discontinued During This Encounter   Medication Reason     methocarbamol (ROBAXIN) 500 MG tablet      polyethylene glycol (MIRALAX) 17 GM/Dose powder      predniSONE (DELTASONE) 5 MG tablet      senna-docusate (SENOKOT-S/PERICOLACE) 8.6-50 MG tablet      traMADol (ULTRAM) 50 MG tablet      traZODone (DESYREL) 50 MG tablet        Physical Exam   Vital Signs: BP (!) 150/85   Pulse 108   Temp 97.6  F (36.4  C) (Oral)   Ht 1.702 m (5' 7\")   Wt 69.1 kg (152 lb 4.8 oz)   BMI 23.85 kg/m      GENERAL " APPEARANCE: alert and no distress  HENT: mouth without ulcers or lesions  LYMPHATICS: no cervical or supraclavicular nodes  RESP: lungs clear to auscultation - no rales, rhonchi or wheezes  CV: regular rhythm, normal rate, no rub, no murmur  EDEMA: no LE edema bilaterally  ABDOMEN: soft, nondistended, nontender, bowel sounds normal  MS: extremities normal - no gross deformities noted, no evidence of inflammation in joints, no muscle tenderness  SKIN: no rash    Data     Renal Latest Ref Rng & Units 11/4/2021 11/1/2021 10/30/2021   Na 133 - 144 mmol/L 140 138 135   K 3.4 - 5.3 mmol/L 3.9 4.4 4.2   Cl 94 - 109 mmol/L 111(H) 106 103   CO2 20 - 32 mmol/L 23 24 23   BUN 7 - 30 mg/dL 44(H) 78(H) 79(H)   Cr 0.52 - 1.04 mg/dL 1.29(H) 2.10(H) 2.75(H)   Glucose 70 - 99 mg/dL 109(H) 183(H) 195(H)   Ca  8.5 - 10.1 mg/dL 8.2(L) 8.8 8.6   Mg 1.6 - 2.3 mg/dL 1.6 2.0 2.0     Bone Health Latest Ref Rng & Units 11/4/2021 11/1/2021 10/30/2021   Phos 2.5 - 4.5 mg/dL 2.5 4.6(H) 5.1(H)   Vit D Def 20 - 75 ug/L - - -     Heme Latest Ref Rng & Units 11/4/2021 11/1/2021 10/30/2021   WBC 4.0 - 11.0 10e3/uL 7.7 11.0 10.9   Hgb 11.7 - 15.7 g/dL 9.6(L) 9.5(L) 8.6(L)   Plt 150 - 450 10e3/uL 151 227 206   ABSOLUTE NEUTROPHIL 1.6 - 8.3 10e3/uL - - -   ABSOLUTE LYMPHOCYTES 0.8 - 5.3 10e3/uL - - -   ABSOLUTE MONOCYTES 0.0 - 1.3 10e3/uL - - -   ABSOLUTE EOSINOPHILS 0.0 - 0.7 10e3/uL - - -   ABSOLUTE BASOPHILS 0.0 - 0.2 10e9/L - - -   ABS IMMATURE GRANULOCYTES 0 - 0.4 10e9/L - - -   ABSOLUTE NUCLEATED RBC - - - -     Liver Latest Ref Rng & Units 11/4/2021 11/1/2021 10/30/2021   AP 40 - 150 U/L 185(H) 188(H) 196(H)   AP (external) 50 - 136 IU/L - - -   TBili 0.2 - 1.3 mg/dL 1.4(H) 1.3 1.2   TBili (external) 0.2 - 1.2 mg/dL - - -   DBili 0.0 - 0.2 mg/dL 1.0(H) 1.0(H) 1.1(H)   DBili (external) 0.1 - 0.5 mg/dL - - -   ALT 0 - 50 U/L 42 64(H) 46   ALT (external) 8 - 45 IU/L - - -   AST 0 - 45 U/L 16 27 24   AST (external) 2 - 40 IU/L - - -   Tot Protein  6.8 - 8.8 g/dL 5.3(L) 5.9(L) 5.9(L)   Tot Protein (external) 6.0 - 8.0 g/dL - - -   Albumin 3.4 - 5.0 g/dL 2.3(L) 3.0(L) 2.9(L)   Albumin (external) 3.5 - 5.2 g/dL - - -     Pancreas Latest Ref Rng & Units 10/26/2021 10/25/2021 10/19/2021   A1C 0.0 - 5.6 % - - 5.1   Amylase 30 - 110 U/L - 37 -   Lipase 73 - 393 U/L 81 249 -     Iron studies Latest Ref Rng & Units 10/5/2021 9/28/2021 7/30/2021   Iron 35 - 180 ug/dL - - -   Iron sat 15 - 46 % - - -   Ferritin 8 - 252 ng/mL 186 280(H) 32     [facility] Txp Virology Latest Ref Rng & Units 10/16/2021 5/2/2021 1/4/2021   EBV CAPSID ANTIBODY IGG No detectable antibody. Positive(A) 2.5(H) 3.0(H)   Hep B Core NR:Nonreactive - Nonreactive -        Recent Labs   Lab Test 10/30/21  1033 11/01/21  0836 11/04/21  0856   DOSTAC 10/29/2021 10/31/2021 11/3/2021   TACROL 5.1 5.9 6.6             Early Post Transplant

## 2021-11-04 NOTE — LETTER
11/4/2021       RE: Sarah Fisher  [address]  [facility] [address]     Dear Colleague,    Thank you for referring your patient, Sarah Fisher, to the [facility] NEPHROLOGY CLINIC [place] at [facility]. Please see a copy of my visit note below.    TRANSPLANT NEPHROLOGY POST LIVER TRANSPLANT VISIT    Assessment & Plan   CORBIN after OLT  Immunosuppression   HTN   Volume overload   Differential swelling (ultrasound was reviewed and negative for DVT)      Discussion:  Overall making brisk recovery. No evidence of DVT.   We discussed lasix as needed. Continue with appropriate hydration. RTC only as needed.     Return visit: GERARD Jordan MD    Chief Complaint   Ms. Fisher is a 51 year old here for routine follow up and acute kidney injury following liver transplantation.     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  She underwent orthotopic liver transplantation about 2 weeks ago.  She had some kidney injury that appears to be improving.  She returns to the clinic today accompanied by her daughter.  She is taking her medications regularly.  Her appetite is improving.  She specifically denied fevers chills nausea vomiting or diarrhea.  She is off her stool softeners.  She is hydrating with over 2 L/day.  She has significant lower extremity edema right greater than left all the way to the thigh, US was obtained and was negative for DVT.  Maria Elena specifically denied any history of longstanding diabetes prior to her transplantation.  Had no issues with high blood pressure.  Currently she is using sliding scale insulin due to elevated sugar in the settings of steroid use.  She is able to void without any trouble.  She specifically denied over-the-counter medication use including NSAIDs.    Home BP: Controlled 130-140/80-90 mmHg.    Problem List   Patient Active Problem List    Diagnosis     Acute deep vein thrombosis (DVT) of upper extremity (H)     Acute hepatic encephalopathy     Acute hypoxemic respiratory failure (H)     Acute kidney failure, unspecified (H)     Carrier of group B Streptococcus     Cirrhosis of liver with ascites (H)     CKD (chronic kidney disease)     Elevated blood pressure     Family history of colon cancer     Gastroesophageal reflux disease without esophagitis     GI (gastrointestinal bleed)     Hyponatremia     Hypokalemia     History of DVT (deep vein thrombosis)     Anemia associated with acute blood loss     Acute anemia     Need for vaccination for viral hepatitis     Macrocytosis without anemia     Knee pain     Pancytopenia (H)     Macrocytic anemia     Blood loss anemia     Hydrothorax     Portal hypertensive gastropathy (H)     Pleural effusion     Screening for malignant neoplasm of cervix     Venous incompetence     Varicose veins of leg with pain     Supervision of high-risk pregnancy of elderly primigravida     Subclinical hypothyroidism     Liver failure (H)     Hypotension     Pre-liver transplant, listed     Alcoholic cirrhosis (H)     Abnormal serum iron level     Iron deficiency anemia due to chronic blood loss     Encounter for immunization     Liver transplant candidate     Liver transplant recipient (H)     Immunosuppressed status (H)     Malnutrition (H)     Steroid-induced hyperglycemia     Epistaxis     Esophagitis     Duodenal erosion     Gastric antral vascular ectasia     Hyperphosphatemia     Leukocytosis       Allergies   Allergies   Allergen Reactions     Amoxicillin GI Disturbance, Diarrhea, Nausea and Nausea and Vomiting       Medications   Current Outpatient Medications   Medication Sig     alcohol swab prep pads Use to swab area of injection/fina as directed.     aspirin (ASA) 325 MG tablet Take 1 tablet (325 mg) by mouth daily     blood glucose (CONTOUR NEXT TEST) test strip Use to test blood sugar 3 times daily.     blood  glucose (NO BRAND SPECIFIED) lancets standard To use to test glucose level in the blood Use to test blood sugar 3 times daily as directed. To accompany glucose monitor brands per insurance coverage.     furosemide (LASIX) 20 MG tablet Take 1 tablet (20 mg) by mouth daily     insulin aspart (NOVOLOG PEN) 100 UNIT/ML pen Inject 1-10 Units Subcutaneous 3 times daily (before meals) See AVS (too long to print in script). Dose to decrease with decreasing steroid dose     insulin pen needle (32G X 4 MM) 32G X 4 MM miscellaneous Use as directed by provider Per insurance coverage     levothyroxine (SYNTHROID/LEVOTHROID) 50 MCG tablet Take 50 mcg by mouth daily     mycophenolate (GENERIC EQUIVALENT) 250 MG capsule Take 3 capsules (750 mg) by mouth 2 times daily     ondansetron (ZOFRAN-ODT) 4 MG ODT tab Take 1 tablet (4 mg) by mouth every 6 hours as needed for nausea or vomiting     pantoprazole (PROTONIX) 40 MG EC tablet Take 1 tablet (40 mg) by mouth 2 times daily     Sharps Container MISC Use as directed to dispose of needles, lancets and other sharps Per Insurance coverage     sulfamethoxazole-trimethoprim (BACTRIM) 400-80 MG tablet 1 tablet by Oral or Feeding Tube route daily     ursodiol (ACTIGALL) 300 MG capsule Take 1 capsule (300 mg) by mouth 2 times daily     valGANciclovir (VALCYTE) 450 MG tablet Take 1 tablet (450 mg) by mouth daily     tacrolimus (GENERIC EQUIVALENT) 1 MG capsule Take 1 capsule (1 mg) by mouth every morning Dose change     tacrolimus (GENERIC EQUIVALENT) 5 MG capsule Take 1 capsule (5 mg) by mouth every morning AND 1 capsule (5 mg) every evening.     No current facility-administered medications for this visit.     Medications Discontinued During This Encounter   Medication Reason     methocarbamol (ROBAXIN) 500 MG tablet      polyethylene glycol (MIRALAX) 17 GM/Dose powder      predniSONE (DELTASONE) 5 MG tablet      senna-docusate (SENOKOT-S/PERICOLACE) 8.6-50 MG tablet      traMADol (ULTRAM) 50  "MG tablet      traZODone (DESYREL) 50 MG tablet        Physical Exam   Vital Signs: BP (!) 150/85   Pulse 108   Temp 97.6  F (36.4  C) (Oral)   Ht 1.702 m (5' 7\")   Wt 69.1 kg (152 lb 4.8 oz)   BMI 23.85 kg/m      GENERAL APPEARANCE: alert and no distress  HENT: mouth without ulcers or lesions  LYMPHATICS: no cervical or supraclavicular nodes  RESP: lungs clear to auscultation - no rales, rhonchi or wheezes  CV: regular rhythm, normal rate, no rub, no murmur  EDEMA: no LE edema bilaterally  ABDOMEN: soft, nondistended, nontender, bowel sounds normal  MS: extremities normal - no gross deformities noted, no evidence of inflammation in joints, no muscle tenderness  SKIN: no rash    Data     Renal Latest Ref Rng & Units 11/4/2021 11/1/2021 10/30/2021   Na 133 - 144 mmol/L 140 138 135   K 3.4 - 5.3 mmol/L 3.9 4.4 4.2   Cl 94 - 109 mmol/L 111(H) 106 103   CO2 20 - 32 mmol/L 23 24 23   BUN 7 - 30 mg/dL 44(H) 78(H) 79(H)   Cr 0.52 - 1.04 mg/dL 1.29(H) 2.10(H) 2.75(H)   Glucose 70 - 99 mg/dL 109(H) 183(H) 195(H)   Ca  8.5 - 10.1 mg/dL 8.2(L) 8.8 8.6   Mg 1.6 - 2.3 mg/dL 1.6 2.0 2.0     Bone Health Latest Ref Rng & Units 11/4/2021 11/1/2021 10/30/2021   Phos 2.5 - 4.5 mg/dL 2.5 4.6(H) 5.1(H)   Vit D Def 20 - 75 ug/L - - -     Heme Latest Ref Rng & Units 11/4/2021 11/1/2021 10/30/2021   WBC 4.0 - 11.0 10e3/uL 7.7 11.0 10.9   Hgb 11.7 - 15.7 g/dL 9.6(L) 9.5(L) 8.6(L)   Plt 150 - 450 10e3/uL 151 227 206   ABSOLUTE NEUTROPHIL 1.6 - 8.3 10e3/uL - - -   ABSOLUTE LYMPHOCYTES 0.8 - 5.3 10e3/uL - - -   ABSOLUTE MONOCYTES 0.0 - 1.3 10e3/uL - - -   ABSOLUTE EOSINOPHILS 0.0 - 0.7 10e3/uL - - -   ABSOLUTE BASOPHILS 0.0 - 0.2 10e9/L - - -   ABS IMMATURE GRANULOCYTES 0 - 0.4 10e9/L - - -   ABSOLUTE NUCLEATED RBC - - - -     Liver Latest Ref Rng & Units 11/4/2021 11/1/2021 10/30/2021   AP 40 - 150 U/L 185(H) 188(H) 196(H)   AP (external) 50 - 136 IU/L - - -   TBili 0.2 - 1.3 mg/dL 1.4(H) 1.3 1.2   TBili (external) 0.2 - 1.2 mg/dL - - - "   DBili 0.0 - 0.2 mg/dL 1.0(H) 1.0(H) 1.1(H)   DBili (external) 0.1 - 0.5 mg/dL - - -   ALT 0 - 50 U/L 42 64(H) 46   ALT (external) 8 - 45 IU/L - - -   AST 0 - 45 U/L 16 27 24   AST (external) 2 - 40 IU/L - - -   Tot Protein 6.8 - 8.8 g/dL 5.3(L) 5.9(L) 5.9(L)   Tot Protein (external) 6.0 - 8.0 g/dL - - -   Albumin 3.4 - 5.0 g/dL 2.3(L) 3.0(L) 2.9(L)   Albumin (external) 3.5 - 5.2 g/dL - - -     Pancreas Latest Ref Rng & Units 10/26/2021 10/25/2021 10/19/2021   A1C 0.0 - 5.6 % - - 5.1   Amylase 30 - 110 U/L - 37 -   Lipase 73 - 393 U/L 81 249 -     Iron studies Latest Ref Rng & Units 10/5/2021 9/28/2021 7/30/2021   Iron 35 - 180 ug/dL - - -   Iron sat 15 - 46 % - - -   Ferritin 8 - 252 ng/mL 186 280(H) 32     UMP Txp Virology Latest Ref Rng & Units 10/16/2021 5/2/2021 1/4/2021   EBV CAPSID ANTIBODY IGG No detectable antibody. Positive(A) 2.5(H) 3.0(H)   Hep B Core NR:Nonreactive - Nonreactive -        Recent Labs   Lab Test 10/30/21  1033 11/01/21  0836 11/04/21  0856   DOSTAC 10/29/2021 10/31/2021 11/3/2021   TACROL 5.1 5.9 6.6             Again, thank you for allowing me to participate in the care of your patient.      Sincerely,    Early Post Transplant

## 2021-11-04 NOTE — LETTER
"    11/4/2021         RE: Sarah Fisher  [address] [facility] [address]        Dear Colleague,    Thank you for referring your patient, Sarah Fisher, to the [facility] TRANSPLANT CLINIC. Please see a copy of my visit note below.    SP liver transplant 10/17/21  Current IS: tac 4/5 and /750 (off steroids)  Had one rejection in hospital treated with steroid bolus and taper.  Told to increase tac dose to 5/5 if tac level <8.  (she checks MyChart).    Recent labs:  Improving  Results for BOB FISHER (MRN [id number]) as of 11/4/2021 16:05   Ref. Range 11/4/2021 08:56   Bilirubin Total Latest Ref Range: 0.2 - 1.3 mg/dL 1.4 (H)   Alkaline Phosphatase Latest Ref Range: 40 - 150 U/L 185 (H)   ALT Latest Ref Range: 0 - 50 U/L 42   AST Latest Ref Range: 0 - 45 U/L 16     Had initial renal dysfunction limiting tac dose, but now kidneys pretty recovered.  Results for BOB FISHER (MRN [id number]) as of 11/4/2021 16:05   Ref. Range 11/4/2021 08:56   Creatinine Latest Ref Range: 0.52 - 1.04 mg/dL 1.29 (H)   Results for BOB FISHER (MRN [id number]) as of 11/4/2021 16:05   Ref. Range 11/4/2021 08:56   Tacrolimus by Tandem Mass Spectrometry Latest Ref Range: 5.0 - 15.0 ug/L 6.6       Overall feels very well.  Only issue is mild amount of edema in LE.      Exam:   BP (!) 150/85   Pulse 108   Temp 97.6  F (36.4  C) (Oral)   Ht 1.702 m (5' 7\")   Wt 68.9 kg (152 lb)   BMI 23.81 kg/m    Wound slight serous drainage.  BERKLEY min output: removed.  Abd non tender  Ext 2+ edema up to knees.  Wearing slippers.    Current Outpatient Medications   Medication     alcohol swab prep pads     aspirin (ASA) 325 MG tablet     blood glucose (CONTOUR NEXT TEST) test strip     blood glucose (NO BRAND SPECIFIED) lancets standard     furosemide (LASIX) 20 MG tablet     insulin aspart (NOVOLOG PEN) 100 UNIT/ML pen     insulin pen needle (32G X 4 MM) 32G X 4 MM miscellaneous     levothyroxine " (SYNTHROID/LEVOTHROID) 50 MCG tablet     mycophenolate (GENERIC EQUIVALENT) 250 MG capsule     ondansetron (ZOFRAN-ODT) 4 MG ODT tab     pantoprazole (PROTONIX) 40 MG EC tablet     Sharps Container MISC     sulfamethoxazole-trimethoprim (BACTRIM) 400-80 MG tablet     tacrolimus (GENERIC EQUIVALENT) 1 MG capsule     tacrolimus (GENERIC EQUIVALENT) 5 MG capsule     ursodiol (ACTIGALL) 300 MG capsule     valGANciclovir (VALCYTE) 450 MG tablet     No current facility-administered medications for this visit.     IP  1.  SP LT.  Doing well at present.  No major issues.  2. Chronic IS: had one rjn in hosp (3/9) treated with steroid bolus and taper.  Needs more tac.  Inc dose to 5/5.  LFTs improving, doing ok.  3. CORBIN: resolved/ing.  Cont to follow crt with inc tac dose.  4. HtN.  May need treatment if BP continues up.  5. Edema: should diurese ok, but would be good if she could wear her own shoes.  Discussed about use of diuretics.  6. She needs to increase protein and calorie intake to reconstitute protein/heal.  Talked about supplements and calorie.  Would help to talk with dietician.    25 min reviewing chart, exam, discuss and planning.  Discussed IS.  Sent note to coordinator to increase tac dose.      Again, thank you for allowing me to participate in the care of your patient.        Sincerely,        Riaz Seth MD

## 2021-11-04 NOTE — PROGRESS NOTES
Medication Therapy Management (MTM) Encounter    ASSESSMENT:                            Medication Adherence/Access: No issues identified    Liver Transplant:  Stable.    Steroid induced hyperglycemia:  Blood sugars well controlled the last 2 days without prednisone. Patient to continue to monitor.     Loose stools: Start metamucil to help with loose stools.     PLAN:                            1. Start Metamucil once daily.     Follow-up: 3 months      SUBJECTIVE/OBJECTIVE:                          Sarah Fisher is a 51 year old female called for a transitions of care visit. She was discharged from [facility] on Friday for Liver transplant. Patient was accompanied by Enedelia Garcia.     Reason for visit: initial post txp.    Allergies/ADRs: Reviewed in chart  Past Medical History: Reviewed in chart  Tobacco: She reports that she has never smoked. She has never used smokeless tobacco.  Alcohol: not currently using    Medication Adherence/Access: Patient uses pill box(es) and has assistance with medication administration: family, Radha, her niece, lives with her.   Patient takes medications 2 time(s) per day. 9:15am and pm  Per patient, misses medication 0 times per week.   Medication barriers: none.   The patient fills medications at [place]: NO, fills medications at [facility].    Liver Transplant:  Current immunosuppressants include Tacrolimus 4mg every morning & 5mg every evening and Mycophenolate Mofetil 750mg twice daily.  Pt reports no side effects  Vascular prophylaxis: Aspirin 325mg daily. Denies bleed sx.   Other meds:   Transplant date: 10/17/21  Estimated Creatinine Clearance: 56.1 mL/min (A) (based on SCr of 1.29 mg/dL (H)).  CMV prophylaxis: Valcyte 450 mg once daily Treat 3 months post tx   PCP prophylaxis: Bactrim S S daily for 6 months post txp   PPI use: Pantoprazole 40mg twice daily. Was taking this before. No GERD sx since discharge.   Tx Coordinator: Juan C Bright MD: Dr. Seth, Using Med  Card: Yes  Immunizations: annual flu shot 2021; [id number]:  2021; Prevnar 13: unknown; TDaP:  2017; Shingrix: 2021x1, HBV: immunity per last titer, COVID: Pfizer-BioNTech 3x2021    Steroid induced hyperglycemia:  Currently taking Novolog sliding scale with meals. Patient is not experiencing side effects.  Completed prednisone yesterday  Blood sugar monitoring: 3-4 time(s) daily. Ranges (patient reported):   Date FBG/ 2hours post Lunch/2hours post Dinner /2hours post   11/4 109 120 (today during visit)     11/3   113   11/2 (last dose of pred)  198/176 173   11/1 167  122   10/31   144   Symptoms of low blood sugar? Never has bee nbelow 80.   Symptoms of high blood sugar? tremors  Lab Results   Component Value Date    A1C 5.1 10/19/2021    A1C 4.2 03/17/2021     Loose stools: Pt not is taking any laxatives. Having 3-4 soft/ loose stools daily.     Today's Vitals: There were no vitals taken for this visit.  ----------------  Post Discharge Medication Reconciliation Status: discharge medications reconciled and changed, per note/orders.    I spent 30 minutes with this patient today. I offer these suggestions for consideration by txp team. A copy of the visit note was provided to the patient's referring provider.    The patient was given a summary of these recommendations.     Ming Archer, PharmD  [facility] Pharmacist    Phone: [phone]      Medication Therapy Recommendations  Diarrhea, unspecified type    Rationale: Untreated condition - Needs additional medication therapy - Indication   Recommendation: Start Medication - Metamucil 48.57 % Powd   Status: Accepted - no CPA Needed

## 2021-11-04 NOTE — PROGRESS NOTES
"SP liver transplant 10/17/21  Current IS: tac 4/5 and /750 (off steroids)  Had one rejection in hospital treated with steroid bolus and taper.  Told to increase tac dose to 5/5 if tac level <8.  (she checks MyChart).    Recent labs:  Improving  Results for BOB CRONIN (MRN [id number]) as of 11/4/2021 16:05   Ref. Range 11/4/2021 08:56   Bilirubin Total Latest Ref Range: 0.2 - 1.3 mg/dL 1.4 (H)   Alkaline Phosphatase Latest Ref Range: 40 - 150 U/L 185 (H)   ALT Latest Ref Range: 0 - 50 U/L 42   AST Latest Ref Range: 0 - 45 U/L 16     Had initial renal dysfunction limiting tac dose, but now kidneys pretty recovered.  Results for BOB CRONIN (MRN [id number]) as of 11/4/2021 16:05   Ref. Range 11/4/2021 08:56   Creatinine Latest Ref Range: 0.52 - 1.04 mg/dL 1.29 (H)   Results for BOB CRONIN (MRN [id number]) as of 11/4/2021 16:05   Ref. Range 11/4/2021 08:56   Tacrolimus by Tandem Mass Spectrometry Latest Ref Range: 5.0 - 15.0 ug/L 6.6       Overall feels very well.  Only issue is mild amount of edema in LE.      Exam:   BP (!) 150/85   Pulse 108   Temp 97.6  F (36.4  C) (Oral)   Ht 1.702 m (5' 7\")   Wt 68.9 kg (152 lb)   BMI 23.81 kg/m    Wound slight serous drainage.  BERKLEY min output: removed.  Abd non tender  Ext 2+ edema up to knees.  Wearing slippers.    Current Outpatient Medications   Medication     alcohol swab prep pads     aspirin (ASA) 325 MG tablet     blood glucose (CONTOUR NEXT TEST) test strip     blood glucose (NO BRAND SPECIFIED) lancets standard     furosemide (LASIX) 20 MG tablet     insulin aspart (NOVOLOG PEN) 100 UNIT/ML pen     insulin pen needle (32G X 4 MM) 32G X 4 MM miscellaneous     levothyroxine (SYNTHROID/LEVOTHROID) 50 MCG tablet     mycophenolate (GENERIC EQUIVALENT) 250 MG capsule     ondansetron (ZOFRAN-ODT) 4 MG ODT tab     pantoprazole (PROTONIX) 40 MG EC tablet     Sharps Container MISC     sulfamethoxazole-trimethoprim (BACTRIM) 400-80 MG tablet     tacrolimus " (GENERIC EQUIVALENT) 1 MG capsule     tacrolimus (GENERIC EQUIVALENT) 5 MG capsule     ursodiol (ACTIGALL) 300 MG capsule     valGANciclovir (VALCYTE) 450 MG tablet     No current facility-administered medications for this visit.     IP  1.  SP LT.  Doing well at present.  No major issues.  2. Chronic IS: had one rjn in hosp (3/9) treated with steroid bolus and taper.  Needs more tac.  Inc dose to 5/5.  LFTs improving, doing ok.  3. CORBIN: resolved/ing.  Cont to follow crt with inc tac dose.  4. HtN.  May need treatment if BP continues up.  5. Edema: should diurese ok, but would be good if she could wear her own shoes.  Discussed about use of diuretics.  6. She needs to increase protein and calorie intake to reconstitute protein/heal.  Talked about supplements and calorie.  Would help to talk with dietician.    25 min reviewing chart, exam, discuss and planning.  Discussed IS.  Sent note to coordinator to increase tac dose.

## 2021-11-08 NOTE — PROGRESS NOTES
[facility] Solid Organ Transplant  Outpatient MNT: Post Liver Transplant    Time Spent: 15 minutes  Visit Type: Initial   Referring Physician: Prince   Pt accompanied by: family member    Date of txp: liver 10/17     Nutrition Assessment/Diet Recall  Per IP RD end of Oct, taste is off. Today pt reports appetite is so-so.   Labs 11/4 K 3.9 eGFR 48 (prev 27)    Has been eating:   PB s/w, smoothies (banana, strawb, Greek yogurt, protein powder), cereal, milk (skim), fruit   Uses either unflavored protein powder (Vital Collagen) or flavored   [place]- water, soda, juice   No edema, compression socks   Is focusing on weight regain since txp; does some walking but report stairs are somewhat challenging     Nutrition Diagnosis  Predicted suboptimal protein intake related to increased protein needs s/p transplant.    Nutrition Intervention  1. Discussed importance of consuming adequate calories and protein for 2 months post-txp to help heal and reduce muscle/fat wasting. Discussed sources of protein and ways to ensure she is increasing her protein intake.    2. Discussed potential for wt gain post-txp and after 2 months of eating higher calorie/higher protein foods, to return to baseline eating habits and monitor for any weight gains.     3. Reviewed importance of food safety and increased risk for food-borne illness post-txp. Also discussed potential need to monitor K+, CHO, and Na+ intakes d/t medication side effects.     4. Avoid the following post txp d/t risk for rejection, unknown effects on the organs, and/or potential interactions with immunosuppressants:  - Herbal, Chinese, holistic, chiropractic, natural, alternative medicines and supplements  - Detoxes and cleanses  - Weight loss pills  - Protein powders or other products with extracts or herbs (ie green tea extract)      Patient Understanding: Pt verbalized understanding of education provided.  Expected Engagement: Good  Follow-Up Plans: PRN      Nutrition Goals  Continue with protein-rich food and supplements x 2 mo post txp    Humaira Jason, RD, LD, CCTD

## 2021-11-09 ENCOUNTER — ALLIED HEALTH/NURSE VISIT (OUTPATIENT)
Dept: TRANSPLANT | Facility: CLINIC | Age: 51
End: 2021-11-09
Attending: TRANSPLANT SURGERY
Payer: COMMERCIAL

## 2021-11-09 ENCOUNTER — TELEPHONE (OUTPATIENT)
Dept: TRANSPLANT | Facility: CLINIC | Age: 51
End: 2021-11-09

## 2021-11-09 ENCOUNTER — LAB (OUTPATIENT)
Dept: LAB | Facility: CLINIC | Age: 51
End: 2021-11-09
Attending: TRANSPLANT SURGERY
Payer: COMMERCIAL

## 2021-11-09 VITALS
OXYGEN SATURATION: 98 % | HEART RATE: 102 BPM | BODY MASS INDEX: 23.87 KG/M2 | SYSTOLIC BLOOD PRESSURE: 156 MMHG | DIASTOLIC BLOOD PRESSURE: 87 MMHG | WEIGHT: 152.4 LBS

## 2021-11-09 DIAGNOSIS — Z94.4 LIVER TRANSPLANT RECIPIENT (H): Primary | ICD-10-CM

## 2021-11-09 DIAGNOSIS — Z94.4 LIVER REPLACED BY TRANSPLANT (H): ICD-10-CM

## 2021-11-09 DIAGNOSIS — Z94.4 LIVER TRANSPLANTED (H): Primary | ICD-10-CM

## 2021-11-09 LAB
ALBUMIN SERPL-MCNC: 2.1 G/DL (ref 3.4–5)
ALP SERPL-CCNC: 410 U/L (ref 40–150)
ALT SERPL W P-5'-P-CCNC: 16 U/L (ref 0–50)
ANION GAP SERPL CALCULATED.3IONS-SCNC: 9 MMOL/L (ref 3–14)
AST SERPL W P-5'-P-CCNC: 10 U/L (ref 0–45)
BILIRUB DIRECT SERPL-MCNC: 1.3 MG/DL (ref 0–0.2)
BILIRUB SERPL-MCNC: 1.5 MG/DL (ref 0.2–1.3)
BUN SERPL-MCNC: 34 MG/DL (ref 7–30)
CALCIUM SERPL-MCNC: 8.6 MG/DL (ref 8.5–10.1)
CHLORIDE BLD-SCNC: 114 MMOL/L (ref 94–109)
CO2 SERPL-SCNC: 20 MMOL/L (ref 20–32)
CREAT SERPL-MCNC: 2.09 MG/DL (ref 0.52–1.04)
ERYTHROCYTE [DISTWIDTH] IN BLOOD BY AUTOMATED COUNT: 17.9 % (ref 10–15)
GFR SERPL CREATININE-BSD FRML MDRD: 27 ML/MIN/1.73M2
GLUCOSE BLD-MCNC: 105 MG/DL (ref 70–99)
HCT VFR BLD AUTO: 28.2 % (ref 35–47)
HGB BLD-MCNC: 8.8 G/DL (ref 11.7–15.7)
MAGNESIUM SERPL-MCNC: 1.4 MG/DL (ref 1.6–2.3)
MCH RBC QN AUTO: 30.8 PG (ref 26.5–33)
MCHC RBC AUTO-ENTMCNC: 31.2 G/DL (ref 31.5–36.5)
MCV RBC AUTO: 99 FL (ref 78–100)
PHOSPHATE SERPL-MCNC: 3.8 MG/DL (ref 2.5–4.5)
PLATELET # BLD AUTO: 171 10E3/UL (ref 150–450)
POTASSIUM BLD-SCNC: 4 MMOL/L (ref 3.4–5.3)
PROT SERPL-MCNC: 5.7 G/DL (ref 6.8–8.8)
RBC # BLD AUTO: 2.86 10E6/UL (ref 3.8–5.2)
SODIUM SERPL-SCNC: 143 MMOL/L (ref 133–144)
T4 FREE SERPL-MCNC: 0.89 NG/DL (ref 0.76–1.46)
TACROLIMUS BLD-MCNC: 9.6 UG/L (ref 5–15)
TME LAST DOSE: NORMAL H
TME LAST DOSE: NORMAL H
TSH SERPL DL<=0.005 MIU/L-ACNC: 3.21 MU/L (ref 0.4–4)
WBC # BLD AUTO: 6.4 10E3/UL (ref 4–11)

## 2021-11-09 PROCEDURE — 36415 COLL VENOUS BLD VENIPUNCTURE: CPT | Performed by: PATHOLOGY

## 2021-11-09 PROCEDURE — 83735 ASSAY OF MAGNESIUM: CPT | Performed by: PATHOLOGY

## 2021-11-09 PROCEDURE — 99214 OFFICE O/P EST MOD 30 MIN: CPT | Mod: 24 | Performed by: TRANSPLANT SURGERY

## 2021-11-09 PROCEDURE — 80053 COMPREHEN METABOLIC PANEL: CPT | Performed by: PATHOLOGY

## 2021-11-09 PROCEDURE — 85027 COMPLETE CBC AUTOMATED: CPT | Performed by: PATHOLOGY

## 2021-11-09 PROCEDURE — 99000 SPECIMEN HANDLING OFFICE-LAB: CPT | Performed by: PATHOLOGY

## 2021-11-09 PROCEDURE — 82248 BILIRUBIN DIRECT: CPT | Performed by: PATHOLOGY

## 2021-11-09 PROCEDURE — 84439 ASSAY OF FREE THYROXINE: CPT | Performed by: PATHOLOGY

## 2021-11-09 PROCEDURE — 84100 ASSAY OF PHOSPHORUS: CPT | Performed by: PATHOLOGY

## 2021-11-09 PROCEDURE — 84443 ASSAY THYROID STIM HORMONE: CPT | Performed by: PATHOLOGY

## 2021-11-09 PROCEDURE — 80197 ASSAY OF TACROLIMUS: CPT | Mod: 90 | Performed by: PATHOLOGY

## 2021-11-09 RX ORDER — MEGESTROL ACETATE 40 MG/ML
400 SUSPENSION ORAL 2 TIMES DAILY
Qty: 100 ML | Refills: 3 | Status: SHIPPED | OUTPATIENT
Start: 2021-11-09 | End: 2022-01-03

## 2021-11-09 RX ORDER — PREDNISONE 5 MG/1
5 TABLET ORAL DAILY
Qty: 60 TABLET | Refills: 3 | Status: SHIPPED | OUTPATIENT
Start: 2021-11-09 | End: 2022-05-24

## 2021-11-09 NOTE — TELEPHONE ENCOUNTER
Pt calls and was told to follow up with you regarding today's appt. Pt was taken off lasix and put back on prednisone. Pt was just following Dr. NAGY's recommendations. Informed pt that she may hear back from us or not.

## 2021-11-09 NOTE — PROGRESS NOTES
Transplant Surgery -OUTPATIENT IMMUNOSUPPRESSION PROGRESS NOTE    Date of Visit: 11/09/2021    Transplants:  10/17/2021 (Liver); Postoperative day:  23  ASSESMENT AND PLAN:  1.Graft Function:Liver allograft: no rejection, has a bile duct stent will need to be changed 11/29    2.Immunosuppression Management: start prednisone 5mg daily for renal sparing, keep tacrolimus levels at 5, continue cellcept  3.Hypertension:ok  4.Renal Function: creatinine 2.03, hold lasix and push fluids  5.Lab frequency: wekly  6.Other:  Wound healthy, remove staples today  Start megace for low appetite  Date: November 9, 2021    Transplant:  [x]                             Liver [x]                              Kidney []                             Pancreas []                              Other:             Chief Complaint:  Doing well, no fevers, not eating well has low appetite    History of Present Illness:  Patient Active Problem List   Diagnosis     Acute deep vein thrombosis (DVT) of upper extremity (H)     Acute hepatic encephalopathy     Acute hypoxemic respiratory failure (H)     Acute kidney failure, unspecified (H)     Carrier of group B Streptococcus     Cirrhosis of liver with ascites (H)     CKD (chronic kidney disease)     Elevated blood pressure     Family history of colon cancer     Gastroesophageal reflux disease without esophagitis     GI (gastrointestinal bleed)     Hyponatremia     Hypokalemia     History of DVT (deep vein thrombosis)     Anemia associated with acute blood loss     Acute anemia     Need for vaccination for viral hepatitis     Macrocytosis without anemia     Knee pain     Pancytopenia (H)     Macrocytic anemia     Blood loss anemia     Hydrothorax     Portal hypertensive gastropathy (H)     Pleural effusion     Screening for malignant neoplasm of cervix     Venous incompetence     Varicose veins of leg with pain     Supervision of high-risk pregnancy of elderly primigravida     Subclinical  hypothyroidism     Liver failure (H)     Hypotension     Pre-liver transplant, listed     Alcoholic cirrhosis (H)     Abnormal serum iron level     Iron deficiency anemia due to chronic blood loss     Encounter for immunization     Liver transplant candidate     Liver transplant recipient (H)     Immunosuppressed status (H)     Malnutrition (H)     Steroid-induced hyperglycemia     Epistaxis     Esophagitis     Duodenal erosion     Gastric antral vascular ectasia     Hyperphosphatemia     Leukocytosis     SOCIAL /FAMILY HISTORY: [x]                  No recent change    Past Medical History:   Diagnosis Date     Ascites      History of blood transfusion      Liver cirrhosis (H)      Thyroid disease      Past Surgical History:   Procedure Laterality Date      SECTION       CV RIGHT HEART CATH MEASUREMENTS RECORDED N/A 2021    Procedure: CV RIGHT HEART CATH;  Surgeon: Joseph Guevara MD;  Location:  HEART CARDIAC CATH LAB     ENDOSCOPIC RETROGRADE CHOLANGIOPANCREATOGRAM N/A 10/25/2021    Procedure: ENDOSCOPIC RETROGRADE CHOLANGIOPANCREATOGRAPHY, WITH biliary sphincterotomy, biliary dilation, and biliary stent placement;  Surgeon: Guru Dominguez Fenton MD;  Location:  OR     ESOPHAGOSCOPY, GASTROSCOPY, DUODENOSCOPY (EGD), COMBINED N/A 10/7/2021    Procedure: ESOPHAGOGASTRODUODENOSCOPY (EGD) with hemostasis;  Surgeon: Fox Jerry MD;  Location:  GI     ESOPHAGOSCOPY, GASTROSCOPY, DUODENOSCOPY (EGD), COMBINED N/A 10/22/2021    Procedure: ESOPHAGOGASTRODUODENOSCOPY, WITH BIOPSY;  Surgeon: Fadia Avila MD;  Location:  GI     IR LIVER BIOPSY PERCUTANEOUS  10/26/2021     IR THORACENTESIS  2021     IR THORACENTESIS  2021     THORACENTESIS Left 2021    Procedure: THORACENTESIS;  Surgeon: Almas Irby MD;  Location:  GI     THORACENTESIS N/A 2021    Procedure: THORACENTESIS;  Surgeon: Almas Irby MD;  Location:  GI     THORACENTESIS N/A  03/10/2021    Procedure: THORACENTESIS;  Surgeon: Almas Irby MD;  [facility]     THORACENTESIS Left 2021    Procedure: THORACENTESIS;  Surgeon: Kennedi Chavez MD;  Location: [facility] OR     THORACENTESIS Left 2021    Procedure: THORACENTESIS;  Surgeon: Jess Ansari PA-C;  Location: [facility] OR     TRANSPLANT LIVER RECIPIENT  DONOR N/A 10/17/2021    Procedure: TRANSPLANT, LIVER, RECIPIENT,  DONOR;  Surgeon: Christian Morrison MD;  Location: [facility] OR     Social History     Socioeconomic History     Marital status: Single     Spouse name: Not on file     Number of children: Not on file     Years of education: Not on file     Highest education level: Master's degree (e.g., MA, MS, Roberto, MEd, MSW, SASHA)   Occupational History     Not on file   Tobacco Use     Smoking status: Never Smoker     Smokeless tobacco: Never Used   Substance and Sexual Activity     Alcohol use: Not Currently     Comment: Quit on 2020     Drug use: Not Currently     Sexual activity: Not Currently     Partners: Male   Other Topics Concern     Parent/sibling w/ CABG, MI or angioplasty before 65F 55M? Not Asked   Social History Narrative    Lives [place]. 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 12 year sonFlakito.         Christine Harris, DNP, APRN, CNP    3/17/2021     Social Determinants of Health     Financial Resource Strain: Low Risk      Difficulty of Paying Living Expenses: Not hard at all   Food Insecurity: No Food Insecurity     Worried About Running Out of Food in the Last Year: Never true     Ran Out of Food in the Last Year: Never true   Transportation Needs: No Transportation Needs     Lack of Transportation (Medical): No     Lack of Transportation (Non-Medical): No   Physical Activity: Sufficiently Active     Days of Exercise per Week: 5 days     Minutes of Exercise per Session: 30 min   Stress: No Stress Concern Present     Feeling of Stress : Only a  little   Social Connections: Moderately Integrated     Frequency of Communication with Friends and Family: More than three times a week     Frequency of Social Gatherings with Friends and Family: More than three times a week     Attends [employer] Services: More than 4 times per year     Active Member of Clubs or Organizations: Yes     Attends Club or Organization Meetings: More than 4 times per year     Marital Status: Never    Intimate Partner Violence: Not on file   Housing Stability: Unknown     Unable to Pay for Housing in the Last Year: Not on file     Number of Places Lived in the Last Year: 1     Unstable Housing in the Last Year: No     Prescription Medications as of 11/9/2021       Rx Number Disp Refills Start End Last Dispensed Date Next Fill Date Owning Pharmacy    alcohol swab prep pads  100 each 3 10/29/2021    [address]    Sig: Use to swab area of injection/fina as directed.    Class: E-Prescribe    aspirin (ASA) 325 MG tablet  90 tablet 3 11/2/2021    [id number] - [place], [place] - [address]    Sig: Take 1 tablet (325 mg) by mouth daily    Class: E-Prescribe    Route: Oral    blood glucose (CONTOUR NEXT TEST) test strip  100 strip 6 10/29/2021    [address]    Sig: Use to test blood sugar 3 times daily.    Class: E-Prescribe    blood glucose (NO BRAND SPECIFIED) lancets standard  100 each 1 10/29/2021    [address]    Sig: To use to test glucose level in the blood Use to test blood sugar 3 times daily as directed. To accompany glucose monitor brands per insurance coverage.    Class: E-Prescribe    Notes to Pharmacy: Cecy microlet lancets    furosemide (LASIX) 20 MG tablet  60 tablet 0 11/1/2021    [place], [place] - [address]  [facility] [phone]    Sig: Take 1 tablet (20 mg) by mouth daily    Class: E-Prescribe    Route: Oral    insulin aspart (NOVOLOG PEN) 100 UNIT/ML pen  9 mL 1 10/29/2021    [address]    Sig: Inject 1-10 Units Subcutaneous 3 times daily (before meals) See AVS (too long to print in script). Dose to decrease with decreasing steroid dose    Class: E-Prescribe    Route: Subcutaneous    insulin pen needle (32G X 4 MM) 32G X 4 MM miscellaneous  100 each 0 10/29/2021    [address]    Sig: Use as directed by provider Per insurance coverage    Class: E-Prescribe    levothyroxine (SYNTHROID/LEVOTHROID) 50 MCG tablet    7/8/2020        Sig: Take 50 mcg by mouth daily    Class: Historical    Route: Oral    mycophenolate (GENERIC EQUIVALENT) 250 MG capsule  540 capsule 3 11/2/2021    [facility] #[id number] [place], [place]  [address]    Sig: Take 3 capsules (750 mg) by mouth 2 times daily    Class: E-Prescribe    Notes to Pharmacy: TXP DT 10/17/2021 (Liver) TXP Dischg DT 10/29/2021 DX Liver replaced by transplant Z94.4 TX [facility] ([place], [place])    Route: Oral    ondansetron (ZOFRAN-ODT) 4 MG ODT tab  20 tablet 1 10/29/2021    [address]    Sig: Take 1 tablet (4 mg) by mouth every 6 hours as needed for nausea or vomiting    Class: E-Prescribe    Route: Oral    pantoprazole (PROTONIX) 40 MG EC tablet  180 tablet 3 11/2/2021    [facility] #[id number] [place], [place]  [address] AT [address]    Sig: Take 1 tablet (40 mg) by mouth 2 times daily    Class: E-Prescribe    Route: Oral    Sharps Container MISC  1 each 0 10/29/2021    [address]    Sig: Use as directed  to dispose of needles, lancets and other sharps Per Insurance coverage    Class: E-Prescribe    sulfamethoxazole-trimethoprim (BACTRIM) 400-80 MG tablet  90 tablet 3 2021    [facility] #[id number] [place], [place] - [address] AT [address]    Si tablet by Oral or Feeding Tube route daily    Class: E-Prescribe    Route: Oral or Feeding Tube    tacrolimus (GENERIC EQUIVALENT) 1 MG capsule  30 capsule 3 2021    [facility] #[id number] [place], [place] - [address] AT [address]    Sig: Take 1 capsule (1 mg) by mouth every morning Dose change    Class: E-Prescribe    Notes to Pharmacy: TXP DT 10/17/2021 (Liver) TXP Dischg DT 10/29/2021 DX Liver replaced by transplant Z94.4 TX [facility] ([place], [place])    Route: Oral    tacrolimus (GENERIC EQUIVALENT) 5 MG capsule  180 capsule 3 2021    [facility] #[id number] [place], [place] - [address] AT [facility] [address]    Sig: Take 1 capsule (5 mg) by mouth every morning AND 1 capsule (5 mg) every evening.    Class: E-Prescribe    Notes to Pharmacy: TXP DT 10/17/2021 (Liver) TXP Dischg DT 10/29/2021 DX Liver replaced by transplant Z94.4 [facility] ([place], [place])    Route: Oral    ursodiol (ACTIGALL) 300 MG capsule  180 capsule 3 2021    [facility] #[id number] [place], [place] - [address] AT [address]    Sig: Take 1 capsule (300 mg) by mouth 2 times daily    Class: E-Prescribe    Route: Oral    valGANciclovir (VALCYTE) 450 MG tablet  90 tablet 3 2021    [facility] #[id number] [place], [place] - [address] AT [address]    Sig: Take 1 tablet (450 mg) by mouth daily    Class: E-Prescribe    Route: Oral        Amoxicillin   REVIEW OF SYSTEMS (check box if normal)  [x]               GENERAL  [x]                  PULMONARY [x]                GENITOURINARY  [x]                CNS                 [x]                 CARDIAC  [x]                 ENDOCRINE  [x]                EARS,NOSE,THROAT [x]                 GASTROINTESTINAL [x]                 NEUROLOGIC    [x]                MUSCLOSKELTAL  [x]                  HEMATOLOGY      PHYSICAL EXAM (check box if normal)BP (!) 156/87   Pulse 102   Wt 69.1 kg (152 lb 6.4 oz)   SpO2 98%   BMI 23.87 kg/m      @txexam@    [x]            GENERAL:    [x]       EYES:  ICTERIC   []        YES  []                    NO  [x]           EXTREMITIES: Clubbing []       Y     [x]           N    [x]           EARS, NOSE, THROAT: Membranes Moist    YES   [x]                   NO []                  [x]           LUNGS:  CLEAR    YES       [x]                  NO    []                                [x]           SKIN: Jaundice           YES       []                  NO    [x]                   Rash: YES       []                  NO    [x]                                     [x]             HEART: Regular Rate          YES       [x]                  NO    []                   Incision Clean:  YES       [x]                  NO    []                                [x]                    ABDOMEN: Organomegaly YES       []                  NO    [x]                       [x]                    NEUROLOGICAL:  Nonfocal  YES       [x]                  NO    []                       [x]                    Hernia YES       []                  NO    [x]                   PSYCHIATRIC:  Appropriate  YES       [x]                  NO    []                       OTHER:                                                                                                   PAIN SCALE:: 3

## 2021-11-09 NOTE — NURSING NOTE
Chief Complaint   Patient presents with     RECHECK     2 week follow up     Blood pressure (!) 156/87, pulse 102, weight 69.1 kg (152 lb 6.4 oz), SpO2 98 %, not currently breastfeeding.    Joaquina Villeda on 11/9/2021 at 9:12 AM

## 2021-11-09 NOTE — PROGRESS NOTES
TRANSPLANT NEPHROLOGY POST LIVER TRANSPLANT VISIT    Assessment & Plan   CORBIN after OLT  Immunosuppression   HTN   Volume overload   Differential swelling (ultrasound was reviewed and negative for DVT)      Discussion:  Overall making brisk recovery. No evidence of DVT.   We discussed lasix as needed. Continue with appropriate hydration. RTC only as needed.     Return visit: GERARD Jordan MD    Chief Complaint   Ms. Fisher is a 51 year old here for routine follow up and acute kidney injury following liver transplantation.     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  She underwent orthotopic liver transplantation about 2 weeks ago.  She had some kidney injury that appears to be improving.  She returns to the clinic today accompanied by her daughter.  She is taking her medications regularly.  Her appetite is improving.  She specifically denied fevers chills nausea vomiting or diarrhea.  She is off her stool softeners.  She is hydrating with over 2 L/day.  She has significant lower extremity edema right greater than left all the way to the thigh, US was obtained and was negative for DVT.  Maria Elena specifically denied any history of longstanding diabetes prior to her transplantation.  Had no issues with high blood pressure.  Currently she is using sliding scale insulin due to elevated sugar in the settings of steroid use.  She is able to void without any trouble.  She specifically denied over-the-counter medication use including NSAIDs.    Home BP: Controlled 130-140/80-90 mmHg.    Problem List   Patient Active Problem List   Diagnosis     Acute deep vein thrombosis (DVT) of upper extremity (H)     Acute hepatic encephalopathy     Acute hypoxemic respiratory failure (H)     Acute kidney failure, unspecified (H)     Carrier of group B Streptococcus     Cirrhosis of liver with ascites (H)     CKD (chronic kidney disease)     Elevated blood  pressure     Family history of colon cancer     Gastroesophageal reflux disease without esophagitis     GI (gastrointestinal bleed)     Hyponatremia     Hypokalemia     History of DVT (deep vein thrombosis)     Anemia associated with acute blood loss     Acute anemia     Need for vaccination for viral hepatitis     Macrocytosis without anemia     Knee pain     Pancytopenia (H)     Macrocytic anemia     Blood loss anemia     Hydrothorax     Portal hypertensive gastropathy (H)     Pleural effusion     Screening for malignant neoplasm of cervix     Venous incompetence     Varicose veins of leg with pain     Supervision of high-risk pregnancy of elderly primigravida     Subclinical hypothyroidism     Liver failure (H)     Hypotension     Pre-liver transplant, listed     Alcoholic cirrhosis (H)     Abnormal serum iron level     Iron deficiency anemia due to chronic blood loss     Encounter for immunization     Liver transplant candidate     Liver transplant recipient (H)     Immunosuppressed status (H)     Malnutrition (H)     Steroid-induced hyperglycemia     Epistaxis     Esophagitis     Duodenal erosion     Gastric antral vascular ectasia     Hyperphosphatemia     Leukocytosis       Allergies   Allergies   Allergen Reactions     Amoxicillin GI Disturbance, Diarrhea, Nausea and Nausea and Vomiting       Medications   Current Outpatient Medications   Medication Sig     alcohol swab prep pads Use to swab area of injection/fina as directed.     aspirin (ASA) 325 MG tablet Take 1 tablet (325 mg) by mouth daily     blood glucose (CONTOUR NEXT TEST) test strip Use to test blood sugar 3 times daily.     blood glucose (NO BRAND SPECIFIED) lancets standard To use to test glucose level in the blood Use to test blood sugar 3 times daily as directed. To accompany glucose monitor brands per insurance coverage.     furosemide (LASIX) 20 MG tablet Take 1 tablet (20 mg) by mouth daily     insulin aspart (NOVOLOG PEN) 100 UNIT/ML pen  "Inject 1-10 Units Subcutaneous 3 times daily (before meals) See AVS (too long to print in script). Dose to decrease with decreasing steroid dose     insulin pen needle (32G X 4 MM) 32G X 4 MM miscellaneous Use as directed by provider Per insurance coverage     levothyroxine (SYNTHROID/LEVOTHROID) 50 MCG tablet Take 50 mcg by mouth daily     mycophenolate (GENERIC EQUIVALENT) 250 MG capsule Take 3 capsules (750 mg) by mouth 2 times daily     ondansetron (ZOFRAN-ODT) 4 MG ODT tab Take 1 tablet (4 mg) by mouth every 6 hours as needed for nausea or vomiting     pantoprazole (PROTONIX) 40 MG EC tablet Take 1 tablet (40 mg) by mouth 2 times daily     Sharps Container MISC Use as directed to dispose of needles, lancets and other sharps Per Insurance coverage     sulfamethoxazole-trimethoprim (BACTRIM) 400-80 MG tablet 1 tablet by Oral or Feeding Tube route daily     ursodiol (ACTIGALL) 300 MG capsule Take 1 capsule (300 mg) by mouth 2 times daily     valGANciclovir (VALCYTE) 450 MG tablet Take 1 tablet (450 mg) by mouth daily     tacrolimus (GENERIC EQUIVALENT) 1 MG capsule Take 1 capsule (1 mg) by mouth every morning Dose change     tacrolimus (GENERIC EQUIVALENT) 5 MG capsule Take 1 capsule (5 mg) by mouth every morning AND 1 capsule (5 mg) every evening.     No current facility-administered medications for this visit.     Medications Discontinued During This Encounter   Medication Reason     methocarbamol (ROBAXIN) 500 MG tablet      polyethylene glycol (MIRALAX) 17 GM/Dose powder      predniSONE (DELTASONE) 5 MG tablet      senna-docusate (SENOKOT-S/PERICOLACE) 8.6-50 MG tablet      traMADol (ULTRAM) 50 MG tablet      traZODone (DESYREL) 50 MG tablet        Physical Exam   Vital Signs: BP (!) 150/85   Pulse 108   Temp 97.6  F (36.4  C) (Oral)   Ht 1.702 m (5' 7\")   Wt 69.1 kg (152 lb 4.8 oz)   BMI 23.85 kg/m      GENERAL APPEARANCE: alert and no distress  HENT: mouth without ulcers or lesions  LYMPHATICS: no " cervical or supraclavicular nodes  RESP: lungs clear to auscultation - no rales, rhonchi or wheezes  CV: regular rhythm, normal rate, no rub, no murmur  EDEMA: no LE edema bilaterally  ABDOMEN: soft, nondistended, nontender, bowel sounds normal  MS: extremities normal - no gross deformities noted, no evidence of inflammation in joints, no muscle tenderness  SKIN: no rash    Data     Renal Latest Ref Rng & Units 11/4/2021 11/1/2021 10/30/2021   Na 133 - 144 mmol/L 140 138 135   K 3.4 - 5.3 mmol/L 3.9 4.4 4.2   Cl 94 - 109 mmol/L 111(H) 106 103   CO2 20 - 32 mmol/L 23 24 23   BUN 7 - 30 mg/dL 44(H) 78(H) 79(H)   Cr 0.52 - 1.04 mg/dL 1.29(H) 2.10(H) 2.75(H)   Glucose 70 - 99 mg/dL 109(H) 183(H) 195(H)   Ca  8.5 - 10.1 mg/dL 8.2(L) 8.8 8.6   Mg 1.6 - 2.3 mg/dL 1.6 2.0 2.0     Bone Health Latest Ref Rng & Units 11/4/2021 11/1/2021 10/30/2021   Phos 2.5 - 4.5 mg/dL 2.5 4.6(H) 5.1(H)   Vit D Def 20 - 75 ug/L - - -     Heme Latest Ref Rng & Units 11/4/2021 11/1/2021 10/30/2021   WBC 4.0 - 11.0 10e3/uL 7.7 11.0 10.9   Hgb 11.7 - 15.7 g/dL 9.6(L) 9.5(L) 8.6(L)   Plt 150 - 450 10e3/uL 151 227 206   ABSOLUTE NEUTROPHIL 1.6 - 8.3 10e3/uL - - -   ABSOLUTE LYMPHOCYTES 0.8 - 5.3 10e3/uL - - -   ABSOLUTE MONOCYTES 0.0 - 1.3 10e3/uL - - -   ABSOLUTE EOSINOPHILS 0.0 - 0.7 10e3/uL - - -   ABSOLUTE BASOPHILS 0.0 - 0.2 10e9/L - - -   ABS IMMATURE GRANULOCYTES 0 - 0.4 10e9/L - - -   ABSOLUTE NUCLEATED RBC - - - -     Liver Latest Ref Rng & Units 11/4/2021 11/1/2021 10/30/2021   AP 40 - 150 U/L 185(H) 188(H) 196(H)   AP (external) 50 - 136 IU/L - - -   TBili 0.2 - 1.3 mg/dL 1.4(H) 1.3 1.2   TBili (external) 0.2 - 1.2 mg/dL - - -   DBili 0.0 - 0.2 mg/dL 1.0(H) 1.0(H) 1.1(H)   DBili (external) 0.1 - 0.5 mg/dL - - -   ALT 0 - 50 U/L 42 64(H) 46   ALT (external) 8 - 45 IU/L - - -   AST 0 - 45 U/L 16 27 24   AST (external) 2 - 40 IU/L - - -   Tot Protein 6.8 - 8.8 g/dL 5.3(L) 5.9(L) 5.9(L)   Tot Protein (external) 6.0 - 8.0 g/dL - - -   Albumin  3.4 - 5.0 g/dL 2.3(L) 3.0(L) 2.9(L)   Albumin (external) 3.5 - 5.2 g/dL - - -     Pancreas Latest Ref Rng & Units 10/26/2021 10/25/2021 10/19/2021   A1C 0.0 - 5.6 % - - 5.1   Amylase 30 - 110 U/L - 37 -   Lipase 73 - 393 U/L 81 249 -     Iron studies Latest Ref Rng & Units 10/5/2021 9/28/2021 7/30/2021   Iron 35 - 180 ug/dL - - -   Iron sat 15 - 46 % - - -   Ferritin 8 - 252 ng/mL 186 280(H) 32     [facility] Txp Virology Latest Ref Rng & Units 10/16/2021 5/2/2021 1/4/2021   EBV CAPSID ANTIBODY IGG No detectable antibody. Positive(A) 2.5(H) 3.0(H)   Hep B Core NR:Nonreactive - Nonreactive -        Recent Labs   Lab Test 10/30/21  1033 11/01/21  0836 11/04/21  0856   DOSTAC 10/29/2021 10/31/2021 11/3/2021   TACROL 5.1 5.9 6.6

## 2021-11-09 NOTE — LETTER
11/9/2021     RE: Sarah Fisher  [address] [facility] [address]    Dear Colleague,    Thank you for referring your patient, Sarah Fisher, to the [facility] TRANSPLANT [facility]. Please see a copy of my visit note below.    Transplant Surgery -OUTPATIENT IMMUNOSUPPRESSION PROGRESS NOTE    Date of Visit: 11/09/2021    Transplants:  10/17/2021 (Liver); Postoperative day:  23  ASSESMENT AND PLAN:  1.Graft Function:Liver allograft: no rejection, has a bile duct stent will need to be changed 11/29    2.Immunosuppression Management: start prednisone 5mg daily for renal sparing, keep tacrolimus levels at 5, continue cellcept  3.Hypertension:ok  4.Renal Function: creatinine 2.03, hold lasix and push fluids  5.Lab frequency: wekly  6.Other:  Wound healthy, remove staples today  Start megace for low appetite  Date: November 9, 2021    Transplant:  [x]                             Liver [x]                              Kidney []                             Pancreas []                              Other:             Chief Complaint:  Doing well, no fevers, not eating well has low appetite    History of Present Illness:  Patient Active Problem List   Diagnosis     Acute deep vein thrombosis (DVT) of upper extremity (H)     Acute hepatic encephalopathy     Acute hypoxemic respiratory failure (H)     Acute kidney failure, unspecified (H)     Carrier of group B Streptococcus     Cirrhosis of liver with ascites (H)     CKD (chronic kidney disease)     Elevated blood pressure     Family history of colon cancer     Gastroesophageal reflux disease without esophagitis     GI (gastrointestinal bleed)     Hyponatremia     Hypokalemia     History of DVT (deep vein thrombosis)     Anemia associated with acute blood loss     Acute anemia     Need for vaccination for viral hepatitis     Macrocytosis without anemia     Knee pain     Pancytopenia (H)     Macrocytic anemia     Blood loss anemia     Hydrothorax      Portal hypertensive gastropathy (H)     Pleural effusion     Screening for malignant neoplasm of cervix     Venous incompetence     Varicose veins of leg with pain     Supervision of high-risk pregnancy of elderly primigravida     Subclinical hypothyroidism     Liver failure (H)     Hypotension     Pre-liver transplant, listed     Alcoholic cirrhosis (H)     Abnormal serum iron level     Iron deficiency anemia due to chronic blood loss     Encounter for immunization     Liver transplant candidate     Liver transplant recipient (H)     Immunosuppressed status (H)     Malnutrition (H)     Steroid-induced hyperglycemia     Epistaxis     Esophagitis     Duodenal erosion     Gastric antral vascular ectasia     Hyperphosphatemia     Leukocytosis     SOCIAL /FAMILY HISTORY: [x]                  No recent change    Past Medical History:   Diagnosis Date     Ascites      History of blood transfusion      Liver cirrhosis (H)      Thyroid disease      Past Surgical History:   Procedure Laterality Date      SECTION       CV RIGHT HEART CATH MEASUREMENTS RECORDED N/A 2021    Procedure: CV RIGHT HEART CATH;  Surgeon: Joseph Guevara MD;  Location:  HEART CARDIAC CATH LAB     ENDOSCOPIC RETROGRADE CHOLANGIOPANCREATOGRAM N/A 10/25/2021    Procedure: ENDOSCOPIC RETROGRADE CHOLANGIOPANCREATOGRAPHY, WITH biliary sphincterotomy, biliary dilation, and biliary stent placement;  Surgeon: Guru Dominguez Fenton MD;  Location:  OR     ESOPHAGOSCOPY, GASTROSCOPY, DUODENOSCOPY (EGD), COMBINED N/A 10/7/2021    Procedure: ESOPHAGOGASTRODUODENOSCOPY (EGD) with hemostasis;  Surgeon: Fox Jerry MD;  Location:  GI     ESOPHAGOSCOPY, GASTROSCOPY, DUODENOSCOPY (EGD), COMBINED N/A 10/22/2021    Procedure: ESOPHAGOGASTRODUODENOSCOPY, WITH BIOPSY;  Surgeon: Fadia Avila MD;  Location:  GI     IR LIVER BIOPSY PERCUTANEOUS  10/26/2021     IR THORACENTESIS  2021     IR THORACENTESIS  2021      THORACENTESIS Left 2021    Procedure: THORACENTESIS;  Surgeon: Almas Irby MD;  Location: [facility] GI     THORACENTESIS N/A 2021    Procedure: THORACENTESIS;  Surgeon: Almas Irby MD;  Location: [facility] GI     THORACENTESIS N/A 03/10/2021    Procedure: THORACENTESIS;  Surgeon: Almas Irby MD;  Location: [facility] GI     THORACENTESIS Left 2021    Procedure: THORACENTESIS;  Surgeon: Kennedi Chavez MD;  Location: [facility] OR     THORACENTESIS Left 2021    Procedure: THORACENTESIS;  Surgeon: Jess Ansari PA-C;  Location: [facility] OR     TRANSPLANT LIVER RECIPIENT  DONOR N/A 10/17/2021    Procedure: TRANSPLANT, LIVER, RECIPIENT,  DONOR;  Surgeon: Christian Morrison MD;  Location: [facility] OR     Social History     Socioeconomic History     Marital status: Single     Spouse name: Not on file     Number of children: Not on file     Years of education: Not on file     Highest education level: Master's degree (e.g., MA, MS, Roberto, MEd, MSW, SASHA)   Occupational History     Not on file   Tobacco Use     Smoking status: Never Smoker     Smokeless tobacco: Never Used   Substance and Sexual Activity     Alcohol use: Not Currently     Comment: Quit on 2020     Drug use: Not Currently     Sexual activity: Not Currently     Partners: Male   Other Topics Concern     Parent/sibling w/ CABG, MI or angioplasty before 65F 55M? Not Asked   Social History Narrative    Lives [place]. 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 12 year sonFlakito.         Christine Harris, DNP, APRN, CNP    3/17/2021     Social Determinants of Health     Financial Resource Strain: Low Risk      Difficulty of Paying Living Expenses: Not hard at all   Food Insecurity: No Food Insecurity     Worried About Running Out of Food in the Last Year: Never true     Ran Out of Food in the Last Year: Never true   Transportation Needs: No Transportation Needs     Lack of Transportation (Medical): No      Lack of Transportation (Non-Medical): No   Physical Activity: Sufficiently Active     Days of Exercise per Week: 5 days     Minutes of Exercise per Session: 30 min   Stress: No Stress Concern Present     Feeling of Stress : Only a little   Social Connections: Moderately Integrated     Frequency of Communication with Friends and Family: More than three times a week     Frequency of Social Gatherings with Friends and Family: More than three times a week     Attends Taoism Services: More than 4 times per year     Active Member of Clubs or Organizations: Yes     Attends Club or Organization Meetings: More than 4 times per year     Marital Status: Never    Intimate Partner Violence: Not on file   Housing Stability: Unknown     Unable to Pay for Housing in the Last Year: Not on file     Number of Places Lived in the Last Year: 1     Unstable Housing in the Last Year: No     Prescription Medications as of 11/9/2021       Rx Number Disp Refills Start End Last Dispensed Date Next Fill Date Owning Pharmacy    alcohol swab prep pads  100 each 3 10/29/2021    [address]    Sig: Use to swab area of injection/fina as directed.    Class: E-Prescribe    aspirin (ASA) 325 MG tablet  90 tablet 3 11/2/2021    [facility] #[id number] - [place], [place] - [address] AT [address]    Sig: Take 1 tablet (325 mg) by mouth daily    Class: E-Prescribe    Route: Oral    blood glucose (CONTOUR NEXT TEST) test strip  100 strip 6 10/29/2021    [address]    Sig: Use to test blood sugar 3 times daily.    Class: E-Prescribe    blood glucose (NO BRAND SPECIFIED) lancets standard  100 each 1 10/29/2021    [address]    Sig: To use to test glucose level in the blood Use to test blood sugar 3 times daily as directed. To accompany  glucose monitor brands per insurance coverage.    Class: E-Prescribe    Notes to Pharmacy: Cecy microlet lancets    furosemide (LASIX) 20 MG tablet  60 tablet 0 11/1/2021    [address]    Sig: Take 1 tablet (20 mg) by mouth daily    Class: E-Prescribe    Route: Oral    insulin aspart (NOVOLOG PEN) 100 UNIT/ML pen  9 mL 1 10/29/2021    [address]    Sig: Inject 1-10 Units Subcutaneous 3 times daily (before meals) See AVS (too long to print in script). Dose to decrease with decreasing steroid dose    Class: E-Prescribe    Route: Subcutaneous    insulin pen needle (32G X 4 MM) 32G X 4 MM miscellaneous  100 each 0 10/29/2021    [address]    Sig: Use as directed by provider Per insurance coverage    Class: E-Prescribe    levothyroxine (SYNTHROID/LEVOTHROID) 50 MCG tablet    7/8/2020        Sig: Take 50 mcg by mouth daily    Class: Historical    Route: Oral    mycophenolate (GENERIC EQUIVALENT) 250 MG capsule  540 capsule 3 11/2/2021    [email or web address] #[id number] [place], [place] - [address]    Sig: Take 3 capsules (750 mg) by mouth 2 times daily    Class: E-Prescribe    Notes to Pharmacy: TXP DT 10/17/2021 (Liver) TXP Dischg DT 10/29/2021 DX Liver replaced by transplant Z94.4 TX [facility] ([place], [place])    Route: Oral    ondansetron (ZOFRAN-ODT) 4 MG ODT tab  20 tablet 1 10/29/2021    [address]    Sig: Take 1 tablet (4 mg) by mouth every 6 hours as needed for nausea or vomiting    Class: E-Prescribe    Route: Oral    pantoprazole (PROTONIX) 40 MG EC tablet  180 tablet 3 11/2/2021    [facility] #[id number] [place], [place] - [address] AT [address] OF [address]  [employer]    Sig: Take 1 tablet (40 mg) by mouth 2 times daily    Class: E-Prescribe    Route: Oral    Sharps Container MISC  1 each 0 10/29/2021    [facility] - [place], [place] - [address]    Sig: Use as directed to dispose of needles, lancets and other sharps Per Insurance coverage    Class: E-Prescribe    sulfamethoxazole-trimethoprim (BACTRIM) 400-80 MG tablet  90 tablet 3 2021    [id number] [place], [place] - [address]    Si tablet by Oral or Feeding Tube route daily    Class: E-Prescribe    Route: Oral or Feeding Tube    tacrolimus (GENERIC EQUIVALENT) 1 MG capsule  30 capsule 3 2021    [id number] [place], [place] - [address] AT [address]    Sig: Take 1 capsule (1 mg) by mouth every morning Dose change    Class: E-Prescribe    Notes to Pharmacy: TXP DT 10/17/2021 (Liver) TXP Dischg DT 10/29/2021 DX Liver replaced by transplant Z94.4 TX Center [facility] ([place], [place])    Route: Oral    tacrolimus (GENERIC EQUIVALENT) 5 MG capsule  180 capsule 3 2021    [id number] [place], [place] - [address]    Sig: Take 1 capsule (5 mg) by mouth every morning AND 1 capsule (5 mg) every evening.    Class: E-Prescribe    Notes to Pharmacy: TXP DT 10/17/2021 (Liver) TXP Dischg DT 10/29/2021 DX Liver replaced by transplant Z94.4 TX Center [facility] ([place], [place])    Route: Oral    ursodiol (ACTIGALL) 300 MG capsule  180 capsule 3 2021    [facility] #[id number] [place], [place] - [address]    Sig: Take 1 capsule (300 mg) by mouth 2 times daily    Class: E-Prescribe    Route: Oral    valGANciclovir (VALCYTE) 450 MG tablet  90 tablet 3 2021    [facility] #[id number] - [place], [place] - [address] AT [address]    Sig: Take 1 tablet (450 mg) by mouth daily    Class: E-Prescribe    Route: Oral        Amoxicillin   REVIEW OF SYSTEMS (check box if normal)  [x]               GENERAL  [x]                 PULMONARY [x]                GENITOURINARY  [x]                CNS                 [x]                 CARDIAC  [x]                 ENDOCRINE  [x]                EARS,NOSE,THROAT [x]                 GASTROINTESTINAL [x]                 NEUROLOGIC    [x]                MUSCLOSKELTAL  [x]                  HEMATOLOGY      PHYSICAL EXAM (check box if normal)BP (!) 156/87   Pulse 102   Wt 69.1 kg (152 lb 6.4 oz)   SpO2 98%   BMI 23.87 kg/m      @txexam@    [x]            GENERAL:    [x]       EYES:  ICTERIC   []        YES  []                    NO  [x]           EXTREMITIES: Clubbing []       Y     [x]           N    [x]           EARS, NOSE, THROAT: Membranes Moist    YES   [x]                   NO []                  [x]           LUNGS:  CLEAR    YES       [x]                  NO    []                                [x]           SKIN: Jaundice           YES       []                  NO    [x]                   Rash: YES       []                  NO    [x]                                     [x]             HEART: Regular Rate          YES       [x]                  NO    []                   Incision Clean:  YES       [x]                  NO    []                                [x]                    ABDOMEN: Organomegaly YES       []                  NO    [x]                       [x]                    NEUROLOGICAL:  Nonfocal  YES       [x]                  NO    []                       [x]                    Hernia YES       []                  NO    [x]                   PSYCHIATRIC:  Appropriate  YES       [x]                  NO    []                       OTHER:                                                                                                    PAIN SCALE:: 3    Again, thank you for allowing me to participate in the care of your patient.      Sincerely,      Christian Morrison MD

## 2021-11-09 NOTE — PROGRESS NOTES
"Transplant Social Work Services Progress Note        Collaborated with: Liver Transplant Team     Data: Sarah \"Maria Elena\" is s/p  donor Liver Transplant and this writer met with Maria Elena and her niece, Radha in clinic for a routine follow up. Maria Elena is 23 days post  donor liver transplant.       Intervention/Education: I spoke with Maria Elena  and we reviewed the followin. Writing to the Donor Family process. I provided packet regarding how to write to the donor family         2. Liver Transplant Support Group and social events- She would like to be added to our distrubution list.        3. Immunosuppression copays-no current concerns.  I reminded Maria Elena to contact me if they have future concerns and we can evaluate for financial assistance programs, grants, etc. She did not report any surprises or concerns when picking up their medications.         4. Adjustment to illness - Maria Elena reports that she feels good about her progress. She indicates that she has to remind herself that some days will be slower than others.         5. Importance of maintaining abstinence from all non-prescribed drugs and alcohol.    Assessment: Maria Elena voiced understanding to the topics reviewed. They decline any concerns and continue to have adequate support.      Plan: I will remain available for the psychosocial needs of this patient.     ENRIQUE Rodriguez, [facility]  Liver Transplant   [facility]  Phone: [phone]  Pager: [phone]    "

## 2021-11-10 DIAGNOSIS — Z94.4 LIVER TRANSPLANT RECIPIENT (H): ICD-10-CM

## 2021-11-10 RX ORDER — TACROLIMUS 1 MG/1
4 CAPSULE ORAL EVERY EVENING
Qty: 360 CAPSULE | Refills: 3 | Status: SHIPPED | OUTPATIENT
Start: 2021-11-10 | End: 2021-11-24 | Stop reason: DRUGHIGH

## 2021-11-10 RX ORDER — TACROLIMUS 5 MG/1
5 CAPSULE ORAL EVERY MORNING
Qty: 90 CAPSULE | Refills: 3 | Status: SHIPPED | OUTPATIENT
Start: 2021-11-10 | End: 2021-11-24

## 2021-11-10 NOTE — TELEPHONE ENCOUNTER
ISSUE:   Tacrolimus IR level 9.6 on 11/9, goal 8, dose 5 mg BID. Creatinine increased.    PLAN:   Please call patient and confirm this was an accurate 12-hour trough. Verify Tacrolimus IR dose 5 mg BID. Confirm no new medications or illness. Confirm no missed doses. If accurate trough and accurate dose, decrease Tacrolimus IR dose to 5 mg AM and 4 mg pM and repeat labs on Monday.    OUTCOME:   Spoke with patient, they confirm accurate trough level and current dose 5 mg BID. Patient confirmed dose change to 5 mg am, 4 mg pm and to repeat labs in 4 days. Orders sent to preferred pharmacy for dose change and lab for repeat labs. Patient voiced understanding of plan.     Alexandrea Peter LPN

## 2021-11-11 ENCOUNTER — LAB (OUTPATIENT)
Dept: LAB | Facility: CLINIC | Age: 51
End: 2021-11-11
Payer: COMMERCIAL

## 2021-11-11 ENCOUNTER — TEAM CONFERENCE (OUTPATIENT)
Dept: TRANSPLANT | Facility: CLINIC | Age: 51
End: 2021-11-11

## 2021-11-11 DIAGNOSIS — Z94.4 LIVER TRANSPLANTED (H): Primary | ICD-10-CM

## 2021-11-11 DIAGNOSIS — Z94.4 LIVER REPLACED BY TRANSPLANT (H): ICD-10-CM

## 2021-11-11 LAB
ERYTHROCYTE [DISTWIDTH] IN BLOOD BY AUTOMATED COUNT: 18.4 % (ref 10–15)
HCT VFR BLD AUTO: 28.7 % (ref 35–47)
HGB BLD-MCNC: 9 G/DL (ref 11.7–15.7)
MCH RBC QN AUTO: 31 PG (ref 26.5–33)
MCHC RBC AUTO-ENTMCNC: 31.4 G/DL (ref 31.5–36.5)
MCV RBC AUTO: 99 FL (ref 78–100)
PLATELET # BLD AUTO: 251 10E3/UL (ref 150–450)
RBC # BLD AUTO: 2.9 10E6/UL (ref 3.8–5.2)
TACROLIMUS BLD-MCNC: 8.2 UG/L (ref 5–15)
TME LAST DOSE: NORMAL H
TME LAST DOSE: NORMAL H
WBC # BLD AUTO: 5.8 10E3/UL (ref 4–11)

## 2021-11-11 PROCEDURE — 85027 COMPLETE CBC AUTOMATED: CPT

## 2021-11-11 PROCEDURE — 84100 ASSAY OF PHOSPHORUS: CPT

## 2021-11-11 PROCEDURE — 80197 ASSAY OF TACROLIMUS: CPT

## 2021-11-11 PROCEDURE — 82248 BILIRUBIN DIRECT: CPT

## 2021-11-11 PROCEDURE — 36415 COLL VENOUS BLD VENIPUNCTURE: CPT

## 2021-11-11 PROCEDURE — 83735 ASSAY OF MAGNESIUM: CPT

## 2021-11-11 PROCEDURE — 80053 COMPREHEN METABOLIC PANEL: CPT

## 2021-11-11 NOTE — TELEPHONE ENCOUNTER
Post Liver Transplant Team Conference  Date: 11/11/2021  Transplant Coordinator: Lotus Hughes  Transplant Surgeon: Christian Morrison      Attendees:  [] Dr. Seth [] Dr. Beltran []       [x] Dr. Morrison [x] Alexandrea Peter LPN []    [] Dr. Patterson [] Cyndee Salazar NP []    [] Dr. Benavidez [] Raven Garcia NP []    [] Dr. Lyon []  []       [] Dr. Johnson [x] Britni Wilson, RN []       [] Dr. Tom Garcia [x] Sirena Dudley, RN []       [] Dr. MAYTE Dominguez [x] Lotus Márquez RN []       [] Dr. Ravi [] Olivia Sam RN []       [] Dr. Sharma [] Jose Gurrola, RICHARD []         Verbal Plan Read Back:   Pt alk phos elevated, repeat and if elevated have liver biopsy.    Pt ERCP set up Nov 29th.       Routed to RN Coordinator   Lotus Hughes, RN

## 2021-11-12 LAB
ALBUMIN SERPL-MCNC: 2.2 G/DL (ref 3.4–5)
ALP SERPL-CCNC: 430 U/L (ref 40–150)
ALT SERPL W P-5'-P-CCNC: 13 U/L (ref 0–50)
ANION GAP SERPL CALCULATED.3IONS-SCNC: 11 MMOL/L (ref 3–14)
AST SERPL W P-5'-P-CCNC: 10 U/L (ref 0–45)
BILIRUB DIRECT SERPL-MCNC: 0.9 MG/DL (ref 0–0.2)
BILIRUB SERPL-MCNC: 1.1 MG/DL (ref 0.2–1.3)
BUN SERPL-MCNC: 35 MG/DL (ref 7–30)
CALCIUM SERPL-MCNC: 8.6 MG/DL (ref 8.5–10.1)
CHLORIDE BLD-SCNC: 113 MMOL/L (ref 94–109)
CO2 SERPL-SCNC: 18 MMOL/L (ref 20–32)
CREAT SERPL-MCNC: 2.38 MG/DL (ref 0.52–1.04)
GFR SERPL CREATININE-BSD FRML MDRD: 23 ML/MIN/1.73M2
GLUCOSE BLD-MCNC: 91 MG/DL (ref 70–99)
MAGNESIUM SERPL-MCNC: 1.7 MG/DL (ref 1.6–2.3)
PHOSPHATE SERPL-MCNC: 3.9 MG/DL (ref 2.5–4.5)
POTASSIUM BLD-SCNC: 4.5 MMOL/L (ref 3.4–5.3)
PROT SERPL-MCNC: 6 G/DL (ref 6.8–8.8)
SODIUM SERPL-SCNC: 142 MMOL/L (ref 133–144)

## 2021-11-15 ENCOUNTER — TELEPHONE (OUTPATIENT)
Dept: TRANSPLANT | Facility: CLINIC | Age: 51
End: 2021-11-15

## 2021-11-15 ENCOUNTER — OFFICE VISIT (OUTPATIENT)
Dept: TRANSPLANT | Facility: CLINIC | Age: 51
End: 2021-11-15
Attending: TRANSPLANT SURGERY
Payer: COMMERCIAL

## 2021-11-15 ENCOUNTER — LAB (OUTPATIENT)
Dept: LAB | Facility: CLINIC | Age: 51
End: 2021-11-15
Attending: TRANSPLANT SURGERY
Payer: COMMERCIAL

## 2021-11-15 VITALS
DIASTOLIC BLOOD PRESSURE: 90 MMHG | BODY MASS INDEX: 23.46 KG/M2 | WEIGHT: 149.8 LBS | HEART RATE: 95 BPM | SYSTOLIC BLOOD PRESSURE: 164 MMHG | OXYGEN SATURATION: 99 %

## 2021-11-15 DIAGNOSIS — Z94.4 LIVER TRANSPLANTED (H): Primary | ICD-10-CM

## 2021-11-15 DIAGNOSIS — Z94.4 LIVER REPLACED BY TRANSPLANT (H): ICD-10-CM

## 2021-11-15 LAB
ALBUMIN SERPL-MCNC: 2.3 G/DL (ref 3.4–5)
ALP SERPL-CCNC: 402 U/L (ref 40–150)
ALT SERPL W P-5'-P-CCNC: 11 U/L (ref 0–50)
ANION GAP SERPL CALCULATED.3IONS-SCNC: 12 MMOL/L (ref 3–14)
AST SERPL W P-5'-P-CCNC: 7 U/L (ref 0–45)
BACTERIA PRT CULT: NO GROWTH
BILIRUB DIRECT SERPL-MCNC: 0.6 MG/DL (ref 0–0.2)
BILIRUB SERPL-MCNC: 0.8 MG/DL (ref 0.2–1.3)
BUN SERPL-MCNC: 32 MG/DL (ref 7–30)
CALCIUM SERPL-MCNC: 8.8 MG/DL (ref 8.5–10.1)
CHLORIDE BLD-SCNC: 114 MMOL/L (ref 94–109)
CO2 SERPL-SCNC: 20 MMOL/L (ref 20–32)
CREAT SERPL-MCNC: 2.01 MG/DL (ref 0.52–1.04)
ERYTHROCYTE [DISTWIDTH] IN BLOOD BY AUTOMATED COUNT: 18.1 % (ref 10–15)
GFR SERPL CREATININE-BSD FRML MDRD: 28 ML/MIN/1.73M2
GLUCOSE BLD-MCNC: 95 MG/DL (ref 70–99)
HCT VFR BLD AUTO: 29 % (ref 35–47)
HGB BLD-MCNC: 8.8 G/DL (ref 11.7–15.7)
MAGNESIUM SERPL-MCNC: 1.5 MG/DL (ref 1.6–2.3)
MCH RBC QN AUTO: 30.4 PG (ref 26.5–33)
MCHC RBC AUTO-ENTMCNC: 30.3 G/DL (ref 31.5–36.5)
MCV RBC AUTO: 100 FL (ref 78–100)
PHOSPHATE SERPL-MCNC: 3.7 MG/DL (ref 2.5–4.5)
PLATELET # BLD AUTO: 177 10E3/UL (ref 150–450)
POTASSIUM BLD-SCNC: 4 MMOL/L (ref 3.4–5.3)
PROT SERPL-MCNC: 5.8 G/DL (ref 6.8–8.8)
RBC # BLD AUTO: 2.89 10E6/UL (ref 3.8–5.2)
SODIUM SERPL-SCNC: 146 MMOL/L (ref 133–144)
TACROLIMUS BLD-MCNC: 5.6 UG/L (ref 5–15)
TME LAST DOSE: NORMAL H
TME LAST DOSE: NORMAL H
WBC # BLD AUTO: 4.7 10E3/UL (ref 4–11)

## 2021-11-15 PROCEDURE — 99000 SPECIMEN HANDLING OFFICE-LAB: CPT | Performed by: PATHOLOGY

## 2021-11-15 PROCEDURE — 36415 COLL VENOUS BLD VENIPUNCTURE: CPT | Performed by: PATHOLOGY

## 2021-11-15 PROCEDURE — 83735 ASSAY OF MAGNESIUM: CPT | Performed by: PATHOLOGY

## 2021-11-15 PROCEDURE — 80197 ASSAY OF TACROLIMUS: CPT | Mod: 90 | Performed by: PATHOLOGY

## 2021-11-15 PROCEDURE — 99214 OFFICE O/P EST MOD 30 MIN: CPT | Mod: 24 | Performed by: TRANSPLANT SURGERY

## 2021-11-15 PROCEDURE — 80053 COMPREHEN METABOLIC PANEL: CPT | Performed by: PATHOLOGY

## 2021-11-15 PROCEDURE — 84100 ASSAY OF PHOSPHORUS: CPT | Performed by: PATHOLOGY

## 2021-11-15 PROCEDURE — 85027 COMPLETE CBC AUTOMATED: CPT | Performed by: PATHOLOGY

## 2021-11-15 PROCEDURE — 82248 BILIRUBIN DIRECT: CPT | Performed by: PATHOLOGY

## 2021-11-15 RX ORDER — ONDANSETRON 4 MG/1
4 TABLET, FILM COATED ORAL EVERY 6 HOURS PRN
Qty: 20 TABLET | Refills: 0 | Status: SHIPPED | OUTPATIENT
Start: 2021-11-15 | End: 2022-07-15

## 2021-11-15 NOTE — PROGRESS NOTES
Transplant Surgery -OUTPATIENT IMMUNOSUPPRESSION PROGRESS NOTE    Date of Visit: 11/15/2021    Transplants:  10/17/2021 (Liver); Postoperative day:  29  ASSESMENT AND PLAN:  1.Graft Function:Liver allograft: if labs do not improve, will need a liver biopsy.    2.Immunosuppression Management: keep tacrolimus levels at 8 ng/dL  3.Hypertension: ok  4.Renal Function: needs to see a nephrologist  5.Lab frequency:  6.Other:  Wound healthy    Date: November 15, 2021    Transplant:  [x]                             Liver [x]                              Kidney []                             Pancreas []                              Other:             Chief Complaint:  Doing well    History of Present Illness:  Patient Active Problem List   Diagnosis     Acute deep vein thrombosis (DVT) of upper extremity (H)     Acute hepatic encephalopathy     Acute hypoxemic respiratory failure (H)     Acute kidney failure, unspecified (H)     Carrier of group B Streptococcus     Cirrhosis of liver with ascites (H)     CKD (chronic kidney disease)     Elevated blood pressure     Family history of colon cancer     Gastroesophageal reflux disease without esophagitis     GI (gastrointestinal bleed)     Hyponatremia     Hypokalemia     History of DVT (deep vein thrombosis)     Anemia associated with acute blood loss     Acute anemia     Need for vaccination for viral hepatitis     Macrocytosis without anemia     Knee pain     Pancytopenia (H)     Macrocytic anemia     Blood loss anemia     Hydrothorax     Portal hypertensive gastropathy (H)     Pleural effusion     Screening for malignant neoplasm of cervix     Venous incompetence     Varicose veins of leg with pain     Supervision of high-risk pregnancy of elderly primigravida     Subclinical hypothyroidism     Liver failure (H)     Hypotension     Pre-liver transplant, listed     Alcoholic cirrhosis (H)     Abnormal serum iron level     Iron deficiency anemia due to chronic blood loss      Encounter for immunization     Liver transplant candidate     Liver transplant recipient (H)     Immunosuppressed status (H)     Malnutrition (H)     Steroid-induced hyperglycemia     Epistaxis     Esophagitis     Duodenal erosion     Gastric antral vascular ectasia     Hyperphosphatemia     Leukocytosis     SOCIAL /FAMILY HISTORY: [x]                  No recent change    Past Medical History:   Diagnosis Date     Ascites      History of blood transfusion      Liver cirrhosis (H)      Thyroid disease      Past Surgical History:   Procedure Laterality Date      SECTION       CV RIGHT HEART CATH MEASUREMENTS RECORDED N/A 2021    Procedure: CV RIGHT HEART CATH;  Surgeon: Joseph Guevara MD;  Location:  HEART CARDIAC CATH LAB     ENDOSCOPIC RETROGRADE CHOLANGIOPANCREATOGRAM N/A 10/25/2021    Procedure: ENDOSCOPIC RETROGRADE CHOLANGIOPANCREATOGRAPHY, WITH biliary sphincterotomy, biliary dilation, and biliary stent placement;  Surgeon: Guru Dominguez Fenton MD;  Location:  OR     ESOPHAGOSCOPY, GASTROSCOPY, DUODENOSCOPY (EGD), COMBINED N/A 10/7/2021    Procedure: ESOPHAGOGASTRODUODENOSCOPY (EGD) with hemostasis;  Surgeon: Fox Jerry MD;  Location:  GI     ESOPHAGOSCOPY, GASTROSCOPY, DUODENOSCOPY (EGD), COMBINED N/A 10/22/2021    Procedure: ESOPHAGOGASTRODUODENOSCOPY, WITH BIOPSY;  Surgeon: Fadia Avila MD;  [facility]: [facility]     IR LIVER BIOPSY PERCUTANEOUS  10/26/2021     IR THORACENTESIS  2021     IR THORACENTESIS  2021     THORACENTESIS Left 2021    Procedure: THORACENTESIS;  Surgeon: Almas Irby MD;  Location: [facility]     THORACENTESIS N/A 2021    Procedure: THORACENTESIS;  Surgeon: Almas Irby MD;  Location: [facility]     THORACENTESIS N/A 03/10/2021    Procedure: THORACENTESIS;  Surgeon: Almas Irby MD;  Location: [facility]     THORACENTESIS Left 2021    Procedure: THORACENTESIS;  Surgeon: Kennedi Chavez MD;  Location: [facility]      THORACENTESIS Left 2021    Procedure: THORACENTESIS;  Surgeon: Jess Ansari PA-C;  Location: [facility] OR     TRANSPLANT LIVER RECIPIENT  DONOR N/A 10/17/2021    Procedure: TRANSPLANT, LIVER, RECIPIENT,  DONOR;  Surgeon: Christian Morrison MD;  [facility]     Social History     Socioeconomic History     Marital status: Single     Spouse name: Not on file     Number of children: Not on file     Years of education: Not on file     Highest education level: Master's degree (e.g., MA, MS, Roberto, MEd, MSW, SASHA)   Occupational History     Not on file   Tobacco Use     Smoking status: Never Smoker     Smokeless tobacco: Never Used   Substance and Sexual Activity     Alcohol use: Not Currently     Comment: Quit on 2020     Drug use: Not Currently     Sexual activity: Not Currently     Partners: Male   Other Topics Concern     Parent/sibling w/ CABG, MI or angioplasty before 65F 55M? Not Asked   Social History Narrative    Lives [place]. 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 12 year sonFlakito.         Christine Harris, DNP, APRN, CNP    3/17/2021     Social Determinants of Health     Financial Resource Strain: Low Risk      Difficulty of Paying Living Expenses: Not hard at all   Food Insecurity: No Food Insecurity     Worried About Running Out of Food in the Last Year: Never true     Ran Out of Food in the Last Year: Never true   Transportation Needs: No Transportation Needs     Lack of Transportation (Medical): No     Lack of Transportation (Non-Medical): No   Physical Activity: Sufficiently Active     Days of Exercise per Week: 5 days     Minutes of Exercise per Session: 30 min   Stress: No Stress Concern Present     Feeling of Stress : Only a little   Social Connections: Moderately Integrated     Frequency of Communication with Friends and Family: More than three times a week     Frequency of Social Gatherings with Friends and Family: More than  three times a week     Attends Mu-ism Services: More than 4 times per year     Active Member of Clubs or Organizations: Yes     Attends Club or Organization Meetings: More than 4 times per year     Marital Status: Never    Intimate Partner Violence: Not on file   Housing Stability: Unknown     Unable to Pay for Housing in the Last Year: Not on file     Number of Places Lived in the Last Year: 1     Unstable Housing in the Last Year: No     Prescription Medications as of 11/15/2021       Rx Number Disp Refills Start End Last Dispensed Date Next Fill Date Owning Pharmacy    alcohol swab prep pads  100 each 3 10/29/2021    [address]    Sig: Use to swab area of injection/fina as directed.    Class: E-Prescribe    aspirin (ASA) 325 MG tablet  90 tablet 3 11/2/2021    [id number] - [place], [place] - [address] AT [address]    Sig: Take 1 tablet (325 mg) by mouth daily    Class: E-Prescribe    Route: Oral    blood glucose (CONTOUR NEXT TEST) test strip  100 strip 6 10/29/2021    [address]    Sig: Use to test blood sugar 3 times daily.    Class: E-Prescribe    blood glucose (NO BRAND SPECIFIED) lancets standard  100 each 1 10/29/2021    [address]    Sig: To use to test glucose level in the blood Use to test blood sugar 3 times daily as directed. To accompany glucose monitor brands per insurance coverage.    Class: E-Prescribe    Notes to Pharmacy: Cecy microlet lancets    furosemide (LASIX) 20 MG tablet  60 tablet 0 11/1/2021    [place], [place] - [id number]    Sig: Take 1 tablet (20 mg) by mouth daily    Class: E-Prescribe    Route: Oral    insulin aspart (NOVOLOG PEN) 100 UNIT/ML pen  9 mL 1 10/29/2021    [facility]  Dis[facility] - [address]    Sig: Inject 1-10 Units Subcutaneous 3 times daily (before meals) See AVS (too long to print in script). Dose to decrease with decreasing steroid dose    Class: E-Prescribe    Route: Subcutaneous    insulin pen needle (32G X 4 MM) 32G X 4 MM miscellaneous  100 each 0 10/29/2021    [facility] [address]    Sig: Use as directed by provider Per insurance coverage    Class: E-Prescribe    levothyroxine (SYNTHROID/LEVOTHROID) 50 MCG tablet    7/8/2020        Sig: Take 50 mcg by mouth daily    Class: Historical    Route: Oral    megestrol (MEGACE) 40 MG/ML suspension  100 mL 3 11/9/2021    [id number] [place], [place] - [address]    Sig: Take 10 mLs (400 mg) by mouth 2 times daily    Class: E-Prescribe    Route: Oral    mycophenolate (GENERIC EQUIVALENT) 250 MG capsule  540 capsule 3 11/2/2021    [id number] [place], [place] - [address]    Sig: Take 3 capsules (750 mg) by mouth 2 times daily    Class: E-Prescribe    Notes to Pharmacy: TXP DT 10/17/2021 (Liver) TXP Dischg DT 10/29/2021 DX Liver replaced by transplant Z94.4 TX Center [facility] ([place], [place])    Route: Oral    ondansetron (ZOFRAN-ODT) 4 MG ODT tab  20 tablet 1 10/29/2021    [facility] [address]    Sig: Take 1 tablet (4 mg) by mouth every 6 hours as needed for nausea or vomiting    Class: E-Prescribe    Route: Oral    pantoprazole (PROTONIX) 40 MG EC tablet  180 tablet 3 11/2/2021    [id number] [place], [place] - [address]    Sig: Take 1 tablet (40 mg) by mouth 2 times daily    Class: E-Prescribe    Route: Oral    predniSONE (DELTASONE) 5 MG tablet  60 tablet 3 11/9/2021    [id number] [place], [place] - [address] AT [address] [facility]    Sig: Take 1 tablet (5 mg) by mouth daily    Class: E-Prescribe    Notes to Pharmacy: TXP DT 10/17/2021 (Liver) TXP Dischg DT 10/29/2021 DX Liver replaced by transplant Z94.4 TX Center [facility] ([place], [place])    Route: Oral    Sharps Container MISC  1 each 0 10/29/2021    [facility] - [place], [place] - [address]    Sig: Use as directed to dispose of needles, lancets and other sharps Per Insurance coverage    Class: E-Prescribe    sulfamethoxazole-trimethoprim (BACTRIM) 400-80 MG tablet  90 tablet 3 2021    [id number] [place], [place] - [address] AT [address] [facility]    Si tablet by Oral or Feeding Tube route daily    Class: E-Prescribe    Route: Oral or Feeding Tube    tacrolimus (GENERIC EQUIVALENT) 1 MG capsule  360 capsule 3 11/10/2021    [id number] [place] [address] - [address] AT [address] [facility]    Sig: Take 4 capsules (4 mg) by mouth every evening Dose change    Class: E-Prescribe    Notes to Pharmacy: Dose change    Route: Oral    tacrolimus (GENERIC EQUIVALENT) 5 MG capsule  90 capsule 3 11/10/2021    [facility] #[id number] [place] [address] - [address] AT [address] [facility]    Sig: Take 1 capsule (5 mg) by mouth every morning    Class: E-Prescribe    Notes to Pharmacy: Dose change    Route: Oral    ursodiol (ACTIGALL) 300 MG capsule  180 capsule 3 2021    [facility][id number] [place], [place] - [address] AT [address] [facility]    Sig: Take 1 capsule (300 mg) by mouth 2 times daily    Class: E-Prescribe    Route: Oral    valGANciclovir (VALCYTE) 450 MG tablet  90 tablet 3 2021    [facility][id number] [place], [place] - [address] AT [address] [facility]    Sig: Take 1 tablet (450 mg)  by mouth daily    Class: E-Prescribe    Route: Oral        Amoxicillin   REVIEW OF SYSTEMS (check box if normal)  [x]               GENERAL  [x]                 PULMONARY [x]                GENITOURINARY  [x]                CNS                 [x]                 CARDIAC  [x]                 ENDOCRINE  [x]                EARS,NOSE,THROAT [x]                 GASTROINTESTINAL [x]                 NEUROLOGIC    [x]                MUSCLOSKELTAL  [x]                  HEMATOLOGY      PHYSICAL EXAM (check box if normal)BP (!) 164/90   Pulse 95   Wt 67.9 kg (149 lb 12.8 oz)   SpO2 99%   BMI 23.46 kg/m        [x]            GENERAL:    [x]       EYES:  ICTERIC   []        YES  []                    NO  [x]           EXTREMITIES: Clubbing []       Y     [x]           N    [x]           EARS, NOSE, THROAT: Membranes Moist    YES   [x]                   NO []                  [x]           LUNGS:  CLEAR    YES       [x]                  NO    []                                [x]           SKIN: Jaundice           YES       []                  NO    [x]                   Rash: YES       []                  NO    [x]                                     [x]             HEART: Regular Rate          YES       [x]                  NO    []                   Incision Clean:  YES       [x]                  NO    []                                [x]                    ABDOMEN: Organomegaly YES       []                  NO    [x]                       [x]                    NEUROLOGICAL:  Nonfocal  YES       [x]                  NO    []                       [x]                    Hernia YES       []                  NO    [x]                   PSYCHIATRIC:  Appropriate  YES       [x]                  NO    []                       OTHER:                                                                                                   PAIN SCALE:: 3

## 2021-11-15 NOTE — TELEPHONE ENCOUNTER
Left message for patient that dr mixon would like a liver biopsy completed due to elevated alk phos.     Attempted to schedule biopsy but orders are not signed properly. Message to dr Mixon.

## 2021-11-15 NOTE — LETTER
11/15/2021     RE: Sarah Fisher  [address] [address] [address]    Dear Colleague,    Thank you for referring your patient, Sarah Fihser, to the [facility]. Please see a copy of my visit note below.    Transplant Surgery -OUTPATIENT IMMUNOSUPPRESSION PROGRESS NOTE    Date of Visit: 11/15/2021    Transplants:  10/17/2021 (Liver); Postoperative day:  29  ASSESMENT AND PLAN:  1.Graft Function:Liver allograft: if labs do not improve, will need a liver biopsy.    2.Immunosuppression Management: keep tacrolimus levels at 8 ng/dL  3.Hypertension: ok  4.Renal Function: needs to see a nephrologist  5.Lab frequency:  6.Other:  Wound healthy    Date: November 15, 2021    Transplant:  [x]                             Liver [x]                              Kidney []                             Pancreas []                              Other:             Chief Complaint:  Doing well    History of Present Illness:  Patient Active Problem List   Diagnosis     Acute deep vein thrombosis (DVT) of upper extremity (H)     Acute hepatic encephalopathy     Acute hypoxemic respiratory failure (H)     Acute kidney failure, unspecified (H)     Carrier of group B Streptococcus     Cirrhosis of liver with ascites (H)     CKD (chronic kidney disease)     Elevated blood pressure     Family history of colon cancer     Gastroesophageal reflux disease without esophagitis     GI (gastrointestinal bleed)     Hyponatremia     Hypokalemia     History of DVT (deep vein thrombosis)     Anemia associated with acute blood loss     Acute anemia     Need for vaccination for viral hepatitis     Macrocytosis without anemia     Knee pain     Pancytopenia (H)     Macrocytic anemia     Blood loss anemia     Hydrothorax     Portal hypertensive gastropathy (H)     Pleural effusion     Screening for malignant neoplasm of cervix     Venous incompetence     Varicose veins of leg with pain     Supervision of high-risk  pregnancy of elderly primigravida     Subclinical hypothyroidism     Liver failure (H)     Hypotension     Pre-liver transplant, listed     Alcoholic cirrhosis (H)     Abnormal serum iron level     Iron deficiency anemia due to chronic blood loss     Encounter for immunization     Liver transplant candidate     Liver transplant recipient (H)     Immunosuppressed status (H)     Malnutrition (H)     Steroid-induced hyperglycemia     Epistaxis     Esophagitis     Duodenal erosion     Gastric antral vascular ectasia     Hyperphosphatemia     Leukocytosis     SOCIAL /FAMILY HISTORY: [x]                  No recent change    Past Medical History:   Diagnosis Date     Ascites      History of blood transfusion      Liver cirrhosis (H)      Thyroid disease      Past Surgical History:   Procedure Laterality Date      SECTION       CV RIGHT HEART CATH MEASUREMENTS RECORDED N/A 2021    Procedure: CV RIGHT HEART CATH;  Surgeon: Joseph Guevara MD;  Location:  HEART CARDIAC CATH LAB     ENDOSCOPIC RETROGRADE CHOLANGIOPANCREATOGRAM N/A 10/25/2021    Procedure: ENDOSCOPIC RETROGRADE CHOLANGIOPANCREATOGRAPHY, WITH biliary sphincterotomy, biliary dilation, and biliary stent placement;  Surgeon: Guru Dominguez Fenton MD;  Location:  OR     ESOPHAGOSCOPY, GASTROSCOPY, DUODENOSCOPY (EGD), COMBINED N/A 10/7/2021    Procedure: ESOPHAGOGASTRODUODENOSCOPY (EGD) with hemostasis;  Surgeon: Fox Jerry MD;  Location:  GI     ESOPHAGOSCOPY, GASTROSCOPY, DUODENOSCOPY (EGD), COMBINED N/A 10/22/2021    Procedure: ESOPHAGOGASTRODUODENOSCOPY, WITH BIOPSY;  Surgeon: Fadia Avila MD;  Location:  GI     IR LIVER BIOPSY PERCUTANEOUS  10/26/2021     IR THORACENTESIS  2021     IR THORACENTESIS  2021     THORACENTESIS Left 2021    Procedure: THORACENTESIS;  Surgeon: Almas Irby MD;  Location:  GI     THORACENTESIS N/A 2021    Procedure: THORACENTESIS;  Surgeon: Almas Irby  MD;  Location: [facility] GI     THORACENTESIS N/A 03/10/2021    Procedure: THORACENTESIS;  Surgeon: Almas Irby MD;  Location: [facility] GI     THORACENTESIS Left 2021    Procedure: THORACENTESIS;  Surgeon: Kenendi Chavez MD;  Location: [facility] OR     THORACENTESIS Left 2021    Procedure: THORACENTESIS;  Surgeon: Jess Ansari PA-C;  Location: [facility] OR     TRANSPLANT LIVER RECIPIENT  DONOR N/A 10/17/2021    Procedure: TRANSPLANT, LIVER, RECIPIENT,  DONOR;  Surgeon: Christian Morrison MD;  Location: [facility] OR     Social History     Socioeconomic History     Marital status: Single     Spouse name: Not on file     Number of children: Not on file     Years of education: Not on file     Highest education level: Master's degree (e.g., MA, MS, Roberto, MEd, MSW, SASHA)   Occupational History     Not on file   Tobacco Use     Smoking status: Never Smoker     Smokeless tobacco: Never Used   Substance and Sexual Activity     Alcohol use: Not Currently     Comment: Quit on 2020     Drug use: Not Currently     Sexual activity: Not Currently     Partners: Male   Other Topics Concern     Parent/sibling w/ CABG, MI or angioplasty before 65F 55M? Not Asked   Social History Narrative    Lives [place]. 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 12 year sonFlakito.         Christine Harris, DNP, APRN, CNP    3/17/2021     Social Determinants of Health     Financial Resource Strain: Low Risk      Difficulty of Paying Living Expenses: Not hard at all   Food Insecurity: No Food Insecurity     Worried About Running Out of Food in the Last Year: Never true     Ran Out of Food in the Last Year: Never true   Transportation Needs: No Transportation Needs     Lack of Transportation (Medical): No     Lack of Transportation (Non-Medical): No   Physical Activity: Sufficiently Active     Days of Exercise per Week: 5 days     Minutes of Exercise per Session: 30 min   Stress: No Stress  Concern Present     Feeling of Stress : Only a little   Social Connections: Moderately Integrated     Frequency of Communication with Friends and Family: More than three times a week     Frequency of Social Gatherings with Friends and Family: More than three times a week     Attends [employer] Services: More than 4 times per year     Active Member of Clubs or Organizations: Yes     Attends Club or Organization Meetings: More than 4 times per year     Marital Status: Never    Intimate Partner Violence: Not on file   Housing Stability: Unknown     Unable to Pay for Housing in the Last Year: Not on file     Number of Places Lived in the Last Year: 1     Unstable Housing in the Last Year: No     Prescription Medications as of 11/15/2021       Rx Number Disp Refills Start End Last Dispensed Date Next Fill Date Owning Pharmacy    alcohol swab prep pads  100 each 3 10/29/2021    [address]    Sig: Use to swab area of injection/fina as directed.    Class: E-Prescribe    aspirin (ASA) 325 MG tablet  90 tablet 3 11/2/2021    [id number] - [place], [place] - [address] OF [address]    Sig: Take 1 tablet (325 mg) by mouth daily    Class: E-Prescribe    Route: Oral    blood glucose (CONTOUR NEXT TEST) test strip  100 strip 6 10/29/2021    [address]    Sig: Use to test blood sugar 3 times daily.    Class: E-Prescribe    blood glucose (NO BRAND SPECIFIED) lancets standard  100 each 1 10/29/2021    [address]    Sig: To use to test glucose level in the blood Use to test blood sugar 3 times daily as directed. To accompany glucose monitor brands per insurance coverage.    Class: E-Prescribe    Notes to Pharmacy: Cecy microlet lancets    furosemide (LASIX) 20 MG tablet  60 tablet 0 11/1/2021    [facility] [phone]    Sig: Take 1 tablet (20 mg) by mouth daily    Class: E-Prescribe    Route: Oral    insulin aspart (NOVOLOG PEN) 100 UNIT/ML pen  9 mL 1 10/29/2021    [address]    Sig: Inject 1-10 Units Subcutaneous 3 times daily (before meals) See AVS (too long to print in script). Dose to decrease with decreasing steroid dose    Class: E-Prescribe    Route: Subcutaneous    insulin pen needle (32G X 4 MM) 32G X 4 MM miscellaneous  100 each 0 10/29/2021    [address]    Sig: Use as directed by provider Per insurance coverage    Class: E-Prescribe    levothyroxine (SYNTHROID/LEVOTHROID) 50 MCG tablet    7/8/2020        Sig: Take 50 mcg by mouth daily    Class: Historical    Route: Oral    megestrol (MEGACE) 40 MG/ML suspension  100 mL 3 11/9/2021    [id number] - [place], [place] - [address] AT [address] OF [address]    Sig: Take 10 mLs (400 mg) by mouth 2 times daily    Class: E-Prescribe    Route: Oral    mycophenolate (GENERIC EQUIVALENT) 250 MG capsule  540 capsule 3 11/2/2021    [facility] #[id number] - [place], [place] - [address] AT [address]    Sig: Take 3 capsules (750 mg) by mouth 2 times daily    Class: E-Prescribe    Notes to Pharmacy: TXP DT 10/17/2021 (Liver) TXP Dischg DT 10/29/2021 DX Liver replaced by transplant Z94.4 TX [facility] [place] ([place], [place])    Route: Oral    ondansetron (ZOFRAN-ODT) 4 MG ODT tab  20 tablet 1 10/29/2021    [address]    Sig: Take 1 tablet (4 mg) by mouth every 6 hours as needed for nausea or vomiting    Class: E-Prescribe    Route: Oral    pantoprazole (PROTONIX) 40 MG EC tablet  180 tablet 3 11/2/2021    [facility] #[id number]  - [place], [place] - [address] [facility] [address]    Sig: Take 1 tablet (40 mg) by mouth 2 times daily    Class: E-Prescribe    Route: Oral    predniSONE (DELTASONE) 5 MG tablet  60 tablet 3 2021    [id number] [place], [place] - [address] AT [facility] [address]    Sig: Take 1 tablet (5 mg) by mouth daily    Class: E-Prescribe    Notes to Pharmacy: TXP DT 10/17/2021 (Liver) TXP Dischg DT 10/29/2021 DX Liver replaced by transplant Z94.4 TX Center [facility] [place] ([place], [place])    Route: Oral    Sharps Container MISC  1 each 0 10/29/2021    [facility] - [place], [place] - [address]    Sig: Use as directed to dispose of needles, lancets and other sharps Per Insurance coverage    Class: E-Prescribe    sulfamethoxazole-trimethoprim (BACTRIM) 400-80 MG tablet  90 tablet 3 2021    [id number] - [place], [place] - [address] AT [facility] [address]    Si tablet by Oral or Feeding Tube route daily    Class: E-Prescribe    Route: Oral or Feeding Tube    tacrolimus (GENERIC EQUIVALENT) 1 MG capsule  360 capsule 3 11/10/2021    [facility] #[id number] [place], [place] - [address] AT [facility] [address]    Sig: Take 4 capsules (4 mg) by mouth every evening Dose change    Class: E-Prescribe    Notes to Pharmacy: Dose change    Route: Oral    tacrolimus (GENERIC EQUIVALENT) 5 MG capsule  90 capsule 3 11/10/2021    [facility] #[id number] [place], [place] - [address] AT [facility] [address]    Sig: Take 1 capsule (5 mg) by mouth every morning    Class: E-Prescribe    Notes to Pharmacy: Dose change    Route: Oral    ursodiol (ACTIGALL) 300 MG capsule  180 capsule 3 2021    [facility] #[id number] [place], [place] - [address] AT [facility] [address]    Sig: Take 1 capsule (300 mg) by mouth 2  times daily    Class: E-Prescribe    Route: Oral    valGANciclovir (VALCYTE) 450 MG tablet  90 tablet 3 11/2/2021    [facility] #[id number] - [place] [id number] [facility] OF [facility] [address]    Sig: Take 1 tablet (450 mg) by mouth daily    Class: E-Prescribe    Route: Oral        Amoxicillin   REVIEW OF SYSTEMS (check box if normal)  [x]               GENERAL  [x]                 PULMONARY [x]                GENITOURINARY  [x]                CNS                 [x]                 CARDIAC  [x]                 ENDOCRINE  [x]                EARS,NOSE,THROAT [x]                 GASTROINTESTINAL [x]                 NEUROLOGIC    [x]                MUSCLOSKELTAL  [x]                  HEMATOLOGY      PHYSICAL EXAM (check box if normal)BP (!) 164/90   Pulse 95   Wt 67.9 kg (149 lb 12.8 oz)   SpO2 99%   BMI 23.46 kg/m        [x]            GENERAL:    [x]       EYES:  ICTERIC   []        YES  []                    NO  [x]           EXTREMITIES: Clubbing []       Y     [x]           N    [x]           EARS, NOSE, THROAT: Membranes Moist    YES   [x]                   NO []                  [x]           LUNGS:  CLEAR    YES       [x]                  NO    []                                [x]           SKIN: Jaundice           YES       []                  NO    [x]                   Rash: YES       []                  NO    [x]                                     [x]             HEART: Regular Rate          YES       [x]                  NO    []                   Incision Clean:  YES       [x]                  NO    []                                [x]                    ABDOMEN: Organomegaly YES       []                  NO    [x]                       [x]                    NEUROLOGICAL:  Nonfocal  YES       [x]                  NO    []                       [x]                    Hernia YES       []                  NO    [x]                   PSYCHIATRIC:  Appropriate  YES       [x]                   NO    []                       OTHER:                                                                                                   PAIN SCALE:: 3    Again, thank you for allowing me to participate in the care of your patient.      Sincerely,    Christian Morrison MD

## 2021-11-17 DIAGNOSIS — Z94.4 LIVER TRANSPLANTED (H): Primary | ICD-10-CM

## 2021-11-17 NOTE — PROGRESS NOTES
Per dr aldana pt to have liver biopsy. Set up pt for 23rd. Discussed with patient.  Patient wondering if can wait. Message to dr mixon. Pt will keep 23rd appt for now.

## 2021-11-18 ENCOUNTER — MYC MEDICAL ADVICE (OUTPATIENT)
Dept: INTERVENTIONAL RADIOLOGY/VASCULAR | Facility: CLINIC | Age: 51
End: 2021-11-18

## 2021-11-18 ENCOUNTER — LAB (OUTPATIENT)
Dept: LAB | Facility: CLINIC | Age: 51
End: 2021-11-18
Payer: COMMERCIAL

## 2021-11-18 DIAGNOSIS — Z94.4 LIVER REPLACED BY TRANSPLANT (H): ICD-10-CM

## 2021-11-18 LAB
ALBUMIN SERPL-MCNC: 2.2 G/DL (ref 3.4–5)
ALP SERPL-CCNC: 348 U/L (ref 40–150)
ALT SERPL W P-5'-P-CCNC: 9 U/L (ref 0–50)
ANION GAP SERPL CALCULATED.3IONS-SCNC: 6 MMOL/L (ref 3–14)
AST SERPL W P-5'-P-CCNC: 7 U/L (ref 0–45)
BILIRUB DIRECT SERPL-MCNC: 0.5 MG/DL (ref 0–0.2)
BILIRUB SERPL-MCNC: 0.7 MG/DL (ref 0.2–1.3)
BUN SERPL-MCNC: 24 MG/DL (ref 7–30)
CALCIUM SERPL-MCNC: 8.6 MG/DL (ref 8.5–10.1)
CHLORIDE BLD-SCNC: 116 MMOL/L (ref 94–109)
CO2 SERPL-SCNC: 21 MMOL/L (ref 20–32)
CREAT SERPL-MCNC: 1.56 MG/DL (ref 0.52–1.04)
ERYTHROCYTE [DISTWIDTH] IN BLOOD BY AUTOMATED COUNT: 18.3 % (ref 10–15)
GFR SERPL CREATININE-BSD FRML MDRD: 38 ML/MIN/1.73M2
GLUCOSE BLD-MCNC: 95 MG/DL (ref 70–99)
HCT VFR BLD AUTO: 29.2 % (ref 35–47)
HGB BLD-MCNC: 9.1 G/DL (ref 11.7–15.7)
MAGNESIUM SERPL-MCNC: 1.6 MG/DL (ref 1.6–2.3)
MCH RBC QN AUTO: 31.4 PG (ref 26.5–33)
MCHC RBC AUTO-ENTMCNC: 31.2 G/DL (ref 31.5–36.5)
MCV RBC AUTO: 101 FL (ref 78–100)
PHOSPHATE SERPL-MCNC: 3.7 MG/DL (ref 2.5–4.5)
PLATELET # BLD AUTO: 242 10E3/UL (ref 150–450)
POTASSIUM BLD-SCNC: 4.8 MMOL/L (ref 3.4–5.3)
PROT SERPL-MCNC: 6 G/DL (ref 6.8–8.8)
RBC # BLD AUTO: 2.9 10E6/UL (ref 3.8–5.2)
SODIUM SERPL-SCNC: 143 MMOL/L (ref 133–144)
TACROLIMUS BLD-MCNC: 5.6 UG/L (ref 5–15)
TME LAST DOSE: NORMAL H
TME LAST DOSE: NORMAL H
WBC # BLD AUTO: 4.3 10E3/UL (ref 4–11)

## 2021-11-18 PROCEDURE — 80053 COMPREHEN METABOLIC PANEL: CPT

## 2021-11-18 PROCEDURE — 82248 BILIRUBIN DIRECT: CPT

## 2021-11-18 PROCEDURE — 36415 COLL VENOUS BLD VENIPUNCTURE: CPT

## 2021-11-18 PROCEDURE — 84100 ASSAY OF PHOSPHORUS: CPT

## 2021-11-18 PROCEDURE — 80197 ASSAY OF TACROLIMUS: CPT

## 2021-11-18 PROCEDURE — 85027 COMPLETE CBC AUTOMATED: CPT

## 2021-11-18 PROCEDURE — 83735 ASSAY OF MAGNESIUM: CPT

## 2021-11-20 ENCOUNTER — LAB (OUTPATIENT)
Dept: URGENT CARE | Facility: URGENT CARE | Age: 51
End: 2021-11-20
Attending: TRANSPLANT SURGERY
Payer: COMMERCIAL

## 2021-11-20 DIAGNOSIS — Z94.4 LIVER TRANSPLANTED (H): ICD-10-CM

## 2021-11-20 PROCEDURE — U0003 INFECTIOUS AGENT DETECTION BY NUCLEIC ACID (DNA OR RNA); SEVERE ACUTE RESPIRATORY SYNDROME CORONAVIRUS 2 (SARS-COV-2) (CORONAVIRUS DISEASE [COVID-19]), AMPLIFIED PROBE TECHNIQUE, MAKING USE OF HIGH THROUGHPUT TECHNOLOGIES AS DESCRIBED BY CMS-2020-01-R: HCPCS

## 2021-11-20 PROCEDURE — U0005 INFEC AGEN DETEC AMPLI PROBE: HCPCS

## 2021-11-21 LAB — SARS-COV-2 RNA RESP QL NAA+PROBE: NEGATIVE

## 2021-11-22 ENCOUNTER — LAB (OUTPATIENT)
Dept: LAB | Facility: CLINIC | Age: 51
End: 2021-11-22
Attending: TRANSPLANT SURGERY
Payer: COMMERCIAL

## 2021-11-22 ENCOUNTER — OFFICE VISIT (OUTPATIENT)
Dept: TRANSPLANT | Facility: CLINIC | Age: 51
End: 2021-11-22
Attending: TRANSPLANT SURGERY
Payer: COMMERCIAL

## 2021-11-22 VITALS
OXYGEN SATURATION: 98 % | SYSTOLIC BLOOD PRESSURE: 162 MMHG | BODY MASS INDEX: 23.78 KG/M2 | DIASTOLIC BLOOD PRESSURE: 87 MMHG | WEIGHT: 151.8 LBS | HEART RATE: 92 BPM

## 2021-11-22 DIAGNOSIS — Z94.4 LIVER REPLACED BY TRANSPLANT (H): ICD-10-CM

## 2021-11-22 DIAGNOSIS — Z94.4 LIVER TRANSPLANTED (H): Primary | ICD-10-CM

## 2021-11-22 DIAGNOSIS — K70.31 ALCOHOLIC CIRRHOSIS OF LIVER WITH ASCITES (H): ICD-10-CM

## 2021-11-22 LAB
ALBUMIN SERPL-MCNC: 2.2 G/DL (ref 3.4–5)
ALP SERPL-CCNC: 273 U/L (ref 40–150)
ALT SERPL W P-5'-P-CCNC: 9 U/L (ref 0–50)
ANION GAP SERPL CALCULATED.3IONS-SCNC: 6 MMOL/L (ref 3–14)
AST SERPL W P-5'-P-CCNC: 10 U/L (ref 0–45)
BILIRUB DIRECT SERPL-MCNC: 0.4 MG/DL (ref 0–0.2)
BILIRUB SERPL-MCNC: 0.6 MG/DL (ref 0.2–1.3)
BUN SERPL-MCNC: 24 MG/DL (ref 7–30)
CALCIUM SERPL-MCNC: 8.4 MG/DL (ref 8.5–10.1)
CHLORIDE BLD-SCNC: 114 MMOL/L (ref 94–109)
CO2 SERPL-SCNC: 20 MMOL/L (ref 20–32)
CREAT SERPL-MCNC: 1.79 MG/DL (ref 0.52–1.04)
ERYTHROCYTE [DISTWIDTH] IN BLOOD BY AUTOMATED COUNT: 17.1 % (ref 10–15)
GFR SERPL CREATININE-BSD FRML MDRD: 32 ML/MIN/1.73M2
GLUCOSE BLD-MCNC: 100 MG/DL (ref 70–99)
HCT VFR BLD AUTO: 26.8 % (ref 35–47)
HGB BLD-MCNC: 8.1 G/DL (ref 11.7–15.7)
MAGNESIUM SERPL-MCNC: 1.2 MG/DL (ref 1.6–2.3)
MCH RBC QN AUTO: 30.7 PG (ref 26.5–33)
MCHC RBC AUTO-ENTMCNC: 30.2 G/DL (ref 31.5–36.5)
MCV RBC AUTO: 102 FL (ref 78–100)
PHOSPHATE SERPL-MCNC: 3.3 MG/DL (ref 2.5–4.5)
PLATELET # BLD AUTO: 185 10E3/UL (ref 150–450)
POTASSIUM BLD-SCNC: 4.4 MMOL/L (ref 3.4–5.3)
PROT SERPL-MCNC: 5.8 G/DL (ref 6.8–8.8)
RBC # BLD AUTO: 2.64 10E6/UL (ref 3.8–5.2)
SODIUM SERPL-SCNC: 140 MMOL/L (ref 133–144)
TACROLIMUS BLD-MCNC: 5.6 UG/L (ref 5–15)
TME LAST DOSE: NORMAL H
TME LAST DOSE: NORMAL H
WBC # BLD AUTO: 3.7 10E3/UL (ref 4–11)

## 2021-11-22 PROCEDURE — 36415 COLL VENOUS BLD VENIPUNCTURE: CPT | Performed by: PATHOLOGY

## 2021-11-22 PROCEDURE — 99214 OFFICE O/P EST MOD 30 MIN: CPT | Mod: 24 | Performed by: TRANSPLANT SURGERY

## 2021-11-22 NOTE — NURSING NOTE
Chief Complaint   Patient presents with     RECHECK     4 week post off     Blood pressure (!) 162/87, pulse 92, weight 68.9 kg (151 lb 12.8 oz), SpO2 98 %, not currently breastfeeding.    Joaquina Villeda on 11/22/2021 at 9:55 AM

## 2021-11-22 NOTE — LETTER
11/22/2021     RE: Sarah Fisher  [address] [facility] [address]    Dear Colleague,    Thank you for referring your patient, Sarah Fisher, to the [facility]PL[facility]. Please see a copy of my visit note below.    Transplant Surgery -OUTPATIENT IMMUNOSUPPRESSION PROGRESS NOTE    Date of Visit: 11/22/2021    Transplants:  10/17/2021 (Liver); Postoperative day:  36  ASSESMENT AND PLAN:  1.Graft Function: The liver tests have improved, unlikely to be rejection.  Recommend change the biliary stent.  2.Immunosuppression Management: Keep the tacrolimus levels around 8-10.  3.Hypertension: Okay  4.Renal Function: Good  5.Lab frequency: Weekly  6.Other:  There was minimal discharge at the upper end of the wound which I cleaned up and applied a sterile dressing.    Date: November 22, 2021    Transplant:  [x]                             Liver [x]                              Kidney []                             Pancreas []                              Other:             Chief Complaint:  Doing well no fever or abdominal pain.  Her appetite is improved and she is eating well.  History of Present Illness:  Patient Active Problem List   Diagnosis     Acute deep vein thrombosis (DVT) of upper extremity (H)     Acute hepatic encephalopathy     Acute hypoxemic respiratory failure (H)     Acute kidney failure, unspecified (H)     Carrier of group B Streptococcus     Cirrhosis of liver with ascites (H)     CKD (chronic kidney disease)     Elevated blood pressure     Family history of colon cancer     Gastroesophageal reflux disease without esophagitis     GI (gastrointestinal bleed)     Hyponatremia     Hypokalemia     History of DVT (deep vein thrombosis)     Anemia associated with acute blood loss     Acute anemia     Need for vaccination for viral hepatitis     Macrocytosis without anemia     Knee pain     Pancytopenia (H)     Macrocytic anemia     Blood loss anemia     Hydrothorax     Portal  hypertensive gastropathy (H)     Pleural effusion     Screening for malignant neoplasm of cervix     Venous incompetence     Varicose veins of leg with pain     Supervision of high-risk pregnancy of elderly primigravida     Subclinical hypothyroidism     Liver failure (H)     Hypotension     Pre-liver transplant, listed     Alcoholic cirrhosis (H)     Abnormal serum iron level     Iron deficiency anemia due to chronic blood loss     Encounter for immunization     Liver transplant candidate     Liver transplant recipient (H)     Immunosuppressed status (H)     Malnutrition (H)     Steroid-induced hyperglycemia     Epistaxis     Esophagitis     Duodenal erosion     Gastric antral vascular ectasia     Hyperphosphatemia     Leukocytosis     SOCIAL /FAMILY HISTORY: [x]                  No recent change    Past Medical History:   Diagnosis Date     Ascites      History of blood transfusion      Liver cirrhosis (H)      Thyroid disease      Past Surgical History:   Procedure Laterality Date      SECTION       CV RIGHT HEART CATH MEASUREMENTS RECORDED N/A 2021    Procedure: CV RIGHT HEART CATH;  Surgeon: Joseph Guevara MD;  Location:  HEART CARDIAC CATH LAB     ENDOSCOPIC RETROGRADE CHOLANGIOPANCREATOGRAM N/A 10/25/2021    Procedure: ENDOSCOPIC RETROGRADE CHOLANGIOPANCREATOGRAPHY, WITH biliary sphincterotomy, biliary dilation, and biliary stent placement;  Surgeon: Guru Dominguez Fenton MD;  Location:  OR     ESOPHAGOSCOPY, GASTROSCOPY, DUODENOSCOPY (EGD), COMBINED N/A 10/7/2021    Procedure: ESOPHAGOGASTRODUODENOSCOPY (EGD) with hemostasis;  Surgeon: Fox Jerry MD;  Location:  GI     ESOPHAGOSCOPY, GASTROSCOPY, DUODENOSCOPY (EGD), COMBINED N/A 10/22/2021    Procedure: ESOPHAGOGASTRODUODENOSCOPY, WITH BIOPSY;  Surgeon: Fadia Avila MD;  Location:  GI     IR LIVER BIOPSY PERCUTANEOUS  10/26/2021     IR THORACENTESIS  2021     IR THORACENTESIS  2021      THORACENTESIS Left 2021    Procedure: THORACENTESIS;  Surgeon: Almas Irby MD;  Location: [facility] GI     THORACENTESIS N/A 2021    Procedure: THORACENTESIS;  Surgeon: Almas Irby MD;  Location: [facility] GI     THORACENTESIS N/A 03/10/2021    Procedure: THORACENTESIS;  Surgeon: Almas Irby MD;  Location: [facility] GI     THORACENTESIS Left 2021    Procedure: THORACENTESIS;  Surgeon: Kennedi Chavez MD;  Location: [facility] OR     THORACENTESIS Left 2021    Procedure: THORACENTESIS;  Surgeon: Jess Ansari PA-C;  Location: [facility] OR     TRANSPLANT LIVER RECIPIENT  DONOR N/A 10/17/2021    Procedure: TRANSPLANT, LIVER, RECIPIENT,  DONOR;  Surgeon: Christian Morrison MD;  Location: [facility] OR     Social History     Socioeconomic History     Marital status: Single     Spouse name: Not on file     Number of children: Not on file     Years of education: Not on file     Highest education level: Master's degree (e.g., MA, MS, Roberto, MEd, MSW, SASHA)   Occupational History     Not on file   Tobacco Use     Smoking status: Never Smoker     Smokeless tobacco: Never Used   Substance and Sexual Activity     Alcohol use: Not Currently     Comment: Quit on 2020     Drug use: Not Currently     Sexual activity: Not Currently     Partners: Male   Other Topics Concern     Parent/sibling w/ CABG, MI or angioplasty before 65F 55M? Not Asked   Social History Narrative    Lives [place]. 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 12 year sonFlakito.         Christine Harris, DNP, APRN, CNP    3/17/2021     Social Determinants of Health     Financial Resource Strain: Low Risk      Difficulty of Paying Living Expenses: Not hard at all   Food Insecurity: No Food Insecurity     Worried About Running Out of Food in the Last Year: Never true     Ran Out of Food in the Last Year: Never true   Transportation Needs: No Transportation Needs     Lack of Transportation (Medical): No      Lack of Transportation (Non-Medical): No   Physical Activity: Sufficiently Active     Days of Exercise per Week: 5 days     Minutes of Exercise per Session: 30 min   Stress: No Stress Concern Present     Feeling of Stress : Only a little   Social Connections: Moderately Integrated     Frequency of Communication with Friends and Family: More than three times a week     Frequency of Social Gatherings with Friends and Family: More than three times a week     Attends Quaker Services: More than 4 times per year     Active Member of Clubs or Organizations: Yes     Attends Club or Organization Meetings: More than 4 times per year     Marital Status: Never    Intimate Partner Violence: Not on file   Housing Stability: Unknown     Unable to Pay for Housing in the Last Year: Not on file     Number of Places Lived in the Last Year: 1     Unstable Housing in the Last Year: No     Prescription Medications as of 11/22/2021       Rx Number Disp Refills Start End Last Dispensed Date Next Fill Date Owning Pharmacy    alcohol swab prep pads  100 each 3 10/29/2021    [address]    Sig: Use to swab area of injection/fina as directed.    Class: E-Prescribe    aspirin (ASA) 325 MG tablet  90 tablet 3 11/2/2021    [id number] - [place], [place] - [email or web address]    Sig: Take 1 tablet (325 mg) by mouth daily    Class: E-Prescribe    Route: Oral    blood glucose (CONTOUR NEXT TEST) test strip  100 strip 6 10/29/2021    [address]    Sig: Use to test blood sugar 3 times daily.    Class: E-Prescribe    blood glucose (NO BRAND SPECIFIED) lancets standard  100 each 1 10/29/2021    [address]    Sig: To use to test glucose level in the blood Use to test blood sugar 3 times daily as directed. To accompany  glucose monitor brands per insurance coverage.    Class: E-Prescribe    Notes to Pharmacy: Cecy microlet lancets    furosemide (LASIX) 20 MG tablet  60 tablet 0 11/1/2021    [place] [id number] [facility] [id number]    Sig: Take 1 tablet (20 mg) by mouth daily    Class: E-Prescribe    Route: Oral    insulin aspart (NOVOLOG PEN) 100 UNIT/ML pen  9 mL 1 10/29/2021    [address]    Sig: Inject 1-10 Units Subcutaneous 3 times daily (before meals) See AVS (too long to print in script). Dose to decrease with decreasing steroid dose    Class: E-Prescribe    Route: Subcutaneous    insulin pen needle (32G X 4 MM) 32G X 4 MM miscellaneous  100 each 0 10/29/2021    [address]    Sig: Use as directed by provider Per insurance coverage    Class: E-Prescribe    levothyroxine (SYNTHROID/LEVOTHROID) 50 MCG tablet    7/8/2020        Sig: Take 50 mcg by mouth daily    Class: Historical    Route: Oral    megestrol (MEGACE) 40 MG/ML suspension  100 mL 3 11/9/2021    [id number] [place], [place] - [address]    Sig: Take 10 mLs (400 mg) by mouth 2 times daily    Class: E-Prescribe    Route: Oral    mycophenolate (GENERIC EQUIVALENT) 250 MG capsule  540 capsule 3 11/2/2021    [id number] [place], [place] - [address] AT [address]    Sig: Take 3 capsules (750 mg) by mouth 2 times daily    Class: E-Prescribe    Notes to Pharmacy: TXP DT 10/17/2021 (Liver) TXP Dischg DT 10/29/2021 DX Liver replaced by transplant Z94.4 [facility] ([place], [place])    Route: Oral    ondansetron (ZOFRAN) 4 MG tablet  20 tablet 0 11/15/2021    [facility][id number] [place], [place] - [address]    Sig: Take  1 tablet (4 mg) by mouth every 6 hours as needed for nausea    Class: E-Prescribe    Route: Oral    pantoprazole (PROTONIX) 40 MG EC tablet  180 tablet 3 2021    [facility] [address] [place] [address] [address] AT [facility] [address]    Sig: Take 1 tablet (40 mg) by mouth 2 times daily    Class: E-Prescribe    Route: Oral    predniSONE (DELTASONE) 5 MG tablet  60 tablet 3 2021    [facility] ST[facility] [address] [place] [address] [address] AT [facility] [address]    Sig: Take 1 tablet (5 mg) by mouth daily    Class: E-Prescribe    Notes to Pharmacy: TXP DT 10/17/2021 (Liver) TXP Dischg DT 10/29/2021 DX Liver replaced by transplant Z94.4 TX Center [facility] ([place], [place])    Route: Oral    [employer] MISC  1 each 0 10/29/2021    [facility] - [place], [place] - [address] [facility]    Sig: Use as directed to dispose of needles, lancets and other sharps Per Insurance coverage    Class: E-Prescribe    sulfamethoxazole-trimethoprim (BACTRIM) 400-80 MG tablet  90 tablet 3 2021    [facility] STORE [address] [place] [address] [address] AT [facility] [address]    Si tablet by Oral or Feeding Tube route daily    Class: E-Prescribe    Route: Oral or Feeding Tube    tacrolimus (GENERIC EQUIVALENT) 1 MG capsule  360 capsule 3 11/10/2021    [facility] [address] [place] [address] [address] AT [facility] [address]    Sig: Take 4 capsules (4 mg) by mouth every evening Dose change    Class: E-Prescribe    Notes to Pharmacy: Dose change    Route: Oral    tacrolimus (GENERIC EQUIVALENT) 5 MG capsule  90 capsule 3 11/10/2021    [facility] [address] [place] [address] [address] AT [facility] [address]    Sig: Take 1 capsule (5 mg) by mouth every morning    Class: E-Prescribe    Notes to Pharmacy: Dose change    Route: Oral    ursodiol  (ACTIGALL) 300 MG capsule  180 capsule 3 11/2/2021    [facility][id number] - [place], [place] - [address]    Sig: Take 1 capsule (300 mg) by mouth 2 times daily    Class: E-Prescribe    Route: Oral    valGANciclovir (VALCYTE) 450 MG tablet  90 tablet 3 11/2/2021    [id number] - [facility] [address]    Sig: Take 1 tablet (450 mg) by mouth daily    Class: E-Prescribe    Route: Oral    ondansetron (ZOFRAN-ODT) 4 MG ODT tab  20 tablet 1 10/29/2021    [facility] [place], [place] - [address] [place] [address]    Sig: Take 1 tablet (4 mg) by mouth every 6 hours as needed for nausea or vomiting    Class: E-Prescribe    Route: Oral        Amoxicillin   REVIEW OF SYSTEMS (check box if normal)  [x]               GENERAL  [x]                 PULMONARY [x]                GENITOURINARY  [x]                CNS                 [x]                 CARDIAC  [x]                 ENDOCRINE  [x]                EARS,NOSE,THROAT [x]                 GASTROINTESTINAL [x]                 NEUROLOGIC    [x]                MUSCLOSKELTAL  [x]                  HEMATOLOGY      PHYSICAL EXAM (check box if normal)BP (!) 162/87   Pulse 92   Wt 68.9 kg (151 lb 12.8 oz)   SpO2 98%   BMI 23.78 kg/m          [x]            GENERAL:    [x]       EYES:  ICTERIC   []        YES  []                    NO  [x]           EXTREMITIES: Clubbing []       Y     [x]           N    [x]           EARS, NOSE, THROAT: Membranes Moist    YES   [x]                   NO []                  [x]           LUNGS:  CLEAR    YES       [x]                  NO    []                                [x]           SKIN: Jaundice           YES       []                  NO    [x]                   Rash: YES       []                  NO    [x]                                     [x]             HEART: Regular Rate          YES       [x]                  NO     []                   Incision Clean:  YES       [x]                  NO    []                                [x]                    ABDOMEN: Organomegaly YES       []                  NO    [x]                       [x]                    NEUROLOGICAL:  Nonfocal  YES       [x]                  NO    []                       [x]                    Hernia YES       []                  NO    [x]                   PSYCHIATRIC:  Appropriate  YES       [x]                  NO    []                       OTHER:                                                                                                   PAIN SCALE:: 4    Again, thank you for allowing me to participate in the care of your patient.      Sincerely,    Christian Morrison MD

## 2021-11-22 NOTE — PROGRESS NOTES
Transplant Surgery -OUTPATIENT IMMUNOSUPPRESSION PROGRESS NOTE    Date of Visit: 11/22/2021    Transplants:  10/17/2021 (Liver); Postoperative day:  36  ASSESMENT AND PLAN:  1.Graft Function: The liver tests have improved, unlikely to be rejection.  Recommend change the biliary stent.  2.Immunosuppression Management: Keep the tacrolimus levels around 8-10.  3.Hypertension: Okay  4.Renal Function: Good  5.Lab frequency: Weekly  6.Other:  There was minimal discharge at the upper end of the wound which I cleaned up and applied a sterile dressing.    Date: November 22, 2021    Transplant:  [x]                             Liver [x]                              Kidney []                             Pancreas []                              Other:             Chief Complaint:  Doing well no fever or abdominal pain.  Her appetite is improved and she is eating well.  History of Present Illness:  Patient Active Problem List   Diagnosis     Acute deep vein thrombosis (DVT) of upper extremity (H)     Acute hepatic encephalopathy     Acute hypoxemic respiratory failure (H)     Acute kidney failure, unspecified (H)     Carrier of group B Streptococcus     Cirrhosis of liver with ascites (H)     CKD (chronic kidney disease)     Elevated blood pressure     Family history of colon cancer     Gastroesophageal reflux disease without esophagitis     GI (gastrointestinal bleed)     Hyponatremia     Hypokalemia     History of DVT (deep vein thrombosis)     Anemia associated with acute blood loss     Acute anemia     Need for vaccination for viral hepatitis     Macrocytosis without anemia     Knee pain     Pancytopenia (H)     Macrocytic anemia     Blood loss anemia     Hydrothorax     Portal hypertensive gastropathy (H)     Pleural effusion     Screening for malignant neoplasm of cervix     Venous incompetence     Varicose veins of leg with pain     Supervision of high-risk pregnancy of elderly primigravida     Subclinical hypothyroidism      Liver failure (H)     Hypotension     Pre-liver transplant, listed     Alcoholic cirrhosis (H)     Abnormal serum iron level     Iron deficiency anemia due to chronic blood loss     Encounter for immunization     Liver transplant candidate     Liver transplant recipient (H)     Immunosuppressed status (H)     Malnutrition (H)     Steroid-induced hyperglycemia     Epistaxis     Esophagitis     Duodenal erosion     Gastric antral vascular ectasia     Hyperphosphatemia     Leukocytosis     SOCIAL /FAMILY HISTORY: [x]                  No recent change    Past Medical History:   Diagnosis Date     Ascites      History of blood transfusion      Liver cirrhosis (H)      Thyroid disease      Past Surgical History:   Procedure Laterality Date      SECTION       CV RIGHT HEART CATH MEASUREMENTS RECORDED N/A 2021    Procedure: CV RIGHT HEART CATH;  Surgeon: Joseph Guevara MD;  Location:  HEART CARDIAC CATH LAB     ENDOSCOPIC RETROGRADE CHOLANGIOPANCREATOGRAM N/A 10/25/2021    Procedure: ENDOSCOPIC RETROGRADE CHOLANGIOPANCREATOGRAPHY, WITH biliary sphincterotomy, biliary dilation, and biliary stent placement;  Surgeon: Guru Dominguez Fenton MD;  [facility]     ESOPHAGOSCOPY, GASTROSCOPY, DUODENOSCOPY (EGD), COMBINED N/A 10/7/2021    Procedure: ESOPHAGOGASTRODUODENOSCOPY (EGD) with hemostasis;  Surgeon: Fox Jerry MD;  Location:  GI     ESOPHAGOSCOPY, GASTROSCOPY, DUODENOSCOPY (EGD), COMBINED N/A 10/22/2021    Procedure: ESOPHAGOGASTRODUODENOSCOPY, WITH BIOPSY;  Surgeon: Fadia Avila MD;  Location: [facility]     IR LIVER BIOPSY PERCUTANEOUS  10/26/2021     IR THORACENTESIS  2021     IR THORACENTESIS  2021     THORACENTESIS Left 2021    Procedure: THORACENTESIS;  Surgeon: Almas Irby MD;  Location:  GI     THORACENTESIS N/A 2021    Procedure: THORACENTESIS;  Surgeon: Almas Irby MD;  [facility]     THORACENTESIS N/A 03/10/2021    Procedure:  THORACENTESIS;  Surgeon: Almas Irby MD;  [facility]     THORACENTESIS Left 2021    Procedure: THORACENTESIS;  Surgeon: Kennedi Chavez MD;  Location: [facility] OR     THORACENTESIS Left 2021    Procedure: THORACENTESIS;  Surgeon: Jess Ansari PA-C;  Location: [facility] OR     TRANSPLANT LIVER RECIPIENT  DONOR N/A 10/17/2021    Procedure: TRANSPLANT, LIVER, RECIPIENT,  DONOR;  Surgeon: Christian Morrison MD;  [facility]     Social History     Socioeconomic History     Marital status: Single     Spouse name: Not on file     Number of children: Not on file     Years of education: Not on file     Highest education level: Master's degree (e.g., MA, MS, Roberto, MEd, MSW, SASHA)   Occupational History     Not on file   Tobacco Use     Smoking status: Never Smoker     Smokeless tobacco: Never Used   Substance and Sexual Activity     Alcohol use: Not Currently     Comment: Quit on 2020     Drug use: Not Currently     Sexual activity: Not Currently     Partners: Male   Other Topics Concern     Parent/sibling w/ CABG, MI or angioplasty before 65F 55M? Not Asked   Social History Narrative    Lives [place]. 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 12 year sonFlakito.         Christine Harris, DNP, APRN, CNP    3/17/2021     Social Determinants of Health     Financial Resource Strain: Low Risk      Difficulty of Paying Living Expenses: Not hard at all   Food Insecurity: No Food Insecurity     Worried About Running Out of Food in the Last Year: Never true     Ran Out of Food in the Last Year: Never true   Transportation Needs: No Transportation Needs     Lack of Transportation (Medical): No     Lack of Transportation (Non-Medical): No   Physical Activity: Sufficiently Active     Days of Exercise per Week: 5 days     Minutes of Exercise per Session: 30 min   Stress: No Stress Concern Present     Feeling of Stress : Only a little   Social  Connections: Moderately Integrated     Frequency of Communication with Friends and Family: More than three times a week     Frequency of Social Gatherings with Friends and Family: More than three times a week     Attends Restoration Services: More than 4 times per year     Active Member of Clubs or Organizations: Yes     Attends Club or Organization Meetings: More than 4 times per year     Marital Status: Never    Intimate Partner Violence: Not on file   Housing Stability: Unknown     Unable to Pay for Housing in the Last Year: Not on file     Number of Places Lived in the Last Year: 1     Unstable Housing in the Last Year: No     Prescription Medications as of 11/22/2021       Rx Number Disp Refills Start End Last Dispensed Date Next Fill Date Owning Pharmacy    alcohol swab prep pads  100 each 3 10/29/2021    [place], [place] - [address] [place] [address]    Sig: Use to swab area of injection/fina as directed.    Class: E-Prescribe    aspirin (ASA) 325 MG tablet  90 tablet 3 11/2/2021    [id number] - [place], [place] - [address] AT [address]    Sig: Take 1 tablet (325 mg) by mouth daily    Class: E-Prescribe    Route: Oral    blood glucose (CONTOUR NEXT TEST) test strip  100 strip 6 10/29/2021    [address]    Sig: Use to test blood sugar 3 times daily.    Class: E-Prescribe    blood glucose (NO BRAND SPECIFIED) lancets standard  100 each 1 10/29/2021    [address]     Sig: To use to test glucose level in the blood Use to test blood sugar 3 times daily as directed. To accompany glucose monitor brands per insurance coverage.    Class: E-Prescribe    Notes to Pharmacy: Cecy microlet lancets    furosemide (LASIX) 20 MG tablet  60 tablet 0 11/1/2021    [facility] [place], [place] - [address] [place] [address]  [phone]    Sig: Take 1 tablet (20 mg) by mouth daily    Class: E-Prescribe    Route: Oral    insulin aspart (NOVOLOG PEN) 100 UNIT/ML pen  9 mL 1 10/29/2021    [facility] [address]    Sig: Inject 1-10 Units Subcutaneous 3 times daily (before meals) See AVS (too long to print in script). Dose to decrease with decreasing steroid dose    Class: E-Prescribe    Route: Subcutaneous    insulin pen needle (32G X 4 MM) 32G X 4 MM miscellaneous  100 each 0 10/29/2021    [address]    Sig: Use as directed by provider Per insurance coverage    Class: E-Prescribe    levothyroxine (SYNTHROID/LEVOTHROID) 50 MCG tablet    7/8/2020        Sig: Take 50 mcg by mouth daily    Class: Historical    Route: Oral    megestrol (MEGACE) 40 MG/ML suspension  100 mL 3 11/9/2021    [facility] #[id number] [place], [place] - [address]    Sig: Take 10 mLs (400 mg) by mouth 2 times daily    Class: E-Prescribe    Route: Oral    mycophenolate (GENERIC EQUIVALENT) 250 MG capsule  540 capsule 3 11/2/2021    [facility] [address] [place] [address] [address]    Sig: Take 3 capsules (750 mg) by mouth 2 times daily    Class: E-Prescribe    Notes to Pharmacy: TXP DT 10/17/2021 (Liver) TXP Dischg DT 10/29/2021 DX Liver replaced by transplant Z94.4 TX [facility] ([place], [place])    Route: Oral    ondansetron (ZOFRAN) 4 MG tablet  20 tablet 0 11/15/2021    [facility] #[id number] [place], [place] [employer] AT [address]    Sig: Take 1 tablet (4 mg) by mouth every 6 hours as needed for nausea    Class: E-Prescribe    Route: Oral    pantoprazole (PROTONIX) 40 MG EC tablet  180 tablet 3 11/2/2021    [facility] #[id number] [employer] [address] AT [address] OF  [address]    Sig: Take 1 tablet (40 mg) by mouth 2 times daily    Class: E-Prescribe    Route: Oral    predniSONE (DELTASONE) 5 MG tablet  60 tablet 3 2021    [facility] #[id number] [place], [place] - [address] AT [address]    Sig: Take 1 tablet (5 mg) by mouth daily    Class: E-Prescribe    Notes to Pharmacy: TXP DT 10/17/2021 (Liver) TXP Dischg DT 10/29/2021 DX Liver replaced by transplant Z94.4 TX Center [facility] ([place], [place])    Route: Oral    Sharps Container MISC  1 each 0 10/29/2021    [facility] - [place], [place] - [address] [facility]    Sig: Use as directed to dispose of needles, lancets and other sharps Per Insurance coverage    Class: E-Prescribe    sulfamethoxazole-trimethoprim (BACTRIM) 400-80 MG tablet  90 tablet 3 2021    [facility] [place] [address] - [address] AT [address]    Si tablet by Oral or Feeding Tube route daily    Class: E-Prescribe    Route: Oral or Feeding Tube    tacrolimus (GENERIC EQUIVALENT) 1 MG capsule  360 capsule 3 11/10/2021    [facility] [address] [place] [address] - [address] AT [address]    Sig: Take 4 capsules (4 mg) by mouth every evening Dose change    Class: E-Prescribe    Notes to Pharmacy: Dose change    Route: Oral    tacrolimus (GENERIC EQUIVALENT) 5 MG capsule  90 capsule 3 11/10/2021    [facility] #[id number] [place] [address] - [address] AT [address]    Sig: Take 1 capsule (5 mg) by mouth every morning    Class: E-Prescribe    Notes to Pharmacy: Dose change    Route: Oral    ursodiol (ACTIGALL) 300 MG capsule  180 capsule 3 2021    [facility] #[id number] [place], [place] - [address] AT [address]    Sig: Take 1 capsule (300 mg) by mouth 2 times daily    Class: E-Prescribe    Route: Oral     valGANciclovir (VALCYTE) 450 MG tablet  90 tablet 3 11/2/2021    [facility] #[id number] - [place], [place] - [address] AT [address]    Sig: Take 1 tablet (450 mg) by mouth daily    Class: E-Prescribe    Route: Oral    ondansetron (ZOFRAN-ODT) 4 MG ODT tab  20 tablet 1 10/29/2021    [facility] [place], [place] - [address] [facility]    Sig: Take 1 tablet (4 mg) by mouth every 6 hours as needed for nausea or vomiting    Class: E-Prescribe    Route: Oral        Amoxicillin   REVIEW OF SYSTEMS (check box if normal)  [x]               GENERAL  [x]                 PULMONARY [x]                GENITOURINARY  [x]                CNS                 [x]                 CARDIAC  [x]                 ENDOCRINE  [x]                EARS,NOSE,THROAT [x]                 GASTROINTESTINAL [x]                 NEUROLOGIC    [x]                MUSCLOSKELTAL  [x]                  HEMATOLOGY      PHYSICAL EXAM (check box if normal)BP (!) 162/87   Pulse 92   Wt 68.9 kg (151 lb 12.8 oz)   SpO2 98%   BMI 23.78 kg/m          [x]            GENERAL:    [x]       EYES:  ICTERIC   []        YES  []                    NO  [x]           EXTREMITIES: Clubbing []       Y     [x]           N    [x]           EARS, NOSE, THROAT: Membranes Moist    YES   [x]                   NO []                  [x]           LUNGS:  CLEAR    YES       [x]                  NO    []                                [x]           SKIN: Jaundice           YES       []                  NO    [x]                   Rash: YES       []                  NO    [x]                                     [x]             HEART: Regular Rate          YES       [x]                  NO    []                   Incision Clean:  YES       [x]                  NO    []                                [x]                    ABDOMEN: Organomegaly YES       []                  NO    [x]                       [x]                     NEUROLOGICAL:  Nonfocal  YES       [x]                  NO    []                       [x]                    Hernia YES       []                  NO    [x]                   PSYCHIATRIC:  Appropriate  YES       [x]                  NO    []                       OTHER:                                                                                                   PAIN SCALE:: 4

## 2021-11-23 DIAGNOSIS — Z94.4 LIVER TRANSPLANT RECIPIENT (H): ICD-10-CM

## 2021-11-23 NOTE — TELEPHONE ENCOUNTER
ISSUE:   Tacrolimus IR level 5.6 on 11/22, goal 8, dose 5 mg AM and 4 mg pM.    PLAN:   Please call patient and confirm this was an accurate 12-hour trough. Verify Tacrolimus IR dose 5 mg AM and 4 mg PM. Confirm no new medications or illness. Confirm no missed doses. If accurate trough and accurate dose, increase Tacrolimus IR dose to 5 mg BID and repeat labs in 1 weeks    OUTCOME:   Spoke with patient, they confirm accurate trough level and current dose 5 mg am, 4mg pm . Patient confirmed dose change to 5 mg BID and to repeat labs in 1 weeks. Orders sent to preferred pharmacy for dose change and lab for repeat labs. Patient voiced understanding of plan.     Alexandrea Peter LPN

## 2021-11-24 RX ORDER — TACROLIMUS 5 MG/1
5 CAPSULE ORAL EVERY 12 HOURS
Qty: 180 CAPSULE | Refills: 3 | Status: SHIPPED | OUTPATIENT
Start: 2021-11-24 | End: 2021-12-06

## 2021-11-26 ENCOUNTER — LAB (OUTPATIENT)
Dept: URGENT CARE | Facility: URGENT CARE | Age: 51
End: 2021-11-26
Payer: COMMERCIAL

## 2021-11-26 ENCOUNTER — NURSE TRIAGE (OUTPATIENT)
Dept: NURSING | Facility: CLINIC | Age: 51
End: 2021-11-26

## 2021-11-26 ENCOUNTER — LAB (OUTPATIENT)
Dept: LAB | Facility: CLINIC | Age: 51
End: 2021-11-26

## 2021-11-26 DIAGNOSIS — Z94.4 LIVER REPLACED BY TRANSPLANT (H): ICD-10-CM

## 2021-11-26 DIAGNOSIS — Z11.59 ENCOUNTER FOR SCREENING FOR OTHER VIRAL DISEASES: ICD-10-CM

## 2021-11-26 LAB
ALBUMIN SERPL-MCNC: 2.8 G/DL (ref 3.5–5)
ALP SERPL-CCNC: 227 U/L (ref 45–120)
ALT SERPL W P-5'-P-CCNC: <9 U/L (ref 0–45)
ANION GAP SERPL CALCULATED.3IONS-SCNC: 11 MMOL/L (ref 5–18)
AST SERPL W P-5'-P-CCNC: 7 U/L (ref 0–40)
BILIRUB DIRECT SERPL-MCNC: 0.4 MG/DL
BILIRUB SERPL-MCNC: 0.7 MG/DL (ref 0–1)
BUN SERPL-MCNC: 22 MG/DL (ref 8–22)
CALCIUM SERPL-MCNC: 8.8 MG/DL (ref 8.5–10.5)
CHLORIDE BLD-SCNC: 112 MMOL/L (ref 98–107)
CO2 SERPL-SCNC: 18 MMOL/L (ref 22–31)
CREAT SERPL-MCNC: 1.69 MG/DL (ref 0.6–1.1)
ERYTHROCYTE [DISTWIDTH] IN BLOOD BY AUTOMATED COUNT: 16.7 % (ref 10–15)
GFR SERPL CREATININE-BSD FRML MDRD: 35 ML/MIN/1.73M2
GLUCOSE BLD-MCNC: 95 MG/DL (ref 70–125)
HCT VFR BLD AUTO: 26.8 % (ref 35–47)
HGB BLD-MCNC: 8.5 G/DL (ref 11.7–15.7)
MAGNESIUM SERPL-MCNC: 1 MG/DL (ref 1.8–2.6)
MCH RBC QN AUTO: 30.7 PG (ref 26.5–33)
MCHC RBC AUTO-ENTMCNC: 31.7 G/DL (ref 31.5–36.5)
MCV RBC AUTO: 97 FL (ref 78–100)
PETH BLD-MCNC: NEGATIVE NG/ML
PHOSPHATE SERPL-MCNC: 3.8 MG/DL (ref 2.5–4.5)
PLATELET # BLD AUTO: 194 10E3/UL (ref 150–450)
POTASSIUM BLD-SCNC: 4.2 MMOL/L (ref 3.5–5)
PROT SERPL-MCNC: 5.8 G/DL (ref 6–8)
RBC # BLD AUTO: 2.77 10E6/UL (ref 3.8–5.2)
SODIUM SERPL-SCNC: 141 MMOL/L (ref 136–145)
WBC # BLD AUTO: 4.6 10E3/UL (ref 4–11)

## 2021-11-26 PROCEDURE — 83735 ASSAY OF MAGNESIUM: CPT | Performed by: FAMILY MEDICINE

## 2021-11-26 PROCEDURE — 36415 COLL VENOUS BLD VENIPUNCTURE: CPT | Performed by: FAMILY MEDICINE

## 2021-11-26 PROCEDURE — 84100 ASSAY OF PHOSPHORUS: CPT | Performed by: FAMILY MEDICINE

## 2021-11-26 PROCEDURE — 85027 COMPLETE CBC AUTOMATED: CPT | Performed by: FAMILY MEDICINE

## 2021-11-26 PROCEDURE — 82248 BILIRUBIN DIRECT: CPT | Performed by: FAMILY MEDICINE

## 2021-11-26 PROCEDURE — U0005 INFEC AGEN DETEC AMPLI PROBE: HCPCS

## 2021-11-26 PROCEDURE — 80053 COMPREHEN METABOLIC PANEL: CPT | Performed by: FAMILY MEDICINE

## 2021-11-26 PROCEDURE — U0003 INFECTIOUS AGENT DETECTION BY NUCLEIC ACID (DNA OR RNA); SEVERE ACUTE RESPIRATORY SYNDROME CORONAVIRUS 2 (SARS-COV-2) (CORONAVIRUS DISEASE [COVID-19]), AMPLIFIED PROBE TECHNIQUE, MAKING USE OF HIGH THROUGHPUT TECHNOLOGIES AS DESCRIBED BY CMS-2020-01-R: HCPCS

## 2021-11-26 PROCEDURE — 80197 ASSAY OF TACROLIMUS: CPT | Performed by: FAMILY MEDICINE

## 2021-11-27 LAB — SARS-COV-2 RNA RESP QL NAA+PROBE: NEGATIVE

## 2021-11-27 NOTE — TELEPHONE ENCOUNTER
[facility] lab calling w/ critical value: Magnesium 1.0. Ordered by PHILLY Aldana [facility]. Liver transplant pt.  Paged on-call Dr Keith Dominguez @8:58pm. No ans at 9:20pm. Informed the lab is to call these results directly to transplant coordinator. Called lab and they will contact transplant coordinator.     Reason for Disposition    Lab or radiology calling with CRITICAL test results    Additional Information    Negative: Lab calling with strep throat test results and triager can call in prescription    Negative: Lab calling with urinalysis test results and triager can call in prescription    Negative: Medication questions    Protocols used: PCP CALL - NO TRIAGE-A-

## 2021-11-28 ENCOUNTER — ANESTHESIA EVENT (OUTPATIENT)
Dept: SURGERY | Facility: CLINIC | Age: 51
End: 2021-11-28
Payer: COMMERCIAL

## 2021-11-28 LAB
TACROLIMUS BLD-MCNC: 3.4 UG/L (ref 5–15)
TME LAST DOSE: ABNORMAL H
TME LAST DOSE: ABNORMAL H

## 2021-11-28 ASSESSMENT — ENCOUNTER SYMPTOMS: DYSRHYTHMIAS: 0

## 2021-11-28 NOTE — BRIEF OP NOTE
[facility] Brief Operative Note    Pre-operative diagnosis: Biliary stricture [K83.1]   Post-operative diagnosis As prior   Procedure: Procedure(s):  Endoscopic Retrograde Cholangiopancreatography   Surgeon(s): Surgeon(s) and Role:     * Guru Dominguez Fenton MD - Primary   Estimated blood loss: 4000 mL    Specimens: * No specimens in log *   Findings:        51 year old female with PMHx of alcohol related cirrhosis c/b ascites, hepatic hydrothorax and GAVE and history of hemochromatosis compound heterozygote who underwent DBD DDLT 10/17/21. Had worsening direct hyperbilirubinemia last month, ERCP 10/25/21 showed single localized moderate-grade biliary stricture in the post-transplant anastomosis, dilated to 3 mm, and a 10 Fr x 9 cm Sofflex stent was placed into the common bile duct. Patient presents today for repeat ERCP and further evaluation of biliary tree. '    Findings  - Previously placed plastic biliary stent was seen at the major papilla and was partially migrated distally. Extracted.  - Selective biliary cannulation and a cholangiogram showed a low cystic duct takeoff and a single localized low-grade biliary stricture the post-transplant anastomosis, significantly improved from prior. Successfully dilated to 8 mm.   - Biliary sphincterotomy was extended and a 7 Fr x 7 cm [employer] plastic stent was into the common bile duct.       Plan  - Observe patient in PACU for possible discharge same day.   - Avoid aspirin, anticoagulation and nonsteroidal anti-inflammatory medicines for 3 days.   - Please obtain a KUB flat and upright films in 4 weeks to ensure spontaneous passage of biliary stent.  - Observe patient's clinical course. Expect no further ERCPs given resolution of significant biliary stricture. If patient develops fever, chills, jaundice or passes dark urine or has worsening of LFTs, will recommend patient to call us immediately for consideration of an urgent ERCP.  - The findings  and recommendations were discussed with the patient and their family.

## 2021-11-28 NOTE — ANESTHESIA PREPROCEDURE EVALUATION
Anesthesia Pre-Procedure Evaluation    Patient: Sarah Fisher   MRN: [id number] : 1970        Preoperative Diagnosis: Biliary stricture [K83.1]    Procedure : Procedure(s):  Endoscopic Retrograde Cholangiopancreatography          Past Medical History:   Diagnosis Date     Ascites      History of blood transfusion      Liver cirrhosis (H)      Thyroid disease       Past Surgical History:   Procedure Laterality Date      SECTION       CV RIGHT HEART CATH MEASUREMENTS RECORDED N/A 2021    Procedure: CV RIGHT HEART CATH;  Surgeon: Joseph Guevara MD;  Location:  HEART CARDI[facility] CATH LAB     ENDOSCOPIC RETROGRADE CHOLANGIOPANCREATOGRAM N/A 10/25/2021    Procedure: ENDOSCOPIC RETROGRADE CHOLANGIOPANCREATOGRAPHY, WITH biliary sphincterotomy, biliary dilation, and biliary stent placement;  Surgeon: Guru Dominguez Fenton MD;  Location:  OR     ESOPHAGOSCOPY, GASTROSCOPY, DUODENOSCOPY (EGD), COMBINED N/A 10/7/2021    Procedure: ESOPHAGOGASTRODUODENOSCOPY (EGD) with hemostasis;  Surgeon: Fox Jerry MD;  Location:  GI     ESOPHAGOSCOPY, GASTROSCOPY, DUODENOSCOPY (EGD), COMBINED N/A 10/22/2021    Procedure: ESOPHAGOGASTRODUODENOSCOPY, WITH BIOPSY;  Surgeon: Fadia Avila MD;  Location:  GI     IR LIVER BIOPSY PERCUTANEOUS  10/26/2021     IR THORACENTESIS  2021     IR THORACENTESIS  2021     THORACENTESIS Left 2021    Procedure: THORACENTESIS;  Surgeon: Almas Irby MD;  [facility]     THORACENTESIS N/A 2021    Procedure: THORACENTESIS;  Surgeon: Almas Irby MD;  [facility]     THORACENTESIS N/A 03/10/2021    Procedure: THORACENTESIS;  Surgeon: Almas Irby MD;  [facility]     THORACENTESIS Left 2021    Procedure: THORACENTESIS;  Surgeon: Kennedi Chavez MD;  Location: [facility] OR     THORACENTESIS Left 2021    Procedure: THORACENTESIS;  Surgeon: Jess Ansari PA-C;  [place] [facility] [place]     TRANSPLANT LIVER  RECIPIENT  DONOR N/A 10/17/2021    Procedure: TRANSPLANT, LIVER, RECIPIENT,  DONOR;  Surgeon: Christian Morrison MD;  Location: [facility]      Allergies   Allergen Reactions     Amoxicillin GI Disturbance, Diarrhea, Nausea and Nausea and Vomiting      Social History     Tobacco Use     Smoking status: Never Smoker     Smokeless tobacco: Never Used   Substance Use Topics     Alcohol use: Not Currently     Comment: Quit on 2020      Wt Readings from Last 1 Encounters:   21 68.9 kg (151 lb 12.8 oz)        Anesthesia Evaluation   Pt has had prior anesthetic. Type: General.    No history of anesthetic complications       ROS/MED HX  ENT/Pulmonary:  - neg pulmonary ROS     Neurologic: Comment: Hx Hepatic encephalopathy, now s/p DDLT on 10/17/2021      Cardiovascular:     (+) -----Previous cardiac testing   Echo: Date: Results:    Stress Test: Date: 2021 Results:    Interpretation Summary  Normal dobutamine stress echocardiogram without evidence of inducible ischemia. Target heart rate was achieved. Heart rate and blood pressure response to dobutamine were normal. Normal LV function and wall motion at rest. With stress, the left ventricular ejection fraction increased from 55- 60% to greater than 65% and the left ventricular size decreased appropriately. No regional wall motion abnormality with stress. No subjective symptoms to suggest ischemia. There was no ECG evidence of ischemia. No significant valve disease on screening doppler evaluation. The aortic root and visualized ascending aorta are normal. IVC diameter and respiratory changes fall into an intermediate range suggesting an RA pressure of 8 mmHg. Right ventricular systolic pressure could not be approximated due to inadequate tricuspid regurgitation. There is no pericardial effusion. Large pleural effusion.  ECG Reviewed: Date: 2021 Results:  SR with PAC's. Prolonged QT (QTc 485)  Cath:  Date: 21 Results:  Mean PA pressure  25.   Mild elevated pulmonary hypertension.   Left sided filling pressures are mildly elevated. Hyperdynamic cardiac output level.     (-) angina, CAD, syncope, arrhythmias and angina   METS/Exercise Tolerance:     Hematologic: Comments: Hx of BUNNY quarles  TCP   Coagulopathy       (+) History of blood clots, anemia, history of blood transfusion, no previous transfusion reaction,     Musculoskeletal:  - neg musculoskeletal ROS     GI/Hepatic: Comment: s/p DDLT 10/17/2021  Gastric Antral Vascular EctasIA, s/p Argon Plasma Coagulations   Duodenal ulcers    (+) GERD, Asymptomatic on medication, esophageal disease, liver disease,     Renal/Genitourinary:     (+) renal disease, type: CRI,     Endo:     (+) thyroid problem, hypothyroidism,     Psychiatric/Substance Use: Comment:   EtOH abuse in remission.     (+) alcohol abuse     Infectious Disease:  - neg infectious disease ROS     Malignancy:  - neg malignancy ROS     Other:  - neg other ROS          Physical Exam    Airway        Mallampati: II   TM distance: > 3 FB   Neck ROM: full   Mouth opening: > 3 cm    Respiratory Devices and Support         Dental  no notable dental history         Cardiovascular   cardiovascular exam normal          Pulmonary   pulmonary exam normal                OUTSIDE LABS:  CBC:   Lab Results   Component Value Date    WBC 4.6 11/26/2021    WBC 3.7 (L) 11/22/2021    HGB 8.5 (L) 11/26/2021    HGB 8.1 (L) 11/22/2021    HCT 26.8 (L) 11/26/2021    HCT 26.8 (L) 11/22/2021     11/26/2021     11/22/2021     BMP:   Lab Results   Component Value Date     11/26/2021     11/22/2021    POTASSIUM 4.2 11/26/2021    POTASSIUM 4.4 11/22/2021    CHLORIDE 112 (H) 11/26/2021    CHLORIDE 114 (H) 11/22/2021    CO2 18 (L) 11/26/2021    CO2 20 11/22/2021    BUN 22 11/26/2021    BUN 24 11/22/2021    CR 1.69 (H) 11/26/2021    CR 1.79 (H) 11/22/2021    GLC 95 11/26/2021     (H) 11/22/2021     COAGS:   Lab Results   Component Value  Date    PTT 35 10/21/2021    INR 1.20 (H) 10/21/2021    FIBR 177 10/18/2021     POC:   Lab Results   Component Value Date    HCGS Negative 01/04/2021     HEPATIC:   Lab Results   Component Value Date    ALBUMIN 2.8 (L) 11/26/2021    PROTTOTAL 5.8 (L) 11/26/2021    ALT <9 11/26/2021    AST 7 11/26/2021    ALKPHOS 227 (H) 11/26/2021    BILITOTAL 0.7 11/26/2021     OTHER:   Lab Results   Component Value Date    PH 7.37 10/18/2021    LACT 0.5 (L) 10/26/2021    A1C 5.1 10/19/2021    EMMY 8.8 11/26/2021    PHOS 3.8 11/26/2021    MAG 1.0 (LL) 11/26/2021    LIPASE 81 10/26/2021    AMYLASE 37 10/25/2021    TSH 3.21 11/09/2021    T4 0.89 11/09/2021       Anesthesia Plan    ASA Status:  3      Anesthesia Type: General.     - Airway: ETT   Induction: Intravenous.   Maintenance: Balanced.        Consents    Anesthesia Plan(s) and associated risks, benefits, and realistic alternatives discussed. Questions answered and patient/representative(s) expressed understanding.    - Discussed:     - Discussed with:  Patient      - Extended Intubation/Ventilatory Support Discussed: No.      - Patient is DNR/DNI Status: No    Use of blood products discussed: No .     Postoperative Care    Pain management: IV analgesics.   PONV prophylaxis: Ondansetron (or other 5HT-3), Dexamethasone or Solumedrol     Comments:                Noel Osullivan MD

## 2021-11-29 ENCOUNTER — ANESTHESIA (OUTPATIENT)
Dept: SURGERY | Facility: CLINIC | Age: 51
End: 2021-11-29
Payer: COMMERCIAL

## 2021-11-29 ENCOUNTER — HOSPITAL ENCOUNTER (OUTPATIENT)
Facility: CLINIC | Age: 51
Discharge: HOME | End: 2021-11-29
Attending: INTERNAL MEDICINE | Admitting: INTERNAL MEDICINE
Payer: COMMERCIAL

## 2021-11-29 ENCOUNTER — APPOINTMENT (OUTPATIENT)
Dept: GENERAL RADIOLOGY | Facility: CLINIC | Age: 51
End: 2021-11-29
Attending: INTERNAL MEDICINE
Payer: COMMERCIAL

## 2021-11-29 VITALS
BODY MASS INDEX: 23.53 KG/M2 | DIASTOLIC BLOOD PRESSURE: 98 MMHG | SYSTOLIC BLOOD PRESSURE: 158 MMHG | OXYGEN SATURATION: 97 % | HEIGHT: 67 IN | HEART RATE: 75 BPM | RESPIRATION RATE: 16 BRPM | TEMPERATURE: 97.9 F | WEIGHT: 149.91 LBS

## 2021-11-29 LAB
ALBUMIN SERPL-MCNC: 2.4 G/DL (ref 3.4–5)
ALP SERPL-CCNC: 204 U/L (ref 40–150)
ALT SERPL W P-5'-P-CCNC: 6 U/L (ref 0–50)
AMYLASE SERPL-CCNC: 32 U/L (ref 30–110)
ANION GAP SERPL CALCULATED.3IONS-SCNC: 6 MMOL/L (ref 3–14)
AST SERPL W P-5'-P-CCNC: 9 U/L (ref 0–45)
BILIRUB SERPL-MCNC: 0.6 MG/DL (ref 0.2–1.3)
BUN SERPL-MCNC: 18 MG/DL (ref 7–30)
CALCIUM SERPL-MCNC: 8.5 MG/DL (ref 8.5–10.1)
CHLORIDE BLD-SCNC: 117 MMOL/L (ref 94–109)
CO2 SERPL-SCNC: 20 MMOL/L (ref 20–32)
CREAT SERPL-MCNC: 1.45 MG/DL (ref 0.52–1.04)
ERYTHROCYTE [DISTWIDTH] IN BLOOD BY AUTOMATED COUNT: 16.7 % (ref 10–15)
GFR SERPL CREATININE-BSD FRML MDRD: 42 ML/MIN/1.73M2
GLUCOSE BLD-MCNC: 101 MG/DL (ref 70–99)
GLUCOSE BLDC GLUCOMTR-MCNC: 108 MG/DL (ref 70–99)
HCT VFR BLD AUTO: 28.8 % (ref 35–47)
HGB BLD-MCNC: 8.9 G/DL (ref 11.7–15.7)
INR PPP: 1.13 (ref 0.85–1.15)
LIPASE SERPL-CCNC: 72 U/L (ref 73–393)
MCH RBC QN AUTO: 30.8 PG (ref 26.5–33)
MCHC RBC AUTO-ENTMCNC: 30.9 G/DL (ref 31.5–36.5)
MCV RBC AUTO: 100 FL (ref 78–100)
PLATELET # BLD AUTO: 227 10E3/UL (ref 150–450)
POTASSIUM BLD-SCNC: 4.1 MMOL/L (ref 3.4–5.3)
PROT SERPL-MCNC: 6.4 G/DL (ref 6.8–8.8)
RBC # BLD AUTO: 2.89 10E6/UL (ref 3.8–5.2)
SODIUM SERPL-SCNC: 143 MMOL/L (ref 133–144)
WBC # BLD AUTO: 4.5 10E3/UL (ref 4–11)

## 2021-11-29 PROCEDURE — 999N000141 HC STATISTIC PRE-PROCEDURE NURSING ASSESSMENT: Performed by: INTERNAL MEDICINE

## 2021-11-29 PROCEDURE — 710N000012 HC RECOVERY PHASE 2, PER MINUTE: Performed by: INTERNAL MEDICINE

## 2021-11-29 PROCEDURE — C1877 STENT, NON-COAT/COV W/O DEL: HCPCS | Performed by: INTERNAL MEDICINE

## 2021-11-29 PROCEDURE — 258N000003 HC RX IP 258 OP 636: Performed by: NURSE ANESTHETIST, CERTIFIED REGISTERED

## 2021-11-29 PROCEDURE — 85610 PROTHROMBIN TIME: CPT | Performed by: INTERNAL MEDICINE

## 2021-11-29 PROCEDURE — 80053 COMPREHEN METABOLIC PANEL: CPT | Performed by: INTERNAL MEDICINE

## 2021-11-29 PROCEDURE — 250N000011 HC RX IP 250 OP 636: Performed by: NURSE ANESTHETIST, CERTIFIED REGISTERED

## 2021-11-29 PROCEDURE — 370N000017 HC ANESTHESIA TECHNICAL FEE, PER MIN: Performed by: INTERNAL MEDICINE

## 2021-11-29 PROCEDURE — 710N000010 HC RECOVERY PHASE 1, LEVEL 2, PER MIN: Performed by: INTERNAL MEDICINE

## 2021-11-29 PROCEDURE — 255N000002 HC RX 255 OP 636: Performed by: INTERNAL MEDICINE

## 2021-11-29 PROCEDURE — 999N000181 XR SURGERY CARM FLUORO GREATER THAN 5 MIN W STILLS: Mod: TC

## 2021-11-29 PROCEDURE — 85027 COMPLETE CBC AUTOMATED: CPT | Performed by: INTERNAL MEDICINE

## 2021-11-29 PROCEDURE — 250N000024 HC ISOFLURANE, PER MIN: Performed by: INTERNAL MEDICINE

## 2021-11-29 PROCEDURE — 83690 ASSAY OF LIPASE: CPT | Performed by: INTERNAL MEDICINE

## 2021-11-29 PROCEDURE — 82962 GLUCOSE BLOOD TEST: CPT

## 2021-11-29 PROCEDURE — 272N000001 HC OR GENERAL SUPPLY STERILE: Performed by: INTERNAL MEDICINE

## 2021-11-29 PROCEDURE — C1769 GUIDE WIRE: HCPCS | Performed by: INTERNAL MEDICINE

## 2021-11-29 PROCEDURE — 250N000009 HC RX 250: Performed by: NURSE ANESTHETIST, CERTIFIED REGISTERED

## 2021-11-29 PROCEDURE — 82150 ASSAY OF AMYLASE: CPT | Performed by: INTERNAL MEDICINE

## 2021-11-29 PROCEDURE — C1726 CATH, BAL DIL, NON-VASCULAR: HCPCS | Performed by: INTERNAL MEDICINE

## 2021-11-29 PROCEDURE — 36415 COLL VENOUS BLD VENIPUNCTURE: CPT | Performed by: INTERNAL MEDICINE

## 2021-11-29 PROCEDURE — 360N000082 HC SURGERY LEVEL 2 W/ FLUORO, PER MIN: Performed by: INTERNAL MEDICINE

## 2021-11-29 PROCEDURE — 250N000009 HC RX 250: Performed by: INTERNAL MEDICINE

## 2021-11-29 DEVICE — STENT FREEMAN PANCREA FLEX 7FRX7CM SGL PIGTAIL: Type: IMPLANTABLE DEVICE | Site: BILE DUCT | Status: FUNCTIONAL

## 2021-11-29 RX ORDER — OXYCODONE HYDROCHLORIDE 5 MG/1
5 TABLET ORAL EVERY 4 HOURS PRN
Status: DISCONTINUED | OUTPATIENT
Start: 2021-11-29 | End: 2021-11-29 | Stop reason: HOSPADM

## 2021-11-29 RX ORDER — PROPOFOL 10 MG/ML
INJECTION, EMULSION INTRAVENOUS PRN
Status: DISCONTINUED | OUTPATIENT
Start: 2021-11-29 | End: 2021-11-29

## 2021-11-29 RX ORDER — ONDANSETRON 4 MG/1
4 TABLET, ORALLY DISINTEGRATING ORAL EVERY 6 HOURS PRN
Status: DISCONTINUED | OUTPATIENT
Start: 2021-11-29 | End: 2021-11-29 | Stop reason: HOSPADM

## 2021-11-29 RX ORDER — ONDANSETRON 2 MG/ML
4 INJECTION INTRAMUSCULAR; INTRAVENOUS EVERY 30 MIN PRN
Status: DISCONTINUED | OUTPATIENT
Start: 2021-11-29 | End: 2021-11-29 | Stop reason: HOSPADM

## 2021-11-29 RX ORDER — LABETALOL HYDROCHLORIDE 5 MG/ML
10 INJECTION, SOLUTION INTRAVENOUS
Status: DISCONTINUED | OUTPATIENT
Start: 2021-11-29 | End: 2021-11-29 | Stop reason: HOSPADM

## 2021-11-29 RX ORDER — NALOXONE HYDROCHLORIDE 0.4 MG/ML
0.4 INJECTION, SOLUTION INTRAMUSCULAR; INTRAVENOUS; SUBCUTANEOUS
Status: DISCONTINUED | OUTPATIENT
Start: 2021-11-29 | End: 2021-11-29 | Stop reason: HOSPADM

## 2021-11-29 RX ORDER — FENTANYL CITRATE 50 UG/ML
INJECTION, SOLUTION INTRAMUSCULAR; INTRAVENOUS PRN
Status: DISCONTINUED | OUTPATIENT
Start: 2021-11-29 | End: 2021-11-29

## 2021-11-29 RX ORDER — ESMOLOL HYDROCHLORIDE 10 MG/ML
INJECTION INTRAVENOUS PRN
Status: DISCONTINUED | OUTPATIENT
Start: 2021-11-29 | End: 2021-11-29

## 2021-11-29 RX ORDER — MEPERIDINE HYDROCHLORIDE 25 MG/ML
12.5 INJECTION INTRAMUSCULAR; INTRAVENOUS; SUBCUTANEOUS
Status: DISCONTINUED | OUTPATIENT
Start: 2021-11-29 | End: 2021-11-29 | Stop reason: HOSPADM

## 2021-11-29 RX ORDER — LEVOFLOXACIN 5 MG/ML
INJECTION, SOLUTION INTRAVENOUS PRN
Status: DISCONTINUED | OUTPATIENT
Start: 2021-11-29 | End: 2021-11-29

## 2021-11-29 RX ORDER — IOPAMIDOL 510 MG/ML
INJECTION, SOLUTION INTRAVASCULAR PRN
Status: DISCONTINUED | OUTPATIENT
Start: 2021-11-29 | End: 2021-11-29 | Stop reason: HOSPADM

## 2021-11-29 RX ORDER — ONDANSETRON 2 MG/ML
INJECTION INTRAMUSCULAR; INTRAVENOUS PRN
Status: DISCONTINUED | OUTPATIENT
Start: 2021-11-29 | End: 2021-11-29

## 2021-11-29 RX ORDER — SODIUM CHLORIDE, SODIUM LACTATE, POTASSIUM CHLORIDE, CALCIUM CHLORIDE 600; 310; 30; 20 MG/100ML; MG/100ML; MG/100ML; MG/100ML
INJECTION, SOLUTION INTRAVENOUS CONTINUOUS
Status: DISCONTINUED | OUTPATIENT
Start: 2021-11-29 | End: 2021-11-29 | Stop reason: HOSPADM

## 2021-11-29 RX ORDER — FENTANYL CITRATE 50 UG/ML
25 INJECTION, SOLUTION INTRAMUSCULAR; INTRAVENOUS
Status: DISCONTINUED | OUTPATIENT
Start: 2021-11-29 | End: 2021-11-29 | Stop reason: HOSPADM

## 2021-11-29 RX ORDER — ONDANSETRON 2 MG/ML
4 INJECTION INTRAMUSCULAR; INTRAVENOUS EVERY 6 HOURS PRN
Status: DISCONTINUED | OUTPATIENT
Start: 2021-11-29 | End: 2021-11-29 | Stop reason: HOSPADM

## 2021-11-29 RX ORDER — FENTANYL CITRATE 50 UG/ML
25-50 INJECTION, SOLUTION INTRAMUSCULAR; INTRAVENOUS EVERY 5 MIN PRN
Status: DISCONTINUED | OUTPATIENT
Start: 2021-11-29 | End: 2021-11-29 | Stop reason: HOSPADM

## 2021-11-29 RX ORDER — NALOXONE HYDROCHLORIDE 0.4 MG/ML
0.2 INJECTION, SOLUTION INTRAMUSCULAR; INTRAVENOUS; SUBCUTANEOUS
Status: DISCONTINUED | OUTPATIENT
Start: 2021-11-29 | End: 2021-11-29 | Stop reason: HOSPADM

## 2021-11-29 RX ORDER — ONDANSETRON 4 MG/1
4 TABLET, ORALLY DISINTEGRATING ORAL EVERY 30 MIN PRN
Status: DISCONTINUED | OUTPATIENT
Start: 2021-11-29 | End: 2021-11-29 | Stop reason: HOSPADM

## 2021-11-29 RX ORDER — LIDOCAINE 40 MG/G
CREAM TOPICAL
Status: DISCONTINUED | OUTPATIENT
Start: 2021-11-29 | End: 2021-11-29 | Stop reason: HOSPADM

## 2021-11-29 RX ORDER — HYDROMORPHONE HYDROCHLORIDE 1 MG/ML
.2-.4 INJECTION, SOLUTION INTRAMUSCULAR; INTRAVENOUS; SUBCUTANEOUS EVERY 5 MIN PRN
Status: DISCONTINUED | OUTPATIENT
Start: 2021-11-29 | End: 2021-11-29 | Stop reason: HOSPADM

## 2021-11-29 RX ORDER — SODIUM CHLORIDE, SODIUM LACTATE, POTASSIUM CHLORIDE, CALCIUM CHLORIDE 600; 310; 30; 20 MG/100ML; MG/100ML; MG/100ML; MG/100ML
INJECTION, SOLUTION INTRAVENOUS CONTINUOUS PRN
Status: DISCONTINUED | OUTPATIENT
Start: 2021-11-29 | End: 2021-11-29

## 2021-11-29 RX ORDER — FLUMAZENIL 0.1 MG/ML
0.2 INJECTION, SOLUTION INTRAVENOUS
Status: DISCONTINUED | OUTPATIENT
Start: 2021-11-29 | End: 2021-11-29 | Stop reason: HOSPADM

## 2021-11-29 RX ORDER — HALOPERIDOL 5 MG/ML
1 INJECTION INTRAMUSCULAR
Status: DISCONTINUED | OUTPATIENT
Start: 2021-11-29 | End: 2021-11-29 | Stop reason: HOSPADM

## 2021-11-29 RX ORDER — LIDOCAINE HYDROCHLORIDE 20 MG/ML
INJECTION, SOLUTION INFILTRATION; PERINEURAL PRN
Status: DISCONTINUED | OUTPATIENT
Start: 2021-11-29 | End: 2021-11-29

## 2021-11-29 RX ADMIN — Medication 100 MG: at 10:10

## 2021-11-29 RX ADMIN — SODIUM CHLORIDE, POTASSIUM CHLORIDE, SODIUM LACTATE AND CALCIUM CHLORIDE: 600; 310; 30; 20 INJECTION, SOLUTION INTRAVENOUS at 10:04

## 2021-11-29 RX ADMIN — PROPOFOL 120 MG: 10 INJECTION, EMULSION INTRAVENOUS at 10:09

## 2021-11-29 RX ADMIN — FENTANYL CITRATE 100 MCG: 50 INJECTION, SOLUTION INTRAMUSCULAR; INTRAVENOUS at 10:07

## 2021-11-29 RX ADMIN — ESMOLOL HYDROCHLORIDE 20 MG: 10 INJECTION, SOLUTION INTRAVENOUS at 10:33

## 2021-11-29 RX ADMIN — LIDOCAINE HYDROCHLORIDE 100 MG: 20 INJECTION, SOLUTION INFILTRATION; PERINEURAL at 10:08

## 2021-11-29 RX ADMIN — LEVOFLOXACIN 500 MG: 5 INJECTION, SOLUTION INTRAVENOUS at 10:20

## 2021-11-29 RX ADMIN — ONDANSETRON 4 MG: 2 INJECTION INTRAMUSCULAR; INTRAVENOUS at 10:50

## 2021-11-29 ASSESSMENT — MIFFLIN-ST. JEOR: SCORE: 1327.63

## 2021-11-29 NOTE — ANESTHESIA PROCEDURE NOTES
Airway       Patient location during procedure: OR       Procedure Start/Stop Times: 11/29/2021 10:12 AM  Staff -        CRNA: Anne Freeman APRN CRNA       Performed By: CRNA  Consent for Airway        Urgency: elective  Indications and Patient Condition       Indications for airway management: michael-procedural       Induction type:intravenous       Mask difficulty assessment: 1 - vent by mask    Final Airway Details       Final airway type: endotracheal airway       Successful airway: ETT - single  Endotracheal Airway Details        ETT size (mm): 7.0       Cuffed: yes       Successful intubation technique: direct laryngoscopy       DL Blade Type: Mendez 2       Grade View of Cords: 1       Adjucts: stylet       Position: Right       Measured from: lips       Secured at (cm): 22       Bite Block used: Bite block.    Post intubation assessment        Placement verified by: capnometry and chest rise        Number of attempts at approach: 1       Secured with: pink tape       Ease of procedure: easy       Dentition: Intact and Unchanged

## 2021-11-29 NOTE — DISCHARGE INSTRUCTIONS
[facility]  Same-Day Surgery   Adult Discharge Orders & Instructions     For 24 hours after surgery    1. Get plenty of rest.  A responsible adult must stay with you for at least 24 hours after you leave the hospital.   2. Do not drive or use heavy equipment.  If you have weakness or tingling, don't drive or use heavy equipment until this feeling goes away.  3. Do not drink alcohol.  4. Avoid strenuous or risky activities.  Ask for help when climbing stairs.   5. You may feel lightheaded.  IF so, sit for a few minutes before standing.  Have someone help you get up.   6. If you have nausea (feel sick to your stomach): Drink only clear liquids such as apple juice, ginger ale, broth or 7-Up.  Rest may also help.  Be sure to drink enough fluids.  Move to a regular diet as you feel able.  7. You may have a slight fever. Call the doctor if your fever is over 100 F (37.7 C) (taken under the tongue) or lasts longer than 24 hours.  8. You may have a dry mouth, a sore throat, muscle aches or trouble sleeping.  These should go away after 24 hours.  9. Do not make important or legal decisions.   Call your doctor for any of the followin.  Signs of infection (fever, growing tenderness at the surgery site, a large amount of drainage or bleeding, severe pain, foul-smelling drainage, redness, swelling).    2. It has been over 8 to 10 hours since surgery and you are still not able to urinate (pass water).    3.  Headache for over 24 hours.    To contact a doctor, Dr. Guru Fenton @ [phone] (GI Clinic) or [phone] and ask for the resident on call for Gastroenterology (answered 24 hours a day)    Emergency Department on [facility]: [phone]                                                                  (TTY for hearing impaired: [phone])

## 2021-11-29 NOTE — ANESTHESIA POSTPROCEDURE EVALUATION
Patient: Sarah Fisher    Procedure: Procedure(s):  ENDOSCOPIC RETROGRADE CHOLANGIOPANCREATOGRAPHY, WITH biliary sphincterotomy, stricture dilation, stent exchange       Diagnosis:Biliary stricture [K83.1]  Diagnosis Additional Information: No value filed.    Anesthesia Type:  General    Note:  Disposition: Outpatient   Postop Pain Control: Uneventful            Sign Out: Well controlled pain   PONV: No   Neuro/Psych: Uneventful            Sign Out: Acceptable/Baseline neuro status   Airway/Respiratory: Uneventful            Sign Out: Acceptable/Baseline resp. status   CV/Hemodynamics: Uneventful            Sign Out: Acceptable CV status; No obvious hypovolemia; No obvious fluid overload   Other NRE: NONE   DID A NON-ROUTINE EVENT OCCUR? No           Last vitals:  Vitals Value Taken Time   /94 11/29/21 1145   Temp     Pulse 82 11/29/21 1146   Resp 16 11/29/21 1130   SpO2 95 % 11/29/21 1146   Vitals shown include unvalidated device data.    Electronically Signed By: Noel Osullivan MD  November 29, 2021  11:47 AM

## 2021-11-29 NOTE — OR NURSING
Per MD Milner pt does not need labs drawn post 2 hours, if patient continues to not have pain.

## 2021-11-30 NOTE — PROGRESS NOTES
Assessment & Plan     Liver replaced by transplant (H)  Stable. Continue to follow with surgery. Dr. Morrison (surgery) will be managing the patient for the first 3 months postoperatively, then back to GI (Dr. Avila) thereafter, around January 2022.    Chronic kidney disease, stage 3b (H)  Improving. Saw nephrology, creatinine trending in right direction so recommended, recommended follow-up as needed.    Subclinical hypothyroidism  Stable. Continue levothyroxine.   - levothyroxine (SYNTHROID/LEVOTHROID) 50 MCG tablet; Take 1 tablet (50 mcg) by mouth daily    Mass of left lower extremity  Unclear etiology. Recommend watchful waiting for now. If persisting, growing, or becoming increasingly bothersome, will obtain US.     Need for shingles vaccine  Patient called transplant RN during today's visit, advised to wait on all vaccines until after 90 days post-op.   - ZOSTER VACCINE RECOMBINANT ADJUVANTED IM NJX; Future      22 minutes spent on the date of the encounter doing chart review, patient visit and documentation        MEDICATIONS:  Continue current medications without change    Return in about 3 days (around 12/6/2021) for Follow-up, Specialist.    ROBYN Bear CNP  [facility]PHONSE Arredondo is a 51 year old who presents for the following health issues:    [facility]       Hospital Follow-up Visit:    Hospital/Nursing Home/IP Rehab Facility: [facility]  Date of Admission: Oct 16th  Date of Discharge: Oct 29th  Reason(s) for Admission: Liver Transplant 10/17/2021      Was your hospitalization related to COVID-19? No   Problems taking medications regularly:  None  Medication changes since discharge: Adjust Tacrolimus as needed for anti-rejection   Problems adhering to non-medication therapy:  None    Summary of hospitalization:  [facility] discharge summary reviewed  Diagnostic Tests/Treatments reviewed.  Follow up  needed: none  Other Healthcare Providers Involved in Patient s Care:         Surgical follow-up appointment - Monday 12/6/21  Update since discharge: improved.     Has some issues with decreased appetite. Has been eating snacks throughout the day rather than big meals. Focusing on proteins and vegetables as well as adequate fluid intake.      Enedelia Garcia lives with her right now, she is a nursing student at [employer]. Her 12 year old son also lives in the home. Has been cleared to drive. Has been walking without a walker for a few weeks now, doing well. The one thing she can't do yet is carry things and stairs are still a challenge if she is carrying anything.    Checking blood sugars once daily fasting in the am as recommended by her surgeon. Readings ranging from .     Saw nephrology, creatinine trending in right direction so recommended, recommended follow-up as needed. Dr. Morrison (surgery) will be managing the patient for the first 3 months postoperatively, then back to GI (Dr. Avila) thereafter, around January 2022.     Reports a 1 week history of lump to left calf muscle, about the size of a dime. Tender when pressing on it. No redness or bruising. No known injury.     Post Discharge Medication Reconciliation: discharge medications reconciled, continue medications without change.    Plan of care communicated with patient.              Past Medical History:   Diagnosis Date     Ascites      History of blood transfusion      Liver cirrhosis (H)      Thyroid disease      Current Outpatient Medications   Medication     levothyroxine (SYNTHROID/LEVOTHROID) 50 MCG tablet     alcohol swab prep pads     aspirin (ASA) 325 MG tablet     blood glucose (CONTOUR NEXT TEST) test strip     blood glucose (NO BRAND SPECIFIED) lancets standard     megestrol (MEGACE) 40 MG/ML suspension     mycophenolate (GENERIC EQUIVALENT) 250 MG capsule     ondansetron (ZOFRAN) 4 MG tablet     pantoprazole  "(PROTONIX) 40 MG EC tablet     predniSONE (DELTASONE) 5 MG tablet     [employer] MISC     sulfamethoxazole-trimethoprim (BACTRIM) 400-80 MG tablet     tacrolimus (GENERIC EQUIVALENT) 5 MG capsule     ursodiol (ACTIGALL) 300 MG capsule     valGANciclovir (VALCYTE) 450 MG tablet     No current facility-administered medications for this visit.        Allergies   Allergen Reactions     Amoxicillin GI Disturbance, Diarrhea, Nausea and Nausea and Vomiting         Review of Systems    ROS: 10 point ROS neg other than the symptoms noted above in the HPI.        Objective    /78 (BP Location: Right arm, Patient Position: Sitting, Cuff Size: Adult Regular)   Pulse 95   Temp 98.8  F (37.1  C) (Tympanic)   Ht 1.692 m (5' 6.6\")   Wt 67.3 kg (148 lb 6.4 oz)   SpO2 99%   BMI 23.52 kg/m    Body mass index is 23.52 kg/m .  Physical Exam  Constitutional:       General: She is not in acute distress.     Appearance: Normal appearance. She is not ill-appearing or toxic-appearing.   Cardiovascular:      Rate and Rhythm: Normal rate.   Pulmonary:      Effort: Pulmonary effort is normal. No respiratory distress.   Abdominal:      General: Abdomen is flat.      Palpations: Abdomen is soft.      Comments: Abdominal incision appears clean, dry, and intact with exception of the very top of incision just below xiphoid process, some slight drainage present however no signs of infection.    Musculoskeletal:      Left lower leg: Tenderness present.      Comments: Soft tissue mass palpated to left lower extremity, about the size of a walnut. No skin changes including redness or bruising. Some tenderness on palpation of the mass.    Skin:     General: Skin is warm and dry.   Neurological:      General: No focal deficit present.      Mental Status: She is alert and oriented to person, place, and time.   Psychiatric:         Mood and Affect: Mood normal.         Behavior: Behavior normal.                        "

## 2021-12-01 LAB — ERCP: NORMAL

## 2021-12-03 ENCOUNTER — OFFICE VISIT (OUTPATIENT)
Dept: PEDIATRICS | Facility: CLINIC | Age: 51
End: 2021-12-03
Payer: COMMERCIAL

## 2021-12-03 ENCOUNTER — LAB (OUTPATIENT)
Dept: LAB | Facility: CLINIC | Age: 51
End: 2021-12-03
Payer: COMMERCIAL

## 2021-12-03 VITALS
BODY MASS INDEX: 23.29 KG/M2 | TEMPERATURE: 98.8 F | DIASTOLIC BLOOD PRESSURE: 78 MMHG | HEIGHT: 67 IN | OXYGEN SATURATION: 99 % | WEIGHT: 148.4 LBS | SYSTOLIC BLOOD PRESSURE: 128 MMHG | HEART RATE: 95 BPM

## 2021-12-03 DIAGNOSIS — E03.8 SUBCLINICAL HYPOTHYROIDISM: ICD-10-CM

## 2021-12-03 DIAGNOSIS — Z94.4 LIVER REPLACED BY TRANSPLANT (H): ICD-10-CM

## 2021-12-03 DIAGNOSIS — R22.42 MASS OF LEFT LOWER EXTREMITY: ICD-10-CM

## 2021-12-03 DIAGNOSIS — N18.32 CHRONIC KIDNEY DISEASE, STAGE 3B (H): ICD-10-CM

## 2021-12-03 DIAGNOSIS — Z23 NEED FOR SHINGLES VACCINE: ICD-10-CM

## 2021-12-03 DIAGNOSIS — Z94.4 LIVER REPLACED BY TRANSPLANT (H): Primary | ICD-10-CM

## 2021-12-03 DIAGNOSIS — N18.32 CHRONIC KIDNEY DISEASE, STAGE 3B (H): Primary | ICD-10-CM

## 2021-12-03 LAB
ERYTHROCYTE [DISTWIDTH] IN BLOOD BY AUTOMATED COUNT: 16.5 % (ref 10–15)
HCT VFR BLD AUTO: 26.8 % (ref 35–47)
HGB BLD-MCNC: 8.4 G/DL (ref 11.7–15.7)
MCH RBC QN AUTO: 30.4 PG (ref 26.5–33)
MCHC RBC AUTO-ENTMCNC: 31.3 G/DL (ref 31.5–36.5)
MCV RBC AUTO: 97 FL (ref 78–100)
PLATELET # BLD AUTO: 227 10E3/UL (ref 150–450)
RBC # BLD AUTO: 2.76 10E6/UL (ref 3.8–5.2)
WBC # BLD AUTO: 4.5 10E3/UL (ref 4–11)

## 2021-12-03 PROCEDURE — 82248 BILIRUBIN DIRECT: CPT

## 2021-12-03 PROCEDURE — 82043 UR ALBUMIN QUANTITATIVE: CPT

## 2021-12-03 PROCEDURE — 80053 COMPREHEN METABOLIC PANEL: CPT

## 2021-12-03 PROCEDURE — 84100 ASSAY OF PHOSPHORUS: CPT

## 2021-12-03 PROCEDURE — 85027 COMPLETE CBC AUTOMATED: CPT

## 2021-12-03 PROCEDURE — 83735 ASSAY OF MAGNESIUM: CPT

## 2021-12-03 PROCEDURE — 80197 ASSAY OF TACROLIMUS: CPT

## 2021-12-03 PROCEDURE — 36415 COLL VENOUS BLD VENIPUNCTURE: CPT

## 2021-12-03 PROCEDURE — 99214 OFFICE O/P EST MOD 30 MIN: CPT | Performed by: NURSE PRACTITIONER

## 2021-12-03 RX ORDER — LEVOTHYROXINE SODIUM 50 UG/1
50 TABLET ORAL DAILY
Qty: 90 TABLET | Refills: 3 | Status: SHIPPED | OUTPATIENT
Start: 2021-12-03 | End: 2022-11-17 | Stop reason: DRUGHIGH

## 2021-12-03 ASSESSMENT — MIFFLIN-ST. JEOR: SCORE: 1314.42

## 2021-12-03 NOTE — NURSING NOTE
SB4 PAL saw patient during visit today, introduced role, and gave Welcome Letter.    Follow up plan discussed with patient. Will follow up with patient in three month via phone or prior to if needed.    Patient denied need for resources at this time.     Kiana Krishnan, CHBRENDEN  [phone]

## 2021-12-04 LAB
TACROLIMUS BLD-MCNC: 4.2 UG/L (ref 5–15)
TME LAST DOSE: ABNORMAL H
TME LAST DOSE: ABNORMAL H

## 2021-12-05 LAB
ALBUMIN SERPL-MCNC: 2.4 G/DL (ref 3.4–5)
ALP SERPL-CCNC: 153 U/L (ref 40–150)
ALT SERPL W P-5'-P-CCNC: 9 U/L (ref 0–50)
ANION GAP SERPL CALCULATED.3IONS-SCNC: 5 MMOL/L (ref 3–14)
AST SERPL W P-5'-P-CCNC: 6 U/L (ref 0–45)
BILIRUB DIRECT SERPL-MCNC: 0.3 MG/DL (ref 0–0.2)
BILIRUB SERPL-MCNC: 0.4 MG/DL (ref 0.2–1.3)
BUN SERPL-MCNC: 18 MG/DL (ref 7–30)
CALCIUM SERPL-MCNC: 7.8 MG/DL (ref 8.5–10.1)
CHLORIDE BLD-SCNC: 113 MMOL/L (ref 94–109)
CO2 SERPL-SCNC: 22 MMOL/L (ref 20–32)
CREAT SERPL-MCNC: 1.51 MG/DL (ref 0.52–1.04)
CREAT UR-MCNC: 265 MG/DL
GFR SERPL CREATININE-BSD FRML MDRD: 40 ML/MIN/1.73M2
GLUCOSE BLD-MCNC: 90 MG/DL (ref 70–99)
MAGNESIUM SERPL-MCNC: 1 MG/DL (ref 1.6–2.3)
MICROALBUMIN UR-MCNC: 66 MG/L
MICROALBUMIN/CREAT UR: 24.91 MG/G CR (ref 0–25)
PHOSPHATE SERPL-MCNC: 3.5 MG/DL (ref 2.5–4.5)
POTASSIUM BLD-SCNC: 3.9 MMOL/L (ref 3.4–5.3)
PROT SERPL-MCNC: 6 G/DL (ref 6.8–8.8)
SODIUM SERPL-SCNC: 140 MMOL/L (ref 133–144)

## 2021-12-06 ENCOUNTER — INFUSION THERAPY VISIT (OUTPATIENT)
Dept: INFUSION THERAPY | Facility: CLINIC | Age: 51
End: 2021-12-06
Attending: NURSE PRACTITIONER
Payer: COMMERCIAL

## 2021-12-06 ENCOUNTER — TELEPHONE (OUTPATIENT)
Dept: TRANSPLANT | Facility: CLINIC | Age: 51
End: 2021-12-06

## 2021-12-06 ENCOUNTER — LAB (OUTPATIENT)
Dept: LAB | Facility: CLINIC | Age: 51
End: 2021-12-06
Attending: OTOLARYNGOLOGY

## 2021-12-06 ENCOUNTER — OFFICE VISIT (OUTPATIENT)
Dept: TRANSPLANT | Facility: CLINIC | Age: 51
End: 2021-12-06
Attending: OTOLARYNGOLOGY
Payer: COMMERCIAL

## 2021-12-06 VITALS
DIASTOLIC BLOOD PRESSURE: 94 MMHG | OXYGEN SATURATION: 99 % | HEART RATE: 88 BPM | SYSTOLIC BLOOD PRESSURE: 163 MMHG | TEMPERATURE: 98.1 F

## 2021-12-06 VITALS
BODY MASS INDEX: 23.38 KG/M2 | OXYGEN SATURATION: 98 % | HEART RATE: 94 BPM | WEIGHT: 147.5 LBS | DIASTOLIC BLOOD PRESSURE: 91 MMHG | SYSTOLIC BLOOD PRESSURE: 161 MMHG

## 2021-12-06 DIAGNOSIS — Z94.4 LIVER TRANSPLANTED (H): Primary | ICD-10-CM

## 2021-12-06 DIAGNOSIS — Z94.4 LIVER REPLACED BY TRANSPLANT (H): ICD-10-CM

## 2021-12-06 DIAGNOSIS — K70.31 ALCOHOLIC CIRRHOSIS OF LIVER WITH ASCITES (H): ICD-10-CM

## 2021-12-06 DIAGNOSIS — Z94.4 LIVER TRANSPLANT RECIPIENT (H): ICD-10-CM

## 2021-12-06 DIAGNOSIS — R79.0 LOW MAGNESIUM LEVEL: Primary | ICD-10-CM

## 2021-12-06 LAB
ALBUMIN SERPL-MCNC: 2.3 G/DL (ref 3.4–5)
ALP SERPL-CCNC: 150 U/L (ref 40–150)
ALT SERPL W P-5'-P-CCNC: 7 U/L (ref 0–50)
ANION GAP SERPL CALCULATED.3IONS-SCNC: 8 MMOL/L (ref 3–14)
AST SERPL W P-5'-P-CCNC: 7 U/L (ref 0–45)
BILIRUB DIRECT SERPL-MCNC: 0.3 MG/DL (ref 0–0.2)
BILIRUB SERPL-MCNC: 0.4 MG/DL (ref 0.2–1.3)
BUN SERPL-MCNC: 16 MG/DL (ref 7–30)
CALCIUM SERPL-MCNC: 8.1 MG/DL (ref 8.5–10.1)
CHLORIDE BLD-SCNC: 115 MMOL/L (ref 94–109)
CO2 SERPL-SCNC: 22 MMOL/L (ref 20–32)
CREAT SERPL-MCNC: 1.65 MG/DL (ref 0.52–1.04)
ERYTHROCYTE [DISTWIDTH] IN BLOOD BY AUTOMATED COUNT: 16.4 % (ref 10–15)
GFR SERPL CREATININE-BSD FRML MDRD: 36 ML/MIN/1.73M2
GLUCOSE BLD-MCNC: 90 MG/DL (ref 70–99)
HCT VFR BLD AUTO: 27.3 % (ref 35–47)
HGB BLD-MCNC: 8.4 G/DL (ref 11.7–15.7)
MAGNESIUM SERPL-MCNC: 0.9 MG/DL (ref 1.6–2.3)
MCH RBC QN AUTO: 29.8 PG (ref 26.5–33)
MCHC RBC AUTO-ENTMCNC: 30.8 G/DL (ref 31.5–36.5)
MCV RBC AUTO: 97 FL (ref 78–100)
PHOSPHATE SERPL-MCNC: 3.2 MG/DL (ref 2.5–4.5)
PLATELET # BLD AUTO: 184 10E3/UL (ref 150–450)
POTASSIUM BLD-SCNC: 4 MMOL/L (ref 3.4–5.3)
PROT SERPL-MCNC: 6 G/DL (ref 6.8–8.8)
RBC # BLD AUTO: 2.82 10E6/UL (ref 3.8–5.2)
SODIUM SERPL-SCNC: 145 MMOL/L (ref 133–144)
TACROLIMUS BLD-MCNC: 5.3 UG/L (ref 5–15)
TME LAST DOSE: NORMAL H
TME LAST DOSE: NORMAL H
WBC # BLD AUTO: 4.6 10E3/UL (ref 4–11)

## 2021-12-06 PROCEDURE — 80197 ASSAY OF TACROLIMUS: CPT | Mod: 90 | Performed by: PATHOLOGY

## 2021-12-06 PROCEDURE — 36415 COLL VENOUS BLD VENIPUNCTURE: CPT | Performed by: PATHOLOGY

## 2021-12-06 PROCEDURE — 99214 OFFICE O/P EST MOD 30 MIN: CPT | Mod: 24 | Performed by: TRANSPLANT SURGERY

## 2021-12-06 PROCEDURE — 80053 COMPREHEN METABOLIC PANEL: CPT | Performed by: PATHOLOGY

## 2021-12-06 PROCEDURE — 250N000011 HC RX IP 250 OP 636: Performed by: TRANSPLANT SURGERY

## 2021-12-06 PROCEDURE — 83735 ASSAY OF MAGNESIUM: CPT | Performed by: PATHOLOGY

## 2021-12-06 PROCEDURE — 96365 THER/PROPH/DIAG IV INF INIT: CPT

## 2021-12-06 PROCEDURE — 84100 ASSAY OF PHOSPHORUS: CPT | Performed by: PATHOLOGY

## 2021-12-06 PROCEDURE — 85027 COMPLETE CBC AUTOMATED: CPT | Performed by: PATHOLOGY

## 2021-12-06 PROCEDURE — 82248 BILIRUBIN DIRECT: CPT | Performed by: PATHOLOGY

## 2021-12-06 PROCEDURE — 99000 SPECIMEN HANDLING OFFICE-LAB: CPT | Performed by: PATHOLOGY

## 2021-12-06 RX ORDER — MEPERIDINE HYDROCHLORIDE 25 MG/ML
25 INJECTION INTRAMUSCULAR; INTRAVENOUS; SUBCUTANEOUS EVERY 30 MIN PRN
Status: CANCELLED | OUTPATIENT
Start: 2021-12-06

## 2021-12-06 RX ORDER — HEPARIN SODIUM (PORCINE) LOCK FLUSH IV SOLN 100 UNIT/ML 100 UNIT/ML
5 SOLUTION INTRAVENOUS
Status: CANCELLED | OUTPATIENT
Start: 2021-12-06

## 2021-12-06 RX ORDER — TACROLIMUS 5 MG/1
5 CAPSULE ORAL EVERY 12 HOURS
Qty: 180 CAPSULE | Refills: 3 | Status: SHIPPED | OUTPATIENT
Start: 2021-12-06 | End: 2021-12-10

## 2021-12-06 RX ORDER — ALBUTEROL SULFATE 0.83 MG/ML
2.5 SOLUTION RESPIRATORY (INHALATION)
Status: CANCELLED | OUTPATIENT
Start: 2021-12-06

## 2021-12-06 RX ORDER — EPINEPHRINE 1 MG/ML
0.3 INJECTION, SOLUTION, CONCENTRATE INTRAVENOUS EVERY 5 MIN PRN
Status: CANCELLED | OUTPATIENT
Start: 2021-12-06

## 2021-12-06 RX ORDER — ALBUTEROL SULFATE 90 UG/1
1-2 AEROSOL, METERED RESPIRATORY (INHALATION)
Status: CANCELLED
Start: 2021-12-06

## 2021-12-06 RX ORDER — NALOXONE HYDROCHLORIDE 0.4 MG/ML
0.2 INJECTION, SOLUTION INTRAMUSCULAR; INTRAVENOUS; SUBCUTANEOUS
Status: CANCELLED | OUTPATIENT
Start: 2021-12-06

## 2021-12-06 RX ORDER — MAGNESIUM SULFATE HEPTAHYDRATE 40 MG/ML
2 INJECTION, SOLUTION INTRAVENOUS ONCE
Status: COMPLETED | OUTPATIENT
Start: 2021-12-06 | End: 2021-12-06

## 2021-12-06 RX ORDER — METHYLPREDNISOLONE SODIUM SUCCINATE 125 MG/2ML
125 INJECTION, POWDER, LYOPHILIZED, FOR SOLUTION INTRAMUSCULAR; INTRAVENOUS
Status: CANCELLED
Start: 2021-12-06

## 2021-12-06 RX ORDER — MAGNESIUM SULFATE HEPTAHYDRATE 40 MG/ML
2 INJECTION, SOLUTION INTRAVENOUS ONCE
Status: CANCELLED
Start: 2021-12-06 | End: 2021-12-06

## 2021-12-06 RX ORDER — TACROLIMUS 1 MG/1
1 CAPSULE ORAL EVERY 12 HOURS
Qty: 180 CAPSULE | Refills: 3 | Status: SHIPPED | OUTPATIENT
Start: 2021-12-06 | End: 2021-12-10

## 2021-12-06 RX ORDER — DIPHENHYDRAMINE HYDROCHLORIDE 50 MG/ML
50 INJECTION INTRAMUSCULAR; INTRAVENOUS
Status: CANCELLED
Start: 2021-12-06

## 2021-12-06 RX ORDER — HEPARIN SODIUM,PORCINE 10 UNIT/ML
5 VIAL (ML) INTRAVENOUS
Status: CANCELLED | OUTPATIENT
Start: 2021-12-06

## 2021-12-06 RX ORDER — DIPHENHYDRAMINE HCL 25 MG
50 CAPSULE ORAL ONCE
Status: CANCELLED
Start: 2021-12-06 | End: 2021-12-06

## 2021-12-06 RX ORDER — EPINEPHRINE 1 MG/ML
0.3 INJECTION, SOLUTION INTRAMUSCULAR; SUBCUTANEOUS EVERY 5 MIN PRN
Status: CANCELLED | OUTPATIENT
Start: 2021-12-06

## 2021-12-06 RX ADMIN — MAGNESIUM SULFATE HEPTAHYDRATE 2 G: 40 INJECTION, SOLUTION INTRAVENOUS at 11:21

## 2021-12-06 ASSESSMENT — PAIN SCALES - GENERAL: PAINLEVEL: NO PAIN (0)

## 2021-12-06 NOTE — TELEPHONE ENCOUNTER
Pt's magnesium level low 0.9. pt to have 2 gm IV magnesium.    [facility].   Pt updated and is aware and will go to 2nd floor infusion center.

## 2021-12-06 NOTE — PROGRESS NOTES
Transplant Surgery -OUTPATIENT IMMUNOSUPPRESSION PROGRESS NOTE    Date of Visit: 12/06/2021    Transplants:  10/17/2021 (Liver); Postoperative day:  50  ASSESMENT AND PLAN:  1.Graft Function:Liver allograft: no rejection or technical problems.    2.Immunosuppression Management: keep tacrolimus levels at 10 ng/dL  3.Hypertension: ok  4.Renal Function: ok  5.Lab frequency: weekly  6.Other:  Hypomagnesemia: give grams iv magnesium; Magnesium 400 mg po bid    Date: December 6, 2021    Transplant:  [x]                             Liver [x]                              Kidney []                             Pancreas []                              Other:             Chief Complaint:  Doing well,     History of Present Illness:  Patient Active Problem List   Diagnosis     Acute deep vein thrombosis (DVT) of upper extremity (H)     Acute hepatic encephalopathy     Acute hypoxemic respiratory failure (H)     Acute kidney failure, unspecified (H)     Carrier of group B Streptococcus     Cirrhosis of liver with ascites (H)     CKD (chronic kidney disease)     Elevated blood pressure     Family history of colon cancer     Gastroesophageal reflux disease without esophagitis     GI (gastrointestinal bleed)     Hyponatremia     Hypokalemia     History of DVT (deep vein thrombosis)     Anemia associated with acute blood loss     Acute anemia     Need for vaccination for viral hepatitis     Macrocytosis without anemia     Knee pain     Pancytopenia (H)     Macrocytic anemia     Blood loss anemia     Hydrothorax     Portal hypertensive gastropathy (H)     Pleural effusion     Screening for malignant neoplasm of cervix     Venous incompetence     Varicose veins of leg with pain     Supervision of high-risk pregnancy of elderly primigravida     Subclinical hypothyroidism     Liver failure (H)     Hypotension     Pre-liver transplant, listed     Alcoholic cirrhosis (H)     Abnormal serum iron level     Iron deficiency anemia due to  chronic blood loss     Encounter for immunization     Liver transplant candidate     Liver transplant recipient (H)     Immunosuppressed status (H)     Malnutrition (H)     Steroid-induced hyperglycemia     Epistaxis     Esophagitis     Duodenal erosion     Gastric antral vascular ectasia     Hyperphosphatemia     Leukocytosis     Chronic kidney disease, stage 3b (H)     SOCIAL /FAMILY HISTORY: [x]                  No recent change    Past Medical History:   Diagnosis Date     Ascites      History of blood transfusion      Liver cirrhosis (H)      Thyroid disease      Past Surgical History:   Procedure Laterality Date      SECTION       CV RIGHT HEART CATH MEASUREMENTS RECORDED N/A 2021    Procedure: CV RIGHT HEART CATH;  Surgeon: Joseph Guevara MD;  Location:  HEART CARDIAC CATH LAB     ENDOSCOPIC RETROGRADE CHOLANGIOPANCREATOGRAM N/A 10/25/2021    Procedure: ENDOSCOPIC RETROGRADE CHOLANGIOPANCREATOGRAPHY, WITH biliary sphincterotomy, biliary dilation, and biliary stent placement;  Surgeon: Guru Dominguez Fenton MD;  Location: [facility] OR     ENDOSCOPIC RETROGRADE CHOLANGIOPANCREATOGRAPHY, EXCHANGE TUBE/STENT N/A 2021    Procedure: ENDOSCOPIC RETROGRADE CHOLANGIOPANCREATOGRAPHY, WITH biliary sphincterotomy, stricture dilation, stent exchange;  Surgeon: Guru Dominguez Fenton MD;  Location:  OR     ESOPHAGOSCOPY, GASTROSCOPY, DUODENOSCOPY (EGD), COMBINED N/A 10/7/2021    Procedure: ESOPHAGOGASTRODUODENOSCOPY (EGD) with hemostasis;  Surgeon: Fox Jerry MD;  Location: [facility]     ESOPHAGOSCOPY, GASTROSCOPY, DUODENOSCOPY (EGD), COMBINED N/A 10/22/2021    Procedure: ESOPHAGOGASTRODUODENOSCOPY, WITH BIOPSY;  Surgeon: Fadia Avila MD;  Location:  GI     IR LIVER BIOPSY PERCUTANEOUS  10/26/2021     IR THORACENTESIS  2021     IR THORACENTESIS  2021     THORACENTESIS Left 2021    Procedure: THORACENTESIS;  Surgeon: Almas Irby MD;   Location: [facility] GI     THORACENTESIS N/A 2021    Procedure: THORACENTESIS;  Surgeon: Almas Irby MD;  Location: [facility]     THORACENTESIS N/A 03/10/2021    Procedure: THORACENTESIS;  Surgeon: Almas Irby MD;  Location: [facility]     THORACENTESIS Left 2021    Procedure: THORACENTESIS;  Surgeon: Kennedi Chavez MD;  Location: [facility] OR     THORACENTESIS Left 2021    Procedure: THORACENTESIS;  Surgeon: Jess Ansari PA-C;  Location: [facility] OR     TRANSPLANT LIVER RECIPIENT  DONOR N/A 10/17/2021    Procedure: TRANSPLANT, LIVER, RECIPIENT,  DONOR;  Surgeon: Christian Morrison MD;  Location: [facility] OR     Social History     Socioeconomic History     Marital status: Single     Spouse name: Not on file     Number of children: Not on file     Years of education: Not on file     Highest education level: Master's degree (e.g., MA, MS, Roberto, MEd, MSW, SASHA)   Occupational History     Not on file   Tobacco Use     Smoking status: Never Smoker     Smokeless tobacco: Never Used   Substance and Sexual Activity     Alcohol use: Not Currently     Comment: Quit on 2020     Drug use: Not Currently     Sexual activity: Not Currently     Partners: Male   Other Topics Concern     Parent/sibling w/ CABG, MI or angioplasty before 65F 55M? Not Asked   Social History Narrative    Lives [place]. 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 12 year sonFlakito.         Christine Harris, DNP, APRN, CNP    3/17/2021     Social Determinants of Health     Financial Resource Strain: Low Risk      Difficulty of Paying Living Expenses: Not hard at all   Food Insecurity: No Food Insecurity     Worried About Running Out of Food in the Last Year: Never true     Ran Out of Food in the Last Year: Never true   Transportation Needs: No Transportation Needs     Lack of Transportation (Medical): No     Lack of Transportation (Non-Medical): No   Physical Activity: Sufficiently Active      Days of Exercise per Week: 5 days     Minutes of Exercise per Session: 30 min   Stress: No Stress Concern Present     Feeling of Stress : Only a little   Social Connections: Moderately Integrated     Frequency of Communication with Friends and Family: More than three times a week     Frequency of Social Gatherings with Friends and Family: More than three times a week     Attends Shinto Services: More than 4 times per year     Active Member of Clubs or Organizations: Yes     Attends Club or Organization Meetings: More than 4 times per year     Marital Status: Never    Intimate Partner Violence: Not on file   Housing Stability: Unknown     Unable to Pay for Housing in the Last Year: Not on file     Number of Places Lived in the Last Year: 1     Unstable Housing in the Last Year: No     Prescription Medications as of 12/6/2021       Rx Number Disp Refills Start End Last Dispensed Date Next Fill Date Owning Pharmacy    alcohol swab prep pads  100 each 3 10/29/2021    [address]    Sig: Use to swab area of injection/fina as directed.    Class: E-Prescribe    aspirin (ASA) 325 MG tablet  90 tablet 3 11/2/2021    [id number] [place] [id number]    Sig: Take 1 tablet (325 mg) by mouth daily    Class: E-Prescribe    Route: Oral    blood glucose (CONTOUR NEXT TEST) test strip  100 strip 6 10/29/2021    [address]    Sig: Use to test blood sugar 3 times daily.    Class: E-Prescribe    blood glucose (NO BRAND SPECIFIED) lancets standard  100 each 1 10/29/2021    [address]    Sig: To use to test glucose level in the blood Use to test blood sugar 3 times daily as directed. To accompany glucose monitor brands per insurance coverage.    Class: E-Prescribe    Notes to Pharmacy:  Cecy microlet lancets    levothyroxine (SYNTHROID/LEVOTHROID) 50 MCG tablet  90 tablet 3 12/3/2021    [facility] #[id number] [place] [address] - [address] [facility] [address]    Sig: Take 1 tablet (50 mcg) by mouth daily    Class: E-Prescribe    Route: Oral    mycophenolate (GENERIC EQUIVALENT) 250 MG capsule  540 capsule 3 11/2/2021    [facility] [address] [place] [address] [address] [facility] [address]    Sig: Take 3 capsules (750 mg) by mouth 2 times daily    Class: E-Prescribe    Notes to Pharmacy: TXP DT 10/17/2021 (Liver) TXP Dischg DT 10/29/2021 DX Liver replaced by transplant Z94.4 [facility] ([place], [place])    Route: Oral    ondansetron (ZOFRAN) 4 MG tablet  20 tablet 0 11/15/2021    [facility] ST[facility] [address] [place] [address] - [address] [facility] [address]    Sig: Take 1 tablet (4 mg) by mouth every 6 hours as needed for nausea    Class: E-Prescribe    Route: Oral    pantoprazole (PROTONIX) 40 MG EC tablet  180 tablet 3 11/2/2021    [facility] [address] [place] [address] - [address] [facility] [address]    Sig: Take 1 tablet (40 mg) by mouth 2 times daily    Class: E-Prescribe    Route: Oral    predniSONE (DELTASONE) 5 MG tablet  60 tablet 3 11/9/2021    [facility] [address] [place] [address] - [address] [facility] [address]    Sig: Take 1 tablet (5 mg) by mouth daily    Class: E-Prescribe    Notes to Pharmacy: TXP DT 10/17/2021 (Liver) TXP Dischg DT 10/29/2021 DX Liver replaced by transplant Z94.4 [facility] ([place], [place])    Route: Oral    Sharps Container MISC  1 each 0 10/29/2021    [facility] - [place], [place] - [address] [facility]    Sig: Use as directed to dispose of needles, lancets and other sharps Per Insurance coverage    Class:  E-Prescribe    sulfamethoxazole-trimethoprim (BACTRIM) 400-80 MG tablet  90 tablet 3 2021    [facility] [address] [place] [address] [employer] [facility] [employer]    Si tablet by Oral or Feeding Tube route daily    Class: E-Prescribe    Route: Oral or Feeding Tube    tacrolimus (GENERIC EQUIVALENT) 5 MG capsule  180 capsule 3 2021    [facility] ST[facility] [address] [place] [address] [address] [facility]    Sig: Take 1 capsule (5 mg) by mouth every 12 hours    Class: E-Prescribe    Notes to Pharmacy: TXP DT 10/17/2021 (Liver) TXP Dischg DT 10/29/2021 DX Liver replaced by transplant Z94.4 [facility] ([place], [place])    Route: Oral    ursodiol (ACTIGALL) 300 MG capsule  180 capsule 3 2021    [facility] [address] [place] [address] [address] [facility] [employer] [facility] [employer]    Sig: Take 1 capsule (300 mg) by mouth 2 times daily    Class: E-Prescribe    Route: Oral    valGANciclovir (VALCYTE) 450 MG tablet  90 tablet 3 2021    [facility] [address] [place] [address] [address] [facility] [employer]    Sig: Take 1 tablet (450 mg) by mouth daily    Class: E-Prescribe    Route: Oral    megestrol (MEGACE) 40 MG/ML suspension  100 mL 3 2021    [id number] [address] [facility] [employer] [facility] [employer]    Sig: Take 10 mLs (400 mg) by mouth 2 times daily    Class: E-Prescribe    Route: Oral        Amoxicillin   REVIEW OF SYSTEMS (check box if normal)  [x]               GENERAL  [x]                 PULMONARY [x]                GENITOURINARY  [x]                CNS                 [x]                 CARDIAC  [x]                 ENDOCRINE  [x]                EARS,NOSE,THROAT [x]                 GASTROINTESTINAL [x]                 NEUROLOGIC    [x]                MUSCLOSKELTAL  [x]                   HEMATOLOGY      PHYSICAL EXAM (check box if normal)BP (!) 161/91   Pulse 94   Wt 66.9 kg (147 lb 8 oz)   SpO2 98%   Breastfeeding No   BMI 23.38 kg/m          [x]            GENERAL:    [x]       EYES:  ICTERIC   []        YES  []                    NO  [x]           EXTREMITIES: Clubbing []       Y     [x]           N    [x]           EARS, NOSE, THROAT: Membranes Moist    YES   [x]                   NO []                  [x]           LUNGS:  CLEAR    YES       [x]                  NO    []                                [x]           SKIN: Jaundice           YES       []                  NO    [x]                   Rash: YES       []                  NO    [x]                                     [x]             HEART: Regular Rate          YES       [x]                  NO    []                   Incision Clean:  YES       [x]                  NO    []                                [x]                    ABDOMEN: Organomegaly YES       []                  NO    [x]                       [x]                    NEUROLOGICAL:  Nonfocal  YES       [x]                  NO    []                       [x]                    Hernia YES       []                  NO    [x]                   PSYCHIATRIC:  Appropriate  YES       [x]                  NO    []                       OTHER:                                                                                                   PAIN SCALE:: 3

## 2021-12-06 NOTE — LETTER
12/6/2021     RE: Sarah Fisher  [address] [facility] [address]    Dear Colleague,    Thank you for referring your patient, Sarah Fisher, to the [facility]. Please see a copy of my visit note below.    Transplant Surgery -OUTPATIENT IMMUNOSUPPRESSION PROGRESS NOTE    Date of Visit: 12/06/2021    Transplants:  10/17/2021 (Liver); Postoperative day:  50  ASSESMENT AND PLAN:  1.Graft Function:Liver allograft: no rejection or technical problems.    2.Immunosuppression Management: keep tacrolimus levels at 10 ng/dL  3.Hypertension: ok  4.Renal Function: ok  5.Lab frequency: weekly  6.Other:  Hypomagnesemia: give grams iv magnesium; Magnesium 400 mg po bid    Date: December 6, 2021    Transplant:  [x]                             Liver [x]                              Kidney []                             Pancreas []                              Other:             Chief Complaint:  Doing well,     History of Present Illness:  Patient Active Problem List   Diagnosis     Acute deep vein thrombosis (DVT) of upper extremity (H)     Acute hepatic encephalopathy     Acute hypoxemic respiratory failure (H)     Acute kidney failure, unspecified (H)     Carrier of group B Streptococcus     Cirrhosis of liver with ascites (H)     CKD (chronic kidney disease)     Elevated blood pressure     Family history of colon cancer     Gastroesophageal reflux disease without esophagitis     GI (gastrointestinal bleed)     Hyponatremia     Hypokalemia     History of DVT (deep vein thrombosis)     Anemia associated with acute blood loss     Acute anemia     Need for vaccination for viral hepatitis     Macrocytosis without anemia     Knee pain     Pancytopenia (H)     Macrocytic anemia     Blood loss anemia     Hydrothorax     Portal hypertensive gastropathy (H)     Pleural effusion     Screening for malignant neoplasm of cervix     Venous incompetence     Varicose veins of leg with pain      Supervision of high-risk pregnancy of elderly primigravida     Subclinical hypothyroidism     Liver failure (H)     Hypotension     Pre-liver transplant, listed     Alcoholic cirrhosis (H)     Abnormal serum iron level     Iron deficiency anemia due to chronic blood loss     Encounter for immunization     Liver transplant candidate     Liver transplant recipient (H)     Immunosuppressed status (H)     Malnutrition (H)     Steroid-induced hyperglycemia     Epistaxis     Esophagitis     Duodenal erosion     Gastric antral vascular ectasia     Hyperphosphatemia     Leukocytosis     Chronic kidney disease, stage 3b (H)     SOCIAL /FAMILY HISTORY: [x]                  No recent change    Past Medical History:   Diagnosis Date     Ascites      History of blood transfusion      Liver cirrhosis (H)      Thyroid disease      Past Surgical History:   Procedure Laterality Date      SECTION       CV RIGHT HEART CATH MEASUREMENTS RECORDED N/A 2021    Procedure: CV RIGHT HEART CATH;  Surgeon: Joseph Guevara MD;  Location:  HEART CARDIAC CATH LAB     ENDOSCOPIC RETROGRADE CHOLANGIOPANCREATOGRAM N/A 10/25/2021    Procedure: ENDOSCOPIC RETROGRADE CHOLANGIOPANCREATOGRAPHY, WITH biliary sphincterotomy, biliary dilation, and biliary stent placement;  Surgeon: Guru Dominguez Fenton MD;  Location:  OR     ENDOSCOPIC RETROGRADE CHOLANGIOPANCREATOGRAPHY, EXCHANGE TUBE/STENT N/A 2021    Procedure: ENDOSCOPIC RETROGRADE CHOLANGIOPANCREATOGRAPHY, WITH biliary sphincterotomy, stricture dilation, stent exchange;  Surgeon: Guru Dominguez Fenton MD;  Location:  OR     ESOPHAGOSCOPY, GASTROSCOPY, DUODENOSCOPY (EGD), COMBINED N/A 10/7/2021    Procedure: ESOPHAGOGASTRODUODENOSCOPY (EGD) with hemostasis;  Surgeon: Fox Jerry MD;  Location: [facility]     ESOPHAGOSCOPY, GASTROSCOPY, DUODENOSCOPY (EGD), COMBINED N/A 10/22/2021    Procedure: ESOPHAGOGASTRODUODENOSCOPY, WITH BIOPSY;   Surgeon: Fadia Avila MD;  Location: [facility]     IR LIVER BIOPSY PERCUTANEOUS  10/26/2021     IR THORACENTESIS  2021     IR THORACENTESIS  2021     THORACENTESIS Left 2021    Procedure: THORACENTESIS;  Surgeon: Almas Irby MD;  Location: [facility]     THORACENTESIS N/A 2021    Procedure: THORACENTESIS;  Surgeon: Almas Irby MD;  Location: [facility]     THORACENTESIS N/A 03/10/2021    Procedure: THORACENTESIS;  Surgeon: Almas Irby MD;  Location: [facility]     THORACENTESIS Left 2021    Procedure: THORACENTESIS;  Surgeon: Kennedi Chavez MD;  Location: [facility] OR     THORACENTESIS Left 2021    Procedure: THORACENTESIS;  Surgeon: Jess Ansari PA-C;  Location: [facility] OR     TRANSPLANT LIVER RECIPIENT  DONOR N/A 10/17/2021    Procedure: TRANSPLANT, LIVER, RECIPIENT,  DONOR;  Surgeon: Christian Morrison MD;  Location: [facility]     Social History     Socioeconomic History     Marital status: Single     Spouse name: Not on file     Number of children: Not on file     Years of education: Not on file     Highest education level: Master's degree (e.g., MA, MS, Roberto, MEd, MSW, SASHA)   Occupational History     Not on file   Tobacco Use     Smoking status: Never Smoker     Smokeless tobacco: Never Used   Substance and Sexual Activity     Alcohol use: Not Currently     Comment: Quit on 2020     Drug use: Not Currently     Sexual activity: Not Currently     Partners: Male   Other Topics Concern     Parent/sibling w/ CABG, MI or angioplasty before 65F 55M? Not Asked   Social History Narrative    Lives [place]. 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 12 year sonFlakito.         Christine Harris, DNP, APRN, CNP    3/17/2021     Social Determinants of Health     Financial Resource Strain: Low Risk      Difficulty of Paying Living Expenses: Not hard at all   Food Insecurity: No Food Insecurity     Worried About Running Out of Food in the Last  Year: Never true     Ran Out of Food in the Last Year: Never true   Transportation Needs: No Transportation Needs     Lack of Transportation (Medical): No     Lack of Transportation (Non-Medical): No   Physical Activity: Sufficiently Active     Days of Exercise per Week: 5 days     Minutes of Exercise per Session: 30 min   Stress: No Stress Concern Present     Feeling of Stress : Only a little   Social Connections: Moderately Integrated     Frequency of Communication with Friends and Family: More than three times a week     Frequency of Social Gatherings with Friends and Family: More than three times a week     Attends [employer] Services: More than 4 times per year     Active Member of Clubs or Organizations: Yes     Attends Club or Organization Meetings: More than 4 times per year     Marital Status: Never    Intimate Partner Violence: Not on file   Housing Stability: Unknown     Unable to Pay for Housing in the Last Year: Not on file     Number of Places Lived in the Last Year: 1     Unstable Housing in the Last Year: No     Prescription Medications as of 12/6/2021       Rx Number Disp Refills Start End Last Dispensed Date Next Fill Date Owning Pharmacy    alcohol swab prep pads  100 each 3 10/29/2021    [address]    Sig: Use to swab area of injection/fina as directed.    Class: E-Prescribe    aspirin (ASA) 325 MG tablet  90 tablet 3 11/2/2021    [facility] #[id number] - [place], [place] - [address] [facility] AT [address] [facility]    Sig: Take 1 tablet (325 mg) by mouth daily    Class: E-Prescribe    Route: Oral    blood glucose (CONTOUR NEXT TEST) test strip  100 strip 6 10/29/2021    [address]    Sig: Use to test blood sugar 3 times daily.    Class: E-Prescribe    blood glucose (NO BRAND SPECIFIED) lancets standard  100 each 1 10/29/2021    [facility] - [place], [place] - [address] [facility]    Sig: To use to test glucose level in the blood Use to test blood sugar 3 times daily as directed. To accompany glucose monitor brands per insurance coverage.    Class: E-Prescribe    Notes to Pharmacy: Cecy microlet lancets    levothyroxine (SYNTHROID/LEVOTHROID) 50 MCG tablet  90 tablet 3 12/3/2021    [facility] #[id number] [place], [place] - [address] AT [address]    Sig: Take 1 tablet (50 mcg) by mouth daily    Class: E-Prescribe    Route: Oral    mycophenolate (GENERIC EQUIVALENT) 250 MG capsule  540 capsule 3 11/2/2021    [facility] #[id number] [place], [place] - [address] AT [address]    Sig: Take 3 capsules (750 mg) by mouth 2 times daily    Class: E-Prescribe    Notes to Pharmacy: TXP DT 10/17/2021 (Liver) TXP Dischg DT 10/29/2021 DX Liver replaced by transplant Z94.4 TX Center [facility] ([place], [place])    Route: Oral    ondansetron (ZOFRAN) 4 MG tablet  20 tablet 0 11/15/2021    [facility] #[id number] [place], [place] - [address] AT [facility]    Sig: Take 1 tablet (4 mg) by mouth every 6 hours as needed for nausea    Class: E-Prescribe    Route: Oral    pantoprazole (PROTONIX) 40 MG EC tablet  180 tablet 3 11/2/2021    [facility] #[id number] [place], [place] - [address] AT [address]    Sig: Take 1 tablet (40 mg) by mouth 2 times daily    Class: E-Prescribe    Route: Oral    predniSONE (DELTASONE) 5 MG tablet  60 tablet 3 11/9/2021    [facility] #[id number] [place], [place] - [address] AT [address]    Sig: Take 1 tablet (5 mg) by mouth daily    Class: E-Prescribe    Notes to Pharmacy: TXP DT 10/17/2021 (Liver) TXP Dischg DT 10/29/2021 DX Liver replaced by transplant Z94.4 TX Center [facility] ([place],  [place])    Route: Oral    Sharps Container MISC  1 each 0 10/29/2021    [facility] - [place], [place] - [address] [facility]    Sig: Use as directed to dispose of needles, lancets and other sharps Per Insurance coverage    Class: E-Prescribe    sulfamethoxazole-trimethoprim (BACTRIM) 400-80 MG tablet  90 tablet 3 2021    [facility][id number] [place], [place] - [address] AT [address] OF [address]    Si tablet by Oral or Feeding Tube route daily    Class: E-Prescribe    Route: Oral or Feeding Tube    tacrolimus (GENERIC EQUIVALENT) 5 MG capsule  180 capsule 3 2021    [facility] [address] [place] [address] - [address] AT [address]    Sig: Take 1 capsule (5 mg) by mouth every 12 hours    Class: E-Prescribe    Notes to Pharmacy: TXP DT 10/17/2021 (Liver) TXP Dischg DT 10/29/2021 DX Liver replaced by transplant Z94.4 TX Center [facility] ([place], [place])    Route: Oral    ursodiol (ACTIGALL) 300 MG capsule  180 capsule 3 2021    [facility] [address] [place] [address] - [address] AT [address]    Sig: Take 1 capsule (300 mg) by mouth 2 times daily    Class: E-Prescribe    Route: Oral    valGANciclovir (VALCYTE) 450 MG tablet  90 tablet 3 2021    [facility] [place] [address] - [address] AT [address]    Sig: Take 1 tablet (450 mg) by mouth daily    Class: E-Prescribe    Route: Oral    megestrol (MEGACE) 40 MG/ML suspension  100 mL 3 2021    [facility] #[id number] [place], [place] - [address] AT [address] OF [address]    Sig: Take 10 mLs (400 mg) by mouth 2 times daily    Class: E-Prescribe    Route: Oral        Amoxicillin   REVIEW OF SYSTEMS (check box if normal)  [x]               GENERAL  [x]                 PULMONARY [x]                GENITOURINARY  [x]                CNS                  [x]                 CARDIAC  [x]                 ENDOCRINE  [x]                EARS,NOSE,THROAT [x]                 GASTROINTESTINAL [x]                 NEUROLOGIC    [x]                MUSCLOSKELTAL  [x]                  HEMATOLOGY      PHYSICAL EXAM (check box if normal)BP (!) 161/91   Pulse 94   Wt 66.9 kg (147 lb 8 oz)   SpO2 98%   Breastfeeding No   BMI 23.38 kg/m          [x]            GENERAL:    [x]       EYES:  ICTERIC   []        YES  []                    NO  [x]           EXTREMITIES: Clubbing []       Y     [x]           N    [x]           EARS, NOSE, THROAT: Membranes Moist    YES   [x]                   NO []                  [x]           LUNGS:  CLEAR    YES       [x]                  NO    []                                [x]           SKIN: Jaundice           YES       []                  NO    [x]                   Rash: YES       []                  NO    [x]                                     [x]             HEART: Regular Rate          YES       [x]                  NO    []                   Incision Clean:  YES       [x]                  NO    []                                [x]                    ABDOMEN: Organomegaly YES       []                  NO    [x]                       [x]                    NEUROLOGICAL:  Nonfocal  YES       [x]                  NO    []                       [x]                    Hernia YES       []                  NO    [x]                   PSYCHIATRIC:  Appropriate  YES       [x]                  NO    []                       OTHER:                                                                                                   PAIN SCALE:: 3    Again, thank you for allowing me to participate in the care of your patient.      Sincerely,    Christian Morrison MD

## 2021-12-06 NOTE — PROGRESS NOTES
Nursing Note  Sarah Fisher [facility]: Magn[facility]   During [facility]'s [facility] appointment, orders from Dr. Morrison were completed.  Frequency: once    Progress note:  Patient identification verified by name and date of birth.  Assessment completed.  Vitals recorded in Doc Flowsheets.  Patient was provided with education regarding medication/procedure and possible side effects.  Patient verbalized understanding.     present during visit today: Not Applicable.    Treatment Conditions: Magnesium 0.9 today    Infusion length and rate:  infusion given over approximately 1 hours    Labs: were not ordered for this appointment.    Vascular access: peripheral IV placed today.    Is the next appt scheduled? no  Asymptomatic COVID test completed? no    Post Infusion Assessment:  Patient tolerated infusion without incident.     Discharge Plan:   Follow up plan of care with: transplant coordinator.  Discharge instructions were reviewed with patient.  Patient/representative verbalized understanding of discharge instructions and all questions answered.  [facility] in stable condition.    Lyndsey Bunch RN    Administrations This Visit     magnesium sulfate 2 g in water intermittent infusion     Admin Date  12/06/2021 Action  New Bag Dose  2 g Rate  50 mL/hr Route  Intravenous Administered By  Lyndsey Bunch RN                BP (!) 163/94   Pulse 88   Temp 98.1  F (36.7  C) (Oral)   SpO2 99%

## 2021-12-06 NOTE — LETTER
12/6/2021         RE: Sarah Fisher  [address]  [facility] [address]        Dear Colleague,    Thank you for referring your patient, Sarah Fisher, to the [facility]. Please see a copy of my visit note below.    Nursing Note  Sarah Fisher [facility]: Magnesium   During [facility]'s [facility] appointment, orders from Dr. Morrison were completed.  Frequency: once    Progress note:  Patient identification verified by name and date of birth.  Assessment completed.  Vitals recorded in Doc Flowsheets.  Patient was provided with education regarding medication/procedure and possible side effects.  Patient verbalized understanding.     present during visit today: Not Applicable.    Treatment Conditions: Magnesium 0.9 today    Infusion length and rate:  infusion given over approximately 1 hours    Labs: were not ordered for this appointment.    Vascular access: peripheral IV placed today.    Is the next appt scheduled? no  Asymptomatic COVID test completed? no    Post Infusion Assessment:  Patient tolerated infusion without incident.     Discharge Plan:   Follow up plan of care with: transplant coordinator.  Discharge instructions were reviewed with patient.  Patient/representative verbalized understanding of discharge instructions and all questions answered.  [facility] in stable condition.    Lyndsey Bunch RN    Administrations This Visit     magnesium sulfate 2 g in water intermittent infusion     Admin Date  12/06/2021 Action  New Bag Dose  2 g Rate  50 mL/hr Route  Intravenous Administered By  Lyndsey Bunch RN                BP (!) 163/94   Pulse 88   Temp 98.1  F (36.7  C) (Oral)   SpO2 99%         Again, thank you for allowing me to participate in the care of your patient.         Sincerely,        [facility]

## 2021-12-06 NOTE — TELEPHONE ENCOUNTER
ISSUE:   Tacrolimus IR level 5.3 on 12/6, goal 8, dose 5 mg BID.    PLAN:   Please call patient and confirm this was an accurate 12-hour trough. Verify Tacrolimus IR dose 5 mg BID. Confirm no new medications or illness. Confirm no missed doses. If accurate trough and accurate dose, increase Tacrolimus IR dose to 6 mg BID and repeat labs Thursday.    OUTCOME:   Spoke with patient, they confirm accurate trough level and current dose 5 mg BID. Patient confirmed dose change to 6 mg BID and to repeat labs in 3 days. Orders sent to preferred pharmacy for dose change and lab for repeat labs. Patient voiced understanding of plan.     Alexandrea Peter LPN

## 2021-12-07 ENCOUNTER — PATIENT OUTREACH (OUTPATIENT)
Dept: GASTROENTEROLOGY | Facility: CLINIC | Age: 51
End: 2021-12-07
Payer: COMMERCIAL

## 2021-12-07 DIAGNOSIS — Z46.89 ENCOUNTER FOR REMOVAL OF BILIARY STENT: Primary | ICD-10-CM

## 2021-12-07 NOTE — TELEPHONE ENCOUNTER
Post ERCP (11/29/21) with Dr. Finn: Follow-up    Post procedure recommendations:   Please obtain a KUB flat and upright films in 4 weeks to ensure spontaneous passage of biliary stent.    - Observe patient's clinical course. Expect no further ERCPs given resolution of significant biliary  stricture. If patient develops fever, chills, jaundice     or passes dark urine or has worsening of LFTs, will  recommend patient to call us immediately for  consideration of an urgent ERCP.     Orders placed: KUB    Patient states: feeling fine, will do it after 1/2/22    Clinic contact and scheduling numbers verified for future questions/concerns.    Areli Pringle, RN Care Coordinator

## 2021-12-08 ENCOUNTER — MYC MEDICAL ADVICE (OUTPATIENT)
Dept: PEDIATRICS | Facility: CLINIC | Age: 51
End: 2021-12-08
Payer: COMMERCIAL

## 2021-12-09 ENCOUNTER — LAB (OUTPATIENT)
Dept: LAB | Facility: CLINIC | Age: 51
End: 2021-12-09
Payer: COMMERCIAL

## 2021-12-09 DIAGNOSIS — Z94.4 LIVER TRANSPLANT RECIPIENT (H): ICD-10-CM

## 2021-12-09 DIAGNOSIS — Z94.4 LIVER REPLACED BY TRANSPLANT (H): ICD-10-CM

## 2021-12-09 LAB
ERYTHROCYTE [DISTWIDTH] IN BLOOD BY AUTOMATED COUNT: 16.1 % (ref 10–15)
HCT VFR BLD AUTO: 27.1 % (ref 35–47)
HGB BLD-MCNC: 8.6 G/DL (ref 11.7–15.7)
MCH RBC QN AUTO: 30.9 PG (ref 26.5–33)
MCHC RBC AUTO-ENTMCNC: 31.7 G/DL (ref 31.5–36.5)
MCV RBC AUTO: 98 FL (ref 78–100)
PLATELET # BLD AUTO: 250 10E3/UL (ref 150–450)
RBC # BLD AUTO: 2.78 10E6/UL (ref 3.8–5.2)
TACROLIMUS BLD-MCNC: 5.6 UG/L (ref 5–15)
TME LAST DOSE: NORMAL H
TME LAST DOSE: NORMAL H
WBC # BLD AUTO: 4.2 10E3/UL (ref 4–11)

## 2021-12-09 PROCEDURE — 36415 COLL VENOUS BLD VENIPUNCTURE: CPT

## 2021-12-09 PROCEDURE — 85027 COMPLETE CBC AUTOMATED: CPT

## 2021-12-09 PROCEDURE — 82248 BILIRUBIN DIRECT: CPT

## 2021-12-09 PROCEDURE — 84100 ASSAY OF PHOSPHORUS: CPT

## 2021-12-09 PROCEDURE — 80053 COMPREHEN METABOLIC PANEL: CPT

## 2021-12-09 PROCEDURE — 80197 ASSAY OF TACROLIMUS: CPT

## 2021-12-09 PROCEDURE — 83735 ASSAY OF MAGNESIUM: CPT

## 2021-12-09 NOTE — TELEPHONE ENCOUNTER
ISSUE:   Tacrolimus IR level 5.6 on 12/9, goal 8, dose 6 mg BID. Pt had 13 hour level per lab.    PLAN:   Please call patient and confirm this was an accurate 12-hour trough. Verify Tacrolimus IR dose 6 mg BID. Confirm no new medications or illness. Confirm no missed doses. If accurate trough and accurate dose, increase Tacrolimus IR dose to 7 mg BID and repeat labs next week and ensure 12 hour level or as close to 12 hour as can be.     OUTCOME:   Spoke with patient, they confirm accurate trough level and current dose 6 mg BID. Patient confirmed dose change to 7 mg BID and to repeat labs in 3 days. Orders sent to preferred pharmacy for dose change and lab for repeat labs. Patient voiced understanding of plan.     Alexandrea Peter LPN

## 2021-12-10 ENCOUNTER — TELEPHONE (OUTPATIENT)
Dept: TRANSPLANT | Facility: CLINIC | Age: 51
End: 2021-12-10
Payer: COMMERCIAL

## 2021-12-10 LAB
ALBUMIN SERPL-MCNC: 2.5 G/DL (ref 3.4–5)
ALP SERPL-CCNC: 144 U/L (ref 40–150)
ALT SERPL W P-5'-P-CCNC: 6 U/L (ref 0–50)
ANION GAP SERPL CALCULATED.3IONS-SCNC: 7 MMOL/L (ref 3–14)
AST SERPL W P-5'-P-CCNC: 5 U/L (ref 0–45)
BILIRUB DIRECT SERPL-MCNC: 0.3 MG/DL (ref 0–0.2)
BILIRUB SERPL-MCNC: 0.4 MG/DL (ref 0.2–1.3)
BUN SERPL-MCNC: 23 MG/DL (ref 7–30)
CALCIUM SERPL-MCNC: 8.8 MG/DL (ref 8.5–10.1)
CHLORIDE BLD-SCNC: 112 MMOL/L (ref 94–109)
CO2 SERPL-SCNC: 21 MMOL/L (ref 20–32)
CREAT SERPL-MCNC: 1.62 MG/DL (ref 0.52–1.04)
GFR SERPL CREATININE-BSD FRML MDRD: 36 ML/MIN/1.73M2
GLUCOSE BLD-MCNC: 91 MG/DL (ref 70–99)
MAGNESIUM SERPL-MCNC: 1.4 MG/DL (ref 1.6–2.3)
PHOSPHATE SERPL-MCNC: 3.4 MG/DL (ref 2.5–4.5)
POTASSIUM BLD-SCNC: 4.1 MMOL/L (ref 3.4–5.3)
PROT SERPL-MCNC: 6.3 G/DL (ref 6.8–8.8)
SODIUM SERPL-SCNC: 140 MMOL/L (ref 133–144)

## 2021-12-10 RX ORDER — TACROLIMUS 1 MG/1
2 CAPSULE ORAL EVERY 12 HOURS
Qty: 360 CAPSULE | Refills: 3 | Status: SHIPPED | OUTPATIENT
Start: 2021-12-10 | End: 2022-02-02

## 2021-12-10 RX ORDER — TACROLIMUS 5 MG/1
5 CAPSULE ORAL EVERY 12 HOURS
Qty: 180 CAPSULE | Refills: 3 | Status: SHIPPED | OUTPATIENT
Start: 2021-12-10 | End: 2022-02-02

## 2021-12-10 NOTE — TELEPHONE ENCOUNTER
December 10, 2021 10:05 AM - Ra Elmore CNA: pt called to johann follow up appts per cord drc appts and labs , no work q order

## 2021-12-13 ENCOUNTER — LAB (OUTPATIENT)
Dept: LAB | Facility: CLINIC | Age: 51
End: 2021-12-13
Payer: COMMERCIAL

## 2021-12-13 DIAGNOSIS — Z94.4 LIVER REPLACED BY TRANSPLANT (H): ICD-10-CM

## 2021-12-13 LAB
ALBUMIN SERPL-MCNC: 3 G/DL (ref 3.5–5)
ALP SERPL-CCNC: 120 U/L (ref 45–120)
ALT SERPL W P-5'-P-CCNC: <9 U/L (ref 0–45)
ANION GAP SERPL CALCULATED.3IONS-SCNC: 10 MMOL/L (ref 5–18)
AST SERPL W P-5'-P-CCNC: 7 U/L (ref 0–40)
BILIRUB DIRECT SERPL-MCNC: 0.3 MG/DL
BILIRUB SERPL-MCNC: 0.5 MG/DL (ref 0–1)
BUN SERPL-MCNC: 23 MG/DL (ref 8–22)
CALCIUM SERPL-MCNC: 8.8 MG/DL (ref 8.5–10.5)
CHLORIDE BLD-SCNC: 112 MMOL/L (ref 98–107)
CO2 SERPL-SCNC: 19 MMOL/L (ref 22–31)
CREAT SERPL-MCNC: 1.51 MG/DL (ref 0.6–1.1)
ERYTHROCYTE [DISTWIDTH] IN BLOOD BY AUTOMATED COUNT: 16.1 % (ref 10–15)
GFR SERPL CREATININE-BSD FRML MDRD: 40 ML/MIN/1.73M2
GLUCOSE BLD-MCNC: 99 MG/DL (ref 70–125)
HCT VFR BLD AUTO: 27.1 % (ref 35–47)
HGB BLD-MCNC: 8.7 G/DL (ref 11.7–15.7)
MAGNESIUM SERPL-MCNC: 1.2 MG/DL (ref 1.8–2.6)
MCH RBC QN AUTO: 30.5 PG (ref 26.5–33)
MCHC RBC AUTO-ENTMCNC: 32.1 G/DL (ref 31.5–36.5)
MCV RBC AUTO: 95 FL (ref 78–100)
PHOSPHATE SERPL-MCNC: 3.3 MG/DL (ref 2.5–4.5)
PLATELET # BLD AUTO: 226 10E3/UL (ref 150–450)
POTASSIUM BLD-SCNC: 4.5 MMOL/L (ref 3.5–5)
PROT SERPL-MCNC: 5.8 G/DL (ref 6–8)
RBC # BLD AUTO: 2.85 10E6/UL (ref 3.8–5.2)
SODIUM SERPL-SCNC: 141 MMOL/L (ref 136–145)
TACROLIMUS BLD-MCNC: 8.4 UG/L (ref 5–15)
TME LAST DOSE: NORMAL H
TME LAST DOSE: NORMAL H
WBC # BLD AUTO: 4.8 10E3/UL (ref 4–11)

## 2021-12-13 PROCEDURE — 82248 BILIRUBIN DIRECT: CPT | Performed by: FAMILY MEDICINE

## 2021-12-13 PROCEDURE — 85027 COMPLETE CBC AUTOMATED: CPT | Performed by: FAMILY MEDICINE

## 2021-12-13 PROCEDURE — 36415 COLL VENOUS BLD VENIPUNCTURE: CPT | Performed by: FAMILY MEDICINE

## 2021-12-13 PROCEDURE — 83735 ASSAY OF MAGNESIUM: CPT | Performed by: FAMILY MEDICINE

## 2021-12-13 PROCEDURE — 80197 ASSAY OF TACROLIMUS: CPT | Performed by: FAMILY MEDICINE

## 2021-12-13 PROCEDURE — 84100 ASSAY OF PHOSPHORUS: CPT | Performed by: FAMILY MEDICINE

## 2021-12-13 PROCEDURE — 80053 COMPREHEN METABOLIC PANEL: CPT | Performed by: FAMILY MEDICINE

## 2021-12-14 ENCOUNTER — TELEPHONE (OUTPATIENT)
Dept: TRANSPLANT | Facility: CLINIC | Age: 51
End: 2021-12-14
Payer: COMMERCIAL

## 2021-12-14 DIAGNOSIS — Z94.4 LIVER TRANSPLANTED (H): ICD-10-CM

## 2021-12-14 NOTE — TELEPHONE ENCOUNTER
Maria Elena called to ask about dental work and when we would recommend she have a teeth cleaning and fillings done. I informed her that we would like her to wait until 6 months transplant if possible which she said works fine. If she has any further questions she will talk with her coordinator.

## 2021-12-16 ENCOUNTER — LAB (OUTPATIENT)
Dept: LAB | Facility: CLINIC | Age: 51
End: 2021-12-16
Payer: COMMERCIAL

## 2021-12-16 DIAGNOSIS — Z94.4 LIVER REPLACED BY TRANSPLANT (H): ICD-10-CM

## 2021-12-16 LAB
ERYTHROCYTE [DISTWIDTH] IN BLOOD BY AUTOMATED COUNT: 15.7 % (ref 10–15)
HCT VFR BLD AUTO: 26.7 % (ref 35–47)
HGB BLD-MCNC: 8.4 G/DL (ref 11.7–15.7)
MCH RBC QN AUTO: 30.7 PG (ref 26.5–33)
MCHC RBC AUTO-ENTMCNC: 31.5 G/DL (ref 31.5–36.5)
MCV RBC AUTO: 97 FL (ref 78–100)
PLATELET # BLD AUTO: 245 10E3/UL (ref 150–450)
RBC # BLD AUTO: 2.74 10E6/UL (ref 3.8–5.2)
TACROLIMUS BLD-MCNC: 6.8 UG/L (ref 5–15)
TME LAST DOSE: NORMAL H
TME LAST DOSE: NORMAL H
WBC # BLD AUTO: 4.5 10E3/UL (ref 4–11)

## 2021-12-16 PROCEDURE — 82248 BILIRUBIN DIRECT: CPT

## 2021-12-16 PROCEDURE — 36415 COLL VENOUS BLD VENIPUNCTURE: CPT

## 2021-12-16 PROCEDURE — 83735 ASSAY OF MAGNESIUM: CPT

## 2021-12-16 PROCEDURE — 80197 ASSAY OF TACROLIMUS: CPT

## 2021-12-16 PROCEDURE — 85027 COMPLETE CBC AUTOMATED: CPT

## 2021-12-16 PROCEDURE — 84100 ASSAY OF PHOSPHORUS: CPT

## 2021-12-16 PROCEDURE — 80053 COMPREHEN METABOLIC PANEL: CPT

## 2021-12-17 DIAGNOSIS — Z94.4 LIVER TRANSPLANTED (H): ICD-10-CM

## 2021-12-17 LAB
ALBUMIN SERPL-MCNC: 2.8 G/DL (ref 3.4–5)
ALP SERPL-CCNC: 111 U/L (ref 40–150)
ALT SERPL W P-5'-P-CCNC: 8 U/L (ref 0–50)
ANION GAP SERPL CALCULATED.3IONS-SCNC: 8 MMOL/L (ref 3–14)
AST SERPL W P-5'-P-CCNC: 5 U/L (ref 0–45)
BILIRUB DIRECT SERPL-MCNC: 0.3 MG/DL (ref 0–0.2)
BILIRUB SERPL-MCNC: 0.4 MG/DL (ref 0.2–1.3)
BUN SERPL-MCNC: 31 MG/DL (ref 7–30)
CALCIUM SERPL-MCNC: 8.8 MG/DL (ref 8.5–10.1)
CHLORIDE BLD-SCNC: 112 MMOL/L (ref 94–109)
CO2 SERPL-SCNC: 21 MMOL/L (ref 20–32)
CREAT SERPL-MCNC: 1.93 MG/DL (ref 0.52–1.04)
GFR SERPL CREATININE-BSD FRML MDRD: 30 ML/MIN/1.73M2
GLUCOSE BLD-MCNC: 92 MG/DL (ref 70–99)
MAGNESIUM SERPL-MCNC: 1.4 MG/DL (ref 1.6–2.3)
PHOSPHATE SERPL-MCNC: 3.1 MG/DL (ref 2.5–4.5)
POTASSIUM BLD-SCNC: 5.2 MMOL/L (ref 3.4–5.3)
PROT SERPL-MCNC: 6.3 G/DL (ref 6.8–8.8)
SODIUM SERPL-SCNC: 141 MMOL/L (ref 133–144)

## 2021-12-30 ENCOUNTER — TELEPHONE (OUTPATIENT)
Dept: GASTROENTEROLOGY | Facility: CLINIC | Age: 51
End: 2021-12-30
Payer: COMMERCIAL

## 2021-12-30 ENCOUNTER — TEAM CONFERENCE (OUTPATIENT)
Dept: TRANSPLANT | Facility: CLINIC | Age: 51
End: 2021-12-30
Payer: COMMERCIAL

## 2021-12-30 DIAGNOSIS — Z94.4 LIVER TRANSPLANTED (H): Primary | ICD-10-CM

## 2021-12-30 NOTE — TELEPHONE ENCOUNTER
Post Liver Transplant Team Conference  Date: 12/30/2021  Transplant Coordinator: Lotus Hughes  Transplant Surgeon: Christian Morrison      Attendees:  [] Dr. Seth [] Dr. Beltran []       [x] Dr. Morrison [] Alexandrea Peter LPN []    [] Dr. Patterson [] Cyndee Salazar NP []    [] Dr. Benavidez [] Raven Garcia NP []    [] Dr. Lyon [] Raven Ocasio, RICHARD []       [] Dr. Johnson [x] Britni Wilson, RICHARD []       [] Dr. Tom Garcia [x] Sirena Dudley, RICHARD []       [] Dr. MAYTE Dominguez [x] Lotus Márquez, RICHARD []       [] Dr. Ravi [] Olivia Sam RN []       [] Dr. Sharma [] Jose Gurrola, RICHARD []         Verbal Plan Read Back:   neph referral for increased creatinine  Push fluids    neph referral entered  Routed to RN Coordinator   Lotus Hughes RN

## 2022-01-03 ENCOUNTER — ANCILLARY PROCEDURE (OUTPATIENT)
Dept: GENERAL RADIOLOGY | Facility: CLINIC | Age: 52
End: 2022-01-03
Attending: INTERNAL MEDICINE
Payer: COMMERCIAL

## 2022-01-03 ENCOUNTER — TELEPHONE (OUTPATIENT)
Dept: TRANSPLANT | Facility: CLINIC | Age: 52
End: 2022-01-03

## 2022-01-03 ENCOUNTER — LAB (OUTPATIENT)
Dept: LAB | Facility: CLINIC | Age: 52
End: 2022-01-03
Attending: TRANSPLANT SURGERY
Payer: COMMERCIAL

## 2022-01-03 ENCOUNTER — OFFICE VISIT (OUTPATIENT)
Dept: TRANSPLANT | Facility: CLINIC | Age: 52
End: 2022-01-03
Attending: TRANSPLANT SURGERY
Payer: COMMERCIAL

## 2022-01-03 VITALS
HEART RATE: 88 BPM | OXYGEN SATURATION: 98 % | TEMPERATURE: 97.9 F | SYSTOLIC BLOOD PRESSURE: 159 MMHG | DIASTOLIC BLOOD PRESSURE: 82 MMHG | BODY MASS INDEX: 23.97 KG/M2 | WEIGHT: 151.2 LBS

## 2022-01-03 DIAGNOSIS — Z94.4 LIVER TRANSPLANT RECIPIENT (H): ICD-10-CM

## 2022-01-03 DIAGNOSIS — Z94.4 LIVER REPLACED BY TRANSPLANT (H): ICD-10-CM

## 2022-01-03 DIAGNOSIS — Z46.89 ENCOUNTER FOR REMOVAL OF BILIARY STENT: ICD-10-CM

## 2022-01-03 DIAGNOSIS — D84.9 IMMUNOSUPPRESSION (H): Primary | ICD-10-CM

## 2022-01-03 LAB
ALBUMIN SERPL-MCNC: 3.1 G/DL (ref 3.4–5)
ALP SERPL-CCNC: 127 U/L (ref 40–150)
ALT SERPL W P-5'-P-CCNC: 21 U/L (ref 0–50)
ANION GAP SERPL CALCULATED.3IONS-SCNC: 9 MMOL/L (ref 3–14)
AST SERPL W P-5'-P-CCNC: 15 U/L (ref 0–45)
BILIRUB DIRECT SERPL-MCNC: 0.2 MG/DL (ref 0–0.2)
BILIRUB SERPL-MCNC: 0.4 MG/DL (ref 0.2–1.3)
BUN SERPL-MCNC: 36 MG/DL (ref 7–30)
CALCIUM SERPL-MCNC: 9.2 MG/DL (ref 8.5–10.1)
CHLORIDE BLD-SCNC: 110 MMOL/L (ref 94–109)
CO2 SERPL-SCNC: 25 MMOL/L (ref 20–32)
CREAT SERPL-MCNC: 1.84 MG/DL (ref 0.52–1.04)
ERYTHROCYTE [DISTWIDTH] IN BLOOD BY AUTOMATED COUNT: 15.2 % (ref 10–15)
GFR SERPL CREATININE-BSD FRML MDRD: 33 ML/MIN/1.73M2
GLUCOSE BLD-MCNC: 110 MG/DL (ref 70–99)
HCT VFR BLD AUTO: 27.4 % (ref 35–47)
HGB BLD-MCNC: 8.5 G/DL (ref 11.7–15.7)
MAGNESIUM SERPL-MCNC: 1.7 MG/DL (ref 1.6–2.3)
MCH RBC QN AUTO: 30.8 PG (ref 26.5–33)
MCHC RBC AUTO-ENTMCNC: 31 G/DL (ref 31.5–36.5)
MCV RBC AUTO: 99 FL (ref 78–100)
PHOSPHATE SERPL-MCNC: 4.1 MG/DL (ref 2.5–4.5)
PLATELET # BLD AUTO: 217 10E3/UL (ref 150–450)
POTASSIUM BLD-SCNC: 4.2 MMOL/L (ref 3.4–5.3)
PROT SERPL-MCNC: 6.3 G/DL (ref 6.8–8.8)
RBC # BLD AUTO: 2.76 10E6/UL (ref 3.8–5.2)
SODIUM SERPL-SCNC: 144 MMOL/L (ref 133–144)
TACROLIMUS BLD-MCNC: 7.5 UG/L (ref 5–15)
TME LAST DOSE: NORMAL H
TME LAST DOSE: NORMAL H
WBC # BLD AUTO: 2.7 10E3/UL (ref 4–11)

## 2022-01-03 PROCEDURE — 99214 OFFICE O/P EST MOD 30 MIN: CPT | Mod: 24 | Performed by: TRANSPLANT SURGERY

## 2022-01-03 PROCEDURE — 80053 COMPREHEN METABOLIC PANEL: CPT | Performed by: PATHOLOGY

## 2022-01-03 PROCEDURE — 84100 ASSAY OF PHOSPHORUS: CPT | Performed by: PATHOLOGY

## 2022-01-03 PROCEDURE — 85027 COMPLETE CBC AUTOMATED: CPT | Performed by: PATHOLOGY

## 2022-01-03 PROCEDURE — 74019 RADEX ABDOMEN 2 VIEWS: CPT | Performed by: RADIOLOGY

## 2022-01-03 PROCEDURE — 83735 ASSAY OF MAGNESIUM: CPT | Performed by: PATHOLOGY

## 2022-01-03 PROCEDURE — 36415 COLL VENOUS BLD VENIPUNCTURE: CPT | Performed by: PATHOLOGY

## 2022-01-03 PROCEDURE — 82248 BILIRUBIN DIRECT: CPT | Performed by: PATHOLOGY

## 2022-01-03 PROCEDURE — 99000 SPECIMEN HANDLING OFFICE-LAB: CPT | Performed by: PATHOLOGY

## 2022-01-03 PROCEDURE — 80197 ASSAY OF TACROLIMUS: CPT | Mod: 90 | Performed by: PATHOLOGY

## 2022-01-03 RX ORDER — VALGANCICLOVIR 450 MG/1
450 TABLET, FILM COATED ORAL EVERY OTHER DAY
Qty: 14 TABLET | Refills: 0 | Status: SHIPPED | OUTPATIENT
Start: 2022-01-03 | End: 2022-01-14

## 2022-01-03 ASSESSMENT — PAIN SCALES - GENERAL: PAINLEVEL: NO PAIN (0)

## 2022-01-03 NOTE — LETTER
1/3/2022     RE: Sarah Fisher  [address] [facility] [address]    Dear Colleague,    Thank you for referring your patient, Sarah Fisher, to the [facility] TRANSPLANT [facility]. Please see a copy of my visit note below.    Transplant Surgery -OUTPATIENT IMMUNOSUPPRESSION PROGRESS NOTE    Date of Visit: 01/03/2022    Transplants:  10/17/2021 (Liver); Postoperative day:  78  ASSESMENT AND PLAN:  1.Graft Function:Liver allograft: no rejection or technical problems.    2.Immunosuppression Management: Keep tacrolimus levels around 6-8 ng/dl  3.Hypertension: ok  4.Renal Function: elevated creatinine: recommend to see nephrology  5.Lab frequency: weekly  6.Other:  Wound has healed well  Leucopenia: valcyte every other day  Decrease asprin to 81 mg po daily      Date: January 3, 2022    Transplant:  [x]                             Liver [x]                              Kidney []                             Pancreas []                              Other:             Chief Complaint:  Doing very well, no fever or abdominal pain    History of Present Illness:  Patient Active Problem List   Diagnosis     Acute deep vein thrombosis (DVT) of upper extremity (H)     Acute hepatic encephalopathy     Acute hypoxemic respiratory failure (H)     Acute kidney failure, unspecified (H)     Carrier of group B Streptococcus     Cirrhosis of liver with ascites (H)     CKD (chronic kidney disease)     Elevated blood pressure     Family history of colon cancer     Gastroesophageal reflux disease without esophagitis     GI (gastrointestinal bleed)     Hyponatremia     Hypokalemia     History of DVT (deep vein thrombosis)     Anemia associated with acute blood loss     Acute anemia     Need for vaccination for viral hepatitis     Macrocytosis without anemia     Knee pain     Pancytopenia (H)     Macrocytic anemia     Blood loss anemia     Hydrothorax     Portal hypertensive gastropathy (H)     Pleural effusion      Screening for malignant neoplasm of cervix     Venous incompetence     Varicose veins of leg with pain     Supervision of high-risk pregnancy of elderly primigravida     Subclinical hypothyroidism     Liver failure (H)     Hypotension     Pre-liver transplant, listed     Alcoholic cirrhosis (H)     Abnormal serum iron level     Iron deficiency anemia due to chronic blood loss     Encounter for immunization     Liver transplant candidate     Liver transplant recipient (H)     Immunosuppressed status (H)     Malnutrition (H)     Steroid-induced hyperglycemia     Epistaxis     Esophagitis     Duodenal erosion     Gastric antral vascular ectasia     Hyperphosphatemia     Leukocytosis     Chronic kidney disease, stage 3b (H)     Low magnesium level     SOCIAL /FAMILY HISTORY: [x]                  No recent change    Past Medical History:   Diagnosis Date     Ascites      History of blood transfusion      Liver cirrhosis (H)      Thyroid disease      Past Surgical History:   Procedure Laterality Date      SECTION       CV RIGHT HEART CATH MEASUREMENTS RECORDED N/A 2021    Procedure: CV RIGHT HEART CATH;  Surgeon: Joseph Guevara MD;  Location:  HEART CARDIAC CATH LAB     ENDOSCOPIC RETROGRADE CHOLANGIOPANCREATOGRAM N/A 10/25/2021    Procedure: ENDOSCOPIC RETROGRADE CHOLANGIOPANCREATOGRAPHY, WITH biliary sphincterotomy, biliary dilation, and biliary stent placement;  Surgeon: Guru Dominguez Fenton MD;  Location:  OR     ENDOSCOPIC RETROGRADE CHOLANGIOPANCREATOGRAPHY, EXCHANGE TUBE/STENT N/A 2021    Procedure: ENDOSCOPIC RETROGRADE CHOLANGIOPANCREATOGRAPHY, WITH biliary sphincterotomy, stricture dilation, stent exchange;  Surgeon: Guru Dominguez Fenton MD;  Location:  OR     ESOPHAGOSCOPY, GASTROSCOPY, DUODENOSCOPY (EGD), COMBINED N/A 10/7/2021    Procedure: ESOPHAGOGASTRODUODENOSCOPY (EGD) with hemostasis;  Surgeon: Fox Jerry MD;  Location:  GI      ESOPHAGOSCOPY, GASTROSCOPY, DUODENOSCOPY (EGD), COMBINED N/A 10/22/2021    Procedure: ESOPHAGOGASTRODUODENOSCOPY, WITH BIOPSY;  Surgeon: Fadia Avila MD;  Location: [facility] GI     IR LIVER BIOPSY PERCUTANEOUS  10/26/2021     IR THORACENTESIS  2021     IR THORACENTESIS  2021     THORACENTESIS Left 2021    Procedure: THORACENTESIS;  Surgeon: Almas Irby MD;  Location: [facility] GI     THORACENTESIS N/A 2021    Procedure: THORACENTESIS;  Surgeon: Almas Irby MD;  Location: [facility]     THORACENTESIS N/A 03/10/2021    Procedure: THORACENTESIS;  Surgeon: Almas Irby MD;  Location: [facility] GI     THORACENTESIS Left 2021    Procedure: THORACENTESIS;  Surgeon: Kennedi Chavez MD;  Location: [facility] OR     THORACENTESIS Left 2021    Procedure: THORACENTESIS;  Surgeon: Jess Ansari PA-C;  Location: [facility]     TRANSPLANT LIVER RECIPIENT  DONOR N/A 10/17/2021    Procedure: TRANSPLANT, LIVER, RECIPIENT,  DONOR;  Surgeon: Christian Morrison MD;  Location:  OR     Social History     Socioeconomic History     Marital status: Single     Spouse name: Not on file     Number of children: Not on file     Years of education: Not on file     Highest education level: Master's degree (e.g., MA, MS, Roberto, MEd, MSW, SASHA)   Occupational History     Not on file   Tobacco Use     Smoking status: Never Smoker     Smokeless tobacco: Never Used   Substance and Sexual Activity     Alcohol use: Not Currently     Comment: Quit on 2020     Drug use: Not Currently     Sexual activity: Not Currently     Partners: Male   Other Topics Concern     Parent/sibling w/ CABG, MI or angioplasty before 65F 55M? Not Asked   Social History Narrative    Lives [place]. 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 12 year sonFlakito.         Christine Harris, DNP, APRN, CNP    3/17/2021     Social Determinants of Health     Financial Resource Strain: Low Risk      Difficulty  of Paying Living Expenses: Not hard at all   Food Insecurity: No Food Insecurity     Worried About Running Out of Food in the Last Year: Never true     Ran Out of Food in the Last Year: Never true   Transportation Needs: No Transportation Needs     Lack of Transportation (Medical): No     Lack of Transportation (Non-Medical): No   Physical Activity: Sufficiently Active     Days of Exercise per Week: 5 days     Minutes of Exercise per Session: 30 min   Stress: No Stress Concern Present     Feeling of Stress : Only a little   Social Connections: Moderately Integrated     Frequency of Communication with Friends and Family: More than three times a week     Frequency of Social Gatherings with Friends and Family: More than three times a week     Attends Cheondoism Services: More than 4 times per year     Active Member of Clubs or Organizations: Yes     Attends Club or Organization Meetings: More than 4 times per year     Marital Status: Never    Intimate Partner Violence: Not on file   Housing Stability: Unknown     Unable to Pay for Housing in the Last Year: Not on file     Number of Places Lived in the Last Year: 1     Unstable Housing in the Last Year: No     Prescription Medications as of 1/3/2022       Rx Number Disp Refills Start End Last Dispensed Date Next Fill Date Owning Pharmacy    alcohol swab prep pads  100 each 3 10/29/2021    [place], [place] - [address] [place] [address]    Sig: Use to swab area of injection/fina as directed.    Class: E-Prescribe    aspirin (ASA) 325 MG tablet  90 tablet 3 11/2/2021    [id number] - [place], [place] - [address]    Sig: Take 1 tablet (325 mg) by mouth daily    Class: E-Prescribe    Route: Oral    blood glucose (CONTOUR NEXT TEST) test strip  100 strip 6 10/29/2021    [place], [place] - [address] [place] [address]    Sig: Use to test blood sugar 3 times daily.     Class: E-Prescribe    blood glucose (NO BRAND SPECIFIED) lancets standard  100 each 1 10/29/2021    [facility] - [place], [place] - [address]    Sig: To use to test glucose level in the blood Use to test blood sugar 3 times daily as directed. To accompany glucose monitor brands per insurance coverage.    Class: E-Prescribe    Notes to Pharmacy: Cecy microlet lancets    levothyroxine (SYNTHROID/LEVOTHROID) 50 MCG tablet  90 tablet 3 12/3/2021    [id number] [place], [place] - [email or web address]    Sig: Take 1 tablet (50 mcg) by mouth daily    Class: E-Prescribe    Route: Oral    magnesium oxide (MAG-OX) 400 (241.3 Mg) MG tablet  360 tablet 3 12/17/2021    [id number] [place], [place] - [address] AT [address]    Sig: Take 2 tablets (800 mg) by mouth every morning AND 2 tablets (800 mg) every evening.    Class: E-Prescribe    Route: Oral    mycophenolate (GENERIC EQUIVALENT) 250 MG capsule  540 capsule 3 11/2/2021    [facility] #[id number] [place], [place] - [email or web address]    Sig: Take 3 capsules (750 mg) by mouth 2 times daily    Class: E-Prescribe    Notes to Pharmacy: TXP DT 10/17/2021 (Liver) TXP Dischg DT 10/29/2021 DX Liver replaced by transplant Z94.4 TX [facility] [place] ([place], [place])    Route: Oral    ondansetron (ZOFRAN) 4 MG tablet  20 tablet 0 11/15/2021    [facility] #[id number] [place], [place] - [email or web address]    Sig: Take 1 tablet (4 mg) by mouth every 6 hours as needed for nausea    Class: E-Prescribe    Route: Oral    pantoprazole (PROTONIX) 40 MG EC tablet  180 tablet 3 11/2/2021    [facility] #[id number] [place], [place] - [address] AT [address]    Sig: Take 1 tablet (40 mg) by mouth 2 times daily    Class:  E-Prescribe    Route: Oral    predniSONE (DELTASONE) 5 MG tablet  60 tablet 3 2021    [facility][id number] - [place], [place] - [address] AT [facility] [address]    Sig: Take 1 tablet (5 mg) by mouth daily    Class: E-Prescribe    Notes to Pharmacy: TXP DT 10/17/2021 (Liver) TXP Dischg DT 10/29/2021 DX Liver replaced by transplant Z94.4 TX Center [facility] [place] ([place], [place])    Route: Oral    Sharps Container MISC  1 each 0 10/29/2021    [facility] - [place], [place] - [address] [facility]    Sig: Use as directed to dispose of needles, lancets and other sharps Per Insurance coverage    Class: E-Prescribe    sulfamethoxazole-trimethoprim (BACTRIM) 400-80 MG tablet  90 tablet 3 2021    [facility] #[id number] [place], [place] - [address] AT [facility] [address]    Si tablet by Oral or Feeding Tube route daily    Class: E-Prescribe    Route: Oral or Feeding Tube    tacrolimus (GENERIC EQUIVALENT) 1 MG capsule  360 capsule 3 12/10/2021    [facility] [address] [place] [address] - [address] [facility] [address]    Sig: Take 2 capsules (2 mg) by mouth every 12 hours Total 7mg BID    Class: E-Prescribe    Notes to Pharmacy: Dose change    Route: Oral    tacrolimus (GENERIC EQUIVALENT) 5 MG capsule  180 capsule 3 12/10/2021    [facility] #[id number] [place] [address] - [address] AT [facility] [address]    Sig: Take 1 capsule (5 mg) by mouth every 12 hours Total dose 7mg BID    Class: E-Prescribe    Notes to Pharmacy: Dose change    Route: Oral    ursodiol (ACTIGALL) 300 MG capsule  180 capsule 3 2021    [facility] #[id number] [place], [place] - [address]    Sig: Take 1 capsule (300 mg) by mouth 2 times daily    Class: E-Prescribe    Route: Oral    valGANciclovir (VALCYTE) 450 MG tablet  90 tablet 3 2021     [id number] [address]    Sig: Take 1 tablet (450 mg) by mouth daily    Class: E-Prescribe    Route: Oral    megestrol (MEGACE) 40 MG/ML suspension  100 mL 3 11/9/2021    [id number] [place], [place] - [address]    Sig: Take 10 mLs (400 mg) by mouth 2 times daily    Class: E-Prescribe    Route: Oral        Amoxicillin   REVIEW OF SYSTEMS (check box if normal)  [x]               GENERAL  [x]                 PULMONARY [x]                GENITOURINARY  [x]                CNS                 [x]                 CARDIAC  [x]                 ENDOCRINE  [x]                EARS,NOSE,THROAT [x]                 GASTROINTESTINAL [x]                 NEUROLOGIC    [x]                MUSCLOSKELTAL  [x]                  HEMATOLOGY      PHYSICAL EXAM (check box if normal)BP (!) 159/82   Pulse 88   Temp 97.9  F (36.6  C) (Oral)   Wt 68.6 kg (151 lb 3.2 oz)   SpO2 98%   BMI 23.97 kg/m      @txexam@    [x]            GENERAL:    [x]       EYES:  ICTERIC   []        YES  []                    NO  [x]           EXTREMITIES: Clubbing []       Y     [x]           N    [x]           EARS, NOSE, THROAT: Membranes Moist    YES   [x]                   NO []                  [x]           LUNGS:  CLEAR    YES       [x]                  NO    []                                [x]           SKIN: Jaundice           YES       []                  NO    [x]                   Rash: YES       []                  NO    [x]                                     [x]             HEART: Regular Rate          YES       [x]                  NO    []                   Incision Clean:  YES       [x]                  NO    []                                [x]                    ABDOMEN: Organomegaly YES       []                  NO    [x]                       [x]                    NEUROLOGICAL:  Nonfocal  YES        [x]                  NO    []                       [x]                    Hernia YES       []                  NO    [x]                   PSYCHIATRIC:  Appropriate  YES       [x]                  NO    []                       OTHER:                                                                                                   PAIN SCALE:: 3    Again, thank you for allowing me to participate in the care of your patient.      Sincerely,    Christian Morrison MD

## 2022-01-03 NOTE — NURSING NOTE
Chief Complaint   Patient presents with     RECHECK     Liver tx post op f/u     Blood pressure (!) 159/82, pulse 88, temperature 97.9  F (36.6  C), temperature source Oral, weight 68.6 kg (151 lb 3.2 oz), SpO2 98 %, not currently breastfeeding.    Yessenia Jasso, CMA

## 2022-01-03 NOTE — PROGRESS NOTES
Transplant Surgery -OUTPATIENT IMMUNOSUPPRESSION PROGRESS NOTE    Date of Visit: 01/03/2022    Transplants:  10/17/2021 (Liver); Postoperative day:  78  ASSESMENT AND PLAN:  1.Graft Function:Liver allograft: no rejection or technical problems.    2.Immunosuppression Management: Keep tacrolimus levels around 6-8 ng/dl  3.Hypertension: ok  4.Renal Function: elevated creatinine: recommend to see nephrology  5.Lab frequency: weekly  6.Other:  Wound has healed well  Leucopenia: valcyte every other day  Decrease asprin to 81 mg po daily      Date: January 3, 2022    Transplant:  [x]                             Liver [x]                              Kidney []                             Pancreas []                              Other:             Chief Complaint:  Doing very well, no fever or abdominal pain    History of Present Illness:  Patient Active Problem List   Diagnosis     Acute deep vein thrombosis (DVT) of upper extremity (H)     Acute hepatic encephalopathy     Acute hypoxemic respiratory failure (H)     Acute kidney failure, unspecified (H)     Carrier of group B Streptococcus     Cirrhosis of liver with ascites (H)     CKD (chronic kidney disease)     Elevated blood pressure     Family history of colon cancer     Gastroesophageal reflux disease without esophagitis     GI (gastrointestinal bleed)     Hyponatremia     Hypokalemia     History of DVT (deep vein thrombosis)     Anemia associated with acute blood loss     Acute anemia     Need for vaccination for viral hepatitis     Macrocytosis without anemia     Knee pain     Pancytopenia (H)     Macrocytic anemia     Blood loss anemia     Hydrothorax     Portal hypertensive gastropathy (H)     Pleural effusion     Screening for malignant neoplasm of cervix     Venous incompetence     Varicose veins of leg with pain     Supervision of high-risk pregnancy of elderly primigravida     Subclinical hypothyroidism     Liver failure (H)     Hypotension     Pre-liver  transplant, listed     Alcoholic cirrhosis (H)     Abnormal serum iron level     Iron deficiency anemia due to chronic blood loss     Encounter for immunization     Liver transplant candidate     Liver transplant recipient (H)     Immunosuppressed status (H)     Malnutrition (H)     Steroid-induced hyperglycemia     Epistaxis     Esophagitis     Duodenal erosion     Gastric antral vascular ectasia     Hyperphosphatemia     Leukocytosis     Chronic kidney disease, stage 3b (H)     Low magnesium level     SOCIAL /FAMILY HISTORY: [x]                  No recent change    Past Medical History:   Diagnosis Date     Ascites      History of blood transfusion      Liver cirrhosis (H)      Thyroid disease      Past Surgical History:   Procedure Laterality Date      SECTION       CV RIGHT HEART CATH MEASUREMENTS RECORDED N/A 2021    Procedure: CV RIGHT HEART CATH;  Surgeon: Joseph Guevara MD;  Location:  HEART CARDIAC CATH LAB     ENDOSCOPIC RETROGRADE CHOLANGIOPANCREATOGRAM N/A 10/25/2021    Procedure: ENDOSCOPIC RETROGRADE CHOLANGIOPANCREATOGRAPHY, WITH biliary sphincterotomy, biliary dilation, and biliary stent placement;  Surgeon: Guru Dominguez Fenton MD;  [facility]     ENDOSCOPIC RETROGRADE CHOLANGIOPANCREATOGRAPHY, EXCHANGE TUBE/STENT N/A 2021    Procedure: ENDOSCOPIC RETROGRADE CHOLANGIOPANCREATOGRAPHY, WITH biliary sphincterotomy, stricture dilation, stent exchange;  Surgeon: Guru Dominguez Fenton MD;  [facility]     ESOPHAGOSCOPY, GASTROSCOPY, DUODENOSCOPY (EGD), COMBINED N/A 10/7/2021    Procedure: ESOPHAGOGASTRODUODENOSCOPY (EGD) with hemostasis;  Surgeon: Fox Jerry MD;  Location:  GI     ESOPHAGOSCOPY, GASTROSCOPY, DUODENOSCOPY (EGD), COMBINED N/A 10/22/2021    Procedure: ESOPHAGOGASTRODUODENOSCOPY, WITH BIOPSY;  Surgeon: Fadia Avila MD;  Location:  GI     IR LIVER BIOPSY PERCUTANEOUS  10/26/2021     IR THORACENTESIS   2021     IR THORACENTESIS  2021     THORACENTESIS Left 2021    Procedure: THORACENTESIS;  Surgeon: Almas Irby MD;  [facility]     THORACENTESIS N/A 2021    Procedure: THORACENTESIS;  Surgeon: Almas Irby MD;  [facility]     THORACENTESIS N/A 03/10/2021    Procedure: THORACENTESIS;  Surgeon: Almas Irby MD;  [facility]     THORACENTESIS Left 2021    Procedure: THORACENTESIS;  Surgeon: Kennedi Chavez MD;  Location: [facility] OR     THORACENTESIS Left 2021    Procedure: THORACENTESIS;  Surgeon: Jess Ansari PA-C;  Location: [facility] OR     TRANSPLANT LIVER RECIPIENT  DONOR N/A 10/17/2021    Procedure: TRANSPLANT, LIVER, RECIPIENT,  DONOR;  Surgeon: Christian Morrison MD;  Location[facility]     Social History     Socioeconomic History     Marital status: Single     Spouse name: Not on file     Number of children: Not on file     Years of education: Not on file     Highest education level: Master's degree (e.g., MA, MS, Roberto, MEd, MSW, SASHA)   Occupational History     Not on file   Tobacco Use     Smoking status: Never Smoker     Smokeless tobacco: Never Used   Substance and Sexual Activity     Alcohol use: Not Currently     Comment: Quit on 2020     Drug use: Not Currently     Sexual activity: Not Currently     Partners: Male   Other Topics Concern     Parent/sibling w/ CABG, MI or angioplasty before 65F 55M? Not Asked   Social History Narrative    Lives [place]. 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 12 year sonFlakito.         Christine Harris, DNP, APRN, CNP    3/17/2021     Social Determinants of Health     Financial Resource Strain: Low Risk      Difficulty of Paying Living Expenses: Not hard at all   Food Insecurity: No Food Insecurity     Worried About Running Out of Food in the Last Year: Never true     Ran Out of Food in the Last Year: Never true   Transportation Needs: No Transportation Needs      Lack of Transportation (Medical): No     Lack of Transportation (Non-Medical): No   Physical Activity: Sufficiently Active     Days of Exercise per Week: 5 days     Minutes of Exercise per Session: 30 min   Stress: No Stress Concern Present     Feeling of Stress : Only a little   Social Connections: Moderately Integrated     Frequency of Communication with Friends and Family: More than three times a week     Frequency of Social Gatherings with Friends and Family: More than three times a week     Attends Buddhist Services: More than 4 times per year     Active Member of Clubs or Organizations: Yes     Attends Club or Organization Meetings: More than 4 times per year     Marital Status: Never    Intimate Partner Violence: Not on file   Housing Stability: Unknown     Unable to Pay for Housing in the Last Year: Not on file     Number of Places Lived in the Last Year: 1     Unstable Housing in the Last Year: No     Prescription Medications as of 1/3/2022       Rx Number Disp Refills Start End Last Dispensed Date Next Fill Date Owning Pharmacy    alcohol swab prep pads  100 each 3 10/29/2021    [address]    Sig: Use to swab area of injection/fina as directed.    Class: E-Prescribe    aspirin (ASA) 325 MG tablet  90 tablet 3 11/2/2021    [id number] [place] [address] [address]    Sig: Take 1 tablet (325 mg) by mouth daily    Class: E-Prescribe    Route: Oral    blood glucose (CONTOUR NEXT TEST) test strip  100 strip 6 10/29/2021    [address]    Sig: Use to test blood sugar 3 times daily.    Class: E-Prescribe    blood glucose (NO BRAND SPECIFIED) lancets standard  100 each 1 10/29/2021    [address]    Sig: To use to test glucose level in the blood Use to test blood sugar  3 times daily as directed. To accompany glucose monitor brands per insurance coverage.    Class: E-Prescribe    Notes to Pharmacy: Cecy microlet lancets    levothyroxine (SYNTHROID/LEVOTHROID) 50 MCG tablet  90 tablet 3 12/3/2021    [id number] [place], [place] - [email or web address] [facility]    Sig: Take 1 tablet (50 mcg) by mouth daily    Class: E-Prescribe    Route: Oral    magnesium oxide (MAG-OX) 400 (241.3 Mg) MG tablet  360 tablet 3 12/17/2021    [id number] [place] [address] [email or web address] [facility]    Sig: Take 2 tablets (800 mg) by mouth every morning AND 2 tablets (800 mg) every evening.    Class: E-Prescribe    Route: Oral    mycophenolate (GENERIC EQUIVALENT) 250 MG capsule  540 capsule 3 11/2/2021    [id number] [email or web address] [facility]    Sig: Take 3 capsules (750 mg) by mouth 2 times daily    Class: E-Prescribe    Notes to Pharmacy: TXP DT 10/17/2021 (Liver) TXP Dischg DT 10/29/2021 DX Liver replaced by transplant Z94.4 TX Center [facility] ([place], [place])    Route: Oral    ondansetron (ZOFRAN) 4 MG tablet  20 tablet 0 11/15/2021    [id number] [place], [place] - [email or web address] [facility]    Sig: Take 1 tablet (4 mg) by mouth every 6 hours as needed for nausea    Class: E-Prescribe    Route: Oral    pantoprazole (PROTONIX) 40 MG EC tablet  180 tablet 3 11/2/2021    [facility] [place] [address] [email or web address] [facility]    Sig: Take 1 tablet (40 mg) by mouth 2 times daily    Class: E-Prescribe    Route: Oral    predniSONE (DELTASONE) 5 MG tablet  60 tablet 3 11/9/2021    [id number] [address] [facility] AT [address] OF [address]    Sig: Take 1 tablet (5 mg) by mouth daily    Class:  E-Prescribe    Notes to Pharmacy: TXP DT 10/17/2021 (Liver) TXP Dischg DT 10/29/2021 DX Liver replaced by transplant Z94.4 TX Center [facility] ([place], [place])    Route: Oral    Sharps Container MISC  1 each 0 10/29/2021    [facility] - [place], [place] - [address]    Sig: Use as directed to dispose of needles, lancets and other sharps Per Insurance coverage    Class: E-Prescribe    sulfamethoxazole-trimethoprim (BACTRIM) 400-80 MG tablet  90 tablet 3 2021    [facility] STORE #[id number] [place], [place] - [address] AT [address]    Si tablet by Oral or Feeding Tube route daily    Class: E-Prescribe    Route: Oral or Feeding Tube    tacrolimus (GENERIC EQUIVALENT) 1 MG capsule  360 capsule 3 12/10/2021    [facility] STORE [address] [place] [address] - [address] AT [address]    Sig: Take 2 capsules (2 mg) by mouth every 12 hours Total 7mg BID    Class: E-Prescribe    Notes to Pharmacy: Dose change    Route: Oral    tacrolimus (GENERIC EQUIVALENT) 5 MG capsule  180 capsule 3 12/10/2021    [facility] STORE [address] [place] [address] - [address] AT [place]    Sig: Take 1 capsule (5 mg) by mouth every 12 hours Total dose 7mg BID    Class: E-Prescribe    Notes to Pharmacy: Dose change    Route: Oral    ursodiol (ACTIGALL) 300 MG capsule  180 capsule 3 2021    [facility] #[id number] [place] [address] - [address] AT [address]    Sig: Take 1 capsule (300 mg) by mouth 2 times daily    Class: E-Prescribe    Route: Oral    valGANciclovir (VALCYTE) 450 MG tablet  90 tablet 3 2021    [facility] STORE #[id number] [place], [place] - [address] AT [address]    Sig: Take 1 tablet (450 mg) by mouth daily    Class: E-Prescribe    Route: Oral    megestrol (MEGACE) 40 MG/ML suspension  100 mL 3  11/9/2021    [facility] #[id number] - [place], [place] - [email or web address]    Sig: Take 10 mLs (400 mg) by mouth 2 times daily    Class: E-Prescribe    Route: Oral        Amoxicillin   REVIEW OF SYSTEMS (check box if normal)  [x]               GENERAL  [x]                 PULMONARY [x]                GENITOURINARY  [x]                CNS                 [x]                 CARDIAC  [x]                 ENDOCRINE  [x]                EARS,NOSE,THROAT [x]                 GASTROINTESTINAL [x]                 NEUROLOGIC    [x]                MUSCLOSKELTAL  [x]                  HEMATOLOGY      PHYSICAL EXAM (check box if normal)BP (!) 159/82   Pulse 88   Temp 97.9  F (36.6  C) (Oral)   Wt 68.6 kg (151 lb 3.2 oz)   SpO2 98%   BMI 23.97 kg/m      @txexam@    [x]            GENERAL:    [x]       EYES:  ICTERIC   []        YES  []                    NO  [x]           EXTREMITIES: Clubbing []       Y     [x]           N    [x]           EARS, NOSE, THROAT: Membranes Moist    YES   [x]                   NO []                  [x]           LUNGS:  CLEAR    YES       [x]                  NO    []                                [x]           SKIN: Jaundice           YES       []                  NO    [x]                   Rash: YES       []                  NO    [x]                                     [x]             HEART: Regular Rate          YES       [x]                  NO    []                   Incision Clean:  YES       [x]                  NO    []                                [x]                    ABDOMEN: Organomegaly YES       []                  NO    [x]                       [x]                    NEUROLOGICAL:  Nonfocal  YES       [x]                  NO    []                       [x]                    Hernia YES       []                  NO    [x]                   PSYCHIATRIC:  Appropriate  YES       [x]                  NO    []                       OTHER:                                                                                                    PAIN SCALE:: 3

## 2022-01-10 ENCOUNTER — OFFICE VISIT (OUTPATIENT)
Dept: TRANSPLANT | Facility: CLINIC | Age: 52
End: 2022-01-10
Attending: TRANSPLANT SURGERY
Payer: COMMERCIAL

## 2022-01-10 ENCOUNTER — LAB (OUTPATIENT)
Dept: LAB | Facility: CLINIC | Age: 52
End: 2022-01-10
Attending: TRANSPLANT SURGERY
Payer: COMMERCIAL

## 2022-01-10 VITALS
WEIGHT: 146 LBS | SYSTOLIC BLOOD PRESSURE: 181 MMHG | HEART RATE: 91 BPM | OXYGEN SATURATION: 98 % | DIASTOLIC BLOOD PRESSURE: 93 MMHG | BODY MASS INDEX: 23.14 KG/M2

## 2022-01-10 DIAGNOSIS — Z94.4 LIVER REPLACED BY TRANSPLANT (H): ICD-10-CM

## 2022-01-10 DIAGNOSIS — D84.9 IMMUNOSUPPRESSION (H): Primary | ICD-10-CM

## 2022-01-10 LAB
ALBUMIN SERPL-MCNC: 3.1 G/DL (ref 3.4–5)
ALP SERPL-CCNC: 154 U/L (ref 40–150)
ALT SERPL W P-5'-P-CCNC: 22 U/L (ref 0–50)
ANION GAP SERPL CALCULATED.3IONS-SCNC: 10 MMOL/L (ref 3–14)
AST SERPL W P-5'-P-CCNC: 12 U/L (ref 0–45)
BILIRUB DIRECT SERPL-MCNC: 0.1 MG/DL (ref 0–0.2)
BILIRUB SERPL-MCNC: 0.2 MG/DL (ref 0.2–1.3)
BUN SERPL-MCNC: 31 MG/DL (ref 7–30)
CALCIUM SERPL-MCNC: 8.6 MG/DL (ref 8.5–10.1)
CHLORIDE BLD-SCNC: 108 MMOL/L (ref 94–109)
CO2 SERPL-SCNC: 26 MMOL/L (ref 20–32)
CREAT SERPL-MCNC: 1.75 MG/DL (ref 0.52–1.04)
ERYTHROCYTE [DISTWIDTH] IN BLOOD BY AUTOMATED COUNT: 14.4 % (ref 10–15)
GFR SERPL CREATININE-BSD FRML MDRD: 35 ML/MIN/1.73M2
GLUCOSE BLD-MCNC: 96 MG/DL (ref 70–99)
HCT VFR BLD AUTO: 30.1 % (ref 35–47)
HGB BLD-MCNC: 9.2 G/DL (ref 11.7–15.7)
MAGNESIUM SERPL-MCNC: 1.6 MG/DL (ref 1.6–2.3)
MCH RBC QN AUTO: 30.2 PG (ref 26.5–33)
MCHC RBC AUTO-ENTMCNC: 30.6 G/DL (ref 31.5–36.5)
MCV RBC AUTO: 99 FL (ref 78–100)
PHOSPHATE SERPL-MCNC: 4.2 MG/DL (ref 2.5–4.5)
PLATELET # BLD AUTO: 194 10E3/UL (ref 150–450)
POTASSIUM BLD-SCNC: 4.5 MMOL/L (ref 3.4–5.3)
PROT SERPL-MCNC: 6.3 G/DL (ref 6.8–8.8)
RBC # BLD AUTO: 3.05 10E6/UL (ref 3.8–5.2)
SODIUM SERPL-SCNC: 144 MMOL/L (ref 133–144)
TACROLIMUS BLD-MCNC: 8.8 UG/L (ref 5–15)
TME LAST DOSE: NORMAL H
TME LAST DOSE: NORMAL H
WBC # BLD AUTO: 1.6 10E3/UL (ref 4–11)

## 2022-01-10 PROCEDURE — 80197 ASSAY OF TACROLIMUS: CPT | Mod: 90 | Performed by: PATHOLOGY

## 2022-01-10 PROCEDURE — 80053 COMPREHEN METABOLIC PANEL: CPT | Performed by: PATHOLOGY

## 2022-01-10 PROCEDURE — 85027 COMPLETE CBC AUTOMATED: CPT | Performed by: PATHOLOGY

## 2022-01-10 PROCEDURE — 84100 ASSAY OF PHOSPHORUS: CPT | Performed by: PATHOLOGY

## 2022-01-10 PROCEDURE — 83735 ASSAY OF MAGNESIUM: CPT | Performed by: PATHOLOGY

## 2022-01-10 PROCEDURE — 99000 SPECIMEN HANDLING OFFICE-LAB: CPT | Performed by: PATHOLOGY

## 2022-01-10 PROCEDURE — 99214 OFFICE O/P EST MOD 30 MIN: CPT | Mod: 24 | Performed by: TRANSPLANT SURGERY

## 2022-01-10 PROCEDURE — 82248 BILIRUBIN DIRECT: CPT | Performed by: PATHOLOGY

## 2022-01-10 PROCEDURE — 36415 COLL VENOUS BLD VENIPUNCTURE: CPT | Performed by: PATHOLOGY

## 2022-01-10 NOTE — LETTER
1/10/2022     RE: Sarah Fisher  [address] [facility] [address]    Dear Colleague,    Thank you for referring your patient, Sarah Fisher, to the [facility]PL[facility]. Please see a copy of my visit note below.    Transplant Surgery -OUTPATIENT IMMUNOSUPPRESSION PROGRESS NOTE    Date of Visit: 01/10/2022    Transplants:  10/17/2021 (Liver); Postoperative day:  85  ASSESMENT AND PLAN:  1.Graft Function:Liver allograft: no rejection or technical problems.    2.Immunosuppression Management: decrease cellcept to 500 mg po bid  3.Hypertension: needs to see PCP  4.Renal Function: elevated and needs to see nephrology  5.Lab frequency: weekly  6.Other:  Leucopenia: hold bactrim and valcyte and repeat counts     Date: January 10, 2022    Transplant:  [x]                             Liver [x]                              Kidney []                             Pancreas []                              Other:             Chief Complaint:  Doing well    History of Present Illness:  Patient Active Problem List   Diagnosis     Acute deep vein thrombosis (DVT) of upper extremity (H)     Acute hepatic encephalopathy     Acute hypoxemic respiratory failure (H)     Acute kidney failure, unspecified (H)     Carrier of group B Streptococcus     Cirrhosis of liver with ascites (H)     CKD (chronic kidney disease)     Elevated blood pressure     Family history of colon cancer     Gastroesophageal reflux disease without esophagitis     GI (gastrointestinal bleed)     Hyponatremia     Hypokalemia     History of DVT (deep vein thrombosis)     Anemia associated with acute blood loss     Acute anemia     Need for vaccination for viral hepatitis     Macrocytosis without anemia     Knee pain     Pancytopenia (H)     Macrocytic anemia     Blood loss anemia     Hydrothorax     Portal hypertensive gastropathy (H)     Pleural effusion     Screening for malignant neoplasm of cervix     Venous incompetence      Varicose veins of leg with pain     Supervision of high-risk pregnancy of elderly primigravida     Subclinical hypothyroidism     Liver failure (H)     Hypotension     Pre-liver transplant, listed     Alcoholic cirrhosis (H)     Abnormal serum iron level     Iron deficiency anemia due to chronic blood loss     Encounter for immunization     Liver transplant candidate     Liver transplant recipient (H)     Immunosuppressed status (H)     Malnutrition (H)     Steroid-induced hyperglycemia     Epistaxis     Esophagitis     Duodenal erosion     Gastric antral vascular ectasia     Hyperphosphatemia     Leukocytosis     Chronic kidney disease, stage 3b (H)     Low magnesium level     SOCIAL /FAMILY HISTORY: [x]                  No recent change    Past Medical History:   Diagnosis Date     Ascites      History of blood transfusion      Liver cirrhosis (H)      Thyroid disease      Past Surgical History:   Procedure Laterality Date      SECTION       CV RIGHT HEART CATH MEASUREMENTS RECORDED N/A 2021    Procedure: CV RIGHT HEART CATH;  Surgeon: Joseph Guevara MD;  Location:  HEART CARDIAC CATH LAB     ENDOSCOPIC RETROGRADE CHOLANGIOPANCREATOGRAM N/A 10/25/2021    Procedure: ENDOSCOPIC RETROGRADE CHOLANGIOPANCREATOGRAPHY, WITH biliary sphincterotomy, biliary dilation, and biliary stent placement;  Surgeon: Guru Dominguez Fenton MD;  Location:  OR     ENDOSCOPIC RETROGRADE CHOLANGIOPANCREATOGRAPHY, EXCHANGE TUBE/STENT N/A 2021    Procedure: ENDOSCOPIC RETROGRADE CHOLANGIOPANCREATOGRAPHY, WITH biliary sphincterotomy, stricture dilation, stent exchange;  Surgeon: Guru Dominguez Fenton MD;  Location:  OR     ESOPHAGOSCOPY, GASTROSCOPY, DUODENOSCOPY (EGD), COMBINED N/A 10/7/2021    Procedure: ESOPHAGOGASTRODUODENOSCOPY (EGD) with hemostasis;  Surgeon: Fox Jerry MD;  Location: [facility]     ESOPHAGOSCOPY, GASTROSCOPY, DUODENOSCOPY (EGD), COMBINED N/A  10/22/2021    Procedure: ESOPHAGOGASTRODUODENOSCOPY, WITH BIOPSY;  Surgeon: Fadia Avila MD;  Location: [facility] GI     IR LIVER BIOPSY PERCUTANEOUS  10/26/2021     IR THORACENTESIS  2021     IR THORACENTESIS  2021     THORACENTESIS Left 2021    Procedure: THORACENTESIS;  Surgeon: Almas Irby MD;  Location: [facility]     THORACENTESIS N/A 2021    Procedure: THORACENTESIS;  Surgeon: Almas Irby MD;  [facility]: [facility]     THORACENTESIS N/A 03/10/2021    Procedure: THORACENTESIS;  Surgeon: Almas Irby MD;  Location: [facility]     THORACENTESIS Left 2021    Procedure: THORACENTESIS;  Surgeon: Kennedi Chavez MD;  Location: [facility] OR     THORACENTESIS Left 2021    Procedure: THORACENTESIS;  Surgeon: Jess Ansari PA-C;  Location: [facility] OR     TRANSPLANT LIVER RECIPIENT  DONOR N/A 10/17/2021    Procedure: TRANSPLANT, LIVER, RECIPIENT,  DONOR;  Surgeon: Christian Morrison MD;  [facility]     Social History     Socioeconomic History     Marital status: Single     Spouse name: Not on file     Number of children: Not on file     Years of education: Not on file     Highest education level: Master's degree (e.g., MA, MS, Roberto, MEd, MSW, SASHA)   Occupational History     Not on file   Tobacco Use     Smoking status: Never Smoker     Smokeless tobacco: Never Used   Substance and Sexual Activity     Alcohol use: Not Currently     Comment: Quit on 2020     Drug use: Not Currently     Sexual activity: Not Currently     Partners: Male   Other Topics Concern     Parent/sibling w/ CABG, MI or angioplasty before 65F 55M? Not Asked   Social History Narrative    Lives [place]. 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 12 year son, Flakito.         Christine Harris, DNP, APRN, CNP    3/17/2021     Social Determinants of Health     Financial Resource Strain: Low Risk      Difficulty of Paying Living Expenses: Not hard at all   Food Insecurity: No  Food Insecurity     Worried About Running Out of Food in the Last Year: Never true     Ran Out of Food in the Last Year: Never true   Transportation Needs: No Transportation Needs     Lack of Transportation (Medical): No     Lack of Transportation (Non-Medical): No   Physical Activity: Sufficiently Active     Days of Exercise per Week: 5 days     Minutes of Exercise per Session: 30 min   Stress: No Stress Concern Present     Feeling of Stress : Only a little   Social Connections: Moderately Integrated     Frequency of Communication with Friends and Family: More than three times a week     Frequency of Social Gatherings with Friends and Family: More than three times a week     Attends [employer] Services: More than 4 times per year     Active Member of Clubs or Organizations: Yes     Attends Club or Organization Meetings: More than 4 times per year     Marital Status: Never    Intimate Partner Violence: Not on file   Housing Stability: Unknown     Unable to Pay for Housing in the Last Year: Not on file     Number of Places Lived in the Last Year: 1     Unstable Housing in the Last Year: No     Prescription Medications as of 1/10/2022       Rx Number Disp Refills Start End Last Dispensed Date Next Fill Date Owning Pharmacy    alcohol swab prep pads  100 each 3 10/29/2021    [place], [place] - [address] [place] [address]    Sig: Use to swab area of injection/fina as directed.    Class: E-Prescribe    aspirin (ASA) 325 MG tablet  90 tablet 3 11/2/2021    [id number] - [place], [place] - [address]    Sig: Take 1 tablet (325 mg) by mouth daily    Class: E-Prescribe    Route: Oral    blood glucose (CONTOUR NEXT TEST) test strip  100 strip 6 10/29/2021    [address]    Sig: Use to test blood sugar 3 times daily.    Class: E-Prescribe    blood glucose (NO BRAND SPECIFIED) lancets  standard  100 each 1 10/29/2021    [facility] - [place], [place] - [address] [facility]    Sig: To use to test glucose level in the blood Use to test blood sugar 3 times daily as directed. To accompany glucose monitor brands per insurance coverage.    Class: E-Prescribe    Notes to Pharmacy: Cecy microlet lancets    levothyroxine (SYNTHROID/LEVOTHROID) 50 MCG tablet  90 tablet 3 12/3/2021    [id number] [place], [place] - [address] [employer]    Sig: Take 1 tablet (50 mcg) by mouth daily    Class: E-Prescribe    Route: Oral    magnesium oxide (MAG-OX) 400 (241.3 Mg) MG tablet  360 tablet 3 12/17/2021    [id number] [place], [place] - [address] [employer]    Sig: Take 2 tablets (800 mg) by mouth every morning AND 2 tablets (800 mg) every evening.    Class: E-Prescribe    Route: Oral    mycophenolate (GENERIC EQUIVALENT) 250 MG capsule  540 capsule 3 11/2/2021    [facility] #[id number] [place], [place] - [address] [employer]    Sig: Take 3 capsules (750 mg) by mouth 2 times daily    Class: E-Prescribe    Notes to Pharmacy: TXP DT 10/17/2021 (Liver) TXP Dischg DT 10/29/2021 DX Liver replaced by transplant Z94.4 [facility] ([place], [place])    Route: Oral    ondansetron (ZOFRAN) 4 MG tablet  20 tablet 0 11/15/2021    [facility] #[id number] [place], [place] - [address] AT [address] [employer]    Sig: Take 1 tablet (4 mg) by mouth every 6 hours as needed for nausea    Class: E-Prescribe    Route: Oral    pantoprazole (PROTONIX) 40 MG EC tablet  180 tablet 3 11/2/2021    [facility] #[id number] [place], [place] - [address] AT [address] [employer]    Sig: Take 1 tablet (40 mg) by mouth 2 times daily    Class: E-Prescribe    Route: Oral    predniSONE (DELTASONE) 5 MG tablet  60  tablet 3 2021    [facility] #[id number] - [place], [place] - [address] [employer]    Sig: Take 1 tablet (5 mg) by mouth daily    Class: E-Prescribe    Notes to Pharmacy: TXP DT 10/17/2021 (Liver) TXP Dischg DT 10/29/2021 DX Liver replaced by transplant Z94.4 TX Center [facility] ([place], [place])    Route: Oral    Sharps Container MISC  1 each 0 10/29/2021    [facility] - [place], [place] - [address] [facility]    Sig: Use as directed to dispose of needles, lancets and other sharps Per Insurance coverage    Class: E-Prescribe    sulfamethoxazole-trimethoprim (BACTRIM) 400-80 MG tablet  90 tablet 3 2021    [facility] #[id number] - [place], [place] - [address] AT [address]    Si tablet by Oral or Feeding Tube route daily    Class: E-Prescribe    Route: Oral or Feeding Tube    tacrolimus (GENERIC EQUIVALENT) 1 MG capsule  360 capsule 3 12/10/2021    [facility] #[id number] [place], [place] - [address]    Sig: Take 2 capsules (2 mg) by mouth every 12 hours Total 7mg BID    Class: E-Prescribe    Notes to Pharmacy: Dose change    Route: Oral    tacrolimus (GENERIC EQUIVALENT) 5 MG capsule  180 capsule 3 12/10/2021    [facility] #[id number] [place], [place] - [address]    Sig: Take 1 capsule (5 mg) by mouth every 12 hours Total dose 7mg BID    Class: E-Prescribe    Notes to Pharmacy: Dose change    Route: Oral    ursodiol (ACTIGALL) 300 MG capsule  180 capsule 3 2021    [facility] #[id number] [place], [place] - [address]    Sig: Take 1 capsule (300 mg) by mouth 2 times daily    Class: E-Prescribe    Route: Oral    valGANciclovir (VALCYTE) 450 MG tablet  14 tablet 0 1/3/2022    [facility] #[id number] - [place], [place] - [address] AT [address] OF [employer]    Sig: Take 1 tablet (450 mg) by mouth every other day    Class: E-Prescribe    Route: Oral        Amoxicillin   REVIEW OF SYSTEMS (check box if normal)  [x]               GENERAL  [x]                 PULMONARY [x]                GENITOURINARY  [x]                CNS                 [x]                 CARDIAC  [x]                 ENDOCRINE  [x]                EARS,NOSE,THROAT [x]                 GASTROINTESTINAL [x]                 NEUROLOGIC    [x]                MUSCLOSKELTAL  [x]                  HEMATOLOGY      PHYSICAL EXAM (check box if normal)BP (!) 181/93   Pulse 91   Wt 66.2 kg (146 lb)   SpO2 98%   BMI 23.14 kg/m          [x]            GENERAL:    [x]       EYES:  ICTERIC   []        YES  []                    NO  [x]           EXTREMITIES: Clubbing []       Y     [x]           N    [x]           EARS, NOSE, THROAT: Membranes Moist    YES   [x]                   NO []                  [x]           LUNGS:  CLEAR    YES       [x]                  NO    []                                [x]           SKIN: Jaundice           YES       []                  NO    [x]                   Rash: YES       []                  NO    [x]                                     [x]             HEART: Regular Rate          YES       [x]                  NO    []                   Incision Clean:  YES       [x]                  NO    []                                [x]                    ABDOMEN: Organomegaly YES       []                  NO    [x]                       [x]                    NEUROLOGICAL:  Nonfocal  YES       [x]                  NO    []                       [x]                    Hernia YES       []                  NO    [x]                   PSYCHIATRIC:  Appropriate  YES       [x]                  NO    []                       OTHER:                                                                                                   PAIN SCALE:: 3    Again,  thank you for allowing me to participate in the care of your patient.      Sincerely,    Christian Morrison MD

## 2022-01-10 NOTE — PROGRESS NOTES
Transplant Surgery -OUTPATIENT IMMUNOSUPPRESSION PROGRESS NOTE    Date of Visit: 01/10/2022    Transplants:  10/17/2021 (Liver); Postoperative day:  85  ASSESMENT AND PLAN:  1.Graft Function:Liver allograft: no rejection or technical problems.    2.Immunosuppression Management: decrease cellcept to 500 mg po bid  3.Hypertension: needs to see PCP  4.Renal Function: elevated and needs to see nephrology  5.Lab frequency: weekly  6.Other:  Leucopenia: hold bactrim and valcyte and repeat counts     Date: January 10, 2022    Transplant:  [x]                             Liver [x]                              Kidney []                             Pancreas []                              Other:             Chief Complaint:  Doing well    History of Present Illness:  Patient Active Problem List   Diagnosis     Acute deep vein thrombosis (DVT) of upper extremity (H)     Acute hepatic encephalopathy     Acute hypoxemic respiratory failure (H)     Acute kidney failure, unspecified (H)     Carrier of group B Streptococcus     Cirrhosis of liver with ascites (H)     CKD (chronic kidney disease)     Elevated blood pressure     Family history of colon cancer     Gastroesophageal reflux disease without esophagitis     GI (gastrointestinal bleed)     Hyponatremia     Hypokalemia     History of DVT (deep vein thrombosis)     Anemia associated with acute blood loss     Acute anemia     Need for vaccination for viral hepatitis     Macrocytosis without anemia     Knee pain     Pancytopenia (H)     Macrocytic anemia     Blood loss anemia     Hydrothorax     Portal hypertensive gastropathy (H)     Pleural effusion     Screening for malignant neoplasm of cervix     Venous incompetence     Varicose veins of leg with pain     Supervision of high-risk pregnancy of elderly primigravida     Subclinical hypothyroidism     Liver failure (H)     Hypotension     Pre-liver transplant, listed     Alcoholic cirrhosis (H)     Abnormal serum iron level      Iron deficiency anemia due to chronic blood loss     Encounter for immunization     Liver transplant candidate     Liver transplant recipient (H)     Immunosuppressed status (H)     Malnutrition (H)     Steroid-induced hyperglycemia     Epistaxis     Esophagitis     Duodenal erosion     Gastric antral vascular ectasia     Hyperphosphatemia     Leukocytosis     Chronic kidney disease, stage 3b (H)     Low magnesium level     SOCIAL /FAMILY HISTORY: [x]                  No recent change    Past Medical History:   Diagnosis Date     Ascites      History of blood transfusion      Liver cirrhosis (H)      Thyroid disease      Past Surgical History:   Procedure Laterality Date      SECTION       CV RIGHT HEART CATH MEASUREMENTS RECORDED N/A 2021    Procedure: CV RIGHT HEART CATH;  Surgeon: Joseph Guevara MD;  Location:  HEART CARDIAC CATH LAB     ENDOSCOPIC RETROGRADE CHOLANGIOPANCREATOGRAM N/A 10/25/2021    Procedure: ENDOSCOPIC RETROGRADE CHOLANGIOPANCREATOGRAPHY, WITH biliary sphincterotomy, biliary dilation, and biliary stent placement;  Surgeon: Guru Dominguez Fenton MD;  Location: [facility]     ENDOSCOPIC RETROGRADE CHOLANGIOPANCREATOGRAPHY, EXCHANGE TUBE/STENT N/A 2021    Procedure: ENDOSCOPIC RETROGRADE CHOLANGIOPANCREATOGRAPHY, WITH biliary sphincterotomy, stricture dilation, stent exchange;  Surgeon: Guru Dominguez Fenton MD;  Location: [facility] OR     ESOPHAGOSCOPY, GASTROSCOPY, DUODENOSCOPY (EGD), COMBINED N/A 10/7/2021    Procedure: ESOPHAGOGASTRODUODENOSCOPY (EGD) with hemostasis;  Surgeon: Fox Jerry MD;  Location:  GI     ESOPHAGOSCOPY, GASTROSCOPY, DUODENOSCOPY (EGD), COMBINED N/A 10/22/2021    Procedure: ESOPHAGOGASTRODUODENOSCOPY, WITH BIOPSY;  Surgeon: Fadia Avila MD;  Location:  GI     IR LIVER BIOPSY PERCUTANEOUS  10/26/2021     IR THORACENTESIS  2021     IR THORACENTESIS  2021     THORACENTESIS Left 2021     Procedure: THORACENTESIS;  Surgeon: Almas Irby MD;  Location: [facility]     THORACENTESIS N/A 2021    Procedure: THORACENTESIS;  Surgeon: Almas Irby MD;  Location: [facility]     THORACENTESIS N/A 03/10/2021    Procedure: THORACENTESIS;  Surgeon: Almas Irby MD;  Location: [facility]     THORACENTESIS Left 2021    Procedure: THORACENTESIS;  Surgeon: Kennedi Chavez MD;  Location: [facility] OR     THORACENTESIS Left 2021    Procedure: THORACENTESIS;  Surgeon: Jess Ansari PA-C;  Location: [facility] OR     TRANSPLANT LIVER RECIPIENT  DONOR N/A 10/17/2021    Procedure: TRANSPLANT, LIVER, RECIPIENT,  DONOR;  Surgeon: Christian Morrison MD;  Location: [facility] OR     Social History     Socioeconomic History     Marital status: Single     Spouse name: Not on file     Number of children: Not on file     Years of education: Not on file     Highest education level: Master's degree (e.g., MA, MS, Roberto, MEd, MSW, SASHA)   Occupational History     Not on file   Tobacco Use     Smoking status: Never Smoker     Smokeless tobacco: Never Used   Substance and Sexual Activity     Alcohol use: Not Currently     Comment: Quit on 2020     Drug use: Not Currently     Sexual activity: Not Currently     Partners: Male   Other Topics Concern     Parent/sibling w/ CABG, MI or angioplasty before 65F 55M? Not Asked   Social History Narrative    Lives [place]. 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 12 year sonFlakito.         Christine Harris, DNP, APRN, CNP    3/17/2021     Social Determinants of Health     Financial Resource Strain: Low Risk      Difficulty of Paying Living Expenses: Not hard at all   Food Insecurity: No Food Insecurity     Worried About Running Out of Food in the Last Year: Never true     Ran Out of Food in the Last Year: Never true   Transportation Needs: No Transportation Needs     Lack of Transportation (Medical): No     Lack of Transportation  (Non-Medical): No   Physical Activity: Sufficiently Active     Days of Exercise per Week: 5 days     Minutes of Exercise per Session: 30 min   Stress: No Stress Concern Present     Feeling of Stress : Only a little   Social Connections: Moderately Integrated     Frequency of Communication with Friends and Family: More than three times a week     Frequency of Social Gatherings with Friends and Family: More than three times a week     Attends [employer] Services: More than 4 times per year     Active Member of Clubs or Organizations: Yes     Attends Club or Organization Meetings: More than 4 times per year     Marital Status: Never    Intimate Partner Violence: Not on file   Housing Stability: Unknown     Unable to Pay for Housing in the Last Year: Not on file     Number of Places Lived in the Last Year: 1     Unstable Housing in the Last Year: No     Prescription Medications as of 1/10/2022       Rx Number Disp Refills Start End Last Dispensed Date Next Fill Date Owning Pharmacy    alcohol swab prep pads  100 each 3 10/29/2021    [address]    Sig: Use to swab area of injection/fina as directed.    Class: E-Prescribe    aspirin (ASA) 325 MG tablet  90 tablet 3 11/2/2021    [facility] #[id number] - [place], [place] - [address]    Sig: Take 1 tablet (325 mg) by mouth daily    Class: E-Prescribe    Route: Oral    blood glucose (CONTOUR NEXT TEST) test strip  100 strip 6 10/29/2021    [address]    Sig: Use to test blood sugar 3 times daily.    Class: E-Prescribe    blood glucose (NO BRAND SPECIFIED) lancets standard  100 each 1 10/29/2021    [address]     Sig: To use to test glucose level in the blood Use to test blood sugar 3 times daily as directed. To accompany glucose monitor brands per  insurance coverage.    Class: E-Prescribe    Notes to Pharmacy: Cecy microlet lancets    levothyroxine (SYNTHROID/LEVOTHROID) 50 MCG tablet  90 tablet 3 12/3/2021    [id number] [address] [facility] [address] [facility]    Sig: Take 1 tablet (50 mcg) by mouth daily    Class: E-Prescribe    Route: Oral    magnesium oxide (MAG-OX) 400 (241.3 Mg) MG tablet  360 tablet 3 12/17/2021    [id number] [address] [facility] [address] [facility]    Sig: Take 2 tablets (800 mg) by mouth every morning AND 2 tablets (800 mg) every evening.    Class: E-Prescribe    Route: Oral    mycophenolate (GENERIC EQUIVALENT) 250 MG capsule  540 capsule 3 11/2/2021    [id number] [place], [place] - [address] [facility] [address] [facility]    Sig: Take 3 capsules (750 mg) by mouth 2 times daily    Class: E-Prescribe    Notes to Pharmacy: TXP DT 10/17/2021 (Liver) TXP Dischg DT 10/29/2021 DX Liver replaced by transplant Z94.4 TX Center [facility] ([place], [place])    Route: Oral    ondansetron (ZOFRAN) 4 MG tablet  20 tablet 0 11/15/2021    [id number] [place], [place] - [address] [facility] [address] [facility]    Sig: Take 1 tablet (4 mg) by mouth every 6 hours as needed for nausea    Class: E-Prescribe    Route: Oral    pantoprazole (PROTONIX) 40 MG EC tablet  180 tablet 3 11/2/2021    [facility] #[id number] [place], [place] - [address] [facility] [address] [facility]    Sig: Take 1 tablet (40 mg) by mouth 2 times daily    Class: E-Prescribe    Route: Oral    predniSONE (DELTASONE) 5 MG tablet  60 tablet 3 11/9/2021    [facility]ORE #[id number] [place], [place] - [address] [facility] [address]    Sig: Take 1 tablet (5 mg) by mouth daily    Class: E-Prescribe    Notes to Pharmacy: JANEY ESCALERA 10/17/2021 (Liver) JANEY Dischg DT  10/29/2021 DX Liver replaced by transplant Z94.4 TX Center [facility] ([place], [place])    Route: Oral    Sharps Container MISC  1 each 0 10/29/2021    [facility] [place], [place] - [address] [facility] [address] [facility]    Sig: Use as directed to dispose of needles, lancets and other sharps Per Insurance coverage    Class: E-Prescribe    sulfamethoxazole-trimethoprim (BACTRIM) 400-80 MG tablet  90 tablet 3 2021    [facility] STORE #[id number] [place] [address] - [address]    Si tablet by Oral or Feeding Tube route daily    Class: E-Prescribe    Route: Oral or Feeding Tube    tacrolimus (GENERIC EQUIVALENT) 1 MG capsule  360 capsule 3 12/10/2021    [facility] STORE [address] [place] [address] - [address] [employer]    Sig: Take 2 capsules (2 mg) by mouth every 12 hours Total 7mg BID    Class: E-Prescribe    Notes to Pharmacy: Dose change    Route: Oral    tacrolimus (GENERIC EQUIVALENT) 5 MG capsule  180 capsule 3 12/10/2021    [facility] [address] [place] [address] - [address]    Sig: Take 1 capsule (5 mg) by mouth every 12 hours Total dose 7mg BID    Class: E-Prescribe    Notes to Pharmacy: Dose change    Route: Oral    ursodiol (ACTIGALL) 300 MG capsule  180 capsule 3 2021    [facility] [address] [place] [address] - [address]    Sig: Take 1 capsule (300 mg) by mouth 2 times daily    Class: E-Prescribe    Route: Oral    valGANciclovir (VALCYTE) 450 MG tablet  14 tablet 0 1/3/2022    [facility] [address] [place] [address] - [address]    Sig: Take 1 tablet (450 mg) by mouth every other day    Class: E-Prescribe    Route: Oral        Amoxicillin   REVIEW OF SYSTEMS (check box if normal)  [x]               GENERAL  [x]                 PULMONARY [x]                 GENITOURINARY  [x]                CNS                 [x]                 CARDIAC  [x]                 ENDOCRINE  [x]                EARS,NOSE,THROAT [x]                 GASTROINTESTINAL [x]                 NEUROLOGIC    [x]                MUSCLOSKELTAL  [x]                  HEMATOLOGY      PHYSICAL EXAM (check box if normal)BP (!) 181/93   Pulse 91   Wt 66.2 kg (146 lb)   SpO2 98%   BMI 23.14 kg/m          [x]            GENERAL:    [x]       EYES:  ICTERIC   []        YES  []                    NO  [x]           EXTREMITIES: Clubbing []       Y     [x]           N    [x]           EARS, NOSE, THROAT: Membranes Moist    YES   [x]                   NO []                  [x]           LUNGS:  CLEAR    YES       [x]                  NO    []                                [x]           SKIN: Jaundice           YES       []                  NO    [x]                   Rash: YES       []                  NO    [x]                                     [x]             HEART: Regular Rate          YES       [x]                  NO    []                   Incision Clean:  YES       [x]                  NO    []                                [x]                    ABDOMEN: Organomegaly YES       []                  NO    [x]                       [x]                    NEUROLOGICAL:  Nonfocal  YES       [x]                  NO    []                       [x]                    Hernia YES       []                  NO    [x]                   PSYCHIATRIC:  Appropriate  YES       [x]                  NO    []                       OTHER:                                                                                                   PAIN SCALE:: 3

## 2022-01-10 NOTE — NURSING NOTE
Chief Complaint   Patient presents with     RECHECK     post op liver      Blood pressure (!) 181/93, pulse 91, weight 66.2 kg (146 lb), SpO2 98 %, not currently breastfeeding.    Joaquina Villeda on 1/10/2022 at 9:33 AM

## 2022-01-13 ENCOUNTER — TELEPHONE (OUTPATIENT)
Dept: TRANSPLANT | Facility: CLINIC | Age: 52
End: 2022-01-13

## 2022-01-13 ENCOUNTER — LAB (OUTPATIENT)
Dept: LAB | Facility: CLINIC | Age: 52
End: 2022-01-13
Payer: COMMERCIAL

## 2022-01-13 ENCOUNTER — TEAM CONFERENCE (OUTPATIENT)
Dept: TRANSPLANT | Facility: CLINIC | Age: 52
End: 2022-01-13
Payer: COMMERCIAL

## 2022-01-13 DIAGNOSIS — Z94.4 LIVER TRANSPLANT RECIPIENT (H): ICD-10-CM

## 2022-01-13 DIAGNOSIS — Z94.4 LIVER REPLACED BY TRANSPLANT (H): ICD-10-CM

## 2022-01-13 LAB
ALBUMIN SERPL-MCNC: 3.2 G/DL (ref 3.4–5)
ALP SERPL-CCNC: 158 U/L (ref 40–150)
ALT SERPL W P-5'-P-CCNC: 17 U/L (ref 0–50)
ANION GAP SERPL CALCULATED.3IONS-SCNC: 5 MMOL/L (ref 3–14)
AST SERPL W P-5'-P-CCNC: 9 U/L (ref 0–45)
BILIRUB DIRECT SERPL-MCNC: 0.2 MG/DL (ref 0–0.2)
BILIRUB SERPL-MCNC: 0.3 MG/DL (ref 0.2–1.3)
BUN SERPL-MCNC: 35 MG/DL (ref 7–30)
CALCIUM SERPL-MCNC: 9.1 MG/DL (ref 8.5–10.1)
CHLORIDE BLD-SCNC: 109 MMOL/L (ref 94–109)
CO2 SERPL-SCNC: 25 MMOL/L (ref 20–32)
CREAT SERPL-MCNC: 1.76 MG/DL (ref 0.52–1.04)
ERYTHROCYTE [DISTWIDTH] IN BLOOD BY AUTOMATED COUNT: 13.8 % (ref 10–15)
GFR SERPL CREATININE-BSD FRML MDRD: 34 ML/MIN/1.73M2
GLUCOSE BLD-MCNC: 97 MG/DL (ref 70–99)
HCT VFR BLD AUTO: 32.7 % (ref 35–47)
HGB BLD-MCNC: 9.6 G/DL (ref 11.7–15.7)
MCH RBC QN AUTO: 30.4 PG (ref 26.5–33)
MCHC RBC AUTO-ENTMCNC: 29.4 G/DL (ref 31.5–36.5)
MCV RBC AUTO: 104 FL (ref 78–100)
PHOSPHATE SERPL-MCNC: 4.5 MG/DL (ref 2.5–4.5)
PLATELET # BLD AUTO: 234 10E3/UL (ref 150–450)
POTASSIUM BLD-SCNC: 5.5 MMOL/L (ref 3.4–5.3)
PROT SERPL-MCNC: 6.7 G/DL (ref 6.8–8.8)
RBC # BLD AUTO: 3.16 10E6/UL (ref 3.8–5.2)
SODIUM SERPL-SCNC: 139 MMOL/L (ref 133–144)
TACROLIMUS BLD-MCNC: 5.9 UG/L (ref 5–15)
TME LAST DOSE: NORMAL H
TME LAST DOSE: NORMAL H
WBC # BLD AUTO: 1.7 10E3/UL (ref 4–11)

## 2022-01-13 PROCEDURE — 85027 COMPLETE CBC AUTOMATED: CPT

## 2022-01-13 PROCEDURE — 80053 COMPREHEN METABOLIC PANEL: CPT

## 2022-01-13 PROCEDURE — 84100 ASSAY OF PHOSPHORUS: CPT

## 2022-01-13 PROCEDURE — 82248 BILIRUBIN DIRECT: CPT

## 2022-01-13 PROCEDURE — 36415 COLL VENOUS BLD VENIPUNCTURE: CPT

## 2022-01-13 PROCEDURE — 80197 ASSAY OF TACROLIMUS: CPT

## 2022-01-13 RX ORDER — MYCOPHENOLATE MOFETIL 250 MG/1
500 CAPSULE ORAL 2 TIMES DAILY
Qty: 67 CAPSULE | Refills: 0 | Status: SHIPPED | OUTPATIENT
Start: 2022-01-13 | End: 2022-01-19

## 2022-01-13 NOTE — TELEPHONE ENCOUNTER
DATE:  1/13/2022     TIME OF RECEIPT FROM LAB:  11:30    ORDERING PROVIDER:     LAB TEST:  WBC    LAB VALUE:  1.82    RESULTS GIVEN WITH READ-BACK TO (PROVIDER):  PRAVEEN MEADE LPN    TIME LAB VALUE REPORTED TO PROVIDER:   113

## 2022-01-13 NOTE — TELEPHONE ENCOUNTER
Post Liver Transplant Team Conference  Date: 1/13/2022  Transplant Coordinator: Lotus Hughes  Transplant Surgeon: Christian Morrison      Attendees:  [] Dr. Seth [] Dr. Beltran []       [x] Dr. Morrison [x] Alexandrea Peter LPN []    [] Dr. Patterson [] Cyndee Salazar NP []    [] Dr. Benavidez [] Raven Garcia NP []    [] Dr. Lyon [x] Raven Ocasio, RN []       [] Dr. Johnson [] Britni Wilson, RN []       [] Dr. Tom Garcia [x] Sirena Dudley, RN []       [] Dr. MAYTE Dominguez [x] Lotus Márquez RN []       [] Dr. Ravi [] Olivia Sam RN []       [] Dr. Sharma [] Jose Gurrola, RICHARD []         Verbal Plan Read Back:   Start cellcept wean due to low WBC  Check cmv next week with labs      Left message with patient to decrease cellcept and do labs next week with cmv  Routed to RN Coordinator   Lotus Hughes, RICHARD

## 2022-01-14 DIAGNOSIS — Z94.4 LIVER TRANSPLANT RECIPIENT (H): ICD-10-CM

## 2022-01-14 LAB — CMV DNA SPEC NAA+PROBE-ACNC: NOT DETECTED IU/ML

## 2022-01-14 NOTE — TELEPHONE ENCOUNTER
Spoke to pt and discussed refraining from eating foods high in K+. Reviewed a list of foods to avoid. Also updated the med list for pt from recent visit with Dr. NAGY.     Also pt confirmed Crystal's voice message to reduce MMF 500mg BID.

## 2022-01-17 ENCOUNTER — TELEPHONE (OUTPATIENT)
Dept: TRANSPLANT | Facility: CLINIC | Age: 52
End: 2022-01-17

## 2022-01-17 ENCOUNTER — OFFICE VISIT (OUTPATIENT)
Dept: GASTROENTEROLOGY | Facility: CLINIC | Age: 52
End: 2022-01-17
Attending: STUDENT IN AN ORGANIZED HEALTH CARE EDUCATION/TRAINING PROGRAM
Payer: COMMERCIAL

## 2022-01-17 ENCOUNTER — LAB (OUTPATIENT)
Dept: LAB | Facility: CLINIC | Age: 52
End: 2022-01-17
Attending: STUDENT IN AN ORGANIZED HEALTH CARE EDUCATION/TRAINING PROGRAM
Payer: COMMERCIAL

## 2022-01-17 VITALS
SYSTOLIC BLOOD PRESSURE: 183 MMHG | OXYGEN SATURATION: 100 % | HEART RATE: 85 BPM | DIASTOLIC BLOOD PRESSURE: 105 MMHG | BODY MASS INDEX: 22.83 KG/M2 | WEIGHT: 144 LBS

## 2022-01-17 DIAGNOSIS — F10.21 ALCOHOL USE DISORDER, SEVERE, IN SUSTAINED REMISSION (H): ICD-10-CM

## 2022-01-17 DIAGNOSIS — N18.30 STAGE 3 CHRONIC KIDNEY DISEASE, UNSPECIFIED WHETHER STAGE 3A OR 3B CKD (H): ICD-10-CM

## 2022-01-17 DIAGNOSIS — K70.31 ALCOHOLIC CIRRHOSIS OF LIVER WITH ASCITES (H): ICD-10-CM

## 2022-01-17 DIAGNOSIS — Z94.4 LIVER TRANSPLANT RECIPIENT (H): ICD-10-CM

## 2022-01-17 DIAGNOSIS — I15.9 SECONDARY HYPERTENSION: Primary | ICD-10-CM

## 2022-01-17 DIAGNOSIS — Z94.4 LIVER REPLACED BY TRANSPLANT (H): ICD-10-CM

## 2022-01-17 DIAGNOSIS — K83.1 BILIARY STRICTURE (H): ICD-10-CM

## 2022-01-17 LAB
ALBUMIN SERPL-MCNC: 3.3 G/DL (ref 3.4–5)
ALP SERPL-CCNC: 152 U/L (ref 40–150)
ALT SERPL W P-5'-P-CCNC: 18 U/L (ref 0–50)
ANION GAP SERPL CALCULATED.3IONS-SCNC: 7 MMOL/L (ref 3–14)
AST SERPL W P-5'-P-CCNC: 15 U/L (ref 0–45)
BILIRUB DIRECT SERPL-MCNC: 0.1 MG/DL (ref 0–0.2)
BILIRUB SERPL-MCNC: 0.2 MG/DL (ref 0.2–1.3)
BUN SERPL-MCNC: 41 MG/DL (ref 7–30)
CALCIUM SERPL-MCNC: 9.3 MG/DL (ref 8.5–10.1)
CHLORIDE BLD-SCNC: 110 MMOL/L (ref 94–109)
CO2 SERPL-SCNC: 27 MMOL/L (ref 20–32)
CREAT SERPL-MCNC: 1.56 MG/DL (ref 0.52–1.04)
ERYTHROCYTE [DISTWIDTH] IN BLOOD BY AUTOMATED COUNT: 13.7 % (ref 10–15)
GFR SERPL CREATININE-BSD FRML MDRD: 40 ML/MIN/1.73M2
GLUCOSE BLD-MCNC: 98 MG/DL (ref 70–99)
HCT VFR BLD AUTO: 30.1 % (ref 35–47)
HGB BLD-MCNC: 9.4 G/DL (ref 11.7–15.7)
MAGNESIUM SERPL-MCNC: 1.7 MG/DL (ref 1.6–2.3)
MCH RBC QN AUTO: 30.3 PG (ref 26.5–33)
MCHC RBC AUTO-ENTMCNC: 31.2 G/DL (ref 31.5–36.5)
MCV RBC AUTO: 97 FL (ref 78–100)
PHOSPHATE SERPL-MCNC: 3.5 MG/DL (ref 2.5–4.5)
PLATELET # BLD AUTO: 209 10E3/UL (ref 150–450)
POTASSIUM BLD-SCNC: 4.6 MMOL/L (ref 3.4–5.3)
PROT SERPL-MCNC: 6.4 G/DL (ref 6.8–8.8)
RBC # BLD AUTO: 3.1 10E6/UL (ref 3.8–5.2)
SODIUM SERPL-SCNC: 144 MMOL/L (ref 133–144)
TACROLIMUS BLD-MCNC: 7.4 UG/L (ref 5–15)
TME LAST DOSE: NORMAL H
TME LAST DOSE: NORMAL H
WBC # BLD AUTO: 1.5 10E3/UL (ref 4–11)

## 2022-01-17 PROCEDURE — 82248 BILIRUBIN DIRECT: CPT | Performed by: PATHOLOGY

## 2022-01-17 PROCEDURE — 85027 COMPLETE CBC AUTOMATED: CPT | Performed by: PATHOLOGY

## 2022-01-17 PROCEDURE — 80197 ASSAY OF TACROLIMUS: CPT | Mod: 90 | Performed by: PATHOLOGY

## 2022-01-17 PROCEDURE — 80321 ALCOHOLS BIOMARKERS 1OR 2: CPT | Mod: 90 | Performed by: PATHOLOGY

## 2022-01-17 PROCEDURE — 83735 ASSAY OF MAGNESIUM: CPT | Performed by: PATHOLOGY

## 2022-01-17 PROCEDURE — 36415 COLL VENOUS BLD VENIPUNCTURE: CPT | Performed by: PATHOLOGY

## 2022-01-17 PROCEDURE — 99000 SPECIMEN HANDLING OFFICE-LAB: CPT | Performed by: PATHOLOGY

## 2022-01-17 PROCEDURE — 80053 COMPREHEN METABOLIC PANEL: CPT | Performed by: PATHOLOGY

## 2022-01-17 PROCEDURE — 99215 OFFICE O/P EST HI 40 MIN: CPT | Mod: 24 | Performed by: STUDENT IN AN ORGANIZED HEALTH CARE EDUCATION/TRAINING PROGRAM

## 2022-01-17 PROCEDURE — 84100 ASSAY OF PHOSPHORUS: CPT | Performed by: PATHOLOGY

## 2022-01-17 RX ORDER — VALGANCICLOVIR 450 MG/1
TABLET, FILM COATED ORAL
Qty: 14 TABLET | Refills: 0 | COMMUNITY
Start: 2022-01-14 | End: 2022-01-19

## 2022-01-17 RX ORDER — AMLODIPINE BESYLATE 5 MG/1
5 TABLET ORAL DAILY
Qty: 30 TABLET | Refills: 3 | Status: SHIPPED | OUTPATIENT
Start: 2022-01-17 | End: 2022-02-07

## 2022-01-17 RX ORDER — SULFAMETHOXAZOLE AND TRIMETHOPRIM 400; 80 MG/1; MG/1
1 TABLET ORAL DAILY
Qty: 90 TABLET | Refills: 3 | COMMUNITY
Start: 2022-01-14 | End: 2022-03-01

## 2022-01-17 ASSESSMENT — PAIN SCALES - GENERAL: PAINLEVEL: NO PAIN (0)

## 2022-01-17 NOTE — TELEPHONE ENCOUNTER
Patient Call: General    Reason for call: Patient called to verify new script discuss at visit today with Dr. Avila was sent to [facility] in [place].     Call back needed? No

## 2022-01-17 NOTE — LETTER
1/17/2022     RE: Sarah Fisher  [address]  [facility] [address]    Dear Colleague,    Thank you for referring your patient, Sarah Fisher, to the [facility] [place]. Please see a copy of my visit note below.    [facility]     Date of Visit: 1/16/2022    Subjective: Ms. 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 This is her first visit back in hepatology clinic.     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 Plastic stent left in place. Out on AXR 1/2022  - Rejection: liver bx 10/26/2021 showing HURTADO 3, with more intrahepatic cholestasis than explained on the bx - concern for biliary stricture that was later found on ERCP  - Noted to be hypercoagulable intraoperatively, postop US with elevated RI involving the extrahepatic hepatic artery and the right hepatic artery. Repeat US POD 1&2 -Patent hepatic vasculature. Continued elevated RI in the hepatic arteries. Was started on heparin gtt, developed epistaxis, GI bleeding (grade A esophagitis, residual GAVE, duodenal erosions), so that was stopped and taking  mg daily -> now 81 mg daily  - CORBIN: creatinine baseline 1-1.2, peaked at 3.9 post transplant, downtrending. Creatinine now 1.5    Current Immunosuppresion:  - Tacrolimus 7-7 - goal 6-8 given creatinine  - Started MMF wean 1/11,  taking 500 mg BID  - Prednisone 5 mg daily    Holding valcyte (on for 3 months for CMV D+/R+) and bactrim given leukopenia     CMV pcr negative .     Otherwise doing well. Blood pressure elevated today, was elevated at her last visit.     Doing well with sobriety.     Not planning on going back to work, doing some volunteer work.    Niece who she lives with diagnosed with COVID, has been isolating. She last saw her 6 days ago. She has been feeling fine. Son tested negative.     ROS: 14 point ROS negative except for positives noted in HPI.    PMHx:  Past Medical History:   Diagnosis Date     Ascites      History of blood transfusion      Liver cirrhosis (H)      Thyroid disease    Alcohol related cirrhosis    PSHx:  Past Surgical History:   Procedure Laterality Date      SECTION       CV RIGHT HEART CATH MEASUREMENTS RECORDED N/A 2021    Procedure: CV RIGHT HEART CATH;  Surgeon: Joseph Guevara MD;  Location:  HEART CARDIAC CATH LAB     ENDOSCOPIC RETROGRADE CHOLANGIOPANCREATOGRAM N/A 10/25/2021    Procedure: ENDOSCOPIC RETROGRADE CHOLANGIOPANCREATOGRAPHY, WITH biliary sphincterotomy, biliary dilation, and biliary stent placement;  Surgeon: Guru Dominguez Fenton MD;  Location:  OR     ENDOSCOPIC RETROGRADE CHOLANGIOPANCREATOGRAPHY, EXCHANGE TUBE/STENT N/A 2021    Procedure: ENDOSCOPIC RETROGRADE CHOLANGIOPANCREATOGRAPHY, WITH biliary sphincterotomy, stricture dilation, stent exchange;  Surgeon: Guru Dominguez Fenton MD;  Location:  OR     ESOPHAGOSCOPY, GASTROSCOPY, DUODENOSCOPY (EGD), COMBINED N/A 10/7/2021    Procedure: ESOPHAGOGASTRODUODENOSCOPY (EGD) with hemostasis;  Surgeon: Fox Jerry MD;  Location:  GI     ESOPHAGOSCOPY, GASTROSCOPY, DUODENOSCOPY (EGD), COMBINED N/A 10/22/2021    Procedure: ESOPHAGOGASTRODUODENOSCOPY, WITH BIOPSY;  Surgeon: Fadia Avila MD;  Location:  GI     IR LIVER BIOPSY PERCUTANEOUS  10/26/2021     IR  THORACENTESIS  2021     IR THORACENTESIS  2021     THORACENTESIS Left 2021    Procedure: THORACENTESIS;  Surgeon: Almas Irby MD;  Location: [facility] GI     THORACENTESIS N/A 2021    Procedure: THORACENTESIS;  Surgeon: Almas Irby MD;  Location: [facility] GI     THORACENTESIS N/A 03/10/2021    Procedure: THORACENTESIS;  Surgeon: Almas Irby MD;  Location: [facility] GI     THORACENTESIS Left 2021    Procedure: THORACENTESIS;  Surgeon: Kennedi Chavez MD;  Location: [facility] OR     THORACENTESIS Left 2021    Procedure: THORACENTESIS;  Surgeon: Jess Ansari PA-C;  Location: [facility] OR     TRANSPLANT LIVER RECIPIENT  DONOR N/A 10/17/2021    Procedure: TRANSPLANT, LIVER, RECIPIENT,  DONOR;  Surgeon: Christian Morrison MD;  Location: [facility] OR     Ovarian cyst    FamHx:  No known history of liver disease    SocHx:  Social History     Socioeconomic History     Marital status: Single     Spouse name: Not on file     Number of children: Not on file     Years of education: Not on file     Highest education level: Master's degree (e.g., MA, MS, Roberto, MEd, MSW, SASHA)   Occupational History     Not on file   Tobacco Use     Smoking status: Never Smoker     Smokeless tobacco: Never Used   Substance and Sexual Activity     Alcohol use: Not Currently     Comment: Quit on 2020     Drug use: Not Currently     Sexual activity: Not Currently     Partners: Male   Other Topics Concern     Parent/sibling w/ CABG, MI or angioplasty before 65F 55M? Not Asked   Social History Narrative    Lives [place]. 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 12 year son, Flakito.         Christine Harris, DNP, APRN, CNP    3/17/2021     Social Determinants of Health     Financial Resource Strain: Low Risk      Difficulty of Paying Living Expenses: Not hard at all   Food Insecurity: No Food Insecurity     Worried About Running Out of Food in the Last Year: Never true      Ran Out of Food in the Last Year: Never true   Transportation Needs: No Transportation Needs     Lack of Transportation (Medical): No     Lack of Transportation (Non-Medical): No   Physical Activity: Sufficiently Active     Days of Exercise per Week: 5 days     Minutes of Exercise per Session: 30 min   Stress: No Stress Concern Present     Feeling of Stress : Only a little   Social Connections: Moderately Integrated     Frequency of Communication with Friends and Family: More than three times a week     Frequency of Social Gatherings with Friends and Family: More than three times a week     Attends [employer] Services: More than 4 times per year     Active Member of Clubs or Organizations: Yes     Attends Club or Organization Meetings: More than 4 times per year     Marital Status: Never    Intimate Partner Violence: Not on file   Housing Stability: Unknown     Unable to Pay for Housing in the Last Year: Not on file     Number of Places Lived in the Last Year: 1     Unstable Housing in the Last Year: No   Just moved back home, living with son and niece who is going to school to be a RN    Medications:  Current Outpatient Medications   Medication     alcohol swab prep pads     blood glucose (CONTOUR NEXT TEST) test strip     blood glucose (NO BRAND SPECIFIED) lancets standard     levothyroxine (SYNTHROID/LEVOTHROID) 50 MCG tablet     magnesium oxide (MAG-OX) 400 (241.3 Mg) MG tablet     mycophenolate (GENERIC EQUIVALENT) 250 MG capsule     ondansetron (ZOFRAN) 4 MG tablet     pantoprazole (PROTONIX) 40 MG EC tablet     predniSONE (DELTASONE) 5 MG tablet     [employer] MISC     sulfamethoxazole-trimethoprim (BACTRIM) 400-80 MG tablet     tacrolimus (GENERIC EQUIVALENT) 1 MG capsule     tacrolimus (GENERIC EQUIVALENT) 5 MG capsule     ursodiol (ACTIGALL) 300 MG capsule     valGANciclovir (VALCYTE) 450 MG tablet     No current facility-administered medications for this visit.     Allergies:     Allergies    Allergen Reactions     Amoxicillin GI Disturbance, Diarrhea, Nausea and Nausea and Vomiting     Objective:  BP (!) 183/105   Pulse 85   Wt 65.3 kg (144 lb)   SpO2 100%   BMI 22.83 kg/m    Constitutional: Pleasant woman in NAD  Eyes: nonicteric  Respiratory: Breathing comfortably on RA  Neuro: AOOX3  Psychiatric: normal mood and orientation    Labs:  Last Comprehensive Metabolic Panel:  Sodium   Date Value Ref Range Status   01/13/2022 139 133 - 144 mmol/L Final   06/07/2021 140 133 - 144 mmol/L Final     Potassium   Date Value Ref Range Status   01/13/2022 5.5 (H) 3.4 - 5.3 mmol/L Final   06/07/2021 3.4 3.4 - 5.3 mmol/L Final     Chloride   Date Value Ref Range Status   01/13/2022 109 94 - 109 mmol/L Final   06/07/2021 106 94 - 109 mmol/L Final     Carbon Dioxide   Date Value Ref Range Status   06/07/2021 28 20 - 32 mmol/L Final     Carbon Dioxide (CO2)   Date Value Ref Range Status   01/13/2022 25 20 - 32 mmol/L Final     Anion Gap   Date Value Ref Range Status   01/13/2022 5 3 - 14 mmol/L Final   06/07/2021 6 3 - 14 mmol/L Final     Glucose   Date Value Ref Range Status   01/13/2022 97 70 - 99 mg/dL Final   06/07/2021 138 (H) 70 - 99 mg/dL Final     Urea Nitrogen   Date Value Ref Range Status   01/13/2022 35 (H) 7 - 30 mg/dL Final   06/07/2021 14 7 - 30 mg/dL Final     Creatinine   Date Value Ref Range Status   01/13/2022 1.76 (H) 0.52 - 1.04 mg/dL Final   06/07/2021 0.96 0.52 - 1.04 mg/dL Final     GFR Estimate   Date Value Ref Range Status   01/13/2022 34 (L) >60 mL/min/1.73m2 Final     Comment:     Effective December 21, 2021 eGFRcr in adults is calculated using the 2021 CKD-EPI creatinine equation which includes age and gender (Felix rivera al., NEJM, DOI: 10.1056/SDKGun9615639)   06/07/2021 68 >60 mL/min/[1.73_m2] Final     Comment:     Non  GFR Calc  Starting 12/18/2018, serum creatinine based estimated GFR (eGFR) will be   calculated using the Chronic Kidney Disease Epidemiology  Collaboration   (CKD-EPI) equation.       Calcium   Date Value Ref Range Status   01/13/2022 9.1 8.5 - 10.1 mg/dL Final   06/07/2021 8.7 8.5 - 10.1 mg/dL Final     Bilirubin Total   Date Value Ref Range Status   01/13/2022 0.3 0.2 - 1.3 mg/dL Final   06/07/2021 5.6 (H) 0.2 - 1.3 mg/dL Final     Alkaline Phosphatase   Date Value Ref Range Status   01/13/2022 158 (H) 40 - 150 U/L Final   06/07/2021 150 40 - 150 U/L Final     ALT   Date Value Ref Range Status   01/13/2022 17 0 - 50 U/L Final   06/07/2021 18 0 - 50 U/L Final     AST   Date Value Ref Range Status   01/13/2022 9 0 - 45 U/L Final   06/07/2021 35 0 - 45 U/L Final     Imaging: Reviewed in EHR    Endoscopy:    12/4/2020 Colonoscopy    Findings:       Hemorrhoids were found on perianal exam.       The terminal ileum appeared normal.       An area of grossly congested mucosa was found in the entire colon.       A 4 mm polyp was found in the cecum. The polyp was sessile. The polyp        was removed with a cold biopsy forceps. Resection and retrieval were        complete.       Non-bleeding internal hemorrhoids were found during retroflexion.    Impressions/Post-Op Diagnosis:       - Hemorrhoids found on perianal exam.       - The examined portion of the ileum was normal.       - Congested mucosa in the entire examined colon.       - One 4 mm polyp in the cecum, removed with a cold biopsy forceps.        Resected and retrieved.       - Non-bleeding internal hemorrhoids.    A) COLON, CECUM, POLYPECTOMY:  1. Colonic mucosa with no diagnostic abnormalities; a lymphoid aggregate is present  2. Negative for serrated change, dysplasia, and malignancy    Assessment/Plan: Ms. 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 This is her first visit back in hepatology clinic.     Post transplant course  complicated by CORBIN, biliary anastomotic stricture.     Bx ~ 9 days post transplant showing intrahepatic cholestasis and only mild (3/9) rejection, findings likely 2/2 anastomotic stricture that she underwent ERCP for    Valcyte stopped slightly early of 3 months due to leukopenia, bactrim also stopped.    BP elevated in clinic today    Renal sparing immunosuppresion: Tacrolimus 7/7 with goal 6-8, MMF wean (taking 500 mg BID, recommended weaning by 250 mg BID each week)  PPx: Stopped valcyte early, cmr pcr negative. Holding bactrim for leukopenia  HTN: start amlodipine 5 mg daily given persistently elevated BP, recommend checking at home with goal < 140/90. Would not start ACEi now given issues with high K  CORBIN: Follow up with nephrology ordered  AUD: Doing well with sobriety  Potential COVID exposure 6 days ago, recommend testing. She is asymptomatic but would consider monoclonal abx if positive. She has received COVID vaccine x 3 and had COVID fall 2021.   Recommend finishing shingrix once off MMF    RV 4 months    Fadia Avila MD MSc  Transplant Hepatology  [facility]    HME    Mammogram 2/2021 that showed skin thickening likely related to underlying cirrhosis, potential asymmetry in the upper posterior right breast seen on MLO view only, recommended further evaluation. Additional imaging showed grouped coarse calcifications. Underwent bx suspicious calcifications that were sampled, pathology resulted as hyalinized fibroadenoma with stromal calcifications, negative for atypic and malignancy. Recommend repeat mammogram annually.     Pap smear 3/2021 normal    Approximately 25 minutes was spent for the visit with 25 minutes of non face-to-face time were spent in review of the patient's medical record on the day of the visit. This included review of previous: clinic visits, hospital records, lab results, imaging studies, and documentation.  The findings from this review are summarized in the above  note.    Again, thank you for allowing me to participate in the care of your patient.      Sincerely,    Fadia Avila MD

## 2022-01-17 NOTE — PROGRESS NOTES
[facility]     Date of Visit: 1/16/2022    Subjective: Ms. 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 This is her first visit back in hepatology clinic.     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 Plastic stent left in place. Out on AXR 1/2022  - Rejection: liver bx 10/26/2021 showing HURTADO 3, with more intrahepatic cholestasis than explained on the bx - concern for biliary stricture that was later found on ERCP  - Noted to be hypercoagulable intraoperatively, postop US with elevated RI involving the extrahepatic hepatic artery and the right hepatic artery. Repeat US POD 1&2 -Patent hepatic vasculature. Continued elevated RI in the hepatic arteries. Was started on heparin gtt, developed epistaxis, GI bleeding (grade A esophagitis, residual GAVE, duodenal erosions), so that was stopped and taking  mg daily -> now 81 mg daily  - CORBIN: creatinine baseline 1-1.2, peaked at 3.9 post transplant, downtrending. Creatinine now 1.5    Current Immunosuppresion:  - Tacrolimus 7-7 - goal 6-8 given creatinine  - Started MMF wean 1/11, taking 500 mg BID  - Prednisone 5 mg daily    Holding valcyte (on for 3 months for CMV D+/R+) and bactrim given leukopenia     CMV pcr negative 1/13.     Otherwise doing well. Blood pressure elevated today, was elevated at her last visit.     Doing well with  sobriety.     Not planning on going back to work, doing some volunteer work.    Niece who she lives with diagnosed with COVID, has been isolating. She last saw her 6 days ago. She has been feeling fine. Son tested negative.     ROS: 14 point ROS negative except for positives noted in HPI.    PMHx:  Past Medical History:   Diagnosis Date     Ascites      History of blood transfusion      Liver cirrhosis (H)      Thyroid disease    Alcohol related cirrhosis    PSHx:  Past Surgical History:   Procedure Laterality Date      SECTION       CV RIGHT HEART CATH MEASUREMENTS RECORDED N/A 2021    Procedure: CV RIGHT HEART CATH;  Surgeon: Joseph Guevara MD;  Location:  HEART CARDIAC CATH LAB     ENDOSCOPIC RETROGRADE CHOLANGIOPANCREATOGRAM N/A 10/25/2021    Procedure: ENDOSCOPIC RETROGRADE CHOLANGIOPANCREATOGRAPHY, WITH biliary sphincterotomy, biliary dilation, and biliary stent placement;  Surgeon: Guru Dominguez Fenton MD;  Location: [facility] OR     ENDOSCOPIC RETROGRADE CHOLANGIOPANCREATOGRAPHY, EXCHANGE TUBE/STENT N/A 2021    Procedure: ENDOSCOPIC RETROGRADE CHOLANGIOPANCREATOGRAPHY, WITH biliary sphincterotomy, stricture dilation, stent exchange;  Surgeon: Guru Dominguez Fenton MD;  Location:  OR     ESOPHAGOSCOPY, GASTROSCOPY, DUODENOSCOPY (EGD), COMBINED N/A 10/7/2021    Procedure: ESOPHAGOGASTRODUODENOSCOPY (EGD) with hemostasis;  Surgeon: Fox Jerry MD;  Location:  GI     ESOPHAGOSCOPY, GASTROSCOPY, DUODENOSCOPY (EGD), COMBINED N/A 10/22/2021    Procedure: ESOPHAGOGASTRODUODENOSCOPY, WITH BIOPSY;  Surgeon: Fadia Avila MD;  Location:  GI     IR LIVER BIOPSY PERCUTANEOUS  10/26/2021     IR THORACENTESIS  2021     IR THORACENTESIS  2021     THORACENTESIS Left 2021    Procedure: THORACENTESIS;  Surgeon: Almas Irby MD;  Location:  GI     THORACENTESIS N/A 2021    Procedure: THORACENTESIS;  Surgeon: Almas Irby MD;   Location: [facility] GI     THORACENTESIS N/A 03/10/2021    Procedure: THORACENTESIS;  Surgeon: Almas Irby MD;  Location: [facility] GI     THORACENTESIS Left 2021    Procedure: THORACENTESIS;  Surgeon: Kennedi Chavez MD;  Location: [facility] OR     THORACENTESIS Left 2021    Procedure: THORACENTESIS;  Surgeon: eJss Ansari PA-C;  Location: [facility] OR     TRANSPLANT LIVER RECIPIENT  DONOR N/A 10/17/2021    Procedure: TRANSPLANT, LIVER, RECIPIENT,  DONOR;  Surgeon: Christian Morrison MD;  Location: [facility] OR     Ovarian cyst    FamHx:  No known history of liver disease    SocHx:  Social History     Socioeconomic History     Marital status: Single     Spouse name: Not on file     Number of children: Not on file     Years of education: Not on file     Highest education level: Master's degree (e.g., MA, MS, Roberto, MEd, MSW, SASHA)   Occupational History     Not on file   Tobacco Use     Smoking status: Never Smoker     Smokeless tobacco: Never Used   Substance and Sexual Activity     Alcohol use: Not Currently     Comment: Quit on 2020     Drug use: Not Currently     Sexual activity: Not Currently     Partners: Male   Other Topics Concern     Parent/sibling w/ CABG, MI or angioplasty before 65F 55M? Not Asked   Social History Narrative    Lives [place]. 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 12 year sonFlakito.         Christine Harris, DNP, APRN, CNP    3/17/2021     Social Determinants of Health     Financial Resource Strain: Low Risk      Difficulty of Paying Living Expenses: Not hard at all   Food Insecurity: No Food Insecurity     Worried About Running Out of Food in the Last Year: Never true     Ran Out of Food in the Last Year: Never true   Transportation Needs: No Transportation Needs     Lack of Transportation (Medical): No     Lack of Transportation (Non-Medical): No   Physical Activity: Sufficiently Active     Days of Exercise per Week: 5  days     Minutes of Exercise per Session: 30 min   Stress: No Stress Concern Present     Feeling of Stress : Only a little   Social Connections: Moderately Integrated     Frequency of Communication with Friends and Family: More than three times a week     Frequency of Social Gatherings with Friends and Family: More than three times a week     Attends Confucianist Services: More than 4 times per year     Active Member of Clubs or Organizations: Yes     Attends Club or Organization Meetings: More than 4 times per year     Marital Status: Never    Intimate Partner Violence: Not on file   Housing Stability: Unknown     Unable to Pay for Housing in the Last Year: Not on file     Number of Places Lived in the Last Year: 1     Unstable Housing in the Last Year: No   Just moved back home, living with son and niece who is going to school to be a RN    Medications:  Current Outpatient Medications   Medication     alcohol swab prep pads     blood glucose (CONTOUR NEXT TEST) test strip     blood glucose (NO BRAND SPECIFIED) lancets standard     levothyroxine (SYNTHROID/LEVOTHROID) 50 MCG tablet     magnesium oxide (MAG-OX) 400 (241.3 Mg) MG tablet     mycophenolate (GENERIC EQUIVALENT) 250 MG capsule     ondansetron (ZOFRAN) 4 MG tablet     pantoprazole (PROTONIX) 40 MG EC tablet     predniSONE (DELTASONE) 5 MG tablet     [employer] MISC     sulfamethoxazole-trimethoprim (BACTRIM) 400-80 MG tablet     tacrolimus (GENERIC EQUIVALENT) 1 MG capsule     tacrolimus (GENERIC EQUIVALENT) 5 MG capsule     ursodiol (ACTIGALL) 300 MG capsule     valGANciclovir (VALCYTE) 450 MG tablet     No current facility-administered medications for this visit.     Allergies:     Allergies   Allergen Reactions     Amoxicillin GI Disturbance, Diarrhea, Nausea and Nausea and Vomiting     Objective:  BP (!) 183/105   Pulse 85   Wt 65.3 kg (144 lb)   SpO2 100%   BMI 22.83 kg/m    Constitutional: Pleasant woman in NAD  Eyes:  nonicteric  Respiratory: Breathing comfortably on RA  Neuro: AOOX3  Psychiatric: normal mood and orientation    Labs:  Last Comprehensive Metabolic Panel:  Sodium   Date Value Ref Range Status   01/13/2022 139 133 - 144 mmol/L Final   06/07/2021 140 133 - 144 mmol/L Final     Potassium   Date Value Ref Range Status   01/13/2022 5.5 (H) 3.4 - 5.3 mmol/L Final   06/07/2021 3.4 3.4 - 5.3 mmol/L Final     Chloride   Date Value Ref Range Status   01/13/2022 109 94 - 109 mmol/L Final   06/07/2021 106 94 - 109 mmol/L Final     Carbon Dioxide   Date Value Ref Range Status   06/07/2021 28 20 - 32 mmol/L Final     Carbon Dioxide (CO2)   Date Value Ref Range Status   01/13/2022 25 20 - 32 mmol/L Final     Anion Gap   Date Value Ref Range Status   01/13/2022 5 3 - 14 mmol/L Final   06/07/2021 6 3 - 14 mmol/L Final     Glucose   Date Value Ref Range Status   01/13/2022 97 70 - 99 mg/dL Final   06/07/2021 138 (H) 70 - 99 mg/dL Final     Urea Nitrogen   Date Value Ref Range Status   01/13/2022 35 (H) 7 - 30 mg/dL Final   06/07/2021 14 7 - 30 mg/dL Final     Creatinine   Date Value Ref Range Status   01/13/2022 1.76 (H) 0.52 - 1.04 mg/dL Final   06/07/2021 0.96 0.52 - 1.04 mg/dL Final     GFR Estimate   Date Value Ref Range Status   01/13/2022 34 (L) >60 mL/min/1.73m2 Final     Comment:     Effective December 21, 2021 eGFRcr in adults is calculated using the 2021 CKD-EPI creatinine equation which includes age and gender (Felix rivera al., NEJM, DOI: 10.1056/RSXPfc6860289)   06/07/2021 68 >60 mL/min/[1.73_m2] Final     Comment:     Non  GFR Calc  Starting 12/18/2018, serum creatinine based estimated GFR (eGFR) will be   calculated using the Chronic Kidney Disease Epidemiology Collaboration   (CKD-EPI) equation.       Calcium   Date Value Ref Range Status   01/13/2022 9.1 8.5 - 10.1 mg/dL Final   06/07/2021 8.7 8.5 - 10.1 mg/dL Final     Bilirubin Total   Date Value Ref Range Status   01/13/2022 0.3 0.2 - 1.3 mg/dL Final    06/07/2021 5.6 (H) 0.2 - 1.3 mg/dL Final     Alkaline Phosphatase   Date Value Ref Range Status   01/13/2022 158 (H) 40 - 150 U/L Final   06/07/2021 150 40 - 150 U/L Final     ALT   Date Value Ref Range Status   01/13/2022 17 0 - 50 U/L Final   06/07/2021 18 0 - 50 U/L Final     AST   Date Value Ref Range Status   01/13/2022 9 0 - 45 U/L Final   06/07/2021 35 0 - 45 U/L Final     Imaging: Reviewed in EHR    Endoscopy:    12/4/2020 Colonoscopy    Findings:       Hemorrhoids were found on perianal exam.       The terminal ileum appeared normal.       An area of grossly congested mucosa was found in the entire colon.       A 4 mm polyp was found in the cecum. The polyp was sessile. The polyp        was removed with a cold biopsy forceps. Resection and retrieval were        complete.       Non-bleeding internal hemorrhoids were found during retroflexion.    Impressions/Post-Op Diagnosis:       - Hemorrhoids found on perianal exam.       - The examined portion of the ileum was normal.       - Congested mucosa in the entire examined colon.       - One 4 mm polyp in the cecum, removed with a cold biopsy forceps.        Resected and retrieved.       - Non-bleeding internal hemorrhoids.    A) COLON, CECUM, POLYPECTOMY:  1. Colonic mucosa with no diagnostic abnormalities; a lymphoid aggregate is present  2. Negative for serrated change, dysplasia, and malignancy    Assessment/Plan: Ms. 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 This is her first visit back in hepatology clinic.     Post transplant course complicated by CORBIN, biliary anastomotic stricture.     Bx ~ 9 days post transplant showing intrahepatic cholestasis and only mild (3/9) rejection, findings likely 2/2 anastomotic stricture that she underwent ERCP for    Valcyte stopped slightly  early of 3 months due to leukopenia, bactrim also stopped.    BP elevated in clinic today    Renal sparing immunosuppresion: Tacrolimus 7/7 with goal 6-8, MMF wean (taking 500 mg BID, recommended weaning by 250 mg BID each week)  PPx: Stopped valcyte early, cmr pcr negative. Holding bactrim for leukopenia  HTN: start amlodipine 5 mg daily given persistently elevated BP, recommend checking at home with goal < 140/90. Would not start ACEi now given issues with high K  CORBIN: Follow up with nephrology ordered  AUD: Doing well with sobriety  Potential COVID exposure 6 days ago, recommend testing. She is asymptomatic but would consider monoclonal abx if positive. She has received COVID vaccine x 3 and had COVID fall 2021.   Recommend finishing shingrix once off MMF    RV 4 months    Fadia Avila MD MSc  Transplant Hepatology  [facility]    Mammogram 2/2021 that showed skin thickening likely related to underlying cirrhosis, potential asymmetry in the upper posterior right breast seen on MLO view only, recommended further evaluation. Additional imaging showed grouped coarse calcifications. Underwent bx suspicious calcifications that were sampled, pathology resulted as hyalinized fibroadenoma with stromal calcifications, negative for atypic and malignancy. Recommend repeat mammogram annually.     Pap smear 3/2021 normal    Approximately 25 minutes was spent for the visit with 25 minutes of non face-to-face time were spent in review of the patient's medical record on the day of the visit. This included review of previous: clinic visits, hospital records, lab results, imaging studies, and documentation.  The findings from this review are summarized in the above note.

## 2022-01-17 NOTE — TELEPHONE ENCOUNTER
Patient Call: General  Route to LPN    Reason for call: Patient thought they might have been exposed to covid but had no symptoms. Their at-home was positive and were told by the doctor that they could get some medication but they would need to speak with the coordinator first. Wants to get prescription from doctor and wants to know next steps.    Call back needed? Yes    Return Call Needed  Same as documented in contacts section  When to return call?: Same day: Route High Priority

## 2022-01-18 ENCOUNTER — TELEPHONE (OUTPATIENT)
Dept: TRANSPLANT | Facility: CLINIC | Age: 52
End: 2022-01-18
Payer: COMMERCIAL

## 2022-01-18 DIAGNOSIS — D70.9 NEUTROPENIA (H): Primary | ICD-10-CM

## 2022-01-18 DIAGNOSIS — Z94.4 LIVER TRANSPLANT RECIPIENT (H): ICD-10-CM

## 2022-01-18 NOTE — TELEPHONE ENCOUNTER
Left message for patient to return call, but informed would send [employer]hart message with Dept of health website.     Pt encouraged to call back with symptoms or changes. Informed monoclonal antibody appointments are harder to come by and it might not be available.     Message to Dr beckham.

## 2022-01-18 NOTE — TELEPHONE ENCOUNTER
Patient Call: Maria Elena wanted to discuss plan of care       Call back needed? Yes    Return Call Needed  Same as documented in contacts section  When to return call?: Greater than one day: Route standard priority

## 2022-01-18 NOTE — TELEPHONE ENCOUNTER
Thanks for letting me know - yea she mentioned it near the end and I told her she should test and get monoclonal antibodies if available

## 2022-01-19 RX ORDER — MYCOPHENOLATE MOFETIL 250 MG/1
250 CAPSULE ORAL 2 TIMES DAILY
Qty: 67 CAPSULE | Refills: 0 | COMMUNITY
Start: 2022-01-19 | End: 2023-08-16

## 2022-01-19 NOTE — TELEPHONE ENCOUNTER
Pt's WBC remains low and is doing cellcept wean. Pt currently taking 500 mg BID. Pt to decrease to 250 mg BID and repeat labs in 1 week.

## 2022-01-21 LAB — PETH BLD-MCNC: NEGATIVE NG/ML

## 2022-01-31 ENCOUNTER — LAB (OUTPATIENT)
Dept: LAB | Facility: CLINIC | Age: 52
End: 2022-01-31
Payer: COMMERCIAL

## 2022-01-31 DIAGNOSIS — Z94.4 LIVER REPLACED BY TRANSPLANT (H): ICD-10-CM

## 2022-01-31 DIAGNOSIS — N18.32 CHRONIC KIDNEY DISEASE, STAGE 3B (H): ICD-10-CM

## 2022-01-31 LAB
ALBUMIN SERPL-MCNC: 3.2 G/DL (ref 3.4–5)
ALP SERPL-CCNC: 190 U/L (ref 40–150)
ALT SERPL W P-5'-P-CCNC: 42 U/L (ref 0–50)
ANION GAP SERPL CALCULATED.3IONS-SCNC: 5 MMOL/L (ref 3–14)
AST SERPL W P-5'-P-CCNC: 34 U/L (ref 0–45)
BILIRUB DIRECT SERPL-MCNC: 0.2 MG/DL (ref 0–0.2)
BILIRUB SERPL-MCNC: 0.2 MG/DL (ref 0.2–1.3)
BUN SERPL-MCNC: 30 MG/DL (ref 7–30)
CALCIUM SERPL-MCNC: 9.3 MG/DL (ref 8.5–10.1)
CHLORIDE BLD-SCNC: 109 MMOL/L (ref 94–109)
CO2 SERPL-SCNC: 27 MMOL/L (ref 20–32)
CREAT SERPL-MCNC: 1.47 MG/DL (ref 0.52–1.04)
ERYTHROCYTE [DISTWIDTH] IN BLOOD BY AUTOMATED COUNT: 12.7 % (ref 10–15)
GFR SERPL CREATININE-BSD FRML MDRD: 43 ML/MIN/1.73M2
GLUCOSE BLD-MCNC: 102 MG/DL (ref 70–99)
HCT VFR BLD AUTO: 32.5 % (ref 35–47)
HGB BLD-MCNC: 10.2 G/DL (ref 11.7–15.7)
MAGNESIUM SERPL-MCNC: 2.1 MG/DL (ref 1.6–2.3)
MCH RBC QN AUTO: 31.2 PG (ref 26.5–33)
MCHC RBC AUTO-ENTMCNC: 31.4 G/DL (ref 31.5–36.5)
MCV RBC AUTO: 99 FL (ref 78–100)
PHOSPHATE SERPL-MCNC: 4.7 MG/DL (ref 2.5–4.5)
PLATELET # BLD AUTO: 234 10E3/UL (ref 150–450)
POTASSIUM BLD-SCNC: 4.5 MMOL/L (ref 3.4–5.3)
PROT SERPL-MCNC: 6.4 G/DL (ref 6.8–8.8)
PTH-INTACT SERPL-MCNC: 38 PG/ML (ref 18–80)
RBC # BLD AUTO: 3.27 10E6/UL (ref 3.8–5.2)
SODIUM SERPL-SCNC: 141 MMOL/L (ref 133–144)
TACROLIMUS BLD-MCNC: 12.2 UG/L (ref 5–15)
TME LAST DOSE: NORMAL H
TME LAST DOSE: NORMAL H
WBC # BLD AUTO: 4.6 10E3/UL (ref 4–11)

## 2022-01-31 PROCEDURE — 84100 ASSAY OF PHOSPHORUS: CPT

## 2022-01-31 PROCEDURE — 80197 ASSAY OF TACROLIMUS: CPT

## 2022-01-31 PROCEDURE — 82248 BILIRUBIN DIRECT: CPT

## 2022-01-31 PROCEDURE — 83970 ASSAY OF PARATHORMONE: CPT

## 2022-01-31 PROCEDURE — 85027 COMPLETE CBC AUTOMATED: CPT

## 2022-01-31 PROCEDURE — 80053 COMPREHEN METABOLIC PANEL: CPT

## 2022-01-31 PROCEDURE — 36415 COLL VENOUS BLD VENIPUNCTURE: CPT

## 2022-01-31 PROCEDURE — 83735 ASSAY OF MAGNESIUM: CPT

## 2022-02-02 ENCOUNTER — VIRTUAL VISIT (OUTPATIENT)
Dept: PHARMACY | Facility: CLINIC | Age: 52
End: 2022-02-02
Payer: COMMERCIAL

## 2022-02-02 VITALS — DIASTOLIC BLOOD PRESSURE: 93 MMHG | SYSTOLIC BLOOD PRESSURE: 142 MMHG

## 2022-02-02 DIAGNOSIS — E03.8 SUBCLINICAL HYPOTHYROIDISM: ICD-10-CM

## 2022-02-02 DIAGNOSIS — Z94.4 LIVER TRANSPLANT RECIPIENT (H): ICD-10-CM

## 2022-02-02 DIAGNOSIS — Z94.4 LIVER TRANSPLANT RECIPIENT (H): Primary | ICD-10-CM

## 2022-02-02 DIAGNOSIS — I10 ESSENTIAL HYPERTENSION: ICD-10-CM

## 2022-02-02 DIAGNOSIS — I15.9 SECONDARY HYPERTENSION: ICD-10-CM

## 2022-02-02 DIAGNOSIS — K21.9 GASTROESOPHAGEAL REFLUX DISEASE WITHOUT ESOPHAGITIS: ICD-10-CM

## 2022-02-02 PROCEDURE — 99607 MTMS BY PHARM ADDL 15 MIN: CPT | Performed by: PHARMACIST

## 2022-02-02 PROCEDURE — 99605 MTMS BY PHARM NP 15 MIN: CPT | Performed by: PHARMACIST

## 2022-02-02 RX ORDER — TACROLIMUS 5 MG/1
5 CAPSULE ORAL EVERY 12 HOURS
Qty: 180 CAPSULE | Refills: 3 | Status: SHIPPED | OUTPATIENT
Start: 2022-02-02 | End: 2022-02-24

## 2022-02-02 RX ORDER — TACROLIMUS 1 MG/1
1 CAPSULE ORAL EVERY 12 HOURS
Qty: 360 CAPSULE | Refills: 3 | Status: SHIPPED | OUTPATIENT
Start: 2022-02-02 | End: 2022-02-24

## 2022-02-02 NOTE — PROGRESS NOTES
Medication Therapy Management (MTM) Encounter    ASSESSMENT:                            Medication Adherence/Access: No issues identified    Liver Transplant:   Discussed vaccinations which are due at 6 months post transplant.  Namely her second dose of Shingrix.  She will also be due for Prevnar 13 at 1 year after her Pneumovax 23 dose so after 8/13/2022.  As far as sun exposure goes recommend SPF 50+ long sleeves hats whenever she goes outside.  Rates of skin cancer are quite high in transplant patients.  Kombucha does have a small amount of alcohol in it as well as some probiotic bacteria.  Would recommend avoiding kombucha for this reason.  Magnesium levels are over 2 currently will reduce dose to 400 twice daily.  White blood cells are normal recommend restarting Bactrim.    GERD: Stable.    Hypertension: BP not at goal <140/90. Recommend titrating amlodipine. Will discuss with txp team.     Hypothyroidism: TSH and T4 at goal at last draw. Pending results of most recent draw.     PLAN:                            Txp team....  1. Consider increasing Amlodipine to 10mg at bedtime. BPs still slightly over 140/90. If you like I can follow-up with patient in a few weeks to see how it is going.  2. Bactrim restart WBC normal.     Pt to...  1. Reduce Magnesium to 400mg twice daily.   2. You will be due for April 17th, 2022 for 2nd dose of Shingrix.   3. After 8/13/2022 you will be due for Prevnar 13.   4. Would be safest to avoid Kombucha, there is a small of alcohol in it.   5. Recommend long sleeves, wide brim hats, sunscreen SPF 50+ and annual dermatology reviews. The transplant medications do greatly increase your risk for skin cancer.     Follow-up: as needed    SUBJECTIVE/OBJECTIVE:                          Sarah Fisher is a 51 year old female called for a follow-up visit.  Today's visit is a follow-up MTM visit from 11/4     Reason for visit: 3-4 months post txp.  Patient has questions about, mood should and  sun exposure.    Allergies/ADRs: Reviewed in chart  Past Medical History: Reviewed in chart  Tobacco: She reports that she has never smoked. She has never used smokeless tobacco.  Alcohol: not currently using    Medication Adherence/Access: no issues reported. Missed one dose in the past month,     Liver Transplant:  Current immunosuppressants include Tacrolimus 7mg twice daily, Prednisone 5mg once daily, and Mycophenolate Mofetil 250mg twice daily, tapering.  Pt reports no side effects  Vascular prophylaxis: Aspirin 81mg daily. Denies bleed sx.   Other meds: Ursodiol 300mg twice daily  Transplant date: 10/17/21  Estimated Creatinine Clearance: 46.7 mL/min (A) (based on SCr of 1.47 mg/dL (H)).  PCP prophylaxis: Holding Bactrim for leukopenia.  Supplements: Magnesium 800mg twice daily.    Tx Coordinator: Juan C Bright MD: Dr. Seth, Using Med Card: Yes  Immunizations: annual flu shot 2021; [id number]:  2021; Prevnar 13: unknown; TDaP:  2017; Shingrix: 2021x1, HBV: immunity per last titer, COVID: Pfizer-BioNTech 3x2021  Magnesium   Date Value Ref Range Status   01/31/2022 2.1 1.6 - 2.3 mg/dL Final   05/02/2021 1.8 1.6 - 2.3 mg/dL Final     GERD: Current medications include: Protonix (pantoprazole) 40mg twice daily. Pt reports no current symptoms.  Patient feels that current regimen is effective. GIB in 2020, may be due to varices.     Hypertension: Current medications include Amlodipine 5mg at bedtime.  Patient does self-monitor blood pressure. 142/93, 140/90, 144/95, 139/90.  Patient reports no current medication side effects.  BP Readings from Last 3 Encounters:   02/02/22 (!) 142/93   01/17/22 (!) 183/105   01/10/22 (!) 181/93     Hypothyroidism: Patient is taking levothyroxine 50 mcg daily. Patient is having the following symptoms: none. Patient has been tired/ cold for the past year.   TSH   Date Value Ref Range Status   11/09/2021 3.21 0.40 - 4.00 mU/L Final   03/17/2021 5.38 (H) 0.40 - 4.00 mU/L Final      Today's Vitals: BP (!) 142/93   ----------------    I spent 25 minutes with this patient today. I offer these suggestions for consideration by txp team. A copy of the visit note was provided to the patient's provider(s).    The patient was sent via F[employer]box a summary of these recommendations.     Sascha RainesD  [facility] Pharmacist    Phone: [phone]     Telemedicine Visit Details  Type of service:  Telephone visit  Start Time: 9:03 AM  End Time: 9:27 AM  Originating Location (patient location): [place]  Distant Location (provider location):  [facility]     Medication Therapy Recommendations  Essential hypertension    Current Medication: amLODIPine (NORVASC) 5 MG tablet   Rationale: Dose too low - Dosage too low - Effectiveness   Recommendation: Increase Dose   Status: Contact Provider - Awaiting Response         Liver transplant recipient (H)    Current Medication: magnesium oxide (MAG-OX) 400 (241.3 Mg) MG tablet   Rationale: Dose too high - Dosage too high - Safety   Recommendation: Decrease Dose   Status: Accepted per CPA          Rationale: Preventive therapy - Needs additional medication therapy - Indication   Recommendation: Order Vaccine - Shingrix 50 MCG/0.5ML Susr   Status: Patient Agreed - Adherence/Education          Rationale: Untreated condition - Needs additional medication therapy - Indication   Recommendation: Start Medication - sulfamethoxazole-trimethoprim 400-80 MG tablet   Status: Contact Provider - Awaiting Response

## 2022-02-02 NOTE — TELEPHONE ENCOUNTER
Left a message to change tacro to 6mg BID, recheck labs in two weeks, and restart bactrim daily and to call with confirmation.

## 2022-02-02 NOTE — TELEPHONE ENCOUNTER
ISSUE:   Tacrolimus IR level 12.2 on 1/31, goal 5-8, dose 7 mg BID. Restart bactrim    PLAN:   Please call patient and confirm this was an accurate 12-hour trough. Verify Tacrolimus IR dose 7 mg BID. Confirm no new medications or illness. Confirm no missed doses. If accurate trough and accurate dose, decrease Tacrolimus IR dose to 6 mg BID and restart bactrim and repeat labs in 2 weeks    OUTCOME:   Spoke with patient, they confirm accurate trough level and current dose 7 mg BID. Patient confirmed dose change to 6 mg BID and to repeat labs in 2 weeks. Orders sent to preferred pharmacy for dose change and lab for repeat labs. Patient voiced understanding of plan.     Instructed pt to restart bactrim one tab daily. Pt states that Ming suggested increasing the amlodipine to 10mg @ hs. Suggested that pt discuss with PCP for Bp management even though Dr. Beltran had originally prescribed. Pt will.     Alexandrea Peter LPN

## 2022-02-07 ENCOUNTER — LAB (OUTPATIENT)
Dept: LAB | Facility: CLINIC | Age: 52
End: 2022-02-07
Payer: COMMERCIAL

## 2022-02-07 DIAGNOSIS — D70.9 NEUTROPENIA (H): ICD-10-CM

## 2022-02-07 DIAGNOSIS — Z94.4 LIVER REPLACED BY TRANSPLANT (H): ICD-10-CM

## 2022-02-07 LAB
ALBUMIN SERPL-MCNC: 3.6 G/DL (ref 3.4–5)
ALP SERPL-CCNC: 248 U/L (ref 40–150)
ALT SERPL W P-5'-P-CCNC: 69 U/L (ref 0–50)
ANION GAP SERPL CALCULATED.3IONS-SCNC: 4 MMOL/L (ref 3–14)
AST SERPL W P-5'-P-CCNC: 44 U/L (ref 0–45)
BASOPHILS # BLD AUTO: 0.1 10E3/UL (ref 0–0.2)
BASOPHILS NFR BLD AUTO: 2 %
BILIRUB DIRECT SERPL-MCNC: 0.2 MG/DL (ref 0–0.2)
BILIRUB SERPL-MCNC: 0.4 MG/DL (ref 0.2–1.3)
BUN SERPL-MCNC: 39 MG/DL (ref 7–30)
CALCIUM SERPL-MCNC: 9.5 MG/DL (ref 8.5–10.1)
CHLORIDE BLD-SCNC: 108 MMOL/L (ref 94–109)
CO2 SERPL-SCNC: 26 MMOL/L (ref 20–32)
CREAT SERPL-MCNC: 1.64 MG/DL (ref 0.52–1.04)
EOSINOPHIL # BLD AUTO: 0.4 10E3/UL (ref 0–0.7)
EOSINOPHIL NFR BLD AUTO: 8 %
ERYTHROCYTE [DISTWIDTH] IN BLOOD BY AUTOMATED COUNT: 12.2 % (ref 10–15)
GFR SERPL CREATININE-BSD FRML MDRD: 37 ML/MIN/1.73M2
GLUCOSE BLD-MCNC: 104 MG/DL (ref 70–99)
HCT VFR BLD AUTO: 33.3 % (ref 35–47)
HGB BLD-MCNC: 10.4 G/DL (ref 11.7–15.7)
LYMPHOCYTES # BLD AUTO: 1.2 10E3/UL (ref 0.8–5.3)
LYMPHOCYTES NFR BLD AUTO: 23 %
MAGNESIUM SERPL-MCNC: 1.4 MG/DL (ref 1.8–2.6)
MCH RBC QN AUTO: 30.8 PG (ref 26.5–33)
MCHC RBC AUTO-ENTMCNC: 31.2 G/DL (ref 31.5–36.5)
MCV RBC AUTO: 99 FL (ref 78–100)
MONOCYTES # BLD AUTO: 0.7 10E3/UL (ref 0–1.3)
MONOCYTES NFR BLD AUTO: 13 %
NEUTROPHILS # BLD AUTO: 2.8 10E3/UL (ref 1.6–8.3)
NEUTROPHILS NFR BLD AUTO: 53 %
PHOSPHATE SERPL-MCNC: 4.5 MG/DL (ref 2.5–4.5)
PLATELET # BLD AUTO: 203 10E3/UL (ref 150–450)
POTASSIUM BLD-SCNC: 4.3 MMOL/L (ref 3.4–5.3)
PROT SERPL-MCNC: 7.2 G/DL (ref 6.8–8.8)
RBC # BLD AUTO: 3.38 10E6/UL (ref 3.8–5.2)
SODIUM SERPL-SCNC: 138 MMOL/L (ref 133–144)
TACROLIMUS BLD-MCNC: 10.6 UG/L (ref 5–15)
TME LAST DOSE: NORMAL H
TME LAST DOSE: NORMAL H
WBC # BLD AUTO: 5.3 10E3/UL (ref 4–11)

## 2022-02-07 PROCEDURE — 83735 ASSAY OF MAGNESIUM: CPT

## 2022-02-07 PROCEDURE — 80197 ASSAY OF TACROLIMUS: CPT

## 2022-02-07 PROCEDURE — 80053 COMPREHEN METABOLIC PANEL: CPT

## 2022-02-07 PROCEDURE — 84100 ASSAY OF PHOSPHORUS: CPT

## 2022-02-07 PROCEDURE — 82248 BILIRUBIN DIRECT: CPT

## 2022-02-07 PROCEDURE — 85025 COMPLETE CBC W/AUTO DIFF WBC: CPT

## 2022-02-07 PROCEDURE — 36415 COLL VENOUS BLD VENIPUNCTURE: CPT

## 2022-02-07 RX ORDER — AMLODIPINE BESYLATE 10 MG/1
10 TABLET ORAL DAILY
Qty: 90 TABLET | Refills: 1 | Status: SHIPPED | OUTPATIENT
Start: 2022-02-07 | End: 2022-07-07

## 2022-02-07 NOTE — PROGRESS NOTES
Fadia Avila MD Griffin, Peter Alberto, [facility]  Hi -     Yes - sounds good for both             Previous Messages       ----- Message -----   From: Ming Archer [facility]   Sent: 2/2/2022   9:38 AM CST   To: MD Dr. Austin Slaughter,     I saw patient for a medication review, I have several recommendations:     1. Consider increasing Amlodipine to 10mg at bedtime. BPs still slightly over 140/90. If you like I can follow-up with patient in a few weeks to see how it is going.   2. Bactrim restart as WBC are normal. Likely improved after Valcyte discontinue.       Thanks, let me know!     Ming Archer, PharmD   Transplant MTM Pharmacist     Phone: [phone]         Will follow-up with patient on 2/25 at 1:30 PM

## 2022-02-09 ENCOUNTER — TELEPHONE (OUTPATIENT)
Dept: TRANSPLANT | Facility: CLINIC | Age: 52
End: 2022-02-09
Payer: COMMERCIAL

## 2022-02-09 NOTE — TELEPHONE ENCOUNTER
Pt states that she itching front and back of torso, both arms and arm pits, upper legs that started two days ago. Not redness but just itching. Started amlodipine and restarted bactrim on 2/7. Please advise.

## 2022-02-11 ENCOUNTER — LAB (OUTPATIENT)
Dept: LAB | Facility: CLINIC | Age: 52
End: 2022-02-11
Payer: COMMERCIAL

## 2022-02-11 DIAGNOSIS — D70.9 NEUTROPENIA (H): ICD-10-CM

## 2022-02-11 LAB
BASOPHILS # BLD AUTO: 0.1 10E3/UL (ref 0–0.2)
BASOPHILS NFR BLD AUTO: 1 %
EOSINOPHIL # BLD AUTO: 0.4 10E3/UL (ref 0–0.7)
EOSINOPHIL NFR BLD AUTO: 7 %
IMM GRANULOCYTES # BLD: 0 10E3/UL
IMM GRANULOCYTES NFR BLD: 1 %
LYMPHOCYTES # BLD AUTO: 1.2 10E3/UL (ref 0.8–5.3)
LYMPHOCYTES NFR BLD AUTO: 22 %
MONOCYTES # BLD AUTO: 0.6 10E3/UL (ref 0–1.3)
MONOCYTES NFR BLD AUTO: 11 %
NEUTROPHILS # BLD AUTO: 3.3 10E3/UL (ref 1.6–8.3)
NEUTROPHILS NFR BLD AUTO: 58 %
NRBC # BLD AUTO: 0 10E3/UL
NRBC BLD AUTO-RTO: 0 /100
WBC # BLD AUTO: 5.6 10E3/UL (ref 4–11)

## 2022-02-11 PROCEDURE — 36415 COLL VENOUS BLD VENIPUNCTURE: CPT

## 2022-02-11 PROCEDURE — 85004 AUTOMATED DIFF WBC COUNT: CPT

## 2022-02-11 PROCEDURE — 85048 AUTOMATED LEUKOCYTE COUNT: CPT

## 2022-02-14 ENCOUNTER — LAB (OUTPATIENT)
Dept: LAB | Facility: CLINIC | Age: 52
End: 2022-02-14
Payer: COMMERCIAL

## 2022-02-14 DIAGNOSIS — D70.9 NEUTROPENIA (H): ICD-10-CM

## 2022-02-14 DIAGNOSIS — Z94.4 LIVER REPLACED BY TRANSPLANT (H): ICD-10-CM

## 2022-02-14 DIAGNOSIS — R74.8 ELEVATED ALKALINE PHOSPHATASE LEVEL: ICD-10-CM

## 2022-02-14 LAB
ALBUMIN SERPL-MCNC: 3.2 G/DL (ref 3.4–5)
ALP SERPL-CCNC: 351 U/L (ref 40–150)
ALT SERPL W P-5'-P-CCNC: 49 U/L (ref 0–50)
ANION GAP SERPL CALCULATED.3IONS-SCNC: 4 MMOL/L (ref 3–14)
AST SERPL W P-5'-P-CCNC: 28 U/L (ref 0–45)
BASOPHILS # BLD AUTO: 0.1 10E3/UL (ref 0–0.2)
BASOPHILS NFR BLD AUTO: 2 %
BILIRUB DIRECT SERPL-MCNC: 0.2 MG/DL (ref 0–0.2)
BILIRUB SERPL-MCNC: 0.3 MG/DL (ref 0.2–1.3)
BUN SERPL-MCNC: 36 MG/DL (ref 7–30)
CALCIUM SERPL-MCNC: 9.5 MG/DL (ref 8.5–10.1)
CHLORIDE BLD-SCNC: 108 MMOL/L (ref 94–109)
CO2 SERPL-SCNC: 29 MMOL/L (ref 20–32)
CREAT SERPL-MCNC: 1.59 MG/DL (ref 0.52–1.04)
EOSINOPHIL # BLD AUTO: 0.3 10E3/UL (ref 0–0.7)
EOSINOPHIL NFR BLD AUTO: 7 %
ERYTHROCYTE [DISTWIDTH] IN BLOOD BY AUTOMATED COUNT: 12 % (ref 10–15)
GFR SERPL CREATININE-BSD FRML MDRD: 39 ML/MIN/1.73M2
GLUCOSE BLD-MCNC: 98 MG/DL (ref 70–99)
HCT VFR BLD AUTO: 33.9 % (ref 35–47)
HGB BLD-MCNC: 10.5 G/DL (ref 11.7–15.7)
LYMPHOCYTES # BLD AUTO: 1.3 10E3/UL (ref 0.8–5.3)
LYMPHOCYTES NFR BLD AUTO: 27 %
MAGNESIUM SERPL-MCNC: 1.6 MG/DL (ref 1.6–2.3)
MCH RBC QN AUTO: 31.1 PG (ref 26.5–33)
MCHC RBC AUTO-ENTMCNC: 31 G/DL (ref 31.5–36.5)
MCV RBC AUTO: 100 FL (ref 78–100)
MONOCYTES # BLD AUTO: 0.6 10E3/UL (ref 0–1.3)
MONOCYTES NFR BLD AUTO: 13 %
NEUTROPHILS # BLD AUTO: 2.5 10E3/UL (ref 1.6–8.3)
NEUTROPHILS NFR BLD AUTO: 51 %
PHOSPHATE SERPL-MCNC: 5 MG/DL (ref 2.5–4.5)
PLATELET # BLD AUTO: 212 10E3/UL (ref 150–450)
POTASSIUM BLD-SCNC: 4.5 MMOL/L (ref 3.4–5.3)
PROT SERPL-MCNC: 7 G/DL (ref 6.8–8.8)
RBC # BLD AUTO: 3.38 10E6/UL (ref 3.8–5.2)
SODIUM SERPL-SCNC: 141 MMOL/L (ref 133–144)
TACROLIMUS BLD-MCNC: 8.3 UG/L (ref 5–15)
TME LAST DOSE: NORMAL H
TME LAST DOSE: NORMAL H
WBC # BLD AUTO: 4.8 10E3/UL (ref 4–11)
WBC # BLD AUTO: 4.8 10E3/UL (ref 4–11)

## 2022-02-14 PROCEDURE — 83735 ASSAY OF MAGNESIUM: CPT

## 2022-02-14 PROCEDURE — 82248 BILIRUBIN DIRECT: CPT

## 2022-02-14 PROCEDURE — 82977 ASSAY OF GGT: CPT

## 2022-02-14 PROCEDURE — 80053 COMPREHEN METABOLIC PANEL: CPT

## 2022-02-14 PROCEDURE — 36415 COLL VENOUS BLD VENIPUNCTURE: CPT

## 2022-02-14 PROCEDURE — 85025 COMPLETE CBC W/AUTO DIFF WBC: CPT

## 2022-02-14 PROCEDURE — 80197 ASSAY OF TACROLIMUS: CPT

## 2022-02-14 PROCEDURE — 84100 ASSAY OF PHOSPHORUS: CPT

## 2022-02-15 ENCOUNTER — E-VISIT (OUTPATIENT)
Dept: PEDIATRICS | Facility: CLINIC | Age: 52
End: 2022-02-15
Payer: COMMERCIAL

## 2022-02-15 DIAGNOSIS — R74.8 ELEVATED ALKALINE PHOSPHATASE LEVEL: ICD-10-CM

## 2022-02-15 DIAGNOSIS — Z94.4 LIVER TRANSPLANT RECIPIENT (H): Primary | ICD-10-CM
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
## 2022-02-15 PROCEDURE — 99207 PR NO BILLABLE SERVICE THIS VISIT: CPT | Performed by: NURSE PRACTITIONER

## 2022-02-15 NOTE — PATIENT INSTRUCTIONS
Thank you for choosing us for your care. I think an in-clinic visit would be best next steps based on your symptoms. Please schedule a clinic appointment; you won t be charged for this eVisit.      You can schedule an appointment right here in [facility], or call [phone]

## 2022-02-15 NOTE — TELEPHONE ENCOUNTER
Called and schedule patient for in-person appointment with extender for 2/16/2021.     Kiana Krishnan, [facility]  [phone]

## 2022-02-16 ENCOUNTER — ANCILLARY PROCEDURE (OUTPATIENT)
Dept: GENERAL RADIOLOGY | Facility: CLINIC | Age: 52
End: 2022-02-16
Attending: NURSE PRACTITIONER
Payer: COMMERCIAL

## 2022-02-16 ENCOUNTER — OFFICE VISIT (OUTPATIENT)
Dept: PEDIATRICS | Facility: CLINIC | Age: 52
End: 2022-02-16
Payer: COMMERCIAL

## 2022-02-16 VITALS
BODY MASS INDEX: 23.46 KG/M2 | WEIGHT: 148 LBS | HEART RATE: 86 BPM | TEMPERATURE: 97.4 F | SYSTOLIC BLOOD PRESSURE: 118 MMHG | DIASTOLIC BLOOD PRESSURE: 70 MMHG | OXYGEN SATURATION: 100 %

## 2022-02-16 DIAGNOSIS — M25.521 PAIN OF BOTH ELBOWS: ICD-10-CM

## 2022-02-16 DIAGNOSIS — S39.92XA INJURY OF LOW BACK, INITIAL ENCOUNTER: ICD-10-CM

## 2022-02-16 DIAGNOSIS — M25.522 PAIN OF BOTH ELBOWS: ICD-10-CM

## 2022-02-16 DIAGNOSIS — R93.89 ABNORMAL X-RAY: ICD-10-CM
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
## 2022-02-16 DIAGNOSIS — M43.6 ACUTE MUSCLE STIFFNESS OF NECK: ICD-10-CM

## 2022-02-16 PROCEDURE — 99213 OFFICE O/P EST LOW 20 MIN: CPT | Performed by: NURSE PRACTITIONER

## 2022-02-16 PROCEDURE — 72220 X-RAY EXAM SACRUM TAILBONE: CPT | Performed by: RADIOLOGY

## 2022-02-16 PROCEDURE — 72100 X-RAY EXAM L-S SPINE 2/3 VWS: CPT | Mod: 59 | Performed by: RADIOLOGY

## 2022-02-16 RX ORDER — TRAMADOL HYDROCHLORIDE 50 MG/1
50 TABLET ORAL EVERY 6 HOURS PRN
Qty: 10 TABLET | Refills: 0 | Status: SHIPPED | OUTPATIENT
Start: 2022-02-16 | End: 2022-02-19

## 2022-02-16 RX ORDER — CYCLOBENZAPRINE HCL 5 MG
5 TABLET ORAL
Qty: 10 TABLET | Refills: 0 | Status: SHIPPED | OUTPATIENT
Start: 2022-02-16 | End: 2022-04-12

## 2022-02-16 NOTE — PATIENT INSTRUCTIONS
It was nice seeing you today.    Please let me know if you have any questions regarding today's visit!    Take care,    SANDER Melchor DNP  Family Medicine

## 2022-02-16 NOTE — PROGRESS NOTES
Assessment & Plan     Fall, initial encounter  Injury of low back, initial encounter  Xray ordered to rule out fracture.  Medication given for pain.  Will refer to specialist and PT based on results.  Follow-up as needed  - XR Lumbar Spine 2/3 Views  - XR Sacrum and Coccyx 2 Views  - traMADol (ULTRAM) 50 MG tablet; Take 1 tablet (50 mg) by mouth every 6 hours as needed for severe pain  - diclofenac (VOLTAREN) 1 % topical gel; Apply 2 g topically 4 times daily    Acute muscle stiffness of neck  Musculoskeletal.  Given medication to help with pain.  Follow-up as needed  - traMADol (ULTRAM) 50 MG tablet; Take 1 tablet (50 mg) by mouth every 6 hours as needed for severe pain  - cyclobenzaprine (FLEXERIL) 5 MG tablet; Take 1 tablet (5 mg) by mouth nightly as needed for muscle spasms  - diclofenac (VOLTAREN) 1 % topical gel; Apply 2 g topically 4 times daily    Pain of both elbows  Use OTC medication--Tylenol as needed    Return if symptoms worsen or fail to improve.    Leigh Melchor NP  [facility] ALPHONSE Arredondo is a 51 year old who presents for the following health issues:     HPI     Fall on ice Monday night.   -Point of impact was tailbone and lower back.  Right and left elbow hit ground due to holding onto her dogs leashes.  Neck is stiff from bracing the fall but no head trauma or LOC  -Soreness in areas of injury.  -Been taking Tylenol, not helping with pain.   -Has also iced it, no relief.     Denies sciatica, numbness, tingling, loss of bowel or bladder function or saddle paraesthesia.       Review of Systems   Constitutional, HEENT, cardiovascular, pulmonary, gi and gu systems are negative, except as otherwise noted.      Objective    /70 (BP Location: Right arm, Patient Position: Sitting, Cuff Size: Adult Regular)   Pulse 86   Temp 97.4  F (36.3  C) (Tympanic)   Wt 67.1 kg (148 lb)   SpO2 100%   BMI 23.46 kg/m    Body mass index is 23.46 kg/m .       Physical Exam    GENERAL: healthy, alert and no distress  NECK: no adenopathy, no asymmetry, masses, or scars and thyroid normal to palpation  RESP: lungs clear to auscultation - no rales, rhonchi or wheezes  CV: regular rate and rhythm, normal S1 S2, no S3 or S4, no murmur, click or rub, no peripheral edema and peripheral pulses strong  MS: normal range of motion, tenderness to palpation bilateral low back   RUE exam shows normal strength and muscle mass, no deformities, no erythema, induration, or nodules, no evidence of joint effusion and ROM of all joints is normal   LUE exam shows normal strength and muscle mass, no deformities, no erythema, induration, or nodules, no evidence of joint effusion and ROM of all joints is normal   Neck exam shows normal strength, ROM is normal and tenderness upon palpation.  Comprehensive back pain exam:  Tenderness of bilateral low back, Range of motion not limited by pain and Straight leg raise negative bilaterally  PSYCH: mentation appears normal, affect normal/bright

## 2022-02-18 DIAGNOSIS — R74.8 ELEVATED ALKALINE PHOSPHATASE LEVEL: Primary | ICD-10-CM

## 2022-02-19 LAB — GGT SERPL-CCNC: 224 U/L (ref 0–40)

## 2022-02-22 ENCOUNTER — OFFICE VISIT (OUTPATIENT)
Dept: NEUROSURGERY | Facility: CLINIC | Age: 52
End: 2022-02-22
Attending: NURSE PRACTITIONER
Payer: COMMERCIAL

## 2022-02-22 ENCOUNTER — DOCUMENTATION ONLY (OUTPATIENT)
Dept: TRANSPLANT | Facility: CLINIC | Age: 52
End: 2022-02-22

## 2022-02-22 VITALS
WEIGHT: 148 LBS | HEART RATE: 91 BPM | OXYGEN SATURATION: 100 % | SYSTOLIC BLOOD PRESSURE: 146 MMHG | BODY MASS INDEX: 23.46 KG/M2 | DIASTOLIC BLOOD PRESSURE: 86 MMHG

## 2022-02-22 DIAGNOSIS — R93.89 ABNORMAL X-RAY: ICD-10-CM
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
## 2022-02-22 DIAGNOSIS — M53.3 ACUTE COCCYGEAL PAIN: Primary | ICD-10-CM

## 2022-02-22 PROCEDURE — G0463 HOSPITAL OUTPT CLINIC VISIT: HCPCS

## 2022-02-22 PROCEDURE — 99203 OFFICE O/P NEW LOW 30 MIN: CPT | Performed by: PHYSICIAN ASSISTANT

## 2022-02-22 RX ORDER — METHOCARBAMOL 500 MG/1
500 TABLET, FILM COATED ORAL 4 TIMES DAILY PRN
Qty: 20 TABLET | Refills: 0 | Status: SHIPPED | OUTPATIENT
Start: 2022-02-22 | End: 2023-01-23

## 2022-02-22 ASSESSMENT — PAIN SCALES - GENERAL: PAINLEVEL: SEVERE PAIN (6)

## 2022-02-22 NOTE — LETTER
2/22/2022         RE: Sarah Fisher  [address] [address] [facility] [address]        Dear Colleague,    Thank you for referring your patient, Sarah Fisher, to the [facility] [place]. Please see a copy of my visit note below.    NEUROSURGERY CLINIC CONSULT NOTE     DATE OF VISIT: 2/22/2022     SUBJECTIVE:     Sarah Fisher is a pleasant 51 year old female who presents to the clinic today for consultation on coccygeal pain s/p fall. She is referred to the Neurosurgery Clinic by Dr. Melchor in Primary Care.   Today, she reports a one-week history of symptoms. She describes constant, sharp, aching pain that initiates in the coccygeal region and does not radiate distally nor is this pain accompanied by paresthesia, numbness or perceived weakness. Prolonged walking, standing and sitting aggravate the symptoms, while alleviation is obtained by positional changes. There are no bowel or bladder changes. She denies saddle anesthesia. She has not participated in conservative therapies.          Current Outpatient Medications:      amLODIPine (NORVASC) 10 MG tablet, Take 1 tablet (10 mg) by mouth daily, Disp: 90 tablet, Rfl: 1     aspirin (ASA) 81 MG EC tablet, Take 1 tablet (81 mg) by mouth daily, Disp: 90 tablet, Rfl: 3     cyclobenzaprine (FLEXERIL) 5 MG tablet, Take 1 tablet (5 mg) by mouth nightly as needed for muscle spasms, Disp: 10 tablet, Rfl: 0     diclofenac (VOLTAREN) 1 % topical gel, Apply 2 g topically 4 times daily, Disp: 50 g, Rfl: 0     levothyroxine (SYNTHROID/LEVOTHROID) 50 MCG tablet, Take 1 tablet (50 mcg) by mouth daily, Disp: 90 tablet, Rfl: 3     magnesium oxide (MAG-OX) 400 (241.3 Mg) MG tablet, Take 2 tablets (800 mg) by mouth every morning AND 2 tablets (800 mg) every evening. (Patient taking differently: Take 1 tablet (400mg) twice daily.), Disp: 360 tablet, Rfl: 3     methocarbamol (ROBAXIN) 500 MG tablet, Take 1 tablet (500 mg) by mouth 4  times daily as needed for muscle spasms, Disp: 20 tablet, Rfl: 0     mycophenolate (GENERIC EQUIVALENT) 250 MG capsule, Take 1 capsule (250 mg) by mouth 2 times daily Weaning off, Disp: 67 capsule, Rfl: 0     ondansetron (ZOFRAN) 4 MG tablet, Take 1 tablet (4 mg) by mouth every 6 hours as needed for nausea, Disp: 20 tablet, Rfl: 0     pantoprazole (PROTONIX) 40 MG EC tablet, Take 1 tablet (40 mg) by mouth 2 times daily, Disp: 180 tablet, Rfl: 3     predniSONE (DELTASONE) 5 MG tablet, Take 1 tablet (5 mg) by mouth daily, Disp: 60 tablet, Rfl: 3     sulfamethoxazole-trimethoprim (BACTRIM) 400-80 MG tablet, Take 1 tablet by mouth daily Restarted on , patient to take until , Disp: 90 tablet, Rfl: 3     tacrolimus (GENERIC EQUIVALENT) 1 MG capsule, Take 1 capsule (1 mg) by mouth every 12 hours Total dose 6mg BID, Disp: 360 capsule, Rfl: 3     tacrolimus (GENERIC EQUIVALENT) 5 MG capsule, Take 1 capsule (5 mg) by mouth every 12 hours Total dose 6mg BID, Disp: 180 capsule, Rfl: 3     ursodiol (ACTIGALL) 300 MG capsule, Take 1 capsule (300 mg) by mouth 2 times daily, Disp: 180 capsule, Rfl: 3     Allergies   Allergen Reactions     Amoxicillin GI Disturbance, Diarrhea, Nausea and Nausea and Vomiting        Past Medical History:   Diagnosis Date     Ascites      History of blood transfusion      Liver cirrhosis (H)      Thyroid disease         ROS: 10 point review of symptoms are negative other than the symptoms noted above in the HPI.     Family History has been reviewed with the patient, there are no pertinent findings to presenting concern.     Past Surgical History:   Procedure Laterality Date      SECTION       CV RIGHT HEART CATH MEASUREMENTS RECORDED N/A 2021    Procedure: CV RIGHT HEART CATH;  Surgeon: Joseph Guevara MD;  Location:  HEART CARDIAC CATH LAB     ENDOSCOPIC RETROGRADE CHOLANGIOPANCREATOGRAM N/A 10/25/2021    Procedure: ENDOSCOPIC RETROGRADE  CHOLANGIOPANCREATOGRAPHY, WITH biliary sphincterotomy, biliary dilation, and biliary stent placement;  Surgeon: Guru Dominguez Fenton MD;  Location: [facility] OR     ENDOSCOPIC RETROGRADE CHOLANGIOPANCREATOGRAPHY, EXCHANGE TUBE/STENT N/A 2021    Procedure: ENDOSCOPIC RETROGRADE CHOLANGIOPANCREATOGRAPHY, WITH biliary sphincterotomy, stricture dilation, stent exchange;  Surgeon: Guru Dominguez Fenton MD;  Location: [facility] OR     ESOPHAGOSCOPY, GASTROSCOPY, DUODENOSCOPY (EGD), COMBINED N/A 10/7/2021    Procedure: ESOPHAGOGASTRODUODENOSCOPY (EGD) with hemostasis;  Surgeon: Fox Jerry MD;  Location:  GI     ESOPHAGOSCOPY, GASTROSCOPY, DUODENOSCOPY (EGD), COMBINED N/A 10/22/2021    Procedure: ESOPHAGOGASTRODUODENOSCOPY, WITH BIOPSY;  Surgeon: Fadia Avila MD;  Location: [facility] GI     IR LIVER BIOPSY PERCUTANEOUS  10/26/2021     IR THORACENTESIS  2021     IR THORACENTESIS  2021     THORACENTESIS Left 2021    Procedure: THORACENTESIS;  Surgeon: Almas Irby MD;  Location: [facility] GI     THORACENTESIS N/A 2021    Procedure: THORACENTESIS;  Surgeon: Almas Irby MD;  Location: [facility] GI     THORACENTESIS N/A 03/10/2021    Procedure: THORACENTESIS;  Surgeon: Almas Irby MD;  Location:  GI     THORACENTESIS Left 2021    Procedure: THORACENTESIS;  Surgeon: Kennedi Chavez MD;  Location: [facility] OR     THORACENTESIS Left 2021    Procedure: THORACENTESIS;  Surgeon: Jess Ansari PA-C;  Location: [facility] [place]     TRANSPLANT LIVER RECIPIENT  DONOR N/A 10/17/2021    Procedure: TRANSPLANT, LIVER, RECIPIENT,  DONOR;  Surgeon: Chirstian Morrison MD;  Location:  OR        Social History     Tobacco Use     Smoking status: Never Smoker     Smokeless tobacco: Never Used   Substance Use Topics     Alcohol use: Not Currently     Comment: Quit on 2020     Drug use: Not Currently        OBJECTIVE:   BP (!) 146/86   Pulse 91   Wt 148 lb (67.1 kg)   SpO2 100%    BMI 23.46 kg/m     Body mass index is 23.46 kg/m .     Imaging:     XR LUMBAR SPINE 2-3 VIEWS 2/16/2022 11:38 AM    Findings, per radiologist read, notable for:      Transitional S1 vertebral body. Normal vertebral body  heights and alignment. Severe L5-S1 interbody degeneration. Mild L5-S1  facet arthropathy. Mild degenerative change of the sacroiliac joints.    XR SACRUM AND COCCYX 2 VW 2/16/2022 11:38 AM    Evidence of a step off at the sacrococcygeal junction  concerning for fracture. No other fracture. Mild degenerative change  of the sacroiliac joints.    Full radiological report in chart. Imaging was reviewed with with patient today.     Exam:     Patient appears comfortable, conversational, and in no apparent distress.   Head: Normocephalic, without obvious abnormality, atraumatic, no facial asymmetry.   Eyes: conjunctivae/corneas clear. PERRL, EOM's intact.   Throat: lips, mucosa, and tongue normal; teeth and gums normal.   Neck: supple, symmetrical, trachea midline, no adenopathy and thyroid: not enlarged, symmetric, no tenderness/mass/nodules.   Lungs: clear to auscultation bilaterally.   Heart: regular rate and rhythm.   Abdomen: soft, non-tender; bowel sounds normal; no masses, no organomegaly.   Pulses: 2+ and symmetric.   Skin: Skin color, texture, turgor normal. No rashes or lesions.     CN II-XII grossly intact, alert and appropriate with conversation and following commands.   Gait is non-antalgic. Able to tandem walk. Able to walk on toes and heels without difficulty.   Cervical spine is non tender to palpation. Appropriate range of motion of neck, not concerning for lhermitte's phenomenon.   Bilateral bicep 2/4 and tricep reflexes 1/4. Sensation intact throughout upper extremities.     UE muscle strength  Right  Left    Deltoid  5/5  5/5    Biceps  5/5  5/5    Triceps  5/5  5/5    Hand intrinsics  5/5  5/5    Hand grasp  5/5  5/5    Ferro signs  neg  neg      Lumbar spine is non tender to  palpation.  Intact sensation throughout lower extremities.   Bilateral patellar 2/4 and achilles reflex 1/4. Negative for pain with straight leg raise.     LE muscle strength  Right  Left    Iliopsoas (hip flexion)  5/5  5/5    Quad (knee extension)  5/5  5/5    Hamstring (knee flexion)  5/5  5/5    Gastrocnemius (PF)  5/5  5/5    Tibialis Ant. (DF)  5/5  5/5    EHL  5/5  5/5      Negative Babinski bilaterally. Negative for clonus.   Calves are soft and non-tender bilaterally.     ASSESSMENT/PLAN:     Sarah Fisher is a 51 year old female who presents to the clinic for consultation on a one-week history of a constant, sharp, aching pain that initiates in the coccygeal region and does not radiate distally nor is this pain accompanied by paresthesia, numbness or perceived weakness. The patient's most recent imaging was reviewed with her today. It was explained that images show evidence of a step off at the sacrococcygeal junction concerning for fracture, which correlates to today's clinical examination. On exam, she is noted to have appropriate strength, sensation and range of motion. She has not attempted conservative management.     Based on her physical exam, imaging review, and past treatments, we feel that it would be in her best interest to try a conservative approach by participating in a physical therapy program. We also discussed obtaining a coccygeal steroid injection, but she would like to avoid this at this time.      We also discussed signs of a worsening problem that she should seek being evaluated.        Respectfully,     SHANTA Grewal PA-C  [facility] Neurosurgery  [facility]  Tel: [phone]    Exam, imaging, and plan reviewed by Dr. Laurent.       Again, thank you for allowing me to participate in the care of your patient.        Sincerely,        Willian Bingham PA-C

## 2022-02-22 NOTE — PROGRESS NOTES
NEUROSURGERY CLINIC CONSULT NOTE     DATE OF VISIT: 2/22/2022     SUBJECTIVE:     Sarah Fisher is a pleasant 51 year old female who presents to the clinic today for consultation on coccygeal pain s/p fall. She is referred to the Neurosurgery Clinic by Dr. Melchor in Primary Care.   Today, she reports a one-week history of symptoms. She describes constant, sharp, aching pain that initiates in the coccygeal region and does not radiate distally nor is this pain accompanied by paresthesia, numbness or perceived weakness. Prolonged walking, standing and sitting aggravate the symptoms, while alleviation is obtained by positional changes. There are no bowel or bladder changes. She denies saddle anesthesia. She has not participated in conservative therapies.          Current Outpatient Medications:      amLODIPine (NORVASC) 10 MG tablet, Take 1 tablet (10 mg) by mouth daily, Disp: 90 tablet, Rfl: 1     aspirin (ASA) 81 MG EC tablet, Take 1 tablet (81 mg) by mouth daily, Disp: 90 tablet, Rfl: 3     cyclobenzaprine (FLEXERIL) 5 MG tablet, Take 1 tablet (5 mg) by mouth nightly as needed for muscle spasms, Disp: 10 tablet, Rfl: 0     diclofenac (VOLTAREN) 1 % topical gel, Apply 2 g topically 4 times daily, Disp: 50 g, Rfl: 0     levothyroxine (SYNTHROID/LEVOTHROID) 50 MCG tablet, Take 1 tablet (50 mcg) by mouth daily, Disp: 90 tablet, Rfl: 3     magnesium oxide (MAG-OX) 400 (241.3 Mg) MG tablet, Take 2 tablets (800 mg) by mouth every morning AND 2 tablets (800 mg) every evening. (Patient taking differently: Take 1 tablet (400mg) twice daily.), Disp: 360 tablet, Rfl: 3     methocarbamol (ROBAXIN) 500 MG tablet, Take 1 tablet (500 mg) by mouth 4 times daily as needed for muscle spasms, Disp: 20 tablet, Rfl: 0     mycophenolate (GENERIC EQUIVALENT) 250 MG capsule, Take 1 capsule (250 mg) by mouth 2 times daily Weaning off, Disp: 67 capsule, Rfl: 0     ondansetron (ZOFRAN) 4 MG tablet, Take 1 tablet (4 mg) by mouth every 6  hours as needed for nausea, Disp: 20 tablet, Rfl: 0     pantoprazole (PROTONIX) 40 MG EC tablet, Take 1 tablet (40 mg) by mouth 2 times daily, Disp: 180 tablet, Rfl: 3     predniSONE (DELTASONE) 5 MG tablet, Take 1 tablet (5 mg) by mouth daily, Disp: 60 tablet, Rfl: 3     sulfamethoxazole-trimethoprim (BACTRIM) 400-80 MG tablet, Take 1 tablet by mouth daily Restarted on , patient to take until , Disp: 90 tablet, Rfl: 3     tacrolimus (GENERIC EQUIVALENT) 1 MG capsule, Take 1 capsule (1 mg) by mouth every 12 hours Total dose 6mg BID, Disp: 360 capsule, Rfl: 3     tacrolimus (GENERIC EQUIVALENT) 5 MG capsule, Take 1 capsule (5 mg) by mouth every 12 hours Total dose 6mg BID, Disp: 180 capsule, Rfl: 3     ursodiol (ACTIGALL) 300 MG capsule, Take 1 capsule (300 mg) by mouth 2 times daily, Disp: 180 capsule, Rfl: 3     Allergies   Allergen Reactions     Amoxicillin GI Disturbance, Diarrhea, Nausea and Nausea and Vomiting        Past Medical History:   Diagnosis Date     Ascites      History of blood transfusion      Liver cirrhosis (H)      Thyroid disease         ROS: 10 point review of symptoms are negative other than the symptoms noted above in the HPI.     Family History has been reviewed with the patient, there are no pertinent findings to presenting concern.     Past Surgical History:   Procedure Laterality Date      SECTION       CV RIGHT HEART CATH MEASUREMENTS RECORDED N/A 2021    Procedure: CV RIGHT HEART CATH;  Surgeon: Joseph Guevara MD;  Location:  HEART CARDIAC CATH LAB     ENDOSCOPIC RETROGRADE CHOLANGIOPANCREATOGRAM N/A 10/25/2021    Procedure: ENDOSCOPIC RETROGRADE CHOLANGIOPANCREATOGRAPHY, WITH biliary sphincterotomy, biliary dilation, and biliary stent placement;  Surgeon: Guru Dominguez Fenton MD;  Location:  OR     ENDOSCOPIC RETROGRADE CHOLANGIOPANCREATOGRAPHY, EXCHANGE TUBE/STENT N/A 2021    Procedure: ENDOSCOPIC RETROGRADE  CHOLANGIOPANCREATOGRAPHY, WITH biliary sphincterotomy, stricture dilation, stent exchange;  Surgeon: Guru Dominguez Fenton MD;  Location:  OR     ESOPHAGOSCOPY, GASTROSCOPY, DUODENOSCOPY (EGD), COMBINED N/A 10/7/2021    Procedure: ESOPHAGOGASTRODUODENOSCOPY (EGD) with hemostasis;  Surgeon: Fox Jerry MD;  Location:  GI     ESOPHAGOSCOPY, GASTROSCOPY, DUODENOSCOPY (EGD), COMBINED N/A 10/22/2021    Procedure: ESOPHAGOGASTRODUODENOSCOPY, WITH BIOPSY;  Surgeon: Fadia Avila MD;  Location: [facility] GI     IR LIVER BIOPSY PERCUTANEOUS  10/26/2021     IR THORACENTESIS  2021     IR THORACENTESIS  2021     THORACENTESIS Left 2021    Procedure: THORACENTESIS;  Surgeon: Almas Irby MD;  [facility]     THORACENTESIS N/A 2021    Procedure: THORACENTESIS;  Surgeon: Almas Irby MD;  Location: [facility] GI     THORACENTESIS N/A 03/10/2021    Procedure: THORACENTESIS;  Surgeon: Almas Iryb MD;  Location: [facility] GI     THORACENTESIS Left 2021    Procedure: THORACENTESIS;  Surgeon: Kennedi Chavez MD;  Location: [facility] OR     THORACENTESIS Left 2021    Procedure: THORACENTESIS;  Surgeon: Jess Ansari PA-C;  Location: [facility] OR     TRANSPLANT LIVER RECIPIENT  DONOR N/A 10/17/2021    Procedure: TRANSPLANT, LIVER, RECIPIENT,  DONOR;  Surgeon: Christian Morrison MD;  Location:  OR        Social History     Tobacco Use     Smoking status: Never Smoker     Smokeless tobacco: Never Used   Substance Use Topics     Alcohol use: Not Currently     Comment: Quit on 2020     Drug use: Not Currently        OBJECTIVE:   BP (!) 146/86   Pulse 91   Wt 148 lb (67.1 kg)   SpO2 100%   BMI 23.46 kg/m     Body mass index is 23.46 kg/m .     Imaging:     XR LUMBAR SPINE 2-3 VIEWS 2022 11:38 AM    Findings, per radiologist read, notable for:      Transitional S1 vertebral body. Normal vertebral body  heights and alignment. Severe L5-S1 interbody degeneration. Mild  L5-S1  facet arthropathy. Mild degenerative change of the sacroiliac joints.    XR SACRUM AND COCCYX 2 VW 2/16/2022 11:38 AM    Evidence of a step off at the sacrococcygeal junction  concerning for fracture. No other fracture. Mild degenerative change  of the sacroiliac joints.    Full radiological report in chart. Imaging was reviewed with with patient today.     Exam:     Patient appears comfortable, conversational, and in no apparent distress.   Head: Normocephalic, without obvious abnormality, atraumatic, no facial asymmetry.   Eyes: conjunctivae/corneas clear. PERRL, EOM's intact.   Throat: lips, mucosa, and tongue normal; teeth and gums normal.   Neck: supple, symmetrical, trachea midline, no adenopathy and thyroid: not enlarged, symmetric, no tenderness/mass/nodules.   Lungs: clear to auscultation bilaterally.   Heart: regular rate and rhythm.   Abdomen: soft, non-tender; bowel sounds normal; no masses, no organomegaly.   Pulses: 2+ and symmetric.   Skin: Skin color, texture, turgor normal. No rashes or lesions.     CN II-XII grossly intact, alert and appropriate with conversation and following commands.   Gait is non-antalgic. Able to tandem walk. Able to walk on toes and heels without difficulty.   Cervical spine is non tender to palpation. Appropriate range of motion of neck, not concerning for lhermitte's phenomenon.   Bilateral bicep 2/4 and tricep reflexes 1/4. Sensation intact throughout upper extremities.     UE muscle strength  Right  Left    Deltoid  5/5  5/5    Biceps  5/5  5/5    Triceps  5/5  5/5    Hand intrinsics  5/5  5/5    Hand grasp  5/5  5/5    Ferro signs  neg  neg      Lumbar spine is non tender to palpation.  Intact sensation throughout lower extremities.   Bilateral patellar 2/4 and achilles reflex 1/4. Negative for pain with straight leg raise.     LE muscle strength  Right  Left    Iliopsoas (hip flexion)  5/5  5/5    Quad (knee extension)  5/5  5/5    Hamstring (knee flexion)  5/5   5/5    Gastrocnemius (PF)  5/5  5/5    Tibialis Ant. (DF)  5/5  5/5    EHL  5/5  5/5      Negative Babinski bilaterally. Negative for clonus.   Calves are soft and non-tender bilaterally.     ASSESSMENT/PLAN:     Sarah Fisher is a 51 year old female who presents to the clinic for consultation on a one-week history of a constant, sharp, aching pain that initiates in the coccygeal region and does not radiate distally nor is this pain accompanied by paresthesia, numbness or perceived weakness. The patient's most recent imaging was reviewed with her today. It was explained that images show evidence of a step off at the sacrococcygeal junction concerning for fracture, which correlates to today's clinical examination. On exam, she is noted to have appropriate strength, sensation and range of motion. She has not attempted conservative management.     Based on her physical exam, imaging review, and past treatments, we feel that it would be in her best interest to try a conservative approach by participating in a physical therapy program. We also discussed obtaining a coccygeal steroid injection, but she would like to avoid this at this time.      We also discussed signs of a worsening problem that she should seek being evaluated.        Respectfully,     SHANTA Grewal, RICHARD  [facility] Neurosurgery  [facility]  Tel: [phone]    Exam, imaging, and plan reviewed by Dr. Laurent.

## 2022-02-23 ENCOUNTER — LAB (OUTPATIENT)
Dept: LAB | Facility: CLINIC | Age: 52
End: 2022-02-23
Payer: COMMERCIAL

## 2022-02-23 DIAGNOSIS — Z94.4 LIVER REPLACED BY TRANSPLANT (H): ICD-10-CM

## 2022-02-23 DIAGNOSIS — K70.31 ALCOHOLIC CIRRHOSIS OF LIVER WITH ASCITES (H): Primary | ICD-10-CM

## 2022-02-23 DIAGNOSIS — Z13.220 LIPID SCREENING: ICD-10-CM

## 2022-02-23 LAB
ALBUMIN SERPL-MCNC: 3.7 G/DL (ref 3.5–5)
ALP SERPL-CCNC: 433 U/L (ref 45–120)
ALT SERPL W P-5'-P-CCNC: 24 U/L (ref 0–45)
ANION GAP SERPL CALCULATED.3IONS-SCNC: 11 MMOL/L (ref 5–18)
AST SERPL W P-5'-P-CCNC: 21 U/L (ref 0–40)
BILIRUB DIRECT SERPL-MCNC: 0.2 MG/DL
BILIRUB SERPL-MCNC: 0.5 MG/DL (ref 0–1)
BUN SERPL-MCNC: 38 MG/DL (ref 8–22)
CALCIUM SERPL-MCNC: 10.2 MG/DL (ref 8.5–10.5)
CHLORIDE BLD-SCNC: 102 MMOL/L (ref 98–107)
CO2 SERPL-SCNC: 26 MMOL/L (ref 22–31)
CREAT SERPL-MCNC: 1.65 MG/DL (ref 0.6–1.1)
ERYTHROCYTE [DISTWIDTH] IN BLOOD BY AUTOMATED COUNT: 11.9 % (ref 10–15)
GFR SERPL CREATININE-BSD FRML MDRD: 37 ML/MIN/1.73M2
GLUCOSE BLD-MCNC: 100 MG/DL (ref 70–125)
HCT VFR BLD AUTO: 34.7 % (ref 35–47)
HGB BLD-MCNC: 11 G/DL (ref 11.7–15.7)
MAGNESIUM SERPL-MCNC: 1.5 MG/DL (ref 1.8–2.6)
MCH RBC QN AUTO: 30.5 PG (ref 26.5–33)
MCHC RBC AUTO-ENTMCNC: 31.7 G/DL (ref 31.5–36.5)
MCV RBC AUTO: 96 FL (ref 78–100)
PHOSPHATE SERPL-MCNC: 4.4 MG/DL (ref 2.5–4.5)
PLATELET # BLD AUTO: 227 10E3/UL (ref 150–450)
POTASSIUM BLD-SCNC: 5.1 MMOL/L (ref 3.5–5)
PROT SERPL-MCNC: 6.8 G/DL (ref 6–8)
RBC # BLD AUTO: 3.61 10E6/UL (ref 3.8–5.2)
SODIUM SERPL-SCNC: 139 MMOL/L (ref 136–145)
TACROLIMUS BLD-MCNC: 11.2 UG/L (ref 5–15)
TME LAST DOSE: NORMAL H
TME LAST DOSE: NORMAL H
WBC # BLD AUTO: 6.7 10E3/UL (ref 4–11)

## 2022-02-23 PROCEDURE — 84100 ASSAY OF PHOSPHORUS: CPT

## 2022-02-23 PROCEDURE — 80197 ASSAY OF TACROLIMUS: CPT

## 2022-02-23 PROCEDURE — 83735 ASSAY OF MAGNESIUM: CPT

## 2022-02-23 PROCEDURE — 36415 COLL VENOUS BLD VENIPUNCTURE: CPT

## 2022-02-23 PROCEDURE — 80053 COMPREHEN METABOLIC PANEL: CPT

## 2022-02-23 PROCEDURE — 82248 BILIRUBIN DIRECT: CPT

## 2022-02-23 PROCEDURE — 85027 COMPLETE CBC AUTOMATED: CPT

## 2022-02-24 ENCOUNTER — TELEPHONE (OUTPATIENT)
Dept: NEUROSURGERY | Facility: CLINIC | Age: 52
End: 2022-02-24
Payer: COMMERCIAL

## 2022-02-24 ENCOUNTER — MYC MEDICAL ADVICE (OUTPATIENT)
Dept: NEUROSURGERY | Facility: CLINIC | Age: 52
End: 2022-02-24
Payer: COMMERCIAL

## 2022-02-24 DIAGNOSIS — Z94.4 LIVER TRANSPLANT RECIPIENT (H): Primary | ICD-10-CM

## 2022-02-24 DIAGNOSIS — Z94.4 LIVER TRANSPLANT RECIPIENT (H): ICD-10-CM

## 2022-02-24 DIAGNOSIS — M53.3 ACUTE COCCYGEAL PAIN: Primary | ICD-10-CM

## 2022-02-24 RX ORDER — TACROLIMUS 1 MG/1
CAPSULE ORAL
Qty: 360 CAPSULE | Refills: 3 | COMMUNITY
Start: 2022-02-24 | End: 2022-04-20

## 2022-02-24 RX ORDER — TACROLIMUS 5 MG/1
5 CAPSULE ORAL EVERY 12 HOURS
Qty: 180 CAPSULE | Refills: 3 | Status: SHIPPED | OUTPATIENT
Start: 2022-02-24 | End: 2022-04-20 | Stop reason: DRUGHIGH

## 2022-02-24 RX ORDER — METHOCARBAMOL 750 MG/1
750 TABLET, FILM COATED ORAL 4 TIMES DAILY
Qty: 28 TABLET | Refills: 0 | Status: SHIPPED | OUTPATIENT
Start: 2022-02-24 | End: 2022-05-25

## 2022-02-24 NOTE — TELEPHONE ENCOUNTER
ISSUE:   Tacrolimus IR level 11.2 on 2/23, goal 5-8, dose 6 mg BID.    PLAN:   Please call patient and confirm this was an accurate 12-hour trough. Verify Tacrolimus IR dose 6 mg BID. Confirm no new medications or illness. Confirm no missed doses. If accurate trough and accurate dose, decrease Tacrolimus IR dose to 5 mg BID and repeat labs in 1-2 weeks    OUTCOME:   Spoke with patient, they confirm accurate trough level and current dose 6 mg BID. Patient confirmed dose change to 5 mg BID and to repeat labs in 1-2 weeks. Orders sent to preferred pharmacy for dose change and lab for repeat labs. Patient voiced understanding of plan.    Discussed having the future MRI as pt had answers. Informed pt it was ordered due to elevated alk phos and history of biliary stricture.    Alexandrea Peter LPN

## 2022-02-24 NOTE — TELEPHONE ENCOUNTER
Patient seen in clinic and prescribed Robaxin. States it is not helping with her pain and requesting a different muscle relaxer. Her Primary team had prescribed Flexeril to be taken at night. She is unsure if it has been helping.  Will route to Provider for recommendations.

## 2022-02-24 NOTE — TELEPHONE ENCOUNTER
Maria Elena called-in for Otilia with questions since her recent visit, please reach her at [phone].

## 2022-02-24 NOTE — TELEPHONE ENCOUNTER
As per Willian CASON, we can give her flexeril or robaxin at a higher dose. Ultimately she should have pain management via her PCP.   Messaged patient.

## 2022-02-24 NOTE — TELEPHONE ENCOUNTER
Patient requesting to try an increased dose of Methocarbamol.   Order placed and sent to pharmacy.

## 2022-02-25 ENCOUNTER — VIRTUAL VISIT (OUTPATIENT)
Dept: PHARMACY | Facility: CLINIC | Age: 52
End: 2022-02-25
Payer: COMMERCIAL

## 2022-02-25 ENCOUNTER — PRE VISIT (OUTPATIENT)
Dept: NEPHROLOGY | Facility: CLINIC | Age: 52
End: 2022-02-25
Payer: COMMERCIAL

## 2022-02-25 ENCOUNTER — OFFICE VISIT (OUTPATIENT)
Dept: NEPHROLOGY | Facility: CLINIC | Age: 52
End: 2022-02-25
Attending: TRANSPLANT SURGERY
Payer: COMMERCIAL

## 2022-02-25 VITALS
DIASTOLIC BLOOD PRESSURE: 81 MMHG | SYSTOLIC BLOOD PRESSURE: 139 MMHG | OXYGEN SATURATION: 98 % | HEART RATE: 91 BPM | BODY MASS INDEX: 23.4 KG/M2 | TEMPERATURE: 97.4 F | WEIGHT: 147.6 LBS

## 2022-02-25 DIAGNOSIS — Z78.9: ICD-10-CM

## 2022-02-25 DIAGNOSIS — N18.32 CHRONIC KIDNEY DISEASE, STAGE 3B (H): Primary | ICD-10-CM

## 2022-02-25 DIAGNOSIS — K21.9 GASTROESOPHAGEAL REFLUX DISEASE WITHOUT ESOPHAGITIS: Primary | ICD-10-CM

## 2022-02-25 DIAGNOSIS — Z94.4 LIVER TRANSPLANTED (H): ICD-10-CM

## 2022-02-25 DIAGNOSIS — I12.9 HYPERTENSION, RENAL: ICD-10-CM

## 2022-02-25 DIAGNOSIS — I10 ESSENTIAL HYPERTENSION: ICD-10-CM

## 2022-02-25 DIAGNOSIS — E83.42 HYPOMAGNESEMIA: ICD-10-CM

## 2022-02-25 PROCEDURE — 99606 MTMS BY PHARM EST 15 MIN: CPT | Performed by: PHARMACIST

## 2022-02-25 PROCEDURE — 99245 OFF/OP CONSLTJ NEW/EST HI 55: CPT | Mod: GC | Performed by: STUDENT IN AN ORGANIZED HEALTH CARE EDUCATION/TRAINING PROGRAM

## 2022-02-25 ASSESSMENT — PAIN SCALES - GENERAL: PAINLEVEL: NO PAIN (0)

## 2022-02-25 NOTE — TELEPHONE ENCOUNTER
RECORDS RECEIVED FROM: [facility]   DATE RECEIVED: 02.25.2022   NOTES STATUS DETAILS   OFFICE NOTE from referring provider Internal 12.30.2021 Christian Morrison MD   MEDICATION LIST Internal / [facility]    LABS     CBC Internal 02.23.2022   CMP Internal 02.23.2022   BMP Internal 02.23.2022   UA Internal 11.01.2021   URINE PROTEIN Internal 11.01.2021

## 2022-02-25 NOTE — PROGRESS NOTES
Nephrology Clinic Follow Up Visit    Assessment and Plan:     1. CKD 3b  Cr seems to have settled at new baseline of 1.5-1.7 with an eGFR in the 30s. At this level she does not qualify for a safety net kidney transplant. Underling etiology of CKD is likely residual scarring from AKIs while liver had failed. Now focus is on preventing further injury - maintaining good BP, avoiding nephrotoxins, etc. With CNI, next BP medication should probably be a thiazide diuretic and then would consider introducing ACE/ARB at some point if CKD remains stable.  - No changes to management  - RTC in 3-4 months    2. Liver cirrhosis s/p liver transplant 10/17/21    3. Electrolytes  K borderline elevated at 5.1. Continue to monitor.    4. HTN  BP borderline. Goal < 130/80. Currently on amlodipine 10mg daily.  - Recommend checking daily    5. Anemia  Cr 11.0, trending up.    6. CKD-BMD  Ca 10.2, phos 4.4, PTH 28 (1/31/22), Vit D not checked recently. Borderline hypercalcemia - not on Vit D.  - Monitor    Assessment and plan was discussed with patient and she voiced her understanding and agreement.    I discussed the patient's plan of care with Dr. Nicholas.    Mariella Camacho MD  Nephrology Fellow    Reason for Visit:  Sarah Fisher is a 51 year old female with hx of liver cirrhosis due to alcohol use now s/p liver transplant 10/17/21 who presents for follow up of elevated creatinine/CKD.    HPI:  Ms. Fisher was last seen in the kidney transplant clinic on 11/4/21. At that time, Cr was trending down nicely since time of liver transplant. Cr has remained in 1.5-1.7 range since then, however. She overall feels well other than a recently fractured coccyx which has slowed down her physical activity. She is regaining strength and eating/drinking well. No SOB, no chest pain, no n/v/d. No swelling, no rashes.       Home BP: Not checked    ROS:  A comprehensive review of systems was obtained and negative, except as noted in the HPI or  PMH.    Active Medical Problems:  Patient Active Problem List   Diagnosis     Acute deep vein thrombosis (DVT) of upper extremity (H)     Acute hepatic encephalopathy     Acute hypoxemic respiratory failure (H)     Acute kidney failure, unspecified (H)     Carrier of group B Streptococcus     Cirrhosis of liver with ascites (H)     CKD (chronic kidney disease)     Elevated blood pressure     Family history of colon cancer     Gastroesophageal reflux disease without esophagitis     GI (gastrointestinal bleed)     Hyponatremia     Hypokalemia     History of DVT (deep vein thrombosis)     Anemia associated with acute blood loss     Acute anemia     Need for vaccination for viral hepatitis     Macrocytosis without anemia     Knee pain     Pancytopenia (H)     Macrocytic anemia     Blood loss anemia     Hydrothorax     Portal hypertensive gastropathy (H)     Pleural effusion     Screening for malignant neoplasm of cervix     Venous incompetence     Varicose veins of leg with pain     Supervision of high-risk pregnancy of elderly primigravida     Subclinical hypothyroidism     Liver failure (H)     Hypotension     Pre-liver transplant, listed     Alcoholic cirrhosis (H)     Abnormal serum iron level     Iron deficiency anemia due to chronic blood loss     Encounter for immunization     Liver transplant candidate     Liver transplant recipient (H)     Immunosuppressed status (H)     Malnutrition (H)     Steroid-induced hyperglycemia     Epistaxis     Esophagitis     Duodenal erosion     Gastric antral vascular ectasia     Hyperphosphatemia     Leukocytosis     Chronic kidney disease, stage 3b (H)     Low magnesium level       Personal Hx:   Social History     Tobacco Use     Smoking status: Never Smoker     Smokeless tobacco: Never Used   Substance Use Topics     Alcohol use: Not Currently     Comment: Quit on 6/20/2020       Allergies: Reviewed by provider.     Medications:  Current Outpatient Medications   Medication Sig      amLODIPine (NORVASC) 10 MG tablet Take 1 tablet (10 mg) by mouth daily     aspirin (ASA) 81 MG EC tablet Take 1 tablet (81 mg) by mouth daily     cyclobenzaprine (FLEXERIL) 5 MG tablet Take 1 tablet (5 mg) by mouth nightly as needed for muscle spasms     diclofenac (VOLTAREN) 1 % topical gel Apply 2 g topically 4 times daily     levothyroxine (SYNTHROID/LEVOTHROID) 50 MCG tablet Take 1 tablet (50 mcg) by mouth daily     magnesium oxide (MAG-OX) 400 (241.3 Mg) MG tablet Take 2 tablets (800 mg) by mouth every morning AND 2 tablets (800 mg) every evening. (Patient taking differently: Take 1 tablet (400mg) twice daily.)     methocarbamol (ROBAXIN) 500 MG tablet Take 1 tablet (500 mg) by mouth 4 times daily as needed for muscle spasms     methocarbamol (ROBAXIN) 750 MG tablet Take 1 tablet (750 mg) by mouth 4 times daily     mycophenolate (GENERIC EQUIVALENT) 250 MG capsule Take 1 capsule (250 mg) by mouth 2 times daily Weaning off     ondansetron (ZOFRAN) 4 MG tablet Take 1 tablet (4 mg) by mouth every 6 hours as needed for nausea     pantoprazole (PROTONIX) 40 MG EC tablet Take 1 tablet (40 mg) by mouth 2 times daily     predniSONE (DELTASONE) 5 MG tablet Take 1 tablet (5 mg) by mouth daily     sulfamethoxazole-trimethoprim (BACTRIM) 400-80 MG tablet Take 1 tablet by mouth daily Restarted on 2/7, patient to take until 4/17     tacrolimus (GENERIC EQUIVALENT) 5 MG capsule Take 1 capsule (5 mg) by mouth every 12 hours     ursodiol (ACTIGALL) 300 MG capsule Take 1 capsule (300 mg) by mouth 2 times daily     tacrolimus (GENERIC EQUIVALENT) 1 MG capsule Use for dose changes (Patient not taking: Reported on 2/25/2022)     No current facility-administered medications for this visit.       Vitals:  /81   Pulse 91   Temp 97.4  F (36.3  C) (Oral)   Wt 67 kg (147 lb 9.6 oz)   SpO2 98%   BMI 23.40 kg/m      Exam:  GENERAL APPEARANCE: alert and no distress  HENT: mouth without ulcers or lesions  RESP: lungs clear to  auscultation - no rales, rhonchi or wheezes  CV: regular rhythm, normal rate, no rub, no murmur  EDEMA: no LE edema bilaterally  ABDOMEN: soft, nondistended, nontender, bowel sounds normal  MS: extremities normal - no gross deformities noted, no evidence of inflammation in joints, no muscle tenderness  SKIN: no rash    LABS:   CMP  Recent Labs   Lab Test 02/23/22  0913 02/14/22  0842 02/07/22  0934 01/31/22  0851 07/23/21  0854 06/07/21  1214 05/26/21  1659 05/17/21  1229 05/02/21  0846 05/02/21  0106    141 138 141   < > 140 137 135  --  133   POTASSIUM 5.1* 4.5 4.3 4.5   < > 3.4 3.2* 3.4   < > 2.8*   CHLORIDE 102 108 108 109   < > 106 102 100  --  99   CO2 26 29 26 27   < > 28 30 27  --  26   ANIONGAP 11 4 4 5   < > 6 6 8  --  8    98 104* 102*   < > 138* 124* 127*  --  119*   BUN 38* 36* 39* 30   < > 14 13 16  --  15   CR 1.65* 1.59* 1.64* 1.47*   < > 0.96 0.94 1.05*  --  1.15*   GFRESTIMATED 37* 39* 37* 43*   < > 68 70 61  --  55*   GFRESTBLACK  --   --   --   --   --  79 81 71  --  64   EMMY 10.2 9.5 9.5 9.3   < > 8.7 8.7 8.7  --  8.6    < > = values in this interval not displayed.     Recent Labs   Lab Test 02/23/22  0913 02/14/22  0842 02/07/22  0934 01/31/22  0851   BILITOTAL 0.5 0.3 0.4 0.2   ALKPHOS 433* 351* 248* 190*   ALT 24 49 69* 42   AST 21 28 44 34     CBC  Recent Labs   Lab Test 02/23/22  0913 02/14/22  0842 02/11/22  0844 02/07/22  0950 01/31/22  0851   HGB 11.0* 10.5*  --  10.4* 10.2*   WBC 6.7 4.8  4.8 5.6 5.3 4.6   RBC 3.61* 3.38*  --  3.38* 3.27*   HCT 34.7* 33.9*  --  33.3* 32.5*   MCV 96 100  --  99 99   MCH 30.5 31.1  --  30.8 31.2   MCHC 31.7 31.0*  --  31.2* 31.4*   RDW 11.9 12.0  --  12.2 12.7    212  --  203 234     URINE STUDIES  Recent Labs   Lab Test 11/01/21  1253 10/25/21  2123 10/19/21  1438 01/04/21  1020   COLOR Yellow Yellow Yellow Bibi   APPEARANCE Clear Clear Slightly Cloudy* Slightly Cloudy   URINEGLC Negative Negative Negative Negative   URINEBILI  Negative Negative Negative Negative   URINEKETONE Negative Negative Negative Negative   SG 1.015 1.013 1.018 1.019   UBLD Negative Negative Large* Negative   URINEPH 5.0 5.5 5.0 5.0   PROTEIN 30 * 10 * 20 * Negative   NITRITE Negative Negative Negative Negative   LEUKEST Negative Negative Negative Negative   RBCU <1 <1 120* 1   WBCU 1 <1 4 7*     No lab results found.  PTH  Recent Labs   Lab Test 01/31/22  0851   PTHI 38     IRON STUDIES  Recent Labs   Lab Test 10/05/21  1124 09/28/21  1204 07/30/21  1052 05/02/21  0106   IRON  --   --   --  259*   FEB  --   --   --  355   IRONSAT  --   --   --  73*   DARRICK 186 280* 32 72       Lin Camacho MD

## 2022-02-25 NOTE — PATIENT INSTRUCTIONS
Recommendations from today's MTM visit:                                                       1. When you come off of Mycophenolate Mofetil, see if you can taper off Pantoprazole as well.  Discussed with transplant doctor as he has a history of esophagitis.  2. Increase Magnesium to 2 tablets (800mg) twice daily.     Follow-up: as needed    It was great to speak with you today.  I value your experience and would be very thankful for your time with providing feedback on our clinic survey. You may receive a survey via email or text message in the next few days.     To schedule another MTM appointment, please call the clinic directly or you may call the MTM scheduling line at [phone] or toll-free at [phone].     My Clinical Pharmacist's contact information:                                                      Please feel free to contact me with any questions or concerns you have.      Ming Archer, PharmD  MTM Pharmacist    Phone: [phone]

## 2022-02-25 NOTE — NURSING NOTE
Chief Complaint   Patient presents with     Consult       /81   Pulse 91   Temp 97.4  F (36.3  C) (Oral)   Wt 67 kg (147 lb 9.6 oz)   SpO2 98%   BMI 23.40 kg/m      Mu Lemus MA on 2/25/2022 at 10:07 AM

## 2022-02-25 NOTE — LETTER
2/25/2022     RE: Sarah Fisher  [address]  [facility] [address]     Dear Colleague,    Thank you for referring your patient, Sarah Fisher, to the [facility] [place] at [facility]. Please see a copy of my visit note below.    Nephrology Clinic Follow Up Visit    Assessment and Plan:     1. CKD 3b  Cr seems to have settled at new baseline of 1.5-1.7 with an eGFR in the 30s. At this level she does not qualify for a safety net kidney transplant. Underling etiology of CKD is likely residual scarring from AKIs while liver had failed. Now focus is on preventing further injury - maintaining good BP, avoiding nephrotoxins, etc. With CNI, next BP medication should probably be a thiazide diuretic and then would consider introducing ACE/ARB at some point if CKD remains stable.  - No changes to management  - RTC in 3-4 months    2. Liver cirrhosis s/p liver transplant 10/17/21    3. Electrolytes  K borderline elevated at 5.1. Continue to monitor.    4. HTN  BP borderline. Goal < 130/80. Currently on amlodipine 10mg daily.  - Recommend checking daily    5. Anemia  Cr 11.0, trending up.    6. CKD-BMD  Ca 10.2, phos 4.4, PTH 28 (1/31/22), Vit D not checked recently. Borderline hypercalcemia - not on Vit D.  - Monitor    Assessment and plan was discussed with patient and she voiced her understanding and agreement.    I discussed the patient's plan of care with Dr. Nicholas.    Mariella Camacho MD  Nephrology Fellow    Reason for Visit:  Sarah Fisher is a 51 year old female with hx of liver cirrhosis due to alcohol use now s/p liver transplant 10/17/21 who presents for follow up of elevated creatinine/CKD.    HPI:  Ms. Fisher was last seen in the kidney transplant clinic on 11/4/21. At that time, Cr was trending down nicely since time of liver transplant. Cr has remained in 1.5-1.7 range since then, however. She overall feels well other  than a recently fractured coccyx which has slowed down her physical activity. She is regaining strength and eating/drinking well. No SOB, no chest pain, no n/v/d. No swelling, no rashes.       Home BP: Not checked    ROS:  A comprehensive review of systems was obtained and negative, except as noted in the HPI or PMH.    Active Medical Problems:  Patient Active Problem List   Diagnosis     Acute deep vein thrombosis (DVT) of upper extremity (H)     Acute hepatic encephalopathy     Acute hypoxemic respiratory failure (H)     Acute kidney failure, unspecified (H)     Carrier of group B Streptococcus     Cirrhosis of liver with ascites (H)     CKD (chronic kidney disease)     Elevated blood pressure     Family history of colon cancer     Gastroesophageal reflux disease without esophagitis     GI (gastrointestinal bleed)     Hyponatremia     Hypokalemia     History of DVT (deep vein thrombosis)     Anemia associated with acute blood loss     Acute anemia     Need for vaccination for viral hepatitis     Macrocytosis without anemia     Knee pain     Pancytopenia (H)     Macrocytic anemia     Blood loss anemia     Hydrothorax     Portal hypertensive gastropathy (H)     Pleural effusion     Screening for malignant neoplasm of cervix     Venous incompetence     Varicose veins of leg with pain     Supervision of high-risk pregnancy of elderly primigravida     Subclinical hypothyroidism     Liver failure (H)     Hypotension     Pre-liver transplant, listed     Alcoholic cirrhosis (H)     Abnormal serum iron level     Iron deficiency anemia due to chronic blood loss     Encounter for immunization     Liver transplant candidate     Liver transplant recipient (H)     Immunosuppressed status (H)     Malnutrition (H)     Steroid-induced hyperglycemia     Epistaxis     Esophagitis     Duodenal erosion     Gastric antral vascular ectasia     Hyperphosphatemia     Leukocytosis     Chronic kidney disease, stage 3b (H)     Low magnesium  level       Personal Hx:   Social History     Tobacco Use     Smoking status: Never Smoker     Smokeless tobacco: Never Used   Substance Use Topics     Alcohol use: Not Currently     Comment: Quit on 6/20/2020       Allergies: Reviewed by provider.     Medications:  Current Outpatient Medications   Medication Sig     amLODIPine (NORVASC) 10 MG tablet Take 1 tablet (10 mg) by mouth daily     aspirin (ASA) 81 MG EC tablet Take 1 tablet (81 mg) by mouth daily     cyclobenzaprine (FLEXERIL) 5 MG tablet Take 1 tablet (5 mg) by mouth nightly as needed for muscle spasms     diclofenac (VOLTAREN) 1 % topical gel Apply 2 g topically 4 times daily     levothyroxine (SYNTHROID/LEVOTHROID) 50 MCG tablet Take 1 tablet (50 mcg) by mouth daily     magnesium oxide (MAG-OX) 400 (241.3 Mg) MG tablet Take 2 tablets (800 mg) by mouth every morning AND 2 tablets (800 mg) every evening. (Patient taking differently: Take 1 tablet (400mg) twice daily.)     methocarbamol (ROBAXIN) 500 MG tablet Take 1 tablet (500 mg) by mouth 4 times daily as needed for muscle spasms     methocarbamol (ROBAXIN) 750 MG tablet Take 1 tablet (750 mg) by mouth 4 times daily     mycophenolate (GENERIC EQUIVALENT) 250 MG capsule Take 1 capsule (250 mg) by mouth 2 times daily Weaning off     ondansetron (ZOFRAN) 4 MG tablet Take 1 tablet (4 mg) by mouth every 6 hours as needed for nausea     pantoprazole (PROTONIX) 40 MG EC tablet Take 1 tablet (40 mg) by mouth 2 times daily     predniSONE (DELTASONE) 5 MG tablet Take 1 tablet (5 mg) by mouth daily     sulfamethoxazole-trimethoprim (BACTRIM) 400-80 MG tablet Take 1 tablet by mouth daily Restarted on 2/7, patient to take until 4/17     tacrolimus (GENERIC EQUIVALENT) 5 MG capsule Take 1 capsule (5 mg) by mouth every 12 hours     ursodiol (ACTIGALL) 300 MG capsule Take 1 capsule (300 mg) by mouth 2 times daily     tacrolimus (GENERIC EQUIVALENT) 1 MG capsule Use for dose changes (Patient not taking: Reported on  2/25/2022)     No current facility-administered medications for this visit.       Vitals:  /81   Pulse 91   Temp 97.4  F (36.3  C) (Oral)   Wt 67 kg (147 lb 9.6 oz)   SpO2 98%   BMI 23.40 kg/m      Exam:  GENERAL APPEARANCE: alert and no distress  HENT: mouth without ulcers or lesions  RESP: lungs clear to auscultation - no rales, rhonchi or wheezes  CV: regular rhythm, normal rate, no rub, no murmur  EDEMA: no LE edema bilaterally  ABDOMEN: soft, nondistended, nontender, bowel sounds normal  MS: extremities normal - no gross deformities noted, no evidence of inflammation in joints, no muscle tenderness  SKIN: no rash    LABS:   CMP  Recent Labs   Lab Test 02/23/22  0913 02/14/22  0842 02/07/22  0934 01/31/22  0851 07/23/21  0854 06/07/21  1214 05/26/21  1659 05/17/21  1229 05/02/21  0846 05/02/21  0106    141 138 141   < > 140 137 135  --  133   POTASSIUM 5.1* 4.5 4.3 4.5   < > 3.4 3.2* 3.4   < > 2.8*   CHLORIDE 102 108 108 109   < > 106 102 100  --  99   CO2 26 29 26 27   < > 28 30 27  --  26   ANIONGAP 11 4 4 5   < > 6 6 8  --  8    98 104* 102*   < > 138* 124* 127*  --  119*   BUN 38* 36* 39* 30   < > 14 13 16  --  15   CR 1.65* 1.59* 1.64* 1.47*   < > 0.96 0.94 1.05*  --  1.15*   GFRESTIMATED 37* 39* 37* 43*   < > 68 70 61  --  55*   GFRESTBLACK  --   --   --   --   --  79 81 71  --  64   EMMY 10.2 9.5 9.5 9.3   < > 8.7 8.7 8.7  --  8.6    < > = values in this interval not displayed.     Recent Labs   Lab Test 02/23/22  0913 02/14/22  0842 02/07/22  0934 01/31/22  0851   BILITOTAL 0.5 0.3 0.4 0.2   ALKPHOS 433* 351* 248* 190*   ALT 24 49 69* 42   AST 21 28 44 34     CBC  Recent Labs   Lab Test 02/23/22  0913 02/14/22  0842 02/11/22  0844 02/07/22  0950 01/31/22  0851   HGB 11.0* 10.5*  --  10.4* 10.2*   WBC 6.7 4.8  4.8 5.6 5.3 4.6   RBC 3.61* 3.38*  --  3.38* 3.27*   HCT 34.7* 33.9*  --  33.3* 32.5*   MCV 96 100  --  99 99   MCH 30.5 31.1  --  30.8 31.2   MCHC 31.7 31.0*  --  31.2*  31.4*   RDW 11.9 12.0  --  12.2 12.7    212  --  203 234     URINE STUDIES  Recent Labs   Lab Test 11/01/21  1253 10/25/21  2123 10/19/21  1438 01/04/21  1020   COLOR Yellow Yellow Yellow Bibi   APPEARANCE Clear Clear Slightly Cloudy* Slightly Cloudy   URINEGLC Negative Negative Negative Negative   URINEBILI Negative Negative Negative Negative   URINEKETONE Negative Negative Negative Negative   SG 1.015 1.013 1.018 1.019   UBLD Negative Negative Large* Negative   URINEPH 5.0 5.5 5.0 5.0   PROTEIN 30 * 10 * 20 * Negative   NITRITE Negative Negative Negative Negative   LEUKEST Negative Negative Negative Negative   RBCU <1 <1 120* 1   WBCU 1 <1 4 7*     No lab results found.  PTH  Recent Labs   Lab Test 01/31/22  0851   PTHI 38     IRON STUDIES  Recent Labs   Lab Test 10/05/21  1124 09/28/21  1204 07/30/21  1052 05/02/21  0106   IRON  --   --   --  259*   FEB  --   --   --  355   IRONSAT  --   --   --  73*   DARRICK 186 280* 32 72     Lin Camacho MD    Attestation signed by Lyndsey Nicholas MD at 3/21/2022 11:10 AM:  Attestation:  Sarah Fisher has been evaluated and examined by me, Lyndsey Nicholas MD.  Discussed with the fellow or resident and agree with the findings and plan in this note.  I have reviewed Medications, Vital Signs, and Labs as well as provider notes.    Date of Service (when I saw the patient): 2/25/2022    Lyndsey Nicholas MD  [facility]  Department of Medicine  Division of Renal Disease and Hypertension  [facility]  [employer]

## 2022-02-25 NOTE — PATIENT INSTRUCTIONS
1. Monitor your blood pressure at home    2. Stay well hydrated     3. Follow up with me in 3-4 months

## 2022-02-25 NOTE — PROGRESS NOTES
Medication Therapy Management (MTM) Encounter    ASSESSMENT:                            Medication Adherence/Access: No issues identified    PJP prophylaxis.:  Per protocol patient should be on P BERKLEY prophylaxis for 6 months post transplant or mid April in this case.  Will discuss with transplant team if new med needs to be started in place of Bactrim.    Hypomagnesemia: Magnesium level is low, will increase magnesium back up to prescribed dose of 800 mg twice daily.    GERD: Patient has is history of esophagitis, GI bleed, and other esophageal his issues per her problem list.  Patient to discuss with transplant team if she can taper off pantoprazole once she completes MMF therapy.    Hypertension: Blood pressures have been at or near goal of less than 140/90.    PLAN:                            Txp team...  1.  Patient stop Bactrim due to itching, would you like to start a alternative medication such as pentamadine or dapsone?    Pt to...  1. When you come off of Mycophenolate Mofetil, see if you can taper off Pantoprazole as well.  Discussed with transplant doctor as he has a history of esophagitis.  2. Increase Magnesium to 2 tablets (800mg) twice daily.     Follow-up: as needed    SUBJECTIVE/OBJECTIVE:                          Sarah Fisher is a 51 year old female called for a follow-up visit.  Today's visit is a follow-up MTM visit from 2/2     Reason for visit: Follow-up on changes.    Allergies/ADRs: Reviewed in chart  Past Medical History: Reviewed in chart  Tobacco: She reports that she has never smoked. She has never used smokeless tobacco.  Alcohol: not currently using    Medication Adherence/Access: no issues reported     PJP prophylaxis.: Holding Bactrim due to itching.  She restarted Bactrim which was originally held due to leukopenia, but then had severe itching over a couple days.  Bactrim was stopped and itching resolved, likely from the Bactrim.      Hypomagnesemia: Pt is taking Magnesium 400mg  twice daily.  Magnesium   Date Value Ref Range Status   02/23/2022 1.5 (L) 1.8 - 2.6 mg/dL Final   05/02/2021 1.8 1.6 - 2.3 mg/dL Final       GERD: Current medications include: Protonix (pantoprazole) 40mg twice daily. Pt reports no current symptoms.  Patient feels that current regimen is effective. GIB in 2020, may be due to varices.     Hypertension: Current medications include Amlodipine 10mg at bedtime. Patient does not self-monitor blood pressure.  Patient reports no current medication side effects.  BP Readings from Last 3 Encounters:   02/25/22 139/81   02/22/22 (!) 146/86   02/16/22 118/70     Today's Vitals: There were no vitals taken for this visit.  ----------------      I spent 10 minutes with this patient today. All changes were made via collaborative practice agreement with txp team. A copy of the visit note was provided to the patient's provider(s).    The patient was sent via [employer] a summary of these recommendations.     Ming Archer, PharmD  [facility] Pharmacist    Phone: [phone]     Telemedicine Visit Details  Type of service:  Telephone visit  Start Time: 12:32 PM  End Time: 12:42 PM  Originating Location (patient location): Home  Distant Location (provider location):  [facility]     Medication Therapy Recommendations  Antibiotic prophylaxis not prescribed    Current Medication: sulfamethoxazole-trimethoprim (BACTRIM) 400-80 MG tablet   Rationale: Undesirable effect - Adverse medication event - Safety   Recommendation: Change Medication   Status: Contact Provider - Awaiting Response         Hypomagnesemia    Current Medication: magnesium oxide (MAG-OX) 400 (241.3 Mg) MG tablet   Rationale: Dose too low - Dosage too low - Effectiveness   Recommendation: Increase Dose   Status: Accepted per CPA

## 2022-03-01 ENCOUNTER — TELEPHONE (OUTPATIENT)
Dept: TRANSPLANT | Facility: CLINIC | Age: 52
End: 2022-03-01

## 2022-03-01 ENCOUNTER — HOSPITAL ENCOUNTER (OUTPATIENT)
Dept: MRI IMAGING | Facility: CLINIC | Age: 52
Discharge: HOME | End: 2022-03-01
Attending: STUDENT IN AN ORGANIZED HEALTH CARE EDUCATION/TRAINING PROGRAM | Admitting: STUDENT IN AN ORGANIZED HEALTH CARE EDUCATION/TRAINING PROGRAM
Payer: COMMERCIAL

## 2022-03-01 DIAGNOSIS — Z94.4 LIVER TRANSPLANT RECIPIENT (H): ICD-10-CM

## 2022-03-01 DIAGNOSIS — Z94.4 LIVER TRANSPLANT RECIPIENT (H): Primary | ICD-10-CM

## 2022-03-01 DIAGNOSIS — R74.8 ELEVATED ALKALINE PHOSPHATASE LEVEL: ICD-10-CM

## 2022-03-01 PROCEDURE — 74183 MRI ABD W/O CNTR FLWD CNTR: CPT | Mod: 26 | Performed by: RADIOLOGY

## 2022-03-01 PROCEDURE — 255N000002 HC RX 255 OP 636: Performed by: STUDENT IN AN ORGANIZED HEALTH CARE EDUCATION/TRAINING PROGRAM

## 2022-03-01 PROCEDURE — 74183 MRI ABD W/O CNTR FLWD CNTR: CPT

## 2022-03-01 PROCEDURE — A9585 GADOBUTROL INJECTION: HCPCS | Performed by: STUDENT IN AN ORGANIZED HEALTH CARE EDUCATION/TRAINING PROGRAM

## 2022-03-01 RX ORDER — METHYLPREDNISOLONE SODIUM SUCCINATE 125 MG/2ML
125 INJECTION, POWDER, LYOPHILIZED, FOR SOLUTION INTRAMUSCULAR; INTRAVENOUS
Status: CANCELLED
Start: 2022-03-01

## 2022-03-01 RX ORDER — DIPHENHYDRAMINE HYDROCHLORIDE 50 MG/ML
50 INJECTION INTRAMUSCULAR; INTRAVENOUS
Status: CANCELLED
Start: 2022-03-01

## 2022-03-01 RX ORDER — EPINEPHRINE 1 MG/ML
0.3 INJECTION, SOLUTION, CONCENTRATE INTRAVENOUS EVERY 5 MIN PRN
Status: CANCELLED | OUTPATIENT
Start: 2022-03-01

## 2022-03-01 RX ORDER — PENTAMIDINE ISETHIONATE 300 MG/300MG
300 INHALANT RESPIRATORY (INHALATION) ONCE
Status: CANCELLED
Start: 2022-03-01 | End: 2022-03-01

## 2022-03-01 RX ORDER — GADOBUTROL 604.72 MG/ML
10 INJECTION INTRAVENOUS ONCE
Status: COMPLETED | OUTPATIENT
Start: 2022-03-01 | End: 2022-03-01

## 2022-03-01 RX ORDER — ALBUTEROL SULFATE 0.83 MG/ML
2.5 SOLUTION RESPIRATORY (INHALATION)
Status: CANCELLED | OUTPATIENT
Start: 2022-03-01

## 2022-03-01 RX ORDER — MEPERIDINE HYDROCHLORIDE 25 MG/ML
25 INJECTION INTRAMUSCULAR; INTRAVENOUS; SUBCUTANEOUS EVERY 30 MIN PRN
Status: CANCELLED | OUTPATIENT
Start: 2022-03-01

## 2022-03-01 RX ORDER — NALOXONE HYDROCHLORIDE 0.4 MG/ML
0.2 INJECTION, SOLUTION INTRAMUSCULAR; INTRAVENOUS; SUBCUTANEOUS
Status: CANCELLED | OUTPATIENT
Start: 2022-03-01

## 2022-03-01 RX ORDER — ALBUTEROL SULFATE 90 UG/1
1-2 AEROSOL, METERED RESPIRATORY (INHALATION)
Status: CANCELLED
Start: 2022-03-01

## 2022-03-01 RX ORDER — ALBUTEROL SULFATE 0.83 MG/ML
2.5 SOLUTION RESPIRATORY (INHALATION) ONCE
Status: CANCELLED
Start: 2022-03-01 | End: 2022-03-01

## 2022-03-01 RX ADMIN — GADOBUTROL 7 ML: 604.72 INJECTION INTRAVENOUS at 07:15

## 2022-03-01 NOTE — TELEPHONE ENCOUNTER
Reviewed imaging with DR beckham. Pt to have US to check hepatic artery. eRCP team to review to set up ERCP.     Patient aware.   Patient to stop bactrim and do Pentamidine neb this month and next month and stop.   Patient reports that she will call scheduling to set up pentamidine and US appts.

## 2022-03-01 NOTE — TELEPHONE ENCOUNTER
Pt calls who states that she developed a Cold and wants to know what is safe to take over the counter. Suggested she take musinex per coordinator.

## 2022-03-02 ENCOUNTER — HOSPITAL ENCOUNTER (OUTPATIENT)
Dept: ULTRASOUND IMAGING | Facility: CLINIC | Age: 52
Discharge: HOME | End: 2022-03-02
Attending: STUDENT IN AN ORGANIZED HEALTH CARE EDUCATION/TRAINING PROGRAM | Admitting: STUDENT IN AN ORGANIZED HEALTH CARE EDUCATION/TRAINING PROGRAM
Payer: COMMERCIAL

## 2022-03-02 DIAGNOSIS — Z94.4 LIVER TRANSPLANT RECIPIENT (H): ICD-10-CM

## 2022-03-02 PROCEDURE — 93975 VASCULAR STUDY: CPT | Mod: 26 | Performed by: RADIOLOGY

## 2022-03-02 PROCEDURE — 76700 US EXAM ABDOM COMPLETE: CPT | Mod: XS

## 2022-03-03 ENCOUNTER — TELEPHONE (OUTPATIENT)
Dept: TRANSPLANT | Facility: CLINIC | Age: 52
End: 2022-03-03
Payer: COMMERCIAL

## 2022-03-03 DIAGNOSIS — Z94.4 LIVER TRANSPLANT RECIPIENT (H): Primary | ICD-10-CM

## 2022-03-03 NOTE — TELEPHONE ENCOUNTER
Reviewed MRCP and US results with dr mixon and dr beckham. Per dr mixon patient to have repeat labs next week along with GGT. Review trend with dr mixon and dr carl and then discuss possible hepatic artery angriogram with IR dr Chavez.     Pt has a lab appt on Monday 3/7.

## 2022-03-05 ENCOUNTER — HEALTH MAINTENANCE LETTER (OUTPATIENT)
Age: 52
End: 2022-03-05

## 2022-03-07 ENCOUNTER — LAB (OUTPATIENT)
Dept: LAB | Facility: CLINIC | Age: 52
End: 2022-03-07
Payer: COMMERCIAL

## 2022-03-07 DIAGNOSIS — Z13.220 LIPID SCREENING: ICD-10-CM

## 2022-03-07 DIAGNOSIS — Z94.4 LIVER TRANSPLANT RECIPIENT (H): ICD-10-CM

## 2022-03-07 DIAGNOSIS — Z94.4 LIVER REPLACED BY TRANSPLANT (H): ICD-10-CM

## 2022-03-07 LAB
ALBUMIN SERPL-MCNC: 3.2 G/DL (ref 3.4–5)
ALBUMIN SERPL-MCNC: 3.3 G/DL (ref 3.4–5)
ALBUMIN UR-MCNC: NEGATIVE MG/DL
ALP SERPL-CCNC: 457 U/L (ref 40–150)
ALP SERPL-CCNC: 461 U/L (ref 40–150)
ALT SERPL W P-5'-P-CCNC: 41 U/L (ref 0–50)
ALT SERPL W P-5'-P-CCNC: 42 U/L (ref 0–50)
ANION GAP SERPL CALCULATED.3IONS-SCNC: 7 MMOL/L (ref 3–14)
ANION GAP SERPL CALCULATED.3IONS-SCNC: 9 MMOL/L (ref 3–14)
APPEARANCE UR: CLEAR
AST SERPL W P-5'-P-CCNC: 24 U/L (ref 0–45)
AST SERPL W P-5'-P-CCNC: 25 U/L (ref 0–45)
BILIRUB DIRECT SERPL-MCNC: 0.2 MG/DL (ref 0–0.2)
BILIRUB SERPL-MCNC: 0.3 MG/DL (ref 0.2–1.3)
BILIRUB SERPL-MCNC: 0.4 MG/DL (ref 0.2–1.3)
BILIRUB UR QL STRIP: NEGATIVE
BUN SERPL-MCNC: 36 MG/DL (ref 7–30)
BUN SERPL-MCNC: 36 MG/DL (ref 7–30)
CALCIUM SERPL-MCNC: 9.8 MG/DL (ref 8.5–10.1)
CALCIUM SERPL-MCNC: 9.8 MG/DL (ref 8.5–10.1)
CHLORIDE BLD-SCNC: 107 MMOL/L (ref 94–109)
CHLORIDE BLD-SCNC: 108 MMOL/L (ref 94–109)
CHOLEST SERPL-MCNC: 249 MG/DL
CO2 SERPL-SCNC: 25 MMOL/L (ref 20–32)
CO2 SERPL-SCNC: 26 MMOL/L (ref 20–32)
COLOR UR AUTO: YELLOW
CREAT SERPL-MCNC: 1.17 MG/DL (ref 0.52–1.04)
CREAT SERPL-MCNC: 1.21 MG/DL (ref 0.52–1.04)
CREAT UR-MCNC: 83 MG/DL
ERYTHROCYTE [DISTWIDTH] IN BLOOD BY AUTOMATED COUNT: 12 % (ref 10–15)
FASTING STATUS PATIENT QL REPORTED: YES
GFR SERPL CREATININE-BSD FRML MDRD: 54 ML/MIN/1.73M2
GFR SERPL CREATININE-BSD FRML MDRD: 56 ML/MIN/1.73M2
GGT SERPL-CCNC: 239 U/L (ref 0–40)
GLUCOSE BLD-MCNC: 106 MG/DL (ref 70–99)
GLUCOSE BLD-MCNC: 107 MG/DL (ref 70–99)
GLUCOSE UR STRIP-MCNC: NEGATIVE MG/DL
HCT VFR BLD AUTO: 35.9 % (ref 35–47)
HDLC SERPL-MCNC: 90 MG/DL
HGB BLD-MCNC: 11.3 G/DL (ref 11.7–15.7)
HGB UR QL STRIP: NEGATIVE
KETONES UR STRIP-MCNC: ABNORMAL MG/DL
LDLC SERPL CALC-MCNC: 133 MG/DL
LEUKOCYTE ESTERASE UR QL STRIP: NEGATIVE
MAGNESIUM SERPL-MCNC: 1.9 MG/DL (ref 1.6–2.3)
MCH RBC QN AUTO: 30.6 PG (ref 26.5–33)
MCHC RBC AUTO-ENTMCNC: 31.5 G/DL (ref 31.5–36.5)
MCV RBC AUTO: 97 FL (ref 78–100)
NITRATE UR QL: NEGATIVE
NONHDLC SERPL-MCNC: 159 MG/DL
PH UR STRIP: 6 [PH] (ref 5–7)
PLATELET # BLD AUTO: 273 10E3/UL (ref 150–450)
POTASSIUM BLD-SCNC: 4.1 MMOL/L (ref 3.4–5.3)
POTASSIUM BLD-SCNC: 4.1 MMOL/L (ref 3.4–5.3)
PROT SERPL-MCNC: 7.1 G/DL (ref 6.8–8.8)
PROT SERPL-MCNC: 7.1 G/DL (ref 6.8–8.8)
PROT UR-MCNC: 0.17 G/L
PROT/CREAT 24H UR: 0.2 G/G CR (ref 0–0.2)
RBC # BLD AUTO: 3.69 10E6/UL (ref 3.8–5.2)
SODIUM SERPL-SCNC: 141 MMOL/L (ref 133–144)
SODIUM SERPL-SCNC: 141 MMOL/L (ref 133–144)
SP GR UR STRIP: 1.01 (ref 1–1.03)
TACROLIMUS BLD-MCNC: 9.7 UG/L (ref 5–15)
TME LAST DOSE: NORMAL H
TME LAST DOSE: NORMAL H
TRIGL SERPL-MCNC: 128 MG/DL
UROBILINOGEN UR STRIP-ACNC: 0.2 E.U./DL
WBC # BLD AUTO: 4.4 10E3/UL (ref 4–11)

## 2022-03-07 PROCEDURE — 82248 BILIRUBIN DIRECT: CPT

## 2022-03-07 PROCEDURE — 80061 LIPID PANEL: CPT

## 2022-03-07 PROCEDURE — 81003 URINALYSIS AUTO W/O SCOPE: CPT

## 2022-03-07 PROCEDURE — 99000 SPECIMEN HANDLING OFFICE-LAB: CPT

## 2022-03-07 PROCEDURE — 84075 ASSAY ALKALINE PHOSPHATASE: CPT | Mod: 59

## 2022-03-07 PROCEDURE — 36415 COLL VENOUS BLD VENIPUNCTURE: CPT

## 2022-03-07 PROCEDURE — 82247 BILIRUBIN TOTAL: CPT | Mod: 59

## 2022-03-07 PROCEDURE — 84156 ASSAY OF PROTEIN URINE: CPT

## 2022-03-07 PROCEDURE — 82977 ASSAY OF GGT: CPT

## 2022-03-07 PROCEDURE — 84080 ASSAY ALKALINE PHOSPHATASES: CPT | Mod: 90

## 2022-03-07 PROCEDURE — 83735 ASSAY OF MAGNESIUM: CPT

## 2022-03-07 PROCEDURE — 80197 ASSAY OF TACROLIMUS: CPT

## 2022-03-07 PROCEDURE — 84460 ALANINE AMINO (ALT) (SGPT): CPT | Mod: 59

## 2022-03-07 PROCEDURE — 85027 COMPLETE CBC AUTOMATED: CPT

## 2022-03-07 PROCEDURE — 84075 ASSAY ALKALINE PHOSPHATASE: CPT | Mod: 90

## 2022-03-07 PROCEDURE — 80053 COMPREHEN METABOLIC PANEL: CPT

## 2022-03-07 PROCEDURE — 2894A HEPATIC FUNCTION PANEL: CPT

## 2022-03-07 PROCEDURE — 84155 ASSAY OF PROTEIN SERUM: CPT | Mod: 59

## 2022-03-09 LAB
ALP BONE SERPL-CCNC: 108 U/L
ALP LIVER SERPL-CCNC: 362 U/L
ALP OTHER SERPL-CCNC: 0 U/L
ALP SERPL-CCNC: 470 U/L

## 2022-03-11 DIAGNOSIS — Z94.4 LIVER TRANSPLANT RECIPIENT (H): Primary | ICD-10-CM

## 2022-03-21 ENCOUNTER — LAB (OUTPATIENT)
Dept: LAB | Facility: CLINIC | Age: 52
End: 2022-03-21
Payer: COMMERCIAL

## 2022-03-21 DIAGNOSIS — Z13.220 LIPID SCREENING: ICD-10-CM

## 2022-03-21 DIAGNOSIS — Z94.4 LIVER REPLACED BY TRANSPLANT (H): ICD-10-CM

## 2022-03-21 LAB
ALBUMIN SERPL-MCNC: 3.5 G/DL (ref 3.4–5)
ALP SERPL-CCNC: 383 U/L (ref 40–150)
ALT SERPL W P-5'-P-CCNC: 42 U/L (ref 0–50)
ANION GAP SERPL CALCULATED.3IONS-SCNC: 7 MMOL/L (ref 3–14)
AST SERPL W P-5'-P-CCNC: 23 U/L (ref 0–45)
BILIRUB DIRECT SERPL-MCNC: 0.2 MG/DL (ref 0–0.2)
BILIRUB SERPL-MCNC: 0.6 MG/DL (ref 0.2–1.3)
BUN SERPL-MCNC: 37 MG/DL (ref 7–30)
CALCIUM SERPL-MCNC: 10.3 MG/DL (ref 8.5–10.1)
CHLORIDE BLD-SCNC: 110 MMOL/L (ref 94–109)
CO2 SERPL-SCNC: 25 MMOL/L (ref 20–32)
CREAT SERPL-MCNC: 1.12 MG/DL (ref 0.52–1.04)
ERYTHROCYTE [DISTWIDTH] IN BLOOD BY AUTOMATED COUNT: 12.5 % (ref 10–15)
GFR SERPL CREATININE-BSD FRML MDRD: 59 ML/MIN/1.73M2
GLUCOSE BLD-MCNC: 103 MG/DL (ref 70–99)
HCT VFR BLD AUTO: 34.4 % (ref 35–47)
HGB BLD-MCNC: 11.2 G/DL (ref 11.7–15.7)
MAGNESIUM SERPL-MCNC: 2.1 MG/DL (ref 1.6–2.3)
MCH RBC QN AUTO: 30.4 PG (ref 26.5–33)
MCHC RBC AUTO-ENTMCNC: 32.6 G/DL (ref 31.5–36.5)
MCV RBC AUTO: 94 FL (ref 78–100)
PLATELET # BLD AUTO: 238 10E3/UL (ref 150–450)
POTASSIUM BLD-SCNC: 3.9 MMOL/L (ref 3.4–5.3)
PROT SERPL-MCNC: 7.2 G/DL (ref 6.8–8.8)
RBC # BLD AUTO: 3.68 10E6/UL (ref 3.8–5.2)
SODIUM SERPL-SCNC: 142 MMOL/L (ref 133–144)
TACROLIMUS BLD-MCNC: 7.7 UG/L (ref 5–15)
TME LAST DOSE: NORMAL H
TME LAST DOSE: NORMAL H
WBC # BLD AUTO: 3.5 10E3/UL (ref 4–11)

## 2022-03-21 PROCEDURE — 82248 BILIRUBIN DIRECT: CPT

## 2022-03-21 PROCEDURE — 80053 COMPREHEN METABOLIC PANEL: CPT

## 2022-03-21 PROCEDURE — 85027 COMPLETE CBC AUTOMATED: CPT

## 2022-03-21 PROCEDURE — 83735 ASSAY OF MAGNESIUM: CPT

## 2022-03-21 PROCEDURE — 80197 ASSAY OF TACROLIMUS: CPT

## 2022-03-21 PROCEDURE — 36415 COLL VENOUS BLD VENIPUNCTURE: CPT

## 2022-03-24 ENCOUNTER — THERAPY VISIT (OUTPATIENT)
Dept: PHYSICAL THERAPY | Facility: CLINIC | Age: 52
End: 2022-03-24
Attending: PHYSICIAN ASSISTANT
Payer: COMMERCIAL

## 2022-03-24 DIAGNOSIS — N94.9 PAIN OF FEMALE SYMPHYSIS PUBIS: ICD-10-CM

## 2022-03-24 DIAGNOSIS — M99.05 SOMATIC DYSFUNCTION OF PELVIC REGION: ICD-10-CM

## 2022-03-24 DIAGNOSIS — M53.3 ACUTE COCCYGEAL PAIN: ICD-10-CM

## 2022-03-24 PROCEDURE — 97140 MANUAL THERAPY 1/> REGIONS: CPT | Mod: GP | Performed by: PHYSICAL THERAPIST

## 2022-03-24 PROCEDURE — 97530 THERAPEUTIC ACTIVITIES: CPT | Mod: GP | Performed by: PHYSICAL THERAPIST

## 2022-03-24 PROCEDURE — 97161 PT EVAL LOW COMPLEX 20 MIN: CPT | Mod: GP | Performed by: PHYSICAL THERAPIST

## 2022-03-24 PROCEDURE — 97110 THERAPEUTIC EXERCISES: CPT | Mod: GP | Performed by: PHYSICAL THERAPIST

## 2022-03-24 NOTE — PROGRESS NOTES
Physical Therapy Initial Evaluation  Subjective:    Patient Health History  Sarah Fisher being seen for Fractured tail bine..     Problem began: 2/14/2022.   Problem occurred: Slipped on I e.   Pain is reported as 3/10 on pain scale.  General health as reported by patient is good.       Medical allergies: none.   Surgeries include:  Other. Other surgery history details: Transplant.    Current medications:  Other. Other [facility] details: [facility]    Current occupation is Self employed.   Primary job tasks include:  Computer work.                History of current episode:    Onset date/MD order: Coccygeal pain .    CC/Present symptoms: Patient was walking the dogs on 2/14/2022 and hit an ice patch and fell with her legs out in front of her.  The following week, she had a lot of pain, had most difficult with sitting, getting into/out of sitting, rolling over in bed, and walking.  It has been improving improving and it depends on how active she is.  If she moves a certain way or bends, it is more painful.  Gets worse with being on her feet and towards end of day.  Ice pack or massage chair seems to help.  Pain is mostly in lower back and into tailbone.    HPI/SMHx/Childbirth hx:: Liver transplant 10/2021.  No limitations.    Pain rating (0-10): 3/10, can get up to a 5/10  Conditioning is improving/unchanging/worsening: improving    Current occupation: Self-employed  Current activity: walking shorter distances (was doing 1.5-2 miles), was walking more deliberately due to fear of falling on ice again.   Goals: moving without pain, increase flexibility and endurance  Red flags: none    Urination:  Do you leak on the way to the bathroom or with a strong urge to void? no   Do you leak with cough,sneeze, jumping, running? no  Any other activities that cause leaking?  no  Do you have triggers that make you feel you can't wait to go to the bathroom?  What are they? n/a.  Type of pad and number used per day?  n/a  When you leak what is the amount? n/a  How long can you delay the need to urinate? n/a.   Do you feel excessive pressure in pelvic floor:no.  When?   Frequency of daytime urination:n/a  Frequency of nighttime urination:n/a  Can you stop the flow of urine when on the toilet? yes  Is the volume of urine passed usually:  (8sec rule= 250ml with average bladder storing 400-600ml) average  Do you strain to pass urine? no  Do you have a slow or hesitant urinary stream? no  Do you have difficulty initiating the urine stream? no  Is urination painful? no  How many bladder infections have you had in last 12 months?0  Fluid intake(one glass is 8oz or one cup) 6-8glasses/day, 16 ozcaffinated glasses/day  0 alcohol glasses/day.  Bowel habits:  Frequency of bowel movements? 2-3 times a day  Consistancy of stool?  Cl[place] Stool Scale soft formed  Do you ignore the urge to defecate? no  Do you strain to pass stool? no  Pelvic Pain:  Do you have any pelvic pain with intercourse, exams, use of tampons? no  Is initial penetration during intercourse painful? no  Is deeper penetration painful? no  Do you use lubricant?  no  Are you sexually active? no  Have you ever been worried for your physical safety? no  Have you practiced the PF(kegel) exercises for 4 or more weeks?no  Marinoff Scale:Level n/a  (Level 3: Abstinence from intercourse because of severe pain. Level 2: Painful intercourse which limites frequency of activity. Level 1: Painful intercourse not severe enough to prevent activity.)    OBJECTIVE:      Treatment/Education provided this session: please see flow sheet for details    ASSESSMENT/PLAN:    Patient is a 52 year old female with coccyx complaints.    Patient has the following significant findings with corresponding treatment plan.                Diagnosis 1:  tailbone pain -  manual therapy, self management, education, directional preference exercise and home program  Decreased ROM/flexibility - manual therapy and  therapeutic exercise  Decreased joint mobility - manual therapy and therapeutic exercise  Decreased strength - therapeutic exercise and therapeutic activities  Decreased proprioception - neuro re-education and therapeutic activities  Impaired gait - gait training  Impaired muscle performance - neuro re-education  Decreased function - therapeutic activities  Impaired posture - neuro re-education    Previous and current functional limitations:  (See Goal Flow Sheet for this information)    Short term and Long term goals: (See Goal Flow Sheet for this information)     Communication ability:  Patient appears to be able to clearly communicate and understand verbal and written communication and follow directions correctly.  Treatment Explanation - The following has been discussed with the patient:   RX ordered/plan of care  Anticipated outcomes  Possible risks and side effects  This patient would benefit from PT intervention to resume normal activities.   Rehab potential is good.    Frequency:  1X week, once daily  Duration:  for 8 weeks  Discharge Plan:  Achieve all LTG.  Independent in home treatment program.  Reach maximal therapeutic benefit.    Please refer to the daily flowsheet for treatment today, total treatment time and time spent performing 1:1 timed codes.                                                 Objective:  System         Lumbar/SI Evaluation  ROM:    AROM Lumbar:   Flexion:          Min candelaria   Ext:                    Min candelaria    Side Bend:        Left:     Right:   Rotation:           Left:     Right:   Side Glide:        Left:  Min candelaria, more pain    Right:  Mod candelaria, less pain           Lumbar Myotomes:  normal                      Functional Tests:  Core strength and proprioception lumbar: SLS: L 17 sec, R 5 sec.            SI joint/Sacrum:          Left negative at:    Thigh thrust and Sacral thrust    Right negative at:  Thigh thrust and Sacral thrust    Sacral conclusion right:  Anterior inominate  and elevated pubic sym                                  Hip Evaluation    Hip Strength:    Flexion:   Left: 5-/5   Pain:  Right: 5-/5   Pain:                    Extension:  Left: 4+/5  Pain:Right: 4+/5    Pain:    Abduction:  Left: 4/5     Pain:Right: 4/5    Pain:                  Hip Special Testing:      Left hip negative for the following special tests:  Giselle; Fadir/Labrum or SLR                   General     ROS

## 2022-03-25 ENCOUNTER — ANCILLARY PROCEDURE (OUTPATIENT)
Dept: CT IMAGING | Facility: CLINIC | Age: 52
End: 2022-03-25
Attending: STUDENT IN AN ORGANIZED HEALTH CARE EDUCATION/TRAINING PROGRAM
Payer: COMMERCIAL

## 2022-03-25 DIAGNOSIS — Z94.4 LIVER TRANSPLANT RECIPIENT (H): ICD-10-CM

## 2022-03-25 PROCEDURE — 74174 CTA ABD&PLVS W/CONTRAST: CPT | Performed by: RADIOLOGY

## 2022-03-25 RX ORDER — IOPAMIDOL 755 MG/ML
100 INJECTION, SOLUTION INTRAVASCULAR ONCE
Status: COMPLETED | OUTPATIENT
Start: 2022-03-25 | End: 2022-03-25

## 2022-03-25 RX ADMIN — IOPAMIDOL 100 ML: 755 INJECTION, SOLUTION INTRAVASCULAR at 10:13

## 2022-03-28 ENCOUNTER — TELEPHONE (OUTPATIENT)
Dept: TRANSPLANT | Facility: CLINIC | Age: 52
End: 2022-03-28

## 2022-03-28 ENCOUNTER — LAB (OUTPATIENT)
Dept: LAB | Facility: CLINIC | Age: 52
End: 2022-03-28
Payer: COMMERCIAL

## 2022-03-28 DIAGNOSIS — Z94.4 LIVER TRANSPLANT RECIPIENT (H): Primary | ICD-10-CM

## 2022-03-28 DIAGNOSIS — Z13.220 LIPID SCREENING: ICD-10-CM

## 2022-03-28 DIAGNOSIS — Z94.4 LIVER REPLACED BY TRANSPLANT (H): ICD-10-CM

## 2022-03-28 LAB
ALBUMIN SERPL-MCNC: 3.2 G/DL (ref 3.4–5)
ALP SERPL-CCNC: 401 U/L (ref 40–150)
ALT SERPL W P-5'-P-CCNC: 47 U/L (ref 0–50)
ANION GAP SERPL CALCULATED.3IONS-SCNC: 2 MMOL/L (ref 3–14)
AST SERPL W P-5'-P-CCNC: 28 U/L (ref 0–45)
BILIRUB DIRECT SERPL-MCNC: 0.2 MG/DL (ref 0–0.2)
BILIRUB SERPL-MCNC: 0.4 MG/DL (ref 0.2–1.3)
BUN SERPL-MCNC: 35 MG/DL (ref 7–30)
CALCIUM SERPL-MCNC: 9.7 MG/DL (ref 8.5–10.1)
CHLORIDE BLD-SCNC: 109 MMOL/L (ref 94–109)
CO2 SERPL-SCNC: 29 MMOL/L (ref 20–32)
CREAT SERPL-MCNC: 1.15 MG/DL (ref 0.52–1.04)
ERYTHROCYTE [DISTWIDTH] IN BLOOD BY AUTOMATED COUNT: 12.9 % (ref 10–15)
GFR SERPL CREATININE-BSD FRML MDRD: 57 ML/MIN/1.73M2
GLUCOSE BLD-MCNC: 106 MG/DL (ref 70–99)
HCT VFR BLD AUTO: 33.4 % (ref 35–47)
HGB BLD-MCNC: 10.8 G/DL (ref 11.7–15.7)
MAGNESIUM SERPL-MCNC: 1.7 MG/DL (ref 1.6–2.3)
MCH RBC QN AUTO: 30.8 PG (ref 26.5–33)
MCHC RBC AUTO-ENTMCNC: 32.3 G/DL (ref 31.5–36.5)
MCV RBC AUTO: 95 FL (ref 78–100)
PLATELET # BLD AUTO: 220 10E3/UL (ref 150–450)
POTASSIUM BLD-SCNC: 4.2 MMOL/L (ref 3.4–5.3)
PROT SERPL-MCNC: 7 G/DL (ref 6.8–8.8)
RBC # BLD AUTO: 3.51 10E6/UL (ref 3.8–5.2)
SODIUM SERPL-SCNC: 140 MMOL/L (ref 133–144)
TACROLIMUS BLD-MCNC: 7.9 UG/L (ref 5–15)
TME LAST DOSE: NORMAL H
TME LAST DOSE: NORMAL H
WBC # BLD AUTO: 3.7 10E3/UL (ref 4–11)

## 2022-03-28 PROCEDURE — 80197 ASSAY OF TACROLIMUS: CPT

## 2022-03-28 PROCEDURE — 80053 COMPREHEN METABOLIC PANEL: CPT

## 2022-03-28 PROCEDURE — 83735 ASSAY OF MAGNESIUM: CPT

## 2022-03-28 PROCEDURE — 82248 BILIRUBIN DIRECT: CPT

## 2022-03-28 PROCEDURE — 36415 COLL VENOUS BLD VENIPUNCTURE: CPT

## 2022-03-28 PROCEDURE — 85027 COMPLETE CBC AUTOMATED: CPT

## 2022-03-28 NOTE — TELEPHONE ENCOUNTER
Called Maria Elena per request of txp coordinator, Lotus.     She has been avoiding grapefruit, but wondering if fresca or flavored water is OK? Yes, per Ming Archer, PharmD.    Has been avoiding processed meats, wondering if one kind is safer than another? Recommend purchasing pre-packaged deli meat and consuming within 2-3 days. Deli meat/cheese sliced at the deli counter pose 2X the risk for food borne illness.     Need to avoid blue, feta, brie cheese? Only if unpasteurized. Same goes for all cheese (ie avoid unpasteurized cheddar). Check the label, but any pasteurized cheese is OK.    Reviewed that proper food safety guidelines are life long or as long as you are immunosx. Provided pt with my phone #.

## 2022-03-31 ENCOUNTER — THERAPY VISIT (OUTPATIENT)
Dept: PHYSICAL THERAPY | Facility: CLINIC | Age: 52
End: 2022-03-31
Payer: COMMERCIAL

## 2022-03-31 DIAGNOSIS — M99.05 SOMATIC DYSFUNCTION OF PELVIC REGION: ICD-10-CM

## 2022-03-31 DIAGNOSIS — N94.9 PAIN OF FEMALE SYMPHYSIS PUBIS: ICD-10-CM

## 2022-03-31 PROCEDURE — 97110 THERAPEUTIC EXERCISES: CPT | Mod: GP | Performed by: PHYSICAL THERAPIST

## 2022-03-31 PROCEDURE — 97140 MANUAL THERAPY 1/> REGIONS: CPT | Mod: GP | Performed by: PHYSICAL THERAPIST

## 2022-04-04 ENCOUNTER — LAB (OUTPATIENT)
Dept: LAB | Facility: CLINIC | Age: 52
End: 2022-04-04
Payer: COMMERCIAL

## 2022-04-04 DIAGNOSIS — Z94.4 LIVER REPLACED BY TRANSPLANT (H): ICD-10-CM

## 2022-04-04 DIAGNOSIS — Z13.220 LIPID SCREENING: ICD-10-CM

## 2022-04-04 LAB
ALBUMIN SERPL-MCNC: 3.1 G/DL (ref 3.4–5)
ALP SERPL-CCNC: 497 U/L (ref 40–150)
ALT SERPL W P-5'-P-CCNC: 62 U/L (ref 0–50)
ANION GAP SERPL CALCULATED.3IONS-SCNC: 6 MMOL/L (ref 3–14)
AST SERPL W P-5'-P-CCNC: 51 U/L (ref 0–45)
BILIRUB DIRECT SERPL-MCNC: 0.2 MG/DL (ref 0–0.2)
BILIRUB SERPL-MCNC: 0.4 MG/DL (ref 0.2–1.3)
BUN SERPL-MCNC: 34 MG/DL (ref 7–30)
CALCIUM SERPL-MCNC: 9.2 MG/DL (ref 8.5–10.1)
CHLORIDE BLD-SCNC: 106 MMOL/L (ref 94–109)
CO2 SERPL-SCNC: 26 MMOL/L (ref 20–32)
CREAT SERPL-MCNC: 1.16 MG/DL (ref 0.52–1.04)
ERYTHROCYTE [DISTWIDTH] IN BLOOD BY AUTOMATED COUNT: 13.2 % (ref 10–15)
GFR SERPL CREATININE-BSD FRML MDRD: 56 ML/MIN/1.73M2
GLUCOSE BLD-MCNC: 101 MG/DL (ref 70–99)
HCT VFR BLD AUTO: 33.7 % (ref 35–47)
HGB BLD-MCNC: 10.7 G/DL (ref 11.7–15.7)
MAGNESIUM SERPL-MCNC: 1.7 MG/DL (ref 1.6–2.3)
MCH RBC QN AUTO: 30.6 PG (ref 26.5–33)
MCHC RBC AUTO-ENTMCNC: 31.8 G/DL (ref 31.5–36.5)
MCV RBC AUTO: 96 FL (ref 78–100)
PLATELET # BLD AUTO: 232 10E3/UL (ref 150–450)
POTASSIUM BLD-SCNC: 4.2 MMOL/L (ref 3.4–5.3)
PROT SERPL-MCNC: 6.9 G/DL (ref 6.8–8.8)
RBC # BLD AUTO: 3.5 10E6/UL (ref 3.8–5.2)
SODIUM SERPL-SCNC: 138 MMOL/L (ref 133–144)
TACROLIMUS BLD-MCNC: 7.2 UG/L (ref 5–15)
TME LAST DOSE: NORMAL H
TME LAST DOSE: NORMAL H
WBC # BLD AUTO: 3.5 10E3/UL (ref 4–11)

## 2022-04-04 PROCEDURE — 80053 COMPREHEN METABOLIC PANEL: CPT

## 2022-04-04 PROCEDURE — 82248 BILIRUBIN DIRECT: CPT

## 2022-04-04 PROCEDURE — 80197 ASSAY OF TACROLIMUS: CPT

## 2022-04-04 PROCEDURE — 83735 ASSAY OF MAGNESIUM: CPT

## 2022-04-04 PROCEDURE — 36415 COLL VENOUS BLD VENIPUNCTURE: CPT

## 2022-04-04 PROCEDURE — 85027 COMPLETE CBC AUTOMATED: CPT

## 2022-04-05 ENCOUNTER — PATIENT OUTREACH (OUTPATIENT)
Dept: GASTROENTEROLOGY | Facility: CLINIC | Age: 52
End: 2022-04-05
Payer: COMMERCIAL

## 2022-04-05 ENCOUNTER — TELEPHONE (OUTPATIENT)
Dept: TRANSPLANT | Facility: CLINIC | Age: 52
End: 2022-04-05
Payer: COMMERCIAL

## 2022-04-05 ENCOUNTER — PREP FOR PROCEDURE (OUTPATIENT)
Dept: GASTROENTEROLOGY | Facility: CLINIC | Age: 52
End: 2022-04-05
Payer: COMMERCIAL

## 2022-04-05 DIAGNOSIS — R79.89 ELEVATED LFTS: Primary | ICD-10-CM

## 2022-04-05 DIAGNOSIS — R74.8 ELEVATED ALKALINE PHOSPHATASE LEVEL: Primary | ICD-10-CM

## 2022-04-05 DIAGNOSIS — Z11.59 ENCOUNTER FOR SCREENING FOR OTHER VIRAL DISEASES: Primary | ICD-10-CM

## 2022-04-05 NOTE — TELEPHONE ENCOUNTER
FUTURE VISIT INFORMATION      FUTURE VISIT INFORMATION:    Date: 5.5.22    Time: 3:15    Location: [facility] [place]  REFERRAL INFORMATION:    Referring provider:  [place]    Referring providers clinic:  [facility]    Reason for visit/diagnosis  Liver transplant recipient; s/p liver tx, Hair loss - Recs in Epic - no outside Recs - Per Pt Maria Elena    RECORDS REQUESTED FROM:       Clinic name Comments Records Status   [facility] 3.28.22, 3.16.22  [place] [facility]

## 2022-04-05 NOTE — TELEPHONE ENCOUNTER
Message from Dr. Fadia Avila:    Fadia Avila MD sent to Guru Dominguez Fenton MD; Lotus Hughes RN  Her alk phos is up some more with now mild elevation in AST/ALT. We can get her set up for a liver bx, but I think an ERCP would be more helpful given they are seeing some issues with her bile ducts on imaging        Called Maria Elena to let her know about need for MRCP

## 2022-04-05 NOTE — TELEPHONE ENCOUNTER
Per Dr. Fenton    Please assist in scheduling:     Procedure/Imaging/Clinic: ERCP  Physician: Dr. Fenton  Timin/27  Procedure length:provider average  Anesthesia:gen  Dx: elevated lfts2  Tier:2  Location: [facility]     Left message, orders not placed yet.    Pt called back,  ok for procedure. ill have physical on , orders placed for covid test.   ML

## 2022-04-05 NOTE — TELEPHONE ENCOUNTER
No answer on phone.  Left detailed message about reason for MRCP.  Also left phone number that she can call to schedule.  Asked her to call back if she has questions.

## 2022-04-06 DIAGNOSIS — Z11.59 ENCOUNTER FOR SCREENING FOR OTHER VIRAL DISEASES: Primary | ICD-10-CM

## 2022-04-08 ENCOUNTER — LAB (OUTPATIENT)
Dept: LAB | Facility: CLINIC | Age: 52
End: 2022-04-08
Payer: COMMERCIAL

## 2022-04-08 DIAGNOSIS — Z94.4 LIVER REPLACED BY TRANSPLANT (H): ICD-10-CM

## 2022-04-08 DIAGNOSIS — Z13.220 LIPID SCREENING: ICD-10-CM

## 2022-04-08 LAB
ALBUMIN SERPL-MCNC: 3.6 G/DL (ref 3.4–5)
ALP SERPL-CCNC: 521 U/L (ref 40–150)
ALT SERPL W P-5'-P-CCNC: 57 U/L (ref 0–50)
AST SERPL W P-5'-P-CCNC: 32 U/L (ref 0–45)
BILIRUB DIRECT SERPL-MCNC: 0.2 MG/DL (ref 0–0.2)
BILIRUB SERPL-MCNC: 0.5 MG/DL (ref 0.2–1.3)
PROT SERPL-MCNC: 7.4 G/DL (ref 6.8–8.8)

## 2022-04-08 PROCEDURE — 36415 COLL VENOUS BLD VENIPUNCTURE: CPT

## 2022-04-08 PROCEDURE — 80076 HEPATIC FUNCTION PANEL: CPT

## 2022-04-11 ENCOUNTER — LAB (OUTPATIENT)
Dept: LAB | Facility: CLINIC | Age: 52
End: 2022-04-11
Payer: COMMERCIAL

## 2022-04-11 ENCOUNTER — OFFICE VISIT (OUTPATIENT)
Dept: PEDIATRICS | Facility: CLINIC | Age: 52
End: 2022-04-11
Payer: COMMERCIAL

## 2022-04-11 ENCOUNTER — PREP FOR PROCEDURE (OUTPATIENT)
Dept: GASTROENTEROLOGY | Facility: CLINIC | Age: 52
End: 2022-04-11

## 2022-04-11 VITALS
HEIGHT: 66 IN | SYSTOLIC BLOOD PRESSURE: 120 MMHG | OXYGEN SATURATION: 100 % | DIASTOLIC BLOOD PRESSURE: 70 MMHG | RESPIRATION RATE: 16 BRPM | WEIGHT: 160.8 LBS | BODY MASS INDEX: 25.84 KG/M2 | TEMPERATURE: 97.6 F | HEART RATE: 73 BPM

## 2022-04-11 DIAGNOSIS — K70.31 ALCOHOLIC CIRRHOSIS OF LIVER WITH ASCITES (H): ICD-10-CM

## 2022-04-11 DIAGNOSIS — Z86.718 HISTORY OF DVT (DEEP VEIN THROMBOSIS): ICD-10-CM

## 2022-04-11 DIAGNOSIS — Z23 ENCOUNTER FOR ADMINISTRATION OF VACCINE: ICD-10-CM

## 2022-04-11 DIAGNOSIS — E03.8 SUBCLINICAL HYPOTHYROIDISM: ICD-10-CM

## 2022-04-11 DIAGNOSIS — Z94.4 LIVER TRANSPLANT RECIPIENT (H): ICD-10-CM

## 2022-04-11 DIAGNOSIS — Z94.4 LIVER REPLACED BY TRANSPLANT (H): ICD-10-CM

## 2022-04-11 DIAGNOSIS — N18.32 CHRONIC KIDNEY DISEASE, STAGE 3B (H): ICD-10-CM

## 2022-04-11 DIAGNOSIS — Z00.00 ROUTINE GENERAL MEDICAL EXAMINATION AT A HEALTH CARE FACILITY: Primary | ICD-10-CM

## 2022-04-11 DIAGNOSIS — I15.9 SECONDARY HYPERTENSION: ICD-10-CM

## 2022-04-11 DIAGNOSIS — Z11.59 ENCOUNTER FOR SCREENING FOR OTHER VIRAL DISEASES: Primary | ICD-10-CM

## 2022-04-11 DIAGNOSIS — D84.9 IMMUNOSUPPRESSED STATUS (H): ICD-10-CM

## 2022-04-11 DIAGNOSIS — Z13.220 LIPID SCREENING: ICD-10-CM

## 2022-04-11 DIAGNOSIS — Z11.59 ENCOUNTER FOR SCREENING FOR OTHER VIRAL DISEASES: ICD-10-CM

## 2022-04-11 LAB
ALBUMIN SERPL-MCNC: 3.7 G/DL (ref 3.4–5)
ALP SERPL-CCNC: 507 U/L (ref 40–150)
ALT SERPL W P-5'-P-CCNC: 59 U/L (ref 0–50)
ANION GAP SERPL CALCULATED.3IONS-SCNC: 1 MMOL/L (ref 3–14)
AST SERPL W P-5'-P-CCNC: 34 U/L (ref 0–45)
BILIRUB DIRECT SERPL-MCNC: 0.3 MG/DL (ref 0–0.2)
BILIRUB SERPL-MCNC: 0.5 MG/DL (ref 0.2–1.3)
BUN SERPL-MCNC: 33 MG/DL (ref 7–30)
CALCIUM SERPL-MCNC: 9.8 MG/DL (ref 8.5–10.1)
CHLORIDE BLD-SCNC: 102 MMOL/L (ref 94–109)
CO2 SERPL-SCNC: 32 MMOL/L (ref 20–32)
CREAT SERPL-MCNC: 1.06 MG/DL (ref 0.52–1.04)
ERYTHROCYTE [DISTWIDTH] IN BLOOD BY AUTOMATED COUNT: 13 % (ref 10–15)
GFR SERPL CREATININE-BSD FRML MDRD: 63 ML/MIN/1.73M2
GLUCOSE BLD-MCNC: 105 MG/DL (ref 70–99)
HCT VFR BLD AUTO: 36.6 % (ref 35–47)
HGB BLD-MCNC: 11.7 G/DL (ref 11.7–15.7)
MAGNESIUM SERPL-MCNC: 1.6 MG/DL (ref 1.6–2.3)
MCH RBC QN AUTO: 30.5 PG (ref 26.5–33)
MCHC RBC AUTO-ENTMCNC: 32 G/DL (ref 31.5–36.5)
MCV RBC AUTO: 95 FL (ref 78–100)
PLATELET # BLD AUTO: 250 10E3/UL (ref 150–450)
POTASSIUM BLD-SCNC: 3.9 MMOL/L (ref 3.4–5.3)
PROT SERPL-MCNC: 7.7 G/DL (ref 6.8–8.8)
RBC # BLD AUTO: 3.84 10E6/UL (ref 3.8–5.2)
SODIUM SERPL-SCNC: 135 MMOL/L (ref 133–144)
TACROLIMUS BLD-MCNC: 9.3 UG/L (ref 5–15)
TME LAST DOSE: NORMAL H
TME LAST DOSE: NORMAL H
WBC # BLD AUTO: 4 10E3/UL (ref 4–11)

## 2022-04-11 PROCEDURE — U0003 INFECTIOUS AGENT DETECTION BY NUCLEIC ACID (DNA OR RNA); SEVERE ACUTE RESPIRATORY SYNDROME CORONAVIRUS 2 (SARS-COV-2) (CORONAVIRUS DISEASE [COVID-19]), AMPLIFIED PROBE TECHNIQUE, MAKING USE OF HIGH THROUGHPUT TECHNOLOGIES AS DESCRIBED BY CMS-2020-01-R: HCPCS

## 2022-04-11 PROCEDURE — 36415 COLL VENOUS BLD VENIPUNCTURE: CPT

## 2022-04-11 PROCEDURE — 83735 ASSAY OF MAGNESIUM: CPT

## 2022-04-11 PROCEDURE — 99396 PREV VISIT EST AGE 40-64: CPT | Performed by: NURSE PRACTITIONER

## 2022-04-11 PROCEDURE — 80053 COMPREHEN METABOLIC PANEL: CPT

## 2022-04-11 PROCEDURE — 85027 COMPLETE CBC AUTOMATED: CPT

## 2022-04-11 PROCEDURE — U0005 INFEC AGEN DETEC AMPLI PROBE: HCPCS

## 2022-04-11 PROCEDURE — 80197 ASSAY OF TACROLIMUS: CPT

## 2022-04-11 PROCEDURE — 82248 BILIRUBIN DIRECT: CPT

## 2022-04-11 RX ORDER — ASPIRIN 81 MG/1
81 TABLET ORAL DAILY
COMMUNITY

## 2022-04-11 SDOH — ECONOMIC STABILITY: TRANSPORTATION INSECURITY
IN THE PAST 12 MONTHS, HAS LACK OF TRANSPORTATION KEPT YOU FROM MEETINGS, WORK, OR FROM GETTING THINGS NEEDED FOR DAILY LIVING?: NO

## 2022-04-11 SDOH — HEALTH STABILITY: PHYSICAL HEALTH: ON AVERAGE, HOW MANY DAYS PER WEEK DO YOU ENGAGE IN MODERATE TO STRENUOUS EXERCISE (LIKE A BRISK WALK)?: 5 DAYS

## 2022-04-11 SDOH — ECONOMIC STABILITY: INCOME INSECURITY: IN THE LAST 12 MONTHS, WAS THERE A TIME WHEN YOU WERE NOT ABLE TO PAY THE MORTGAGE OR RENT ON TIME?: NO

## 2022-04-11 SDOH — ECONOMIC STABILITY: INCOME INSECURITY: HOW HARD IS IT FOR YOU TO PAY FOR THE VERY BASICS LIKE FOOD, HOUSING, MEDICAL CARE, AND HEATING?: NOT HARD AT ALL

## 2022-04-11 SDOH — HEALTH STABILITY: PHYSICAL HEALTH: ON AVERAGE, HOW MANY MINUTES DO YOU ENGAGE IN EXERCISE AT THIS LEVEL?: 40 MIN

## 2022-04-11 SDOH — ECONOMIC STABILITY: TRANSPORTATION INSECURITY
IN THE PAST 12 MONTHS, HAS THE LACK OF TRANSPORTATION KEPT YOU FROM MEDICAL APPOINTMENTS OR FROM GETTING MEDICATIONS?: NO

## 2022-04-11 SDOH — ECONOMIC STABILITY: FOOD INSECURITY: WITHIN THE PAST 12 MONTHS, YOU WORRIED THAT YOUR FOOD WOULD RUN OUT BEFORE YOU GOT MONEY TO BUY MORE.: NEVER TRUE

## 2022-04-11 SDOH — ECONOMIC STABILITY: FOOD INSECURITY: WITHIN THE PAST 12 MONTHS, THE FOOD YOU BOUGHT JUST DIDN'T LAST AND YOU DIDN'T HAVE MONEY TO GET MORE.: NEVER TRUE

## 2022-04-11 ASSESSMENT — ENCOUNTER SYMPTOMS
FEVER: 0
HEARTBURN: 0
DIARRHEA: 0
DYSURIA: 0
CONSTIPATION: 0
SHORTNESS OF BREATH: 0
COUGH: 0
CHILLS: 0
ABDOMINAL PAIN: 0
HEADACHES: 0
HEMATOCHEZIA: 0
EYE PAIN: 0
JOINT SWELLING: 0
SORE THROAT: 0
DIZZINESS: 0
FREQUENCY: 0
MYALGIAS: 0
PARESTHESIAS: 0
ARTHRALGIAS: 0
NAUSEA: 0
PALPITATIONS: 0
NERVOUS/ANXIOUS: 0
WEAKNESS: 0
HEMATURIA: 0
BREAST MASS: 0

## 2022-04-11 ASSESSMENT — LIFESTYLE VARIABLES
SKIP TO QUESTIONS 9-10: 1
HOW OFTEN DO YOU HAVE SIX OR MORE DRINKS ON ONE OCCASION: NEVER
HOW MANY STANDARD DRINKS CONTAINING ALCOHOL DO YOU HAVE ON A TYPICAL DAY: PATIENT DOES NOT DRINK
HOW OFTEN DO YOU HAVE A DRINK CONTAINING ALCOHOL: NEVER
AUDIT-C TOTAL SCORE: 0

## 2022-04-11 ASSESSMENT — SOCIAL DETERMINANTS OF HEALTH (SDOH)
HOW OFTEN DO YOU ATTEND CHURCH OR RELIGIOUS SERVICES?: PATIENT DECLINED
IN A TYPICAL WEEK, HOW MANY TIMES DO YOU TALK ON THE PHONE WITH FAMILY, FRIENDS, OR NEIGHBORS?: MORE THAN THREE TIMES A WEEK
ARE YOU MARRIED, WIDOWED, DIVORCED, SEPARATED, NEVER MARRIED, OR LIVING WITH A PARTNER?: PATIENT DECLINED
DO YOU BELONG TO ANY CLUBS OR ORGANIZATIONS SUCH AS CHURCH GROUPS UNIONS, FRATERNAL OR ATHLETIC GROUPS, OR SCHOOL GROUPS?: YES
HOW OFTEN DO YOU GET TOGETHER WITH FRIENDS OR RELATIVES?: MORE THAN THREE TIMES A WEEK

## 2022-04-11 NOTE — Clinical Note
Colonoscopy done Fall 2020 at [facility], was told she needed repeat in 10 years.

## 2022-04-11 NOTE — PROGRESS NOTES
SUBJECTIVE:   CC: Sarah Fisher is an 52 year old woman who presents for preventive health visit.     Patient has been advised of split billing requirements and indicates understanding: Yes     Healthy Habits:     Getting at least 3 servings of Calcium per day:  Yes    Bi-annual eye exam:  Yes    Dental care twice a year:  NO    Sleep apnea or symptoms of sleep apnea:  None    Diet:  Regular (no restrictions)    Frequency of exercise:  4-5 days/week    Duration of exercise:  30-45 minutes    Taking medications regularly:  Yes    Medication side effects:  None    PHQ-2 Total Score: 0    Additional concerns today:  Yes    Mammo - due  Pap - utd  Tetanus - utd  Shingrix - needs dose #2  Colonoscopy - 12/4/2020, hemorrhoid, congested colonic mucosa, 4 mm cecal polyp removed that showed normal colonic mucosa.  DEXA - recommend screening given history of excessive alcohol use and chronic steroid use. Done in 2021, due for repeat in 2023.      Today's PHQ-2 Score:   PHQ-2 ( 1999 Pfizer) 4/11/2022   Q1: Little interest or pleasure in doing things 0   Q2: Feeling down, depressed or hopeless 0   PHQ-2 Score 0   PHQ-2 Total Score (12-17 Years)- Positive if 3 or more points; Administer PHQ-A if positive -   Q1: Little interest or pleasure in doing things Not at all   Q2: Feeling down, depressed or hopeless Not at all   PHQ-2 Score 0       Abuse: Current or Past (Physical, Sexual or Emotional) - No  Do you feel safe in your environment? Yes      Social History     Tobacco Use     Smoking status: Never Smoker     Smokeless tobacco: Never Used   Substance Use Topics     Alcohol use: Not Currently     Comment: Quit on 6/20/2020         Alcohol Use 4/11/2022   Prescreen: >3 drinks/day or >7 drinks/week? Not Applicable       Reviewed orders with patient.  Reviewed health maintenance and updated orders accordingly - Yes  BP Readings from Last 3 Encounters:   04/11/22 120/70   02/25/22 139/81   02/22/22 (!) 146/86    Wt Readings  from Last 3 Encounters:   04/11/22 72.9 kg (160 lb 12.8 oz)   02/25/22 67 kg (147 lb 9.6 oz)   02/22/22 67.1 kg (148 lb)           Breast Cancer Screening:  Any new diagnosis of family breast, ovarian, or bowel cancer? No    Breast CA Risk Assessment (FHS-7) 3/17/2021   Do you have a family history of breast, colon, or ovarian cancer? No / Unknown       Mammogram Screening: Recommended annual mammography  Pertinent mammograms are reviewed under the imaging tab.    History of abnormal Pap smear: YES - updated in Problem List and Health Maintenance accordingly  PAP / HPV Latest Ref Rng & Units 3/17/2021   PAP (Historical) - NIL   HPV16 NEG:Negative Negative   HPV18 NEG:Negative Negative   HRHPV NEG:Negative Negative     Reviewed and updated as needed this visit by clinical staff                  Reviewed and updated as needed this visit by Provider                   Patient Active Problem List   Diagnosis     Acute deep vein thrombosis (DVT) of upper extremity (H)     Acute hepatic encephalopathy     Acute hypoxemic respiratory failure (H)     Acute kidney failure, unspecified (H)     Carrier of group B Streptococcus     Cirrhosis of liver with ascites (H)     CKD (chronic kidney disease)     Elevated blood pressure     Family history of colon cancer     Gastroesophageal reflux disease without esophagitis     GI (gastrointestinal bleed)     Hyponatremia     Hypokalemia     History of DVT (deep vein thrombosis)     Anemia associated with acute blood loss     Acute anemia     Need for vaccination for viral hepatitis     Macrocytosis without anemia     Knee pain     Pancytopenia (H)     Macrocytic anemia     Blood loss anemia     Hydrothorax     Portal hypertensive gastropathy (H)     Pleural effusion     Screening for malignant neoplasm of cervix     Venous incompetence     Varicose veins of leg with pain     Supervision of high-risk pregnancy of elderly primigravida     Subclinical hypothyroidism     Liver failure (H)      Hypotension     Pre-liver transplant, listed     Alcoholic cirrhosis (H)     Abnormal serum iron level     Iron deficiency anemia due to chronic blood loss     Encounter for immunization     Liver transplant candidate     Liver transplant recipient (H)     Immunosuppressed status (H)     Malnutrition (H)     Steroid-induced hyperglycemia     Epistaxis     Esophagitis     Duodenal erosion     Gastric antral vascular ectasia     Hyperphosphatemia     Leukocytosis     Chronic kidney disease, stage 3b (H)     Low magnesium level     Pain of female symphysis pubis     Somatic dysfunction of pelvic region     Past Medical History:   Diagnosis Date     Ascites      History of blood transfusion      Liver cirrhosis (H)      Thyroid disease      Past Surgical History:   Procedure Laterality Date     ABDOMEN SURGERY        SECTION       CV RIGHT HEART CATH MEASUREMENTS RECORDED N/A 2021    Procedure: CV RIGHT HEART CATH;  Surgeon: Joseph Guevara MD;  Location:  HE[facility] CATH LAB     ENDOSCOPIC RETROGRADE CHOLANGIOPANCREATOGRAM N/A 10/25/2021    Procedure: ENDOSCOPIC RETROGRADE CHOLANGIOPANCREATOGRAPHY, WITH biliary sphincterotomy, biliary dilation, and biliary stent placement;  Surgeon: Guru Dominguez Fenton MD;  Location:  OR     ENDOSCOPIC RETROGRADE CHOLANGIOPANCREATOGRAPHY, EXCHANGE TUBE/STENT N/A 2021    Procedure: ENDOSCOPIC RETROGRADE CHOLANGIOPANCREATOGRAPHY, WITH biliary sphincterotomy, stricture dilation, stent exchange;  Surgeon: Guru Dominguez Fenton MD;  Location:  OR     ESOPHAGOSCOPY, GASTROSCOPY, DUODENOSCOPY (EGD), COMBINED N/A 10/07/2021    Procedure: ESOPHAGOGASTRODUODENOSCOPY (EGD) with hemostasis;  Surgeon: Fox Jerry MD;  Location[facility]     ESOPHAGOSCOPY, GASTROSCOPY, DUODENOSCOPY (EGD), COMBINED N/A 10/22/2021    Procedure: ESOPHAGOGASTRODUODENOSCOPY, WITH BIOPSY;  Surgeon: Fadia Avila MD;  Location: [facility]      IR LIVER BIOPSY PERCUTANEOUS  10/26/2021     IR THORACENTESIS  2021     IR THORACENTESIS  2021     THORACENTESIS Left 2021    Procedure: THORACENTESIS;  Surgeon: Almas Irby MD;  Location:  GI     THORACENTESIS N/A 2021    Procedure: THORACENTESIS;  Surgeon: Almas Irby MD;  Location:  GI     THORACENTESIS N/A 03/10/2021    Procedure: THORACENTESIS;  Surgeon: Almas Irby MD;  Location:  GI     THORACENTESIS Left 2021    Procedure: THORACENTESIS;  Surgeon: Kennedi Chavez MD;  Location: [facility] OR     THORACENTESIS Left 2021    Procedure: THORACENTESIS;  Surgeon: Jess Ansari PA-C;  [place] [facility]     [place]ORACIC SURGERY  2021     TRANSPLANT LIVER RECIPIENT  DONOR N/A 10/17/2021    Procedure: TRANSPLANT, LIVER, RECIPIENT,  DONOR;  Surgeon: Christian Morrison MD;  Location:  OR     Social History     Socioeconomic History     Marital status: Single     Spouse name: Not on file     Number of children: Not on file     Years of education: Not on file     Highest education level: Master's degree (e.g., MA, MS, Roberto, MEd, MSW, SASHA)   Occupational History     Not on file   Tobacco Use     Smoking status: Never Smoker     Smokeless tobacco: Never Used   Vaping Use     Vaping Use: Never used   Substance and Sexual Activity     Alcohol use: Not Currently     Comment: Quit on 2020     Drug use: Not Currently     Sexual activity: Not Currently     Partners: Male   Other Topics Concern     Parent/sibling w/ CABG, MI or angioplasty before 65F 55M? Not Asked   Social History Narrative    Lives [place]. 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 12 year sonFlakito.         Christine Harris, DNP, APRN, CNP    3/17/2021     Social Determinants of Health     Financial Resource Strain: Low Risk      Difficulty of Paying Living Expenses: Not hard at all   Food Insecurity: No Food Insecurity     Worried About  Running Out of Food in the Last Year: Never true     Ran Out of Food in the Last Year: Never true   Transportation Needs: No Transportation Needs     Lack of Transportation (Medical): No     Lack of Transportation (Non-Medical): No   Physical Activity: Sufficiently Active     Days of Exercise per Week: 5 days     Minutes of Exercise per Session: 40 min   Stress: No Stress Concern Present     Feeling of Stress : Not at all   Social Connections: Unknown     Frequency of Communication with Friends and Family: More than three times a week     Frequency of Social Gatherings with Friends and Family: More than three times a week     Attends Jehovah's witness Services: Patient refused     Active Member of Clubs or Organizations: Yes     Attends Club or Organization Meetings: More than 4 times per year     Marital Status: Patient refused   Intimate Partner Violence: Not on file   Housing Stability: Low Risk      Unable to Pay for Housing in the Last Year: No     Number of Places Lived in the Last Year: 1     Unstable Housing in the Last Year: No     Current Outpatient Medications   Medication     amLODIPine (NORVASC) 10 MG tablet     aspirin (ASA) 81 MG EC tablet     cyclobenzaprine (FLEXERIL) 5 MG tablet     diclofenac (VOLTAREN) 1 % topical gel     levothyroxine (SYNTHROID/LEVOTHROID) 50 MCG tablet     magnesium oxide (MAG-OX) 400 (241.3 Mg) MG tablet     methocarbamol (ROBAXIN) 500 MG tablet     methocarbamol (ROBAXIN) 750 MG tablet     mycophenolate (GENERIC EQUIVALENT) 250 MG capsule     ondansetron (ZOFRAN) 4 MG tablet     pantoprazole (PROTONIX) 40 MG EC tablet     predniSONE (DELTASONE) 5 MG tablet     tacrolimus (GENERIC EQUIVALENT) 1 MG capsule     tacrolimus (GENERIC EQUIVALENT) 5 MG capsule     ursodiol (ACTIGALL) 300 MG capsule     No current facility-administered medications for this visit.        Allergies   Allergen Reactions     Amoxicillin GI Disturbance, Diarrhea, Nausea and Nausea and Vomiting       Review of  "Systems   Constitutional: Negative for chills and fever.   HENT: Negative for congestion, ear pain, hearing loss and sore throat.    Eyes: Negative for pain and visual disturbance.   Respiratory: Negative for cough and shortness of breath.    Cardiovascular: Negative for chest pain, palpitations and peripheral edema.   Gastrointestinal: Negative for abdominal pain, constipation, diarrhea, heartburn, hematochezia and nausea.   Breasts:  Negative for tenderness, breast mass and discharge.   Genitourinary: Negative for dysuria, frequency, genital sores, hematuria, pelvic pain, urgency, vaginal bleeding and vaginal discharge.   Musculoskeletal: Negative for arthralgias, joint swelling and myalgias.   Skin: Negative for rash.   Neurological: Negative for dizziness, weakness, headaches and paresthesias.   Psychiatric/Behavioral: Negative for mood changes. The patient is not nervous/anxious.         OBJECTIVE:   /70 (BP Location: Right arm, Patient Position: Sitting, Cuff Size: Adult Small)   Pulse 73   Temp 97.6  F (36.4  C) (Tympanic)   Resp 16   Ht 1.68 m (5' 6.14\")   Wt 72.9 kg (160 lb 12.8 oz)   LMP  (LMP Unknown)   SpO2 100%   BMI 25.84 kg/m    Physical Exam  Constitutional: appears to be in no acute distress, comfortable, pleasant.   Eyes: anicteric, conjunctiva clear without drainage or erythema. WILLIS.   Ears, Nose and Throat: tympanic membranes gray with LR,  nose without nasal discharge. OP: no erythema to posterior pharynx, negative post nasal drainage, tonsils +1 no erythema or exudate.  Neck: supple, thyroid palpable,not enlarged, no nodules   Breast: Exam deferred (deferred after discussion of exam options with patient, no symptoms or concerns).   Cardiovascular: regular rate and rhythm, normal S1 and S2, no murmurs, rubs or gallops, peripheral pulses full and symmetric; negative peripheral edema   Respiratory: Air entry throughout. Breathing pattern unlabored without the use of accessory " muscles. Clear to auscultation A and P, no wheezes or crackles, normal breath sounds.    Gastrointestinal: rounded, soft. Positive bowel sounds x4, nontender, no masses.   Genitourinary: Exam deferred (deferred after discussion of exam options with patient, no symptoms or concerns, pap up to date).   Musculoskeletal: full range of motion, no edema.   Skin: pink, turgor smooth and elastic. Negative for lesions or dryness.  Neurological: normal gait, no tremor.   Psychological: appropriate mood and affect.   Lymphatic: no cervical, axillary, supraclavicular, or infraclavicular lymphadenopathy.    Diagnostic Test Results:  Labs reviewed in Epic    ASSESSMENT/PLAN:   (Z00.00) Routine general medical examination at a health care facility  (primary encounter diagnosis)  Age appropriate screening and preventative care have been addressed today. Vaccinations have been reviewed. Recommend Hep B #3 of 3, 4th COVID-19 dose, and Shingrix dose #2 of 2. Patient to discuss recommended vaccines with transplant RN, if appropriate will return in 1 week for vaccine administration. Patient has been advised to undertake routine aerobic activity. Colonoscopy has been reviewed and is up to date at this time. Recommend annual vision exams as well as biannual dental exams. They will follow up for annual physical again in one year.   - Lipid panel reflex to direct LDL Fasting  - Mammo - due, scheduled 4/25  - Pap - utd  - Tetanus - utd  - Due for April 17th, 2022 for 2nd dose of Shingrix.   - After 8/13/2022 will be due for Prevnar 13.   - Colonoscopy - 12/4/2020, hemorrhoid, congested colonic mucosa, 4 mm cecal polyp removed that showed normal colonic mucosa.  - DEXA - recommend screening given history of excessive alcohol use and chronic steroid use. Done in 2021, due for repeat in 2023.            (Z23) Encounter for administration of vaccine  Previously recommended to wait 90 days post liver transplant to obtain any vaccines. Recommend  Hep B #3 of 3, 4th COVID-19 dose, and Shingrix dose #2 of 2. Patient to discuss recommended vaccines again with transplant RN, if appropriate will return in 1 week for vaccine administration, nurse visit scheduled.   - ZOSTER VACCINE RECOMBINANT (Shingrix)  - COVID-19,PF,PFIZER (12+ Yrs GRAY LABEL)  - HEPATITIS B VACCINE, ADULT, IM            (N18.32) Chronic kidney disease, stage 3b (H)  Follows with nephrology. Cr seems to have settled to new baseline of 1.5-1.7 with an eGFR in the 30s. Underlying etiology thought to be from residual scarring from AKIs while liver had failed. Now focus is on preventing further injury - controlling BP, avoiding nephrotoxins, etc.       (Z94.4) Liver transplant recipient (H)  (K70.31) Alcoholic cirrhosis of liver with ascites (H)  (D84.9) Immunosuppressed status (H)  S/p liver transplant 10/17/2021. Doing well with sobriety. On ASA 81 mg due to hypercoagulable state intraoperatively and post-op US with elevated RI involving the extrahepatic hepatic artery and right hepatic artery. Continue to follow with GI, Dr. Fadia Avila. Repeat ERCP scheduled for 4/13, will have pre-op exam tomorrow 4/12 with Sirena Marinelli CNP.  Current Immunosuppresion:  - Tacrolimus 7-7 - goal 6-8 given creatinine  - Started MMF wean 1/11, taking 500 mg BID  - Prednisone 5 mg daily      (Z86.718) History of DVT (deep vein thrombosis)  Left internal jugular acute DVT 9/2020. First DVT she had ever had. Saw hematology, they felt her DVT was related to hypercoagulable state from cirrhosis and compression from hydrothorax. Plan was for 2 months of AC.       (E03.8) Subclinical hypothyroidism  On levothyroxine 50 mcg daily, recheck TSH.   - TSH with free T4 reflex      (I15.9) Secondary hypertension  BP well controlled on amlodipine 10 mg daily. With CNI, next BP medication should be a thiazide diuretic and then would consider introducing ACE/ARB at some point if CKD remains stable.  BP Readings from Last 3  "Encounters:   04/11/22 120/70   02/25/22 139/81   02/22/22 (!) 146/86         Follow-up:  - Return for mammogram, fasting labs, and immunizations as scheduled.        COUNSELING:  Reviewed preventive health counseling, as reflected in patient instructions  Special attention given to:        Regular exercise       Vision screening       Immunizations       Osteoporosis prevention/bone health       Colorectal Cancer Screening    Estimated body mass index is 25.84 kg/m  as calculated from the following:    Height as of this encounter: 1.68 m (5' 6.14\").    Weight as of this encounter: 72.9 kg (160 lb 12.8 oz).      She reports that she has never smoked. She has never used smokeless tobacco.      Counseling Resources:  ATP IV Guidelines  Pooled Cohorts Equation Calculator  Breast Cancer Risk Calculator  BRCA-Related Cancer Risk Assessment: FHS-7 Tool  FRAX Risk Assessment  ICSI Preventive Guidelines  Dietary Guidelines for Americans, 2010  USDA's MyPlate  ASA Prophylaxis  Lung CA Screening    ROBYN Bear CNP  [facility] ALPHONSE  "

## 2022-04-11 NOTE — PATIENT INSTRUCTIONS
Things to ask transplant RN:  - Need Hep B dose #3 of 3  - 4th dose COVID-19 vaccine  - Shingrix dose #2 of 2  - Colonoscopy done 12/2020 - was told to repeat in 10 years. Should this be done sooner than 10 y ars given immunocompromised state?    Preventive Health Recommendations  Female Ages 50 - 64    Yearly exam: See your health care provider every year in order to  Review health changes.   Discuss preventive care.    Review your medicines if your doctor has prescribed any.    Get a Pap test every three years (unless you have an abnormal result and your provider advises testing more often).  If you get Pap tests with HPV test, you only need to test every 5 years, unless you have an abnormal result.   You do not need a Pap test if your uterus was removed (hysterectomy) and you have not had cancer.  You should be tested each year for STDs (sexually transmitted diseases) if you're at risk.   Have a mammogram every 1 to 2 years.  Have a colonoscopy at age 50, or have a yearly FIT test (stool test). These exams screen for colon cancer.    Have a cholesterol test every 5 years, or more often if advised.  Have a diabetes test (fasting glucose) every three years. If you are at risk for diabetes, you should have this test more often.   If you are at risk for osteoporosis (brittle bone disease), think about having a bone density scan (DEXA).    Shots: Get a flu shot each year. Get a tetanus shot every 10 years.    Nutrition:   Eat at least 5 servings of fruits and vegetables each day.  Eat whole-grain bread, whole-wheat pasta and brown rice instead of white grains and rice.  Get adequate Calcium and Vitamin D.     Lifestyle  Exercise at least 150 minutes a week (30 minutes a day, 5 days a week). This will help you control your weight and prevent disease.  Limit alcohol to one drink per day.  No smoking.   Wear sunscreen to prevent skin cancer.   See your dentist every six months for an exam and cleaning.  See your eye  doctor every 1 to 2 years.

## 2022-04-12 ENCOUNTER — OFFICE VISIT (OUTPATIENT)
Dept: PEDIATRICS | Facility: CLINIC | Age: 52
End: 2022-04-12
Payer: COMMERCIAL

## 2022-04-12 VITALS
RESPIRATION RATE: 16 BRPM | WEIGHT: 160 LBS | BODY MASS INDEX: 25.11 KG/M2 | OXYGEN SATURATION: 100 % | TEMPERATURE: 97.8 F | HEIGHT: 67 IN | HEART RATE: 83 BPM | SYSTOLIC BLOOD PRESSURE: 124 MMHG | DIASTOLIC BLOOD PRESSURE: 80 MMHG

## 2022-04-12 DIAGNOSIS — Z86.718 HISTORY OF DVT (DEEP VEIN THROMBOSIS): ICD-10-CM

## 2022-04-12 DIAGNOSIS — Z94.4 LIVER TRANSPLANT RECIPIENT (H): ICD-10-CM

## 2022-04-12 DIAGNOSIS — Z01.818 PREOP GENERAL PHYSICAL EXAM: Primary | ICD-10-CM

## 2022-04-12 DIAGNOSIS — N18.32 CHRONIC KIDNEY DISEASE, STAGE 3B (H): ICD-10-CM

## 2022-04-12 DIAGNOSIS — I15.9 SECONDARY HYPERTENSION: ICD-10-CM

## 2022-04-12 DIAGNOSIS — D84.9 IMMUNOSUPPRESSED STATUS (H): ICD-10-CM

## 2022-04-12 LAB — SARS-COV-2 RNA RESP QL NAA+PROBE: NEGATIVE

## 2022-04-12 PROCEDURE — 99214 OFFICE O/P EST MOD 30 MIN: CPT | Performed by: NURSE PRACTITIONER

## 2022-04-12 NOTE — PATIENT INSTRUCTIONS
Preparing for Your Surgery  Getting started  A nurse will call you to review your health history and instructions. They will give you an arrival time based on your scheduled surgery time. Please be ready to share:    Your doctor's clinic name and phone number    Your medical, surgical and anesthesia history    A list of allergies and sensitivities    A list of medicines, including herbal treatments and over-the-counter drugs    Whether the patient has a legal guardian (ask how to send us the papers in advance)  Please tell us if you're pregnant--or if there's any chance you might be pregnant. Some surgeries may injure a fetus (unborn baby), so they require a pregnancy test. Surgeries that are safe for a fetus don't always need a test, and you can choose whether to have one.   If you have a child who's having surgery, please ask for a copy of Preparing for Your Child's Surgery.    Preparing for surgery    Within 30 days of surgery: Have a pre-op exam (sometimes called an H&P, or History and Physical). This can be done at a clinic or pre-operative center.  ? If you're having a , you may not need this exam. Talk to your care team.    At your pre-op exam, talk to your care team about all medicines you take. If you need to stop any medicines before surgery, ask when to start taking them again.  ? We do this for your safety. Many medicines can make you bleed too much during surgery. Some change how well surgery (anesthesia) drugs work.    Call your insurance company to let them know you're having surgery. (If you don't have insurance, call [phone].)    Call your clinic if there's any change in your health. This includes signs of a cold or flu (sore throat, runny nose, cough, rash, fever). It also includes a scrape or scratch near the surgery site.    If you have questions on the day of surgery, call your hospital or surgery center.  COVID testing  You may need to be tested for COVID-19 before having  surgery. If so, your surgical team will give you instructions for scheduling this test, separate from your preoperative history and physical.  Eating and drinking guidelines  For your safety: Unless your surgeon tells you otherwise, follow the guidelines below.    Eat and drink as usual until 8 hours before surgery. After that, no food or milk.    Drink clear liquids until 2 hours before surgery. These are liquids you can see through, like water, Gatorade and Propel Water. You may also have black coffee and tea (no cream or milk).    Nothing by mouth within 2 hours of surgery. This includes gum, candy and breath mints.    If you drink alcohol: Stop drinking it the night before surgery.    If your care team tells you to take medicine on the morning of surgery, it's okay to take it with a sip of water.  Preventing infection    Shower or bathe the night before and morning of your surgery. Follow the instructions your clinic gave you. (If no instructions, use regular soap.)    Don't shave or clip hair near your surgery site. We'll remove the hair if needed.    Don't smoke or vape the morning of surgery. You may chew nicotine gum up to 2 hours before surgery. A nicotine patch is okay.  ? Note: Some surgeries require you to completely quit smoking and nicotine. Check with your surgeon.    Your care team will make every effort to keep you safe from infection. We will:  ? Clean our hands often with soap and water (or an alcohol-based hand rub).  ? Clean the skin at your surgery site with a special soap that kills germs.  ? Give you a special gown to keep you warm. (Cold raises the risk of infection.)  ? Wear special hair covers, masks, gowns and gloves during surgery.  ? Give antibiotic medicine, if prescribed. Not all surgeries need antibiotics.  What to bring on the day of surgery    Photo ID and insurance card    Copy of your health care directive, if you have one    Glasses and hearing aides (bring cases)  ? You can't  wear contacts during surgery    Inhaler and eye drops, if you use them (tell us about these when you arrive)    CPAP machine or breathing device, if you use them    A few personal items, if spending the night    If you have . . .  ? A pacemaker, ICD (cardiac defibrillator) or other implant: Bring the ID card.  ? An implanted stimulator: Bring the remote control.  ? A legal guardian: Bring a copy of the certified (court-stamped) guardianship papers.  Please remove any jewelry, including body piercings. Leave jewelry and other valuables at home.  If you're going home the day of surgery    You must have a responsible adult drive you home. They should stay with you overnight as well.    If you don't have someone to stay with you, and you aren't safe to go home alone, we may keep you overnight. Insurance often won't pay for this.  After surgery  If it's hard to control your pain or you need more pain medicine, please call your surgeon's office.  Questions?   If you have any questions for your care team, list them here: _________________________________________________________________________________________________________________________________________________________________________ ____________________________________ ____________________________________ ____________________________________  For informational purposes only. Not to replace the advice of your health care provider. Copyright   2003, 2019 [facility]. All rights reserved. Clinically reviewed by Carolynn Mendes MD. [employer] [id number] - REV 07/21.    Preparing for Your Surgery  Getting started  A nurse will call you to review your health history and instructions. They will give you an arrival time based on your scheduled surgery time. Please be ready to share:    Your doctor's clinic name and phone number    Your medical, surgical and anesthesia history    A list of allergies and sensitivities    A list of medicines, including herbal treatments and  over-the-counter drugs    Whether the patient has a legal guardian (ask how to send us the papers in advance)  Please tell us if you're pregnant--or if there's any chance you might be pregnant. Some surgeries may injure a fetus (unborn baby), so they require a pregnancy test. Surgeries that are safe for a fetus don't always need a test, and you can choose whether to have one.   If you have a child who's having surgery, please ask for a copy of Preparing for Your Child's Surgery.    Preparing for surgery    Within 30 days of surgery: Have a pre-op exam (sometimes called an H&P, or History and Physical). This can be done at a clinic or pre-operative center.  ? If you're having a , you may not need this exam. Talk to your care team.    At your pre-op exam, talk to your care team about all medicines you take. If you need to stop any medicines before surgery, ask when to start taking them again.  ? We do this for your safety. Many medicines can make you bleed too much during surgery. Some change how well surgery (anesthesia) drugs work.    Call your insurance company to let them know you're having surgery. (If you don't have insurance, call [phone].)    Call your clinic if there's any change in your health. This includes signs of a cold or flu (sore throat, runny nose, cough, rash, fever). It also includes a scrape or scratch near the surgery site.    If you have questions on the day of surgery, call your hospital or surgery center.  COVID testing  You may need to be tested for COVID-19 before having surgery. If so, your surgical team will give you instructions for scheduling this test, separate from your preoperative history and physical.  Eating and drinking guidelines  For your safety: Unless your surgeon tells you otherwise, follow the guidelines below.    Eat and drink as usual until 8 hours before surgery. After that, no food or milk.    Drink clear liquids until 2 hours before surgery. These are  liquids you can see through, like water, Gatorade and Propel Water. You may also have black coffee and tea (no cream or milk).    Nothing by mouth within 2 hours of surgery. This includes gum, candy and breath mints.    If you drink alcohol: Stop drinking it the night before surgery.    If your care team tells you to take medicine on the morning of surgery, it's okay to take it with a sip of water.  Preventing infection    Shower or bathe the night before and morning of your surgery. Follow the instructions your clinic gave you. (If no instructions, use regular soap.)    Don't shave or clip hair near your surgery site. We'll remove the hair if needed.    Don't smoke or vape the morning of surgery. You may chew nicotine gum up to 2 hours before surgery. A nicotine patch is okay.  ? Note: Some surgeries require you to completely quit smoking and nicotine. Check with your surgeon.    Your care team will make every effort to keep you safe from infection. We will:  ? Clean our hands often with soap and water (or an alcohol-based hand rub).  ? Clean the skin at your surgery site with a special soap that kills germs.  ? Give you a special gown to keep you warm. (Cold raises the risk of infection.)  ? Wear special hair covers, masks, gowns and gloves during surgery.  ? Give antibiotic medicine, if prescribed. Not all surgeries need antibiotics.  What to bring on the day of surgery    Photo ID and insurance card    Copy of your health care directive, if you have one    Glasses and hearing aides (bring cases)  ? You can't wear contacts during surgery    Inhaler and eye drops, if you use them (tell us about these when you arrive)    CPAP machine or breathing device, if you use them    A few personal items, if spending the night    If you have . . .  ? A pacemaker, ICD (cardiac defibrillator) or other implant: Bring the ID card.  ? An implanted stimulator: Bring the remote control.  ? A legal guardian: Bring a copy of the  certified (court-stamped) guardianship papers.  Please remove any jewelry, including body piercings. Leave jewelry and other valuables at home.  If you're going home the day of surgery    You must have a responsible adult drive you home. They should stay with you overnight as well.    If you don't have someone to stay with you, and you aren't safe to go home alone, we may keep you overnight. Insurance often won't pay for this.  After surgery  If it's hard to control your pain or you need more pain medicine, please call your surgeon's office.  Questions?   If you have any questions for your care team, list them here: _________________________________________________________________________________________________________________________________________________________________________ ____________________________________ ____________________________________ ____________________________________  For informational purposes only. Not to replace the advice of your health care provider. Copyright   2003, 2019 [employer]. All rights reserved. Clinically reviewed by Carolynn Mendes MD. [employer] [id number] - REV 07/21.

## 2022-04-12 NOTE — PROGRESS NOTES
[facility]  [address]  [address]  [place] [address]  Phone: [phone]  Fax: [phone]  Primary Provider: Christine Harris  Pre-op Performing Provider: CHAUNCEY PORTER      PREOPERATIVE EVALUATION:  Today's date: 4/12/2022    Sarah Fisher is a 52 year old female who presents for a preoperative evaluation.    Surgical Information:  Surgery/Procedure: ENDOSCOPIC RETROGRADE CHOLANGIOPANCREATOGRAPHY  Surgery Location: [facility]  Surgeon: Guru Dominguez Fenton  Surgery Date: 4/13/22  Time of Surgery: 140PM  Where patient plans to recover: At home with family  Fax number for surgical facility: Note does not need to be faxed, will be available electronically in Epic.    Type of Anesthesia Anticipated: General    Assessment & Plan     The proposed surgical procedure is considered LOW risk.    (Z01.818) Preop general physical exam  (primary encounter diagnosis)  (Z94.4) Liver transplant recipient (H)  ERCP and possible stent placement.     (N18.32) Chronic kidney disease, stage 3b (H)  Comment: Stable.    (D84.9) Immunosuppressed status (H)  Comment: Stable.    (Z86.718) History of DVT (deep vein thrombosis)  Comment: Past. Thought to be related to hypercoagulable state from cirrhosis and compression from hydrothorax.    (I15.9) Secondary hypertension  Comment: Stable.       Risks and Recommendations:  The patient has the following additional risks and recommendations for perioperative complications:   - No identified additional risk factors other than previously addressed    Medication Instructions:  Patient is to take all scheduled medications on the day of surgery    RECOMMENDATION:  APPROVAL GIVEN to proceed with proposed procedure, without further diagnostic evaluation.    Review of external notes as documented above     Subjective     HPI related to upcoming procedure: ERCP    Preop Questions 4/12/2022   1. Have you ever had a heart attack or stroke?  No   2. Have you ever had surgery on your heart or blood vessels, such as a stent placement, a coronary artery bypass, or surgery on an artery in your head, neck, heart, or legs? No   3. Do you have chest pain with activity? No   4. Do you have a history of  heart failure? No   5. Do you currently have a cold, bronchitis or symptoms of other infection? No   6. Do you have a cough, shortness of breath, or wheezing? No   7. Do you or anyone in your family have previous history of blood clots? No   8. Do you or does anyone in your family have a serious bleeding problem such as prolonged bleeding following surgeries or cuts? No   9. Have you ever had problems with anemia or been told to take iron pills? No   10. Have you had any abnormal blood loss such as black, tarry or bloody stools, or abnormal vaginal bleeding? No   11. Have you ever had a blood transfusion? YES   11a. Have you ever had a transfusion reaction? No   12. Are you willing to have a blood transfusion if it is medically needed before, during, or after your surgery? Yes   13. Have you or any of your relatives ever had problems with anesthesia? No   14. Do you have sleep apnea, excessive snoring or daytime drowsiness? No   15. Do you have any artifical heart valves or other implanted medical devices like a pacemaker, defibrillator, or continuous glucose monitor? No   16. Do you have artificial joints? No   17. Are you allergic to latex? No   18. Is there any chance that you may be pregnant? No       Health Care Directive:  Patient has a Health Care Directive on file      Preoperative Review of :   reviewed - Small amount of tramadol prescribed      Status of Chronic Conditions:  See problem list for active medical problems.  Problems all longstanding and stable, except as noted/documented.  See ROS for pertinent symptoms related to these conditions.      Review of Systems  Constitutional, neuro, ENT, endocrine, pulmonary, cardiac, gastrointestinal,  genitourinary, musculoskeletal, integument and psychiatric systems are negative, except as otherwise noted.    Patient Active Problem List    Diagnosis Date Noted     Pain of female symphysis pubis 03/24/2022     Priority: Medium     Somatic dysfunction of pelvic region 03/24/2022     Priority: Medium     Low magnesium level 12/06/2021     Priority: Medium     Chronic kidney disease, stage 3b (H) 12/03/2021     Priority: Medium     Liver transplant recipient (H) 10/25/2021     Priority: Medium     Immunosuppressed status (H) 10/25/2021     Priority: Medium     Malnutrition (H) 10/25/2021     Priority: Medium     Moderate in the context of chronic illness       Steroid-induced hyperglycemia 10/25/2021     Priority: Medium     Epistaxis 10/25/2021     Priority: Medium     Esophagitis 10/25/2021     Priority: Medium     Duodenal erosion 10/25/2021     Priority: Medium     Gastric antral vascular ectasia 10/25/2021     Priority: Medium     Hyperphosphatemia 10/25/2021     Priority: Medium     Leukocytosis 10/25/2021     Priority: Medium     Liver transplant candidate 10/17/2021     Priority: Medium     Encounter for immunization 09/20/2021     Priority: Medium     Iron deficiency anemia due to chronic blood loss 08/05/2021     Priority: Medium     Alcoholic cirrhosis (H) 05/10/2021     Priority: Medium     Added automatically from request for surgery [id number]       Abnormal serum iron level 05/10/2021     Priority: Medium     Added automatically from request for surgery [id number]       Pre-liver transplant, listed 05/02/2021     Priority: Medium     Hypotension 12/19/2020     Priority: Medium     Liver failure (H) 12/14/2020     Priority: Medium     Acute kidney failure, unspecified (H) 11/14/2020     Priority: Medium     CKD (chronic kidney disease) 11/14/2020     Priority: Medium     Hyponatremia 11/14/2020     Priority: Medium     Blood loss anemia 11/14/2020     Priority: Medium     GI (gastrointestinal bleed)  10/30/2020     Priority: Medium     History of DVT (deep vein thrombosis) 10/30/2020     Priority: Medium     Anemia associated with acute blood loss 10/30/2020     Priority: Medium     Acute anemia 10/02/2020     Priority: Medium     Portal hypertensive gastropathy (H) 10/02/2020     Priority: Medium     Macrocytic anemia 10/01/2020     Priority: Medium     Hydrothorax 09/13/2020     Priority: Medium     Acute hepatic encephalopathy 09/10/2020     Priority: Medium     Acute hypoxemic respiratory failure (H) 09/09/2020     Priority: Medium     Acute deep vein thrombosis (DVT) of upper extremity (H) 09/08/2020     Priority: Medium     Cirrhosis of liver with ascites (H) 09/08/2020     Priority: Medium     Hypokalemia 09/08/2020     Priority: Medium     Pancytopenia (H) 09/08/2020     Priority: Medium     Pleural effusion 09/08/2020     Priority: Medium     Macrocytosis without anemia 05/19/2017     Priority: Medium     Screening for malignant neoplasm of cervix 02/23/2017     Priority: Medium     2010 & 2011  ASCUS, Negative HPV  2012 NILM  2017 ASCUS, Negative HPV, 46 y.o. PLAN: cotest 2/2020  3/17/21 NIL, Neg HPV. Repeat co-test in 3 years (3/17/2024)      Co-testing is preferred, but cytology is acceptable if 30 years or older.    If using cytology alone, perform annual cervical cytology. If results of 3 consecutive cytology results are normal, perform cytology every 3 years.    If using co-testing, perform baseline co-test with cytology and HPV. If result of cytology is normal and HPV is negative, co-testing can be performed every 3 years.    Continue screening throughout lifetime (older than 65 years). Discontinue screening based on shared discussion regarding quality and duration of life rather than age.    Screen patients on dialysis and posttransplant similarly.             Elevated blood pressure 02/15/2017     Priority: Medium     Venous incompetence 02/15/2017     Priority: Medium     Gastroesophageal  reflux disease without esophagitis 2017     Priority: Medium     Subclinical hypothyroidism 2012     Priority: Medium     Family history of colon cancer 2011     Priority: Medium     Father  age 63  Father  age 63       Varicose veins of leg with pain 2010     Priority: Medium     Need for vaccination for viral hepatitis 2008     Priority: Medium     Hep B series not completed in .  Needs immunization, OK with series during pregnancy.  Will start i 2nd trimester.  Hep B series not completed in .  Needs immunization, OK with series during pregnancy.  Will start i 2nd trimester.       Supervision of high-risk pregnancy of elderly primigravida 2008     Priority: Medium     RAUDEL 08.  Pt plans CNM del @ [facility].  RAUDEL 08.  Pt plans CN del @ [facility].       Carrier of group B Streptococcus 2008     Priority: Medium     In UC 08  In UC 08       Knee pain 01/10/2008     Priority: Medium      Past Medical History:   Diagnosis Date     Ascites      History of blood transfusion      Liver cirrhosis (H)      Thyroid disease      Past Surgical History:   Procedure Laterality Date     ABDOMEN SURGERY        SECTION       CV RIGHT HEART CATH MEASUREMENTS RECORDED N/A 2021    Procedure: CV RIGHT HEART CATH;  Surgeon: Joseph Guevara MD;  Location:  HEART CARD[facility] CATH LAB     ENDOSCOPIC RETROGRADE CHOLANGIOPANCREATOGRAM N/A 10/25/2021    Procedure: ENDOSCOPIC RETROGRADE CHOLANGIOPANCREATOGRAPHY, WITH biliary sphincterotomy, biliary dilation, and biliary stent placement;  Surgeon: Guru Dominguez Fenton MD;  Location:  OR     ENDOSCOPIC RETROGRADE CHOLANGIOPANCREATOGRAPHY, EXCHANGE TUBE/STENT N/A 2021    Procedure: ENDOSCOPIC RETROGRADE CHOLANGIOPANCREATOGRAPHY, WITH biliary sphincterotomy, stricture dilation, stent exchange;  Surgeon: Guru Dominguez Fenton MD;  Location:  OR      ESOPHAGOSCOPY, GASTROSCOPY, DUODENOSCOPY (EGD), COMBINED N/A 10/07/2021    Procedure: ESOPHAGOGASTRODUODENOSCOPY (EGD) with hemostasis;  Surgeon: Fox Jerry MD;  Location:  GI     ESOPHAGOSCOPY, GASTROSCOPY, DUODENOSCOPY (EGD), COMBINED N/A 10/22/2021    Procedure: ESOPHAGOGASTRODUODENOSCOPY, WITH BIOPSY;  Surgeon: Fadia Avila MD;  [facility]     IR LIVER BIOPSY PERCUTANEOUS  10/26/2021     IR THORACENTESIS  2021     IR THORACENTESIS  2021     THORACENTESIS Left 2021    Procedure: THORACENTESIS;  Surgeon: Almas Irby MD;  [facility]: [facility]     THORACENTESIS N/A 2021    Procedure: THORACENTESIS;  Surgeon: Almas Irby MD;  [facility]     THORACENTESIS N/A 03/10/2021    Procedure: THORACENTESIS;  Surgeon: Almas Irby MD;  [facility]     THORACENTESIS Left 2021    Procedure: THORACENTESIS;  Surgeon: Kennedi Chavez MD;  Location: [facility] OR     THORACENTESIS Left 2021    Procedure: THORACENTESIS;  Surgeon: Jess Ansari PA-C;  Location: [facility] OR     THORACIC SURGERY  2021     TRANSPLANT LIVER RECIPIENT  DONOR N/A 10/17/2021    Procedure: TRANSPLANT, LIVER, RECIPIENT,  DONOR;  Surgeon: Christian Morrison MD;  Location:  OR     Current Outpatient Medications   Medication Sig Dispense Refill     amLODIPine (NORVASC) 10 MG tablet Take 1 tablet (10 mg) by mouth daily 90 tablet 1     aspirin (ASA) 325 MG EC tablet Take 325 mg by mouth in the morning.       aspirin (ASA) 81 MG EC tablet Take 1 tablet (81 mg) by mouth daily 90 tablet 3     cyclobenzaprine (FLEXERIL) 5 MG tablet Take 1 tablet (5 mg) by mouth nightly as needed for muscle spasms 10 tablet 0     diclofenac (VOLTAREN) 1 % topical gel Apply 2 g topically 4 times daily 50 g 0     levothyroxine (SYNTHROID/LEVOTHROID) 50 MCG tablet Take 1 tablet (50 mcg) by mouth daily 90 tablet 3     magnesium oxide (MAG-OX) 400 (241.3 Mg) MG tablet Take 2 tablets (800 mg) by mouth every morning  AND 2 tablets (800 mg) every evening. (Patient taking differently: Take 1 tablet (400mg) twice daily.) 360 tablet 3     methocarbamol (ROBAXIN) 500 MG tablet Take 1 tablet (500 mg) by mouth 4 times daily as needed for muscle spasms 20 tablet 0     methocarbamol (ROBAXIN) 750 MG tablet Take 1 tablet (750 mg) by mouth 4 times daily 28 tablet 0     mycophenolate (GENERIC EQUIVALENT) 250 MG capsule Take 1 capsule (250 mg) by mouth 2 times daily Weaning off 67 capsule 0     ondansetron (ZOFRAN) 4 MG tablet Take 1 tablet (4 mg) by mouth every 6 hours as needed for nausea 20 tablet 0     pantoprazole (PROTONIX) 40 MG EC tablet Take 1 tablet (40 mg) by mouth 2 times daily 180 tablet 3     predniSONE (DELTASONE) 5 MG tablet Take 1 tablet (5 mg) by mouth daily 60 tablet 3     tacrolimus (GENERIC EQUIVALENT) 1 MG capsule Use for dose changes (Patient not taking: Reported on 2/25/2022) 360 capsule 3     tacrolimus (GENERIC EQUIVALENT) 5 MG capsule Take 1 capsule (5 mg) by mouth every 12 hours 180 capsule 3     ursodiol (ACTIGALL) 300 MG capsule Take 1 capsule (300 mg) by mouth 2 times daily 180 capsule 3       Allergies   Allergen Reactions     Amoxicillin GI Disturbance, Diarrhea, Nausea and Nausea and Vomiting        Social History     Tobacco Use     Smoking status: Never Smoker     Smokeless tobacco: Never Used   Substance Use Topics     Alcohol use: Not Currently     Comment: Quit on 6/20/2020     Family History   Problem Relation Age of Onset     No Known Problems Mother      Colon Cancer Father 58     Breast Cancer Maternal Grandmother      No Known Problems Maternal Grandfather      No Known Problems Paternal Grandmother      No Known Problems Paternal Grandfather      Substance Abuse Brother      No Known Problems Sister      No Known Problems Son      Substance Abuse Brother      History   Drug Use Unknown         Objective     LMP  (LMP Unknown)     Physical Exam    GENERAL APPEARANCE: healthy, alert and no  distress     EYES: EOMI, PERRL     HENT: ear canals and TM's normal and nose and mouth without ulcers or lesions     NECK: no adenopathy, no asymmetry, masses, or scars and thyroid normal to palpation     RESP: lungs clear to auscultation - no rales, rhonchi or wheezes     CV: regular rates and rhythm, normal S1 S2, no S3 or S4 and no murmur, click or rub     ABDOMEN:  soft, nontender, no HSM or masses and bowel sounds normal     MS: extremities normal- no gross deformities noted, no evidence of inflammation in joints, FROM in all extremities.     SKIN: no suspicious lesions or rashes     NEURO: Normal strength and tone, sensory exam grossly normal, mentation intact and speech normal     PSYCH: mentation appears normal. and affect normal/bright     LYMPHATICS: No cervical adenopathy    Recent Labs   Lab Test 04/11/22  1014 04/04/22  0853 12/03/21  1420 11/29/21  0845 10/21/21  1154 10/21/21  0719 10/20/21  0700 10/19/21  1731 04/15/21  1337 03/17/21  1143   HGB 11.7 10.7*   < > 8.9*   < >  --    < >  --    < >  --     232   < > 227   < >  --    < >  --    < >  --    INR  --   --   --  1.13  --  1.20*  --   --    < >  --     138   < > 143   < >  --    < >  --    < > 134   POTASSIUM 3.9 4.2   < > 4.1   < >  --    < >  --    < > 3.4   CR 1.06* 1.16*   < > 1.45*   < >  --    < >  --    < > 1.01   A1C  --   --   --   --   --   --   --  5.1  --  4.2    < > = values in this interval not displayed.        Diagnostics:  No labs were ordered during this visit.   No EKG required for low risk surgery (cataract, skin procedure, breast biopsy, etc).    Revised Cardiac Risk Index (RCRI):  The patient has the following serious cardiovascular risks for perioperative complications:   - No serious cardiac risks = 0 points     RCRI Interpretation: 0 points: Class I (very low risk - 0.4% complication rate)           Signed Electronically by: ROBYN Sheehan CNP  Copy of this evaluation report is provided to  requesting physician.

## 2022-04-12 NOTE — H&P (VIEW-ONLY)
[facility]  [address]  [address]  [place] [address]  Phone: [phone]  Fax: [phone]  Primary Provider: Christine Harris  Pre-op Performing Provider: CHAUNCEY PORTER      PREOPERATIVE EVALUATION:  Today's date: 4/12/2022    Sarah Fisher is a 52 year old female who presents for a preoperative evaluation.    Surgical Information:  Surgery/Procedure: ENDOSCOPIC RETROGRADE CHOLANGIOPANCREATOGRAPHY  Surgery Location: [facility]  Surgeon: Guru Dominguez Fenton  Surgery Date: 4/13/22  Time of Surgery: 140PM  Where patient plans to recover: At home with family  Fax number for surgical facility: Note does not need to be faxed, will be available electronically in Epic.    Type of Anesthesia Anticipated: General    Assessment & Plan     The proposed surgical procedure is considered LOW risk.    (Z01.818) Preop general physical exam  (primary encounter diagnosis)  (Z94.4) Liver transplant recipient (H)  ERCP and possible stent placement.     (N18.32) Chronic kidney disease, stage 3b (H)  Comment: Stable.    (D84.9) Immunosuppressed status (H)  Comment: Stable.    (Z86.718) History of DVT (deep vein thrombosis)  Comment: Past. Thought to be related to hypercoagulable state from cirrhosis and compression from hydrothorax.    (I15.9) Secondary hypertension  Comment: Stable.       Risks and Recommendations:  The patient has the following additional risks and recommendations for perioperative complications:   - No identified additional risk factors other than previously addressed    Medication Instructions:  Patient is to take all scheduled medications on the day of surgery    RECOMMENDATION:  APPROVAL GIVEN to proceed with proposed procedure, without further diagnostic evaluation.    Review of external notes as documented above     Subjective     HPI related to upcoming procedure: ERCP    Preop Questions 4/12/2022   1. Have you ever had a heart attack or stroke?  No   2. Have you ever had surgery on your heart or blood vessels, such as a stent placement, a coronary artery bypass, or surgery on an artery in your head, neck, heart, or legs? No   3. Do you have chest pain with activity? No   4. Do you have a history of  heart failure? No   5. Do you currently have a cold, bronchitis or symptoms of other infection? No   6. Do you have a cough, shortness of breath, or wheezing? No   7. Do you or anyone in your family have previous history of blood clots? No   8. Do you or does anyone in your family have a serious bleeding problem such as prolonged bleeding following surgeries or cuts? No   9. Have you ever had problems with anemia or been told to take iron pills? No   10. Have you had any abnormal blood loss such as black, tarry or bloody stools, or abnormal vaginal bleeding? No   11. Have you ever had a blood transfusion? YES   11a. Have you ever had a transfusion reaction? No   12. Are you willing to have a blood transfusion if it is medically needed before, during, or after your surgery? Yes   13. Have you or any of your relatives ever had problems with anesthesia? No   14. Do you have sleep apnea, excessive snoring or daytime drowsiness? No   15. Do you have any artifical heart valves or other implanted medical devices like a pacemaker, defibrillator, or continuous glucose monitor? No   16. Do you have artificial joints? No   17. Are you allergic to latex? No   18. Is there any chance that you may be pregnant? No       Health Care Directive:  Patient has a Health Care Directive on file      Preoperative Review of :   reviewed - Small amount of tramadol prescribed      Status of Chronic Conditions:  See problem list for active medical problems.  Problems all longstanding and stable, except as noted/documented.  See ROS for pertinent symptoms related to these conditions.      Review of Systems  Constitutional, neuro, ENT, endocrine, pulmonary, cardiac, gastrointestinal,  genitourinary, musculoskeletal, integument and psychiatric systems are negative, except as otherwise noted.    Patient Active Problem List    Diagnosis Date Noted     Pain of female symphysis pubis 03/24/2022     Priority: Medium     Somatic dysfunction of pelvic region 03/24/2022     Priority: Medium     Low magnesium level 12/06/2021     Priority: Medium     Chronic kidney disease, stage 3b (H) 12/03/2021     Priority: Medium     Liver transplant recipient (H) 10/25/2021     Priority: Medium     Immunosuppressed status (H) 10/25/2021     Priority: Medium     Malnutrition (H) 10/25/2021     Priority: Medium     Moderate in the context of chronic illness       Steroid-induced hyperglycemia 10/25/2021     Priority: Medium     Epistaxis 10/25/2021     Priority: Medium     Esophagitis 10/25/2021     Priority: Medium     Duodenal erosion 10/25/2021     Priority: Medium     Gastric antral vascular ectasia 10/25/2021     Priority: Medium     Hyperphosphatemia 10/25/2021     Priority: Medium     Leukocytosis 10/25/2021     Priority: Medium     Liver transplant candidate 10/17/2021     Priority: Medium     Encounter for immunization 09/20/2021     Priority: Medium     Iron deficiency anemia due to chronic blood loss 08/05/2021     Priority: Medium     Alcoholic cirrhosis (H) 05/10/2021     Priority: Medium     Added automatically from request for surgery [id number]       Abnormal serum iron level 05/10/2021     Priority: Medium     Added automatically from request for surgery [id number]       Pre-liver transplant, listed 05/02/2021     Priority: Medium     Hypotension 12/19/2020     Priority: Medium     Liver failure (H) 12/14/2020     Priority: Medium     Acute kidney failure, unspecified (H) 11/14/2020     Priority: Medium     CKD (chronic kidney disease) 11/14/2020     Priority: Medium     Hyponatremia 11/14/2020     Priority: Medium     Blood loss anemia 11/14/2020     Priority: Medium     GI (gastrointestinal bleed)  10/30/2020     Priority: Medium     History of DVT (deep vein thrombosis) 10/30/2020     Priority: Medium     Anemia associated with acute blood loss 10/30/2020     Priority: Medium     Acute anemia 10/02/2020     Priority: Medium     Portal hypertensive gastropathy (H) 10/02/2020     Priority: Medium     Macrocytic anemia 10/01/2020     Priority: Medium     Hydrothorax 09/13/2020     Priority: Medium     Acute hepatic encephalopathy 09/10/2020     Priority: Medium     Acute hypoxemic respiratory failure (H) 09/09/2020     Priority: Medium     Acute deep vein thrombosis (DVT) of upper extremity (H) 09/08/2020     Priority: Medium     Cirrhosis of liver with ascites (H) 09/08/2020     Priority: Medium     Hypokalemia 09/08/2020     Priority: Medium     Pancytopenia (H) 09/08/2020     Priority: Medium     Pleural effusion 09/08/2020     Priority: Medium     Macrocytosis without anemia 05/19/2017     Priority: Medium     Screening for malignant neoplasm of cervix 02/23/2017     Priority: Medium     2010 & 2011  ASCUS, Negative HPV  2012 NILM  2017 ASCUS, Negative HPV, 46 y.o. PLAN: cotest 2/2020  3/17/21 NIL, Neg HPV. Repeat co-test in 3 years (3/17/2024)      Co-testing is preferred, but cytology is acceptable if 30 years or older.    If using cytology alone, perform annual cervical cytology. If results of 3 consecutive cytology results are normal, perform cytology every 3 years.    If using co-testing, perform baseline co-test with cytology and HPV. If result of cytology is normal and HPV is negative, co-testing can be performed every 3 years.    Continue screening throughout lifetime (older than 65 years). Discontinue screening based on shared discussion regarding quality and duration of life rather than age.    Screen patients on dialysis and posttransplant similarly.             Elevated blood pressure 02/15/2017     Priority: Medium     Venous incompetence 02/15/2017     Priority: Medium     Gastroesophageal  reflux disease without esophagitis 2017     Priority: Medium     Subclinical hypothyroidism 2012     Priority: Medium     Family history of colon cancer 2011     Priority: Medium     Father  age 63  Father  age 63       Varicose veins of leg with pain 2010     Priority: Medium     Need for vaccination for viral hepatitis 2008     Priority: Medium     Hep B series not completed in .  Needs immunization, OK with series during pregnancy.  Will start i 2nd trimester.  Hep B series not completed in .  Needs immunization, OK with series during pregnancy.  Will start i 2nd trimester.       Supervision of high-risk pregnancy of elderly primigravida 2008     Priority: Medium     RAUDEL 08.  Pt plans CNM del @ [facility].  RAUDEL 08.  Pt plans CN del @ [facility].       Carrier of group B Streptococcus 2008     Priority: Medium     In UC 08  In UC 08       Knee pain 01/10/2008     Priority: Medium      Past Medical History:   Diagnosis Date     Ascites      History of blood transfusion      Liver cirrhosis (H)      Thyroid disease      Past Surgical History:   Procedure Laterality Date     ABDOMEN SURGERY        SECTION       CV RIGHT HEART CATH MEASUREMENTS RECORDED N/A 2021    Procedure: CV RIGHT HEART CATH;  Surgeon: Joseph Guevara MD;  Location:  HEART CARDI[facility] CATH LAB     ENDOSCOPIC RETROGRADE CHOLANGIOPANCREATOGRAM N/A 10/25/2021    Procedure: ENDOSCOPIC RETROGRADE CHOLANGIOPANCREATOGRAPHY, WITH biliary sphincterotomy, biliary dilation, and biliary stent placement;  Surgeon: Guru Dominguez Fenton MD;  Location:  OR     ENDOSCOPIC RETROGRADE CHOLANGIOPANCREATOGRAPHY, EXCHANGE TUBE/STENT N/A 2021    Procedure: ENDOSCOPIC RETROGRADE CHOLANGIOPANCREATOGRAPHY, WITH biliary sphincterotomy, stricture dilation, stent exchange;  Surgeon: Guru Dominguez Fenton MD;  Location:  OR      ESOPHAGOSCOPY, GASTROSCOPY, DUODENOSCOPY (EGD), COMBINED N/A 10/07/2021    Procedure: ESOPHAGOGASTRODUODENOSCOPY (EGD) with hemostasis;  Surgeon: Fox Jerry MD;  Location:  GI     ESOPHAGOSCOPY, GASTROSCOPY, DUODENOSCOPY (EGD), COMBINED N/A 10/22/2021    Procedure: ESOPHAGOGASTRODUODENOSCOPY, WITH BIOPSY;  Surgeon: Fadia Avila MD;  [facility]     IR LIVER BIOPSY PERCUTANEOUS  10/26/2021     IR THORACENTESIS  2021     IR THORACENTESIS  2021     THORACENTESIS Left 2021    Procedure: THORACENTESIS;  Surgeon: Almas Irby MD;  [facility]     THORACENTESIS N/A 2021    Procedure: THORACENTESIS;  Surgeon: Almas Irby MD;  [facility]     THORACENTESIS N/A 03/10/2021    Procedure: THORACENTESIS;  Surgeon: Almas Irby MD;  [facility]     THORACENTESIS Left 2021    Procedure: THORACENTESIS;  Surgeon: Kennedi Chavez MD;  [place] [facility]     THORACENTESIS Left 2021    Procedure: THORACENTESIS;  Surgeon: Jess Ansari PA-C;  [place] [facility] [place]     THORACIC SURGERY  2021     TRANSPLANT LIVER RECIPIENT  DONOR N/A 10/17/2021    Procedure: TRANSPLANT, LIVER, RECIPIENT,  DONOR;  Surgeon: Christian Morrison MD;  Location:  OR     Current Outpatient Medications   Medication Sig Dispense Refill     amLODIPine (NORVASC) 10 MG tablet Take 1 tablet (10 mg) by mouth daily 90 tablet 1     aspirin (ASA) 325 MG EC tablet Take 325 mg by mouth in the morning.       aspirin (ASA) 81 MG EC tablet Take 1 tablet (81 mg) by mouth daily 90 tablet 3     cyclobenzaprine (FLEXERIL) 5 MG tablet Take 1 tablet (5 mg) by mouth nightly as needed for muscle spasms 10 tablet 0     diclofenac (VOLTAREN) 1 % topical gel Apply 2 g topically 4 times daily 50 g 0     levothyroxine (SYNTHROID/LEVOTHROID) 50 MCG tablet Take 1 tablet (50 mcg) by mouth daily 90 tablet 3     magnesium oxide (MAG-OX) 400 (241.3 Mg) MG tablet Take 2 tablets (800 mg) by mouth every morning  AND 2 tablets (800 mg) every evening. (Patient taking differently: Take 1 tablet (400mg) twice daily.) 360 tablet 3     methocarbamol (ROBAXIN) 500 MG tablet Take 1 tablet (500 mg) by mouth 4 times daily as needed for muscle spasms 20 tablet 0     methocarbamol (ROBAXIN) 750 MG tablet Take 1 tablet (750 mg) by mouth 4 times daily 28 tablet 0     mycophenolate (GENERIC EQUIVALENT) 250 MG capsule Take 1 capsule (250 mg) by mouth 2 times daily Weaning off 67 capsule 0     ondansetron (ZOFRAN) 4 MG tablet Take 1 tablet (4 mg) by mouth every 6 hours as needed for nausea 20 tablet 0     pantoprazole (PROTONIX) 40 MG EC tablet Take 1 tablet (40 mg) by mouth 2 times daily 180 tablet 3     predniSONE (DELTASONE) 5 MG tablet Take 1 tablet (5 mg) by mouth daily 60 tablet 3     tacrolimus (GENERIC EQUIVALENT) 1 MG capsule Use for dose changes (Patient not taking: Reported on 2/25/2022) 360 capsule 3     tacrolimus (GENERIC EQUIVALENT) 5 MG capsule Take 1 capsule (5 mg) by mouth every 12 hours 180 capsule 3     ursodiol (ACTIGALL) 300 MG capsule Take 1 capsule (300 mg) by mouth 2 times daily 180 capsule 3       Allergies   Allergen Reactions     Amoxicillin GI Disturbance, Diarrhea, Nausea and Nausea and Vomiting        Social History     Tobacco Use     Smoking status: Never Smoker     Smokeless tobacco: Never Used   Substance Use Topics     Alcohol use: Not Currently     Comment: Quit on 6/20/2020     Family History   Problem Relation Age of Onset     No Known Problems Mother      Colon Cancer Father 58     Breast Cancer Maternal Grandmother      No Known Problems Maternal Grandfather      No Known Problems Paternal Grandmother      No Known Problems Paternal Grandfather      Substance Abuse Brother      No Known Problems Sister      No Known Problems Son      Substance Abuse Brother      History   Drug Use Unknown         Objective     LMP  (LMP Unknown)     Physical Exam    GENERAL APPEARANCE: healthy, alert and no  distress     EYES: EOMI, PERRL     HENT: ear canals and TM's normal and nose and mouth without ulcers or lesions     NECK: no adenopathy, no asymmetry, masses, or scars and thyroid normal to palpation     RESP: lungs clear to auscultation - no rales, rhonchi or wheezes     CV: regular rates and rhythm, normal S1 S2, no S3 or S4 and no murmur, click or rub     ABDOMEN:  soft, nontender, no HSM or masses and bowel sounds normal     MS: extremities normal- no gross deformities noted, no evidence of inflammation in joints, FROM in all extremities.     SKIN: no suspicious lesions or rashes     NEURO: Normal strength and tone, sensory exam grossly normal, mentation intact and speech normal     PSYCH: mentation appears normal. and affect normal/bright     LYMPHATICS: No cervical adenopathy    Recent Labs   Lab Test 04/11/22  1014 04/04/22  0853 12/03/21  1420 11/29/21  0845 10/21/21  1154 10/21/21  0719 10/20/21  0700 10/19/21  1731 04/15/21  1337 03/17/21  1143   HGB 11.7 10.7*   < > 8.9*   < >  --    < >  --    < >  --     232   < > 227   < >  --    < >  --    < >  --    INR  --   --   --  1.13  --  1.20*  --   --    < >  --     138   < > 143   < >  --    < >  --    < > 134   POTASSIUM 3.9 4.2   < > 4.1   < >  --    < >  --    < > 3.4   CR 1.06* 1.16*   < > 1.45*   < >  --    < >  --    < > 1.01   A1C  --   --   --   --   --   --   --  5.1  --  4.2    < > = values in this interval not displayed.        Diagnostics:  No labs were ordered during this visit.   No EKG required for low risk surgery (cataract, skin procedure, breast biopsy, etc).    Revised Cardiac Risk Index (RCRI):  The patient has the following serious cardiovascular risks for perioperative complications:   - No serious cardiac risks = 0 points     RCRI Interpretation: 0 points: Class I (very low risk - 0.4% complication rate)           Signed Electronically by: ROBYN Sheehan CNP  Copy of this evaluation report is provided to  requesting physician.

## 2022-04-13 ENCOUNTER — APPOINTMENT (OUTPATIENT)
Dept: GENERAL RADIOLOGY | Facility: CLINIC | Age: 52
End: 2022-04-13
Attending: INTERNAL MEDICINE
Payer: COMMERCIAL

## 2022-04-13 ENCOUNTER — ANESTHESIA (OUTPATIENT)
Dept: SURGERY | Facility: CLINIC | Age: 52
End: 2022-04-13
Payer: COMMERCIAL

## 2022-04-13 ENCOUNTER — HOSPITAL ENCOUNTER (OUTPATIENT)
Facility: CLINIC | Age: 52
Discharge: HOME | End: 2022-04-13
Attending: INTERNAL MEDICINE | Admitting: INTERNAL MEDICINE
Payer: COMMERCIAL

## 2022-04-13 ENCOUNTER — ANESTHESIA EVENT (OUTPATIENT)
Dept: SURGERY | Facility: CLINIC | Age: 52
End: 2022-04-13
Payer: COMMERCIAL

## 2022-04-13 VITALS
BODY MASS INDEX: 26.33 KG/M2 | TEMPERATURE: 97.6 F | RESPIRATION RATE: 16 BRPM | HEART RATE: 75 BPM | SYSTOLIC BLOOD PRESSURE: 156 MMHG | WEIGHT: 163.8 LBS | DIASTOLIC BLOOD PRESSURE: 97 MMHG | HEIGHT: 66 IN | OXYGEN SATURATION: 99 %

## 2022-04-13 DIAGNOSIS — Z94.4 LIVER REPLACED BY TRANSPLANT (H): ICD-10-CM

## 2022-04-13 DIAGNOSIS — Z13.220 LIPID SCREENING: ICD-10-CM

## 2022-04-13 LAB
ALBUMIN SERPL-MCNC: 3.7 G/DL (ref 3.4–5)
ALP SERPL-CCNC: 492 U/L (ref 40–150)
ALT SERPL W P-5'-P-CCNC: 43 U/L (ref 0–50)
AMYLASE SERPL-CCNC: 61 U/L (ref 30–110)
ANION GAP SERPL CALCULATED.3IONS-SCNC: 4 MMOL/L (ref 3–14)
AST SERPL W P-5'-P-CCNC: 25 U/L (ref 0–45)
BILIRUB SERPL-MCNC: 0.6 MG/DL (ref 0.2–1.3)
BUN SERPL-MCNC: 40 MG/DL (ref 7–30)
CALCIUM SERPL-MCNC: 9.7 MG/DL (ref 8.5–10.1)
CHLORIDE BLD-SCNC: 105 MMOL/L (ref 94–109)
CO2 SERPL-SCNC: 27 MMOL/L (ref 20–32)
CREAT SERPL-MCNC: 0.95 MG/DL (ref 0.52–1.04)
ERCP: NORMAL
ERYTHROCYTE [DISTWIDTH] IN BLOOD BY AUTOMATED COUNT: 13.1 % (ref 10–15)
GFR SERPL CREATININE-BSD FRML MDRD: 72 ML/MIN/1.73M2
GLUCOSE BLD-MCNC: 156 MG/DL (ref 70–99)
GLUCOSE BLDC GLUCOMTR-MCNC: 194 MG/DL (ref 70–99)
HCT VFR BLD AUTO: 36.9 % (ref 35–47)
HGB BLD-MCNC: 11.9 G/DL (ref 11.7–15.7)
INR PPP: 0.86 (ref 0.85–1.15)
LIPASE SERPL-CCNC: 341 U/L (ref 73–393)
MCH RBC QN AUTO: 30.6 PG (ref 26.5–33)
MCHC RBC AUTO-ENTMCNC: 32.2 G/DL (ref 31.5–36.5)
MCV RBC AUTO: 95 FL (ref 78–100)
PLATELET # BLD AUTO: 240 10E3/UL (ref 150–450)
POTASSIUM BLD-SCNC: 3.8 MMOL/L (ref 3.4–5.3)
PROT SERPL-MCNC: 7.7 G/DL (ref 6.8–8.8)
RBC # BLD AUTO: 3.89 10E6/UL (ref 3.8–5.2)
SODIUM SERPL-SCNC: 136 MMOL/L (ref 133–144)
WBC # BLD AUTO: 4.3 10E3/UL (ref 4–11)

## 2022-04-13 PROCEDURE — 80053 COMPREHEN METABOLIC PANEL: CPT | Performed by: INTERNAL MEDICINE

## 2022-04-13 PROCEDURE — 999N000141 HC STATISTIC PRE-PROCEDURE NURSING ASSESSMENT: Performed by: INTERNAL MEDICINE

## 2022-04-13 PROCEDURE — 710N000012 HC RECOVERY PHASE 2, PER MINUTE: Performed by: INTERNAL MEDICINE

## 2022-04-13 PROCEDURE — 88313 SPECIAL STAINS GROUP 2: CPT | Mod: 26 | Performed by: PATHOLOGY

## 2022-04-13 PROCEDURE — 83690 ASSAY OF LIPASE: CPT | Performed by: NURSE ANESTHETIST, CERTIFIED REGISTERED

## 2022-04-13 PROCEDURE — C1769 GUIDE WIRE: HCPCS | Performed by: INTERNAL MEDICINE

## 2022-04-13 PROCEDURE — 370N000017 HC ANESTHESIA TECHNICAL FEE, PER MIN: Performed by: INTERNAL MEDICINE

## 2022-04-13 PROCEDURE — 999N000179 XR SURGERY CARM FLUORO LESS THAN 5 MIN W STILLS: Mod: TC

## 2022-04-13 PROCEDURE — 85027 COMPLETE CBC AUTOMATED: CPT | Performed by: INTERNAL MEDICINE

## 2022-04-13 PROCEDURE — 710N000010 HC RECOVERY PHASE 1, LEVEL 2, PER MIN: Performed by: INTERNAL MEDICINE

## 2022-04-13 PROCEDURE — 88307 TISSUE EXAM BY PATHOLOGIST: CPT | Mod: 26 | Performed by: PATHOLOGY

## 2022-04-13 PROCEDURE — 255N000002 HC RX 255 OP 636: Performed by: INTERNAL MEDICINE

## 2022-04-13 PROCEDURE — C1726 CATH, BAL DIL, NON-VASCULAR: HCPCS | Performed by: INTERNAL MEDICINE

## 2022-04-13 PROCEDURE — 82150 ASSAY OF AMYLASE: CPT | Performed by: NURSE ANESTHETIST, CERTIFIED REGISTERED

## 2022-04-13 PROCEDURE — C1877 STENT, NON-COAT/COV W/O DEL: HCPCS | Performed by: INTERNAL MEDICINE

## 2022-04-13 PROCEDURE — 36415 COLL VENOUS BLD VENIPUNCTURE: CPT | Performed by: INTERNAL MEDICINE

## 2022-04-13 PROCEDURE — 258N000003 HC RX IP 258 OP 636: Performed by: NURSE ANESTHETIST, CERTIFIED REGISTERED

## 2022-04-13 PROCEDURE — 85610 PROTHROMBIN TIME: CPT | Performed by: INTERNAL MEDICINE

## 2022-04-13 PROCEDURE — 250N000009 HC RX 250: Performed by: INTERNAL MEDICINE

## 2022-04-13 PROCEDURE — 82962 GLUCOSE BLOOD TEST: CPT

## 2022-04-13 PROCEDURE — 250N000025 HC SEVOFLURANE, PER MIN: Performed by: INTERNAL MEDICINE

## 2022-04-13 PROCEDURE — 272N000001 HC OR GENERAL SUPPLY STERILE: Performed by: INTERNAL MEDICINE

## 2022-04-13 PROCEDURE — 360N000082 HC SURGERY LEVEL 2 W/ FLUORO, PER MIN: Performed by: INTERNAL MEDICINE

## 2022-04-13 PROCEDURE — 250N000009 HC RX 250: Performed by: NURSE ANESTHETIST, CERTIFIED REGISTERED

## 2022-04-13 PROCEDURE — 88313 SPECIAL STAINS GROUP 2: CPT | Mod: TC | Performed by: INTERNAL MEDICINE

## 2022-04-13 PROCEDURE — 250N000011 HC RX IP 250 OP 636: Performed by: NURSE ANESTHETIST, CERTIFIED REGISTERED

## 2022-04-13 DEVICE — STENT FREEMAN PANCREA FLEX 7FRX7CM SGL PIGTAIL: Type: IMPLANTABLE DEVICE | Site: BILE DUCT | Status: FUNCTIONAL

## 2022-04-13 RX ORDER — NALOXONE HYDROCHLORIDE 0.4 MG/ML
0.4 INJECTION, SOLUTION INTRAMUSCULAR; INTRAVENOUS; SUBCUTANEOUS
Status: DISCONTINUED | OUTPATIENT
Start: 2022-04-13 | End: 2022-04-13 | Stop reason: HOSPADM

## 2022-04-13 RX ORDER — ONDANSETRON 4 MG/1
4 TABLET, ORALLY DISINTEGRATING ORAL EVERY 6 HOURS PRN
Status: DISCONTINUED | OUTPATIENT
Start: 2022-04-13 | End: 2022-04-13 | Stop reason: HOSPADM

## 2022-04-13 RX ORDER — FLUMAZENIL 0.1 MG/ML
0.2 INJECTION, SOLUTION INTRAVENOUS
Status: DISCONTINUED | OUTPATIENT
Start: 2022-04-13 | End: 2022-04-13 | Stop reason: HOSPADM

## 2022-04-13 RX ORDER — ONDANSETRON 2 MG/ML
4 INJECTION INTRAMUSCULAR; INTRAVENOUS EVERY 6 HOURS PRN
Status: DISCONTINUED | OUTPATIENT
Start: 2022-04-13 | End: 2022-04-13 | Stop reason: HOSPADM

## 2022-04-13 RX ORDER — OXYCODONE HYDROCHLORIDE 5 MG/1
5 TABLET ORAL EVERY 4 HOURS PRN
Status: DISCONTINUED | OUTPATIENT
Start: 2022-04-13 | End: 2022-04-13 | Stop reason: HOSPADM

## 2022-04-13 RX ORDER — LIDOCAINE HYDROCHLORIDE 20 MG/ML
INJECTION, SOLUTION INFILTRATION; PERINEURAL PRN
Status: DISCONTINUED | OUTPATIENT
Start: 2022-04-13 | End: 2022-04-13

## 2022-04-13 RX ORDER — NALOXONE HYDROCHLORIDE 0.4 MG/ML
0.2 INJECTION, SOLUTION INTRAMUSCULAR; INTRAVENOUS; SUBCUTANEOUS
Status: DISCONTINUED | OUTPATIENT
Start: 2022-04-13 | End: 2022-04-13 | Stop reason: HOSPADM

## 2022-04-13 RX ORDER — ONDANSETRON 2 MG/ML
4 INJECTION INTRAMUSCULAR; INTRAVENOUS EVERY 30 MIN PRN
Status: DISCONTINUED | OUTPATIENT
Start: 2022-04-13 | End: 2022-04-13 | Stop reason: HOSPADM

## 2022-04-13 RX ORDER — SODIUM CHLORIDE, SODIUM LACTATE, POTASSIUM CHLORIDE, CALCIUM CHLORIDE 600; 310; 30; 20 MG/100ML; MG/100ML; MG/100ML; MG/100ML
INJECTION, SOLUTION INTRAVENOUS CONTINUOUS
Status: DISCONTINUED | OUTPATIENT
Start: 2022-04-13 | End: 2022-04-13 | Stop reason: HOSPADM

## 2022-04-13 RX ORDER — ONDANSETRON 4 MG/1
4 TABLET, ORALLY DISINTEGRATING ORAL EVERY 30 MIN PRN
Status: DISCONTINUED | OUTPATIENT
Start: 2022-04-13 | End: 2022-04-13 | Stop reason: HOSPADM

## 2022-04-13 RX ORDER — PROPOFOL 10 MG/ML
INJECTION, EMULSION INTRAVENOUS PRN
Status: DISCONTINUED | OUTPATIENT
Start: 2022-04-13 | End: 2022-04-13

## 2022-04-13 RX ORDER — BIOTIN 1 MG
1000 TABLET ORAL DAILY
COMMUNITY
End: 2024-01-16

## 2022-04-13 RX ORDER — HYDROMORPHONE HCL IN WATER/PF 6 MG/30 ML
0.2 PATIENT CONTROLLED ANALGESIA SYRINGE INTRAVENOUS EVERY 5 MIN PRN
Status: DISCONTINUED | OUTPATIENT
Start: 2022-04-13 | End: 2022-04-13 | Stop reason: HOSPADM

## 2022-04-13 RX ORDER — LIDOCAINE 40 MG/G
CREAM TOPICAL
Status: DISCONTINUED | OUTPATIENT
Start: 2022-04-13 | End: 2022-04-13 | Stop reason: HOSPADM

## 2022-04-13 RX ORDER — ONDANSETRON 2 MG/ML
INJECTION INTRAMUSCULAR; INTRAVENOUS PRN
Status: DISCONTINUED | OUTPATIENT
Start: 2022-04-13 | End: 2022-04-13

## 2022-04-13 RX ORDER — LEVOFLOXACIN 5 MG/ML
INJECTION, SOLUTION INTRAVENOUS PRN
Status: DISCONTINUED | OUTPATIENT
Start: 2022-04-13 | End: 2022-04-13

## 2022-04-13 RX ORDER — INDOMETHACIN 50 MG/1
100 SUPPOSITORY RECTAL
Status: DISCONTINUED | OUTPATIENT
Start: 2022-04-13 | End: 2022-04-13 | Stop reason: HOSPADM

## 2022-04-13 RX ORDER — FENTANYL CITRATE 50 UG/ML
25 INJECTION, SOLUTION INTRAMUSCULAR; INTRAVENOUS EVERY 5 MIN PRN
Status: DISCONTINUED | OUTPATIENT
Start: 2022-04-13 | End: 2022-04-13 | Stop reason: HOSPADM

## 2022-04-13 RX ORDER — IOPAMIDOL 510 MG/ML
INJECTION, SOLUTION INTRAVASCULAR PRN
Status: DISCONTINUED | OUTPATIENT
Start: 2022-04-13 | End: 2022-04-13 | Stop reason: HOSPADM

## 2022-04-13 RX ORDER — SODIUM CHLORIDE, SODIUM LACTATE, POTASSIUM CHLORIDE, CALCIUM CHLORIDE 600; 310; 30; 20 MG/100ML; MG/100ML; MG/100ML; MG/100ML
INJECTION, SOLUTION INTRAVENOUS CONTINUOUS PRN
Status: DISCONTINUED | OUTPATIENT
Start: 2022-04-13 | End: 2022-04-13

## 2022-04-13 RX ORDER — DEXAMETHASONE SODIUM PHOSPHATE 4 MG/ML
INJECTION, SOLUTION INTRA-ARTICULAR; INTRALESIONAL; INTRAMUSCULAR; INTRAVENOUS; SOFT TISSUE PRN
Status: DISCONTINUED | OUTPATIENT
Start: 2022-04-13 | End: 2022-04-13

## 2022-04-13 RX ORDER — FENTANYL CITRATE 50 UG/ML
INJECTION, SOLUTION INTRAMUSCULAR; INTRAVENOUS PRN
Status: DISCONTINUED | OUTPATIENT
Start: 2022-04-13 | End: 2022-04-13

## 2022-04-13 RX ORDER — INDOMETHACIN 50 MG/1
SUPPOSITORY RECTAL PRN
Status: DISCONTINUED | OUTPATIENT
Start: 2022-04-13 | End: 2022-04-13 | Stop reason: HOSPADM

## 2022-04-13 RX ADMIN — DEXAMETHASONE SODIUM PHOSPHATE 8 MG: 4 INJECTION, SOLUTION INTRA-ARTICULAR; INTRALESIONAL; INTRAMUSCULAR; INTRAVENOUS; SOFT TISSUE at 13:45

## 2022-04-13 RX ADMIN — PROPOFOL 150 MG: 10 INJECTION, EMULSION INTRAVENOUS at 13:37

## 2022-04-13 RX ADMIN — PHENYLEPHRINE HYDROCHLORIDE 100 MCG: 10 INJECTION INTRAVENOUS at 13:46

## 2022-04-13 RX ADMIN — FENTANYL CITRATE 50 MCG: 50 INJECTION, SOLUTION INTRAMUSCULAR; INTRAVENOUS at 13:57

## 2022-04-13 RX ADMIN — ONDANSETRON 4 MG: 2 INJECTION INTRAMUSCULAR; INTRAVENOUS at 14:30

## 2022-04-13 RX ADMIN — SUGAMMADEX 200 MG: 100 INJECTION, SOLUTION INTRAVENOUS at 14:31

## 2022-04-13 RX ADMIN — MIDAZOLAM 2 MG: 1 INJECTION INTRAMUSCULAR; INTRAVENOUS at 13:30

## 2022-04-13 RX ADMIN — SODIUM CHLORIDE, POTASSIUM CHLORIDE, SODIUM LACTATE AND CALCIUM CHLORIDE: 600; 310; 30; 20 INJECTION, SOLUTION INTRAVENOUS at 13:25

## 2022-04-13 RX ADMIN — LEVOFLOXACIN 500 MG: 5 INJECTION, SOLUTION INTRAVENOUS at 13:50

## 2022-04-13 RX ADMIN — ROCURONIUM BROMIDE 50 MG: 50 INJECTION, SOLUTION INTRAVENOUS at 13:37

## 2022-04-13 RX ADMIN — LIDOCAINE HYDROCHLORIDE 50 MG: 20 INJECTION, SOLUTION INFILTRATION; PERINEURAL at 13:37

## 2022-04-13 RX ADMIN — FENTANYL CITRATE 50 MCG: 50 INJECTION, SOLUTION INTRAMUSCULAR; INTRAVENOUS at 13:35

## 2022-04-13 ASSESSMENT — ENCOUNTER SYMPTOMS: DYSRHYTHMIAS: 0

## 2022-04-13 NOTE — INTERVAL H&P NOTE
"I have reviewed the surgical (or preoperative) H&P that is linked to this encounter, and examined the patient. There are no significant changes    Clinical Conditions Present on Arrival:  Clinically Significant Risk Factors Present on Admission                  # Platelet Defect: home medication list includes an antiplatelet medication  # Overweight: Estimated body mass index is 26.44 kg/m  as calculated from the following:    Height as of this encounter: 1.676 m (5' 6\").    Weight as of this encounter: 74.3 kg (163 lb 12.8 oz).       "

## 2022-04-13 NOTE — ANESTHESIA PROCEDURE NOTES
Airway       Patient location during procedure: OR       Procedure Start/Stop Times: 4/13/2022 1:40 PM  Staff -        CRNA: Tien Aragon APRN CRNA       Other Anesthesia Staff: Raven Denney       Performed By: JIMI  Consent for Airway        Urgency: elective  Indications and Patient Condition       Indications for airway management: michael-procedural       Induction type:intravenous       Mask difficulty assessment: 1 - vent by mask    Final Airway Details       Final airway type: endotracheal airway       Successful airway: ETT - single and Oral  Endotracheal Airway Details        ETT size (mm): 7.0       Cuffed: yes       Successful intubation technique: direct laryngoscopy       DL Blade Type: Mendez 2       Grade View of Cords: 1       Adjucts: stylet       Position: Right       Measured from: lips       Secured at (cm): 21       Bite Block used: Endo bite block.    Post intubation assessment        Placement verified by: capnometry, equal breath sounds and chest rise        Number of attempts at approach: 1       Secured with: silk tape       Ease of procedure: easy       Dentition: Unchanged and Intact    Medication(s) Administered   Medication Administration Time: 4/13/2022 1:40 PM    Additional Comments       Eyes taped prior to intubation.

## 2022-04-13 NOTE — ANESTHESIA PREPROCEDURE EVALUATION
Anesthesia Pre-Procedure Evaluation    Patient: Sarah Fisher   MRN: [id number] : 1970        Procedure : Procedure(s):  ENDOSCOPIC RETROGRADE CHOLANGIOPANCREATOGRAPHY          Past Medical History:   Diagnosis Date     Ascites      History of blood transfusion      Liver cirrhosis (H)      Thyroid disease       Past Surgical History:   Procedure Laterality Date     ABDOMEN SURGERY        SECTION       CV RIGHT HEART CATH MEASUREMENTS RECORDED N/A 2021    Procedure: CV RIGHT HEART CATH;  Surgeon: Joseph Guevara MD;  Location:  HEART CARDIAC CATH LAB     ENDOSCOPIC RETROGRADE CHOLANGIOPANCREATOGRAM N/A 10/25/2021    Procedure: ENDOSCOPIC RETROGRADE CHOLANGIOPANCREATOGRAPHY, WITH biliary sphincterotomy, biliary dilation, and biliary stent placement;  Surgeon: Guru Dominguez Fenton MD;  Location: [facility]     ENDOSCOPIC RETROGRADE CHOLANGIOPANCREATOGRAPHY, EXCHANGE TUBE/STENT N/A 2021    Procedure: ENDOSCOPIC RETROGRADE CHOLANGIOPANCREATOGRAPHY, WITH biliary sphincterotomy, stricture dilation, stent exchange;  Surgeon: Guru Dominguez Fenton MD;  Location:  OR     ESOPHAGOSCOPY, GASTROSCOPY, DUODENOSCOPY (EGD), COMBINED N/A 10/07/2021    Procedure: ESOPHAGOGASTRODUODENOSCOPY (EGD) with hemostasis;  Surgeon: Fox Jerry MD;  Location: [facility]     ESOPHAGOSCOPY, GASTROSCOPY, DUODENOSCOPY (EGD), COMBINED N/A 10/22/2021    Procedure: ESOPHAGOGASTRODUODENOSCOPY, WITH BIOPSY;  Surgeon: Fadia Avila MD;  Location:  GI     IR LIVER BIOPSY PERCUTANEOUS  10/26/2021     IR THORACENTESIS  2021     IR THORACENTESIS  2021     THORACENTESIS Left 2021    Procedure: THORACENTESIS;  Surgeon: Almas Irby MD;  Location:  GI     THORACENTESIS N/A 2021    Procedure: THORACENTESIS;  Surgeon: Almas Irby MD;  Location:  GI     THORACENTESIS N/A 03/10/2021    Procedure: THORACENTESIS;  Surgeon: Almas Irby MD;  Location:  GI      THORACENTESIS Left 2021    Procedure: THORACENTESIS;  Surgeon: Kennedi Chavez MD;  Location: [facility] OR     THORACENTESIS Left 2021    Procedure: THORACENTESIS;  Surgeon: Jess Ansari PA-C;  Location: [facility] OR     THORACIC SURGERY  2021     TRANSPLANT LIVER RECIPIENT  DONOR N/A 10/17/2021    Procedure: TRANSPLANT, LIVER, RECIPIENT,  DONOR;  Surgeon: Christian Morrison MD;  Location: [facility] OR      Allergies   Allergen Reactions     Amoxicillin GI Disturbance, Diarrhea, Nausea and Nausea and Vomiting      Social History     Tobacco Use     Smoking status: Never Smoker     Smokeless tobacco: Never Used   Substance Use Topics     Alcohol use: Not Currently     Comment: Quit on 2020      Wt Readings from Last 1 Encounters:   22 74.3 kg (163 lb 12.8 oz)        Anesthesia Evaluation   Pt has had prior anesthetic. Type: General.    No history of anesthetic complications       ROS/MED HX  ENT/Pulmonary:  - neg pulmonary ROS     Neurologic: Comment: Hx Hepatic encephalopathy, now s/p DDLT on 10/17/2021      Cardiovascular:     (+) -----Previous cardiac testing   Echo: Date: Results:    Stress Test: Date: 2021 Results:    Interpretation Summary  Normal dobutamine stress echocardiogram without evidence of inducible ischemia. Target heart rate was achieved. Heart rate and blood pressure response to dobutamine were normal. Normal LV function and wall motion at rest. With stress, the left ventricular ejection fraction increased from 55- 60% to greater than 65% and the left ventricular size decreased appropriately. No regional wall motion abnormality with stress. No subjective symptoms to suggest ischemia. There was no ECG evidence of ischemia. No significant valve disease on screening doppler evaluation. The aortic root and visualized ascending aorta are normal. IVC diameter and respiratory changes fall into an intermediate range suggesting an RA pressure of 8 mmHg.  Right ventricular systolic pressure could not be approximated due to inadequate tricuspid regurgitation. There is no pericardial effusion. Large pleural effusion.  ECG Reviewed: Date: 5/26/2021 Results:  SR with PAC's. Prolonged QT (QTc 485)  Cath:  Date: 6/7/21 Results:  Mean PA pressure 25.   Mild elevated pulmonary hypertension.   Left sided filling pressures are mildly elevated. Hyperdynamic cardiac output level.     (-) angina, CAD, syncope, arrhythmias and angina   METS/Exercise Tolerance:     Hematologic: Comments: Hx of [facility]  TCP   Coagulopathy       (+) History of blood clots, anemia, history of blood transfusion, no previous transfusion reaction,     Musculoskeletal:  - neg musculoskeletal ROS     GI/Hepatic: Comment: s/p DDLT 10/17/2021  Gastric Antral Vascular EctasIA, s/p Argon Plasma Coagulations   Duodenal ulcers    (+) GERD, Asymptomatic on medication, esophageal disease, liver disease,     Renal/Genitourinary:     (+) renal disease, type: CRI,     Endo:     (+) thyroid problem, hypothyroidism,     Psychiatric/Substance Use: Comment:   EtOH abuse in remission.     (+) alcohol abuse     Infectious Disease:  - neg infectious disease ROS     Malignancy:  - neg malignancy ROS     Other:  - neg other ROS          Physical Exam    Airway  airway exam normal      Mallampati: I       Respiratory Devices and Support         Dental  no notable dental history         Cardiovascular   cardiovascular exam normal          Pulmonary   pulmonary exam normal                OUTSIDE LABS:  CBC:   Lab Results   Component Value Date    WBC 4.0 04/11/2022    WBC 3.5 (L) 04/04/2022    HGB 11.7 04/11/2022    HGB 10.7 (L) 04/04/2022    HCT 36.6 04/11/2022    HCT 33.7 (L) 04/04/2022     04/11/2022     04/04/2022     BMP:   Lab Results   Component Value Date     04/11/2022     04/04/2022    POTASSIUM 3.9 04/11/2022    POTASSIUM 4.2 04/04/2022    CHLORIDE 102 04/11/2022    CHLORIDE 106  04/04/2022    CO2 32 04/11/2022    CO2 26 04/04/2022    BUN 33 (H) 04/11/2022    BUN 34 (H) 04/04/2022    CR 1.06 (H) 04/11/2022    CR 1.16 (H) 04/04/2022     (H) 04/13/2022     (H) 04/11/2022     COAGS:   Lab Results   Component Value Date    PTT 35 10/21/2021    INR 1.13 11/29/2021    FIBR 177 10/18/2021     POC:   Lab Results   Component Value Date    HCGS Negative 01/04/2021     HEPATIC:   Lab Results   Component Value Date    ALBUMIN 3.7 04/11/2022    PROTTOTAL 7.7 04/11/2022    ALT 59 (H) 04/11/2022    AST 34 04/11/2022     (H) 03/07/2022    ALKPHOS 507 (H) 04/11/2022    BILITOTAL 0.5 04/11/2022     OTHER:   Lab Results   Component Value Date    PH 7.37 10/18/2021    LACT 0.5 (L) 10/26/2021    A1C 5.1 10/19/2021    EMMY 9.8 04/11/2022    PHOS 4.4 02/23/2022    MAG 1.6 04/11/2022    LIPASE 72 (L) 11/29/2021    AMYLASE 32 11/29/2021    TSH 3.21 11/09/2021    T4 0.89 11/09/2021       Anesthesia Plan    ASA Status:  3   NPO Status:  NPO Appropriate    Anesthesia Type: General.     - Airway: ETT   Induction: Intravenous.   Maintenance: Balanced.        Consents    Anesthesia Plan(s) and associated risks, benefits, and realistic alternatives discussed. Questions answered and patient/representative(s) expressed understanding.    - Discussed:     - Discussed with:  Patient      - Extended Intubation/Ventilatory Support Discussed: No.      - Patient is DNR/DNI Status: No    Use of blood products discussed: No .     Postoperative Care    Pain management: Multi-modal analgesia, IV analgesics.   PONV prophylaxis: Ondansetron (or other 5HT-3), Dexamethasone or Solumedrol     Comments:                Antoni Pearl MD

## 2022-04-13 NOTE — DISCHARGE INSTRUCTIONS
[facility]  Same-Day Surgery   Adult Discharge Orders & Instructions     For 24 hours after surgery    Get plenty of rest.  A responsible adult must stay with you for at least 24 hours after you leave the hospital.   Do not drive or use heavy equipment.  If you have weakness or tingling, don't drive or use heavy equipment until this feeling goes away.  Do not drink alcohol.  Avoid strenuous or risky activities.  Ask for help when climbing stairs.   You may feel lightheaded.  IF so, sit for a few minutes before standing.  Have someone help you get up.   If you have nausea (feel sick to your stomach): Drink only clear liquids such as apple juice, ginger ale, broth or 7-Up.  Rest may also help.  Be sure to drink enough fluids.  Move to a regular diet as you feel able.  You may have a slight fever. Call the doctor if your fever is over 100 F (37.7 C) (taken under the tongue) or lasts longer than 24 hours.  You may have a dry mouth, a sore throat, muscle aches or trouble sleeping.  These should go away after 24 hours.  Do not make important or legal decisions.   Call your doctor for any of the followin.  Signs of infection (fever, growing tenderness at the surgery site, a large amount of drainage or bleeding, severe pain, foul-smelling drainage, redness, swelling).    2. It has been over 8 to 10 hours since surgery and you are still not able to urinate (pass water).    3.  Headache for over 24 hours.    To contact a doctor, call Dr. Fenton at the Gastroenterology Clinic @ [phone] or:  '   [phone] and ask for the resident on call for GI (answered 24 hours a day)  '   Emergency Department:  [facility]: [phone]       (TTY for hearing impaired: [phone])

## 2022-04-13 NOTE — ANESTHESIA POSTPROCEDURE EVALUATION
Patient: Sarah Fisher    Procedure: Procedure(s):  ENDOSCOPIC RETROGRADE CHOLANGIOPANCREATOGRAPHY WITH BILIARY DILATION, DEBRIS REMOVAL AND STENT PLACEMENT  ENDOSCOPIC ULTRASOUND WITH CORE LIVER BIOPSY       Anesthesia Type:  General    Note:  Disposition: Outpatient   Postop Pain Control: Uneventful            Sign Out: Well controlled pain   PONV: No   Neuro/Psych: Uneventful            Sign Out: Acceptable/Baseline neuro status   Airway/Respiratory: Uneventful            Sign Out: Acceptable/Baseline resp. status   CV/Hemodynamics: Uneventful            Sign Out: Acceptable CV status; No obvious hypovolemia; No obvious fluid overload   Other NRE: NONE   DID A NON-ROUTINE EVENT OCCUR? No           Last vitals:  Vitals Value Taken Time   /94 04/13/22 1445   Temp 36.3  C (97.3  F) 04/13/22 1440   Pulse 85 04/13/22 1458   Resp 22 04/13/22 1458   SpO2 99 % 04/13/22 1458   Vitals shown include unvalidated device data.    Electronically Signed By: Tien Garcia MD  April 13, 2022  2:59 PM

## 2022-04-13 NOTE — ANESTHESIA CARE TRANSFER NOTE
Patient: Sarah Fisher    Procedure: Procedure(s):  ENDOSCOPIC RETROGRADE CHOLANGIOPANCREATOGRAPHY WITH BILIARY DILATION, DEBRIS REMOVAL AND STENT PLACEMENT  ENDOSCOPIC ULTRASOUND WITH CORE LIVER BIOPSY       Diagnosis: Elevated LFTs [R79.89]  Diagnosis Additional Information: No value filed.    Anesthesia Type:   General     Note:    Oropharynx: oropharynx clear of all foreign objects  Level of Consciousness: awake  Oxygen Supplementation: nasal cannula    Independent Airway: airway patency satisfactory and stable  Dentition: dentition unchanged  Vital Signs Stable: post-procedure vital signs reviewed and stable  Report to RN Given: handoff report given            Vitals:  Vitals Value Taken Time   /97 04/13/22 1441   Temp     Pulse 90 04/13/22 1442   Resp 24 04/13/22 1442   SpO2 98 % 04/13/22 1442   Vitals shown include unvalidated device data.    Electronically Signed By: ROBYN Nino CRNA  April 13, 2022  2:43 PM

## 2022-04-14 LAB
PATH REPORT.COMMENTS IMP SPEC: NORMAL
PATH REPORT.FINAL DX SPEC: NORMAL
PATH REPORT.GROSS SPEC: NORMAL
PATH REPORT.MICROSCOPIC SPEC OTHER STN: NORMAL
PATH REPORT.MICROSCOPIC SPEC OTHER STN: NORMAL
PATH REPORT.RELEVANT HX SPEC: NORMAL
PHOTO IMAGE: NORMAL

## 2022-04-15 ENCOUNTER — TELEPHONE (OUTPATIENT)
Dept: TRANSPLANT | Facility: CLINIC | Age: 52
End: 2022-04-15
Payer: COMMERCIAL

## 2022-04-15 DIAGNOSIS — Z94.4 LIVER TRANSPLANT RECIPIENT (H): ICD-10-CM

## 2022-04-15 RX ORDER — URSODIOL 300 MG/1
CAPSULE ORAL
Qty: 270 CAPSULE | Refills: 3 | Status: SHIPPED | OUTPATIENT
Start: 2022-04-15 | End: 2023-04-17

## 2022-04-15 NOTE — TELEPHONE ENCOUNTER
Biopsy results reviewed in pathology conference today. Attempted to reach Maria Elena to review, no answer, left a VM to call me and main number provided.     Per review,   - no rejection   - no new injury noted  - the portal changes suggestive of biliary obstruction could be related to biliary obstruction last fall and are still visible.   - no change in plan per Dr. Beltran    *ADDENDUM*  Maria Elena returned my call. She said she was actually on the phone with Dr. Avila when I tried to reach her. Maria Elena did not have any other questions for me. She said Dr. Avila is increasing her Ursodiol from 300 mg BID to 300 mg AM and 600 mg PM. She would like RX sent to local  pharmacy.     RX to  in [place]

## 2022-04-18 LAB — UPPER EUS: NORMAL

## 2022-04-19 ENCOUNTER — LAB (OUTPATIENT)
Dept: LAB | Facility: CLINIC | Age: 52
End: 2022-04-19
Payer: COMMERCIAL

## 2022-04-19 ENCOUNTER — ALLIED HEALTH/NURSE VISIT (OUTPATIENT)
Dept: PEDIATRICS | Facility: CLINIC | Age: 52
End: 2022-04-19
Payer: COMMERCIAL

## 2022-04-19 DIAGNOSIS — Z00.00 ROUTINE GENERAL MEDICAL EXAMINATION AT A HEALTH CARE FACILITY: ICD-10-CM

## 2022-04-19 DIAGNOSIS — Z23 ENCOUNTER FOR ADMINISTRATION OF VACCINE: ICD-10-CM

## 2022-04-19 DIAGNOSIS — Z94.4 LIVER REPLACED BY TRANSPLANT (H): ICD-10-CM

## 2022-04-19 DIAGNOSIS — Z13.220 LIPID SCREENING: ICD-10-CM

## 2022-04-19 LAB
ALBUMIN SERPL-MCNC: 3.4 G/DL (ref 3.4–5)
ALP SERPL-CCNC: 441 U/L (ref 40–150)
ALT SERPL W P-5'-P-CCNC: 36 U/L (ref 0–50)
ANION GAP SERPL CALCULATED.3IONS-SCNC: 9 MMOL/L (ref 3–14)
AST SERPL W P-5'-P-CCNC: 21 U/L (ref 0–45)
BILIRUB DIRECT SERPL-MCNC: 0.2 MG/DL (ref 0–0.2)
BILIRUB SERPL-MCNC: 0.4 MG/DL (ref 0.2–1.3)
BUN SERPL-MCNC: 41 MG/DL (ref 7–30)
CALCIUM SERPL-MCNC: 9.7 MG/DL (ref 8.5–10.1)
CHLORIDE BLD-SCNC: 105 MMOL/L (ref 94–109)
CO2 SERPL-SCNC: 26 MMOL/L (ref 20–32)
CREAT SERPL-MCNC: 1.1 MG/DL (ref 0.52–1.04)
ERYTHROCYTE [DISTWIDTH] IN BLOOD BY AUTOMATED COUNT: 12.6 % (ref 10–15)
GFR SERPL CREATININE-BSD FRML MDRD: 60 ML/MIN/1.73M2
GLUCOSE BLD-MCNC: 107 MG/DL (ref 70–99)
HCT VFR BLD AUTO: 35.7 % (ref 35–47)
HGB BLD-MCNC: 11.8 G/DL (ref 11.7–15.7)
MAGNESIUM SERPL-MCNC: 1.8 MG/DL (ref 1.6–2.3)
MCH RBC QN AUTO: 30.8 PG (ref 26.5–33)
MCHC RBC AUTO-ENTMCNC: 33.1 G/DL (ref 31.5–36.5)
MCV RBC AUTO: 93 FL (ref 78–100)
PLATELET # BLD AUTO: 244 10E3/UL (ref 150–450)
POTASSIUM BLD-SCNC: 4.1 MMOL/L (ref 3.4–5.3)
PROT SERPL-MCNC: 7.3 G/DL (ref 6.8–8.8)
RBC # BLD AUTO: 3.83 10E6/UL (ref 3.8–5.2)
SODIUM SERPL-SCNC: 140 MMOL/L (ref 133–144)
T4 FREE SERPL-MCNC: 0.82 NG/DL (ref 0.76–1.46)
TACROLIMUS BLD-MCNC: 9.5 UG/L (ref 5–15)
TME LAST DOSE: NORMAL H
TME LAST DOSE: NORMAL H
TSH SERPL DL<=0.005 MIU/L-ACNC: 5.77 MU/L (ref 0.4–4)
WBC # BLD AUTO: 4.6 10E3/UL (ref 4–11)

## 2022-04-19 PROCEDURE — 91305 COVID-19,PF,PFIZER (12+ YRS): CPT

## 2022-04-19 PROCEDURE — 85027 COMPLETE CBC AUTOMATED: CPT

## 2022-04-19 PROCEDURE — 90750 HZV VACC RECOMBINANT IM: CPT

## 2022-04-19 PROCEDURE — 90472 IMMUNIZATION ADMIN EACH ADD: CPT

## 2022-04-19 PROCEDURE — 90746 HEPB VACCINE 3 DOSE ADULT IM: CPT

## 2022-04-19 PROCEDURE — 0054A COVID-19,PF,PFIZER (12+ YRS): CPT

## 2022-04-19 PROCEDURE — 84443 ASSAY THYROID STIM HORMONE: CPT

## 2022-04-19 PROCEDURE — 80053 COMPREHEN METABOLIC PANEL: CPT

## 2022-04-19 PROCEDURE — 90471 IMMUNIZATION ADMIN: CPT

## 2022-04-19 PROCEDURE — 82248 BILIRUBIN DIRECT: CPT

## 2022-04-19 PROCEDURE — 84439 ASSAY OF FREE THYROXINE: CPT

## 2022-04-19 PROCEDURE — 36415 COLL VENOUS BLD VENIPUNCTURE: CPT

## 2022-04-19 PROCEDURE — 80197 ASSAY OF TACROLIMUS: CPT

## 2022-04-19 PROCEDURE — 83735 ASSAY OF MAGNESIUM: CPT

## 2022-04-19 NOTE — PROGRESS NOTES
Prior to immunization administration, verified patients identity using patient s name and date of birth. Please see Immunization Activity for additional information.     Screening Questionnaire for Adult Immunization    Are you sick today?   No   Do you have allergies to medications, food, a vaccine component or latex?   No   Have you ever had a serious reaction after receiving a vaccination?   No   Do you have a long-term health problem with heart, lung, kidney, or metabolic disease (e.g., diabetes), asthma, a blood disorder, no spleen, complement component deficiency, a cochlear implant, or a spinal fluid leak?  Are you on long-term aspirin therapy?   Yes   Do you have cancer, leukemia, HIV/AIDS, or any other immune system problem?   No   Do you have a parent, brother, or sister with an immune system problem?   No   In the past 3 months, have you taken medications that affect  your immune system, such as prednisone, other steroids, or anticancer drugs; drugs for the treatment of rheumatoid arthritis, Crohn s disease, or psoriasis; or have you had radiation treatments?   Yes   Have you had a seizure, or a brain or other nervous system problem?   No   During the past year, have you received a transfusion of blood or blood    products, or been given immune (gamma) globulin or antiviral drug?   Yes   For women: Are you pregnant or is there a chance you could become       pregnant during the next month?   No   Have you received any vaccinations in the past 4 weeks?   No     Immunization questionnaire was positive for at least one answer.  Notified pcp aware orders were futured okay with Hepatology team as well.        Per orders of Dr. sibley, injection of hepb shingrix covid #4 given by Gaye Echavarria CMA. Patient instructed to remain in clinic for 15 minutes afterwards, and to report any adverse reaction to me immediately.       Screening performed by Gaye Echavarria CMA on 4/19/2022 at 8:57 AM.

## 2022-04-20 DIAGNOSIS — Z94.4 LIVER TRANSPLANT RECIPIENT (H): ICD-10-CM

## 2022-04-20 RX ORDER — TACROLIMUS 1 MG/1
4 CAPSULE ORAL EVERY 12 HOURS
Qty: 720 CAPSULE | Refills: 3 | Status: SHIPPED | OUTPATIENT
Start: 2022-04-20 | End: 2022-06-28

## 2022-04-20 NOTE — TELEPHONE ENCOUNTER
ISSUE:   Tacrolimus IR level 9.5 on 4/18, goal 5-8, dose 5 mg BID.    PLAN:   Please call patient and confirm this was an accurate 12-hour trough. Verify Tacrolimus IR dose. Confirm no new medications or illness. Confirm no missed doses. If accurate trough and accurate dose, decrease Tacrolimus IR dose to 4 mg BID and repeat labs in 2 weeks.  Sirena Dudley RN    OUTCOME:   Spoke with patient, they confirm accurate trough level and current dose 5 mg BID. Patient confirmed dose change to 4 mg BID and to repeat labs in 2 weeks. Orders sent to preferred pharmacy for dose change and lab for repeat labs. Patient voiced understanding of plan.    Alexandrea Peter LPN

## 2022-04-22 ENCOUNTER — TELEPHONE (OUTPATIENT)
Dept: TRANSPLANT | Facility: CLINIC | Age: 52
End: 2022-04-22
Payer: COMMERCIAL

## 2022-04-22 ASSESSMENT — ENCOUNTER SYMPTOMS: NEW SYMPTOMS OF CORONARY ARTERY DISEASE: 0

## 2022-04-27 ENCOUNTER — MYC MEDICAL ADVICE (OUTPATIENT)
Dept: PEDIATRICS | Facility: CLINIC | Age: 52
End: 2022-04-27
Payer: COMMERCIAL

## 2022-04-29 ENCOUNTER — TELEPHONE (OUTPATIENT)
Dept: PEDIATRICS | Facility: CLINIC | Age: 52
End: 2022-04-29
Payer: COMMERCIAL

## 2022-04-29 NOTE — TELEPHONE ENCOUNTER
Prior Authorization Retail Medication Request    Medication/Dose: Pantoprazole 40mg EC tablet   ICD code (if different than what is on RX):  Liver transplant recipient (H) [Z94.4]   Previously Tried and Failed:  NA  Rationale:  Liver transplant recipient, medication used as part of anti-rejection treatment.     Insurance Name:  [facility]  Insurance ID:  [id number]       Pharmacy Information (if different than what is on RX)  Name:  [facility]  Phone:  [phone]

## 2022-04-29 NOTE — TELEPHONE ENCOUNTER
Can you guys help me with this. Im the only Pal last couple days.     Amanda Gant, EMT at 10:18 AM on April 29, 2022  [facility] Guide  [phone]

## 2022-05-02 ENCOUNTER — LAB (OUTPATIENT)
Dept: LAB | Facility: CLINIC | Age: 52
End: 2022-05-02
Payer: COMMERCIAL

## 2022-05-02 DIAGNOSIS — Z94.4 LIVER REPLACED BY TRANSPLANT (H): ICD-10-CM

## 2022-05-02 DIAGNOSIS — Z13.220 LIPID SCREENING: ICD-10-CM

## 2022-05-02 LAB
ALBUMIN SERPL-MCNC: 3.6 G/DL (ref 3.4–5)
ALP SERPL-CCNC: 363 U/L (ref 40–150)
ALT SERPL W P-5'-P-CCNC: 39 U/L (ref 0–50)
ANION GAP SERPL CALCULATED.3IONS-SCNC: 4 MMOL/L (ref 3–14)
AST SERPL W P-5'-P-CCNC: 27 U/L (ref 0–45)
BILIRUB DIRECT SERPL-MCNC: 0.2 MG/DL (ref 0–0.2)
BILIRUB SERPL-MCNC: 0.5 MG/DL (ref 0.2–1.3)
BUN SERPL-MCNC: 31 MG/DL (ref 7–30)
CALCIUM SERPL-MCNC: 9.8 MG/DL (ref 8.5–10.1)
CHLORIDE BLD-SCNC: 105 MMOL/L (ref 94–109)
CO2 SERPL-SCNC: 31 MMOL/L (ref 20–32)
CREAT SERPL-MCNC: 1.14 MG/DL (ref 0.52–1.04)
ERYTHROCYTE [DISTWIDTH] IN BLOOD BY AUTOMATED COUNT: 12.7 % (ref 10–15)
GFR SERPL CREATININE-BSD FRML MDRD: 58 ML/MIN/1.73M2
GLUCOSE BLD-MCNC: 106 MG/DL (ref 70–99)
HCT VFR BLD AUTO: 35.8 % (ref 35–47)
HGB BLD-MCNC: 11.9 G/DL (ref 11.7–15.7)
MAGNESIUM SERPL-MCNC: 2 MG/DL (ref 1.6–2.3)
MCH RBC QN AUTO: 30.3 PG (ref 26.5–33)
MCHC RBC AUTO-ENTMCNC: 33.2 G/DL (ref 31.5–36.5)
MCV RBC AUTO: 91 FL (ref 78–100)
PLATELET # BLD AUTO: 233 10E3/UL (ref 150–450)
POTASSIUM BLD-SCNC: 4.5 MMOL/L (ref 3.4–5.3)
PROT SERPL-MCNC: 7.2 G/DL (ref 6.8–8.8)
RBC # BLD AUTO: 3.93 10E6/UL (ref 3.8–5.2)
SODIUM SERPL-SCNC: 140 MMOL/L (ref 133–144)
TACROLIMUS BLD-MCNC: 7.3 UG/L (ref 5–15)
TME LAST DOSE: NORMAL H
TME LAST DOSE: NORMAL H
WBC # BLD AUTO: 4.4 10E3/UL (ref 4–11)

## 2022-05-02 PROCEDURE — 82248 BILIRUBIN DIRECT: CPT

## 2022-05-02 PROCEDURE — 80053 COMPREHEN METABOLIC PANEL: CPT

## 2022-05-02 PROCEDURE — 80197 ASSAY OF TACROLIMUS: CPT

## 2022-05-02 PROCEDURE — 85027 COMPLETE CBC AUTOMATED: CPT

## 2022-05-02 PROCEDURE — 36415 COLL VENOUS BLD VENIPUNCTURE: CPT

## 2022-05-02 PROCEDURE — 83735 ASSAY OF MAGNESIUM: CPT

## 2022-05-02 NOTE — TELEPHONE ENCOUNTER
Can you guys help me on this. Only PAL here for several days.     Amanda Gant, EMT at 11:42 AM on May 2, 2022  [facility] Health Guide  [phone]

## 2022-05-04 NOTE — TELEPHONE ENCOUNTER
Prior Authorization Approval    Authorization Effective Date: 5/4/2022  Authorization Expiration Date: 5/4/2023  Medication: Pantoprazole 40mg EC tablet -APPROVED  Approved Dose/Quantity:   Reference #:     Insurance Company: [employer] - Phone [phone] Fax [phone]  Expected CoPay:       CoPay Card Available:      Foundation Assistance Needed:    Which Pharmacy is filling the prescription (Not needed for infusion/clinic administered): [id number] [place], [place] - [facility] [employer] AT [address]  Pharmacy Notified: Yes  Patient Notified: No    **Instructed pharmacy to notify patient when script is ready to /ship.**

## 2022-05-04 NOTE — TELEPHONE ENCOUNTER
Central Prior Authorization Team   Phone: [phone]      PA Initiation    Medication: Pantoprazole 40mg EC tablet   Insurance Company: [employer] - Phone [phone] Fax [phone]  Pharmacy [facility]illing the Rx: [id number] [place], [place] - [facility] [employer] [address] [address] AT [address]  Filling Pharmacy Phone: [phone]  Filling Pharmacy Fax:    Start Date: 5/4/2022

## 2022-05-05 ENCOUNTER — PRE VISIT (OUTPATIENT)
Dept: DERMATOLOGY | Facility: CLINIC | Age: 52
End: 2022-05-05
Payer: COMMERCIAL

## 2022-05-09 ENCOUNTER — LAB (OUTPATIENT)
Dept: LAB | Facility: CLINIC | Age: 52
End: 2022-05-09

## 2022-05-09 DIAGNOSIS — Z94.4 LIVER REPLACED BY TRANSPLANT (H): ICD-10-CM

## 2022-05-09 DIAGNOSIS — Z13.220 LIPID SCREENING: ICD-10-CM

## 2022-05-09 LAB
ALBUMIN SERPL-MCNC: 3.5 G/DL (ref 3.4–5)
ALP SERPL-CCNC: 331 U/L (ref 40–150)
ALT SERPL W P-5'-P-CCNC: 34 U/L (ref 0–50)
ANION GAP SERPL CALCULATED.3IONS-SCNC: 5 MMOL/L (ref 3–14)
AST SERPL W P-5'-P-CCNC: 28 U/L (ref 0–45)
BILIRUB DIRECT SERPL-MCNC: 0.1 MG/DL (ref 0–0.2)
BILIRUB SERPL-MCNC: 0.3 MG/DL (ref 0.2–1.3)
BUN SERPL-MCNC: 31 MG/DL (ref 7–30)
CALCIUM SERPL-MCNC: 9.3 MG/DL (ref 8.5–10.1)
CHLORIDE BLD-SCNC: 108 MMOL/L (ref 94–109)
CO2 SERPL-SCNC: 28 MMOL/L (ref 20–32)
CREAT SERPL-MCNC: 0.93 MG/DL (ref 0.52–1.04)
ERYTHROCYTE [DISTWIDTH] IN BLOOD BY AUTOMATED COUNT: 13 % (ref 10–15)
GFR SERPL CREATININE-BSD FRML MDRD: 74 ML/MIN/1.73M2
GLUCOSE BLD-MCNC: 86 MG/DL (ref 70–99)
HCT VFR BLD AUTO: 34.4 % (ref 35–47)
HGB BLD-MCNC: 11.3 G/DL (ref 11.7–15.7)
MAGNESIUM SERPL-MCNC: 1.8 MG/DL (ref 1.6–2.3)
MCH RBC QN AUTO: 30.6 PG (ref 26.5–33)
MCHC RBC AUTO-ENTMCNC: 32.8 G/DL (ref 31.5–36.5)
MCV RBC AUTO: 93 FL (ref 78–100)
PLATELET # BLD AUTO: 230 10E3/UL (ref 150–450)
POTASSIUM BLD-SCNC: 3.7 MMOL/L (ref 3.4–5.3)
PROT SERPL-MCNC: 7 G/DL (ref 6.8–8.8)
RBC # BLD AUTO: 3.69 10E6/UL (ref 3.8–5.2)
SODIUM SERPL-SCNC: 141 MMOL/L (ref 133–144)
TACROLIMUS BLD-MCNC: 8 UG/L (ref 5–15)
TME LAST DOSE: NORMAL H
TME LAST DOSE: NORMAL H
WBC # BLD AUTO: 4.1 10E3/UL (ref 4–11)

## 2022-05-09 PROCEDURE — 85027 COMPLETE CBC AUTOMATED: CPT

## 2022-05-09 PROCEDURE — 80197 ASSAY OF TACROLIMUS: CPT

## 2022-05-09 PROCEDURE — 80053 COMPREHEN METABOLIC PANEL: CPT

## 2022-05-09 PROCEDURE — 36415 COLL VENOUS BLD VENIPUNCTURE: CPT

## 2022-05-09 PROCEDURE — 82248 BILIRUBIN DIRECT: CPT

## 2022-05-09 PROCEDURE — 83735 ASSAY OF MAGNESIUM: CPT

## 2022-05-20 ENCOUNTER — LAB (OUTPATIENT)
Dept: LAB | Facility: CLINIC | Age: 52
End: 2022-05-20
Attending: STUDENT IN AN ORGANIZED HEALTH CARE EDUCATION/TRAINING PROGRAM
Payer: COMMERCIAL

## 2022-05-20 ENCOUNTER — OFFICE VISIT (OUTPATIENT)
Dept: GASTROENTEROLOGY | Facility: CLINIC | Age: 52
End: 2022-05-20
Attending: STUDENT IN AN ORGANIZED HEALTH CARE EDUCATION/TRAINING PROGRAM
Payer: COMMERCIAL

## 2022-05-20 VITALS
DIASTOLIC BLOOD PRESSURE: 81 MMHG | HEART RATE: 97 BPM | WEIGHT: 170.2 LBS | BODY MASS INDEX: 27.35 KG/M2 | HEIGHT: 66 IN | SYSTOLIC BLOOD PRESSURE: 132 MMHG

## 2022-05-20 DIAGNOSIS — N18.32 CHRONIC KIDNEY DISEASE, STAGE 3B (H): Primary | ICD-10-CM

## 2022-05-20 DIAGNOSIS — Z94.4 LIVER REPLACED BY TRANSPLANT (H): ICD-10-CM

## 2022-05-20 DIAGNOSIS — K76.9 LIVER LESION: ICD-10-CM

## 2022-05-20 DIAGNOSIS — Z13.220 LIPID SCREENING: ICD-10-CM

## 2022-05-20 DIAGNOSIS — N18.32 CHRONIC KIDNEY DISEASE, STAGE 3B (H): ICD-10-CM

## 2022-05-20 DIAGNOSIS — Z94.4 LIVER TRANSPLANT RECIPIENT (H): ICD-10-CM

## 2022-05-20 DIAGNOSIS — R79.89 ELEVATED LFTS: ICD-10-CM

## 2022-05-20 LAB
ALBUMIN SERPL-MCNC: 3.6 G/DL (ref 3.4–5)
ALP SERPL-CCNC: 306 U/L (ref 40–150)
ALT SERPL W P-5'-P-CCNC: 32 U/L (ref 0–50)
ANION GAP SERPL CALCULATED.3IONS-SCNC: 10 MMOL/L (ref 3–14)
AST SERPL W P-5'-P-CCNC: 19 U/L (ref 0–45)
BILIRUB DIRECT SERPL-MCNC: 0.2 MG/DL (ref 0–0.2)
BILIRUB SERPL-MCNC: 0.5 MG/DL (ref 0.2–1.3)
BUN SERPL-MCNC: 31 MG/DL (ref 7–30)
CALCIUM SERPL-MCNC: 9.6 MG/DL (ref 8.5–10.1)
CHLORIDE BLD-SCNC: 104 MMOL/L (ref 94–109)
CO2 SERPL-SCNC: 30 MMOL/L (ref 20–32)
CREAT SERPL-MCNC: 0.99 MG/DL (ref 0.52–1.04)
ERYTHROCYTE [DISTWIDTH] IN BLOOD BY AUTOMATED COUNT: 13.2 % (ref 10–15)
GFR SERPL CREATININE-BSD FRML MDRD: 68 ML/MIN/1.73M2
GLUCOSE BLD-MCNC: 94 MG/DL (ref 70–99)
HCT VFR BLD AUTO: 36.1 % (ref 35–47)
HGB BLD-MCNC: 11.9 G/DL (ref 11.7–15.7)
MAGNESIUM SERPL-MCNC: 1.9 MG/DL (ref 1.6–2.3)
MCH RBC QN AUTO: 30.4 PG (ref 26.5–33)
MCHC RBC AUTO-ENTMCNC: 33 G/DL (ref 31.5–36.5)
MCV RBC AUTO: 92 FL (ref 78–100)
PHOSPHATE SERPL-MCNC: 4.1 MG/DL (ref 2.5–4.5)
PLATELET # BLD AUTO: 229 10E3/UL (ref 150–450)
POTASSIUM BLD-SCNC: 3.7 MMOL/L (ref 3.4–5.3)
PROT SERPL-MCNC: 7.2 G/DL (ref 6.8–8.8)
RBC # BLD AUTO: 3.91 10E6/UL (ref 3.8–5.2)
SODIUM SERPL-SCNC: 144 MMOL/L (ref 133–144)
TACROLIMUS BLD-MCNC: 5.4 UG/L (ref 5–15)
TME LAST DOSE: NORMAL H
TME LAST DOSE: NORMAL H
WBC # BLD AUTO: 4.6 10E3/UL (ref 4–11)

## 2022-05-20 PROCEDURE — 99000 SPECIMEN HANDLING OFFICE-LAB: CPT | Performed by: PATHOLOGY

## 2022-05-20 PROCEDURE — 84100 ASSAY OF PHOSPHORUS: CPT | Performed by: PATHOLOGY

## 2022-05-20 PROCEDURE — 80321 ALCOHOLS BIOMARKERS 1OR 2: CPT | Mod: 90 | Performed by: PATHOLOGY

## 2022-05-20 PROCEDURE — G0463 HOSPITAL OUTPT CLINIC VISIT: HCPCS

## 2022-05-20 PROCEDURE — 85027 COMPLETE CBC AUTOMATED: CPT | Performed by: PATHOLOGY

## 2022-05-20 PROCEDURE — 99215 OFFICE O/P EST HI 40 MIN: CPT | Performed by: STUDENT IN AN ORGANIZED HEALTH CARE EDUCATION/TRAINING PROGRAM

## 2022-05-20 PROCEDURE — 36415 COLL VENOUS BLD VENIPUNCTURE: CPT | Performed by: PATHOLOGY

## 2022-05-20 PROCEDURE — 80197 ASSAY OF TACROLIMUS: CPT | Mod: 90 | Performed by: PATHOLOGY

## 2022-05-20 PROCEDURE — 82306 VITAMIN D 25 HYDROXY: CPT | Mod: 90 | Performed by: PATHOLOGY

## 2022-05-20 PROCEDURE — 83735 ASSAY OF MAGNESIUM: CPT | Performed by: PATHOLOGY

## 2022-05-20 PROCEDURE — 83970 ASSAY OF PARATHORMONE: CPT | Performed by: PATHOLOGY

## 2022-05-20 PROCEDURE — 82248 BILIRUBIN DIRECT: CPT | Performed by: PATHOLOGY

## 2022-05-20 PROCEDURE — 80053 COMPREHEN METABOLIC PANEL: CPT | Performed by: PATHOLOGY

## 2022-05-20 ASSESSMENT — PAIN SCALES - GENERAL: PAINLEVEL: NO PAIN (0)

## 2022-05-20 NOTE — NURSING NOTE
"Chief Complaint   Patient presents with     RECHECK     Follow up with liver transplant     /81   Pulse 97   Ht 1.676 m (5' 6\")   Wt 77.2 kg (170 lb 3.2 oz)   LMP  (LMP Unknown)   BMI 27.47 kg/m    Kamla Mark CMA on 5/20/2022 at 10:04 AM    "

## 2022-05-20 NOTE — LETTER
5/20/2022         RE: Sarah Fisher  [address]  [facility] [address]        Dear Colleague,    Thank you for referring your patient, Sarah Fisher, to the [facility] [place]. Please see a copy of my visit note below.    [facility]     Date of Visit: 1/16/2022    Subjective: Ms. 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 Plastic stent left in place. Out on AXR 1/2022  - Rejection: liver bx 10/26/2021 showing HURTADO 3, with more intrahepatic cholestasis than explained on the bx - concern for biliary stricture that was later found on ERCP  - Noted to be hypercoagulable intraoperatively, postop US with elevated RI involving the extrahepatic hepatic artery and the right hepatic artery. Repeat US POD 1&2 -Patent hepatic vasculature. Continued elevated RI in the hepatic arteries. Was started on heparin gtt, developed epistaxis, GI bleeding (grade A esophagitis, residual GAVE, duodenal erosions), so that was stopped and taking  mg daily -> now 81 mg daily  - CORBIN: creatinine baseline 1-1.2, peaked at 3.9 post transplant, downtrending.    Current Immunosuppresion:  - Tacrolimus 4 mg BID   -  mg BID  - off prednisone  - Steve 300/600    Interval Events:  - Alk phos telly earlier this year  without clear etiology. MRCP showing mild intrahepatic biliary dilation, including segment 4A/B. Showed T1 hypointense (series 9 image 26) and T2 mildly hyperintense (series 31 image 27) lesion within hepatic segment 7, measuring 1.1 cm.  Associated subtle hypoenhancement on portal venous phase (series 22 image image 20) becomes less conspicuous on later phase images. No associated diffusion restriction. Wedge-shaped region of diffusion restriction, increased T2 signal, and progressive enhancement within the hepatic segments 4A/B associated with the above-described biliary dilation and irregularity. RUQ US with doppler concern for hepatic artery narrowing, follow up CTA with minimal arterial anastomotic narrowing. Vasculature was patent. Showed previous Arterial focus of enhancement in hepatic segment 8 measuring 9 mm with possible washout on portal venous phase, correlating with previously described 1.1 cm focus on most recent MRI. Follow up ERCP showed normal biliary tree, empiric dilation of the post transplant anastomosis to 10 mm. Temporary plastic stent placed. EUS guided liver bx showed finding suggestive of biliary obstruction, mild subsinusoidal fibrosis (seen on previous bx - thought to be donor derived. LFTs improving in the meantime without intervention, but also slightly increasing meaghan. She was having itching prior to her ERCP, improved. Got COVID earlier in , but clinically was well with this  - Received 4th covid shot, shingrix  - Otherwise feels well, no alcohol cravings    ROS: 14 point ROS negative except for positives noted in HPI.    PMHx:  Past Medical History:   Diagnosis Date     Ascites      History of blood transfusion      Liver cirrhosis (H)      Thyroid disease    Alcohol related cirrhosis    PSHx:  Past Surgical History:   Procedure Laterality Date     ABDOMEN SURGERY        SECTION       CV RIGHT HEART CATH MEASUREMENTS RECORDED N/A 2021    Procedure: CV RIGHT  HEART CATH;  Surgeon: Joseph Guevara MD;  Location:  HEART CARDIAC CATH LAB     ENDOSCOPIC RETROGRADE CHOLANGIOPANCREATOGRAM N/A 10/25/2021    Procedure: ENDOSCOPIC RETROGRADE CHOLANGIOPANCREATOGRAPHY, WITH biliary sphincterotomy, biliary dilation, and biliary stent placement;  Surgeon: Guru Dominguez Fenton MD;  Location: [facility] OR     ENDOSCOPIC RETROGRADE CHOLANGIOPANCREATOGRAM N/A 4/13/2022    Procedure: ENDOSCOPIC RETROGRADE CHOLANGIOPANCREATOGRAPHY WITH BILIARY DILATION, DEBRIS REMOVAL AND STENT PLACEMENT;  Surgeon: Guru Dominguez Fenton MD;  Location: [facility] OR     ENDOSCOPIC RETROGRADE CHOLANGIOPANCREATOGRAPHY, EXCHANGE TUBE/STENT N/A 11/29/2021    Procedure: ENDOSCOPIC RETROGRADE CHOLANGIOPANCREATOGRAPHY, WITH biliary sphincterotomy, stricture dilation, stent exchange;  Surgeon: Guru Dominguez Fenton MD;  Location: [facility] OR     ENDOSCOPIC ULTRASOUND UPPER GASTROINTESTINAL TRACT (GI) N/A 4/13/2022    Procedure: ENDOSCOPIC ULTRASOUND WITH CORE LIVER BIOPSY;  Surgeon: Guru Dominguez Fenton MD;  Location:  OR     ESOPHAGOSCOPY, GASTROSCOPY, DUODENOSCOPY (EGD), COMBINED N/A 10/07/2021    Procedure: ESOPHAGOGASTRODUODENOSCOPY (EGD) with hemostasis;  Surgeon: Fox Jerry MD;  Location: [facility]     ESOPHAGOSCOPY, GASTROSCOPY, DUODENOSCOPY (EGD), COMBINED N/A 10/22/2021    Procedure: ESOPHAGOGASTRODUODENOSCOPY, WITH BIOPSY;  Surgeon: Fadia Avila MD;  Location:  GI     IR LIVER BIOPSY PERCUTANEOUS  10/26/2021     IR THORACENTESIS  05/25/2021     IR THORACENTESIS  07/30/2021     THORACENTESIS Left 01/14/2021    Procedure: THORACENTESIS;  Surgeon: Almas Irby MD;  Location:  GI     THORACENTESIS N/A 02/17/2021    Procedure: THORACENTESIS;  Surgeon: Almas Irby MD;  Location:  GI     THORACENTESIS N/A 03/10/2021    Procedure: THORACENTESIS;  Surgeon: Almas Irby MD;  Location: [facility] GI     THORACENTESIS Left 05/25/2021    Procedure:  THORACENTESIS;  Surgeon: Kennedi Chavez MD;  Location: [facility] OR     THORACENTESIS Left 2021    Procedure: THORACENTESIS;  Surgeon: Jess Ansari PA-C;  Location: [facility] OR     THORACIC SURGERY  2021     TRANSPLANT LIVER RECIPIENT  DONOR N/A 10/17/2021    Procedure: TRANSPLANT, LIVER, RECIPIENT,  DONOR;  Surgeon: Christian Morrison MD;  Location: [facility] OR     Ovarian cyst    FamHx:  No known history of liver disease    SocHx:  Social History     Socioeconomic History     Marital status: Single     Spouse name: Not on file     Number of children: Not on file     Years of education: Not on file     Highest education level: Master's degree (e.g., MA, MS, Roberto, MEd, MSW, SASHA)   Occupational History     Not on file   Tobacco Use     Smoking status: Never Smoker     Smokeless tobacco: Never Used   Vaping Use     Vaping Use: Never used   Substance and Sexual Activity     Alcohol use: Not Currently     Comment: Quit on 2020     Drug use: Not Currently     Sexual activity: Not Currently     Partners: Male   Other Topics Concern     Parent/sibling w/ CABG, MI or angioplasty before 65F 55M? Not Asked   Social History Narrative    Lives [place]. 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 12 year sonFlakito.         Christine Harris, DNP, APRN, CNP    3/17/2021     Social Determinants of Health     Financial Resource Strain: Low Risk      Difficulty of Paying Living Expenses: Not hard at all   Food Insecurity: No Food Insecurity     Worried About Running Out of Food in the Last Year: Never true     Ran Out of Food in the Last Year: Never true   Transportation Needs: No Transportation Needs     Lack of Transportation (Medical): No     Lack of Transportation (Non-Medical): No   Physical Activity: Sufficiently Active     Days of Exercise per Week: 5 days     Minutes of Exercise per Session: 40 min   Stress: No Stress Concern Present      "Feeling of Stress : Not at all   Social Connections: Unknown     Frequency of Communication with Friends and Family: More than three times a week     Frequency of Social Gatherings with Friends and Family: More than three times a week     Attends Sikh Services: Patient refused     Active Member of Clubs or Organizations: Yes     Attends Club or Organization Meetings: Not on file     Marital Status: Patient refused   Intimate Partner Violence: Not on file   Housing Stability: Low Risk      Unable to Pay for Housing in the Last Year: No     Number of Places Lived in the Last Year: 1     Unstable Housing in the Last Year: No   Just moved back home, living with son and niece who is going to school to be a RN    Medications:  Current Outpatient Medications   Medication     amLODIPine (NORVASC) 10 MG tablet     aspirin (ASA) 325 MG EC tablet     biotin 1000 MCG TABS tablet     diclofenac (VOLTAREN) 1 % topical gel     levothyroxine (SYNTHROID/LEVOTHROID) 50 MCG tablet     magnesium oxide (MAG-OX) 400 (241.3 Mg) MG tablet     methocarbamol (ROBAXIN) 500 MG tablet     methocarbamol (ROBAXIN) 750 MG tablet     mycophenolate (GENERIC EQUIVALENT) 250 MG capsule     ondansetron (ZOFRAN) 4 MG tablet     pantoprazole (PROTONIX) 40 MG EC tablet     tacrolimus (GENERIC EQUIVALENT) 1 MG capsule     ursodiol (ACTIGALL) 300 MG capsule     No current facility-administered medications for this visit.     Allergies:     Allergies   Allergen Reactions     Amoxicillin GI Disturbance, Diarrhea, Nausea and Nausea and Vomiting     Objective:  /81   Pulse 97   Ht 1.676 m (5' 6\")   Wt 77.2 kg (170 lb 3.2 oz)   LMP  (LMP Unknown)   BMI 27.47 kg/m    Constitutional: Pleasant woman in NAD  Eyes: nonicteric  Respiratory: Breathing comfortably on RA  Neuro: AOOX3  Psychiatric: normal mood and orientation    Labs:  Last Comprehensive Metabolic Panel:  Sodium   Date Value Ref Range Status   05/20/2022 144 133 - 144 mmol/L Final "   06/07/2021 140 133 - 144 mmol/L Final     Potassium   Date Value Ref Range Status   05/20/2022 3.7 3.4 - 5.3 mmol/L Final   06/07/2021 3.4 3.4 - 5.3 mmol/L Final     Chloride   Date Value Ref Range Status   05/20/2022 104 94 - 109 mmol/L Final   06/07/2021 106 94 - 109 mmol/L Final     Carbon Dioxide   Date Value Ref Range Status   06/07/2021 28 20 - 32 mmol/L Final     Carbon Dioxide (CO2)   Date Value Ref Range Status   05/20/2022 30 20 - 32 mmol/L Final     Anion Gap   Date Value Ref Range Status   05/20/2022 10 3 - 14 mmol/L Final   06/07/2021 6 3 - 14 mmol/L Final     Glucose   Date Value Ref Range Status   05/20/2022 94 70 - 99 mg/dL Final   06/07/2021 138 (H) 70 - 99 mg/dL Final     Urea Nitrogen   Date Value Ref Range Status   05/20/2022 31 (H) 7 - 30 mg/dL Final   06/07/2021 14 7 - 30 mg/dL Final     Creatinine   Date Value Ref Range Status   05/20/2022 0.99 0.52 - 1.04 mg/dL Final   06/07/2021 0.96 0.52 - 1.04 mg/dL Final     GFR Estimate   Date Value Ref Range Status   05/20/2022 68 >60 mL/min/1.73m2 Final     Comment:     Effective December 21, 2021 eGFRcr in adults is calculated using the 2021 CKD-EPI creatinine equation which includes age and gender (Felix et al., NE, DOI: 10.1056/JFFXyo2879229)   06/07/2021 68 >60 mL/min/[1.73_m2] Final     Comment:     Non  GFR Calc  Starting 12/18/2018, serum creatinine based estimated GFR (eGFR) will be   calculated using the Chronic Kidney Disease Epidemiology Collaboration   (CKD-EPI) equation.       Calcium   Date Value Ref Range Status   05/20/2022 9.6 8.5 - 10.1 mg/dL Final   06/07/2021 8.7 8.5 - 10.1 mg/dL Final     Bilirubin Total   Date Value Ref Range Status   05/20/2022 0.5 0.2 - 1.3 mg/dL Final   06/07/2021 5.6 (H) 0.2 - 1.3 mg/dL Final     Alkaline Phosphatase   Date Value Ref Range Status   05/20/2022 306 (H) 40 - 150 U/L Final   06/07/2021 150 40 - 150 U/L Final     ALT   Date Value Ref Range Status   05/20/2022 32 0 - 50 U/L Final    06/07/2021 18 0 - 50 U/L Final     AST   Date Value Ref Range Status   05/20/2022 19 0 - 45 U/L Final   06/07/2021 35 0 - 45 U/L Final     Imaging: Reviewed in EHR    Endoscopy:    12/4/2020 Colonoscopy    Findings:       Hemorrhoids were found on perianal exam.       The terminal ileum appeared normal.       An area of grossly congested mucosa was found in the entire colon.       A 4 mm polyp was found in the cecum. The polyp was sessile. The polyp        was removed with a cold biopsy forceps. Resection and retrieval were        complete.       Non-bleeding internal hemorrhoids were found during retroflexion.    Impressions/Post-Op Diagnosis:       - Hemorrhoids found on perianal exam.       - The examined portion of the ileum was normal.       - Congested mucosa in the entire examined colon.       - One 4 mm polyp in the cecum, removed with a cold biopsy forceps.        Resected and retrieved.       - Non-bleeding internal hemorrhoids.    A) COLON, CECUM, POLYPECTOMY:  1. Colonic mucosa with no diagnostic abnormalities; a lymphoid aggregate is present  2. Negative for serrated change, dysplasia, and malignancy    Assessment/Plan: Ms. 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 This is her first visit back in hepatology clinic.     Post transplant course complicated by CORBIN, biliary anastomotic stricture.     Bx ~ 9 days post transplant showing intrahepatic cholestasis and only mild (3/9) rejection, findings likely 2/2 anastomotic stricture that she underwent ERCP for    Valcyte stopped slightly early of 3 months due to leukopenia, bactrim also stopped.    Recently had elevated alkaline phosphatase without clear etiology. Concern for delayed hepatic artery issues - not seen on CTA. Imaging showing signs of biliary inflammation, ERCP  without biliary obstruction. Bx without clear etiology. Alk phos improving without any further intervention, but not back to normal. MRI and CT showed ~ 1 cm arterial enhancing lesion, will follow up with MRI Liver mid June    Renal sparing immunosuppresion: Tacrolimus 4/4 with goal 6-8,  mg BID - continue at current doses until LFTs normalize. Labs every other week for now. Planning on leaving the country 7/21 to go to [facility] and [place] for 2 weeks  CORBIN: Follows with nephrology  AUD: Doing well with sobriety    RV 6 months    Fadia Avila MD MSc  Transplant Hepatology  [facility]    HME    Mammogam 2022 normal  Pap smear 3/2021 normal

## 2022-05-20 NOTE — PROGRESS NOTES
[facility]     Date of Visit: 1/16/2022    Subjective: Ms. 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 Plastic stent left in place. Out on AXR 1/2022  - Rejection: liver bx 10/26/2021 showing HURTADO 3, with more intrahepatic cholestasis than explained on the bx - concern for biliary stricture that was later found on ERCP  - Noted to be hypercoagulable intraoperatively, postop US with elevated RI involving the extrahepatic hepatic artery and the right hepatic artery. Repeat US POD 1&2 -Patent hepatic vasculature. Continued elevated RI in the hepatic arteries. Was started on heparin gtt, developed epistaxis, GI bleeding (grade A esophagitis, residual GAVE, duodenal erosions), so that was stopped and taking  mg daily -> now 81 mg daily  - CORBIN: creatinine baseline 1-1.2, peaked at 3.9 post transplant, downtrending.    Current Immunosuppresion:  - Tacrolimus 4 mg BID   -  mg BID  - off prednisone  - Steve 300/600    Interval Events:  - Alk phos telly earlier this year without clear etiology. MRCP showing mild intrahepatic biliary dilation, including segment 4A/B. Showed T1 hypointense (series 9 image 26) and T2 mildly hyperintense (series 31 image 27) lesion within hepatic segment 7, measuring 1.1 cm.  Associated subtle hypoenhancement  on portal venous phase (series 22 image image 20) becomes less conspicuous on later phase images. No associated diffusion restriction. Wedge-shaped region of diffusion restriction, increased T2 signal, and progressive enhancement within the hepatic segments 4A/B associated with the above-described biliary dilation and irregularity. RUQ US with doppler concern for hepatic artery narrowing, follow up CTA with minimal arterial anastomotic narrowing. Vasculature was patent. Showed previous Arterial focus of enhancement in hepatic segment 8 measuring 9 mm with possible washout on portal venous phase, correlating with previously described 1.1 cm focus on most recent MRI. Follow up ERCP showed normal biliary tree, empiric dilation of the post transplant anastomosis to 10 mm. Temporary plastic stent placed. EUS guided liver bx showed finding suggestive of biliary obstruction, mild subsinusoidal fibrosis (seen on previous bx - thought to be donor derived. LFTs improving in the meantime without intervention, but also slightly increasing meaghan. She was having itching prior to her ERCP, improved. Got COVID earlier in , but clinically was well with this  - Received 4th covid shot, shingrix  - Otherwise feels well, no alcohol cravings    ROS: 14 point ROS negative except for positives noted in HPI.    PMHx:  Past Medical History:   Diagnosis Date     Ascites      History of blood transfusion      Liver cirrhosis (H)      Thyroid disease    Alcohol related cirrhosis    PSHx:  Past Surgical History:   Procedure Laterality Date     ABDOMEN SURGERY        SECTION       CV RIGHT HEART CATH MEASUREMENTS RECORDED N/A 2021    Procedure: CV RIGHT HEART CATH;  Surgeon: Joseph Guevara MD;  Location:  HEART CARDIAC CATH LAB     ENDOSCOPIC RETROGRADE CHOLANGIOPANCREATOGRAM N/A 10/25/2021    Procedure: ENDOSCOPIC RETROGRADE CHOLANGIOPANCREATOGRAPHY, WITH biliary sphincterotomy, biliary dilation, and  biliary stent placement;  Surgeon: Guru Dominguez Fenton MD;  Location: [facility] OR     ENDOSCOPIC RETROGRADE CHOLANGIOPANCREATOGRAM N/A 2022    Procedure: ENDOSCOPIC RETROGRADE CHOLANGIOPANCREATOGRAPHY WITH BILIARY DILATION, DEBRIS REMOVAL AND STENT PLACEMENT;  Surgeon: Guru Dominguez Fenton MD;  Location: [facility] OR     ENDOSCOPIC RETROGRADE CHOLANGIOPANCREATOGRAPHY, EXCHANGE TUBE/STENT N/A 2021    Procedure: ENDOSCOPIC RETROGRADE CHOLANGIOPANCREATOGRAPHY, WITH biliary sphincterotomy, stricture dilation, stent exchange;  Surgeon: Guru Dominguez Fenton MD;  Location: [facility] OR     ENDOSCOPIC ULTRASOUND UPPER GASTROINTESTINAL TRACT (GI) N/A 2022    Procedure: ENDOSCOPIC ULTRASOUND WITH CORE LIVER BIOPSY;  Surgeon: Guru Dominguez Fenton MD;  Location: [facility] OR     ESOPHAGOSCOPY, GASTROSCOPY, DUODENOSCOPY (EGD), COMBINED N/A 10/07/2021    Procedure: ESOPHAGOGASTRODUODENOSCOPY (EGD) with hemostasis;  Surgeon: Fox Jerry MD;  Location:  GI     ESOPHAGOSCOPY, GASTROSCOPY, DUODENOSCOPY (EGD), COMBINED N/A 10/22/2021    Procedure: ESOPHAGOGASTRODUODENOSCOPY, WITH BIOPSY;  Surgeon: Fadia Avila MD;  Location: [facility] GI     IR LIVER BIOPSY PERCUTANEOUS  10/26/2021     IR THORACENTESIS  2021     IR THORACENTESIS  2021     THORACENTESIS Left 2021    Procedure: THORACENTESIS;  Surgeon: Almas Irby MD;  Location: [facility] GI     THORACENTESIS N/A 2021    Procedure: THORACENTESIS;  Surgeon: Almas Irby MD;  Location: [facility] GI     THORACENTESIS N/A 03/10/2021    Procedure: THORACENTESIS;  Surgeon: Almas Irby MD;  Location: [facility] GI     THORACENTESIS Left 2021    Procedure: THORACENTESIS;  Surgeon: Kennedi Chavez MD;  Location: [facility] OR     THORACENTESIS Left 2021    Procedure: THORACENTESIS;  Surgeon: Jess Ansari PA-C;  Location: [facility] OR     THORACIC SURGERY  2021     TRANSPLANT LIVER RECIPIENT  DONOR N/A 10/17/2021     Procedure: TRANSPLANT, LIVER, RECIPIENT,  DONOR;  Surgeon: Christian Morrison MD;  Location: [facility] OR     Ovarian cyst    FamHx:  No known history of liver disease    SocHx:  Social History     Socioeconomic History     Marital status: Single     Spouse name: Not on file     Number of children: Not on file     Years of education: Not on file     Highest education level: Master's degree (e.g., MA, MS, Roberto, MEd, MSW, SASHA)   Occupational History     Not on file   Tobacco Use     Smoking status: Never Smoker     Smokeless tobacco: Never Used   Vaping Use     Vaping Use: Never used   Substance and Sexual Activity     Alcohol use: Not Currently     Comment: Quit on 2020     Drug use: Not Currently     Sexual activity: Not Currently     Partners: Male   Other Topics Concern     Parent/sibling w/ CABG, MI or angioplasty before 65F 55M? Not Asked   Social History Narrative    Lives [place]. 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 12 year sonFlakito.         Christine Harris, DNP, APRN, CNP    3/17/2021     Social Determinants of Health     Financial Resource Strain: Low Risk      Difficulty of Paying Living Expenses: Not hard at all   Food Insecurity: No Food Insecurity     Worried About Running Out of Food in the Last Year: Never true     Ran Out of Food in the Last Year: Never true   Transportation Needs: No Transportation Needs     Lack of Transportation (Medical): No     Lack of Transportation (Non-Medical): No   Physical Activity: Sufficiently Active     Days of Exercise per Week: 5 days     Minutes of Exercise per Session: 40 min   Stress: No Stress Concern Present     Feeling of Stress : Not at all   Social Connections: Unknown     Frequency of Communication with Friends and Family: More than three times a week     Frequency of Social Gatherings with Friends and Family: More than three times a week     Attends Yarsanism Services: Patient refused      "Active Member of Clubs or Organizations: Yes     Attends Club or Organization Meetings: Not on file     Marital Status: Patient refused   Intimate Partner Violence: Not on file   Housing Stability: Low Risk      Unable to Pay for Housing in the Last Year: No     Number of Places Lived in the Last Year: 1     Unstable Housing in the Last Year: No   Just moved back home, living with son and niece who is going to school to be a RN    Medications:  Current Outpatient Medications   Medication     amLODIPine (NORVASC) 10 MG tablet     aspirin (ASA) 325 MG EC tablet     biotin 1000 MCG TABS tablet     diclofenac (VOLTAREN) 1 % topical gel     levothyroxine (SYNTHROID/LEVOTHROID) 50 MCG tablet     magnesium oxide (MAG-OX) 400 (241.3 Mg) MG tablet     methocarbamol (ROBAXIN) 500 MG tablet     methocarbamol (ROBAXIN) 750 MG tablet     mycophenolate (GENERIC EQUIVALENT) 250 MG capsule     ondansetron (ZOFRAN) 4 MG tablet     pantoprazole (PROTONIX) 40 MG EC tablet     tacrolimus (GENERIC EQUIVALENT) 1 MG capsule     ursodiol (ACTIGALL) 300 MG capsule     No current facility-administered medications for this visit.     Allergies:     Allergies   Allergen Reactions     Amoxicillin GI Disturbance, Diarrhea, Nausea and Nausea and Vomiting     Objective:  /81   Pulse 97   Ht 1.676 m (5' 6\")   Wt 77.2 kg (170 lb 3.2 oz)   LMP  (LMP Unknown)   BMI 27.47 kg/m    Constitutional: Pleasant woman in NAD  Eyes: nonicteric  Respiratory: Breathing comfortably on RA  Neuro: AOOX3  Psychiatric: normal mood and orientation    Labs:  Last Comprehensive Metabolic Panel:  Sodium   Date Value Ref Range Status   05/20/2022 144 133 - 144 mmol/L Final   06/07/2021 140 133 - 144 mmol/L Final     Potassium   Date Value Ref Range Status   05/20/2022 3.7 3.4 - 5.3 mmol/L Final   06/07/2021 3.4 3.4 - 5.3 mmol/L Final     Chloride   Date Value Ref Range Status   05/20/2022 104 94 - 109 mmol/L Final   06/07/2021 106 94 - 109 mmol/L Final "     Carbon Dioxide   Date Value Ref Range Status   06/07/2021 28 20 - 32 mmol/L Final     Carbon Dioxide (CO2)   Date Value Ref Range Status   05/20/2022 30 20 - 32 mmol/L Final     Anion Gap   Date Value Ref Range Status   05/20/2022 10 3 - 14 mmol/L Final   06/07/2021 6 3 - 14 mmol/L Final     Glucose   Date Value Ref Range Status   05/20/2022 94 70 - 99 mg/dL Final   06/07/2021 138 (H) 70 - 99 mg/dL Final     Urea Nitrogen   Date Value Ref Range Status   05/20/2022 31 (H) 7 - 30 mg/dL Final   06/07/2021 14 7 - 30 mg/dL Final     Creatinine   Date Value Ref Range Status   05/20/2022 0.99 0.52 - 1.04 mg/dL Final   06/07/2021 0.96 0.52 - 1.04 mg/dL Final     GFR Estimate   Date Value Ref Range Status   05/20/2022 68 >60 mL/min/1.73m2 Final     Comment:     Effective December 21, 2021 eGFRcr in adults is calculated using the 2021 CKD-EPI creatinine equation which includes age and gender (Felix et al., NEJM, DOI: 10.1056/JBBNmi2214994)   06/07/2021 68 >60 mL/min/[1.73_m2] Final     Comment:     Non  GFR Calc  Starting 12/18/2018, serum creatinine based estimated GFR (eGFR) will be   calculated using the Chronic Kidney Disease Epidemiology Collaboration   (CKD-EPI) equation.       Calcium   Date Value Ref Range Status   05/20/2022 9.6 8.5 - 10.1 mg/dL Final   06/07/2021 8.7 8.5 - 10.1 mg/dL Final     Bilirubin Total   Date Value Ref Range Status   05/20/2022 0.5 0.2 - 1.3 mg/dL Final   06/07/2021 5.6 (H) 0.2 - 1.3 mg/dL Final     Alkaline Phosphatase   Date Value Ref Range Status   05/20/2022 306 (H) 40 - 150 U/L Final   06/07/2021 150 40 - 150 U/L Final     ALT   Date Value Ref Range Status   05/20/2022 32 0 - 50 U/L Final   06/07/2021 18 0 - 50 U/L Final     AST   Date Value Ref Range Status   05/20/2022 19 0 - 45 U/L Final   06/07/2021 35 0 - 45 U/L Final     Imaging: Reviewed in EHR    Endoscopy:    12/4/2020 Colonoscopy    Findings:       Hemorrhoids were found on perianal exam.       The terminal  ileum appeared normal.       An area of grossly congested mucosa was found in the entire colon.       A 4 mm polyp was found in the cecum. The polyp was sessile. The polyp        was removed with a cold biopsy forceps. Resection and retrieval were        complete.       Non-bleeding internal hemorrhoids were found during retroflexion.    Impressions/Post-Op Diagnosis:       - Hemorrhoids found on perianal exam.       - The examined portion of the ileum was normal.       - Congested mucosa in the entire examined colon.       - One 4 mm polyp in the cecum, removed with a cold biopsy forceps.        Resected and retrieved.       - Non-bleeding internal hemorrhoids.    A) COLON, CECUM, POLYPECTOMY:  1. Colonic mucosa with no diagnostic abnormalities; a lymphoid aggregate is present  2. Negative for serrated change, dysplasia, and malignancy    Assessment/Plan: Ms. 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 This is her first visit back in hepatology clinic.     Post transplant course complicated by CORBIN, biliary anastomotic stricture.     Bx ~ 9 days post transplant showing intrahepatic cholestasis and only mild (3/9) rejection, findings likely 2/2 anastomotic stricture that she underwent ERCP for    Valcyte stopped slightly early of 3 months due to leukopenia, bactrim also stopped.    Recently had elevated alkaline phosphatase without clear etiology. Concern for delayed hepatic artery issues - not seen on CTA. Imaging showing signs of biliary inflammation, ERCP without biliary obstruction. Bx without clear etiology. Alk phos improving without any further intervention, but not back to normal. MRI and CT showed ~ 1 cm arterial enhancing lesion, will follow up with MRI Liver mid June    Renal sparing immunosuppresion: Tacrolimus 4/4 with goal 6-8,   mg BID - continue at current doses until LFTs normalize. Labs every other week for now. Planning on leaving the country 7/21 to go to [facility] [place] and [place] for 2 weeks  CORBIN: Follows with nephrology  AUD: Doing well with sobriety    RV 6 months    Fadia Avila MD MSc  Transplant Hepatology  [facility]    Mammogam 2022 normal  Pap smear 3/2021 normal

## 2022-05-23 DIAGNOSIS — N18.32 CHRONIC KIDNEY DISEASE, STAGE 3B (H): Primary | ICD-10-CM

## 2022-05-23 LAB
DEPRECATED CALCIDIOL+CALCIFEROL SERPL-MC: 37 UG/L (ref 20–75)
PLPETH BLD-MCNC: <10 NG/ML
POPETH BLD-MCNC: <10 NG/ML
PTH-INTACT SERPL-MCNC: 38 PG/ML (ref 15–65)

## 2022-05-25 ENCOUNTER — VIRTUAL VISIT (OUTPATIENT)
Dept: NEPHROLOGY | Facility: CLINIC | Age: 52
End: 2022-05-25
Attending: STUDENT IN AN ORGANIZED HEALTH CARE EDUCATION/TRAINING PROGRAM
Payer: COMMERCIAL

## 2022-05-25 ENCOUNTER — TELEPHONE (OUTPATIENT)
Dept: TRANSPLANT | Facility: CLINIC | Age: 52
End: 2022-05-25
Payer: COMMERCIAL

## 2022-05-25 VITALS — WEIGHT: 165 LBS | BODY MASS INDEX: 26.63 KG/M2

## 2022-05-25 DIAGNOSIS — Z94.4 LIVER TRANSPLANT RECIPIENT (H): ICD-10-CM

## 2022-05-25 DIAGNOSIS — I12.9 BENIGN HYPERTENSION WITH CKD (CHRONIC KIDNEY DISEASE), STAGE II: Primary | ICD-10-CM

## 2022-05-25 DIAGNOSIS — N18.2 BENIGN HYPERTENSION WITH CKD (CHRONIC KIDNEY DISEASE), STAGE II: Primary | ICD-10-CM

## 2022-05-25 PROBLEM — K76.82 ACUTE HEPATIC ENCEPHALOPATHY (H): Status: RESOLVED | Noted: 2020-09-10 | Resolved: 2022-05-25

## 2022-05-25 PROBLEM — J96.01 ACUTE HYPOXEMIC RESPIRATORY FAILURE (H): Status: RESOLVED | Noted: 2020-09-09 | Resolved: 2022-05-25

## 2022-05-25 PROCEDURE — 99214 OFFICE O/P EST MOD 30 MIN: CPT | Mod: 95 | Performed by: STUDENT IN AN ORGANIZED HEALTH CARE EDUCATION/TRAINING PROGRAM

## 2022-05-25 RX ORDER — ALCOHOL 70.47 ML/100ML
1 GEL TOPICAL DAILY
Qty: 30 TABLET | Refills: 1 | COMMUNITY
Start: 2022-05-25

## 2022-05-25 RX ORDER — MAGNESIUM OXIDE 400 MG/1
800 TABLET ORAL 2 TIMES DAILY
Qty: 120 TABLET | Refills: 3 | COMMUNITY
Start: 2022-05-25 | End: 2022-07-07

## 2022-05-25 ASSESSMENT — PAIN SCALES - GENERAL: PAINLEVEL: NO PAIN (0)

## 2022-05-25 NOTE — PROGRESS NOTES
Maria Elena is a 52 year old who is being evaluated via a billable video visit.      How would you like to obtain your AVS? MyChart  If the video visit is dropped, the invitation should be resent by: Send to e-mail at: [email or web address]  Will anyone else be joining your video visit? No    Video Start Time: 2:10PM  Video-Visit Details    Type of service:  Video Visit    Video End Time:2:44PM    Originating Location (pt. Location): Home    Distant Location (provider location):  [facility] [place]     Platform used for Video Visit: [facility]     Nephrology Clinic Follow Up Visit    Assessment and Plan:     1. CKD 2 vs resolved CORBIN  Cr post liver transplant was slow to trend down. It was stuck in the 1.5-1.7 range, but has now trended to ~0.9 with eGFR in the 70s. CORBIN was likely hemodynamic in nature and did partially correspond to CNI levels. BP has been borderline - will have pt monitor more closely and will start anti-hypertensives if BP persistently >130/80.  - No changes to management  - RTC in 3-4 months     2. Liver cirrhosis s/p liver transplant 10/17/21  Mildly elevated, but downtrending, alk phos. Management per Hepatology.     3. Electrolytes  No acute concerns.     4. HTN  BP borderline. Goal < 130/80. Currently on amlodipine 10mg daily.  - Monitor      Assessment and plan was discussed with patient and she voiced her understanding and agreement.    I discussed the patient's plan of care with Dr. Mckeon.    Mariella Camacho MD  Nephrology Fellow    Reason for Visit:  Sarah Fisher is a 51 year old female with hx of liver cirrhosis due to alcohol use now s/p liver transplant 10/17/21 who presents for follow up of elevated creatinine/CKD.    HPI:  Maria Elena was last seen in clinic on 2/25/22 at which time Cr seemed to have settled at a new baseline of 1.5-1.7. This has since improved dramatically however to a baseline ~0.9-1.0. She feels well and has a good amount of energy. She is eating &  drinking well. No new symptoms or concerns. She's had a mildly elevated alk phos that is trending down. She is going to get repeat liver imaging per hepatology. She hasn't been checking BP regularly - it's been in the 130s/80s when checked more recently.      ROS:  A comprehensive review of systems was obtained and negative, except as noted in the HPI or PMH.    Active Medical Problems:  Patient Active Problem List   Diagnosis     Acute deep vein thrombosis (DVT) of upper extremity (H)     Acute kidney failure, unspecified (H)     Carrier of group B Streptococcus     Cirrhosis of liver with ascites (H)     CKD (chronic kidney disease)     Elevated blood pressure     Family history of colon cancer     Gastroesophageal reflux disease without esophagitis     GI (gastrointestinal bleed)     Hyponatremia     Hypokalemia     History of DVT (deep vein thrombosis)     Anemia associated with acute blood loss     Acute anemia     Need for vaccination for viral hepatitis     Macrocytosis without anemia     Knee pain     Pancytopenia (H)     Macrocytic anemia     Blood loss anemia     Hydrothorax     Portal hypertensive gastropathy (H)     Pleural effusion     Screening for malignant neoplasm of cervix     Venous incompetence     Varicose veins of leg with pain     Supervision of high-risk pregnancy of elderly primigravida     Subclinical hypothyroidism     Liver failure (H)     Hypotension     Pre-liver transplant, listed     Alcoholic cirrhosis (H)     Abnormal serum iron level     Iron deficiency anemia due to chronic blood loss     Encounter for immunization     Liver transplant candidate     Liver transplant recipient (H)     Immunosuppressed status (H)     Malnutrition (H)     Steroid-induced hyperglycemia     Epistaxis     Esophagitis     Duodenal erosion     Gastric antral vascular ectasia     Hyperphosphatemia     Leukocytosis     Chronic kidney disease, stage 3b (H)     Low magnesium level     Pain of female symphysis  pubis     Somatic dysfunction of pelvic region       Personal Hx:   Social History     Tobacco Use     Smoking status: Never Smoker     Smokeless tobacco: Never Used   Substance Use Topics     Alcohol use: Not Currently     Comment: Quit on 6/20/2020       Allergies: Reviewed by provider.     Medications:  Current Outpatient Medications   Medication Sig     amLODIPine (NORVASC) 10 MG tablet Take 1 tablet (10 mg) by mouth daily     aspirin (ASA) 325 MG EC tablet Take 325 mg by mouth in the morning.     biotin 1000 MCG TABS tablet Take 1,000 mcg by mouth daily     diclofenac (VOLTAREN) 1 % topical gel Apply 2 g topically 4 times daily     levothyroxine (SYNTHROID/LEVOTHROID) 50 MCG tablet Take 1 tablet (50 mcg) by mouth daily     magnesium oxide (MAG-OX) 400 MG tablet Take 2 tablets (800 mg) by mouth 2 times daily     methocarbamol (ROBAXIN) 500 MG tablet Take 1 tablet (500 mg) by mouth 4 times daily as needed for muscle spasms     multivitamin (THERMEMS) TABS Take 1 tablet by mouth daily     mycophenolate (GENERIC EQUIVALENT) 250 MG capsule Take 1 capsule (250 mg) by mouth 2 times daily Weaning off     ondansetron (ZOFRAN) 4 MG tablet Take 1 tablet (4 mg) by mouth every 6 hours as needed for nausea     pantoprazole (PROTONIX) 40 MG EC tablet Take 1 tablet (40 mg) by mouth 2 times daily     tacrolimus (GENERIC EQUIVALENT) 1 MG capsule Take 4 capsules (4 mg) by mouth every 12 hours     ursodiol (ACTIGALL) 300 MG capsule Take 1 capsule (300 mg) by mouth every morning AND 2 capsules (600 mg) every evening.     No current facility-administered medications for this visit.       Vitals:  Wt 74.8 kg (165 lb)   LMP  (LMP Unknown)   BMI 26.63 kg/m      Exam:  Vital signs were deferred for this telemedicine visit.    GENERAL APPEARANCE: alert and no distress  HENT: no obvious abnormalities on appearance  RESP: breathing appears unremarkable with normal rate, no audible wheezing or cough and no apparent shortness of breath  with conversation  SKIN: no apparent rash and normal skin tone  NEURO: speech is clear with no obvious neurological deficits  PSYCH: mentation appears normal and affect normal      LABS:   CMP  Recent Labs   Lab Test 05/31/22 0822 05/20/22  0954 05/09/22  0822 05/02/22  0913 07/23/21  0854 06/07/21  1214 05/26/21  1659 05/17/21  1229 05/02/21  0846 05/02/21  0106    144 141 140   < > 140 137 135  --  133   POTASSIUM 3.9 3.7 3.7 4.5   < > 3.4 3.2* 3.4   < > 2.8*   CHLORIDE 108 104 108 105   < > 106 102 100  --  99   CO2 30 30 28 31   < > 28 30 27  --  26   ANIONGAP 5 10 5 4   < > 6 6 8  --  8   GLC 97 94 86 106*   < > 138* 124* 127*  --  119*   BUN 29 31* 31* 31*   < > 14 13 16  --  15   CR 0.91 0.99 0.93 1.14*   < > 0.96 0.94 1.05*  --  1.15*   GFRESTIMATED 76 68 74 58*   < > 68 70 61  --  55*   GFRESTBLACK  --   --   --   --   --  79 81 71  --  64   EMMY 9.9 9.6 9.3 9.8   < > 8.7 8.7 8.7  --  8.6    < > = values in this interval not displayed.     Recent Labs   Lab Test 05/31/22 0822 05/20/22  0954 05/09/22  0822 05/02/22  0913   BILITOTAL 0.4 0.5 0.3 0.5   ALKPHOS 230* 306* 331* 363*   ALT 21 32 34 39   AST 11 19 28 27     CBC  Recent Labs   Lab Test 05/31/22 0822 05/20/22  0954 05/09/22  0822 05/02/22  0913   HGB 12.1 11.9 11.3* 11.9   WBC 5.6 4.6 4.1 4.4   RBC 3.95 3.91 3.69* 3.93   HCT 36.4 36.1 34.4* 35.8   MCV 92 92 93 91   MCH 30.6 30.4 30.6 30.3   MCHC 33.2 33.0 32.8 33.2   RDW 12.9 13.2 13.0 12.7    229 230 233     URINE STUDIES  Recent Labs   Lab Test 03/07/22  0921 11/01/21  1253 10/25/21  2123 10/19/21  1438 01/04/21  1020   COLOR Yellow Yellow Yellow Yellow Bibi   APPEARANCE Clear Clear Clear Slightly Cloudy* Slightly Cloudy   URINEGLC Negative Negative Negative Negative Negative   URINEBILI Negative Negative Negative Negative Negative   URINEKETONE Trace* Negative Negative Negative Negative   SG 1.015 1.015 1.013 1.018 1.019   UBLD Negative Negative Negative Large* Negative   URINEPH  6.0 5.0 5.5 5.0 5.0   PROTEIN Negative 30 * 10 * 20 * Negative   UROBILINOGEN 0.2  --   --   --   --    NITRITE Negative Negative Negative Negative Negative   LEUKEST Negative Negative Negative Negative Negative   RBCU  --  <1 <1 120* 1   WBCU  --  1 <1 4 7*     Recent Labs   Lab Test 03/07/22  0922   UTPG 0.20     PTH  Recent Labs   Lab Test 05/20/22  0954 01/31/22  0851   PTHI 38 38     IRON STUDIES  Recent Labs   Lab Test 10/05/21  1124 09/28/21  1204 07/30/21  1052 05/02/21  0106   IRON  --   --   --  259*   FEB  --   --   --  355   IRONSAT  --   --   --  73*   DARRICK 186 280* 32 72       Lin Camacho MD

## 2022-05-25 NOTE — LETTER
5/25/2022     RE: Sarah Fisher  [address] [address]     Dear Colleague,    Thank you for referring your patient, Sarah Fisher, to the [facility] NEPHROLOGY [facility]INIC [place] at [facility]. Please see a copy of my visit note below.    Maria Elena is a 52 year old who is being evaluated via a billable video visit.      How would you like to obtain your AVS? MyChart  If the video visit is dropped, the invitation should be resent by: Send to e-mail at: [email or web address]  Will anyone else be joining your video visit? No    Video Start Time: 2:10PM  Video-Visit Details    Type of service:  Video Visit    Video End Time:2:44PM    Originating Location (pt. Location): Home    Distant Location (provider location):  [facility]PHROLOG[facility] [place]     Platform used for Video Visit: Well     Nephrology Clinic Follow Up Visit    Assessment and Plan:     1. CKD 2 vs resolved CORBIN  Cr post liver transplant was slow to trend down. It was stuck in the 1.5-1.7 range, but has now trended to ~0.9 with eGFR in the 70s. CORBIN was likely hemodynamic in nature and did partially correspond to CNI levels. BP has been borderline - will have pt monitor more closely and will start anti-hypertensives if BP persistently >130/80.  - No changes to management  - RTC in 3-4 months     2. Liver cirrhosis s/p liver transplant 10/17/21  Mildly elevated, but downtrending, alk phos. Management per Hepatology.     3. Electrolytes  No acute concerns.     4. HTN  BP borderline. Goal < 130/80. Currently on amlodipine 10mg daily.  - Monitor      Assessment and plan was discussed with patient and she voiced her understanding and agreement.    I discussed the patient's plan of care with Dr. Mckeon.    Mariella Camacho MD  Nephrology Fellow    Reason for Visit:  Sarah Fisher is a 51 year old female with hx of liver cirrhosis due to alcohol use now s/p liver  transplant 10/17/21 who presents for follow up of elevated creatinine/CKD.    HPI:  Maria Elena was last seen in clinic on 2/25/22 at which time Cr seemed to have settled at a new baseline of 1.5-1.7. This has since improved dramatically however to a baseline ~0.9-1.0. She feels well and has a good amount of energy. She is eating & drinking well. No new symptoms or concerns. She's had a mildly elevated alk phos that is trending down. She is going to get repeat liver imaging per hepatology. She hasn't been checking BP regularly - it's been in the 130s/80s when checked more recently.      ROS:  A comprehensive review of systems was obtained and negative, except as noted in the HPI or PMH.    Active Medical Problems:  Patient Active Problem List   Diagnosis     Acute deep vein thrombosis (DVT) of upper extremity (H)     Acute kidney failure, unspecified (H)     Carrier of group B Streptococcus     Cirrhosis of liver with ascites (H)     CKD (chronic kidney disease)     Elevated blood pressure     Family history of colon cancer     Gastroesophageal reflux disease without esophagitis     GI (gastrointestinal bleed)     Hyponatremia     Hypokalemia     History of DVT (deep vein thrombosis)     Anemia associated with acute blood loss     Acute anemia     Need for vaccination for viral hepatitis     Macrocytosis without anemia     Knee pain     Pancytopenia (H)     Macrocytic anemia     Blood loss anemia     Hydrothorax     Portal hypertensive gastropathy (H)     Pleural effusion     Screening for malignant neoplasm of cervix     Venous incompetence     Varicose veins of leg with pain     Supervision of high-risk pregnancy of elderly primigravida     Subclinical hypothyroidism     Liver failure (H)     Hypotension     Pre-liver transplant, listed     Alcoholic cirrhosis (H)     Abnormal serum iron level     Iron deficiency anemia due to chronic blood loss     Encounter for immunization     Liver transplant candidate     Liver  transplant recipient (H)     Immunosuppressed status (H)     Malnutrition (H)     Steroid-induced hyperglycemia     Epistaxis     Esophagitis     Duodenal erosion     Gastric antral vascular ectasia     Hyperphosphatemia     Leukocytosis     Chronic kidney disease, stage 3b (H)     Low magnesium level     Pain of female symphysis pubis     Somatic dysfunction of pelvic region       Personal Hx:   Social History     Tobacco Use     Smoking status: Never Smoker     Smokeless tobacco: Never Used   Substance Use Topics     Alcohol use: Not Currently     Comment: Quit on 6/20/2020       Allergies: Reviewed by provider.     Medications:  Current Outpatient Medications   Medication Sig     amLODIPine (NORVASC) 10 MG tablet Take 1 tablet (10 mg) by mouth daily     aspirin (ASA) 325 MG EC tablet Take 325 mg by mouth in the morning.     biotin 1000 MCG TABS tablet Take 1,000 mcg by mouth daily     diclofenac (VOLTAREN) 1 % topical gel Apply 2 g topically 4 times daily     levothyroxine (SYNTHROID/LEVOTHROID) 50 MCG tablet Take 1 tablet (50 mcg) by mouth daily     magnesium oxide (MAG-OX) 400 MG tablet Take 2 tablets (800 mg) by mouth 2 times daily     methocarbamol (ROBAXIN) 500 MG tablet Take 1 tablet (500 mg) by mouth 4 times daily as needed for muscle spasms     multivitamin (THERMEMS) TABS Take 1 tablet by mouth daily     mycophenolate (GENERIC EQUIVALENT) 250 MG capsule Take 1 capsule (250 mg) by mouth 2 times daily Weaning off     ondansetron (ZOFRAN) 4 MG tablet Take 1 tablet (4 mg) by mouth every 6 hours as needed for nausea     pantoprazole (PROTONIX) 40 MG EC tablet Take 1 tablet (40 mg) by mouth 2 times daily     tacrolimus (GENERIC EQUIVALENT) 1 MG capsule Take 4 capsules (4 mg) by mouth every 12 hours     ursodiol (ACTIGALL) 300 MG capsule Take 1 capsule (300 mg) by mouth every morning AND 2 capsules (600 mg) every evening.     No current facility-administered medications for this visit.       Vitals:  Wt 74.8 kg  (165 lb)   LMP  (LMP Unknown)   BMI 26.63 kg/m      Exam:  Vital signs were deferred for this telemedicine visit.    GENERAL APPEARANCE: alert and no distress  HENT: no obvious abnormalities on appearance  RESP: breathing appears unremarkable with normal rate, no audible wheezing or cough and no apparent shortness of breath with conversation  SKIN: no apparent rash and normal skin tone  NEURO: speech is clear with no obvious neurological deficits  PSYCH: mentation appears normal and affect normal      LABS:   CMP  Recent Labs   Lab Test 05/31/22  0822 05/20/22  0954 05/09/22  0822 05/02/22  0913 07/23/21  0854 06/07/21  1214 05/26/21  1659 05/17/21  1229 05/02/21  0846 05/02/21  0106    144 141 140   < > 140 137 135  --  133   POTASSIUM 3.9 3.7 3.7 4.5   < > 3.4 3.2* 3.4   < > 2.8*   CHLORIDE 108 104 108 105   < > 106 102 100  --  99   CO2 30 30 28 31   < > 28 30 27  --  26   ANIONGAP 5 10 5 4   < > 6 6 8  --  8   GLC 97 94 86 106*   < > 138* 124* 127*  --  119*   BUN 29 31* 31* 31*   < > 14 13 16  --  15   CR 0.91 0.99 0.93 1.14*   < > 0.96 0.94 1.05*  --  1.15*   GFRESTIMATED 76 68 74 58*   < > 68 70 61  --  55*   GFRESTBLACK  --   --   --   --   --  79 81 71  --  64   EMMY 9.9 9.6 9.3 9.8   < > 8.7 8.7 8.7  --  8.6    < > = values in this interval not displayed.     Recent Labs   Lab Test 05/31/22  0822 05/20/22  0954 05/09/22  0822 05/02/22  0913   BILITOTAL 0.4 0.5 0.3 0.5   ALKPHOS 230* 306* 331* 363*   ALT 21 32 34 39   AST 11 19 28 27     CBC  Recent Labs   Lab Test 05/31/22  0822 05/20/22  0954 05/09/22  0822 05/02/22  0913   HGB 12.1 11.9 11.3* 11.9   WBC 5.6 4.6 4.1 4.4   RBC 3.95 3.91 3.69* 3.93   HCT 36.4 36.1 34.4* 35.8   MCV 92 92 93 91   MCH 30.6 30.4 30.6 30.3   MCHC 33.2 33.0 32.8 33.2   RDW 12.9 13.2 13.0 12.7    229 230 233     URINE STUDIES  Recent Labs   Lab Test 03/07/22  0921 11/01/21  1253 10/25/21  2123 10/19/21  1438 01/04/21  1020   COLOR Yellow Yellow Yellow Yellow Bibi    APPEARANCE Clear Clear Clear Slightly Cloudy* Slightly Cloudy   URINEGLC Negative Negative Negative Negative Negative   URINEBILI Negative Negative Negative Negative Negative   URINEKETONE Trace* Negative Negative Negative Negative   SG 1.015 1.015 1.013 1.018 1.019   UBLD Negative Negative Negative Large* Negative   URINEPH 6.0 5.0 5.5 5.0 5.0   PROTEIN Negative 30 * 10 * 20 * Negative   UROBILINOGEN 0.2  --   --   --   --    NITRITE Negative Negative Negative Negative Negative   LEUKEST Negative Negative Negative Negative Negative   RBCU  --  <1 <1 120* 1   WBCU  --  1 <1 4 7*     Recent Labs   Lab Test 03/07/22  0922   UTPG 0.20     PTH  Recent Labs   Lab Test 05/20/22  0954 01/31/22  0851   PTHI 38 38     IRON STUDIES  Recent Labs   Lab Test 10/05/21  1124 09/28/21  1204 07/30/21  1052 05/02/21  0106   IRON  --   --   --  259*   FEB  --   --   --  355   IRONSAT  --   --   --  73*   DARRICK 186 280* 32 72       Lin Joanne Camacho MD    Attestation signed by Betzy Mckeon MD at 6/1/2022 11:41 AM:  Attending's attestation:   I was the supervising physician in the delivery of the service. I verified the history of present illness and the patient's clinical course. I personally evaluated and interviewed the patient on the date of the encounter (05/25/2022).      I reviewed the relevant labs, previous notes and imaging studies, and participated in the formulation of a diagnostic and treatment plan.  I have reviewed and discussed with the fellow their history, physical exam and plan. This note reflects my direct involvement in the patient's care.     52 yr old lady status post liver transplant on 10/17/2021 on immunosuppressants(Cellcept and tacrolimus). Also known to have essential HTN. We are following for what turned out to be Resolved CORBIN. Creatinine now 0.9 mg/dl down from 1.7 mg/dl. This has been most likely secondary to hemodynamic changes associated with fluctuations in her tacrolimus levels.  -We advised to  avoid NSAIDs, IV contrast and keep herself hydrated.    For her essential HTN, it is currently borderline. Goal < 130/80. Currently on amlodipine 10mg daily.She will keep a BP log at home and follow up in clinic in few weeks.    Betzy Mckeon MD   Physicians  , [facility]  Division of Nephrology  Department of Internal Medicine    Again, thank you for allowing me to participate in the care of your patient.      Sincerely,    Lin Camacho MD

## 2022-05-31 ENCOUNTER — LAB (OUTPATIENT)
Dept: LAB | Facility: CLINIC | Age: 52
End: 2022-05-31
Payer: COMMERCIAL

## 2022-05-31 DIAGNOSIS — Z00.00 ROUTINE GENERAL MEDICAL EXAMINATION AT A HEALTH CARE FACILITY: ICD-10-CM

## 2022-05-31 DIAGNOSIS — Z13.220 LIPID SCREENING: ICD-10-CM

## 2022-05-31 DIAGNOSIS — Z94.4 LIVER REPLACED BY TRANSPLANT (H): ICD-10-CM

## 2022-05-31 LAB
ALBUMIN SERPL-MCNC: 3.8 G/DL (ref 3.4–5)
ALP SERPL-CCNC: 230 U/L (ref 40–150)
ALT SERPL W P-5'-P-CCNC: 21 U/L (ref 0–50)
ANION GAP SERPL CALCULATED.3IONS-SCNC: 5 MMOL/L (ref 3–14)
AST SERPL W P-5'-P-CCNC: 11 U/L (ref 0–45)
BILIRUB DIRECT SERPL-MCNC: 0.2 MG/DL (ref 0–0.2)
BILIRUB SERPL-MCNC: 0.4 MG/DL (ref 0.2–1.3)
BUN SERPL-MCNC: 29 MG/DL (ref 7–30)
CALCIUM SERPL-MCNC: 9.9 MG/DL (ref 8.5–10.1)
CHLORIDE BLD-SCNC: 108 MMOL/L (ref 94–109)
CHOLEST SERPL-MCNC: 194 MG/DL
CO2 SERPL-SCNC: 30 MMOL/L (ref 20–32)
CREAT SERPL-MCNC: 0.91 MG/DL (ref 0.52–1.04)
ERYTHROCYTE [DISTWIDTH] IN BLOOD BY AUTOMATED COUNT: 12.9 % (ref 10–15)
FASTING STATUS PATIENT QL REPORTED: YES
GFR SERPL CREATININE-BSD FRML MDRD: 76 ML/MIN/1.73M2
GLUCOSE BLD-MCNC: 97 MG/DL (ref 70–99)
HCT VFR BLD AUTO: 36.4 % (ref 35–47)
HDLC SERPL-MCNC: 105 MG/DL
HGB BLD-MCNC: 12.1 G/DL (ref 11.7–15.7)
LDLC SERPL CALC-MCNC: 66 MG/DL
MAGNESIUM SERPL-MCNC: 1.9 MG/DL (ref 1.6–2.3)
MCH RBC QN AUTO: 30.6 PG (ref 26.5–33)
MCHC RBC AUTO-ENTMCNC: 33.2 G/DL (ref 31.5–36.5)
MCV RBC AUTO: 92 FL (ref 78–100)
NONHDLC SERPL-MCNC: 89 MG/DL
PLATELET # BLD AUTO: 247 10E3/UL (ref 150–450)
POTASSIUM BLD-SCNC: 3.9 MMOL/L (ref 3.4–5.3)
PROT SERPL-MCNC: 7.2 G/DL (ref 6.8–8.8)
RBC # BLD AUTO: 3.95 10E6/UL (ref 3.8–5.2)
SODIUM SERPL-SCNC: 143 MMOL/L (ref 133–144)
TACROLIMUS BLD-MCNC: 7.3 UG/L (ref 5–15)
TME LAST DOSE: NORMAL H
TME LAST DOSE: NORMAL H
TRIGL SERPL-MCNC: 116 MG/DL
WBC # BLD AUTO: 5.6 10E3/UL (ref 4–11)

## 2022-05-31 PROCEDURE — 85027 COMPLETE CBC AUTOMATED: CPT

## 2022-05-31 PROCEDURE — 36415 COLL VENOUS BLD VENIPUNCTURE: CPT

## 2022-05-31 PROCEDURE — 80197 ASSAY OF TACROLIMUS: CPT

## 2022-05-31 PROCEDURE — 83735 ASSAY OF MAGNESIUM: CPT

## 2022-05-31 PROCEDURE — 82248 BILIRUBIN DIRECT: CPT

## 2022-05-31 PROCEDURE — 80053 COMPREHEN METABOLIC PANEL: CPT

## 2022-05-31 PROCEDURE — 80061 LIPID PANEL: CPT

## 2022-06-06 ENCOUNTER — ANCILLARY PROCEDURE (OUTPATIENT)
Dept: MRI IMAGING | Facility: CLINIC | Age: 52
End: 2022-06-06
Attending: STUDENT IN AN ORGANIZED HEALTH CARE EDUCATION/TRAINING PROGRAM

## 2022-06-06 DIAGNOSIS — K76.9 LIVER LESION: ICD-10-CM

## 2022-06-06 DIAGNOSIS — R79.89 ELEVATED LFTS: ICD-10-CM

## 2022-06-06 DIAGNOSIS — Z94.4 LIVER TRANSPLANT RECIPIENT (H): ICD-10-CM

## 2022-06-06 PROCEDURE — A9585 GADOBUTROL INJECTION: HCPCS | Performed by: STUDENT IN AN ORGANIZED HEALTH CARE EDUCATION/TRAINING PROGRAM

## 2022-06-06 PROCEDURE — 255N000002 HC RX 255 OP 636: Performed by: STUDENT IN AN ORGANIZED HEALTH CARE EDUCATION/TRAINING PROGRAM

## 2022-06-06 PROCEDURE — 74183 MRI ABD W/O CNTR FLWD CNTR: CPT

## 2022-06-06 RX ORDER — GADOBUTROL 604.72 MG/ML
1 INJECTION INTRAVENOUS ONCE
Status: COMPLETED | OUTPATIENT
Start: 2022-06-06 | End: 2022-06-06

## 2022-06-06 RX ADMIN — GADOBUTROL 7 ML: 604.72 INJECTION INTRAVENOUS at 11:57

## 2022-06-10 DIAGNOSIS — Z94.4 LIVER TRANSPLANT RECIPIENT (H): Primary | ICD-10-CM

## 2022-06-13 ENCOUNTER — LAB (OUTPATIENT)
Dept: LAB | Facility: CLINIC | Age: 52
End: 2022-06-13
Payer: COMMERCIAL

## 2022-06-13 DIAGNOSIS — Z13.220 LIPID SCREENING: ICD-10-CM

## 2022-06-13 DIAGNOSIS — Z94.4 LIVER REPLACED BY TRANSPLANT (H): ICD-10-CM

## 2022-06-13 LAB
ALBUMIN SERPL-MCNC: 3.5 G/DL (ref 3.4–5)
ALP SERPL-CCNC: 248 U/L (ref 40–150)
ALT SERPL W P-5'-P-CCNC: 21 U/L (ref 0–50)
ANION GAP SERPL CALCULATED.3IONS-SCNC: 4 MMOL/L (ref 3–14)
AST SERPL W P-5'-P-CCNC: 19 U/L (ref 0–45)
BILIRUB DIRECT SERPL-MCNC: 0.2 MG/DL (ref 0–0.2)
BILIRUB SERPL-MCNC: 0.5 MG/DL (ref 0.2–1.3)
BUN SERPL-MCNC: 23 MG/DL (ref 7–30)
CALCIUM SERPL-MCNC: 9.3 MG/DL (ref 8.5–10.1)
CHLORIDE BLD-SCNC: 107 MMOL/L (ref 94–109)
CO2 SERPL-SCNC: 29 MMOL/L (ref 20–32)
CREAT SERPL-MCNC: 0.83 MG/DL (ref 0.52–1.04)
ERYTHROCYTE [DISTWIDTH] IN BLOOD BY AUTOMATED COUNT: 12.9 % (ref 10–15)
GFR SERPL CREATININE-BSD FRML MDRD: 84 ML/MIN/1.73M2
GLUCOSE BLD-MCNC: 118 MG/DL (ref 70–99)
HCT VFR BLD AUTO: 34.9 % (ref 35–47)
HGB BLD-MCNC: 11.6 G/DL (ref 11.7–15.7)
MAGNESIUM SERPL-MCNC: 1.8 MG/DL (ref 1.6–2.3)
MCH RBC QN AUTO: 30.3 PG (ref 26.5–33)
MCHC RBC AUTO-ENTMCNC: 33.2 G/DL (ref 31.5–36.5)
MCV RBC AUTO: 91 FL (ref 78–100)
PLATELET # BLD AUTO: 219 10E3/UL (ref 150–450)
POTASSIUM BLD-SCNC: 3.9 MMOL/L (ref 3.4–5.3)
PROT SERPL-MCNC: 6.8 G/DL (ref 6.8–8.8)
RBC # BLD AUTO: 3.83 10E6/UL (ref 3.8–5.2)
SODIUM SERPL-SCNC: 140 MMOL/L (ref 133–144)
TACROLIMUS BLD-MCNC: 6.7 UG/L (ref 5–15)
TME LAST DOSE: NORMAL H
TME LAST DOSE: NORMAL H
WBC # BLD AUTO: 3.8 10E3/UL (ref 4–11)

## 2022-06-13 PROCEDURE — 36415 COLL VENOUS BLD VENIPUNCTURE: CPT

## 2022-06-13 PROCEDURE — 82248 BILIRUBIN DIRECT: CPT

## 2022-06-13 PROCEDURE — 85027 COMPLETE CBC AUTOMATED: CPT

## 2022-06-13 PROCEDURE — 80053 COMPREHEN METABOLIC PANEL: CPT

## 2022-06-13 PROCEDURE — 83735 ASSAY OF MAGNESIUM: CPT

## 2022-06-13 PROCEDURE — 80197 ASSAY OF TACROLIMUS: CPT

## 2022-06-16 ENCOUNTER — ANCILLARY PROCEDURE (OUTPATIENT)
Dept: ULTRASOUND IMAGING | Facility: CLINIC | Age: 52
End: 2022-06-16
Attending: STUDENT IN AN ORGANIZED HEALTH CARE EDUCATION/TRAINING PROGRAM

## 2022-06-16 DIAGNOSIS — Z94.4 LIVER TRANSPLANT RECIPIENT (H): ICD-10-CM

## 2022-06-16 DIAGNOSIS — K76.9 LIVER LESION: ICD-10-CM

## 2022-06-16 PROCEDURE — 76705 ECHO EXAM OF ABDOMEN: CPT

## 2022-06-27 ENCOUNTER — ANCILLARY PROCEDURE (OUTPATIENT)
Dept: GENERAL RADIOLOGY | Facility: CLINIC | Age: 52
End: 2022-06-27
Attending: STUDENT IN AN ORGANIZED HEALTH CARE EDUCATION/TRAINING PROGRAM

## 2022-06-27 ENCOUNTER — LAB (OUTPATIENT)
Dept: LAB | Facility: CLINIC | Age: 52
End: 2022-06-27

## 2022-06-27 DIAGNOSIS — Z13.220 LIPID SCREENING: ICD-10-CM

## 2022-06-27 DIAGNOSIS — Z94.4 LIVER REPLACED BY TRANSPLANT (H): ICD-10-CM

## 2022-06-27 DIAGNOSIS — R74.8 ELEVATED ALKALINE PHOSPHATASE LEVEL: ICD-10-CM

## 2022-06-27 DIAGNOSIS — Z94.4 LIVER TRANSPLANT RECIPIENT (H): ICD-10-CM

## 2022-06-27 LAB
ERYTHROCYTE [DISTWIDTH] IN BLOOD BY AUTOMATED COUNT: 12.9 % (ref 10–15)
HCT VFR BLD AUTO: 38.5 % (ref 35–47)
HGB BLD-MCNC: 12.7 G/DL (ref 11.7–15.7)
MCH RBC QN AUTO: 30.1 PG (ref 26.5–33)
MCHC RBC AUTO-ENTMCNC: 33 G/DL (ref 31.5–36.5)
MCV RBC AUTO: 91 FL (ref 78–100)
PLATELET # BLD AUTO: 319 10E3/UL (ref 150–450)
RBC # BLD AUTO: 4.22 10E6/UL (ref 3.8–5.2)
TACROLIMUS BLD-MCNC: 9 UG/L (ref 5–15)
TME LAST DOSE: NORMAL H
TME LAST DOSE: NORMAL H
WBC # BLD AUTO: 3.7 10E3/UL (ref 4–11)

## 2022-06-27 PROCEDURE — 83735 ASSAY OF MAGNESIUM: CPT

## 2022-06-27 PROCEDURE — 36415 COLL VENOUS BLD VENIPUNCTURE: CPT

## 2022-06-27 PROCEDURE — 99000 SPECIMEN HANDLING OFFICE-LAB: CPT

## 2022-06-27 PROCEDURE — 74019 RADEX ABDOMEN 2 VIEWS: CPT | Mod: TC | Performed by: RADIOLOGY

## 2022-06-27 PROCEDURE — 80053 COMPREHEN METABOLIC PANEL: CPT | Mod: 90

## 2022-06-27 PROCEDURE — 80197 ASSAY OF TACROLIMUS: CPT

## 2022-06-27 PROCEDURE — 85027 COMPLETE CBC AUTOMATED: CPT

## 2022-06-27 PROCEDURE — 82248 BILIRUBIN DIRECT: CPT

## 2022-06-27 PROCEDURE — 84080 ASSAY ALKALINE PHOSPHATASES: CPT | Mod: 90

## 2022-06-28 ENCOUNTER — TELEPHONE (OUTPATIENT)
Dept: TRANSPLANT | Facility: CLINIC | Age: 52
End: 2022-06-28

## 2022-06-28 DIAGNOSIS — Z94.4 LIVER TRANSPLANT RECIPIENT (H): ICD-10-CM

## 2022-06-28 RX ORDER — TACROLIMUS 1 MG/1
3 CAPSULE ORAL EVERY 12 HOURS
Qty: 540 CAPSULE | Refills: 3 | Status: SHIPPED | OUTPATIENT
Start: 2022-06-28 | End: 2022-11-17

## 2022-06-28 NOTE — TELEPHONE ENCOUNTER
ISSUE:   Tacrolimus IR level 9 on 6/27, goal 5-8, dose 4 mg BID. Appears as longer than12 hour trough.    PLAN:   Please call patient and confirm this was an accurate 12-hour trough. Verify Tacrolimus IR dose 4 mg BID. Confirm no new medications or illness. Confirm no missed doses. If accurate trough and accurate dose, decrease Tacrolimus IR dose to 3 mg BID and repeat labs in 3-4 weeks    OUTCOME:   Left message with patient, to call to confirm accurate trough level and current dose4 mg BID. Patient confirmed dose change to 3 mg BID and to repeat labs in 3-4 weeks. Orders sent to preferred pharmacy for dose change and lab for repeat labs. Patient voiced understanding of plan.     Pt sent message stating all accurate and will decrease dose.

## 2022-06-29 DIAGNOSIS — R74.8 ELEVATED ALKALINE PHOSPHATASE LEVEL: ICD-10-CM

## 2022-06-29 DIAGNOSIS — R74.8 ELEVATED ALKALINE PHOSPHATASE IN NEWBORN: Primary | ICD-10-CM

## 2022-06-29 LAB
ALBUMIN SERPL-MCNC: 3.8 G/DL (ref 3.4–5)
ALP SERPL-CCNC: 258 U/L (ref 40–150)
ALT SERPL W P-5'-P-CCNC: 21 U/L (ref 0–50)
ANION GAP SERPL CALCULATED.3IONS-SCNC: 6 MMOL/L (ref 3–14)
AST SERPL W P-5'-P-CCNC: 18 U/L (ref 0–45)
BILIRUB DIRECT SERPL-MCNC: 0.2 MG/DL (ref 0–0.2)
BILIRUB SERPL-MCNC: 0.5 MG/DL (ref 0.2–1.3)
BUN SERPL-MCNC: 17 MG/DL (ref 7–30)
CALCIUM SERPL-MCNC: 9.8 MG/DL (ref 8.5–10.1)
CHLORIDE BLD-SCNC: 106 MMOL/L (ref 94–109)
CO2 SERPL-SCNC: 28 MMOL/L (ref 20–32)
CREAT SERPL-MCNC: 0.96 MG/DL (ref 0.52–1.04)
GFR SERPL CREATININE-BSD FRML MDRD: 71 ML/MIN/1.73M2
GLUCOSE BLD-MCNC: 103 MG/DL (ref 70–99)
MAGNESIUM SERPL-MCNC: 1.7 MG/DL (ref 1.6–2.3)
POTASSIUM BLD-SCNC: 5.4 MMOL/L (ref 3.4–5.3)
PROT SERPL-MCNC: 7.2 G/DL (ref 6.8–8.8)
SODIUM SERPL-SCNC: 140 MMOL/L (ref 133–144)

## 2022-07-02 ENCOUNTER — E-VISIT (OUTPATIENT)
Dept: URGENT CARE | Facility: CLINIC | Age: 52
End: 2022-07-02
Payer: COMMERCIAL

## 2022-07-02 DIAGNOSIS — H00.019 HORDEOLUM EXTERNUM, UNSPECIFIED LATERALITY: Primary | ICD-10-CM

## 2022-07-02 LAB
ALP BONE SERPL-CCNC: 76 U/L
ALP LIVER SERPL-CCNC: 187 U/L
ALP OTHER SERPL-CCNC: 0 U/L
ALP SERPL-CCNC: 263 U/L

## 2022-07-02 PROCEDURE — 99207 PR NON-BILLABLE SERV PER CHARTING: CPT

## 2022-07-07 ENCOUNTER — TELEPHONE (OUTPATIENT)
Dept: TRANSPLANT | Facility: CLINIC | Age: 52
End: 2022-07-07

## 2022-07-07 DIAGNOSIS — Z94.4 LIVER TRANSPLANT RECIPIENT (H): Primary | ICD-10-CM

## 2022-07-07 DIAGNOSIS — E83.42 HYPOMAGNESEMIA: ICD-10-CM

## 2022-07-07 DIAGNOSIS — I15.9 SECONDARY HYPERTENSION: ICD-10-CM

## 2022-07-07 RX ORDER — MAGNESIUM OXIDE 400 MG/1
800 TABLET ORAL 2 TIMES DAILY
Qty: 120 TABLET | Refills: 3 | Status: SHIPPED | OUTPATIENT
Start: 2022-07-07 | End: 2023-01-23

## 2022-07-07 RX ORDER — AMLODIPINE BESYLATE 10 MG/1
10 TABLET ORAL DAILY
Qty: 90 TABLET | Refills: 1 | Status: SHIPPED | OUTPATIENT
Start: 2022-07-07 | End: 2022-11-16

## 2022-07-15 ENCOUNTER — MYC REFILL (OUTPATIENT)
Dept: TRANSPLANT | Facility: CLINIC | Age: 52
End: 2022-07-15

## 2022-07-15 DIAGNOSIS — Z94.4 LIVER TRANSPLANTED (H): ICD-10-CM

## 2022-07-15 RX ORDER — ONDANSETRON 4 MG/1
4 TABLET, FILM COATED ORAL EVERY 6 HOURS PRN
Qty: 20 TABLET | Refills: 0 | Status: SHIPPED | OUTPATIENT
Start: 2022-07-15 | End: 2023-11-11

## 2022-08-05 ENCOUNTER — LAB (OUTPATIENT)
Dept: LAB | Facility: CLINIC | Age: 52
End: 2022-08-05
Payer: COMMERCIAL

## 2022-08-05 DIAGNOSIS — Z13.220 LIPID SCREENING: ICD-10-CM

## 2022-08-05 DIAGNOSIS — Z94.4 LIVER REPLACED BY TRANSPLANT (H): ICD-10-CM

## 2022-08-05 LAB
ERYTHROCYTE [DISTWIDTH] IN BLOOD BY AUTOMATED COUNT: 12.8 % (ref 10–15)
HCT VFR BLD AUTO: 37.8 % (ref 35–47)
HGB BLD-MCNC: 12.6 G/DL (ref 11.7–15.7)
MCH RBC QN AUTO: 30 PG (ref 26.5–33)
MCHC RBC AUTO-ENTMCNC: 33.3 G/DL (ref 31.5–36.5)
MCV RBC AUTO: 90 FL (ref 78–100)
PLATELET # BLD AUTO: 240 10E3/UL (ref 150–450)
RBC # BLD AUTO: 4.2 10E6/UL (ref 3.8–5.2)
TACROLIMUS BLD-MCNC: 6.3 UG/L (ref 5–15)
TME LAST DOSE: NORMAL H
TME LAST DOSE: NORMAL H
WBC # BLD AUTO: 4.9 10E3/UL (ref 4–11)

## 2022-08-05 PROCEDURE — 82248 BILIRUBIN DIRECT: CPT

## 2022-08-05 PROCEDURE — 36415 COLL VENOUS BLD VENIPUNCTURE: CPT

## 2022-08-05 PROCEDURE — 80197 ASSAY OF TACROLIMUS: CPT

## 2022-08-05 PROCEDURE — 80053 COMPREHEN METABOLIC PANEL: CPT

## 2022-08-05 PROCEDURE — 83735 ASSAY OF MAGNESIUM: CPT

## 2022-08-05 PROCEDURE — 85027 COMPLETE CBC AUTOMATED: CPT

## 2022-08-05 PROCEDURE — 84100 ASSAY OF PHOSPHORUS: CPT

## 2022-08-07 LAB
ALBUMIN SERPL-MCNC: 3.8 G/DL (ref 3.4–5)
ALP SERPL-CCNC: 254 U/L (ref 40–150)
ALT SERPL W P-5'-P-CCNC: 32 U/L (ref 0–50)
ANION GAP SERPL CALCULATED.3IONS-SCNC: 7 MMOL/L (ref 3–14)
AST SERPL W P-5'-P-CCNC: 25 U/L (ref 0–45)
BILIRUB DIRECT SERPL-MCNC: 0.2 MG/DL (ref 0–0.2)
BILIRUB SERPL-MCNC: 0.5 MG/DL (ref 0.2–1.3)
BUN SERPL-MCNC: 17 MG/DL (ref 7–30)
CALCIUM SERPL-MCNC: 9.3 MG/DL (ref 8.5–10.1)
CHLORIDE BLD-SCNC: 111 MMOL/L (ref 94–109)
CO2 SERPL-SCNC: 26 MMOL/L (ref 20–32)
CREAT SERPL-MCNC: 0.78 MG/DL (ref 0.52–1.04)
GFR SERPL CREATININE-BSD FRML MDRD: >90 ML/MIN/1.73M2
GLUCOSE BLD-MCNC: 96 MG/DL (ref 70–99)
MAGNESIUM SERPL-MCNC: 1.7 MG/DL (ref 1.6–2.3)
PHOSPHATE SERPL-MCNC: 4 MG/DL (ref 2.5–4.5)
POTASSIUM BLD-SCNC: 4.6 MMOL/L (ref 3.4–5.3)
PROT SERPL-MCNC: 6.9 G/DL (ref 6.8–8.8)
SODIUM SERPL-SCNC: 144 MMOL/L (ref 133–144)

## 2022-08-30 ENCOUNTER — TELEPHONE (OUTPATIENT)
Dept: TRANSPLANT | Facility: CLINIC | Age: 52
End: 2022-08-30

## 2022-08-30 DIAGNOSIS — Z94.4 LIVER TRANSPLANT RECIPIENT (H): Primary | ICD-10-CM

## 2022-08-30 NOTE — TELEPHONE ENCOUNTER
Informed pt that she would be due to see Dr. Avila in December and to have labs drawn prior to appt.

## 2022-08-30 NOTE — TELEPHONE ENCOUNTER
Maria Elena has a few questions about yearly appt.,  (needs to scheduled appt yet) Would like to get scheduled with Dr.M. Beltran  but first wanted to discuss with care team member

## 2022-09-06 ENCOUNTER — OFFICE VISIT (OUTPATIENT)
Dept: DERMATOLOGY | Facility: CLINIC | Age: 52
End: 2022-09-06
Payer: COMMERCIAL

## 2022-09-06 DIAGNOSIS — Z94.4 LIVER TRANSPLANT RECIPIENT (H): ICD-10-CM

## 2022-09-06 DIAGNOSIS — L30.9 DERMATITIS: Primary | ICD-10-CM

## 2022-09-06 DIAGNOSIS — K13.0 PERLECHE: ICD-10-CM

## 2022-09-06 DIAGNOSIS — L65.0 TELOGEN EFFLUVIUM: ICD-10-CM

## 2022-09-06 PROCEDURE — 99204 OFFICE O/P NEW MOD 45 MIN: CPT | Performed by: PHYSICIAN ASSISTANT

## 2022-09-06 RX ORDER — KETOCONAZOLE 20 MG/G
CREAM TOPICAL
Qty: 15 G | Refills: 3 | Status: SHIPPED | OUTPATIENT
Start: 2022-09-06 | End: 2023-04-18

## 2022-09-06 RX ORDER — HYDROCORTISONE 25 MG/G
OINTMENT TOPICAL
Qty: 15 G | Refills: 3 | Status: SHIPPED | OUTPATIENT
Start: 2022-09-06 | End: 2023-11-11

## 2022-09-06 RX ORDER — BETAMETHASONE DIPROPIONATE 0.5 MG/G
CREAM TOPICAL
Qty: 50 G | Refills: 2 | Status: SHIPPED | OUTPATIENT
Start: 2022-09-06 | End: 2024-04-15

## 2022-09-06 NOTE — PATIENT INSTRUCTIONS
Your hair - you had telogen effluvium - this is self limited and is happily now resolved.     Directions for eczema (atopic dermatitis)    Bathe every day - we recommend short showers or baths (5 minutes or less) with lukewarm water.  Use a gentle soap such as Dove for sensitive skin, Cetaphil, Vanicream or CeraVe   Wash the  dirty areas  only - this means armpits, groin, buttocks and feet.   After the bath or shower, within 2 minutes:   Pat dry with towel    If prescribed a topical prescription, apply this FIRST to any red/rashy/itchy areas - betamethasone cream - apply this twice per day for 1-2 weeks at a time when needed    Apply a moisturizer to entire body, even where you just put the prescription medicine. We recommend CeraVe cream, Cetaphil cream, Eucerin cream, Gold Bond or Vanicream. You will do this every single day. If you don t bathe every day we still recommend an application of body moisturizer.   For the lips, this is perleche  Start hydrocortisone 2.5% ointment twice per day for 1 week alternating with ketoconazole cream twice per day for 1 week and then you should be clear. You can repeat this if needed in the future for flares.

## 2022-09-06 NOTE — LETTER
9/6/2022         RE: Sarah Fisher  [address] [address]        Dear Colleague,    Thank you for referring your patient, Sarah Fisher, to the [facility]. Please see a copy of my visit note below.    HPI:   Chief complaints: Sarah Fisher is a pleasant 52 year old female who presents for evaluation of hair loss. She had a liver transplant 10/2021 and about 3 months later she started to shed hair profusely. She lost most of her hair during this time. The shedding lasted several months and about 4-6 months ago, her hair started growing back. It it now coming in thick and healthy. Additionally, she has persistent itchy, irritated skin on the back of her left hand and patches on both lower legs. They do come and go. She has tried otc cortisone and CeraVe which help a little but never eliminate the rash. Lastly she has persistently irritated skin on her lips and a persistent crack in the right corner of her mouth. She has tried chapstick without success.       PHYSICAL EXAM:    LMP  (LMP Unknown)   Skin exam performed as follows: Type 2 skin. Mood appropriate  Alert and Oriented X 3. Well developed, well nourished in no distress.  General appearance: Normal  Head including face: Normal  Eyes: conjunctiva and lids: Normal  Mouth: Lips, teeth, gums: Normal  Neck: Normal  Cardiovascular: Exam of peripheral vascular system by observation for swelling, varicosities, edema: Normal  Right upper extremity: Normal  Left upper extremity: Normal  Right lower extremity: Normal  Left lower extremity: Normal  Skin: Scalp and body hair: See below    Considerable new hair growth all over scalp which shows 4-6 months of outgrowth  Dermatitis on the left dorsal hand and both lower legs  Dermatitis and fissure along the right oral commissure    ASSESSMENT/PLAN:     1. Telogen effluvium, thankfully resolved at this point. Discussed condition at length.   2. Atopic  dermatitis - discussed condition and treatment options  --Start betamethasone cream BID x 1-2 weeks then PRN  --Thick emollients daily  3. Perleche  --Start HC 2.5% cream alternating with ketoconazole cream x 1 week then PRN only thereafter        Follow-up: PRN  CC:   Scribed By: Areli Reich, MS, PAKAMERON          Again, thank you for allowing me to participate in the care of your patient.        Sincerely,        Areli Reich PA-C

## 2022-09-06 NOTE — PROGRESS NOTES
HPI:   Chief complaints: Sarah Fisher is a pleasant 52 year old female who presents for evaluation of hair loss. She had a liver transplant 10/2021 and about 3 months later she started to shed hair profusely. She lost most of her hair during this time. The shedding lasted several months and about 4-6 months ago, her hair started growing back. It it now coming in thick and healthy. Additionally, she has persistent itchy, irritated skin on the back of her left hand and patches on both lower legs. They do come and go. She has tried otc cortisone and CeraVe which help a little but never eliminate the rash. Lastly she has persistently irritated skin on her lips and a persistent crack in the right corner of her mouth. She has tried chapstick without success.       PHYSICAL EXAM:    LMP  (LMP Unknown)   Skin exam performed as follows: Type 2 skin. Mood appropriate  Alert and Oriented X 3. Well developed, well nourished in no distress.  General appearance: Normal  Head including face: Normal  Eyes: conjunctiva and lids: Normal  Mouth: Lips, teeth, gums: Normal  Neck: Normal  Cardiovascular: Exam of peripheral vascular system by observation for swelling, varicosities, edema: Normal  Right upper extremity: Normal  Left upper extremity: Normal  Right lower extremity: Normal  Left lower extremity: Normal  Skin: Scalp and body hair: See below    Considerable new hair growth all over scalp which shows 4-6 months of outgrowth  Dermatitis on the left dorsal hand and both lower legs  Dermatitis and fissure along the right oral commissure    ASSESSMENT/PLAN:     1. Telogen effluvium, thankfully resolved at this point. Discussed condition at length.   2. Atopic dermatitis - discussed condition and treatment options  --Start betamethasone cream BID x 1-2 weeks then PRN  --Thick emollients daily  3. Perleche  --Start HC 2.5% cream alternating with ketoconazole cream x 1 week then PRN only thereafter        Follow-up: PRN  CC:    Scribed By: Areli Reich, MS, PA-C

## 2022-09-07 ENCOUNTER — LAB (OUTPATIENT)
Dept: LAB | Facility: CLINIC | Age: 52
End: 2022-09-07
Payer: COMMERCIAL

## 2022-09-07 DIAGNOSIS — Z94.4 LIVER REPLACED BY TRANSPLANT (H): ICD-10-CM

## 2022-09-07 DIAGNOSIS — K70.31 ALCOHOLIC CIRRHOSIS OF LIVER WITH ASCITES (H): ICD-10-CM

## 2022-09-07 DIAGNOSIS — N18.32 CHRONIC KIDNEY DISEASE, STAGE 3B (H): Primary | ICD-10-CM

## 2022-09-07 DIAGNOSIS — Z13.220 LIPID SCREENING: ICD-10-CM

## 2022-09-07 LAB
ALBUMIN SERPL-MCNC: 3.6 G/DL (ref 3.4–5)
ALP SERPL-CCNC: 218 U/L (ref 40–150)
ALT SERPL W P-5'-P-CCNC: 25 U/L (ref 0–50)
ANION GAP SERPL CALCULATED.3IONS-SCNC: 4 MMOL/L (ref 3–14)
AST SERPL W P-5'-P-CCNC: 22 U/L (ref 0–45)
BILIRUB DIRECT SERPL-MCNC: 0.1 MG/DL (ref 0–0.2)
BILIRUB SERPL-MCNC: 0.4 MG/DL (ref 0.2–1.3)
BUN SERPL-MCNC: 20 MG/DL (ref 7–30)
CALCIUM SERPL-MCNC: 9.2 MG/DL (ref 8.5–10.1)
CHLORIDE BLD-SCNC: 110 MMOL/L (ref 94–109)
CO2 SERPL-SCNC: 26 MMOL/L (ref 20–32)
CREAT SERPL-MCNC: 0.8 MG/DL (ref 0.52–1.04)
ERYTHROCYTE [DISTWIDTH] IN BLOOD BY AUTOMATED COUNT: 12.6 % (ref 10–15)
GFR SERPL CREATININE-BSD FRML MDRD: 88 ML/MIN/1.73M2
GLUCOSE BLD-MCNC: 107 MG/DL (ref 70–99)
HCT VFR BLD AUTO: 34.8 % (ref 35–47)
HGB BLD-MCNC: 11.8 G/DL (ref 11.7–15.7)
MAGNESIUM SERPL-MCNC: 1.7 MG/DL (ref 1.6–2.3)
MCH RBC QN AUTO: 28.6 PG (ref 26.5–33)
MCHC RBC AUTO-ENTMCNC: 33.9 G/DL (ref 31.5–36.5)
MCV RBC AUTO: 84 FL (ref 78–100)
PLATELET # BLD AUTO: 202 10E3/UL (ref 150–450)
POTASSIUM BLD-SCNC: 4.4 MMOL/L (ref 3.4–5.3)
PROT SERPL-MCNC: 6.9 G/DL (ref 6.8–8.8)
RBC # BLD AUTO: 4.13 10E6/UL (ref 3.8–5.2)
SODIUM SERPL-SCNC: 140 MMOL/L (ref 133–144)
TACROLIMUS BLD-MCNC: 7.1 UG/L (ref 5–15)
TME LAST DOSE: NORMAL H
TME LAST DOSE: NORMAL H
WBC # BLD AUTO: 4.2 10E3/UL (ref 4–11)

## 2022-09-07 PROCEDURE — 82043 UR ALBUMIN QUANTITATIVE: CPT

## 2022-09-07 PROCEDURE — 36415 COLL VENOUS BLD VENIPUNCTURE: CPT

## 2022-09-07 PROCEDURE — 85027 COMPLETE CBC AUTOMATED: CPT

## 2022-09-07 PROCEDURE — 80197 ASSAY OF TACROLIMUS: CPT

## 2022-09-07 PROCEDURE — 82248 BILIRUBIN DIRECT: CPT

## 2022-09-07 PROCEDURE — 99000 SPECIMEN HANDLING OFFICE-LAB: CPT

## 2022-09-07 PROCEDURE — 83735 ASSAY OF MAGNESIUM: CPT

## 2022-09-07 PROCEDURE — 87535 HIV-1 PROBE&REVERSE TRNSCRPJ: CPT | Mod: 90

## 2022-09-07 PROCEDURE — 87516 HEPATITIS B DNA AMP PROBE: CPT | Mod: 90

## 2022-09-07 PROCEDURE — 80053 COMPREHEN METABOLIC PANEL: CPT

## 2022-09-07 PROCEDURE — 80321 ALCOHOLS BIOMARKERS 1OR 2: CPT | Mod: 90

## 2022-09-07 PROCEDURE — 87521 HEPATITIS C PROBE&RVRS TRNSC: CPT | Mod: 90

## 2022-09-08 LAB
CREAT UR-MCNC: 95 MG/DL
MICROALBUMIN UR-MCNC: 10 MG/L
MICROALBUMIN/CREAT UR: 10.53 MG/G CR (ref 0–25)

## 2022-09-09 LAB
HBV DNA SERPL QL NAA+PROBE: NORMAL
HCV RNA SERPL QL NAA+PROBE: NORMAL
HIV1+2 RNA SERPL QL NAA+PROBE: NORMAL
PLPETH BLD-MCNC: <10 NG/ML
POPETH BLD-MCNC: <10 NG/ML

## 2022-09-13 ENCOUNTER — THERAPY VISIT (OUTPATIENT)
Dept: PHYSICAL THERAPY | Facility: CLINIC | Age: 52
End: 2022-09-13
Payer: COMMERCIAL

## 2022-09-13 DIAGNOSIS — N94.9 PAIN OF FEMALE SYMPHYSIS PUBIS: Primary | ICD-10-CM

## 2022-09-13 DIAGNOSIS — M99.05 SOMATIC DYSFUNCTION OF PELVIC REGION: ICD-10-CM

## 2022-09-13 PROCEDURE — 97110 THERAPEUTIC EXERCISES: CPT | Mod: GP | Performed by: PHYSICAL THERAPIST

## 2022-09-13 PROCEDURE — 97140 MANUAL THERAPY 1/> REGIONS: CPT | Mod: GP | Performed by: PHYSICAL THERAPIST

## 2022-09-14 NOTE — PROGRESS NOTES
PROGRESS  REPORT    Progress reporting period is from 3/24/2022 to 9/13/2022.       SUBJECTIVE  Subjective changes noted by patient:  yes.  Subjective: Pt reports she dropped of therapy due to busy schedule and she is wanting to get back into program.  She reports the mobilizations and exercises were very helpful and she wants to review/    Current pain level is 4/10 Current Pain level: 3/10.     Previous pain level was  3/10 Initial Pain level: 5/10.   Changes in function:  Yes (See Goal flowsheet attached for changes in current functional level)  Adverse reaction to treatment or activity: None    OBJECTIVE  Changes noted in objective findings:  Yes  Objective: Baseline: central LBP; +pain right SI, +FB; imp post mobilization     ASSESSMENT/PLAN  Updated problem list and treatment plan: Diagnosis 1:  Coccyx pain  Pain -  manual therapy, self management, education, directional preference exercise and home program  Decreased ROM/flexibility - manual therapy and therapeutic exercise  Decreased strength - therapeutic exercise and therapeutic activities  Impaired muscle performance - neuro re-education  Decreased function - therapeutic activities  Impaired posture - neuro re-education  STG/LTGs have been met or progress has been made towards goals:  Yes (See Goal flow sheet completed today.)  Assessment of Progress: The patient has had set backs in their progress.  The patient's condition has exacerbated.  Self Management Plans:  Patient has been instructed in a home treatment program.  I have re-evaluated this patient and find that the nature, scope, duration and intensity of the therapy is appropriate for the medical condition of the patient.  Sarah continues to require the following intervention to meet STG and LTG's:  PT    Recommendations:  This patient would benefit from continued therapy.     Frequency:  1 X week, once daily  Duration:  for 8 weeks        Please refer to the daily flowsheet for treatment  today, total treatment time and time spent performing 1:1 timed codes.

## 2022-09-30 ENCOUNTER — OFFICE VISIT (OUTPATIENT)
Dept: DERMATOLOGY | Facility: CLINIC | Age: 52
End: 2022-09-30
Payer: COMMERCIAL

## 2022-09-30 DIAGNOSIS — L98.8 FACIAL RHYTIDS: Primary | ICD-10-CM

## 2022-09-30 PROCEDURE — 99213 OFFICE O/P EST LOW 20 MIN: CPT | Performed by: PHYSICIAN ASSISTANT

## 2022-09-30 RX ORDER — TRETINOIN 0.25 MG/G
CREAM TOPICAL
Qty: 45 G | Refills: 11 | Status: SHIPPED | OUTPATIENT
Start: 2022-09-30 | End: 2023-11-11

## 2022-09-30 NOTE — PATIENT INSTRUCTIONS
Skin care directions:    AM  Wash  Apply glycolic acid toner from [employer] ([employer] or [employer])  Apply a pea size of tretinoin cream to entire face  Moisturizer with sunscreen over - Try CeraVe Sheer Tint with SPF 30   Makeup    PM  Wash  Apply The Buffet from [employer] (peptides)  Moisturizer over

## 2022-09-30 NOTE — PROGRESS NOTES
HPI:   Chief complaints: Sarah Fisher is a pleasant 52 year old female who presents for evaluation of facial rhytids. She is interested in anti-aging today. She is most bothered by her skin and nasolabial folds.       PHYSICAL EXAM:    LMP  (LMP Unknown)   Breastfeeding No   Skin exam performed as follows: Type 2 skin. Mood appropriate  Alert and Oriented X 3. Well developed, well nourished in no distress.  General appearance: Normal  Head including face: Normal  Eyes: conjunctiva and lids: Normal  Mouth: Lips, teeth, gums: Normal  Neck: Normal  Cardiovascular: Exam of peripheral vascular system by observation for swelling, varicosities, edema: Normal  Right upper extremity: Normal  Left upper extremity: Normal  Right lower extremity: Normal  Left lower extremity: Normal  Skin: Scalp and body hair: See below    Age appropriate volume loss and rhytids    ASSESSMENT/PLAN:     1. Facial rhytids  --She will schedule for dermal filler for the NLF  --Start tretinoin 0.025% cream daily. Discussed potential for dryness/irritation. Advised to use emollients if needed.  --Start the Glycolic Aci Cleanser from [employer]  --Start The Buffet from [employer] at   --Discussed IPL for facial telangiectasias           Follow-up: PRN  CC:   Scribed By: Areli Reich, MS, PA-C

## 2022-09-30 NOTE — LETTER
9/30/2022         RE: Sarah Fisher  [address] [facility] [address]        Dear Colleague,    Thank you for referring your patient, Sarah Fisher, to the [facility]. Please see a copy of my visit note below.    HPI:   Chief complaints: Sarah Fisher is a pleasant 52 year old female who presents for evaluation of facial rhytids. She is interested in anti-aging today. She is most bothered by her skin and nasolabial folds.       PHYSICAL EXAM:    LMP  (LMP Unknown)   Breastfeeding No   Skin exam performed as follows: Type 2 skin. Mood appropriate  Alert and Oriented X 3. Well developed, well nourished in no distress.  General appearance: Normal  Head including face: Normal  Eyes: conjunctiva and lids: Normal  Mouth: Lips, teeth, gums: Normal  Neck: Normal  Cardiovascular: Exam of peripheral vascular system by observation for swelling, varicosities, edema: Normal  Right upper extremity: Normal  Left upper extremity: Normal  Right lower extremity: Normal  Left lower extremity: Normal  Skin: Scalp and body hair: See below    Age appropriate volume loss and rhytids    ASSESSMENT/PLAN:     1. Facial rhytids  --She will schedule for dermal filler for the NLF  --Start tretinoin 0.025% cream daily. Discussed potential for dryness/irritation. Advised to use emollients if needed.  --Start the Glycolic Aci Cleanser from [employer]  --Start The Buffet from [employer] at [facility]  --Discussed IPL for facial telangiectasias           Follow-up: PRN  CC:   Scribed By: Areli Reich, MS, PAEduardoC          Again, thank you for allowing me to participate in the care of your patient.        Sincerely,        Areli Reich PA-C

## 2022-10-05 ENCOUNTER — OFFICE VISIT (OUTPATIENT)
Dept: GASTROENTEROLOGY | Facility: CLINIC | Age: 52
End: 2022-10-05
Attending: STUDENT IN AN ORGANIZED HEALTH CARE EDUCATION/TRAINING PROGRAM
Payer: COMMERCIAL

## 2022-10-05 VITALS
HEART RATE: 87 BPM | DIASTOLIC BLOOD PRESSURE: 80 MMHG | TEMPERATURE: 98.3 F | OXYGEN SATURATION: 100 % | SYSTOLIC BLOOD PRESSURE: 121 MMHG

## 2022-10-05 DIAGNOSIS — Z94.4 LIVER TRANSPLANT RECIPIENT (H): ICD-10-CM

## 2022-10-05 DIAGNOSIS — R74.8 ELEVATED ALKALINE PHOSPHATASE LEVEL: Primary | ICD-10-CM

## 2022-10-05 PROCEDURE — 99214 OFFICE O/P EST MOD 30 MIN: CPT | Performed by: STUDENT IN AN ORGANIZED HEALTH CARE EDUCATION/TRAINING PROGRAM

## 2022-10-05 PROCEDURE — G0463 HOSPITAL OUTPT CLINIC VISIT: HCPCS

## 2022-10-05 ASSESSMENT — PAIN SCALES - GENERAL: PAINLEVEL: NO PAIN (0)

## 2022-10-05 NOTE — NURSING NOTE
Chief Complaint   Patient presents with     RECHECK     Return visit.     Blood pressure 121/80, pulse 87, temperature 98.3  F (36.8  C), SpO2 100 %, not currently breastfeeding.    ANIBAL CABRAL

## 2022-10-05 NOTE — LETTER
10/5/2022       RE: Sarah Fisher  [address]  [place] [address]     Dear Colleague,    Thank you for referring your patient, Sarah Fisher, to the [facility]. Please see a copy of my visit note below.    No notes on file    Again, thank you for allowing me to participate in the care of your patient.      Sincerely,    Fadia Avila MD

## 2022-10-05 NOTE — LETTER
10/5/2022         RE: Sarah Fisher  [address]  [facility] [address]        Dear Colleague,    Thank you for referring your patient, Sarah Fisher, to the [facility] [place]. Please see a copy of my visit note below.    No notes on file    Again, thank you for allowing me to participate in the care of your patient.        Sincerely,        Fadia Avila MD

## 2022-10-05 NOTE — CONFIDENTIAL NOTE
[facility]     Date of Visit: 10/5/2022    Subjective: Ms. 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 Plastic stent left in place. Out on AXR 1/2022  - Rejection: liver bx 10/26/2021 showing HURTADO 3, with more intrahepatic cholestasis than explained on the bx - concern for biliary stricture that was later found on ERCP  - Noted to be hypercoagulable intraoperatively, postop US with elevated RI involving the extrahepatic hepatic artery and the right hepatic artery. Repeat US POD 1&2 -Patent hepatic vasculature. Continued elevated RI in the hepatic arteries. Was started on heparin gtt, developed epistaxis, GI bleeding (grade A esophagitis, residual GAVE, duodenal erosions), so that was stopped and taking  mg daily -> now 81 mg daily  - CORBIN: creatinine baseline 1-1.2, peaked at 3.9 post transplant, downtrending.    Current Immunosuppresion:  - Tacrolimus 3 mg BID   -  mg BID  - Off prednisone  - Steve 300/600    Alk phos telly earlier this year without clear etiology. MRCP showing mild intrahepatic biliary dilation, including segment 4A/B. Showed T1 hypointense (series 9 image 26) and T2 mildly hyperintense (series 31 image 27) lesion within hepatic segment 7, measuring 1.1 cm.  Associated subtle hypoenhancement on portal venous  phase (series 22 image image 20) becomes less conspicuous on later phase images. No associated diffusion restriction. Wedge-shaped region of diffusion restriction, increased T2 signal, and progressive enhancement within the hepatic segments 4A/B associated with the above-described biliary dilation and irregularity. RUQ US with doppler concern for hepatic artery narrowing, follow up CTA with minimal arterial anastomotic narrowing. Vasculature was patent. Showed previous Arterial focus of enhancement in hepatic segment 8 measuring 9 mm with possible washout on portal venous phase, correlating with previously described 1.1 cm focus on most recent MRI. Follow up ERCP showed normal biliary tree, empiric dilation of the post transplant anastomosis to 10 mm. Temporary plastic stent placed. EUS guided liver bx showed finding suggestive of biliary obstruction, mild subsinusoidal fibrosis (seen on previous bx - thought to be donor derived. LFTs improving in the meantime without intervention, but also slightly increasing meaghan. She was having itching prior to her ERCP, improved. Got COVID earlier in , but clinically was well with this    Interval Events:  - alk phos downtrending  - doing well, went to [place] this [place]    ROS: 14 point ROS negative except for positives noted in HPI.    PMHx:  Past Medical History:   Diagnosis Date     Ascites      History of blood transfusion      Liver cirrhosis (H)      Thyroid disease    Alcohol related cirrhosis    PSHx:  Past Surgical History:   Procedure Laterality Date     ABDOMEN SURGERY        SECTION       CV RIGHT HEART CATH MEASUREMENTS RECORDED N/A 2021    Procedure: CV RIGHT HEART CATH;  Surgeon: Joseph Guevara MD;  Location:  HEART CARDIAC CATH LAB     ENDOSCOPIC RETROGRADE CHOLANGIOPANCREATOGRAM N/A 10/25/2021    Procedure: ENDOSCOPIC RETROGRADE CHOLANGIOPANCREATOGRAPHY, WITH biliary sphincterotomy, biliary dilation, and biliary stent  placement;  Surgeon: Guru Dominguez Fenton MD;  Location: [facility] OR     ENDOSCOPIC RETROGRADE CHOLANGIOPANCREATOGRAM N/A 2022    Procedure: ENDOSCOPIC RETROGRADE CHOLANGIOPANCREATOGRAPHY WITH BILIARY DILATION, DEBRIS REMOVAL AND STENT PLACEMENT;  Surgeon: Guru Dominguez Fenton MD;  Location: [facility] OR     ENDOSCOPIC RETROGRADE CHOLANGIOPANCREATOGRAPHY, EXCHANGE TUBE/STENT N/A 2021    Procedure: ENDOSCOPIC RETROGRADE CHOLANGIOPANCREATOGRAPHY, WITH biliary sphincterotomy, stricture dilation, stent exchange;  Surgeon: Guru Dominguez Fenton MD;  Location: [facility] OR     ENDOSCOPIC ULTRASOUND UPPER GASTROINTESTINAL TRACT (GI) N/A 2022    Procedure: ENDOSCOPIC ULTRASOUND WITH CORE LIVER BIOPSY;  Surgeon: Guru Dominguez Fenton MD;  Location: [facility] OR     ESOPHAGOSCOPY, GASTROSCOPY, DUODENOSCOPY (EGD), COMBINED N/A 10/07/2021    Procedure: ESOPHAGOGASTRODUODENOSCOPY (EGD) with hemostasis;  Surgeon: Fox Jerry MD;  Location:  GI     ESOPHAGOSCOPY, GASTROSCOPY, DUODENOSCOPY (EGD), COMBINED N/A 10/22/2021    Procedure: ESOPHAGOGASTRODUODENOSCOPY, WITH BIOPSY;  Surgeon: Fadia Avila MD;  Location: [facility] GI     IR LIVER BIOPSY PERCUTANEOUS  10/26/2021     IR THORACENTESIS  2021     IR THORACENTESIS  2021     THORACENTESIS Left 2021    Procedure: THORACENTESIS;  Surgeon: Almas Irby MD;  Location: [facility] GI     THORACENTESIS N/A 2021    Procedure: THORACENTESIS;  Surgeon: Almas Irby MD;  Location: [facility] GI     THORACENTESIS N/A 03/10/2021    Procedure: THORACENTESIS;  Surgeon: Almas Irby MD;  Location: [facility] GI     THORACENTESIS Left 2021    Procedure: THORACENTESIS;  Surgeon: Kennedi Chavez MD;  Location: [facility] OR     THORACENTESIS Left 2021    Procedure: THORACENTESIS;  Surgeon: Soran, Jess R, PA-C;  Location: [facility] OR     THORACIC SURGERY  2021     TRANSPLANT LIVER RECIPIENT  DONOR N/A 10/17/2021    Procedure:  TRANSPLANT, LIVER, RECIPIENT,  DONOR;  Surgeon: Christian Morrison MD;  Location: [facility] OR     Ovarian cyst    FamHx:  No known history of liver disease    SocHx:  Social History     Socioeconomic History     Marital status: Single     Spouse name: Not on file     Number of children: Not on file     Years of education: Not on file     Highest education level: Master's degree (e.g., MA, MS, Roberto, MEd, MSW, SASHA)   Occupational History     Not on file   Tobacco Use     Smoking status: Never Smoker     Smokeless tobacco: Never Used   Vaping Use     Vaping Use: Never used   Substance and Sexual Activity     Alcohol use: Not Currently     Comment: Quit on 2020     Drug use: Not Currently     Sexual activity: Not Currently     Partners: Male   Other Topics Concern     Parent/sibling w/ CABG, MI or angioplasty before 65F 55M? Not Asked   Social History Narrative    Lives [place]. 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 12 year sonFlakito.         Christine Harris, DNP, APRN, CNP    3/17/2021     Social Determinants of Health     Financial Resource Strain: Low Risk      Difficulty of Paying Living Expenses: Not hard at all   Food Insecurity: No Food Insecurity     Worried About Running Out of Food in the Last Year: Never true     Ran Out of Food in the Last Year: Never true   Transportation Needs: No Transportation Needs     Lack of Transportation (Medical): No     Lack of Transportation (Non-Medical): No   Physical Activity: Sufficiently Active     Days of Exercise per Week: 5 days     Minutes of Exercise per Session: 40 min   Stress: No Stress Concern Present     Feeling of Stress : Not at all   Social Connections: Unknown     Frequency of Communication with Friends and Family: More than three times a week     Frequency of Social Gatherings with Friends and Family: More than three times a week     Attends Church Services: Patient refused     Active  Member of Clubs or Organizations: Yes     Attends Club or Organization Meetings: Not on file     Marital Status: Patient refused   Intimate Partner Violence: Not on file   Housing Stability: Low Risk      Unable to Pay for Housing in the Last Year: No     Number of Places Lived in the Last Year: 1     Unstable Housing in the Last Year: No   Just moved back home, living with son and niece who is going to school to be a RN    Medications:  Current Outpatient Medications   Medication     amLODIPine (NORVASC) 10 MG tablet     aspirin (ASA) 325 MG EC tablet     augmented betamethasone dipropionate (DIPROLENE AF) 0.05 % external cream     biotin 1000 MCG TABS tablet     diclofenac (VOLTAREN) 1 % topical gel     hydrocortisone 2.5 % ointment     ketoconazole (NIZORAL) 2 % external cream     levothyroxine (SYNTHROID/LEVOTHROID) 50 MCG tablet     magnesium oxide (MAG-OX) 400 MG tablet     methocarbamol (ROBAXIN) 500 MG tablet     multivitamin (THERMEMS) TABS     mycophenolate (GENERIC EQUIVALENT) 250 MG capsule     ondansetron (ZOFRAN) 4 MG tablet     pantoprazole (PROTONIX) 40 MG EC tablet     tacrolimus (GENERIC EQUIVALENT) 1 MG capsule     tretinoin (RETIN-A) 0.025 % external cream     ursodiol (ACTIGALL) 300 MG capsule     No current facility-administered medications for this visit.     Allergies:     Allergies   Allergen Reactions     Amoxicillin GI Disturbance, Diarrhea, Nausea and Nausea and Vomiting     Objective:  /80   Pulse 87   Temp 98.3  F (36.8  C)   LMP  (LMP Unknown)   SpO2 100%   Constitutional: Pleasant woman in NAD  Eyes: nonicteric  Respiratory: Breathing comfortably on RA  Neuro: AOOX3  Psychiatric: normal mood and orientation    Labs:  Last Comprehensive Metabolic Panel:  Sodium   Date Value Ref Range Status   09/07/2022 140 133 - 144 mmol/L Final   06/07/2021 140 133 - 144 mmol/L Final     Potassium   Date Value Ref Range Status   09/07/2022 4.4 3.4 - 5.3 mmol/L Final   06/07/2021 3.4 3.4 -  5.3 mmol/L Final     Chloride   Date Value Ref Range Status   09/07/2022 110 (H) 94 - 109 mmol/L Final   06/07/2021 106 94 - 109 mmol/L Final     Carbon Dioxide   Date Value Ref Range Status   06/07/2021 28 20 - 32 mmol/L Final     Carbon Dioxide (CO2)   Date Value Ref Range Status   09/07/2022 26 20 - 32 mmol/L Final     Anion Gap   Date Value Ref Range Status   09/07/2022 4 3 - 14 mmol/L Final   06/07/2021 6 3 - 14 mmol/L Final     Glucose   Date Value Ref Range Status   09/07/2022 107 (H) 70 - 99 mg/dL Final   06/07/2021 138 (H) 70 - 99 mg/dL Final     Urea Nitrogen   Date Value Ref Range Status   09/07/2022 20 7 - 30 mg/dL Final   06/07/2021 14 7 - 30 mg/dL Final     Creatinine   Date Value Ref Range Status   09/07/2022 0.80 0.52 - 1.04 mg/dL Final   06/07/2021 0.96 0.52 - 1.04 mg/dL Final     GFR Estimate   Date Value Ref Range Status   09/07/2022 88 >60 mL/min/1.73m2 Final     Comment:     Effective December 21, 2021 eGFRcr in adults is calculated using the 2021 CKD-EPI creatinine equation which includes age and gender (Felix et al., NEJ, DOI: 10.1056/JYCOuc8385824)   06/07/2021 68 >60 mL/min/[1.73_m2] Final     Comment:     Non  GFR Calc  Starting 12/18/2018, serum creatinine based estimated GFR (eGFR) will be   calculated using the Chronic Kidney Disease Epidemiology Collaboration   (CKD-EPI) equation.       Calcium   Date Value Ref Range Status   09/07/2022 9.2 8.5 - 10.1 mg/dL Final   06/07/2021 8.7 8.5 - 10.1 mg/dL Final     Bilirubin Total   Date Value Ref Range Status   09/07/2022 0.4 0.2 - 1.3 mg/dL Final   06/07/2021 5.6 (H) 0.2 - 1.3 mg/dL Final     Alkaline Phosphatase   Date Value Ref Range Status   09/07/2022 218 (H) 40 - 150 U/L Final   06/07/2021 150 40 - 150 U/L Final     ALT   Date Value Ref Range Status   09/07/2022 25 0 - 50 U/L Final   06/07/2021 18 0 - 50 U/L Final     AST   Date Value Ref Range Status   09/07/2022 22 0 - 45 U/L Final   06/07/2021 35 0 - 45 U/L Final      Imaging: Reviewed in EHR    Endoscopy:    12/4/2020 Colonoscopy    Findings:       Hemorrhoids were found on perianal exam.       The terminal ileum appeared normal.       An area of grossly congested mucosa was found in the entire colon.       A 4 mm polyp was found in the cecum. The polyp was sessile. The polyp        was removed with a cold biopsy forceps. Resection and retrieval were        complete.       Non-bleeding internal hemorrhoids were found during retroflexion.    Impressions/Post-Op Diagnosis:       - Hemorrhoids found on perianal exam.       - The examined portion of the ileum was normal.       - Congested mucosa in the entire examined colon.       - One 4 mm polyp in the cecum, removed with a cold biopsy forceps.        Resected and retrieved.       - Non-bleeding internal hemorrhoids.    A) COLON, CECUM, POLYPECTOMY:  1. Colonic mucosa with no diagnostic abnormalities; a lymphoid aggregate is present  2. Negative for serrated change, dysplasia, and malignancy    Assessment/Plan: Ms. 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     Post transplant course complicated by CORBIN, biliary anastomotic stricture.     Bx ~ 9 days post transplant showing intrahepatic cholestasis and only mild (3/9) rejection, findings likely 2/2 anastomotic stricture that she underwent ERCP for    Has had elevated alkaline phosphatase without clear etiology. Initial concern for delayed hepatic artery issues - not seen on CTA. Imaging showing signs of biliary inflammation, ERCP without biliary obstruction. Bx without clear etiology. Alk phos improving without any further intervention, but not back to normal. MRI and CT showed ~ 1 cm arterial enhancing lesion, not seen on follow up ultrasound    Immunosuppression: Tacrolimus 3/3 - reduce goal 4-7.  Start MMF wean - decreased to 250 mg daily, if next labs normal can stop. Continue ursodiol until alkaline phosphatase normalizes. Would continue monthly labs while alk phos still somewhat elevated  CORBIN: Follows with nephrology - creatinine normalized  AUD: Doing well with sobriety    RV 12 months    Fadia Avila MD MSc  Transplant Hepatology  [facility]    HME    Mammogam 4/2022 normal  Pap smear 3/2021 normal - recommend getting follow up with PCP

## 2022-10-07 ENCOUNTER — LAB (OUTPATIENT)
Dept: LAB | Facility: CLINIC | Age: 52
End: 2022-10-07
Payer: COMMERCIAL

## 2022-10-07 DIAGNOSIS — Z94.4 LIVER REPLACED BY TRANSPLANT (H): ICD-10-CM

## 2022-10-07 DIAGNOSIS — Z13.220 LIPID SCREENING: ICD-10-CM

## 2022-10-07 LAB
ALBUMIN SERPL-MCNC: 3.8 G/DL (ref 3.4–5)
ALP SERPL-CCNC: 258 U/L (ref 40–150)
ALT SERPL W P-5'-P-CCNC: 27 U/L (ref 0–50)
ANION GAP SERPL CALCULATED.3IONS-SCNC: 6 MMOL/L (ref 3–14)
AST SERPL W P-5'-P-CCNC: 21 U/L (ref 0–45)
BILIRUB DIRECT SERPL-MCNC: 0.1 MG/DL (ref 0–0.2)
BILIRUB SERPL-MCNC: 0.5 MG/DL (ref 0.2–1.3)
BUN SERPL-MCNC: 24 MG/DL (ref 7–30)
CALCIUM SERPL-MCNC: 9.4 MG/DL (ref 8.5–10.1)
CHLORIDE BLD-SCNC: 109 MMOL/L (ref 94–109)
CO2 SERPL-SCNC: 26 MMOL/L (ref 20–32)
CREAT SERPL-MCNC: 0.83 MG/DL (ref 0.52–1.04)
ERYTHROCYTE [DISTWIDTH] IN BLOOD BY AUTOMATED COUNT: 13 % (ref 10–15)
GFR SERPL CREATININE-BSD FRML MDRD: 84 ML/MIN/1.73M2
GLUCOSE BLD-MCNC: 105 MG/DL (ref 70–99)
HCT VFR BLD AUTO: 35.8 % (ref 35–47)
HGB BLD-MCNC: 12 G/DL (ref 11.7–15.7)
MAGNESIUM SERPL-MCNC: 2.1 MG/DL (ref 1.6–2.3)
MCH RBC QN AUTO: 28.7 PG (ref 26.5–33)
MCHC RBC AUTO-ENTMCNC: 33.5 G/DL (ref 31.5–36.5)
MCV RBC AUTO: 86 FL (ref 78–100)
PLATELET # BLD AUTO: 226 10E3/UL (ref 150–450)
POTASSIUM BLD-SCNC: 4.9 MMOL/L (ref 3.4–5.3)
PROT SERPL-MCNC: 7.3 G/DL (ref 6.8–8.8)
RBC # BLD AUTO: 4.18 10E6/UL (ref 3.8–5.2)
SODIUM SERPL-SCNC: 141 MMOL/L (ref 133–144)
TACROLIMUS BLD-MCNC: 7.4 UG/L (ref 5–15)
TME LAST DOSE: NORMAL H
TME LAST DOSE: NORMAL H
WBC # BLD AUTO: 3.9 10E3/UL (ref 4–11)

## 2022-10-07 PROCEDURE — 80197 ASSAY OF TACROLIMUS: CPT

## 2022-10-07 PROCEDURE — 82248 BILIRUBIN DIRECT: CPT

## 2022-10-07 PROCEDURE — 80053 COMPREHEN METABOLIC PANEL: CPT

## 2022-10-07 PROCEDURE — 83735 ASSAY OF MAGNESIUM: CPT

## 2022-10-07 PROCEDURE — 36415 COLL VENOUS BLD VENIPUNCTURE: CPT

## 2022-10-07 PROCEDURE — 85027 COMPLETE CBC AUTOMATED: CPT

## 2022-10-11 ENCOUNTER — IMMUNIZATION (OUTPATIENT)
Dept: PEDIATRICS | Facility: CLINIC | Age: 52
End: 2022-10-11
Payer: COMMERCIAL

## 2022-10-11 DIAGNOSIS — Z23 HIGH PRIORITY FOR 2019-NCOV VACCINE: Primary | ICD-10-CM

## 2022-10-11 PROCEDURE — 91312 COVID-19,PF,PFIZER BOOSTER BIVALENT: CPT

## 2022-10-11 PROCEDURE — 90471 IMMUNIZATION ADMIN: CPT

## 2022-10-11 PROCEDURE — 0124A COVID-19,PF,PFIZER BOOSTER BIVALENT: CPT

## 2022-10-11 PROCEDURE — 90682 RIV4 VACC RECOMBINANT DNA IM: CPT

## 2022-10-11 PROCEDURE — 99207 PR NO CHARGE NURSE ONLY: CPT

## 2022-10-17 ENCOUNTER — TELEPHONE (OUTPATIENT)
Dept: GASTROENTEROLOGY | Facility: CLINIC | Age: 52
End: 2022-10-17

## 2022-10-22 ENCOUNTER — E-VISIT (OUTPATIENT)
Dept: URGENT CARE | Facility: URGENT CARE | Age: 52
End: 2022-10-22
Payer: COMMERCIAL

## 2022-10-22 DIAGNOSIS — R21 RASH: Primary | ICD-10-CM

## 2022-10-22 NOTE — PATIENT INSTRUCTIONS
Dear Sarah Fisher,    We are sorry you are not feeling well. Based on the responses you provided, it is recommended that you be seen in-person in urgent care so we can better evaluate your symptoms. Please click here to find the nearest urgent care location to you.   You will not be charged for this Visit. Thank you for trusting us with your care.    James Emery PA-C

## 2022-10-24 ENCOUNTER — LAB (OUTPATIENT)
Dept: LAB | Facility: CLINIC | Age: 52
End: 2022-10-24
Payer: COMMERCIAL

## 2022-10-24 DIAGNOSIS — Z94.4 LIVER REPLACED BY TRANSPLANT (H): Primary | ICD-10-CM

## 2022-10-24 DIAGNOSIS — Z94.4 LIVER REPLACED BY TRANSPLANT (H): ICD-10-CM

## 2022-10-24 DIAGNOSIS — Z13.220 LIPID SCREENING: ICD-10-CM

## 2022-10-24 DIAGNOSIS — K70.31 ALCOHOLIC CIRRHOSIS OF LIVER WITH ASCITES (H): ICD-10-CM

## 2022-10-24 LAB
ALBUMIN SERPL-MCNC: 3.7 G/DL (ref 3.4–5)
ALP SERPL-CCNC: 236 U/L (ref 40–150)
ALT SERPL W P-5'-P-CCNC: 24 U/L (ref 0–50)
ANION GAP SERPL CALCULATED.3IONS-SCNC: 7 MMOL/L (ref 3–14)
AST SERPL W P-5'-P-CCNC: 18 U/L (ref 0–45)
BILIRUB DIRECT SERPL-MCNC: 0.1 MG/DL (ref 0–0.2)
BILIRUB SERPL-MCNC: 0.4 MG/DL (ref 0.2–1.3)
BUN SERPL-MCNC: 30 MG/DL (ref 7–30)
CALCIUM SERPL-MCNC: 9.4 MG/DL (ref 8.5–10.1)
CHLORIDE BLD-SCNC: 110 MMOL/L (ref 94–109)
CO2 SERPL-SCNC: 24 MMOL/L (ref 20–32)
CREAT SERPL-MCNC: 0.95 MG/DL (ref 0.52–1.04)
ERYTHROCYTE [DISTWIDTH] IN BLOOD BY AUTOMATED COUNT: 12.7 % (ref 10–15)
GFR SERPL CREATININE-BSD FRML MDRD: 72 ML/MIN/1.73M2
GLUCOSE BLD-MCNC: 105 MG/DL (ref 70–99)
HCT VFR BLD AUTO: 37.5 % (ref 35–47)
HGB BLD-MCNC: 12.7 G/DL (ref 11.7–15.7)
MAGNESIUM SERPL-MCNC: 2.3 MG/DL (ref 1.6–2.3)
MCH RBC QN AUTO: 29.3 PG (ref 26.5–33)
MCHC RBC AUTO-ENTMCNC: 33.9 G/DL (ref 31.5–36.5)
MCV RBC AUTO: 87 FL (ref 78–100)
PLATELET # BLD AUTO: 236 10E3/UL (ref 150–450)
POTASSIUM BLD-SCNC: 4.6 MMOL/L (ref 3.4–5.3)
PROT SERPL-MCNC: 7 G/DL (ref 6.8–8.8)
RBC # BLD AUTO: 4.33 10E6/UL (ref 3.8–5.2)
SODIUM SERPL-SCNC: 141 MMOL/L (ref 133–144)
TACROLIMUS BLD-MCNC: 9.4 UG/L (ref 5–15)
TME LAST DOSE: NORMAL H
TME LAST DOSE: NORMAL H
WBC # BLD AUTO: 5.4 10E3/UL (ref 4–11)

## 2022-10-24 PROCEDURE — 80053 COMPREHEN METABOLIC PANEL: CPT

## 2022-10-24 PROCEDURE — 82248 BILIRUBIN DIRECT: CPT

## 2022-10-24 PROCEDURE — 83735 ASSAY OF MAGNESIUM: CPT

## 2022-10-24 PROCEDURE — 36415 COLL VENOUS BLD VENIPUNCTURE: CPT

## 2022-10-24 PROCEDURE — 85027 COMPLETE CBC AUTOMATED: CPT

## 2022-10-24 PROCEDURE — 80197 ASSAY OF TACROLIMUS: CPT

## 2022-10-25 ENCOUNTER — TELEPHONE (OUTPATIENT)
Dept: TRANSPLANT | Facility: CLINIC | Age: 52
End: 2022-10-25

## 2022-10-25 DIAGNOSIS — Z94.4 LIVER TRANSPLANT RECIPIENT (H): ICD-10-CM

## 2022-10-26 NOTE — TELEPHONE ENCOUNTER
Pt wants to discuss changing pharmacies to [facility]. Pt will let us know by the end of the week.

## 2022-10-26 NOTE — TELEPHONE ENCOUNTER
ISSUE:   Tacrolimus IR level 9.4 on 10/24, goal 5-8, dose 3 mg BID.    PLAN:   Please call patient and confirm this was an accurate 12-hour trough. Verify Tacrolimus IR dose 3 mg BID. Confirm no new medications or illness. Confirm no missed doses. If accurate trough and accurate dose, decrease Tacrolimus IR dose to 3 mg AM and 2 mg PM and repeat labs as scheduled.   Lotus Hughes RN      OUTCOME:   Spoke with patient, they confirm accurate trough level and current dose 3 mg BID. Patient confirmed dose change to 3 mg am, 2 mg pm and to repeat labs in 1 months. Orders sent to preferred pharmacy for dose change and lab for repeat labs. Patient voiced understanding of plan.     Alexandrea Peter LPN

## 2022-11-16 ENCOUNTER — OFFICE VISIT (OUTPATIENT)
Dept: PEDIATRICS | Facility: CLINIC | Age: 52
End: 2022-11-16
Payer: COMMERCIAL

## 2022-11-16 VITALS
WEIGHT: 196.6 LBS | BODY MASS INDEX: 31.6 KG/M2 | HEART RATE: 78 BPM | TEMPERATURE: 97.8 F | HEIGHT: 66 IN | DIASTOLIC BLOOD PRESSURE: 54 MMHG | SYSTOLIC BLOOD PRESSURE: 98 MMHG | OXYGEN SATURATION: 100 %

## 2022-11-16 DIAGNOSIS — I15.9 SECONDARY HYPERTENSION: ICD-10-CM

## 2022-11-16 DIAGNOSIS — M54.50 CHRONIC MIDLINE LOW BACK PAIN WITHOUT SCIATICA: ICD-10-CM

## 2022-11-16 DIAGNOSIS — Z12.4 CERVICAL CANCER SCREENING: Primary | ICD-10-CM

## 2022-11-16 DIAGNOSIS — E03.8 SUBCLINICAL HYPOTHYROIDISM: ICD-10-CM

## 2022-11-16 DIAGNOSIS — Z94.4 LIVER TRANSPLANT RECIPIENT (H): ICD-10-CM

## 2022-11-16 DIAGNOSIS — L98.9 SKIN LESION: ICD-10-CM

## 2022-11-16 DIAGNOSIS — E66.811 CLASS 1 OBESITY WITH SERIOUS COMORBIDITY AND BODY MASS INDEX (BMI) OF 31.0 TO 31.9 IN ADULT, UNSPECIFIED OBESITY TYPE: ICD-10-CM

## 2022-11-16 DIAGNOSIS — G89.29 CHRONIC MIDLINE LOW BACK PAIN WITHOUT SCIATICA: ICD-10-CM

## 2022-11-16 LAB
T4 FREE SERPL-MCNC: 1.11 NG/DL (ref 0.9–1.7)
TSH SERPL DL<=0.005 MIU/L-ACNC: 6.1 UIU/ML (ref 0.3–4.2)

## 2022-11-16 PROCEDURE — G0145 SCR C/V CYTO,THINLAYER,RESCR: HCPCS | Performed by: NURSE PRACTITIONER

## 2022-11-16 PROCEDURE — 36415 COLL VENOUS BLD VENIPUNCTURE: CPT | Performed by: NURSE PRACTITIONER

## 2022-11-16 PROCEDURE — 99214 OFFICE O/P EST MOD 30 MIN: CPT | Performed by: NURSE PRACTITIONER

## 2022-11-16 PROCEDURE — 84439 ASSAY OF FREE THYROXINE: CPT | Performed by: NURSE PRACTITIONER

## 2022-11-16 PROCEDURE — 87624 HPV HI-RISK TYP POOLED RSLT: CPT | Performed by: NURSE PRACTITIONER

## 2022-11-16 PROCEDURE — 84443 ASSAY THYROID STIM HORMONE: CPT | Performed by: NURSE PRACTITIONER

## 2022-11-16 RX ORDER — PANTOPRAZOLE SODIUM 40 MG/1
40 TABLET, DELAYED RELEASE ORAL 2 TIMES DAILY
Qty: 180 TABLET | Refills: 3 | Status: SHIPPED | OUTPATIENT
Start: 2022-11-16 | End: 2023-11-13

## 2022-11-16 RX ORDER — LEVOTHYROXINE SODIUM 50 UG/1
50 TABLET ORAL DAILY
Qty: 90 TABLET | Refills: 3 | Status: CANCELLED | OUTPATIENT
Start: 2022-11-16

## 2022-11-16 RX ORDER — AMLODIPINE BESYLATE 10 MG/1
10 TABLET ORAL DAILY
Qty: 90 TABLET | Refills: 1 | Status: SHIPPED | OUTPATIENT
Start: 2022-11-16 | End: 2023-05-12

## 2022-11-16 ASSESSMENT — ENCOUNTER SYMPTOMS
JOINT SWELLING: 0
FREQUENCY: 0
FEVER: 0
HEARTBURN: 0
DYSURIA: 0
EYE PAIN: 0
PALPITATIONS: 0
HEMATOCHEZIA: 0
SORE THROAT: 0
COUGH: 0
HEMATURIA: 0
ARTHRALGIAS: 0
NERVOUS/ANXIOUS: 0
ABDOMINAL PAIN: 0
HEADACHES: 0
MYALGIAS: 0
CONSTIPATION: 0
NAUSEA: 0
BREAST MASS: 0
PARESTHESIAS: 0
DIZZINESS: 0
DIARRHEA: 0
SHORTNESS OF BREATH: 0
WEAKNESS: 0
CHILLS: 0

## 2022-11-16 ASSESSMENT — PAIN SCALES - GENERAL: PAINLEVEL: NO PAIN (0)

## 2022-11-16 NOTE — Clinical Note
Hi there - I saw Maria Elena today in clinic for several concerns, one of which was localized (size of pencil eraser) cluster of erythematous papules on her right wrist that is itchy. She isn't getting resolution from the steroid cream that was previously prescribed. Also has a spot on her face that she is concerned about. She has follow-up with you in March but I am wondering if there is any chance she can be seen sooner! Thank you! Christine Harris, DNP, APRN, CNP

## 2022-11-16 NOTE — PROGRESS NOTES
"    Assessment & Plan     Cervical cancer screening  Needs annual screening.  - Pap screen with HPV - recommended age 30 - 65 years    Skin lesion  Recommend continuing high potency corticosteroid as needed. Avoid wearing any jewelry or watches on right wrist. Follow-up with dermatology, chart routed to dermatologist she has established with to see if there is possibility of her being seen sooner than currently scheduled appt in March 2023.    Chronic midline low back pain without sciatica  No red flags identified. Recommend resuming physical therapy and if no improvement after 6 weeks, would consider spine referral.  - Physical Therapy Referral; Future    Class 1 obesity with serious comorbidity and body mass index (BMI) of 31.0 to 31.9 in adult, unspecified obesity type  Body mass index is 31.73 kg/m .  - Comprehensive Weight Management; Future    Secondary hypertension  BP well controlled on current regimen. Primary care will resume management of BP. Recommend spot checking BP at home and if SBP <90 or having symptoms of hypotension, return for follow-up to discuss decreasing amlodipine dose.   - amLODIPine (NORVASC) 10 MG tablet; Take 1 tablet (10 mg) by mouth daily    Liver transplant recipient (H)  - pantoprazole (PROTONIX) 40 MG EC tablet; Take 1 tablet (40 mg) by mouth 2 times daily  - Pap screen with HPV - recommended age 30 - 65 years    Subclinical hypothyroidism  Due for labs. Will adjust levothyroxine accordingly.   - TSH with free T4 reflex      45 minutes spent on the date of the encounter doing chart review, review of test results, patient visit and documentation        BMI:   Estimated body mass index is 31.73 kg/m  as calculated from the following:    Height as of this encounter: 1.676 m (5' 6\").    Weight as of this encounter: 89.2 kg (196 lb 9.6 oz).   Weight management plan: Discussed healthy diet and exercise guidelines    MEDICATIONS:  Continue current medications without " change  CONSULTATION/REFERRAL to Physical Therapy  FUTURE APPOINTMENTS:       - Follow-up for annual visit or as needed       - Chart routed to derm to see if she can be re-evaluated sooner than currently scheduled follow-up.     Follow-up: Return to clinic if symptoms fail to improve, worsen, or new symptoms develop with the above treatment plan.     ROBYN Bear CNP  [facility] ALPHONSE Arredondo is a 52 year old, presenting for the following health issues:  Gyn Exam      HPI     Presents for cervical cancer screening. Recommended by transplant team to have this done annually given her post-transplant status.       Skin Lesion  Onset/Duration: present on and off for a month   Description  Location: r wrist   Color: pink and red   Border description: irregular border, irregularly pigmented, raised, red, scaly, inflamed  Character: round, raised, flakey, red  Itching: moderate  Bleeding:  No  Intensity:  moderate  Progression of Symptoms:  worsening  Accompanying signs and symptoms:   Bleeding: No  Scaling: YES  Excessive sun exposure/tanning: No  Sunscreen used: YES  History:           Any previous history of skin cancer: No  Any family history of melanoma: No  Previous episodes of similar lesion: No  Precipitating or alleviating factors: nothing noted   Therapies tried and outcome: betamethasone prescribed by her dermatologist - helps temporarily but then lesion returns.     Chronic midline low back pain without prior injury. Does not radiate. Pain is worse in the morning upon waking and getting out of bed, improves throughout the day. Has worked with PT in the past and has tried doing some of those exercises.     BP to be managed by PCP moving forward. Taking amlodipine 10 mg daily.   BP Readings from Last 3 Encounters:   11/16/22 98/54   10/05/22 121/80   05/20/22 132/81     Due for thyroid labs. Taking levothyroxine 50 mcg daily.     Concerns about rapid unplanned weight gain.    Wt Readings from Last 2 Encounters:   11/16/22 89.2 kg (196 lb 9.6 oz)   05/25/22 74.8 kg (165 lb)     Patient Active Problem List   Diagnosis     Acute deep vein thrombosis (DVT) of upper extremity (H)     Acute kidney failure, unspecified (H)     Carrier of group B Streptococcus     Cirrhosis of liver with ascites (H)     CKD (chronic kidney disease)     Elevated blood pressure     Family history of colon cancer     Gastroesophageal reflux disease without esophagitis     GI (gastrointestinal bleed)     Hyponatremia     Hypokalemia     History of DVT (deep vein thrombosis)     Anemia associated with acute blood loss     Acute anemia     Need for vaccination for viral hepatitis     Macrocytosis without anemia     Knee pain     Pancytopenia (H)     Macrocytic anemia     Blood loss anemia     Hydrothorax     Portal hypertensive gastropathy (H)     Pleural effusion     Screening for malignant neoplasm of cervix     Venous incompetence     Varicose veins of leg with pain     Supervision of high-risk pregnancy of elderly primigravida     Subclinical hypothyroidism     Liver failure (H)     Hypotension     Pre-liver transplant, listed     Alcoholic cirrhosis (H)     Abnormal serum iron level     Iron deficiency anemia due to chronic blood loss     Encounter for immunization     Liver transplant candidate     Liver transplant recipient (H)     Immunosuppressed status (H)     Malnutrition (H)     Steroid-induced hyperglycemia     Epistaxis     Esophagitis     Duodenal erosion     Gastric antral vascular ectasia     Hyperphosphatemia     Leukocytosis     Chronic kidney disease, stage 3b (H)     Low magnesium level     Pain of female symphysis pubis     Somatic dysfunction of pelvic region     Past Medical History:   Diagnosis Date     Ascites      History of blood transfusion      Liver cirrhosis (H)      Thyroid disease      Current Outpatient Medications   Medication     amLODIPine (NORVASC) 10 MG tablet     augmented  "betamethasone dipropionate (DIPROLENE AF) 0.05 % external cream     biotin 1000 MCG TABS tablet     hydrocortisone 2.5 % ointment     ketoconazole (NIZORAL) 2 % external cream     levothyroxine (SYNTHROID/LEVOTHROID) 50 MCG tablet     magnesium oxide (MAG-OX) 400 MG tablet     multivitamin (THERMEMS) TABS     mycophenolate (GENERIC EQUIVALENT) 250 MG capsule     ondansetron (ZOFRAN) 4 MG tablet     pantoprazole (PROTONIX) 40 MG EC tablet     tacrolimus (GENERIC EQUIVALENT) 1 MG capsule     tretinoin (RETIN-A) 0.025 % external cream     ursodiol (ACTIGALL) 300 MG capsule     aspirin (ASA) 325 MG EC tablet     diclofenac (VOLTAREN) 1 % topical gel     methocarbamol (ROBAXIN) 500 MG tablet     No current facility-administered medications for this visit.        Allergies   Allergen Reactions     Amoxicillin GI Disturbance, Diarrhea, Nausea and Nausea and Vomiting         Review of Systems    ROS: 10 point ROS neg other than the symptoms noted above in the HPI.        Objective    BP 98/54 (BP Location: Right arm, Patient Position: Sitting, Cuff Size: Adult Regular)   Pulse 78   Temp 97.8  F (36.6  C) (Tympanic)   Ht 1.676 m (5' 6\")   Wt 89.2 kg (196 lb 9.6 oz)   LMP  (LMP Unknown)   SpO2 100%   BMI 31.73 kg/m    Body mass index is 31.73 kg/m .  Physical Exam   Constitutional: appears to be in no acute distress, comfortable, pleasant.   Eyes: anicteric, conjunctiva clear without drainage or erythema.  Cardiovascular: regular rate.  Respiratory: Breathing pattern unlabored without the use of accessory muscles. Genitourinary: external genitalia is without lesions. Introitus is normal, vaginal walls pink and moist without lesions or evidence of trauma. There is no abnormal discharge from the cervix. Cervix is pink without polyps or lesions.  Musculoskeletal: full range of motion, no edema.   Skin: pink, turgor smooth and elastic. Small (about size of pencil eraser) raised cluster of erythematous papules to medial " aspect of right wrist.  Neurological: normal gait, no tremor.   Psychological: appropriate mood and affect.

## 2022-11-16 NOTE — Clinical Note
TIMBOI - I am going to make this patient SB5 moving forward. She will be due for follow-up with me again in April 2023 for routine preventative exam.

## 2022-11-17 ENCOUNTER — DOCUMENTATION ONLY (OUTPATIENT)
Dept: TRANSPLANT | Facility: CLINIC | Age: 52
End: 2022-11-17

## 2022-11-17 DIAGNOSIS — Z94.4 LIVER TRANSPLANT RECIPIENT (H): ICD-10-CM

## 2022-11-17 DIAGNOSIS — L98.8 FACIAL RHYTIDS: ICD-10-CM

## 2022-11-17 DIAGNOSIS — E03.8 SUBCLINICAL HYPOTHYROIDISM: Primary | ICD-10-CM

## 2022-11-17 RX ORDER — LEVOTHYROXINE SODIUM 75 UG/1
75 TABLET ORAL DAILY
Qty: 90 TABLET | Refills: 0 | Status: SHIPPED | OUTPATIENT
Start: 2022-11-17 | End: 2023-01-24

## 2022-11-17 RX ORDER — TACROLIMUS 1 MG/1
CAPSULE ORAL
Qty: 150 CAPSULE | Refills: 11 | Status: SHIPPED | OUTPATIENT
Start: 2022-11-17 | End: 2022-12-19

## 2022-11-17 ASSESSMENT — ENCOUNTER SYMPTOMS: NEW SYMPTOMS OF CORONARY ARTERY DISEASE: 0

## 2022-11-18 LAB
BKR LAB AP GYN ADEQUACY: NORMAL
BKR LAB AP GYN INTERPRETATION: NORMAL
BKR LAB AP HPV REFLEX: NORMAL
BKR LAB AP PREVIOUS ABNORMAL: NORMAL
PATH REPORT.COMMENTS IMP SPEC: NORMAL
PATH REPORT.COMMENTS IMP SPEC: NORMAL
PATH REPORT.RELEVANT HX SPEC: NORMAL

## 2022-11-21 ENCOUNTER — TELEPHONE (OUTPATIENT)
Dept: DERMATOLOGY | Facility: CLINIC | Age: 52
End: 2022-11-21

## 2022-11-21 ENCOUNTER — LAB (OUTPATIENT)
Dept: LAB | Facility: CLINIC | Age: 52
End: 2022-11-21
Payer: COMMERCIAL

## 2022-11-21 DIAGNOSIS — K70.31 ALCOHOLIC CIRRHOSIS OF LIVER WITH ASCITES (H): ICD-10-CM

## 2022-11-21 DIAGNOSIS — Z94.4 LIVER REPLACED BY TRANSPLANT (H): ICD-10-CM

## 2022-11-21 LAB
ALBUMIN SERPL BCG-MCNC: 4.2 G/DL (ref 3.5–5.2)
ALP SERPL-CCNC: 212 U/L (ref 35–104)
ALT SERPL W P-5'-P-CCNC: 21 U/L (ref 10–35)
ANION GAP SERPL CALCULATED.3IONS-SCNC: 13 MMOL/L (ref 7–15)
AST SERPL W P-5'-P-CCNC: 22 U/L (ref 10–35)
BILIRUB DIRECT SERPL-MCNC: <0.2 MG/DL (ref 0–0.3)
BILIRUB SERPL-MCNC: 0.3 MG/DL
BUN SERPL-MCNC: 31.7 MG/DL (ref 6–20)
CALCIUM SERPL-MCNC: 9.8 MG/DL (ref 8.6–10)
CHLORIDE SERPL-SCNC: 106 MMOL/L (ref 98–107)
CREAT SERPL-MCNC: 1.03 MG/DL (ref 0.51–0.95)
DEPRECATED HCO3 PLAS-SCNC: 22 MMOL/L (ref 22–29)
ERYTHROCYTE [DISTWIDTH] IN BLOOD BY AUTOMATED COUNT: 12.4 % (ref 10–15)
GFR SERPL CREATININE-BSD FRML MDRD: 65 ML/MIN/1.73M2
GLUCOSE SERPL-MCNC: 97 MG/DL (ref 70–99)
HCT VFR BLD AUTO: 36.8 % (ref 35–47)
HGB BLD-MCNC: 12.5 G/DL (ref 11.7–15.7)
MAGNESIUM SERPL-MCNC: 1.7 MG/DL (ref 1.7–2.3)
MCH RBC QN AUTO: 29.6 PG (ref 26.5–33)
MCHC RBC AUTO-ENTMCNC: 34 G/DL (ref 31.5–36.5)
MCV RBC AUTO: 87 FL (ref 78–100)
PLATELET # BLD AUTO: 238 10E3/UL (ref 150–450)
POTASSIUM SERPL-SCNC: 4.4 MMOL/L (ref 3.4–5.3)
PROT SERPL-MCNC: 6.8 G/DL (ref 6.4–8.3)
RBC # BLD AUTO: 4.22 10E6/UL (ref 3.8–5.2)
SODIUM SERPL-SCNC: 141 MMOL/L (ref 136–145)
TACROLIMUS BLD-MCNC: 6.6 UG/L (ref 5–15)
TME LAST DOSE: NORMAL H
TME LAST DOSE: NORMAL H
WBC # BLD AUTO: 4.6 10E3/UL (ref 4–11)

## 2022-11-21 PROCEDURE — 80053 COMPREHEN METABOLIC PANEL: CPT

## 2022-11-21 PROCEDURE — 83735 ASSAY OF MAGNESIUM: CPT

## 2022-11-21 PROCEDURE — 99000 SPECIMEN HANDLING OFFICE-LAB: CPT

## 2022-11-21 PROCEDURE — 36415 COLL VENOUS BLD VENIPUNCTURE: CPT

## 2022-11-21 PROCEDURE — 80321 ALCOHOLS BIOMARKERS 1OR 2: CPT | Mod: 90

## 2022-11-21 PROCEDURE — 85027 COMPLETE CBC AUTOMATED: CPT

## 2022-11-21 PROCEDURE — 82248 BILIRUBIN DIRECT: CPT

## 2022-11-21 PROCEDURE — 80197 ASSAY OF TACROLIMUS: CPT

## 2022-11-21 NOTE — TELEPHONE ENCOUNTER
Areli Reich, RICHARD  [facility] Triage  I received a request from Maria Elena's NP to fit her in sooner. Ok to fit her in within the next 8 weeks on a day when I do not have other fit ins.

## 2022-11-21 NOTE — TELEPHONE ENCOUNTER
Left message on answering machine for patient to call back.    Gabriela RINCON RN  Dermatology   [phone]

## 2022-11-22 LAB
HUMAN PAPILLOMA VIRUS 16 DNA: NEGATIVE
HUMAN PAPILLOMA VIRUS 18 DNA: NEGATIVE
HUMAN PAPILLOMA VIRUS FINAL DIAGNOSIS: NORMAL
HUMAN PAPILLOMA VIRUS OTHER HR: NEGATIVE

## 2022-11-22 NOTE — TELEPHONE ENCOUNTER
Left message on answering machine for patient to call back.    Gabriela RINCON RN  Dermatology   [phone]

## 2022-11-23 LAB
PLPETH BLD-MCNC: <10 NG/ML
POPETH BLD-MCNC: <10 NG/ML

## 2022-11-23 NOTE — TELEPHONE ENCOUNTER
Pt responded to [employer] message and scheduled appt.    Gabriela RINCON RN  Dermatology   [phone]

## 2022-12-10 ENCOUNTER — ANCILLARY PROCEDURE (OUTPATIENT)
Dept: GENERAL RADIOLOGY | Facility: CLINIC | Age: 52
End: 2022-12-10
Attending: PHYSICIAN ASSISTANT
Payer: COMMERCIAL

## 2022-12-10 ENCOUNTER — OFFICE VISIT (OUTPATIENT)
Dept: URGENT CARE | Facility: URGENT CARE | Age: 52
End: 2022-12-10
Payer: COMMERCIAL

## 2022-12-10 ENCOUNTER — MYC MEDICAL ADVICE (OUTPATIENT)
Dept: PEDIATRICS | Facility: CLINIC | Age: 52
End: 2022-12-10

## 2022-12-10 VITALS
DIASTOLIC BLOOD PRESSURE: 71 MMHG | RESPIRATION RATE: 12 BRPM | OXYGEN SATURATION: 98 % | HEART RATE: 91 BPM | BODY MASS INDEX: 31.73 KG/M2 | SYSTOLIC BLOOD PRESSURE: 108 MMHG | TEMPERATURE: 98.3 F | WEIGHT: 196.6 LBS

## 2022-12-10 DIAGNOSIS — M25.571 PAIN IN JOINT INVOLVING ANKLE AND FOOT, RIGHT: ICD-10-CM

## 2022-12-10 DIAGNOSIS — S99.921A INJURY OF RIGHT FOOT, INITIAL ENCOUNTER: ICD-10-CM

## 2022-12-10 DIAGNOSIS — S82.891A ANKLE FRACTURE, RIGHT, CLOSED, INITIAL ENCOUNTER: Primary | ICD-10-CM

## 2022-12-10 PROCEDURE — 73610 X-RAY EXAM OF ANKLE: CPT | Mod: TC | Performed by: RADIOLOGY

## 2022-12-10 PROCEDURE — 73630 X-RAY EXAM OF FOOT: CPT | Mod: TC | Performed by: RADIOLOGY

## 2022-12-10 PROCEDURE — 99214 OFFICE O/P EST MOD 30 MIN: CPT | Performed by: PHYSICIAN ASSISTANT

## 2022-12-10 NOTE — PROGRESS NOTES
SUBJECTIVE:  Chief Complaint   Patient presents with     Urgent Care     Fall     This morning, right ankle injury, throbbing, patient has tried to ice and elevate but still is painful, head injury, no loss of consciousness, no dizziness, no vomiting     Sarah Fisher is a 52 year old female presents with a chief complaint of right ankle pain, swelling, tenderness and decreased range of motion.  The injury occurred this morning.   The injury happened while at home. How: twistedimmediate pain.  The patient complained of moderate pain  and has had decreased ROM.  Pain exacerbated by weight-bearing and movement.  Relieved by rest and ice.  She treated it initially with ice. This is the first time this type of injury has occurred to this patient.     Past Medical History:   Diagnosis Date     Ascites      History of blood transfusion      Liver cirrhosis (H)      Thyroid disease      Current Outpatient Medications   Medication Sig Dispense Refill     amLODIPine (NORVASC) 10 MG tablet Take 1 tablet (10 mg) by mouth daily 90 tablet 1     augmented betamethasone dipropionate (DIPROLENE AF) 0.05 % external cream Apply to AA BID x 1-2 weeks then PRN only. Do not apply to face 50 g 2     biotin 1000 MCG TABS tablet Take 1,000 mcg by mouth daily       diclofenac (VOLTAREN) 1 % topical gel Apply 2 g topically 4 times daily 50 g 0     hydrocortisone 2.5 % ointment Apply to lips BID x 1 week then PRN 15 g 3     ketoconazole (NIZORAL) 2 % external cream Apply to lips BID x 1 week then PRN 15 g 3     levothyroxine (SYNTHROID/LEVOTHROID) 75 MCG tablet Take 1 tablet (75 mcg) by mouth daily 90 tablet 0     magnesium oxide (MAG-OX) 400 MG tablet Take 2 tablets (800 mg) by mouth 2 times daily 120 tablet 3     multivitamin (THERMEMS) TABS Take 1 tablet by mouth daily 30 tablet 1     ondansetron (ZOFRAN) 4 MG tablet Take 1 tablet (4 mg) by mouth every 6 hours as needed for nausea 20 tablet 0     pantoprazole (PROTONIX) 40 MG EC tablet  Take 1 tablet (40 mg) by mouth 2 times daily 180 tablet 3     tacrolimus (GENERIC EQUIVALENT) 1 MG capsule Take 3 capsules (3 mg) by mouth every morning AND 2 capsules (2 mg) every evening. 150 capsule 11     tretinoin (RETIN-A) 0.025 % external cream Apply a pea size to entire face QD 45 g 11     ursodiol (ACTIGALL) 300 MG capsule Take 1 capsule (300 mg) by mouth every morning AND 2 capsules (600 mg) every evening. 270 capsule 3     aspirin (ASA) 325 MG EC tablet Take 325 mg by mouth in the morning. (Patient not taking: Reported on 11/16/2022)       methocarbamol (ROBAXIN) 500 MG tablet Take 1 tablet (500 mg) by mouth 4 times daily as needed for muscle spasms (Patient not taking: Reported on 12/10/2022) 20 tablet 0     mycophenolate (GENERIC EQUIVALENT) 250 MG capsule Take 1 capsule (250 mg) by mouth 2 times daily Weaning off (Patient not taking: Reported on 12/10/2022) 67 capsule 0     Social History     Tobacco Use     Smoking status: Never     Smokeless tobacco: Never   Substance Use Topics     Alcohol use: Not Currently     Comment: Quit on 6/20/2020       ROS:  Review of systems negative except as stated above.    EXAM:   /71   Pulse 91   Temp 98.3  F (36.8  C) (Oral)   Resp 12   Wt 89.2 kg (196 lb 9.6 oz)   LMP  (LMP Unknown)   SpO2 98%   BMI 31.73 kg/m    Gen: healthy,alert,no distress  Extremity: ankle has swelling and point tenderness at lat mal and foot at 5th metatarsal.   There is not compromise to the distal circulation.  Pulses are +2 and CRT is brisk  HEAD: no bruising or laceration at site, nontender  GENERAL APPEARANCE: healthy, alert and no distress  NEURO: Normal strength and tone, sensory exam grossly normal, mentation intact and speech normal    X-ray image initially interpreted in clinic by me indicating small avulsion fracture of distal fibula    ASSESSMENT:   (E88.534D) Ankle fracture, right, closed, initial encounter  (primary encounter diagnosis)  Plan: Orthopedic   Referral      (M25.571) Pain in joint involving ankle and foot, right  Plan: XR Ankle Right G/E 3 Views, XR Foot Right G/E 3        Views, Ankle/Foot Bracing Supplies Order for         DME - ONLY FOR DME      (S99.921A) Injury of right foot, initial encounter  Plan: XR Ankle Right G/E 3 Views, XR Foot Right G/E 3        Views, Ankle/Foot Bracing Supplies Order for         DME - ONLY FOR DME    Pain management deferred to care team

## 2022-12-12 ENCOUNTER — OFFICE VISIT (OUTPATIENT)
Dept: ORTHOPEDICS | Facility: CLINIC | Age: 52
End: 2022-12-12
Payer: COMMERCIAL

## 2022-12-12 ENCOUNTER — PRE VISIT (OUTPATIENT)
Dept: ORTHOPEDICS | Facility: CLINIC | Age: 52
End: 2022-12-12

## 2022-12-12 DIAGNOSIS — S82.891A ANKLE FRACTURE, RIGHT, CLOSED, INITIAL ENCOUNTER: ICD-10-CM

## 2022-12-12 DIAGNOSIS — S82.839A AVULSION FRACTURE OF DISTAL FIBULA: Primary | ICD-10-CM

## 2022-12-12 PROCEDURE — 99203 OFFICE O/P NEW LOW 30 MIN: CPT | Mod: GC | Performed by: FAMILY MEDICINE

## 2022-12-12 NOTE — LETTER
12/12/2022      RE: Sarah Fisher  [address] [facility] [address]     Dear Colleague,    Thank you for referring your patient, Sarah Fisher, to the [facility] [place]. Please see a copy of my visit note below.    ASSESSMENT/PLAN:    (L31.425Y) Avulsion fracture of distal fibula  (primary encounter diagnosis)  Comment: exam/ imaging consistent w/ tiny avulsion at distal fibula/ moderate sprain; will wear boot x 2 weeks and wean out to stirrup ankle brace; will plan to start PT after she returns from her holiday vacation and see me back in 3-4 wks; precautions/anticipatory guidance given  Plan: Physical Therapy Referral, acetaminophen-codeine (TYLENOL #3) 300-30 MG tablet          (K62.611N) Ankle fracture, right, closed, initial encounter  Comment: see above  Plan: Physical Therapy Referral, acetaminophen-codeine (TYLENOL #3) 300-30 MG tablet          Attestation:  This patient has been seen and evaluated by me, Hernan Garza MD with the resident, Dr Tasneem Harris and the care team. I agree with the findings and plan of care as documented in this note.    Hernan Garza MD  December 12, 2022  11:32 AM        Pt is a 52 year old female here today for:     R Foot/Ankle pain:   Location: right lateral ankle  Duration: Injury occurred two days ago  Trauma/ Fall? Was stepping outside her front door and felt instability in her right ankle, fell against the wall. Went to urgent care same day and was found to have broken the tip.   Able to walk? Can put a little weight on it. Feels much more stable with the boot.   Swelling? Mild  Bruising? No   Numbness/ Tingling? No    Weakness? Struggling with movement of the right toes.    Instability? No history of instability   Snapping/Clicking? No   Imaging? Xray performed two days ago   Treatment? Has been icing her right ankle. Took tylenol for the pain, taking it scheduled every 8 hours. No NSAIDs.   Liver transplant was a  year ago.   Rates her pain as a 4-5 out of ten. Worse at the end of the day, throbs as she is going to be at night. She notes it is improving.     Per urgent care note on 12/10/22:  Sarah Fisher is a 52 year old female presents with a chief complaint of right ankle pain, swelling, tenderness and decreased range of motion.  The injury occurred this morning.   The injury happened while at home. How: twisted immediate pain.  The patient complained of moderate pain  and has had decreased ROM.  Pain exacerbated by weight-bearing and movement.  Relieved by rest and ice.  She treated it initially with ice. This is the first time this type of injury has occurred to this patient.   X-ray image initially interpreted in clinic by me indicating small avulsion fracture of distal fibula     ASSESSMENT:   (S88.768Q) Ankle fracture, right, closed, initial encounter  (primary encounter diagnosis)  Plan: Orthopedic  Referral      Past Medical History:   Diagnosis Date     Ascites      History of blood transfusion      Liver cirrhosis (H)      Thyroid disease       Past Surgical History:   Procedure Laterality Date     ABDOMEN SURGERY        SECTION       CV RIGHT HEART CATH MEASUREMENTS RECORDED N/A 2021    Procedure: CV RIGHT HEART CATH;  Surgeon: Josehp Guevara MD;  Location:  HEART CARDIAC CATH LAB     ENDOSCOPIC RETROGRADE CHOLANGIOPANCREATOGRAM N/A 10/25/2021    Procedure: ENDOSCOPIC RETROGRADE CHOLANGIOPANCREATOGRAPHY, WITH biliary sphincterotomy, biliary dilation, and biliary stent placement;  Surgeon: Guru Dominguez Fenton MD;  [facility]     ENDOSCOPIC RETROGRADE CHOLANGIOPANCREATOGRAM N/A 2022    Procedure: ENDOSCOPIC RETROGRADE CHOLANGIOPANCREATOGRAPHY WITH BILIARY DILATION, DEBRIS REMOVAL AND STENT PLACEMENT;  Surgeon: Guru Dominguez Fenton MD;  [facility]     ENDOSCOPIC RETROGRADE CHOLANGIOPANCREATOGRAPHY, EXCHANGE  TUBE/STENT N/A 2021    Procedure: ENDOSCOPIC RETROGRADE CHOLANGIOPANCREATOGRAPHY, WITH biliary sphincterotomy, stricture dilation, stent exchange;  Surgeon: Guru Dominguez Fenton MD;  [facility]     ENDOSCOPIC ULTRASOUND UPPER GASTROINTESTINAL TRACT (GI) N/A 2022    Procedure: ENDOSCOPIC ULTRASOUND WITH CORE LIVER BIOPSY;  Surgeon: Guru Dominguez Fenton MD;  [facility]     ESOPHAGOSCOPY, GASTROSCOPY, DUODENOSCOPY (EGD), COMBINED N/A 10/07/2021    Procedure: ESOPHAGOGASTRODUODENOSCOPY (EGD) with hemostasis;  Surgeon: Fox Jerry MD;  Location:  GI     ESOPHAGOSCOPY, GASTROSCOPY, DUODENOSCOPY (EGD), COMBINED N/A 10/22/2021    Procedure: ESOPHAGOGASTRODUODENOSCOPY, WITH BIOPSY;  Surgeon: Fadia Avila MD;  [facility]     IR LIVER BIOPSY PERCUTANEOUS  10/26/2021     IR THORACENTESIS  2021     IR THORACENTESIS  2021     THORACENTESIS Left 2021    Procedure: THORACENTESIS;  Surgeon: Almas Irby MD;  [facility]     THORACENTESIS N/A 2021    Procedure: THORACENTESIS;  Surgeon: Almas Irby MD;  [facility]     THORACENTESIS N/A 03/10/2021    Procedure: THORACENTESIS;  Surgeon: Almas Irby MD;  [facility]     THORACENTESIS Left 2021    Procedure: THORACENTESIS;  Surgeon: Kennedi Chavez MD;  [place] [facility] [place] [place]     THORACENTESIS Left 2021    Procedure: THORACENTESIS;  Surgeon: Jess Ansari PA-C;  [place] [facility] [place]     THORACIC SURGERY  2021     TRANSPLANT LIVER RECIPIENT  DONOR N/A 10/17/2021    Procedure: TRANSPLANT, LIVER, RECIPIENT,  DONOR;  Surgeon: Christian Morrison MD;  [facility]      Current Outpatient Medications   Medication Sig Dispense Refill     amLODIPine (NORVASC) 10 MG tablet Take 1 tablet (10 mg) by mouth daily 90 tablet 1     aspirin (ASA) 325 MG EC tablet Take 325 mg by mouth daily       augmented betamethasone dipropionate (DIPROLENE AF) 0.05 % external  cream Apply to AA BID x 1-2 weeks then PRN only. Do not apply to face 50 g 2     biotin 1000 MCG TABS tablet Take 1,000 mcg by mouth daily       diclofenac (VOLTAREN) 1 % topical gel Apply 2 g topically 4 times daily 50 g 0     hydrocortisone 2.5 % ointment Apply to lips BID x 1 week then PRN 15 g 3     ketoconazole (NIZORAL) 2 % external cream Apply to lips BID x 1 week then PRN 15 g 3     levothyroxine (SYNTHROID/LEVOTHROID) 75 MCG tablet Take 1 tablet (75 mcg) by mouth daily 90 tablet 0     magnesium oxide (MAG-OX) 400 MG tablet Take 2 tablets (800 mg) by mouth 2 times daily 120 tablet 3     methocarbamol (ROBAXIN) 500 MG tablet Take 1 tablet (500 mg) by mouth 4 times daily as needed for muscle spasms 20 tablet 0     multivitamin (THERMEMS) TABS Take 1 tablet by mouth daily 30 tablet 1     mycophenolate (GENERIC EQUIVALENT) 250 MG capsule Take 250 mg by mouth 2 times daily Weaning off 67 capsule 0     ondansetron (ZOFRAN) 4 MG tablet Take 1 tablet (4 mg) by mouth every 6 hours as needed for nausea 20 tablet 0     pantoprazole (PROTONIX) 40 MG EC tablet Take 1 tablet (40 mg) by mouth 2 times daily 180 tablet 3     tacrolimus (GENERIC EQUIVALENT) 1 MG capsule Take 3 capsules (3 mg) by mouth every morning AND 2 capsules (2 mg) every evening. 150 capsule 11     tretinoin (RETIN-A) 0.025 % external cream Apply a pea size to entire face QD 45 g 11     ursodiol (ACTIGALL) 300 MG capsule Take 1 capsule (300 mg) by mouth every morning AND 2 capsules (600 mg) every evening. 270 capsule 3      Allergies   Allergen Reactions     Amoxicillin GI Disturbance, Diarrhea, Nausea and Nausea and Vomiting      ROS:   Gen- no fevers/chills   Rheum - no morning stiffness   Derm - no rash/ redness   Neuro - no numbness, no tingling   Remainder of ROS negative.     Exam:   LMP  (LMP Unknown)        R Foot/Ankle:   Inspection: Swelling - YES - mild; Bruising - NO   Sensation: intact in peroneal, tibial, sural, and saphenous distribution    ROM: Dorsiflexion - limited; Plantarflexion - full; Inversion -limited; Eversion - limited;    Strength: 5/5 in all motions; Pain w/ resisted dorsiflexion  Bony tenderness: Medial malleolus? - NO; Lateral malleolus? - YES; Navicular? - NO; 5th Metatarsal? - NO; Other - inferior to lateral malleolus   Ligaments Tenderness: ATFL/CFL -YES; Deltoid - NO; Syndesmosis - NO; LisFranc - NO  Tendons: Posterior Tibialis - NO; Peroneals - YES; Achilles - intac   Maneuvers: Anterior Drawer - deferred; Talar Tilt - deferred Squeeze - neg; Hop - deferred; Beltran - intact      Xray of R ankle and R foot on December 10, 2022 at urgent care location - films personally reviewed with patient at time of visit     My impression: tiny avulsion fx at distal lateral malleolus          Again, thank you for allowing me to participate in the care of your patient.      Sincerely,    Hernan Garza MD

## 2022-12-12 NOTE — TELEPHONE ENCOUNTER
3DIAGNOSIS: Twisted ankle   APPOINTMENT DATE: 12.12.22 - right   NOTES STATUS DETAILS   OFFICE NOTE from referring provider Internal 12.10.22 Bobby Vazquez   MEDICATION LIST Internal    XRAY IMAGES Internal 12.10.22 R ankle  12.10.22 R foot

## 2022-12-12 NOTE — PROGRESS NOTES
ASSESSMENT/PLAN:    (S82.990Z) Avulsion fracture of distal fibula  (primary encounter diagnosis)  Comment: exam/ imaging consistent w/ tiny avulsion at distal fibula/ moderate sprain; will wear boot x 2 weeks and wean out to stirrup ankle brace; will plan to start PT after she returns from her holiday vacation and see me back in 3-4 wks; precautions/anticipatory guidance given  Plan: Physical Therapy Referral, acetaminophen-codeine (TYLENOL #3) 300-30 MG tablet          (S82.131R) Ankle fracture, right, closed, initial encounter  Comment: see above  Plan: Physical Therapy Referral, acetaminophen-codeine (TYLENOL #3) 300-30 MG tablet          Attestation:  This patient has been seen and evaluated by me, Hernan Garza MD with the resident, Dr Tasneem Harris and the care team. I agree with the findings and plan of care as documented in this note.    Hernan Garza MD  December 12, 2022  11:32 AM        Pt is a 52 year old female here today for:     R Foot/Ankle pain:   Location: right lateral ankle  Duration: Injury occurred two days ago  Trauma/ Fall? Was stepping outside her front door and felt instability in her right ankle, fell against the wall. Went to urgent care same day and was found to have broken the tip.   Able to walk? Can put a little weight on it. Feels much more stable with the boot.   Swelling? Mild  Bruising? No   Numbness/ Tingling? No    Weakness? Struggling with movement of the right toes.    Instability? No history of instability   Snapping/Clicking? No   Imaging? Xray performed two days ago   Treatment? Has been icing her right ankle. Took tylenol for the pain, taking it scheduled every 8 hours. No NSAIDs.   Liver transplant was a year ago.   Rates her pain as a 4-5 out of ten. Worse at the end of the day, throbs as she is going to be at night. She notes it is improving.     Per urgent care note on 12/10/22:  Sarah Fisher is a 52 year old female presents with a chief complaint of right ankle  pain, swelling, tenderness and decreased range of motion.  The injury occurred this morning.   The injury happened while at home. How: twisted immediate pain.  The patient complained of moderate pain  and has had decreased ROM.  Pain exacerbated by weight-bearing and movement.  Relieved by rest and ice.  She treated it initially with ice. This is the first time this type of injury has occurred to this patient.   X-ray image initially interpreted in clinic by me indicating small avulsion fracture of distal fibula     ASSESSMENT:   (S87.415F) Ankle fracture, right, closed, initial encounter  (primary encounter diagnosis)  Plan: Orthopedic  Referral      Past Medical History:   Diagnosis Date     Ascites      History of blood transfusion      Liver cirrhosis (H)      Thyroid disease       Past Surgical History:   Procedure Laterality Date     ABDOMEN SURGERY        SECTION       CV RIGHT HEART CATH MEASUREMENTS RECORDED N/A 2021    Procedure: CV RIGHT HEART CATH;  Surgeon: Joseph Guevara MD;  Location:  HEART CARDIAC CATH LAB     ENDOSCOPIC RETROGRADE CHOLANGIOPANCREATOGRAM N/A 10/25/2021    Procedure: ENDOSCOPIC RETROGRADE CHOLANGIOPANCREATOGRAPHY, WITH biliary sphincterotomy, biliary dilation, and biliary stent placement;  Surgeon: Guru Dominguez Fenton MD;  [facility]     ENDOSCOPIC RETROGRADE CHOLANGIOPANCREATOGRAM N/A 2022    Procedure: ENDOSCOPIC RETROGRADE CHOLANGIOPANCREATOGRAPHY WITH BILIARY DILATION, DEBRIS REMOVAL AND STENT PLACEMENT;  Surgeon: Guru Dominguez Fenton MD;  [facility]     ENDOSCOPIC RETROGRADE CHOLANGIOPANCREATOGRAPHY, EXCHANGE TUBE/STENT N/A 2021    Procedure: ENDOSCOPIC RETROGRADE CHOLANGIOPANCREATOGRAPHY, WITH biliary sphincterotomy, stricture dilation, stent exchange;  Surgeon: Guru Dominguez Fenton MD;  [facility]     ENDOSCOPIC ULTRASOUND UPPER GASTROINTESTINAL TRACT  (GI) N/A 2022    Procedure: ENDOSCOPIC ULTRASOUND WITH CORE LIVER BIOPSY;  Surgeon: Guru Dominguez Fenton MD;  [facility]     ESOPHAGOSCOPY, GASTROSCOPY, DUODENOSCOPY (EGD), COMBINED N/A 10/07/2021    Procedure: ESOPHAGOGASTRODUODENOSCOPY (EGD) with hemostasis;  Surgeon: Fox Jerry MD;  Location:  GI     ESOPHAGOSCOPY, GASTROSCOPY, DUODENOSCOPY (EGD), COMBINED N/A 10/22/2021    Procedure: ESOPHAGOGASTRODUODENOSCOPY, WITH BIOPSY;  Surgeon: Fadia Avila MD;  [facility]     IR LIVER BIOPSY PERCUTANEOUS  10/26/2021     IR THORACENTESIS  2021     IR THORACENTESIS  2021     THORACENTESIS Left 2021    Procedure: THORACENTESIS;  Surgeon: Almas Irby MD;  [facility]     THORACENTESIS N/A 2021    Procedure: THORACENTESIS;  Surgeon: Almas Irby MD;  [facility]     THORACENTESIS N/A 03/10/2021    Procedure: THORACENTESIS;  Surgeon: Almas Irby MD;  [facility]     THORACENTESIS Left 2021    Procedure: THORACENTESIS;  Surgeon: Kennedi Chavez MD;  [place] [facility] [place]     THORACENTESIS Left 2021    Procedure: THORACENTESIS;  Surgeon: Jess Ansari PA-C;  Location: [facility] [place]     THORACIC SURGERY  2021     TRANSPLANT LIVER RECIPIENT  DONOR N/A 10/17/2021    Procedure: TRANSPLANT, LIVER, RECIPIENT,  DONOR;  Surgeon: Christian Morrison MD;  [facility]      Current Outpatient Medications   Medication Sig Dispense Refill     amLODIPine (NORVASC) 10 MG tablet Take 1 tablet (10 mg) by mouth daily 90 tablet 1     aspirin (ASA) 325 MG EC tablet Take 325 mg by mouth daily       augmented betamethasone dipropionate (DIPROLENE AF) 0.05 % external cream Apply to AA BID x 1-2 weeks then PRN only. Do not apply to face 50 g 2     biotin 1000 MCG TABS tablet Take 1,000 mcg by mouth daily       diclofenac (VOLTAREN) 1 % topical gel Apply 2 g topically 4 times daily 50 g 0     hydrocortisone 2.5 % ointment Apply to lips BID x 1  week then PRN 15 g 3     ketoconazole (NIZORAL) 2 % external cream Apply to lips BID x 1 week then PRN 15 g 3     levothyroxine (SYNTHROID/LEVOTHROID) 75 MCG tablet Take 1 tablet (75 mcg) by mouth daily 90 tablet 0     magnesium oxide (MAG-OX) 400 MG tablet Take 2 tablets (800 mg) by mouth 2 times daily 120 tablet 3     methocarbamol (ROBAXIN) 500 MG tablet Take 1 tablet (500 mg) by mouth 4 times daily as needed for muscle spasms 20 tablet 0     multivitamin (THERMEMS) TABS Take 1 tablet by mouth daily 30 tablet 1     mycophenolate (GENERIC EQUIVALENT) 250 MG capsule Take 250 mg by mouth 2 times daily Weaning off 67 capsule 0     ondansetron (ZOFRAN) 4 MG tablet Take 1 tablet (4 mg) by mouth every 6 hours as needed for nausea 20 tablet 0     pantoprazole (PROTONIX) 40 MG EC tablet Take 1 tablet (40 mg) by mouth 2 times daily 180 tablet 3     tacrolimus (GENERIC EQUIVALENT) 1 MG capsule Take 3 capsules (3 mg) by mouth every morning AND 2 capsules (2 mg) every evening. 150 capsule 11     tretinoin (RETIN-A) 0.025 % external cream Apply a pea size to entire face QD 45 g 11     ursodiol (ACTIGALL) 300 MG capsule Take 1 capsule (300 mg) by mouth every morning AND 2 capsules (600 mg) every evening. 270 capsule 3      Allergies   Allergen Reactions     Amoxicillin GI Disturbance, Diarrhea, Nausea and Nausea and Vomiting      ROS:   Gen- no fevers/chills   Rheum - no morning stiffness   Derm - no rash/ redness   Neuro - no numbness, no tingling   Remainder of ROS negative.     Exam:   LMP  (LMP Unknown)        R Foot/Ankle:   Inspection: Swelling - YES - mild; Bruising - NO   Sensation: intact in peroneal, tibial, sural, and saphenous distribution   ROM: Dorsiflexion - limited; Plantarflexion - full; Inversion -limited; Eversion - limited;    Strength: 5/5 in all motions; Pain w/ resisted dorsiflexion  Bony tenderness: Medial malleolus? - NO; Lateral malleolus? - YES; Navicular? - NO; 5th Metatarsal? - NO; Other - inferior  to lateral malleolus   Ligaments Tenderness: ATFL/CFL -YES; Deltoid - NO; Syndesmosis - NO; LisFranc - NO  Tendons: Posterior Tibialis - NO; Peroneals - YES; Achilles - intac   Maneuvers: Anterior Drawer - deferred; Talar Tilt - deferred Squeeze - neg; Hop - deferred; Beltran - intact      Xray of R ankle and R foot on December 10, 2022 at urgent care location - films personally reviewed with patient at time of visit     My impression: tiny avulsion fx at distal lateral malleolus

## 2022-12-13 ENCOUNTER — OFFICE VISIT (OUTPATIENT)
Dept: DERMATOLOGY | Facility: CLINIC | Age: 52
End: 2022-12-13
Payer: COMMERCIAL

## 2022-12-13 DIAGNOSIS — D18.01 ANGIOMA OF SKIN: ICD-10-CM

## 2022-12-13 DIAGNOSIS — D22.9 NEVUS: Primary | ICD-10-CM

## 2022-12-13 DIAGNOSIS — L81.4 LENTIGO: ICD-10-CM

## 2022-12-13 DIAGNOSIS — L82.1 SEBORRHEIC KERATOSIS: ICD-10-CM

## 2022-12-13 DIAGNOSIS — D48.5 NEOPLASM OF UNCERTAIN BEHAVIOR OF SKIN: ICD-10-CM

## 2022-12-13 PROCEDURE — 99213 OFFICE O/P EST LOW 20 MIN: CPT | Mod: 25 | Performed by: PHYSICIAN ASSISTANT

## 2022-12-13 PROCEDURE — 11102 TANGNTL BX SKIN SINGLE LES: CPT | Performed by: PHYSICIAN ASSISTANT

## 2022-12-13 PROCEDURE — 88305 TISSUE EXAM BY PATHOLOGIST: CPT | Performed by: DERMATOLOGY

## 2022-12-13 PROCEDURE — 88312 SPECIAL STAINS GROUP 1: CPT | Performed by: DERMATOLOGY

## 2022-12-13 ASSESSMENT — PAIN SCALES - GENERAL: PAINLEVEL: NO PAIN (0)

## 2022-12-13 NOTE — PROGRESS NOTES
HPI:   Chief complaints: Sarah Fisher is a pleasant 52 year old female who presents for Full skin cancer screening to rule out skin cancer   Last Skin Exam: n/a      1st Baseline: yes  Personal HX of Skin Cancer: no   Personal HX of Malignant Melanoma: no   Family HX of Skin Cancer / Malignant Melanoma: no  Personal HX of Atypical Moles:   no  Risk factors: history of sun exposure and burns; liver transplant and currently immunosuppressed.   New / Changing lesions:yes persistent rash on the hands - she has been using betamethasone cream but this is not helping  Social History: going to [facility] this week! Recently fractured her distal fibula  On review of systems, there are no further skin complaints, patient is feeling otherwise well.   ROS of the following were done and are negative: Constitutional, Eyes, Ears, Nose,   Mouth, Throat, Cardiovascular, Respiratory, GI, Genitourinary, Musculoskeletal,   Psychiatric, Endocrine, Allergic/Immunologic.    PHYSICAL EXAM:   LMP  (LMP Unknown)   Skin exam performed as follows: Type 2 skin. Mood appropriate  Alert and Oriented X 3. Well developed, well nourished in no distress.  General appearance: Normal  Head including face: Normal  Eyes: conjunctiva and lids: Normal  Mouth: Lips, teeth, gums: Normal  Neck: Normal  Chest-breast/axillae: Normal  Back: Normal  Spleen and liver: Normal  Cardiovascular: Exam of peripheral vascular system by observation for swelling, varicosities, edema: Normal  Genitalia: groin, buttocks: Normal  Extremities: digits/nails (clubbing): Normal  Eccrine and Apocrine glands: Normal  Right upper extremity: Normal  Left upper extremity: Normal  Right lower extremity: Normal  Left lower extremity: Normal  Skin: Scalp and body hair: See below    Pt deferred exam of breasts, groin, buttocks: No    Other physical findings:  1. Multiple pigmented macules on extremities and trunk  2. Multiple pigmented macules on face, trunk and extremities  3. Multiple  vascular papules on trunk, arms and legs  4. Multiple scattered keratotic plaques  5. Dermatitis on bilateral dorsal hands, right wrist; lips with xerosis  6. Pink gritty papule on the left upper forehead x 1       Except as noted above, no other signs of skin cancer or melanoma.     ASSESSMENT/PLAN:   Benign Full skin cancer screening today. . Patient with history of none  Advised on monthly self exams and 1 year  Patient Education: Appropriate brochures given.    1. Multiple benign appearing melanocytic nevi on arms, legs and trunk. Discussed ABCDEs of melanoma and sunscreen.   2. Multiple lentigos on arms, legs and trunk. Advised benign, no treatment needed.  3. Multiple scattered angiomas. Advised benign, no treatment needed.   4. Seborrheic keratosis on arms, legs and trunk. Advised benign, no treatment needed.  5. Spong derm r/o tinea vs other on the right wrist. Shave biopsy performed.  Area cleaned and anesthetized with 1% lidocaine with epinephrine.  Dermablade used to remove the lesion and sent to pathology. Bleeding was cauterized. Pt tolerated procedure well with no complications. Continue betamethasone cream until path results return. Start using thick emollients every day  6. Actinic keratosis on the left upper forehead - will plan on LN2 at next OV as she is leaving for [place] this week.              Follow-up: yearly FSE/PRN sooner    1.) Patient was asked about new and changing moles. YES  2.) Patient received a complete physical skin examination: YES  3.) Patient was counseled to perform a monthly self skin examination: YES  Scribed By: Areli Reich MS, PAKAMERON

## 2022-12-13 NOTE — LETTER
12/13/2022         RE: Sarah Fisher  [address] [address]        Dear Colleague,    Thank you for referring your patient, Sarah Fisher, to the [facility]. Please see a copy of my visit note below.    HPI:   Chief complaints: Sarah Fisher is a pleasant 52 year old female who presents for Full skin cancer screening to rule out skin cancer   Last Skin Exam: n/a      1st Baseline: yes  Personal HX of Skin Cancer: no   Personal HX of Malignant Melanoma: no   Family HX of Skin Cancer / Malignant Melanoma: no  Personal HX of Atypical Moles:   no  Risk factors: history of sun exposure and burns; liver transplant and currently immunosuppressed.   New / Changing lesions:yes persistent rash on the hands - she has been using betamethasone cream but this is not helping  Social History: going to [facility] this week! Recently fractured her distal fibula  On review of systems, there are no further skin complaints, patient is feeling otherwise well.   ROS of the following were done and are negative: Constitutional, Eyes, Ears, Nose,   Mouth, Throat, Cardiovascular, Respiratory, GI, Genitourinary, Musculoskeletal,   Psychiatric, Endocrine, Allergic/Immunologic.    PHYSICAL EXAM:   LMP  (LMP Unknown)   Skin exam performed as follows: Type 2 skin. Mood appropriate  Alert and Oriented X 3. Well developed, well nourished in no distress.  General appearance: Normal  Head including face: Normal  Eyes: conjunctiva and lids: Normal  Mouth: Lips, teeth, gums: Normal  Neck: Normal  Chest-breast/axillae: Normal  Back: Normal  Spleen and liver: Normal  Cardiovascular: Exam of peripheral vascular system by observation for swelling, varicosities, edema: Normal  Genitalia: groin, buttocks: Normal  Extremities: digits/nails (clubbing): Normal  Eccrine and Apocrine glands: Normal  Right upper extremity: Normal  Left upper extremity: Normal  Right lower extremity: Normal  Left lower  extremity: Normal  Skin: Scalp and body hair: See below    Pt deferred exam of breasts, groin, buttocks: No    Other physical findings:  1. Multiple pigmented macules on extremities and trunk  2. Multiple pigmented macules on face, trunk and extremities  3. Multiple vascular papules on trunk, arms and legs  4. Multiple scattered keratotic plaques  5. Dermatitis on bilateral dorsal hands, right wrist; lips with xerosis  6. Pink gritty papule on the left upper forehead x 1       Except as noted above, no other signs of skin cancer or melanoma.     ASSESSMENT/PLAN:   Benign Full skin cancer screening today. . Patient with history of none  Advised on monthly self exams and 1 year  Patient Education: Appropriate brochures given.    1. Multiple benign appearing melanocytic nevi on arms, legs and trunk. Discussed ABCDEs of melanoma and sunscreen.   2. Multiple lentigos on arms, legs and trunk. Advised benign, no treatment needed.  3. Multiple scattered angiomas. Advised benign, no treatment needed.   4. Seborrheic keratosis on arms, legs and trunk. Advised benign, no treatment needed.  5. Spong derm r/o tinea vs other on the right wrist. Shave biopsy performed.  Area cleaned and anesthetized with 1% lidocaine with epinephrine.  Dermablade used to remove the lesion and sent to pathology. Bleeding was cauterized. Pt tolerated procedure well with no complications. Continue betamethasone cream until path results return. Start using thick emollients every day  6. Actinic keratosis on the left upper forehead - will plan on LN2 at next OV as she is leaving for [place] this week.              Follow-up: yearly FSE/PRN sooner    1.) Patient was asked about new and changing moles. YES  2.) Patient received a complete physical skin examination: YES  3.) Patient was counseled to perform a monthly self skin examination: YES  Scribed By: Areli Reich, MS, PA-C          Again, thank you for allowing me to participate in the care of your  patient.        Sincerely,        Areli Love PA-C

## 2022-12-13 NOTE — PATIENT INSTRUCTIONS
Wound Care Instructions     FOR SUPERFICIAL WOUNDS     [facility]  [phone]                 AFTER 24 HOURS YOU SHOULD REMOVE THE BANDAGE AND BEGIN DAILY DRESSING CHANGES AS FOLLOWS:     1) Remove Dressing.     2) Clean and dry the area with tap water using a Q-tip or sterile gauze pad.     3) Apply Vaseline, Aquaphor, Polysporin ointment or Bacitracin ointment over entire wound.  Do NOT use Neosporin ointment.     4) Cover the wound with a band-aid, or a sterile non-stick gauze pad and micropore paper tape    REPEAT THESE INSTRUCTIONS AT LEAST ONCE A DAY UNTIL THE WOUND HAS COMPLETELY HEALED.    It is an old wives tale that a wound heals better when it is exposed to air and allowed to dry out. The wound will heal faster with a better cosmetic result if it is kept moist with ointment and covered with a bandage.    **Do not let the wound dry out.**    Supplies Needed:      *Cotton tipped applicators (Q-tips)    *Vaseline, Aquaphor, Polysporin or Bacitracin Ointment (NOT NEOSPORIN)    *Band-aids or non-stick gauze pads and micropore paper tape.      PATIENT INFORMATION:    During the healing process you will notice a number of changes. All wounds develop a small halo of redness surrounding the wound.  This means healing is occurring. Severe itching with extensive redness usually indicates sensitivity to the ointment or bandage tape used to dress the wound.  You should call our office if this develops.      Swelling  and/or discoloration around your surgical site is common, particularly when performed around the eye.    All wounds normally drain.  The larger the wound the more drainage there will be.  After 7-10 days, you will notice the wound beginning to shrink and new skin will begin to grow.  The wound is healed when you can see skin has formed over the entire area.  A healed wound has a healthy, shiny look to the surface and is red to dark pink in color to normalize.  Wounds may  take approximately 4-6 weeks to heal.  Larger wounds may take 6-8 weeks.  After the wound is healed you may discontinue dressing changes.    You may experience a sensation of tightness as your wound heals. This is normal and will gradually subside.    Your healed wound may be sensitive to temperature changes. This sensitivity improves with time, but if you re having a lot of discomfort, try to avoid temperature extremes.    Patients frequently experience itching after their wound appears to have healed because of the continue healing under the skin.  Plain Vaseline will help relieve the itching.      POSSIBLE COMPLICATIONS    BLEEDING:    Leave the bandage in place.  Use tightly rolled up gauze or a cloth to apply direct pressure over the bandage for 30  minutes.  Reapply pressure for an additional 30 minutes if necessary  Use additional gauze and tape to maintain pressure once the bleeding has stopped.

## 2022-12-19 ENCOUNTER — TELEPHONE (OUTPATIENT)
Dept: TRANSPLANT | Facility: CLINIC | Age: 52
End: 2022-12-19

## 2022-12-19 ENCOUNTER — LAB (OUTPATIENT)
Dept: LAB | Facility: CLINIC | Age: 52
End: 2022-12-19
Payer: COMMERCIAL

## 2022-12-19 DIAGNOSIS — Z94.4 LIVER REPLACED BY TRANSPLANT (H): ICD-10-CM

## 2022-12-19 DIAGNOSIS — Z94.4 LIVER TRANSPLANT RECIPIENT (H): ICD-10-CM

## 2022-12-19 LAB
ALBUMIN SERPL BCG-MCNC: 4.4 G/DL (ref 3.5–5.2)
ALP SERPL-CCNC: 239 U/L (ref 35–104)
ALT SERPL W P-5'-P-CCNC: 21 U/L (ref 10–35)
ANION GAP SERPL CALCULATED.3IONS-SCNC: 9 MMOL/L (ref 7–15)
AST SERPL W P-5'-P-CCNC: 25 U/L (ref 10–35)
BILIRUB DIRECT SERPL-MCNC: <0.2 MG/DL (ref 0–0.3)
BILIRUB SERPL-MCNC: 0.4 MG/DL
BUN SERPL-MCNC: 26.4 MG/DL (ref 6–20)
CALCIUM SERPL-MCNC: 10 MG/DL (ref 8.6–10)
CHLORIDE SERPL-SCNC: 102 MMOL/L (ref 98–107)
CREAT SERPL-MCNC: 0.88 MG/DL (ref 0.51–0.95)
DEPRECATED HCO3 PLAS-SCNC: 29 MMOL/L (ref 22–29)
ERYTHROCYTE [DISTWIDTH] IN BLOOD BY AUTOMATED COUNT: 12.4 % (ref 10–15)
GFR SERPL CREATININE-BSD FRML MDRD: 79 ML/MIN/1.73M2
GLUCOSE SERPL-MCNC: 103 MG/DL (ref 70–99)
HCT VFR BLD AUTO: 37.9 % (ref 35–47)
HGB BLD-MCNC: 12.7 G/DL (ref 11.7–15.7)
MAGNESIUM SERPL-MCNC: 1.9 MG/DL (ref 1.7–2.3)
MCH RBC QN AUTO: 29.9 PG (ref 26.5–33)
MCHC RBC AUTO-ENTMCNC: 33.5 G/DL (ref 31.5–36.5)
MCV RBC AUTO: 89 FL (ref 78–100)
PLATELET # BLD AUTO: 232 10E3/UL (ref 150–450)
POTASSIUM SERPL-SCNC: 4.8 MMOL/L (ref 3.4–5.3)
PROT SERPL-MCNC: 7 G/DL (ref 6.4–8.3)
RBC # BLD AUTO: 4.25 10E6/UL (ref 3.8–5.2)
SODIUM SERPL-SCNC: 140 MMOL/L (ref 136–145)
TACROLIMUS BLD-MCNC: 5.4 UG/L (ref 5–15)
TME LAST DOSE: NORMAL H
TME LAST DOSE: NORMAL H
WBC # BLD AUTO: 4.5 10E3/UL (ref 4–11)

## 2022-12-19 PROCEDURE — 82248 BILIRUBIN DIRECT: CPT

## 2022-12-19 PROCEDURE — 80053 COMPREHEN METABOLIC PANEL: CPT

## 2022-12-19 PROCEDURE — 85027 COMPLETE CBC AUTOMATED: CPT

## 2022-12-19 PROCEDURE — 36415 COLL VENOUS BLD VENIPUNCTURE: CPT

## 2022-12-19 PROCEDURE — 83735 ASSAY OF MAGNESIUM: CPT

## 2022-12-19 PROCEDURE — 80197 ASSAY OF TACROLIMUS: CPT

## 2022-12-19 RX ORDER — TACROLIMUS 1 MG/1
CAPSULE ORAL
Qty: 450 CAPSULE | Refills: 3 | Status: SHIPPED | OUTPATIENT
Start: 2022-12-19 | End: 2023-10-10

## 2022-12-19 NOTE — TELEPHONE ENCOUNTER
Pt wanted to let writer know where to send the tacro which will be [facility]. Pt received a lab letter to have labs drawn every 2-3 months. Pt wanted to be sure she should not be having them done monthly. Informed pt it is protocol to have labs done differently when reaching the 1 year anniversary. If different, let me know.

## 2022-12-21 LAB
PATH REPORT.COMMENTS IMP SPEC: NORMAL
PATH REPORT.FINAL DX SPEC: NORMAL
PATH REPORT.GROSS SPEC: NORMAL
PATH REPORT.MICROSCOPIC SPEC OTHER STN: NORMAL
PATH REPORT.RELEVANT HX SPEC: NORMAL

## 2023-01-07 NOTE — PATIENT INSTRUCTIONS
Recommendations from today's MTM visit:                                                       1. Reduce Magnesium to 400mg twice daily.   2. You will be due for 2nd dose of Shingrix after 4/13/2022.   3. After 8/13/2022 you will be due for Prevnar 13.   4. Would be safest to avoid Kombucha, there is a small of alcohol in it.   5. Recommend long sleeves, wide brim hats, sunscreen SPF 50+ and annual dermatology reviews. The transplant medications do greatly increase your risk for skin cancer.     Follow-up: as needed    It was great to speak with you today.  I value your experience and would be very thankful for your time with providing feedback on our clinic survey. You may receive a survey via email or text message in the next few days.     To schedule another MTM appointment, please call the clinic directly or you may call the MTM scheduling line at [phone] or toll-free at [phone].     My Clinical Pharmacist's contact information:                                                      Please feel free to contact me with any questions or concerns you have.      Ming Archer, PharmD  MTM Pharmacist    Phone: [phone]      Patient unable to complete

## 2023-01-17 NOTE — PLAN OF CARE
VS: /79   Pulse 116   Temp 98.2  F (36.8  C) (Oral)   Resp 16   Wt 84.6 kg (186 lb 8 oz)   SpO2 93%   BMI 29.21 kg/m      Cares: [id number]     Current condition: VSS on 2L of humidified O2, via NC (only in right nostril due to L nostril nose bleed)  Neuro: A/Ox4  Cardio: Tachycardic  Respiratory: Diminished at bases, encourage incentive spirometry   GI/: BM this shift. Oliguria but void x3 this shift, 2 incidences of incontinence and unmeasured urine.   Skin: jaundiced and bruised. Mild edema in legs bilaterally. BERKLEY dressing changed and stapled clamshell incision dressings changed x2.   Diet: Regular with CC, poor oral intake.   Labs: Lactic 0.7, Hgb 7.2 and 6.0. Critical lab values of Hgb 6.0 and platelets 43.    BG: ACHS; 92, 72  LDA: internal jugular (infusing blood), PIV infusing heparin SR at 400.   Mobility: SBA to A1, walker.   Pain: Pt denied pain and nausea  PRN medications: 500ml of albumin administered at 2200 for BERKLEY replacement.   Changes: Pt had a persistent nose bleed from approximately 3416-1052. Team was notified and involved in care throughout process. Nose bleed stopped at 2230 due to quick clot and afrin spray held on nose for an hour. Stat Hgb ordered and recheck requiring 2 units of blood. 1 unit infusing at this time. Pt also requiring humidified oxygen due to oxygen sats dropping into the 80's throughout night.   Plan of Care: Bumex to be administered between units of blood. Continue to monitor pt and update with changes.      Spouse

## 2023-01-22 DIAGNOSIS — E03.8 SUBCLINICAL HYPOTHYROIDISM: ICD-10-CM

## 2023-01-23 ENCOUNTER — ANCILLARY PROCEDURE (OUTPATIENT)
Dept: ULTRASOUND IMAGING | Facility: CLINIC | Age: 53
End: 2023-01-23
Attending: STUDENT IN AN ORGANIZED HEALTH CARE EDUCATION/TRAINING PROGRAM
Payer: COMMERCIAL

## 2023-01-23 ENCOUNTER — OFFICE VISIT (OUTPATIENT)
Dept: ORTHOPEDICS | Facility: CLINIC | Age: 53
End: 2023-01-23
Payer: COMMERCIAL

## 2023-01-23 ENCOUNTER — MYC REFILL (OUTPATIENT)
Dept: PEDIATRICS | Facility: CLINIC | Age: 53
End: 2023-01-23

## 2023-01-23 DIAGNOSIS — E03.8 SUBCLINICAL HYPOTHYROIDISM: ICD-10-CM

## 2023-01-23 DIAGNOSIS — Z94.4 LIVER TRANSPLANT RECIPIENT (H): ICD-10-CM

## 2023-01-23 DIAGNOSIS — E83.42 HYPOMAGNESEMIA: ICD-10-CM

## 2023-01-23 DIAGNOSIS — S82.839A AVULSION FRACTURE OF DISTAL FIBULA: Primary | ICD-10-CM

## 2023-01-23 PROCEDURE — 76705 ECHO EXAM OF ABDOMEN: CPT | Mod: GC | Performed by: RADIOLOGY

## 2023-01-23 PROCEDURE — 99213 OFFICE O/P EST LOW 20 MIN: CPT | Performed by: FAMILY MEDICINE

## 2023-01-23 RX ORDER — MAGNESIUM OXIDE 400 MG/1
800 TABLET ORAL 2 TIMES DAILY
Qty: 120 TABLET | Refills: 3 | Status: SHIPPED | OUTPATIENT
Start: 2023-01-23 | End: 2023-05-26

## 2023-01-23 NOTE — LETTER
1/23/2023      RE: Sarah Fisher  [address] [address]     Dear Colleague,    Thank you for referring your patient, Sarah Fisher, to the [facility] [place]. Please see a copy of my visit note below.    ASSESSMENT/PLAN:    (F36.480T) Avulsion fracture of distal fibula  (primary encounter diagnosis)  Comment:   Plan: stable/ improved; handout given to focus on strengthening and proprioception; precautions given; f/u prn     Hernan Garza MD  January 23, 2023  11:38 AM      Pt is a 52 year old female last seen on 12/12/22 here for follow up of:     R ankle - feels great  Pain is 1/10  Feels it w/ lateral motion  Is able to be on the treadmill  Swelling is stable in ankle - has swelling not related to injury   Not requiring medication  Out of boot x 1 month; got a brace on [employer] and is working well  Wore her brace to [place] and did well    Per my last note:  (Z53.541F) Avulsion fracture of distal fibula  (primary encounter diagnosis)  Comment: exam/ imaging consistent w/ tiny avulsion at distal fibula/ moderate sprain; will wear boot x 2 weeks and wean out to stirrup ankle brace; will plan to start PT after she returns from her holiday vacation and see me back in 3-4 wks; precautions/anticipatory guidance given  Plan: Physical Therapy Referral, acetaminophen-codeine (TYLENOL #3) 300-30 MG tablet           (S82.919D) Ankle fracture, right, closed, initial encounter  Comment: see above  Plan: Physical Therapy Referral, acetaminophen-codeine (TYLENOL #3) 300-30 MG tablet    Past Medical History:   Diagnosis Date     Ascites      History of blood transfusion      Liver cirrhosis (H)      Thyroid disease       Current Outpatient Medications   Medication Sig Dispense Refill     amLODIPine (NORVASC) 10 MG tablet Take 1 tablet (10 mg) by mouth daily 90 tablet 1     aspirin (ASA) 325 MG EC tablet Take 325 mg by mouth daily       augmented betamethasone  dipropionate (DIPROLENE AF) 0.05 % external cream Apply to AA BID x 1-2 weeks then PRN only. Do not apply to face 50 g 2     biotin 1000 MCG TABS tablet Take 1,000 mcg by mouth daily       diclofenac (VOLTAREN) 1 % topical gel Apply 2 g topically 4 times daily 50 g 0     hydrocortisone 2.5 % ointment Apply to lips BID x 1 week then PRN 15 g 3     ketoconazole (NIZORAL) 2 % external cream Apply to lips BID x 1 week then PRN 15 g 3     levothyroxine (SYNTHROID/LEVOTHROID) 75 MCG tablet Take 1 tablet (75 mcg) by mouth daily 90 tablet 0     magnesium oxide (MAG-OX) 400 MG tablet Take 2 tablets (800 mg) by mouth 2 times daily 120 tablet 3     multivitamin (THERMEMS) TABS Take 1 tablet by mouth daily 30 tablet 1     mycophenolate (GENERIC EQUIVALENT) 250 MG capsule Take 250 mg by mouth 2 times daily Weaning off (Patient not taking: Reported on 12/13/2022) 67 capsule 0     ondansetron (ZOFRAN) 4 MG tablet Take 1 tablet (4 mg) by mouth every 6 hours as needed for nausea (Patient not taking: Reported on 12/13/2022) 20 tablet 0     pantoprazole (PROTONIX) 40 MG EC tablet Take 1 tablet (40 mg) by mouth 2 times daily 180 tablet 3     tacrolimus (GENERIC EQUIVALENT) 1 MG capsule Take 3 capsules (3 mg) by mouth every morning AND 2 capsules (2 mg) every evening. 450 capsule 3     tretinoin (RETIN-A) 0.025 % external cream Apply a pea size to entire face QD 45 g 11     ursodiol (ACTIGALL) 300 MG capsule Take 1 capsule (300 mg) by mouth every morning AND 2 capsules (600 mg) every evening. 270 capsule 3      Allergies   Allergen Reactions     Amoxicillin GI Disturbance, Diarrhea, Nausea and Nausea and Vomiting      ROS:   Gen- no fevers/chills   Rheum - no morning stiffness   Derm - no rash/ redness   Neuro - no numbness, no tingling   Remainder of ROS negative.     Exam:     R ankle:  Full ROM  Mild TTP at ATFL, no bony tenderness  Pain w/ resisted inversion/ eversion  Able to single leg stand w/o pain    Again, thank you for  allowing me to participate in the care of your patient.      Sincerely,    Hernan Garza MD

## 2023-01-23 NOTE — PROGRESS NOTES
ASSESSMENT/PLAN:    (R69.153O) Avulsion fracture of distal fibula  (primary encounter diagnosis)  Comment:   Plan: stable/ improved; handout given to focus on strengthening and proprioception; precautions given; f/u prn     Hernan Garza MD  January 23, 2023  11:38 AM      Pt is a 52 year old female last seen on 12/12/22 here for follow up of:     R ankle - feels great  Pain is 1/10  Feels it w/ lateral motion  Is able to be on the treadmill  Swelling is stable in ankle - has swelling not related to injury   Not requiring medication  Out of boot x 1 month; got a brace on [employer] and is working well  Wore her brace to [place] and did well    Per my last note:  (S89.983O) Avulsion fracture of distal fibula  (primary encounter diagnosis)  Comment: exam/ imaging consistent w/ tiny avulsion at distal fibula/ moderate sprain; will wear boot x 2 weeks and wean out to stirrup ankle brace; will plan to start PT after she returns from her holiday vacation and see me back in 3-4 wks; precautions/anticipatory guidance given  Plan: Physical Therapy Referral, acetaminophen-codeine (TYLENOL #3) 300-30 MG tablet           (S82.452P) Ankle fracture, right, closed, initial encounter  Comment: see above  Plan: Physical Therapy Referral, acetaminophen-codeine (TYLENOL #3) 300-30 MG tablet    Past Medical History:   Diagnosis Date     Ascites      History of blood transfusion      Liver cirrhosis (H)      Thyroid disease       Current Outpatient Medications   Medication Sig Dispense Refill     amLODIPine (NORVASC) 10 MG tablet Take 1 tablet (10 mg) by mouth daily 90 tablet 1     aspirin (ASA) 325 MG EC tablet Take 325 mg by mouth daily       augmented betamethasone dipropionate (DIPROLENE AF) 0.05 % external cream Apply to AA BID x 1-2 weeks then PRN only. Do not apply to face 50 g 2     biotin 1000 MCG TABS tablet Take 1,000 mcg by mouth daily       diclofenac (VOLTAREN) 1 % topical gel Apply 2 g topically 4 times daily 50 g 0      hydrocortisone 2.5 % ointment Apply to lips BID x 1 week then PRN 15 g 3     ketoconazole (NIZORAL) 2 % external cream Apply to lips BID x 1 week then PRN 15 g 3     levothyroxine (SYNTHROID/LEVOTHROID) 75 MCG tablet Take 1 tablet (75 mcg) by mouth daily 90 tablet 0     magnesium oxide (MAG-OX) 400 MG tablet Take 2 tablets (800 mg) by mouth 2 times daily 120 tablet 3     multivitamin (THERMEMS) TABS Take 1 tablet by mouth daily 30 tablet 1     mycophenolate (GENERIC EQUIVALENT) 250 MG capsule Take 250 mg by mouth 2 times daily Weaning off (Patient not taking: Reported on 12/13/2022) 67 capsule 0     ondansetron (ZOFRAN) 4 MG tablet Take 1 tablet (4 mg) by mouth every 6 hours as needed for nausea (Patient not taking: Reported on 12/13/2022) 20 tablet 0     pantoprazole (PROTONIX) 40 MG EC tablet Take 1 tablet (40 mg) by mouth 2 times daily 180 tablet 3     tacrolimus (GENERIC EQUIVALENT) 1 MG capsule Take 3 capsules (3 mg) by mouth every morning AND 2 capsules (2 mg) every evening. 450 capsule 3     tretinoin (RETIN-A) 0.025 % external cream Apply a pea size to entire face QD 45 g 11     ursodiol (ACTIGALL) 300 MG capsule Take 1 capsule (300 mg) by mouth every morning AND 2 capsules (600 mg) every evening. 270 capsule 3      Allergies   Allergen Reactions     Amoxicillin GI Disturbance, Diarrhea, Nausea and Nausea and Vomiting      ROS:   Gen- no fevers/chills   Rheum - no morning stiffness   Derm - no rash/ redness   Neuro - no numbness, no tingling   Remainder of ROS negative.     Exam:     R ankle:  Full ROM  Mild TTP at ATFL, no bony tenderness  Pain w/ resisted inversion/ eversion  Able to single leg stand w/o pain

## 2023-01-24 ENCOUNTER — MYC MEDICAL ADVICE (OUTPATIENT)
Dept: PEDIATRICS | Facility: CLINIC | Age: 53
End: 2023-01-24

## 2023-01-24 DIAGNOSIS — E03.8 SUBCLINICAL HYPOTHYROIDISM: ICD-10-CM

## 2023-01-24 RX ORDER — LEVOTHYROXINE SODIUM 75 UG/1
TABLET ORAL
Qty: 30 TABLET | Refills: 0 | Status: SHIPPED | OUTPATIENT
Start: 2023-01-24 | End: 2023-02-15

## 2023-01-24 RX ORDER — LEVOTHYROXINE SODIUM 75 UG/1
75 TABLET ORAL DAILY
Qty: 90 TABLET | Refills: 0 | OUTPATIENT
Start: 2023-01-24

## 2023-01-24 RX ORDER — LEVOTHYROXINE SODIUM 75 UG/1
TABLET ORAL
Qty: 90 TABLET | OUTPATIENT
Start: 2023-01-24

## 2023-01-24 NOTE — TELEPHONE ENCOUNTER
Routing refill request to provider for review/approval because:  Labs out of range:  TSH  Aniya Shaw RN, BSN  [facility]

## 2023-01-24 NOTE — TELEPHONE ENCOUNTER
Called patient and scheduled her for lab only visit on February 13th, she will have other labs drawn that day as well.     Audrey Bermeo, CMA

## 2023-01-24 NOTE — TELEPHONE ENCOUNTER
Overdue to labs - please call to schedule at patient's earliest convenience. Will provide short term refill for 30 days.   Christine Harris DNP, APRN, CNP

## 2023-01-24 NOTE — TELEPHONE ENCOUNTER
See pt's Mychart message.     - Pt was sent an automated medication denial message. The prescription was signed today.     Replied to pt's Moozeyhart message notifying her that a refill was sent to her pharmacy.     levothyroxine (SYNTHROID/LEVOTHROID) 75 MCG tablet 30 tablet 0 1/24/2023     Lyndsey Lynch RN   PAL (Patient Advocate Liason)  [facility]

## 2023-02-07 NOTE — TELEPHONE ENCOUNTER
I called Maria Elena and left a voice message requesting a call back. This it my second attempt to call patient and discuss chem dep. Resources and engagement in treatment     Plan: Waiting for return call   
I received a VM from Maria Elena on 2/4/2021 calling back from previous message. I called Maria Elena this morning and reviewed options with her. She indicated that she has been in contact with her insurance appealing out of network status. She would prefer to go to our CD treatment here at  and would like to know the out of pocket cost. I also spoke with her niece and they indicated that they might be covered under the appeal, but is unsure at this time. I let Maria Elena know that I will be out the week of the 8th, and she is planning to call me the week of the 15th to update me about the appeal and if we are able to get her into one of out programs (as she prefers). Otherwise, we will local other resources in the community for treatment.     Plan: Maria Elena will call me the week of the 15th to discuss information about appeal/treatment. I will be available for ongoing support.   
Transplant Social Work Services Phone Call    Data: I called and left a  for Maria Elena requesting a call back. Purpose: check in regarding treatment as our [facility] is out of network with her insurance. She was directed to contact her insurance company to find an in-network program.  Intervention: deferred  Assessment: deferred  Education provided by SW: none at this time   Plan: Waiting for return call. Will assist in identifying in-network program if needed    ENRIQUE Rodriguez, [facility]  Liver Transplant   [facility]  Phone: [phone]  Pager: [phone]      
Detail Level: Simple
Detail Level: Zone

## 2023-02-14 ENCOUNTER — LAB (OUTPATIENT)
Dept: LAB | Facility: CLINIC | Age: 53
End: 2023-02-14
Payer: COMMERCIAL

## 2023-02-14 DIAGNOSIS — Z94.4 LIVER REPLACED BY TRANSPLANT (H): ICD-10-CM

## 2023-02-14 DIAGNOSIS — E03.8 SUBCLINICAL HYPOTHYROIDISM: ICD-10-CM

## 2023-02-14 LAB
ALBUMIN SERPL BCG-MCNC: 4.3 G/DL (ref 3.5–5.2)
ALP SERPL-CCNC: 195 U/L (ref 35–104)
ALT SERPL W P-5'-P-CCNC: 17 U/L (ref 10–35)
ANION GAP SERPL CALCULATED.3IONS-SCNC: 11 MMOL/L (ref 7–15)
AST SERPL W P-5'-P-CCNC: 21 U/L (ref 10–35)
BILIRUB DIRECT SERPL-MCNC: <0.2 MG/DL (ref 0–0.3)
BILIRUB SERPL-MCNC: 0.4 MG/DL
BUN SERPL-MCNC: 22.7 MG/DL (ref 6–20)
CALCIUM SERPL-MCNC: 9.9 MG/DL (ref 8.6–10)
CHLORIDE SERPL-SCNC: 106 MMOL/L (ref 98–107)
CREAT SERPL-MCNC: 0.88 MG/DL (ref 0.51–0.95)
DEPRECATED HCO3 PLAS-SCNC: 26 MMOL/L (ref 22–29)
ERYTHROCYTE [DISTWIDTH] IN BLOOD BY AUTOMATED COUNT: 12 % (ref 10–15)
GFR SERPL CREATININE-BSD FRML MDRD: 79 ML/MIN/1.73M2
GLUCOSE SERPL-MCNC: 130 MG/DL (ref 70–99)
HCT VFR BLD AUTO: 37.4 % (ref 35–47)
HGB BLD-MCNC: 12.8 G/DL (ref 11.7–15.7)
MAGNESIUM SERPL-MCNC: 1.7 MG/DL (ref 1.7–2.3)
MCH RBC QN AUTO: 30.5 PG (ref 26.5–33)
MCHC RBC AUTO-ENTMCNC: 34.2 G/DL (ref 31.5–36.5)
MCV RBC AUTO: 89 FL (ref 78–100)
PLATELET # BLD AUTO: 242 10E3/UL (ref 150–450)
POTASSIUM SERPL-SCNC: 5 MMOL/L (ref 3.4–5.3)
PROT SERPL-MCNC: 6.8 G/DL (ref 6.4–8.3)
RBC # BLD AUTO: 4.19 10E6/UL (ref 3.8–5.2)
SODIUM SERPL-SCNC: 143 MMOL/L (ref 136–145)
T4 FREE SERPL-MCNC: 1.31 NG/DL (ref 0.9–1.7)
TACROLIMUS BLD-MCNC: 6.9 UG/L (ref 5–15)
TME LAST DOSE: NORMAL H
TME LAST DOSE: NORMAL H
TSH SERPL DL<=0.005 MIU/L-ACNC: 6.43 UIU/ML (ref 0.3–4.2)
WBC # BLD AUTO: 4.2 10E3/UL (ref 4–11)

## 2023-02-14 PROCEDURE — 80053 COMPREHEN METABOLIC PANEL: CPT

## 2023-02-14 PROCEDURE — 84439 ASSAY OF FREE THYROXINE: CPT

## 2023-02-14 PROCEDURE — 84443 ASSAY THYROID STIM HORMONE: CPT

## 2023-02-14 PROCEDURE — 83735 ASSAY OF MAGNESIUM: CPT

## 2023-02-14 PROCEDURE — 85027 COMPLETE CBC AUTOMATED: CPT

## 2023-02-14 PROCEDURE — 82248 BILIRUBIN DIRECT: CPT

## 2023-02-14 PROCEDURE — 80197 ASSAY OF TACROLIMUS: CPT

## 2023-02-14 PROCEDURE — 36415 COLL VENOUS BLD VENIPUNCTURE: CPT

## 2023-02-15 DIAGNOSIS — E03.8 SUBCLINICAL HYPOTHYROIDISM: ICD-10-CM

## 2023-02-15 RX ORDER — LEVOTHYROXINE SODIUM 88 UG/1
88 TABLET ORAL DAILY
Qty: 60 TABLET | Refills: 0 | Status: SHIPPED | OUTPATIENT
Start: 2023-02-15 | End: 2023-03-28

## 2023-02-15 RX ORDER — LEVOTHYROXINE SODIUM 88 UG/1
TABLET ORAL
Qty: 90 TABLET | OUTPATIENT
Start: 2023-02-15

## 2023-03-01 NOTE — PROGRESS NOTES
Called pt per request of   Pt has procedure scheduled for 10/7, similar procedure had been done one month prior on 9/9. Pt reports she was in pain and had very little appetite, felt nauseated and cramping after the prior procedure. She wondered if she would feel the same after this procedure. Discussed the use of protonix and possibly needing an antiemetic after procedure this time. Advised her to discuss with Dr Jerry and the anesthesiologist on the day of procedure to see if there are suggestions to reduce these symptoms after procedure.    Pt questions answered    Fatoumata Lai, RN, BSN,   [facility]  Care coordinator      
9

## 2023-03-07 ENCOUNTER — OFFICE VISIT (OUTPATIENT)
Dept: DERMATOLOGY | Facility: CLINIC | Age: 53
End: 2023-03-07
Payer: COMMERCIAL

## 2023-03-07 DIAGNOSIS — Z41.1 ELECTIVE PROCEDURE FOR UNACCEPTABLE COSMETIC APPEARANCE: Primary | ICD-10-CM

## 2023-03-07 PROCEDURE — 96999 UNLISTED SPEC DERM SVC/PX: CPT | Performed by: PHYSICIAN ASSISTANT

## 2023-03-07 NOTE — PROGRESS NOTES
HPI:   Chief complaints: Sarah Fisher is a pleasant 52 year old female who presents for treatment of facial rhytids with cosmetic Botox. This is her first botox treatment. She is most bothered by her glabellar wrinkles and crow's feet.       PHYSICAL EXAM:    LMP  (LMP Unknown)   Skin exam performed as follows: Type 2 skin. Mood appropriate  Alert and Oriented X 3. Well developed, well nourished in no distress.  General appearance: Normal  Head including face: Normal  Eyes: conjunctiva and lids: Normal  Mouth: Lips, teeth, gums: Normal  Neck: Normal  Cardiovascular: Exam of peripheral vascular system by observation for swelling, varicosities, edema: Normal  Right upper extremity: Normal  Left upper extremity: Normal  Right lower extremity: Normal  Left lower extremity: Normal  Skin: Scalp and body hair: See below    Age appropriate facial rhytids    ASSESSMENT/PLAN:     1. Facial rhytids - here today for Botox.     --26 units injected to glabella  --12 into each crow's (with 1 unit infraorbitally)   --50 units total  --Cosmetic charge of: $650    BOTOX:  PGACAC discussed.  Risks including but not limited to injection site reaction, bruising, asymmetry, no resolution of rhytides, brow ptosis, dry mouth, tiredness, and headache.  Also label warnings are difficulty with swallowing, speaking, breathing, muscle weakness, loss of bladder control, and double vision/blurred vision.  Explained confirmed report of spread of toxin effect when used for hyperhidrosis of underarms or cosmetic use on face has not been reported.  All questions answered and entertained to patient s satisfaction.  Informed consent obtained.      --Lot: [id number]  --Exp: 03/2025          Follow-up: 3 mo  CC:   Scribed By: Areli Reich, MS, PAEduardoC

## 2023-03-07 NOTE — LETTER
3/7/2023         RE: Sarah Fisher  [address] [place] [address]        Dear Colleague,    Thank you for referring your patient, Sarah Fisher, to the [facility]. Please see a copy of my visit note below.    HPI:   Chief complaints: Sarah Fisher is a pleasant 52 year old female who presents for treatment of facial rhytids with cosmetic Botox. This is her first botox treatment. She is most bothered by her glabellar wrinkles and crow's feet.       PHYSICAL EXAM:    LMP  (LMP Unknown)   Skin exam performed as follows: Type 2 skin. Mood appropriate  Alert and Oriented X 3. Well developed, well nourished in no distress.  General appearance: Normal  Head including face: Normal  Eyes: conjunctiva and lids: Normal  Mouth: Lips, teeth, gums: Normal  Neck: Normal  Cardiovascular: Exam of peripheral vascular system by observation for swelling, varicosities, edema: Normal  Right upper extremity: Normal  Left upper extremity: Normal  Right lower extremity: Normal  Left lower extremity: Normal  Skin: Scalp and body hair: See below    Age appropriate facial rhytids    ASSESSMENT/PLAN:     1. Facial rhytids - here today for Botox.     --26 units injected to glabella  --12 into each crow's (with 1 unit infraorbitally)   --50 units total  --Cosmetic charge of: $650    BOTOX:  PGACAC discussed.  Risks including but not limited to injection site reaction, bruising, asymmetry, no resolution of rhytides, brow ptosis, dry mouth, tiredness, and headache.  Also label warnings are difficulty with swallowing, speaking, breathing, muscle weakness, loss of bladder control, and double vision/blurred vision.  Explained confirmed report of spread of toxin effect when used for hyperhidrosis of underarms or cosmetic use on face has not been reported.  All questions answered and entertained to patient s satisfaction.  Informed consent obtained.      --Lot: [id number]  --Exp:  03/2025          Follow-up: 3 mo  CC:   Scribed By: Areli Reich, MS, PAKAMERON          Again, thank you for allowing me to participate in the care of your patient.        Sincerely,        Areli Reich PA-C

## 2023-03-24 ENCOUNTER — OFFICE VISIT (OUTPATIENT)
Dept: DERMATOLOGY | Facility: CLINIC | Age: 53
End: 2023-03-24
Payer: COMMERCIAL

## 2023-03-24 DIAGNOSIS — Z41.1 ELECTIVE PROCEDURE FOR UNACCEPTABLE COSMETIC APPEARANCE: Primary | ICD-10-CM

## 2023-03-24 PROCEDURE — 99207 PR JUVEDERM ULTRA PLUS XC, 1ML: CPT | Performed by: PHYSICIAN ASSISTANT

## 2023-03-24 PROCEDURE — 96999 UNLISTED SPEC DERM SVC/PX: CPT | Performed by: PHYSICIAN ASSISTANT

## 2023-03-24 ASSESSMENT — PAIN SCALES - GENERAL: PAINLEVEL: NO PAIN (0)

## 2023-03-24 NOTE — PROGRESS NOTES
HPI:   Chief complaints: Sarah Fisher is a pleasant 53 year old female who presents for treatment of facial rhytids with dermal filler. This will be her first filler treatment. She is most bothered by her NLF and MLF.   Additionally, she had cosmetic Botox 2 weeks ago but her glabella is moving as well as her crow's feet.       PHYSICAL EXAM:    LMP  (LMP Unknown)   Breastfeeding No   Skin exam performed as follows: Type 2 skin. Mood appropriate  Alert and Oriented X 3. Well developed, well nourished in no distress.  General appearance: Normal  Head including face: Normal  Eyes: conjunctiva and lids: Normal  Mouth: Lips, teeth, gums: Normal  Neck: Normal  Skin: Scalp and body hair: See below    Age appropriate facial rhytids  Some movement of glabella and crow's feet    ASSESSMENT/PLAN:     1. FILLER INJECTION:  Discussed treatment options and full PGACAC with patient.  Warned S/E of medication to include swelling, redness, bruising, HSV eruption (if prior incidences), skin necrosis, scarring, and incomplete resolution of rhytides.  Informed consent obtained.  Pt tolerated procedure well and no complications; wound care routine.  No pain post procedure. The patient was discharged in good condition.  --2 syrings of Juvederm ultra plus injected  --Areas treated: bilateral nasolabial folds, melolabial folds and cheeks  --Cosmetic charge of $1580   --Lot: [id number]  --Exp: 08/29/2023      2. BOTOX:  PGACAC discussed.  Risks including but not limited to injection site reaction, bruising, asymmetry, no resolution of rhytides, brow ptosis, dry mouth, tiredness, and headache.  Also label warnings are difficulty with swallowing, speaking, breathing, muscle weakness, loss of bladder control, and double vision/blurred vision.  Explained confirmed report of spread of toxin effect when used for hyperhidrosis of underarms or cosmetic use on face has not been reported.  All questions answered and entertained to patient s  satisfaction.  Informed consent obtained.    --7 additional units added to glabella (3 to procerus and 2 to each medial )  --5 to each crow's (3 to inferior and 2 to inferior orbit)  --Cosmetic charge of $221  --Lot: [id number]  --Exp: 09/2025          Follow-up: 3 months  CC:   Scribed By: Areli Reich, MS, PA-C

## 2023-03-24 NOTE — LETTER
3/24/2023         RE: Sarah Fisher  [address] [facility] [address]        Dear Colleague,    Thank you for referring your patient, Sarah Fisher, to the [facility]. Please see a copy of my visit note below.    HPI:   Chief complaints: Sarah Fisher is a pleasant 53 year old female who presents for treatment of facial rhytids with dermal filler. This will be her first filler treatment. She is most bothered by her NLF and MLF.   Additionally, she had cosmetic Botox 2 weeks ago but her glabella is moving as well as her crow's feet.       PHYSICAL EXAM:    LMP  (LMP Unknown)   Breastfeeding No   Skin exam performed as follows: Type 2 skin. Mood appropriate  Alert and Oriented X 3. Well developed, well nourished in no distress.  General appearance: Normal  Head including face: Normal  Eyes: conjunctiva and lids: Normal  Mouth: Lips, teeth, gums: Normal  Neck: Normal  Skin: Scalp and body hair: See below    Age appropriate facial rhytids  Some movement of glabella and crow's feet    ASSESSMENT/PLAN:     1. FILLER INJECTION:  Discussed treatment options and full PGACAC with patient.  Warned S/E of medication to include swelling, redness, bruising, HSV eruption (if prior incidences), skin necrosis, scarring, and incomplete resolution of rhytides.  Informed consent obtained.  Pt tolerated procedure well and no complications; wound care routine.  No pain post procedure. The patient was discharged in good condition.  --2 syrings of Juvederm ultra plus injected  --Areas treated: bilateral nasolabial folds, melolabial folds and cheeks  --Cosmetic charge of $1580   --Lot: [id number]  --Exp: 08/29/2023      2. BOTOX:  PGACAC discussed.  Risks including but not limited to injection site reaction, bruising, asymmetry, no resolution of rhytides, brow ptosis, dry mouth, tiredness, and headache.  Also label warnings are difficulty with swallowing, speaking, breathing,  muscle weakness, loss of bladder control, and double vision/blurred vision.  Explained confirmed report of spread of toxin effect when used for hyperhidrosis of underarms or cosmetic use on face has not been reported.  All questions answered and entertained to patient s satisfaction.  Informed consent obtained.    --7 additional units added to glabella (3 to procerus and 2 to each medial )  --5 to each crow's (3 to inferior and 2 to inferior orbit)  --Cosmetic charge of $221  --Lot: [id number]  --Exp: 09/2025          Follow-up: 3 months  CC:   Scribed By: Areli Reich, MS, PAKAMERON          Again, thank you for allowing me to participate in the care of your patient.        Sincerely,        Areli Reich PA-C

## 2023-03-27 ENCOUNTER — LAB (OUTPATIENT)
Dept: LAB | Facility: CLINIC | Age: 53
End: 2023-03-27
Payer: COMMERCIAL

## 2023-03-27 DIAGNOSIS — E03.8 SUBCLINICAL HYPOTHYROIDISM: ICD-10-CM

## 2023-03-27 LAB — TSH SERPL DL<=0.005 MIU/L-ACNC: 3.79 UIU/ML (ref 0.3–4.2)

## 2023-03-27 PROCEDURE — 36415 COLL VENOUS BLD VENIPUNCTURE: CPT

## 2023-03-27 PROCEDURE — 84443 ASSAY THYROID STIM HORMONE: CPT

## 2023-03-28 DIAGNOSIS — E03.8 SUBCLINICAL HYPOTHYROIDISM: ICD-10-CM

## 2023-03-28 RX ORDER — LEVOTHYROXINE SODIUM 88 UG/1
88 TABLET ORAL DAILY
Qty: 90 TABLET | Refills: 3 | Status: SHIPPED | OUTPATIENT
Start: 2023-03-28 | End: 2024-01-16

## 2023-04-17 DIAGNOSIS — Z94.4 LIVER TRANSPLANT RECIPIENT (H): ICD-10-CM

## 2023-04-17 RX ORDER — URSODIOL 300 MG/1
CAPSULE ORAL
Qty: 270 CAPSULE | Refills: 3 | Status: SHIPPED | OUTPATIENT
Start: 2023-04-17 | End: 2023-10-10

## 2023-04-18 DIAGNOSIS — K13.0 PERLECHE: ICD-10-CM

## 2023-04-18 RX ORDER — KETOCONAZOLE 20 MG/G
CREAM TOPICAL
Qty: 15 G | Refills: 3 | Status: SHIPPED | OUTPATIENT
Start: 2023-04-18 | End: 2023-11-11

## 2023-04-18 NOTE — TELEPHONE ENCOUNTER
Requested Prescriptions   Pending Prescriptions Disp Refills     ketoconazole (NIZORAL) 2 % external cream 15 g 3     Sig: Apply to lips BID x 1 week then PRN       There is no refill protocol information for this order        Last Written Prescription Date:  09/06/2022  Last Fill Quantity: 15g,  # refills: 3   Last office visit: 3/24/2023 ; last virtual visit: Visit date not found with prescribing provider:  Areli Love    Future Office Visit:   Next 5 appointments (look out 90 days)    Apr 19, 2023  9:15 AM  Lab visit with [facility]  [facility] ([facility] ) [address] [place] [address]  [address] [place] [address] [id number] [phone]   Jun 06, 2023 10:00 AM  (Arrive by 9:45 AM)  Return Visit with Areli Love PA-C  [facility] ([facility] ) [address] [address] [phone]           Thank you  Nathaly Harman [facility]

## 2023-04-19 ENCOUNTER — LAB (OUTPATIENT)
Dept: LAB | Facility: CLINIC | Age: 53
End: 2023-04-19
Payer: COMMERCIAL

## 2023-04-19 DIAGNOSIS — Z13.220 LIPID SCREENING: ICD-10-CM

## 2023-04-19 DIAGNOSIS — Z94.4 LIVER REPLACED BY TRANSPLANT (H): ICD-10-CM

## 2023-04-19 LAB
ALBUMIN MFR UR ELPH: 16.9 MG/DL (ref 1–14)
ALBUMIN SERPL BCG-MCNC: 4.6 G/DL (ref 3.5–5.2)
ALBUMIN UR-MCNC: NEGATIVE MG/DL
ALP SERPL-CCNC: 157 U/L (ref 35–104)
ALT SERPL W P-5'-P-CCNC: 20 U/L (ref 10–35)
ANION GAP SERPL CALCULATED.3IONS-SCNC: 11 MMOL/L (ref 7–15)
APPEARANCE UR: CLEAR
AST SERPL W P-5'-P-CCNC: 25 U/L (ref 10–35)
BACTERIA #/AREA URNS HPF: ABNORMAL /HPF
BILIRUB DIRECT SERPL-MCNC: <0.2 MG/DL (ref 0–0.3)
BILIRUB SERPL-MCNC: 0.6 MG/DL
BILIRUB UR QL STRIP: NEGATIVE
BUN SERPL-MCNC: 23 MG/DL (ref 6–20)
CALCIUM SERPL-MCNC: 10.4 MG/DL (ref 8.6–10)
CHLORIDE SERPL-SCNC: 106 MMOL/L (ref 98–107)
CHOLEST SERPL-MCNC: 168 MG/DL
COLOR UR AUTO: YELLOW
CREAT SERPL-MCNC: 0.93 MG/DL (ref 0.51–0.95)
CREAT UR-MCNC: 128 MG/DL
DEPRECATED HCO3 PLAS-SCNC: 27 MMOL/L (ref 22–29)
ERYTHROCYTE [DISTWIDTH] IN BLOOD BY AUTOMATED COUNT: 12.4 % (ref 10–15)
GFR SERPL CREATININE-BSD FRML MDRD: 73 ML/MIN/1.73M2
GLUCOSE SERPL-MCNC: 108 MG/DL (ref 70–99)
GLUCOSE UR STRIP-MCNC: NEGATIVE MG/DL
HCT VFR BLD AUTO: 38.8 % (ref 35–47)
HDLC SERPL-MCNC: 96 MG/DL
HGB BLD-MCNC: 13.4 G/DL (ref 11.7–15.7)
HGB UR QL STRIP: NEGATIVE
KETONES UR STRIP-MCNC: NEGATIVE MG/DL
LDLC SERPL CALC-MCNC: 63 MG/DL
LEUKOCYTE ESTERASE UR QL STRIP: ABNORMAL
MAGNESIUM SERPL-MCNC: 1.9 MG/DL (ref 1.7–2.3)
MCH RBC QN AUTO: 30.5 PG (ref 26.5–33)
MCHC RBC AUTO-ENTMCNC: 34.5 G/DL (ref 31.5–36.5)
MCV RBC AUTO: 88 FL (ref 78–100)
NITRATE UR QL: NEGATIVE
NONHDLC SERPL-MCNC: 72 MG/DL
PH UR STRIP: 7 [PH] (ref 5–7)
PLATELET # BLD AUTO: 237 10E3/UL (ref 150–450)
POTASSIUM SERPL-SCNC: 4.6 MMOL/L (ref 3.4–5.3)
PROT SERPL-MCNC: 7.3 G/DL (ref 6.4–8.3)
PROT/CREAT 24H UR: 0.13 MG/MG CR (ref 0–0.2)
RBC # BLD AUTO: 4.4 10E6/UL (ref 3.8–5.2)
RBC #/AREA URNS AUTO: ABNORMAL /HPF
SODIUM SERPL-SCNC: 144 MMOL/L (ref 136–145)
SP GR UR STRIP: 1.01 (ref 1–1.03)
SQUAMOUS #/AREA URNS AUTO: ABNORMAL /LPF
TACROLIMUS BLD-MCNC: 4.8 UG/L (ref 5–15)
TME LAST DOSE: ABNORMAL H
TME LAST DOSE: ABNORMAL H
TRIGL SERPL-MCNC: 47 MG/DL
UROBILINOGEN UR STRIP-ACNC: 0.2 E.U./DL
WBC # BLD AUTO: 6.3 10E3/UL (ref 4–11)
WBC #/AREA URNS AUTO: ABNORMAL /HPF
WBC CLUMPS #/AREA URNS HPF: PRESENT /HPF

## 2023-04-19 PROCEDURE — 81001 URINALYSIS AUTO W/SCOPE: CPT

## 2023-04-19 PROCEDURE — 36415 COLL VENOUS BLD VENIPUNCTURE: CPT

## 2023-04-19 PROCEDURE — 85027 COMPLETE CBC AUTOMATED: CPT

## 2023-04-19 PROCEDURE — 83735 ASSAY OF MAGNESIUM: CPT

## 2023-04-19 PROCEDURE — 84156 ASSAY OF PROTEIN URINE: CPT

## 2023-04-19 PROCEDURE — 80061 LIPID PANEL: CPT

## 2023-04-19 PROCEDURE — 80197 ASSAY OF TACROLIMUS: CPT

## 2023-04-19 PROCEDURE — 80053 COMPREHEN METABOLIC PANEL: CPT

## 2023-04-19 PROCEDURE — 82248 BILIRUBIN DIRECT: CPT

## 2023-04-20 ENCOUNTER — PATIENT OUTREACH (OUTPATIENT)
Dept: CARE COORDINATION | Facility: CLINIC | Age: 53
End: 2023-04-20
Payer: COMMERCIAL

## 2023-04-21 ENCOUNTER — TELEPHONE (OUTPATIENT)
Dept: TRANSPLANT | Facility: CLINIC | Age: 53
End: 2023-04-21
Payer: COMMERCIAL

## 2023-04-21 DIAGNOSIS — R82.90 ABNORMAL URINE: Primary | ICD-10-CM

## 2023-04-28 ENCOUNTER — ANCILLARY PROCEDURE (OUTPATIENT)
Dept: MAMMOGRAPHY | Facility: CLINIC | Age: 53
End: 2023-04-28
Attending: NURSE PRACTITIONER
Payer: COMMERCIAL

## 2023-04-28 DIAGNOSIS — R82.90 ABNORMAL URINE: ICD-10-CM

## 2023-04-28 DIAGNOSIS — Z12.31 VISIT FOR SCREENING MAMMOGRAM: ICD-10-CM

## 2023-04-28 PROCEDURE — 77063 BREAST TOMOSYNTHESIS BI: CPT | Mod: TC | Performed by: RADIOLOGY

## 2023-04-28 PROCEDURE — 77067 SCR MAMMO BI INCL CAD: CPT | Mod: TC | Performed by: RADIOLOGY

## 2023-04-28 PROCEDURE — 87086 URINE CULTURE/COLONY COUNT: CPT

## 2023-04-30 LAB — BACTERIA UR CULT: NORMAL

## 2023-05-05 ENCOUNTER — ANCILLARY PROCEDURE (OUTPATIENT)
Dept: MAMMOGRAPHY | Facility: CLINIC | Age: 53
End: 2023-05-05
Attending: NURSE PRACTITIONER
Payer: COMMERCIAL

## 2023-05-05 DIAGNOSIS — R92.8 ABNORMAL MAMMOGRAM: ICD-10-CM

## 2023-05-05 PROCEDURE — G0279 TOMOSYNTHESIS, MAMMO: HCPCS | Mod: GC | Performed by: STUDENT IN AN ORGANIZED HEALTH CARE EDUCATION/TRAINING PROGRAM

## 2023-05-05 PROCEDURE — 76642 ULTRASOUND BREAST LIMITED: CPT | Mod: LT | Performed by: STUDENT IN AN ORGANIZED HEALTH CARE EDUCATION/TRAINING PROGRAM

## 2023-05-05 PROCEDURE — 77065 DX MAMMO INCL CAD UNI: CPT | Mod: LT | Performed by: STUDENT IN AN ORGANIZED HEALTH CARE EDUCATION/TRAINING PROGRAM

## 2023-05-11 DIAGNOSIS — I15.9 SECONDARY HYPERTENSION: ICD-10-CM

## 2023-05-12 RX ORDER — AMLODIPINE BESYLATE 10 MG/1
10 TABLET ORAL DAILY
Qty: 90 TABLET | Refills: 0 | Status: SHIPPED | OUTPATIENT
Start: 2023-05-12 | End: 2023-08-11

## 2023-05-15 PROBLEM — N94.9 PAIN OF FEMALE SYMPHYSIS PUBIS: Status: RESOLVED | Noted: 2022-03-24 | Resolved: 2023-05-15

## 2023-05-15 PROBLEM — M99.05 SOMATIC DYSFUNCTION OF PELVIC REGION: Status: RESOLVED | Noted: 2022-03-24 | Resolved: 2023-05-15

## 2023-05-16 ENCOUNTER — TELEPHONE (OUTPATIENT)
Dept: PEDIATRICS | Facility: CLINIC | Age: 53
End: 2023-05-16
Payer: COMMERCIAL

## 2023-05-16 NOTE — TELEPHONE ENCOUNTER
Prior Authorization Retail Medication Request    Medication/Dose: pantoprazole (PROTONIX) 40 MG EC tablet  ICD code (if different than what is on RX):  Z94.4  Previously Tried and Failed:  NA   Rationale:  NA     Insurance Name:  [facility]  Insurance ID:  [id number]       Pharmacy Information (if different than what is on RX)  Name:  [id number]  Phone:  [phone]

## 2023-05-22 NOTE — TELEPHONE ENCOUNTER
Central Prior Authorization Team   Phone: [phone]    PA Initiation    Medication: pantoprazole (PROTONIX) 40 MG EC tablet  Insurance Company: [employer] [place] - Phone [phone] Fax [phone]  Pharmacy Filling the Rx: [id number] [place], [place] - [address]  Filling Pharmacy Phone: [phone]  Filling Pharmacy Fax:    Start Date: 5/22/2023

## 2023-05-22 NOTE — TELEPHONE ENCOUNTER
Prior Authorization Approval    Authorization Effective Date: 5/22/2023  Authorization Expiration Date: 5/22/2024  Medication: pantoprazole (PROTONIX) 40 MG EC tablet  Approved Dose/Quantity:    Reference #:     Insurance Company: [employer] [place] - Phone [phone] Fax [phone]  Expected CoPay:       CoPay Card Available:      Foundation Assistance Needed:    Which Pharmacy is filling the prescription (Not needed for infusion/clinic administered): [id number] - [place], [place] - [address]  Pharmacy Notified: Yes  Patient Notified: Yes  **Instructed pharmacy to notify patient when script is ready to /ship.**

## 2023-05-26 DIAGNOSIS — Z94.4 LIVER TRANSPLANT RECIPIENT (H): ICD-10-CM

## 2023-05-26 DIAGNOSIS — E83.42 HYPOMAGNESEMIA: ICD-10-CM

## 2023-05-26 RX ORDER — MAGNESIUM OXIDE 400 MG/1
400 TABLET ORAL 2 TIMES DAILY
Qty: 60 TABLET | Refills: 2 | Status: SHIPPED | OUTPATIENT
Start: 2023-05-26 | End: 2023-09-11

## 2023-06-06 ENCOUNTER — OFFICE VISIT (OUTPATIENT)
Dept: DERMATOLOGY | Facility: CLINIC | Age: 53
End: 2023-06-06
Payer: COMMERCIAL

## 2023-06-06 DIAGNOSIS — Z41.1 ELECTIVE PROCEDURE FOR UNACCEPTABLE COSMETIC APPEARANCE: Primary | ICD-10-CM

## 2023-06-06 PROCEDURE — 96999 UNLISTED SPEC DERM SVC/PX: CPT | Performed by: PHYSICIAN ASSISTANT

## 2023-06-06 NOTE — LETTER
6/6/2023         RE: Sarah Fisher  [address] [facility] [address]        Dear Colleague,    Thank you for referring your patient, Sarah Fisher, to the [facility]. Please see a copy of my visit note below.    HPI:   Chief complaints: Sarah Fisher is a pleasant 53 year old female who presents for evaluation of treatment of facial rhytids with cosmetic botox. She liked units after LOV.       PHYSICAL EXAM:    LMP  (LMP Unknown)   Skin exam performed as follows: Type 2 skin. Mood appropriate  Alert and Oriented X 3. Well developed, well nourished in no distress.  General appearance: Normal  Head including face: Normal  Eyes: conjunctiva and lids: Normal  Mouth: Lips, teeth, gums: Normal  Neck: Normal  Skin: Scalp and body hair: See below    Age appropriate rhytids of glabella, crows feet    ASSESSMENT/PLAN:     BOTOX:  PGACAC discussed.  Risks including but not limited to injection site reaction, bruising, asymmetry, no resolution of rhytides, brow ptosis, dry mouth, tiredness, and headache.  Also label warnings are difficulty with swallowing, speaking, breathing, muscle weakness, loss of bladder control, and double vision/blurred vision.  Explained confirmed report of spread of toxin effect when used for hyperhidrosis of underarms or cosmetic use on face has not been reported.  All questions answered and entertained to patient s satisfaction.  Informed consent obtained.      --28 units injected to gabella (10 into procerus)  --12 into each crows (with 1 unit infraorbitally)   --52 units total  --Cosmetic cost of $624    Lot: [id number]  Exp: 05/2025    Follow-up: 3 mo  CC:   Scribed By: Areli Reich, MS, RICHARD          Again, thank you for allowing me to participate in the care of your patient.        Sincerely,        Areli Reich PA-C

## 2023-06-06 NOTE — PATIENT INSTRUCTIONS
To prevent bruising    Eat pineapple (lots) prior to your appointment  Arnica - OTC supplement    Also topical vitamin K cream can help bruises resolve more quickly

## 2023-06-06 NOTE — PROGRESS NOTES
HPI:   Chief complaints: Sarah Fisher is a pleasant 53 year old female who presents for evaluation of treatment of facial rhytids with cosmetic botox. She liked units after LOV.       PHYSICAL EXAM:    LMP  (LMP Unknown)   Skin exam performed as follows: Type 2 skin. Mood appropriate  Alert and Oriented X 3. Well developed, well nourished in no distress.  General appearance: Normal  Head including face: Normal  Eyes: conjunctiva and lids: Normal  Mouth: Lips, teeth, gums: Normal  Neck: Normal  Skin: Scalp and body hair: See below    Age appropriate rhytids of glabella, crows feet    ASSESSMENT/PLAN:     BOTOX:  PGACAC discussed.  Risks including but not limited to injection site reaction, bruising, asymmetry, no resolution of rhytides, brow ptosis, dry mouth, tiredness, and headache.  Also label warnings are difficulty with swallowing, speaking, breathing, muscle weakness, loss of bladder control, and double vision/blurred vision.  Explained confirmed report of spread of toxin effect when used for hyperhidrosis of underarms or cosmetic use on face has not been reported.  All questions answered and entertained to patient s satisfaction.  Informed consent obtained.      --28 units injected to gabella (10 into procerus)  --12 into each crows (with 1 unit infraorbitally)   --52 units total  --Cosmetic cost of $624    Lot: [id number]  Exp: 05/2025    Follow-up: 3 mo  CC:   Scribed By: Areli Reich, MS, PA-C

## 2023-06-15 ENCOUNTER — MYC MEDICAL ADVICE (OUTPATIENT)
Dept: PEDIATRICS | Facility: CLINIC | Age: 53
End: 2023-06-15
Payer: COMMERCIAL

## 2023-06-15 DIAGNOSIS — E55.9 VITAMIN D DEFICIENCY: Primary | ICD-10-CM

## 2023-06-15 NOTE — TELEPHONE ENCOUNTER
Spoke with patient.   We scheduled for 6/28/2023.   Patient did update that she takes Vitamin D 5000. Updated medication list.

## 2023-06-15 NOTE — TELEPHONE ENCOUNTER
She recently had her TSH checked 3/2023 and was within normal limits so likely not a need to repeat unless she changed how she is taking the medication or a dose adjustment was made.  I would suggest a visits for further evaluation--Does Emelina have anything tomorrow?    Leigh

## 2023-06-15 NOTE — TELEPHONE ENCOUNTER
Routing to Covering Provider - Would you advise on TSH check? Or visit?    Has lab visit tomorrow to follow up on transplant labs.     TSH   Date Value Ref Range Status   03/27/2023 3.79 0.30 - 4.20 uIU/mL Final   04/19/2022 5.77 (H) 0.40 - 4.00 mU/L Final   03/17/2021 5.38 (H) 0.40 - 4.00 mU/L Final

## 2023-06-27 ENCOUNTER — LAB (OUTPATIENT)
Dept: LAB | Facility: CLINIC | Age: 53
End: 2023-06-27
Payer: COMMERCIAL

## 2023-06-27 DIAGNOSIS — Z94.4 LIVER REPLACED BY TRANSPLANT (H): ICD-10-CM

## 2023-06-27 DIAGNOSIS — R53.83 FATIGUE, UNSPECIFIED TYPE: ICD-10-CM

## 2023-06-27 DIAGNOSIS — E03.8 SUBCLINICAL HYPOTHYROIDISM: ICD-10-CM

## 2023-06-27 LAB
ALBUMIN SERPL BCG-MCNC: 4.4 G/DL (ref 3.5–5.2)
ALP SERPL-CCNC: 166 U/L (ref 35–104)
ALT SERPL W P-5'-P-CCNC: 18 U/L (ref 0–50)
ANION GAP SERPL CALCULATED.3IONS-SCNC: 11 MMOL/L (ref 7–15)
AST SERPL W P-5'-P-CCNC: 22 U/L (ref 0–45)
BILIRUB DIRECT SERPL-MCNC: <0.2 MG/DL (ref 0–0.3)
BILIRUB SERPL-MCNC: 0.4 MG/DL
BUN SERPL-MCNC: 19.6 MG/DL (ref 6–20)
CALCIUM SERPL-MCNC: 9.9 MG/DL (ref 8.6–10)
CHLORIDE SERPL-SCNC: 106 MMOL/L (ref 98–107)
CREAT SERPL-MCNC: 0.88 MG/DL (ref 0.51–0.95)
DEPRECATED HCO3 PLAS-SCNC: 28 MMOL/L (ref 22–29)
ERYTHROCYTE [DISTWIDTH] IN BLOOD BY AUTOMATED COUNT: 11.9 % (ref 10–15)
GFR SERPL CREATININE-BSD FRML MDRD: 78 ML/MIN/1.73M2
GLUCOSE SERPL-MCNC: 105 MG/DL (ref 70–99)
HCT VFR BLD AUTO: 39.3 % (ref 35–47)
HGB BLD-MCNC: 13.4 G/DL (ref 11.7–15.7)
MAGNESIUM SERPL-MCNC: 1.6 MG/DL (ref 1.7–2.3)
MCH RBC QN AUTO: 30 PG (ref 26.5–33)
MCHC RBC AUTO-ENTMCNC: 34.1 G/DL (ref 31.5–36.5)
MCV RBC AUTO: 88 FL (ref 78–100)
PLATELET # BLD AUTO: 223 10E3/UL (ref 150–450)
POTASSIUM SERPL-SCNC: 4.3 MMOL/L (ref 3.4–5.3)
PROT SERPL-MCNC: 7.1 G/DL (ref 6.4–8.3)
RBC # BLD AUTO: 4.46 10E6/UL (ref 3.8–5.2)
SODIUM SERPL-SCNC: 145 MMOL/L (ref 136–145)
TACROLIMUS BLD-MCNC: 6.3 UG/L (ref 5–15)
TME LAST DOSE: NORMAL H
TME LAST DOSE: NORMAL H
WBC # BLD AUTO: 4.9 10E3/UL (ref 4–11)

## 2023-06-27 PROCEDURE — 82306 VITAMIN D 25 HYDROXY: CPT

## 2023-06-27 PROCEDURE — 82248 BILIRUBIN DIRECT: CPT

## 2023-06-27 PROCEDURE — 36415 COLL VENOUS BLD VENIPUNCTURE: CPT

## 2023-06-27 PROCEDURE — 80053 COMPREHEN METABOLIC PANEL: CPT

## 2023-06-27 PROCEDURE — 83735 ASSAY OF MAGNESIUM: CPT

## 2023-06-27 PROCEDURE — 80197 ASSAY OF TACROLIMUS: CPT

## 2023-06-27 PROCEDURE — 85027 COMPLETE CBC AUTOMATED: CPT

## 2023-06-27 PROCEDURE — 84443 ASSAY THYROID STIM HORMONE: CPT

## 2023-06-28 ENCOUNTER — OFFICE VISIT (OUTPATIENT)
Dept: PEDIATRICS | Facility: CLINIC | Age: 53
End: 2023-06-28
Payer: COMMERCIAL

## 2023-06-28 VITALS
WEIGHT: 213 LBS | OXYGEN SATURATION: 98 % | HEIGHT: 66 IN | HEART RATE: 91 BPM | BODY MASS INDEX: 34.23 KG/M2 | TEMPERATURE: 97.6 F | DIASTOLIC BLOOD PRESSURE: 74 MMHG | RESPIRATION RATE: 16 BRPM | SYSTOLIC BLOOD PRESSURE: 112 MMHG

## 2023-06-28 DIAGNOSIS — Z23 ENCOUNTER FOR ADMINISTRATION OF COVID-19 VACCINE: ICD-10-CM

## 2023-06-28 DIAGNOSIS — Z94.4 LIVER TRANSPLANT RECIPIENT (H): ICD-10-CM

## 2023-06-28 DIAGNOSIS — R53.83 FATIGUE, UNSPECIFIED TYPE: Primary | ICD-10-CM

## 2023-06-28 DIAGNOSIS — Z23 ENCOUNTER FOR ADMINISTRATION OF VACCINE: ICD-10-CM

## 2023-06-28 DIAGNOSIS — N18.32 CHRONIC KIDNEY DISEASE, STAGE 3B (H): ICD-10-CM

## 2023-06-28 DIAGNOSIS — E66.09 CLASS 1 OBESITY DUE TO EXCESS CALORIES WITHOUT SERIOUS COMORBIDITY WITH BODY MASS INDEX (BMI) OF 34.0 TO 34.9 IN ADULT: ICD-10-CM

## 2023-06-28 DIAGNOSIS — E03.8 SUBCLINICAL HYPOTHYROIDISM: ICD-10-CM

## 2023-06-28 DIAGNOSIS — R63.5 WEIGHT GAIN: ICD-10-CM

## 2023-06-28 DIAGNOSIS — E66.811 CLASS 1 OBESITY DUE TO EXCESS CALORIES WITHOUT SERIOUS COMORBIDITY WITH BODY MASS INDEX (BMI) OF 34.0 TO 34.9 IN ADULT: ICD-10-CM

## 2023-06-28 LAB — TSH SERPL DL<=0.005 MIU/L-ACNC: 3.74 UIU/ML (ref 0.3–4.2)

## 2023-06-28 PROCEDURE — 90471 IMMUNIZATION ADMIN: CPT | Performed by: NURSE PRACTITIONER

## 2023-06-28 PROCEDURE — 91312 COVID-19 BIVALENT 12+ (PFIZER): CPT | Performed by: NURSE PRACTITIONER

## 2023-06-28 PROCEDURE — 90677 PCV20 VACCINE IM: CPT | Performed by: NURSE PRACTITIONER

## 2023-06-28 PROCEDURE — 99215 OFFICE O/P EST HI 40 MIN: CPT | Mod: 25 | Performed by: NURSE PRACTITIONER

## 2023-06-28 PROCEDURE — 0121A COVID-19 BIVALENT 12+ (PFIZER): CPT | Performed by: NURSE PRACTITIONER

## 2023-06-28 ASSESSMENT — PAIN SCALES - GENERAL: PAINLEVEL: NO PAIN (0)

## 2023-06-28 ASSESSMENT — ENCOUNTER SYMPTOMS: FATIGUE: 1

## 2023-06-28 NOTE — Clinical Note
Hi Dr. Avila,  I saw Maria Elena today for complaints of fatigue. Etiology remains unclear at this time but we are looking into it with some additional blood work. She also reported a 10# weight gain in the past month (no signs of fluid overload/ascites/etc) and Maria Elena expressed interest in medication for weight loss. We briefly discussed a GLP-1 agonist like wegovy. I can't see any absolute contraindications however wanted to get your thoughts on the safety of this medication for her. I appreciate your input! Thanks, Christine Harris, DNP, APRN, CNP

## 2023-06-28 NOTE — PROGRESS NOTES
Assessment & Plan     Fatigue, unspecified type  Subclinical hypothyroidism  Etiology unclear. Will add on a thyroid and vitamin D to the recent blood work collected yesterday. Other considerations would include addressing her frequent urination overnight which may be preventing her from reaching restorative sleep. Would also consider menopause as contributing factor and therefore OB/GYN referral could be considered.   - TSH with free T4 reflex; Future  - Vitamin D Deficiency; Future    Class 1 obesity due to excess calories without serious comorbidity with body mass index (BMI) of 34.0 to 34.9 in adult  Weight gain  Body mass index is 34.38 kg/m .  Reports a 10 pound weight gain in the past month. Notes she typically carries her weight in her abdomen, however she feels the weight gain everywhere, legs, calves. Denies shortness of breath or abdominal swelling concerning for fluid overload. Lungs CTA and abdomen is soft on palpation. Pt expresses interest in starting a medication to assist with weight loss as she wonders if her weight gain is contributing to her fatigue. Briefly discussed GLP-1 agonist like wegovy. Would want to discuss with her transplant team prior to considering GLP-1 agonist - staff message sent. Will follow-up with patient over [facility] when I hear back.     Encounter for administration of vaccine  Liver transplant recipient (H)  Chronic kidney disease, stage 3b (H)  - Pneumococcal 20 Valent Conjugate (Prevnar 20)    Encounter for administration of COVID-19 vaccine  - COVID-19 BIVALENT 12+ (PFIZER)      44 minutes spent by me on the date of the encounter doing chart review, review of test results, interpretation of tests, patient visit, documentation and discussion with other provider(s)        Follow-up: Lab results pending, will follow-up as indicated after reviewing results.     ROBYN Bear CNP  [facility] ALPHONSE Arredondo is a 53 year old, presenting for the  following health issues:  Fatigue        6/28/2023     1:57 PM   Additional Questions   Roomed by juventino   Accompanied by self         6/28/2023     1:57 PM   Patient Reported Additional Medications   Patient reports taking the following new medications no     Reports a 3-4 week history of exhaustion. Has also gained 10 pounds in the past month. Notes she typically carries her weight in her abdomen, however she feels the weight gain everywhere, legs, calves. Denies shortness of breath or abdominal swelling.     Sleep: likely gets a good 8 hours/night, maybe more. She does get up 2-5 times overnight to use the bathroom. No significant issues returning back to sleep.  Exercise: Walks on the [employer] treadmill 3-4 times/week for 30 minutes.   Caffeine: 2-3 cups coffee in the morning. Diet ginger ale or water the remainder of the day.   Mood: no concerns, not worried about depression.     Hot flashes have started which are new for her, has only happened 5 times.   LMP was at least 3 years ago.     Wt Readings from Last 10 Encounters:   06/28/23 96.6 kg (213 lb)   12/10/22 89.2 kg (196 lb 9.6 oz)   11/16/22 89.2 kg (196 lb 9.6 oz)   05/25/22 74.8 kg (165 lb)   05/20/22 77.2 kg (170 lb 3.2 oz)   04/13/22 74.3 kg (163 lb 12.8 oz)   04/12/22 72.6 kg (160 lb)   04/11/22 72.9 kg (160 lb 12.8 oz)   02/25/22 67 kg (147 lb 9.6 oz)   02/22/22 67.1 kg (148 lb)         Patient Active Problem List   Diagnosis     Acute deep vein thrombosis (DVT) of upper extremity (H)     Acute kidney failure, unspecified (H)     Carrier of group B Streptococcus     Cirrhosis of liver with ascites (H)     CKD (chronic kidney disease)     Elevated blood pressure     Family history of colon cancer     Gastroesophageal reflux disease without esophagitis     GI (gastrointestinal bleed)     Hyponatremia     Hypokalemia     History of DVT (deep vein thrombosis)     Anemia associated with acute blood loss     Acute anemia     Need for vaccination for viral  hepatitis     Macrocytosis without anemia     Knee pain     Pancytopenia (H)     Macrocytic anemia     Blood loss anemia     Hydrothorax     Portal hypertensive gastropathy (H)     Pleural effusion     Screening for malignant neoplasm of cervix     Venous incompetence     Varicose veins of leg with pain     Supervision of high-risk pregnancy of elderly primigravida     Subclinical hypothyroidism     Liver failure (H)     Hypotension     Pre-liver transplant, listed     Alcoholic cirrhosis (H)     Abnormal serum iron level     Iron deficiency anemia due to chronic blood loss     Encounter for immunization     Liver transplant candidate     Liver transplant recipient (H)     Immunosuppressed status (H)     Malnutrition (H)     Steroid-induced hyperglycemia     Epistaxis     Esophagitis     Duodenal erosion     Gastric antral vascular ectasia     Hyperphosphatemia     Leukocytosis     Chronic kidney disease, stage 3b (H)     Low magnesium level     Past Medical History:   Diagnosis Date     Ascites      History of blood transfusion      Liver cirrhosis (H)      Thyroid disease      Current Outpatient Medications   Medication     amLODIPine (NORVASC) 10 MG tablet     aspirin (ASA) 325 MG EC tablet     biotin 1000 MCG TABS tablet     cholecalciferol 125 MCG (5000 UT) CAPS     diclofenac (VOLTAREN) 1 % topical gel     hydrocortisone 2.5 % ointment     ketoconazole (NIZORAL) 2 % external cream     levothyroxine (SYNTHROID/LEVOTHROID) 88 MCG tablet     magnesium oxide (MAG-OX) 400 MG tablet     multivitamin (THERMEMS) TABS     mycophenolate (GENERIC EQUIVALENT) 250 MG capsule     ondansetron (ZOFRAN) 4 MG tablet     pantoprazole (PROTONIX) 40 MG EC tablet     tacrolimus (GENERIC EQUIVALENT) 1 MG capsule     tretinoin (RETIN-A) 0.025 % external cream     ursodiol (ACTIGALL) 300 MG capsule     augmented betamethasone dipropionate (DIPROLENE AF) 0.05 % external cream     No current facility-administered medications for this  "visit.        Allergies   Allergen Reactions     Amoxicillin GI Disturbance, Diarrhea, Nausea and Nausea and Vomiting         Review of Systems   Constitutional: Positive for fatigue.          Objective    /74 (Cuff Size: Adult Regular)   Pulse 91   Temp 97.6  F (36.4  C) (Tympanic)   Resp 16   Ht 1.676 m (5' 6\")   Wt 96.6 kg (213 lb)   LMP  (LMP Unknown)   SpO2 98%   BMI 34.38 kg/m    Body mass index is 34.38 kg/m .  Physical Exam  Constitutional:       General: She is not in acute distress.     Appearance: Normal appearance. She is not ill-appearing or toxic-appearing.   Neck:      Thyroid: No thyroid mass, thyromegaly or thyroid tenderness.   Cardiovascular:      Rate and Rhythm: Normal rate and regular rhythm.      Heart sounds: Normal heart sounds. No murmur heard.     No friction rub. No gallop.   Pulmonary:      Effort: Pulmonary effort is normal. No respiratory distress.      Breath sounds: Normal breath sounds. No wheezing, rhonchi or rales.   Lymphadenopathy:      Cervical: No cervical adenopathy.   Skin:     General: Skin is warm and dry.   Neurological:      General: No focal deficit present.      Mental Status: She is alert and oriented to person, place, and time.   Psychiatric:         Mood and Affect: Mood normal.         Behavior: Behavior normal.                   "

## 2023-06-29 ENCOUNTER — MYC MEDICAL ADVICE (OUTPATIENT)
Dept: PEDIATRICS | Facility: CLINIC | Age: 53
End: 2023-06-29
Payer: COMMERCIAL

## 2023-06-29 DIAGNOSIS — E66.811 CLASS 1 OBESITY DUE TO EXCESS CALORIES WITHOUT SERIOUS COMORBIDITY WITH BODY MASS INDEX (BMI) OF 34.0 TO 34.9 IN ADULT: Primary | ICD-10-CM

## 2023-06-29 DIAGNOSIS — E66.09 CLASS 1 OBESITY DUE TO EXCESS CALORIES WITHOUT SERIOUS COMORBIDITY WITH BODY MASS INDEX (BMI) OF 34.0 TO 34.9 IN ADULT: Primary | ICD-10-CM

## 2023-06-29 LAB — DEPRECATED CALCIDIOL+CALCIFEROL SERPL-MC: 50 UG/L (ref 20–75)

## 2023-06-30 ENCOUNTER — TELEPHONE (OUTPATIENT)
Dept: PEDIATRICS | Facility: CLINIC | Age: 53
End: 2023-06-30
Payer: COMMERCIAL

## 2023-06-30 DIAGNOSIS — E66.09 CLASS 1 OBESITY DUE TO EXCESS CALORIES WITHOUT SERIOUS COMORBIDITY WITH BODY MASS INDEX (BMI) OF 34.0 TO 34.9 IN ADULT: Primary | ICD-10-CM

## 2023-06-30 DIAGNOSIS — E66.811 CLASS 1 OBESITY DUE TO EXCESS CALORIES WITHOUT SERIOUS COMORBIDITY WITH BODY MASS INDEX (BMI) OF 34.0 TO 34.9 IN ADULT: Primary | ICD-10-CM

## 2023-06-30 NOTE — TELEPHONE ENCOUNTER
Prior Authorization Retail Medication Request    Medication/Dose: Semaglutide-Weight Management (WEGOVY) 0.25 MG/0.5ML pen  ICD code (if different than what is on RX):  Class 1 obesity due to excess calories without serious comorbidity with body mass index (BMI) of 34.0 to 34.9 in adult [E66.09, Z68.34]  - Primary   Previously Tried and Failed:  na  Rationale:      Insurance Name:  See chart  Insurance ID:

## 2023-07-06 NOTE — TELEPHONE ENCOUNTER
Central Prior Authorization Team   Phone: [phone]    PA Initiation    Medication: Semaglutide-Weight Management (WEGOVY) 0.25 MG/0.5ML pen  Insurance Company: [employer] [place] - Phone [phone] Fax [phone]  [facility] the Rx: [id number] [place], [place] - [address]  Filling Pharmacy Phone: [phone]  Filling Pharmacy Fax:    Start Date: 7/6/2023

## 2023-07-06 NOTE — TELEPHONE ENCOUNTER
PRIOR AUTHORIZATION DENIED    Medication: Semaglutide-Weight Management (WEGOVY) 0.25 MG/0.5ML pen    Denial Date: 7/6/2023    Denial Rational:  Per insurance, medication is excluded from patient's benefit plan and will not be covered. Review and appeal are not available because of this exclusion.        Appeal Information:  N/A

## 2023-07-06 NOTE — TELEPHONE ENCOUNTER
Noel Garner Ea/Carlos Manuel 2 minutes ago (2:38 PM)       Per insurance, medication is excluded from patient's benefit plan and will not be covered. Review and appeal are not available because of this exclusion.

## 2023-07-07 ENCOUNTER — MYC MEDICAL ADVICE (OUTPATIENT)
Dept: PHARMACY | Facility: CLINIC | Age: 53
End: 2023-07-07

## 2023-07-07 DIAGNOSIS — I15.9 SECONDARY HYPERTENSION: ICD-10-CM

## 2023-07-08 ENCOUNTER — HEALTH MAINTENANCE LETTER (OUTPATIENT)
Age: 53
End: 2023-07-08

## 2023-08-08 ENCOUNTER — ANCILLARY PROCEDURE (OUTPATIENT)
Dept: GENERAL RADIOLOGY | Facility: CLINIC | Age: 53
End: 2023-08-08
Attending: PODIATRIST
Payer: COMMERCIAL

## 2023-08-08 ENCOUNTER — OFFICE VISIT (OUTPATIENT)
Dept: PODIATRY | Facility: CLINIC | Age: 53
End: 2023-08-08
Payer: COMMERCIAL

## 2023-08-08 VITALS — WEIGHT: 213 LBS | SYSTOLIC BLOOD PRESSURE: 124 MMHG | DIASTOLIC BLOOD PRESSURE: 80 MMHG | BODY MASS INDEX: 34.38 KG/M2

## 2023-08-08 DIAGNOSIS — M79.671 RIGHT FOOT PAIN: ICD-10-CM

## 2023-08-08 DIAGNOSIS — M79.671 RIGHT FOOT PAIN: Primary | ICD-10-CM

## 2023-08-08 DIAGNOSIS — M19.071 ARTHRITIS OF RIGHT FOOT: ICD-10-CM

## 2023-08-08 DIAGNOSIS — I87.2 EDEMA OF BOTH LOWER EXTREMITIES DUE TO PERIPHERAL VENOUS INSUFFICIENCY: ICD-10-CM

## 2023-08-08 PROCEDURE — 99203 OFFICE O/P NEW LOW 30 MIN: CPT | Performed by: PODIATRIST

## 2023-08-08 PROCEDURE — 73630 X-RAY EXAM OF FOOT: CPT | Mod: TC | Performed by: RADIOLOGY

## 2023-08-08 NOTE — PATIENT INSTRUCTIONS
Thank you for choosing [facility] Podiatry / Foot & Ankle Surgery!    DR GIBBS'S CLINIC:  [facility]   [address]   [place] [address]      TRIAGE LINE: [phone]  APPOINTMENTS: [phone]  RADIOLOGY: [phone]  SET UP SURGERY: [phone]  PHYSICAL THERAPY: [phone]   FAX NUMBER: [phone]  BILLING QUESTIONS: [phone]       Follow up: As needed    Please call to schedule your MRI/CT/Ultrasound/Arthrogram appointment.  The number is [phone].      Osteoarthritis of the Foot and Ankle  What Is Osteoarthritis?  Osteoarthritis is a condition characterized by the breakdown and eventual loss of cartilage in one or more joints. Cartilage (the connective tissue found at the end of the bones in the joints) protects and cushions the bones during movement. When cartilage deteriorates or is lost, symptoms develop that can restrict one s ability to easily perform daily activities.  Osteoarthritis is also known as degenerative arthritis, reflecting its nature to develop as part of the aging process. As the most common form of arthritis, osteoarthritis affects millions of Americans. Some people refer to osteoarthritis simply as arthritis, even though there are many different types of arthritis.  Osteoarthritis appears at various joints throughout the body, including the hands, feet, spine, hips, and knees. In the foot, the disease most frequently occurs in the big toe, although it is also often found in the midfoot and ankle.  Causes  Osteoarthritis is considered a  wear and tear  disease because the cartilage in the joint wears down with repeated stress and use over time. As the cartilage deteriorates and gets thinner, the bones lose their protective covering and eventually may rub together, causing pain and inflammation of the joint.  An injury may also lead to osteoarthritis, although it may take months or years after the injury for the condition to develop. For  example, osteoarthritis in the big toe is often caused by kicking or jamming the toe, or by dropping something on the toe. Osteoarthritis in the midfoot is often caused by dropping something on it, or by a sprain or fracture. In the ankle, osteoarthritis is usually caused by a fracture and occasionally by a severe sprain.  Sometimes osteoarthritis develops as a result of abnormal foot mechanics such as flat feet or high arches. A flat foot causes less stability in the ligaments (bands of tissue that connect bones), resulting in excessive strain on the joints, which can cause arthritis. A high arch is rigid and lacks mobility, causing a jamming of joints that creates an increased risk of arthritis.  Symptoms  People with osteoarthritis in the foot or ankle experience, in varying degrees, one or more of the following:  Pain and stiffness in the joint   Swelling in or near the joint   Difficulty walking or bending the joint   Some patients with osteoarthritis also develop a bone spur (a bony protrusion) at the affected joint. Shoe pressure may cause pain at the site of a bone spur, and in some cases blisters or calluses may form over its surface. Bone spurs can also limit the movement of the joint.  Diagnosis  In diagnosing osteoarthritis, the foot and ankle surgeon will examine the foot thoroughly, looking for swelling in the joint, limited mobility, and pain with movement. In some cases, deformity and/or enlargement (spur) of the joint may be noted. X-rays may be ordered to evaluate the extent of the disease.  Non-surgical Treatment  To help relieve symptoms, the surgeon may begin treating osteoarthritis with one or more of the following non-surgical approaches:  Oral medications. Nonsteroidal anti-inflammatory drugs (NSAIDs), such as ibuprofen, are often helpful in reducing the inflammation and pain. Occasionally a prescription for a steroid medication is needed to adequately reduce symptoms.   Orthotic devices.  Custom orthotic devices (shoe inserts) are often prescribed to provide support to improve the foot s mechanics or cushioning to help minimize pain.   Bracing. Bracing, which restricts motion and supports the joint, can reduce pain during walking and help prevent further deformity.   Immobilization. Protecting the foot from movement by wearing a cast or removable cast-boot may be necessary to allow the inflammation to resolve.   Steroid injections. In some cases, steroid injections are applied to the affected joint to deliver anti-inflammatory medication.   Physical therapy. Exercises to strengthen the muscles, especially when the osteoarthritis occurs in the ankle, may give the patient greater stability and help avoid injury that might worsen the condition.   When Is Surgery Needed?  When osteoarthritis has progressed substantially or failed to improve with non-surgical treatment, surgery may be recommended. In advanced cases, surgery may be the only option. The goal of surgery is to decrease pain and improve function. The foot and ankle surgeon will consider a number of factors when selecting the procedure best suited to the patient s condition and lifestyle.     [facility]  [place]  [address]   [place] [address]  Ph: [phone]  Fax: [phone] [place] [facility]  [address]  [place] [address]  Ph: [phone]  Fax: [phone]   [place]  [address]   [place] [address]  Ph: [phone]  Fax: [phone] [place]  [address]  [place] [address]   Ph: [phone]    [place]  [address]  [place] [address]   Ph: [phone]  [place]  [address]  [place] [address]   Ph: [phone]      [place]  [address]  [place] [address]  Ph: [phone]  [place]  [address]   [place] [address]  Ph: [phone]  Fax: [phone]        PLANTAR FASCIITIS  Plantar fasciitis is often  referred to as heel spurs or heel pain. Plantar fasciitis is a very common problem that affects people of all foot shapes, age, weight and activity level. Pain may be in the arch or on the weight-bearing surface of the heel. The pain may come on without injury or identifiable cause. Pain is generally present when first getting out of bed in the morning or up from a seated break.     CAUSES  The plantar fascia is a dense fibrous band of tissue that stretches across the bottom surface of the foot. The fascia helps support the foot muscles and arch. Plantar fasciitis is thought to be caused by mechanical strain or overload. Frequent walking without shoes or wearing unsupportive shoes is thought to cause structural overload and ultimately inflammation of the plantar fascia. Some people have heel spurs that can be seen on x-ray. The heel spur is actually a minor component of plantar fascitis and is largely ignored.       SELF TREATMENT   The easiest solution is to stop walking around your home without shoes. Plantar fasciitis is largely a shoe problem. Shoes are either not being worn often enough or your current shoes are inadequate for your weight, foot structure or activity level. The majority of shoes on the market today are not sufficient to resist development of plantar fasciitis or to promote healing. Assume that your current shoes are inadequate and will need to be replaced. Even high quality shoes wear out with 6 months to one year of frequent use. Weight loss is another option. Losing ten pounds in the next two months may be enough to resolve the problem. Ice applied to the area of pain two to three times per day for ten minutes each session can be very helpful. Warm foot soaks in epsom salts can also relieve pain. This should continue until the problem resolves. Achilles tendon stretching is essential. Stretch multiple times daily to promote healing and to prevent recurrence in the future. Over all stretching  of the body is helpful as well such as the calves, thighs and lower back. Normally when one area of the body is tight, other areas are too. Gentle Yoga can be good for this.     Over the counter topical anti inflammatories can be helpful such as biofreeze, bengay, salon pas, ect...  Oral ibuprofen or aleve is recommended as well to try to calm down inflammation.     Night splints can be helpful to gradually stretch the foot at night as a lot of pain is when you get up in the morning. Taking a towel or thera band and stretching the foot back multiple times before you get ou of bed can be beneficial as well.     MEDICAL TREATMENT  Medical treatments often include custom arch supports, cortisone injections, physical therapy, splints to be worn in bed, prescription medications and surgery. The home treatments listed above will be necessary regardless of these advanced medical treatments. Surgery is rarely needed but is very helpful in selected cases.     PROGNOSIS  Plantar fasciitis can last from one day to a lifetime. Some people get intermittent fascitis that is very short-lived. Others suffer daily for years. Excessive body weight, frequent bare foot walking, long hours on the feet, inadequate shoes, predisposing foot structures and excessive activity such as running are all potential issues that lead to chronic and/or recurring plantar fascitis. Having plantar fasciitis means that you are forever prone to this problem and will require modification of some of the above factors. Most people seek treatment within one to four months. Healing usually requires a similar one to four month time frame. Healing time is relative to the amount of effort spent treating the problem.   Plantar fasciitis is highly recurrent. Risk factors often continue, including return to bare foot walking, inadequate shoes, excessive body weight, excessive activities, etc. Your life style and foot structure may predispose you to recurrent plantar  fasciitis. A daily prevention regimen can be very helpful. Ongoing use of shoe inserts, careful attention to appropriate shoes, daily Achilles stretching, etc. may prevent recurrence. Prompt attention at the earliest warning signs of heel pain can resolve the problem in as short as a few days.     EXERCISES  Stair Exercise: Step on the stairs with the ball of your foot and hold your position for at least 15 seconds, then slowly step down with the heels of your foot. You can do this daily and as often as you want.   Picking the Towel: Sit comfortably and then pick the towel up with your toes. You can use any object other than a towel as long as the material can be soft and you can pick it up with your toes.  Rolling the Bottle: Use a small ball or frozen water bottle and then roll it around with your foot.   Flex the Toes: Sit comfortably and then flex your toes by pointing it towards the floor or towards your body. This will relax and flex your foot and exercise your plantar fascia, the calf, and the Achilles tendon. The inability of the foot to stretch often causes the bunching up of the plantar fascia area leading to the pain.  Calf/Achilles Stretching: Lay on you back and raise one foot, then point your toes towards the floor. See photo below:               Hold each stretch for 10 seconds. Stretch 10 times per set, three sets per day. Morning, afternoon and evening. If your heel pain is very severe in the morning, consider doing the first set of stretches before you get out of bed.      OVER THE COUNTER INSERT RECOMMENDATIONS  SuperFeet   Sofsole Fit Spenco   Power Step   Walk-Fit Arch Cradles     Most of these can be found at your local [employer], [employer], or online.  **A good high quality over the counter insert should cost around $40-$50      [facility] LOCATIONS  [place]  [address] [place] [address]  [phone]   [address]  [phone] [place] [address]  [place]  [phone]   [address]  [phone]   [place]  [address]  [phone] [place] [address]  [phone]   [place]  [address]  [phone]   [place]  [address] [place] [address]  [phone] [place]  [address]  [phone]

## 2023-08-08 NOTE — PROGRESS NOTES
PATIENT HISTORY:   Sarah Fisher is a 53 year old female who presents to clinic for painful bump to the right foot.  Notes its on the top of the foot.  Noticed it about 2 weeks ago.  It is an aching shooting pain.  Can be 8 out of 10.  Has tried resting and elevation but it has not really helped.  Denies specific injury.  Wondering what is causing the pain and wondering what can be done for it.    Review of Systems:  Patient denies fever, chills, rash, wound, stiffness, limping, numbness, weakness, heart burn, blood in stool, chest pain with activity, calf pain when walking, shortness of breath with activity, chronic cough, easy bleeding/bruising, swelling of ankles, excessive thirst, fatigue, depression, anxiety.comp     PAST MEDICAL HISTORY:   Past Medical History:   Diagnosis Date    Ascites     History of blood transfusion     Liver cirrhosis (H)     Thyroid disease         PAST SURGICAL HISTORY:   Past Surgical History:   Procedure Laterality Date    ABDOMEN SURGERY       SECTION      CV RIGHT HEART CATH MEASUREMENTS RECORDED N/A 2021    Procedure: CV RIGHT HEART CATH;  Surgeon: Joseph Guevara MD;  Location:  HEART CARDIAC CATH LAB    ENDOSCOPIC RETROGRADE CHOLANGIOPANCREATOGRAM N/A 10/25/2021    Procedure: ENDOSCOPIC RETROGRADE CHOLANGIOPANCREATOGRAPHY, WITH biliary sphincterotomy, biliary dilation, and biliary stent placement;  Surgeon: Guru Dominguez Fenton MD;  Location:  OR    ENDOSCOPIC RETROGRADE CHOLANGIOPANCREATOGRAM N/A 2022    Procedure: ENDOSCOPIC RETROGRADE CHOLANGIOPANCREATOGRAPHY WITH BILIARY DILATION, DEBRIS REMOVAL AND STENT PLACEMENT;  Surgeon: Guru Dominguez Fenton MD;  Location: [facility]    ENDOSCOPIC RETROGRADE CHOLANGIOPANCREATOGRAPHY, EXCHANGE TUBE/STENT N/A 2021    Procedure: ENDOSCOPIC RETROGRADE CHOLANGIOPANCREATOGRAPHY, WITH biliary sphincterotomy, stricture dilation, stent exchange;  Surgeon:  Guru Dominguez Fenton MD;  Location: [facility]    ENDOSCOPIC ULTRASOUND UPPER GASTROINTESTINAL TRACT (GI) N/A 2022    Procedure: ENDOSCOPIC ULTRASOUND WITH CORE LIVER BIOPSY;  Surgeon: Guru Dominguez Fenton MD;  [facility]    ESOPHAGOSCOPY, GASTROSCOPY, DUODENOSCOPY (EGD), COMBINED N/A 10/07/2021    Procedure: ESOPHAGOGASTRODUODENOSCOPY (EGD) with hemostasis;  Surgeon: Fox Jerry MD;  Location:  GI    ESOPHAGOSCOPY, GASTROSCOPY, DUODENOSCOPY (EGD), COMBINED N/A 10/22/2021    Procedure: ESOPHAGOGASTRODUODENOSCOPY, WITH BIOPSY;  Surgeon: Fadia Avila MD;  Location: [facility] GI    IR LIVER BIOPSY PERCUTANEOUS  10/26/2021    IR THORACENTESIS  2021    IR THORACENTESIS  2021    THORACENTESIS Left 2021    Procedure: THORACENTESIS;  Surgeon: Almas Irby MD;  [facility]: [facility]    THORACENTESIS N/A 2021    Procedure: THORACENTESIS;  Surgeon: Almas Irby MD;  [facility]    THORACENTESIS N/A 03/10/2021    Procedure: THORACENTESIS;  Surgeon: Almas Irby MD;  [facility]    THORACENTESIS Left 2021    Procedure: THORACENTESIS;  Surgeon: Kennedi Chavez MD;  Location: [facility] OR    THORACENTESIS Left 2021    Procedure: THORACENTESIS;  Surgeon: Jess Ansari PA-C;  Location: [facility] OR    THORACIC SURGERY  2021    TRANSPLANT LIVER RECIPIENT  DONOR N/A 10/17/2021    Procedure: TRANSPLANT, LIVER, RECIPIENT,  DONOR;  Surgeon: Christian Morrison MD;  Location:  OR        MEDICATIONS:   Current Outpatient Medications:     amLODIPine (NORVASC) 10 MG tablet, Take 1 tablet (10 mg) by mouth daily, Disp: 90 tablet, Rfl: 0    aspirin (ASA) 325 MG EC tablet, Take 325 mg by mouth daily, Disp: , Rfl:     biotin 1000 MCG TABS tablet, Take 1,000 mcg by mouth daily, Disp: , Rfl:     cholecalciferol 125 MCG (5000 UT) CAPS, Take 1 capsule (5,000 Units) by mouth daily, Disp: , Rfl:     diclofenac (VOLTAREN) 1 % topical gel, Apply 2 g  topically 4 times daily, Disp: 50 g, Rfl: 0    hydrocortisone 2.5 % ointment, Apply to lips BID x 1 week then PRN, Disp: 15 g, Rfl: 3    ketoconazole (NIZORAL) 2 % external cream, Apply to lips BID x 1 week then PRN, Disp: 15 g, Rfl: 3    levothyroxine (SYNTHROID/LEVOTHROID) 88 MCG tablet, Take 1 tablet (88 mcg) by mouth daily, Disp: 90 tablet, Rfl: 3    magnesium oxide (MAG-OX) 400 MG tablet, Take 1 tablet (400 mg) by mouth 2 times daily, Disp: 60 tablet, Rfl: 2    multivitamin (THERMEMS) TABS, Take 1 tablet by mouth daily, Disp: 30 tablet, Rfl: 1    mycophenolate (GENERIC EQUIVALENT) 250 MG capsule, Take 250 mg by mouth 2 times daily Weaning off, Disp: 67 capsule, Rfl: 0    ondansetron (ZOFRAN) 4 MG tablet, Take 1 tablet (4 mg) by mouth every 6 hours as needed for nausea, Disp: 20 tablet, Rfl: 0    pantoprazole (PROTONIX) 40 MG EC tablet, Take 1 tablet (40 mg) by mouth 2 times daily, Disp: 180 tablet, Rfl: 3    Semaglutide-Weight Management (WEGOVY) 0.25 MG/0.5ML pen, Inject 0.25 mg Subcutaneous once a week, Disp: 2 mL, Rfl: 0    tacrolimus (GENERIC EQUIVALENT) 1 MG capsule, Take 3 capsules (3 mg) by mouth every morning AND 2 capsules (2 mg) every evening., Disp: 450 capsule, Rfl: 3    tretinoin (RETIN-A) 0.025 % external cream, Apply a pea size to entire face QD, Disp: 45 g, Rfl: 11    ursodiol (ACTIGALL) 300 MG capsule, Take 1 capsule (300 mg) by mouth every morning AND 2 capsules (600 mg) every evening., Disp: 270 capsule, Rfl: 3    augmented betamethasone dipropionate (DIPROLENE AF) 0.05 % external cream, Apply to AA BID x 1-2 weeks then PRN only. Do not apply to face (Patient not taking: Reported on 3/24/2023), Disp: 50 g, Rfl: 2     ALLERGIES:    Allergies   Allergen Reactions    Amoxicillin GI Disturbance, Diarrhea, Nausea and Nausea and Vomiting        SOCIAL HISTORY:   Social History     Socioeconomic History    Marital status: Single     Spouse name: Not on file    Number of children: Not on file     Years of education: Not on file    Highest education level: Master's degree (e.g., MA, MS, Roberto, MEd, MSW, SASHA)   Occupational History    Not on file   Tobacco Use    Smoking status: Never    Smokeless tobacco: Never   Vaping Use    Vaping Use: Never used   Substance and Sexual Activity    Alcohol use: Not Currently     Comment: Quit on 6/20/2020    Drug use: Not Currently    Sexual activity: Not Currently     Partners: Male   Other Topics Concern    Parent/sibling w/ CABG, MI or angioplasty before 65F 55M? Not Asked   Social History Narrative    Lives [place]. 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 12 year sonFlakito.         Christine Harris, DNP, APRN, CNP    3/17/2021     Social Determinants of Health     Financial Resource Strain: Low Risk  (4/11/2022)    Overall Financial Resource Strain (CARDIA)     Difficulty of Paying Living Expenses: Not hard at all   Food Insecurity: No Food Insecurity (4/11/2022)    Hunger Vital Sign     Worried About Running Out of Food in the Last Year: Never true     Ran Out of Food in the Last Year: Never true   Transportation Needs: No Transportation Needs (4/11/2022)    PRAPARE - Transportation     Lack of Transportation (Medical): No     Lack of Transportation (Non-Medical): No   Physical Activity: Sufficiently Active (4/11/2022)    Exercise Vital Sign     Days of Exercise per Week: 5 days     Minutes of Exercise per Session: 40 min   Stress: No Stress Concern Present (4/11/2022)    [employer] - Occupational Stress Questionnaire     Feeling of Stress : Not at all   Social Connections: Unknown (4/11/2022)    Social Connection and Isolation Panel [NHANES]     Frequency of Communication with Friends and Family: More than three times a week     Frequency of Social Gatherings with Friends and Family: More than three times a week     Attends Alevism Services: Patient refused     Active Member of Clubs or  Organizations: Yes     Attends Club or Organization Meetings: Not on file     Marital Status: Patient refused   Intimate Partner Violence: Not on file   Housing Stability: Low Risk  (4/11/2022)    Housing Stability Vital Sign     Unable to Pay for Housing in the Last Year: No     Number of Places Lived in the Last Year: 1     Unstable Housing in the Last Year: No        FAMILY HISTORY:   Family History   Problem Relation Age of Onset    No Known Problems Mother     Colon Cancer Father 58    Breast Cancer Maternal Grandmother     No Known Problems Maternal Grandfather     No Known Problems Paternal Grandmother     No Known Problems Paternal Grandfather     Substance Abuse Brother     No Known Problems Sister     No Known Problems Son     Substance Abuse Brother         EXAM:Vitals: Wt 96.6 kg (213 lb)   LMP  (LMP Unknown)   BMI 34.38 kg/m    BMI= Body mass index is 34.38 kg/m .    General appearance: Patient is alert and fully cooperative with history & exam.  No sign of distress is noted during the visit.     Psychiatric: Affect is pleasant & appropriate.  Patient appears motivated to improve health.     Respiratory: Breathing is regular & unlabored while sitting.     HEENT: Hearing is intact to spoken word.  Speech is clear.  No gross evidence of visual impairment that would impact ambulation.     Dermatologic: Skin is intact to both lower extremities without significant lesions, rash or abrasion.  No paronychia or evidence of soft tissue infection is noted.     Vascular: DP & PT pulses are intact & regular bilaterally.  Edema with both lower extremities with diffuse varicosities noted..  CFT and skin temperature is normal to both lower extremities.     Neurologic: Lower extremity sensation is intact to light touch.  No evidence of weakness or contracture in the lower extremities.  No evidence of neuropathy.     Musculoskeletal: Patient is ambulatory without assistive device or brace.  Pain on palpation of the  calcaneal navicular joint and subtalar joint of the right foot.    Radiographs: right foot pain - I personally reviewed the xrays.  Shows minimal degenerative changes to the tarsometatarsal joints.  No fractures are noted.     ASSESSMENT:    Right foot pain  Arthritis of right foot  Edema of both lower extremities due to peripheral venous insufficiency     Medical Decision Making/Plan:  Reviewed [facility] in [facility]. Reviewed and discussed xrays. Talked about arthritis and treatments including orthotics, injections, icing, NSAIDS, bracing, physical therapy, compounding pain cream, or surgical intervention.    At this time would recommend topical pain cream as well as an x-ray guided cortisone injection into the area to try to help with pain.  She also does get some swelling to the feet and legs and has diffuse varicosities  Recommend compression socks as this could be part of the swelling and pain to the feet.  If pain does not improve with these would recommend an MRI of the foot and ankle.    Questions were answered to patient's satisfaction and she will call further questions or concerns.    Patient risk factor: Patient is at medium risk for infection.        Silvana Lora DPM, Podiatry/Foot and Ankle Surgery

## 2023-08-08 NOTE — LETTER
2023         RE: Sarah Fisher  [address] [facility] [address]        Dear Colleague,    Thank you for referring your patient, Sarah Fisher, to the [facility] POD[facility]. Please see a copy of my visit note below.    PATIENT HISTORY:   Sarah Fisher is a 53 year old female who presents to clinic for painful bump to the right foot.  Notes its on the top of the foot.  Noticed it about 2 weeks ago.  It is an aching shooting pain.  Can be 8 out of 10.  Has tried resting and elevation but it has not really helped.  Denies specific injury.  Wondering what is causing the pain and wondering what can be done for it.    Review of Systems:  Patient denies fever, chills, rash, wound, stiffness, limping, numbness, weakness, heart burn, blood in stool, chest pain with activity, calf pain when walking, shortness of breath with activity, chronic cough, easy bleeding/bruising, swelling of ankles, excessive thirst, fatigue, depression, anxiety.comp     PAST MEDICAL HISTORY:   Past Medical History:   Diagnosis Date     Ascites      History of blood transfusion      Liver cirrhosis (H)      Thyroid disease         PAST SURGICAL HISTORY:   Past Surgical History:   Procedure Laterality Date     ABDOMEN SURGERY  2009      SECTION       CV RIGHT HEART CATH MEASUREMENTS RECORDED N/A 2021    Procedure: CV RIGHT HEART CATH;  Surgeon: Joseph Guevara MD;  Location:  HEART CARDIAC CATH LAB     ENDOSCOPIC RETROGRADE CHOLANGIOPANCREATOGRAM N/A 10/25/2021    Procedure: ENDOSCOPIC RETROGRADE CHOLANGIOPANCREATOGRAPHY, WITH biliary sphincterotomy, biliary dilation, and biliary stent placement;  Surgeon: Guru Dominguez Fenton MD;  Location:  OR     ENDOSCOPIC RETROGRADE CHOLANGIOPANCREATOGRAM N/A 2022    Procedure: ENDOSCOPIC RETROGRADE CHOLANGIOPANCREATOGRAPHY WITH BILIARY DILATION, DEBRIS REMOVAL AND STENT PLACEMENT;  Surgeon:  Guru Dominguez Fenton MD;  [facility]     ENDOSCOPIC RETROGRADE CHOLANGIOPANCREATOGRAPHY, EXCHANGE TUBE/STENT N/A 2021    Procedure: ENDOSCOPIC RETROGRADE CHOLANGIOPANCREATOGRAPHY, WITH biliary sphincterotomy, stricture dilation, stent exchange;  Surgeon: Guru Dominguez Fenton MD;  [facility]     ENDOSCOPIC ULTRASOUND UPPER GASTROINTESTINAL TRACT (GI) N/A 2022    Procedure: ENDOSCOPIC ULTRASOUND WITH CORE LIVER BIOPSY;  Surgeon: Guru Dominguez Fenton MD;  [facility]: [facility]     ESOPHAGOSCOPY, GASTROSCOPY, DUODENOSCOPY (EGD), COMBINED N/A 10/07/2021    Procedure: ESOPHAGOGASTRODUODENOSCOPY (EGD) with hemostasis;  Surgeon: Fox Jerry MD;  Location:  GI     ESOPHAGOSCOPY, GASTROSCOPY, DUODENOSCOPY (EGD), COMBINED N/A 10/22/2021    Procedure: ESOPHAGOGASTRODUODENOSCOPY, WITH BIOPSY;  Surgeon: Fadia Avila MD;  Location: [facility] GI     IR LIVER BIOPSY PERCUTANEOUS  10/26/2021     IR THORACENTESIS  2021     IR THORACENTESIS  2021     THORACENTESIS Left 2021    Procedure: THORACENTESIS;  Surgeon: Almas Irby MD;  [facility]: [facility]     THORACENTESIS N/A 2021    Procedure: THORACENTESIS;  Surgeon: Almas Irby MD;  [facility]: [facility]     THORACENTESIS N/A 03/10/2021    Procedure: THORACENTESIS;  Surgeon: Almas Irby MD;  [facility]     THORACENTESIS Left 2021    Procedure: THORACENTESIS;  Surgeon: Kennedi Chavez MD;  Location: [facility] OR     THORACENTESIS Left 2021    Procedure: THORACENTESIS;  Surgeon: Jess Ansari PA-C;  Location: [facility] OR     THORACIC SURGERY  2021     TRANSPLANT LIVER RECIPIENT  DONOR N/A 10/17/2021    Procedure: TRANSPLANT, LIVER, RECIPIENT,  DONOR;  Surgeon: Christian Morrison MD;  [facility]        MEDICATIONS:   Current Outpatient Medications:      amLODIPine (NORVASC) 10 MG tablet, Take 1 tablet (10 mg) by mouth daily, Disp: 90 tablet, Rfl: 0     aspirin (ASA)  325 MG EC tablet, Take 325 mg by mouth daily, Disp: , Rfl:      biotin 1000 MCG TABS tablet, Take 1,000 mcg by mouth daily, Disp: , Rfl:      cholecalciferol 125 MCG (5000 UT) CAPS, Take 1 capsule (5,000 Units) by mouth daily, Disp: , Rfl:      diclofenac (VOLTAREN) 1 % topical gel, Apply 2 g topically 4 times daily, Disp: 50 g, Rfl: 0     hydrocortisone 2.5 % ointment, Apply to lips BID x 1 week then PRN, Disp: 15 g, Rfl: 3     ketoconazole (NIZORAL) 2 % external cream, Apply to lips BID x 1 week then PRN, Disp: 15 g, Rfl: 3     levothyroxine (SYNTHROID/LEVOTHROID) 88 MCG tablet, Take 1 tablet (88 mcg) by mouth daily, Disp: 90 tablet, Rfl: 3     magnesium oxide (MAG-OX) 400 MG tablet, Take 1 tablet (400 mg) by mouth 2 times daily, Disp: 60 tablet, Rfl: 2     multivitamin (THERMEMS) TABS, Take 1 tablet by mouth daily, Disp: 30 tablet, Rfl: 1     mycophenolate (GENERIC EQUIVALENT) 250 MG capsule, Take 250 mg by mouth 2 times daily Weaning off, Disp: 67 capsule, Rfl: 0     ondansetron (ZOFRAN) 4 MG tablet, Take 1 tablet (4 mg) by mouth every 6 hours as needed for nausea, Disp: 20 tablet, Rfl: 0     pantoprazole (PROTONIX) 40 MG EC tablet, Take 1 tablet (40 mg) by mouth 2 times daily, Disp: 180 tablet, Rfl: 3     Semaglutide-Weight Management (WEGOVY) 0.25 MG/0.5ML pen, Inject 0.25 mg Subcutaneous once a week, Disp: 2 mL, Rfl: 0     tacrolimus (GENERIC EQUIVALENT) 1 MG capsule, Take 3 capsules (3 mg) by mouth every morning AND 2 capsules (2 mg) every evening., Disp: 450 capsule, Rfl: 3     tretinoin (RETIN-A) 0.025 % external cream, Apply a pea size to entire face QD, Disp: 45 g, Rfl: 11     ursodiol (ACTIGALL) 300 MG capsule, Take 1 capsule (300 mg) by mouth every morning AND 2 capsules (600 mg) every evening., Disp: 270 capsule, Rfl: 3     augmented betamethasone dipropionate (DIPROLENE AF) 0.05 % external cream, Apply to AA BID x 1-2 weeks then PRN only. Do not apply to face (Patient not taking: Reported on  3/24/2023), Disp: 50 g, Rfl: 2     ALLERGIES:    Allergies   Allergen Reactions     Amoxicillin GI Disturbance, Diarrhea, Nausea and Nausea and Vomiting        SOCIAL HISTORY:   Social History     Socioeconomic History     Marital status: Single     Spouse name: Not on file     Number of children: Not on file     Years of education: Not on file     Highest education level: Master's degree (e.g., MA, MS, Roberto, MEd, MSW, SASHA)   Occupational History     Not on file   Tobacco Use     Smoking status: Never     Smokeless tobacco: Never   Vaping Use     Vaping Use: Never used   Substance and Sexual Activity     Alcohol use: Not Currently     Comment: Quit on 6/20/2020     Drug use: Not Currently     Sexual activity: Not Currently     Partners: Male   Other Topics Concern     Parent/sibling w/ CABG, MI or angioplasty before 65F 55M? Not Asked   Social History Narrative    Lives [place]. 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 12 year sonFlakito.         Christine Harris, DNP, APRN, CNP    3/17/2021     Social Determinants of Health     Financial Resource Strain: Low Risk  (4/11/2022)    Overall Financial Resource Strain (CARDIA)      Difficulty of Paying Living Expenses: Not hard at all   Food Insecurity: No Food Insecurity (4/11/2022)    Hunger Vital Sign      Worried About Running Out of Food in the Last Year: Never true      Ran Out of Food in the Last Year: Never true   Transportation Needs: No Transportation Needs (4/11/2022)    PRAPARE - Transportation      Lack of Transportation (Medical): No      Lack of Transportation (Non-Medical): No   Physical Activity: Sufficiently Active (4/11/2022)    Exercise Vital Sign      Days of Exercise per Week: 5 days      Minutes of Exercise per Session: 40 min   Stress: No Stress Concern Present (4/11/2022)    [employer] - Occupational Stress Questionnaire      Feeling of Stress : Not at all   Social  Connections: Unknown (4/11/2022)    Social Connection and Isolation Panel [NHANES]      Frequency of Communication with Friends and Family: More than three times a week      Frequency of Social Gatherings with Friends and Family: More than three times a week      Attends Zoroastrian Services: Patient refused      Active Member of Clubs or Organizations: Yes      Attends Club or Organization Meetings: Not on file      Marital Status: Patient refused   Intimate Partner Violence: Not on file   Housing Stability: Low Risk  (4/11/2022)    Housing Stability Vital Sign      Unable to Pay for Housing in the Last Year: No      Number of Places Lived in the Last Year: 1      Unstable Housing in the Last Year: No        FAMILY HISTORY:   Family History   Problem Relation Age of Onset     No Known Problems Mother      Colon Cancer Father 58     Breast Cancer Maternal Grandmother      No Known Problems Maternal Grandfather      No Known Problems Paternal Grandmother      No Known Problems Paternal Grandfather      Substance Abuse Brother      No Known Problems Sister      No Known Problems Son      Substance Abuse Brother         EXAM:Vitals: Wt 96.6 kg (213 lb)   LMP  (LMP Unknown)   BMI 34.38 kg/m    BMI= Body mass index is 34.38 kg/m .    General appearance: Patient is alert and fully cooperative with history & exam.  No sign of distress is noted during the visit.     Psychiatric: Affect is pleasant & appropriate.  Patient appears motivated to improve health.     Respiratory: Breathing is regular & unlabored while sitting.     HEENT: Hearing is intact to spoken word.  Speech is clear.  No gross evidence of visual impairment that would impact ambulation.     Dermatologic: Skin is intact to both lower extremities without significant lesions, rash or abrasion.  No paronychia or evidence of soft tissue infection is noted.     Vascular: DP & PT pulses are intact & regular bilaterally.  Edema with both lower extremities with  diffuse varicosities noted..  CFT and skin temperature is normal to both lower extremities.     Neurologic: Lower extremity sensation is intact to light touch.  No evidence of weakness or contracture in the lower extremities.  No evidence of neuropathy.     Musculoskeletal: Patient is ambulatory without assistive device or brace.  Pain on palpation of the calcaneal navicular joint and subtalar joint of the right foot.    Radiographs: right foot pain - I personally reviewed the xrays.  Shows minimal degenerative changes to the tarsometatarsal joints.  No fractures are noted.     ASSESSMENT:    Right foot pain  Arthritis of right foot  Edema of both lower extremities due to peripheral venous insufficiency     Medical Decision Making/Plan:  Reviewed patient's chart in [facility]. Reviewed and discussed xrays. Talked about arthritis and treatments including orthotics, injections, icing, NSAIDS, bracing, physical therapy, compounding pain cream, or surgical intervention.    At this time would recommend topical pain cream as well as an x-ray guided cortisone injection into the area to try to help with pain.  She also does get some swelling to the feet and legs and has diffuse varicosities  Recommend compression socks as this could be part of the swelling and pain to the feet.  If pain does not improve with these would recommend an MRI of the foot and ankle.    Questions were answered to patient's satisfaction and she will call further questions or concerns.    Patient risk factor: Patient is at medium risk for infection.        Silvana Lora DPM, Podiatry/Foot and Ankle Surgery      Again, thank you for allowing me to participate in the care of your patient.        Sincerely,        Silvana Lora DPM, Podiatry/Foot and Ankle Surgery

## 2023-08-14 ENCOUNTER — MYC REFILL (OUTPATIENT)
Dept: PEDIATRICS | Facility: CLINIC | Age: 53
End: 2023-08-14
Payer: COMMERCIAL

## 2023-08-14 DIAGNOSIS — I15.9 SECONDARY HYPERTENSION: ICD-10-CM

## 2023-08-14 RX ORDER — AMLODIPINE BESYLATE 10 MG/1
10 TABLET ORAL DAILY
Qty: 90 TABLET | Refills: 0 | Status: SHIPPED | OUTPATIENT
Start: 2023-08-14 | End: 2023-10-16

## 2023-08-14 NOTE — TELEPHONE ENCOUNTER
Routing refill request to provider for review/approval because:  Unable to approve medication, routing to PCP    Dina Lora RN on 8/14/2023 at 3:46 PM

## 2023-08-16 ENCOUNTER — VIRTUAL VISIT (OUTPATIENT)
Dept: PHARMACY | Facility: CLINIC | Age: 53
End: 2023-08-16
Attending: NURSE PRACTITIONER
Payer: COMMERCIAL

## 2023-08-16 ENCOUNTER — HOSPITAL ENCOUNTER (OUTPATIENT)
Dept: GENERAL RADIOLOGY | Facility: CLINIC | Age: 53
Discharge: HOME | End: 2023-08-16
Attending: PODIATRIST | Admitting: PODIATRIST
Payer: COMMERCIAL

## 2023-08-16 ENCOUNTER — MYC MEDICAL ADVICE (OUTPATIENT)
Dept: PEDIATRICS | Facility: CLINIC | Age: 53
End: 2023-08-16
Payer: COMMERCIAL

## 2023-08-16 VITALS — DIASTOLIC BLOOD PRESSURE: 68 MMHG | SYSTOLIC BLOOD PRESSURE: 111 MMHG

## 2023-08-16 DIAGNOSIS — E03.8 SUBCLINICAL HYPOTHYROIDISM: ICD-10-CM

## 2023-08-16 DIAGNOSIS — M19.071 ARTHRITIS OF RIGHT FOOT: ICD-10-CM

## 2023-08-16 DIAGNOSIS — Z78.9 TAKES DIETARY SUPPLEMENTS: ICD-10-CM

## 2023-08-16 DIAGNOSIS — K21.9 GASTROESOPHAGEAL REFLUX DISEASE WITHOUT ESOPHAGITIS: ICD-10-CM

## 2023-08-16 DIAGNOSIS — E66.9 OBESITY, UNSPECIFIED CLASSIFICATION, UNSPECIFIED OBESITY TYPE, UNSPECIFIED WHETHER SERIOUS COMORBIDITY PRESENT: ICD-10-CM

## 2023-08-16 DIAGNOSIS — I10 ESSENTIAL HYPERTENSION: ICD-10-CM

## 2023-08-16 DIAGNOSIS — R73.9 ELEVATED BLOOD SUGAR LEVEL: Primary | ICD-10-CM

## 2023-08-16 DIAGNOSIS — R52 PAIN: ICD-10-CM

## 2023-08-16 DIAGNOSIS — R11.0 NAUSEA: ICD-10-CM

## 2023-08-16 DIAGNOSIS — L98.9 SKIN DISORDER: ICD-10-CM

## 2023-08-16 DIAGNOSIS — M79.671 RIGHT FOOT PAIN: ICD-10-CM

## 2023-08-16 DIAGNOSIS — I87.2 EDEMA OF BOTH LOWER EXTREMITIES DUE TO PERIPHERAL VENOUS INSUFFICIENCY: ICD-10-CM

## 2023-08-16 DIAGNOSIS — Z94.4 LIVER TRANSPLANT RECIPIENT (H): ICD-10-CM

## 2023-08-16 DIAGNOSIS — E66.9 OBESITY: ICD-10-CM

## 2023-08-16 DIAGNOSIS — Z86.718 PERSONAL HISTORY OF DVT (DEEP VEIN THROMBOSIS): ICD-10-CM

## 2023-08-16 PROCEDURE — 99207 PR NO CHARGE LOS: CPT | Performed by: PHARMACIST

## 2023-08-16 PROCEDURE — 250N000009 HC RX 250

## 2023-08-16 PROCEDURE — 255N000002 HC RX 255 OP 636: Mod: JZ | Performed by: PODIATRIST

## 2023-08-16 PROCEDURE — 77002 NEEDLE LOCALIZATION BY XRAY: CPT

## 2023-08-16 PROCEDURE — 250N000011 HC RX IP 250 OP 636: Mod: JZ

## 2023-08-16 RX ORDER — LIDOCAINE HYDROCHLORIDE 10 MG/ML
5 INJECTION, SOLUTION EPIDURAL; INFILTRATION; INTRACAUDAL; PERINEURAL ONCE
Status: COMPLETED | OUTPATIENT
Start: 2023-08-16 | End: 2023-08-16

## 2023-08-16 RX ORDER — BUPIVACAINE HYDROCHLORIDE 5 MG/ML
INJECTION, SOLUTION EPIDURAL; INTRACAUDAL
Status: COMPLETED
Start: 2023-08-16 | End: 2023-08-16

## 2023-08-16 RX ORDER — AMLODIPINE BESYLATE 10 MG/1
10 TABLET ORAL DAILY
Qty: 90 TABLET | Refills: 0 | OUTPATIENT
Start: 2023-08-16

## 2023-08-16 RX ORDER — LIDOCAINE HYDROCHLORIDE 10 MG/ML
INJECTION, SOLUTION EPIDURAL; INFILTRATION; INTRACAUDAL; PERINEURAL
Status: COMPLETED
Start: 2023-08-16 | End: 2023-08-16

## 2023-08-16 RX ORDER — TRIAMCINOLONE ACETONIDE 40 MG/ML
40 INJECTION, SUSPENSION INTRA-ARTICULAR; INTRAMUSCULAR ONCE
Status: COMPLETED | OUTPATIENT
Start: 2023-08-16 | End: 2023-08-16

## 2023-08-16 RX ORDER — BUPIVACAINE HYDROCHLORIDE 5 MG/ML
5 INJECTION, SOLUTION EPIDURAL; INTRACAUDAL ONCE
Status: COMPLETED | OUTPATIENT
Start: 2023-08-16 | End: 2023-08-16

## 2023-08-16 RX ORDER — TRIAMCINOLONE ACETONIDE 40 MG/ML
INJECTION, SUSPENSION INTRA-ARTICULAR; INTRAMUSCULAR
Status: COMPLETED
Start: 2023-08-16 | End: 2023-08-16

## 2023-08-16 RX ORDER — IOPAMIDOL 408 MG/ML
10 INJECTION, SOLUTION INTRATHECAL ONCE
Status: COMPLETED | OUTPATIENT
Start: 2023-08-16 | End: 2023-08-16

## 2023-08-16 RX ADMIN — BUPIVACAINE HYDROCHLORIDE 25 MG: 5 INJECTION, SOLUTION EPIDURAL; INTRACAUDAL at 11:24

## 2023-08-16 RX ADMIN — IOPAMIDOL 1 ML: 408 INJECTION, SOLUTION INTRATHECAL at 11:23

## 2023-08-16 RX ADMIN — BUPIVACAINE HYDROCHLORIDE 25 MG: 5 INJECTION, SOLUTION EPIDURAL; INTRACAUDAL; PERINEURAL at 11:24

## 2023-08-16 RX ADMIN — LIDOCAINE HYDROCHLORIDE 1 ML: 10 INJECTION, SOLUTION EPIDURAL; INFILTRATION; INTRACAUDAL; PERINEURAL at 11:24

## 2023-08-16 RX ADMIN — TRIAMCINOLONE ACETONIDE 40 MG: 40 INJECTION, SUSPENSION INTRA-ARTICULAR; INTRAMUSCULAR at 11:24

## 2023-08-16 NOTE — PROGRESS NOTES
Medication Therapy Management (MTM) Encounter    ASSESSMENT:                            Medication Adherence/Access: See below for considerations    Class 1 obesity due to excess calories without serious comorbidity with body mass index (BMI) of 34.0 to 34.9 in adult:  I would agree that ideally prefer GLP1 RA (+GIP RA) agent for weight loss. Unfortunately her insurance does not cover any brand name weight loss medications at this time. Reasonable to trial a diabetes for her history of elevated fasting blood sugars. If she continues to be denied then will need to consider generic agents only which would include: phentermine, naltrexone + bupropion, and topiramate.   - do not recommend phentermine due to CV risk/elevated blood pressure risk.  - avoid in active liver disease  - bupropion in combo with naltrexone only  - topiramate caution worsening fatigue-cognitive issues     History of liver transplant:  Tacrolimus at goal. stable     Hypomagnesemia-supplements:   stable     Hypertension:   stable    Skin disorders:  stable    Hypothyroidism:   Stable, tsh wnl    Nausea:  stable    GERD:   stable    History of DVT:  stable    Pain:  stable    PLAN:                            Will send Ozempic with diagnosis of elevated blood sugars.    Follow-up: Return if symptoms worsen or fail to improve, for Medication Therapy Telephone Visit.    SUBJECTIVE/OBJECTIVE:                          Maria Elena Fisher is a 53 year old female called for an initial visit. She was referred to me from Christine Harris-primary care provider. She has worked with Ming Archer, Sas[employer] in the past 2/2022.    Reason for visit: weight loss medication coverage help.    Allergies/ADRs: Reviewed in chart  Past Medical History: Reviewed in chart  Tobacco: She reports that she has never smoked. She has never used smokeless tobacco.  Alcohol: not currently using    Medication Adherence/Access: no issues reported    Class 1 obesity due to excess calories  without serious comorbidity with body mass index (BMI) of 34.0 to 34.9 in adult:  Taking none. Primary care provider did send Rx for Wegovy but it was not covered. The prior authorization was denied and unable to appeal due to plan exclusion. She reports weight gain has been bothersome for her. Feels it impacting her energy and fatigue. She has not been on other medications for weight loss in the past. Her SOT did agree to her starting an injectable medication.     History of liver transplant:  Tacrolimus 3 mg in the morning and 2 mg in the evening   Ursodiol 300 mcg in the morning and 600 mg in the evening     Taking regularly, no side effects reported. Last level is meeting goal. Following with her transplant regularly/closely.      Hypomagnesemia-supplements:   Magnesium 400mg twice daily  Biotin 1000 mcg daily   Vitamin D3 5000 mcg daily   Multivitamin daily     Magnesium was reduced earlier this year due to elevated level.     Hypertension:   Amlodipine 10mg daily     Patient does not self-monitor blood pressure.  Patient reports no current medication side effects.    Skin disorders:  ketoconazole 2% cream as needed dry lips  Hydrocortisone 2.5% as needed   Betamethasone as needed   Tretinoin cream daily for redness in her face    She reports using the topicals only when needed.     Hypothyroidism:   Levothyroxine 88 mcg daily    Patient is having the following symptoms: hypothyroidism -  fatigue and weight gain    Nausea:  Ondansetron 4 mg tablet as needed     Very rarely has nausea. When she was taking the iron supplementation it would trigger the nausea.     GERD:   Pantoprazole 40 mg once daily, twice daily     Patient feels that current regimen is effective. GIB in 2020, may of been due to varices.     History of DVT:  Aspirin 81 mg daily     Patient denies any issues and has been on the aspirin long-term now.     Pain:  Diclofenac gel as needed     Using on occasion and feels is effective.    Today's  Vitals: LMP  (LMP Unknown)   ----------------      I spent 32 minutes with this patient today. All changes were made via collaborative practice agreement with ROBYN Bear CNP. A copy of the visit note was provided to the patient's provider(s).    A summary of these recommendations was sent via [employer].    Umm Turner, PharmD  Medication Therapy Management Pharmacist  [phone]      Telemedicine Visit Details  Type of service:  Telephone visit  Start Time:  1:30pm  End Time:  2:02p     Medication Therapy Recommendations  Obesity    Current Medication: Semaglutide-Weight Management (WEGOVY) 0.25 MG/0.5ML pen (Discontinued)   Rationale: Cannot afford medication product - Cost - Adherence   Recommendation: Change Medication - Ozempic (0.25 or 0.5 MG/DOSE) 2 MG/1.5ML Sopn   Status: Accepted per CPA

## 2023-08-16 NOTE — PROGRESS NOTES
Pt was in Radiology today for a right naviculo cuboid joint steroid injection . Pt tolerated ortho procedure well. Pre procedure pain was 6/10 post procedure pain level 0. Procedure was completed by AIDEN CASON. There were no complications during this procedure. Pt verbalized understanding of written and verbal instructions and left department in stable and satisfactory condition with self. There is no evidence of bleeding or any other complications upon discharge.

## 2023-08-16 NOTE — PROGRESS NOTES
RADIOLOGY PROCEDURE NOTE  Patient name: Sarah Fisher  MRN: [id number]  : 1970    Pre-procedure diagnosis: Right foot pain  Post-procedure diagnosis: Same    Procedure Date/Time: 2023  11:22 AM  Procedure: Right naviculocuboid injection.  Estimated blood loss: None  Specimen(s) collected with description: none  The patient tolerated the procedure well with no immediate complications.  Significant findings:none    See imaging dictation for procedural details.    Provider name: Rene Wirght PA-C  Assistant(s):None

## 2023-08-17 ENCOUNTER — TELEPHONE (OUTPATIENT)
Dept: PEDIATRICS | Facility: CLINIC | Age: 53
End: 2023-08-17
Payer: COMMERCIAL

## 2023-08-17 NOTE — TELEPHONE ENCOUNTER
Prior Authorization Retail Medication Request    Medication/Dose: semaglutide (OZEMPIC) 2 MG/3ML pen   ICD code (if different than what is on RX):  Elevated blood sugar level [R73.9]  - Primary   Previously Tried and Failed:    Rationale:      Insurance Name:    Insurance ID:  [id number]      Pharmacy Information (if different than what is on RX)  Name:  [facility]  Phone:

## 2023-08-22 NOTE — TELEPHONE ENCOUNTER
Central Prior Authorization Team   Phone: [phone]    PA Initiation    Medication: OZEMPIC (0.25 OR 0.5 MG/DOSE) 2 MG/3ML SC SOPN  Insurance Company: [employer] - Phone [phone] Fax [phone]  Pharmacy Filling the Rx: [id number] [address] AT [facility] [address]  Filling Pharmacy Phone: [phone]  Filling Pharmacy Fax:    Start Date: 8/22/2023    Started PA on CMM and a response of Product not covered for this health plan/disease state/medication as submitted. Product not covered by this plan for this diagnosis. For a FDA label approved diagnosis, please resubmit with an ICD 10 code or submit via other methods.    Insurance will ONLY cover medication when it is being prescribed for FDA approved diagnosis of Type 2 diabetes mellitus, insurance will not even do a PA for any other diagnosis.

## 2023-08-23 NOTE — PATIENT INSTRUCTIONS
"Recommendations from today's MTM visit:                                                      Will send Ozempic with diagnosis of elevated blood sugars.    Follow-up: Return if symptoms worsen or fail to improve, for Medication Therapy Telephone Visit.    It was great speaking with you today.  I value your experience and would be very thankful for your time in providing feedback in our clinic survey. In the next few days, you may receive an email or text message from [facility] [employer] with a link to a survey related to your  clinical pharmacist.\"     To schedule another MTM appointment, please call the clinic directly or you may call the MTM scheduling line at [phone] or toll-free at [phone].     My Clinical Pharmacist's contact information:                                                      Please feel free to contact me with any questions or concerns you have.      Umm Turner, PharmD  Medication Therapy Management Pharmacist  [phone]     "

## 2023-08-24 NOTE — TELEPHONE ENCOUNTER
Chelsey Bennett Ea/Rm2 days ago       PA cannot be completed as patient does not have an FDA approved diagnosis. If done with encounter, please notify the patient and close the encounter.    Thank you,  Central PA Team

## 2023-08-28 ENCOUNTER — LAB (OUTPATIENT)
Dept: LAB | Facility: CLINIC | Age: 53
End: 2023-08-28
Payer: COMMERCIAL

## 2023-08-28 DIAGNOSIS — R74.8 ELEVATED ALKALINE PHOSPHATASE LEVEL: Primary | ICD-10-CM

## 2023-08-28 DIAGNOSIS — R74.8 ELEVATED ALKALINE PHOSPHATASE LEVEL: ICD-10-CM

## 2023-08-28 DIAGNOSIS — Z94.4 LIVER REPLACED BY TRANSPLANT (H): ICD-10-CM

## 2023-08-28 LAB
ALBUMIN SERPL BCG-MCNC: 4.2 G/DL (ref 3.5–5.2)
ALP SERPL-CCNC: 176 U/L (ref 35–104)
ALT SERPL W P-5'-P-CCNC: 25 U/L (ref 0–50)
ANION GAP SERPL CALCULATED.3IONS-SCNC: 11 MMOL/L (ref 7–15)
AST SERPL W P-5'-P-CCNC: 29 U/L (ref 0–45)
BILIRUB DIRECT SERPL-MCNC: <0.2 MG/DL (ref 0–0.3)
BILIRUB SERPL-MCNC: 0.5 MG/DL
BUN SERPL-MCNC: 16.6 MG/DL (ref 6–20)
CALCIUM SERPL-MCNC: 9.7 MG/DL (ref 8.6–10)
CHLORIDE SERPL-SCNC: 105 MMOL/L (ref 98–107)
CREAT SERPL-MCNC: 0.86 MG/DL (ref 0.51–0.95)
DEPRECATED HCO3 PLAS-SCNC: 26 MMOL/L (ref 22–29)
ERYTHROCYTE [DISTWIDTH] IN BLOOD BY AUTOMATED COUNT: 12.5 % (ref 10–15)
GFR SERPL CREATININE-BSD FRML MDRD: 80 ML/MIN/1.73M2
GLUCOSE SERPL-MCNC: 96 MG/DL (ref 70–99)
HCT VFR BLD AUTO: 37.3 % (ref 35–47)
HGB BLD-MCNC: 12.5 G/DL (ref 11.7–15.7)
MAGNESIUM SERPL-MCNC: 2 MG/DL (ref 1.7–2.3)
MCH RBC QN AUTO: 30.3 PG (ref 26.5–33)
MCHC RBC AUTO-ENTMCNC: 33.5 G/DL (ref 31.5–36.5)
MCV RBC AUTO: 90 FL (ref 78–100)
PLATELET # BLD AUTO: 256 10E3/UL (ref 150–450)
POTASSIUM SERPL-SCNC: 5 MMOL/L (ref 3.4–5.3)
PROT SERPL-MCNC: 7 G/DL (ref 6.4–8.3)
RBC # BLD AUTO: 4.13 10E6/UL (ref 3.8–5.2)
SODIUM SERPL-SCNC: 142 MMOL/L (ref 136–145)
TACROLIMUS BLD-MCNC: 5.3 UG/L (ref 5–15)
TME LAST DOSE: NORMAL H
TME LAST DOSE: NORMAL H
WBC # BLD AUTO: 5.7 10E3/UL (ref 4–11)

## 2023-08-28 PROCEDURE — 82248 BILIRUBIN DIRECT: CPT

## 2023-08-28 PROCEDURE — 80053 COMPREHEN METABOLIC PANEL: CPT

## 2023-08-28 PROCEDURE — 83735 ASSAY OF MAGNESIUM: CPT

## 2023-08-28 PROCEDURE — 36415 COLL VENOUS BLD VENIPUNCTURE: CPT

## 2023-08-28 PROCEDURE — 80197 ASSAY OF TACROLIMUS: CPT

## 2023-08-28 PROCEDURE — 85027 COMPLETE CBC AUTOMATED: CPT

## 2023-09-08 ENCOUNTER — MYC REFILL (OUTPATIENT)
Dept: PHARMACY | Facility: CLINIC | Age: 53
End: 2023-09-08

## 2023-09-08 DIAGNOSIS — R73.9 ELEVATED BLOOD SUGAR LEVEL: ICD-10-CM

## 2023-09-08 DIAGNOSIS — T38.0X5A STEROID-INDUCED HYPERGLYCEMIA: ICD-10-CM

## 2023-09-08 DIAGNOSIS — R73.9 STEROID-INDUCED HYPERGLYCEMIA: ICD-10-CM

## 2023-09-11 DIAGNOSIS — E83.42 HYPOMAGNESEMIA: ICD-10-CM

## 2023-09-11 DIAGNOSIS — Z94.4 LIVER TRANSPLANT RECIPIENT (H): ICD-10-CM

## 2023-09-11 RX ORDER — MAGNESIUM OXIDE 400 MG/1
400 TABLET ORAL 2 TIMES DAILY
Qty: 180 TABLET | Refills: 3 | Status: SHIPPED | OUTPATIENT
Start: 2023-09-11 | End: 2024-09-29

## 2023-09-12 ENCOUNTER — IMMUNIZATION (OUTPATIENT)
Dept: NURSING | Facility: CLINIC | Age: 53
End: 2023-09-12
Payer: COMMERCIAL

## 2023-09-12 ENCOUNTER — OFFICE VISIT (OUTPATIENT)
Dept: DERMATOLOGY | Facility: CLINIC | Age: 53
End: 2023-09-12
Payer: COMMERCIAL

## 2023-09-12 DIAGNOSIS — Z41.1 ELECTIVE PROCEDURE FOR UNACCEPTABLE COSMETIC APPEARANCE: Primary | ICD-10-CM

## 2023-09-12 PROCEDURE — 96999 UNLISTED SPEC DERM SVC/PX: CPT | Performed by: PHYSICIAN ASSISTANT

## 2023-09-12 PROCEDURE — 90682 RIV4 VACC RECOMBINANT DNA IM: CPT

## 2023-09-12 PROCEDURE — 90471 IMMUNIZATION ADMIN: CPT

## 2023-09-12 NOTE — PROGRESS NOTES
HPI:   Chief complaints: Sarah Fisher is a pleasant 53 year old female who presents for evaluation of treatment of facial rhytids with cosmetic botox. She liked units after LOV. No issues.       PHYSICAL EXAM:    LMP  (LMP Unknown)   Skin exam performed as follows: Type 2 skin. Mood appropriate  Alert and Oriented X 3. Well developed, well nourished in no distress.  General appearance: Normal  Head including face: Normal  Eyes: conjunctiva and lids: Normal  Mouth: Lips, teeth, gums: Normal  Neck: Normal  Skin: Scalp and body hair: See below    Age appropriate rhytids of glabella, crows feet    ASSESSMENT/PLAN:     BOTOX:  PGACAC discussed.  Risks including but not limited to injection site reaction, bruising, asymmetry, no resolution of rhytides, brow ptosis, dry mouth, tiredness, and headache.  Also label warnings are difficulty with swallowing, speaking, breathing, muscle weakness, loss of bladder control, and double vision/blurred vision.  Explained confirmed report of spread of toxin effect when used for hyperhidrosis of underarms or cosmetic use on face has not been reported.  All questions answered and entertained to patient s satisfaction.  Informed consent obtained.      --31 units injected to gabella (11 into procerus, 8 into medial )  --12 into each crows (with 1 unit infraorbitally)   --55 units total  --Cosmetic cost of $770    Lot: [id number]  Exp: 10/2025    Follow-up: 3 mo  CC:   Scribed By: Areli Reich, MS, PA-C

## 2023-09-12 NOTE — LETTER
9/12/2023         RE: Sarah Fisher  [address] [address]        Dear Colleague,    Thank you for referring your patient, Sarah Fisher, to the [facility]. Please see a copy of my visit note below.    HPI:   Chief complaints: Sarah Fisher is a pleasant 53 year old female who presents for evaluation of treatment of facial rhytids with cosmetic botox. She liked units after LOV. No issues.       PHYSICAL EXAM:    LMP  (LMP Unknown)   Skin exam performed as follows: Type 2 skin. Mood appropriate  Alert and Oriented X 3. Well developed, well nourished in no distress.  General appearance: Normal  Head including face: Normal  Eyes: conjunctiva and lids: Normal  Mouth: Lips, teeth, gums: Normal  Neck: Normal  Skin: Scalp and body hair: See below    Age appropriate rhytids of glabella, crows feet    ASSESSMENT/PLAN:     BOTOX:  PGACAC discussed.  Risks including but not limited to injection site reaction, bruising, asymmetry, no resolution of rhytides, brow ptosis, dry mouth, tiredness, and headache.  Also label warnings are difficulty with swallowing, speaking, breathing, muscle weakness, loss of bladder control, and double vision/blurred vision.  Explained confirmed report of spread of toxin effect when used for hyperhidrosis of underarms or cosmetic use on face has not been reported.  All questions answered and entertained to patient s satisfaction.  Informed consent obtained.      --31 units injected to gabella (11 into procerus, 8 into medial )  --12 into each crows (with 1 unit infraorbitally)   --55 units total  --Cosmetic cost of $770    Lot: [id number]  Exp: 10/2025    Follow-up: 3 mo  CC:   Scribed By: Areli Reich, MS, PAKAMERON      Again, thank you for allowing me to participate in the care of your patient.        Sincerely,        Areli Reich PA-C

## 2023-09-25 ENCOUNTER — TELEPHONE (OUTPATIENT)
Dept: TRANSPLANT | Facility: CLINIC | Age: 53
End: 2023-09-25
Payer: COMMERCIAL

## 2023-09-25 NOTE — TELEPHONE ENCOUNTER
Pt calls to question about the letter sent via [employer] about vaccinations. Pt has an appt and wanted to know if the vaccines are something she needs to pre-order before her appts. Informed pt that she can discuss with Dr. Beltran and can get the vaccines at the time of the appt.

## 2023-10-02 ENCOUNTER — VIRTUAL VISIT (OUTPATIENT)
Dept: PHARMACY | Facility: CLINIC | Age: 53
End: 2023-10-02
Payer: COMMERCIAL

## 2023-10-02 DIAGNOSIS — E66.9 OBESITY: Primary | ICD-10-CM

## 2023-10-02 DIAGNOSIS — E66.811 CLASS 1 OBESITY WITH BODY MASS INDEX (BMI) OF 34.0 TO 34.9 IN ADULT, UNSPECIFIED OBESITY TYPE, UNSPECIFIED WHETHER SERIOUS COMORBIDITY PRESENT: ICD-10-CM

## 2023-10-02 PROCEDURE — 99207 PR NO CHARGE LOS: CPT | Mod: VID | Performed by: PHARMACIST

## 2023-10-02 RX ORDER — SEMAGLUTIDE 2.68 MG/ML
2 INJECTION, SOLUTION SUBCUTANEOUS
Qty: 9 ML | Refills: 0 | Status: SHIPPED | OUTPATIENT
Start: 2023-10-02 | End: 2023-10-02

## 2023-10-02 RX ORDER — SEMAGLUTIDE 1.34 MG/ML
INJECTION, SOLUTION SUBCUTANEOUS
COMMUNITY
End: 2023-11-03

## 2023-10-02 RX ORDER — SEMAGLUTIDE 2.68 MG/ML
2 INJECTION, SOLUTION SUBCUTANEOUS
Qty: 9 ML | Refills: 0 | Status: SHIPPED | OUTPATIENT
Start: 2023-10-02 | End: 2024-01-23 | Stop reason: ALTCHOICE

## 2023-10-02 NOTE — PATIENT INSTRUCTIONS
"Recommendations from today's MTM visit:                                                       new Rx for Ozempic 2 mg dose pen (each pen with 8mg), but continue semaglutide dosing plan:  10/2 Today another 0.5 mg dose  10/9 increase to 1 mg dose (2 doses of 0.5 mg)  10/16 continue 1 mg (2 x 0.5 mg)  10/23 continue 1 mg (2 x 0.5 mg)  10/30 continue with 1 mg dose from 2 mg pen (count the clicks like we discussed)  11/6 continue take 1 mg dose  11/13 increase to 2 mg dose and thereafter continue 2 mg weekly.  *if you have side effects do not increase and outreach to [place] plan for next year, other medication options:  FDA for weight loss - Wegovy or Saxenda  FDA for type 2 diabetes - Mounjaro, Ozempic, Victoza, Trulicity     Diet:  Aim for 20 grams per meal.  Apps to help you keep track of nutrition - Lose it!, [facility] Patient Advocate Liaison, Priscila Alcantar - Ozempic 2 mg Rx for 90 days supply from "[facility] [facility] is $2718.59.     Follow-up: Return in about 3 months (around 1/16/2024) for Medication Therapy Management Visit.    It was great speaking with you today.  I value your experience and would be very thankful for your time in providing feedback in our clinic survey. In the next few days, you may receive an email or text message from [employer] with a link to a survey related to your  clinical pharmacist.\"     To schedule another MT appointment, please call the clinic directly or you may call the MTM scheduling line at [phone] or toll-free at [phone].     My Clinical Pharmacist's contact information:                                                      Please feel free to contact me with any questions or concerns you have.      Umm Turner, PharmD  Medication Therapy Management Pharmacist  [phone]     "

## 2023-10-02 NOTE — PROGRESS NOTES
Medication Therapy Management (MTM) Encounter    ASSESSMENT:                            Medication Adherence/Access: See below for considerations    Class 1 obesity due to excess calories without serious comorbidity with body mass index (BMI) of 34.0 to 34.9 in adult:  Patient to continue with semaglutide titration.    PLAN:                             new Rx for Ozempic 2 mg dose pen (each pen with 8mg), but continue semaglutide dosing plan:  10/2 Today another 0.5 mg dose  10/9 increase to 1 mg dose (2 doses of 0.5 mg)  10/16 continue 1 mg (2 x 0.5 mg)  10/23 continue 1 mg (2 x 0.5 mg)  10/30 continue with 1 mg dose from 2 mg pen (count the clicks like we discussed)  11/6 continue take 1 mg dose  11/13 increase to 2 mg dose and thereafter continue 2 mg weekly.  *if you have side effects do not increase and outreach to [employer] plan for next year, other medication options:  FDA for weight loss - Wegovy or Saxenda  FDA for type 2 diabetes - Mounjaro, Ozempic, Victoza, Trulicity     Diet:  Aim for 20 grams per meal.  Apps to help you keep track of nutrition - Lose it!, [employer] Patient Advocate Liaison, Priscila Alcantar - Ozempic 2 mg Rx for 90 days supply from [employer] is $2718.59.     Follow-up: Return in about 3 months (around 1/16/2024) for Medication Therapy Management Visit.    SUBJECTIVE/OBJECTIVE:                          Maria Elena Fisher is a 53 year old female contacted via secure video for a follow-up visit from 8/16/2023.       Reason for visit: semaglutide/weight management.    Allergies/ADRs: Reviewed in chart  Past Medical History: Reviewed in chart  Tobacco: She reports that she has never smoked. She has never used smokeless tobacco.  Alcohol: not currently using    Medication Adherence/Access:   Paying out of pocket for semaglutide ($2300 for 3 month supply). Fills rx at [facility].    Class 1 obesity due to excess calories without serious comorbidity with body mass index (BMI) of  "34.0 to 34.9 in adult:  Semaglutide 0.5 mg weekly - Mondays (3 doses so far)    She reports weight gain has been bothersome for her. Feels it impacting her energy and fatigue. She has not been on other medications for weight loss in the past. She denies any side effects but also no benefit yet. She continues with walking on her [place] every morning. She has been reading any about health weight loss and importance of maintaining muscle. She has been making sure to getting enough protein - chicken and fish. Avoids process foods.   Lab Results   Component Value Date    A1C 5.1 10/19/2021    A1C 4.2 03/17/2021     Estimated body mass index is 34.38 kg/m  as calculated from the following:    Height as of 6/28/23: 5' 6\" (1.676 m).    Weight as of 8/8/23: 213 lb (96.6 kg).      Today's Vitals: LMP  (LMP Unknown)   ----------------      I spent 30 minutes with this patient today. All changes were made via collaborative practice agreement with ROBYN Bear CNP. A copy of the visit note was provided to the patient's provider(s).    A summary of these recommendations was sent via [employer].    Umm Turner, PharmD  Medication Therapy Management Pharmacist  [phone]    Telemedicine Visit Details  Type of service:  Video Conference via [employer]  Start Time:  10am  End Time:  1030am       Medication Therapy Recommendations  Obesity    Current Medication: Semaglutide, 2 MG/DOSE, (OZEMPIC, 2 MG/DOSE,) 8 MG/3ML pen   Rationale: Dose too low - Dosage too low - Effectiveness   Recommendation: Increase Dose   Status: Accepted per CPA            "

## 2023-10-04 NOTE — TELEPHONE ENCOUNTER
Umm, we keep getting PA for Ozempic. Looks like pt was supposed to pay out of pocket. Should we be starting a PA?      Celeste BAEZ CMA

## 2023-10-09 ENCOUNTER — LAB (OUTPATIENT)
Dept: LAB | Facility: CLINIC | Age: 53
End: 2023-10-09
Payer: COMMERCIAL

## 2023-10-09 DIAGNOSIS — Z94.4 LIVER REPLACED BY TRANSPLANT (H): ICD-10-CM

## 2023-10-09 LAB
ALBUMIN SERPL BCG-MCNC: 4.3 G/DL (ref 3.5–5.2)
ALP SERPL-CCNC: 100 U/L (ref 35–104)
ALT SERPL W P-5'-P-CCNC: 15 U/L (ref 0–50)
ANION GAP SERPL CALCULATED.3IONS-SCNC: 10 MMOL/L (ref 7–15)
AST SERPL W P-5'-P-CCNC: 19 U/L (ref 0–45)
BILIRUB DIRECT SERPL-MCNC: <0.2 MG/DL (ref 0–0.3)
BILIRUB SERPL-MCNC: 0.4 MG/DL
BUN SERPL-MCNC: 24.3 MG/DL (ref 6–20)
CALCIUM SERPL-MCNC: 9.8 MG/DL (ref 8.6–10)
CHLORIDE SERPL-SCNC: 105 MMOL/L (ref 98–107)
CREAT SERPL-MCNC: 1.14 MG/DL (ref 0.51–0.95)
DEPRECATED HCO3 PLAS-SCNC: 28 MMOL/L (ref 22–29)
EGFRCR SERPLBLD CKD-EPI 2021: 57 ML/MIN/1.73M2
ERYTHROCYTE [DISTWIDTH] IN BLOOD BY AUTOMATED COUNT: 12 % (ref 10–15)
GLUCOSE SERPL-MCNC: 95 MG/DL (ref 70–99)
HCT VFR BLD AUTO: 39.3 % (ref 35–47)
HGB BLD-MCNC: 13.4 G/DL (ref 11.7–15.7)
MAGNESIUM SERPL-MCNC: 2 MG/DL (ref 1.7–2.3)
MCH RBC QN AUTO: 29.8 PG (ref 26.5–33)
MCHC RBC AUTO-ENTMCNC: 34.1 G/DL (ref 31.5–36.5)
MCV RBC AUTO: 88 FL (ref 78–100)
PLATELET # BLD AUTO: 200 10E3/UL (ref 150–450)
POTASSIUM SERPL-SCNC: 4.8 MMOL/L (ref 3.4–5.3)
PROT SERPL-MCNC: 6.7 G/DL (ref 6.4–8.3)
RBC # BLD AUTO: 4.49 10E6/UL (ref 3.8–5.2)
SODIUM SERPL-SCNC: 143 MMOL/L (ref 135–145)
TACROLIMUS BLD-MCNC: 7.5 UG/L (ref 5–15)
TME LAST DOSE: NORMAL H
TME LAST DOSE: NORMAL H
WBC # BLD AUTO: 4.8 10E3/UL (ref 4–11)

## 2023-10-09 PROCEDURE — 82248 BILIRUBIN DIRECT: CPT

## 2023-10-09 PROCEDURE — 85027 COMPLETE CBC AUTOMATED: CPT

## 2023-10-09 PROCEDURE — 80197 ASSAY OF TACROLIMUS: CPT

## 2023-10-09 PROCEDURE — 80053 COMPREHEN METABOLIC PANEL: CPT

## 2023-10-09 PROCEDURE — 36415 COLL VENOUS BLD VENIPUNCTURE: CPT

## 2023-10-09 PROCEDURE — 83735 ASSAY OF MAGNESIUM: CPT

## 2023-10-10 ENCOUNTER — OFFICE VISIT (OUTPATIENT)
Dept: GASTROENTEROLOGY | Facility: CLINIC | Age: 53
End: 2023-10-10
Attending: STUDENT IN AN ORGANIZED HEALTH CARE EDUCATION/TRAINING PROGRAM
Payer: COMMERCIAL

## 2023-10-10 VITALS
HEART RATE: 88 BPM | HEIGHT: 66 IN | DIASTOLIC BLOOD PRESSURE: 86 MMHG | SYSTOLIC BLOOD PRESSURE: 124 MMHG | BODY MASS INDEX: 32.78 KG/M2 | WEIGHT: 204 LBS

## 2023-10-10 DIAGNOSIS — Z94.4 LIVER TRANSPLANT RECIPIENT (H): ICD-10-CM

## 2023-10-10 DIAGNOSIS — Z23 NEED FOR COVID-19 VACCINE: Primary | ICD-10-CM

## 2023-10-10 DIAGNOSIS — D84.9 IMMUNOSUPPRESSED STATUS (H): ICD-10-CM

## 2023-10-10 PROCEDURE — 250N000011 HC RX IP 250 OP 636: Performed by: STUDENT IN AN ORGANIZED HEALTH CARE EDUCATION/TRAINING PROGRAM

## 2023-10-10 PROCEDURE — 99214 OFFICE O/P EST MOD 30 MIN: CPT | Performed by: STUDENT IN AN ORGANIZED HEALTH CARE EDUCATION/TRAINING PROGRAM

## 2023-10-10 PROCEDURE — 99213 OFFICE O/P EST LOW 20 MIN: CPT | Performed by: STUDENT IN AN ORGANIZED HEALTH CARE EDUCATION/TRAINING PROGRAM

## 2023-10-10 PROCEDURE — 90480 ADMN SARSCOV2 VAC 1/ONLY CMP: CPT | Performed by: STUDENT IN AN ORGANIZED HEALTH CARE EDUCATION/TRAINING PROGRAM

## 2023-10-10 PROCEDURE — 91320 SARSCV2 VAC 30MCG TRS-SUC IM: CPT | Performed by: STUDENT IN AN ORGANIZED HEALTH CARE EDUCATION/TRAINING PROGRAM

## 2023-10-10 RX ORDER — TACROLIMUS 1 MG/1
CAPSULE ORAL
Qty: 360 CAPSULE | Refills: 3 | Status: SHIPPED | OUTPATIENT
Start: 2023-10-10 | End: 2023-12-11

## 2023-10-10 RX ORDER — URSODIOL 300 MG/1
CAPSULE ORAL
Qty: 180 CAPSULE | Refills: 3 | Status: SHIPPED | OUTPATIENT
Start: 2023-10-10 | End: 2024-01-16

## 2023-10-10 RX ADMIN — COVID-19 VACCINE, MRNA 30 MCG: 0.05 INJECTION, SUSPENSION INTRAMUSCULAR at 10:28

## 2023-10-10 ASSESSMENT — PAIN SCALES - GENERAL: PAINLEVEL: NO PAIN (0)

## 2023-10-10 NOTE — PROGRESS NOTES
[facility]     Date of Visit: 10/10/2023    Subjective: Ms. 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 Plastic stent left in place. Out on AXR 1/2022  - Rejection: liver bx 10/26/2021 showing HURTADO 3, with more intrahepatic cholestasis than explained on the bx - concern for biliary stricture that was later found on ERCP  - Noted to be hypercoagulable intraoperatively, postop US with elevated RI involving the extrahepatic hepatic artery and the right hepatic artery. Repeat US POD 1&2 -Patent hepatic vasculature. Continued elevated RI in the hepatic arteries. Was started on heparin gtt, developed epistaxis, GI bleeding (grade A esophagitis, residual GAVE, duodenal erosions), so that was stopped and taking  mg daily -> now 81 mg daily  - CORBIN: creatinine baseline 1-1.2, peaked at 3.9 post transplant, downtrending.    Alk phos telly earlier 2022 year without clear etiology. MRCP showing mild intrahepatic biliary dilation, including segment 4A/B. Showed T1 hypointense (series 9 image 26) and T2 mildly hyperintense (series 31 image 27) lesion within hepatic segment 7, measuring 1.1 cm.    Associated subtle hypoenhancement on portal venous phase (series 22 image image 20) becomes less conspicuous on later phase images. No associated diffusion  restriction. Wedge-shaped region of diffusion restriction, increased T2 signal, and progressive enhancement within the hepatic segments 4A/B associated with the above-described biliary dilation and irregularity. RUQ US with doppler concern for hepatic artery narrowing, follow up CTA with minimal arterial anastomotic narrowing. Vasculature was patent. Showed previous Arterial focus of enhancement in hepatic segment 8 measuring 9 mm with possible washout on portal venous phase, correlating with previously described 1.1 cm focus on most recent MRI. Follow up ERCP 2022 showed normal biliary tree, empiric dilation of the post transplant anastomosis to 10 mm. Temporary plastic stent placed. EUS guided liver bx showed finding suggestive of biliary obstruction, mild subsinusoidal fibrosis (seen on previous bx - thought to be donor derived. LFTs improving in the meantime without intervention, but also slightly increasing steve. She was having itching prior to her ERCP, improved. Got COVID earlier in , but clinically was well with this    Interval Events:  - US  and 2023 showing grossly stable pneumobilia post ERCP interventions, no biliary dilation. CBD 5 mm.   - alkaline phosphatase improved  - otherwise doing well, getting back to normal life  - recently started on ozempic    Current Immunosuppresion:  - Tacrolimus 3/2  - Steve 300/600    ROS: 14 point ROS negative except for positives noted in HPI.    PMHx:  Past Medical History:   Diagnosis Date     Ascites      History of blood transfusion      Liver cirrhosis (H)      Thyroid disease    Alcohol related cirrhosis    PSHx:  Past Surgical History:   Procedure Laterality Date     ABDOMEN SURGERY  2009      SECTION       CV RIGHT HEART CATH MEASUREMENTS RECORDED N/A 2021    Procedure: CV RIGHT HEART CATH;  Surgeon: Joseph Guevara MD;  Location:  HEART CARDIAC CATH LAB     ENDOSCOPIC RETROGRADE CHOLANGIOPANCREATOGRAM N/A  10/25/2021    Procedure: ENDOSCOPIC RETROGRADE CHOLANGIOPANCREATOGRAPHY, WITH biliary sphincterotomy, biliary dilation, and biliary stent placement;  Surgeon: Guru Dominguez Fenton MD;  Location: [facility] OR     ENDOSCOPIC RETROGRADE CHOLANGIOPANCREATOGRAM N/A 4/13/2022    Procedure: ENDOSCOPIC RETROGRADE CHOLANGIOPANCREATOGRAPHY WITH BILIARY DILATION, DEBRIS REMOVAL AND STENT PLACEMENT;  Surgeon: Guru Dominguez Fenton MD;  Location: [facility] OR     ENDOSCOPIC RETROGRADE CHOLANGIOPANCREATOGRAPHY, EXCHANGE TUBE/STENT N/A 11/29/2021    Procedure: ENDOSCOPIC RETROGRADE CHOLANGIOPANCREATOGRAPHY, WITH biliary sphincterotomy, stricture dilation, stent exchange;  Surgeon: Guru Dominguez Fenton MD;  Location: [facility] OR     ENDOSCOPIC ULTRASOUND UPPER GASTROINTESTINAL TRACT (GI) N/A 4/13/2022    Procedure: ENDOSCOPIC ULTRASOUND WITH CORE LIVER BIOPSY;  Surgeon: Guru Dominguez Fenton MD;  Location: [facility] OR     ESOPHAGOSCOPY, GASTROSCOPY, DUODENOSCOPY (EGD), COMBINED N/A 10/07/2021    Procedure: ESOPHAGOGASTRODUODENOSCOPY (EGD) with hemostasis;  Surgeon: Fox Jerry MD;  Location:  GI     ESOPHAGOSCOPY, GASTROSCOPY, DUODENOSCOPY (EGD), COMBINED N/A 10/22/2021    Procedure: ESOPHAGOGASTRODUODENOSCOPY, WITH BIOPSY;  Surgeon: Fadia Avila MD;  Location: [facility] GI     IR LIVER BIOPSY PERCUTANEOUS  10/26/2021     IR THORACENTESIS  05/25/2021     IR THORACENTESIS  07/30/2021     THORACENTESIS Left 01/14/2021    Procedure: THORACENTESIS;  Surgeon: Almas Irby MD;  Location: [facility] GI     THORACENTESIS N/A 02/17/2021    Procedure: THORACENTESIS;  Surgeon: Almas Irby MD;  Location: [facility] GI     THORACENTESIS N/A 03/10/2021    Procedure: THORACENTESIS;  Surgeon: Almas Irby MD;  Location: [facility]     THORACENTESIS Left 05/25/2021    Procedure: THORACENTESIS;  Surgeon: Kennedi Chavez MD;  Location: [facility] OR     THORACENTESIS Left 07/30/2021    Procedure: THORACENTESIS;  Surgeon: Jess Ansari  RICHARD LYN;  Location: [facility] OR     THORACIC SURGERY  2021     TRANSPLANT LIVER RECIPIENT  DONOR N/A 10/17/2021    Procedure: TRANSPLANT, LIVER, RECIPIENT,  DONOR;  Surgeon: Christian Morrison MD;  Location: [facility] OR     Ovarian cyst    FamHx:  No known history of liver disease    SocHx:  Social History     Socioeconomic History     Marital status: Single     Spouse name: Not on file     Number of children: Not on file     Years of education: Not on file     Highest education level: Master's degree (e.g., MA, MS, Roberto, MEd, MSW, SASHA)   Occupational History     Not on file   Tobacco Use     Smoking status: Never     Smokeless tobacco: Never   Vaping Use     Vaping Use: Never used   Substance and Sexual Activity     Alcohol use: Not Currently     Comment: Quit on 2020     Drug use: Not Currently     Sexual activity: Not Currently     Partners: Male   Other Topics Concern     Parent/sibling w/ CABG, MI or angioplasty before 65F 55M? Not Asked   Social History Narrative    Lives [place]. 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 12 year sonFlakito.         Christine Harris, DNP, APRN, CNP    3/17/2021     Social Determinants of Health     Financial Resource Strain: Low Risk  (2022)    Overall Financial Resource Strain (CARDIA)      Difficulty of Paying Living Expenses: Not hard at all   Food Insecurity: No Food Insecurity (2022)    Hunger Vital Sign      Worried About Running Out of Food in the Last Year: Never true      Ran Out of Food in the Last Year: Never true   Transportation Needs: No Transportation Needs (2022)    PRAPARE - Transportation      Lack of Transportation (Medical): No      Lack of Transportation (Non-Medical): No   Physical Activity: Sufficiently Active (2022)    Exercise Vital Sign      Days of Exercise per Week: 5 days      Minutes of Exercise per Session: 40 min   Stress: No Stress Concern Present  (4/11/2022)    [employer] - Occupational Stress Questionnaire      Feeling of Stress : Not at all   Social Connections: Unknown (4/11/2022)    Social Connection and Isolation Panel [NHANES]      Frequency of Communication with Friends and Family: More than three times a week      Frequency of Social Gatherings with Friends and Family: More than three times a week      Attends Pentec[employer]al Services: Patient refused      Active Member of Clubs or Organizations: Yes      Attends Club or Organization Meetings: Not on file      Marital Status: Patient refused   Interpersonal Safety: Not on file   Housing Stability: Low Risk  (4/11/2022)    Housing Stability Vital Sign      Unable to Pay for Housing in the Last Year: No      Number of Places Lived in the Last Year: 1      Unstable Housing in the Last Year: No   Just moved back home, living with son and niece who is going to school to be a RN    Medications:  Current Outpatient Medications   Medication     amLODIPine (NORVASC) 10 MG tablet     aspirin 81 MG EC tablet     augmented betamethasone dipropionate (DIPROLENE AF) 0.05 % external cream     biotin 1000 MCG TABS tablet     cholecalciferol 125 MCG (5000 UT) CAPS     diclofenac (VOLTAREN) 1 % topical gel     hydrocortisone 2.5 % ointment     ketoconazole (NIZORAL) 2 % external cream     levothyroxine (SYNTHROID/LEVOTHROID) 88 MCG tablet     magnesium oxide (MAG-OX) 400 MG tablet     multivitamin (THERMEMS) TABS     ondansetron (ZOFRAN) 4 MG tablet     pantoprazole (PROTONIX) 40 MG EC tablet     semaglutide (OZEMPIC) 2 MG/3ML pen     semaglutide (OZEMPIC, 0.25 OR 0.5 MG/DOSE,) 2 MG/1.5ML SOPN pen     Semaglutide, 2 MG/DOSE, (OZEMPIC, 2 MG/DOSE,) 8 MG/3ML pen     tacrolimus (GENERIC EQUIVALENT) 1 MG capsule     tretinoin (RETIN-A) 0.025 % external cream     ursodiol (ACTIGALL) 300 MG capsule     No current facility-administered medications for this visit.     Allergies:     Allergies   Allergen  "Reactions     Amoxicillin GI Disturbance, Diarrhea, Nausea and Nausea and Vomiting     Objective:  /86   Pulse 88   Ht 1.676 m (5' 6\")   Wt 92.5 kg (204 lb)   LMP  (LMP Unknown)   BMI 32.93 kg/m    Constitutional: Pleasant woman in NAD  Eyes: nonicteric  Respiratory: Breathing comfortably on RA  Neuro: AOOX3  Psychiatric: normal mood and orientation    Labs:  Last Comprehensive Metabolic Panel:  Sodium   Date Value Ref Range Status   10/09/2023 143 135 - 145 mmol/L Final     Comment:     Reference intervals for this test were updated on 09/26/2023 to more accurately reflect our healthy population. There may be differences in the flagging of prior results with similar values performed with this method. Interpretation of those prior results can be made in the context of the updated reference intervals.    06/07/2021 140 133 - 144 mmol/L Final     Potassium   Date Value Ref Range Status   10/09/2023 4.8 3.4 - 5.3 mmol/L Final   10/24/2022 4.6 3.4 - 5.3 mmol/L Final   06/07/2021 3.4 3.4 - 5.3 mmol/L Final     Chloride   Date Value Ref Range Status   10/09/2023 105 98 - 107 mmol/L Final   10/24/2022 110 (H) 94 - 109 mmol/L Final   06/07/2021 106 94 - 109 mmol/L Final     Carbon Dioxide   Date Value Ref Range Status   06/07/2021 28 20 - 32 mmol/L Final     Carbon Dioxide (CO2)   Date Value Ref Range Status   10/09/2023 28 22 - 29 mmol/L Final   10/24/2022 24 20 - 32 mmol/L Final     Anion Gap   Date Value Ref Range Status   10/09/2023 10 7 - 15 mmol/L Final   10/24/2022 7 3 - 14 mmol/L Final   06/07/2021 6 3 - 14 mmol/L Final     Glucose   Date Value Ref Range Status   10/09/2023 95 70 - 99 mg/dL Final   10/24/2022 105 (H) 70 - 99 mg/dL Final   06/07/2021 138 (H) 70 - 99 mg/dL Final     Urea Nitrogen   Date Value Ref Range Status   10/09/2023 24.3 (H) 6.0 - 20.0 mg/dL Final   10/24/2022 30 7 - 30 mg/dL Final   06/07/2021 14 7 - 30 mg/dL Final     Creatinine   Date Value Ref Range Status   10/09/2023 1.14 (H) " 0.51 - 0.95 mg/dL Final   06/07/2021 0.96 0.52 - 1.04 mg/dL Final     GFR Estimate   Date Value Ref Range Status   10/09/2023 57 (L) >60 mL/min/1.73m2 Final   06/07/2021 68 >60 mL/min/[1.73_m2] Final     Comment:     Non  GFR Calc  Starting 12/18/2018, serum creatinine based estimated GFR (eGFR) will be   calculated using the Chronic Kidney Disease Epidemiology Collaboration   (CKD-EPI) equation.       Calcium   Date Value Ref Range Status   10/09/2023 9.8 8.6 - 10.0 mg/dL Final   06/07/2021 8.7 8.5 - 10.1 mg/dL Final     Bilirubin Total   Date Value Ref Range Status   10/09/2023 0.4 <=1.2 mg/dL Final   06/07/2021 5.6 (H) 0.2 - 1.3 mg/dL Final     Alkaline Phosphatase   Date Value Ref Range Status   10/09/2023 100 35 - 104 U/L Final   06/07/2021 150 40 - 150 U/L Final     ALT   Date Value Ref Range Status   10/09/2023 15 0 - 50 U/L Final     Comment:     Reference intervals for this test were updated on 6/12/2023 to more accurately reflect our healthy population. There may be differences in the flagging of prior results with similar values performed with this method. Interpretation of those prior results can be made in the context of the updated reference intervals.     06/07/2021 18 0 - 50 U/L Final     AST   Date Value Ref Range Status   10/09/2023 19 0 - 45 U/L Final     Comment:     Reference intervals for this test were updated on 6/12/2023 to more accurately reflect our healthy population. There may be differences in the flagging of prior results with similar values performed with this method. Interpretation of those prior results can be made in the context of the updated reference intervals.   06/07/2021 35 0 - 45 U/L Final     Imaging: Reviewed in EHR    Endoscopy:    12/4/2020 Colonoscopy    Findings:       Hemorrhoids were found on perianal exam.       The terminal ileum appeared normal.       An area of grossly congested mucosa was found in the entire colon.       A 4 mm polyp was found in  the cecum. The polyp was sessile. The polyp        was removed with a cold biopsy forceps. Resection and retrieval were        complete.       Non-bleeding internal hemorrhoids were found during retroflexion.    Impressions/Post-Op Diagnosis:       - Hemorrhoids found on perianal exam.       - The examined portion of the ileum was normal.       - Congested mucosa in the entire examined colon.       - One 4 mm polyp in the cecum, removed with a cold biopsy forceps.        Resected and retrieved.       - Non-bleeding internal hemorrhoids.    A) COLON, CECUM, POLYPECTOMY:  1. Colonic mucosa with no diagnostic abnormalities; a lymphoid aggregate is present  2. Negative for serrated change, dysplasia, and malignancy    Assessment/Plan: Ms. 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     Post transplant course complicated by CORBIN, biliary anastomotic stricture.     Bx ~ 9 days post transplant showing intrahepatic cholestasis and only mild (3/9) rejection, findings likely 2/2 anastomotic stricture that she underwent ERCP for    Earlier in 2022 had elevated alkaline phosphatase without clear etiology. Concern for delayed hepatic artery issues - not seen on CTA. Imaging showing signs of biliary inflammation, ERCP without biliary obstruction. Bx without clear etiology. Alk phos improving without any further intervention, now back to normal. MRI also showed < 1 cm liver lesion, stable on follow up MRI and not seen on subsequent US scans.     Immunosuppression: Reduce tacro to 2 mg twice daily with goal trough 3-5.  Had been on renal sparing immunosuppression in the past given CORBIN.  Creatinine slightly elevated today.  Blood pressure at goal, not a diabetic.  Discussed decreasing her tacrolimus level to help with this and she is more than a year out from  transplant without recent rejection  Elevated alkaline phosphatase: Reduce ursodiol to 300 mg twice a day, will wean this once she is within her regular tacrolimus goal.  She is doing well with her sobriety  Got her flu shot this fall, will get her COVID-vaccine today.    RV 12 months    Fadia Avila MD MSc  Transplant Hepatology  [facility]    Mammogam 2023, repeat annually  Pap smear Fall 2022, repeat annually given immunosuppressed  Repeat colonoscopy every 5 years, due 2025

## 2023-10-10 NOTE — LETTER
10/10/2023         RE: Sarah Fisher  [address]  [facility] [address]        Dear Colleague,    Thank you for referring your patient, Sarah Fisher, to the [facility] [place]. Please see a copy of my visit note below.    [facility]     Date of Visit: 10/10/2023    Subjective: Ms. 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 Plastic stent left in place. Out on AXR 1/2022  - Rejection: liver bx 10/26/2021 showing HURTADO 3, with more intrahepatic cholestasis than explained on the bx - concern for biliary stricture that was later found on ERCP  - Noted to be hypercoagulable intraoperatively, postop US with elevated RI involving the extrahepatic hepatic artery and the right hepatic artery. Repeat US POD 1&2 -Patent hepatic vasculature. Continued elevated RI in the hepatic arteries. Was started on heparin gtt, developed epistaxis, GI bleeding (grade A esophagitis, residual GAVE, duodenal erosions), so that was stopped and taking  mg daily -> now 81 mg daily  - CORBIN: creatinine baseline 1-1.2, peaked at 3.9 post transplant, downtrending.    Alk phos telly earlier 2022 year without clear etiology. MRCP showing mild intrahepatic biliary dilation, including segment 4A/B. Showed T1 hypointense  (series 9 image 26) and T2 mildly hyperintense (series 31 image 27) lesion within hepatic segment 7, measuring 1.1 cm.    Associated subtle hypoenhancement on portal venous phase (series 22 image image 20) becomes less conspicuous on later phase images. No associated diffusion restriction. Wedge-shaped region of diffusion restriction, increased T2 signal, and progressive enhancement within the hepatic segments 4A/B associated with the above-described biliary dilation and irregularity. RUQ US with doppler concern for hepatic artery narrowing, follow up CTA with minimal arterial anastomotic narrowing. Vasculature was patent. Showed previous Arterial focus of enhancement in hepatic segment 8 measuring 9 mm with possible washout on portal venous phase, correlating with previously described 1.1 cm focus on most recent MRI. Follow up ERCP 4/2022 showed normal biliary tree, empiric dilation of the post transplant anastomosis to 10 mm. Temporary plastic stent placed. EUS guided liver bx showed finding suggestive of biliary obstruction, mild subsinusoidal fibrosis (seen on previous bx - thought to be donor derived. LFTs improving in the meantime without intervention, but also slightly increasing steve. She was having itching prior to her ERCP, improved. Got COVID earlier in 2022, but clinically was well with this    Interval Events:  - US 2022 and 1/2023 showing grossly stable pneumobilia post ERCP interventions, no biliary dilation. CBD 5 mm.   - alkaline phosphatase improved  - otherwise doing well, getting back to normal life  - recently started on ozempic    Current Immunosuppresion:  - Tacrolimus 3/2  - Steve 300/600    ROS: 14 point ROS negative except for positives noted in HPI.    PMHx:  Past Medical History:   Diagnosis Date    Ascites     History of blood transfusion     Liver cirrhosis (H)     Thyroid disease    Alcohol related cirrhosis    PSHx:  Past Surgical History:   Procedure Laterality Date    ABDOMEN SURGERY   2009     SECTION      CV RIGHT HEART CATH MEASUREMENTS RECORDED N/A 2021    Procedure: CV RIGHT HEART CATH;  Surgeon: Joseph Guevara MD;  Location:  HEART CARDIAC CATH LAB    ENDOSCOPIC RETROGRADE CHOLANGIOPANCREATOGRAM N/A 10/25/2021    Procedure: ENDOSCOPIC RETROGRADE CHOLANGIOPANCREATOGRAPHY, WITH biliary sphincterotomy, biliary dilation, and biliary stent placement;  Surgeon: Guru Dominguez Fenton MD;  Location: [facility]    ENDOSCOPIC RETROGRADE CHOLANGIOPANCREATOGRAM N/A 2022    Procedure: ENDOSCOPIC RETROGRADE CHOLANGIOPANCREATOGRAPHY WITH BILIARY DILATION, DEBRIS REMOVAL AND STENT PLACEMENT;  Surgeon: Guru Dominguez Fenton MD;  Location: [facility] OR    ENDOSCOPIC RETROGRADE CHOLANGIOPANCREATOGRAPHY, EXCHANGE TUBE/STENT N/A 2021    Procedure: ENDOSCOPIC RETROGRADE CHOLANGIOPANCREATOGRAPHY, WITH biliary sphincterotomy, stricture dilation, stent exchange;  Surgeon: Guru Dominguez Fenton MD;  Location: [facility] OR    ENDOSCOPIC ULTRASOUND UPPER GASTROINTESTINAL TRACT (GI) N/A 2022    Procedure: ENDOSCOPIC ULTRASOUND WITH CORE LIVER BIOPSY;  Surgeon: Guru Dominguez Fenton MD;  Location: [facility] OR    ESOPHAGOSCOPY, GASTROSCOPY, DUODENOSCOPY (EGD), COMBINED N/A 10/07/2021    Procedure: ESOPHAGOGASTRODUODENOSCOPY (EGD) with hemostasis;  Surgeon: Fox Jerry MD;  Location:  GI    ESOPHAGOSCOPY, GASTROSCOPY, DUODENOSCOPY (EGD), COMBINED N/A 10/22/2021    Procedure: ESOPHAGOGASTRODUODENOSCOPY, WITH BIOPSY;  Surgeon: Fadia Avila MD;  Location: [facility] GI    IR LIVER BIOPSY PERCUTANEOUS  10/26/2021    IR THORACENTESIS  2021    IR THORACENTESIS  2021    THORACENTESIS Left 2021    Procedure: THORACENTESIS;  Surgeon: Almas Irby MD;  Location: [facility] GI    THORACENTESIS N/A 2021    Procedure: THORACENTESIS;  Surgeon: Almas Irby MD;  Location: [facility] GI    THORACENTESIS N/A 03/10/2021    Procedure: THORACENTESIS;   Surgeon: Almas Irby MD;  [facility]    THORACENTESIS Left 2021    Procedure: THORACENTESIS;  Surgeon: Kennedi Chavez MD;  Location: [facility] OR    THORACENTESIS Left 2021    Procedure: THORACENTESIS;  Surgeon: Jess Ansari PA-C;  Location: [facility] OR    THORACIC SURGERY  2021    TRANSPLANT LIVER RECIPIENT  DONOR N/A 10/17/2021    Procedure: TRANSPLANT, LIVER, RECIPIENT,  DONOR;  Surgeon: Christian Morrison MD;  Location: [facility] OR     Ovarian cyst    FamHx:  No known history of liver disease    SocHx:  Social History     Socioeconomic History    Marital status: Single     Spouse name: Not on file    Number of children: Not on file    Years of education: Not on file    Highest education level: Master's degree (e.g., MA, MS, Roberto, MEd, MSW, SASHA)   Occupational History    Not on file   Tobacco Use    Smoking status: Never    Smokeless tobacco: Never   Vaping Use    Vaping Use: Never used   Substance and Sexual Activity    Alcohol use: Not Currently     Comment: Quit on 2020    Drug use: Not Currently    Sexual activity: Not Currently     Partners: Male   Other Topics Concern    Parent/sibling w/ CABG, MI or angioplasty before 65F 55M? Not Asked   Social History Narrative    Lives [place]. 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 12 year sonFlakito.         Christine Harris, DNP, APRN, CNP    3/17/2021     Social Determinants of Health     Financial Resource Strain: Low Risk  (2022)    Overall Financial Resource Strain (CARDIA)     Difficulty of Paying Living Expenses: Not hard at all   Food Insecurity: No Food Insecurity (2022)    Hunger Vital Sign     Worried About Running Out of Food in the Last Year: Never true     Ran Out of Food in the Last Year: Never true   Transportation Needs: No Transportation Needs (2022)    PRAPARE - Transportation     Lack of Transportation (Medical): No     Lack of  Transportation (Non-Medical): No   Physical Activity: Sufficiently Active (4/11/2022)    Exercise Vital Sign     Days of Exercise per Week: 5 days     Minutes of Exercise per Session: 40 min   Stress: No Stress Concern Present (4/11/2022)    [place] [employer] - Occupational Stress Questionnaire     Feeling of Stress : Not at all   Social Connections: Unknown (4/11/2022)    Social Connection and Isolation Panel [NHANES]     Frequency of Communication with Friends and Family: More than three times a week     Frequency of Social Gatherings with Friends and Family: More than three times a week     Attends Restoration Services: Patient refused     Active Member of Clubs or Organizations: Yes     Attends Club or Organization Meetings: Not on file     Marital Status: Patient refused   Interpersonal Safety: Not on file   Housing Stability: Low Risk  (4/11/2022)    Housing Stability Vital Sign     Unable to Pay for Housing in the Last Year: No     Number of Places Lived in the Last Year: 1     Unstable Housing in the Last Year: No   Just moved back home, living with son and niece who is going to school to be a RN    Medications:  Current Outpatient Medications   Medication    amLODIPine (NORVASC) 10 MG tablet    aspirin 81 MG EC tablet    augmented betamethasone dipropionate (DIPROLENE AF) 0.05 % external cream    biotin 1000 MCG TABS tablet    cholecalciferol 125 MCG (5000 UT) CAPS    diclofenac (VOLTAREN) 1 % topical gel    hydrocortisone 2.5 % ointment    ketoconazole (NIZORAL) 2 % external cream    levothyroxine (SYNTHROID/LEVOTHROID) 88 MCG tablet    magnesium oxide (MAG-OX) 400 MG tablet    multivitamin (THERMEMS) TABS    ondansetron (ZOFRAN) 4 MG tablet    pantoprazole (PROTONIX) 40 MG EC tablet    semaglutide (OZEMPIC) 2 MG/3ML pen    semaglutide (OZEMPIC, 0.25 OR 0.5 MG/DOSE,) 2 MG/1.5ML SOPN pen    Semaglutide, 2 MG/DOSE, (OZEMPIC, 2 MG/DOSE,) 8 MG/3ML pen    tacrolimus (GENERIC EQUIVALENT) 1  "MG capsule    tretinoin (RETIN-A) 0.025 % external cream    ursodiol (ACTIGALL) 300 MG capsule     No current facility-administered medications for this visit.     Allergies:     Allergies   Allergen Reactions    Amoxicillin GI Disturbance, Diarrhea, Nausea and Nausea and Vomiting     Objective:  /86   Pulse 88   Ht 1.676 m (5' 6\")   Wt 92.5 kg (204 lb)   LMP  (LMP Unknown)   BMI 32.93 kg/m    Constitutional: Pleasant woman in NAD  Eyes: nonicteric  Respiratory: Breathing comfortably on RA  Neuro: AOOX3  Psychiatric: normal mood and orientation    Labs:  Last Comprehensive Metabolic Panel:  Sodium   Date Value Ref Range Status   10/09/2023 143 135 - 145 mmol/L Final     Comment:     Reference intervals for this test were updated on 09/26/2023 to more accurately reflect our healthy population. There may be differences in the flagging of prior results with similar values performed with this method. Interpretation of those prior results can be made in the context of the updated reference intervals.    06/07/2021 140 133 - 144 mmol/L Final     Potassium   Date Value Ref Range Status   10/09/2023 4.8 3.4 - 5.3 mmol/L Final   10/24/2022 4.6 3.4 - 5.3 mmol/L Final   06/07/2021 3.4 3.4 - 5.3 mmol/L Final     Chloride   Date Value Ref Range Status   10/09/2023 105 98 - 107 mmol/L Final   10/24/2022 110 (H) 94 - 109 mmol/L Final   06/07/2021 106 94 - 109 mmol/L Final     Carbon Dioxide   Date Value Ref Range Status   06/07/2021 28 20 - 32 mmol/L Final     Carbon Dioxide (CO2)   Date Value Ref Range Status   10/09/2023 28 22 - 29 mmol/L Final   10/24/2022 24 20 - 32 mmol/L Final     Anion Gap   Date Value Ref Range Status   10/09/2023 10 7 - 15 mmol/L Final   10/24/2022 7 3 - 14 mmol/L Final   06/07/2021 6 3 - 14 mmol/L Final     Glucose   Date Value Ref Range Status   10/09/2023 95 70 - 99 mg/dL Final   10/24/2022 105 (H) 70 - 99 mg/dL Final   06/07/2021 138 (H) 70 - 99 mg/dL Final     Urea Nitrogen   Date Value " Ref Range Status   10/09/2023 24.3 (H) 6.0 - 20.0 mg/dL Final   10/24/2022 30 7 - 30 mg/dL Final   06/07/2021 14 7 - 30 mg/dL Final     Creatinine   Date Value Ref Range Status   10/09/2023 1.14 (H) 0.51 - 0.95 mg/dL Final   06/07/2021 0.96 0.52 - 1.04 mg/dL Final     GFR Estimate   Date Value Ref Range Status   10/09/2023 57 (L) >60 mL/min/1.73m2 Final   06/07/2021 68 >60 mL/min/[1.73_m2] Final     Comment:     Non  GFR Calc  Starting 12/18/2018, serum creatinine based estimated GFR (eGFR) will be   calculated using the Chronic Kidney Disease Epidemiology Collaboration   (CKD-EPI) equation.       Calcium   Date Value Ref Range Status   10/09/2023 9.8 8.6 - 10.0 mg/dL Final   06/07/2021 8.7 8.5 - 10.1 mg/dL Final     Bilirubin Total   Date Value Ref Range Status   10/09/2023 0.4 <=1.2 mg/dL Final   06/07/2021 5.6 (H) 0.2 - 1.3 mg/dL Final     Alkaline Phosphatase   Date Value Ref Range Status   10/09/2023 100 35 - 104 U/L Final   06/07/2021 150 40 - 150 U/L Final     ALT   Date Value Ref Range Status   10/09/2023 15 0 - 50 U/L Final     Comment:     Reference intervals for this test were updated on 6/12/2023 to more accurately reflect our healthy population. There may be differences in the flagging of prior results with similar values performed with this method. Interpretation of those prior results can be made in the context of the updated reference intervals.     06/07/2021 18 0 - 50 U/L Final     AST   Date Value Ref Range Status   10/09/2023 19 0 - 45 U/L Final     Comment:     Reference intervals for this test were updated on 6/12/2023 to more accurately reflect our healthy population. There may be differences in the flagging of prior results with similar values performed with this method. Interpretation of those prior results can be made in the context of the updated reference intervals.   06/07/2021 35 0 - 45 U/L Final     Imaging: Reviewed in EHR    Endoscopy:    12/4/2020  Colonoscopy    Findings:       Hemorrhoids were found on perianal exam.       The terminal ileum appeared normal.       An area of grossly congested mucosa was found in the entire colon.       A 4 mm polyp was found in the cecum. The polyp was sessile. The polyp        was removed with a cold biopsy forceps. Resection and retrieval were        complete.       Non-bleeding internal hemorrhoids were found during retroflexion.    Impressions/Post-Op Diagnosis:       - Hemorrhoids found on perianal exam.       - The examined portion of the ileum was normal.       - Congested mucosa in the entire examined colon.       - One 4 mm polyp in the cecum, removed with a cold biopsy forceps.        Resected and retrieved.       - Non-bleeding internal hemorrhoids.    A) COLON, CECUM, POLYPECTOMY:  1. Colonic mucosa with no diagnostic abnormalities; a lymphoid aggregate is present  2. Negative for serrated change, dysplasia, and malignancy    Assessment/Plan: Ms. 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     Post transplant course complicated by CORBIN, biliary anastomotic stricture.     Bx ~ 9 days post transplant showing intrahepatic cholestasis and only mild (3/9) rejection, findings likely 2/2 anastomotic stricture that she underwent ERCP for    Earlier in 2022 had elevated alkaline phosphatase without clear etiology. Concern for delayed hepatic artery issues - not seen on CTA. Imaging showing signs of biliary inflammation, ERCP without biliary obstruction. Bx without clear etiology. Alk phos improving without any further intervention, now back to normal. MRI also showed < 1 cm liver lesion, stable on follow up MRI and not seen on subsequent US scans.     Immunosuppression: Reduce tacro to 2 mg twice daily with goal trough 3-5.  Had been on renal  sparing immunosuppression in the past given CORBIN.  Creatinine slightly elevated today.  Blood pressure at goal, not a diabetic.  Discussed decreasing her tacrolimus level to help with this and she is more than a year out from transplant without recent rejection  Elevated alkaline phosphatase: Reduce ursodiol to 300 mg twice a day, will wean this once she is within her regular tacrolimus goal.  She is doing well with her sobriety  Got her flu shot this fall, will get her COVID-vaccine today.    RV 12 months    Fadia Avila MD MSc  Transplant Hepatology  [facility]    HME    Mammogam 2023, repeat annually  Pap smear Fall 2022, repeat annually given immunosuppressed  Repeat colonoscopy every 5 years, due 2025            Again, thank you for allowing me to participate in the care of your patient.        Sincerely,        Fadia Avila MD

## 2023-10-10 NOTE — NURSING NOTE
"Chief Complaint   Patient presents with    Follow Up     Liver Recheck     /86   Pulse 88   Ht 1.676 m (5' 6\")   Wt 92.5 kg (204 lb)   LMP  (LMP Unknown)   BMI 32.93 kg/m    Nohelia Villatoro CMA on 10/10/2023 at 9:34 AM    "

## 2023-10-12 ENCOUNTER — TELEPHONE (OUTPATIENT)
Dept: PEDIATRICS | Facility: CLINIC | Age: 53
End: 2023-10-12
Payer: COMMERCIAL

## 2023-10-12 NOTE — TELEPHONE ENCOUNTER
Prior Authorization Retail Medication Request    Medication/Dose: Semaglutide, 2 MG/DOSE, (OZEMPIC, 2 MG/DOSE,) 8 MG/3ML pen  ICD code (if different than what is on RX):  NA  Previously Tried and Failed:  NA  Rationale:  NA    Insurance Name:  [facility]  Insurance ID:    [id number]       Pharmacy Information (if different than what is on RX)  Name:  [facility]  Phone:  [phone]

## 2023-10-16 ENCOUNTER — TELEPHONE (OUTPATIENT)
Dept: PEDIATRICS | Facility: CLINIC | Age: 53
End: 2023-10-16
Payer: COMMERCIAL

## 2023-10-16 ENCOUNTER — MYC REFILL (OUTPATIENT)
Dept: PHARMACY | Facility: CLINIC | Age: 53
End: 2023-10-16
Payer: COMMERCIAL

## 2023-10-16 DIAGNOSIS — I15.9 SECONDARY HYPERTENSION: ICD-10-CM

## 2023-10-16 RX ORDER — AMLODIPINE BESYLATE 10 MG/1
10 TABLET ORAL DAILY
Qty: 90 TABLET | Refills: 0 | Status: SHIPPED | OUTPATIENT
Start: 2023-10-16 | End: 2024-02-13

## 2023-10-16 NOTE — TELEPHONE ENCOUNTER
Pt called back. Pt needed to schedule physical appt with PCP.    Appointments in Next Year      Dec 13, 2023 11:30 AM  (Arrive by 11:10 AM)  Adult Preventative Visit with ROBYN Bear CNP  [facility] ([facility] [place] ) [phone]

## 2023-10-16 NOTE — TELEPHONE ENCOUNTER
Central Prior Authorization Team   Phone: [phone]    PA Initiation    Medication: OZEMPIC (2 MG/DOSE) 8 MG/3ML SC SOPN  Insurance Company: [employer] - Phone [phone] Fax [phone]  Pharmacy Filling the Rx: [id number] [place], [place] - [address] [facility] [address]  Filling Pharmacy Phone: [phone]  Filling Pharmacy Fax:    Start Date: 10/16/2023

## 2023-10-16 NOTE — TELEPHONE ENCOUNTER
General Call    Contacts         Type Contact Phone/Fax    10/16/2023 10:55 AM CDT Phone (Incoming) Maria Elena iFsher (Self) [phone] (M)          Reason for Call:  Callback    What are your questions or concerns:  Patient LVM for her care team, requesting a callback from her nurse.    Date of last appointment with provider: 6/28/2023 with Christine Harris    Could we send this information to you in [employer] or would you prefer to receive a phone call?:   Patient would prefer a phone call   Okay to leave a detailed message?: No at Cell number on file:    Telephone Information:   Mobile [phone]

## 2023-10-17 NOTE — TELEPHONE ENCOUNTER
Chelsey Bennett Ea/Rm49 minutes ago (12:42 PM)       PA has been denied, please see rational.   If done with encounter, please notify the patient and close the encounter.    Thank you,  Central PA Team

## 2023-10-17 NOTE — TELEPHONE ENCOUNTER
PRIOR AUTHORIZATION DENIED    Medication: OZEMPIC (2 MG/DOSE) 8 MG/3ML SC SOPN  Insurance Company: [employer] - Phone [phone] Fax [phone]  Denial Date: 10/17/2023  Denial Rational:         Appeal Information: N/A, not being used for an FDA approved diagnosis.          Patient Notified: No

## 2023-11-03 ENCOUNTER — DOCUMENTATION ONLY (OUTPATIENT)
Dept: LAB | Facility: CLINIC | Age: 53
End: 2023-11-03

## 2023-11-03 NOTE — PROGRESS NOTES
See MyChart message.   Patient confirmed labs are for transplant.   Though HM due for microalbumin so added that lab.

## 2023-11-03 NOTE — PROGRESS NOTES
Can you check with pt what labs she thinks she needs done?  Has appointment scheduled with Christine in December  Holly Del Rio, APRN, CNP

## 2023-11-03 NOTE — PROGRESS NOTES
Sarah Fisher has an upcoming lab appointment:    Future Appointments   Date Time Provider Department Center   11/8/2023  8:00 AM EA LAB [facility]    12/12/2023 12:00 PM Areli Reich PA-C [facility]   12/13/2023 11:30 AM Christine Harris APRN CNP [facility]    1/16/2024 10:00 AM [facility]mm Turner, [facility]   3/5/2024 10:30 AM Areli Reich PA-C [facility]   6/11/2024 10:45 AM Areli Reich PA-C [facility]   10/15/2024  9:30 AM Fadia Avila MD [facility]     Patient is scheduled for the following lab(s):   Scheduling Notes: Orders on file.   Rescheduled: 11/1/2023 2:59 PM By: CANDIDO FLOYD       There is no order available. Please review and place either future orders or HMPO (Review of Health Maintenance Protocol Orders), as appropriate.    Health Maintenance Due   Topic    MICROALBUMIN     ANNUAL REVIEW OF HM ORDERS      Nicolette Madden

## 2023-11-06 ENCOUNTER — DOCUMENTATION ONLY (OUTPATIENT)
Dept: GASTROENTEROLOGY | Facility: CLINIC | Age: 53
End: 2023-11-06
Payer: COMMERCIAL

## 2023-11-06 DIAGNOSIS — Z94.4 LIVER REPLACED BY TRANSPLANT (H): Primary | ICD-10-CM

## 2023-11-06 DIAGNOSIS — K70.31 ALCOHOLIC CIRRHOSIS OF LIVER WITH ASCITES (H): ICD-10-CM

## 2023-11-06 DIAGNOSIS — Z94.4 LIVER TRANSPLANT RECIPIENT (H): ICD-10-CM

## 2023-11-06 DIAGNOSIS — Z13.220 LIPID SCREENING: ICD-10-CM

## 2023-11-06 DIAGNOSIS — D84.9 IMMUNOSUPPRESSION (H): ICD-10-CM

## 2023-11-06 DIAGNOSIS — Z94.4 LIVER TRANSPLANTED (H): ICD-10-CM

## 2023-11-06 NOTE — PROGRESS NOTES
"Patient has lab only appt 23 for \"transplant labs\", no orders in chart or they've .  Please place new standing orders in Epic if appropriate.    Thanks,  [facility]  "

## 2023-11-08 ENCOUNTER — LAB (OUTPATIENT)
Dept: LAB | Facility: CLINIC | Age: 53
End: 2023-11-08
Payer: COMMERCIAL

## 2023-11-08 DIAGNOSIS — K70.31 ALCOHOLIC CIRRHOSIS OF LIVER WITH ASCITES (H): ICD-10-CM

## 2023-11-08 DIAGNOSIS — Z94.4 LIVER REPLACED BY TRANSPLANT (H): ICD-10-CM

## 2023-11-08 DIAGNOSIS — N18.32 CHRONIC KIDNEY DISEASE, STAGE 3B (H): ICD-10-CM

## 2023-11-08 LAB
ALBUMIN SERPL BCG-MCNC: 4.2 G/DL (ref 3.5–5.2)
ALP SERPL-CCNC: 94 U/L (ref 35–104)
ALT SERPL W P-5'-P-CCNC: 18 U/L (ref 0–50)
ANION GAP SERPL CALCULATED.3IONS-SCNC: 9 MMOL/L (ref 7–15)
AST SERPL W P-5'-P-CCNC: 17 U/L (ref 0–45)
BILIRUB DIRECT SERPL-MCNC: <0.2 MG/DL (ref 0–0.3)
BILIRUB SERPL-MCNC: 0.4 MG/DL
BUN SERPL-MCNC: 19.1 MG/DL (ref 6–20)
CALCIUM SERPL-MCNC: 10 MG/DL (ref 8.6–10)
CHLORIDE SERPL-SCNC: 108 MMOL/L (ref 98–107)
CREAT SERPL-MCNC: 1 MG/DL (ref 0.51–0.95)
CREAT UR-MCNC: 150 MG/DL
DEPRECATED HCO3 PLAS-SCNC: 27 MMOL/L (ref 22–29)
EGFRCR SERPLBLD CKD-EPI 2021: 67 ML/MIN/1.73M2
ERYTHROCYTE [DISTWIDTH] IN BLOOD BY AUTOMATED COUNT: 12.4 % (ref 10–15)
GLUCOSE SERPL-MCNC: 86 MG/DL (ref 70–99)
HCT VFR BLD AUTO: 39.2 % (ref 35–47)
HGB BLD-MCNC: 13.1 G/DL (ref 11.7–15.7)
MAGNESIUM SERPL-MCNC: 2 MG/DL (ref 1.7–2.3)
MCH RBC QN AUTO: 29.7 PG (ref 26.5–33)
MCHC RBC AUTO-ENTMCNC: 33.4 G/DL (ref 31.5–36.5)
MCV RBC AUTO: 89 FL (ref 78–100)
MICROALBUMIN UR-MCNC: 14.2 MG/L
MICROALBUMIN/CREAT UR: 9.47 MG/G CR (ref 0–25)
PHOSPHATE SERPL-MCNC: 3.5 MG/DL (ref 2.5–4.5)
PLATELET # BLD AUTO: 206 10E3/UL (ref 150–450)
POTASSIUM SERPL-SCNC: 4.8 MMOL/L (ref 3.4–5.3)
PROT SERPL-MCNC: 6.7 G/DL (ref 6.4–8.3)
RBC # BLD AUTO: 4.41 10E6/UL (ref 3.8–5.2)
SODIUM SERPL-SCNC: 144 MMOL/L (ref 135–145)
TACROLIMUS BLD-MCNC: 5.5 UG/L (ref 5–15)
TME LAST DOSE: NORMAL H
TME LAST DOSE: NORMAL H
WBC # BLD AUTO: 4.6 10E3/UL (ref 4–11)

## 2023-11-08 PROCEDURE — 80197 ASSAY OF TACROLIMUS: CPT

## 2023-11-08 PROCEDURE — 85027 COMPLETE CBC AUTOMATED: CPT

## 2023-11-08 PROCEDURE — 36415 COLL VENOUS BLD VENIPUNCTURE: CPT

## 2023-11-08 PROCEDURE — G0480 DRUG TEST DEF 1-7 CLASSES: HCPCS | Mod: 90

## 2023-11-08 PROCEDURE — 80053 COMPREHEN METABOLIC PANEL: CPT

## 2023-11-08 PROCEDURE — 82570 ASSAY OF URINE CREATININE: CPT

## 2023-11-08 PROCEDURE — 83735 ASSAY OF MAGNESIUM: CPT

## 2023-11-08 PROCEDURE — 82248 BILIRUBIN DIRECT: CPT

## 2023-11-08 PROCEDURE — 84100 ASSAY OF PHOSPHORUS: CPT

## 2023-11-08 PROCEDURE — 82043 UR ALBUMIN QUANTITATIVE: CPT

## 2023-11-10 LAB
LABORATORY REPORT: NORMAL
PLPETH BLD-MCNC: <10 NG/ML
POPETH BLD-MCNC: <10 NG/ML

## 2023-11-11 ENCOUNTER — MYC REFILL (OUTPATIENT)
Dept: DERMATOLOGY | Facility: CLINIC | Age: 53
End: 2023-11-11
Payer: COMMERCIAL

## 2023-11-11 ENCOUNTER — MYC REFILL (OUTPATIENT)
Dept: TRANSPLANT | Facility: CLINIC | Age: 53
End: 2023-11-11
Payer: COMMERCIAL

## 2023-11-11 DIAGNOSIS — L98.8 FACIAL RHYTIDS: ICD-10-CM

## 2023-11-11 DIAGNOSIS — K13.0 PERLECHE: ICD-10-CM

## 2023-11-11 DIAGNOSIS — Z94.4 LIVER TRANSPLANTED (H): ICD-10-CM

## 2023-11-13 DIAGNOSIS — Z94.4 LIVER TRANSPLANT RECIPIENT (H): ICD-10-CM

## 2023-11-13 RX ORDER — ONDANSETRON 4 MG/1
4 TABLET, FILM COATED ORAL EVERY 6 HOURS PRN
Qty: 20 TABLET | Refills: 0 | Status: SHIPPED | OUTPATIENT
Start: 2023-11-13

## 2023-11-13 RX ORDER — KETOCONAZOLE 20 MG/G
CREAM TOPICAL
Qty: 15 G | Refills: 3 | Status: SHIPPED | OUTPATIENT
Start: 2023-11-13 | End: 2024-04-15

## 2023-11-13 RX ORDER — TRETINOIN 0.25 MG/G
CREAM TOPICAL
Qty: 45 G | Refills: 11 | Status: SHIPPED | OUTPATIENT
Start: 2023-11-13

## 2023-11-13 RX ORDER — HYDROCORTISONE 25 MG/G
OINTMENT TOPICAL
Qty: 15 G | Refills: 3 | Status: SHIPPED | OUTPATIENT
Start: 2023-11-13 | End: 2024-04-15

## 2023-11-13 NOTE — TELEPHONE ENCOUNTER
Routing to provider for approval/denial since med not on protocol.    Thank you,  Gabriela RINCON RN  Dermatology   [phone]

## 2023-11-14 RX ORDER — PANTOPRAZOLE SODIUM 40 MG/1
40 TABLET, DELAYED RELEASE ORAL 2 TIMES DAILY
Qty: 180 TABLET | Refills: 1 | Status: SHIPPED | OUTPATIENT
Start: 2023-11-14 | End: 2024-08-18

## 2023-12-05 ENCOUNTER — ANCILLARY PROCEDURE (OUTPATIENT)
Dept: GENERAL RADIOLOGY | Facility: CLINIC | Age: 53
End: 2023-12-05
Attending: FAMILY MEDICINE
Payer: COMMERCIAL

## 2023-12-05 ENCOUNTER — OFFICE VISIT (OUTPATIENT)
Dept: ORTHOPEDICS | Facility: CLINIC | Age: 53
End: 2023-12-05
Payer: COMMERCIAL

## 2023-12-05 ENCOUNTER — TELEPHONE (OUTPATIENT)
Dept: TRANSPLANT | Facility: CLINIC | Age: 53
End: 2023-12-05

## 2023-12-05 VITALS
SYSTOLIC BLOOD PRESSURE: 112 MMHG | DIASTOLIC BLOOD PRESSURE: 70 MMHG | HEIGHT: 66 IN | WEIGHT: 180 LBS | BODY MASS INDEX: 28.93 KG/M2

## 2023-12-05 DIAGNOSIS — M79.672 ACUTE PAIN OF LEFT FOOT: ICD-10-CM

## 2023-12-05 DIAGNOSIS — M19.071 PRIMARY OSTEOARTHRITIS OF RIGHT FOOT: ICD-10-CM

## 2023-12-05 DIAGNOSIS — M19.072 PRIMARY OSTEOARTHRITIS OF LEFT FOOT: ICD-10-CM

## 2023-12-05 DIAGNOSIS — M79.672 ACUTE PAIN OF LEFT FOOT: Primary | ICD-10-CM

## 2023-12-05 PROCEDURE — 99204 OFFICE O/P NEW MOD 45 MIN: CPT | Performed by: FAMILY MEDICINE

## 2023-12-05 PROCEDURE — 73630 X-RAY EXAM OF FOOT: CPT | Mod: LT | Performed by: FAMILY MEDICINE

## 2023-12-05 NOTE — PATIENT INSTRUCTIONS
1. Acute pain of left foot    2. Primary osteoarthritis of left foot    3. Primary osteoarthritis of right foot      -Patient has acute left foot pain due to arthritis.  Patient has chronic right foot pain due to arthritis  -Patient will start formal physical therapy and home exercise program  -May take Tylenol as needed for pain.  Patient should avoid oral anti-inflammatory medications since she is a liver transplant patient.  -Patient should ensure proper foot wear when she is active  -An order was placed for custom orthotics  -To consider possible short course of prednisone in the future if pain does not improve  -Call direct clinic number [[phone]] at any time with questions or concerns.    Albert Yeo MD [facility]  [facility] [place]

## 2023-12-05 NOTE — TELEPHONE ENCOUNTER
Her PCP wants to start pt on an NSAID for a bone spur but informed pt it is not recommended to take any NSAIDS.     Also pt would like to know if Dr. MAR will begin to wean pt off of ursodiol now that her labs are stable. Dr. MAR mentioned it with pt's last visit. Please advise on both.

## 2023-12-05 NOTE — PROGRESS NOTES
ASSESSMENT & PLAN  Patient Instructions     1. Acute pain of left foot    2. Primary osteoarthritis of left foot    3. Primary osteoarthritis of right foot      -Patient has acute left foot pain due to arthritis.  Patient has chronic right foot pain due to arthritis  -Patient will start formal physical therapy and home exercise program  -May take Tylenol as needed for pain.  Patient should avoid oral anti-inflammatory medications since she is a liver transplant patient.  -Patient should ensure proper foot wear when she is active  -An order was placed for custom orthotics  -To consider possible short course of prednisone in the future if pain does not improve  -Call direct clinic number [[phone]] at any time with questions or concerns.    Albert Yeo MD [facility]  [facility]        -----    SUBJECTIVE  Sarah Fisher is a/an 53 year old female who is seen as a self referral for evaluation of left foot pain. The patient is seen by themselves.    Onset: 2 week(s) ago. Reports insidious onset without acute precipitating event.  Location of Pain: left dorsal midfoot  Rating of Pain at worst: 7/10  Rating of Pain Currently: 4/10  Worsened by: movement of foot, walking  Better with: rest  Treatments tried: rest/activity avoidance and massage, supportive shoes  Associated symptoms: tingling, tender to touch over left lateral foot  Orthopedic history: YES - h/o similar pain in right foot - had previous cortisone injection  Relevant surgical history: NO  Social history: social history: works -  - works from home    Past Medical History:   Diagnosis Date    Ascites     History of blood transfusion     Liver cirrhosis (H)     Thyroid disease      Social History     Socioeconomic History    Marital status: Single    Highest education level: Master's degree (e.g., MA, MS, Roberto, MEd, MSW, SASHA)   Tobacco Use    Smoking status: Never    Smokeless  tobacco: Never   Vaping Use    Vaping Use: Never used   Substance and Sexual Activity    Alcohol use: Not Currently     Comment: Quit on 6/20/2020    Drug use: Not Currently    Sexual activity: Not Currently     Partners: Male   Social History Narrative    Lives [place]. 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 12 year sonFlakito.         Christine Harris, DNP, APRN, CNP    3/17/2021     Social Determinants of Health     Financial Resource Strain: Low Risk  (4/11/2022)    Overall Financial Resource Strain (CARDIA)     Difficulty of Paying Living Expenses: Not hard at all   Food Insecurity: No Food Insecurity (4/11/2022)    Hunger Vital Sign     Worried About Running Out of Food in the Last Year: Never true     Ran Out of Food in the Last Year: Never true   Transportation Needs: No Transportation Needs (4/11/2022)    PRAPARE - Transportation     Lack of Transportation (Medical): No     Lack of Transportation (Non-Medical): No   Physical Activity: Sufficiently Active (4/11/2022)    Exercise Vital Sign     Days of Exercise per Week: 5 days     Minutes of Exercise per Session: 40 min   Stress: No Stress Concern Present (4/11/2022)    [place] [employer] - Occupational Stress Questionnaire     Feeling of Stress : Not at all   Social Connections: Unknown (4/11/2022)    Social Connection and Isolation Panel [NHANES]     Frequency of Communication with Friends and Family: More than three times a week     Frequency of Social Gatherings with Friends and Family: More than three times a week     Attends Protestant Services: Patient refused     Active Member of Clubs or Organizations: Yes     Marital Status: Patient refused   Housing Stability: Low Risk  (4/11/2022)    Housing Stability Vital Sign     Unable to Pay for Housing in the Last Year: No     Number of Places Lived in the Last Year: 1     Unstable Housing in the Last Year: No         Patient's past medical,  "surgical, social, and family histories were reviewed today and no changes are noted.    REVIEW OF SYSTEMS:  10 point ROS is negative other than symptoms noted above in HPI, Past Medical History or as stated below  Constitutional: NEGATIVE for fever, chills, change in weight  Skin: NEGATIVE for worrisome rashes, moles or lesions  GI/: NEGATIVE for bowel or bladder changes  Neuro: NEGATIVE for weakness, dizziness or paresthesias    OBJECTIVE:  /70   Ht 1.676 m (5' 6\")   Wt 81.6 kg (180 lb)   LMP  (LMP Unknown)   BMI 29.05 kg/m     General: healthy, alert and in no distress  HEENT: no scleral icterus or conjunctival erythema  Skin: no suspicious lesions or rash. No jaundice.  CV:  no pedal edema  Resp: normal respiratory effort without conversational dyspnea   Psych: normal mood and affect  Gait: normal steady gait with appropriate coordination and balance  Neuro: Normal light sensory exam of lower extremity  MSK:  LEFT FOOT  Inspection:    no swelling or ecchymosis  Palpation:    Tender about the 1st, 2nd, 5th metatarsals, cuboid, medial cuneiform, and navicular. Otherwise remainder of bony/ligamentous landmarks are non-tender.  Range of Motion:     Grossly intact and non-painful  Strength:    Grossly intact in all planes  Special Tests:    Positive: none      Independent visualization of the below image:  Recent Results (from the past 24 hour(s))   XR Foot Left G/E 3 Views    Narrative    Mild degenerative changes noted of the midfoot between the first and   second tarsometatarsal joints.  Small osteophyte on the dorsal navicular.    No acute fracture or dislocation.           Albert Yeo MD [facility]    "

## 2023-12-05 NOTE — LETTER
12/5/2023         RE: Sarah Fisher  [address] [address]        Dear Colleague,    Thank you for referring your patient, Sarah Fisher, to the [facility] [place]. Please see a copy of my visit note below.    ASSESSMENT & PLAN  Patient Instructions     1. Acute pain of left foot    2. Primary osteoarthritis of left foot    3. Primary osteoarthritis of right foot      -Patient has acute left foot pain due to arthritis.  Patient has chronic right foot pain due to arthritis  -Patient will start formal physical therapy and home exercise program  -May take Tylenol as needed for pain.  Patient should avoid oral anti-inflammatory medications since she is a liver transplant patient.  -Patient should ensure proper foot wear when she is active  -An order was placed for custom orthotics  -To consider possible short course of prednisone in the future if pain does not improve  -Call direct clinic number [[phone]] at any time with questions or concerns.    Albert Yeo MD [facility] Medicine  [facility] [place]        -----    SUBJECTIVE  Sarah Fisher is a/an 53 year old female who is seen as a self referral for evaluation of left foot pain. The patient is seen by themselves.    Onset: 2 week(s) ago. Reports insidious onset without acute precipitating event.  Location of Pain: left dorsal midfoot  Rating of Pain at worst: 7/10  Rating of Pain Currently: 4/10  Worsened by: movement of foot, walking  Better with: rest  Treatments tried: rest/activity avoidance and massage, supportive shoes  Associated symptoms: tingling, tender to touch over left lateral foot  Orthopedic history: YES - h/o similar pain in right foot - had previous cortisone injection  Relevant surgical history: NO  Social history: social history: works -  - works from home    Past Medical History:   Diagnosis Date     Ascites       History of blood transfusion      Liver cirrhosis (H)      Thyroid disease      Social History     Socioeconomic History     Marital status: Single     Highest education level: Master's degree (e.g., MA, MS, Roberto, MEd, MSW, SASHA)   Tobacco Use     Smoking status: Never     Smokeless tobacco: Never   Vaping Use     Vaping Use: Never used   Substance and Sexual Activity     Alcohol use: Not Currently     Comment: Quit on 6/20/2020     Drug use: Not Currently     Sexual activity: Not Currently     Partners: Male   Social History Narrative    Lives [place]. 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 12 year son, Flakito.         Christine Harris, DNP, APRN, CNP    3/17/2021     Social Determinants of Health     Financial Resource Strain: Low Risk  (4/11/2022)    Overall Financial Resource Strain (CARDIA)      Difficulty of Paying Living Expenses: Not hard at all   Food Insecurity: No Food Insecurity (4/11/2022)    Hunger Vital Sign      Worried About Running Out of Food in the Last Year: Never true      Ran Out of Food in the Last Year: Never true   Transportation Needs: No Transportation Needs (4/11/2022)    PRAPARE - Transportation      Lack of Transportation (Medical): No      Lack of Transportation (Non-Medical): No   Physical Activity: Sufficiently Active (4/11/2022)    Exercise Vital Sign      Days of Exercise per Week: 5 days      Minutes of Exercise per Session: 40 min   Stress: No Stress Concern Present (4/11/2022)    [place] [employer] - Occupational Stress Questionnaire      Feeling of Stress : Not at all   Social Connections: Unknown (4/11/2022)    Social Connection and Isolation Panel [NHANES]      Frequency of Communication with Friends and Family: More than three times a week      Frequency of Social Gatherings with Friends and Family: More than three times a week      Attends Synagogue Services: Patient refused      Active Member of Clubs or  "Organizations: Yes      Marital Status: Patient refused   Housing Stability: Low Risk  (4/11/2022)    Housing Stability Vital Sign      Unable to Pay for Housing in the Last Year: No      Number of Places Lived in the Last Year: 1      Unstable Housing in the Last Year: No         Patient's past medical, surgical, social, and family histories were reviewed today and no changes are noted.    REVIEW OF SYSTEMS:  10 point ROS is negative other than symptoms noted above in HPI, Past Medical History or as stated below  Constitutional: NEGATIVE for fever, chills, change in weight  Skin: NEGATIVE for worrisome rashes, moles or lesions  GI/: NEGATIVE for bowel or bladder changes  Neuro: NEGATIVE for weakness, dizziness or paresthesias    OBJECTIVE:  /70   Ht 1.676 m (5' 6\")   Wt 81.6 kg (180 lb)   LMP  (LMP Unknown)   BMI 29.05 kg/m     General: healthy, alert and in no distress  HEENT: no scleral icterus or conjunctival erythema  Skin: no suspicious lesions or rash. No jaundice.  CV:  no pedal edema  Resp: normal respiratory effort without conversational dyspnea   Psych: normal mood and affect  Gait: normal steady gait with appropriate coordination and balance  Neuro: Normal light sensory exam of lower extremity  MSK:  LEFT FOOT  Inspection:    no swelling or ecchymosis  Palpation:    Tender about the 1st, 2nd, 5th metatarsals, cuboid, medial cuneiform, and navicular. Otherwise remainder of bony/ligamentous landmarks are non-tender.  Range of Motion:     Grossly intact and non-painful  Strength:    Grossly intact in all planes  Special Tests:    Positive: none      Independent visualization of the below image:  Recent Results (from the past 24 hour(s))   XR Foot Left G/E 3 Views    Narrative    Mild degenerative changes noted of the midfoot between the first and   second tarsometatarsal joints.  Small osteophyte on the dorsal navicular.    No acute fracture or dislocation.           Albert Yeo MD " CAQSM  [facility]      Again, thank you for allowing me to participate in the care of your patient.        Sincerely,        Albert Yeo, MD

## 2023-12-06 ENCOUNTER — LAB (OUTPATIENT)
Dept: LAB | Facility: CLINIC | Age: 53
End: 2023-12-06
Payer: COMMERCIAL

## 2023-12-06 DIAGNOSIS — Z94.4 LIVER REPLACED BY TRANSPLANT (H): ICD-10-CM

## 2023-12-06 LAB
ALBUMIN SERPL BCG-MCNC: 4.3 G/DL (ref 3.5–5.2)
ALP SERPL-CCNC: 97 U/L (ref 40–150)
ALT SERPL W P-5'-P-CCNC: 17 U/L (ref 0–50)
ANION GAP SERPL CALCULATED.3IONS-SCNC: 10 MMOL/L (ref 7–15)
AST SERPL W P-5'-P-CCNC: 24 U/L (ref 0–45)
BILIRUB DIRECT SERPL-MCNC: 0.2 MG/DL (ref 0–0.3)
BILIRUB SERPL-MCNC: 0.6 MG/DL
BUN SERPL-MCNC: 20.5 MG/DL (ref 6–20)
CALCIUM SERPL-MCNC: 10.2 MG/DL (ref 8.6–10)
CHLORIDE SERPL-SCNC: 108 MMOL/L (ref 98–107)
CREAT SERPL-MCNC: 1.1 MG/DL (ref 0.51–0.95)
DEPRECATED HCO3 PLAS-SCNC: 27 MMOL/L (ref 22–29)
EGFRCR SERPLBLD CKD-EPI 2021: 60 ML/MIN/1.73M2
ERYTHROCYTE [DISTWIDTH] IN BLOOD BY AUTOMATED COUNT: 12.4 % (ref 10–15)
GLUCOSE SERPL-MCNC: 97 MG/DL (ref 70–99)
HCT VFR BLD AUTO: 39.1 % (ref 35–47)
HGB BLD-MCNC: 13 G/DL (ref 11.7–15.7)
MCH RBC QN AUTO: 29.5 PG (ref 26.5–33)
MCHC RBC AUTO-ENTMCNC: 33.2 G/DL (ref 31.5–36.5)
MCV RBC AUTO: 89 FL (ref 78–100)
PLATELET # BLD AUTO: 223 10E3/UL (ref 150–450)
POTASSIUM SERPL-SCNC: 5.1 MMOL/L (ref 3.4–5.3)
PROT SERPL-MCNC: 7 G/DL (ref 6.4–8.3)
RBC # BLD AUTO: 4.4 10E6/UL (ref 3.8–5.2)
SODIUM SERPL-SCNC: 145 MMOL/L (ref 135–145)
TACROLIMUS BLD-MCNC: 6.5 UG/L (ref 5–15)
TME LAST DOSE: NORMAL H
TME LAST DOSE: NORMAL H
WBC # BLD AUTO: 4.9 10E3/UL (ref 4–11)

## 2023-12-06 PROCEDURE — 80053 COMPREHEN METABOLIC PANEL: CPT

## 2023-12-06 PROCEDURE — 85027 COMPLETE CBC AUTOMATED: CPT

## 2023-12-06 PROCEDURE — 80197 ASSAY OF TACROLIMUS: CPT

## 2023-12-06 PROCEDURE — 36415 COLL VENOUS BLD VENIPUNCTURE: CPT

## 2023-12-06 PROCEDURE — 82248 BILIRUBIN DIRECT: CPT

## 2023-12-08 ENCOUNTER — MYC MEDICAL ADVICE (OUTPATIENT)
Dept: ORTHOPEDICS | Facility: CLINIC | Age: 53
End: 2023-12-08
Payer: COMMERCIAL

## 2023-12-08 NOTE — TELEPHONE ENCOUNTER
Instructed pt the following:    Per DR beckham patient can decrease meaghan to 300 mg daily.  She can have short term NSAID use. Less than 2 weeks.     Pt will reach out Dr. Yeo to prescribe.

## 2023-12-08 NOTE — TELEPHONE ENCOUNTER
Please see patient's mychart message and advise. Pharmacy selected in Epic.    Joyce Ryan MBA, ATC

## 2023-12-08 NOTE — TELEPHONE ENCOUNTER
Per DR beckham patient can decrease meaghan to 300 mg daily.  She can have short term NSAID use. Less than 2 weeks.

## 2023-12-09 RX ORDER — MELOXICAM 15 MG/1
15 TABLET ORAL DAILY
Qty: 15 TABLET | Refills: 0 | Status: SHIPPED | OUTPATIENT
Start: 2023-12-09 | End: 2024-01-16

## 2023-12-11 ENCOUNTER — TELEPHONE (OUTPATIENT)
Dept: TRANSPLANT | Facility: CLINIC | Age: 53
End: 2023-12-11
Payer: COMMERCIAL

## 2023-12-11 DIAGNOSIS — Z94.4 LIVER TRANSPLANT RECIPIENT (H): Primary | ICD-10-CM

## 2023-12-11 RX ORDER — TACROLIMUS 1 MG/1
1 CAPSULE ORAL EVERY 12 HOURS
Qty: 180 CAPSULE | Refills: 3 | Status: SHIPPED | OUTPATIENT
Start: 2023-12-11 | End: 2024-01-25

## 2023-12-11 NOTE — TELEPHONE ENCOUNTER
ISSUE:   Tacrolimus IR level 6.5 on 12/6, goal 3-5, dose 2 mg BID.    PLAN:   Call Patientand confirm this was an accurate 12-hour trough.   Verify Tacrolimus IR dose 2 mg BID.   Confirm no new medications or illness.   Confirm no missed doses.   If accurate trough and accurate dose, decrease Tacrolimus IR dose to 1 mg BID     Is this more than a 50% increase or decrease in current IS dose: Yes  If YES, justification: above goal    Repeat labs in 1 weeks.  *If > 50% change in immunosuppression dose, repeat tacro only  in 1 week. Lotus Hughes RN     OUTCOME:   Patient left a message , they confirm accurate trough level and current dose 2 mg BID.   Patientconfirmed dose change to 1 mg BID.  Patientagreed to repeat tacro in 1 weeks.   Orders sent to preferred pharmacy for dose change and lab for repeat labs.   Patientvoiced understanding of plan.      Alexandrea Peter LPN

## 2023-12-12 ENCOUNTER — OFFICE VISIT (OUTPATIENT)
Dept: DERMATOLOGY | Facility: CLINIC | Age: 53
End: 2023-12-12

## 2023-12-12 DIAGNOSIS — Z41.1 ELECTIVE PROCEDURE FOR UNACCEPTABLE COSMETIC APPEARANCE: Primary | ICD-10-CM

## 2023-12-12 PROCEDURE — 96999 UNLISTED SPEC DERM SVC/PX: CPT | Performed by: PHYSICIAN ASSISTANT

## 2023-12-12 NOTE — PROGRESS NOTES
HPI:   Chief complaints: Sarah Fisher is a pleasant 53 year old female who presents for evaluation of treatment of facial rhytids with cosmetic botox. She liked units after LOV. No issues.       PHYSICAL EXAM:    LMP  (LMP Unknown)   Skin exam performed as follows: Type 2 skin. Mood appropriate  Alert and Oriented X 3. Well developed, well nourished in no distress.  General appearance: Normal  Head including face: Normal  Eyes: conjunctiva and lids: Normal  Mouth: Lips, teeth, gums: Normal  Neck: Normal  Skin: Scalp and body hair: See below    Age appropriate rhytids of glabella, crows feet    ASSESSMENT/PLAN:     BOTOX:  PGACAC discussed.  Risks including but not limited to injection site reaction, bruising, asymmetry, no resolution of rhytides, brow ptosis, dry mouth, tiredness, and headache.  Also label warnings are difficulty with swallowing, speaking, breathing, muscle weakness, loss of bladder control, and double vision/blurred vision.  Explained confirmed report of spread of toxin effect when used for hyperhidrosis of underarms or cosmetic use on face has not been reported.  All questions answered and entertained to patient s satisfaction.  Informed consent obtained.      --31 units injected to gabella (11 into procerus, 8 into medial )  --12 into each crows (with 1 unit infraorbitally)   --55 units total  --Cosmetic cost of $770    Lot: [id number]  Exp: 10/2025    Follow-up: 3 mo  CC:   Scribed By: Areli Reich, MS, PA-C

## 2023-12-12 NOTE — LETTER
12/12/2023         RE: Sarah Fisher  [address] [address]        Dear Colleague,    Thank you for referring your patient, Sarah Fisher, to the [facility]. Please see a copy of my visit note below.    HPI:   Chief complaints: Sarah Fisher is a pleasant 53 year old female who presents for evaluation of treatment of facial rhytids with cosmetic botox. She liked units after LOV. No issues.       PHYSICAL EXAM:    LMP  (LMP Unknown)   Skin exam performed as follows: Type 2 skin. Mood appropriate  Alert and Oriented X 3. Well developed, well nourished in no distress.  General appearance: Normal  Head including face: Normal  Eyes: conjunctiva and lids: Normal  Mouth: Lips, teeth, gums: Normal  Neck: Normal  Skin: Scalp and body hair: See below    Age appropriate rhytids of glabella, crows feet    ASSESSMENT/PLAN:     BOTOX:  PGACAC discussed.  Risks including but not limited to injection site reaction, bruising, asymmetry, no resolution of rhytides, brow ptosis, dry mouth, tiredness, and headache.  Also label warnings are difficulty with swallowing, speaking, breathing, muscle weakness, loss of bladder control, and double vision/blurred vision.  Explained confirmed report of spread of toxin effect when used for hyperhidrosis of underarms or cosmetic use on face has not been reported.  All questions answered and entertained to patient s satisfaction.  Informed consent obtained.      --31 units injected to gabella (11 into procerus, 8 into medial )  --12 into each crows (with 1 unit infraorbitally)   --55 units total  --Cosmetic cost of $770    Lot: [id number]  Exp: 10/2025    Follow-up: 3 mo  CC:   Scribed By: Areli Reich, MS, PAKAMERON      Again, thank you for allowing me to participate in the care of your patient.        Sincerely,        Areli Reich PA-C

## 2023-12-13 ENCOUNTER — OFFICE VISIT (OUTPATIENT)
Dept: PEDIATRICS | Facility: CLINIC | Age: 53
End: 2023-12-13
Payer: COMMERCIAL

## 2023-12-13 VITALS
HEIGHT: 67 IN | TEMPERATURE: 98.3 F | SYSTOLIC BLOOD PRESSURE: 110 MMHG | RESPIRATION RATE: 16 BRPM | DIASTOLIC BLOOD PRESSURE: 66 MMHG | WEIGHT: 186 LBS | BODY MASS INDEX: 29.19 KG/M2 | OXYGEN SATURATION: 100 % | HEART RATE: 85 BPM

## 2023-12-13 DIAGNOSIS — E46 MALNUTRITION, UNSPECIFIED TYPE (H): ICD-10-CM

## 2023-12-13 DIAGNOSIS — R73.9 STEROID-INDUCED HYPERGLYCEMIA: ICD-10-CM

## 2023-12-13 DIAGNOSIS — Z91.89 AT RISK FOR DECREASED BONE DENSITY: ICD-10-CM

## 2023-12-13 DIAGNOSIS — I82.622 ACUTE DEEP VEIN THROMBOSIS (DVT) OF OTHER VEIN OF LEFT UPPER EXTREMITY (H): ICD-10-CM

## 2023-12-13 DIAGNOSIS — Z12.4 SCREENING FOR MALIGNANT NEOPLASM OF CERVIX: ICD-10-CM

## 2023-12-13 DIAGNOSIS — K21.9 GASTROESOPHAGEAL REFLUX DISEASE WITHOUT ESOPHAGITIS: ICD-10-CM

## 2023-12-13 DIAGNOSIS — N18.32 CHRONIC KIDNEY DISEASE, STAGE 3B (H): ICD-10-CM

## 2023-12-13 DIAGNOSIS — F10.21 ALCOHOL USE DISORDER, SEVERE, IN SUSTAINED REMISSION (H): ICD-10-CM

## 2023-12-13 DIAGNOSIS — E03.8 SUBCLINICAL HYPOTHYROIDISM: ICD-10-CM

## 2023-12-13 DIAGNOSIS — Z23 ENCOUNTER FOR ADMINISTRATION OF VACCINE: ICD-10-CM

## 2023-12-13 DIAGNOSIS — D59.10 AUTOIMMUNE HEMOLYTIC ANEMIA (H): ICD-10-CM

## 2023-12-13 DIAGNOSIS — E83.110 HEREDITARY HEMOCHROMATOSIS (H): ICD-10-CM

## 2023-12-13 DIAGNOSIS — K70.31 ALCOHOLIC CIRRHOSIS OF LIVER WITH ASCITES (H): ICD-10-CM

## 2023-12-13 DIAGNOSIS — I82.C12 ACUTE EMBOLISM AND THROMBOSIS OF LEFT INTERNAL JUGULAR VEIN (H): ICD-10-CM

## 2023-12-13 DIAGNOSIS — T38.0X5A STEROID-INDUCED HYPERGLYCEMIA: ICD-10-CM

## 2023-12-13 DIAGNOSIS — K72.10 CHRONIC LIVER FAILURE WITHOUT HEPATIC COMA (H): ICD-10-CM

## 2023-12-13 DIAGNOSIS — Z00.00 ROUTINE GENERAL MEDICAL EXAMINATION AT A HEALTH CARE FACILITY: Primary | ICD-10-CM

## 2023-12-13 DIAGNOSIS — Z94.4 LIVER TRANSPLANT RECIPIENT (H): ICD-10-CM

## 2023-12-13 PROBLEM — K92.2 GI (GASTROINTESTINAL BLEED): Status: RESOLVED | Noted: 2020-10-30 | Resolved: 2023-12-13

## 2023-12-13 PROBLEM — I95.9 HYPOTENSION: Status: RESOLVED | Noted: 2020-12-19 | Resolved: 2023-12-13

## 2023-12-13 PROBLEM — D75.89 MACROCYTOSIS WITHOUT ANEMIA: Status: RESOLVED | Noted: 2017-05-19 | Resolved: 2023-12-13

## 2023-12-13 PROBLEM — D64.9 ACUTE ANEMIA: Status: RESOLVED | Noted: 2020-10-02 | Resolved: 2023-12-13

## 2023-12-13 PROBLEM — N18.9 CKD (CHRONIC KIDNEY DISEASE): Status: RESOLVED | Noted: 2020-11-14 | Resolved: 2023-12-13

## 2023-12-13 PROBLEM — E87.6 HYPOKALEMIA: Status: RESOLVED | Noted: 2020-09-08 | Resolved: 2023-12-13

## 2023-12-13 PROBLEM — Z76.82 PRE-LIVER TRANSPLANT, LISTED: Status: RESOLVED | Noted: 2021-05-02 | Resolved: 2023-12-13

## 2023-12-13 PROBLEM — Z48.24: Status: ACTIVE | Noted: 2023-12-13

## 2023-12-13 PROBLEM — Z76.82 LIVER TRANSPLANT CANDIDATE: Status: RESOLVED | Noted: 2021-10-17 | Resolved: 2023-12-13

## 2023-12-13 PROBLEM — K72.90 LIVER FAILURE (H): Status: RESOLVED | Noted: 2020-12-14 | Resolved: 2023-12-13

## 2023-12-13 PROCEDURE — G0145 SCR C/V CYTO,THINLAYER,RESCR: HCPCS | Performed by: NURSE PRACTITIONER

## 2023-12-13 PROCEDURE — 99213 OFFICE O/P EST LOW 20 MIN: CPT | Mod: 25 | Performed by: NURSE PRACTITIONER

## 2023-12-13 PROCEDURE — 99396 PREV VISIT EST AGE 40-64: CPT | Mod: 25 | Performed by: NURSE PRACTITIONER

## 2023-12-13 PROCEDURE — 90632 HEPA VACCINE ADULT IM: CPT | Performed by: NURSE PRACTITIONER

## 2023-12-13 PROCEDURE — 87624 HPV HI-RISK TYP POOLED RSLT: CPT | Performed by: NURSE PRACTITIONER

## 2023-12-13 PROCEDURE — G0124 SCREEN C/V THIN LAYER BY MD: HCPCS | Performed by: PATHOLOGY

## 2023-12-13 PROCEDURE — 90471 IMMUNIZATION ADMIN: CPT | Performed by: NURSE PRACTITIONER

## 2023-12-13 ASSESSMENT — ENCOUNTER SYMPTOMS
BREAST MASS: 0
MYALGIAS: 0
NERVOUS/ANXIOUS: 0
SHORTNESS OF BREATH: 0
DYSURIA: 0
CONSTIPATION: 0
HEARTBURN: 0
DIARRHEA: 0
PARESTHESIAS: 0
ARTHRALGIAS: 1
HEADACHES: 0
WEAKNESS: 0
NAUSEA: 0
HEMATURIA: 0
ABDOMINAL PAIN: 0
HEMATOCHEZIA: 0
JOINT SWELLING: 1
DIZZINESS: 0
FEVER: 0
CHILLS: 0
PALPITATIONS: 0
FREQUENCY: 0
SORE THROAT: 0
COUGH: 0
EYE PAIN: 0

## 2023-12-13 ASSESSMENT — PAIN SCALES - GENERAL: PAINLEVEL: NO PAIN (0)

## 2023-12-13 NOTE — PROGRESS NOTES
SUBJECTIVE:   Maria Elena is a 53 year old, presenting for the following:  Physical        12/13/2023    11:19 AM   Additional Questions   Roomed by Celeste BAEZ   Accompanied by Self         12/13/2023    11:19 AM   Patient Reported Additional Medications   Patient reports taking the following new medications n/a       Healthy Habits:     Getting at least 3 servings of Calcium per day:  Yes    Bi-annual eye exam:  Yes    Dental care twice a year:  NO    Sleep apnea or symptoms of sleep apnea:  None    Diet:  Regular (no restrictions)    Frequency of exercise:  4-5 days/week    Duration of exercise:  30-45 minutes    Taking medications regularly:  No    Barriers to taking medications:  None    Medication side effects:  None    Additional concerns today:  No      Mammo - utd  Pap - due  Tetanus - utd  Colonoscopy - 12/4/2020, hemorrhoid, congested colonic mucosa, 4 mm cecal polyp removed that showed normal colonic mucosa.  DEXA - recommend screening given history of excessive alcohol use and chronic steroid use. Done in 2021, due for repeat in 2023.      Social History     Tobacco Use    Smoking status: Never    Smokeless tobacco: Never   Substance Use Topics    Alcohol use: Not Currently     Comment: Quit on 6/20/2020 12/13/2023    11:12 AM   Alcohol Use   Prescreen: >3 drinks/day or >7 drinks/week? Not Applicable     Reviewed orders with patient.  Reviewed health maintenance and updated orders accordingly - Yes  BP Readings from Last 3 Encounters:   12/13/23 110/66   12/05/23 112/70   10/10/23 124/86    Wt Readings from Last 3 Encounters:   12/13/23 84.4 kg (186 lb)   12/05/23 81.6 kg (180 lb)   10/10/23 92.5 kg (204 lb)           Breast Cancer Screening:        3/17/2021    10:23 AM   Breast CA Risk Assessment (FHS-7)   Do you have a family history of breast, colon, or ovarian cancer? No / Unknown     Mammogram Screening: Recommended annual mammography  Pertinent mammograms are reviewed under the imaging  tab.    History of abnormal Pap smear:       Latest Ref Rng & Units 11/16/2022    11:32 AM 3/17/2021    11:43 AM 3/17/2021    10:59 AM   PAP / HPV   PAP  Negative for Intraepithelial Lesion or Malignancy (NILM)      PAP (Historical)    NIL    HPV 16 DNA Negative Negative  Negative     HPV 18 DNA Negative Negative  Negative     Other HR HPV Negative Negative  Negative       Reviewed and updated as needed this visit by clinical staff   Tobacco  Allergies               Reviewed and updated as needed this visit by Provider                 Patient Active Problem List   Diagnosis    Acute deep vein thrombosis (DVT) of upper extremity (H)    Acute kidney failure, unspecified (H24)    Carrier of group B Streptococcus    Cirrhosis of liver with ascites (H)    Elevated blood pressure    Family history of colon cancer    Gastroesophageal reflux disease without esophagitis    Hyponatremia    History of DVT (deep vein thrombosis)    Anemia associated with acute blood loss    Need for vaccination for viral hepatitis    Knee pain    Pancytopenia (H)    Macrocytic anemia    Blood loss anemia    Hydrothorax    Portal hypertensive gastropathy (H)    Pleural effusion    Screening for malignant neoplasm of cervix    Venous incompetence    Varicose veins of leg with pain    Subclinical hypothyroidism    Alcoholic cirrhosis (H)    Abnormal serum iron level    Iron deficiency anemia due to chronic blood loss    Liver transplant recipient (H)    Immunosuppressed status (H24)    Malnutrition (H24)    Steroid-induced hyperglycemia    Epistaxis    Esophagitis    Duodenal erosion    Gastric antral vascular ectasia    Hyperphosphatemia    Leukocytosis    Chronic kidney disease, stage 3b (H)    Low magnesium level    Autoimmune hemolytic anemia (H)    Alcohol use disorder, severe, in sustained remission (H)    Hereditary hemochromatosis (H24)     Past Medical History:   Diagnosis Date    Ascites     GI (gastrointestinal bleed)     History of  blood transfusion     Hypokalemia     Hypotension     Liver cirrhosis (H)     Liver failure (H)     Thyroid disease      Past Surgical History:   Procedure Laterality Date    ABDOMEN SURGERY  2009     SECTION      CV RIGHT HEART CATH MEASUREMENTS RECORDED N/A 2021    Procedure: CV RIGHT HEART CATH;  Surgeon: Joseph Guevara MD;  Location:  [facility]    ENDOSCOPIC RETROGRADE CHOLANGIOPANCREATOGRAM N/A 10/25/2021    Procedure: ENDOSCOPIC RETROGRADE CHOLANGIOPANCREATOGRAPHY, WITH biliary sphincterotomy, biliary dilation, and biliary stent placement;  Surgeon: Guru Dominguez Fenton MD;  Location: [facility]    ENDOSCOPIC RETROGRADE CHOLANGIOPANCREATOGRAM N/A 2022    Procedure: ENDOSCOPIC RETROGRADE CHOLANGIOPANCREATOGRAPHY WITH BILIARY DILATION, DEBRIS REMOVAL AND STENT PLACEMENT;  Surgeon: Guru Dominguez Fenton MD;  Location: [facility]    ENDOSCOPIC RETROGRADE CHOLANGIOPANCREATOGRAPHY, EXCHANGE TUBE/STENT N/A 2021    Procedure: ENDOSCOPIC RETROGRADE CHOLANGIOPANCREATOGRAPHY, WITH biliary sphincterotomy, stricture dilation, stent exchange;  Surgeon: Guru Dominguez Fenton MD;  Location: [facility]    ENDOSCOPIC ULTRASOUND UPPER GASTROINTESTINAL TRACT (GI) N/A 2022    Procedure: ENDOSCOPIC ULTRASOUND WITH CORE LIVER BIOPSY;  Surgeon: Guru Dominguez Fenton MD;  Location: [facility]    ESOPHAGOSCOPY, GASTROSCOPY, DUODENOSCOPY (EGD), COMBINED N/A 10/07/2021    Procedure: ESOPHAGOGASTRODUODENOSCOPY (EGD) with hemostasis;  Surgeon: Fox Jerry MD;  Location:  GI    ESOPHAGOSCOPY, GASTROSCOPY, DUODENOSCOPY (EGD), COMBINED N/A 10/22/2021    Procedure: ESOPHAGOGASTRODUODENOSCOPY, WITH BIOPSY;  Surgeon: Fadia Avila MD;  Location: [facility] GI    IR LIVER BIOPSY PERCUTANEOUS  10/26/2021    IR PARACENTESIS  2020    IR PARACENTESIS  2020    IR THORACENTESIS  2021    IR THORACENTESIS  2021    THORACENTESIS  Left 2021    Procedure: THORACENTESIS;  Surgeon: Almas Irby MD;  Location: [facility]    THORACENTESIS N/A 2021    Procedure: THORACENTESIS;  Surgeon: Almas Irby MD;  Location: [facility]    THORACENTESIS N/A 03/10/2021    Procedure: THORACENTESIS;  Surgeon: Almas Irby MD;  Location: [facility]    THORACENTESIS Left 2021    Procedure: THORACENTESIS;  Surgeon: Kennedi Chavez MD;  Location: [facility] OR    THORACENTESIS Left 2021    Procedure: THORACENTESIS;  Surgeon: Jess Ansari PA-C;  Location: [facility] OR    THORACIC SURGERY  2021    TRANSPLANT LIVER RECIPIENT  DONOR N/A 10/17/2021    Procedure: TRANSPLANT, LIVER, RECIPIENT,  DONOR;  Surgeon: Christian Morrison MD;  Location: [facility] OR     Family History   Problem Relation Age of Onset    No Known Problems Mother     Colon Cancer Father 58    Breast Cancer Maternal Grandmother     No Known Problems Maternal Grandfather     No Known Problems Paternal Grandmother     No Known Problems Paternal Grandfather     Substance Abuse Brother     No Known Problems Sister     No Known Problems Son     Substance Abuse Brother      Social History     Socioeconomic History    Marital status: Single     Spouse name: Not on file    Number of children: Not on file    Years of education: Not on file    Highest education level: Master's degree (e.g., MA, MS, Roberto, MEd, MSW, SASHA)   Occupational History    Not on file   Tobacco Use    Smoking status: Never    Smokeless tobacco: Never   Vaping Use    Vaping Use: Never used   Substance and Sexual Activity    Alcohol use: Not Currently     Comment: Quit on 2020    Drug use: Not Currently    Sexual activity: Not Currently     Partners: Male   Other Topics Concern    Parent/sibling w/ CABG, MI or angioplasty before 65F 55M? Not Asked   Social History Narrative    Lives [place]. 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 12 year son, Shane King  Kimberly, DNP, APRN, CNP    3/17/2021     Social Determinants of Health     Financial Resource Strain: Low Risk  (12/13/2023)    Financial Resource Strain     Within the past 12 months, have you or your family members you live with been unable to get utilities (heat, electricity) when it was really needed?: No   Food Insecurity: Low Risk  (12/13/2023)    Food Insecurity     Within the past 12 months, did you worry that your food would run out before you got money to buy more?: No     Within the past 12 months, did the food you bought just not last and you didn t have money to get more?: No   Transportation Needs: Low Risk  (12/13/2023)    Transportation Needs     Within the past 12 months, has lack of transportation kept you from medical appointments, getting your medicines, non-medical meetings or appointments, work, or from getting things that you need?: No   Physical Activity: Sufficiently Active (4/11/2022)    Exercise Vital Sign     Days of Exercise per Week: 5 days     Minutes of Exercise per Session: 40 min   Stress: No Stress Concern Present (4/11/2022)    [employer] - Occupational Stress Questionnaire     Feeling of Stress : Not at all   Social Connections: Unknown (4/11/2022)    Social Connection and Isolation Panel [NHANES]     Frequency of Communication with Friends and Family: More than three times a week     Frequency of Social Gatherings with Friends and Family: More than three times a week     Attends Religion Services: Patient refused     Active Member of Clubs or Organizations: Yes     Attends Club or Organization Meetings: Not on file     Marital Status: Patient refused   Interpersonal Safety: Low Risk  (12/13/2023)    Interpersonal Safety     Do you feel physically and emotionally safe where you currently live?: Yes     Within the past 12 months, have you been hit, slapped, kicked or otherwise physically hurt by someone?: No     Within the past 12 months, have you  been humiliated or emotionally abused in other ways by your partner or ex-partner?: No   Housing Stability: Low Risk  (12/13/2023)    Housing Stability     Do you have housing? : Yes     Are you worried about losing your housing?: No     Current Outpatient Medications   Medication    amLODIPine (NORVASC) 10 MG tablet    aspirin 81 MG EC tablet    augmented betamethasone dipropionate (DIPROLENE AF) 0.05 % external cream    biotin 1000 MCG TABS tablet    cholecalciferol 125 MCG (5000 UT) CAPS    diclofenac (VOLTAREN) 1 % topical gel    hydrocortisone 2.5 % ointment    ketoconazole (NIZORAL) 2 % external cream    levothyroxine (SYNTHROID/LEVOTHROID) 88 MCG tablet    magnesium oxide (MAG-OX) 400 MG tablet    meloxicam (MOBIC) 15 MG tablet    multivitamin (THERMEMS) TABS    ondansetron (ZOFRAN) 4 MG tablet    pantoprazole (PROTONIX) 40 MG EC tablet    Semaglutide, 2 MG/DOSE, (OZEMPIC, 2 MG/DOSE,) 8 MG/3ML pen    tacrolimus (GENERIC) 1 MG capsule    tretinoin (RETIN-A) 0.025 % external cream    ursodiol (ACTIGALL) 300 MG capsule     No current facility-administered medications for this visit.        Allergies   Allergen Reactions    Amoxicillin GI Disturbance, Diarrhea, Nausea and Nausea and Vomiting         Review of Systems   Constitutional:  Negative for chills and fever.   HENT:  Negative for congestion, ear pain, hearing loss and sore throat.    Eyes:  Negative for pain and visual disturbance.   Respiratory:  Negative for cough and shortness of breath.    Cardiovascular:  Negative for chest pain, palpitations and peripheral edema.   Gastrointestinal:  Negative for abdominal pain, constipation, diarrhea, heartburn, hematochezia and nausea.   Breasts:  Negative for tenderness, breast mass and discharge.   Genitourinary:  Negative for dysuria, frequency, genital sores, hematuria, pelvic pain, urgency, vaginal bleeding and vaginal discharge.   Musculoskeletal:  Positive for arthralgias and joint swelling. Negative for  "myalgias.   Skin:  Negative for rash.   Neurological:  Negative for dizziness, weakness, headaches and paresthesias.   Psychiatric/Behavioral:  Negative for mood changes. The patient is not nervous/anxious.      OBJECTIVE:   /66   Pulse 85   Temp 98.3  F (36.8  C) (Tympanic)   Resp 16   Ht 1.689 m (5' 6.5\")   Wt 84.4 kg (186 lb)   LMP  (LMP Unknown)   SpO2 100%   BMI 29.57 kg/m    Physical Exam  Constitutional: appears to be in no acute distress, comfortable, pleasant.   Eyes: anicteric, conjunctiva clear without drainage or erythema. WILLIS.   Ears, Nose and Throat: tympanic membranes gray with LR,  nose without nasal discharge. OP: no erythema to posterior pharynx, negative post nasal drainage, tonsils +1 no erythema or exudate.  Neck: supple, thyroid palpable,not enlarged, no nodules   Breast: Exam deferred (deferred after discussion of exam options with patient, no symptoms or concerns).   Cardiovascular: regular rate and rhythm, normal S1 and S2, no murmurs, rubs or gallops, peripheral pulses full and symmetric; negative peripheral edema   Respiratory: Air entry throughout. Breathing pattern unlabored without the use of accessory muscles. Clear to auscultation A and P, no wheezes or crackles, normal breath sounds.    Gastrointestinal: rounded, soft. Positive bowel sounds x4, nontender, no masses.   Genitourinary: external genitalia is without lesions. Introitus is normal, vaginal walls pink and moist without lesions or evidence of trauma. There is no abnormal discharge from the cervix. Cervix is pink without polyps or lesions.  Musculoskeletal: full range of motion, no edema.   Skin: pink, turgor smooth and elastic. Negative for lesions or dryness.  Neurological: normal gait, no tremor.   Psychological: appropriate mood and affect.   Lymphatic: no cervical, axillary, supraclavicular, or infraclavicular lymphadenopathy    Diagnostic Test Results:  Labs reviewed in Epic    ASSESSMENT/PLAN:   (Z00.00) " Routine general medical examination at a health care facility  (primary encounter diagnosis)  Age appropriate screening and preventative care have been addressed today. Vaccinations have been updated. Patient has been advised to follow a balanced diet. They have been advised to undertake routine aerobic activity and they were counseled on healthy weight maintenance. Colonoscopy has been reviewed and is up to date at this time. Recommend annual vision exams as well as biannual dental exams. They will follow up for annual physical again in one year.   - PRIMARY CARE FOLLOW-UP SCHEDULING          (Z12.4) Screening for malignant neoplasm of cervix  Needs annual screening.   - Pap Screen with HPV - recommended age 30 - 65 years          (D59.10) Autoimmune hemolytic anemia (H)  (E83.110) Hereditary hemochromatosis (H24)  Stable, has followed with hematology in the past but not since 8/2021.    (K70.31) Alcoholic cirrhosis of liver with ascites (H)  (F10.21) Alcohol use disorder, severe, in sustained remission (H)  (K72.10) Chronic liver failure without hepatic coma (H)  (Z94.4) Liver transplant recipient (H)  Doing well! S/p liver transplant 10/17/2021. Doing well with sobriety. On ASA 81 mg due to hypercoagulable state intraoperatively and post-op US with elevated RI involving the extrahepatic hepatic artery and right hepatic artery. Continue to follow with GI, Dr. Fadia Avila.     (N18.32) Chronic kidney disease, stage 3b (H)  Renal function is stable. Follows with nephrology. Underlying etiology thought to be from residual scarring from AKIs while liver had failed. Now focus is on preventing further injury - controlling BP, avoiding nephrotoxins, etc.      (I82.C12) Acute embolism and thrombosis of left internal jugular vein (H)  (I82.622) Acute deep vein thrombosis (DVT) of other vein of left upper extremity (H)  On ASA. Left internal jugular acute DVT 9/2020. First DVT she had ever had. Saw hematology, they felt  "her DVT was related to hypercoagulable state from cirrhosis and compression from hydrothorax. Plan was for 2 months of AC.      (K21.9) Gastroesophageal reflux disease without esophagitis  Stable. On pantoprazole 40 mg BID.    (E03.8) Subclinical hypothyroidism  On levothyroxine 88 mcg daily. Will order TSH/T4 to be drawn with her next set of labs. Adjust medication accordingly.   - TSH with free T4 reflex  - OFFICE/OUTPT VISIT,EST,LEVL III           (R73.9,  T38.0X5A) Steroid-induced hyperglycemia  Last A1C 2 years ago was 5.1%. No longer on oral steroids.     (Z91.89) At risk for decreased bone density  Recommend screening given history of excessive alcohol use and chronic steroid use. Done in 2021, due for repeat in 2023.  - DX Hip/Pelvis/Spine          (Z23) Encounter for administration of vaccine  - HEPATITIS A 19+ (HAVRIX/VAQTA)                  COUNSELING:  Reviewed preventive health counseling, as reflected in patient instructions  Special attention given to:        Regular exercise       Healthy diet/nutrition       Immunizations  Vaccinated for: Hepatitis A         Osteoporosis prevention/bone health       Colorectal Cancer Screening    BMI:   Estimated body mass index is 29.57 kg/m  as calculated from the following:    Height as of this encounter: 1.689 m (5' 6.5\").    Weight as of this encounter: 84.4 kg (186 lb).   Weight management plan: Discussed healthy diet and exercise guidelines      She reports that she has never smoked. She has never used smokeless tobacco.            ROBYN Bear [facility] ALPHONSE  "

## 2023-12-18 ENCOUNTER — LAB (OUTPATIENT)
Dept: LAB | Facility: CLINIC | Age: 53
End: 2023-12-18
Payer: COMMERCIAL

## 2023-12-18 ENCOUNTER — ANCILLARY PROCEDURE (OUTPATIENT)
Dept: BONE DENSITY | Facility: CLINIC | Age: 53
End: 2023-12-18
Attending: NURSE PRACTITIONER
Payer: COMMERCIAL

## 2023-12-18 DIAGNOSIS — E03.8 SUBCLINICAL HYPOTHYROIDISM: ICD-10-CM

## 2023-12-18 DIAGNOSIS — Z91.89 AT RISK FOR DECREASED BONE DENSITY: ICD-10-CM

## 2023-12-18 DIAGNOSIS — Z94.4 LIVER TRANSPLANT RECIPIENT (H): ICD-10-CM

## 2023-12-18 LAB
T4 FREE SERPL-MCNC: 2.66 NG/DL (ref 0.9–1.7)
TACROLIMUS BLD-MCNC: 3.1 UG/L (ref 5–15)
TME LAST DOSE: ABNORMAL H
TME LAST DOSE: ABNORMAL H
TSH SERPL DL<=0.005 MIU/L-ACNC: 0.01 UIU/ML (ref 0.3–4.2)

## 2023-12-18 PROCEDURE — 84439 ASSAY OF FREE THYROXINE: CPT

## 2023-12-18 PROCEDURE — 36415 COLL VENOUS BLD VENIPUNCTURE: CPT

## 2023-12-18 PROCEDURE — 84443 ASSAY THYROID STIM HORMONE: CPT

## 2023-12-18 PROCEDURE — 77080 DXA BONE DENSITY AXIAL: CPT | Mod: TC | Performed by: PHYSICIAN ASSISTANT

## 2023-12-18 PROCEDURE — 80197 ASSAY OF TACROLIMUS: CPT

## 2023-12-19 DIAGNOSIS — E03.8 SUBCLINICAL HYPOTHYROIDISM: ICD-10-CM

## 2023-12-19 PROBLEM — M85.80 OSTEOPENIA: Status: ACTIVE | Noted: 2023-12-19

## 2023-12-19 LAB
BKR LAB AP GYN ADEQUACY: ABNORMAL
BKR LAB AP GYN INTERPRETATION: ABNORMAL
BKR LAB AP HPV REFLEX: ABNORMAL
BKR LAB AP PREVIOUS ABNORMAL: ABNORMAL
PATH REPORT.COMMENTS IMP SPEC: ABNORMAL
PATH REPORT.COMMENTS IMP SPEC: ABNORMAL
PATH REPORT.RELEVANT HX SPEC: ABNORMAL

## 2023-12-19 RX ORDER — LEVOTHYROXINE SODIUM 75 UG/1
75 TABLET ORAL DAILY
Qty: 90 TABLET | Refills: 0 | Status: SHIPPED | OUTPATIENT
Start: 2023-12-19 | End: 2024-02-13 | Stop reason: DRUGHIGH

## 2023-12-20 PROBLEM — R87.610 ASCUS OF CERVIX WITH NEGATIVE HIGH RISK HPV: Status: ACTIVE | Noted: 2023-12-20

## 2023-12-21 ENCOUNTER — PATIENT OUTREACH (OUTPATIENT)
Dept: PEDIATRICS | Facility: CLINIC | Age: 53
End: 2023-12-21
Payer: COMMERCIAL

## 2023-12-21 DIAGNOSIS — R87.610 ASCUS OF CERVIX WITH NEGATIVE HIGH RISK HPV: Primary | ICD-10-CM

## 2024-01-15 ENCOUNTER — MYC MEDICAL ADVICE (OUTPATIENT)
Dept: PHARMACY | Facility: CLINIC | Age: 54
End: 2024-01-15
Payer: COMMERCIAL

## 2024-01-15 DIAGNOSIS — E66.811 CLASS 1 OBESITY WITH BODY MASS INDEX (BMI) OF 34.0 TO 34.9 IN ADULT, UNSPECIFIED OBESITY TYPE, UNSPECIFIED WHETHER SERIOUS COMORBIDITY PRESENT: Primary | ICD-10-CM

## 2024-01-16 ENCOUNTER — OFFICE VISIT (OUTPATIENT)
Dept: PHARMACY | Facility: CLINIC | Age: 54
End: 2024-01-16
Payer: COMMERCIAL

## 2024-01-16 ENCOUNTER — LAB (OUTPATIENT)
Dept: LAB | Facility: CLINIC | Age: 54
End: 2024-01-16
Payer: COMMERCIAL

## 2024-01-16 VITALS
BODY MASS INDEX: 28.63 KG/M2 | OXYGEN SATURATION: 98 % | WEIGHT: 180.1 LBS | DIASTOLIC BLOOD PRESSURE: 72 MMHG | HEART RATE: 98 BPM | SYSTOLIC BLOOD PRESSURE: 104 MMHG

## 2024-01-16 DIAGNOSIS — M25.569 KNEE PAIN, UNSPECIFIED CHRONICITY, UNSPECIFIED LATERALITY: ICD-10-CM

## 2024-01-16 DIAGNOSIS — E66.811 CLASS 1 OBESITY WITH BODY MASS INDEX (BMI) OF 34.0 TO 34.9 IN ADULT, UNSPECIFIED OBESITY TYPE, UNSPECIFIED WHETHER SERIOUS COMORBIDITY PRESENT: Primary | ICD-10-CM

## 2024-01-16 DIAGNOSIS — Z78.9 TAKES DIETARY SUPPLEMENTS: ICD-10-CM

## 2024-01-16 DIAGNOSIS — E03.8 SUBCLINICAL HYPOTHYROIDISM: ICD-10-CM

## 2024-01-16 DIAGNOSIS — R11.0 NAUSEA: ICD-10-CM

## 2024-01-16 DIAGNOSIS — Z94.4 LIVER TRANSPLANT RECIPIENT (H): ICD-10-CM

## 2024-01-16 DIAGNOSIS — Z94.4 LIVER REPLACED BY TRANSPLANT (H): ICD-10-CM

## 2024-01-16 DIAGNOSIS — Z79.82 ASPIRIN LONG-TERM USE: ICD-10-CM

## 2024-01-16 DIAGNOSIS — I10 ESSENTIAL HYPERTENSION: ICD-10-CM

## 2024-01-16 DIAGNOSIS — K21.9 GASTROESOPHAGEAL REFLUX DISEASE WITHOUT ESOPHAGITIS: ICD-10-CM

## 2024-01-16 LAB
TACROLIMUS BLD-MCNC: 3.2 UG/L (ref 5–15)
TME LAST DOSE: ABNORMAL H
TME LAST DOSE: ABNORMAL H

## 2024-01-16 PROCEDURE — 36415 COLL VENOUS BLD VENIPUNCTURE: CPT

## 2024-01-16 PROCEDURE — 99207 PR NO CHARGE LOS: CPT | Performed by: PHARMACIST

## 2024-01-16 PROCEDURE — 80197 ASSAY OF TACROLIMUS: CPT

## 2024-01-16 RX ORDER — URSODIOL 300 MG/1
300 CAPSULE ORAL DAILY
COMMUNITY

## 2024-01-16 NOTE — PROGRESS NOTES
Medication Therapy Management (MTM) Encounter    ASSESSMENT:                            Medication Adherence/Access: See below for considerations    Class 1 obesity due to excess calories without serious comorbidity with body mass index (BMI) of 34.0 to 34.9 in adult:   Improved, BMI is down to ~ 28 range now. Semaglutide may be increased up to 2.4 mg weekly dose for weight loss. No side effects concerns would be reasonable to titrate up or consider change to Mounjaro. However, this likely will depend on cost.     Nausea:  stable.     GERD:   stable.     ASCVD prophylaxis:  stable.     Pain:  stable.     History of liver transplant:  Recommend she repeat labs after visit which tacrolimus will be a trough, last dose ~ 12 hours ago.      Hypomagnesemia-supplements:   To avoid TSH lab variations will recommend she avoid high biotin dose. Patient is agreeable to stopping completely.     Hypertension:   Stable    Hypothyroidism:  Mentioned above will have her stop high biotin supplementation to avoid causing abnormal TSH labs.     PLAN:                            Change to compounding pharmacy for semaglutide refill. --> Reviewed with patient but unfortunately compound pharmacy is not doing so at the moment on hold. Sent patient mychart after visit to consider Wegovy 2.4 mg or change Mounjaro 2.5mg. awaiting patient response on next steps.      Stop biotin.      Lab for tacrolimus level today after visit.       Follow-up: Return in about 3 months (around 4/16/2024) for Medication Therapy Management Visit.    SUBJECTIVE/OBJECTIVE:                          Maria Elena Fisher is a 53 year old female coming in for a follow-up visit from 10/2/2023.       Reason for visit: weight management follow-up.    Allergies/ADRs: Reviewed in chart  Past Medical History: Reviewed in chart  Tobacco: She reports that she has never smoked. She has never used smokeless tobacco.  Alcohol: not currently using    Medication Adherence/Access:   Paying  "out of pocket for semaglutide. Fills rx at [facility].     Class 1 obesity due to excess calories without serious comorbidity with body mass index (BMI) of 34.0 to 34.9 in adult:  - Ozempic/Semaglutide 2 mg weekly, currently paying out of pocket.   She wants to continue to lose weight does not necessarily have a specific goal weight but overall wants to feel healthier. She has noticed improvement in energy but looking for more weight loss. Feels she plateau in weight loss.   Exercise: She continues to be on the [place] 3-4 times a week.   Diet: She has been making sure to getting enough protein - chicken and fish. Avoids process foods.   Wt Readings from Last 4 Encounters:   01/16/24 180 lb 1.6 oz (81.7 kg)   12/13/23 186 lb (84.4 kg)   12/05/23 180 lb (81.6 kg)   10/10/23 204 lb (92.5 kg)     Estimated body mass index is 28.63 kg/m  as calculated from the following:    Height as of 12/13/23: 5' 6.5\" (1.689 m).    Weight as of this encounter: 180 lb 1.6 oz (81.7 kg).    Nausea:  - Ondansetron 4 mg tablet as needed   She does get intermittent nausea. May need a dose every few weeks ago at most.      GERD:   - Pantoprazole 40 mg twice daily   Patient feels that current regimen is effective. GIB in 2020, may of been due to varices.     ASCVD prophylaxis:  - Aspirin 81 mg daily   History of DVT. Due to hypercoagulable state from cirrhosis and compression from hydrothorax. Previously followed by hematology no longer is. No side effects.     Pain:  - topical Diclofenac gel as needed, not taking   - meloxicam 15 mg daily (Dr. Yeo, Sports Medicine 12/5/23), only took a few days NOT on anymore  She feels manageable. She does not want to take these medication due to side effects risk and were not very effective.     History of liver transplant:  - Tacrolimus 1 mg every 12 hour (goal 3-5)  - Ursodiol 300 mcg daily (reduced 12/8)  Taking regularly, no side effects reported.  Following with her transplant regularly/closely, Dr." Austin. she reports recently advised that she could reduce the tacrolimus and ursodiol doses. She shares with me she was suppose to have labs rechecked again but forgot. Has not taken dose this morning yet.      Hypomagnesemia-supplements:   - Magnesium 400mg twice daily  - Biotin 82845 mcg daily   - Vitamin D3 5000 mcg daily   - Multivitamin daily   No concerns from patient.     Hypertension   - Amlodipine 10 mg daily   Patient reports no current medication side effects  Patient does not self-monitor blood pressure.       BP Readings from Last 3 Encounters:   01/16/24 104/72   12/13/23 110/66   12/05/23 112/70     Pulse Readings from Last 3 Encounters:   01/16/24 98   12/13/23 85   10/10/23 88     Hypothyroidism    - Levothyroxine 75 mcg daily (decrease from 88 mcg)  Patient is having the following symptoms: none.      TSH   Date Value Ref Range Status   12/18/2023 0.01 (L) 0.30 - 4.20 uIU/mL Final   04/19/2022 5.77 (H) 0.40 - 4.00 mU/L Final   03/17/2021 5.38 (H) 0.40 - 4.00 mU/L Final     T4 Free   Date Value Ref Range Status   03/17/2021 1.48 (H) 0.76 - 1.46 ng/dL Final     Free T4   Date Value Ref Range Status   12/18/2023 2.66 (H) 0.90 - 1.70 ng/dL Final       Today's Vitals: /72   Pulse 98   Wt 180 lb 1.6 oz (81.7 kg)   LMP  (LMP Unknown)   SpO2 98%   BMI 28.63 kg/m    ----------------      I spent 30 minutes with this patient today. All changes were made via collaborative practice agreement with ROBYN Bear CNP. A copy of the visit note was provided to the patient's provider(s).    A summary of these recommendations was given to the patient.    Umm Turner, PharmD  Medication Therapy Management Pharmacist       Medication Therapy Recommendations  Class 1 obesity with body mass index (BMI) of 34.0 to 34.9 in adult, unspecified obesity type, unspecified whether serious comorbidity present    Current Medication: Semaglutide, 2 MG/DOSE, (OZEMPIC, 2 MG/DOSE,) 8 MG/3ML pen   Rationale: Dose  too low - Dosage too low - Effectiveness   Recommendation: Increase Dose - Semaglutide-Weight Management 2.4 MG/0.75ML pen   Status: Accepted per CPA         Takes dietary supplements    Current Medication: biotin 1000 MCG TABS tablet (Discontinued)   Rationale: No medical indication at this time - Unnecessary medication therapy - Indication   Recommendation: Discontinue Medication   Status: Accepted - no CPA Needed

## 2024-01-16 NOTE — PATIENT INSTRUCTIONS
"Recommendation(s) from today's visit:                                                      Change to compounding pharmacy for semaglutide.    Stop biotin.     Lab for tacrolimus level today    Follow-up: 3 month     My Clinical Pharmacist's contact information:                                                      Umm Turner, PharmD  Medication therapy management clinical pharmacist    It was great speaking with you today.  I value your experience and would be very thankful for your time in providing feedback in our clinic survey. In the next few days, you may receive an email or text message from [employer] with a link to a survey related to your  clinical pharmacist.\"     To schedule another MTM appointment, please call the clinic directly [phone] or you may call the MTM scheduling line at [phone].    "

## 2024-01-17 DIAGNOSIS — Z94.4 LIVER TRANSPLANT RECIPIENT (H): Primary | ICD-10-CM

## 2024-01-23 NOTE — TELEPHONE ENCOUNTER
Changed rx due to cost. Rx sent per cpa.    Umm Turner, PharmD  Medication Therapy Management Pharmacist

## 2024-01-24 ENCOUNTER — LAB (OUTPATIENT)
Dept: LAB | Facility: CLINIC | Age: 54
End: 2024-01-24
Payer: COMMERCIAL

## 2024-01-24 ENCOUNTER — MYC MEDICAL ADVICE (OUTPATIENT)
Dept: PEDIATRICS | Facility: CLINIC | Age: 54
End: 2024-01-24

## 2024-01-24 DIAGNOSIS — Z94.4 LIVER REPLACED BY TRANSPLANT (H): ICD-10-CM

## 2024-01-24 DIAGNOSIS — Z94.4 LIVER TRANSPLANT RECIPIENT (H): ICD-10-CM

## 2024-01-24 LAB
ALBUMIN SERPL BCG-MCNC: 4.2 G/DL (ref 3.5–5.2)
ALP SERPL-CCNC: 560 U/L (ref 40–150)
ALT SERPL W P-5'-P-CCNC: 223 U/L (ref 0–50)
ANION GAP SERPL CALCULATED.3IONS-SCNC: 11 MMOL/L (ref 7–15)
AST SERPL W P-5'-P-CCNC: 143 U/L (ref 0–45)
BILIRUB DIRECT SERPL-MCNC: 1.97 MG/DL (ref 0–0.3)
BILIRUB SERPL-MCNC: 2.4 MG/DL
BUN SERPL-MCNC: 19.2 MG/DL (ref 6–20)
CALCIUM SERPL-MCNC: 10.4 MG/DL (ref 8.6–10)
CHLORIDE SERPL-SCNC: 104 MMOL/L (ref 98–107)
CREAT SERPL-MCNC: 1.12 MG/DL (ref 0.51–0.95)
DEPRECATED HCO3 PLAS-SCNC: 27 MMOL/L (ref 22–29)
EGFRCR SERPLBLD CKD-EPI 2021: 59 ML/MIN/1.73M2
ERYTHROCYTE [DISTWIDTH] IN BLOOD BY AUTOMATED COUNT: 13.1 % (ref 10–15)
GLUCOSE SERPL-MCNC: 97 MG/DL (ref 70–99)
HCT VFR BLD AUTO: 42.5 % (ref 35–47)
HGB BLD-MCNC: 13.6 G/DL (ref 11.7–15.7)
MCH RBC QN AUTO: 29.1 PG (ref 26.5–33)
MCHC RBC AUTO-ENTMCNC: 32 G/DL (ref 31.5–36.5)
MCV RBC AUTO: 91 FL (ref 78–100)
PLATELET # BLD AUTO: 267 10E3/UL (ref 150–450)
POTASSIUM SERPL-SCNC: 4.9 MMOL/L (ref 3.4–5.3)
PROT SERPL-MCNC: 7.2 G/DL (ref 6.4–8.3)
RBC # BLD AUTO: 4.68 10E6/UL (ref 3.8–5.2)
SODIUM SERPL-SCNC: 142 MMOL/L (ref 135–145)
WBC # BLD AUTO: 8.7 10E3/UL (ref 4–11)

## 2024-01-24 PROCEDURE — 80053 COMPREHEN METABOLIC PANEL: CPT

## 2024-01-24 PROCEDURE — 82248 BILIRUBIN DIRECT: CPT

## 2024-01-24 PROCEDURE — 36415 COLL VENOUS BLD VENIPUNCTURE: CPT

## 2024-01-24 PROCEDURE — 85027 COMPLETE CBC AUTOMATED: CPT

## 2024-01-25 ENCOUNTER — TELEPHONE (OUTPATIENT)
Dept: TRANSPLANT | Facility: CLINIC | Age: 54
End: 2024-01-25
Payer: COMMERCIAL

## 2024-01-25 ENCOUNTER — MYC MEDICAL ADVICE (OUTPATIENT)
Dept: INTERVENTIONAL RADIOLOGY/VASCULAR | Facility: CLINIC | Age: 54
End: 2024-01-25
Payer: COMMERCIAL

## 2024-01-25 DIAGNOSIS — Z94.4 LIVER TRANSPLANT RECIPIENT (H): Primary | ICD-10-CM

## 2024-01-25 DIAGNOSIS — Z94.4 LIVER TRANSPLANT RECIPIENT (H): ICD-10-CM

## 2024-01-25 RX ORDER — TACROLIMUS 1 MG/1
2 CAPSULE ORAL EVERY 12 HOURS
Qty: 360 CAPSULE | Refills: 3 | Status: SHIPPED | OUTPATIENT
Start: 2024-01-25 | End: 2024-02-20

## 2024-01-25 RX ORDER — PREDNISONE 20 MG/1
40 TABLET ORAL DAILY
Qty: 14 TABLET | Refills: 0 | Status: SHIPPED | OUTPATIENT
Start: 2024-01-25 | End: 2024-02-01

## 2024-01-25 NOTE — TELEPHONE ENCOUNTER
Per Dr beckham patient to start 40 mg prednisone daily. Pt aware and will .  Discussed side effects.

## 2024-01-25 NOTE — TELEPHONE ENCOUNTER
Writer has spoken with Maria Elena regarding planned procedure with IR via telephone.      Maria Elena acknowledges understanding of pre-procedure instructions.         I have provided Maria Elena with IR number ([phone]) for questions or concerns.    Humaira CHIN  Interventional Radiology RN   [phone]           Tranexamic Acid Counseling:  Patient advised of the small risk of bleeding problems with tranexamic acid. They were also instructed to call if they developed any nausea, vomiting or diarrhea. All of the patient's questions and concerns were addressed.

## 2024-01-25 NOTE — TELEPHONE ENCOUNTER
Detailed Voicemail message left with request for patient to contact Interventional Radiology Department and acknowledge understanding of MYCHART instructions that were sent in anticipation of procedure scheduled 1/29/2024.      IR contact number provided in [facility].    Humaira CHIN  Interventional Radiology RN   [phone]

## 2024-01-25 NOTE — TELEPHONE ENCOUNTER
Pt has elevated LFTs. Itching.  Per dr beckham patient to have liver biopsy urgently.  Pt updated and is aware.

## 2024-01-29 ENCOUNTER — APPOINTMENT (OUTPATIENT)
Dept: INTERVENTIONAL RADIOLOGY/VASCULAR | Facility: CLINIC | Age: 54
End: 2024-01-29
Attending: STUDENT IN AN ORGANIZED HEALTH CARE EDUCATION/TRAINING PROGRAM
Payer: COMMERCIAL

## 2024-01-29 ENCOUNTER — HOSPITAL ENCOUNTER (OUTPATIENT)
Facility: CLINIC | Age: 54
Discharge: HOME | End: 2024-01-29
Attending: STUDENT IN AN ORGANIZED HEALTH CARE EDUCATION/TRAINING PROGRAM | Admitting: PHYSICIAN ASSISTANT
Payer: COMMERCIAL

## 2024-01-29 ENCOUNTER — APPOINTMENT (OUTPATIENT)
Dept: MEDSURG UNIT | Facility: CLINIC | Age: 54
End: 2024-01-29
Attending: STUDENT IN AN ORGANIZED HEALTH CARE EDUCATION/TRAINING PROGRAM
Payer: COMMERCIAL

## 2024-01-29 VITALS
HEART RATE: 90 BPM | WEIGHT: 173.3 LBS | RESPIRATION RATE: 16 BRPM | OXYGEN SATURATION: 97 % | DIASTOLIC BLOOD PRESSURE: 70 MMHG | SYSTOLIC BLOOD PRESSURE: 99 MMHG | TEMPERATURE: 97.4 F | HEIGHT: 66 IN | BODY MASS INDEX: 27.85 KG/M2

## 2024-01-29 DIAGNOSIS — Z94.4 LIVER TRANSPLANT RECIPIENT (H): ICD-10-CM

## 2024-01-29 LAB — INR PPP: 0.94 (ref 0.85–1.15)

## 2024-01-29 PROCEDURE — 99152 MOD SED SAME PHYS/QHP 5/>YRS: CPT | Performed by: PHYSICIAN ASSISTANT

## 2024-01-29 PROCEDURE — 99152 MOD SED SAME PHYS/QHP 5/>YRS: CPT

## 2024-01-29 PROCEDURE — 47000 NEEDLE BIOPSY OF LIVER PERQ: CPT | Performed by: PHYSICIAN ASSISTANT

## 2024-01-29 PROCEDURE — 999N000142 HC STATISTIC PROCEDURE PREP ONLY

## 2024-01-29 PROCEDURE — 36415 COLL VENOUS BLD VENIPUNCTURE: CPT | Performed by: NURSE PRACTITIONER

## 2024-01-29 PROCEDURE — 85610 PROTHROMBIN TIME: CPT | Performed by: NURSE PRACTITIONER

## 2024-01-29 PROCEDURE — 999N000133 HC STATISTIC PP CARE STAGE 2

## 2024-01-29 PROCEDURE — 88313 SPECIAL STAINS GROUP 2: CPT | Mod: 26 | Performed by: PATHOLOGY

## 2024-01-29 PROCEDURE — 88342 IMHCHEM/IMCYTCHM 1ST ANTB: CPT | Mod: TC | Performed by: STUDENT IN AN ORGANIZED HEALTH CARE EDUCATION/TRAINING PROGRAM

## 2024-01-29 PROCEDURE — 88307 TISSUE EXAM BY PATHOLOGIST: CPT | Mod: 26 | Performed by: PATHOLOGY

## 2024-01-29 PROCEDURE — 250N000011 HC RX IP 250 OP 636: Performed by: STUDENT IN AN ORGANIZED HEALTH CARE EDUCATION/TRAINING PROGRAM

## 2024-01-29 PROCEDURE — 258N000003 HC RX IP 258 OP 636: Performed by: NURSE PRACTITIONER

## 2024-01-29 PROCEDURE — 250N000009 HC RX 250: Performed by: STUDENT IN AN ORGANIZED HEALTH CARE EDUCATION/TRAINING PROGRAM

## 2024-01-29 PROCEDURE — 76942 ECHO GUIDE FOR BIOPSY: CPT | Mod: 26 | Performed by: PHYSICIAN ASSISTANT

## 2024-01-29 RX ORDER — NALOXONE HYDROCHLORIDE 0.4 MG/ML
0.4 INJECTION, SOLUTION INTRAMUSCULAR; INTRAVENOUS; SUBCUTANEOUS
Status: DISCONTINUED | OUTPATIENT
Start: 2024-01-29 | End: 2024-01-29 | Stop reason: HOSPADM

## 2024-01-29 RX ORDER — FLUMAZENIL 0.1 MG/ML
0.2 INJECTION, SOLUTION INTRAVENOUS
Status: DISCONTINUED | OUTPATIENT
Start: 2024-01-29 | End: 2024-01-29 | Stop reason: HOSPADM

## 2024-01-29 RX ORDER — NALOXONE HYDROCHLORIDE 0.4 MG/ML
0.2 INJECTION, SOLUTION INTRAMUSCULAR; INTRAVENOUS; SUBCUTANEOUS
Status: DISCONTINUED | OUTPATIENT
Start: 2024-01-29 | End: 2024-01-29 | Stop reason: HOSPADM

## 2024-01-29 RX ORDER — SODIUM CHLORIDE 9 MG/ML
INJECTION, SOLUTION INTRAVENOUS CONTINUOUS
Status: DISCONTINUED | OUTPATIENT
Start: 2024-01-29 | End: 2024-01-29 | Stop reason: HOSPADM

## 2024-01-29 RX ORDER — FENTANYL CITRATE 50 UG/ML
25-50 INJECTION, SOLUTION INTRAMUSCULAR; INTRAVENOUS EVERY 5 MIN PRN
Status: DISCONTINUED | OUTPATIENT
Start: 2024-01-29 | End: 2024-01-29 | Stop reason: HOSPADM

## 2024-01-29 RX ORDER — LIDOCAINE 40 MG/G
CREAM TOPICAL
Status: DISCONTINUED | OUTPATIENT
Start: 2024-01-29 | End: 2024-01-29 | Stop reason: HOSPADM

## 2024-01-29 RX ADMIN — MIDAZOLAM 0.5 MG: 1 INJECTION INTRAMUSCULAR; INTRAVENOUS at 12:14

## 2024-01-29 RX ADMIN — FENTANYL CITRATE 25 MCG: 50 INJECTION, SOLUTION INTRAMUSCULAR; INTRAVENOUS at 12:14

## 2024-01-29 RX ADMIN — MIDAZOLAM 1 MG: 1 INJECTION INTRAMUSCULAR; INTRAVENOUS at 12:03

## 2024-01-29 RX ADMIN — FENTANYL CITRATE 50 MCG: 50 INJECTION, SOLUTION INTRAMUSCULAR; INTRAVENOUS at 12:02

## 2024-01-29 RX ADMIN — MIDAZOLAM 0.5 MG: 1 INJECTION INTRAMUSCULAR; INTRAVENOUS at 12:09

## 2024-01-29 RX ADMIN — LIDOCAINE HYDROCHLORIDE 13 ML: 10 INJECTION, SOLUTION EPIDURAL; INFILTRATION; INTRACAUDAL; PERINEURAL at 12:17

## 2024-01-29 RX ADMIN — SODIUM CHLORIDE: 9 INJECTION, SOLUTION INTRAVENOUS at 10:40

## 2024-01-29 RX ADMIN — FENTANYL CITRATE 25 MCG: 50 INJECTION, SOLUTION INTRAMUSCULAR; INTRAVENOUS at 12:09

## 2024-01-29 ASSESSMENT — ENCOUNTER SYMPTOMS
SHORTNESS OF BREATH: 0
RHINORRHEA: 0
WEAKNESS: 0
ABDOMINAL PAIN: 0
ARTHRALGIAS: 0
CONSTITUTIONAL NEGATIVE: 1

## 2024-01-29 ASSESSMENT — ACTIVITIES OF DAILY LIVING (ADL)
ADLS_ACUITY_SCORE: 35
ADLS_ACUITY_SCORE: 35

## 2024-01-29 NOTE — IR NOTE
Patient Name: Sarah Fisher  Medical Record Number: [id number]  Today's Date: 1/29/2024    Procedure: Random liver biopsy   Proceduralist: James Stock PA-C  Pathology present: No    Procedure Start: 1209  Procedure end: 1227  Sedation medications administered: Midazolam 2 mg, Fentanyl 100 mcg     Report given to: RICHARD Polanco 2A  : N/A    Other Notes: Pt arrived to IR room 6 from . Consent reviewed. Pt denies any questions or concerns regarding procedure. Pt positioned supine and monitored per protocol.     2 cores obtained and sent to lab as ordered.    GelFoam used to close biopsy needle track.  Hemostasis achieved.    Patient to be on bedrest for 2 hours ending at 1430 per James Stock PA-C.    Pt tolerated procedure without any noted complications. Pt transferred back to .

## 2024-01-29 NOTE — PROGRESS NOTES
Pt completed 2 hours bedrest without any complications. Vitals stable. Denies any pain. LUQ biopsy site soft, dry and intact. Tolerating po intake, voided and ambulated in jo with steady gait. Discharge instructions reviewed with pt and pt verbalizes understanding. PIV removed. Pt escorted to her Niece Radha's car. All belongings returned to pt.

## 2024-01-29 NOTE — H&P
"Sarah Fisher is an 53 year old female.    Past Medical History:   Diagnosis Date    Ascites     GI (gastrointestinal bleed)     History of blood transfusion     Hypokalemia     Hypotension     Liver cirrhosis (H)     Liver failure (H)     Thyroid disease        Allergies:   Allergies   Allergen Reactions    Amoxicillin GI Disturbance, Diarrhea, Nausea and Nausea and Vomiting       Active Problems:    * No active hospital problems. *    Blood pressure 108/74, pulse 84, temperature 97.4  F (36.3  C), temperature source Oral, height 1.676 m (5' 6\"), weight 78.6 kg (173 lb 4.8 oz), SpO2 99%, not currently breastfeeding.    Review of Systems   Constitutional: Negative.    HENT: Negative for congestion and rhinorrhea.    Respiratory: Negative for shortness of breath.    Cardiovascular: Negative for chest pain.   Gastrointestinal: Negative for abdominal pain.   Musculoskeletal: Negative for arthralgias.   Neurological: Negative for weakness.       Physical Exam  Constitutional:       General: She is not in acute distress.  HENT:      Head: Normocephalic and atraumatic.      Mouth/Throat:      Mouth: Mucous membranes are moist.   Eyes:      Extraocular Movements: Extraocular movements intact.   Cardiovascular:      Rate and Rhythm: Normal rate.      Heart sounds: Normal heart sounds.   Pulmonary:      Effort: Pulmonary effort is normal.      Breath sounds: Normal breath sounds.   Abdominal:      Palpations: Abdomen is soft.      Tenderness: There is no abdominal tenderness.   Musculoskeletal:         General: No deformity.   Skin:     General: Skin is warm.   Neurological:      General: No focal deficit present.      Mental Status: She is alert.         Assessment:  53 year old female with past medical history of liver transplant with elevated LFTs. Scheduled for percutaneous random liver biopsy with IR.    Plan:  -Proceed with image-guided percutaneous liver biopsy.    Chaka Kathleen MD  1/29/2024    I reviewed all " pertinent data and agree with the assessment and plan documented by the resident.    Kennedi Chavez MD  Interventional Radiology   Pager [phone]

## 2024-01-29 NOTE — PROCEDURES
[facility]    Procedure: IR Procedure Note    Date/Time: 1/29/2024 12:32 PM    Performed by: James Stock PA-C  Authorized by: James Stock PA-C      UNIVERSAL PROTOCOL   Site Marked: NA  Prior Images Obtained and Reviewed:  Yes  Required items: Required blood products, implants, devices and special equipment available    Patient identity confirmed:  Verbally with patient, arm band, provided demographic data and hospital-assigned identification number  Patient was reevaluated immediately before administering moderate or deep sedation or anesthesia  Confirmation Checklist:  Patient's identity using two indicators, relevant allergies, procedure was appropriate and matched the consent or emergent situation and correct equipment/implants were available  Time out: Immediately prior to the procedure a time out was called    Universal Protocol: the Joint Commission Universal Protocol was followed    Preparation: Patient was prepped and draped in usual sterile fashion    ESBL (mL):  0.1     ANESTHESIA    Anesthesia:  Local infiltration  Local Anesthetic:  Lidocaine 1% without epinephrine  Anesthetic Total (mL):  13      SEDATION  Patient Sedated: Yes    Sedation Type:  Moderate (conscious) sedation  Sedation:  Fentanyl and midazolam  Vital signs: Vital signs monitored during sedation    See dictated procedure note for full details.  Findings: U/S guided random liver biopsy performed using an intercostal approach. Two 18 gauge cores taken.     Specimens: core needle biopsy specimens sent for pathological analysis    Complications: None    Condition: Stable    Plan: Follow up per primary team. Bedrest for 2 hours, no strenuous activity for 3 days.       PROCEDURE    Patient Tolerance:  Patient tolerated the procedure well with no immediate complications  Length of time physician/provider present for 1:1 monitoring during sedation: 20

## 2024-01-29 NOTE — DISCHARGE INSTRUCTIONS
[facility]    Interventional Radiology  Patient Instructions Following Biopsy    AFTER YOU GO HOME  If you were given sedation, for the first 24 hours: we recommend that an adult stay with you, DO NOT drive or operate machinery at home or at work, DO NOT smoke, DO NOT make any important or legal decisions.  DO NOT drink alcoholic beverages the day of your procedure  Drink plenty of fluids   Resume your regular diet, unless otherwise instructed by your Primary Physician  Relax and take it easy for 48 hours  DO NOT do any strenuous exercise or lifting (> 10 lbs) for at least 7 days following your procedure  Keep the dressing dry and in place for 24 hours. Replace with Band aid for 2 days.  Never leave a wet dressing in place.  Do not take a shower for at least 12 hours following your procedure. No tub bath, hot tub, or swimming for 5 days.  There should be minimum drainage from the biopsy site    CALL THE PHYSICIAN IF:  You start bleeding from the procedure site.  If you do start to bleed from that site, lie down flat and hold pressure on the site for a minimum of 10 minutes.  Your physician will tell you if you need to return to the hospital  You develop nausea or vomiting  You have excessive swelling, redness, or tenderness at the site  You have drainage that looks like it is infected.  You experience severe pain  You develop hives or a rash or unexplained itching  You develop shortness of breath  You develop a temperature of 101 degrees F or greater  You develop bloody clots or red urine after you are discharged  You develop chest pain or cough up blood, lightheadedness or fainting    [facility] INTERVENTIONAL RADIOLOGY DEPARTMENT  Procedure Physician:     James Stock PA-C                                 Date of procedure: January 29, 2024  Telephone Numbers: [phone]      Monday-Friday 7:30 am to 4:00 pm  [phone] After 4:00 pm Monday-Friday, Weekends & Holidays.   Ask for the  Interventional Radiologist on call.  Someone is on call 24 hrs/day  [facility] toll free number: [phone] Monday-Friday 8:00 am to 4:30 pm  [facility] Emergency Dept: [phone]

## 2024-01-29 NOTE — PROGRESS NOTES
Pt arrived on 2A, room 4 for Transplanted liver biopsy. Vitals stable. Denies any pain. INR in process. Consent signed. MD to update H and GIUSEPPE Garcia at bedside: [phone]

## 2024-01-30 ENCOUNTER — TELEPHONE (OUTPATIENT)
Dept: TRANSPLANT | Facility: CLINIC | Age: 54
End: 2024-01-30
Payer: COMMERCIAL

## 2024-01-30 DIAGNOSIS — T86.41 LIVER TRANSPLANT REJECTION (H): Primary | ICD-10-CM

## 2024-01-30 NOTE — TELEPHONE ENCOUNTER
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 Pt to have solumedrol.  1 gram solumedrol x 3 days. Tacro goal 5-7    Pt is aware of the plan. Message to [facility] to schedule.

## 2024-01-31 ENCOUNTER — INFUSION THERAPY VISIT (OUTPATIENT)
Dept: INFUSION THERAPY | Facility: CLINIC | Age: 54
End: 2024-01-31
Attending: STUDENT IN AN ORGANIZED HEALTH CARE EDUCATION/TRAINING PROGRAM
Payer: COMMERCIAL

## 2024-01-31 VITALS
OXYGEN SATURATION: 100 % | HEART RATE: 86 BPM | SYSTOLIC BLOOD PRESSURE: 105 MMHG | TEMPERATURE: 97.8 F | DIASTOLIC BLOOD PRESSURE: 69 MMHG | RESPIRATION RATE: 14 BRPM

## 2024-01-31 DIAGNOSIS — T86.41 LIVER TRANSPLANT REJECTION (H): Primary | ICD-10-CM

## 2024-01-31 LAB
ALBUMIN SERPL BCG-MCNC: 4.4 G/DL (ref 3.5–5.2)
ALP SERPL-CCNC: 381 U/L (ref 40–150)
ALT SERPL W P-5'-P-CCNC: 127 U/L (ref 0–50)
ANION GAP SERPL CALCULATED.3IONS-SCNC: 13 MMOL/L (ref 7–15)
AST SERPL W P-5'-P-CCNC: 35 U/L (ref 0–45)
BASOPHILS # BLD AUTO: 0 10E3/UL (ref 0–0.2)
BASOPHILS NFR BLD AUTO: 0 %
BILIRUB DIRECT SERPL-MCNC: 0.63 MG/DL (ref 0–0.3)
BILIRUB SERPL-MCNC: 1.1 MG/DL
BUN SERPL-MCNC: 31.3 MG/DL (ref 6–20)
CALCIUM SERPL-MCNC: 10.2 MG/DL (ref 8.6–10)
CHLORIDE SERPL-SCNC: 104 MMOL/L (ref 98–107)
CREAT SERPL-MCNC: 0.99 MG/DL (ref 0.51–0.95)
DEPRECATED HCO3 PLAS-SCNC: 23 MMOL/L (ref 22–29)
EGFRCR SERPLBLD CKD-EPI 2021: 68 ML/MIN/1.73M2
EOSINOPHIL # BLD AUTO: 0 10E3/UL (ref 0–0.7)
EOSINOPHIL NFR BLD AUTO: 0 %
ERYTHROCYTE [DISTWIDTH] IN BLOOD BY AUTOMATED COUNT: 12.6 % (ref 10–15)
GLUCOSE SERPL-MCNC: 145 MG/DL (ref 70–99)
HCT VFR BLD AUTO: 42.7 % (ref 35–47)
HGB BLD-MCNC: 14.5 G/DL (ref 11.7–15.7)
IMM GRANULOCYTES # BLD: 0.1 10E3/UL
IMM GRANULOCYTES NFR BLD: 1 %
LYMPHOCYTES # BLD AUTO: 1.8 10E3/UL (ref 0.8–5.3)
LYMPHOCYTES NFR BLD AUTO: 17 %
MCH RBC QN AUTO: 29.5 PG (ref 26.5–33)
MCHC RBC AUTO-ENTMCNC: 34 G/DL (ref 31.5–36.5)
MCV RBC AUTO: 87 FL (ref 78–100)
MONOCYTES # BLD AUTO: 0.6 10E3/UL (ref 0–1.3)
MONOCYTES NFR BLD AUTO: 6 %
NEUTROPHILS # BLD AUTO: 8 10E3/UL (ref 1.6–8.3)
NEUTROPHILS NFR BLD AUTO: 76 %
NRBC # BLD AUTO: 0 10E3/UL
NRBC BLD AUTO-RTO: 0 /100
PLATELET # BLD AUTO: 335 10E3/UL (ref 150–450)
POTASSIUM SERPL-SCNC: 4.4 MMOL/L (ref 3.4–5.3)
PROT SERPL-MCNC: 7.3 G/DL (ref 6.4–8.3)
RBC # BLD AUTO: 4.92 10E6/UL (ref 3.8–5.2)
SODIUM SERPL-SCNC: 140 MMOL/L (ref 135–145)
WBC # BLD AUTO: 10.5 10E3/UL (ref 4–11)

## 2024-01-31 PROCEDURE — 250N000011 HC RX IP 250 OP 636: Performed by: STUDENT IN AN ORGANIZED HEALTH CARE EDUCATION/TRAINING PROGRAM

## 2024-01-31 PROCEDURE — 96365 THER/PROPH/DIAG IV INF INIT: CPT

## 2024-01-31 PROCEDURE — 258N000003 HC RX IP 258 OP 636: Performed by: STUDENT IN AN ORGANIZED HEALTH CARE EDUCATION/TRAINING PROGRAM

## 2024-01-31 PROCEDURE — 85004 AUTOMATED DIFF WBC COUNT: CPT | Performed by: STUDENT IN AN ORGANIZED HEALTH CARE EDUCATION/TRAINING PROGRAM

## 2024-01-31 PROCEDURE — 82248 BILIRUBIN DIRECT: CPT | Performed by: STUDENT IN AN ORGANIZED HEALTH CARE EDUCATION/TRAINING PROGRAM

## 2024-01-31 PROCEDURE — 36415 COLL VENOUS BLD VENIPUNCTURE: CPT | Performed by: STUDENT IN AN ORGANIZED HEALTH CARE EDUCATION/TRAINING PROGRAM

## 2024-01-31 PROCEDURE — 80053 COMPREHEN METABOLIC PANEL: CPT | Performed by: STUDENT IN AN ORGANIZED HEALTH CARE EDUCATION/TRAINING PROGRAM

## 2024-01-31 RX ORDER — METHYLPREDNISOLONE SODIUM SUCCINATE 125 MG/2ML
125 INJECTION, POWDER, LYOPHILIZED, FOR SOLUTION INTRAMUSCULAR; INTRAVENOUS
Status: CANCELLED
Start: 2024-01-31

## 2024-01-31 RX ORDER — MEPERIDINE HYDROCHLORIDE 25 MG/ML
25 INJECTION INTRAMUSCULAR; INTRAVENOUS; SUBCUTANEOUS EVERY 30 MIN PRN
Status: CANCELLED | OUTPATIENT
Start: 2024-02-01

## 2024-01-31 RX ORDER — DIPHENHYDRAMINE HYDROCHLORIDE 50 MG/ML
50 INJECTION INTRAMUSCULAR; INTRAVENOUS
Status: CANCELLED
Start: 2024-01-31

## 2024-01-31 RX ORDER — HEPARIN SODIUM (PORCINE) LOCK FLUSH IV SOLN 100 UNIT/ML 100 UNIT/ML
5 SOLUTION INTRAVENOUS
Status: CANCELLED | OUTPATIENT
Start: 2024-02-01

## 2024-01-31 RX ORDER — HEPARIN SODIUM,PORCINE 10 UNIT/ML
5-20 VIAL (ML) INTRAVENOUS DAILY PRN
Status: CANCELLED | OUTPATIENT
Start: 2024-01-31

## 2024-01-31 RX ORDER — MEPERIDINE HYDROCHLORIDE 25 MG/ML
25 INJECTION INTRAMUSCULAR; INTRAVENOUS; SUBCUTANEOUS EVERY 30 MIN PRN
Status: CANCELLED | OUTPATIENT
Start: 2024-01-31

## 2024-01-31 RX ORDER — EPINEPHRINE 1 MG/ML
0.3 INJECTION, SOLUTION INTRAMUSCULAR; SUBCUTANEOUS EVERY 5 MIN PRN
Status: CANCELLED | OUTPATIENT
Start: 2024-02-01

## 2024-01-31 RX ORDER — HEPARIN SODIUM (PORCINE) LOCK FLUSH IV SOLN 100 UNIT/ML 100 UNIT/ML
5 SOLUTION INTRAVENOUS
Status: CANCELLED | OUTPATIENT
Start: 2024-01-31

## 2024-01-31 RX ORDER — ALBUTEROL SULFATE 0.83 MG/ML
2.5 SOLUTION RESPIRATORY (INHALATION)
Status: CANCELLED | OUTPATIENT
Start: 2024-02-01

## 2024-01-31 RX ORDER — HEPARIN SODIUM,PORCINE 10 UNIT/ML
5-20 VIAL (ML) INTRAVENOUS DAILY PRN
Status: CANCELLED | OUTPATIENT
Start: 2024-02-01

## 2024-01-31 RX ORDER — ALBUTEROL SULFATE 90 UG/1
1-2 AEROSOL, METERED RESPIRATORY (INHALATION)
Status: CANCELLED
Start: 2024-02-01

## 2024-01-31 RX ORDER — ALBUTEROL SULFATE 90 UG/1
1-2 AEROSOL, METERED RESPIRATORY (INHALATION)
Status: CANCELLED
Start: 2024-01-31

## 2024-01-31 RX ORDER — EPINEPHRINE 1 MG/ML
0.3 INJECTION, SOLUTION, CONCENTRATE INTRAVENOUS EVERY 5 MIN PRN
Status: CANCELLED | OUTPATIENT
Start: 2024-01-31

## 2024-01-31 RX ORDER — METHYLPREDNISOLONE SODIUM SUCCINATE 125 MG/2ML
125 INJECTION, POWDER, LYOPHILIZED, FOR SOLUTION INTRAMUSCULAR; INTRAVENOUS
Status: CANCELLED
Start: 2024-02-01

## 2024-01-31 RX ORDER — ALBUTEROL SULFATE 0.83 MG/ML
2.5 SOLUTION RESPIRATORY (INHALATION)
Status: CANCELLED | OUTPATIENT
Start: 2024-01-31

## 2024-01-31 RX ORDER — DIPHENHYDRAMINE HYDROCHLORIDE 50 MG/ML
50 INJECTION INTRAMUSCULAR; INTRAVENOUS
Status: CANCELLED
Start: 2024-02-01

## 2024-01-31 RX ADMIN — SODIUM CHLORIDE 1000 MG: 9 INJECTION, SOLUTION INTRAVENOUS at 15:21

## 2024-01-31 NOTE — PROGRESS NOTES
Infusion Nursing Note:  Sarah Fisher presents today for solumedrol, dose 1 of 3.    Patient seen by provider today: No   present during visit today: Not Applicable.    Note:   Solumedrol infused over 30 minutes.    Intravenous Access:  Labs drawn without difficulty.  Peripheral IV placed.    Treatment Conditions:  Not Applicable.    Administrations This Visit       methylPREDNISolone sodium succinate (solu-MEDROL) 1,000 mg in sodium chloride 0.9 % 283 mL intermittent infusion       Admin Date  01/31/2024 Action  $New Bag Dose  1,000 mg Route  Intravenous Documented By  Joyce Gillis RN                  /74 (BP Location: Left arm, Patient Position: Semi-York's, Cuff Size: Adult Regular)   Pulse 85   Temp 97.8  F (36.6  C) (Oral)   Resp 14   LMP  (LMP Unknown)   SpO2 100%     Post Infusion Assessment:  Patient tolerated infusion without incident.  Blood return noted pre and post infusion.  Site patent and intact, free from redness, edema or discomfort.  No evidence of extravasations.  Access discontinued per protocol.     Discharge Plan:   Discharge instructions reviewed with: Patient.  Patient and/or family verbalized understanding of discharge instructions and all questions answered.  AVS to patient via "[employer]T.  Patient will return 2/1 for next appointment.   Patient discharged in stable condition accompanied by: self.  Departure Mode: Ambulatory.    Joyce Gillis RN

## 2024-02-01 ENCOUNTER — INFUSION THERAPY VISIT (OUTPATIENT)
Dept: INFUSION THERAPY | Facility: CLINIC | Age: 54
End: 2024-02-01
Attending: STUDENT IN AN ORGANIZED HEALTH CARE EDUCATION/TRAINING PROGRAM
Payer: COMMERCIAL

## 2024-02-01 VITALS
OXYGEN SATURATION: 98 % | DIASTOLIC BLOOD PRESSURE: 70 MMHG | RESPIRATION RATE: 18 BRPM | HEART RATE: 78 BPM | TEMPERATURE: 97.7 F | SYSTOLIC BLOOD PRESSURE: 110 MMHG

## 2024-02-01 DIAGNOSIS — T86.41 LIVER TRANSPLANT REJECTION (H): Primary | ICD-10-CM

## 2024-02-01 LAB — GLUCOSE BLDC GLUCOMTR-MCNC: 134 MG/DL (ref 70–99)

## 2024-02-01 PROCEDURE — 96365 THER/PROPH/DIAG IV INF INIT: CPT

## 2024-02-01 PROCEDURE — 250N000011 HC RX IP 250 OP 636: Performed by: STUDENT IN AN ORGANIZED HEALTH CARE EDUCATION/TRAINING PROGRAM

## 2024-02-01 PROCEDURE — 82962 GLUCOSE BLOOD TEST: CPT

## 2024-02-01 PROCEDURE — 258N000003 HC RX IP 258 OP 636: Performed by: STUDENT IN AN ORGANIZED HEALTH CARE EDUCATION/TRAINING PROGRAM

## 2024-02-01 RX ORDER — HEPARIN SODIUM,PORCINE 10 UNIT/ML
5-20 VIAL (ML) INTRAVENOUS DAILY PRN
Status: CANCELLED | OUTPATIENT
Start: 2024-02-02

## 2024-02-01 RX ORDER — DIPHENHYDRAMINE HYDROCHLORIDE 50 MG/ML
50 INJECTION INTRAMUSCULAR; INTRAVENOUS
Status: CANCELLED
Start: 2024-02-02

## 2024-02-01 RX ORDER — MEPERIDINE HYDROCHLORIDE 25 MG/ML
25 INJECTION INTRAMUSCULAR; INTRAVENOUS; SUBCUTANEOUS EVERY 30 MIN PRN
Status: CANCELLED | OUTPATIENT
Start: 2024-02-02

## 2024-02-01 RX ORDER — EPINEPHRINE 1 MG/ML
0.3 INJECTION, SOLUTION INTRAMUSCULAR; SUBCUTANEOUS EVERY 5 MIN PRN
Status: CANCELLED | OUTPATIENT
Start: 2024-02-02

## 2024-02-01 RX ORDER — ALBUTEROL SULFATE 0.83 MG/ML
2.5 SOLUTION RESPIRATORY (INHALATION)
Status: CANCELLED | OUTPATIENT
Start: 2024-02-02

## 2024-02-01 RX ORDER — METHYLPREDNISOLONE SODIUM SUCCINATE 125 MG/2ML
125 INJECTION, POWDER, LYOPHILIZED, FOR SOLUTION INTRAMUSCULAR; INTRAVENOUS
Status: CANCELLED
Start: 2024-02-02

## 2024-02-01 RX ORDER — HEPARIN SODIUM (PORCINE) LOCK FLUSH IV SOLN 100 UNIT/ML 100 UNIT/ML
5 SOLUTION INTRAVENOUS
Status: CANCELLED | OUTPATIENT
Start: 2024-02-02

## 2024-02-01 RX ORDER — ALBUTEROL SULFATE 90 UG/1
1-2 AEROSOL, METERED RESPIRATORY (INHALATION)
Status: CANCELLED
Start: 2024-02-02

## 2024-02-01 RX ADMIN — SODIUM CHLORIDE 1000 MG: 9 INJECTION, SOLUTION INTRAVENOUS at 11:04

## 2024-02-01 NOTE — PROGRESS NOTES
"Chief Complaint   Patient presents with    Infusion     IV Sol-Medrol     Infusion Nursing Note:  Sarah Fisher presents today for IV Sol-Medrol dose 2/3.    Patient seen by provider today: No   present during visit today: Not Applicable.    Note: Solu-medrol infused over 30 minutes.      Intravenous Access:  Peripheral IV placed.  Tac not drawn today. Patient will take Tac at 10 PM for lab draw tomorrow.     Treatment Conditions:  Glucose 134.      Post Infusion Assessment:  Patient tolerated infusion without incident.  Blood return noted pre and post infusion.  Site patent and intact, free from redness, edema or discomfort.  No evidence of extravasations.  Access discontinued per protocol.       Discharge Plan:   AVS to patient via MYCHART.  Patient will return tomorrow for next appointment.   Patient discharged in stable condition accompanied by: self.  Departure Mode: Ambulatory.    Administrations This Visit       methylPREDNISolone sodium succinate (solu-MEDROL) 1,000 mg in sodium chloride 0.9 % 283 mL intermittent infusion       Admin Date  02/01/2024 Action  $New Bag Dose  1,000 mg Rate  566 mL/hr Route  Intravenous Documented By  Tomy Durant RN                  Vital signs:  Temp: 97.7  F (36.5  C) Temp src: Oral BP: 123/82 Pulse: 78   Resp: 18 SpO2: 98 % O2 Device: None (Room air)        Estimated body mass index is 27.97 kg/m  as calculated from the following:    Height as of 1/29/24: 1.676 m (5' 6\").    Weight as of 1/29/24: 78.6 kg (173 lb 4.8 oz).          "

## 2024-02-02 ENCOUNTER — INFUSION THERAPY VISIT (OUTPATIENT)
Dept: INFUSION THERAPY | Facility: CLINIC | Age: 54
End: 2024-02-02
Attending: STUDENT IN AN ORGANIZED HEALTH CARE EDUCATION/TRAINING PROGRAM
Payer: COMMERCIAL

## 2024-02-02 ENCOUNTER — TELEPHONE (OUTPATIENT)
Dept: TRANSPLANT | Facility: CLINIC | Age: 54
End: 2024-02-02

## 2024-02-02 VITALS
HEART RATE: 71 BPM | DIASTOLIC BLOOD PRESSURE: 65 MMHG | RESPIRATION RATE: 16 BRPM | SYSTOLIC BLOOD PRESSURE: 107 MMHG | OXYGEN SATURATION: 100 % | TEMPERATURE: 97.7 F

## 2024-02-02 DIAGNOSIS — T86.41 LIVER TRANSPLANT REJECTION (H): Primary | ICD-10-CM

## 2024-02-02 DIAGNOSIS — Z94.4 LIVER REPLACED BY TRANSPLANT (H): ICD-10-CM

## 2024-02-02 LAB
ALBUMIN SERPL BCG-MCNC: 4.5 G/DL (ref 3.5–5.2)
ALP SERPL-CCNC: 314 U/L (ref 40–150)
ALT SERPL W P-5'-P-CCNC: 95 U/L (ref 0–50)
ANION GAP SERPL CALCULATED.3IONS-SCNC: 11 MMOL/L (ref 7–15)
AST SERPL W P-5'-P-CCNC: 29 U/L (ref 0–45)
BASOPHILS # BLD AUTO: 0 10E3/UL (ref 0–0.2)
BASOPHILS NFR BLD AUTO: 0 %
BILIRUB DIRECT SERPL-MCNC: 0.54 MG/DL (ref 0–0.3)
BILIRUB SERPL-MCNC: 1 MG/DL
BUN SERPL-MCNC: 47 MG/DL (ref 6–20)
CALCIUM SERPL-MCNC: 10.2 MG/DL (ref 8.6–10)
CHLORIDE SERPL-SCNC: 104 MMOL/L (ref 98–107)
CREAT SERPL-MCNC: 1.18 MG/DL (ref 0.51–0.95)
DEPRECATED HCO3 PLAS-SCNC: 26 MMOL/L (ref 22–29)
EGFRCR SERPLBLD CKD-EPI 2021: 55 ML/MIN/1.73M2
EOSINOPHIL # BLD AUTO: 0 10E3/UL (ref 0–0.7)
EOSINOPHIL NFR BLD AUTO: 0 %
ERYTHROCYTE [DISTWIDTH] IN BLOOD BY AUTOMATED COUNT: 12.9 % (ref 10–15)
GLUCOSE BLDC GLUCOMTR-MCNC: 124 MG/DL (ref 70–99)
GLUCOSE SERPL-MCNC: 131 MG/DL (ref 70–99)
HCT VFR BLD AUTO: 39.7 % (ref 35–47)
HGB BLD-MCNC: 13.6 G/DL (ref 11.7–15.7)
IMM GRANULOCYTES # BLD: 0.1 10E3/UL
IMM GRANULOCYTES NFR BLD: 1 %
LYMPHOCYTES # BLD AUTO: 1.2 10E3/UL (ref 0.8–5.3)
LYMPHOCYTES NFR BLD AUTO: 8 %
MCH RBC QN AUTO: 29.6 PG (ref 26.5–33)
MCHC RBC AUTO-ENTMCNC: 34.3 G/DL (ref 31.5–36.5)
MCV RBC AUTO: 87 FL (ref 78–100)
MONOCYTES # BLD AUTO: 0.8 10E3/UL (ref 0–1.3)
MONOCYTES NFR BLD AUTO: 5 %
NEUTROPHILS # BLD AUTO: 12.7 10E3/UL (ref 1.6–8.3)
NEUTROPHILS NFR BLD AUTO: 86 %
NRBC # BLD AUTO: 0 10E3/UL
NRBC BLD AUTO-RTO: 0 /100
PATH REPORT.ADDENDUM SPEC: NORMAL
PATH REPORT.COMMENTS IMP SPEC: NORMAL
PATH REPORT.FINAL DX SPEC: NORMAL
PATH REPORT.GROSS SPEC: NORMAL
PATH REPORT.MICROSCOPIC SPEC OTHER STN: NORMAL
PATH REPORT.RELEVANT HX SPEC: NORMAL
PHOTO IMAGE: NORMAL
PLATELET # BLD AUTO: 332 10E3/UL (ref 150–450)
POTASSIUM SERPL-SCNC: 3.6 MMOL/L (ref 3.4–5.3)
PROT SERPL-MCNC: 7.5 G/DL (ref 6.4–8.3)
RBC # BLD AUTO: 4.59 10E6/UL (ref 3.8–5.2)
SODIUM SERPL-SCNC: 141 MMOL/L (ref 135–145)
TACROLIMUS BLD-MCNC: 6.6 UG/L (ref 5–15)
TME LAST DOSE: NORMAL H
TME LAST DOSE: NORMAL H
WBC # BLD AUTO: 14.8 10E3/UL (ref 4–11)

## 2024-02-02 PROCEDURE — 80053 COMPREHEN METABOLIC PANEL: CPT | Performed by: STUDENT IN AN ORGANIZED HEALTH CARE EDUCATION/TRAINING PROGRAM

## 2024-02-02 PROCEDURE — 82040 ASSAY OF SERUM ALBUMIN: CPT

## 2024-02-02 PROCEDURE — 250N000011 HC RX IP 250 OP 636: Performed by: STUDENT IN AN ORGANIZED HEALTH CARE EDUCATION/TRAINING PROGRAM

## 2024-02-02 PROCEDURE — 82962 GLUCOSE BLOOD TEST: CPT

## 2024-02-02 PROCEDURE — 85041 AUTOMATED RBC COUNT: CPT | Performed by: STUDENT IN AN ORGANIZED HEALTH CARE EDUCATION/TRAINING PROGRAM

## 2024-02-02 PROCEDURE — 258N000003 HC RX IP 258 OP 636: Performed by: STUDENT IN AN ORGANIZED HEALTH CARE EDUCATION/TRAINING PROGRAM

## 2024-02-02 PROCEDURE — 36415 COLL VENOUS BLD VENIPUNCTURE: CPT | Performed by: STUDENT IN AN ORGANIZED HEALTH CARE EDUCATION/TRAINING PROGRAM

## 2024-02-02 PROCEDURE — 96365 THER/PROPH/DIAG IV INF INIT: CPT

## 2024-02-02 PROCEDURE — 80048 BASIC METABOLIC PNL TOTAL CA: CPT | Performed by: STUDENT IN AN ORGANIZED HEALTH CARE EDUCATION/TRAINING PROGRAM

## 2024-02-02 PROCEDURE — 80197 ASSAY OF TACROLIMUS: CPT

## 2024-02-02 RX ORDER — MEPERIDINE HYDROCHLORIDE 25 MG/ML
25 INJECTION INTRAMUSCULAR; INTRAVENOUS; SUBCUTANEOUS EVERY 30 MIN PRN
Status: CANCELLED | OUTPATIENT
Start: 2024-02-03

## 2024-02-02 RX ORDER — METHYLPREDNISOLONE SODIUM SUCCINATE 125 MG/2ML
125 INJECTION, POWDER, LYOPHILIZED, FOR SOLUTION INTRAMUSCULAR; INTRAVENOUS
Status: CANCELLED
Start: 2024-02-03

## 2024-02-02 RX ORDER — AZATHIOPRINE 50 MG/1
50 TABLET ORAL DAILY
Qty: 90 TABLET | Refills: 3 | Status: SHIPPED | OUTPATIENT
Start: 2024-02-02

## 2024-02-02 RX ORDER — HEPARIN SODIUM,PORCINE 10 UNIT/ML
5-20 VIAL (ML) INTRAVENOUS DAILY PRN
Status: CANCELLED | OUTPATIENT
Start: 2024-02-03

## 2024-02-02 RX ORDER — HEPARIN SODIUM (PORCINE) LOCK FLUSH IV SOLN 100 UNIT/ML 100 UNIT/ML
5 SOLUTION INTRAVENOUS
Status: CANCELLED | OUTPATIENT
Start: 2024-02-03

## 2024-02-02 RX ORDER — EPINEPHRINE 1 MG/ML
0.3 INJECTION, SOLUTION INTRAMUSCULAR; SUBCUTANEOUS EVERY 5 MIN PRN
Status: CANCELLED | OUTPATIENT
Start: 2024-02-03

## 2024-02-02 RX ORDER — PREDNISONE 5 MG/1
5 TABLET ORAL DAILY
Qty: 90 TABLET | Refills: 3 | Status: SHIPPED | OUTPATIENT
Start: 2024-02-02

## 2024-02-02 RX ORDER — ALBUTEROL SULFATE 90 UG/1
1-2 AEROSOL, METERED RESPIRATORY (INHALATION)
Status: CANCELLED
Start: 2024-02-03

## 2024-02-02 RX ORDER — DIPHENHYDRAMINE HYDROCHLORIDE 50 MG/ML
50 INJECTION INTRAMUSCULAR; INTRAVENOUS
Status: CANCELLED
Start: 2024-02-03

## 2024-02-02 RX ORDER — ALBUTEROL SULFATE 0.83 MG/ML
2.5 SOLUTION RESPIRATORY (INHALATION)
Status: CANCELLED | OUTPATIENT
Start: 2024-02-03

## 2024-02-02 RX ORDER — PREDNISONE 20 MG/1
TABLET ORAL
Qty: 40 TABLET | Refills: 0 | Status: SHIPPED | OUTPATIENT
Start: 2024-02-02 | End: 2024-02-21 | Stop reason: DRUGHIGH

## 2024-02-02 RX ADMIN — METHYLPREDNISOLONE SODIUM SUCCINATE 1000 MG: 1 INJECTION, POWDER, LYOPHILIZED, FOR SOLUTION INTRAMUSCULAR; INTRAVENOUS at 11:02

## 2024-02-02 NOTE — PATIENT INSTRUCTIONS
Dear Sarah Fisher    Thank you for choosing [facility] ([facility]) for your infusion.  The following information is a summary of our appointment as well as important reminders.      We look forward in seeing you on your next appointment here at [facility] ([facility]).  Please don t hesitate to call us at [phone] to reschedule any of your appointments or to speak with one of the [facility] registered nurses.  It was a pleasure taking care of you today.    Sincerely,    [facility]  [facility]  [address] [place] [address]  [id number]  Phone:  [phone]

## 2024-02-02 NOTE — TELEPHONE ENCOUNTER
Pt to start imuran 50 mg daily  The prednisone taper  40 mg daily for 5 days  35 mg daily for 5 days  30 mg daily for 5 days  25 mg daily for 5 days  20 mg daily for 5 days  15 mg daily for 5 days  10 mg daily for 5 days  5 mg daily on going at this time

## 2024-02-02 NOTE — PROGRESS NOTES
Infusion Nursing Note:  Sarah Fisher presents today for   Chief Complaint   Patient presents with    Infusion     Solu-Medrol   Patient seen by provider today: No   present during visit today: Not Applicable.    Note:  Frequency: Daily for 3 doses. Today is dose #3  Labs: CBC w/ Platelets & Differential, BMP, and Tacrolimus drawn per orders.  Premedications/PRNs: None ordered.  Infusion Rate/Length: Solu-Medrol infused at 566 mL/hr over 30 minutes    Intravenous Access:  Peripheral IV placed.    Treatment Conditions:  Results reviewed, labs MET treatment parameters, ok to proceed with treatment.   -BG of 124 mg/dL    /80 (BP Location: Right arm, Patient Position: Sitting, Cuff Size: Adult Regular)   Pulse 69   Temp 97.7  F (36.5  C) (Oral)   Resp 16   LMP  (LMP Unknown)   SpO2 100%     Post Infusion Assessment:  Patient tolerated infusion without incident.  Blood return noted pre and post infusion.  Site patent and intact, free from redness, edema or discomfort.  No evidence of extravasations.  Access discontinued per protocol.     Discharge Plan:   Discharge instructions reviewed with: Patient.  Patient and/or family verbalized understanding of discharge instructions and all questions answered.  AVS to patient via [employer].  Therapy plan completed and discontinued.  Patient discharged in stable condition accompanied by: self.  Departure Mode: Ambulatory.    Administrations This Visit       methylPREDNISolone sodium succinate (solu-MEDROL) 1,000 mg in sodium chloride 0.9 % 283 mL intermittent infusion       Admin Date  02/02/2024 Action  $New Bag Dose  1,000 mg Rate  566 mL/hr Route  Intravenous Documented By  Shreya Rudd RN                Medication Waste Record:  Not Applicable      Shreya Rudd, RICHARD

## 2024-02-05 ENCOUNTER — LAB (OUTPATIENT)
Dept: LAB | Facility: CLINIC | Age: 54
End: 2024-02-05
Payer: COMMERCIAL

## 2024-02-05 DIAGNOSIS — E03.8 SUBCLINICAL HYPOTHYROIDISM: ICD-10-CM

## 2024-02-05 DIAGNOSIS — Z94.4 LIVER REPLACED BY TRANSPLANT (H): ICD-10-CM

## 2024-02-05 LAB
ALBUMIN SERPL BCG-MCNC: 4.1 G/DL (ref 3.5–5.2)
ALP SERPL-CCNC: 225 U/L (ref 40–150)
ALT SERPL W P-5'-P-CCNC: 65 U/L (ref 0–50)
ANION GAP SERPL CALCULATED.3IONS-SCNC: 11 MMOL/L (ref 7–15)
AST SERPL W P-5'-P-CCNC: 28 U/L (ref 0–45)
BILIRUB DIRECT SERPL-MCNC: 0.45 MG/DL (ref 0–0.3)
BILIRUB SERPL-MCNC: 0.9 MG/DL
BUN SERPL-MCNC: 42 MG/DL (ref 6–20)
CALCIUM SERPL-MCNC: 9.8 MG/DL (ref 8.6–10)
CHLORIDE SERPL-SCNC: 101 MMOL/L (ref 98–107)
CREAT SERPL-MCNC: 1.26 MG/DL (ref 0.51–0.95)
DEPRECATED HCO3 PLAS-SCNC: 27 MMOL/L (ref 22–29)
EGFRCR SERPLBLD CKD-EPI 2021: 51 ML/MIN/1.73M2
ERYTHROCYTE [DISTWIDTH] IN BLOOD BY AUTOMATED COUNT: 12.4 % (ref 10–15)
GLUCOSE SERPL-MCNC: 85 MG/DL (ref 70–99)
HCT VFR BLD AUTO: 42.9 % (ref 35–47)
HGB BLD-MCNC: 14.3 G/DL (ref 11.7–15.7)
MCH RBC QN AUTO: 29.6 PG (ref 26.5–33)
MCHC RBC AUTO-ENTMCNC: 33.3 G/DL (ref 31.5–36.5)
MCV RBC AUTO: 89 FL (ref 78–100)
PLATELET # BLD AUTO: 293 10E3/UL (ref 150–450)
POTASSIUM SERPL-SCNC: 4 MMOL/L (ref 3.4–5.3)
PROT SERPL-MCNC: 6.7 G/DL (ref 6.4–8.3)
RBC # BLD AUTO: 4.83 10E6/UL (ref 3.8–5.2)
SODIUM SERPL-SCNC: 139 MMOL/L (ref 135–145)
T4 FREE SERPL-MCNC: 1.47 NG/DL (ref 0.9–1.7)
TACROLIMUS BLD-MCNC: 7 UG/L (ref 5–15)
TME LAST DOSE: NORMAL H
TME LAST DOSE: NORMAL H
TSH SERPL DL<=0.005 MIU/L-ACNC: 0.05 UIU/ML (ref 0.3–4.2)
WBC # BLD AUTO: 11 10E3/UL (ref 4–11)

## 2024-02-05 PROCEDURE — 85027 COMPLETE CBC AUTOMATED: CPT

## 2024-02-05 PROCEDURE — 84439 ASSAY OF FREE THYROXINE: CPT

## 2024-02-05 PROCEDURE — 80053 COMPREHEN METABOLIC PANEL: CPT

## 2024-02-05 PROCEDURE — 82248 BILIRUBIN DIRECT: CPT

## 2024-02-05 PROCEDURE — 80197 ASSAY OF TACROLIMUS: CPT

## 2024-02-05 PROCEDURE — 84443 ASSAY THYROID STIM HORMONE: CPT

## 2024-02-05 PROCEDURE — 36415 COLL VENOUS BLD VENIPUNCTURE: CPT

## 2024-02-06 ENCOUNTER — DOCUMENTATION ONLY (OUTPATIENT)
Dept: TRANSPLANT | Facility: CLINIC | Age: 54
End: 2024-02-06
Payer: COMMERCIAL

## 2024-02-06 ASSESSMENT — ENCOUNTER SYMPTOMS: NEW SYMPTOMS OF CORONARY ARTERY DISEASE: 0

## 2024-02-12 ENCOUNTER — MYC MEDICAL ADVICE (OUTPATIENT)
Dept: PEDIATRICS | Facility: CLINIC | Age: 54
End: 2024-02-12

## 2024-02-12 ENCOUNTER — LAB (OUTPATIENT)
Dept: LAB | Facility: CLINIC | Age: 54
End: 2024-02-12
Payer: COMMERCIAL

## 2024-02-12 DIAGNOSIS — Z94.4 LIVER REPLACED BY TRANSPLANT (H): ICD-10-CM

## 2024-02-12 DIAGNOSIS — E03.8 SUBCLINICAL HYPOTHYROIDISM: ICD-10-CM

## 2024-02-12 DIAGNOSIS — I15.9 SECONDARY HYPERTENSION: ICD-10-CM

## 2024-02-12 LAB
ALBUMIN SERPL BCG-MCNC: 3.8 G/DL (ref 3.5–5.2)
ALP SERPL-CCNC: 145 U/L (ref 40–150)
ALT SERPL W P-5'-P-CCNC: 81 U/L (ref 0–50)
ANION GAP SERPL CALCULATED.3IONS-SCNC: 9 MMOL/L (ref 7–15)
AST SERPL W P-5'-P-CCNC: 41 U/L (ref 0–45)
BILIRUB DIRECT SERPL-MCNC: 0.28 MG/DL (ref 0–0.3)
BILIRUB SERPL-MCNC: 0.5 MG/DL
BUN SERPL-MCNC: 34.7 MG/DL (ref 6–20)
CALCIUM SERPL-MCNC: 9.7 MG/DL (ref 8.6–10)
CHLORIDE SERPL-SCNC: 105 MMOL/L (ref 98–107)
CREAT SERPL-MCNC: 1.18 MG/DL (ref 0.51–0.95)
DEPRECATED HCO3 PLAS-SCNC: 27 MMOL/L (ref 22–29)
EGFRCR SERPLBLD CKD-EPI 2021: 55 ML/MIN/1.73M2
ERYTHROCYTE [DISTWIDTH] IN BLOOD BY AUTOMATED COUNT: 13.1 % (ref 10–15)
GLUCOSE SERPL-MCNC: 94 MG/DL (ref 70–99)
HCT VFR BLD AUTO: 38.4 % (ref 35–47)
HGB BLD-MCNC: 12.5 G/DL (ref 11.7–15.7)
MCH RBC QN AUTO: 29.3 PG (ref 26.5–33)
MCHC RBC AUTO-ENTMCNC: 32.6 G/DL (ref 31.5–36.5)
MCV RBC AUTO: 90 FL (ref 78–100)
PLATELET # BLD AUTO: 185 10E3/UL (ref 150–450)
POTASSIUM SERPL-SCNC: 5.6 MMOL/L (ref 3.4–5.3)
PROT SERPL-MCNC: 5.8 G/DL (ref 6.4–8.3)
RBC # BLD AUTO: 4.26 10E6/UL (ref 3.8–5.2)
SODIUM SERPL-SCNC: 141 MMOL/L (ref 135–145)
TACROLIMUS BLD-MCNC: 5.8 UG/L (ref 5–15)
TME LAST DOSE: NORMAL H
TME LAST DOSE: NORMAL H
WBC # BLD AUTO: 9.8 10E3/UL (ref 4–11)

## 2024-02-12 PROCEDURE — 85027 COMPLETE CBC AUTOMATED: CPT

## 2024-02-12 PROCEDURE — 82248 BILIRUBIN DIRECT: CPT

## 2024-02-12 PROCEDURE — 80053 COMPREHEN METABOLIC PANEL: CPT

## 2024-02-12 PROCEDURE — 36415 COLL VENOUS BLD VENIPUNCTURE: CPT

## 2024-02-12 PROCEDURE — 80197 ASSAY OF TACROLIMUS: CPT

## 2024-02-13 DIAGNOSIS — E03.8 SUBCLINICAL HYPOTHYROIDISM: Primary | ICD-10-CM

## 2024-02-13 RX ORDER — AMLODIPINE BESYLATE 10 MG/1
10 TABLET ORAL DAILY
Qty: 90 TABLET | Refills: 3 | Status: SHIPPED | OUTPATIENT
Start: 2024-02-13

## 2024-02-13 RX ORDER — LEVOTHYROXINE SODIUM 50 UG/1
50 TABLET ORAL DAILY
Qty: 90 TABLET | Refills: 0 | Status: SHIPPED | OUTPATIENT
Start: 2024-02-13 | End: 2024-04-15

## 2024-02-13 RX ORDER — LEVOTHYROXINE SODIUM 25 UG/1
12.5 TABLET ORAL DAILY
Qty: 45 TABLET | Refills: 0 | Status: SHIPPED | OUTPATIENT
Start: 2024-02-13 | End: 2024-04-15

## 2024-02-13 NOTE — RESULT ENCOUNTER NOTE
See mychart encounter dated 2/12 - discusses plan with levothyroxine dose.   Christine Harris, DNP, APRN, CNP

## 2024-02-19 ENCOUNTER — LAB (OUTPATIENT)
Dept: LAB | Facility: CLINIC | Age: 54
End: 2024-02-19
Payer: COMMERCIAL

## 2024-02-19 DIAGNOSIS — Z94.4 LIVER REPLACED BY TRANSPLANT (H): ICD-10-CM

## 2024-02-19 LAB
ALBUMIN SERPL BCG-MCNC: 4.1 G/DL (ref 3.5–5.2)
ALP SERPL-CCNC: 130 U/L (ref 40–150)
ALT SERPL W P-5'-P-CCNC: 103 U/L (ref 0–50)
ANION GAP SERPL CALCULATED.3IONS-SCNC: 9 MMOL/L (ref 7–15)
AST SERPL W P-5'-P-CCNC: 42 U/L (ref 0–45)
BILIRUB DIRECT SERPL-MCNC: 0.28 MG/DL (ref 0–0.3)
BILIRUB SERPL-MCNC: 0.6 MG/DL
BUN SERPL-MCNC: 22.4 MG/DL (ref 6–20)
CALCIUM SERPL-MCNC: 10.1 MG/DL (ref 8.6–10)
CHLORIDE SERPL-SCNC: 103 MMOL/L (ref 98–107)
CREAT SERPL-MCNC: 0.97 MG/DL (ref 0.51–0.95)
DEPRECATED HCO3 PLAS-SCNC: 29 MMOL/L (ref 22–29)
EGFRCR SERPLBLD CKD-EPI 2021: 70 ML/MIN/1.73M2
ERYTHROCYTE [DISTWIDTH] IN BLOOD BY AUTOMATED COUNT: 14.4 % (ref 10–15)
GLUCOSE SERPL-MCNC: 99 MG/DL (ref 70–99)
HCT VFR BLD AUTO: 42.6 % (ref 35–47)
HGB BLD-MCNC: 13.2 G/DL (ref 11.7–15.7)
MCH RBC QN AUTO: 29.1 PG (ref 26.5–33)
MCHC RBC AUTO-ENTMCNC: 31 G/DL (ref 31.5–36.5)
MCV RBC AUTO: 94 FL (ref 78–100)
PLATELET # BLD AUTO: 170 10E3/UL (ref 150–450)
POTASSIUM SERPL-SCNC: 5.6 MMOL/L (ref 3.4–5.3)
PROT SERPL-MCNC: 6.5 G/DL (ref 6.4–8.3)
RBC # BLD AUTO: 4.53 10E6/UL (ref 3.8–5.2)
SODIUM SERPL-SCNC: 141 MMOL/L (ref 135–145)
TACROLIMUS BLD-MCNC: 4.8 UG/L (ref 5–15)
TME LAST DOSE: ABNORMAL H
TME LAST DOSE: ABNORMAL H
WBC # BLD AUTO: 8.7 10E3/UL (ref 4–11)

## 2024-02-19 PROCEDURE — 80197 ASSAY OF TACROLIMUS: CPT

## 2024-02-19 PROCEDURE — 80053 COMPREHEN METABOLIC PANEL: CPT

## 2024-02-19 PROCEDURE — 36415 COLL VENOUS BLD VENIPUNCTURE: CPT

## 2024-02-19 PROCEDURE — 85027 COMPLETE CBC AUTOMATED: CPT

## 2024-02-19 PROCEDURE — 82248 BILIRUBIN DIRECT: CPT

## 2024-02-20 ENCOUNTER — TELEPHONE (OUTPATIENT)
Dept: TRANSPLANT | Facility: CLINIC | Age: 54
End: 2024-02-20

## 2024-02-20 DIAGNOSIS — Z94.4 LIVER TRANSPLANT RECIPIENT (H): Primary | ICD-10-CM

## 2024-02-20 DIAGNOSIS — T86.41 LIVER TRANSPLANT REJECTION (H): ICD-10-CM

## 2024-02-20 RX ORDER — TACROLIMUS 1 MG/1
CAPSULE ORAL
Qty: 450 CAPSULE | Refills: 3 | Status: SHIPPED | OUTPATIENT
Start: 2024-02-20 | End: 2024-03-20

## 2024-02-20 NOTE — TELEPHONE ENCOUNTER
Patient Call: General  Route to LPN    Reason for call: Maria Elena is returning Coordinator's call, patient stated she's out of her meetings for the rest of the day and is available, by phone today.     Call back needed? Yes    Return Call Needed  Same as documented in contacts section  When to return call?: Same day: Route High Priority

## 2024-02-20 NOTE — TELEPHONE ENCOUNTER
Spoke with Maria Elena. She has been eating a lot of fruit instead of eating sugars so most likely why sugar is increased.     On 18th started prednisone 25 mg and on Friday suppose to decrease prednisone 20mg.    Synthroid dose recently decreased to 62.5mcg.     Update to dr beckham.

## 2024-02-20 NOTE — TELEPHONE ENCOUNTER
ISSUE:   Tacrolimus IR level 4.8 on 2/19, goal 5-8, dose 2 mg BID. Potassium slightly elevated 5.6    PLAN:   Call Patientand confirm this was an accurate 12-hour trough.   Verify Tacrolimus IR dose 2 mg BID.   Confirm no new medications or illness.   Confirm no missed doses.   If accurate trough and accurate dose, increase Tacrolimus IR dose to 2 mg Am and 3 mg PM    Is this more than a 50% increase or decrease in current IS dose: Yes  If YES, justification: below goal and increasing ALT    Repeat labs in 1 weeks.  *If > 50% change in immunosuppression dose, repeat labs in 1 week.     OUTCOME:   Left message with Patient, to call to confirm accurate trough level and current dose 2 mg BID.   Patient to call to confirm dose change to 2 mg AM and 3 mg PM.  Patient aware  to repeat labs in 1 weeks.   Orders sent to preferred pharmacy for dose change and lab for repeat labs.     Pt encouraged to watch potassium in diet.   Message to dr beckham regarding increase in ALT.

## 2024-02-21 RX ORDER — PREDNISONE 5 MG/1
TABLET ORAL
Qty: 130 TABLET | Refills: 0 | Status: SHIPPED | OUTPATIENT
Start: 2024-02-21 | End: 2024-04-15

## 2024-02-21 NOTE — TELEPHONE ENCOUNTER
Per Dr beckham patient to increase prednisone to 30 mg daily for 5 days then 25 mg daily for 5 days, then 20 mg for 5 days, then 15 mg for 5 days then 10 mg for 5 days and then hold at 5 mg daily.     Pt to do weekly labs.    Call placed to Sarah Fisher, pt verbalized understanding of plan. MyChart also sent to verify instructions. Orders updated.

## 2024-02-26 ENCOUNTER — LAB (OUTPATIENT)
Dept: LAB | Facility: CLINIC | Age: 54
End: 2024-02-26
Payer: COMMERCIAL

## 2024-02-26 DIAGNOSIS — Z94.4 LIVER TRANSPLANT RECIPIENT (H): ICD-10-CM

## 2024-02-26 DIAGNOSIS — T86.41 LIVER TRANSPLANT REJECTION (H): ICD-10-CM

## 2024-02-26 LAB
ALBUMIN SERPL BCG-MCNC: 4.1 G/DL (ref 3.5–5.2)
ALP SERPL-CCNC: 126 U/L (ref 40–150)
ALT SERPL W P-5'-P-CCNC: 64 U/L (ref 0–50)
ANION GAP SERPL CALCULATED.3IONS-SCNC: 9 MMOL/L (ref 7–15)
AST SERPL W P-5'-P-CCNC: 29 U/L (ref 0–45)
BILIRUB DIRECT SERPL-MCNC: 0.22 MG/DL (ref 0–0.3)
BILIRUB SERPL-MCNC: 0.5 MG/DL
BUN SERPL-MCNC: 23.9 MG/DL (ref 6–20)
CALCIUM SERPL-MCNC: 9.7 MG/DL (ref 8.6–10)
CHLORIDE SERPL-SCNC: 100 MMOL/L (ref 98–107)
CREAT SERPL-MCNC: 1.03 MG/DL (ref 0.51–0.95)
DEPRECATED HCO3 PLAS-SCNC: 30 MMOL/L (ref 22–29)
EGFRCR SERPLBLD CKD-EPI 2021: 65 ML/MIN/1.73M2
ERYTHROCYTE [DISTWIDTH] IN BLOOD BY AUTOMATED COUNT: 14.6 % (ref 10–15)
GLUCOSE SERPL-MCNC: 89 MG/DL (ref 70–99)
HCT VFR BLD AUTO: 44.8 % (ref 35–47)
HGB BLD-MCNC: 14.1 G/DL (ref 11.7–15.7)
MCH RBC QN AUTO: 29.1 PG (ref 26.5–33)
MCHC RBC AUTO-ENTMCNC: 31.5 G/DL (ref 31.5–36.5)
MCV RBC AUTO: 92 FL (ref 78–100)
PLATELET # BLD AUTO: 207 10E3/UL (ref 150–450)
POTASSIUM SERPL-SCNC: 5 MMOL/L (ref 3.4–5.3)
PROT SERPL-MCNC: 6.6 G/DL (ref 6.4–8.3)
RBC # BLD AUTO: 4.85 10E6/UL (ref 3.8–5.2)
SODIUM SERPL-SCNC: 139 MMOL/L (ref 135–145)
TACROLIMUS BLD-MCNC: 5.4 UG/L (ref 5–15)
TME LAST DOSE: NORMAL H
TME LAST DOSE: NORMAL H
WBC # BLD AUTO: 5.5 10E3/UL (ref 4–11)

## 2024-02-26 PROCEDURE — 80053 COMPREHEN METABOLIC PANEL: CPT

## 2024-02-26 PROCEDURE — 85027 COMPLETE CBC AUTOMATED: CPT

## 2024-02-26 PROCEDURE — 36415 COLL VENOUS BLD VENIPUNCTURE: CPT

## 2024-02-26 PROCEDURE — 82248 BILIRUBIN DIRECT: CPT

## 2024-02-26 PROCEDURE — 80197 ASSAY OF TACROLIMUS: CPT

## 2024-03-04 ENCOUNTER — LAB (OUTPATIENT)
Dept: LAB | Facility: CLINIC | Age: 54
End: 2024-03-04
Payer: COMMERCIAL

## 2024-03-04 DIAGNOSIS — Z94.4 LIVER TRANSPLANT RECIPIENT (H): ICD-10-CM

## 2024-03-04 DIAGNOSIS — T86.41 LIVER TRANSPLANT REJECTION (H): ICD-10-CM

## 2024-03-04 LAB
ALBUMIN SERPL BCG-MCNC: 4.1 G/DL (ref 3.5–5.2)
ALP SERPL-CCNC: 96 U/L (ref 40–150)
ALT SERPL W P-5'-P-CCNC: 31 U/L (ref 0–50)
ANION GAP SERPL CALCULATED.3IONS-SCNC: 9 MMOL/L (ref 7–15)
AST SERPL W P-5'-P-CCNC: 20 U/L (ref 0–45)
BILIRUB DIRECT SERPL-MCNC: <0.2 MG/DL (ref 0–0.3)
BILIRUB SERPL-MCNC: 0.4 MG/DL
BUN SERPL-MCNC: 33.7 MG/DL (ref 6–20)
CALCIUM SERPL-MCNC: 9.8 MG/DL (ref 8.6–10)
CHLORIDE SERPL-SCNC: 103 MMOL/L (ref 98–107)
CREAT SERPL-MCNC: 1.09 MG/DL (ref 0.51–0.95)
DEPRECATED HCO3 PLAS-SCNC: 29 MMOL/L (ref 22–29)
EGFRCR SERPLBLD CKD-EPI 2021: 60 ML/MIN/1.73M2
ERYTHROCYTE [DISTWIDTH] IN BLOOD BY AUTOMATED COUNT: 14.3 % (ref 10–15)
GLUCOSE SERPL-MCNC: 105 MG/DL (ref 70–99)
HCT VFR BLD AUTO: 42.9 % (ref 35–47)
HGB BLD-MCNC: 13.9 G/DL (ref 11.7–15.7)
MCH RBC QN AUTO: 29.8 PG (ref 26.5–33)
MCHC RBC AUTO-ENTMCNC: 32.4 G/DL (ref 31.5–36.5)
MCV RBC AUTO: 92 FL (ref 78–100)
PLATELET # BLD AUTO: 207 10E3/UL (ref 150–450)
POTASSIUM SERPL-SCNC: 5 MMOL/L (ref 3.4–5.3)
PROT SERPL-MCNC: 6.6 G/DL (ref 6.4–8.3)
RBC # BLD AUTO: 4.66 10E6/UL (ref 3.8–5.2)
SODIUM SERPL-SCNC: 141 MMOL/L (ref 135–145)
TACROLIMUS BLD-MCNC: 7.6 UG/L (ref 5–15)
TME LAST DOSE: NORMAL H
TME LAST DOSE: NORMAL H
WBC # BLD AUTO: 8.3 10E3/UL (ref 4–11)

## 2024-03-04 PROCEDURE — 82248 BILIRUBIN DIRECT: CPT

## 2024-03-04 PROCEDURE — 80197 ASSAY OF TACROLIMUS: CPT

## 2024-03-04 PROCEDURE — 85027 COMPLETE CBC AUTOMATED: CPT

## 2024-03-04 PROCEDURE — 80053 COMPREHEN METABOLIC PANEL: CPT

## 2024-03-04 PROCEDURE — 36415 COLL VENOUS BLD VENIPUNCTURE: CPT

## 2024-03-05 ENCOUNTER — OFFICE VISIT (OUTPATIENT)
Dept: DERMATOLOGY | Facility: CLINIC | Age: 54
End: 2024-03-05

## 2024-03-05 DIAGNOSIS — Z41.1 ELECTIVE PROCEDURE FOR UNACCEPTABLE COSMETIC APPEARANCE: Primary | ICD-10-CM

## 2024-03-05 PROCEDURE — 96999 UNLISTED SPEC DERM SVC/PX: CPT | Performed by: PHYSICIAN ASSISTANT

## 2024-03-05 ASSESSMENT — PAIN SCALES - GENERAL: PAINLEVEL: NO PAIN (0)

## 2024-03-05 NOTE — LETTER
3/5/2024         RE: Sarah Fisher  [address] [facility] [address]        Dear Colleague,    Thank you for referring your patient, Sarah Fisher, to the [facility]. Please see a copy of my visit note below.    HPI:   Chief complaints: Sarah Fisher is a pleasant 53 year old female who presents for evaluation of treatment of facial rhytids with cosmetic botox. She liked units after LOV. No issues.   She is on prednisone currently for autoimmune hepatitis so will hold off on filler today due to swelling    PHYSICAL EXAM:    LMP  (LMP Unknown)   Breastfeeding No   Skin exam performed as follows: Type 2 skin. Mood appropriate  Alert and Oriented X 3. Well developed, well nourished in no distress.  General appearance: Normal  Head including face: Normal  Eyes: conjunctiva and lids: Normal  Mouth: Lips, teeth, gums: Normal  Neck: Normal  Skin: Scalp and body hair: See below    Age appropriate rhytids of glabella, crows feet    ASSESSMENT/PLAN:     BOTOX:  PGACAC discussed.  Risks including but not limited to injection site reaction, bruising, asymmetry, no resolution of rhytides, brow ptosis, dry mouth, tiredness, and headache.  Also label warnings are difficulty with swallowing, speaking, breathing, muscle weakness, loss of bladder control, and double vision/blurred vision.  Explained confirmed report of spread of toxin effect when used for hyperhidrosis of underarms or cosmetic use on face has not been reported.  All questions answered and entertained to patient s satisfaction.  Informed consent obtained.      --31 units injected to gabella (11 into procerus, 8 into medial )  --12 into each crows (with 1 unit infraorbitally)   --55 units total  --Cosmetic cost of $770        Follow-up: 3 mo  CC:   Scribed By: Areli Reich, MS, PA-C    Clinic Administered Medication Documentation    Patient was given Botox. Prior to medication administration,  verified patient's identity using patient s name and date of birth. Please see MAR and medication order for additional information. Patient instructed to report any adverse reaction to staff immediately.    Vial/Syringe: Single dose vial. Was entire vial of medication used? No, The remainder 45 Units of 100 Units was discarded as unavoidable waste.    Drug Name: botulinum toxin with expiration date of 06/30/2026  Dose: 55 Units  Route administered: IM  NDC #: 8964-4400-68  Amount of waste:45 Units  Reason for waste: Single use vial     LOT #: [id number]  SITE: face  : Allergan  EXPIRATION DATE: 06/30/2026      Again, thank you for allowing me to participate in the care of your patient.        Sincerely,        Areli Love PA-C

## 2024-03-05 NOTE — PROGRESS NOTES
HPI:   Chief complaints: Sarah Fisher is a pleasant 53 year old female who presents for evaluation of treatment of facial rhytids with cosmetic botox. She liked units after LOV. No issues.   She is on prednisone currently for autoimmune hepatitis so will hold off on filler today due to swelling    PHYSICAL EXAM:    LMP  (LMP Unknown)   Breastfeeding No   Skin exam performed as follows: Type 2 skin. Mood appropriate  Alert and Oriented X 3. Well developed, well nourished in no distress.  General appearance: Normal  Head including face: Normal  Eyes: conjunctiva and lids: Normal  Mouth: Lips, teeth, gums: Normal  Neck: Normal  Skin: Scalp and body hair: See below    Age appropriate rhytids of glabella, crows feet    ASSESSMENT/PLAN:     BOTOX:  PGACAC discussed.  Risks including but not limited to injection site reaction, bruising, asymmetry, no resolution of rhytides, brow ptosis, dry mouth, tiredness, and headache.  Also label warnings are difficulty with swallowing, speaking, breathing, muscle weakness, loss of bladder control, and double vision/blurred vision.  Explained confirmed report of spread of toxin effect when used for hyperhidrosis of underarms or cosmetic use on face has not been reported.  All questions answered and entertained to patient s satisfaction.  Informed consent obtained.      --31 units injected to gabella (11 into procerus, 8 into medial )  --12 into each crows (with 1 unit infraorbitally)   --55 units total  --Cosmetic cost of $770        Follow-up: 3 mo  CC:   Scribed By: Areli Reich, MS, PA-C    Clinic Administered Medication Documentation    Patient was given Botox. Prior to medication administration, verified patient's identity using patient s name and date of birth. Please see MAR and medication order for additional information. Patient instructed to report any adverse reaction to staff immediately.    Vial/Syringe: Single dose vial. Was entire vial of medication  used? No, The remainder 45 Units of 100 Units was discarded as unavoidable waste.    Drug Name: botulinum toxin with expiration date of 06/30/2026  Dose: 55 Units  Route administered: IM  NDC #: 6202-9606-42  Amount of waste:45 Units  Reason for waste: Single use vial     LOT #: [id number]  SITE: face  : Allergan  EXPIRATION DATE: 06/30/2026

## 2024-03-18 ENCOUNTER — LAB (OUTPATIENT)
Dept: LAB | Facility: CLINIC | Age: 54
End: 2024-03-18
Payer: COMMERCIAL

## 2024-03-18 ENCOUNTER — OFFICE VISIT (OUTPATIENT)
Dept: ORTHOPEDICS | Facility: CLINIC | Age: 54
End: 2024-03-18
Payer: COMMERCIAL

## 2024-03-18 VITALS
SYSTOLIC BLOOD PRESSURE: 129 MMHG | DIASTOLIC BLOOD PRESSURE: 84 MMHG | HEIGHT: 66 IN | WEIGHT: 180 LBS | BODY MASS INDEX: 28.93 KG/M2

## 2024-03-18 DIAGNOSIS — T86.41 LIVER TRANSPLANT REJECTION (H): ICD-10-CM

## 2024-03-18 DIAGNOSIS — M70.61 TROCHANTERIC BURSITIS OF RIGHT HIP: ICD-10-CM

## 2024-03-18 DIAGNOSIS — S76.011A MUSCLE STRAIN OF RIGHT GLUTEAL REGION, INITIAL ENCOUNTER: ICD-10-CM

## 2024-03-18 DIAGNOSIS — S76.311A STRAIN OF RIGHT HAMSTRING, INITIAL ENCOUNTER: Primary | ICD-10-CM

## 2024-03-18 DIAGNOSIS — Z94.4 LIVER TRANSPLANT RECIPIENT (H): ICD-10-CM

## 2024-03-18 LAB
ALBUMIN SERPL BCG-MCNC: 4.1 G/DL (ref 3.5–5.2)
ALP SERPL-CCNC: 83 U/L (ref 40–150)
ALT SERPL W P-5'-P-CCNC: 22 U/L (ref 0–50)
ANION GAP SERPL CALCULATED.3IONS-SCNC: 9 MMOL/L (ref 7–15)
AST SERPL W P-5'-P-CCNC: 19 U/L (ref 0–45)
BILIRUB DIRECT SERPL-MCNC: <0.2 MG/DL (ref 0–0.3)
BILIRUB SERPL-MCNC: 0.5 MG/DL
BUN SERPL-MCNC: 27.9 MG/DL (ref 6–20)
CALCIUM SERPL-MCNC: 9.7 MG/DL (ref 8.6–10)
CHLORIDE SERPL-SCNC: 104 MMOL/L (ref 98–107)
CREAT SERPL-MCNC: 1.04 MG/DL (ref 0.51–0.95)
DEPRECATED HCO3 PLAS-SCNC: 30 MMOL/L (ref 22–29)
EGFRCR SERPLBLD CKD-EPI 2021: 64 ML/MIN/1.73M2
ERYTHROCYTE [DISTWIDTH] IN BLOOD BY AUTOMATED COUNT: 14.8 % (ref 10–15)
GLUCOSE SERPL-MCNC: 103 MG/DL (ref 70–99)
HCT VFR BLD AUTO: 42.1 % (ref 35–47)
HGB BLD-MCNC: 13.6 G/DL (ref 11.7–15.7)
MCH RBC QN AUTO: 30 PG (ref 26.5–33)
MCHC RBC AUTO-ENTMCNC: 32.3 G/DL (ref 31.5–36.5)
MCV RBC AUTO: 93 FL (ref 78–100)
PLATELET # BLD AUTO: 222 10E3/UL (ref 150–450)
POTASSIUM SERPL-SCNC: 4.8 MMOL/L (ref 3.4–5.3)
PROT SERPL-MCNC: 6.4 G/DL (ref 6.4–8.3)
RBC # BLD AUTO: 4.53 10E6/UL (ref 3.8–5.2)
SODIUM SERPL-SCNC: 143 MMOL/L (ref 135–145)
TACROLIMUS BLD-MCNC: 8.1 UG/L (ref 5–15)
TME LAST DOSE: NORMAL H
TME LAST DOSE: NORMAL H
WBC # BLD AUTO: 5.9 10E3/UL (ref 4–11)

## 2024-03-18 PROCEDURE — 80197 ASSAY OF TACROLIMUS: CPT

## 2024-03-18 PROCEDURE — 36415 COLL VENOUS BLD VENIPUNCTURE: CPT

## 2024-03-18 PROCEDURE — 80053 COMPREHEN METABOLIC PANEL: CPT

## 2024-03-18 PROCEDURE — 85027 COMPLETE CBC AUTOMATED: CPT

## 2024-03-18 PROCEDURE — 82248 BILIRUBIN DIRECT: CPT

## 2024-03-18 PROCEDURE — 99214 OFFICE O/P EST MOD 30 MIN: CPT | Performed by: FAMILY MEDICINE

## 2024-03-18 RX ORDER — METHOCARBAMOL 500 MG/1
500 TABLET, FILM COATED ORAL 2 TIMES DAILY PRN
Qty: 40 TABLET | Refills: 0 | Status: SHIPPED | OUTPATIENT
Start: 2024-03-18 | End: 2024-04-15

## 2024-03-18 RX ORDER — DICLOFENAC SODIUM 75 MG/1
75 TABLET, DELAYED RELEASE ORAL 2 TIMES DAILY PRN
Qty: 60 TABLET | Refills: 0 | Status: SHIPPED | OUTPATIENT
Start: 2024-03-18 | End: 2024-04-15

## 2024-03-18 NOTE — PATIENT INSTRUCTIONS
1. Acute pain of right knee    2. Muscle strain of right gluteal region, initial encounter    3. Strain of right hamstring, initial encounter    4. Trochanteric bursitis of right hip      -Patient has acute right upper leg pain due to muscle strains and bursitis  -Patient will start formal physical therapy and home exercise program  -Will start diclofenac 75 mg twice a day as needed.  Patient will also start Robaxin 500 mg 1-2 times a day as needed for muscle spasms and strain.  Patient should avoid taking the Robaxin if she has to drive or perform any complicated tasks due to potential drowsiness  -Patient may also continue with Tylenol for breakthrough pain as needed  -Patient will follow-up if pain does not improve  -Call direct clinic number [[phone]] at any time with questions or concerns.    Albert Yeo MD [facility]  [facility] [place]

## 2024-03-18 NOTE — PROGRESS NOTES
ASSESSMENT & PLAN  Patient Instructions     1. Acute pain of right knee    2. Muscle strain of right gluteal region, initial encounter    3. Strain of right hamstring, initial encounter    4. Trochanteric bursitis of right hip      -Patient has acute right upper leg pain due to muscle strains and bursitis  -Patient will start formal physical therapy and home exercise program  -Will start diclofenac 75 mg twice a day as needed.  Patient will also start Robaxin 500 mg 1-2 times a day as needed for muscle spasms and strain.  Patient should avoid taking the Robaxin if she has to drive or perform any complicated tasks due to potential drowsiness  -Patient may also continue with Tylenol for breakthrough pain as needed  -Patient will follow-up if pain does not improve  -Call direct clinic number [[phone]] at any time with questions or concerns.    Albert Yeo MD [facility] Orthopedics and Sports Medicine  [facility]        -----    SUBJECTIVE  Sarah Fisher is a/an 53 year old female who is seen as a self referral for evaluation of right hip pain. The patient is seen by themselves.    Onset: 2 week(s) ago. Reports insidious onset without acute precipitating event.  Location of Pain: right lateral knee into lateral thigh  Rating of Pain at worst: 10/10  Rating of Pain Currently: 5/10  Worsened by: Bending knee, bending over, lifting leg  Better with: Rest  Treatments tried: rest/activity avoidance and Tylenol  Quality: aching, dull, tender  Associated symptoms: Bruised  Orthopedic history: NO  Relevant surgical history: NO  Social history: social history: works at Self employed    Past Medical History:   Diagnosis Date    Ascites     GI (gastrointestinal bleed)     History of blood transfusion     Hypokalemia     Hypotension     Liver cirrhosis (H)     Liver failure (H)     Thyroid disease      Social History     Socioeconomic History    Marital status: Single    Highest education level:  Master's degree (e.g., MA, MS, Roberto, MEd, MSW, SASHA)   Tobacco Use    Smoking status: Never    Smokeless tobacco: Never   Vaping Use    Vaping Use: Never used   Substance and Sexual Activity    Alcohol use: Not Currently     Comment: Quit on 6/20/2020    Drug use: Not Currently    Sexual activity: Not Currently     Partners: Male   Social History Narrative    Lives [place]. 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 12 year son, Flakito.         Christine Harris, DNP, APRN, CNP    3/17/2021     Social Determinants of Health     Financial Resource Strain: Low Risk  (12/13/2023)    Financial Resource Strain     Within the past 12 months, have you or your family members you live with been unable to get utilities (heat, electricity) when it was really needed?: No   Food Insecurity: Low Risk  (12/13/2023)    Food Insecurity     Within the past 12 months, did you worry that your food would run out before you got money to buy more?: No     Within the past 12 months, did the food you bought just not last and you didn t have money to get more?: No   Transportation Needs: Low Risk  (12/13/2023)    Transportation Needs     Within the past 12 months, has lack of transportation kept you from medical appointments, getting your medicines, non-medical meetings or appointments, work, or from getting things that you need?: No   Physical Activity: Sufficiently Active (4/11/2022)    Exercise Vital Sign     Days of Exercise per Week: 5 days     Minutes of Exercise per Session: 40 min   Stress: No Stress Concern Present (4/11/2022)    [place] [employer] - Occupational Stress Questionnaire     Feeling of Stress : Not at all   Social Connections: Unknown (4/11/2022)    Social Connection and Isolation Panel [NHANES]     Frequency of Communication with Friends and Family: More than three times a week     Frequency of Social Gatherings with Friends and Family: More than three times  "a week     Attends Holiness Services: Patient declined     Active Member of Clubs or Organizations: Yes     Marital Status: Patient declined   Interpersonal Safety: Low Risk  (12/13/2023)    Interpersonal Safety     Do you feel physically and emotionally safe where you currently live?: Yes     Within the past 12 months, have you been hit, slapped, kicked or otherwise physically hurt by someone?: No     Within the past 12 months, have you been humiliated or emotionally abused in other ways by your partner or ex-partner?: No   Housing Stability: Low Risk  (12/13/2023)    Housing Stability     Do you have housing? : Yes     Are you worried about losing your housing?: No         Patient's past medical, surgical, social, and family histories were reviewed today and no changes are noted.    REVIEW OF SYSTEMS:  10 point ROS is negative other than symptoms noted above in HPI, Past Medical History or as stated below  Constitutional: NEGATIVE for fever, chills, change in weight  Skin: NEGATIVE for worrisome rashes, moles or lesions  GI/: NEGATIVE for bowel or bladder changes  Neuro: NEGATIVE for weakness, dizziness or paresthesias    OBJECTIVE:  /84   Ht 1.676 m (5' 6\")   Wt 81.6 kg (180 lb)   LMP  (LMP Unknown)   BMI 29.05 kg/m     General: healthy, alert and in no distress  HEENT: no scleral icterus or conjunctival erythema  Skin: no suspicious lesions or rash. No jaundice.  CV: no pedal edema  Resp: normal respiratory effort without conversational dyspnea   Psych: normal mood and affect  Gait: normal steady gait with appropriate coordination and balance  Neuro: Normal light sensory exam of lower extremity  MSK:  RIGHT HIP  Inspection:    No obvious deformity or asymmetry, level pelvis  Palpation:    Tender about the greater trochanteric region, gluteus medius insertion, gluteal muscles, proximal hamstring attachment, and hamstring insertion. Otherwise all other landmarks are nontender.  Active Range of Motion: "     Flexion limited by pain, IR limited by pain, ER  limited by pain  Strength:    Flexion grossly intact, adduction grossly intact, abduction grossly intact  Special Tests:    Positive: Logroll, AJAY, anterior impingement (FADIR), straight leg raise    Negative: resisted gluteus medius provocation, posterior impingement (EX/AB/ER)    Independent visualization of the below image:  No results found for this or any previous visit (from the past 24 hour(s)).          Albert Yeo MD [facility]

## 2024-03-18 NOTE — LETTER
3/18/2024         RE: Sarah Fisher  [address] [place] [address]        Dear Colleague,    Thank you for referring your patient, Sarah Fisher, to the [facility] [place]. Please see a copy of my visit note below.    ASSESSMENT & PLAN  Patient Instructions     1. Acute pain of right knee    2. Muscle strain of right gluteal region, initial encounter    3. Strain of right hamstring, initial encounter    4. Trochanteric bursitis of right hip      -Patient has acute right upper leg pain due to muscle strains and bursitis  -Patient will start formal physical therapy and home exercise program  -Will start diclofenac 75 mg twice a day as needed.  Patient will also start Robaxin 500 mg 1-2 times a day as needed for muscle spasms and strain.  Patient should avoid taking the Robaxin if she has to drive or perform any complicated tasks due to potential drowsiness  -Patient may also continue with Tylenol for breakthrough pain as needed  -Patient will follow-up if pain does not improve  -Call direct clinic number [[phone]] at any time with questions or concerns.    Albert Yeo MD [facility] Orthopedics and Sports Medicine  [facility]        -----    SUBJECTIVE  Sarah Fisher is a/an 53 year old female who is seen as a self referral for evaluation of right hip pain. The patient is seen by themselves.    Onset: 2 week(s) ago. Reports insidious onset without acute precipitating event.  Location of Pain: right lateral knee into lateral thigh  Rating of Pain at worst: 10/10  Rating of Pain Currently: 5/10  Worsened by: Bending knee, bending over, lifting leg  Better with: Rest  Treatments tried: rest/activity avoidance and Tylenol  Quality: aching, dull, tender  Associated symptoms: Bruised  Orthopedic history: NO  Relevant surgical history: NO  Social history: social history: works at Self employed    Past Medical History:   Diagnosis Date      Ascites      GI (gastrointestinal bleed)      History of blood transfusion      Hypokalemia      Hypotension      Liver cirrhosis (H)      Liver failure (H)      Thyroid disease      Social History     Socioeconomic History     Marital status: Single     Highest education level: Master's degree (e.g., MA, MS, Roberto, MEd, MSW, SASHA)   Tobacco Use     Smoking status: Never     Smokeless tobacco: Never   Vaping Use     Vaping Use: Never used   Substance and Sexual Activity     Alcohol use: Not Currently     Comment: Quit on 6/20/2020     Drug use: Not Currently     Sexual activity: Not Currently     Partners: Male   Social History Narrative    Lives [place]. 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 12 year son, Flakito.         Christine Harris, DNP, APRN, CNP    3/17/2021     Social Determinants of Health     Financial Resource Strain: Low Risk  (12/13/2023)    Financial Resource Strain      Within the past 12 months, have you or your family members you live with been unable to get utilities (heat, electricity) when it was really needed?: No   Food Insecurity: Low Risk  (12/13/2023)    Food Insecurity      Within the past 12 months, did you worry that your food would run out before you got money to buy more?: No      Within the past 12 months, did the food you bought just not last and you didn t have money to get more?: No   Transportation Needs: Low Risk  (12/13/2023)    Transportation Needs      Within the past 12 months, has lack of transportation kept you from medical appointments, getting your medicines, non-medical meetings or appointments, work, or from getting things that you need?: No   Physical Activity: Sufficiently Active (4/11/2022)    Exercise Vital Sign      Days of Exercise per Week: 5 days      Minutes of Exercise per Session: 40 min   Stress: No Stress Concern Present (4/11/2022)    [place] of Occupational Health - Occupational Stress  "Questionnaire      Feeling of Stress : Not at all   Social Connections: Unknown (4/11/2022)    Social Connection and Isolation Panel [NHANES]      Frequency of Communication with Friends and Family: More than three times a week      Frequency of Social Gatherings with Friends and Family: More than three times a week      Attends Confucianist Services: Patient declined      Active Member of Clubs or Organizations: Yes      Marital Status: Patient declined   Interpersonal Safety: Low Risk  (12/13/2023)    Interpersonal Safety      Do you feel physically and emotionally safe where you currently live?: Yes      Within the past 12 months, have you been hit, slapped, kicked or otherwise physically hurt by someone?: No      Within the past 12 months, have you been humiliated or emotionally abused in other ways by your partner or ex-partner?: No   Housing Stability: Low Risk  (12/13/2023)    Housing Stability      Do you have housing? : Yes      Are you worried about losing your housing?: No         Patient's past medical, surgical, social, and family histories were reviewed today and no changes are noted.    REVIEW OF SYSTEMS:  10 point ROS is negative other than symptoms noted above in HPI, Past Medical History or as stated below  Constitutional: NEGATIVE for fever, chills, change in weight  Skin: NEGATIVE for worrisome rashes, moles or lesions  GI/: NEGATIVE for bowel or bladder changes  Neuro: NEGATIVE for weakness, dizziness or paresthesias    OBJECTIVE:  /84   Ht 1.676 m (5' 6\")   Wt 81.6 kg (180 lb)   LMP  (LMP Unknown)   BMI 29.05 kg/m     General: healthy, alert and in no distress  HEENT: no scleral icterus or conjunctival erythema  Skin: no suspicious lesions or rash. No jaundice.  CV: no pedal edema  Resp: normal respiratory effort without conversational dyspnea   Psych: normal mood and affect  Gait: normal steady gait with appropriate coordination and balance  Neuro: Normal light sensory exam of lower " extremity  MSK:  RIGHT HIP  Inspection:    No obvious deformity or asymmetry, level pelvis  Palpation:    Tender about the greater trochanteric region, gluteus medius insertion, gluteal muscles, proximal hamstring attachment, and hamstring insertion. Otherwise all other landmarks are nontender.  Active Range of Motion:     Flexion limited by pain, IR limited by pain, ER  limited by pain  Strength:    Flexion grossly intact, adduction grossly intact, abduction grossly intact  Special Tests:    Positive: Logroll, AJAY, anterior impingement (FADIR), straight leg raise    Negative: resisted gluteus medius provocation, posterior impingement (EX/AB/ER)    Independent visualization of the below image:  No results found for this or any previous visit (from the past 24 hour(s)).          Albert Yeo MD [facility]      Again, thank you for allowing me to participate in the care of your patient.        Sincerely,        Albert Yeo, MD

## 2024-03-20 ENCOUNTER — TELEPHONE (OUTPATIENT)
Dept: TRANSPLANT | Facility: CLINIC | Age: 54
End: 2024-03-20

## 2024-03-20 DIAGNOSIS — Z94.4 LIVER TRANSPLANT RECIPIENT (H): ICD-10-CM

## 2024-03-20 RX ORDER — TACROLIMUS 1 MG/1
2 CAPSULE ORAL EVERY 12 HOURS
Qty: 360 CAPSULE | Refills: 3 | Status: SHIPPED | OUTPATIENT
Start: 2024-03-20 | End: 2024-04-23

## 2024-03-20 NOTE — TELEPHONE ENCOUNTER
ISSUE:   Tacrolimus IR level 8.1 on 3.19, goal 5-7, dose 2 mg AM and 3 mg PM.    PLAN:   Call Patientand confirm this was an accurate 12-hour trough.   Verify Tacrolimus IR dose 2 mg AM and 3 mg PM.   Confirm no new medications or illness.   Confirm no missed doses.   If accurate trough and accurate dose, decrease Tacrolimus IR dose to 2 mg BID     Is this more than a 50% increase or decrease in current IS dose: No  If YES, justification:    Repeat labs in 2 weeks.  *If > 50% change in immunosuppression dose, repeat labs in 2 weeks. Lotus Hughes RN      OUTCOME:   Spoke with Patient, they confirm accurate trough level and current dose 2 mg am, 3 mg pm.   Patientconfirmed dose change to 2 mg BID.  Patientagreed to repeat labs in 2 weeks.   Orders sent to preferred pharmacy for dose change and lab for repeat labs.   Patientvoiced understanding of plan.     Alexandrea Peter LPN

## 2024-03-26 DIAGNOSIS — E03.8 SUBCLINICAL HYPOTHYROIDISM: ICD-10-CM

## 2024-03-27 ENCOUNTER — THERAPY VISIT (OUTPATIENT)
Dept: PHYSICAL THERAPY | Facility: CLINIC | Age: 54
End: 2024-03-27
Attending: FAMILY MEDICINE
Payer: COMMERCIAL

## 2024-03-27 DIAGNOSIS — M70.61 TROCHANTERIC BURSITIS OF RIGHT HIP: ICD-10-CM

## 2024-03-27 DIAGNOSIS — S76.011A MUSCLE STRAIN OF RIGHT GLUTEAL REGION, INITIAL ENCOUNTER: ICD-10-CM

## 2024-03-27 DIAGNOSIS — S76.311A STRAIN OF RIGHT HAMSTRING, INITIAL ENCOUNTER: ICD-10-CM

## 2024-03-27 PROCEDURE — 97110 THERAPEUTIC EXERCISES: CPT | Mod: GP | Performed by: PHYSICAL THERAPIST

## 2024-03-27 PROCEDURE — 97161 PT EVAL LOW COMPLEX 20 MIN: CPT | Mod: GP | Performed by: PHYSICAL THERAPIST

## 2024-03-27 RX ORDER — LEVOTHYROXINE SODIUM 88 UG/1
88 TABLET ORAL DAILY
Qty: 90 TABLET | Refills: 3 | OUTPATIENT
Start: 2024-03-27

## 2024-03-27 NOTE — PROGRESS NOTES
PHYSICAL THERAPY EVALUATION  Type of Visit: Evaluation    See electronic medical record for Abuse and Falls Screening details.    Subjective       Presenting condition or subjective complaint: Bursitis in thigh and hamstring Pain started 1 month ago without specific onset.  She had a bruise on the outer thigh. She was limping for 3 weeks, she couldn't bend, couldn't lift the leg.  She could not bend over.  It is better but still very uncomfortable.  Leg is still painful on the outer thigh 3/10. Patient tried a muscle relaxer and took that at night a couple of times.  She has also tried Diclofenac which has been helpful.  She has taken the med as needed.  4-5 times total. Patient is a liver transplant patient and went into rejection in January. She is on Prednisone and has tapered down to 5mg daily. She has a DVT that they noticed in 2021. The DVT has not been treated, just monitored, and she plans to return to Dr. Yeo in 2 weeks for an MRI if symptoms are not improving with PT.     Date of onset: 03/18/24 (MD appointment listed.)    Relevant medical history:     Dates & types of surgery:      Prior diagnostic imaging/testing results:       Prior therapy history for the same diagnosis, illness or injury: No      Prior Level of Function  Transfers: Independent  Ambulation: Independent  ADL: Independent    Living Environment  Social support: With family members   Type of home: House   Stairs to enter the home: Yes 3 Is there a railing: Yes   Ramp: No   Stairs inside the home: Yes 15 Is there a railing: Yes   Help at home: None  Equipment owned:       Employment: Yes Self employed  Hobbies/Interests: Walking, family    Patient goals for therapy: Walk without pain    Pain assessment: Location: Lateral thigh R/Rating: 3/10     Objective   Gait: Compensated Trendelenburg gait pattern noted during R stance phase of gait. Minimal use of glutes. No out toeing noted.  ROM:   Lumbar AROM   Motion    Flexion Fingertips to ankles  with pull in R hamstring   Extension 50% of full     Right Left    Side Bending WNL WNL   Rotation WNL WNL   -No Lumbar Shift Present.  Hip PROM   Motion Right Left   Flexion WNL pulling in posterior thigh WNL   External Rotation WNL WNL   Internal Rotation 75% of full with some pain in lateral hip and thigh WNL             Special Tests:  All blank boxes in this table indicate normal special testing   Hip Special Testing   Test Right Left   HOLLEY (FADIR) +    Scour     AJAY Tight Tight   Kirti's  NT NT   Oli Test NT NT   Palpation: Palpated R bursa only due to presence of DVT in R thigh. R bursa painful.       Assessment & Plan   CLINICAL IMPRESSIONS  Medical Diagnosis: Strain of right hamstring, initial encounter (S76.311A)    Muscle strain of right gluteal region, initial encounter (S76.011A)    Trochanteric bursitis of right hip (M70.61)    Treatment Diagnosis: R hip pain, Strain of right hamstring, initial encounter (S76.311A)    Muscle strain of right gluteal region, initial encounter (S76.011A)    Trochanteric bursitis of right hip (M70.61)   Impression/Assessment: Patient is a 54 year old female with Right hip and lateral thigh complaints.  The following significant findings have been identified: Pain, Decreased ROM/flexibility, Decreased strength, Impaired balance, Impaired gait, Impaired muscle performance, and Decreased activity tolerance. These impairments interfere with their ability to perform self care tasks, recreational activities, household chores, driving , household mobility, and community mobility as compared to previous level of function.     Clinical Decision Making (Complexity):  Clinical Presentation: Stable/Uncomplicated  Clinical Presentation Rationale: based on medical and personal factors listed in PT evaluation  Clinical Decision Making (Complexity): Low complexity    PLAN OF CARE  Treatment Interventions:  Modalities: Cryotherapy, Hot Pack  Interventions: Gait Training, Manual  Therapy, Neuromuscular Re-education, Therapeutic Activity, Therapeutic Exercise, Self-Care/Home Management    Long Term Goals     PT Goal 1  Goal Identifier: Bending  Goal Description: Patient will demonstrate unrestricted pain free bending.  Rationale: to maximize safety and independence with performance of ADLs and functional tasks;to maximize safety and independence within the home;to maximize safety and independence within the community;to maximize safety and independence with transportation;to maximize safety and independence with self cares  Target Date: 05/22/24      Frequency of Treatment: 1 time per week  Duration of Treatment: 8 weeks    Recommended Referrals to Other Professionals:  Recommended return to MD for examination of R thigh DVT.   Education Assessment:   Learner/Method: Patient;Listening;Demonstration;Reading;Pictures/Video;No Barriers to Learning  Education Comments: Educated on findings of exam and likely frequency/duration of PT POC.    Risks and benefits of evaluation/treatment have been explained.   Patient/Family/caregiver agrees with Plan of Care.     Evaluation Time:     PT Eval, Low Complexity Minutes (68677): 25     Signing Clinician: Meka Julien, PT

## 2024-03-28 ENCOUNTER — HOSPITAL ENCOUNTER (OUTPATIENT)
Dept: ULTRASOUND IMAGING | Facility: CLINIC | Age: 54
Discharge: HOME | End: 2024-03-28
Attending: INTERNAL MEDICINE | Admitting: INTERNAL MEDICINE
Payer: COMMERCIAL

## 2024-03-28 ENCOUNTER — OFFICE VISIT (OUTPATIENT)
Dept: PEDIATRICS | Facility: CLINIC | Age: 54
End: 2024-03-28
Payer: COMMERCIAL

## 2024-03-28 ENCOUNTER — TELEPHONE (OUTPATIENT)
Dept: PEDIATRICS | Facility: CLINIC | Age: 54
End: 2024-03-28

## 2024-03-28 VITALS
DIASTOLIC BLOOD PRESSURE: 80 MMHG | BODY MASS INDEX: 30.67 KG/M2 | WEIGHT: 190 LBS | RESPIRATION RATE: 16 BRPM | SYSTOLIC BLOOD PRESSURE: 129 MMHG | OXYGEN SATURATION: 98 % | TEMPERATURE: 98 F | HEART RATE: 95 BPM

## 2024-03-28 DIAGNOSIS — Z86.718 PERSONAL HISTORY OF DVT (DEEP VEIN THROMBOSIS): ICD-10-CM

## 2024-03-28 DIAGNOSIS — Z86.718 PERSONAL HISTORY OF DVT (DEEP VEIN THROMBOSIS): Primary | ICD-10-CM

## 2024-03-28 DIAGNOSIS — M79.661 PAIN IN RIGHT LOWER LEG: ICD-10-CM

## 2024-03-28 PROCEDURE — 99214 OFFICE O/P EST MOD 30 MIN: CPT | Performed by: INTERNAL MEDICINE

## 2024-03-28 PROCEDURE — 93971 EXTREMITY STUDY: CPT | Mod: RT

## 2024-03-28 ASSESSMENT — PAIN SCALES - GENERAL: PAINLEVEL: MILD PAIN (3)

## 2024-03-28 NOTE — TELEPHONE ENCOUNTER
ADS able to see the patient.   Scheduled for today at 3:15pm.   Patient understands and agrees with the plan.   We kept the appointment for tomorrow.

## 2024-03-28 NOTE — PROGRESS NOTES
"Marquis Arredondo is a 54 year old, presenting for the following health issues:  Deep Vein Thrombosis    HPI     Evaluation for possible DVT  Onset/Duration: 1 month  Noticed it with severe pain and bruise like appearance a few weeks ago as long as a \"popping sensation\". Bruising and popping sensation no longer present.   Description:       Location: Right upper thigh       Redness: no        Pain: YES       Warmth: no        Joint swelling YES- Upper thigh.   Progression of symptoms same  Accompanying signs and symptoms:       Fevers: no        Numbness/tingling/weakness: no        Chest pain/pleurisy: no        Shortness of breath: no   History        Trauma: no         Recent travel/when: YES- [place] earlier this month.         Previous history of DVT: YES- 2020/2021        Family history of DVT: no         Recent surgery: no   Aggravating factors include: overuse and at night time  Therapies tried and outcome: ice, stretching, acetaminophen, physical therapy, and muscle relaxer  Prior surgery on arteries of veins in this area: No, but patient had vascular testing in area several years ago         Personal history of DVT (deep vein thrombosis)  Pain in right lower leg   Sarah Fisher has had bruising on the right knee prior to visit in sports medicine.. Bruise radiates to posterior knee.  Notes that she had not had any trauma to the knee.   Burising and pain cam on spontaneously.  She does walk and could have had muscle injury from deep tissue massage.  No swelling in the right leg.   No issues with left leg.  Has had a prior deep vein thrombosis in neck, but never in the leg.  Not taking any blood thinners other than aspirin 81 mg daily.    Past Medical History:   Diagnosis Date    Ascites     GI (gastrointestinal bleed)     History of blood transfusion     Hypokalemia     Hypotension     Liver cirrhosis (H)     Liver failure (H)     Thyroid disease      Patient Active Problem List   Diagnosis    " Acute deep vein thrombosis (DVT) of upper extremity (H)    Acute kidney failure, unspecified (H24)    Carrier of group B Streptococcus    Cirrhosis of liver with ascites (H)    Elevated blood pressure    Family history of colon cancer    Gastroesophageal reflux disease without esophagitis    Hyponatremia    History of DVT (deep vein thrombosis)    Anemia associated with acute blood loss    Need for vaccination for viral hepatitis    Knee pain    Pancytopenia (H)    Macrocytic anemia    Blood loss anemia    Hydrothorax    Portal hypertensive gastropathy (H)    Pleural effusion    Screening for malignant neoplasm of cervix    Venous incompetence    Varicose veins of leg with pain    Subclinical hypothyroidism    Alcoholic cirrhosis (H)    Abnormal serum iron level    Iron deficiency anemia due to chronic blood loss    Liver transplant recipient (H)    Immunosuppressed status (H24)    Malnutrition (H24)    Steroid-induced hyperglycemia    Epistaxis    Esophagitis    Duodenal erosion    Gastric antral vascular ectasia    Hyperphosphatemia    Leukocytosis    Chronic kidney disease, stage 3b (H)    Low magnesium level    Autoimmune hemolytic anemia (H)    Alcohol use disorder, severe, in sustained remission (H)    Hereditary hemochromatosis (H24)    Osteopenia    ASCUS of cervix with negative high risk HPV    Liver transplant rejection (H)    Strain of right hamstring, initial encounter    Muscle strain of right gluteal region, initial encounter    Trochanteric bursitis of right hip     Current Outpatient Medications   Medication Sig Dispense Refill    amLODIPine (NORVASC) 10 MG tablet Take 1 tablet (10 mg) by mouth daily 90 tablet 3    aspirin 81 MG EC tablet Take 81 mg by mouth daily      azaTHIOprine (IMURAN) 50 MG tablet Take 1 tablet (50 mg) by mouth daily 90 tablet 3    cholecalciferol 125 MCG (5000 UT) CAPS Take 1 capsule (5,000 Units) by mouth daily      diclofenac (VOLTAREN) 75 MG EC tablet Take 1 tablet (75 mg)  by mouth 2 times daily as needed for moderate pain 60 tablet 0    levothyroxine (SYNTHROID/LEVOTHROID) 25 MCG tablet Take 0.5 tablets (12.5 mcg) by mouth daily Take along with 50mg of levothyroxine. 45 tablet 0    levothyroxine (SYNTHROID/LEVOTHROID) 50 MCG tablet Take 1 tablet (50 mcg) by mouth daily Take along with 12.5mg of levothyroxine. 90 tablet 0    magnesium oxide (MAG-OX) 400 MG tablet Take 1 tablet (400 mg) by mouth 2 times daily 180 tablet 3    methocarbamol (ROBAXIN) 500 MG tablet Take 1 tablet (500 mg) by mouth 2 times daily as needed for muscle spasms 40 tablet 0    multivitamin (THERMEMS) TABS Take 1 tablet by mouth daily 30 tablet 1    pantoprazole (PROTONIX) 40 MG EC tablet Take 1 tablet (40 mg) by mouth 2 times daily 180 tablet 1    predniSONE (DELTASONE) 5 MG tablet Take 6 tablets (30 mg) by mouth daily for 5 days, THEN 5 tablets (25 mg) daily for 5 days, THEN 4 tablets (20 mg) daily for 5 days, THEN 3 tablets (15 mg) daily for 5 days, THEN 2 tablets (10 mg) daily for 5 days, THEN 1 tablet (5 mg) daily for 30 days. 130 tablet 0    predniSONE (DELTASONE) 5 MG tablet Take 1 tablet (5 mg) by mouth daily 90 tablet 3    tacrolimus (GENERIC EQUIVALENT) 1 MG capsule Take 2 capsules (2 mg) by mouth every 12 hours 360 capsule 3    tretinoin (RETIN-A) 0.025 % external cream Apply a pea size to entire face QD 45 g 11    ursodiol (ACTIGALL) 300 MG capsule Take 300 mg by mouth daily      augmented betamethasone dipropionate (DIPROLENE AF) 0.05 % external cream Apply to AA BID x 1-2 weeks then PRN only. Do not apply to face (Patient not taking: Reported on 1/31/2024) 50 g 2    hydrocortisone 2.5 % ointment Apply to lips BID x 1 week then PRN (Patient not taking: Reported on 1/31/2024) 15 g 3    ketoconazole (NIZORAL) 2 % external cream Apply to lips BID x 1 week then PRN (Patient not taking: Reported on 1/31/2024) 15 g 3    ondansetron (ZOFRAN) 4 MG tablet Take 1 tablet (4 mg) by mouth every 6 hours as needed  for nausea (Patient not taking: Reported on 1/31/2024) 20 tablet 0    tirzepatide-Weight Management (ZEPBOUND) 5 MG/0.5ML prefilled pen Inject 0.5 mLs (5 mg) Subcutaneous every 7 days (Patient not taking: Reported on 1/31/2024) 2 mL 1        Allergies   Allergen Reactions    Amoxicillin GI Disturbance, Diarrhea, Nausea and Nausea and Vomiting     Social History     Tobacco Use    Smoking status: Never    Smokeless tobacco: Never   Vaping Use    Vaping Use: Never used   Substance Use Topics    Alcohol use: Not Currently     Comment: Quit on 6/20/2020    Drug use: Not Currently     Family History   Problem Relation Age of Onset    No Known Problems Mother     Colon Cancer Father 58    Breast Cancer Maternal Grandmother     No Known Problems Maternal Grandfather     No Known Problems Paternal Grandmother     No Known Problems Paternal Grandfather     Substance Abuse Brother     No Known Problems Sister     No Known Problems Son     Substance Abuse Brother        Review of Systems  Constitutional, HEENT, cardiovascular, pulmonary, GI, , musculoskeletal - right hip pain, neuro, skin, endocrine and psych systems are negative, except as otherwise noted.      Objective    /80   Pulse 95   Temp 98  F (36.7  C) (Oral)   Resp 16   Wt 86.2 kg (190 lb)   LMP  (LMP Unknown)   SpO2 98%   BMI 30.67 kg/m    There is no height or weight on file to calculate BMI.  Physical Exam   GENERAL: alert and no distress  EYES: Eyes grossly normal to inspection   HENT: ear canals and TM's normal,   NECK: no adenopathy, no asymmetry, masses, or scars  RESP: lungs clear to auscultation - no rales, rhonchi or wheezes  CV: regular rate and rhythm, normal S1 S2, no S3 or S4, no murmur, + varicose vein  ABDOMEN: soft, nontender, no hepatosplenomegaly   MS: no gross musculoskeletal defects noted, no edema  SKIN: no suspicious lesions or rashes  NEURO: Normal strength and tone,    PSYCH: mentation appears normal, affect  normal/bright    Results for orders placed or performed during the hospital encounter of 03/28/24   US Lower Extremity Venous Duplex Right     Status: None    Narrative    US LOWER EXTREMITY VENOUS DUPLEX RIGHT 3/28/2024 4:16 PM    CLINICAL HISTORY/INDICATION: Personal history of DVT (deep vein  thrombosis); Pain in right lower leg    COMPARISON: 11/4/2021    TECHNIQUE:   Grayscale, color-flow, and spectral waveform analysis were performed  of the deep veins of the right lower extremity    FINDINGS:   The right common femoral vein, femoral vein, popliteal vein and tibial  veins demonstrate normal compressibility, spectral waveform, color  flow and augmentation. Varicose veins are noted proximal right calf.    The contralateral left common femoral vein demonstrates normal  compressibility, spectral waveform, color flow and augmentation.      Impression    IMPRESSION:   No evidence of deep venous thrombosis in the right lower extremity.    RADHA OVALLE DO         SYSTEM ID:  [id number]         Patient Instructions   (Z86.718) Personal history of DVT (deep vein thrombosis)  (primary encounter diagnosis)  Comment: noted that your repeat ultrasound did not show evidence of deep vein thrombosis.  OK to continue with physical therapy as planned   Plan: US Lower Extremity Venous Duplex Right            (M79.661) Pain in right lower leg  Comment: as above   Plan: US Lower Extremity Venous Duplex Right              30 minutes spent with patient.  Over 50% of time counseling      Signed Electronically by: Kapil Kovacs MD, MD

## 2024-03-28 NOTE — TELEPHONE ENCOUNTER
Patient calling.   Saw Dr. Yeo with Ortho on 3/18/2024 for right thigh pain.   PT advised.   Patient went to PT 3/27/2024.   There were some concerns on doing any manipulation or massaging due to possible DVT due to her past history of DVT.   Patient also sent MyChart message to Dr. Yeo's team to follow up.   It was recommended to follow up with PCP to inquire on next steps.     Scheduled with Christine Harris NP for tomorrow.   Also huddled with Christine Harris NP - Ok to check with [facility] to see if they could rule out DVT for the patient.     Call into [facility]. Will huddle with Provider soon.

## 2024-03-28 NOTE — RESULT ENCOUNTER NOTE
Khanh Smith,    I have had the opportunity to review your recent results and an interpretation is as follows:  As discussed, there was no evidence of deep vein thrombosis on ultrasound, and would be reasonable to continue with physical therapy      Sincerely,  Kapil Kovacs MD

## 2024-03-28 NOTE — PATIENT INSTRUCTIONS
(Z81.644) Personal history of DVT (deep vein thrombosis)  (primary encounter diagnosis)  Comment: noted that your repeat ultrasound did not show evidence of deep vein thrombosis.  OK to continue with physical therapy as planned   Plan: US Lower Extremity Venous Duplex Right            (M79.575) Pain in right lower leg  Comment: as above   Plan: US Lower Extremity Venous Duplex Right

## 2024-03-29 ENCOUNTER — OFFICE VISIT (OUTPATIENT)
Dept: PEDIATRICS | Facility: CLINIC | Age: 54
End: 2024-03-29
Payer: COMMERCIAL

## 2024-03-29 VITALS
BODY MASS INDEX: 31.13 KG/M2 | RESPIRATION RATE: 16 BRPM | HEART RATE: 94 BPM | TEMPERATURE: 97.1 F | HEIGHT: 66 IN | WEIGHT: 193.7 LBS | SYSTOLIC BLOOD PRESSURE: 110 MMHG | DIASTOLIC BLOOD PRESSURE: 75 MMHG | OXYGEN SATURATION: 99 %

## 2024-03-29 DIAGNOSIS — I83.813 VARICOSE VEINS OF BOTH LOWER EXTREMITIES WITH PAIN: Primary | ICD-10-CM

## 2024-03-29 DIAGNOSIS — M79.651 PAIN OF RIGHT THIGH: ICD-10-CM

## 2024-03-29 PROCEDURE — 99213 OFFICE O/P EST LOW 20 MIN: CPT | Performed by: NURSE PRACTITIONER

## 2024-03-29 RX ORDER — PANTOPRAZOLE SODIUM 40 MG/1
40 TABLET, DELAYED RELEASE ORAL 2 TIMES DAILY
Qty: 180 TABLET | Refills: 1 | Status: CANCELLED | OUTPATIENT
Start: 2024-03-29

## 2024-03-29 RX ORDER — LEVOTHYROXINE SODIUM 25 UG/1
12.5 TABLET ORAL DAILY
Qty: 45 TABLET | Refills: 0 | Status: CANCELLED | OUTPATIENT
Start: 2024-03-29

## 2024-03-29 RX ORDER — LEVOTHYROXINE SODIUM 50 UG/1
50 TABLET ORAL DAILY
Qty: 90 TABLET | Refills: 0 | Status: CANCELLED | OUTPATIENT
Start: 2024-03-29

## 2024-03-29 ASSESSMENT — PAIN SCALES - GENERAL: PAINLEVEL: MILD PAIN (3)

## 2024-03-29 ASSESSMENT — ENCOUNTER SYMPTOMS: LEG PAIN: 1

## 2024-03-29 NOTE — PROGRESS NOTES
"  Assessment & Plan     Varicose veins of both lower extremities with pain  Pain of right thigh  Right thigh pain improving. US yesterday is negative for DVT. Discussed returning to PT for treatment of suspected hamstring strain/hip bursitis. If no improvement, will return to sports medicine for consideration of additional imaging. With hx of varicose veins, which are rather impressive on exam, recommend returning to vascular medicine. We also discussed supportive measures to implement at home.   - Vascular Medicine Referral    Marquis Arredondo is a 54 year old, presenting for the following health issues:  Leg Pain        3/29/2024     8:43 AM   Additional Questions   Roomed by Kailyn VÁSQUEZ   Accompanied by N/A         3/29/2024     8:43 AM   Patient Reported Additional Medications   Patient reports taking the following new medications None       Reports sudden onset right thigh/right knee pain three weeks ago. Associated with bruising behind right knee. No known injury. Pain located to right outer aspect of right thigh and behind right knee. Improving since onset.     Was evaluated by sports med 3/18/24, dx with hamstring strain and bursitis, recommended PT. When PT heard about her hx of DVT, they recommended further work-up. Patient had ultrasound yesterday to rule out DVT, which was negative. Wishes to discuss next steps today.           Objective    /75 (BP Location: Right arm, Patient Position: Sitting, Cuff Size: Adult Regular)   Pulse 94   Temp 97.1  F (36.2  C) (Tympanic)   Resp 16   Ht 1.676 m (5' 6\")   Wt 87.9 kg (193 lb 11.2 oz)   LMP  (LMP Unknown)   SpO2 99%   BMI 31.26 kg/m    Body mass index is 31.26 kg/m .  Physical Exam   GENERAL: alert and no distress  MS: no gross musculoskeletal defects noted, no edema  SKIN: no suspicious lesions or rashes. Varicose veins - lower legs, predominately right upper leg.   NEURO: Normal strength and tone, mentation intact and speech normal  PSYCH: " mentation appears normal, affect normal/bright    US Lower Extremity Venous Duplex Right    Result Date: 3/28/2024  US LOWER EXTREMITY VENOUS DUPLEX RIGHT 3/28/2024 4:16 PM CLINICAL HISTORY/INDICATION: Personal history of DVT (deep vein thrombosis); Pain in right lower leg COMPARISON: 11/4/2021 TECHNIQUE: Grayscale, color-flow, and spectral waveform analysis were performed of the deep veins of the right lower extremity FINDINGS: The right common femoral vein, femoral vein, popliteal vein and tibial veins demonstrate normal compressibility, spectral waveform, color flow and augmentation. Varicose veins are noted proximal right calf. The contralateral left common femoral vein demonstrates normal compressibility, spectral waveform, color flow and augmentation.     IMPRESSION: No evidence of deep venous thrombosis in the right lower extremity. RADHA OVALLE DO   SYSTEM ID:  [id number]         Signed Electronically by: ROBYN Bear CNP

## 2024-04-01 ENCOUNTER — LAB (OUTPATIENT)
Dept: LAB | Facility: CLINIC | Age: 54
End: 2024-04-01
Payer: COMMERCIAL

## 2024-04-01 ENCOUNTER — MYC MEDICAL ADVICE (OUTPATIENT)
Dept: PEDIATRICS | Facility: CLINIC | Age: 54
End: 2024-04-01

## 2024-04-01 DIAGNOSIS — E03.8 SUBCLINICAL HYPOTHYROIDISM: ICD-10-CM

## 2024-04-01 DIAGNOSIS — Z94.4 LIVER REPLACED BY TRANSPLANT (H): ICD-10-CM

## 2024-04-01 DIAGNOSIS — Z13.220 LIPID SCREENING: ICD-10-CM

## 2024-04-01 DIAGNOSIS — T86.41 LIVER TRANSPLANT REJECTION (H): ICD-10-CM

## 2024-04-01 DIAGNOSIS — Z94.4 LIVER TRANSPLANT RECIPIENT (H): ICD-10-CM

## 2024-04-01 LAB
ALBUMIN MFR UR ELPH: 21.1 MG/DL
ALBUMIN SERPL BCG-MCNC: 4.3 G/DL (ref 3.5–5.2)
ALP SERPL-CCNC: 98 U/L (ref 40–150)
ALT SERPL W P-5'-P-CCNC: 24 U/L (ref 0–50)
ANION GAP SERPL CALCULATED.3IONS-SCNC: 10 MMOL/L (ref 7–15)
AST SERPL W P-5'-P-CCNC: 26 U/L (ref 0–45)
BILIRUB DIRECT SERPL-MCNC: <0.2 MG/DL (ref 0–0.3)
BILIRUB SERPL-MCNC: 0.5 MG/DL
BUN SERPL-MCNC: 22.6 MG/DL (ref 6–20)
CALCIUM SERPL-MCNC: 9.5 MG/DL (ref 8.6–10)
CHLORIDE SERPL-SCNC: 108 MMOL/L (ref 98–107)
CHOLEST SERPL-MCNC: 200 MG/DL
CREAT SERPL-MCNC: 0.94 MG/DL (ref 0.51–0.95)
CREAT UR-MCNC: 176 MG/DL
DEPRECATED HCO3 PLAS-SCNC: 27 MMOL/L (ref 22–29)
EGFRCR SERPLBLD CKD-EPI 2021: 72 ML/MIN/1.73M2
ERYTHROCYTE [DISTWIDTH] IN BLOOD BY AUTOMATED COUNT: 14.5 % (ref 10–15)
FASTING STATUS PATIENT QL REPORTED: YES
GLUCOSE SERPL-MCNC: 86 MG/DL (ref 70–99)
HCT VFR BLD AUTO: 41.8 % (ref 35–47)
HDLC SERPL-MCNC: 106 MG/DL
HGB BLD-MCNC: 13.7 G/DL (ref 11.7–15.7)
LDLC SERPL CALC-MCNC: 75 MG/DL
MCH RBC QN AUTO: 30.6 PG (ref 26.5–33)
MCHC RBC AUTO-ENTMCNC: 32.8 G/DL (ref 31.5–36.5)
MCV RBC AUTO: 94 FL (ref 78–100)
NONHDLC SERPL-MCNC: 94 MG/DL
PLATELET # BLD AUTO: 220 10E3/UL (ref 150–450)
POTASSIUM SERPL-SCNC: 4 MMOL/L (ref 3.4–5.3)
PROT SERPL-MCNC: 6.8 G/DL (ref 6.4–8.3)
PROT/CREAT 24H UR: 0.12 MG/MG CR (ref 0–0.2)
RBC # BLD AUTO: 4.47 10E6/UL (ref 3.8–5.2)
SODIUM SERPL-SCNC: 145 MMOL/L (ref 135–145)
T4 FREE SERPL-MCNC: 1.41 NG/DL (ref 0.9–1.7)
TACROLIMUS BLD-MCNC: 5 UG/L (ref 5–15)
TME LAST DOSE: NORMAL H
TME LAST DOSE: NORMAL H
TRIGL SERPL-MCNC: 94 MG/DL
TSH SERPL DL<=0.005 MIU/L-ACNC: 7.58 UIU/ML (ref 0.3–4.2)
WBC # BLD AUTO: 5 10E3/UL (ref 4–11)

## 2024-04-01 PROCEDURE — 80061 LIPID PANEL: CPT

## 2024-04-01 PROCEDURE — 85027 COMPLETE CBC AUTOMATED: CPT

## 2024-04-01 PROCEDURE — 80053 COMPREHEN METABOLIC PANEL: CPT

## 2024-04-01 PROCEDURE — 82248 BILIRUBIN DIRECT: CPT

## 2024-04-01 PROCEDURE — 84439 ASSAY OF FREE THYROXINE: CPT

## 2024-04-01 PROCEDURE — 80197 ASSAY OF TACROLIMUS: CPT

## 2024-04-01 PROCEDURE — 84443 ASSAY THYROID STIM HORMONE: CPT

## 2024-04-01 PROCEDURE — 36415 COLL VENOUS BLD VENIPUNCTURE: CPT

## 2024-04-01 PROCEDURE — 84156 ASSAY OF PROTEIN URINE: CPT

## 2024-04-02 RX ORDER — LEVOTHYROXINE SODIUM 75 UG/1
75 TABLET ORAL DAILY
Qty: 90 TABLET | Refills: 0 | Status: SHIPPED | OUTPATIENT
Start: 2024-04-02 | End: 2024-07-02

## 2024-04-02 NOTE — TELEPHONE ENCOUNTER
Routing to Christine Harris NP - Patient agrees to go to 75mcg daily then recheck in 6 weeks.   Orders pended.

## 2024-04-11 NOTE — PROGRESS NOTES
Medication Therapy Management (MTM) Encounter    ASSESSMENT:                            Medication Adherence/Access: No issues identified    Obesity:  Patient has been tolerating Zepbound dose and would benefit from a dose increase to optimize weight loss and craving management. Patient will likely experience more weight control while off of high dose prednisone. Patient would benefit from continuing to exercise and optimize diet.      History of liver transplant:  Patient is managed per solid organ transplant team. Patient would benefit from continued monitoring while on maintenance regimen to ensure all medications are safe and effective.     Hypothyroidism:  Stable. Patient is tolerating recent levothyroxine dose increase and continue with plan to recheck in 6-8 weeks.      Pain:  Patient would benefit from continuing to work with physical therapy for better pain control. Patient has been appropriately avoiding NSAIDs (history of GIB and risk of CORBIN). Patient is indicated to follow-up with vascular to mitigate varicose vein-related pain.     Supplements:  Stable.     PLAN:                            Increase to Zepbound 7.5 mg weekly.   Zepbound is typically dose increases by 2.5 mg at 1 month at the earliest --> due to shortage changed to equivalent dosing ~ semaglutide 2 mg weekly. Sent patient [employer] with info.    Follow-up: Return in about 29 days (around 5/14/2024) for Follow up, in person.    SUBJECTIVE/OBJECTIVE:                          Maria Elena Fisher is a 54 year old female coming in for a follow-up visit from 1/16/24.       Reason for visit: follow-up on injectable medication.     Allergies/ADRs: Reviewed in chart  Past Medical History: Reviewed in chart  Tobacco: She reports that she has never smoked. She has never used smokeless tobacco.  Alcohol: not currently using    Medication Adherence/Access:   Paying out of pocket for semaglutide. Fills rx at [facility]. Would like to transfer to [place]  "pharmacy though.    Obesity   Zepbound 5 mg weekly, 3 doses so far  She recently started the Zepbound recently but feels no impact on the Zepbound. She was off the semaglutide and not started on Zepbound due to went through rejection of her transplant in January. Her hepatologist did give her approval to resume therapy. She shares had been doing very well on semaglutide otherwise. She was also on higher doses of prednisone which made her appetite very high/felt hungry all of the time.  Side effects: no constipation or stomach upset.  She reports home wait has +/- 3 lbs in the past 3 weeks.  Exercise: She is walking 3-4 times a week (mile each time)  Diet: She reports diet continues to be very good, still eats healthy choices such as lean protein with fresh fruit.  Medication History: Ozempic 2 mg weekly - was paying out of pocket      Wt Readings from Last 4 Encounters:   04/15/24 192 lb 8 oz (87.3 kg)   03/29/24 193 lb 11.2 oz (87.9 kg)   03/28/24 190 lb (86.2 kg)   03/18/24 180 lb (81.6 kg)     Estimated body mass index is 31.07 kg/m  as calculated from the following:    Height as of 3/29/24: 5' 6\" (1.676 m).    Weight as of this encounter: 192 lb 8 oz (87.3 kg).    History of liver transplant:  Tacrolimus 2 mg every 12 hour (goal 3-5)  Imuran 50 mg daily  Ursodiol 300 mg daily (reduced 12/8)  Prednisone 5 mg daily   Taking regularly, no side effects reported.  Following with her transplant regularly/closely, Dr. Avila. She went into rejection in January. Therefore completed high dose prednisone and tacrolimus dose adjustment. However, now back to maintenance regimen.     Hypothyroidism   Levothyroxine 75 mcg daily (dose changed on 4/2)  Patient is having the following symptoms: She reports hard to tell the difference of the dose change at this time.      TSH   Date Value Ref Range Status   04/01/2024 7.58 (H) 0.30 - 4.20 uIU/mL Final   04/19/2022 5.77 (H) 0.40 - 4.00 mU/L Final   03/17/2021 5.38 (H) 0.40 - 4.00 " mU/L Final     Pain:  Taking none. She is going to physical therapy - one session first.   Patient had follow-up with Dr. Yeo, Sports Medicine 3/18/24. She was also evaluated for DVT on 3/28/24 and US was negative. Patient referred to vascular medicine by primary care provider for the right thigh pain. Has not made appointment yet but is on her to-do list.     Supplements   Magnesium 400mg twice daily for history if low magnesiu,  Vitamin D3 5000 mcg daily   Multivitamin daily   No reported issues at this time.      Today's Vitals: /78   Pulse 83   Wt 192 lb 8 oz (87.3 kg)   LMP  (LMP Unknown)   SpO2 100%   BMI 31.07 kg/m    ----------------    I spent 30 minutes with this patient today. All changes were made via collaborative practice agreement with ROBYN Bear CNP. A copy of the visit note was provided to the patient's provider(s).    A summary of these recommendations was given to the patient.    McKenna Mayne, PharmD Student     Umm Turner, PharmD  Medication Therapy Management Pharmacist     Medication Therapy Recommendations  Class 1 obesity with body mass index (BMI) of 34.0 to 34.9 in adult, unspecified obesity type, unspecified whether serious comorbidity present    Current Medication: tirzepatide-Weight Management (ZEPBOUND) 5 MG/0.5ML prefilled pen (Discontinued)   Rationale: Dose too low - Dosage too low - Effectiveness   Recommendation: Increase Dose - tirzepatide-Weight Management 7.5 MG/0.5ML prefilled pen   Status: Accepted per CPA

## 2024-04-15 ENCOUNTER — LAB (OUTPATIENT)
Dept: LAB | Facility: CLINIC | Age: 54
End: 2024-04-15
Payer: COMMERCIAL

## 2024-04-15 ENCOUNTER — OFFICE VISIT (OUTPATIENT)
Dept: PHARMACY | Facility: CLINIC | Age: 54
End: 2024-04-15
Payer: COMMERCIAL

## 2024-04-15 VITALS
SYSTOLIC BLOOD PRESSURE: 114 MMHG | BODY MASS INDEX: 31.07 KG/M2 | HEART RATE: 83 BPM | WEIGHT: 192.5 LBS | OXYGEN SATURATION: 100 % | DIASTOLIC BLOOD PRESSURE: 78 MMHG

## 2024-04-15 DIAGNOSIS — Z94.4 LIVER TRANSPLANT RECIPIENT (H): ICD-10-CM

## 2024-04-15 DIAGNOSIS — I83.819 VARICOSE VEINS OF LOWER EXTREMITY WITH PAIN, UNSPECIFIED LATERALITY: ICD-10-CM

## 2024-04-15 DIAGNOSIS — E66.811 CLASS 1 OBESITY WITH BODY MASS INDEX (BMI) OF 34.0 TO 34.9 IN ADULT, UNSPECIFIED OBESITY TYPE, UNSPECIFIED WHETHER SERIOUS COMORBIDITY PRESENT: Primary | ICD-10-CM

## 2024-04-15 DIAGNOSIS — E03.8 SUBCLINICAL HYPOTHYROIDISM: ICD-10-CM

## 2024-04-15 DIAGNOSIS — T86.41 LIVER TRANSPLANT REJECTION (H): ICD-10-CM

## 2024-04-15 DIAGNOSIS — Z78.9 TAKES DIETARY SUPPLEMENTS: ICD-10-CM

## 2024-04-15 LAB
ALBUMIN SERPL BCG-MCNC: 4.5 G/DL (ref 3.5–5.2)
ALP SERPL-CCNC: 78 U/L (ref 40–150)
ALT SERPL W P-5'-P-CCNC: 11 U/L (ref 0–50)
ANION GAP SERPL CALCULATED.3IONS-SCNC: 11 MMOL/L (ref 7–15)
AST SERPL W P-5'-P-CCNC: 21 U/L (ref 0–45)
BILIRUB DIRECT SERPL-MCNC: 0.22 MG/DL (ref 0–0.3)
BILIRUB SERPL-MCNC: 0.6 MG/DL
BUN SERPL-MCNC: 27.4 MG/DL (ref 6–20)
CALCIUM SERPL-MCNC: 9.7 MG/DL (ref 8.6–10)
CHLORIDE SERPL-SCNC: 107 MMOL/L (ref 98–107)
CREAT SERPL-MCNC: 1.16 MG/DL (ref 0.51–0.95)
DEPRECATED HCO3 PLAS-SCNC: 25 MMOL/L (ref 22–29)
EGFRCR SERPLBLD CKD-EPI 2021: 56 ML/MIN/1.73M2
ERYTHROCYTE [DISTWIDTH] IN BLOOD BY AUTOMATED COUNT: 13.3 % (ref 10–15)
GLUCOSE SERPL-MCNC: 88 MG/DL (ref 70–99)
HCT VFR BLD AUTO: 40.8 % (ref 35–47)
HGB BLD-MCNC: 13.9 G/DL (ref 11.7–15.7)
MCH RBC QN AUTO: 31.4 PG (ref 26.5–33)
MCHC RBC AUTO-ENTMCNC: 34.1 G/DL (ref 31.5–36.5)
MCV RBC AUTO: 92 FL (ref 78–100)
PLATELET # BLD AUTO: 228 10E3/UL (ref 150–450)
POTASSIUM SERPL-SCNC: 4.1 MMOL/L (ref 3.4–5.3)
PROT SERPL-MCNC: 6.8 G/DL (ref 6.4–8.3)
RBC # BLD AUTO: 4.43 10E6/UL (ref 3.8–5.2)
SODIUM SERPL-SCNC: 143 MMOL/L (ref 135–145)
TACROLIMUS BLD-MCNC: 5.5 UG/L (ref 5–15)
TME LAST DOSE: NORMAL H
TME LAST DOSE: NORMAL H
WBC # BLD AUTO: 5.8 10E3/UL (ref 4–11)

## 2024-04-15 PROCEDURE — 80197 ASSAY OF TACROLIMUS: CPT

## 2024-04-15 PROCEDURE — 85027 COMPLETE CBC AUTOMATED: CPT

## 2024-04-15 PROCEDURE — 36415 COLL VENOUS BLD VENIPUNCTURE: CPT

## 2024-04-15 PROCEDURE — 99605 MTMS BY PHARM NP 15 MIN: CPT | Performed by: PHARMACIST

## 2024-04-15 PROCEDURE — 82248 BILIRUBIN DIRECT: CPT

## 2024-04-15 PROCEDURE — 99607 MTMS BY PHARM ADDL 15 MIN: CPT | Performed by: PHARMACIST

## 2024-04-15 PROCEDURE — 80053 COMPREHEN METABOLIC PANEL: CPT

## 2024-04-15 NOTE — PATIENT INSTRUCTIONS
"Recommendation(s) from today's visit:                                                      Increase to Zepbound 7.5 mg weekly.   Zepbound is typically dose increases by 2.5 mg at 1 month at the earliest    Follow-up:     My Clinical Pharmacist's contact information:                                                      Umm Turner, PharmD  Medication therapy management clinical pharmacist    It was great speaking with you today.  I value your experience and would be very thankful for your time in providing feedback in our clinic survey. In the next few days, you may receive an email or text message from [employer] with a link to a survey related to your  clinical pharmacist.\"     To schedule another MTM appointment, please call the clinic directly [phone] or you may call the MTM scheduling line at [phone].    "

## 2024-04-16 ENCOUNTER — TELEPHONE (OUTPATIENT)
Dept: TRANSPLANT | Facility: CLINIC | Age: 54
End: 2024-04-16
Payer: COMMERCIAL

## 2024-04-16 DIAGNOSIS — Z94.4 LIVER REPLACED BY TRANSPLANT (H): Primary | ICD-10-CM

## 2024-04-16 NOTE — TELEPHONE ENCOUNTER
Pt wants to review if there is any travel restrictions- any precautions if travels abroad  Just needs info  might go to [place]

## 2024-04-16 NOTE — TELEPHONE ENCOUNTER
Message sent to Dr. Avila - she recommends an appt with travel medicine. Referral ordered. Patient informed.

## 2024-04-22 ENCOUNTER — LAB (OUTPATIENT)
Dept: LAB | Facility: CLINIC | Age: 54
End: 2024-04-22
Payer: COMMERCIAL

## 2024-04-22 DIAGNOSIS — Z94.4 LIVER TRANSPLANT RECIPIENT (H): ICD-10-CM

## 2024-04-22 DIAGNOSIS — T86.41 LIVER TRANSPLANT REJECTION (H): ICD-10-CM

## 2024-04-22 LAB
ALBUMIN SERPL BCG-MCNC: 4.4 G/DL (ref 3.5–5.2)
ALP SERPL-CCNC: 73 U/L (ref 40–150)
ALT SERPL W P-5'-P-CCNC: 11 U/L (ref 0–50)
ANION GAP SERPL CALCULATED.3IONS-SCNC: 11 MMOL/L (ref 7–15)
AST SERPL W P-5'-P-CCNC: 25 U/L (ref 0–45)
BILIRUB DIRECT SERPL-MCNC: <0.2 MG/DL (ref 0–0.3)
BILIRUB SERPL-MCNC: 0.6 MG/DL
BUN SERPL-MCNC: 23.5 MG/DL (ref 6–20)
CALCIUM SERPL-MCNC: 10 MG/DL (ref 8.6–10)
CHLORIDE SERPL-SCNC: 106 MMOL/L (ref 98–107)
CREAT SERPL-MCNC: 1.08 MG/DL (ref 0.51–0.95)
DEPRECATED HCO3 PLAS-SCNC: 27 MMOL/L (ref 22–29)
EGFRCR SERPLBLD CKD-EPI 2021: 61 ML/MIN/1.73M2
ERYTHROCYTE [DISTWIDTH] IN BLOOD BY AUTOMATED COUNT: 13.1 % (ref 10–15)
GLUCOSE SERPL-MCNC: 78 MG/DL (ref 70–99)
HCT VFR BLD AUTO: 41.3 % (ref 35–47)
HGB BLD-MCNC: 14.1 G/DL (ref 11.7–15.7)
MCH RBC QN AUTO: 31.3 PG (ref 26.5–33)
MCHC RBC AUTO-ENTMCNC: 34.1 G/DL (ref 31.5–36.5)
MCV RBC AUTO: 92 FL (ref 78–100)
PLATELET # BLD AUTO: 219 10E3/UL (ref 150–450)
POTASSIUM SERPL-SCNC: 4 MMOL/L (ref 3.4–5.3)
PROT SERPL-MCNC: 6.7 G/DL (ref 6.4–8.3)
RBC # BLD AUTO: 4.51 10E6/UL (ref 3.8–5.2)
SODIUM SERPL-SCNC: 144 MMOL/L (ref 135–145)
TACROLIMUS BLD-MCNC: 8.6 UG/L (ref 5–15)
TME LAST DOSE: NORMAL H
TME LAST DOSE: NORMAL H
WBC # BLD AUTO: 5.3 10E3/UL (ref 4–11)

## 2024-04-22 PROCEDURE — 85027 COMPLETE CBC AUTOMATED: CPT

## 2024-04-22 PROCEDURE — 80053 COMPREHEN METABOLIC PANEL: CPT

## 2024-04-22 PROCEDURE — 82248 BILIRUBIN DIRECT: CPT

## 2024-04-22 PROCEDURE — 80197 ASSAY OF TACROLIMUS: CPT

## 2024-04-22 PROCEDURE — 36415 COLL VENOUS BLD VENIPUNCTURE: CPT

## 2024-04-23 ENCOUNTER — TELEPHONE (OUTPATIENT)
Dept: TRANSPLANT | Facility: CLINIC | Age: 54
End: 2024-04-23

## 2024-04-23 DIAGNOSIS — Z94.4 LIVER TRANSPLANT RECIPIENT (H): ICD-10-CM

## 2024-04-23 RX ORDER — TACROLIMUS 1 MG/1
CAPSULE ORAL
Qty: 270 CAPSULE | Refills: 3 | Status: SHIPPED | OUTPATIENT
Start: 2024-04-23

## 2024-04-23 NOTE — TELEPHONE ENCOUNTER
ISSUE:   Tacrolimus IR level 8.6 on 4/22, goal 5-7, dose 2 mg BID.    PLAN:   Call Patient and confirm this was an accurate 12-hour trough.   Verify Tacrolimus IR dose 2 mg BID.   Confirm no new medications or or missed doses.   Confirm no new illness / infection / diarrhea.   If accurate trough and accurate dose, decrease Tacrolimus IR dose to 2 mg AM and 1 mg PM.     Is this more than a 50% increase or decrease in current IS dose: No  If YES, justification: n/a    Repeat labs in 2 weeks.  *If > 50% change in immunosuppression dose, repeat labs in 1 week.     Cris Parker RN     OUTCOME:   Spoke with Patient, they confirm accurate trough level and current dose 2 mg BID.   Patient confirmed dose change to 2 mg am, 1 mg pm.  Patient agreed to repeat labs in 1 weeks.   Orders sent to preferred pharmacy for dose change and lab for repeat labs.   Patient voiced understanding of plan.     Alexandrea Peter LPN

## 2024-04-30 ENCOUNTER — LAB (OUTPATIENT)
Dept: LAB | Facility: CLINIC | Age: 54
End: 2024-04-30
Payer: COMMERCIAL

## 2024-04-30 DIAGNOSIS — T86.41 LIVER TRANSPLANT REJECTION (H): ICD-10-CM

## 2024-04-30 DIAGNOSIS — Z94.4 LIVER TRANSPLANT RECIPIENT (H): ICD-10-CM

## 2024-04-30 LAB
ALBUMIN SERPL BCG-MCNC: 4.7 G/DL (ref 3.5–5.2)
ALP SERPL-CCNC: 78 U/L (ref 40–150)
ALT SERPL W P-5'-P-CCNC: 14 U/L (ref 0–50)
ANION GAP SERPL CALCULATED.3IONS-SCNC: 12 MMOL/L (ref 7–15)
AST SERPL W P-5'-P-CCNC: 21 U/L (ref 0–45)
BILIRUB DIRECT SERPL-MCNC: 0.22 MG/DL (ref 0–0.3)
BILIRUB SERPL-MCNC: 0.6 MG/DL
BUN SERPL-MCNC: 19.4 MG/DL (ref 6–20)
CALCIUM SERPL-MCNC: 10 MG/DL (ref 8.6–10)
CHLORIDE SERPL-SCNC: 108 MMOL/L (ref 98–107)
CREAT SERPL-MCNC: 1.09 MG/DL (ref 0.51–0.95)
DEPRECATED HCO3 PLAS-SCNC: 26 MMOL/L (ref 22–29)
EGFRCR SERPLBLD CKD-EPI 2021: 60 ML/MIN/1.73M2
ERYTHROCYTE [DISTWIDTH] IN BLOOD BY AUTOMATED COUNT: 13 % (ref 10–15)
GLUCOSE SERPL-MCNC: 93 MG/DL (ref 70–99)
HCT VFR BLD AUTO: 43.8 % (ref 35–47)
HGB BLD-MCNC: 15 G/DL (ref 11.7–15.7)
MCH RBC QN AUTO: 31.3 PG (ref 26.5–33)
MCHC RBC AUTO-ENTMCNC: 34.2 G/DL (ref 31.5–36.5)
MCV RBC AUTO: 91 FL (ref 78–100)
PLATELET # BLD AUTO: 218 10E3/UL (ref 150–450)
POTASSIUM SERPL-SCNC: 4.7 MMOL/L (ref 3.4–5.3)
PROT SERPL-MCNC: 7 G/DL (ref 6.4–8.3)
RBC # BLD AUTO: 4.79 10E6/UL (ref 3.8–5.2)
SODIUM SERPL-SCNC: 146 MMOL/L (ref 135–145)
WBC # BLD AUTO: 5.9 10E3/UL (ref 4–11)

## 2024-04-30 PROCEDURE — 80197 ASSAY OF TACROLIMUS: CPT

## 2024-04-30 PROCEDURE — 80053 COMPREHEN METABOLIC PANEL: CPT

## 2024-04-30 PROCEDURE — 36415 COLL VENOUS BLD VENIPUNCTURE: CPT

## 2024-04-30 PROCEDURE — 82248 BILIRUBIN DIRECT: CPT

## 2024-04-30 PROCEDURE — 85027 COMPLETE CBC AUTOMATED: CPT

## 2024-05-01 LAB
TACROLIMUS BLD-MCNC: 6.8 UG/L (ref 5–15)
TME LAST DOSE: NORMAL H
TME LAST DOSE: NORMAL H

## 2024-05-08 ENCOUNTER — ANCILLARY PROCEDURE (OUTPATIENT)
Dept: MAMMOGRAPHY | Facility: CLINIC | Age: 54
End: 2024-05-08
Attending: NURSE PRACTITIONER
Payer: COMMERCIAL

## 2024-05-08 DIAGNOSIS — Z12.31 VISIT FOR SCREENING MAMMOGRAM: ICD-10-CM

## 2024-05-08 PROCEDURE — 77067 SCR MAMMO BI INCL CAD: CPT | Mod: TC | Performed by: RADIOLOGY

## 2024-05-08 PROCEDURE — 77063 BREAST TOMOSYNTHESIS BI: CPT | Mod: TC | Performed by: RADIOLOGY

## 2024-05-14 ENCOUNTER — LAB (OUTPATIENT)
Dept: LAB | Facility: CLINIC | Age: 54
End: 2024-05-14
Payer: COMMERCIAL

## 2024-05-14 ENCOUNTER — TELEPHONE (OUTPATIENT)
Dept: OTHER | Facility: CLINIC | Age: 54
End: 2024-05-14

## 2024-05-14 ENCOUNTER — OFFICE VISIT (OUTPATIENT)
Dept: PHARMACY | Facility: CLINIC | Age: 54
End: 2024-05-14
Payer: COMMERCIAL

## 2024-05-14 VITALS
OXYGEN SATURATION: 100 % | HEART RATE: 82 BPM | SYSTOLIC BLOOD PRESSURE: 110 MMHG | DIASTOLIC BLOOD PRESSURE: 72 MMHG | BODY MASS INDEX: 30.33 KG/M2 | WEIGHT: 187.9 LBS

## 2024-05-14 DIAGNOSIS — Z94.4 LIVER TRANSPLANT RECIPIENT (H): ICD-10-CM

## 2024-05-14 DIAGNOSIS — T86.41 LIVER TRANSPLANT REJECTION (H): ICD-10-CM

## 2024-05-14 DIAGNOSIS — E66.811 CLASS 1 OBESITY WITH BODY MASS INDEX (BMI) OF 34.0 TO 34.9 IN ADULT, UNSPECIFIED OBESITY TYPE, UNSPECIFIED WHETHER SERIOUS COMORBIDITY PRESENT: Primary | ICD-10-CM

## 2024-05-14 LAB
ALBUMIN SERPL BCG-MCNC: 4.5 G/DL (ref 3.5–5.2)
ALP SERPL-CCNC: 67 U/L (ref 40–150)
ALT SERPL W P-5'-P-CCNC: 14 U/L (ref 0–50)
ANION GAP SERPL CALCULATED.3IONS-SCNC: 12 MMOL/L (ref 7–15)
AST SERPL W P-5'-P-CCNC: 20 U/L (ref 0–45)
BILIRUB DIRECT SERPL-MCNC: 0.23 MG/DL (ref 0–0.3)
BILIRUB SERPL-MCNC: 0.6 MG/DL
BUN SERPL-MCNC: 22.9 MG/DL (ref 6–20)
CALCIUM SERPL-MCNC: 10 MG/DL (ref 8.6–10)
CHLORIDE SERPL-SCNC: 107 MMOL/L (ref 98–107)
CREAT SERPL-MCNC: 1.06 MG/DL (ref 0.51–0.95)
DEPRECATED HCO3 PLAS-SCNC: 26 MMOL/L (ref 22–29)
EGFRCR SERPLBLD CKD-EPI 2021: 62 ML/MIN/1.73M2
ERYTHROCYTE [DISTWIDTH] IN BLOOD BY AUTOMATED COUNT: 12.6 % (ref 10–15)
GLUCOSE SERPL-MCNC: 85 MG/DL (ref 70–99)
HCT VFR BLD AUTO: 40.3 % (ref 35–47)
HGB BLD-MCNC: 14 G/DL (ref 11.7–15.7)
MCH RBC QN AUTO: 31.7 PG (ref 26.5–33)
MCHC RBC AUTO-ENTMCNC: 34.7 G/DL (ref 31.5–36.5)
MCV RBC AUTO: 91 FL (ref 78–100)
PLATELET # BLD AUTO: 212 10E3/UL (ref 150–450)
POTASSIUM SERPL-SCNC: 3.9 MMOL/L (ref 3.4–5.3)
PROT SERPL-MCNC: 6.9 G/DL (ref 6.4–8.3)
RBC # BLD AUTO: 4.41 10E6/UL (ref 3.8–5.2)
SODIUM SERPL-SCNC: 145 MMOL/L (ref 135–145)
WBC # BLD AUTO: 5.2 10E3/UL (ref 4–11)

## 2024-05-14 PROCEDURE — 99606 MTMS BY PHARM EST 15 MIN: CPT | Performed by: PHARMACIST

## 2024-05-14 PROCEDURE — 36415 COLL VENOUS BLD VENIPUNCTURE: CPT

## 2024-05-14 PROCEDURE — 82248 BILIRUBIN DIRECT: CPT

## 2024-05-14 PROCEDURE — 99607 MTMS BY PHARM ADDL 15 MIN: CPT | Performed by: PHARMACIST

## 2024-05-14 PROCEDURE — 85027 COMPLETE CBC AUTOMATED: CPT

## 2024-05-14 PROCEDURE — 80197 ASSAY OF TACROLIMUS: CPT

## 2024-05-14 PROCEDURE — 80053 COMPREHEN METABOLIC PANEL: CPT

## 2024-05-14 NOTE — PROGRESS NOTES
Medication Therapy Management (MTM) Encounter    ASSESSMENT:                            Medication Adherence/Access: No issues identified    Obesity:   Patient to continue with dose titration with compound semaglutide to max weekly dose. If Zepbound shortage improves and patient still feels current therapy not effective reasonable to transition to Zepbound 5 mg.    PLAN:                            Stay on semaglutide with plan to increase to 2.5 mg.     However, if Zepbound supplies improves then recommend 1 month on 5 mg weekly dose before increase to 7.5 mg weekly dose. ([facility] should have rx for 5mg and 7.5 mg)    Follow-up: Return if symptoms worsen or fail to improve, for Medication Therapy Management Visit./when transition to Zepbound will discuss either follow-up with myself (but private pay) or follow-up with primary care provider for ongoing management.     SUBJECTIVE/OBJECTIVE:                          Maria Elena Fisher is a 54 year old female coming in for a follow-up visit.       Reason for visit: transitioned to compounded semaglutide.    Allergies/ADRs: Reviewed in chart  Past Medical History: Reviewed in chart  Tobacco: She reports that she has never smoked. She has never used smokeless tobacco.  Alcohol: not currently using regularly    Medication Adherence/Access: no issues, pays out of pocket for weight loss injectable medication.    Obesity   Semaglutide 2 mg once weekly still, will increase this week - compounded product  She had started Zepbound briefly. 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 Her hepatologist did give her approval to resume therapy. Has been on compounded semaglutide no issues with administration but feels it is not working as it had before with Ozempic brand product.  Side effects: none  Exercise: She is walking 3-4 times a week (mile each time)  Diet: She reports diet continues to  be very good, still eats healthy choices such as lean protein with fresh fruit.  Medication History: Ozempic 2 mg weekly - was paying out of pocket        Today's Vitals: /72   Pulse 82   Wt 187 lb 14.4 oz (85.2 kg)   LMP  (LMP Unknown)   SpO2 100%   BMI 30.33 kg/m    ----------------      I spent 26 minutes with this patient today. All changes were made via collaborative practice agreement with ROBYN Bear CNP. A copy of the visit note was provided to the patient's provider(s).    A summary of these recommendations was given to the patient.    Umm Turner, PharmD  Medication Therapy Management Pharmacist     Medication Therapy Recommendations  No medication therapy recommendations to display

## 2024-05-14 NOTE — TELEPHONE ENCOUNTER
[facility]    Who is the name of the provider?:  [facility] NURSE   What is the location you see this provider at/preferred location?: [place]  Person calling / Facility: Sarah Fisher  Phone number:  [phone] (home)   Nurse call back needed:  NO     Reason for call:  FYI Vascular Medicine referral directed to to [facility]. Patient asking to schedule with [facility], may be [facility]?    Pharmacy location:     [facility] - [place], [place] - [address] [facility] [id number] [place] [address] AT [facility]  [facility] - [place], [place] - [phone]  [facility] - [place], [place] - [address]  Outside Imaging: n/a   Can we leave a detailed message on this number?  YES     5/14/2024, 2:32 PM

## 2024-05-14 NOTE — PATIENT INSTRUCTIONS
"Recommendation(s) from today's visit:                                                      Stay on semaglutide with plan to increase to 2.5 mg.     Check on FDA website and/or call pharmacy:   Search \"tirzepatide\" you can see updates on the availability for Zepbound.   https://www.accessdata.fda.gov/scripts/drugshortages/dsp_ActiveIngredientDetails.cfm?AI=Tirzepatide%20Injection&st=c    See if you can get on waitlist for Zepbound 5 mg Rx.    Vascular Medicine referral from Christine:    [facility]   [address] [place] [id number]   Phone: [phone]        Follow-up: Let Umm know if you need refills for the 2.5 mg compound OR if you are starting the Zepbound.     My Clinical Pharmacist's contact information:                                                      Umm Turner, PharmD  Medication therapy management clinical pharmacist    It was great speaking with you today.  I value your experience and would be very thankful for your time in providing feedback in our clinic survey. In the next few days, you may receive an email or text message from [facility] with a link to a survey related to your  clinical pharmacist.\"     To schedule another MTM appointment, please call the clinic directly [phone] or you may call the MTM scheduling line at [phone].    "

## 2024-05-14 NOTE — TELEPHONE ENCOUNTER
Pt referred to [facility] by Christine Harris CNP for varicose veins of BLE and pain of right thigh    Forwarding referral to [facility].    Kylie Mendoza RN BSN  [facility]  Phone: [phone]  Fax: [phone]

## 2024-05-15 LAB
TACROLIMUS BLD-MCNC: 5.9 UG/L (ref 5–15)
TME LAST DOSE: NORMAL H
TME LAST DOSE: NORMAL H

## 2024-05-23 ENCOUNTER — OFFICE VISIT (OUTPATIENT)
Dept: VASCULAR SURGERY | Facility: CLINIC | Age: 54
End: 2024-05-23
Payer: COMMERCIAL

## 2024-05-23 DIAGNOSIS — I83.813 VARICOSE VEINS OF BOTH LOWER EXTREMITIES WITH PAIN: ICD-10-CM

## 2024-05-23 DIAGNOSIS — M79.651 PAIN OF RIGHT THIGH: ICD-10-CM

## 2024-05-23 PROCEDURE — 99203 OFFICE O/P NEW LOW 30 MIN: CPT | Performed by: SURGERY

## 2024-05-23 NOTE — NURSING NOTE
Patient Reported symptoms:    Right leg   Heaviness Most of the time   Achiness Most of the time   Swelling Some of the time   Throbbing Some of the time   Itching Some of the time   Appearance Extremely noticeable   Impact on work/activities Mildly reduced     Left Leg   Heaviness A good bit of the time   Achiness A good bit of the time   Swelling A little of the time   Throbbing A little of the time   Itching A little of the time   Appearance Very noticeable   Impact on work/activities Symptoms but full able to participate  
no chest pain

## 2024-05-23 NOTE — PROGRESS NOTES
I had the pleasure of seeing Maria Elena Fisher in the vein clinic today.  She is a very pleasant and active liver transplant recipient (cause of liver failure was hemochromatosis and alcoholism).    She tells me that she has had bilateral lower extremity varicose veins since her 20s.  Over the course of the years they have gotten progressively worse.  Symptoms on the right side are worse than the left.    He describes that she experiences pain and throbbing in the area of the varicose veins.  Symptoms are not present in the morning but gets worse during the day.  She also experiences heaviness in both lower extremities and in particular on the right side.  She also experiences burning in the area of the prominent varicose veins.  She also experiences pruritus.    This is starting to affect her day-to-day living as well as her activity.    She has been wearing compression stockings over the past few years and the symptoms are somewhat relieved by those.    Her immunosuppression is tacrolimus, prednisone and azathioprine.    Examination still shows large ropey varicosities in both lower extremities and in particular on the right side.    Diagnosis: Lifestyle limiting bilateral lower extremity varicose veins, right worse than left, failure of conservative management.    Plan: I explained the pathophysiology of disease to her in detail.  Our neck step would be to get sonography of both lower extremities and evaluate the venous system for any additional interventions.

## 2024-05-23 NOTE — LETTER
5/23/2024         RE: Sarah Fisher  [address] [facility] [address]        Dear Colleague,    Thank you for referring your patient, Sarah Fisher, to the [facility] [place]. Please see a copy of my visit note below.    I had the pleasure of seeing Maria Elena Fisher in the vein clinic today.  She is a very pleasant and active liver transplant recipient (cause of liver failure was hemochromatosis and alcoholism).    She tells me that she has had bilateral lower extremity varicose veins since her 20s.  Over the course of the years they have gotten progressively worse.  Symptoms on the right side are worse than the left.    He describes that she experiences pain and throbbing in the area of the varicose veins.  Symptoms are not present in the morning but gets worse during the day.  She also experiences heaviness in both lower extremities and in particular on the right side.  She also experiences burning in the area of the prominent varicose veins.  She also experiences pruritus.    This is starting to affect her day-to-day living as well as her activity.    She has been wearing compression stockings over the past few years and the symptoms are somewhat relieved by those.    Her immunosuppression is tacrolimus, prednisone and azathioprine.    Examination still shows large ropey varicosities in both lower extremities and in particular on the right side.    Diagnosis: Lifestyle limiting bilateral lower extremity varicose veins, right worse than left, failure of conservative management.    Plan: I explained the pathophysiology of disease to her in detail.  Our neck step would be to get sonography of both lower extremities and evaluate the venous system for any additional interventions.     Again, thank you for allowing me to participate in the care of your patient.        Sincerely,        Elliott Lux MD

## 2024-05-28 ENCOUNTER — ANCILLARY PROCEDURE (OUTPATIENT)
Dept: ULTRASOUND IMAGING | Facility: CLINIC | Age: 54
End: 2024-05-28
Attending: SURGERY
Payer: COMMERCIAL

## 2024-05-28 DIAGNOSIS — I83.813 VARICOSE VEINS OF BOTH LOWER EXTREMITIES WITH PAIN: ICD-10-CM

## 2024-05-28 PROCEDURE — 93970 EXTREMITY STUDY: CPT | Performed by: SURGERY

## 2024-05-28 NOTE — BRIEF OP NOTE
[facility]    Brief Operative Note    Pre-operative diagnosis: Dyspnea, unspecified type [R06.00]  Post-operative diagnosis Same as pre-operative diagnosis    Procedure: Procedure(s):  THORACENTESIS  Surgeon: Surgeon(s) and Role:     * Jess Ansari PA-C - Primary  Anesthesia: Local   Estimated blood loss: None  Drains: None  Specimens: * No specimens in log *  Findings:   Left ultrasound guided thoracentesis. 1400 mL drained.  Complications: None.  Implants: * No implants in log *         [Home] : at home, [unfilled] , at the time of the visit. [Medical Office: ([facility])___] : at the medical office located in  [Verbal consent obtained from patient] : the patient, [unfilled] [de-identified] : 42 F presenting for review of blood work. Labs are significant for elevated total cholesterol and LDL cholesterol. She was never told in the past that she had elevated cholesterol.

## 2024-06-11 ENCOUNTER — OFFICE VISIT (OUTPATIENT)
Dept: DERMATOLOGY | Facility: CLINIC | Age: 54
End: 2024-06-11
Payer: COMMERCIAL

## 2024-06-11 ENCOUNTER — LAB (OUTPATIENT)
Dept: LAB | Facility: CLINIC | Age: 54
End: 2024-06-11
Payer: COMMERCIAL

## 2024-06-11 DIAGNOSIS — E03.8 SUBCLINICAL HYPOTHYROIDISM: ICD-10-CM

## 2024-06-11 DIAGNOSIS — Z94.4 LIVER REPLACED BY TRANSPLANT (H): ICD-10-CM

## 2024-06-11 DIAGNOSIS — Z41.1 ELECTIVE PROCEDURE FOR UNACCEPTABLE COSMETIC APPEARANCE: Primary | ICD-10-CM

## 2024-06-11 DIAGNOSIS — E66.811 CLASS 1 OBESITY WITH BODY MASS INDEX (BMI) OF 34.0 TO 34.9 IN ADULT, UNSPECIFIED OBESITY TYPE, UNSPECIFIED WHETHER SERIOUS COMORBIDITY PRESENT: ICD-10-CM

## 2024-06-11 LAB
ALBUMIN SERPL BCG-MCNC: 4.4 G/DL (ref 3.5–5.2)
ALP SERPL-CCNC: 63 U/L (ref 40–150)
ALT SERPL W P-5'-P-CCNC: 12 U/L (ref 0–50)
ANION GAP SERPL CALCULATED.3IONS-SCNC: 11 MMOL/L (ref 7–15)
AST SERPL W P-5'-P-CCNC: 20 U/L (ref 0–45)
BILIRUB DIRECT SERPL-MCNC: <0.2 MG/DL (ref 0–0.3)
BILIRUB SERPL-MCNC: 0.5 MG/DL
BUN SERPL-MCNC: 22.9 MG/DL (ref 6–20)
CALCIUM SERPL-MCNC: 10 MG/DL (ref 8.6–10)
CHLORIDE SERPL-SCNC: 108 MMOL/L (ref 98–107)
CREAT SERPL-MCNC: 1.33 MG/DL (ref 0.51–0.95)
CREAT UR-MCNC: 316 MG/DL
DEPRECATED HCO3 PLAS-SCNC: 27 MMOL/L (ref 22–29)
EGFRCR SERPLBLD CKD-EPI 2021: 47 ML/MIN/1.73M2
ERYTHROCYTE [DISTWIDTH] IN BLOOD BY AUTOMATED COUNT: 12.2 % (ref 10–15)
GLUCOSE SERPL-MCNC: 84 MG/DL (ref 70–99)
HCT VFR BLD AUTO: 41.1 % (ref 35–47)
HGB BLD-MCNC: 14.4 G/DL (ref 11.7–15.7)
MCH RBC QN AUTO: 31.3 PG (ref 26.5–33)
MCHC RBC AUTO-ENTMCNC: 35 G/DL (ref 31.5–36.5)
MCV RBC AUTO: 89 FL (ref 78–100)
MICROALBUMIN UR-MCNC: 31 MG/L
MICROALBUMIN/CREAT UR: 9.81 MG/G CR (ref 0–25)
PLATELET # BLD AUTO: 193 10E3/UL (ref 150–450)
POTASSIUM SERPL-SCNC: 4.2 MMOL/L (ref 3.4–5.3)
PROT SERPL-MCNC: 6.6 G/DL (ref 6.4–8.3)
RBC # BLD AUTO: 4.6 10E6/UL (ref 3.8–5.2)
SODIUM SERPL-SCNC: 146 MMOL/L (ref 135–145)
TACROLIMUS BLD-MCNC: 5 UG/L (ref 5–15)
TME LAST DOSE: NORMAL H
TME LAST DOSE: NORMAL H
TSH SERPL DL<=0.005 MIU/L-ACNC: 2.55 UIU/ML (ref 0.3–4.2)
WBC # BLD AUTO: 5.3 10E3/UL (ref 4–11)

## 2024-06-11 PROCEDURE — 84443 ASSAY THYROID STIM HORMONE: CPT

## 2024-06-11 PROCEDURE — 82248 BILIRUBIN DIRECT: CPT

## 2024-06-11 PROCEDURE — 82570 ASSAY OF URINE CREATININE: CPT

## 2024-06-11 PROCEDURE — 80197 ASSAY OF TACROLIMUS: CPT

## 2024-06-11 PROCEDURE — 85027 COMPLETE CBC AUTOMATED: CPT

## 2024-06-11 PROCEDURE — 82043 UR ALBUMIN QUANTITATIVE: CPT

## 2024-06-11 PROCEDURE — 36415 COLL VENOUS BLD VENIPUNCTURE: CPT

## 2024-06-11 PROCEDURE — 96999 UNLISTED SPEC DERM SVC/PX: CPT | Performed by: PHYSICIAN ASSISTANT

## 2024-06-11 PROCEDURE — 80053 COMPREHEN METABOLIC PANEL: CPT

## 2024-06-11 NOTE — LETTER
6/11/2024      Sarah Fisher  [address] [address]      Dear Colleague,    Thank you for referring your patient, Sarah Fisher, to the [facility]. Please see a copy of my visit note below.    Clinic Administered Medication Documentation        Patient was given Botox. Prior to medication administration, verified patient's identity using patient s name and date of birth. Please see MAR and medication order for additional information. Patient instructed to report any adverse reaction to staff immediately.    Vial/Syringe: Single dose vial. Was entire vial of medication used? No, The remainder 45 Units of 100 Units was discarded as unavoidable waste.      HPI:   Chief complaints: Sarah Fisher is a pleasant 53 year old female who presents for evaluation of treatment of facial rhytids with cosmetic botox. She liked units after LOV. No issues.   Will plan on doing filler at next OV    PHYSICAL EXAM:    LMP  (LMP Unknown)   Skin exam performed as follows: Type 2 skin. Mood appropriate  Alert and Oriented X 3. Well developed, well nourished in no distress.  General appearance: Normal  Head including face: Normal  Eyes: conjunctiva and lids: Normal  Mouth: Lips, teeth, gums: Normal  Neck: Normal  Skin: Scalp and body hair: See below    Age appropriate rhytids of glabella, crows feet    ASSESSMENT/PLAN:     BOTOX:  PGACAC discussed.  Risks including but not limited to injection site reaction, bruising, asymmetry, no resolution of rhytides, brow ptosis, dry mouth, tiredness, and headache.  Also label warnings are difficulty with swallowing, speaking, breathing, muscle weakness, loss of bladder control, and double vision/blurred vision.  Explained confirmed report of spread of toxin effect when used for hyperhidrosis of underarms or cosmetic use on face has not been reported.  All questions answered and entertained to patient s satisfaction.  Informed consent  obtained.      --31 units injected to gabella (11 into procerus, 8 into medial )  --12 into each crows (with 1 unit infraorbitally)   --55 units total  --Cosmetic cost of $770        Follow-up: 3 mo  CC:   Scribed By: Areli Reich, MS, PAEduardoC    Clinic Administered Medication Documentation    Patient was given Botox. Prior to medication administration, verified patient's identity using patient s name and date of birth. Please see MAR and medication order for additional information. Patient instructed to report any adverse reaction to staff immediately.    Vial/Syringe: Single dose vial. Was entire vial of medication used? No, The remainder 45 Units of 100 Units was discarded as unavoidable waste.    Drug Name: botulinum toxin with expiration date of 06/30/2026  Dose: 55 Units  Route administered: IM  NDC #: 2433-8324-65  Amount of waste:45 Units  Reason for waste: Single use vial     LOT #: [id number]  SITE: face  : Allergan  EXPIRATION DATE: 06/30/2026      Again, thank you for allowing me to participate in the care of your patient.        Sincerely,        Areli Reich, RICHARD

## 2024-06-11 NOTE — PROGRESS NOTES
HPI:   Chief complaints: Sarah Fisher is a pleasant 53 year old female who presents for evaluation of treatment of facial rhytids with cosmetic botox. She liked units after LOV. No issues.   Will plan on doing filler at next OV    PHYSICAL EXAM:    LMP  (LMP Unknown)   Skin exam performed as follows: Type 2 skin. Mood appropriate  Alert and Oriented X 3. Well developed, well nourished in no distress.  General appearance: Normal  Head including face: Normal  Eyes: conjunctiva and lids: Normal  Mouth: Lips, teeth, gums: Normal  Neck: Normal  Skin: Scalp and body hair: See below    Age appropriate rhytids of glabella, crows feet    ASSESSMENT/PLAN:     BOTOX:  PGACAC discussed.  Risks including but not limited to injection site reaction, bruising, asymmetry, no resolution of rhytides, brow ptosis, dry mouth, tiredness, and headache.  Also label warnings are difficulty with swallowing, speaking, breathing, muscle weakness, loss of bladder control, and double vision/blurred vision.  Explained confirmed report of spread of toxin effect when used for hyperhidrosis of underarms or cosmetic use on face has not been reported.  All questions answered and entertained to patient s satisfaction.  Informed consent obtained.      --31 units injected to gabella (11 into procerus, 8 into medial )  --12 into each crows (with 1 unit infraorbitally)   --55 units total  --Cosmetic cost of $770        Follow-up: 3 mo  CC:   Scribed By: Areli Reich, MS, PA-C    Clinic Administered Medication Documentation    Patient was given Botox. Prior to medication administration, verified patient's identity using patient s name and date of birth. Please see MAR and medication order for additional information. Patient instructed to report any adverse reaction to staff immediately.    Vial/Syringe: Single dose vial. Was entire vial of medication used? No, The remainder 45 Units of 100 Units was discarded as unavoidable waste.    Drug  Name: botulinum toxin with expiration date of 06/30/2026  Dose: 55 Units  Route administered: IM  NDC #: 2209-5440-24  Amount of waste:45 Units  Reason for waste: Single use vial     LOT #: [id number]  SITE: face  : Allergan  EXPIRATION DATE: 06/30/2026

## 2024-06-11 NOTE — PROGRESS NOTES
Clinic Administered Medication Documentation        Patient was given Botox. Prior to medication administration, verified patient's identity using patient s name and date of birth. Please see MAR and medication order for additional information. Patient instructed to report any adverse reaction to staff immediately.    Vial/Syringe: Single dose vial. Was entire vial of medication used? No, The remainder 45 Units of 100 Units was discarded as unavoidable waste.

## 2024-06-12 PROBLEM — M70.61 TROCHANTERIC BURSITIS OF RIGHT HIP: Status: RESOLVED | Noted: 2024-03-27 | Resolved: 2024-06-12

## 2024-06-12 PROBLEM — S76.011A MUSCLE STRAIN OF RIGHT GLUTEAL REGION, INITIAL ENCOUNTER: Status: RESOLVED | Noted: 2024-03-27 | Resolved: 2024-06-12

## 2024-06-12 PROBLEM — S76.311A STRAIN OF RIGHT HAMSTRING, INITIAL ENCOUNTER: Status: RESOLVED | Noted: 2024-03-27 | Resolved: 2024-06-12

## 2024-06-13 ENCOUNTER — OFFICE VISIT (OUTPATIENT)
Dept: VASCULAR SURGERY | Facility: CLINIC | Age: 54
End: 2024-06-13
Payer: COMMERCIAL

## 2024-06-13 DIAGNOSIS — I83.813 VARICOSE VEINS OF LOWER EXTREMITY WITH PAIN, BILATERAL: Primary | ICD-10-CM

## 2024-06-13 PROCEDURE — 99213 OFFICE O/P EST LOW 20 MIN: CPT | Performed by: SURGERY

## 2024-06-13 NOTE — PATIENT INSTRUCTIONS
Radha Rondon, Surgery Scheduler - [phone].    Procedure Plan: Right leg Venaseal of the GSV, SSV and multiple phlebectomies    And then 2 weeks or so later:    Left leg Venaseal of the GSV and multiple phlebectomies        Pre-Procedure Instructions:                 VenaSeal and Phlebectomies  You are having a VenaSeal procedure - where one or more veins are closed and Phlebectomies - where one or more veins are removed.   Insurance  Precertification and/or referral authorization may be required by your insurance company.  We will call your insurance company to verify benefits for the medically necessary part of your procedure.    Your Current Medications and Allergies  To reduce bruising, please do not take aspirin-type medications (Motrin, Aleve, Ibuprofen, Advil, etc.) for three days before your procedure. You may take Tylenol if you need a pain reliever.  Are you on blood thinner medications?  (Plavix, Coumadin, Eliquis, Xarelto)  Please discuss this with your surgeon. You may resume taking your blood thinner medication after your procedure.  Are you sensitive to latex or adhesives used for fake fingernails? Please let us know!    Driving Escort and   Please arrange to have a trusted adult (18 years old or older) drive you to and from the clinic.  For your safety, we recommend you have a trusted adult to stay with you until the next morning.    Your Health  If you have a change in your health before the procedure, contact our office immediately. (For example: cold symptoms, cough, urinary tract infection, fever, flu symptoms.)  A pre-procedure physical is not required.     Note  It is sometimes necessary to adjust the procedure schedule due to emergencies. We greatly appreciate your flexibility and understanding in this matter.                _____    Check List:  The Morning of Your Procedure  ___1.  Please do not put anything on your leg(s) or shave the day of your procedure.  ___2.  You may  take your normal medications the day of your procedure  ___3.  It is recommended you eat a light breakfast or lunch the day of your procedure.  ___4.  Wear comfortable loose-fitting clothing and wide-fitting shoes (i.e. tennis shoes, slip-ons).  ___5.  Please arrive at our clinic at the specified time given by the nurse.  ___6.  You will sign an affirmation of informed consent.  ___7.  Bring your pre-procedure sedation medication (lorazepam and clonidine) with you to the clinic.              One hour before your procedure, you will be instructed to take these medications. The lorazepam              (Ativan) lowers anxiety and sedates you; the clonidine makes the lorazepam more effective. Everyone's               body processes these medications differently. Therefore, reactions to these medications vary. Some               people stay awake and some people sleep through the whole procedure. You may not remember    everything about the procedure or the day. You do not want to make any big decisions for the rest of               the day.    The Day of Your Procedure:                 VenaSeal and Phlebectomies  In the Exam Room  A nurse will bring you back to an exam room with your family member or friend. This is when your informed consent will be signed, and you will take your pre-procedure medications.  You will be asked to remove everything from the waist down, including undergarments. You will then put on a hospital gown or shorts and blue booties.  Your surgeon will come in to answer any questions and to baylee any bulging varicose veins to be removed.  You will be taken to the restroom to empty your bladder before going into the procedure room.    In the Procedure Room  You will be escorted to the procedure room. You will lie on a procedure table covered with a sheet or blanket.  A nurse will put a blood pressure cuff on your arm and a pulse/oxygen monitor on a finger. Your vital signs will be monitored every 15  minutes.  Your gown will be pulled up slightly and the groin exposed for a short period of time. The surgeon's assistant will clean your foot, leg, and groin with an antibacterial solution. We will get you covered up as quickly as possible!  Sterile towels and drapes will be used to cover you and the table. You will be asked to keep your hands under the drapes during the procedure.  The lights will be turned down. The table will be tipped so sometimes your head is higher than your feet. You may feel like you're going to slide off, but you won't.    The Procedure  The surgeon will visualize your veins with an ultrasound machine. He or she will then numb your skin and access the vein. A catheter is passed up the vein and positioned with ultrasound guidance. The table will then be tipped head down.   Once the catheter is in the correct position, droplets of glue are deposited into the vein to make the vein walls collapse and stick together stopping blood flow through the vein. These droplets of glue are deposited in segments, closing each segment as the catheter is withdrawn.  For the phlebectomy part of the procedure, small incisions are made where the bulging varicose veins have been marked on your leg(s) and these veins will be removed using a small mirta hook instrument.    Post-Procedure  Once the procedure is done, your leg(s) will be washed with warm water and dried. Your leg(s) will be bandaged with large soft dressings and a large ACE bandage wrapped from toes to groin.   You will be offered something to drink and a light snack.  You will rest with your leg(s) elevated for approximately 30 minutes. Your friend or family member may join you.  For your safety, you will be taken to your car in a wheelchair. If you are able to, it is good to keep your leg(s) elevated on the car ride home.    Post-Procedure Instructions:                                                                         Venaseal and  Phlebectomies     Post-Op Day Zero - The Day of Your Procedure:0  1.  Medication for Pain Control and Inflammation Control   - The numbing medication injected during your procedure will last for several hours. The pre-procedure                 tablets may make you very sleepy and you might not remember everything from the procedure or from                 the day. This will usually wear off by the next day.   - Ibuprofen: If tolerated, take ibuprofen to reduce inflammation whether or not you have pain. For                 three days, take 2 tablets (200mg each) with every meal and at bedtime with a snack. If your pain is                 not controlled with ibuprofen, you may take prescription pain medication (such as Norco), if                 prescribed.   - Aspirin: Take one 325mg aspirin to reduce blood clot risk.   - You may resume taking any medications you were taking before your procedure.   2.  Activity   - Rest with your leg(s) elevated above your heart. This will prevent from swelling and bleeding. You                 do not need to elevate your leg(s) while sleeping at night. You may go upstairs, sit up to eat, use the                 bathroom, and take several five-minute walks. Otherwise, keep your leg(s) elevated. Minimize the                 amount of time you are up on your feet to about 30 minutes at a time.   3.  Bandages   - The incision sites will be covered with soft bandages and an ACE wrap. Keep your bandages on                 and dry for 48 hours. The ACE should provide  snug  compression but should not cause pain or                 numbness in the toes. If you have significant discomfort or your toes become cold or numb, unwrap                 your ACE and rewrap with less tension starting at the toes wrapping upward.   4.  Incisions   - Bleeding: You may see some incision sites that are oozing through the bandages. This is not unusual                 and can be managed with Rest, Ice,  Compression and Elevation (RICE). Apply ice and firm pressure                 directly to the site that is bleeding and rest with your leg(s) elevated above your heart for 20-30                 minutes.      Post-Op Day One:   1.  Medication   - Ibuprofen and Aspirin: Continue the same as the Day of Your Procedure.   2.  Activity   - We would like you to get up at least six times and walk around for short periods of time unless it is                 causing you pain. You should be on your feet no more than 90 minutes at a time. Elevate your leg                 above your heart when you are not walking.   3.  Bandages   - Your bandages must be kept on and dry for 48 hours.   4.  Driving   - You may resume driving when you can do so safely. Do not drive if you are taking narcotic pain                 medication.    Post-Op Day Two:     1. Medication   - Ibuprofen and Aspirin: Continue the same as the Day of Your Procedure.   2. Activity   - Walk as tolerated. Elevate as much as possible when not walking.   3. Bandages    - Remove ACE wrap and padding. You may shower. Your doctor may request you wear a stocking                 during the day.   4. Incisions   - Your leg(s) will be bruised; there may be swelling, hard knots under the skin and possibly some                 numbness. These will likely resolve over time. If you see  hair-like  strings coming out of your                 incisions, do not pull them (this will only cause pain/discomfort). We will trim them when you come                 back for your follow-up appointment.   5. Call Us If:   - You see any areas on your leg that are red and angry in appearance.   - You notice any drainage that is milky or cloudy in appearance or that has a foul odor.   - You run a temperature of 100.5 or greater.      Post-Op Day Three: You will have a follow up appointment 2-4 days post procedure. At this appointment, you will have an ultrasound and we will check your  incisions.  ________________________________________________________________________________________    The Two Weeks Following Your Procedure   1.  Skin Care   - Do not use any lotions, creams, or powders on your incisions for 14 days or until the incisions have                 healed.   - Do not soak in a bathtub, hot tub or go swimming for 14 days or until your incisions have healed.   2. Medications   - You may use ibuprofen or Tylenol as needed for pain or discomfort.   3. Activity   - Do not lift over 25 pounds. After about ten days you may resume exercise such as aerobics, running,                 tennis or weightlifting. Use your common sense and ease back into your exercise routine slowly.   - You may feel a cord-like tightness along the inside of your leg. Gentle stretching can be helpful.  4. Travel  - Do not fly in an airplane for 14 days after your procedure. If you have a long car trip planned within                 two to three weeks following your procedure, stop and walk for a few minutes every two hours.                 Periodic ankle pumps during the ride may be helpful.     Six Week Appointment   - At your six-week appointment, you will see your surgeon for an exam and evaluation. This office visit                 will be scheduled when you return for post-op day three return appointment.       Return to Work   1.  If you work outside the home, you may return to work in a few days depending on the extent of your procedure, how you tolerate it, and the type of work you perform.   2.  Paperwork: If your employer requires paperwork or you would like a letter written to your employer, please let us know. We will complete disability type forms at no charge. Please allow five business days for    forms to be completed.

## 2024-06-13 NOTE — PROGRESS NOTES
June 13, 2024    Vein Procedure Recommendation    Spoke with patient in clinic.    Dr. Lux has recommended patient to have the following vein procedure(s):     1. Right leg Venaseal GSV, SSV and 1 unit Phlebectomies (medically necessary)    2. Left leg Venaseal GSV and 1 unit Phlebectomies (medically necessary)      Patient Pre-op Questions:  [facility]  Anticoagulant/ASA: Yes, Aspirin 81 mg  Artificial Joint or Heart Valve:  No  Open ulcer:  No  Sedation nausea: No    Patient is recommended to wear Thigh High compression hose following her procedure. Discussed compression hose. Per patient request, faxed her compression hose Rx to [facility] at [phone]. Patient would like 1 pair(s) of beige, open-toe, thigh high, 20-30mmhg compression hose. Fax confirmed. Patient is aware the compression hose will be shipped to her home address.    Handed patient written procedure instructions to review on her own (see After Visit Summary).    Next steps:    Insurance Submission  Informed patient this process could take up to 14 business days, but once approved, the patient will be contacted by our surgery scheduler to schedule the above procedure. Gave patient our surgery scheduler's information.    Patient is in agreement with all of the above and has no further questions at this time.    Raven Zelaya RN  [facility]  Vein Clinic

## 2024-06-13 NOTE — LETTER
6/13/2024      Sarah Fisher  [address] [address]      Dear Colleague,    Thank you for referring your patient, Sarah Fisher, to the [facility] VEIN [facility] [place]. Please see a copy of my visit note below.    It was a pleasure again to see Maria Elena Fisher in the vein clinic today.  She is a very pleasant 54-year-old female who I had seen recently for symptomatic varicose veins of both lower extremities.    She is a very pleasant and active liver transplant recipient (cause of liver failure was hemochromatosis and alcoholism).     She tells me that she has had bilateral lower extremity varicose veins since her 20s.  Over the course of the years they have gotten progressively worse.  Symptoms on the right side are worse than the left.     He describes that she experiences pain and throbbing in the area of the varicose veins.  Symptoms are not present in the morning but gets worse during the day.  She also experiences heaviness in both lower extremities and in particular on the right side.  She also experiences burning in the area of the prominent varicose veins.  She also experiences pruritus.     This is starting to affect her day-to-day living as well as her activity.     She has been wearing compression stockings over the past few years and the symptoms are somewhat relieved by those.     Her immunosuppression is tacrolimus, prednisone and azathioprine.    We did sonography of both lower extremities.    In the right lower extremity there is no evidence of deep venous thrombosis.The GSV demonstrates phasic flow, compresses and responds to augmentations from the saphenofemoral junction to the ankle with no evidence of thrombus. The great saphenous vein measures 11.0 mm at the saphenofemoral junction, 6.9 mm at the proximal thigh, and 4.2 mm at the knee. The GSV is incompetent from SFJ to Mid Calf, with the greatest reflux time of 4372 milliseconds.  The GSV gives rise to multiple  incompetent varicose veins, the largest measures 4.3 mm off the Mid Calf that courses Toward the ankle with a reflux time of 1577 milliseconds.    The AASV is incompetent ( 10.8 mm) draining into the saphenofemoral junction. The AASV is incompetent from the saphenofemoal junction to the proximal thigh with a reflux time of 4408 milliseconds. The AASV takes a straight course for 4 cm. The AASV gives rise to a varicose branch measuring 9.6 mm off the Proximal Thigh that courses medial and lateral  with a reflux time of 4621 milliseconds.    The Giacomini vein is absent.    The SSV demonstrates phasic flow, compresses and responds to augmentations from the popliteal space to the ankle.  No thrombus is seen. The saphenopopliteal junction is competent (2.6 mm).  The SSV is incompetent at the Proximal Calf with a reflux time of 550 milliseconds.       On the left side.The GSV demonstrates phasic flow, compresses and responds to augmentations from the saphenofemoral junction to the ankle with no evidence of thrombus. The great saphenous vein measures 10.8 mm at the saphenofemoral junction, 7.8 mm at the proximal thigh, and 3.4 mm at the knee. The GSV is incompetent from SFJ to the proximal thigh and again at the proximal calf, with the greatest reflux time of 1870 milliseconds.   The GSV gives rise to multiple incompetent varicose veins, the largest measures 5.8 mm off the Proximal Calf that courses Toward the ankle with a reflux time of 506 milliseconds.    My recommendation will be to proceed with right great saphenous and small saphenous vein VenaSeal/radiofrequency ablation and medically necessary prep phlebectomies.  The anterior accessory saphenous vein is incompetent.  However it is too short to be treated with VenaSeal or radiofrequency ablation.  On the left side we will proceed with left great saphenous vein ablation/VenaSeal and medically necessary stab phlebectomies.                   Again, thank you for  allowing me to participate in the care of your patient.        Sincerely,        Elliott Lux MD

## 2024-06-13 NOTE — PROGRESS NOTES
It was a pleasure again to see Maria Elena Fisher in the vein clinic today.  She is a very pleasant 54-year-old female who I had seen recently for symptomatic varicose veins of both lower extremities.    She is a very pleasant and active liver transplant recipient (cause of liver failure was hemochromatosis and alcoholism).     She tells me that she has had bilateral lower extremity varicose veins since her 20s.  Over the course of the years they have gotten progressively worse.  Symptoms on the right side are worse than the left.     He describes that she experiences pain and throbbing in the area of the varicose veins.  Symptoms are not present in the morning but gets worse during the day.  She also experiences heaviness in both lower extremities and in particular on the right side.  She also experiences burning in the area of the prominent varicose veins.  She also experiences pruritus.     This is starting to affect her day-to-day living as well as her activity.     She has been wearing compression stockings over the past few years and the symptoms are somewhat relieved by those.     Her immunosuppression is tacrolimus, prednisone and azathioprine.    We did sonography of both lower extremities.    In the right lower extremity there is no evidence of deep venous thrombosis.The GSV demonstrates phasic flow, compresses and responds to augmentations from the saphenofemoral junction to the ankle with no evidence of thrombus. The great saphenous vein measures 11.0 mm at the saphenofemoral junction, 6.9 mm at the proximal thigh, and 4.2 mm at the knee. The GSV is incompetent from SFJ to Mid Calf, with the greatest reflux time of 4372 milliseconds.  The GSV gives rise to multiple incompetent varicose veins, the largest measures 4.3 mm off the Mid Calf that courses Toward the ankle with a reflux time of 1577 milliseconds.    The AASV is incompetent ( 10.8 mm) draining into the saphenofemoral junction. The AASV is incompetent  from the saphenofemoal junction to the proximal thigh with a reflux time of 4408 milliseconds. The AASV takes a straight course for 4 cm. The AASV gives rise to a varicose branch measuring 9.6 mm off the Proximal Thigh that courses medial and lateral  with a reflux time of 4621 milliseconds.    The Giacomini vein is absent.    The SSV demonstrates phasic flow, compresses and responds to augmentations from the popliteal space to the ankle.  No thrombus is seen. The saphenopopliteal junction is competent (2.6 mm).  The SSV is incompetent at the Proximal Calf with a reflux time of 550 milliseconds.       On the left side.The GSV demonstrates phasic flow, compresses and responds to augmentations from the saphenofemoral junction to the ankle with no evidence of thrombus. The great saphenous vein measures 10.8 mm at the saphenofemoral junction, 7.8 mm at the proximal thigh, and 3.4 mm at the knee. The GSV is incompetent from SFJ to the proximal thigh and again at the proximal calf, with the greatest reflux time of 1870 milliseconds.   The GSV gives rise to multiple incompetent varicose veins, the largest measures 5.8 mm off the Proximal Calf that courses Toward the ankle with a reflux time of 506 milliseconds.    My recommendation will be to proceed with right great saphenous and small saphenous vein VenaSeal/radiofrequency ablation and medically necessary prep phlebectomies.  The anterior accessory saphenous vein is incompetent.  However it is too short to be treated with VenaSeal or radiofrequency ablation.  On the left side we will proceed with left great saphenous vein ablation/VenaSeal and medically necessary stab phlebectomies.

## 2024-06-18 ENCOUNTER — TELEPHONE (OUTPATIENT)
Dept: PEDIATRICS | Facility: CLINIC | Age: 54
End: 2024-06-18
Payer: COMMERCIAL

## 2024-06-18 NOTE — TELEPHONE ENCOUNTER
Prior Authorization Retail Medication Request    Medication/Dose: Zepbound 10mg/0.5ML   Diagnosis and ICD code (if different than what is on RX):  Class 1 obesity with body mass index (BMI) of 34.0 to 34.9 in adult, unspecified obesity type, unspecified whether serious comorbidity present [E66.9, Z68.34]  - Primary   New/renewal/insurance change PA/secondary ins. PA:  Previously Tried and Failed:    Rationale:      Insurance   Primary: [facility]  Insurance ID:  [id number]    Secondary (if applicable):  Insurance ID:      Pharmacy Information (if different than what is on RX)  Name:  [facility]   Phone:  [phone]  Fax:[phone]    Celeste BAEZ CMA

## 2024-06-24 NOTE — TELEPHONE ENCOUNTER
Central Prior Authorization Team  Phone: [phone]    PA Initiation    Medication: ZEPBOUND 10 MG/0.5ML SC SOAJ  Insurance Company: [email or web address] Clinical Review - Phone [phone] Fax [phone]  Pharmacy Filling the Rx: [id number] [place], [place] - [address] AT [address]  Filling Pharmacy Phone: [phone]  Filling Pharmacy Fax:    Start Date: 6/24/2024

## 2024-06-25 DIAGNOSIS — I83.813 VARICOSE VEINS OF BOTH LOWER EXTREMITIES WITH PAIN: Primary | ICD-10-CM

## 2024-06-25 NOTE — TELEPHONE ENCOUNTER
PRIOR AUTHORIZATION DENIED    Medication: ZEPBOUND 10 MG/0.5ML SC SOAJ  Insurance Company: [employer] Clinical Review - Phone [phone] Fax [phone]  Denial Date: 6/24/2024    Denial Reason(s): Not a covered benefit.    Appeal Information: If provider would like to appeal please provide a letter of medical necessity.

## 2024-06-25 NOTE — TELEPHONE ENCOUNTER
Jarrell Jain Ea/Rm6 minutes ago (9:58 AM)       Hi,  This medication was denied. If the provider would like to appeal please provide a letter of medical necessity and route back to the team. Otherwise please notify the patient and close the encounter.  Thank you,  Central PA Team

## 2024-06-25 NOTE — PROGRESS NOTES
Denise reviewed patients plan and medical necessity and was determined it does meet the medical criteria and approved to schedule following procedures Rt GSV, phlebs and lt GSV,phlebs     Medical Criteria needed to be met  Vein size of GSV 3.5  Vein Size of Phlebs 2.5     Denise reviewed patients plan and medical necessity and was determined Rt leg SSV does not meet the medical criteria.     SSV needs to be 3.5    Per KMK We will proceed with whichever ones meet criteria.  We do not need to do the right small saphenous vein. Cost estimate sent on behalf of patient.     ESTELA BAKER,   [facility]

## 2024-07-01 DIAGNOSIS — E03.8 SUBCLINICAL HYPOTHYROIDISM: ICD-10-CM

## 2024-07-02 RX ORDER — LEVOTHYROXINE SODIUM 75 UG/1
75 TABLET ORAL DAILY
Qty: 90 TABLET | Refills: 0 | Status: SHIPPED | OUTPATIENT
Start: 2024-07-02 | End: 2024-09-29

## 2024-07-15 ENCOUNTER — LAB (OUTPATIENT)
Dept: LAB | Facility: CLINIC | Age: 54
End: 2024-07-15
Payer: COMMERCIAL

## 2024-07-15 DIAGNOSIS — Z94.4 LIVER REPLACED BY TRANSPLANT (H): ICD-10-CM

## 2024-07-15 LAB
ALBUMIN SERPL BCG-MCNC: 4.6 G/DL (ref 3.5–5.2)
ALP SERPL-CCNC: 65 U/L (ref 40–150)
ALT SERPL W P-5'-P-CCNC: 12 U/L (ref 0–50)
ANION GAP SERPL CALCULATED.3IONS-SCNC: 13 MMOL/L (ref 7–15)
AST SERPL W P-5'-P-CCNC: 18 U/L (ref 0–45)
BILIRUB DIRECT SERPL-MCNC: 0.22 MG/DL (ref 0–0.3)
BILIRUB SERPL-MCNC: 0.7 MG/DL
BUN SERPL-MCNC: 25.2 MG/DL (ref 6–20)
CALCIUM SERPL-MCNC: 10 MG/DL (ref 8.6–10)
CHLORIDE SERPL-SCNC: 107 MMOL/L (ref 98–107)
CREAT SERPL-MCNC: 1.35 MG/DL (ref 0.51–0.95)
EGFRCR SERPLBLD CKD-EPI 2021: 46 ML/MIN/1.73M2
ERYTHROCYTE [DISTWIDTH] IN BLOOD BY AUTOMATED COUNT: 13.1 % (ref 10–15)
GLUCOSE SERPL-MCNC: 85 MG/DL (ref 70–99)
HCO3 SERPL-SCNC: 24 MMOL/L (ref 22–29)
HCT VFR BLD AUTO: 42.7 % (ref 35–47)
HGB BLD-MCNC: 14.6 G/DL (ref 11.7–15.7)
MCH RBC QN AUTO: 31.5 PG (ref 26.5–33)
MCHC RBC AUTO-ENTMCNC: 34.2 G/DL (ref 31.5–36.5)
MCV RBC AUTO: 92 FL (ref 78–100)
PLATELET # BLD AUTO: 219 10E3/UL (ref 150–450)
POTASSIUM SERPL-SCNC: 4.1 MMOL/L (ref 3.4–5.3)
PROT SERPL-MCNC: 7 G/DL (ref 6.4–8.3)
RBC # BLD AUTO: 4.63 10E6/UL (ref 3.8–5.2)
SODIUM SERPL-SCNC: 144 MMOL/L (ref 135–145)
TACROLIMUS BLD-MCNC: 5.7 UG/L (ref 5–15)
TME LAST DOSE: NORMAL H
TME LAST DOSE: NORMAL H
WBC # BLD AUTO: 5.9 10E3/UL (ref 4–11)

## 2024-07-15 PROCEDURE — 36415 COLL VENOUS BLD VENIPUNCTURE: CPT

## 2024-07-15 PROCEDURE — 82248 BILIRUBIN DIRECT: CPT

## 2024-07-15 PROCEDURE — 80053 COMPREHEN METABOLIC PANEL: CPT

## 2024-07-15 PROCEDURE — 85027 COMPLETE CBC AUTOMATED: CPT

## 2024-07-15 PROCEDURE — 80197 ASSAY OF TACROLIMUS: CPT

## 2024-08-07 ENCOUNTER — VIRTUAL VISIT (OUTPATIENT)
Dept: PHARMACY | Facility: CLINIC | Age: 54
End: 2024-08-07
Payer: COMMERCIAL

## 2024-08-07 DIAGNOSIS — E66.811 CLASS 1 OBESITY WITH BODY MASS INDEX (BMI) OF 34.0 TO 34.9 IN ADULT, UNSPECIFIED OBESITY TYPE, UNSPECIFIED WHETHER SERIOUS COMORBIDITY PRESENT: Primary | ICD-10-CM

## 2024-08-07 PROCEDURE — 99606 MTMS BY PHARM EST 15 MIN: CPT | Mod: 93 | Performed by: PHARMACIST

## 2024-08-07 PROCEDURE — 99607 MTMS BY PHARM ADDL 15 MIN: CPT | Mod: 93 | Performed by: PHARMACIST

## 2024-08-07 NOTE — PROGRESS NOTES
Medication Therapy Management (MTM) Encounter    ASSESSMENT:                            Medication Adherence/Access: No issues identified    Weight management:   Patient is reports 0% weight loss since changing over to Zepbound from Wegovy. Discussed option to try increasing Zepbound or can return back to Wegovy. Her last two basic metabolic panel have shown elevated Scr, recommend she increase her water intake and would continue to monitor closely as well.    PLAN:                            Increase Zepbound to 12.5 mg weekly.  Increase water intake. Monitor for basic metabolic panel (patient has monthly check for SOT)    Follow-up: Return in about 6 weeks (around 9/17/2024) for Medication Therapy Management Visit.    SUBJECTIVE/OBJECTIVE:                          Maria Elena Fisher is a 54 year old female seen for a follow-up visit.       Reason for visit: weight management.    Allergies/ADRs: Reviewed in chart  Past Medical History: Reviewed in chart  Tobacco: She reports that she has never smoked. She has never used smokeless tobacco.  Alcohol: not currently using    Medication Adherence/Access:   Insurance does not cover weight loss medication, pays out of pocket.    Weight Management   Zepbound 10 mg subcutaneous weekly  She reports has not lost any weight which has been frustrating as she has increased her exercise and maintain healthy food choices.   No side effects notable.   Exercise:   Pilates 3 times a week and walking every day  Diet:   She reports still very good about watching her salt intake, carb and fats.  Breakfast - greek yogurt, granola (~ 20 g protein)  Lunch and dinner - 1 whole chicken breast with each meal (~ 30 gram)  Snacks are intentionally with more protein - peanut, Porcupine bars, hummus and veggies  Initial weight prior to starting GLP1 therapy 8/16/23 ~ 213 lbs (noted 8/8/23 office visit).     Current weight today: home weight 179 lbs today which she reports no changes in her weight from when  "she first started Zepbound  Wt Readings from Last 4 Encounters:   05/14/24 187 lb 14.4 oz (85.2 kg)   04/15/24 192 lb 8 oz (87.3 kg)   03/29/24 193 lb 11.2 oz (87.9 kg)   03/28/24 190 lb (86.2 kg)     Estimated body mass index is 30.33 kg/m  as calculated from the following:    Height as of 3/29/24: 5' 6\" (1.676 m).    Weight as of 5/14/24: 187 lb 14.4 oz (85.2 kg).    Today's Vitals: LMP  (LMP Unknown)   ----------------      I spent 24 minutes with this patient today. All changes were made via collaborative practice agreement with ROBYN Bear CNP. A copy of the visit note was provided to the patient's provider(s).    A summary of these recommendations was sent via [employer].    Umm Turner, PharmD  Medication Therapy Management Pharmacist    Telemedicine Visit Details  Type of service:  Telephone visit  Start Time:  9:30 AM  End Time: 9:54 AM     Medication Therapy Recommendations  Class 1 obesity with body mass index (BMI) of 34.0 to 34.9 in adult, unspecified obesity type, unspecified whether serious comorbidity present    Current Medication: tirzepatide-Weight Management (ZEPBOUND) 12.5 MG/0.5ML prefilled pen   Rationale: Dose too low - Dosage too low - Effectiveness   Recommendation: Increase Dose   Status: Accepted per CPA            "

## 2024-08-18 ENCOUNTER — MYC REFILL (OUTPATIENT)
Dept: PEDIATRICS | Facility: CLINIC | Age: 54
End: 2024-08-18
Payer: COMMERCIAL

## 2024-08-18 DIAGNOSIS — Z94.4 LIVER TRANSPLANT RECIPIENT (H): ICD-10-CM

## 2024-08-19 ENCOUNTER — DOCUMENTATION ONLY (OUTPATIENT)
Dept: GASTROENTEROLOGY | Facility: CLINIC | Age: 54
End: 2024-08-19

## 2024-08-19 RX ORDER — PANTOPRAZOLE SODIUM 40 MG/1
40 TABLET, DELAYED RELEASE ORAL 2 TIMES DAILY
Qty: 180 TABLET | Refills: 0 | Status: SHIPPED | OUTPATIENT
Start: 2024-08-19 | End: 2024-09-19

## 2024-08-19 RX ORDER — PANTOPRAZOLE SODIUM 40 MG/1
40 TABLET, DELAYED RELEASE ORAL 2 TIMES DAILY
Qty: 180 TABLET | Refills: 1 | OUTPATIENT
Start: 2024-08-19

## 2024-08-19 NOTE — TELEPHONE ENCOUNTER
Last visit MTM 8/7/24-didn't discuss PPI  Discussed with PCP at physical 12/13/23  Has visit scheduled with PCP 12/13/24  Ok to fill

## 2024-08-19 NOTE — PROGRESS NOTES
Sarah Fisher has an upcoming lab appointment:    Future Appointments   Date Time Provider Department Center   8/21/2024 11:30 AM EA LAB [facility]AL[facility] EA   8/26/2024  9:15 AM EA LAB EAL[facility] EA   8/27/2024  9:00 AM Elliott Lux MD [facility]   8/29/2024 11:30 AM SHVS[facility]   8/29/2024 12:00 PM Nurse, Sh Vein [facility]   9/10/2024  8:45 AM Areli Reich PA-C [facility] O[facility]   9/10/2024  9:00 AM Elliott Lux MD [facility]   9/12/2024  1:30 PM [facility]   9/12/2024  2:00 PM Nurse, Sh Vein [facility]   9/17/2024  8:15 AM EA LAB [facility] EA   9/17/2024  8:30 AM Umm Turner [facility]   10/15/2024  9:30 AM Fadia Avila MD [facility]   12/5/2024 10:45 AM Areli Reich PA-C WYDERM FLWY   12/10/2024  9:30 AM Areli Reich PA-C [facility] OX   12/13/2024  9:00 AM Christine Harris APRN CNP EAFP EA   12/17/2024 10:00 AM Areli Reich PA-C [facility] OX     Patient is scheduled for the following lab(s):   Scheduling Notes: Orders on file.   Rescheduled: 8/19/2024 8:27 AM By: CANDIDO FLOYD       There is no order available. Please review and place either future orders or HMPO (Review of Health Maintenance Protocol Orders), as appropriate.    Health Maintenance Due   Topic    ANNUAL REVIEW OF HM ORDERS      Last labs that were drawn on 07/15/24. It mentions to come back to repeat labs in 1 month.     Can you please place a new future order for these labs so the future standing labs stay for every 2 months. Thank you.    Hortensia DENG    
denies pain/discomfort

## 2024-08-20 ENCOUNTER — TELEPHONE (OUTPATIENT)
Dept: VASCULAR SURGERY | Facility: CLINIC | Age: 54
End: 2024-08-20
Payer: COMMERCIAL

## 2024-08-20 ENCOUNTER — TELEPHONE (OUTPATIENT)
Dept: PEDIATRICS | Facility: CLINIC | Age: 54
End: 2024-08-20
Payer: COMMERCIAL

## 2024-08-20 DIAGNOSIS — I83.813 VARICOSE VEINS OF BOTH LOWER EXTREMITIES WITH PAIN: Primary | ICD-10-CM

## 2024-08-20 NOTE — TELEPHONE ENCOUNTER
8/20/2024    Vein Clinic Preoperative Nurse Call    Procedure: Right leg VenaSeal GSV(med nec), 1 unit phlebs(med nec) *SSV denied*   Date: Tuesday 8/27  Surgeon: Dr. Lux  Time: 0900  Check in time: 0800    Called and spoke to patient. Informed patient: when to check in (0800) to sign consent, to bring their preop medications in their original bottle with them (3mg ativan, 0.1mg clonidine (2 sets)). Patient will take the medications after signing the consent to the procedure. Instructed patient to wear loose-fitting comfortable clothing, and bring their compression hose. Ensured patient has a /someone that will be responsible for them the rest of the day. Once procedure is completed, we will keep patient in recovery for 30-45 mins, and call  with aftercare instructions. Informed patient, that if possible, they should sit in the backseat to elevate their leg on the ride home.    Pt needs Thigh High compression hose for procedure. Status of the hose: patient has thigh high hose.    Special instructions: Informed pt to hold her ASA for 3 days prior to procedure. But then, pt to take ASA 325mg day of procedure and the following 2 days post op.    Patient understands if they have any of the following symptoms (fever, cough, shortness of breath, rash), they need to notify us immediately as they may need to cancel their procedure and reschedule for a later date.    Patient is in agreement with all of the above and has no further questions at this time.    Raven Zelaya RN  [facility]

## 2024-08-20 NOTE — TELEPHONE ENCOUNTER
Prior Authorization Retail Medication Request    Medication/Dose: pantoprazole (PROTONIX) 40 MG EC tablet   Diagnosis and ICD code (if different than what is on RX):  Z94.4  New/renewal/insurance change PA/secondary ins. PA:  Previously Tried and Failed:    Rationale:      Insurance   Primary: [facility]  Insurance ID:  [id number]    Secondary (if applicable):  Insurance ID:      Pharmacy Information (if different than what is on RX)  Name:  [facility]  Phone:  [phone]  Fax: [phone]    Krish SMILEY RN 8/20/2024 at 7:44 AM

## 2024-08-21 RX ORDER — CLONIDINE HYDROCHLORIDE 0.1 MG/1
TABLET ORAL
Qty: 2 TABLET | Refills: 0 | Status: SHIPPED | OUTPATIENT
Start: 2024-08-21

## 2024-08-21 RX ORDER — LORAZEPAM 1 MG/1
TABLET ORAL
Qty: 6 TABLET | Refills: 0 | Status: SHIPPED | OUTPATIENT
Start: 2024-08-21 | End: 2024-09-17

## 2024-08-21 NOTE — TELEPHONE ENCOUNTER
Central Prior Authorization Team   Phone: [phone]    PA Initiation    Medication: pantoprazole (PROTONIX) 40 MG EC tablet  Insurance Company: [employer] - Phone [phone] Fax [phone]  Pharmacy Filling the Rx: [id number] [place], [place] - [address] AT [facility] OF [address]  Filling Pharmacy Phone: [phone]  Filling Pharmacy Fax:    Start Date: 8/21/2024

## 2024-08-21 NOTE — TELEPHONE ENCOUNTER
Prior Authorization Approval    Authorization Effective Date: 7/22/2024  Authorization Expiration Date: 8/21/2025  Medication: pantoprazole (PROTONIX) 40 MG EC tablet  Approved Dose/Quantity:   Reference #:     Insurance Company: [employer] - Phone [phone] Fax [phone]  Expected CoPay:       CoPay Card Available:      Foundation Assistance Needed:    Which Pharmacy is filling the prescription (Not needed for infusion/clinic administered): [id number] [place], [place] - [facility] [address] [address] AT [facility] OF [address] [facility]  Pharmacy Notified:  yes  Patient Notified:  yes- Pharmacy will contact patient when ready to /ship

## 2024-08-22 DIAGNOSIS — I83.813 VARICOSE VEINS OF BOTH LOWER EXTREMITIES WITH PAIN: Primary | ICD-10-CM

## 2024-08-22 RX ORDER — LORAZEPAM 1 MG/1
TABLET ORAL
Qty: 6 TABLET | Refills: 0 | Status: SHIPPED | OUTPATIENT
Start: 2024-08-22

## 2024-08-22 NOTE — TELEPHONE ENCOUNTER
Patient called from her [facility] pharmacy, unable to  lorazepam. The prescription was received 8/21 per chart review. I called and spoke with [facility] who stated that prescriptions disappear all the time' and that the only solution was to reorder the medication.    The 6 tabs of lorazepam for two procedures has been reordered, pt and Dr Lux aware. The patient requested that this prescription and any further prescriptions be sent to the [facility] in [place] on [facility].    Radha Dubose RN

## 2024-08-26 NOTE — PATIENT INSTRUCTIONS
Post-Procedure Instructions:                                Venaseal and Phlebectomies      Post-Op Day Zero - The Day of Your Procedure:  1.  Medication for Pain Control and Inflammation Control   - The numbing medication injected during your procedure will last for several hours. The pre-procedure    tablets may make you very sleepy and you might not remember everything from the procedure or from    the day. This will usually wear off by the next day.   - Ibuprofen: If tolerated, take ibuprofen to reduce inflammation whether or not you have pain. For    three days, take 2 tablets (200mg each) with every meal and at bedtime with a snack. If your pain is not    controlled with ibuprofen, you may take prescription pain medication (such as Norco), if prescribed.   - Aspirin: Take one 325mg aspirin to reduce blood clot risk.   - You may resume taking any medications you were taking before your procedure.  2.  Activity   - Rest with your leg(s) elevated above your heart. This will prevent from a lot of swelling and  bleeding.               You do not need to elevate your leg(s) while sleeping at night. You may go upstairs, sit up to eat, use the               bathroom, and take several five minute walks. Otherwise, keep your leg(s) elevated. Minimize the amount               of time you are up on your feet to about 30 minutes at a time.  3.  Bandages   - The incision sites will be covered with soft bandages and an ACE wrap. Keep your bandages on and    dry for 48 hours. The ACE should provide  snug  compression, but should not cause pain or numbness    in the toes. If you have significant discomfort or your toes become cold or numb, unwrap your ACE and    rewrap with less tension starting at the toes wrapping upward.  4.  Incisions   - Bleeding: You may see some incision sites that are oozing through the bandages. This is not unusual    and can be managed with Rest, Ice, Compression and Elevation (RICE). Apply ice and  firm pressure    directly to the site that is bleeding and rest with your leg(s) elevated above your heart for 20-30 minutes.    Post-Op Day One:  1.  Medication   - Ibuprofen and Aspirin: Continue the same as the Day of Your Procedure.  2.  Activity   - We would like you to get up at least six times and walk around for short periods of time unless it is    causing you pain. You should be on your feet no more than 90 minutes at a time. Elevate your leg above    your heart when you are not walking.  3.  Bandages   - Your bandages must be kept on and dry for 48 hours.  4.  Driving   - You may resume driving when you can do so safely. Do not drive if you are taking narcotic pain medication.    Post-Op Day Two:    1. Medication   - Ibuprofen and Aspirin: Continue the same as the Day of Your Procedure.  2. Activity   - Walk as tolerated. Elevate as much as possible when not walking.  3. Bandages    - Remove ACE wrap and padding. You may shower. Your doctor may request you wear a stocking    during the day.  4. Incisions   - Your leg(s) will be bruised; there may be swelling, hard knots under the skin and possibly some    numbness. These will resolve over time. If you see  hair-like  strings coming out of your incisions, do    not pull them (this will only cause pain/discomfort). We will trim them when you come back for your    follow-up appointment.  5. Call Us If:   - You see any areas on your leg that are red and angry in appearance.   - You notice any drainage that is milky or cloudy in appearance or that has a foul odor.   - You run a temperature of 100.5 or greater.    Post-Op Day Three:  You will have a follow up appointment 2-4 days post-procedure. At this appointment, you will have an ultrasound and we will check your incisions.    __________________________________________________________________________________________    The Two Weeks Following Your Procedure  1.  Skin Care   - Do not use any lotions, creams,  or powders on your leg for 14 days or until the incisions have healed.   - Do not soak in a bathtub, whirlpool, or hot tub or go swimming for 14 days or until your incisions have  healed.  2. Medications   - You may use ibuprofen or Tylenol as needed for pain or discomfort.  3. Activity   - Do not lift over 25 pounds. After about ten days you may resume exercise such as aerobics, running,    tennis or weight lifting. Use your common sense and ease back into your exercise routine slowly.   - You may feel a cord-like tightness along the inside of your leg. Gentle stretching can be helpful.    Six Week Appointment   At your six week appointment, you will see your surgeon for an exam and evaluation. This office visit    will be scheduled when you return for Post-op Day Three Return Appointment.     Return to Work  1.  If you work outside the home, you may return to work in a few days depending on the extent of your  procedure, how you tolerate it, and the type of work you perform.  2.  Paperwork: If your employer requires paperwork or you would like a letter written to your employer, please let us know. We will complete disability type forms at no charge. Please allow five business days for forms to be completed.

## 2024-08-27 ENCOUNTER — OFFICE VISIT (OUTPATIENT)
Dept: VASCULAR SURGERY | Facility: CLINIC | Age: 54
End: 2024-08-27
Payer: COMMERCIAL

## 2024-08-27 VITALS — DIASTOLIC BLOOD PRESSURE: 68 MMHG | SYSTOLIC BLOOD PRESSURE: 112 MMHG | OXYGEN SATURATION: 96 % | HEART RATE: 75 BPM

## 2024-08-27 DIAGNOSIS — I83.811 VARICOSE VEINS OF RIGHT LOWER EXTREMITY WITH PAIN: Primary | ICD-10-CM

## 2024-08-27 PROCEDURE — 37766 PHLEB VEINS - EXTREM 20+: CPT | Mod: RT | Performed by: SURGERY

## 2024-08-27 PROCEDURE — 36482 ENDOVEN THER CHEM ADHES 1ST: CPT | Mod: RT | Performed by: SURGERY

## 2024-08-27 RX ORDER — OXYCODONE AND ACETAMINOPHEN 10; 325 MG/1; MG/1
0.5 TABLET ORAL EVERY 6 HOURS PRN
Qty: 15 TABLET | Refills: 0 | Status: SHIPPED | OUTPATIENT
Start: 2024-08-27

## 2024-08-27 NOTE — LETTER
8/27/2024      Sarah Fisher  [address] [address]      Dear Colleague,    Thank you for referring your patient, Sarah Fisher, to the [facility] [place]. Please see a copy of my visit note below.    Pre-procedure Nursing Note    Sarah Fisher presents to clinic for Vein Procedure  .   /Person Responsible for Patient: Saravanan ( Brother )  Phone Number: [phone]    Prophylactic Medication:N/A   Sedation Medication: Ativan, 3mg ,   Time Taken: 0905 and Clonidine, 0.1mg,   Time Taken: 0905  Compression Stockings: Patient left hose at home.  The procedure is being performed on E.  Patient understanding of procedure matches consent? YES    Patient's pre-procedure medications verified by Radha Dubose RN.    Radha Dubose RN on 8/27/2024 at 9:02 AM        Vein Clinic Procedure Note    Preoperative diagnosis:    1.  Right lower extremity lifestyle limiting varicose veins with failure of conservative management.  2.  Right great saphenous vein incompetence.    Post operative diagnosis:  Same    Procedure:  1.  Right great saphenous vein VenaSeal closure.  2.  Right lower extremity medically necessary stab phlebectomies (66 in number)    Preoperative medications: 3 mg ativan, 0.1 mg clonidine      Procedures    Operative description  Indications: Maria Elena is a very pleasant 54-year-old female with longstanding varicose veins of the right lower extremity.  They are significantly interfering with her day-to-day living.  She has tried conservative management without fail.  She has great saphenous vein incompetence.  Will proceed with VenaSeal closure of the right great saphenous vein and medically necessary stab phlebectomies.    Procedure: Patient was identified and then taken to the procedure room and placed in supine position.  Right lower extremity was circumferentially prepped.  A sterile surgical field was created.  Preprocedure timeout was conducted.  With the help  of the ultrasound probe I mapped out the right great saphenous vein starting from just below the level of the knee to the saphenofemoral junction.  Local anesthetic was administered in the soft tissue just below the knee and the great saphenous vein was accessed with a micropuncture needle in retrograde fashion followed by placement of the microwire.  Standard 7 [place]ese sheath was placed using modified Seldinger technique.  Wire was visualized as well as the sheath and the great saphenous vein.  Then I identified the saphenofemoral junction and the VenaSeal wire was placed under sonographic guidance up to the saphenofemoral junction.  This was followed by the delivery sheath system which was then advanced to the saphenofemoral junction and then pulled back 10 cm.  Delivery catheter was advanced and locked in place.  Patient was then placed in Trendelenburg position.  First aliquot of 0.1 mL of VenaSeal glue was delivered.  Catheter was pulled back 1 cm and an additional 0.1 aliquot was delivered.  Then the catheter was pulled back 3 cm and pressure was applied at the saphenofemoral junction for 3 minutes.  Then in 3 cm increments 0.1 mL of VenaSeal glue was delivered with pressure application till we reach the end of the sheath.  A final aliquot was delivered and the delivery system and the sheath were removed.  Then we generously infiltrated all the previously marked varicose veins and these were treated with standard phlebectomies.  66 phlebectomies were performed.    Then sterile compressive dressing was applied.  Patient tolerated the procedure well.    Venaseal: Right great saphenous vein.    Stab phlebectomy: 66 in number, medically necessary    EBL: 30 mL        2024    10:00 AM   Flowsheet Data   Procedure Start Time: 10:00   Prep: Chloraprep   Side: Right   Tx Length (cm): RIGHT GSV: 35   Junction (cm): RIGHT GSV: 5   # PHLEB Sites: RIGHT LE   Cyanoacrylate used (ml): 2   Sedation taken: Yes   Pre  Pt. Physical / Cognitive Limitations: WNL   TOTAL Local anesthesia Injected (ml): 3   Max Volume Local Anesthesia (ml): 11   TOTAL Tumescent Injected volume (ml): 300   Max Volume Tumescent (ml): 572   Post Pt. Physical / Cognitive Limitations: WNL   Procedure End Time: 11:34   D/C Instructions given, states readiness to leave and escorted to car: Yes     Tumescent Concentration: Total Volume: 572ml - 0.9% Sodium Chloride 500ml Bag +  Lidocaine 1% with Epinephrine 1:100,000: 60ml + 8.4% Sodium Bicarbonate: 12ml    Patient's blood pressure, pulse and pulse oximetry were continuously monitored throughout the procedure under my direct supervision and was stable during the procedure.    Patient recovered in our suites and discharged home with their family with postoperative instructions and follow up.     Elliott Lux MD      Again, thank you for allowing me to participate in the care of your patient.        Sincerely,        Elliott Lux MD

## 2024-08-27 NOTE — PROGRESS NOTES
Vein Clinic Procedure Note    Preoperative diagnosis:    1.  Right lower extremity lifestyle limiting varicose veins with failure of conservative management.  2.  Right great saphenous vein incompetence.    Post operative diagnosis:  Same    Procedure:  1.  Right great saphenous vein VenaSeal closure.  2.  Right lower extremity medically necessary stab phlebectomies (66 in number)    Preoperative medications: 3 mg ativan, 0.1 mg clonidine      Procedures    Operative description  Indications: Maria Elena is a very pleasant 54-year-old female with longstanding varicose veins of the right lower extremity.  They are significantly interfering with her day-to-day living.  She has tried conservative management without fail.  She has great saphenous vein incompetence.  Will proceed with VenaSeal closure of the right great saphenous vein and medically necessary stab phlebectomies.    Procedure: Patient was identified and then taken to the procedure room and placed in supine position.  Right lower extremity was circumferentially prepped.  A sterile surgical field was created.  Preprocedure timeout was conducted.  With the help of the ultrasound probe I mapped out the right great saphenous vein starting from just below the level of the knee to the saphenofemoral junction.  Local anesthetic was administered in the soft tissue just below the knee and the great saphenous vein was accessed with a micropuncture needle in retrograde fashion followed by placement of the microwire.  Standard 7 Sierra Leonean sheath was placed using modified Seldinger technique.  Wire was visualized as well as the sheath and the great saphenous vein.  Then I identified the saphenofemoral junction and the VenaSeal wire was placed under sonographic guidance up to the saphenofemoral junction.  This was followed by the delivery sheath system which was then advanced to the saphenofemoral junction and then pulled back 10 cm.  Delivery catheter was advanced and locked  in place.  Patient was then placed in Trendelenburg position.  First aliquot of 0.1 mL of VenaSeal glue was delivered.  Catheter was pulled back 1 cm and an additional 0.1 aliquot was delivered.  Then the catheter was pulled back 3 cm and pressure was applied at the saphenofemoral junction for 3 minutes.  Then in 3 cm increments 0.1 mL of VenaSeal glue was delivered with pressure application till we reach the end of the sheath.  A final aliquot was delivered and the delivery system and the sheath were removed.  Then we generously infiltrated all the previously marked varicose veins and these were treated with standard phlebectomies.  66 phlebectomies were performed.    Then sterile compressive dressing was applied.  Patient tolerated the procedure well.    Venaseal: Right great saphenous vein.    Stab phlebectomy: 66 in number, medically necessary    EBL: 30 mL        2024    10:00 AM   Flowsheet Data   Procedure Start Time: 10:00   Prep: Chloraprep   Side: Right   Tx Length (cm): RIGHT GSV: 35   Junction (cm): RIGHT GSV: 5   # PHLEB Sites: RIGHT LE   Cyanoacrylate used (ml): 2   Sedation taken: Yes   Pre Pt. Physical / Cognitive Limitations: WNL   TOTAL Local anesthesia Injected (ml): 3   Max Volume Local Anesthesia (ml): 11   TOTAL Tumescent Injected volume (ml): 300   Max Volume Tumescent (ml): 572   Post Pt. Physical / Cognitive Limitations: WNL   Procedure End Time: 11:34   D/C Instructions given, states readiness to leave and escorted to car: Yes     Tumescent Concentration: Total Volume: 572ml - 0.9% Sodium Chloride 500ml Bag +  Lidocaine 1% with Epinephrine 1:100,000: 60ml + 8.4% Sodium Bicarbonate: 12ml    Patient's blood pressure, pulse and pulse oximetry were continuously monitored throughout the procedure under my direct supervision and was stable during the procedure.    Patient recovered in our suites and discharged home with their family with postoperative instructions and follow up.      Elliott Lux MD

## 2024-08-27 NOTE — PROGRESS NOTES
Pre-procedure Nursing Note    Sarah Fisher presents to clinic for Vein Procedure  .   /Person Responsible for Patient: Saravanan ( Brother )  Phone Number: [phone]    Prophylactic Medication:N/A   Sedation Medication: Ativan, 3mg ,   Time Taken: 0905 and Clonidine, 0.1mg,   Time Taken: 0905  Compression Stockings: Patient left hose at home.  The procedure is being performed on RLE.  Patient understanding of procedure matches consent? YES    Patient's pre-procedure medications verified by Radha Dubose RN.    Radha Dubose RN on 8/27/2024 at 9:02 AM

## 2024-08-28 NOTE — PATIENT INSTRUCTIONS
Post-Procedure Instructions:                         VenaSeal  You had a VenaSeal procedure, where one or more veins were closed.     Activities:      Resume normal activities as tolerated.     Bathing: Do not take baths, sit in a hot tub, or go swimming for one week. You may shower the day after the procedure.    Medications:    You may continue to take your normal medications including aspirin and ibuprofen.      Call Us If:    You see any areas on your leg that are red and angry in appearance.  You notice any drainage that is milky or cloudy in appearance or that has a foul odor.  You run a temperature of 100.5 or greater.      Post-Op Day Three:  You will have a follow up appointment 2-4 days post-procedure. At this appointment, you will have an ultrasound and we will check your incisions.      Six Week Appointment  At your six week appointment, you will see your surgeon for an exam and evaluation. This office visit will be scheduled when you return for post-op day three appointment.     Vein Removal (Phlebectomy)  Common Things to Expect    Small lumps may develop beneath phlebectomy sites. This is a normal step in healing.  These should not be painful, but may be tender to the touch. It can take 6 weeks to 3 months for these lumps/firmness to resolve. About 3-4 weeks after your phlebectomies, when the bruising has subsided and the incisions are healed, gentle massage will aid in the healing of these small lumps.  Bruising will look worse before it looks better and can last for 4-6 weeks.  Ankle swelling is not uncommon and may last 4-6 weeks.   Numbness will get better with time, but may take 3 months to a year to resolve.  You may notice that the skin on your legs has become ultra-sensitive to touch. For example, the weight of your sheets may feel painful. This usually resolves in 6 weeks.    For 2 weeks, no weight lifting over 25lbs, no running, and no vigorous aerobic exercise. After this time, ease back  into your normal activities. If you do too much too soon, you will have more pain, swelling, and bruising. Keep in mind your body is still healing.  For 2 weeks, do not shave your legs or use lotions, powders, creams to allow proper healing of vein access sites.    Wearing compression hose is not required, but highly recommended following phlebectomies as this will aid in the healing process. Compression will minimize your pain, swelling, and bruising.      If you are experiencing any of the following symptoms, please seek immediate medical attention at your local emergency department.  - Significant pain in the back of the calf possibly with difficulty walking  - Significant swelling and/or tenderness in the back of the calf  - Redness that continues to spread  - Chest pain and/or shortness of breath

## 2024-08-29 ENCOUNTER — ALLIED HEALTH/NURSE VISIT (OUTPATIENT)
Dept: VASCULAR SURGERY | Facility: CLINIC | Age: 54
End: 2024-08-29
Attending: SURGERY
Payer: COMMERCIAL

## 2024-08-29 ENCOUNTER — ANCILLARY PROCEDURE (OUTPATIENT)
Dept: ULTRASOUND IMAGING | Facility: CLINIC | Age: 54
End: 2024-08-29
Attending: SURGERY
Payer: COMMERCIAL

## 2024-08-29 DIAGNOSIS — I83.813 VARICOSE VEINS OF BOTH LOWER EXTREMITIES WITH PAIN: ICD-10-CM

## 2024-08-29 DIAGNOSIS — Z09 POSTOP CHECK: Primary | ICD-10-CM

## 2024-08-29 PROCEDURE — 99207 PR NO CHARGE NURSE ONLY: CPT

## 2024-08-29 PROCEDURE — 93971 EXTREMITY STUDY: CPT | Mod: RT | Performed by: SURGERY

## 2024-08-29 NOTE — PROGRESS NOTES
August 29, 2024    Vein Clinic Postoperative Nurse Note    Patient is here for her 48 hour postoperative visit.    Procedure: Right leg VenaSeal GSV(med nec), 1 unit phlebs(med nec)   Procedure Date: 8/27/24  Surgeon: Dr. Lux    Ultrasound Result: Negative for RLE DVT, Right GSV is closed 6.7 mm from SFJ to knee    Physical Exam: Incisions are approximated without signs of infection.  Ecchymosis: moderate bruising to phlebectomy sites (66 sites)  Swelling: minimal  Paresthesia: patient denies    Applied 3 band aids to areas that were oozing blood and one site oozing clear fluid (presumably tumescent)  Patient Questions or Concerns: none  Assisted patient to don thigh high hose.    Reviewed postoperative instructions with patient and provided her with written material of common things to expect from her procedure.    Patient's Next Vein Clinic Appointment: Left leg procedure on 9/10/24    Rolando Perdomo RN  [facility]

## 2024-09-05 ENCOUNTER — TELEPHONE (OUTPATIENT)
Dept: VASCULAR SURGERY | Facility: CLINIC | Age: 54
End: 2024-09-05
Payer: COMMERCIAL

## 2024-09-05 NOTE — TELEPHONE ENCOUNTER
Patient has 2 swollen spots that she is concerns about and describes its painful.      ESTELA BAKER,   [facility]

## 2024-09-05 NOTE — TELEPHONE ENCOUNTER
Vein Clinic - Nurse Triage Call    Situation: Pt calling c/o swollen painful spots on her leg    Background: Pt had vein procedure: Left leg VenaSeal GSV(med nec), 1 unit phlebs(med nec) *SSV denied* w/ KMK 8/27/2024     Assessment: The patient has a hard golf ball sized lump on her anterior lateral ankle aligned with her big toe and a soft lump 9 inches above the ankle at her mid calf which is soft. She states that her whole leg is bruised and that these areas are not less or more bruised than any other area. She was wearing her compression hose post procedure until Tuesday 9/3 at which point she took a day off. She began to develop these lumps on Wednesday so elevated more frequently. She began her compression hose again today Thursday 9/5 as the lumps were not improving. She is able to bear weight and walk. She has not been taking ibuprofen as she has a history of transplant.        9/5/24: right anteromedial ankle    9/5/24: right medial calf    Recommendation: these appear to be inflammatory bumps at areas where veins were removed via phlebectomy. I communicated to the patient that these are expected findings. I encouraged her to resume wearing her compression hose daily. Most patients find these to be comfortable for about 3 weeks to minimize bumps and bruising. I encouraged her to ice the sore areas twice daily for at least 10 min and to elevate as often as possible, although walking is still fine to do. I encouraged her to take ibuprofen as able (she has a transplant history) and tylenol. Pt was wondering if she can switch to knee high. She has some phleb sites on the back of her thigh so I encouraged her to try to stick with the thigh high for one more week and then try the knee high.      Reviewed postoperative information with patient. Patient is in agreement with plan and had no further questions.      Radha Dubose RN

## 2024-09-10 ENCOUNTER — LAB (OUTPATIENT)
Dept: LAB | Facility: CLINIC | Age: 54
End: 2024-09-10
Payer: COMMERCIAL

## 2024-09-10 ENCOUNTER — OFFICE VISIT (OUTPATIENT)
Dept: DERMATOLOGY | Facility: CLINIC | Age: 54
End: 2024-09-10
Payer: COMMERCIAL

## 2024-09-10 DIAGNOSIS — Z41.1 ELECTIVE PROCEDURE FOR UNACCEPTABLE COSMETIC APPEARANCE: Primary | ICD-10-CM

## 2024-09-10 DIAGNOSIS — Z94.4 LIVER REPLACED BY TRANSPLANT (H): ICD-10-CM

## 2024-09-10 LAB
ALBUMIN SERPL BCG-MCNC: 4.4 G/DL (ref 3.5–5.2)
ALP SERPL-CCNC: 60 U/L (ref 40–150)
ALT SERPL W P-5'-P-CCNC: 7 U/L (ref 0–50)
ANION GAP SERPL CALCULATED.3IONS-SCNC: 9 MMOL/L (ref 7–15)
AST SERPL W P-5'-P-CCNC: 19 U/L (ref 0–45)
BILIRUB DIRECT SERPL-MCNC: 0.24 MG/DL (ref 0–0.3)
BILIRUB SERPL-MCNC: 0.7 MG/DL
BUN SERPL-MCNC: 26.8 MG/DL (ref 6–20)
CALCIUM SERPL-MCNC: 9.9 MG/DL (ref 8.8–10.4)
CHLORIDE SERPL-SCNC: 107 MMOL/L (ref 98–107)
CREAT SERPL-MCNC: 1.21 MG/DL (ref 0.51–0.95)
EGFRCR SERPLBLD CKD-EPI 2021: 53 ML/MIN/1.73M2
ERYTHROCYTE [DISTWIDTH] IN BLOOD BY AUTOMATED COUNT: 12.4 % (ref 10–15)
GLUCOSE SERPL-MCNC: 86 MG/DL (ref 70–99)
HCO3 SERPL-SCNC: 25 MMOL/L (ref 22–29)
HCT VFR BLD AUTO: 38.2 % (ref 35–47)
HGB BLD-MCNC: 13.4 G/DL (ref 11.7–15.7)
MCH RBC QN AUTO: 32.3 PG (ref 26.5–33)
MCHC RBC AUTO-ENTMCNC: 35.1 G/DL (ref 31.5–36.5)
MCV RBC AUTO: 92 FL (ref 78–100)
PLATELET # BLD AUTO: 201 10E3/UL (ref 150–450)
POTASSIUM SERPL-SCNC: 5 MMOL/L (ref 3.4–5.3)
PROT SERPL-MCNC: 6.6 G/DL (ref 6.4–8.3)
RBC # BLD AUTO: 4.15 10E6/UL (ref 3.8–5.2)
SODIUM SERPL-SCNC: 141 MMOL/L (ref 135–145)
TACROLIMUS BLD-MCNC: 4.8 UG/L (ref 5–15)
TME LAST DOSE: ABNORMAL H
TME LAST DOSE: ABNORMAL H
WBC # BLD AUTO: 5.7 10E3/UL (ref 4–11)

## 2024-09-10 PROCEDURE — 80197 ASSAY OF TACROLIMUS: CPT

## 2024-09-10 PROCEDURE — 85027 COMPLETE CBC AUTOMATED: CPT

## 2024-09-10 PROCEDURE — 80053 COMPREHEN METABOLIC PANEL: CPT

## 2024-09-10 PROCEDURE — 82248 BILIRUBIN DIRECT: CPT

## 2024-09-10 PROCEDURE — 36415 COLL VENOUS BLD VENIPUNCTURE: CPT

## 2024-09-10 PROCEDURE — 96999 UNLISTED SPEC DERM SVC/PX: CPT | Performed by: PHYSICIAN ASSISTANT

## 2024-09-10 RX ORDER — KETOCONAZOLE 20 MG/G
CREAM TOPICAL DAILY PRN
COMMUNITY

## 2024-09-10 RX ORDER — HYDROCORTISONE 25 MG/G
OINTMENT TOPICAL 2 TIMES DAILY PRN
COMMUNITY

## 2024-09-10 RX ORDER — BETAMETHASONE DIPROPIONATE 0.5 MG/G
CREAM TOPICAL 2 TIMES DAILY PRN
COMMUNITY

## 2024-09-10 ASSESSMENT — PAIN SCALES - GENERAL: PAINLEVEL: NO PAIN (0)

## 2024-09-10 NOTE — PROGRESS NOTES
HPI:   Chief complaints: Sarah Fisher is a pleasant 54 year old female who presents for evaluation of treatment of facial rhytids with cosmetic botox. She liked units after LOV. No issues.   Will plan on doing filler at next OV    PHYSICAL EXAM:    LMP  (LMP Unknown)   Skin exam performed as follows: Type 2 skin. Mood appropriate  Alert and Oriented X 3. Well developed, well nourished in no distress.  General appearance: Normal  Head including face: Normal  Eyes: conjunctiva and lids: Normal  Mouth: Lips, teeth, gums: Normal  Neck: Normal  Skin: Scalp and body hair: See below    Age appropriate rhytids of glabella, crows feet    ASSESSMENT/PLAN:     BOTOX:  PGACAC discussed.  Risks including but not limited to injection site reaction, bruising, asymmetry, no resolution of rhytides, brow ptosis, dry mouth, tiredness, and headache.  Also label warnings are difficulty with swallowing, speaking, breathing, muscle weakness, loss of bladder control, and double vision/blurred vision.  Explained confirmed report of spread of toxin effect when used for hyperhidrosis of underarms or cosmetic use on face has not been reported.  All questions answered and entertained to patient s satisfaction.  Informed consent obtained.      --31 units injected to gabella (11 into procerus, 8 into medial )  --12 into each crows (with 1 unit infraorbitally)   --55 units total  --Cosmetic cost of $770        Follow-up: 3 mo  CC:   Scribed By: Areli Reich, MS, PA-C    Clinic Administered Medication Documentation    Patient was given Botox. Prior to medication administration, verified patient's identity using patient s name and date of birth. Please see MAR and medication order for additional information. Patient instructed to report any adverse reaction to staff immediately.    Vial/Syringe: Single dose vial. Was entire vial of medication used? No, The remainder 45 Units of 100 Units was discarded as unavoidable waste.    Drug  Name: botulinum toxin with expiration date of 08/30/2026  Dose: 55 Units  Route administered: IM  NDC #: 1882-3353-38  Amount of waste:45 Units  Reason for waste: Single use vial     LOT #: [id number]  SITE: face  : Allergan  EXPIRATION DATE: 08/30/2026

## 2024-09-10 NOTE — LETTER
9/10/2024      Sarah Fisher  [address] [address]      Dear Colleague,    Thank you for referring your patient, Sarah Fisher, to the [facility]. Please see a copy of my visit note below.    HPI:   Chief complaints: Sarah Fisher is a pleasant 54 year old female who presents for evaluation of treatment of facial rhytids with cosmetic botox. She liked units after LOV. No issues.   Will plan on doing filler at next OV    PHYSICAL EXAM:    LMP  (LMP Unknown)   Skin exam performed as follows: Type 2 skin. Mood appropriate  Alert and Oriented X 3. Well developed, well nourished in no distress.  General appearance: Normal  Head including face: Normal  Eyes: conjunctiva and lids: Normal  Mouth: Lips, teeth, gums: Normal  Neck: Normal  Skin: Scalp and body hair: See below    Age appropriate rhytids of glabella, crows feet    ASSESSMENT/PLAN:     BOTOX:  PGACAC discussed.  Risks including but not limited to injection site reaction, bruising, asymmetry, no resolution of rhytides, brow ptosis, dry mouth, tiredness, and headache.  Also label warnings are difficulty with swallowing, speaking, breathing, muscle weakness, loss of bladder control, and double vision/blurred vision.  Explained confirmed report of spread of toxin effect when used for hyperhidrosis of underarms or cosmetic use on face has not been reported.  All questions answered and entertained to patient s satisfaction.  Informed consent obtained.      --31 units injected to gabella (11 into procerus, 8 into medial )  --12 into each crows (with 1 unit infraorbitally)   --55 units total  --Cosmetic cost of $770        Follow-up: 3 mo  CC:   Scribed By: Areli Reich, MS, PA-C    Clinic Administered Medication Documentation    Patient was given Botox. Prior to medication administration, verified patient's identity using patient s name and date of birth. Please see MAR and medication order  for additional information. Patient instructed to report any adverse reaction to staff immediately.    Vial/Syringe: Single dose vial. Was entire vial of medication used? No, The remainder 45 Units of 100 Units was discarded as unavoidable waste.    Drug Name: botulinum toxin with expiration date of 08/30/2026  Dose: 55 Units  Route administered: IM  NDC #: 9537-6581-93  Amount of waste:45 Units  Reason for waste: Single use vial     LOT #: [id number]  SITE: face  : Allergan  EXPIRATION DATE: 08/30/2026      Again, thank you for allowing me to participate in the care of your patient.        Sincerely,        Areli Loev PA-C

## 2024-09-17 ENCOUNTER — OFFICE VISIT (OUTPATIENT)
Dept: PHARMACY | Facility: CLINIC | Age: 54
End: 2024-09-17
Payer: COMMERCIAL

## 2024-09-17 VITALS
SYSTOLIC BLOOD PRESSURE: 110 MMHG | WEIGHT: 168 LBS | OXYGEN SATURATION: 100 % | BODY MASS INDEX: 27.12 KG/M2 | HEART RATE: 83 BPM | DIASTOLIC BLOOD PRESSURE: 72 MMHG

## 2024-09-17 DIAGNOSIS — E66.811 CLASS 1 OBESITY WITH BODY MASS INDEX (BMI) OF 34.0 TO 34.9 IN ADULT, UNSPECIFIED OBESITY TYPE, UNSPECIFIED WHETHER SERIOUS COMORBIDITY PRESENT: Primary | ICD-10-CM

## 2024-09-17 DIAGNOSIS — Z71.85 VACCINE COUNSELING: ICD-10-CM

## 2024-09-17 DIAGNOSIS — K21.9 GASTROESOPHAGEAL REFLUX DISEASE WITHOUT ESOPHAGITIS: ICD-10-CM

## 2024-09-17 PROCEDURE — 99606 MTMS BY PHARM EST 15 MIN: CPT | Performed by: PHARMACIST

## 2024-09-17 PROCEDURE — 99607 MTMS BY PHARM ADDL 15 MIN: CPT | Performed by: PHARMACIST

## 2024-09-17 NOTE — PROGRESS NOTES
Medication Therapy Management (MTM) Encounter    ASSESSMENT:                            Medication Adherence/Access: No issues identified    Weight Management  Patient would benefit from  no changes at this time . Continue to monitor weight loss, would recommend dose reduction if losing weight too quickly, concern for losing muscle vs fatty tissue if so.    GERD  Patient would benefit from the following changes: taper down proton pump inhibitor dose. Chronic PPI use places patient at an increased risk of C. Diff, hypomagnesemia and lower bone mineral density, these risks were reviewed with the patient.     Vaccines  Patient to schedule appointment with travel clinic    PLAN:                            Reduce high potassium foods.  - If level still higher end, then suggest stop multivitamin.     Decrease pantoprazole to daily. If issues then can alternate twice daily and daily dosing for a month before going down to daily.     Great job with weight loss!  Let me know if you notice you at still dropping weight more than 2 lbs per week. If so we may want to reduce Zepbound dose.     Reminder to get Covid and flu vaccine, but patient to schedule with Travel clinic for upcoming travel.    Follow-up: Return in about 3 months (around 12/13/2024) for Medication Therapy Management Visit.    SUBJECTIVE/OBJECTIVE:                          Maria Elena Fisher is a 54 year old female seen for a follow-up visit.       Reason for visit: follow-up on Zepbound. She also will be traveling to [place] for vacation and wonders if we can get her set up for Travel clinic.    Allergies/ADRs: Reviewed in chart  Past Medical History: Reviewed in chart  Tobacco: She reports that she has never smoked. She has never used smokeless tobacco.  Alcohol: not currently using    Medication Adherence/Access: no issues reported    Weight Management   Zepbound 15 mg subcutaneous weekly  She reports tolerating Zepbound well. She has found with the dose titrations  "has began to see weight loss.   Exercise:   Pilates 3 times a week and walking 3-5 times a week  Diet:   She feels this current medication dose as helped reduced her snacking.   She reports still very good about watching her salt intake, carb and fats.  Initial weight prior to starting GLP1 therapy 8/16/23 ~ 213 lbs (noted 8/8/23 office visit).     Current weight today: 168 lbs 0 oz  Cumulative Weight Loss: -45 lb, -27% from baseline  Wt Readings from Last 4 Encounters:   09/17/24 168 lb (76.2 kg)   05/14/24 187 lb 14.4 oz (85.2 kg)   04/15/24 192 lb 8 oz (87.3 kg)   03/29/24 193 lb 11.2 oz (87.9 kg)     Estimated body mass index is 27.12 kg/m  as calculated from the following:    Height as of 3/29/24: 5' 6\" (1.676 m).    Weight as of this encounter: 168 lb (76.2 kg).    GERD    Pantoprazole 40 mg twice daily   Patient feels that current regimen is effective. GIB in 2020, may of been due to varices.        Vaccines  Most Recent Immunizations   Administered Date(s) Administered    COVID-19 12+ (Pfizer) 10/10/2023    COVID-19 Bivalent 12+ (Pfizer) 06/28/2023    COVID-19 MONOVALENT 12+ (Pfizer) 10/12/2021    COVID-19 Monovalent 12+ (Pfizer 2022) 04/19/2022    Flu, Unspecified 09/17/2021    Hepatitis A (ADULT 19+) 12/13/2023    Hepatitis B, Adult 04/19/2022    Influenza (IIV3) PF 08/06/2012    Influenza Vaccine 18-64 (Flublok) 09/12/2023    Influenza Vaccine >6 months,quad, PF 09/17/2021    Influenza Vaccine IM Ages 6-35 Months 4 Valent (PF) 09/19/2013    Influenza, seasonal, injectable, PF 10/01/2008    Influenza,INJ,MDCK,PF,Quad >6mo(Flucelvax) 11/30/2017    Nasal Influenza Vaccine 2-49 (FluMist) 11/18/2014    Pneumococcal 20 valent Conjugate (Prevnar 20) 06/28/2023    Pneumococcal 23 valent 08/13/2021    TD,PF 7+ (Tenivac) 05/17/2017    TDAP Vaccine (Adacel) 05/04/2007    Td (Adult), Adsorbed 05/17/2017    Zoster recombinant adjuvanted (SHINGRIX) 04/19/2022       Today's Vitals: /72   Pulse 83   Wt 168 lb " (76.2 kg)   LMP  (LMP Unknown)   SpO2 100%   BMI 27.12 kg/m    ----------------      I spent 20 minutes with this patient today. All changes were made via collaborative practice agreement with ROBYN Bear CNP. A copy of the visit note was provided to the patient's provider(s).    A summary of these recommendations was given to the patient.    Umm Turner, PharmD  Medication Therapy Management Pharmacist     Medication Therapy Recommendations  Gastroesophageal reflux disease without esophagitis    Current Medication: pantoprazole (PROTONIX) 40 MG EC tablet   Rationale: Dose too high - Dosage too high - Safety   Recommendation: Decrease Frequency   Status: Accepted per CPA

## 2024-09-17 NOTE — PATIENT INSTRUCTIONS
"Recommendation(s) from today's visit:                                                      Reduce high potassium foods.  If still high stop multivitamin.    Decrease pantoprazole to daily. If issues then can alternate twice daily and daily dosing for a month before going down to daily.    Great job with weight loss!  Let me know if you notice you at still dropping weight more than 2 lbs per week.     Covid and flu vaccine    Follow-up:     My Clinical Pharmacist's contact information:                                                      Umm Turner, PharmD  Medication therapy management clinical pharmacist    It was great speaking with you today.  I value your experience and would be very thankful for your time in providing feedback in our clinic survey. In the next few days, you may receive an email or text message from [employer] with a link to a survey related to your  clinical pharmacist.\"     To schedule another MTM appointment, please call the clinic directly [phone] or you may call the MTM scheduling line at [phone].    "

## 2024-09-19 RX ORDER — PANTOPRAZOLE SODIUM 40 MG/1
40 TABLET, DELAYED RELEASE ORAL DAILY
Qty: 180 TABLET | Refills: 0 | Status: SHIPPED | OUTPATIENT
Start: 2024-09-19

## 2024-09-29 ENCOUNTER — MYC REFILL (OUTPATIENT)
Dept: PEDIATRICS | Facility: CLINIC | Age: 54
End: 2024-09-29
Payer: COMMERCIAL

## 2024-09-29 ENCOUNTER — MYC REFILL (OUTPATIENT)
Dept: TRANSPLANT | Facility: CLINIC | Age: 54
End: 2024-09-29
Payer: COMMERCIAL

## 2024-09-29 DIAGNOSIS — Z94.4 LIVER TRANSPLANT RECIPIENT (H): ICD-10-CM

## 2024-09-29 DIAGNOSIS — E83.42 HYPOMAGNESEMIA: ICD-10-CM

## 2024-09-29 DIAGNOSIS — E03.8 SUBCLINICAL HYPOTHYROIDISM: ICD-10-CM

## 2024-09-30 RX ORDER — MAGNESIUM OXIDE 400 MG/1
400 TABLET ORAL 2 TIMES DAILY
Qty: 180 TABLET | Refills: 3 | Status: SHIPPED | OUTPATIENT
Start: 2024-09-30

## 2024-09-30 RX ORDER — LEVOTHYROXINE SODIUM 75 UG/1
75 TABLET ORAL DAILY
Qty: 90 TABLET | Refills: 1 | Status: SHIPPED | OUTPATIENT
Start: 2024-09-30

## 2024-10-11 NOTE — TELEPHONE ENCOUNTER
Called and left a VM for Pt RE xray order following up on the stent that was recently placed during the ERCP. No answer. Left VM for Pt to call Radiology to schedule an appt. Asked that Pt calls back if she has any questions.    Johnny Collins LPN    
No

## 2024-10-14 ENCOUNTER — LAB (OUTPATIENT)
Dept: LAB | Facility: CLINIC | Age: 54
End: 2024-10-14
Payer: COMMERCIAL

## 2024-10-14 DIAGNOSIS — T86.41 LIVER TRANSPLANT REJECTION (H): ICD-10-CM

## 2024-10-14 DIAGNOSIS — Z94.4 LIVER TRANSPLANT RECIPIENT (H): ICD-10-CM

## 2024-10-14 DIAGNOSIS — Z94.4 LIVER REPLACED BY TRANSPLANT (H): Primary | ICD-10-CM

## 2024-10-14 DIAGNOSIS — Z94.4 LIVER REPLACED BY TRANSPLANT (H): ICD-10-CM

## 2024-10-14 DIAGNOSIS — K70.31 ALCOHOLIC CIRRHOSIS OF LIVER WITH ASCITES (H): ICD-10-CM

## 2024-10-14 LAB
ALBUMIN SERPL BCG-MCNC: 4.6 G/DL (ref 3.5–5.2)
ALP SERPL-CCNC: 58 U/L (ref 40–150)
ALT SERPL W P-5'-P-CCNC: 33 U/L (ref 0–50)
ANION GAP SERPL CALCULATED.3IONS-SCNC: 11 MMOL/L (ref 7–15)
AST SERPL W P-5'-P-CCNC: 57 U/L (ref 0–45)
BILIRUB DIRECT SERPL-MCNC: <0.2 MG/DL (ref 0–0.3)
BILIRUB SERPL-MCNC: 0.5 MG/DL
BUN SERPL-MCNC: 17.7 MG/DL (ref 6–20)
CALCIUM SERPL-MCNC: 10.1 MG/DL (ref 8.8–10.4)
CHLORIDE SERPL-SCNC: 106 MMOL/L (ref 98–107)
CREAT SERPL-MCNC: 1.26 MG/DL (ref 0.51–0.95)
EGFRCR SERPLBLD CKD-EPI 2021: 50 ML/MIN/1.73M2
ERYTHROCYTE [DISTWIDTH] IN BLOOD BY AUTOMATED COUNT: 12.5 % (ref 10–15)
GLUCOSE SERPL-MCNC: 86 MG/DL (ref 70–99)
HCO3 SERPL-SCNC: 27 MMOL/L (ref 22–29)
HCT VFR BLD AUTO: 40.5 % (ref 35–47)
HGB BLD-MCNC: 14.1 G/DL (ref 11.7–15.7)
MCH RBC QN AUTO: 32.7 PG (ref 26.5–33)
MCHC RBC AUTO-ENTMCNC: 34.8 G/DL (ref 31.5–36.5)
MCV RBC AUTO: 94 FL (ref 78–100)
PLATELET # BLD AUTO: 219 10E3/UL (ref 150–450)
POTASSIUM SERPL-SCNC: 4.5 MMOL/L (ref 3.4–5.3)
PROT SERPL-MCNC: 7.1 G/DL (ref 6.4–8.3)
RBC # BLD AUTO: 4.31 10E6/UL (ref 3.8–5.2)
SODIUM SERPL-SCNC: 144 MMOL/L (ref 135–145)
TACROLIMUS BLD-MCNC: 6.7 UG/L (ref 5–15)
TME LAST DOSE: NORMAL H
TME LAST DOSE: NORMAL H
WBC # BLD AUTO: 5.2 10E3/UL (ref 4–11)

## 2024-10-14 PROCEDURE — 80053 COMPREHEN METABOLIC PANEL: CPT

## 2024-10-14 PROCEDURE — 80197 ASSAY OF TACROLIMUS: CPT

## 2024-10-14 PROCEDURE — 82248 BILIRUBIN DIRECT: CPT

## 2024-10-14 PROCEDURE — 85027 COMPLETE CBC AUTOMATED: CPT

## 2024-10-14 PROCEDURE — 36415 COLL VENOUS BLD VENIPUNCTURE: CPT

## 2024-10-15 ENCOUNTER — OFFICE VISIT (OUTPATIENT)
Dept: GASTROENTEROLOGY | Facility: CLINIC | Age: 54
End: 2024-10-15
Attending: STUDENT IN AN ORGANIZED HEALTH CARE EDUCATION/TRAINING PROGRAM
Payer: COMMERCIAL

## 2024-10-15 ENCOUNTER — TELEPHONE (OUTPATIENT)
Dept: TRANSPLANT | Facility: CLINIC | Age: 54
End: 2024-10-15

## 2024-10-15 VITALS
HEIGHT: 66 IN | WEIGHT: 164.19 LBS | BODY MASS INDEX: 26.39 KG/M2 | HEART RATE: 78 BPM | DIASTOLIC BLOOD PRESSURE: 75 MMHG | SYSTOLIC BLOOD PRESSURE: 112 MMHG | OXYGEN SATURATION: 98 %

## 2024-10-15 DIAGNOSIS — Z94.4 LIVER TRANSPLANT RECIPIENT (H): Primary | ICD-10-CM

## 2024-10-15 DIAGNOSIS — Z94.4 LIVER REPLACED BY TRANSPLANT (H): ICD-10-CM

## 2024-10-15 DIAGNOSIS — Z94.4 LIVER TRANSPLANT RECIPIENT (H): ICD-10-CM

## 2024-10-15 DIAGNOSIS — K70.31 ALCOHOLIC CIRRHOSIS OF LIVER WITH ASCITES (H): ICD-10-CM

## 2024-10-15 DIAGNOSIS — D84.9 IMMUNOSUPPRESSED STATUS (H): ICD-10-CM

## 2024-10-15 DIAGNOSIS — Z12.11 COLON CANCER SCREENING: Primary | ICD-10-CM

## 2024-10-15 PROCEDURE — 99213 OFFICE O/P EST LOW 20 MIN: CPT | Performed by: STUDENT IN AN ORGANIZED HEALTH CARE EDUCATION/TRAINING PROGRAM

## 2024-10-15 PROCEDURE — 99214 OFFICE O/P EST MOD 30 MIN: CPT | Performed by: STUDENT IN AN ORGANIZED HEALTH CARE EDUCATION/TRAINING PROGRAM

## 2024-10-15 ASSESSMENT — PAIN SCALES - GENERAL: PAINLEVEL: NO PAIN (0)

## 2024-10-15 NOTE — PROGRESS NOTES
[facility]     Date of Visit: October 15, 2024    Subjective: Ms. 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 Plastic stent left in place. Out on AXR 1/2022  - Rejection: liver bx 10/26/2021 showing HURTADO 3, with more intrahepatic cholestasis than explained on the bx - concern for biliary stricture that was later found on ERCP  - Noted to be hypercoagulable intraoperatively, postop US with elevated RI involving the extrahepatic hepatic artery and the right hepatic artery. Repeat US POD 1&2 -Patent hepatic vasculature. Continued elevated RI in the hepatic arteries. Was started on heparin gtt, developed epistaxis, GI bleeding (grade A esophagitis, residual GAVE, duodenal erosions), so that was stopped and taking  mg daily -> now 81 mg daily  - CORBIN: creatinine baseline 1-1.2, peaked at 3.9 post transplant, downtrending.    Alk phos telly earlier 2022 year without clear etiology. MRCP showing mild intrahepatic biliary dilation, including segment 4A/B. Showed T1 hypointense (series 9 image 26) and T2 mildly hyperintense (series 31 image 27) lesion within hepatic segment 7, measuring 1.1 cm.    Associated subtle hypoenhancement on portal venous phase (series 22 image image 20) becomes less conspicuous on later phase images. No associated  diffusion restriction. Wedge-shaped region of diffusion restriction, increased T2 signal, and progressive enhancement within the hepatic segments 4A/B associated with the above-described biliary dilation and irregularity. RUQ US with doppler concern for hepatic artery narrowing, follow up CTA with minimal arterial anastomotic narrowing. Vasculature was patent. Showed previous Arterial focus of enhancement in hepatic segment 8 measuring 9 mm with possible washout on portal venous phase, correlating with previously described 1.1 cm focus on most recent MRI. Follow up ERCP 4/2022 showed normal biliary tree, empiric dilation of the post transplant anastomosis to 10 mm. Temporary plastic stent placed. EUS guided liver bx showed finding suggestive of biliary obstruction, mild subsinusoidal fibrosis (seen on previous bx - thought to be donor derived. LFTs improving in the meantime without intervention, but also slightly increasing meaghan. She was having itching prior to her ERCP, improved. Got COVID earlier in 2022, but clinically was well with this    US 2022 and 1/2023 showing grossly stable pneumobilia post ERCP interventions, no biliary dilation. CBD 5 mm.     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 Now on tacrolimus with higher trough, imuran 50 mg daily and prednisone 5 mg daily.  She has been doing well.  Liver enzymes have been normal but more recently her ALT was elevated.  No recent illnesses or new medications.  She is doing Pilates and walking daily.  Doing very well with her sobriety.  Is planning a trip to [place] in December.    ROS: 14 point ROS negative except for positives noted in HPI.    PMHx:  Past Medical History:   Diagnosis Date    Ascites     GI (gastrointestinal bleed)     History of blood transfusion     Hypokalemia     Hypotension     Liver cirrhosis (H)     Liver failure (H)     Thyroid disease    Alcohol related cirrhosis    PSHx:  Past Surgical  History:   Procedure Laterality Date    ABDOMEN SURGERY       SECTION      CV RIGHT HEART CATH MEASUREMENTS RECORDED N/A 2021    Procedure: CV RIGHT HEART CATH;  Surgeon: Joseph Guevara MD;  Location:  HEART CARDIAC CATH LAB    ENDOSCOPIC RETROGRADE CHOLANGIOPANCREATOGRAM N/A 10/25/2021    Procedure: ENDOSCOPIC RETROGRADE CHOLANGIOPANCREATOGRAPHY, WITH biliary sphincterotomy, biliary dilation, and biliary stent placement;  Surgeon: Guru Dominguez Fenton MD;  Location: [facility] OR    ENDOSCOPIC RETROGRADE CHOLANGIOPANCREATOGRAM N/A 2022    Procedure: ENDOSCOPIC RETROGRADE CHOLANGIOPANCREATOGRAPHY WITH BILIARY DILATION, DEBRIS REMOVAL AND STENT PLACEMENT;  Surgeon: Guru Dominguez Fenton MD;  Location: [facility] OR    ENDOSCOPIC RETROGRADE CHOLANGIOPANCREATOGRAPHY, EXCHANGE TUBE/STENT N/A 2021    Procedure: ENDOSCOPIC RETROGRADE CHOLANGIOPANCREATOGRAPHY, WITH biliary sphincterotomy, stricture dilation, stent exchange;  Surgeon: Guru Dominguez Fenton MD;  Location: [facility] OR    ENDOSCOPIC ULTRASOUND UPPER GASTROINTESTINAL TRACT (GI) N/A 2022    Procedure: ENDOSCOPIC ULTRASOUND WITH CORE LIVER BIOPSY;  Surgeon: Guru Dominguez Fenton MD;  Location:  OR    ESOPHAGOSCOPY, GASTROSCOPY, DUODENOSCOPY (EGD), COMBINED N/A 10/07/2021    Procedure: ESOPHAGOGASTRODUODENOSCOPY (EGD) with hemostasis;  Surgeon: Fox Jerry MD;  Location:  GI    ESOPHAGOSCOPY, GASTROSCOPY, DUODENOSCOPY (EGD), COMBINED N/A 10/22/2021    Procedure: ESOPHAGOGASTRODUODENOSCOPY, WITH BIOPSY;  Surgeon: Fadia Avila MD;  Location:  GI    IR LIVER BIOPSY PERCUTANEOUS  10/26/2021    IR LIVER BIOPSY PERCUTANEOUS  2024    IR PARACENTESIS  2020    IR PARACENTESIS  2020    IR THORACENTESIS  2021    IR THORACENTESIS  2021    THORACENTESIS Left 2021    Procedure: THORACENTESIS;  Surgeon: Almas Irby MD;  Location:  GI     THORACENTESIS N/A 2021    Procedure: THORACENTESIS;  Surgeon: Almas Irby MD;  Location: [facility] GI    THORACENTESIS N/A 03/10/2021    Procedure: THORACENTESIS;  Surgeon: Almas Irby MD;  Location: [facility] GI    THORACENTESIS Left 2021    Procedure: THORACENTESIS;  Surgeon: Kennedi Chavez MD;  Location: [facility] OR    THORACENTESIS Left 2021    Procedure: THORACENTESIS;  Surgeon: Jess Ansari PA-C;  Location: [facility] OR    THORACIC SURGERY  2021    TRANSPLANT LIVER RECIPIENT  DONOR N/A 10/17/2021    Procedure: TRANSPLANT, LIVER, RECIPIENT,  DONOR;  Surgeon: Christian Morrison MD;  Location: [facility] OR     Ovarian cyst    FamHx:  No known history of liver disease    SocHx:  Social History     Socioeconomic History    Marital status: Single     Spouse name: Not on file    Number of children: Not on file    Years of education: Not on file    Highest education level: Master's degree (e.g., MA, MS, Roberto, MEd, MSW, SASHA)   Occupational History    Not on file   Tobacco Use    Smoking status: Never    Smokeless tobacco: Never   Vaping Use    Vaping status: Never Used   Substance and Sexual Activity    Alcohol use: Not Currently     Comment: Quit on 2020    Drug use: Not Currently    Sexual activity: Not Currently     Partners: Male   Other Topics Concern    Parent/sibling w/ CABG, MI or angioplasty before 65F 55M? Not Asked   Social History Narrative    Lives [place]. 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 12 year sonFlakito.         Christine Harris, DNP, APRN, CNP    3/17/2021     Social Determinants of Health     Financial Resource Strain: Low Risk  (2023)    Financial Resource Strain     Within the past 12 months, have you or your family members you live with been unable to get utilities (heat, electricity) when it was really needed?: No   Food Insecurity: Low Risk  (2023)    Food Insecurity     Within the past 12  months, did you worry that your food would run out before you got money to buy more?: No     Within the past 12 months, did the food you bought just not last and you didn t have money to get more?: No   Transportation Needs: Low Risk  (12/13/2023)    Transportation Needs     Within the past 12 months, has lack of transportation kept you from medical appointments, getting your medicines, non-medical meetings or appointments, work, or from getting things that you need?: No   Physical Activity: Sufficiently Active (4/11/2022)    Exercise Vital Sign     Days of Exercise per Week: 5 days     Minutes of Exercise per Session: 40 min   Stress: No Stress Concern Present (4/11/2022)    [employer] [place] [employer] - Occupational Stress Questionnaire     Feeling of Stress : Not at all   Social Connections: Unknown (4/11/2022)    Social Connection and Isolation Panel [NHANES]     Frequency of Communication with Friends and Family: More than three times a week     Frequency of Social Gatherings with Friends and Family: More than three times a week     Attends [employer]ism Services: Patient declined     Active Member of Clubs or Organizations: Yes     Attends Club or Organization Meetings: Not on file     Marital Status: Patient declined   Interpersonal Safety: Low Risk  (12/13/2023)    Interpersonal Safety     Do you feel physically and emotionally safe where you currently live?: Yes     Within the past 12 months, have you been hit, slapped, kicked or otherwise physically hurt by someone?: No     Within the past 12 months, have you been humiliated or emotionally abused in other ways by your partner or ex-partner?: No   Housing Stability: Low Risk  (12/13/2023)    Housing Stability     Do you have housing? : Yes     Are you worried about losing your housing?: No   Just moved back home, living with son and niece who is going to school to be a RN    Medications:  Current Outpatient Medications   Medication Sig Dispense  Refill    amLODIPine (NORVASC) 10 MG tablet Take 1 tablet (10 mg) by mouth daily 90 tablet 3    aspirin 81 MG EC tablet Take 81 mg by mouth daily      augmented betamethasone dipropionate (DIPROLENE AF) 0.05 % external cream Apply topically 2 times daily as needed.      azaTHIOprine (IMURAN) 50 MG tablet Take 1 tablet (50 mg) by mouth daily 90 tablet 3    cholecalciferol 125 MCG (5000 UT) CAPS Take 1 capsule (5,000 Units) by mouth daily      cloNIDine (CATAPRES) 0.1 MG tablet Bring to clinic in original bottle one hour prior to procedure where you will be instructed to take 1 tablet (0.1mg). 2 tablet 0    hydrocortisone 2.5 % ointment Apply topically 2 times daily as needed.      ketoconazole (NIZORAL) 2 % external cream Apply topically daily as needed.      levothyroxine (SYNTHROID/LEVOTHROID) 75 MCG tablet Take 1 tablet (75 mcg) by mouth daily. 90 tablet 1    LORazepam (ATIVAN) 1 MG tablet Bring to clinic in original bottle one hour prior to procedure where you will be instructed to take 3 tablets (3mg). 6 tablet 0    magnesium oxide (MAG-OX) 400 MG tablet Take 1 tablet (400 mg) by mouth 2 times daily. 180 tablet 3    multivitamin (THERMEMS) TABS Take 1 tablet by mouth daily 30 tablet 1    naloxone (NARCAN) 4 MG/0.1ML nasal spray Spray 1 spray (4 mg) into one nostril alternating nostrils as needed for opioid reversal. every 2-3 minutes until assistance arrives (Patient not taking: Reported on 9/10/2024) 2 each 1    ondansetron (ZOFRAN) 4 MG tablet Take 1 tablet (4 mg) by mouth every 6 hours as needed for nausea 20 tablet 0    oxyCODONE-acetaminophen (PERCOCET)  MG per tablet Take 0.5 tablets by mouth every 6 hours as needed for severe pain. (Patient not taking: Reported on 9/10/2024) 15 tablet 0    pantoprazole (PROTONIX) 40 MG EC tablet Take 1 tablet (40 mg) by mouth daily. If symptoms worsen, then may return back to twice daily dosing if needed. 180 tablet 0    predniSONE (DELTASONE) 5 MG tablet Take 1  "tablet (5 mg) by mouth daily 90 tablet 3    tacrolimus (GENERIC EQUIVALENT) 1 MG capsule Take 2 capsules (2 mg) by mouth every morning AND 1 capsule (1 mg) every evening. 270 capsule 3    tirzepatide-Weight Management (ZEPBOUND) 15 MG/0.5ML prefilled pen Inject 0.5 mLs (15 mg) subcutaneously every 7 days. 6 mL 3    tretinoin (RETIN-A) 0.025 % external cream Apply a pea size to entire face QD 45 g 11    ursodiol (ACTIGALL) 300 MG capsule Take 300 mg by mouth daily       No current facility-administered medications for this visit.     Allergies:     Allergies   Allergen Reactions    Amoxicillin GI Disturbance, Diarrhea, Nausea and Nausea and Vomiting     Objective:  /75 (BP Location: Left arm, Cuff Size: Adult Regular)   Pulse 78   Ht 1.676 m (5' 6\")   Wt 74.5 kg (164 lb 3 oz)   LMP  (LMP Unknown)   SpO2 98%   BMI 26.50 kg/m    Constitutional: Pleasant woman in NAD  Eyes: nonicteric  Respiratory: Breathing comfortably on RA  Neuro: AOOX3  Psychiatric: normal mood and orientation    Labs:  Last Comprehensive Metabolic Panel:  Sodium   Date Value Ref Range Status   10/14/2024 144 135 - 145 mmol/L Final   06/07/2021 140 133 - 144 mmol/L Final     Potassium   Date Value Ref Range Status   10/14/2024 4.5 3.4 - 5.3 mmol/L Final   10/24/2022 4.6 3.4 - 5.3 mmol/L Final   06/07/2021 3.4 3.4 - 5.3 mmol/L Final     Chloride   Date Value Ref Range Status   10/14/2024 106 98 - 107 mmol/L Final   10/24/2022 110 (H) 94 - 109 mmol/L Final   06/07/2021 106 94 - 109 mmol/L Final     Carbon Dioxide   Date Value Ref Range Status   06/07/2021 28 20 - 32 mmol/L Final     Carbon Dioxide (CO2)   Date Value Ref Range Status   10/14/2024 27 22 - 29 mmol/L Final   10/24/2022 24 20 - 32 mmol/L Final     Anion Gap   Date Value Ref Range Status   10/14/2024 11 7 - 15 mmol/L Final   10/24/2022 7 3 - 14 mmol/L Final   06/07/2021 6 3 - 14 mmol/L Final     Glucose   Date Value Ref Range Status   10/14/2024 86 70 - 99 mg/dL Final "   10/24/2022 105 (H) 70 - 99 mg/dL Final   06/07/2021 138 (H) 70 - 99 mg/dL Final     GLUCOSE BY METER POCT   Date Value Ref Range Status   02/02/2024 124 (H) 70 - 99 mg/dL Final     Urea Nitrogen   Date Value Ref Range Status   10/14/2024 17.7 6.0 - 20.0 mg/dL Final   10/24/2022 30 7 - 30 mg/dL Final   06/07/2021 14 7 - 30 mg/dL Final     Creatinine   Date Value Ref Range Status   10/14/2024 1.26 (H) 0.51 - 0.95 mg/dL Final   06/07/2021 0.96 0.52 - 1.04 mg/dL Final     GFR Estimate   Date Value Ref Range Status   10/14/2024 50 (L) >60 mL/min/1.73m2 Final     Comment:     eGFR calculated using 2021 CKD-EPI equation.   06/07/2021 68 >60 mL/min/[1.73_m2] Final     Comment:     Non  GFR Calc  Starting 12/18/2018, serum creatinine based estimated GFR (eGFR) will be   calculated using the Chronic Kidney Disease Epidemiology Collaboration   (CKD-EPI) equation.       Calcium   Date Value Ref Range Status   10/14/2024 10.1 8.8 - 10.4 mg/dL Final     Comment:     Reference intervals for this test were updated on 7/16/2024 to reflect our healthy population more accurately. There may be differences in the flagging of prior results with similar values performed with this method. Those prior results can be interpreted in the context of the updated reference intervals.   06/07/2021 8.7 8.5 - 10.1 mg/dL Final     Bilirubin Total   Date Value Ref Range Status   10/14/2024 0.5 <=1.2 mg/dL Final   06/07/2021 5.6 (H) 0.2 - 1.3 mg/dL Final     Alkaline Phosphatase   Date Value Ref Range Status   10/14/2024 58 40 - 150 U/L Final   06/07/2021 150 40 - 150 U/L Final     ALT   Date Value Ref Range Status   10/14/2024 33 0 - 50 U/L Final   06/07/2021 18 0 - 50 U/L Final     AST   Date Value Ref Range Status   10/14/2024 57 (H) 0 - 45 U/L Final   06/07/2021 35 0 - 45 U/L Final     Imaging: Reviewed in EHR    Endoscopy:    12/4/2020 Colonoscopy    Findings:       Hemorrhoids were found on perianal exam.       The terminal  ileum appeared normal.       An area of grossly congested mucosa was found in the entire colon.       A 4 mm polyp was found in the cecum. The polyp was sessile. The polyp        was removed with a cold biopsy forceps. Resection and retrieval were        complete.       Non-bleeding internal hemorrhoids were found during retroflexion.    Impressions/Post-Op Diagnosis:       - Hemorrhoids found on perianal exam.       - The examined portion of the ileum was normal.       - Congested mucosa in the entire examined colon.       - One 4 mm polyp in the cecum, removed with a cold biopsy forceps.        Resected and retrieved.       - Non-bleeding internal hemorrhoids.    A) COLON, CECUM, POLYPECTOMY:  1. Colonic mucosa with no diagnostic abnormalities; a lymphoid aggregate is present  2. Negative for serrated change, dysplasia, and malignancy    Assessment/Plan: Ms. 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     Post transplant course complicated by CORBIN, biliary anastomotic stricture.     Bx ~ 9 days post transplant showing intrahepatic cholestasis and only mild (3/9) rejection, findings likely 2/2 anastomotic stricture that she underwent ERCP for.  Had recurrent rejection January 2024.  This has plasma rich features.  She has been increased immunosuppression since then.     Immunosuppression: On higher immunosuppression given her positive rejection.  Continue tacrolimus with goal trough 5-7, Imuran 50 mg daily and prednisone 5 mg daily.  Repeat labs that at this week.  If they are still elevated recommend a liver bx  Elevated alkaline phosphatase: Resolved okay to stop ursodiol  She is doing well with her sobriety  Discussed maintenance including Pap smear this December, and mammogram.  Due for colonoscopy next year    RV 12 months    Fadia  MD Austin MSc  Transplant Hepatology  [facility]    HME    Mammogam 2023, repeat annually  Pap smear Fall 2022, repeat annually given immunosuppressed  Repeat colonoscopy every 5 years, due 2025

## 2024-10-15 NOTE — LETTER
10/15/2024      Sarah Fisher  [address] [place]  [facility] [address]      Dear Colleague,    Thank you for referring your patient, Sarah Fisher, to the [facility] [place]. Please see a copy of my visit note below.    [facility]     Date of Visit: October 15, 2024    Subjective: Ms. 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 Plastic stent left in place. Out on AXR 1/2022  - Rejection: liver bx 10/26/2021 showing HURTDAO 3, with more intrahepatic cholestasis than explained on the bx - concern for biliary stricture that was later found on ERCP  - Noted to be hypercoagulable intraoperatively, postop US with elevated RI involving the extrahepatic hepatic artery and the right hepatic artery. Repeat US POD 1&2 -Patent hepatic vasculature. Continued elevated RI in the hepatic arteries. Was started on heparin gtt, developed epistaxis, GI bleeding (grade A esophagitis, residual GAVE, duodenal erosions), so that was stopped and taking  mg daily -> now 81 mg daily  - CORBIN: creatinine baseline 1-1.2, peaked at 3.9 post transplant, downtrending.    Alk phos telly earlier 2022 year without clear etiology. MRCP showing mild intrahepatic biliary dilation, including segment 4A/B. Showed T1 hypointense  (series 9 image 26) and T2 mildly hyperintense (series 31 image 27) lesion within hepatic segment 7, measuring 1.1 cm.    Associated subtle hypoenhancement on portal venous phase (series 22 image image 20) becomes less conspicuous on later phase images. No associated diffusion restriction. Wedge-shaped region of diffusion restriction, increased T2 signal, and progressive enhancement within the hepatic segments 4A/B associated with the above-described biliary dilation and irregularity. RUQ US with doppler concern for hepatic artery narrowing, follow up CTA with minimal arterial anastomotic narrowing. Vasculature was patent. Showed previous Arterial focus of enhancement in hepatic segment 8 measuring 9 mm with possible washout on portal venous phase, correlating with previously described 1.1 cm focus on most recent MRI. Follow up ERCP 4/2022 showed normal biliary tree, empiric dilation of the post transplant anastomosis to 10 mm. Temporary plastic stent placed. EUS guided liver bx showed finding suggestive of biliary obstruction, mild subsinusoidal fibrosis (seen on previous bx - thought to be donor derived. LFTs improving in the meantime without intervention, but also slightly increasing meaghan. She was having itching prior to her ERCP, improved. Got COVID earlier in 2022, but clinically was well with this    US 2022 and 1/2023 showing grossly stable pneumobilia post ERCP interventions, no biliary dilation. CBD 5 mm.     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 Now on tacrolimus with higher trough, imuran 50 mg daily and prednisone 5 mg daily.  She has been doing well.  Liver enzymes have been normal but more recently her ALT was elevated.  No recent illnesses or new medications.  She is doing Pilates and walking daily.  Doing very well with her sobriety.  Is planning a trip to [place] in December.    ROS: 14 point ROS negative except for positives noted in  HPI.    PMHx:  Past Medical History:   Diagnosis Date     Ascites      GI (gastrointestinal bleed)      History of blood transfusion      Hypokalemia      Hypotension      Liver cirrhosis (H)      Liver failure (H)      Thyroid disease    Alcohol related cirrhosis    PSHx:  Past Surgical History:   Procedure Laterality Date     ABDOMEN SURGERY        SECTION       CV RIGHT HEART CATH MEASUREMENTS RECORDED N/A 2021    Procedure: CV RIGHT HEART CATH;  Surgeon: Joseph Guevara MD;  [facility]     ENDOSCOPIC RETROGRADE CHOLANGIOPANCREATOGRAM N/A 10/25/2021    Procedure: ENDOSCOPIC RETROGRADE CHOLANGIOPANCREATOGRAPHY, WITH biliary sphincterotomy, biliary dilation, and biliary stent placement;  Surgeon: Guru Dominguez Fenton MD;  [facility]     ENDOSCOPIC RETROGRADE CHOLANGIOPANCREATOGRAM N/A 2022    Procedure: ENDOSCOPIC RETROGRADE CHOLANGIOPANCREATOGRAPHY WITH BILIARY DILATION, DEBRIS REMOVAL AND STENT PLACEMENT;  Surgeon: Guru Dominguez Fenton MD;  [facility]     ENDOSCOPIC RETROGRADE CHOLANGIOPANCREATOGRAPHY, EXCHANGE TUBE/STENT N/A 2021    Procedure: ENDOSCOPIC RETROGRADE CHOLANGIOPANCREATOGRAPHY, WITH biliary sphincterotomy, stricture dilation, stent exchange;  Surgeon: Guru Dominguez Fenton MD;  [facility]     ENDOSCOPIC ULTRASOUND UPPER GASTROINTESTINAL TRACT (GI) N/A 2022    Procedure: ENDOSCOPIC ULTRASOUND WITH CORE LIVER BIOPSY;  Surgeon: Guru Dominguez Fenton MD;  [facility]     ESOPHAGOSCOPY, GASTROSCOPY, DUODENOSCOPY (EGD), COMBINED N/A 10/07/2021    Procedure: ESOPHAGOGASTRODUODENOSCOPY (EGD) with hemostasis;  Surgeon: Fox Jerry MD;  Location:  GI     ESOPHAGOSCOPY, GASTROSCOPY, DUODENOSCOPY (EGD), COMBINED N/A 10/22/2021    Procedure: ESOPHAGOGASTRODUODENOSCOPY, WITH BIOPSY;  Surgeon: Fadia Avila MD;  Location: [facility] GI     IR LIVER BIOPSY  PERCUTANEOUS  10/26/2021     IR LIVER BIOPSY PERCUTANEOUS  2024     IR PARACENTESIS  2020     IR PARACENTESIS  2020     IR THORACENTESIS  2021     IR THORACENTESIS  2021     THORACENTESIS Left 2021    Procedure: THORACENTESIS;  Surgeon: Almas Irby MD;  Location: [facility]     THORACENTESIS N/A 2021    Procedure: THORACENTESIS;  Surgeon: Almas Irby MD;  [facility]: [facility]     THORACENTESIS N/A 03/10/2021    Procedure: THORACENTESIS;  Surgeon: Almas Irby MD;  Location: [facility]     THORACENTESIS Left 2021    Procedure: THORACENTESIS;  Surgeon: Kennedi Chavez MD;  Location: [facility] OR     THORACENTESIS Left 2021    Procedure: THORACENTESIS;  Surgeon: Jess Ansari PA-C;  [place] [facility] [place]     THORACIC SURGERY  2021     TRANSPLANT LIVER RECIPIENT  DONOR N/A 10/17/2021    Procedure: TRANSPLANT, LIVER, RECIPIENT,  DONOR;  Surgeon: Christian Morrison MD;  Location: [facility]     Ovarian cyst    FamHx:  No known history of liver disease    SocHx:  Social History     Socioeconomic History     Marital status: Single     Spouse name: Not on file     Number of children: Not on file     Years of education: Not on file     Highest education level: Master's degree (e.g., MA, MS, Roberto, MEd, MSW, SASHA)   Occupational History     Not on file   Tobacco Use     Smoking status: Never     Smokeless tobacco: Never   Vaping Use     Vaping status: Never Used   Substance and Sexual Activity     Alcohol use: Not Currently     Comment: Quit on 2020     Drug use: Not Currently     Sexual activity: Not Currently     Partners: Male   Other Topics Concern     Parent/sibling w/ CABG, MI or angioplasty before 65F 55M? Not Asked   Social History Narrative    Lives [place]. 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 12 year sonFlakito.         Christine Harris, DNP, APRN, CNP    3/17/2021     Social Determinants of Health      Financial Resource Strain: Low Risk  (12/13/2023)    Financial Resource Strain      Within the past 12 months, have you or your family members you live with been unable to get utilities (heat, electricity) when it was really needed?: No   Food Insecurity: Low Risk  (12/13/2023)    Food Insecurity      Within the past 12 months, did you worry that your food would run out before you got money to buy more?: No      Within the past 12 months, did the food you bought just not last and you didn t have money to get more?: No   Transportation Needs: Low Risk  (12/13/2023)    Transportation Needs      Within the past 12 months, has lack of transportation kept you from medical appointments, getting your medicines, non-medical meetings or appointments, work, or from getting things that you need?: No   Physical Activity: Sufficiently Active (4/11/2022)    Exercise Vital Sign      Days of Exercise per Week: 5 days      Minutes of Exercise per Session: 40 min   Stress: No Stress Concern Present (4/11/2022)    [place] [employer] [place] [employer] - Occupational Stress Questionnaire      Feeling of Stress : Not at all   Social Connections: Unknown (4/11/2022)    Social Connection and Isolation Panel [NHANES]      Frequency of Communication with Friends and Family: More than three times a week      Frequency of Social Gatherings with Friends and Family: More than three times a week      Attends Restoration Services: Patient declined      Active Member of Clubs or Organizations: Yes      Attends Club or Organization Meetings: Not on file      Marital Status: Patient declined   Interpersonal Safety: Low Risk  (12/13/2023)    Interpersonal Safety      Do you feel physically and emotionally safe where you currently live?: Yes      Within the past 12 months, have you been hit, slapped, kicked or otherwise physically hurt by someone?: No      Within the past 12 months, have you been humiliated or emotionally abused in other ways by  your partner or ex-partner?: No   Housing Stability: Low Risk  (12/13/2023)    Housing Stability      Do you have housing? : Yes      Are you worried about losing your housing?: No   Just moved back home, living with son and niece who is going to school to be a RN    Medications:  Current Outpatient Medications   Medication Sig Dispense Refill     amLODIPine (NORVASC) 10 MG tablet Take 1 tablet (10 mg) by mouth daily 90 tablet 3     aspirin 81 MG EC tablet Take 81 mg by mouth daily       augmented betamethasone dipropionate (DIPROLENE AF) 0.05 % external cream Apply topically 2 times daily as needed.       azaTHIOprine (IMURAN) 50 MG tablet Take 1 tablet (50 mg) by mouth daily 90 tablet 3     cholecalciferol 125 MCG (5000 UT) CAPS Take 1 capsule (5,000 Units) by mouth daily       cloNIDine (CATAPRES) 0.1 MG tablet Bring to clinic in original bottle one hour prior to procedure where you will be instructed to take 1 tablet (0.1mg). 2 tablet 0     hydrocortisone 2.5 % ointment Apply topically 2 times daily as needed.       ketoconazole (NIZORAL) 2 % external cream Apply topically daily as needed.       levothyroxine (SYNTHROID/LEVOTHROID) 75 MCG tablet Take 1 tablet (75 mcg) by mouth daily. 90 tablet 1     LORazepam (ATIVAN) 1 MG tablet Bring to clinic in original bottle one hour prior to procedure where you will be instructed to take 3 tablets (3mg). 6 tablet 0     magnesium oxide (MAG-OX) 400 MG tablet Take 1 tablet (400 mg) by mouth 2 times daily. 180 tablet 3     multivitamin (THERMEMS) TABS Take 1 tablet by mouth daily 30 tablet 1     naloxone (NARCAN) 4 MG/0.1ML nasal spray Spray 1 spray (4 mg) into one nostril alternating nostrils as needed for opioid reversal. every 2-3 minutes until assistance arrives (Patient not taking: Reported on 9/10/2024) 2 each 1     ondansetron (ZOFRAN) 4 MG tablet Take 1 tablet (4 mg) by mouth every 6 hours as needed for nausea 20 tablet 0     oxyCODONE-acetaminophen (PERCOCET)   "MG per tablet Take 0.5 tablets by mouth every 6 hours as needed for severe pain. (Patient not taking: Reported on 9/10/2024) 15 tablet 0     pantoprazole (PROTONIX) 40 MG EC tablet Take 1 tablet (40 mg) by mouth daily. If symptoms worsen, then may return back to twice daily dosing if needed. 180 tablet 0     predniSONE (DELTASONE) 5 MG tablet Take 1 tablet (5 mg) by mouth daily 90 tablet 3     tacrolimus (GENERIC EQUIVALENT) 1 MG capsule Take 2 capsules (2 mg) by mouth every morning AND 1 capsule (1 mg) every evening. 270 capsule 3     tirzepatide-Weight Management (ZEPBOUND) 15 MG/0.5ML prefilled pen Inject 0.5 mLs (15 mg) subcutaneously every 7 days. 6 mL 3     tretinoin (RETIN-A) 0.025 % external cream Apply a pea size to entire face QD 45 g 11     ursodiol (ACTIGALL) 300 MG capsule Take 300 mg by mouth daily       No current facility-administered medications for this visit.     Allergies:     Allergies   Allergen Reactions     Amoxicillin GI Disturbance, Diarrhea, Nausea and Nausea and Vomiting     Objective:  /75 (BP Location: Left arm, Cuff Size: Adult Regular)   Pulse 78   Ht 1.676 m (5' 6\")   Wt 74.5 kg (164 lb 3 oz)   LMP  (LMP Unknown)   SpO2 98%   BMI 26.50 kg/m    Constitutional: Pleasant woman in NAD  Eyes: nonicteric  Respiratory: Breathing comfortably on RA  Neuro: AOOX3  Psychiatric: normal mood and orientation    Labs:  Last Comprehensive Metabolic Panel:  Sodium   Date Value Ref Range Status   10/14/2024 144 135 - 145 mmol/L Final   06/07/2021 140 133 - 144 mmol/L Final     Potassium   Date Value Ref Range Status   10/14/2024 4.5 3.4 - 5.3 mmol/L Final   10/24/2022 4.6 3.4 - 5.3 mmol/L Final   06/07/2021 3.4 3.4 - 5.3 mmol/L Final     Chloride   Date Value Ref Range Status   10/14/2024 106 98 - 107 mmol/L Final   10/24/2022 110 (H) 94 - 109 mmol/L Final   06/07/2021 106 94 - 109 mmol/L Final     Carbon Dioxide   Date Value Ref Range Status   06/07/2021 28 20 - 32 mmol/L Final     Carbon " Dioxide (CO2)   Date Value Ref Range Status   10/14/2024 27 22 - 29 mmol/L Final   10/24/2022 24 20 - 32 mmol/L Final     Anion Gap   Date Value Ref Range Status   10/14/2024 11 7 - 15 mmol/L Final   10/24/2022 7 3 - 14 mmol/L Final   06/07/2021 6 3 - 14 mmol/L Final     Glucose   Date Value Ref Range Status   10/14/2024 86 70 - 99 mg/dL Final   10/24/2022 105 (H) 70 - 99 mg/dL Final   06/07/2021 138 (H) 70 - 99 mg/dL Final     GLUCOSE BY METER POCT   Date Value Ref Range Status   02/02/2024 124 (H) 70 - 99 mg/dL Final     Urea Nitrogen   Date Value Ref Range Status   10/14/2024 17.7 6.0 - 20.0 mg/dL Final   10/24/2022 30 7 - 30 mg/dL Final   06/07/2021 14 7 - 30 mg/dL Final     Creatinine   Date Value Ref Range Status   10/14/2024 1.26 (H) 0.51 - 0.95 mg/dL Final   06/07/2021 0.96 0.52 - 1.04 mg/dL Final     GFR Estimate   Date Value Ref Range Status   10/14/2024 50 (L) >60 mL/min/1.73m2 Final     Comment:     eGFR calculated using 2021 CKD-EPI equation.   06/07/2021 68 >60 mL/min/[1.73_m2] Final     Comment:     Non  GFR Calc  Starting 12/18/2018, serum creatinine based estimated GFR (eGFR) will be   calculated using the Chronic Kidney Disease Epidemiology Collaboration   (CKD-EPI) equation.       Calcium   Date Value Ref Range Status   10/14/2024 10.1 8.8 - 10.4 mg/dL Final     Comment:     Reference intervals for this test were updated on 7/16/2024 to reflect our healthy population more accurately. There may be differences in the flagging of prior results with similar values performed with this method. Those prior results can be interpreted in the context of the updated reference intervals.   06/07/2021 8.7 8.5 - 10.1 mg/dL Final     Bilirubin Total   Date Value Ref Range Status   10/14/2024 0.5 <=1.2 mg/dL Final   06/07/2021 5.6 (H) 0.2 - 1.3 mg/dL Final     Alkaline Phosphatase   Date Value Ref Range Status   10/14/2024 58 40 - 150 U/L Final   06/07/2021 150 40 - 150 U/L Final     ALT   Date  Value Ref Range Status   10/14/2024 33 0 - 50 U/L Final   06/07/2021 18 0 - 50 U/L Final     AST   Date Value Ref Range Status   10/14/2024 57 (H) 0 - 45 U/L Final   06/07/2021 35 0 - 45 U/L Final     Imaging: Reviewed in EHR    Endoscopy:    12/4/2020 Colonoscopy    Findings:       Hemorrhoids were found on perianal exam.       The terminal ileum appeared normal.       An area of grossly congested mucosa was found in the entire colon.       A 4 mm polyp was found in the cecum. The polyp was sessile. The polyp        was removed with a cold biopsy forceps. Resection and retrieval were        complete.       Non-bleeding internal hemorrhoids were found during retroflexion.    Impressions/Post-Op Diagnosis:       - Hemorrhoids found on perianal exam.       - The examined portion of the ileum was normal.       - Congested mucosa in the entire examined colon.       - One 4 mm polyp in the cecum, removed with a cold biopsy forceps.        Resected and retrieved.       - Non-bleeding internal hemorrhoids.    A) COLON, CECUM, POLYPECTOMY:  1. Colonic mucosa with no diagnostic abnormalities; a lymphoid aggregate is present  2. Negative for serrated change, dysplasia, and malignancy    Assessment/Plan: Ms. 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     Post transplant course complicated by CORBIN, biliary anastomotic stricture.     Bx ~ 9 days post transplant showing intrahepatic cholestasis and only mild (3/9) rejection, findings likely 2/2 anastomotic stricture that she underwent ERCP for.  Had recurrent rejection January 2024.  This has plasma rich features.  She has been increased immunosuppression since then.     Immunosuppression: On higher immunosuppression given her positive rejection.  Continue tacrolimus with goal trough 5-7, Imuran 50 mg  daily and prednisone 5 mg daily.  Repeat labs that at this week.  If they are still elevated recommend a liver bx  Elevated alkaline phosphatase: Resolved okay to stop ursodiol  She is doing well with her sobriety  Discussed maintenance including Pap smear this December, and mammogram.  Due for colonoscopy next year    RV 12 months    Fadia Avila MD MSc  Transplant Hepatology  [facility]    Mammogam 2023, repeat annually  Pap smear Fall 2022, repeat annually given immunosuppressed  Repeat colonoscopy every 5 years, due 2025          Again, thank you for allowing me to participate in the care of your patient.        Sincerely,        Fadia Avila MD

## 2024-10-18 ENCOUNTER — LAB (OUTPATIENT)
Dept: LAB | Facility: CLINIC | Age: 54
End: 2024-10-18
Payer: COMMERCIAL

## 2024-10-18 DIAGNOSIS — Z94.4 LIVER TRANSPLANT RECIPIENT (H): ICD-10-CM

## 2024-10-18 DIAGNOSIS — K70.31 ALCOHOLIC CIRRHOSIS OF LIVER WITH ASCITES (H): ICD-10-CM

## 2024-10-18 DIAGNOSIS — Z94.4 LIVER REPLACED BY TRANSPLANT (H): ICD-10-CM

## 2024-10-18 LAB
ALBUMIN MFR UR ELPH: 43.4 MG/DL
ALBUMIN SERPL BCG-MCNC: 4.4 G/DL (ref 3.5–5.2)
ALBUMIN UR-MCNC: 30 MG/DL
ALP SERPL-CCNC: 50 U/L (ref 40–150)
ALT SERPL W P-5'-P-CCNC: 19 U/L (ref 0–50)
ANION GAP SERPL CALCULATED.3IONS-SCNC: 9 MMOL/L (ref 7–15)
APPEARANCE UR: CLEAR
AST SERPL W P-5'-P-CCNC: 21 U/L (ref 0–45)
BACTERIA #/AREA URNS HPF: ABNORMAL /HPF
BILIRUB DIRECT SERPL-MCNC: 0.2 MG/DL (ref 0–0.3)
BILIRUB SERPL-MCNC: 0.6 MG/DL
BILIRUB UR QL STRIP: ABNORMAL
BUN SERPL-MCNC: 20.5 MG/DL (ref 6–20)
CALCIUM SERPL-MCNC: 10.3 MG/DL (ref 8.8–10.4)
CHLORIDE SERPL-SCNC: 106 MMOL/L (ref 98–107)
COLOR UR AUTO: YELLOW
CREAT SERPL-MCNC: 1.33 MG/DL (ref 0.51–0.95)
CREAT UR-MCNC: 488 MG/DL
EGFRCR SERPLBLD CKD-EPI 2021: 47 ML/MIN/1.73M2
ERYTHROCYTE [DISTWIDTH] IN BLOOD BY AUTOMATED COUNT: 12.6 % (ref 10–15)
GLUCOSE SERPL-MCNC: 80 MG/DL (ref 70–99)
GLUCOSE UR STRIP-MCNC: NEGATIVE MG/DL
HCO3 SERPL-SCNC: 27 MMOL/L (ref 22–29)
HCT VFR BLD AUTO: 40 % (ref 35–47)
HGB BLD-MCNC: 13.4 G/DL (ref 11.7–15.7)
HGB UR QL STRIP: NEGATIVE
HYALINE CASTS #/AREA URNS LPF: ABNORMAL /LPF
KETONES UR STRIP-MCNC: 15 MG/DL
LEUKOCYTE ESTERASE UR QL STRIP: ABNORMAL
MAGNESIUM SERPL-MCNC: 1.8 MG/DL (ref 1.7–2.3)
MCH RBC QN AUTO: 31.5 PG (ref 26.5–33)
MCHC RBC AUTO-ENTMCNC: 33.5 G/DL (ref 31.5–36.5)
MCV RBC AUTO: 94 FL (ref 78–100)
MUCOUS THREADS #/AREA URNS LPF: PRESENT /LPF
NITRATE UR QL: NEGATIVE
PH UR STRIP: 6 [PH] (ref 5–7)
PHOSPHATE SERPL-MCNC: 3.5 MG/DL (ref 2.5–4.5)
PLATELET # BLD AUTO: 201 10E3/UL (ref 150–450)
POTASSIUM SERPL-SCNC: 5.2 MMOL/L (ref 3.4–5.3)
PROT SERPL-MCNC: 6.6 G/DL (ref 6.4–8.3)
PROT/CREAT 24H UR: 0.09 MG/MG CR (ref 0–0.2)
RBC # BLD AUTO: 4.25 10E6/UL (ref 3.8–5.2)
RBC #/AREA URNS AUTO: ABNORMAL /HPF
SODIUM SERPL-SCNC: 142 MMOL/L (ref 135–145)
SP GR UR STRIP: 1.02 (ref 1–1.03)
SQUAMOUS #/AREA URNS AUTO: ABNORMAL /LPF
TACROLIMUS BLD-MCNC: 5.2 UG/L (ref 5–15)
TME LAST DOSE: NORMAL H
TME LAST DOSE: NORMAL H
UROBILINOGEN UR STRIP-ACNC: 1 E.U./DL
WBC # BLD AUTO: 5.4 10E3/UL (ref 4–11)
WBC #/AREA URNS AUTO: ABNORMAL /HPF

## 2024-10-18 PROCEDURE — 83735 ASSAY OF MAGNESIUM: CPT

## 2024-10-18 PROCEDURE — 99000 SPECIMEN HANDLING OFFICE-LAB: CPT

## 2024-10-18 PROCEDURE — 84156 ASSAY OF PROTEIN URINE: CPT

## 2024-10-18 PROCEDURE — 36415 COLL VENOUS BLD VENIPUNCTURE: CPT

## 2024-10-18 PROCEDURE — 82248 BILIRUBIN DIRECT: CPT

## 2024-10-18 PROCEDURE — G0480 DRUG TEST DEF 1-7 CLASSES: HCPCS | Mod: 90

## 2024-10-18 PROCEDURE — 85027 COMPLETE CBC AUTOMATED: CPT

## 2024-10-18 PROCEDURE — 80053 COMPREHEN METABOLIC PANEL: CPT

## 2024-10-18 PROCEDURE — 80197 ASSAY OF TACROLIMUS: CPT

## 2024-10-18 PROCEDURE — 84100 ASSAY OF PHOSPHORUS: CPT

## 2024-10-18 PROCEDURE — 81001 URINALYSIS AUTO W/SCOPE: CPT

## 2024-10-21 LAB
LABORATORY REPORT: NORMAL
PETH INTERPRETATION: NORMAL
PLPETH BLD-MCNC: <10 NG/ML
POPETH BLD-MCNC: <10 NG/ML

## 2024-10-30 ENCOUNTER — MYC REFILL (OUTPATIENT)
Dept: TRANSPLANT | Facility: CLINIC | Age: 54
End: 2024-10-30

## 2024-10-30 ENCOUNTER — OFFICE VISIT (OUTPATIENT)
Dept: FAMILY MEDICINE | Facility: CLINIC | Age: 54
End: 2024-10-30
Payer: COMMERCIAL

## 2024-10-30 VITALS
RESPIRATION RATE: 16 BRPM | DIASTOLIC BLOOD PRESSURE: 78 MMHG | SYSTOLIC BLOOD PRESSURE: 114 MMHG | TEMPERATURE: 97.5 F | OXYGEN SATURATION: 99 % | HEIGHT: 66 IN | HEART RATE: 82 BPM | BODY MASS INDEX: 25.76 KG/M2 | WEIGHT: 160.3 LBS

## 2024-10-30 DIAGNOSIS — Z94.4 LIVER TRANSPLANTED (H): ICD-10-CM

## 2024-10-30 DIAGNOSIS — Z01.84 IMMUNITY STATUS TESTING: ICD-10-CM

## 2024-10-30 DIAGNOSIS — Z71.84 TRAVEL ADVICE ENCOUNTER: Primary | ICD-10-CM

## 2024-10-30 DIAGNOSIS — T86.41 LIVER TRANSPLANT REJECTION (H): ICD-10-CM

## 2024-10-30 PROCEDURE — 90691 TYPHOID VACCINE IM: CPT | Mod: GA | Performed by: NURSE PRACTITIONER

## 2024-10-30 PROCEDURE — 90472 IMMUNIZATION ADMIN EACH ADD: CPT | Mod: GA | Performed by: NURSE PRACTITIONER

## 2024-10-30 PROCEDURE — 86762 RUBELLA ANTIBODY: CPT | Mod: GA | Performed by: NURSE PRACTITIONER

## 2024-10-30 PROCEDURE — 36415 COLL VENOUS BLD VENIPUNCTURE: CPT | Mod: GA | Performed by: NURSE PRACTITIONER

## 2024-10-30 PROCEDURE — 86735 MUMPS ANTIBODY: CPT | Mod: GA | Performed by: NURSE PRACTITIONER

## 2024-10-30 PROCEDURE — 90715 TDAP VACCINE 7 YRS/> IM: CPT | Mod: GA | Performed by: NURSE PRACTITIONER

## 2024-10-30 PROCEDURE — 86765 RUBEOLA ANTIBODY: CPT | Mod: GA | Performed by: NURSE PRACTITIONER

## 2024-10-30 PROCEDURE — 90471 IMMUNIZATION ADMIN: CPT | Mod: GA | Performed by: NURSE PRACTITIONER

## 2024-10-30 PROCEDURE — 99402 PREV MED CNSL INDIV APPRX 30: CPT | Mod: 25 | Performed by: NURSE PRACTITIONER

## 2024-10-30 RX ORDER — ATOVAQUONE AND PROGUANIL HYDROCHLORIDE 250; 100 MG/1; MG/1
1 TABLET, FILM COATED ORAL DAILY
Qty: 12 TABLET | Refills: 0 | Status: SHIPPED | OUTPATIENT
Start: 2024-10-30

## 2024-10-30 RX ORDER — AZITHROMYCIN 500 MG/1
500 TABLET, FILM COATED ORAL DAILY
Qty: 3 TABLET | Refills: 0 | Status: SHIPPED | OUTPATIENT
Start: 2024-10-30 | End: 2024-11-02

## 2024-10-30 ASSESSMENT — ANXIETY QUESTIONNAIRES
8. IF YOU CHECKED OFF ANY PROBLEMS, HOW DIFFICULT HAVE THESE MADE IT FOR YOU TO DO YOUR WORK, TAKE CARE OF THINGS AT HOME, OR GET ALONG WITH OTHER PEOPLE?: NOT DIFFICULT AT ALL
7. FEELING AFRAID AS IF SOMETHING AWFUL MIGHT HAPPEN: NOT AT ALL
3. WORRYING TOO MUCH ABOUT DIFFERENT THINGS: NOT AT ALL
2. NOT BEING ABLE TO STOP OR CONTROL WORRYING: NOT AT ALL
IF YOU CHECKED OFF ANY PROBLEMS ON THIS QUESTIONNAIRE, HOW DIFFICULT HAVE THESE PROBLEMS MADE IT FOR YOU TO DO YOUR WORK, TAKE CARE OF THINGS AT HOME, OR GET ALONG WITH OTHER PEOPLE: NOT DIFFICULT AT ALL
GAD7 TOTAL SCORE: 0
5. BEING SO RESTLESS THAT IT IS HARD TO SIT STILL: NOT AT ALL
GAD7 TOTAL SCORE: 0
6. BECOMING EASILY ANNOYED OR IRRITABLE: NOT AT ALL
4. TROUBLE RELAXING: NOT AT ALL
1. FEELING NERVOUS, ANXIOUS, OR ON EDGE: NOT AT ALL
GAD7 TOTAL SCORE: 0
7. FEELING AFRAID AS IF SOMETHING AWFUL MIGHT HAPPEN: NOT AT ALL

## 2024-10-30 ASSESSMENT — PAIN SCALES - GENERAL: PAINLEVEL_OUTOF10: NO PAIN (0)

## 2024-10-30 ASSESSMENT — PATIENT HEALTH QUESTIONNAIRE - PHQ9
SUM OF ALL RESPONSES TO PHQ QUESTIONS 1-9: 0
10. IF YOU CHECKED OFF ANY PROBLEMS, HOW DIFFICULT HAVE THESE PROBLEMS MADE IT FOR YOU TO DO YOUR WORK, TAKE CARE OF THINGS AT HOME, OR GET ALONG WITH OTHER PEOPLE: NOT DIFFICULT AT ALL
SUM OF ALL RESPONSES TO PHQ QUESTIONS 1-9: 0

## 2024-10-30 NOTE — NURSING NOTE
Pt received TDAP vaccine and typhoid vaccine per ROBYN Ortega CNP's, orders. Pt given VIS forms prior to immunization administration.   Prior to immunization administration, verified patients identity using patient s name and date of birth.     Patient instructed to remain in clinic for 15 minutes afterwards, and to report any adverse reactions.     Performed by Jaren Krishnan RN on 10/30/2024.

## 2024-10-30 NOTE — PROGRESS NOTES
"Nurse Note ( Pre-Travel Consult)    Itinerary: [place], [place] and [place] ([facility])  day  Departure Date: 12/21/2024    Return Date: 1/1/2025    Length of Trip 12    Reason for Travel: Tourism         Urban or rural: both    Accommodations: Hotel, Safari (tent) - game reserve    IMMUNIZATION HISTORY  Have you received any immunizations within the past 4 weeks?  No  Have you ever fainted from having your blood drawn or from an injection?  No  Have you ever had a fever reaction to vaccination?  No  Have you ever had any bad reaction or side effect from any vaccination?  No  Have you ever had hepatitis A or B vaccine?  Yes  Do you live (or work closely) with anyone who has AIDS, an AIDS-like condition, any other immune disorder or who is on chemotherapy for cancer?  Yes - Pt reports self  Do you have a family history of immunodeficiency?  Yes  Have you received any injection of immune globulin or any blood products during the past 12 months?  No    Patient roomed by RICHARD Eastman    Subjective  Sarahmildred Fisher is a 54 year old female seen today with his mother for counsultation for international travel.   Patient will be departing in  7 week(s) and  traveling with family member(s).      Patient itinerary :  will be in the urban region of [facility] till the 26th  > game [facility] on 30th to [facility] for 2 nights  which risk for Malaria ( [facility] region), food borne illnesses, and motor vehicle accidents. exposure.      Patient's activities will include sightseeing and safari/game [place].    Patient's country of birth is [place]    Special medical concerns:Immunocompromised, transplant patient ( liver 10/2021)     Pre-travel questionnaire was completed by patient and reviewed by provider.     Vitals: /78 (BP Location: Right arm, Patient Position: Sitting, Cuff Size: Adult Regular)   Pulse 82   Temp 97.5  F (36.4  C) (Temporal)   Resp 16   Ht 1.676 m (5' 6\")   Wt 72.7 kg (160 lb 4.8 oz)   " LMP  (LMP Unknown)   SpO2 99%   BMI 25.87 kg/m    BMI= Body mass index is 25.87 kg/m .    EXAM:  General:  Well-nourished, well-developed in no acute distress.  Appears to be stated age, interacts appropriately and expresses understanding of information given to patient.    Current Outpatient Medications   Medication Sig Dispense Refill    amLODIPine (NORVASC) 10 MG tablet Take 1 tablet (10 mg) by mouth daily 90 tablet 3    aspirin 81 MG EC tablet Take 81 mg by mouth daily      atovaquone-proguanil (MALARONE) 250-100 MG tablet Take 1 tablet by mouth daily. Start 2 days before travel and continue 7 days after return. 12 tablet 0    augmented betamethasone dipropionate (DIPROLENE AF) 0.05 % external cream Apply topically 2 times daily as needed.      azaTHIOprine (IMURAN) 50 MG tablet Take 1 tablet (50 mg) by mouth daily 90 tablet 3    azithromycin (ZITHROMAX) 500 MG tablet Take 1 tablet (500 mg) by mouth daily for 3 doses. Take 1 tablet a day for up to 3 days for severe diarrhea 3 tablet 0    cholecalciferol 125 MCG (5000 UT) CAPS Take 1 capsule (5,000 Units) by mouth daily      cloNIDine (CATAPRES) 0.1 MG tablet Bring to clinic in original bottle one hour prior to procedure where you will be instructed to take 1 tablet (0.1mg). 2 tablet 0    hydrocortisone 2.5 % ointment Apply topically 2 times daily as needed.      ketoconazole (NIZORAL) 2 % external cream Apply topically daily as needed.      levothyroxine (SYNTHROID/LEVOTHROID) 75 MCG tablet Take 1 tablet (75 mcg) by mouth daily. 90 tablet 1    LORazepam (ATIVAN) 1 MG tablet Bring to clinic in original bottle one hour prior to procedure where you will be instructed to take 3 tablets (3mg). 6 tablet 0    magnesium oxide (MAG-OX) 400 MG tablet Take 1 tablet (400 mg) by mouth 2 times daily. 180 tablet 3    naloxone (NARCAN) 4 MG/0.1ML nasal spray Spray 1 spray (4 mg) into one nostril alternating nostrils as needed for opioid reversal. every 2-3 minutes until  assistance arrives 2 each 1    ondansetron (ZOFRAN) 4 MG tablet Take 1 tablet (4 mg) by mouth every 6 hours as needed for nausea 20 tablet 0    oxyCODONE-acetaminophen (PERCOCET)  MG per tablet Take 0.5 tablets by mouth every 6 hours as needed for severe pain. 15 tablet 0    pantoprazole (PROTONIX) 40 MG EC tablet Take 1 tablet (40 mg) by mouth daily. If symptoms worsen, then may return back to twice daily dosing if needed. 180 tablet 0    predniSONE (DELTASONE) 5 MG tablet Take 1 tablet (5 mg) by mouth daily 90 tablet 3    tacrolimus (GENERIC EQUIVALENT) 1 MG capsule Take 2 capsules (2 mg) by mouth every morning AND 1 capsule (1 mg) every evening. 270 capsule 3    tirzepatide-Weight Management (ZEPBOUND) 15 MG/0.5ML prefilled pen Inject 0.5 mLs (15 mg) subcutaneously every 7 days. 6 mL 3    tretinoin (RETIN-A) 0.025 % external cream Apply a pea size to entire face QD 45 g 11    multivitamin (THERMEMS) TABS Take 1 tablet by mouth daily (Patient not taking: Reported on 10/30/2024) 30 tablet 1    ursodiol (ACTIGALL) 300 MG capsule Take 300 mg by mouth daily (Patient not taking: Reported on 10/30/2024)       Patient Active Problem List   Diagnosis    Acute deep vein thrombosis (DVT) of upper extremity (H)    Acute kidney failure, unspecified (H)    Carrier of group B Streptococcus    Cirrhosis of liver with ascites (H)    Elevated blood pressure    Family history of colon cancer    Gastroesophageal reflux disease without esophagitis    Hyponatremia    History of DVT (deep vein thrombosis)    Anemia associated with acute blood loss    Need for vaccination for viral hepatitis    Knee pain    Pancytopenia (H)    Macrocytic anemia    Blood loss anemia    Hydrothorax    Portal hypertensive gastropathy (H)    Pleural effusion    Screening for malignant neoplasm of cervix    Venous incompetence    Varicose veins of leg with pain    Subclinical hypothyroidism    Alcoholic cirrhosis (H)    Abnormal serum iron level     Iron deficiency anemia due to chronic blood loss    Liver transplant recipient (H)    Immunosuppressed status (H)    Malnutrition (H)    Steroid-induced hyperglycemia    Epistaxis    Esophagitis    Duodenal erosion    Gastric antral vascular ectasia    Hyperphosphatemia    Leukocytosis    Chronic kidney disease, stage 3b (H)    Low magnesium level    Autoimmune hemolytic anemia (H)    Alcohol use disorder, severe, in sustained remission (H)    Hereditary hemochromatosis (H)    Osteopenia    ASCUS of cervix with negative high risk HPV    Liver transplant rejection (H)     Allergies   Allergen Reactions    Amoxicillin GI Disturbance, Diarrhea, Nausea and Nausea and Vomiting         Immunizations discussed include:   Covid 19: Up to date  Hepatitis A:  will get second dose at wellness visit  Hepatitis B: Up to date  Influenza: Up to date  Typhoid: Ordered/given today, risks, benefits and side effects reviewed  Rabies: Declined  reviewed managment of a animal bite or scratch (washing wound, seek medical care within 24 hours for post exposure prophylaxis )  Yellow Fever: Not indicated  Japanese Encephalitis: Not indicated  Meningococcus: Not indicated  Tetanus/Diphtheria a Pertussis: Ordered/given today, risks, benefits and side effects reviewed  Measles/Mumps/Rubella: Titers drawn for info only  Cholera: contraindicated  Polio: Not indicated  Pneumococcal: Up to date  Varicella: Immune by disease history per patient report  Shingrix: Up to date  HPV:  Not indicated     TB: low risk travel    Altitude Exposure on this trip: no  Past tolerance to Altitude: na    ASSESSMENT/PLAN:  Maria Elena was seen today for travel clinic.    Diagnoses and all orders for this visit:    Travel advice encounter  -     atovaquone-proguanil (MALARONE) 250-100 MG tablet; Take 1 tablet by mouth daily. Start 2 days before travel and continue 7 days after return.  -     azithromycin (ZITHROMAX) 500 MG tablet; Take 1 tablet (500 mg) by mouth daily for  3 doses. Take 1 tablet a day for up to 3 days for severe diarrhea    Immunity status testing  -     Rubeola Antibody IgG; Future  -     Rubella Antibody IgG; Future  -     Mumps Immune Status, IgG; Future  -     Rubeola Antibody IgG  -     Rubella Antibody IgG  -     Mumps Immune Status, IgG    Other orders  -     TYPHOID VACCINE, IM  -     TDAP 7+ (ADACEL,BOOSTRIX)      I have reviewed general recommendations for safe travel   including: food/water precautions, insect precautions, safer sex   practices given high prevalence of Zika, HIV and other STDs,   roadway safety. Educational materials and [employer] report provided.    Malaraia prophylaxis recommended: Malarone  Symptomatic treatment for traveler's diarrhea: azithromycin for emergency standby    Patient will share decisions with transplant team        Evacuation insurance advised and resources were provided to patient.    Total visit time 25 minutes  with over 50% of time spent counseling patient and shared decision making as detailed above.    Raven De Guzman CNP  Certificate in Travel Health

## 2024-10-30 NOTE — PATIENT INSTRUCTIONS
Thank you for visiting the [facility] : [phone]  Today October 30, 2024 you received the    Tetanus (Tdap) Vaccine    Typhoid - injectable. This vaccine is valid for two years.     Future   second Hepatitis A     Follow up vaccine appointments can be made as a NURSE ONLY visit at the Travel Clinic, (BE PREPARED TO WAIT, ) or at designated [facility].    If you are receiving the Rabies vaccines series, it is important that you follow the exact schedule ordered.     Pre-travel     We recommend that you purchase Medical Evacuation Insurance prior to your departure.  Https://wwwnc.cdc.gov/travel/page/insurance    [place] your travel plans with the [place] Department of [employer] through STEP ( Smart Traveler Enrollment Program ) https://step.state.gov.  STEP is a free service to allow U[place] citizens and nationals traveling and living abroad to enroll their trip with the nearest U.S. Embassy or Consulate.    Animal Exposure: Avoid all mammals even if they look healthy.  If there is a bite, scratch or even a lick, wash area immediately with soap and water for 15 minutes and seek medical care within 24 hours for evaluation of Rabies post exposure treatment.  Contact your Medical Evacuation Insurance.    Repiratory illness prevention strategies ( Covid and Influenza ) CDC recommendations:  Consider wearing a mask in crowded or poorly ventilated indoor areas, including on public transportation and in transportation hubs.  Hand washing: frequent, thorough handwashing with soap and water for 20 seconds. Use an alcohol-based hand  with at least 60% alcohol if soap and water are not readily available. Avoid touching face, nose, eyes, mouth unless you have done appropriate hand washing as above.  VACCINES: Staying up to date on COVID-19 vaccines significantly lowers the risk of getting very sick, being hospitalized, or dying from COVID-19. CDC recommends that everyone stay up  to date on their COVID-19 vaccines, especially people with weakened immune systems.    Travel Covid 19 Testing:  updated 12/06/2021  International travelers: Pre-travel:  See country specific Embassy websites or airline websites.    Post travel: CDC recommends getting tested 3-5 days after your trip     Post-travel illness:  Contact your provider or [facility] if you develop a fever, rash, cough, diarrhea or other symptoms for up to 1 year after travel.  Inform your healthcare provider when and where you traveled to.    Please call the [facility] with any questions [phone]  Or send your provider a 'My Chart' note.

## 2024-10-31 RX ORDER — ONDANSETRON 4 MG/1
4 TABLET, FILM COATED ORAL EVERY 6 HOURS PRN
Qty: 20 TABLET | Refills: 0 | Status: SHIPPED | OUTPATIENT
Start: 2024-10-31

## 2024-11-01 LAB
MEV IGG SER IA-ACNC: 140 AU/ML
MEV IGG SER IA-ACNC: POSITIVE
MUMPS ANTIBODY IGG INSTRUMENT VALUE: 33.5 AU/ML
MUV IGG SER QL IA: POSITIVE
RUBV IGG SERPL QL IA: >33 INDEX
RUBV IGG SERPL QL IA: POSITIVE

## 2024-11-03 NOTE — RESULT ENCOUNTER NOTE
JAKY Arredondo,    Good news!  Your blood test confirms that you are protected against Measles, Mumps and Rubella.   Your records are updated    Safe travels    Raven De Guzman (Lori) CNP  [facility]  Certificate in Travel Health

## 2024-11-09 ENCOUNTER — HEALTH MAINTENANCE LETTER (OUTPATIENT)
Age: 54
End: 2024-11-09

## 2024-12-02 PROBLEM — R87.610 ASCUS OF CERVIX WITH NEGATIVE HIGH RISK HPV: Status: ACTIVE | Noted: 2023-12-20

## 2024-12-09 ENCOUNTER — LAB (OUTPATIENT)
Dept: LAB | Facility: CLINIC | Age: 54
End: 2024-12-09
Payer: COMMERCIAL

## 2024-12-09 DIAGNOSIS — Z94.4 LIVER REPLACED BY TRANSPLANT (H): ICD-10-CM

## 2024-12-09 LAB
ALBUMIN SERPL BCG-MCNC: 4.5 G/DL (ref 3.5–5.2)
ALP SERPL-CCNC: 51 U/L (ref 40–150)
ALT SERPL W P-5'-P-CCNC: 13 U/L (ref 0–50)
ANION GAP SERPL CALCULATED.3IONS-SCNC: 9 MMOL/L (ref 7–15)
AST SERPL W P-5'-P-CCNC: 25 U/L (ref 0–45)
BILIRUB DIRECT SERPL-MCNC: 0.22 MG/DL (ref 0–0.3)
BILIRUB SERPL-MCNC: 0.7 MG/DL
BUN SERPL-MCNC: 22.8 MG/DL (ref 6–20)
CALCIUM SERPL-MCNC: 9.9 MG/DL (ref 8.8–10.4)
CHLORIDE SERPL-SCNC: 106 MMOL/L (ref 98–107)
CREAT SERPL-MCNC: 1.36 MG/DL (ref 0.51–0.95)
EGFRCR SERPLBLD CKD-EPI 2021: 46 ML/MIN/1.73M2
ERYTHROCYTE [DISTWIDTH] IN BLOOD BY AUTOMATED COUNT: 12.6 % (ref 10–15)
GLUCOSE SERPL-MCNC: 80 MG/DL (ref 70–99)
HCO3 SERPL-SCNC: 28 MMOL/L (ref 22–29)
HCT VFR BLD AUTO: 38 % (ref 35–47)
HGB BLD-MCNC: 13 G/DL (ref 11.7–15.7)
MCH RBC QN AUTO: 31.8 PG (ref 26.5–33)
MCHC RBC AUTO-ENTMCNC: 34.2 G/DL (ref 31.5–36.5)
MCV RBC AUTO: 93 FL (ref 78–100)
PLATELET # BLD AUTO: 203 10E3/UL (ref 150–450)
POTASSIUM SERPL-SCNC: 5 MMOL/L (ref 3.4–5.3)
PROT SERPL-MCNC: 6.8 G/DL (ref 6.4–8.3)
RBC # BLD AUTO: 4.09 10E6/UL (ref 3.8–5.2)
SODIUM SERPL-SCNC: 143 MMOL/L (ref 135–145)
WBC # BLD AUTO: 4.9 10E3/UL (ref 4–11)

## 2024-12-09 PROCEDURE — 85027 COMPLETE CBC AUTOMATED: CPT

## 2024-12-09 PROCEDURE — 80053 COMPREHEN METABOLIC PANEL: CPT

## 2024-12-09 PROCEDURE — 82248 BILIRUBIN DIRECT: CPT

## 2024-12-09 PROCEDURE — 36415 COLL VENOUS BLD VENIPUNCTURE: CPT

## 2024-12-09 PROCEDURE — 80197 ASSAY OF TACROLIMUS: CPT

## 2024-12-10 LAB
TACROLIMUS BLD-MCNC: 5.9 UG/L (ref 5–15)
TME LAST DOSE: NORMAL H
TME LAST DOSE: NORMAL H

## 2024-12-17 ENCOUNTER — OFFICE VISIT (OUTPATIENT)
Dept: DERMATOLOGY | Facility: CLINIC | Age: 54
End: 2024-12-17
Payer: COMMERCIAL

## 2024-12-17 DIAGNOSIS — Z41.1 ELECTIVE PROCEDURE FOR UNACCEPTABLE COSMETIC APPEARANCE: ICD-10-CM

## 2024-12-17 DIAGNOSIS — R20.8 SKIN PAIN: Primary | ICD-10-CM

## 2024-12-17 PROCEDURE — 96999 UNLISTED SPEC DERM SVC/PX: CPT | Performed by: PHYSICIAN ASSISTANT

## 2024-12-17 NOTE — LETTER
12/17/2024      Sarah Fisher  [address] [place]  [place] [facility] [place] [facility] [address]      Dear Colleague,    Thank you for referring your patient, Sarah Fisher, to the [facility]. Please see a copy of my visit note below.    HPI:   Chief complaints: Sarah Fisher is a pleasant 54 year old female who presents for evaluation of treatment of facial rhytids with cosmetic botox. She liked units after LOV. No issues.       PHYSICAL EXAM:    LMP  (LMP Unknown)   Skin exam performed as follows: Type 2 skin. Mood appropriate  Alert and Oriented X 3. Well developed, well nourished in no distress.  General appearance: Normal  Head including face: Normal  Eyes: conjunctiva and lids: Normal  Mouth: Lips, teeth, gums: Normal  Neck: Normal  Skin: Scalp and body hair: See below    Age appropriate rhytids of glabella, crows feet    ASSESSMENT/PLAN:     BOTOX:  PGACAC discussed.  Risks including but not limited to injection site reaction, bruising, asymmetry, no resolution of rhytides, brow ptosis, dry mouth, tiredness, and headache.  Also label warnings are difficulty with swallowing, speaking, breathing, muscle weakness, loss of bladder control, and double vision/blurred vision.  Explained confirmed report of spread of toxin effect when used for hyperhidrosis of underarms or cosmetic use on face has not been reported.  All questions answered and entertained to patient s satisfaction.  Informed consent obtained.      --31 units injected to gabella (11 into procerus, 8 into medial )  --12 into each crows (with 1 unit infraorbitally)   --55 units total  --Cosmetic cost of $770        Follow-up: 3 mo  CC:   Scribed By: Areli Reich, MS, PA-C    Clinic Administered Medication Documentation    Patient was given Botox. Prior to medication administration, verified patient's identity using patient s name and date of birth. Please see MAR and medication order for additional information.  Patient instructed to report any adverse reaction to staff immediately.    Vial/Syringe: Single dose vial. Was entire vial of medication used? No, The remainder 45 Units of 100 Units was discarded as unavoidable waste.    Drug Name: botulinum toxin with expiration date of 03/30/2027  Dose: 55 Units  Route administered: IM  NDC #: 0744-3318-64  Amount of waste:45 Units  Reason for waste: Single use vial     LOT #: [id number]  SITE: face  : Allergan  EXPIRATION DATE: 03/30/2027      Again, thank you for allowing me to participate in the care of your patient.        Sincerely,        Areli Love PA-C

## 2024-12-17 NOTE — PROGRESS NOTES
HPI:   Chief complaints: Sarah Fisher is a pleasant 54 year old female who presents for evaluation of treatment of facial rhytids with cosmetic botox. She liked units after LOV. No issues.       PHYSICAL EXAM:    LMP  (LMP Unknown)   Skin exam performed as follows: Type 2 skin. Mood appropriate  Alert and Oriented X 3. Well developed, well nourished in no distress.  General appearance: Normal  Head including face: Normal  Eyes: conjunctiva and lids: Normal  Mouth: Lips, teeth, gums: Normal  Neck: Normal  Skin: Scalp and body hair: See below    Age appropriate rhytids of glabella, crows feet    ASSESSMENT/PLAN:     BOTOX:  PGACAC discussed.  Risks including but not limited to injection site reaction, bruising, asymmetry, no resolution of rhytides, brow ptosis, dry mouth, tiredness, and headache.  Also label warnings are difficulty with swallowing, speaking, breathing, muscle weakness, loss of bladder control, and double vision/blurred vision.  Explained confirmed report of spread of toxin effect when used for hyperhidrosis of underarms or cosmetic use on face has not been reported.  All questions answered and entertained to patient s satisfaction.  Informed consent obtained.      --31 units injected to gabella (11 into procerus, 8 into medial )  --12 into each crows (with 1 unit infraorbitally)   --55 units total  --Cosmetic cost of $770        Follow-up: 3 mo  CC:   Scribed By: Areli Reich, MS, PA-C    Clinic Administered Medication Documentation    Patient was given Botox. Prior to medication administration, verified patient's identity using patient s name and date of birth. Please see MAR and medication order for additional information. Patient instructed to report any adverse reaction to staff immediately.    Vial/Syringe: Single dose vial. Was entire vial of medication used? No, The remainder 45 Units of 100 Units was discarded as unavoidable waste.    Drug Name: botulinum toxin with expiration  date of 03/30/2027  Dose: 55 Units  Route administered: IM  NDC #: 7985-2263-13  Amount of waste:45 Units  Reason for waste: Single use vial     LOT #: [id number]  SITE: face  : Allergan  EXPIRATION DATE: 03/30/2027

## 2024-12-19 ENCOUNTER — PATIENT OUTREACH (OUTPATIENT)
Dept: PEDIATRICS | Facility: CLINIC | Age: 54
End: 2024-12-19
Payer: COMMERCIAL

## 2025-02-03 ENCOUNTER — TELEPHONE (OUTPATIENT)
Dept: GASTROENTEROLOGY | Facility: CLINIC | Age: 55
End: 2025-02-03

## 2025-02-03 NOTE — TELEPHONE ENCOUNTER
"Endoscopy Scheduling Screen    Have you had any respiratory illness or flu-like symptoms in the last 10 days?  No    What is your communication preference for Instructions and/or Bowel Prep?   MyChart    What insurance is in the chart?  Other:  BCBS    Ordering/Referring Provider: FLOWER GOSS   (If ordering provider performs procedure, schedule with ordering provider unless otherwise instructed. )    BMI: Estimated body mass index is 24.24 kg/m  as calculated from the following:    Height as of 12/13/24: 1.68 m (5' 6.14\").    Weight as of 12/13/24: 68.4 kg (150 lb 12.8 oz).     Sedation Ordered  MAC/deep sedation.   BMI<= 45 45 < BMI <= 48 48 < BMI < = 50  BMI > 50   No Restrictions No [facility]  No [facility]  [facility] with exceptions Hospital Only OR Only       Do you have a history of malignant hyperthermia?  No    (Females) Are you currently pregnant?   No     Have you been diagnosed or told you have pulmonary hypertension?   No    Do you have an LVAD?  No    Have you been told you have moderate to severe sleep apnea?  No.    Have you been told you have COPD, asthma, or any other lung disease?  No    Do you have any heart conditions?  No     Have you ever had or are you waiting for an organ transplant?  Yes. Have you had or are on on the wait list for a heart/lung transplant? No, may schedule at all facilities except [facility]    Have you had a stroke or transient ischemic attack (TIA aka \"mini stroke\" in the last 6 months?   No    Have you been diagnosed with or been told you have cirrhosis of the liver?   No.    Are you currently on dialysis?   No    Do you need assistance transferring?   No    BMI: Estimated body mass index is 24.24 kg/m  as calculated from the following:    Height as of 12/13/24: 1.68 m (5' 6.14\").    Weight as of 12/13/24: 68.4 kg (150 lb 12.8 oz).     Is patients BMI > 40 and scheduling location [facility]?  No    Do you take an injectable or oral medication for weight loss or diabetes (excluding " insulin)?  Yes, hold time can be up to 7 days. Please consult with you prescribing provider to discuss endoscopy recommendations. (Please schedule at least 7 days out.)    Do you take the medication Naltrexone?  No    Do you take blood thinners?  No       Prep   Are you currently on dialysis or do you have chronic kidney disease?  No    Do you have a diagnosis of diabetes?  No    Do you have a diagnosis of cystic fibrosis (CF)?  No    On a regular basis do you go 3 -5 days between bowel movements?  No    BMI > 40?  No    Preferred Pharmacy:        [facility] [place] - K[facility]STINA Michaud - [address]   [address]   [address]  [place] [address]  Phone: [phone] Fax: [phone]        Final Scheduling Details     Procedure scheduled  Colonoscopy    Surgeon:  ANA PAULA     Date of procedure:  5/8/25     Pre-OP / PAC:   No - Not required for this site.    Location  [facility] - ASC - Per order.    Sedation   MAC/Deep Sedation - Per order.      Patient Reminders:   You will receive a call from a Nurse to review instructions and health history.  This assessment must be completed prior to your procedure.  Failure to complete the Nurse assessment may result in the procedure being cancelled.      On the day of your procedure, please designate an adult(s) who can drive you home stay with you for the next 24 hours. The medicines used in the exam will make you sleepy. You will not be able to drive.      You cannot take public transportation, ride share services, or non-medical taxi service without a responsible caregiver.  Medical transport services are allowed with the requirement that a responsible caregiver will receive you at your destination.  We require that drivers and caregivers are confirmed prior to your procedure.

## 2025-02-04 ENCOUNTER — TELEPHONE (OUTPATIENT)
Dept: VASCULAR SURGERY | Facility: CLINIC | Age: 55
End: 2025-02-04
Payer: COMMERCIAL

## 2025-02-04 NOTE — TELEPHONE ENCOUNTER
2/4/2025    Vein Clinic Preoperative Nurse Call    Procedure: Left leg VenaSeal GSV(med nec), 1 unit phlebs(med nec)  Date: Tuesday 2/11  Surgeon: Dr. Lux  Time: 0900  Check in time: 0800    (Patient had similar procedure on her right leg back in August 2024 - pt was sent 2 sets of medication so she should still have her 3mg ativan and 0.1mg clonidine)    Called patient and left a detailed message. Informed patient: when to check in (0800) to sign consent, to bring their preop medications in their original bottle with them (3mg ativan, 0.1mg clonidine). Patient will take the medications after signing the consent to the procedure. Instructed patient to wear loose-fitting comfortable clothing, and bring their compression hose. Ensured patient has a /someone that will be responsible for them the rest of the day. Once procedure is completed, we will keep patient in recovery for 30-45 mins, and call  with aftercare instructions. Informed patient, that if possible, they should sit in the backseat to elevate their leg on the ride home.    Pt needs Thigh High compression hose for procedure. Status of the hose: patient has thigh high hose.    Special instructions: Instructed pt to hold her ASA x 3 days prior (last dose would be Saturday 2/8).    Patient understands if they have any of the following symptoms (fever, cough, shortness of breath, rash), they need to notify us immediately as they may need to cancel their procedure and reschedule for a later date.    Gave patient our call back number if any further questions or concerns.    Raven Zelaya RN  [facility]

## 2025-02-07 PROBLEM — R87.610 ASCUS OF CERVIX WITH NEGATIVE HIGH RISK HPV: Status: ACTIVE | Noted: 2023-12-20

## 2025-02-10 ENCOUNTER — TELEPHONE (OUTPATIENT)
Dept: TRANSPLANT | Facility: CLINIC | Age: 55
End: 2025-02-10
Payer: COMMERCIAL

## 2025-02-10 DIAGNOSIS — T86.41 LIVER TRANSPLANT REJECTION (H): ICD-10-CM

## 2025-02-10 NOTE — PATIENT INSTRUCTIONS
Post-Procedure Instructions:                         VenaSeal  You had a VenaSeal procedure, where one or more veins were closed.     First 72 hours:  Ibuprofen: If tolerated, take ibuprofen to reduce inflammation whether you have pain or not. Take 2 tablets (200mg each) after every meal and at bedtime with a snack.       Aspirin: Take one 325 mg aspirin tablet to reduce blood clot risk.                                     **If you are currently taking a blood thinner medication (Aspirin, Plavix, Coumadin, Eliquis, Xarelto), please remain taking UNLESS the surgeon has stated otherwise. You WOULD NOT need to take the Ibuprofen and Aspirin for the first 72 hours after in addition to what you are normally taking.**      Bandage(s):    Keep your bandage(s) on and dry for 48 hours.    Activities:      Resume normal activities as tolerated.     Bathing: Do not take baths, sit in a hot tub, or go swimming for one week. You may shower the day after the procedure.    Medications:    You may continue to take your normal medications including aspirin and ibuprofen.      Call Us If:    You see any areas on your leg that are red and angry in appearance.  You notice any drainage that is milky or cloudy in appearance or that has a foul odor.  You run a temperature of 100.5 or greater.      Post-Op Day Three:  You will have a follow up appointment 2-4 days post-procedure. At this appointment, you will have an ultrasound and we will check your incisions.      Six Week Appointment  At your six week appointment, you will see your surgeon for an exam and evaluation. This office visit will be scheduled when you return for post-op day three appointment.     Post-Procedure Instructions:                               Phlebectomies      Post-Op Day Zero - The Day of Your Procedure:  1. Medication for Pain Control and Inflammation Control   - The numbing medication injected during your procedure will last for several hours. The pre-procedure                  tablets may make you very sleepy and you might not remember everything from the procedure or from                 the day. This will usually wear off by the next day.   - Ibuprofen: If tolerated, take ibuprofen (e.g., Advil) to reduce inflammation whether or not you have                 pain. For three days, take two tablets (200mg each) with every meal and at bedtime with a snack. If                 your pain is not controlled with ibuprofen, you may take prescription pain medication (such as Norco),                 if prescribed.   - You may resume taking any medications you were taking before your procedure.  2. Activity   - Rest with your leg(s) elevated above your heart. This will prevent from a lot of swelling and                 bleeding. You do not need to elevate your leg(s) while sleeping at night. You may go upstairs, sit up to                 eat, use the bathroom, and take several five minute walks. Otherwise, keep your leg(s) elevated.                 Minimize the amount of time you are up on your feet to about 30 minutes at a time.  3. Bandages   - The incision sites will be covered with soft bandages and an ACE wrap. Keep your bandages on and    dry for 48 hours. The ACE should provide  snug  compression, but should not cause pain or numbness    in the toes. If you have significant discomfort or your toes become cold or numb, unwrap your ACE and    rewrap with less tension starting at the toes wrapping upward.  4. Incisions   - Bleeding: You may see some incision sites that are oozing through the bandages. This is not unusual    and can be managed with Rest, Ice, Compression and Elevation (RICE). Apply ice and firm pressure    directly to the site that is bleeding and rest with your leg(s) elevated above your heart for 20-30 minutes.    Post-Op Day One:  1. Medication   - Ibuprofen: Continue the same as the Day of Your Procedure. If your pain is not controlled with    ibuprofen, you  may take prescription pain medication (such as Norco), if prescribed.  2. Activity   - We would like you to get up at least six times and walk around for short periods of time, unless it is    causing you pain. You should not be on your feet more than 90 minutes at a time. Elevate your leg    above your heart when you are not walking.  3. Bandages   - Your bandages must be kept on and dry for 48 hours.  4. Driving   - You may resume driving when you can do so safely. Do not drive if you are taking narcotic pain    medication.    Post-Op Day Two:   1. Medication  - Ibuprofen: Continue the same as the Day of Your Procedure.  2.  Activity   - Walk as tolerated. Elevate as much as possible when not walking.  3. Bandages  - Remove bandages and shower. If your return appointment is before 48 hours, we will take the bandages off for you at the clinic.  4. Incisions   - Your leg(s) will be bruised; there may be swelling, hard knots under the skin and possibly some    numbness. These will likely resolve over time. If you see  hair-like  strings coming out of your    incisions, do not pull them (this will only cause pain/discomfort). We will trim them when you come   back for your follow-up appointment.  5. Call Us If:   - You see any areas on your leg that are red and angry in appearance.   - You notice any drainage that is milky or cloudy in appearance or that has a foul odor.   - You run a temperature of 100.5 or greater.    Post-Op Day Three:  You will have a follow up appointment 2-4 days post-procedure. At this appointment, we will check your incisions and see how you are doing.    __________________________________________________________________________________________    The Two Weeks Following Your Procedure  1.  Skin Care   - Do not use any lotions, creams or powders on your leg for 14 days or until the incisions have healed.   - Do not soak in a bathtub, whirlpool, or hot tub or go swimming for 14 days or until  your incisions have  healed.  2.  Medications   - You may use ibuprofen or acetaminophen (e.g., Tylenol) as needed for pain or discomfort.  3.  Activity   - Do not lift over 25 pounds. After about ten days you may resume exercise such as aerobics, running,    tennis or weight lifting. Use your common sense and ease back into your exercise routine slowly.  4.  Travel   - Do not fly in an airplane for 14 days after your procedure.  If you have a long car trip planned within    two to three weeks following your procedure, stop and walk for a few minutes every two hours.    Periodic ankle pumps during the ride may be helpful.    Six Week Appointment   At your six week appointment, you will see your surgeon for an exam and evaluation. This office visit    will be scheduled when you return for Post-op Day Three Return Appointment.     Return to Work  1.  If you work outside the home, you may return to work in a few days depending on the extent of your procedure, how you tolerate it, and the type of work you perform.  2.  Paperwork: If your employer requires paperwork or you would like a letter written to your employer, please    let us know. We will complete disability type forms at no charge. Please allow five business days for    forms to be completed.

## 2025-02-11 ENCOUNTER — OFFICE VISIT (OUTPATIENT)
Dept: VASCULAR SURGERY | Facility: CLINIC | Age: 55
End: 2025-02-11
Payer: COMMERCIAL

## 2025-02-11 ENCOUNTER — MYC REFILL (OUTPATIENT)
Dept: PHARMACY | Facility: CLINIC | Age: 55
End: 2025-02-11

## 2025-02-11 VITALS — DIASTOLIC BLOOD PRESSURE: 68 MMHG | OXYGEN SATURATION: 98 % | HEART RATE: 73 BPM | SYSTOLIC BLOOD PRESSURE: 109 MMHG

## 2025-02-11 DIAGNOSIS — K21.9 GASTROESOPHAGEAL REFLUX DISEASE WITHOUT ESOPHAGITIS: Primary | ICD-10-CM

## 2025-02-11 DIAGNOSIS — I83.813 VARICOSE VEINS OF BOTH LOWER EXTREMITIES WITH PAIN: Primary | ICD-10-CM

## 2025-02-11 PROCEDURE — 36482 ENDOVEN THER CHEM ADHES 1ST: CPT | Mod: LT | Performed by: SURGERY

## 2025-02-11 PROCEDURE — 37766 PHLEB VEINS - EXTREM 20+: CPT | Mod: LT | Performed by: SURGERY

## 2025-02-11 RX ORDER — PANTOPRAZOLE SODIUM 40 MG/1
40 TABLET, DELAYED RELEASE ORAL DAILY
Qty: 180 TABLET | Refills: 3 | Status: SHIPPED | OUTPATIENT
Start: 2025-02-11

## 2025-02-11 RX ORDER — HYDROCODONE BITARTRATE AND ACETAMINOPHEN 5; 325 MG/1; MG/1
1 TABLET ORAL EVERY 6 HOURS PRN
Qty: 15 TABLET | Refills: 0 | Status: SHIPPED | OUTPATIENT
Start: 2025-02-11

## 2025-02-11 NOTE — PROGRESS NOTES
Vein Clinic Procedure Note    Preoperative diagnosis:    1.  Left lower extremity symptomatic varicose veins with failure of conservative management.  2.  Left great saphenous vein incompetence.    Post operative diagnosis:  Same    Procedure:  1.  Left great saphenous vein VenaSeal closure.  2.  Left lower extremity medically necessary stab phlebectomies (61)    Preoperative medications: 3 mg ativan, 0.1 mg clonidine      Procedures    Operative description  Indications: Maria Elena is a very pleasant 54-year-old female with bilateral lower extremity symptomatic varicose veins which have failed conservative management.  She has been treated on the right with great saphenous vein closure and phlebectomies.  We are going to address the left lower extremity today.    Procedure: Written informed consent was obtained.  Patient was taken to the procedure room and placed in supine position.  Left lower extremity was circumferentially prepped and a sterile surgical field was created.  Preprocedure timeout was conducted.    I mapped out the left great saphenous vein from just below the knee to the groin.  Local anesthetic was administered just below the knee.  I made multiple attempts to access the vein but it was rather small and spasmed.  I gave additional local anesthetic and made a 4 mm incision with an 11 blade and dissected out the vein with a hemostat.  It was opened up with a #11 blade and accessed with a wire under direct vision.  Wire was noted to be in the great saphenous vein on sonography.  Then we put in a short 7 Polish sheath.  Guidewire was advanced to the left saphenofemoral junction under ultrasonographic guidance.  Then the delivery sheath was positioned at the saphenofemoral junction.  It was pulled back 10 cm.  Then the delivery catheter for the VenaSeal glue was secured in place.  This was noted to be 5 cm from the saphenofemoral junction.  First aliquot of VenaSeal glue was delivered.  Catheter was  pulled back 1 cm and an additional aliquot was delivered.  Then the catheter was pulled back 3 cm.  Pressure was applied at the saphenofemoral junction for 3 minutes.  Then at 3 cm intervals 0.1 mL aliquots were delivered to the whole vein was treated.  Sheath and delivery system were removed.  Generous tumescent anesthetic was administered in the area of the previously marked veins.  These were treated with standard phlebectomy.  The access site for the great saphenous vein was closed with 3-0 Vicryl and skin was approximated with 5-0 chromic suture.    Sterile compressive dressing was applied.    VenaSeal closure of left great saphenous vein.    Stab phlebectomy: 61, medically necessary    EBL: 5 mL        2025     9:05 AM   Flowsheet Data   Procedure Start Time: 09:05   Prep: Chloraprep   Side: Left   Tx Length (cm): LEFT GSV: 42cm   Junction (cm): LEFT GSV: 5cm   # PHLEB Sites: LEFT LE   Cyanoacrylate used (ml): LEFT GSV: 1.5mL   Sedation taken: Yes   Pre Pt. Physical / Cognitive Limitations: WNL   TOTAL Local anesthesia Injected (ml): 9mL   Max Volume Local Anesthesia (ml): 11   TOTAL Tumescent Injected volume (ml): 200mL   Max Volume Tumescent (ml): 572   Post Pt. Physical / Cognitive Limitations: WNL   Procedure End Time: 10:37   D/C Instructions given, states readiness to leave and escorted to car: Yes     Tumescent Concentration: Total Volume: 572ml - 0.9% Sodium Chloride 500ml Bag +  Lidocaine 1% with Epinephrine 1:100,000: 60ml + 8.4% Sodium Bicarbonate: 12ml    Patient's blood pressure, pulse and pulse oximetry were continuously monitored throughout the procedure under my direct supervision and was stable during the procedure.    Patient recovered in our suites and discharged home with their family with postoperative instructions and follow up.     Elliott Lux MD

## 2025-02-11 NOTE — LETTER
2/11/2025      Sarah Fisher  [address]  [place] [facility] [place] [facility] [place] [address]      Dear Colleague,    Thank you for referring your patient, Sarah Fisher, to the [facility]. Please see a copy of my visit note below.    Pre-procedure Nursing Note    Sarah Fisher presents to clinic for Vein Procedure  .   /Person Responsible for Patient: Iris (Friend)  Phone Number: [phone]    Prophylactic Medication:N/A   Sedation Medication: Ativan, 3mg ,   Time Taken: 0803 and Clonidine, 0.1mg,   Time Taken: 0803  Compression Stockings: Patient left hose at home.  The procedure is being performed on LLE.  Patient understanding of procedure matches consent? YES    Patient's pre-procedure medications verified by RICHARD Carbajal.    Raven Zelaya RN on 2/11/2025 at 8:03 AM        Vein Clinic Procedure Note    Preoperative diagnosis:    1.  Left lower extremity symptomatic varicose veins with failure of conservative management.  2.  Left great saphenous vein incompetence.    Post operative diagnosis:  Same    Procedure:  1.  Left great saphenous vein VenaSeal closure.  2.  Left lower extremity medically necessary stab phlebectomies (61)    Preoperative medications: 3 mg ativan, 0.1 mg clonidine      Procedures    Operative description  Indications: Maria Elena is a very pleasant 54-year-old female with bilateral lower extremity symptomatic varicose veins which have failed conservative management.  She has been treated on the right with great saphenous vein closure and phlebectomies.  We are going to address the left lower extremity today.    Procedure: Written informed consent was obtained.  Patient was taken to the procedure room and placed in supine position.  Left lower extremity was circumferentially prepped and a sterile surgical field was created.  Preprocedure timeout was conducted.    I mapped out the left great saphenous vein from just below the knee to the groin.  Local anesthetic was  administered just below the knee.  I made multiple attempts to access the vein but it was rather small and spasmed.  I gave additional local anesthetic and made a 4 mm incision with an 11 blade and dissected out the vein with a hemostat.  It was opened up with a #11 blade and accessed with a wire under direct vision.  Wire was noted to be in the great saphenous vein on sonography.  Then we put in a short 7 Lao sheath.  Guidewire was advanced to the left saphenofemoral junction under ultrasonographic guidance.  Then the delivery sheath was positioned at the saphenofemoral junction.  It was pulled back 10 cm.  Then the delivery catheter for the VenaSeal glue was secured in place.  This was noted to be 5 cm from the saphenofemoral junction.  First aliquot of VenaSeal glue was delivered.  Catheter was pulled back 1 cm and an additional aliquot was delivered.  Then the catheter was pulled back 3 cm.  Pressure was applied at the saphenofemoral junction for 3 minutes.  Then at 3 cm intervals 0.1 mL aliquots were delivered to the whole vein was treated.  Sheath and delivery system were removed.  Generous tumescent anesthetic was administered in the area of the previously marked veins.  These were treated with standard phlebectomy.  The access site for the great saphenous vein was closed with 3-0 Vicryl and skin was approximated with 5-0 chromic suture.    Sterile compressive dressing was applied.    VenaSeal closure of left great saphenous vein.    Stab phlebectomy: 61, medically necessary    EBL: 5 mL        2025     9:05 AM   Flowsheet Data   Procedure Start Time: 09:05   Prep: Chloraprep   Side: Left   Tx Length (cm): LEFT GSV: 42cm   Junction (cm): LEFT GSV: 5cm   # PHLEB Sites: LEFT LE   Cyanoacrylate used (ml): LEFT GSV: 1.5mL   Sedation taken: Yes   Pre Pt. Physical / Cognitive Limitations: WNL   TOTAL Local anesthesia Injected (ml): 9mL   Max Volume Local Anesthesia (ml): 11   TOTAL Tumescent Injected  volume (ml): 200mL   Max Volume Tumescent (ml): 572   Post Pt. Physical / Cognitive Limitations: WNL   Procedure End Time: 10:37   D/C Instructions given, states readiness to leave and escorted to car: Yes     Tumescent Concentration: Total Volume: 572ml - 0.9% Sodium Chloride 500ml Bag +  Lidocaine 1% with Epinephrine 1:100,000: 60ml + 8.4% Sodium Bicarbonate: 12ml    Patient's blood pressure, pulse and pulse oximetry were continuously monitored throughout the procedure under my direct supervision and was stable during the procedure.    Patient recovered in our suites and discharged home with their family with postoperative instructions and follow up.     Elliott Lux MD      Again, thank you for allowing me to participate in the care of your patient.        Sincerely,        Elliott Lux MD    Electronically signed

## 2025-02-11 NOTE — PROGRESS NOTES
Pre-procedure Nursing Note    Sarah Fisher presents to clinic for Vein Procedure  .   /Person Responsible for Patient: Iris (Friend)  Phone Number: [phone]    Prophylactic Medication:N/A   Sedation Medication: Ativan, 3mg ,   Time Taken: 0803 and Clonidine, 0.1mg,   Time Taken: 0803  Compression Stockings: Patient left hose at home.  The procedure is being performed on LLE.  Patient understanding of procedure matches consent? YES    Patient's pre-procedure medications verified by RICHARD Carbajal.    Raven Zelaya RN on 2/11/2025 at 8:03 AM

## 2025-02-13 ENCOUNTER — ANCILLARY PROCEDURE (OUTPATIENT)
Dept: ULTRASOUND IMAGING | Facility: CLINIC | Age: 55
End: 2025-02-13
Attending: SURGERY
Payer: COMMERCIAL

## 2025-02-13 ENCOUNTER — ALLIED HEALTH/NURSE VISIT (OUTPATIENT)
Dept: VASCULAR SURGERY | Facility: CLINIC | Age: 55
End: 2025-02-13
Attending: SURGERY
Payer: COMMERCIAL

## 2025-02-13 DIAGNOSIS — I83.813 VARICOSE VEINS OF BOTH LOWER EXTREMITIES WITH PAIN: ICD-10-CM

## 2025-02-13 DIAGNOSIS — Z09 POSTOP CHECK: Primary | ICD-10-CM

## 2025-02-13 PROCEDURE — 93971 EXTREMITY STUDY: CPT | Mod: LT | Performed by: STUDENT IN AN ORGANIZED HEALTH CARE EDUCATION/TRAINING PROGRAM

## 2025-02-13 NOTE — PROGRESS NOTES
February 13, 2025    Vein Clinic Postoperative Nurse Note    Patient is here for her 48 hour postoperative visit.    Procedure: Left leg VenaSeal GSV(med nec), 1 unit phlebs(med nec)  Procedure Date: 2/11/25  Surgeon: Dr. Lux    Ultrasound Result: The left GSV is closed 9.1 mm from the SFJ to the proximal calf with evidence of thrombus seen throughout. No evidence of LLE DVT.    Physical Exam: Incisions are approximated without signs of infection. Removed sutures from GSV access site and applied steri-strips.  Ecchymosis: moderate (61 phlebs)  Swelling: minimal  Paresthesia: pt denies    Patient Questions or Concerns: Overall, pt is doing well and has no concerns at this time.    Pt is able to don her thigh high compression stocking. Reminded pt she will most likely want to wear them for at least the next 2-3 weeks to aid in the healing.    Reviewed postoperative instructions with patient and provided her with written material of common things to expect from her procedure.    Patient's Next Vein Clinic Appointment: 6 week post op with Dr. Lux (3/26/25).    Raven Zelaya RN  [facility]

## 2025-02-13 NOTE — PATIENT INSTRUCTIONS
You had a VenaSeal procedure, where one or more veins were closed.     Activities: Resume normal activities as tolerated.    Bathing: Do not take baths, sit in a hot tub, or go swimming for one week. Showering is fine.    Call Us If:    You have intense or bothersome itching of your leg  You see any areas on your leg that are red and angry in appearance.  You notice any drainage that is milky or cloudy in appearance or that has a foul odor.  You run a temperature of 100.5 or greater.    If you are experiencing any of the following symptoms, please seek immediate medical attention at your local emergency department.  - Significant pain in the back of the calf possibly with difficulty walking  - Significant swelling and/or tenderness in the back of the calf  - Redness that continues to spread  - Chest pain and/or shortness of breath    Six Week Appointment  At your six week appointment, you will see your surgeon for an exam and evaluation.       Vein Removal (Phlebectomy)  Common Things to Expect    Small lumps may develop beneath phlebectomy sites. This is a normal step in healing.  These should not be painful, but may be tender to the touch. It can take 6 weeks to 3 months for these lumps/firmness to resolve. About 3-4 weeks after your phlebectomies, when the bruising has subsided and the incisions are healed, gentle massage will aid in the healing of these small lumps.  Bruising will look worse before it looks better and can last for 4-6 weeks.  Ankle swelling is not uncommon and may last 4-6 weeks.   Numbness will get better with time, but may take 3 months to a year to resolve.  You may notice that the skin on your legs has become ultra-sensitive to touch. For example, the weight of your sheets may feel painful. This usually resolves in 6 weeks.    For 2 weeks, no weight lifting over 25lbs, no running, and no vigorous aerobic exercise. After this time, ease back into your normal activities. If you do too much too  soon, you will have more pain, swelling, and bruising. Keep in mind your body is still healing.  For 2 weeks, do not shave your legs or use lotions, powders, creams to allow proper healing of vein access sites.    Wearing compression hose is not required, but highly recommended following phlebectomies as this will aid in the healing process. Compression will minimize your pain, swelling, and bruising.      If you are experiencing any of the following symptoms, please seek immediate medical attention at your local emergency department.  - Significant pain in the back of the calf possibly with difficulty walking  - Significant swelling and/or tenderness in the back of the calf  - Redness that continues to spread  - Chest pain and/or shortness of breath

## 2025-02-24 ENCOUNTER — LAB (OUTPATIENT)
Dept: LAB | Facility: CLINIC | Age: 55
End: 2025-02-24
Payer: COMMERCIAL

## 2025-02-24 ENCOUNTER — OFFICE VISIT (OUTPATIENT)
Dept: PHARMACY | Facility: CLINIC | Age: 55
End: 2025-02-24
Payer: COMMERCIAL

## 2025-02-24 VITALS
BODY MASS INDEX: 21.97 KG/M2 | SYSTOLIC BLOOD PRESSURE: 108 MMHG | OXYGEN SATURATION: 98 % | DIASTOLIC BLOOD PRESSURE: 68 MMHG | HEART RATE: 95 BPM | WEIGHT: 136.7 LBS

## 2025-02-24 DIAGNOSIS — R11.0 NAUSEA: ICD-10-CM

## 2025-02-24 DIAGNOSIS — I15.9 SECONDARY HYPERTENSION: ICD-10-CM

## 2025-02-24 DIAGNOSIS — Z71.85 VACCINE COUNSELING: ICD-10-CM

## 2025-02-24 DIAGNOSIS — E03.8 SUBCLINICAL HYPOTHYROIDISM: ICD-10-CM

## 2025-02-24 DIAGNOSIS — Z79.82 ASPIRIN LONG-TERM USE: ICD-10-CM

## 2025-02-24 DIAGNOSIS — Z94.4 LIVER TRANSPLANT RECIPIENT (H): ICD-10-CM

## 2025-02-24 DIAGNOSIS — Z78.9 TAKES DIETARY SUPPLEMENTS: ICD-10-CM

## 2025-02-24 DIAGNOSIS — E66.811 CLASS 1 OBESITY WITH BODY MASS INDEX (BMI) OF 34.0 TO 34.9 IN ADULT, UNSPECIFIED OBESITY TYPE, UNSPECIFIED WHETHER SERIOUS COMORBIDITY PRESENT: ICD-10-CM

## 2025-02-24 DIAGNOSIS — I10 ESSENTIAL HYPERTENSION: ICD-10-CM

## 2025-02-24 DIAGNOSIS — K21.9 GASTROESOPHAGEAL REFLUX DISEASE WITHOUT ESOPHAGITIS: Primary | ICD-10-CM

## 2025-02-24 DIAGNOSIS — Z94.4 LIVER REPLACED BY TRANSPLANT (H): ICD-10-CM

## 2025-02-24 LAB
ALBUMIN SERPL BCG-MCNC: 4.2 G/DL (ref 3.5–5.2)
ALP SERPL-CCNC: 50 U/L (ref 40–150)
ALT SERPL W P-5'-P-CCNC: 11 U/L (ref 0–50)
ANION GAP SERPL CALCULATED.3IONS-SCNC: 11 MMOL/L (ref 7–15)
AST SERPL W P-5'-P-CCNC: 17 U/L (ref 0–45)
BILIRUB DIRECT SERPL-MCNC: 0.3 MG/DL (ref 0–0.3)
BILIRUB SERPL-MCNC: 0.6 MG/DL
BUN SERPL-MCNC: 25 MG/DL (ref 6–20)
CALCIUM SERPL-MCNC: 9.7 MG/DL (ref 8.8–10.4)
CHLORIDE SERPL-SCNC: 107 MMOL/L (ref 98–107)
CREAT SERPL-MCNC: 1.17 MG/DL (ref 0.51–0.95)
EGFRCR SERPLBLD CKD-EPI 2021: 55 ML/MIN/1.73M2
ERYTHROCYTE [DISTWIDTH] IN BLOOD BY AUTOMATED COUNT: 13 % (ref 10–15)
GLUCOSE SERPL-MCNC: 87 MG/DL (ref 70–99)
HCO3 SERPL-SCNC: 26 MMOL/L (ref 22–29)
HCT VFR BLD AUTO: 36.9 % (ref 35–47)
HGB BLD-MCNC: 13 G/DL (ref 11.7–15.7)
MCH RBC QN AUTO: 32.3 PG (ref 26.5–33)
MCHC RBC AUTO-ENTMCNC: 35.2 G/DL (ref 31.5–36.5)
MCV RBC AUTO: 92 FL (ref 78–100)
PLATELET # BLD AUTO: 250 10E3/UL (ref 150–450)
POTASSIUM SERPL-SCNC: 4.9 MMOL/L (ref 3.4–5.3)
PROT SERPL-MCNC: 6.4 G/DL (ref 6.4–8.3)
RBC # BLD AUTO: 4.02 10E6/UL (ref 3.8–5.2)
SODIUM SERPL-SCNC: 144 MMOL/L (ref 135–145)
TACROLIMUS BLD-MCNC: 4 UG/L (ref 5–15)
TME LAST DOSE: ABNORMAL H
TME LAST DOSE: ABNORMAL H
TSH SERPL DL<=0.005 MIU/L-ACNC: 1.08 UIU/ML (ref 0.3–4.2)
WBC # BLD AUTO: 6.1 10E3/UL (ref 4–11)

## 2025-02-24 PROCEDURE — 85027 COMPLETE CBC AUTOMATED: CPT

## 2025-02-24 PROCEDURE — 36415 COLL VENOUS BLD VENIPUNCTURE: CPT

## 2025-02-24 PROCEDURE — 99607 MTMS BY PHARM ADDL 15 MIN: CPT | Performed by: PHARMACIST

## 2025-02-24 PROCEDURE — 80053 COMPREHEN METABOLIC PANEL: CPT

## 2025-02-24 PROCEDURE — 82248 BILIRUBIN DIRECT: CPT

## 2025-02-24 PROCEDURE — 99605 MTMS BY PHARM NP 15 MIN: CPT | Performed by: PHARMACIST

## 2025-02-24 PROCEDURE — 80197 ASSAY OF TACROLIMUS: CPT

## 2025-02-24 RX ORDER — AMLODIPINE BESYLATE 5 MG/1
5 TABLET ORAL DAILY
Qty: 90 TABLET | Refills: 1 | Status: SHIPPED | OUTPATIENT
Start: 2025-02-24

## 2025-02-24 RX ORDER — PANTOPRAZOLE SODIUM 20 MG/1
20 TABLET, DELAYED RELEASE ORAL DAILY
Qty: 90 TABLET | Refills: 1 | Status: SHIPPED | OUTPATIENT
Start: 2025-02-24

## 2025-02-24 NOTE — PROGRESS NOTES
"Feli Marie MD   \"Thank you for letting me know.     Ok to reduce amlodipine and get FK checked. Including post LT coordinator\"  "

## 2025-02-24 NOTE — PATIENT INSTRUCTIONS
"Recommendation(s) from today's visit:                                                      Continue with Zepbound 12.5 mg weekly.   - future could consider every 10 days or continue with dose decrease.    After you finish pantoprazole 40mg, decrease to 20 mg daily.    Change calcium to 1 tablet twice daily.     Umm is going to follow-up with Dr. Avila on the amlodipine.    Follow-up: 3 months    My Clinical Pharmacist's contact information:                                                      Umm Turner, PharmD  Medication therapy management clinical pharmacist    It was great speaking with you today.  I value your experience and would be very thankful for your time in providing feedback in our clinic survey. In the next few days, you may receive an email or text message from [employer] with a link to a survey related to your  clinical pharmacist.\"     To schedule another MTM appointment, please call the clinic directly [phone] or you may call the MTM scheduling line at [phone].    "

## 2025-02-24 NOTE — Clinical Note
Hi Dr. Avila, patient has lost a significant amount of weight on GLP1 RA she does have intermittent orthostasis and blood pressure has been low. I would like to lower her amlodipine dose but I know that may impact her tacrolimus (lower tracrolimus level). She is overdue for tac check and is going to lab after our visit today. I wanted to check in with you first and get the ok with reducing amlodipine and follow-up plan on tacro level after change. I am assuming we may want to see what her tacro level is today first too. Thank you for your time!   Umm Turner, PharmD Medication Therapy Management Pharmacist

## 2025-02-24 NOTE — PROGRESS NOTES
Medication Therapy Management (MTM) Encounter    ASSESSMENT:                            Medication Adherence/Access: No issues identified.    Hypertension   Patient is meeting blood pressure goal of < 130/80mmHg. Would consider lowering amlodipine due to low blood pressure and orthostasis concern. However, will follow-up with SOT specialist as lowering amlodipine may lower tacrolimus level.    Hypothyroidism  Last TSH is within normal limits.     Weight Management  Patient would benefit from  continuation on lower GLP1Ra as she has been successful with weight loss and with her BMI within normal range would not advise further weight loss .     GERD  and nausea  Recommend further PPI taper but would maintain on PPI for GIB prophylaxis.    Supplements   Consider recheck of mag in the future after PPI taper as PPI was likely contributing to hypomagnesemia.  Calcium is better absorbed as divided doses.    ASCVD prophylaxis   stable, continue on PPI (/H2RA for GIB prophylaxis).    History of liver transplant and vaccines  Patient should be checking tac every monthly (she is behind a month but she is aware of this).  Patient is immunocompromised and would be candidate for 2 covid [id number] boosters, 6 months apart following CDC recommendation.    PLAN:                            Continue with Zepbound 12.5 mg weekly.   - future could consider every 10 days or continue with dose decrease to 10mg.     After you finish pantoprazole 40mg, decrease to 20 mg daily.  - future recheck mag level     Change calcium to 1 tablet twice daily.      Umm is going to follow-up with Dr. Avila on the amlodipine reduction as this may lower tacro level. Will clarify monitoring plan before considering amlodipine reduce to 5mg.    Covid booster end of March.    Follow-up: Return in about 11 weeks (around 5/12/2025) for Medication Therapy Management Visit.    SUBJECTIVE/OBJECTIVE:                          Maria Elena Fisher is a 54 year old female  "seen for a follow-up visit.       Reason for visit: follow-up on WM.    Allergies/ADRs: Reviewed in chart  Past Medical History: Reviewed in chart  Tobacco: She reports that she has never smoked. She has never used smokeless tobacco.  Alcohol: not currently using    Medication Adherence/Access:   Uses a pill box.  Has to pay OOP for Zepbound.    Hypertension   Amlodipine 10 mg daily   Positional changes will have orthostasis.      BP Readings from Last 3 Encounters:   02/24/25 108/68   02/11/25 109/68   12/13/24 102/60     Hypothyroidism   Levothyroxine 75 mcg daily  No symptoms.     Weight Management   Zepbound 12.5 mg subcutaneous weekly  Just started dose reduction, too soon to notice any change.  She feels she has been maintaining at this weight.  Exercise: Pilates 3 times a week and walking 4 times a week for 3 miles  Initial weight prior to starting GLP1 therapy 8/16/23 ~ 213 lbs.     Current weight today: 136 lbs 11.2 oz  Cumulative Weight Loss: ~26% from baseline    Wt Readings from Last 4 Encounters:   02/24/25 136 lb 11.2 oz (62 kg)   12/13/24 150 lb 12.8 oz (68.4 kg)   12/13/24 150 lb 14.4 oz (68.4 kg)   10/30/24 160 lb 4.8 oz (72.7 kg)     Estimated body mass index is 21.97 kg/m  as calculated from the following:    Height as of 12/13/24: 5' 6.14\" (1.68 m).    Weight as of this encounter: 136 lb 11.2 oz (62 kg).    GERD  and nausea  Pantoprazole 40 mg daily   Ondansetron 4 mg every 6 hour as needed - infrequent  GIB in 2020, may of been due to varices. working well. Did not have problems with dose reduction from 40 mg twice daily.       Supplements   Magnesium oxide 400mg twice daily   Calcium citrate +D 650/100units (25mcg) per 2 tablets every day   History of hypomagnesemia.     ASCVD prophylaxis  Aspirin 81 mg daily   History of DVT. Due to hypercoagulable state from cirrhosis and compression from hydrothorax. Previously followed by hematology no longer is. No side effects.      History of liver " transplant and vaccines  Tacrolimus 2 mg every morning and 1 mg evening (goal 5-7)  Azathioprine 50mg daily   Prednisone 5 mg daily   No longer on ursodiol.   Following with her transplant regularly/closely, Dr. Avila [facility] GI/SOT.  Up to date on vaccines, but will be due for 2nd covid [id number] booster in ~ March.     Today's Vitals: /68   Pulse 95   Wt 136 lb 11.2 oz (62 kg)   LMP  (LMP Unknown)   SpO2 98%   BMI 21.97 kg/m    ----------------      I spent 40 minutes with this patient today. All changes were made via collaborative practice agreement with ROBYN Bear CNP.     A summary of these recommendations was given to the patient.    Umm Turner, PharmD  Medication Therapy Management Pharmacist     Medication Therapy Recommendations  Essential hypertension   1 Current Medication: amLODIPine (NORVASC) 10 MG tablet (Discontinued)   Current Medication Sig: Take 1 tablet (10 mg) by mouth daily   Rationale: Dose too high - Dosage too high - Safety   Recommendation: Decrease Dose   Status: Accepted per Provider   Identified Date: 2/24/2025 Completed Date: 2/24/2025         Gastroesophageal reflux disease without esophagitis   1 Current Medication: pantoprazole (PROTONIX) 40 MG EC tablet (Discontinued)   Current Medication Sig: Take 1 tablet (40 mg) by mouth daily. If symptoms worsen, then may return back to twice daily dosing if needed.   Rationale: Dose too high - Dosage too high - Safety   Recommendation: Decrease Dose   Status: Accepted per CPA   Identified Date: 2/24/2025 Completed Date: 2/24/2025         Takes dietary supplements   1 Current Medication: Calcium Citrate-Vitamin D (CALCIUM CITRATE + D3 MAXIMUM PO)   Current Medication Sig: Take 1 tablet by mouth 2 times daily.   Rationale: Dose too low - Dosage too low - Effectiveness   Recommendation: Increase Frequency   Status: Patient Agreed - Adherence/Education   Identified Date: 2/24/2025 Completed Date: 2/24/2025          Vaccine counseling   1 Rationale: Preventive therapy - Needs additional medication therapy - Indication   Recommendation: Provide Education - PFIZER COVID-19 VAC BIVALENT IM   Status: Patient Agreed - Adherence/Education   Identified Date: 2/24/2025 Completed Date: 2/24/2025

## 2025-02-25 DIAGNOSIS — E03.8 SUBCLINICAL HYPOTHYROIDISM: ICD-10-CM

## 2025-02-25 DIAGNOSIS — K70.31 ALCOHOLIC CIRRHOSIS OF LIVER WITH ASCITES (H): ICD-10-CM

## 2025-02-25 DIAGNOSIS — Z94.4 LIVER TRANSPLANT RECIPIENT (H): Primary | ICD-10-CM

## 2025-02-25 DIAGNOSIS — T86.41 LIVER TRANSPLANT REJECTION (H): ICD-10-CM

## 2025-02-25 RX ORDER — LEVOTHYROXINE SODIUM 75 UG/1
75 TABLET ORAL DAILY
Qty: 90 TABLET | Refills: 3 | Status: SHIPPED | OUTPATIENT
Start: 2025-02-25

## 2025-02-25 RX ORDER — TACROLIMUS 1 MG/1
2 CAPSULE ORAL EVERY 12 HOURS
Qty: 360 CAPSULE | Refills: 3 | Status: SHIPPED | OUTPATIENT
Start: 2025-02-25

## 2025-02-25 NOTE — TELEPHONE ENCOUNTER
ISSUE:   Tacrolimus IR level 4 on 2/24, goal 5-7, dose 2 mg am and 1 mg PM.    PLAN:   Call Patient and confirm this was an accurate 12-hour trough.   Verify Tacrolimus IR dose 2 mg AM and  1 mg PM.   Confirm no new medications or or missed doses.   Confirm no new illness / infection / diarrhea.   If accurate trough and accurate dose, increase Tacrolimus IR dose to 2 mg BID     Is this more than a 50% increase or decrease in current IS dose: No  If YES, justification:     Repeat labs in 1-2 weeks.  *If > 50% change in immunosuppression dose, repeat labs in 1 week.   Lotus Hughes, RICHARD    OUTCOME:   Spoke with Patient, they confirm accurate trough level and current dose 2 mg am, 1 mg pm.   Patient confirmed dose change to 2 mg BID.  Patient agreed to repeat labs in 1-2 weeks.   Orders sent to preferred pharmacy for dose change and lab for repeat labs.   Patient voiced understanding of plan.     Alexandrea Peter LPN

## 2025-03-11 ENCOUNTER — OFFICE VISIT (OUTPATIENT)
Dept: DERMATOLOGY | Facility: CLINIC | Age: 55
End: 2025-03-11
Payer: COMMERCIAL

## 2025-03-11 DIAGNOSIS — Z41.1 ELECTIVE PROCEDURE FOR UNACCEPTABLE COSMETIC APPEARANCE: Primary | ICD-10-CM

## 2025-03-11 PROCEDURE — 96999 UNLISTED SPEC DERM SVC/PX: CPT | Performed by: PHYSICIAN ASSISTANT

## 2025-03-11 NOTE — PROGRESS NOTES
HPI:   Chief complaints: Sarah Fisher is a pleasant 54 year old female who presents for evaluation of treatment of facial rhytids with cosmetic botox. She liked units after LOV. No issues.       PHYSICAL EXAM:    LMP  (LMP Unknown)   Skin exam performed as follows: Type 2 skin. Mood appropriate  Alert and Oriented X 3. Well developed, well nourished in no distress.  General appearance: Normal  Head including face: Normal  Eyes: conjunctiva and lids: Normal  Mouth: Lips, teeth, gums: Normal  Neck: Normal  Skin: Scalp and body hair: See below    Age appropriate rhytids of glabella, crows feet    ASSESSMENT/PLAN:     BOTOX:  PGACAC discussed.  Risks including but not limited to injection site reaction, bruising, asymmetry, no resolution of rhytides, brow ptosis, dry mouth, tiredness, and headache.  Also label warnings are difficulty with swallowing, speaking, breathing, muscle weakness, loss of bladder control, and double vision/blurred vision.  Explained confirmed report of spread of toxin effect when used for hyperhidrosis of underarms or cosmetic use on face has not been reported.  All questions answered and entertained to patient s satisfaction.  Informed consent obtained.      --31 units injected to gabella (11 into procerus, 8 into medial )  --12 into each crows (with 1 unit infraorbitally)   --55 units total  --Cosmetic cost of $770        Follow-up: 3 mo  CC:   Scribed By: Areli Reich, MS, PA-C    Clinic Administered Medication Documentation    Patient was given Botox. Prior to medication administration, verified patient's identity using patient s name and date of birth. Please see MAR and medication order for additional information. Patient instructed to report any adverse reaction to staff immediately.    Vial/Syringe: Single dose vial. Was entire vial of medication used? No, The remainder 45 Units of 100 Units was discarded as unavoidable waste.    Drug Name: botulinum toxin with expiration  date of 03/30/2027  Dose: 55 Units  Route administered: IM  NDC #: 9031-7988-48  Amount of waste:45 Units  Reason for waste: Single use vial     LOT #: [id number]  SITE: face  : Allergchitra  EXPIRATION DATE: 04/30/2027

## 2025-03-11 NOTE — Clinical Note
Group Topic:  Group Psychotherapy    Date: 3/11/2025  Start Time:  5:30 PM  End Time:  8:30 PM  Facilitators: Destiny Barrientos APSW    This visit is being performed via video for [facility]. Clinician Location: [facility]  Patient Location: Home  Pt is in [place] and pt's identity has been established.     180 minutes were spent in this encounter.  Method: Group  Attendance: Present  Patient report of any safety concerns: No  Drugs/alcohol use reported since last session:No  Perceptual Disturbance: None  Mood: Depressed and Anxious  Affect: Congruent to mood  Thought Process: Congruent  Speech: Clear/articulate  Behavior/Socialization: Appropriate to Group  Facilitator's therapeutic interventions provided:  reflecting (simple reflections), validating, reinforcing the positive, and providing psychoeducation    Group Topic:  Coping Skills Education:    Start Time: 1230  End Time: 1320  Number of group participants: 7  Therapeutic intervention(s) introduced to group: Self-Soothe/IMPROVE:  Pts were given a brief presentation on Self-Soothe, how to use Touch, Taste, Hearing, Sight, and Smell to calm themselves in moments of increasing stress, as well as on distress tolerance skills aimed at helping to improve the moment in one's mind to tolerate and endure a period of intense, unpleasant emotions with IMPROVE (Imagery, Meaning, Prayer, Relaxation, One thing in the moment, Vacation, Encouragement).  Patient’s response to group: Pt demonstrated quiet participation in group.    Group Topic:  Process Group:    Start Time: 1335  End Time: 1425  Number of group participants: 7  Patient’s response to group and/or progress towards treatment goals:  Pt identified current emotion as peaceful. Patient confirmed that she did take a bath. She was able to complete some tasks by using opposite action by getting out of bed, washing dishes, completing her laundry, and cleaning room. Patient did admitted that it was challenge to start  Please schedule for Fadia Avila & skyla villareal in-person on 1/4/20 with labs before. This is for eval & HFU. Thanks, tk the tasks, but once she got started it became easier.       Group Topic:  Behavioral Activation Group:    Start Time: 1435  End Time: 1530  Number of group participants: 7   Goals identified: Pt set the following self-activation goals:  dedicate time to read Bible, meditate, and prayer..  Pt set intention to reduce/avoid avoidance.  Pt set goal to practice the following skill:  opposite action.  Patient’s response to group: Patient was able to set goals.    Plan:  Pt will continue to attend IOP sessions as scheduled.

## 2025-03-11 NOTE — LETTER
3/11/2025      Sarah Fisher  [address] [place]  [place] [address]      Dear Colleague,    Thank you for referring your patient, Sarah Fisher, to the [facility]. Please see a copy of my visit note below.    HPI:   Chief complaints: Sarah Fisher is a pleasant 54 year old female who presents for evaluation of treatment of facial rhytids with cosmetic botox. She liked units after LOV. No issues.       PHYSICAL EXAM:    LMP  (LMP Unknown)   Skin exam performed as follows: Type 2 skin. Mood appropriate  Alert and Oriented X 3. Well developed, well nourished in no distress.  General appearance: Normal  Head including face: Normal  Eyes: conjunctiva and lids: Normal  Mouth: Lips, teeth, gums: Normal  Neck: Normal  Skin: Scalp and body hair: See below    Age appropriate rhytids of glabella, crows feet    ASSESSMENT/PLAN:     BOTOX:  PGACAC discussed.  Risks including but not limited to injection site reaction, bruising, asymmetry, no resolution of rhytides, brow ptosis, dry mouth, tiredness, and headache.  Also label warnings are difficulty with swallowing, speaking, breathing, muscle weakness, loss of bladder control, and double vision/blurred vision.  Explained confirmed report of spread of toxin effect when used for hyperhidrosis of underarms or cosmetic use on face has not been reported.  All questions answered and entertained to patient s satisfaction.  Informed consent obtained.      --31 units injected to gabella (11 into procerus, 8 into medial )  --12 into each crows (with 1 unit infraorbitally)   --55 units total  --Cosmetic cost of $770        Follow-up: 3 mo  CC:   Scribed By: Areli Reich, MS, PA-C    Clinic Administered Medication Documentation    Patient was given Botox. Prior to medication administration, verified patient's identity using patient s name and date of birth. Please see MAR and medication order for additional information.  Patient instructed to report any adverse reaction to staff immediately.    Vial/Syringe: Single dose vial. Was entire vial of medication used? No, The remainder 45 Units of 100 Units was discarded as unavoidable waste.    Drug Name: botulinum toxin with expiration date of 03/30/2027  Dose: 55 Units  Route administered: IM  NDC #: 7370-1813-10  Amount of waste:45 Units  Reason for waste: Single use vial     LOT #: [id number]  SITE: face  : Allergan  EXPIRATION DATE: 04/30/2027      Again, thank you for allowing me to participate in the care of your patient.        Sincerely,        Areli Love PA-C    Electronically signed

## 2025-03-18 DIAGNOSIS — L98.8 FACIAL RHYTIDS: ICD-10-CM

## 2025-03-19 RX ORDER — TRETINOIN 0.25 MG/G
CREAM TOPICAL
Qty: 45 G | Refills: 11 | Status: SHIPPED | OUTPATIENT
Start: 2025-03-19

## 2025-03-19 NOTE — TELEPHONE ENCOUNTER
RN called and spoke with patient, patient states that Dermatology has been prescribing this. Patient still using.    Routing refill request to derm provider.    Of note: Patient did ask if this is something Christine would be willing to take over after her dermatologist leaves clinic; RN advised when that time comes to submit an e-visit.     Kami Mar RN

## 2025-03-19 NOTE — TELEPHONE ENCOUNTER
Clinic RN: Please investigate patient's chart or contact patient if the information cannot be found because patient should have run out of this medication on 11.13.24. Confirm patient is taking this medication as prescribed. Document findings and route refill encounter to provider for approval or denial.

## 2025-03-26 ENCOUNTER — OFFICE VISIT (OUTPATIENT)
Dept: VASCULAR SURGERY | Facility: CLINIC | Age: 55
End: 2025-03-26
Payer: COMMERCIAL

## 2025-03-26 DIAGNOSIS — I83.813 VARICOSE VEINS OF BOTH LOWER EXTREMITIES WITH PAIN: Primary | ICD-10-CM

## 2025-03-26 PROCEDURE — 99207 PR VEINSOLUTIONS POST OPERATIVE VISIT: CPT | Performed by: SURGERY

## 2025-03-26 NOTE — LETTER
3/26/2025      Sarah Fisher  [address] [place] [place]  [facility] [place] [facility] [address]      Dear Colleague,    Thank you for referring your patient, Sarah Fisher, to the [facility] [employer]. Please see a copy of my visit note below.    It was a pleasure to see Maria Elena Fisher in the vein clinic today.  She is an exceedingly pleasant and active 55-year-old lady who we did a left great saphenous vein VenaSeal closure and stab phlebectomies on 11 February 2025.  He tells me that she is feeling much better and has been more active now that the discomfort in her lower extremities has subsided.    On examination her surgical sites are healing well.    She had mentioned a couple of areas where there is forearm scar forming.  I have advised her to start using Mederma cream when it spends 3 months since after the initial procedure and try and break up the scar.    I look forward to her 6-month appointment.  All questions answered.      Again, thank you for allowing me to participate in the care of your patient.        Sincerely,        Elliott Lux MD    Electronically signed

## 2025-03-26 NOTE — PROGRESS NOTES
It was a pleasure to see Maria Elena Fisher in the vein clinic today.  She is an exceedingly pleasant and active 55-year-old lady who we did a left great saphenous vein VenaSeal closure and stab phlebectomies on 11 February 2025.  He tells me that she is feeling much better and has been more active now that the discomfort in her lower extremities has subsided.    On examination her surgical sites are healing well.    She had mentioned a couple of areas where there is forearm scar forming.  I have advised her to start using Mederma cream when it spends 3 months since after the initial procedure and try and break up the scar.    I look forward to her 6-month appointment.  All questions answered.

## 2025-04-01 ENCOUNTER — TELEPHONE (OUTPATIENT)
Dept: OBGYN | Facility: CLINIC | Age: 55
End: 2025-04-01
Payer: COMMERCIAL

## 2025-04-01 NOTE — TELEPHONE ENCOUNTER
[facility] Call Center    Phone Message    May a detailed message be left on voicemail: yes     Reason for Call: Other: Patient is calling to schedule a colp, I did reach out to secure chat but did not get a response. Thank you      Action Taken: Other: obgyn    Travel Screening: Not Applicable     Date of Service:

## 2025-04-29 ENCOUNTER — MYC REFILL (OUTPATIENT)
Dept: TRANSPLANT | Facility: CLINIC | Age: 55
End: 2025-04-29
Payer: COMMERCIAL

## 2025-04-29 DIAGNOSIS — T86.41 LIVER TRANSPLANT REJECTION (H): ICD-10-CM

## 2025-04-29 RX ORDER — PREDNISONE 5 MG/1
5 TABLET ORAL DAILY
Qty: 90 TABLET | Refills: 3 | Status: SHIPPED | OUTPATIENT
Start: 2025-04-29

## 2025-05-01 ENCOUNTER — LAB (OUTPATIENT)
Dept: LAB | Facility: CLINIC | Age: 55
End: 2025-05-01
Payer: COMMERCIAL

## 2025-05-01 DIAGNOSIS — Z94.4 LIVER REPLACED BY TRANSPLANT (H): ICD-10-CM

## 2025-05-01 LAB
ALBUMIN SERPL BCG-MCNC: 4.3 G/DL (ref 3.5–5.2)
ALP SERPL-CCNC: 52 U/L (ref 40–150)
ALT SERPL W P-5'-P-CCNC: 10 U/L (ref 0–50)
ANION GAP SERPL CALCULATED.3IONS-SCNC: 7 MMOL/L (ref 7–15)
AST SERPL W P-5'-P-CCNC: 18 U/L (ref 0–45)
BILIRUB DIRECT SERPL-MCNC: 0.24 MG/DL (ref 0–0.3)
BILIRUB SERPL-MCNC: 0.5 MG/DL
BUN SERPL-MCNC: 17.1 MG/DL (ref 6–20)
CALCIUM SERPL-MCNC: 10 MG/DL (ref 8.8–10.4)
CHLORIDE SERPL-SCNC: 108 MMOL/L (ref 98–107)
CHOLEST SERPL-MCNC: 139 MG/DL
CREAT SERPL-MCNC: 1.05 MG/DL (ref 0.51–0.95)
CREAT UR-MCNC: 258 MG/DL
EGFRCR SERPLBLD CKD-EPI 2021: 62 ML/MIN/1.73M2
ERYTHROCYTE [DISTWIDTH] IN BLOOD BY AUTOMATED COUNT: 12.7 % (ref 10–15)
FASTING STATUS PATIENT QL REPORTED: YES
FASTING STATUS PATIENT QL REPORTED: YES
GLUCOSE SERPL-MCNC: 69 MG/DL (ref 70–99)
HCO3 SERPL-SCNC: 29 MMOL/L (ref 22–29)
HCT VFR BLD AUTO: 39.3 % (ref 35–47)
HDLC SERPL-MCNC: 75 MG/DL
HGB BLD-MCNC: 13.7 G/DL (ref 11.7–15.7)
LDLC SERPL CALC-MCNC: 49 MG/DL
MAGNESIUM SERPL-MCNC: 1.9 MG/DL (ref 1.7–2.3)
MCH RBC QN AUTO: 32.8 PG (ref 26.5–33)
MCHC RBC AUTO-ENTMCNC: 34.9 G/DL (ref 31.5–36.5)
MCV RBC AUTO: 94 FL (ref 78–100)
MICROALBUMIN UR-MCNC: 29.7 MG/L
MICROALBUMIN/CREAT UR: 11.51 MG/G CR (ref 0–25)
NONHDLC SERPL-MCNC: 64 MG/DL
PLATELET # BLD AUTO: 201 10E3/UL (ref 150–450)
POTASSIUM SERPL-SCNC: 4.5 MMOL/L (ref 3.4–5.3)
PROT SERPL-MCNC: 6.3 G/DL (ref 6.4–8.3)
RBC # BLD AUTO: 4.18 10E6/UL (ref 3.8–5.2)
SODIUM SERPL-SCNC: 144 MMOL/L (ref 135–145)
TACROLIMUS BLD-MCNC: 5.7 UG/L (ref 5–15)
TME LAST DOSE: NORMAL H
TME LAST DOSE: NORMAL H
TRIGL SERPL-MCNC: 77 MG/DL
WBC # BLD AUTO: 5.5 10E3/UL (ref 4–11)

## 2025-05-07 ENCOUNTER — ANESTHESIA EVENT (OUTPATIENT)
Dept: SURGERY | Facility: AMBULATORY SURGERY CENTER | Age: 55
End: 2025-05-07
Payer: COMMERCIAL

## 2025-05-07 RX ORDER — HYDROMORPHONE HYDROCHLORIDE 1 MG/ML
0.2 INJECTION, SOLUTION INTRAMUSCULAR; INTRAVENOUS; SUBCUTANEOUS EVERY 5 MIN PRN
Refills: 0 | OUTPATIENT
Start: 2025-05-07

## 2025-05-07 RX ORDER — SODIUM CHLORIDE, SODIUM LACTATE, POTASSIUM CHLORIDE, CALCIUM CHLORIDE 600; 310; 30; 20 MG/100ML; MG/100ML; MG/100ML; MG/100ML
INJECTION, SOLUTION INTRAVENOUS CONTINUOUS
OUTPATIENT
Start: 2025-05-07

## 2025-05-07 RX ORDER — OXYCODONE HYDROCHLORIDE 5 MG/1
10 TABLET ORAL
Refills: 0 | OUTPATIENT
Start: 2025-05-07

## 2025-05-07 RX ORDER — NALOXONE HYDROCHLORIDE 0.4 MG/ML
0.1 INJECTION, SOLUTION INTRAMUSCULAR; INTRAVENOUS; SUBCUTANEOUS
OUTPATIENT
Start: 2025-05-07

## 2025-05-07 RX ORDER — HYDROMORPHONE HYDROCHLORIDE 1 MG/ML
0.4 INJECTION, SOLUTION INTRAMUSCULAR; INTRAVENOUS; SUBCUTANEOUS EVERY 5 MIN PRN
Refills: 0 | OUTPATIENT
Start: 2025-05-07

## 2025-05-07 RX ORDER — ONDANSETRON 4 MG/1
4 TABLET, ORALLY DISINTEGRATING ORAL EVERY 30 MIN PRN
OUTPATIENT
Start: 2025-05-07

## 2025-05-07 RX ORDER — FENTANYL CITRATE 50 UG/ML
50 INJECTION, SOLUTION INTRAMUSCULAR; INTRAVENOUS EVERY 5 MIN PRN
Refills: 0 | OUTPATIENT
Start: 2025-05-07

## 2025-05-07 RX ORDER — DEXAMETHASONE SODIUM PHOSPHATE 10 MG/ML
4 INJECTION, SOLUTION INTRAMUSCULAR; INTRAVENOUS
OUTPATIENT
Start: 2025-05-07

## 2025-05-07 RX ORDER — ONDANSETRON 2 MG/ML
4 INJECTION INTRAMUSCULAR; INTRAVENOUS EVERY 30 MIN PRN
OUTPATIENT
Start: 2025-05-07

## 2025-05-07 RX ORDER — OXYCODONE HYDROCHLORIDE 5 MG/1
5 TABLET ORAL
Refills: 0 | OUTPATIENT
Start: 2025-05-07

## 2025-05-07 RX ORDER — LABETALOL HYDROCHLORIDE 5 MG/ML
10 INJECTION, SOLUTION INTRAVENOUS
OUTPATIENT
Start: 2025-05-07

## 2025-05-07 RX ORDER — FENTANYL CITRATE 50 UG/ML
25 INJECTION, SOLUTION INTRAMUSCULAR; INTRAVENOUS EVERY 5 MIN PRN
Refills: 0 | OUTPATIENT
Start: 2025-05-07

## 2025-05-08 ENCOUNTER — ANESTHESIA (OUTPATIENT)
Dept: SURGERY | Facility: AMBULATORY SURGERY CENTER | Age: 55
End: 2025-05-08
Payer: COMMERCIAL

## 2025-05-08 ENCOUNTER — HOSPITAL ENCOUNTER (OUTPATIENT)
Facility: AMBULATORY SURGERY CENTER | Age: 55
Discharge: HOME | End: 2025-05-08
Attending: STUDENT IN AN ORGANIZED HEALTH CARE EDUCATION/TRAINING PROGRAM
Payer: COMMERCIAL

## 2025-05-08 VITALS
HEIGHT: 66 IN | WEIGHT: 130 LBS | OXYGEN SATURATION: 100 % | DIASTOLIC BLOOD PRESSURE: 84 MMHG | TEMPERATURE: 97.3 F | RESPIRATION RATE: 16 BRPM | HEART RATE: 66 BPM | BODY MASS INDEX: 20.89 KG/M2 | SYSTOLIC BLOOD PRESSURE: 114 MMHG

## 2025-05-08 VITALS — HEART RATE: 67 BPM

## 2025-05-08 LAB — COLONOSCOPY: NORMAL

## 2025-05-08 RX ORDER — PROPOFOL 10 MG/ML
INJECTION, EMULSION INTRAVENOUS PRN
Status: DISCONTINUED | OUTPATIENT
Start: 2025-05-08 | End: 2025-05-08

## 2025-05-08 RX ORDER — ONDANSETRON 4 MG/1
4 TABLET, ORALLY DISINTEGRATING ORAL EVERY 6 HOURS PRN
OUTPATIENT
Start: 2025-05-08

## 2025-05-08 RX ORDER — LIDOCAINE 40 MG/G
CREAM TOPICAL
Status: ACTIVE | OUTPATIENT
Start: 2025-05-08

## 2025-05-08 RX ORDER — ONDANSETRON 2 MG/ML
4 INJECTION INTRAMUSCULAR; INTRAVENOUS
Status: ACTIVE | OUTPATIENT
Start: 2025-05-08

## 2025-05-08 RX ORDER — LIDOCAINE HYDROCHLORIDE 20 MG/ML
INJECTION, SOLUTION INFILTRATION; PERINEURAL PRN
Status: DISCONTINUED | OUTPATIENT
Start: 2025-05-08 | End: 2025-05-08

## 2025-05-08 RX ORDER — SODIUM CHLORIDE, SODIUM LACTATE, POTASSIUM CHLORIDE, CALCIUM CHLORIDE 600; 310; 30; 20 MG/100ML; MG/100ML; MG/100ML; MG/100ML
INJECTION, SOLUTION INTRAVENOUS CONTINUOUS PRN
Status: DISCONTINUED | OUTPATIENT
Start: 2025-05-08 | End: 2025-05-08

## 2025-05-08 RX ORDER — SODIUM CHLORIDE, SODIUM LACTATE, POTASSIUM CHLORIDE, CALCIUM CHLORIDE 600; 310; 30; 20 MG/100ML; MG/100ML; MG/100ML; MG/100ML
INJECTION, SOLUTION INTRAVENOUS CONTINUOUS
Status: ACTIVE | OUTPATIENT
Start: 2025-05-08

## 2025-05-08 RX ORDER — PROPOFOL 10 MG/ML
INJECTION, EMULSION INTRAVENOUS CONTINUOUS PRN
Status: DISCONTINUED | OUTPATIENT
Start: 2025-05-08 | End: 2025-05-08

## 2025-05-08 RX ORDER — NALOXONE HYDROCHLORIDE 0.4 MG/ML
0.2 INJECTION, SOLUTION INTRAMUSCULAR; INTRAVENOUS; SUBCUTANEOUS
OUTPATIENT
Start: 2025-05-08

## 2025-05-08 RX ORDER — ONDANSETRON 2 MG/ML
4 INJECTION INTRAMUSCULAR; INTRAVENOUS EVERY 6 HOURS PRN
OUTPATIENT
Start: 2025-05-08

## 2025-05-08 RX ORDER — NALOXONE HYDROCHLORIDE 0.4 MG/ML
0.4 INJECTION, SOLUTION INTRAMUSCULAR; INTRAVENOUS; SUBCUTANEOUS
OUTPATIENT
Start: 2025-05-08

## 2025-05-08 RX ORDER — FLUMAZENIL 0.1 MG/ML
0.2 INJECTION, SOLUTION INTRAVENOUS
OUTPATIENT
Start: 2025-05-08 | End: 2025-05-08

## 2025-05-08 RX ORDER — ACETAMINOPHEN 325 MG/1
975 TABLET ORAL ONCE
Status: ACTIVE | OUTPATIENT
Start: 2025-05-08

## 2025-05-08 RX ORDER — PROCHLORPERAZINE MALEATE 10 MG
10 TABLET ORAL EVERY 6 HOURS PRN
OUTPATIENT
Start: 2025-05-08

## 2025-05-08 RX ADMIN — PROPOFOL 200 MCG/KG/MIN: 10 INJECTION, EMULSION INTRAVENOUS at 10:39

## 2025-05-08 RX ADMIN — PROPOFOL 20 MG: 10 INJECTION, EMULSION INTRAVENOUS at 10:58

## 2025-05-08 RX ADMIN — PROPOFOL 40 MG: 10 INJECTION, EMULSION INTRAVENOUS at 10:41

## 2025-05-08 RX ADMIN — SODIUM CHLORIDE, SODIUM LACTATE, POTASSIUM CHLORIDE, CALCIUM CHLORIDE: 600; 310; 30; 20 INJECTION, SOLUTION INTRAVENOUS at 10:36

## 2025-05-08 RX ADMIN — PROPOFOL 60 MG: 10 INJECTION, EMULSION INTRAVENOUS at 10:39

## 2025-05-08 RX ADMIN — LIDOCAINE HYDROCHLORIDE 60 MG: 20 INJECTION, SOLUTION INFILTRATION; PERINEURAL at 10:39

## 2025-05-08 ASSESSMENT — ENCOUNTER SYMPTOMS: DYSRHYTHMIAS: 0

## 2025-05-08 NOTE — ANESTHESIA PREPROCEDURE EVALUATION
Anesthesia Pre-Procedure Evaluation    Patient: Sarah Fisher   MRN: [id number] : 1970        Procedure : Procedure(s):  colonoscopy          Past Medical History:   Diagnosis Date    Ascites     GI (gastrointestinal bleed) 10/30/2020    History of blood transfusion     Hypertension     Hypokalemia 2020    Hypotension 2020    Liver cirrhosis (H)     Liver failure (H) 2020    Thyroid disease       Past Surgical History:   Procedure Laterality Date    ABDOMEN SURGERY       SECTION      CV RIGHT HEART CATH MEASUREMENTS RECORDED N/A 2021    Procedure: CV RIGHT HEART CATH;  Surgeon: Joseph Guevara MD;  Location: [facility]    ENDOSCOPIC RETROGRADE CHOLANGIOPANCREATOGRAM N/A 10/25/2021    Procedure: ENDOSCOPIC RETROGRADE CHOLANGIOPANCREATOGRAPHY, WITH biliary sphincterotomy, biliary dilation, and biliary stent placement;  Surgeon: Guru Dominguez Fenton MD;  Location: [facility]    ENDOSCOPIC RETROGRADE CHOLANGIOPANCREATOGRAM N/A 2022    Procedure: ENDOSCOPIC RETROGRADE CHOLANGIOPANCREATOGRAPHY WITH BILIARY DILATION, DEBRIS REMOVAL AND STENT PLACEMENT;  Surgeon: Guru Dominguez Fenton MD;  Location: [facility]    ENDOSCOPIC RETROGRADE CHOLANGIOPANCREATOGRAPHY, EXCHANGE TUBE/STENT N/A 2021    Procedure: ENDOSCOPIC RETROGRADE CHOLANGIOPANCREATOGRAPHY, WITH biliary sphincterotomy, stricture dilation, stent exchange;  Surgeon: Guru Dominguez Fenton MD;  [facility]: [facility]    ENDOSCOPIC ULTRASOUND UPPER GASTROINTESTINAL TRACT (GI) N/A 2022    Procedure: ENDOSCOPIC ULTRASOUND WITH CORE LIVER BIOPSY;  Surgeon: Guru Dominguez Fenton MD;  Location: [facility]    ESOPHAGOSCOPY, GASTROSCOPY, DUODENOSCOPY (EGD), COMBINED N/A 10/07/2021    Procedure: ESOPHAGOGASTRODUODENOSCOPY (EGD) with hemostasis;  Surgeon: Fox Jerry MD;  Location:  GI    ESOPHAGOSCOPY, GASTROSCOPY, DUODENOSCOPY (EGD),  COMBINED N/A 10/22/2021    Procedure: ESOPHAGOGASTRODUODENOSCOPY, WITH BIOPSY;  Surgeon: Fadia Avila MD;  Location: [facility]    IR LIVER BIOPSY PERCUTANEOUS  10/26/2021    IR LIVER BIOPSY PERCUTANEOUS  2024    IR PARACENTESIS  2020    IR PARACENTESIS  2020    IR THORACENTESIS  2021    IR THORACENTESIS  2021    THORACENTESIS Left 2021    Procedure: THORACENTESIS;  Surgeon: Almas Irby MD;  Location: [facility]    THORACENTESIS N/A 2021    Procedure: THORACENTESIS;  Surgeon: Almas Irby MD;  Location: [facility]    THORACENTESIS N/A 03/10/2021    Procedure: THORACENTESIS;  Surgeon: Almas Irby MD;  Location: [facility]    THORACENTESIS Left 2021    Procedure: THORACENTESIS;  Surgeon: Kennedi Chavez MD;  Location: [facility] OR    THORACENTESIS Left 2021    Procedure: THORACENTESIS;  Surgeon: Jess Ansari PA-C;  Location: [facility] OR    THORACIC SURGERY  2021    TRANSPLANT LIVER RECIPIENT  DONOR N/A 10/17/2021    Procedure: TRANSPLANT, LIVER, RECIPIENT,  DONOR;  Surgeon: Christian Morrison MD;  Location: [facility]      Allergies   Allergen Reactions    Amoxicillin GI Disturbance, Diarrhea, Nausea and Nausea and Vomiting      Social History     Tobacco Use    Smoking status: Never    Smokeless tobacco: Never   Substance Use Topics    Alcohol use: Not Currently     Comment: Quit on 2020      Wt Readings from Last 1 Encounters:   25 62 kg (136 lb 11.2 oz)        Anesthesia Evaluation   Pt has had prior anesthetic. Type: General and MAC.    No history of anesthetic complications       ROS/MED HX  ENT/Pulmonary:  - neg pulmonary ROS     Neurologic: Comment: Hx Hepatic encephalopathy, now s/p DDLT on 10/17/2021      Cardiovascular:     (+)  - -   -  - -                                 Previous cardiac testing   Echo: Date: Results:    Stress Test:  Date: 2021 Results:    Interpretation Summary  Normal dobutamine stress echocardiogram without evidence of  inducible ischemia. Target heart rate was achieved. Heart rate and blood pressure response to dobutamine were normal. Normal LV function and wall motion at rest. With stress, the left ventricular ejection fraction increased from 55- 60% to greater than 65% and the left ventricular size decreased appropriately. No regional wall motion abnormality with stress. No subjective symptoms to suggest ischemia. There was no ECG evidence of ischemia. No significant valve disease on screening doppler evaluation. The aortic root and visualized ascending aorta are normal. IVC diameter and respiratory changes fall into an intermediate range suggesting an RA pressure of 8 mmHg. Right ventricular systolic pressure could not be approximated due to inadequate tricuspid regurgitation. There is no pericardial effusion. Large pleural effusion.  ECG Reviewed:  Date: 5/26/2021 Results:  SR with PAC's. Prolonged QT (QTc 485)  Cath:  Date: 6/7/21 Results:  Mean PA pressure 25.   Mild elevated pulmonary hypertension.   Left sided filling pressures are mildly elevated. Hyperdynamic cardiac output level.     (-) angina, CAD, syncope, arrhythmias and angina   METS/Exercise Tolerance:     Hematologic: Comments: Hx of [facility]  TCP   Coagulopathy       (+) History of blood clots,     anemia, history of blood transfusion, no previous transfusion reaction,        Musculoskeletal:  - neg musculoskeletal ROS     GI/Hepatic: Comment: s/p DDLT 10/17/2021  Gastric Antral Vascular EctasIA, s/p Argon Plasma Coagulations   Duodenal ulcers    (+) GERD, Asymptomatic on medication, esophageal disease,          liver disease,       Renal/Genitourinary:     (+) renal disease, type: CRI,            Endo:     (+)          thyroid problem, hypothyroidism,           Psychiatric/Substance Use: Comment:   EtOH abuse in remission.     (+)   alcohol abuse      Infectious Disease:  - neg infectious disease ROS     Malignancy:  - neg malignancy ROS     Other:  - neg  other ROS          Physical Exam    Airway  airway exam normal      Mallampati: I       Respiratory Devices and Support         Dental  no notable dental history         Cardiovascular   cardiovascular exam normal          Pulmonary   pulmonary exam normal                OUTSIDE LABS:  CBC:   Lab Results   Component Value Date    WBC 5.5 05/01/2025    WBC 6.1 02/24/2025    HGB 13.7 05/01/2025    HGB 13.0 02/24/2025    HCT 39.3 05/01/2025    HCT 36.9 02/24/2025     05/01/2025     02/24/2025     BMP:   Lab Results   Component Value Date     05/01/2025     02/24/2025    POTASSIUM 4.5 05/01/2025    POTASSIUM 4.9 02/24/2025    CHLORIDE 108 (H) 05/01/2025    CHLORIDE 107 02/24/2025    CO2 29 05/01/2025    CO2 26 02/24/2025    BUN 17.1 05/01/2025    BUN 25.0 (H) 02/24/2025    CR 1.05 (H) 05/01/2025    CR 1.17 (H) 02/24/2025    GLC 69 (L) 05/01/2025    GLC 87 02/24/2025     COAGS:   Lab Results   Component Value Date    PTT 35 10/21/2021    INR 0.94 01/29/2024    FIBR 177 10/18/2021     POC:   Lab Results   Component Value Date    HCGS Negative 01/04/2021     HEPATIC:   Lab Results   Component Value Date    ALBUMIN 4.3 05/01/2025    PROTTOTAL 6.3 (L) 05/01/2025    ALT 10 05/01/2025    AST 18 05/01/2025     (H) 03/07/2022    ALKPHOS 52 05/01/2025    BILITOTAL 0.5 05/01/2025     OTHER:   Lab Results   Component Value Date    PH 7.37 10/18/2021    LACT 0.5 (L) 10/26/2021    A1C 5.1 10/19/2021    EMMY 10.0 05/01/2025    PHOS 3.5 10/18/2024    MAG 1.9 05/01/2025    LIPASE 341 04/13/2022    AMYLASE 61 04/13/2022    TSH 1.08 02/24/2025    T4 1.41 04/01/2024       Anesthesia Plan    ASA Status:  3    NPO Status:  NPO Appropriate          Maintenance: TIVA.        Consents            Postoperative Care       PONV prophylaxis: Ondansetron (or other 5HT-3), Background Propofol Infusion     Comments:                Elana Byers MD  Physical Exam  Airway  Mallampati: I    Cardiovascular - normal exam    Dental - normal exam    Pulmonary - normal exam      Neurological   Other Findings     Anesthesia Plan    ASA Status:  3               Consents            Postoperative Care            Comments:

## 2025-05-08 NOTE — PROGRESS NOTES
Medication Therapy Management (MTM) Encounter    ASSESSMENT:                            Medication Adherence/Access: No issues identified.     Hypertension   Patient is meeting blood pressure goal of < 130/80mmHg and on the lower end despite skipping dose. I think it would reasonable with her additional weight loss to trial a dose reduction with home blood pressure monitoring as well.     Weight Management  Patient would benefit from no further weight loss. Her current BMI is within normal range. We discussed importance of maintaining weight vs further weight loss at this time. Due to patient paying out of pocket we discussed options of lowering dose or can trial extending the frequency of the dose with close monitoring of efficacy. Patient would prefer to trial the increased frequency of use.      Vaccine  Due to immunocompromised, patient is recommended to receive additional covid booster 6 months after her last vaccination.     PLAN:                            Reduce Zepbound to every 10 days instead of every 7 days.    We discussed maintaining her current macronutrient goals set on myfitnesspal (I compared to the [employer] Macro Nutrient calculator and this was similar).   We discussed goal to not lose any more weight but maintain.     We discussed Zepbound does have vials but only dose up to 10mg at this point. If she doesn't tolerate the every 10 day frequency she is agreeable to dose reduce to 12.5mg every 7 days.     Recommend updated covid booster.    Trial amlodipine 2.5 mg daily. Check blood pressure at home daily. Report to Umm via [employer] Next Monday.     Follow-up: Return in about 3 months (around 8/12/2025) for Medication Therapy Management Visit.    SUBJECTIVE/OBJECTIVE:                          Maria Elena Fisher is a 55 year old female seen for a follow-up visit.       Reason for visit: Zepbound/WM.    Allergies/ADRs: Reviewed in chart  Past Medical History: Reviewed in chart  Tobacco: She reports that  "she has never smoked. She has never used smokeless tobacco.  Alcohol: not currently using    Medication Adherence/Access: pays OOP for Zepbound through [facility]    Hypertension   Amlodipine 5 mg daily     She did have hypotension on 10 mg dose. She has blood pressure cuff but not checking. She isn't aware of any dizziness. However, she intentionally skipped amlodipine dose last night.      Weight Management   Zepbound 15 mg subcutaneous weekly every 7 days    Exercise:   Pilates 3-4 times a week and walking every day 4-6 miles.    Diet:   She feels Zepbound helps her make consistently better, healthier food choices. She avoid foods high in fat and carbohydrates. She did track a few days of her daily food via [employer] journal upon my request for this appointment.   Yesterday her daily food log was:  Proteins 128g  Fat 61g  Carbs 189g  Calories 2160  Initial weight prior to starting GLP1 therapy 8/16/23 ~ 213 lbs.     Current weight today: 130 lbs 0 oz. Home scale once a week usually.   Wt Readings from Last 4 Encounters:   05/12/25 130 lb (59 kg)   05/08/25 130 lb (59 kg)   02/24/25 136 lb 11.2 oz (62 kg)   12/13/24 150 lb 12.8 oz (68.4 kg)     Estimated body mass index is 20.98 kg/m  as calculated from the following:    Height as of 5/8/25: 5' 6\" (1.676 m).    Weight as of this encounter: 130 lb (59 kg).    Vaccines  She thought she was updated on her vaccines.     Today's Vitals: /69   Pulse 79   Wt 130 lb (59 kg)   LMP  (LMP Unknown)   SpO2 98%   BMI 20.98 kg/m    ----------------      I spent 30 minutes with this patient today. All changes were made via collaborative practice agreement with ROBYN Bear CNP.     A summary of these recommendations was given to the patient.    Umm Turner, PharmD  Medication Therapy Management Pharmacist       Medication Therapy Recommendations  Class 1 obesity with body mass index (BMI) of 34.0 to 34.9 in adult, unspecified obesity " type, unspecified whether serious comorbidity present   1 Rationale: Dose too high - Dosage too high - Safety   Recommendation: Decrease Frequency   Status: Accepted per CPA   Identified Date: 5/12/2025 Completed Date: 5/12/2025         Secondary hypertension   1 Rationale: Dose too high - Dosage too high - Safety   Recommendation: Decrease Dose   Status: Patient Agreed - Adherence/Education   Identified Date: 5/12/2025 Completed Date: 5/12/2025         Vaccine counseling   1 Rationale: Preventive therapy - Needs additional medication therapy - Indication   Recommendation: Provide Education - PFIZER COVID-19 VAC BIVALENT IM   Status: Patient Agreed - Adherence/Education   Identified Date: 5/12/2025 Completed Date: 5/12/2025

## 2025-05-08 NOTE — ANESTHESIA CARE TRANSFER NOTE
Patient: Sarah Fisher    Procedure: Procedure(s):  Colonoscopy       Diagnosis: Colon cancer screening [Z12.11]  Diagnosis Additional Information: No value filed.    Anesthesia Type:   MAC     Note:    Oropharynx: oropharynx clear of all foreign objects and spontaneously breathing  Level of Consciousness: awake  Oxygen Supplementation: room air    Independent Airway: airway patency satisfactory and stable  Dentition: dentition unchanged  Vital Signs Stable: post-procedure vital signs reviewed and stable  Report to RN Given: handoff report given  Patient transferred to: Phase II  Comments: Report to Phase II RN. Resps easy and reg.   Handoff Report: Identifed the Patient, Identified the Reponsible Provider, Reviewed the pertinent medical history, Discussed the surgical course, Reviewed Intra-OP anesthesia mangement and issues during anesthesia, Set expectations for post-procedure period and Allowed opportunity for questions and acknowledgement of understanding      Vitals:  Vitals Value Taken Time   /66 05/08/25 11:06   Temp 26.1  C (78.98  F) 05/08/25 11:06   Pulse 71 05/08/25 11:06   Resp 16    SpO2 100 % 05/08/25 11:07   Vitals shown include unfiled device data.    Electronically Signed By: ROBYN Mendoza CRNA  May 8, 2025  11:07 AM

## 2025-05-08 NOTE — H&P
Sarah DAMIAN [employer]  [id number]  female  55 year old      Reason for procedure/surgery: screening colonoscopy  Dad had CRC  Colonoscopy  with benign polyp    Patient Active Problem List   Diagnosis    Acute deep vein thrombosis (DVT) of upper extremity (H)    Acute kidney failure, unspecified    Carrier of group B Streptococcus    Cirrhosis of liver with ascites (H)    Elevated blood pressure    Family history of colon cancer    Gastroesophageal reflux disease without esophagitis    Hyponatremia    History of DVT (deep vein thrombosis)    Anemia associated with acute blood loss    Need for vaccination for viral hepatitis    Knee pain    Pancytopenia (H)    Macrocytic anemia    Blood loss anemia    Hydrothorax    Portal hypertensive gastropathy (H)    Pleural effusion    Venous incompetence    Varicose veins of leg with pain    Subclinical hypothyroidism    Alcoholic cirrhosis (H)    Abnormal serum iron level    Iron deficiency anemia due to chronic blood loss    Liver transplant recipient (H)    Immunosuppressed status    Malnutrition    Steroid-induced hyperglycemia    Epistaxis    Esophagitis    Duodenal erosion    Gastric antral vascular ectasia    Hyperphosphatemia    Leukocytosis    Chronic kidney disease, stage 3b (H)    Low magnesium level    Autoimmune hemolytic anemia (H)    Alcohol use disorder, severe, in sustained remission (H)    Hereditary hemochromatosis    Osteopenia    ASCUS of cervix with negative high risk HPV    Liver transplant rejection (H)       Past Surgical History:    Past Surgical History:   Procedure Laterality Date    ABDOMEN SURGERY  2009     SECTION      CV RIGHT HEART CATH MEASUREMENTS RECORDED N/A 2021    Procedure: CV RIGHT HEART CATH;  Surgeon: Joseph Guevara MD;  Location:  [facility] C[facility]    ENDOSCOPIC RETROGRADE CHOLANGIOPANCREATOGRAM N/A 10/25/2021    Procedure: ENDOSCOPIC RETROGRADE CHOLANGIOPANCREATOGRAPHY, WITH biliary  sphincterotomy, biliary dilation, and biliary stent placement;  Surgeon: Guru Dominguez Fenton MD;  Location: [facility]    ENDOSCOPIC RETROGRADE CHOLANGIOPANCREATOGRAM N/A 4/13/2022    Procedure: ENDOSCOPIC RETROGRADE CHOLANGIOPANCREATOGRAPHY WITH BILIARY DILATION, DEBRIS REMOVAL AND STENT PLACEMENT;  Surgeon: Guru Dominguez Fenton MD;  Location: [facility]    ENDOSCOPIC RETROGRADE CHOLANGIOPANCREATOGRAPHY, EXCHANGE TUBE/STENT N/A 11/29/2021    Procedure: ENDOSCOPIC RETROGRADE CHOLANGIOPANCREATOGRAPHY, WITH biliary sphincterotomy, stricture dilation, stent exchange;  Surgeon: Guru Dominguez Fenton MD;  Location: [facility]    ENDOSCOPIC ULTRASOUND UPPER GASTROINTESTINAL TRACT (GI) N/A 4/13/2022    Procedure: ENDOSCOPIC ULTRASOUND WITH CORE LIVER BIOPSY;  Surgeon: Guru Dominguez Fenton MD;  Location: [facility]    ESOPHAGOSCOPY, GASTROSCOPY, DUODENOSCOPY (EGD), COMBINED N/A 10/07/2021    Procedure: ESOPHAGOGASTRODUODENOSCOPY (EGD) with hemostasis;  Surgeon: Fox Jerry MD;  Location:  GI    ESOPHAGOSCOPY, GASTROSCOPY, DUODENOSCOPY (EGD), COMBINED N/A 10/22/2021    Procedure: ESOPHAGOGASTRODUODENOSCOPY, WITH BIOPSY;  Surgeon: Fadia Avila MD;  [facility]    IR LIVER BIOPSY PERCUTANEOUS  10/26/2021    IR LIVER BIOPSY PERCUTANEOUS  1/29/2024    IR PARACENTESIS  9/9/2020    IR PARACENTESIS  12/8/2020    IR THORACENTESIS  05/25/2021    IR THORACENTESIS  07/30/2021    THORACENTESIS Left 01/14/2021    Procedure: THORACENTESIS;  Surgeon: Almas Irby MD;  Location: [facility]    THORACENTESIS N/A 02/17/2021    Procedure: THORACENTESIS;  Surgeon: Almas Irby MD;  Location: [facility]    THORACENTESIS N/A 03/10/2021    Procedure: THORACENTESIS;  Surgeon: Almas Irby MD;  Location: [facility]    THORACENTESIS Left 05/25/2021    Procedure: THORACENTESIS;  Surgeon: Kennedi Chavez MD;  Location: [facility] OR    THORACENTESIS Left 07/30/2021    Procedure: THORACENTESIS;  Surgeon: Elan  Jess LYN PA-C;  Location: [facility] [place]    THORACIC SURGERY  2021    TRANSPLANT LIVER RECIPIENT  DONOR N/A 10/17/2021    Procedure: TRANSPLANT, LIVER, RECIPIENT,  DONOR;  Surgeon: Christian Morrison MD;  Location:  OR       Past Medical History:   Past Medical History:   Diagnosis Date    Ascites     GI (gastrointestinal bleed) 10/30/2020    History of blood transfusion     Hypertension     Hypokalemia 2020    Hypotension 2020    Liver cirrhosis (H)     Liver failure (H) 2020    Thyroid disease        Social History:   Social History     Tobacco Use    Smoking status: Never    Smokeless tobacco: Never   Substance Use Topics    Alcohol use: Not Currently     Comment: Quit on 2020       Family History:   Family History   Problem Relation Age of Onset    No Known Problems Mother     Colon Cancer Father 58    Breast Cancer Maternal Grandmother     No Known Problems Maternal Grandfather     No Known Problems Paternal Grandmother     No Known Problems Paternal Grandfather     Substance Abuse Brother     No Known Problems Sister     No Known Problems Son     Substance Abuse Brother        Allergies:   Allergies   Allergen Reactions    Amoxicillin GI Disturbance, Diarrhea, Nausea and Nausea and Vomiting       Active Medications:   Current Outpatient Medications   Medication Sig Dispense Refill    amLODIPine (NORVASC) 5 MG tablet Take 1 tablet (5 mg) by mouth daily. 90 tablet 1    aspirin 81 MG EC tablet Take 81 mg by mouth daily      augmented betamethasone dipropionate (DIPROLENE AF) 0.05 % external cream Apply topically 2 times daily as needed.      azaTHIOprine (IMURAN) 50 MG tablet Take 1 tablet (50 mg) by mouth daily. 90 tablet 3    bisacodyl (DULCOLAX) 5 MG EC tablet 2 days prior to procedure, take 2 tablets at 4 pm. 1 day prior to procedure, take 2 tablets at 4 pm. For additional instructions refer to your colonoscopy prep instructions. 4 tablet 0    Calcium  Citrate-Vitamin D (CALCIUM CITRATE + D3 MAXIMUM PO) Take 1 tablet by mouth 2 times daily.      hydrocortisone 2.5 % ointment Apply topically 2 times daily as needed.      ketoconazole (NIZORAL) 2 % external cream Apply topically daily as needed.      levothyroxine (SYNTHROID/LEVOTHROID) 75 MCG tablet Take 1 tablet (75 mcg) by mouth daily. 90 tablet 3    magnesium oxide (MAG-OX) 400 MG tablet Take 1 tablet (400 mg) by mouth 2 times daily. 180 tablet 3    ondansetron (ZOFRAN) 4 MG tablet Take 1 tablet (4 mg) by mouth every 6 hours as needed for nausea. 20 tablet 0    pantoprazole (PROTONIX) 20 MG EC tablet Take 1 tablet (20 mg) by mouth daily. 90 tablet 1    polyethylene glycol (GOLYTELY) 236 g suspension 2 days prior at 5pm, mix and drink half of a jug of Golytely. Drink an 8 oz. glass of Golytely every 15 minutes until half of the jug is gone. Place remainder of Golytely in the refrigerator. 1 day prior at 5 pm, drink the 2nd half of a jug of Golytely bowel prep. 6 hours before your check-in time, drink an 8 oz. glass of Golytely every 15 minutes until half of the 2nd jug of Golytely is gone. Discard remainder of second jug. 8000 mL 0    predniSONE (DELTASONE) 5 MG tablet Take 1 tablet (5 mg) by mouth daily. 90 tablet 3    tacrolimus (GENERIC EQUIVALENT) 1 MG capsule Take 2 capsules (2 mg) by mouth every 12 hours. 360 capsule 3    tirzepatide-Weight Management (ZEPBOUND) 15 MG/0.5ML prefilled pen Inject 0.5 mLs (15 mg) subcutaneously every 7 days. 6 mL 0    tretinoin (RETIN-A) 0.025 % external cream Apply a pea size to entire face QD 45 g 11       Systemic Review:   CONSTITUTIONAL: NEGATIVE for fever, chills, change in weight  ENT/MOUTH: NEGATIVE for ear, mouth and throat problems  RESP: NEGATIVE for significant cough or SOB  CV: NEGATIVE for chest pain, palpitations or peripheral edema    Physical Examination:   Vital Signs: /69 (BP Location: Right arm)   Pulse 70   Temp 97  F (36.1  C) (Temporal)   Resp  "18   Ht 1.676 m (5' 6\")   Wt 59 kg (130 lb)   LMP  (LMP Unknown)   SpO2 97%   BMI 20.98 kg/m    GENERAL: healthy, alert and no distress  NECK: no adenopathy, no asymmetry, masses, or scars  RESP: lungs clear to auscultation - no rales, rhonchi or wheezes  CV: regular rate and rhythm, normal S1 S2, no S3 or S4, no murmur, click or rub, no peripheral edema and peripheral pulses strong  ABDOMEN: soft, nontender, no hepatosplenomegaly, no masses and bowel sounds normal  MS: no gross musculoskeletal defects noted, no edema      Plan: Appropriate to proceed as scheduled.      Fadia Avila MD  5/8/2025    PCP:  Christine Harris    "

## 2025-05-08 NOTE — ANESTHESIA POSTPROCEDURE EVALUATION
Patient: Sarah Fisher    Procedure: Procedure(s):  Colonoscopy       Anesthesia Type:  MAC    Note:  Disposition: Outpatient   Postop Pain Control: Uneventful            Sign Out: Well controlled pain   PONV: No   Neuro/Psych: Uneventful            Sign Out: Acceptable/Baseline neuro status   Airway/Respiratory: Uneventful            Sign Out: Acceptable/Baseline resp. status   CV/Hemodynamics: Uneventful            Sign Out: Acceptable CV status; No obvious hypovolemia; No obvious fluid overload   Other NRE: NONE   DID A NON-ROUTINE EVENT OCCUR? No       Last vitals:  Vitals Value Taken Time   /84 05/08/25 11:30   Temp 26.3  C (79.34  F) 05/08/25 11:36   Pulse 66 05/08/25 11:30   Resp 16 05/08/25 11:30   SpO2 98 % 05/08/25 11:36   Vitals shown include unfiled device data.    Electronically Signed By: Elana Byers MD  May 8, 2025  11:55 AM

## 2025-05-12 ENCOUNTER — OFFICE VISIT (OUTPATIENT)
Dept: PHARMACY | Facility: CLINIC | Age: 55
End: 2025-05-12
Payer: COMMERCIAL

## 2025-05-12 ENCOUNTER — ANCILLARY PROCEDURE (OUTPATIENT)
Dept: MAMMOGRAPHY | Facility: CLINIC | Age: 55
End: 2025-05-12
Attending: NURSE PRACTITIONER
Payer: COMMERCIAL

## 2025-05-12 VITALS
DIASTOLIC BLOOD PRESSURE: 69 MMHG | OXYGEN SATURATION: 98 % | HEART RATE: 79 BPM | SYSTOLIC BLOOD PRESSURE: 104 MMHG | BODY MASS INDEX: 20.98 KG/M2 | WEIGHT: 130 LBS

## 2025-05-12 DIAGNOSIS — I15.9 SECONDARY HYPERTENSION: ICD-10-CM

## 2025-05-12 DIAGNOSIS — Z71.85 VACCINE COUNSELING: ICD-10-CM

## 2025-05-12 DIAGNOSIS — Z12.31 VISIT FOR SCREENING MAMMOGRAM: ICD-10-CM

## 2025-05-12 DIAGNOSIS — E66.811 CLASS 1 OBESITY WITH BODY MASS INDEX (BMI) OF 34.0 TO 34.9 IN ADULT, UNSPECIFIED OBESITY TYPE, UNSPECIFIED WHETHER SERIOUS COMORBIDITY PRESENT: Primary | ICD-10-CM

## 2025-05-12 PROCEDURE — 3074F SYST BP LT 130 MM HG: CPT | Performed by: PHARMACIST

## 2025-05-12 PROCEDURE — 99607 MTMS BY PHARM ADDL 15 MIN: CPT | Performed by: PHARMACIST

## 2025-05-12 PROCEDURE — 77063 BREAST TOMOSYNTHESIS BI: CPT | Mod: TC | Performed by: RADIOLOGY

## 2025-05-12 PROCEDURE — 3078F DIAST BP <80 MM HG: CPT | Performed by: PHARMACIST

## 2025-05-12 PROCEDURE — 99606 MTMS BY PHARM EST 15 MIN: CPT | Performed by: PHARMACIST

## 2025-05-12 PROCEDURE — 77067 SCR MAMMO BI INCL CAD: CPT | Mod: TC | Performed by: RADIOLOGY

## 2025-05-12 NOTE — PATIENT INSTRUCTIONS
"Recommendations from today's MTM visit:                                                      Great job with your weight progress!  Today's clinic scale is 130 lbs. I want you stay on around this to avoid becoming underweight. Do not drop below 125 lbs.     Another option you can explore [employer]    The Macro Nutrient calculator that I created for you is very similar to your current MyFitnessPal.  Protein 114 grams/day  Range: 60 - 159   Carbs  Includes Sugar 248 grams/day  Range: 199 - 332   Fat  Includes Saturated Fat 53 grams/day  Range: 42 - 74       Cut the amlodipine tablet in half to 2.5 mg daily. Then check blood pressure once a day and send blood pressure reading to umm Next Monday.    Consider updated covid vaccine booster since it has been 6 months.     Follow-up: 3 months    It was great speaking with you today.  I value your experience and would be very thankful for your time in providing feedback in our clinic survey. In the next few days, you may receive an email or text message from [email or web address] with a link to a survey related to your  clinical pharmacist.\"     To schedule another MTM appointment, please call the clinic directly or you may call the MTM scheduling line at [phone] or toll-free at [phone].     My Clinical Pharmacist's contact information:                                                      Please feel free to contact me with any questions or concerns you have.      Umm Turner, PharmD  Medication Therapy Management Pharmacist     "

## 2025-05-14 ENCOUNTER — OFFICE VISIT (OUTPATIENT)
Dept: OBGYN | Facility: CLINIC | Age: 55
End: 2025-05-14
Payer: COMMERCIAL

## 2025-05-14 VITALS — WEIGHT: 128.8 LBS | DIASTOLIC BLOOD PRESSURE: 66 MMHG | BODY MASS INDEX: 20.79 KG/M2 | SYSTOLIC BLOOD PRESSURE: 102 MMHG

## 2025-05-14 DIAGNOSIS — N88.2 CERVICAL STENOSIS (UTERINE CERVIX): ICD-10-CM

## 2025-05-14 DIAGNOSIS — R87.610 ASCUS OF CERVIX WITH NEGATIVE HIGH RISK HPV: Primary | ICD-10-CM

## 2025-05-14 DIAGNOSIS — Z11.3 SCREEN FOR STD (SEXUALLY TRANSMITTED DISEASE): ICD-10-CM

## 2025-05-14 PROCEDURE — 87591 N.GONORRHOEAE DNA AMP PROB: CPT | Performed by: OBSTETRICS & GYNECOLOGY

## 2025-05-14 PROCEDURE — 3078F DIAST BP <80 MM HG: CPT | Performed by: OBSTETRICS & GYNECOLOGY

## 2025-05-14 PROCEDURE — 3074F SYST BP LT 130 MM HG: CPT | Performed by: OBSTETRICS & GYNECOLOGY

## 2025-05-14 PROCEDURE — 87491 CHLMYD TRACH DNA AMP PROBE: CPT | Performed by: OBSTETRICS & GYNECOLOGY

## 2025-05-14 PROCEDURE — 57456 ENDOCERV CURETTAGE W/SCOPE: CPT | Performed by: OBSTETRICS & GYNECOLOGY

## 2025-05-14 NOTE — NURSING NOTE
"Chief Complaint   Patient presents with    Colposcopy     Pap 24  ASC-US  HPV= Neg   Post menopausal        Initial /66 (BP Location: Right arm, Cuff Size: Adult Regular)   Wt 58.4 kg (128 lb 12.8 oz)   LMP  (LMP Unknown)   BMI 20.79 kg/m   Estimated body mass index is 20.79 kg/m  as calculated from the following:    Height as of 25: 1.676 m (5' 6\").    Weight as of this encounter: 58.4 kg (128 lb 12.8 oz).  BP completed using cuff size: regular    Questioned patient about current smoking habits.  Pt. has never smoked.          The following HM Due: NONE          Nohelia Christianson, GIDEON on 2025 at 11:16 AM         "

## 2025-05-14 NOTE — PROGRESS NOTES
Colposcopy Note    Past History:     No LMP recorded (lmp unknown). Patient is postmenopausal.  Patient is not pregnant.     Previous Abnormal Pap Hx:  Pt immunocompromised due to liver transplant. Needs cotests at least every 3 years.   5/12/10 ASCUS pap, neg HPV  8/3/11 ASCUS pap, neg HPV   NIL pap   ASCUS pap, neg HPV  All above from Care Everywhere  3/17/21 NIL Pap, Neg HPV  22 NIL Pap, Neg HPV  23 ASCUS pap, neg HPV. Plan cotest in 1 year   24 ASCUS pap, neg HPV.    Indications:  Encounter Diagnoses   Name Primary?    ASCUS of cervix with negative high risk HPV Yes    Screen for STD (sexually transmitted disease)     Cervical stenosis (uterine cervix)        PROCEDURE:  The procedure was explained, and informed consent obtained. The patient was placed in the dorsal lithotomy position. A vaginal speculum was placed. A GC/Chlamydia culture was obtained. Dilute acetic acid was applied to cervix. The exocervix was visualized. The transformation zone was visualized satisfactorily. Colposcopy was done with white light and with the Johnathon light. The external os is quite small and stenotic. No ectoCx AWE was seen. To facilitate ECC, the cervix was graspsed at 12:00 with a single-toothed tenaculum for countertraction and the cervical os was dilated with os finders to allow insertion of the ECC curette. Endocervical curettage was done. No ectocervical biopsy was done. Colposcopy of vagina revealed: normal. The patient tolerated the procedure well. At the completion of the procedure, only scant bleeding was present. Hemostasis was achieved with Monsel's solution.    FINDINGS:  Normal colposcopic exam.  Stenotic cervical os.    Physical Exam  Genitourinary:           Comments: No AWE seen.        ASSESSMENT:  Encounter Diagnoses   Name Primary?    ASCUS of cervix with negative high risk HPV Yes    Screen for STD (sexually transmitted disease)     Cervical stenosis (uterine cervix)         PLAN:  1) If Bx shows JOVANNI 1 or less, follow-up with Ob/Gyn per [facility] Guideline in 1 year for Pap & HPV cotest at next annual exam.     2) Due to marked stenosis of external os and atrophic cervix, if requires colposcopy in future would recommend referral to [facility]. Pt advised of this recommendation.    Procedure Planned:   If HGSIL, consider LEEP.    Javier Turner MD

## 2025-05-15 LAB
C TRACH DNA SPEC QL NAA+PROBE: NEGATIVE
N GONORRHOEA DNA SPEC QL NAA+PROBE: NEGATIVE
SPECIMEN TYPE: NORMAL
SPECIMEN TYPE: NORMAL

## 2025-06-12 ENCOUNTER — PATIENT OUTREACH (OUTPATIENT)
Dept: OBGYN | Facility: CLINIC | Age: 55
End: 2025-06-12
Payer: COMMERCIAL

## 2025-07-29 ENCOUNTER — LAB (OUTPATIENT)
Dept: LAB | Facility: CLINIC | Age: 55
End: 2025-07-29
Payer: COMMERCIAL

## 2025-07-29 DIAGNOSIS — T86.41 LIVER TRANSPLANT REJECTION (H): ICD-10-CM

## 2025-07-29 LAB
ALBUMIN SERPL BCG-MCNC: 4.1 G/DL (ref 3.5–5.2)
ALP SERPL-CCNC: 53 U/L (ref 40–150)
ALT SERPL W P-5'-P-CCNC: 11 U/L (ref 0–50)
ANION GAP SERPL CALCULATED.3IONS-SCNC: 10 MMOL/L (ref 7–15)
AST SERPL W P-5'-P-CCNC: 22 U/L (ref 0–45)
BILIRUB SERPL-MCNC: 0.4 MG/DL
BILIRUBIN DIRECT (ROCHE PRO & PURE): 0.19 MG/DL (ref 0–0.45)
BUN SERPL-MCNC: 28.8 MG/DL (ref 6–20)
CALCIUM SERPL-MCNC: 9.7 MG/DL (ref 8.8–10.4)
CHLORIDE SERPL-SCNC: 106 MMOL/L (ref 98–107)
CREAT SERPL-MCNC: 1.19 MG/DL (ref 0.51–0.95)
EGFRCR SERPLBLD CKD-EPI 2021: 54 ML/MIN/1.73M2
ERYTHROCYTE [DISTWIDTH] IN BLOOD BY AUTOMATED COUNT: 11.8 % (ref 10–15)
GLUCOSE SERPL-MCNC: 70 MG/DL (ref 70–99)
HCO3 SERPL-SCNC: 27 MMOL/L (ref 22–29)
HCT VFR BLD AUTO: 37.1 % (ref 35–47)
HGB BLD-MCNC: 12.7 G/DL (ref 11.7–15.7)
MCH RBC QN AUTO: 32.2 PG (ref 26.5–33)
MCHC RBC AUTO-ENTMCNC: 34.2 G/DL (ref 31.5–36.5)
MCV RBC AUTO: 94 FL (ref 78–100)
PLATELET # BLD AUTO: 186 10E3/UL (ref 150–450)
POTASSIUM SERPL-SCNC: 4.2 MMOL/L (ref 3.4–5.3)
PROT SERPL-MCNC: 6.1 G/DL (ref 6.4–8.3)
RBC # BLD AUTO: 3.95 10E6/UL (ref 3.8–5.2)
SODIUM SERPL-SCNC: 143 MMOL/L (ref 135–145)
TACROLIMUS BLD-MCNC: 6 UG/L (ref 5–15)
TME LAST DOSE: NORMAL H
TME LAST DOSE: NORMAL H
WBC # BLD AUTO: 5.6 10E3/UL (ref 4–11)

## 2025-07-29 PROCEDURE — 85027 COMPLETE CBC AUTOMATED: CPT

## 2025-07-29 PROCEDURE — 80197 ASSAY OF TACROLIMUS: CPT

## 2025-07-29 PROCEDURE — 36415 COLL VENOUS BLD VENIPUNCTURE: CPT

## 2025-07-29 PROCEDURE — 80053 COMPREHEN METABOLIC PANEL: CPT

## 2025-07-29 PROCEDURE — 82248 BILIRUBIN DIRECT: CPT

## 2025-11-03 ENCOUNTER — PRE VISIT (OUTPATIENT)
Dept: PLASTIC SURGERY | Facility: CLINIC | Age: 55
End: 2025-11-03

## (undated) DEVICE — CONNECTOR MALE TO MALE LL

## (undated) DEVICE — SUCTION MANIFOLD NEPTUNE 2 SYS 4 PORT 0702-020-000

## (undated) DEVICE — CATH RETRIEVAL BALLOON EXTRACTOR PRO RX-S INJ ABOVE 9-12MM

## (undated) DEVICE — ENDO FUSION OMNI-TOME G31903

## (undated) DEVICE — SOL WATER IRRIG 1000ML BOTTLE 2F7114

## (undated) DEVICE — LINEN TOWEL PACK X5 5464

## (undated) DEVICE — INFLATION DEVICE BIG 60 ENDO-AN6012

## (undated) DEVICE — DRSG TEGADERM 2 3/8X2 3/4" 1624W

## (undated) DEVICE — DRAPE SLUSH/WARMER 66X44" ORS-320

## (undated) DEVICE — PACK ENDOSCOPY GI CUSTOM UMMC

## (undated) DEVICE — SU SILK 1 TIE 6X30" A307H

## (undated) DEVICE — SPONGE LAP 18X18" X8435

## (undated) DEVICE — SUCTION FRAZIER 12FR W/HANDLE K73

## (undated) DEVICE — SU SILK 0 TIE 6X30" A306H

## (undated) DEVICE — SU SILK 0 SH 30" K834H

## (undated) DEVICE — SU PROLENE 6-0 C-1DA 30" 8706H

## (undated) DEVICE — STOPCOCK ANGIO 1-WAY MARQUIS MLL  H1RC

## (undated) DEVICE — CLIP APPLIER 09 3/8" SM LIGACLIP MCS20

## (undated) DEVICE — Device

## (undated) DEVICE — TUBING IRRIG CYSTO/BLADDER SET 81" LF 2C4040

## (undated) DEVICE — NDL YUEH CENTESIS 5FRX10CM G09490 DTVN-5.0-19-10.0-YUEH

## (undated) DEVICE — KIT ENDO FIRST STEP DISINFECTANT 200ML W/POUCH EP-4

## (undated) DEVICE — SPONGE KITTNER 30-101

## (undated) DEVICE — TUBING MEDRAD 48" HIGH PRESSURE MX694

## (undated) DEVICE — LINEN TOWEL PACK X6 WHITE 5487

## (undated) DEVICE — SUCTION MANIFOLD NEPTUNE 2 SYS 1 PORT 702-025-000

## (undated) DEVICE — WIRE GUIDE 0.025"X270CM STR VISIGLIDE G-240-2527S

## (undated) DEVICE — SU PROLENE 4-0 SHDA 36" 8521H

## (undated) DEVICE — CONNECTOR STOPCOCK 3 WAY MALE LL MX4311L

## (undated) DEVICE — SU VICRYL 3-0 SH 27" J316H

## (undated) DEVICE — SUCTION CATH AIRLIFE TRI-FLO W/CONTROL PORT 14FR  T60C

## (undated) DEVICE — SU PDS II 6-0 RB-2DA 30" Z149H

## (undated) DEVICE — CLIP ENDO HEMO-LOC PURPLE LG 544240

## (undated) DEVICE — ESU ELEC BLADE 6" COATED E1450-6

## (undated) DEVICE — DRAPE BACK TABLE  44X90" 8377

## (undated) DEVICE — ENDO BITE BLOCK ADULT OMNI-BLOC

## (undated) DEVICE — SYR 01ML 27GA 0.5" NDL TBC 309623

## (undated) DEVICE — SUTURE BOOTS 051003PBX

## (undated) DEVICE — DRSG PRIMAPORE 02X3" 7133

## (undated) DEVICE — GLOVE EXAM NITRILE LG PF LATEX FREE 5064

## (undated) DEVICE — CATH TRAY FOLEY SURESTEP 16FR W/TMP PRB STLK LATEX A319416AM

## (undated) DEVICE — SURGICEL HEMOSTAT 4X8" 1952

## (undated) DEVICE — DRAPE ISOLATION BAG 1003

## (undated) DEVICE — SUCTION TIP YANKAUER W/O VENT K86

## (undated) DEVICE — COVER ULTRASOUND PROBE W/GEL FLEXI-FEEL 6"X58" LF  25-FF658

## (undated) DEVICE — SU PROLENE 3-0 SHDA 36" 8522H

## (undated) DEVICE — STPL ENDO LINEAR CUT ECHELON GST 45MM COMPACT PCEE45A

## (undated) DEVICE — NDL 15GA 1.5" 8881200029

## (undated) DEVICE — SYR 30ML SLIP TIP W/O NDL 302833

## (undated) DEVICE — SU PROLENE 4-0 RB-1DA 36" 8557H

## (undated) DEVICE — DRAPE IOBAN C-SECTION W/POUCH 30X35" 6657

## (undated) DEVICE — LINEN TOWEL PACK X30 5481

## (undated) DEVICE — ESU GROUND PAD THERMOGUARD PLUS ABC PEDS 7-382

## (undated) DEVICE — SU PROLENE 5-0 RB-1DA 36"  8556H

## (undated) DEVICE — ESU GROUND PAD ADULT W/CORD E7507

## (undated) DEVICE — INSERT FOGARTY 61MM TRACTION HYDRAJAW HYDRA61

## (undated) DEVICE — ESU PENCIL W/COATED BLADE E2450H

## (undated) DEVICE — ENDO DEVICE LOCKING AND BIOPSY CAP M00545261

## (undated) DEVICE — SPONGE RAY-TEC 4X8" 7318

## (undated) DEVICE — NDL BLUNT 18GA 1" W/O FILTER 305181

## (undated) DEVICE — DRAPE MAYO STAND 23X54 8337

## (undated) DEVICE — GOWN XLG DISP 9545

## (undated) DEVICE — CLIP APPLIER 11" MED LIGACLIP MCM30

## (undated) DEVICE — WIPES FOLEY CARE SURESTEP PROVON DFC100

## (undated) DEVICE — SURGICEL FIBRILLAR HEMOSTAT 4"X4" 1963

## (undated) DEVICE — SOL WATER IRRIG 500ML BOTTLE 2F7113

## (undated) DEVICE — SU PROLENE 3-0 SH-1 30" 8762H

## (undated) DEVICE — KIT CONNECTOR FOR OLYMPUS ENDOSCOPES DEFENDO 100310

## (undated) DEVICE — SU SILK 4-0 TIE 12X30" A303H

## (undated) DEVICE — BIOPSY VALVE BIOSHIELD 00711135

## (undated) DEVICE — SU PROLENE 5-0 RB-2DA 30" 8710H

## (undated) DEVICE — SU SILK 2-0 TIE 12X30" A305H

## (undated) DEVICE — CATH FOGARTY EMBOLECTOMY 3FR 80CM LATEX 120803FP

## (undated) DEVICE — LINEN GOWN XLG 5407

## (undated) DEVICE — ENDO CATH BALLOON DILATION HURRICANE 08MMX4X180CM M00545940

## (undated) DEVICE — BASIN SET SINGLE STERILE 13752-624

## (undated) DEVICE — ENDO TUBING CO2 SMARTCAP STERILE DISP 100145CO2EXT

## (undated) DEVICE — SPONGE SURGIFOAM 100 1974

## (undated) DEVICE — SURGICEL ABSORBABLE HEMOSTAT SNOW 4"X4" 2083

## (undated) DEVICE — KIT ENDO TURNOVER/PROCEDURE CARRY-ON 101822

## (undated) DEVICE — SU SILK 5-0 TIE 12X30" A302H

## (undated) DEVICE — INTR ENDOSCOPIC STENT FUSION OASIS 09.0FRX200CM

## (undated) DEVICE — SU PROLENE 4-0 RB-1DA 18" 8757H

## (undated) DEVICE — NDL COUNTER 40CT  31142311

## (undated) DEVICE — SU VICRYL 3-0 SH 27" UND J416H

## (undated) DEVICE — DEFIB PRO-PADZ LVP LQD GEL ADULT 8900-2105-01

## (undated) DEVICE — DRAPE SHEET REV FOLD 3/4 9349

## (undated) DEVICE — KIT RIGHT HEART CATH 60130719

## (undated) DEVICE — SU SILK 3-0 SH-1 CR 8X18" C003D

## (undated) DEVICE — SU VICRYL 0 CT-1 CR 8X27" UND JJ41G

## (undated) DEVICE — RETR VISCERA FISH MED 3204

## (undated) DEVICE — SU PROLENE 1 CTX 30" 8455H

## (undated) DEVICE — DRSG TEGADERM 8X12" 1629

## (undated) DEVICE — DRAIN JACKSON PRATT RESERVOIR 400ML SU130-1000

## (undated) DEVICE — DRSG GAUZE 4X4" 2187

## (undated) DEVICE — NDL COUNTER 20CT 31142493

## (undated) DEVICE — DRAPE POUCH INSTRUMENT 1018

## (undated) DEVICE — CLIP APPLIER 13" LG LIGACLIP MCL20

## (undated) DEVICE — GLOVE PROTEXIS POWDER FREE SMT 8.0  2D72PT80X

## (undated) DEVICE — DRAIN JACKSON PRATT ROUND SIL 19FR W/TROCAR LF JP-2232

## (undated) DEVICE — SOL NACL 0.9% IRRIG 1000ML BOTTLE 2F7124

## (undated) DEVICE — LABEL MEDICATION SYSTEM 3303-P

## (undated) DEVICE — TUBING SUCTION 10'X3/16" N510

## (undated) DEVICE — TUBE FEEDING NEOCONNECT SIL ENFIT 5FR 40CM PFTS5.0S-NC

## (undated) DEVICE — SUCTION TIP YANKAUER VIAGUARD W/SLEEVE SMP-2006-001SS

## (undated) DEVICE — SU ETHILON 3-0 PS-1 18" 1663H

## (undated) DEVICE — SYR 30ML LL W/O NDL 302832

## (undated) DEVICE — VESSEL LOOPS YELLOW MAXI 31145694

## (undated) DEVICE — MANIFOLD KIT ANGIO AUTOMATED 014613

## (undated) DEVICE — GOWN IMPERVIOUS 2XL BLUE

## (undated) DEVICE — SU PROLENE 6-0 RB-2DA 30" 8711H

## (undated) DEVICE — PACK HEART RIGHT CUSTOM SAN32RHF18

## (undated) DEVICE — BLADE KNIFE SURG 15 371115

## (undated) DEVICE — SU SILK 2-0 SH 30" K833H

## (undated) DEVICE — SYR BULB IRRIG 50ML LATEX FREE 0035280

## (undated) DEVICE — STPL LINEAR 30X2.5MM VASC TX30V

## (undated) DEVICE — CONTAINER EVACUATOR GLASS VACUTAINER 1000ML 1A8504

## (undated) DEVICE — SU PROLENE 7-0 BV-1DA 30" 8703H

## (undated) DEVICE — DRSG MEDIPORE 3 1/2X13 3/4" 3573

## (undated) DEVICE — ESU LIGASURE IMPACT OPEN SEALER/DVDR CVD LG JAW LF4418

## (undated) DEVICE — ESU HANDPIECE ABC BEND-A-BEAM 6" 134006

## (undated) DEVICE — GLOVE PROTEXIS MICRO 7.5  2D73PM75

## (undated) DEVICE — TUBING SUCTION 12"X1/4" N612

## (undated) DEVICE — STPL SKIN 35W ROTATING HEAD PRW35

## (undated) DEVICE — SU SILK 3-0 TIE 12X30" A304H

## (undated) DEVICE — GLOVE PROTEXIS POWDER FREE SMT 6.5  2D72PT65X

## (undated) DEVICE — COVER LIGHT HANDLE  HILL-ROM C LT-C02

## (undated) DEVICE — CLIP ENDO HEMO-LOC GREEN MED/LG 544230

## (undated) DEVICE — WIRE GUIDE 0.025"X150CM EMERALD J TIP 502524

## (undated) DEVICE — ENDO NDL ASPIRATION ULTRASOUND 19GA ACQUIRE M00555580

## (undated) DEVICE — STPL POWERED ECHELON 45MM PSEE45A

## (undated) DEVICE — GLOVE PROTEXIS BLUE W/NEU-THERA 8.0  2D73EB80

## (undated) DEVICE — DRSG TEGADERM IV ADVANCED 2.5X2.75" 1683

## (undated) DEVICE — ENDO CATH BALLOON DILATION HURRICANE 04MMX4X180CM M00545900

## (undated) DEVICE — TUBE FEEDING NEOCONNECT POLY ENFIT 8FR 24" PFTM8.0P-NC

## (undated) DEVICE — SU PROLENE 3-0 RB-1DA 36"  8558H

## (undated) DEVICE — GLOVE PROTEXIS POWDER FREE SMT 7.5  2D72PT75X

## (undated) DEVICE — CUP AND LID 2PK 2OZ STERILE  SSK9006A

## (undated) DEVICE — VESSEL LOOPS BLUE SUPERMAXI 011022PBX

## (undated) DEVICE — BLADE KNIFE SURG 10 371110

## (undated) DEVICE — INTRO SHEATH 7FRX10CM PINNACLE RSS702

## (undated) RX ORDER — CALCIUM GLUCONATE 94 MG/ML
INJECTION, SOLUTION INTRAVENOUS
Status: DISPENSED
Start: 2024-02-02

## (undated) RX ORDER — HEPARIN SODIUM 1000 [USP'U]/ML
INJECTION, SOLUTION INTRAVENOUS; SUBCUTANEOUS
Status: DISPENSED
Start: 2021-05-02

## (undated) RX ORDER — PAPAVERINE HYDROCHLORIDE 30 MG/ML
INJECTION INTRAMUSCULAR; INTRAVENOUS
Status: DISPENSED
Start: 2021-10-17

## (undated) RX ORDER — DOBUTAMINE HYDROCHLORIDE 200 MG/100ML
INJECTION INTRAVENOUS
Status: DISPENSED
Start: 2021-01-07

## (undated) RX ORDER — ONDANSETRON 2 MG/ML
INJECTION INTRAMUSCULAR; INTRAVENOUS
Status: DISPENSED
Start: 2021-09-09

## (undated) RX ORDER — ALBUMIN HUMAN 25 %
INTRAVENOUS SOLUTION INTRAVENOUS
Status: DISPENSED
Start: 2024-02-02

## (undated) RX ORDER — ONDANSETRON 2 MG/ML
INJECTION INTRAMUSCULAR; INTRAVENOUS
Status: DISPENSED
Start: 2021-11-29

## (undated) RX ORDER — LABETALOL HYDROCHLORIDE 5 MG/ML
INJECTION, SOLUTION INTRAVENOUS
Status: DISPENSED
Start: 2021-05-02

## (undated) RX ORDER — FENTANYL CITRATE 50 UG/ML
INJECTION, SOLUTION INTRAMUSCULAR; INTRAVENOUS
Status: DISPENSED
Start: 2021-10-22

## (undated) RX ORDER — PAPAVERINE HYDROCHLORIDE 30 MG/ML
INJECTION INTRAMUSCULAR; INTRAVENOUS
Status: DISPENSED
Start: 2021-05-02

## (undated) RX ORDER — ESMOLOL HYDROCHLORIDE 10 MG/ML
INJECTION INTRAVENOUS
Status: DISPENSED
Start: 2021-11-29

## (undated) RX ORDER — LIDOCAINE HYDROCHLORIDE 10 MG/ML
INJECTION, SOLUTION EPIDURAL; INFILTRATION; INTRACAUDAL; PERINEURAL
Status: DISPENSED
Start: 2021-10-26

## (undated) RX ORDER — LIDOCAINE HYDROCHLORIDE 20 MG/ML
INJECTION, SOLUTION EPIDURAL; INFILTRATION; INTRACAUDAL; PERINEURAL
Status: DISPENSED
Start: 2021-11-29

## (undated) RX ORDER — HEPARIN SODIUM 1000 [USP'U]/ML
INJECTION, SOLUTION INTRAVENOUS; SUBCUTANEOUS
Status: DISPENSED
Start: 2022-04-13

## (undated) RX ORDER — ROCURONIUM BROMIDE 50 MG/5 ML
SYRINGE (ML) INTRAVENOUS
Status: DISPENSED
Start: 2021-11-29

## (undated) RX ORDER — ALBUMIN (HUMAN) 12.5 G/50ML
SOLUTION INTRAVENOUS
Status: DISPENSED
Start: 2021-08-26

## (undated) RX ORDER — PROPOFOL 10 MG/ML
INJECTION, EMULSION INTRAVENOUS
Status: DISPENSED
Start: 2021-10-17

## (undated) RX ORDER — DEXTROSE MONOHYDRATE 25 G/50ML
INJECTION, SOLUTION INTRAVENOUS
Status: DISPENSED
Start: 2021-10-17

## (undated) RX ORDER — FENTANYL CITRATE 50 UG/ML
INJECTION, SOLUTION INTRAMUSCULAR; INTRAVENOUS
Status: DISPENSED
Start: 2021-11-29

## (undated) RX ORDER — PROPOFOL 10 MG/ML
INJECTION, EMULSION INTRAVENOUS
Status: DISPENSED
Start: 2021-11-29

## (undated) RX ORDER — HEPARIN SODIUM 1000 [USP'U]/ML
INJECTION, SOLUTION INTRAVENOUS; SUBCUTANEOUS
Status: DISPENSED
Start: 2021-10-17

## (undated) RX ORDER — FENTANYL CITRATE 50 UG/ML
INJECTION, SOLUTION INTRAMUSCULAR; INTRAVENOUS
Status: DISPENSED
Start: 2024-01-29

## (undated) RX ORDER — ALBUMIN, HUMAN INJ 5% 5 %
SOLUTION INTRAVENOUS
Status: DISPENSED
Start: 2021-10-17

## (undated) RX ORDER — PROPOFOL 10 MG/ML
INJECTION, EMULSION INTRAVENOUS
Status: DISPENSED
Start: 2021-05-02

## (undated) RX ORDER — SODIUM CHLORIDE 9 MG/ML
INJECTION, SOLUTION INTRAVENOUS
Status: DISPENSED
Start: 2024-01-29

## (undated) RX ORDER — LIDOCAINE 40 MG/G
CREAM TOPICAL
Status: DISPENSED
Start: 2021-06-07

## (undated) RX ORDER — GLYCOPYRROLATE 0.2 MG/ML
INJECTION, SOLUTION INTRAMUSCULAR; INTRAVENOUS
Status: DISPENSED
Start: 2021-05-02

## (undated) RX ORDER — LIDOCAINE HYDROCHLORIDE 20 MG/ML
INJECTION, SOLUTION EPIDURAL; INFILTRATION; INTRACAUDAL; PERINEURAL
Status: DISPENSED
Start: 2021-05-02

## (undated) RX ORDER — FENTANYL CITRATE 50 UG/ML
INJECTION, SOLUTION INTRAMUSCULAR; INTRAVENOUS
Status: DISPENSED
Start: 2021-10-17

## (undated) RX ORDER — VERAPAMIL HYDROCHLORIDE 2.5 MG/ML
INJECTION, SOLUTION INTRAVENOUS
Status: DISPENSED
Start: 2021-10-17

## (undated) RX ORDER — METOPROLOL TARTRATE 1 MG/ML
INJECTION, SOLUTION INTRAVENOUS
Status: DISPENSED
Start: 2021-01-07

## (undated) RX ORDER — ATROPINE SULFATE 0.4 MG/ML
AMPUL (ML) INJECTION
Status: DISPENSED
Start: 2021-01-07

## (undated) RX ORDER — CEFAZOLIN SODIUM 1 G/3ML
INJECTION, POWDER, FOR SOLUTION INTRAMUSCULAR; INTRAVENOUS
Status: DISPENSED
Start: 2021-05-02

## (undated) RX ORDER — FENTANYL CITRATE 50 UG/ML
INJECTION, SOLUTION INTRAMUSCULAR; INTRAVENOUS
Status: DISPENSED
Start: 2021-05-02

## (undated) RX ORDER — FENTANYL CITRATE 50 UG/ML
INJECTION, SOLUTION INTRAMUSCULAR; INTRAVENOUS
Status: DISPENSED
Start: 2021-10-25

## (undated) RX ORDER — DIPHENHYDRAMINE HYDROCHLORIDE 50 MG/ML
INJECTION INTRAMUSCULAR; INTRAVENOUS
Status: DISPENSED
Start: 2021-10-22

## (undated) RX ORDER — HEPARIN SODIUM 1000 [USP'U]/ML
INJECTION, SOLUTION INTRAVENOUS; SUBCUTANEOUS
Status: DISPENSED
Start: 2024-02-02

## (undated) RX ORDER — FENTANYL CITRATE 50 UG/ML
INJECTION, SOLUTION INTRAMUSCULAR; INTRAVENOUS
Status: DISPENSED
Start: 2022-04-13

## (undated) RX ORDER — LEVOFLOXACIN 5 MG/ML
INJECTION, SOLUTION INTRAVENOUS
Status: DISPENSED
Start: 2022-04-13

## (undated) RX ORDER — FENTANYL CITRATE 50 UG/ML
INJECTION, SOLUTION INTRAMUSCULAR; INTRAVENOUS
Status: DISPENSED
Start: 2021-10-26

## (undated) RX ORDER — LIDOCAINE HYDROCHLORIDE 10 MG/ML
INJECTION, SOLUTION EPIDURAL; INFILTRATION; INTRACAUDAL; PERINEURAL
Status: DISPENSED
Start: 2024-01-29

## (undated) RX ORDER — SODIUM CHLORIDE, SODIUM LACTATE, POTASSIUM CHLORIDE, CALCIUM CHLORIDE 600; 310; 30; 20 MG/100ML; MG/100ML; MG/100ML; MG/100ML
INJECTION, SOLUTION INTRAVENOUS
Status: DISPENSED
Start: 2021-11-29